# Patient Record
Sex: MALE | Race: ASIAN | NOT HISPANIC OR LATINO | URBAN - METROPOLITAN AREA
[De-identification: names, ages, dates, MRNs, and addresses within clinical notes are randomized per-mention and may not be internally consistent; named-entity substitution may affect disease eponyms.]

---

## 2018-06-01 ENCOUNTER — OUTPATIENT (OUTPATIENT)
Dept: OUTPATIENT SERVICES | Facility: HOSPITAL | Age: 63
LOS: 1 days | End: 2018-06-01
Payer: MEDICAID

## 2018-06-01 PROCEDURE — G9001: CPT

## 2018-06-11 ENCOUNTER — INPATIENT (INPATIENT)
Facility: HOSPITAL | Age: 63
LOS: 2 days | Discharge: HOME | End: 2018-06-14
Attending: HOSPITALIST | Admitting: HOSPITALIST
Payer: MEDICAID

## 2018-06-11 VITALS
DIASTOLIC BLOOD PRESSURE: 81 MMHG | OXYGEN SATURATION: 100 % | HEART RATE: 81 BPM | TEMPERATURE: 100 F | RESPIRATION RATE: 18 BRPM | SYSTOLIC BLOOD PRESSURE: 151 MMHG

## 2018-06-11 DIAGNOSIS — E11.9 TYPE 2 DIABETES MELLITUS WITHOUT COMPLICATIONS: ICD-10-CM

## 2018-06-11 DIAGNOSIS — I10 ESSENTIAL (PRIMARY) HYPERTENSION: ICD-10-CM

## 2018-06-11 DIAGNOSIS — D64.9 ANEMIA, UNSPECIFIED: ICD-10-CM

## 2018-06-11 DIAGNOSIS — Z29.9 ENCOUNTER FOR PROPHYLACTIC MEASURES, UNSPECIFIED: ICD-10-CM

## 2018-06-11 DIAGNOSIS — N17.9 ACUTE KIDNEY FAILURE, UNSPECIFIED: ICD-10-CM

## 2018-06-11 DIAGNOSIS — I63.9 CEREBRAL INFARCTION, UNSPECIFIED: ICD-10-CM

## 2018-06-11 DIAGNOSIS — R04.2 HEMOPTYSIS: ICD-10-CM

## 2018-06-11 DIAGNOSIS — I25.10 ATHEROSCLEROTIC HEART DISEASE OF NATIVE CORONARY ARTERY WITHOUT ANGINA PECTORIS: ICD-10-CM

## 2018-06-11 DIAGNOSIS — J18.9 PNEUMONIA, UNSPECIFIED ORGANISM: ICD-10-CM

## 2018-06-11 DIAGNOSIS — R94.31 ABNORMAL ELECTROCARDIOGRAM [ECG] [EKG]: ICD-10-CM

## 2018-06-11 DIAGNOSIS — E87.1 HYPO-OSMOLALITY AND HYPONATREMIA: ICD-10-CM

## 2018-06-11 LAB
ALBUMIN SERPL ELPH-MCNC: 2.9 G/DL — LOW (ref 3.3–5)
ALP SERPL-CCNC: 70 U/L — SIGNIFICANT CHANGE UP (ref 40–120)
ALT FLD-CCNC: 18 U/L — SIGNIFICANT CHANGE UP (ref 4–41)
APTT BLD: 31 SEC — SIGNIFICANT CHANGE UP (ref 27.5–37.4)
AST SERPL-CCNC: 28 U/L — SIGNIFICANT CHANGE UP (ref 4–40)
BASE EXCESS BLDV CALC-SCNC: -4 MMOL/L — SIGNIFICANT CHANGE UP
BASE EXCESS BLDV CALC-SCNC: -6.5 MMOL/L — SIGNIFICANT CHANGE UP
BASOPHILS # BLD AUTO: 0.02 K/UL — SIGNIFICANT CHANGE UP (ref 0–0.2)
BASOPHILS NFR BLD AUTO: 0.2 % — SIGNIFICANT CHANGE UP (ref 0–2)
BILIRUB SERPL-MCNC: 0.5 MG/DL — SIGNIFICANT CHANGE UP (ref 0.2–1.2)
BLD GP AB SCN SERPL QL: NEGATIVE — SIGNIFICANT CHANGE UP
BLOOD GAS VENOUS - CREATININE: 5.92 MG/DL — HIGH (ref 0.5–1.3)
BUN SERPL-MCNC: 42 MG/DL — HIGH (ref 7–23)
CALCIUM SERPL-MCNC: 6.8 MG/DL — LOW (ref 8.4–10.5)
CHLORIDE BLDV-SCNC: 99 MMOL/L — SIGNIFICANT CHANGE UP (ref 96–108)
CHLORIDE SERPL-SCNC: 87 MMOL/L — LOW (ref 98–107)
CK MB BLD-MCNC: 0.9 — SIGNIFICANT CHANGE UP (ref 0–2.5)
CK MB BLD-MCNC: 4.68 NG/ML — SIGNIFICANT CHANGE UP (ref 1–6.6)
CK MB BLD-MCNC: 7.07 NG/ML — HIGH (ref 1–6.6)
CK SERPL-CCNC: 504 U/L — HIGH (ref 30–200)
CK SERPL-CCNC: 537 U/L — HIGH (ref 30–200)
CO2 SERPL-SCNC: 18 MMOL/L — LOW (ref 22–31)
CREAT SERPL-MCNC: 5.69 MG/DL — HIGH (ref 0.5–1.3)
EOSINOPHIL # BLD AUTO: 0 K/UL — SIGNIFICANT CHANGE UP (ref 0–0.5)
EOSINOPHIL NFR BLD AUTO: 0 % — SIGNIFICANT CHANGE UP (ref 0–6)
GAS PNL BLDV: 118 MMOL/L — CRITICAL LOW (ref 136–146)
GAS PNL BLDV: 119 MMOL/L — CRITICAL LOW (ref 136–146)
GLUCOSE BLDV-MCNC: 224 — HIGH (ref 70–99)
GLUCOSE BLDV-MCNC: 272 — HIGH (ref 70–99)
GLUCOSE SERPL-MCNC: 255 MG/DL — HIGH (ref 70–99)
HCO3 BLDV-SCNC: 19 MMOL/L — LOW (ref 20–27)
HCO3 BLDV-SCNC: 21 MMOL/L — SIGNIFICANT CHANGE UP (ref 20–27)
HCT VFR BLD CALC: 28.7 % — LOW (ref 39–50)
HCT VFR BLDV CALC: 26.6 % — LOW (ref 39–51)
HCT VFR BLDV CALC: 36.4 % — LOW (ref 39–51)
HGB BLD-MCNC: 9.5 G/DL — LOW (ref 13–17)
HGB BLDV-MCNC: 11.8 G/DL — LOW (ref 13–17)
HGB BLDV-MCNC: 8.6 G/DL — LOW (ref 13–17)
IMM GRANULOCYTES # BLD AUTO: 0.03 # — SIGNIFICANT CHANGE UP
IMM GRANULOCYTES NFR BLD AUTO: 0.3 % — SIGNIFICANT CHANGE UP (ref 0–1.5)
INR BLD: 1.11 — SIGNIFICANT CHANGE UP (ref 0.88–1.17)
LACTATE BLDV-MCNC: 0.9 MMOL/L — SIGNIFICANT CHANGE UP (ref 0.5–2)
LYMPHOCYTES # BLD AUTO: 0.62 K/UL — LOW (ref 1–3.3)
LYMPHOCYTES # BLD AUTO: 6.1 % — LOW (ref 13–44)
MCHC RBC-ENTMCNC: 27.2 PG — SIGNIFICANT CHANGE UP (ref 27–34)
MCHC RBC-ENTMCNC: 33.1 % — SIGNIFICANT CHANGE UP (ref 32–36)
MCV RBC AUTO: 82.2 FL — SIGNIFICANT CHANGE UP (ref 80–100)
MONOCYTES # BLD AUTO: 1.04 K/UL — HIGH (ref 0–0.9)
MONOCYTES NFR BLD AUTO: 10.2 % — SIGNIFICANT CHANGE UP (ref 2–14)
NEUTROPHILS # BLD AUTO: 8.44 K/UL — HIGH (ref 1.8–7.4)
NEUTROPHILS NFR BLD AUTO: 83.2 % — HIGH (ref 43–77)
NRBC # FLD: 0 — SIGNIFICANT CHANGE UP
PCO2 BLDV: 32 MMHG — LOW (ref 41–51)
PCO2 BLDV: 33 MMHG — LOW (ref 41–51)
PH BLDV: 7.36 PH — SIGNIFICANT CHANGE UP (ref 7.32–7.43)
PH BLDV: 7.4 PH — SIGNIFICANT CHANGE UP (ref 7.32–7.43)
PHOSPHATE SERPL-MCNC: 3.3 MG/DL — SIGNIFICANT CHANGE UP (ref 2.5–4.5)
PLATELET # BLD AUTO: 128 K/UL — LOW (ref 150–400)
PMV BLD: 11.3 FL — SIGNIFICANT CHANGE UP (ref 7–13)
PO2 BLDV: 31 MMHG — LOW (ref 35–40)
PO2 BLDV: 40 MMHG — SIGNIFICANT CHANGE UP (ref 35–40)
POTASSIUM BLDV-SCNC: 3.4 MMOL/L — SIGNIFICANT CHANGE UP (ref 3.4–4.5)
POTASSIUM BLDV-SCNC: 3.7 MMOL/L — SIGNIFICANT CHANGE UP (ref 3.4–4.5)
POTASSIUM SERPL-MCNC: 3.9 MMOL/L — SIGNIFICANT CHANGE UP (ref 3.5–5.3)
POTASSIUM SERPL-SCNC: 3.9 MMOL/L — SIGNIFICANT CHANGE UP (ref 3.5–5.3)
PROT SERPL-MCNC: 6.8 G/DL — SIGNIFICANT CHANGE UP (ref 6–8.3)
PROTHROM AB SERPL-ACNC: 12.8 SEC — SIGNIFICANT CHANGE UP (ref 9.8–13.1)
RBC # BLD: 3.49 M/UL — LOW (ref 4.2–5.8)
RBC # FLD: 11.6 % — SIGNIFICANT CHANGE UP (ref 10.3–14.5)
REVIEW TO FOLLOW: YES — SIGNIFICANT CHANGE UP
RH IG SCN BLD-IMP: NEGATIVE — SIGNIFICANT CHANGE UP
SAO2 % BLDV: 54.1 % — LOW (ref 60–85)
SAO2 % BLDV: 71.6 % — SIGNIFICANT CHANGE UP (ref 60–85)
SODIUM SERPL-SCNC: 123 MMOL/L — LOW (ref 135–145)
SODIUM SERPL-SCNC: 128 MMOL/L — LOW (ref 135–145)
TROPONIN T SERPL-MCNC: 0.06 NG/ML — SIGNIFICANT CHANGE UP (ref 0–0.06)
TROPONIN T SERPL-MCNC: < 0.06 NG/ML — SIGNIFICANT CHANGE UP (ref 0–0.06)
WBC # BLD: 10.15 K/UL — SIGNIFICANT CHANGE UP (ref 3.8–10.5)
WBC # FLD AUTO: 10.15 K/UL — SIGNIFICANT CHANGE UP (ref 3.8–10.5)

## 2018-06-11 PROCEDURE — 74176 CT ABD & PELVIS W/O CONTRAST: CPT | Mod: 26

## 2018-06-11 PROCEDURE — 99223 1ST HOSP IP/OBS HIGH 75: CPT | Mod: GC

## 2018-06-11 PROCEDURE — 71045 X-RAY EXAM CHEST 1 VIEW: CPT | Mod: 26

## 2018-06-11 PROCEDURE — 71250 CT THORAX DX C-: CPT | Mod: 26

## 2018-06-11 RX ORDER — AZITHROMYCIN 500 MG/1
500 TABLET, FILM COATED ORAL EVERY 24 HOURS
Qty: 0 | Refills: 0 | Status: DISCONTINUED | OUTPATIENT
Start: 2018-06-12 | End: 2018-06-14

## 2018-06-11 RX ORDER — INSULIN LISPRO 100/ML
VIAL (ML) SUBCUTANEOUS
Qty: 0 | Refills: 0 | Status: DISCONTINUED | OUTPATIENT
Start: 2018-06-11 | End: 2018-06-14

## 2018-06-11 RX ORDER — CEFTRIAXONE 500 MG/1
1 INJECTION, POWDER, FOR SOLUTION INTRAMUSCULAR; INTRAVENOUS ONCE
Qty: 0 | Refills: 0 | Status: COMPLETED | OUTPATIENT
Start: 2018-06-11 | End: 2018-06-11

## 2018-06-11 RX ORDER — PIPERACILLIN AND TAZOBACTAM 4; .5 G/20ML; G/20ML
3.38 INJECTION, POWDER, LYOPHILIZED, FOR SOLUTION INTRAVENOUS EVERY 12 HOURS
Qty: 0 | Refills: 0 | Status: DISCONTINUED | OUTPATIENT
Start: 2018-06-11 | End: 2018-06-12

## 2018-06-11 RX ORDER — SIMVASTATIN 20 MG/1
40 TABLET, FILM COATED ORAL AT BEDTIME
Qty: 0 | Refills: 0 | Status: DISCONTINUED | OUTPATIENT
Start: 2018-06-11 | End: 2018-06-14

## 2018-06-11 RX ORDER — ASPIRIN/CALCIUM CARB/MAGNESIUM 324 MG
324 TABLET ORAL ONCE
Qty: 0 | Refills: 0 | Status: COMPLETED | OUTPATIENT
Start: 2018-06-11 | End: 2018-06-11

## 2018-06-11 RX ORDER — LABETALOL HCL 100 MG
300 TABLET ORAL
Qty: 0 | Refills: 0 | Status: DISCONTINUED | OUTPATIENT
Start: 2018-06-11 | End: 2018-06-14

## 2018-06-11 RX ORDER — LABETALOL HCL 100 MG
300 TABLET ORAL
Qty: 0 | Refills: 0 | Status: DISCONTINUED | OUTPATIENT
Start: 2018-06-11 | End: 2018-06-11

## 2018-06-11 RX ORDER — PIPERACILLIN AND TAZOBACTAM 4; .5 G/20ML; G/20ML
4.5 INJECTION, POWDER, LYOPHILIZED, FOR SOLUTION INTRAVENOUS EVERY 8 HOURS
Qty: 0 | Refills: 0 | Status: DISCONTINUED | OUTPATIENT
Start: 2018-06-11 | End: 2018-06-11

## 2018-06-11 RX ORDER — GLUCAGON INJECTION, SOLUTION 0.5 MG/.1ML
1 INJECTION, SOLUTION SUBCUTANEOUS ONCE
Qty: 0 | Refills: 0 | Status: DISCONTINUED | OUTPATIENT
Start: 2018-06-11 | End: 2018-06-14

## 2018-06-11 RX ORDER — DEXTROSE 50 % IN WATER 50 %
25 SYRINGE (ML) INTRAVENOUS ONCE
Qty: 0 | Refills: 0 | Status: DISCONTINUED | OUTPATIENT
Start: 2018-06-11 | End: 2018-06-14

## 2018-06-11 RX ORDER — IPRATROPIUM/ALBUTEROL SULFATE 18-103MCG
3 AEROSOL WITH ADAPTER (GRAM) INHALATION EVERY 6 HOURS
Qty: 0 | Refills: 0 | Status: DISCONTINUED | OUTPATIENT
Start: 2018-06-11 | End: 2018-06-12

## 2018-06-11 RX ORDER — ACETAMINOPHEN 500 MG
650 TABLET ORAL ONCE
Qty: 0 | Refills: 0 | Status: COMPLETED | OUTPATIENT
Start: 2018-06-11 | End: 2018-06-11

## 2018-06-11 RX ORDER — PIPERACILLIN AND TAZOBACTAM 4; .5 G/20ML; G/20ML
3.38 INJECTION, POWDER, LYOPHILIZED, FOR SOLUTION INTRAVENOUS EVERY 8 HOURS
Qty: 0 | Refills: 0 | Status: DISCONTINUED | OUTPATIENT
Start: 2018-06-11 | End: 2018-06-11

## 2018-06-11 RX ORDER — ASPIRIN/CALCIUM CARB/MAGNESIUM 324 MG
81 TABLET ORAL DAILY
Qty: 0 | Refills: 0 | Status: DISCONTINUED | OUTPATIENT
Start: 2018-06-11 | End: 2018-06-13

## 2018-06-11 RX ORDER — SODIUM CHLORIDE 9 MG/ML
1000 INJECTION INTRAMUSCULAR; INTRAVENOUS; SUBCUTANEOUS ONCE
Qty: 0 | Refills: 0 | Status: COMPLETED | OUTPATIENT
Start: 2018-06-11 | End: 2018-06-11

## 2018-06-11 RX ORDER — DEXTROSE 50 % IN WATER 50 %
15 SYRINGE (ML) INTRAVENOUS ONCE
Qty: 0 | Refills: 0 | Status: DISCONTINUED | OUTPATIENT
Start: 2018-06-11 | End: 2018-06-14

## 2018-06-11 RX ORDER — SODIUM CHLORIDE 9 MG/ML
1000 INJECTION, SOLUTION INTRAVENOUS
Qty: 0 | Refills: 0 | Status: DISCONTINUED | OUTPATIENT
Start: 2018-06-11 | End: 2018-06-14

## 2018-06-11 RX ORDER — METOPROLOL TARTRATE 50 MG
25 TABLET ORAL DAILY
Qty: 0 | Refills: 0 | Status: DISCONTINUED | OUTPATIENT
Start: 2018-06-11 | End: 2018-06-11

## 2018-06-11 RX ORDER — METOPROLOL TARTRATE 50 MG
12.5 TABLET ORAL
Qty: 0 | Refills: 0 | Status: DISCONTINUED | OUTPATIENT
Start: 2018-06-12 | End: 2018-06-14

## 2018-06-11 RX ORDER — SODIUM CHLORIDE 9 MG/ML
1000 INJECTION INTRAMUSCULAR; INTRAVENOUS; SUBCUTANEOUS
Qty: 0 | Refills: 0 | Status: DISCONTINUED | OUTPATIENT
Start: 2018-06-11 | End: 2018-06-11

## 2018-06-11 RX ORDER — METOPROLOL TARTRATE 50 MG
12.5 TABLET ORAL
Qty: 0 | Refills: 0 | Status: DISCONTINUED | OUTPATIENT
Start: 2018-06-11 | End: 2018-06-11

## 2018-06-11 RX ORDER — INSULIN GLARGINE 100 [IU]/ML
15 INJECTION, SOLUTION SUBCUTANEOUS AT BEDTIME
Qty: 0 | Refills: 0 | Status: DISCONTINUED | OUTPATIENT
Start: 2018-06-11 | End: 2018-06-14

## 2018-06-11 RX ORDER — PIPERACILLIN AND TAZOBACTAM 4; .5 G/20ML; G/20ML
3.38 INJECTION, POWDER, LYOPHILIZED, FOR SOLUTION INTRAVENOUS ONCE
Qty: 0 | Refills: 0 | Status: COMPLETED | OUTPATIENT
Start: 2018-06-11 | End: 2018-06-11

## 2018-06-11 RX ORDER — SODIUM BICARBONATE 1 MEQ/ML
650 SYRINGE (ML) INTRAVENOUS THREE TIMES A DAY
Qty: 0 | Refills: 0 | Status: DISCONTINUED | OUTPATIENT
Start: 2018-06-11 | End: 2018-06-13

## 2018-06-11 RX ORDER — DEXTROSE 50 % IN WATER 50 %
12.5 SYRINGE (ML) INTRAVENOUS ONCE
Qty: 0 | Refills: 0 | Status: DISCONTINUED | OUTPATIENT
Start: 2018-06-11 | End: 2018-06-14

## 2018-06-11 RX ORDER — SODIUM CHLORIDE 9 MG/ML
1000 INJECTION INTRAMUSCULAR; INTRAVENOUS; SUBCUTANEOUS
Qty: 0 | Refills: 0 | Status: DISCONTINUED | OUTPATIENT
Start: 2018-06-11 | End: 2018-06-12

## 2018-06-11 RX ORDER — ACETAMINOPHEN 500 MG
650 TABLET ORAL EVERY 6 HOURS
Qty: 0 | Refills: 0 | Status: DISCONTINUED | OUTPATIENT
Start: 2018-06-11 | End: 2018-06-14

## 2018-06-11 RX ORDER — AZITHROMYCIN 500 MG/1
500 TABLET, FILM COATED ORAL ONCE
Qty: 0 | Refills: 0 | Status: COMPLETED | OUTPATIENT
Start: 2018-06-11 | End: 2018-06-11

## 2018-06-11 RX ADMIN — Medication 0.1 MILLIGRAM(S): at 23:53

## 2018-06-11 RX ADMIN — CEFTRIAXONE 100 GRAM(S): 500 INJECTION, POWDER, FOR SOLUTION INTRAMUSCULAR; INTRAVENOUS at 13:54

## 2018-06-11 RX ADMIN — SODIUM CHLORIDE 100 MILLILITER(S): 9 INJECTION INTRAMUSCULAR; INTRAVENOUS; SUBCUTANEOUS at 23:53

## 2018-06-11 RX ADMIN — Medication 324 MILLIGRAM(S): at 13:54

## 2018-06-11 RX ADMIN — Medication 650 MILLIGRAM(S): at 17:29

## 2018-06-11 RX ADMIN — SODIUM CHLORIDE 100 MILLILITER(S): 9 INJECTION INTRAMUSCULAR; INTRAVENOUS; SUBCUTANEOUS at 20:12

## 2018-06-11 RX ADMIN — SIMVASTATIN 40 MILLIGRAM(S): 20 TABLET, FILM COATED ORAL at 23:53

## 2018-06-11 RX ADMIN — PIPERACILLIN AND TAZOBACTAM 200 GRAM(S): 4; .5 INJECTION, POWDER, LYOPHILIZED, FOR SOLUTION INTRAVENOUS at 19:07

## 2018-06-11 RX ADMIN — AZITHROMYCIN 250 MILLIGRAM(S): 500 TABLET, FILM COATED ORAL at 14:30

## 2018-06-11 RX ADMIN — Medication 650 MILLIGRAM(S): at 23:53

## 2018-06-11 RX ADMIN — SODIUM CHLORIDE 666.67 MILLILITER(S): 9 INJECTION INTRAMUSCULAR; INTRAVENOUS; SUBCUTANEOUS at 12:13

## 2018-06-11 RX ADMIN — Medication 650 MILLIGRAM(S): at 12:13

## 2018-06-11 RX ADMIN — SODIUM CHLORIDE 100 MILLILITER(S): 9 INJECTION INTRAMUSCULAR; INTRAVENOUS; SUBCUTANEOUS at 19:07

## 2018-06-11 RX ADMIN — SODIUM CHLORIDE 1000 MILLILITER(S): 9 INJECTION INTRAMUSCULAR; INTRAVENOUS; SUBCUTANEOUS at 14:55

## 2018-06-11 NOTE — ED PROVIDER NOTE - PROGRESS NOTE DETAILS
Attending Rodriguez: I initially saw patient in [facility], was subsequently transferred to other room of department. 63M pmh HTN, CAD s/p stent x 3 (2015), CVA x 3 (no residual deficits), IDDM, presetns with hematemesis x 3 days, episode of chest pain yesterday, resolved, and R lower back pain radiating down R leg. Pt states he did have multiple episodes of hemoptysis, has felt febrile over last few days, daughter believes he may have had weight loss. Pt did immig[place] to [place] from [facility] in [place], did have abnormal TB testing in past. unsure if vaccinated. pt to be moved to negative pressure room in [facility] section. Care to be assumed by Dr. Alvarez. - Zeus Frias MD Attending Rodriguez: I initially saw patient in [facility], was subsequently transferred to other room of department. 63M pmh HTN, CAD s/p stent x 3 (2015), CVA x 3 (no residual deficits), IDDM, presents with hematemesis x 3 days, episode of chest pain yesterday, resolved, and R lower back/flank pain radiating down R leg. Pt states he did have multiple episodes of hemoptysis, has felt febrile over last few days, daughter believes he may have had weight loss. Pt did immig[place] to [place] from [facility] in [place], did have abnormal TB testing in past. unsure if vaccinated. pt to be moved to negative pressure room in [facility] section due to concern for potential TB. Care to be assumed by Dr. Alvarez. Pt signed out to Dr. Alvarez. - Zeus Frias MD spoke with patients pcp office. no prior ekg available to compare. was sent lab work from january. cxr with left hilar infiltrate. patient and family updated on results. will admit. - resident Robb Curry

## 2018-06-11 NOTE — ED ADULT NURSE REASSESSMENT NOTE - NS ED NURSE REASSESS COMMENT FT1
Patient reporting active CP at this time, MAR advised, repeat Troponin sent and repeat ECG performed as per MAR, no further order, will continue to monitor.

## 2018-06-11 NOTE — CONSULT NOTE ADULT - SUBJECTIVE AND OBJECTIVE BOX
HISTORY OF PRESENT ILLNESS:  Patient is a 63y old  Male who presents with a chief complaint of Hemoptysis, r/o TB (11 Jun 2018 16:41)    HPI:  Pt is a 63M with PMHx of DMII, CKD (unknown baseline Cr), CAD s/p stent x3 in 2015, CVA x3 without residual deficits, who presents with shortness of breath and hemoptysis for 3 months, now with fever, chills, and worsening fatigue. In EKG showed TWI in V4- V6, I and aVL. CK and CKMB elevated. CXR showed asymmetric consolidation of left midlung concerning for pna vs pulmonary hemorrhage. Cardiology consulted. On exam, patient denies any recent chest pain or any active chest pain. He endorses new fever, chills, back pain and worsening fatigue in addition to his hemoptysis that he has been experiencing x3 months.  He deies   nausea, vomiting, diarrhea, constipation, abdominal pain, bladder and bowel problems, leg swelling, sick contact, recent travel. Vitals: BP Temp 99.9, /62, HR 71, 02 sat 100 on RA.        Allergies  No Known Allergies      	    MEDICATIONS  cloNIDine 0.1 milliGRAM(s) Oral daily  labetalol 300 milliGRAM(s) Oral two times a day  azithromycin  IVPB 500 milliGRAM(s) IV Intermittent every 24 hours  piperacillin/tazobactam IVPB. 3.375 Gram(s) IV Intermittent every 12 hours  ALBUTerol/ipratropium for Nebulization 3 milliLiter(s) Nebulizer every 6 hours  acetaminophen   Tablet 650 milliGRAM(s) Oral every 6 hours PRN  dextrose 40% Gel 15 Gram(s) Oral once PRN  dextrose 50% Injectable 12.5 Gram(s) IV Push once  dextrose 50% Injectable 25 Gram(s) IV Push once  dextrose 50% Injectable 25 Gram(s) IV Push once  glucagon  Injectable 1 milliGRAM(s) IntraMuscular once PRN  insulin glargine Injectable (LANTUS) 15 Unit(s) SubCutaneous at bedtime  insulin lispro (HumaLOG) corrective regimen sliding scale   SubCutaneous three times a day before meals  simvastatin 40 milliGRAM(s) Oral at bedtime  dextrose 5%. 1000 milliLiter(s) IV Continuous <Continuous>  sodium bicarbonate 650 milliGRAM(s) Oral three times a day  sodium chloride 0.9%. 1000 milliLiter(s) IV Continuous <Continuous>        PAST MEDICAL & SURGICAL HISTORY:  Coronary artery disease  CKD (chronic kidney disease)  HTN (hypertension)  Diabetes  No significant past surgical history      FAMILY HISTORY:  No pertinent family history in first degree relatives      SOCIAL HISTORY  From [facility], moved her ~10 yrs ago, works as a     SUBSTANCE USE  Tobacco Usage:  ( ) never smoked   (x ) former smoker  ( ) current smoker; Packs per day:   Alcohol Usage: ( ) none  (x ) occasional ( ) 2-3 times a week ( ) daily; Last drink:   Recreational drugs (x ) None    REVIEW OF SYSTEMS:    CONSTITUTIONAL: + fevers, No chills,+ fatigue, No weight gain  EYES: No vision changes   ENT: No congestion, No ear pain, No sore throat. +hemoptysis  NECK: No pain, No stiffness  RESPIRATORY: + shortness of breath, No cough, No wheezing, No hemoptysis  CARDIOVASCULAR: No chest pain. No palpitations, + AMEZCUA, No orthopnea, No paroxysmal nocturnal dyspnea, No pleuritic pain  GASTROINTESTINAL: No abdominal pain, No nausea, No vomiting, No hematemesis, No diarrhea No constipation. No melena  GENITOURINARY: No dysuria, No frequency, No incontinence, No hematuria  NEUROLOGICAL: No dizziness, No lightheadedness, No syncope, No LOC, No headache, No numbness or weakness  MUSCULOSKELETAL: No joint pain, No joint swelling., +lower back pain  PSYCHIATRIC: No anxiety, No depression  SKIN: No diaphoresis. No itching, No rashes, No pressure ulcers    All other review of systems is negative unless indicated above.    VITAL SIGNS  T(C): 37.4 (06-11-18 @ 19:08), Max: 39.8 (06-11-18 @ 11:43)  HR: 75 (06-11-18 @ 19:08) (73 - 92)  BP: 158/73 (06-11-18 @ 19:08) (138/64 - 166/76)  RR: 20 (06-11-18 @ 19:08) (18 - 20)  SpO2: 98% (06-11-18 @ 19:08) (98% - 100%)  Wt(kg): --    PHYSICAL EXAM:    Appearance: NAD, no distress  HEENT:   Normal oral mucosa, PERRL, EOMI  Cardiovascular: Regular rate and rhythm, Normal S1 S2, No JVD, No murmurs, No edema  Respiratory: + LLB rales, No rhonchi, No wheezing. No tenderness to palpation  Gastrointestinal:  Soft, Non-tender, + BS  Neurologic: Non-focal, A&Ox3  Skin: Warm and dry, No rashes, No ecchymoses, No cyanosis  Extremities: No clubbing, cyanosis or edema  Vascular: Peripheral pulses palpable 2+ bilaterally  Psychiatry: Mood & affect appropriate        LABORATORY VALUES	 	                          9.5    10.15 )-----------( 128      ( 11 Jun 2018 11:50 )             28.7       06-11    128<L>  |  x   |  x   ----------------------------<  x   x    |  x   |  x   06-11    123<L>  |  87<L>  |  42<H>  ----------------------------<  255<H>  3.9   |  18<L>  |  5.69<H>    Ca    6.8<L>      11 Jun 2018 11:50  Phos  3.3     06-11    TPro  6.8  /  Alb  2.9<L>  /  TBili  0.5  /  DBili  x   /  AST  28  /  ALT  18  /  AlkPhos  70  06-11    LIVER FUNCTIONS - ( 11 Jun 2018 11:50 )  Alb: 2.9 g/dL / Pro: 6.8 g/dL / ALK PHOS: 70 u/L / ALT: 18 u/L / AST: 28 u/L / GGT: x           Prothrombin Time, Plasma: 12.8 SEC (06-11 @ 11:50)      CARDIAC MARKERS:  Creatine Kinase, Serum: 537 u/L (06-11 @ 17:21)  Creatine Kinase, Serum: 504 u/L (06-11 @ 11:50)    CKMB: 7.07 ng/mL (06-11 @ 17:21)  CKMB: 4.68 ng/mL (06-11 @ 11:50)    CKMB Relative Index: 0.9 (06-11 @ 11:50)    Troponin T, Serum: < 0.06 ng/mL (06-11 @ 17:21)  Troponin T, Serum: 0.06 ng/mL (06-11 @ 11:50)          Blood Gas Venous - Lactate: 0.9 mmol/L (06-11 @ 15:36)              CAPILLARY BLOOD GLUCOSE      POCT Blood Glucose.: 146 mg/dL (11 Jun 2018 19:17)          TELEMETRY: 	    ECG:  	  RADIOLOGY:  OTHER: 	    PREVIOUS DIAGNOSTIC TESTING:    [ ] Echocardiogram: pending    	    ASSESSMENT AND PLAN  Pt is a 63M with PMHx of DMII, CKD (unknown baseline Cr), CAD s/p stent x3 in 2015, CVA x3 without residual deficits p/w shortness of breath, hemoptysis x3, with now presenting worsening cardiology consulted for EKG changes in the setting of elevated CK and CKMB.  Patient denies current and active chest pain.  Patient with TWI in with elevated CK and CKMB.  Patient has current hemoptysis.     PLAN  1. shortness of breath- patient with shortness of breath x3 months, denies any current chest pain or recent chest pain occurring at  rest or exertion.  Patient also with hemoptysis. given history of stent, EKG with evidence of ischemia, not meeting STEMI criteria. Patient has no previous EKG in medical record. Elevated CK, without concurrent elevation in MB fraction, most recent set of troponin is negative in the setting of creatine >5. Given no active chest pain and unclear pulmonary status in setting of hemoptysis, would continue home asa, and hold for further antiplatelet therapy and heparin for now.   monitor on telemetry  trend CE every 8rs  serial EKG  if patient experienced chest pain, call [phone] cardiology immediately.  consider urgent pulmonary consult  appreciate pulmonary reccs re: hemoptysis and further AC and platelet therapy.  given fever,  left shift, and CXR concerning for pulmonary hemorrhage would consider further workup by primary team vs pulmonary /MICU  given active pulmonary and renal issue, would consider MICU eval  will continue to follow w/ you    # [id number] HISTORY OF PRESENT ILLNESS:  Patient is a 63y old  Male who presents with a chief complaint of Hemoptysis, r/o TB (11 Jun 2018 16:41)    HPI:  Pt is a 63M with PMHx of DMII, CKD (unknown baseline Cr), CAD s/p stent x3 in 2015, CVA x3 without residual deficits, who presents with shortness of breath and hemoptysis for 3 months, now with fever, chills, and worsening fatigue. In EKG showed TWI in V4- V6, I and aVL. CK and CKMB elevated. CXR showed asymmetric consolidation of left midlung concerning for pna vs pulmonary hemorrhage. Cardiology consulted. On exam, patient denies any recent chest pain or any active chest pain. He endorses new fever, chills, back pain and worsening fatigue in addition to his hemoptysis and shortness of breath that he has been experiencing x3 months.  He denies  nausea, vomiting, diarrhea, constipation, abdominal pain, bladder and bowel problems, leg swelling, sick contact, recent travel. Vitals: BP Temp 99.9, /62, HR 71, 02 sat 100 on RA.        Allergies  No Known Allergies      	    MEDICATIONS  cloNIDine 0.1 milliGRAM(s) Oral daily  labetalol 300 milliGRAM(s) Oral two times a day  azithromycin  IVPB 500 milliGRAM(s) IV Intermittent every 24 hours  piperacillin/tazobactam IVPB. 3.375 Gram(s) IV Intermittent every 12 hours  ALBUTerol/ipratropium for Nebulization 3 milliLiter(s) Nebulizer every 6 hours  acetaminophen   Tablet 650 milliGRAM(s) Oral every 6 hours PRN  dextrose 40% Gel 15 Gram(s) Oral once PRN  dextrose 50% Injectable 12.5 Gram(s) IV Push once  dextrose 50% Injectable 25 Gram(s) IV Push once  dextrose 50% Injectable 25 Gram(s) IV Push once  glucagon  Injectable 1 milliGRAM(s) IntraMuscular once PRN  insulin glargine Injectable (LANTUS) 15 Unit(s) SubCutaneous at bedtime  insulin lispro (HumaLOG) corrective regimen sliding scale   SubCutaneous three times a day before meals  simvastatin 40 milliGRAM(s) Oral at bedtime  dextrose 5%. 1000 milliLiter(s) IV Continuous <Continuous>  sodium bicarbonate 650 milliGRAM(s) Oral three times a day  sodium chloride 0.9%. 1000 milliLiter(s) IV Continuous <Continuous>        PAST MEDICAL & SURGICAL HISTORY:  Coronary artery disease  CKD (chronic kidney disease)  HTN (hypertension)  Diabetes  No significant past surgical history      FAMILY HISTORY:  No pertinent family history in first degree relatives      SOCIAL HISTORY  From [facility], moved her ~10 yrs ago, works as a     SUBSTANCE USE  Tobacco Usage:  ( ) never smoked   (x ) former smoker  ( ) current smoker; Packs per day:   Alcohol Usage: ( ) none  (x ) occasional ( ) 2-3 times a week ( ) daily; Last drink:   Recreational drugs (x ) None    REVIEW OF SYSTEMS:    CONSTITUTIONAL: + fevers, No chills,+ fatigue, No weight gain  EYES: No vision changes   ENT: No congestion, No ear pain, No sore throat. +hemoptysis  NECK: No pain, No stiffness  RESPIRATORY: + shortness of breath, No cough, No wheezing, No hemoptysis  CARDIOVASCULAR: No chest pain. No palpitations, + AMEZCUA, No orthopnea, No paroxysmal nocturnal dyspnea, No pleuritic pain  GASTROINTESTINAL: No abdominal pain, No nausea, No vomiting, No hematemesis, No diarrhea No constipation. No melena  GENITOURINARY: No dysuria, No frequency, No incontinence, No hematuria  NEUROLOGICAL: No dizziness, No lightheadedness, No syncope, No LOC, No headache, No numbness or weakness  MUSCULOSKELETAL: No joint pain, No joint swelling., +lower back pain  PSYCHIATRIC: No anxiety, No depression  SKIN: No diaphoresis. No itching, No rashes, No pressure ulcers    All other review of systems is negative unless indicated above.    VITAL SIGNS  T(C): 37.4 (06-11-18 @ 19:08), Max: 39.8 (06-11-18 @ 11:43)  HR: 75 (06-11-18 @ 19:08) (73 - 92)  BP: 158/73 (06-11-18 @ 19:08) (138/64 - 166/76)  RR: 20 (06-11-18 @ 19:08) (18 - 20)  SpO2: 98% (06-11-18 @ 19:08) (98% - 100%)  Wt(kg): --    PHYSICAL EXAM:    Appearance: NAD, no distress  HEENT:   Normal oral mucosa, PERRL, EOMI  Cardiovascular: Regular rate and rhythm, Normal S1 S2, No JVD, No murmurs, No edema  Respiratory: + LLB rales, No rhonchi, No wheezing. No tenderness to palpation  Gastrointestinal:  Soft, Non-tender, + BS  Neurologic: Non-focal, A&Ox3  Skin: Warm and dry, No rashes, No ecchymoses, No cyanosis  Extremities: No clubbing, cyanosis or edema  Vascular: Peripheral pulses palpable 2+ bilaterally  Psychiatry: Mood & affect appropriate        LABORATORY VALUES	 	                          9.5    10.15 )-----------( 128      ( 11 Jun 2018 11:50 )             28.7       06-11    128<L>  |  x   |  x   ----------------------------<  x   x    |  x   |  x   06-11    123<L>  |  87<L>  |  42<H>  ----------------------------<  255<H>  3.9   |  18<L>  |  5.69<H>    Ca    6.8<L>      11 Jun 2018 11:50  Phos  3.3     06-11    TPro  6.8  /  Alb  2.9<L>  /  TBili  0.5  /  DBili  x   /  AST  28  /  ALT  18  /  AlkPhos  70  06-11    LIVER FUNCTIONS - ( 11 Jun 2018 11:50 )  Alb: 2.9 g/dL / Pro: 6.8 g/dL / ALK PHOS: 70 u/L / ALT: 18 u/L / AST: 28 u/L / GGT: x           Prothrombin Time, Plasma: 12.8 SEC (06-11 @ 11:50)      CARDIAC MARKERS:  Creatine Kinase, Serum: 537 u/L (06-11 @ 17:21)  Creatine Kinase, Serum: 504 u/L (06-11 @ 11:50)    CKMB: 7.07 ng/mL (06-11 @ 17:21)  CKMB: 4.68 ng/mL (06-11 @ 11:50)    CKMB Relative Index: 0.9 (06-11 @ 11:50)    Troponin T, Serum: < 0.06 ng/mL (06-11 @ 17:21)  Troponin T, Serum: 0.06 ng/mL (06-11 @ 11:50)          Blood Gas Venous - Lactate: 0.9 mmol/L (06-11 @ 15:36)              CAPILLARY BLOOD GLUCOSE      POCT Blood Glucose.: 146 mg/dL (11 Jun 2018 19:17)          TELEMETRY: 	    ECG:  	  RADIOLOGY:  OTHER: 	    PREVIOUS DIAGNOSTIC TESTING:    [ ] Echocardiogram: pending    	    ASSESSMENT AND PLAN  Pt is a 63M with PMHx of DMII, CKD (unknown baseline Cr), CAD s/p stent x3 in 2015, CVA x3 without residual deficits p/w shortness of breath, hemoptysis x3, with now presenting worsening cardiology consulted for EKG changes in the setting of elevated CK and CKMB.  Patient denies current and active chest pain.  Patient with TWI in with elevated CK and CKMB.  Patient has current hemoptysis.     PLAN  1. shortness of breath- patient with shortness of breath x3 months, denies any current chest pain or recent chest pain occurring at  rest or exertion.  Patient also with hemoptysis. given history of stent, EKG with evidence of ischemia, not meeting STEMI criteria. Patient has no previous EKG in medical record. Elevated CK, without concurrent elevation in MB fraction, most recent set of troponin is negative in the setting of creatine >5. Given no active chest pain and unclear pulmonary status in setting of hemoptysis, would continue home asa, and hold for further antiplatelet therapy and heparin for now.   monitor on telemetry  trend CE every 8rs  serial EKG  if patient experienced chest pain, call [phone] cardiology immediately.  consider urgent pulmonary consult  appreciate pulmonary reccs re: hemoptysis and further AC and platelet therapy.  given fever,  left shift, and CXR concerning for pulmonary hemorrhage would consider further workup by primary team vs pulmonary /MICU  given active pulmonary and renal issue, would consider MICU eval  will continue to follow w/ you    # [id number] HISTORY OF PRESENT ILLNESS:  Patient is a 63y old  Male who presents with a chief complaint of Hemoptysis, r/o TB (11 Jun 2018 16:41)    HPI:  Pt is a 63M with PMHx of DMII, CKD (unknown baseline Cr), CAD s/p stent x3 in 2015, CVA x3 without residual deficits, who presents with shortness of breath and hemoptysis for 3 months, now with fever, chills, and worsening fatigue. In EKG showed TWI in V4- V6, I and aVL. CK and CKMB elevated. CXR showed asymmetric consolidation of left midlung concerning for pna vs pulmonary hemorrhage. Cardiology consulted. On exam, patient denies any recent chest pain or any active chest pain. He endorses new fever, chills, back pain and worsening fatigue in addition to his hemoptysis and shortness of breath that he has been experiencing x3 months.  He denies  nausea, vomiting, diarrhea, constipation, abdominal pain, bladder and bowel problems, leg swelling, sick contact, recent travel. Vitals: BP Temp 99.9, /62, HR 71, 02 sat 100 on RA.        Allergies  No Known Allergies      	    MEDICATIONS  cloNIDine 0.1 milliGRAM(s) Oral daily  labetalol 300 milliGRAM(s) Oral two times a day  azithromycin  IVPB 500 milliGRAM(s) IV Intermittent every 24 hours  piperacillin/tazobactam IVPB. 3.375 Gram(s) IV Intermittent every 12 hours  ALBUTerol/ipratropium for Nebulization 3 milliLiter(s) Nebulizer every 6 hours  acetaminophen   Tablet 650 milliGRAM(s) Oral every 6 hours PRN  dextrose 40% Gel 15 Gram(s) Oral once PRN  dextrose 50% Injectable 12.5 Gram(s) IV Push once  dextrose 50% Injectable 25 Gram(s) IV Push once  dextrose 50% Injectable 25 Gram(s) IV Push once  glucagon  Injectable 1 milliGRAM(s) IntraMuscular once PRN  insulin glargine Injectable (LANTUS) 15 Unit(s) SubCutaneous at bedtime  insulin lispro (HumaLOG) corrective regimen sliding scale   SubCutaneous three times a day before meals  simvastatin 40 milliGRAM(s) Oral at bedtime  dextrose 5%. 1000 milliLiter(s) IV Continuous <Continuous>  sodium bicarbonate 650 milliGRAM(s) Oral three times a day  sodium chloride 0.9%. 1000 milliLiter(s) IV Continuous <Continuous>        PAST MEDICAL & SURGICAL HISTORY:  Coronary artery disease  CKD (chronic kidney disease)  HTN (hypertension)  Diabetes  No significant past surgical history      FAMILY HISTORY:  No pertinent family history in first degree relatives      SOCIAL HISTORY  From [facility], moved her ~10 yrs ago, works as a     SUBSTANCE USE  Tobacco Usage:  ( ) never smoked   (x ) former smoker  ( ) current smoker; Packs per day:   Alcohol Usage: ( ) none  (x ) occasional ( ) 2-3 times a week ( ) daily; Last drink:   Recreational drugs (x ) None    REVIEW OF SYSTEMS:    CONSTITUTIONAL: + fevers, No chills,+ fatigue, No weight gain  EYES: No vision changes   ENT: No congestion, No ear pain, No sore throat. +hemoptysis  NECK: No pain, No stiffness  RESPIRATORY: + shortness of breath, No cough, No wheezing, No hemoptysis  CARDIOVASCULAR: No chest pain. No palpitations, + AMEZCUA, No orthopnea, No paroxysmal nocturnal dyspnea, No pleuritic pain  GASTROINTESTINAL: No abdominal pain, No nausea, No vomiting, No hematemesis, No diarrhea No constipation. No melena  GENITOURINARY: No dysuria, No frequency, No incontinence, No hematuria  NEUROLOGICAL: No dizziness, No lightheadedness, No syncope, No LOC, No headache, No numbness or weakness  MUSCULOSKELETAL: No joint pain, No joint swelling., +lower back pain  PSYCHIATRIC: No anxiety, No depression  SKIN: No diaphoresis. No itching, No rashes, No pressure ulcers    All other review of systems is negative unless indicated above.    VITAL SIGNS  T(C): 37.4 (06-11-18 @ 19:08), Max: 39.8 (06-11-18 @ 11:43)  HR: 75 (06-11-18 @ 19:08) (73 - 92)  BP: 158/73 (06-11-18 @ 19:08) (138/64 - 166/76)  RR: 20 (06-11-18 @ 19:08) (18 - 20)  SpO2: 98% (06-11-18 @ 19:08) (98% - 100%)  Wt(kg): --    PHYSICAL EXAM:    Appearance: NAD, no distress  HEENT:   Normal oral mucosa, PERRL, EOMI  Cardiovascular: Regular rate and rhythm, Normal S1 S2, No JVD, No murmurs, No edema  Respiratory: + LLB rales, No rhonchi, No wheezing. No tenderness to palpation  Gastrointestinal:  Soft, Non-tender, + BS  Neurologic: Non-focal, A&Ox3  Skin: Warm and dry, No rashes, No ecchymoses, No cyanosis  Extremities: No clubbing, cyanosis or edema  Vascular: Peripheral pulses palpable 2+ bilaterally  Psychiatry: Mood & affect appropriate        LABORATORY VALUES	 	                          9.5    10.15 )-----------( 128      ( 11 Jun 2018 11:50 )             28.7       06-11    128<L>  |  x   |  x   ----------------------------<  x   x    |  x   |  x   06-11    123<L>  |  87<L>  |  42<H>  ----------------------------<  255<H>  3.9   |  18<L>  |  5.69<H>    Ca    6.8<L>      11 Jun 2018 11:50  Phos  3.3     06-11    TPro  6.8  /  Alb  2.9<L>  /  TBili  0.5  /  DBili  x   /  AST  28  /  ALT  18  /  AlkPhos  70  06-11    LIVER FUNCTIONS - ( 11 Jun 2018 11:50 )  Alb: 2.9 g/dL / Pro: 6.8 g/dL / ALK PHOS: 70 u/L / ALT: 18 u/L / AST: 28 u/L / GGT: x           Prothrombin Time, Plasma: 12.8 SEC (06-11 @ 11:50)      CARDIAC MARKERS:  Creatine Kinase, Serum: 537 u/L (06-11 @ 17:21)  Creatine Kinase, Serum: 504 u/L (06-11 @ 11:50)    CKMB: 7.07 ng/mL (06-11 @ 17:21)  CKMB: 4.68 ng/mL (06-11 @ 11:50)    CKMB Relative Index: 0.9 (06-11 @ 11:50)    Troponin T, Serum: < 0.06 ng/mL (06-11 @ 17:21)  Troponin T, Serum: 0.06 ng/mL (06-11 @ 11:50)          Blood Gas Venous - Lactate: 0.9 mmol/L (06-11 @ 15:36)              CAPILLARY BLOOD GLUCOSE      POCT Blood Glucose.: 146 mg/dL (11 Jun 2018 19:17)          TELEMETRY: 	NSR    ECG:  	NSR , TW IV4- V6, I and aVL  RADIOLOGY: < from: CT Chest No Cont (06.11.18 @ 18:40) >  INTERPRETATION:  Left lower lobe consolidation.    No acute abdominal pathology.    < from: Xray Chest 1 View- PORTABLE-Urgent (06.11.18 @ 13:10) >  IMPRESSION:     Asymmetric consolidation left midlung, possibly pneumonia or pulmonary   hemorrhage.      PREVIOUS DIAGNOSTIC TESTING:    [ ] Echocardiogram: pending  	  ASSESSMENT AND PLAN  Pt is a 63M with PMHx of DMII, CKD (unknown baseline Cr), CAD s/p stent x3 in 2015, CVA x3 without residual deficits p/w shortness of breath, hemoptysis x3, with now presenting worsening fatigue and new fever and chills. Cardiology consulted for EKG changes in the setting of elevated CK and CKMB.  Patient denies current and active chest pain.  Patient with TWI in V4- V6, I and aVL with elevated CK and CKMB.  Patient has current hemoptysis.     PLAN  1. shortness of breath- patient with shortness of breath x3 months, denies any current chest pain or recent chest pain occurring at  rest or exertion.  Patient also with hemoptysis. given history of stent, EKG with evidence of ischemia, not meeting STEMI criteria. Patient has no previous EKG in medical record. Elevated CK, without concurrent elevation in MB fraction, most recent set of troponin is negative in the setting of creatine >5. Given no active chest pain and unclear pulmonary status in setting of hemoptysis, would continue home asa, and hold for further antiplatelet therapy and heparin for now.   monitor on telemetry  trend CE every 8rs  serial EKG  if patient experienced chest pain, call [phone] cardiology immediately.  consider urgent pulmonary consult  appreciate pulmonary reccs re: hemoptysis and further AC and platelet therapy.  given fever,  left shift, and CXR concerning for pulmonary hemorrhage would consider further workup by primary team vs pulmonary /MICU  given active pulmonary and renal issue, would consider MICU eval  will continue to follow w/ you    # [id number] HISTORY OF PRESENT ILLNESS:  Patient is a 63y old  Male who presents with a chief complaint of Hemoptysis, r/o TB (11 Jun 2018 16:41)    HPI:  Pt is a 63M with PMHx of DMII, CKD (unknown baseline Cr), CAD s/p stent x3 in 2015, CVA x3 without residual deficits, who presents with shortness of breath and hemoptysis for 3 months, now with fever, chills, and worsening fatigue. In EKG showed TWI in V4- V6, I and aVL. CK and CKMB elevated. CXR showed asymmetric consolidation of left midlung concerning for pna vs pulmonary hemorrhage. Cardiology consulted. On exam, patient denies any recent chest pain or any active chest pain. He endorses new fever, chills, back pain and worsening fatigue in addition to his hemoptysis and shortness of breath that he has been experiencing x3 months.  He denies  nausea, vomiting, diarrhea, constipation, abdominal pain, bladder and bowel problems, leg swelling, sick contact, recent travel. Vitals: BP Temp 99.9, /62, HR 71, 02 sat 100 on RA.        Allergies  No Known Allergies      	    MEDICATIONS  cloNIDine 0.1 milliGRAM(s) Oral daily  labetalol 300 milliGRAM(s) Oral two times a day  azithromycin  IVPB 500 milliGRAM(s) IV Intermittent every 24 hours  piperacillin/tazobactam IVPB. 3.375 Gram(s) IV Intermittent every 12 hours  ALBUTerol/ipratropium for Nebulization 3 milliLiter(s) Nebulizer every 6 hours  acetaminophen   Tablet 650 milliGRAM(s) Oral every 6 hours PRN  dextrose 40% Gel 15 Gram(s) Oral once PRN  dextrose 50% Injectable 12.5 Gram(s) IV Push once  dextrose 50% Injectable 25 Gram(s) IV Push once  dextrose 50% Injectable 25 Gram(s) IV Push once  glucagon  Injectable 1 milliGRAM(s) IntraMuscular once PRN  insulin glargine Injectable (LANTUS) 15 Unit(s) SubCutaneous at bedtime  insulin lispro (HumaLOG) corrective regimen sliding scale   SubCutaneous three times a day before meals  simvastatin 40 milliGRAM(s) Oral at bedtime  dextrose 5%. 1000 milliLiter(s) IV Continuous <Continuous>  sodium bicarbonate 650 milliGRAM(s) Oral three times a day  sodium chloride 0.9%. 1000 milliLiter(s) IV Continuous <Continuous>        PAST MEDICAL & SURGICAL HISTORY:  Coronary artery disease  CKD (chronic kidney disease)  HTN (hypertension)  Diabetes  No significant past surgical history      FAMILY HISTORY:  No pertinent family history in first degree relatives      SOCIAL HISTORY  From [facility], moved her ~10 yrs ago, works as a     SUBSTANCE USE  Tobacco Usage:  ( ) never smoked   (x ) former smoker  ( ) current smoker; Packs per day:   Alcohol Usage: ( ) none  (x ) occasional ( ) 2-3 times a week ( ) daily; Last drink:   Recreational drugs (x ) None    REVIEW OF SYSTEMS:    CONSTITUTIONAL: + fevers, No chills,+ fatigue, No weight gain  EYES: No vision changes   ENT: No congestion, No ear pain, No sore throat. +hemoptysis  NECK: No pain, No stiffness  RESPIRATORY: + shortness of breath, No cough, No wheezing, No hemoptysis  CARDIOVASCULAR: No chest pain. No palpitations, + AMEZCUA, No orthopnea, No paroxysmal nocturnal dyspnea, No pleuritic pain  GASTROINTESTINAL: No abdominal pain, No nausea, No vomiting, No hematemesis, No diarrhea No constipation. No melena  GENITOURINARY: No dysuria, No frequency, No incontinence, No hematuria  NEUROLOGICAL: No dizziness, No lightheadedness, No syncope, No LOC, No headache, No numbness or weakness  MUSCULOSKELETAL: No joint pain, No joint swelling., +lower back pain  PSYCHIATRIC: No anxiety, No depression  SKIN: No diaphoresis. No itching, No rashes, No pressure ulcers    All other review of systems is negative unless indicated above.    VITAL SIGNS  T(C): 37.4 (06-11-18 @ 19:08), Max: 39.8 (06-11-18 @ 11:43)  HR: 75 (06-11-18 @ 19:08) (73 - 92)  BP: 158/73 (06-11-18 @ 19:08) (138/64 - 166/76)  RR: 20 (06-11-18 @ 19:08) (18 - 20)  SpO2: 98% (06-11-18 @ 19:08) (98% - 100%)  Wt(kg): --    PHYSICAL EXAM:    Appearance: NAD, no distress  HEENT:   Normal oral mucosa, PERRL, EOMI  Cardiovascular: Regular rate and rhythm, Normal S1 S2, No JVD, No murmurs, No edema  Respiratory: + LLB rales, No rhonchi, No wheezing. No tenderness to palpation  Gastrointestinal:  Soft, Non-tender, + BS  Neurologic: Non-focal, A&Ox3  Skin: Warm and dry, No rashes, No ecchymoses, No cyanosis  Extremities: No clubbing, cyanosis or edema  Vascular: Peripheral pulses palpable 2+ bilaterally  Psychiatry: Mood & affect appropriate        LABORATORY VALUES	 	                          9.5    10.15 )-----------( 128      ( 11 Jun 2018 11:50 )             28.7       06-11    128<L>  |  x   |  x   ----------------------------<  x   x    |  x   |  x   06-11    123<L>  |  87<L>  |  42<H>  ----------------------------<  255<H>  3.9   |  18<L>  |  5.69<H>    Ca    6.8<L>      11 Jun 2018 11:50  Phos  3.3     06-11    TPro  6.8  /  Alb  2.9<L>  /  TBili  0.5  /  DBili  x   /  AST  28  /  ALT  18  /  AlkPhos  70  06-11    LIVER FUNCTIONS - ( 11 Jun 2018 11:50 )  Alb: 2.9 g/dL / Pro: 6.8 g/dL / ALK PHOS: 70 u/L / ALT: 18 u/L / AST: 28 u/L / GGT: x           Prothrombin Time, Plasma: 12.8 SEC (06-11 @ 11:50)      CARDIAC MARKERS:  Creatine Kinase, Serum: 537 u/L (06-11 @ 17:21)  Creatine Kinase, Serum: 504 u/L (06-11 @ 11:50)    CKMB: 7.07 ng/mL (06-11 @ 17:21)  CKMB: 4.68 ng/mL (06-11 @ 11:50)    CKMB Relative Index: 0.9 (06-11 @ 11:50)    Troponin T, Serum: < 0.06 ng/mL (06-11 @ 17:21)  Troponin T, Serum: 0.06 ng/mL (06-11 @ 11:50)          Blood Gas Venous - Lactate: 0.9 mmol/L (06-11 @ 15:36)              CAPILLARY BLOOD GLUCOSE      POCT Blood Glucose.: 146 mg/dL (11 Jun 2018 19:17)          TELEMETRY: 	NSR    ECG:  	NSR , TW IV4- V6, I and aVL  RADIOLOGY: < from: CT Chest No Cont (06.11.18 @ 18:40) >  INTERPRETATION:  Left lower lobe consolidation.    No acute abdominal pathology.    < from: Xray Chest 1 View- PORTABLE-Urgent (06.11.18 @ 13:10) >  IMPRESSION:     Asymmetric consolidation left midlung, possibly pneumonia or pulmonary   hemorrhage.      PREVIOUS DIAGNOSTIC TESTING:    [ ] Echocardiogram: pending  	  ASSESSMENT AND PLAN  Pt is a 63M with PMHx of DMII, CKD (unknown baseline Cr), CAD s/p stent x3 in 2015, CVA x3 without residual deficits p/w shortness of breath and hemoptysis x3 months, with now presenting worsening fatigue and new fever and chills. Cardiology consulted for EKG changes in the setting of elevated CK and CKMB.  Patient denies current and active chest pain.  Patient with TWI in V4- V6, I and aVL with elevated CK and CKMB.  Patient has current hemoptysis.     PLAN  1. Shortness of breath- patient with shortness of breath x3 months, denies any current chest pain or recent chest pain occurring at  rest or exertion.  Patient also with hemoptysis. Given history of stent, EKG with evidence of ischemia, not meeting STEMI criteria. Patient has no previous EKG in medical record. Elevated CK, without concurrent elevation in MB fraction, most recent set of troponin is negative in the setting of creatine >5. Given no active chest pain and unclear pulmonary status in setting of hemoptysis, would continue home asa, and hold for further antiplatelet therapy and heparin for now.   monitor on telemetry  trend CE every 8rs  serial EKG  if patient experienced chest pain, call [phone] cardiology immediately.  consider urgent pulmonary consult  appreciate pulmonary reccs re: hemoptysis and further AC and platelet therapy.  given fever,  left shift, and CXR concerning for pulmonary hemorrhage would consider further workup by primary team vs pulmonary /MICU  given active pulmonary and renal issue, would consider MICU eval  will continue to follow w/ you    # [phone]

## 2018-06-11 NOTE — H&P ADULT - PROBLEM SELECTOR PLAN 6
Pt has hx of CAD with 3 stents placed in 2015 at [facility], on ASA81 at home  - holding ASA in setting of hemoptysis

## 2018-06-11 NOTE — H&P ADULT - PROBLEM SELECTOR PLAN 2
Serum Cr elevated to 5.69, unknown baseline. Has hx of chronic CKD and had appt with nephrologist this week.  - will obtain records from PMD for baseline Cr  - p-ANCA, c-ANCA, anti-GBM, C3, and C4 levels for ?goodpastures, vasculitis  - urine electrolytes, AU, urine albumin  - will consult nephro

## 2018-06-11 NOTE — H&P ADULT - PROBLEM SELECTOR PLAN 7
Unknown A1C, on novolog 70/30 30U BID at home.  - will check A1C  - ISS, check fingersticks Unknown A1C, on novolog 70/30 30U BID at home.  - will check A1C  - ISS, check fingersticks  - will c/w weight based lantus 15U at night

## 2018-06-11 NOTE — ED PROVIDER NOTE - ATTENDING CONTRIBUTION TO CARE
Attending note:   After face to face evaluation of this patient, I concur with above noted hx, pe, and care plan for this patient.  +62 y/o M with dm, cad, htn, +chronic cough and hemoptysis for months according to patient.    +LLL rales on exam; patient well appearing.    evaluation in progress

## 2018-06-11 NOTE — H&P ADULT - ASSESSMENT
33M with PMHx of HTN, CAD s/p stent placement x3 in 2015, CKD, DMII presents with fevers and hemoptysis concerning for CAP vs active TB infection.

## 2018-06-11 NOTE — H&P ADULT - PROBLEM SELECTOR PLAN 9
Pt s/p CVA x3 without residual deficits  - c/w simvastatin 40  - holding ASA in setting of active hemoptysis

## 2018-06-11 NOTE — H&P ADULT - HISTORY OF PRESENT ILLNESS
Pt is a 63M with PMHx of DMII, CKD (unknown baseline Cr), CAD s/p stent x3 in 2015, CVA x3 without residual deficits, who presents with hemoptysis for 3 months. Pt was in usual state of health three months ago when he abruptly started to experience hemoptysis. He was evaluated by his  shortly after it began who sent bloodwork and recommended a chest x-ray. Pt reports the blood work was normal and he never got the CXR. The hemoptysis continued about once a week and became acutely worse over the past 4 days. He came to the emergency department after experiencing low back pain, fever, and increasing weakness and shortness of breath. Currently, pt c/o tremulousness, dizziness, weakness, SOB, continued cough, and low back pain. He had one episode of vomiting the day prior to presentation, nausea and vomiting now resolved. He denies CP, abd pain, nausea, dysuria, constipation, diarrhea, numbness/tingling in his legs, or incontinence. He is originally from [facility], but immigrated here in 2010. He most recently visited [facility] in 2011, but since then has had no international travel. He denies any recent travel out of [place] or recent sick contacts. He states that when he first came to [place] he had an abnormal PPD test, but did not receive any tx for latent TB. He is not sure if he received the TB vaccine as a child.     In ED, VS were: Tmax 130.7/ Tcurrent 99.8 HR73 /64 RR18 SaO2 100% RA. Labs were significant for Hgb of 9.5, platelets of 128, and Creatinine of 5.69. EKG showed T wave inversions in V4-V6, I, and aVL. CXR showed asymmetric consolidation of left midlung concerning for pna vs pulmonary hemorrhage. He was admitted to the general medical floor for further management of CAP vs active TB Pt is a 63M with PMHx of DMII, CKD (unknown baseline Cr), CAD s/p stent x3 in 2015, CVA x3 without residual deficits, who presents with hemoptysis for 3 months. Pt was in usual state of health three months ago when he abruptly started to experience hemoptysis. He was evaluated by his  shortly after it began who sent bloodwork and recommended a chest x-ray. Pt reports the blood work was normal and he never got the CXR. The hemoptysis continued about once a week and became acutely worse over the past 4 days. He came to the emergency department after experiencing low back pain, fever, and increasing weakness and shortness of breath. Currently, pt c/o tremulousness, dizziness, weakness, SOB, continued cough, and low back pain. He describes the hemoptysis as dark blood, no clots. He had one episode of vomiting the day prior to presentation, nausea and vomiting now resolved. He denies CP, abd pain, nausea, dysuria, constipation, diarrhea, numbness/tingling in his legs, or incontinence. He is originally from [facility], but immigrated here in 2010. He most recently visited [facility] in 2011, but since then has had no international travel. He denies any recent travel out of [place] or recent sick contacts. He states that when he first came to [place] he had an abnormal PPD test, but did not receive any tx for latent TB. He is not sure if he received the TB vaccine as a child.     In ED, VS were: Tmax 130.7/ Tcurrent 99.8 HR73 /64 RR18 SaO2 100% RA. Labs were significant for Hgb of 9.5, platelets of 128, and Creatinine of 5.69. EKG showed T wave inversions in V4-V6, I, and aVL. CXR showed asymmetric consolidation of left midlung concerning for pna vs pulmonary hemorrhage. He was admitted to the general medical floor for further management of CAP vs active TB

## 2018-06-11 NOTE — ED PROVIDER NOTE - OBJECTIVE STATEMENT
Attending Rodriguez: 63M pmh HTN, CAD s/p stent x 3 (2015), CVA x 3 (no residual deficits), IDDM, presetns with hematemesis x 3 days, episode of chest pain yesterday, resolved, and R lower back pain radiating down R leg. 63M, PMH of HTN, DM, CKD presenting with coughing up blood. History obtained from patient and family. For the past three days the patietn          initially saw patient in [place], was subsequently transferred to other room of department. 63M pmh HTN, CAD s/p stent x 3 (2015), CVA x 3 (no residual deficits), IDDM, presents with hematemesis x 3 days, episode of chest pain yesterday, resolved, and R lower back/flank pain radiating down R leg. Pt states he did have multiple episodes of hemoptysis, has felt febrile over last few days, daughter believes he may have had weight loss. Pt did immig[place] from [facility] in [place], did have abnormal TB testing in past. unsure if vaccinated. pt to be moved to negative pressure room in [place] due to concern for potential TB. Care to be assumed by Dr. Alvarez. Pt signed out to Dr. Alvarez. - Zeus Frias MD. 63M, PMH of HTN, DM, CKD presenting with coughing up blood. History obtained from patient and family. Patient is from [facility] (immigrated in [place]) an reports three months of hemoptysis. Believes he has lost weight but cannot quantify.  For the past three months the patient has been coughing up blood. Was febrile yesterday and had chest pain. Patient denies difficulty breathing, nausea, vomiting, abdominal pain, burning with urination. Patient has history of abnormal TB testing in past, does not know if vaccinated. 63M, PMH of HTN, DM, CKD presenting with coughing up blood. History obtained from patient and family. Patient is from [facility] (immigrated in 2010) an reports three months of hemoptysis. Believes he has lost weight but cannot quantify.  For the past three months the patient has been coughing up blood. Was febrile yesterday and had chest pain. Patient denies difficulty breathing, nausea, vomiting, abdominal pain, burning with urination. Patient has history of abnormal TB testing in past, does not know if vaccinated.    PCP: Dr. Cevallos [phone]

## 2018-06-11 NOTE — H&P ADULT - PROBLEM SELECTOR PLAN 5
Normocytic anemia with Hgb of 9.5, unknown baseline. Most likely in the setting of chronic disease (CKD) and hemoptysis.  - iron, ferritin, TIBC in AM  - cont to monitor CBC, signs/sx of active bleed

## 2018-06-11 NOTE — H&P ADULT - ATTENDING COMMENTS
Patient seen and examined. Chart/lab reviewed. Agree with above with modifications.  33M with PMHx of HTN, DM-2, HLD, CAD s/p stents x3 in 2015, CKD, p/w fevers, chills, hemoptysis concerning for CAP vs active TB infection, also c/o chest discomfort and right lower back pain.  PE: pt in distress from fever/chill, lung expiratory wheeze, heart regular, abdomen distended, soft, extrem: no edema  labs/imaging reviewed: CXR L hilar infiltrate, labs notable for hyponatremia Na 123, HCo3 18, bun/creat 42/5.69, hypocalcemia 6.68, albumin 2.9    Assessment/plan:  # Hemoptysis: pneumonia vs. active TB, check CT chest w/o contrast, pulmonary consult, airborne isolation r/o TB, AFB x3, quantiferon gold  pt with h/o +PPD (2 daughters treated with 9 months of INH)  Pt with expiratory wheeze, give duoneb  cover with abx (zosyn, zithromax), check urine legionella Ag    # Acute on chronic renal failure: ?CKD5, get baseline creat from PCP,  CT abd/pelvis with distended abdomen and LBP  likely progressive CKD from HTN/DM, however, in the setting of hemoptysis, will r/o RPGN with UA, complement level, anca, anti-gbm  NahCo3 650 mg tid for metabolic acidosis likely due to ckd,   Nephrology consult  # Anemia: likely due to ckd and acute blood loss from hemoptysis, check iron panel in am, monitor cbc, transfuse prn for Hgb<7  # Electrolyte abnormality: pt with hypocalcemia, hyponatremia, check phos  likely hyperphosphatemia from advanced ckd, if has high phos can treat with phos binder phoslo; likely element of hypovolemic hyponatremia, will get urine lytes and urine osm, IVF hydration NS  #HTN: c/w labetalol and lopressor, clonidine, hold losartan in the setting of advanced ckd Patient seen and examined. Chart/lab reviewed. Agree with above with modifications.  33M with PMHx of HTN, DM-2, HLD, CAD s/p stents x3 in 2015, CKD, p/w fevers, chills, hemoptysis concerning for CAP vs active TB infection, also c/o chest discomfort and right lower back pain.  PE: pt in distress from fever/chill, lung expiratory wheeze, heart regular, abdomen distended, soft, extrem: no edema  labs/imaging reviewed: CXR L hilar infiltrate, labs notable for hyponatremia Na 123, HCo3 18, bun/creat 42/5.69, hypocalcemia 6.68, albumin 2.9    Assessment/plan:  # Hemoptysis: pneumonia vs. active TB, check CT chest w/o contrast, pulmonary consult, airborne isolation r/o TB, AFB x3, quantiferon gold  pt with h/o +PPD (2 daughters treated with 9 months of INH)  Pt with expiratory wheeze, give duoneb  cover with abx (zosyn, zithromax), check urine legionella Ag  # Acute on chronic renal failure: ?CKD5, get baseline creat from PCP,  CT abd/pelvis with distended abdomen and LBP  likely progressive CKD from HTN/DM, however, in the setting of hemoptysis, will r/o RPGN with UA, complement level, anca, anti-gbm  NahCo3 650 mg tid for metabolic acidosis likely due to ckd,   Nephrology consult  # chest discomfort: r/o ACS with 3 sets of cardiac enzymes and ekg's, pt with h/o CAD/stents, hold off on ASA for now in the setting of hemoptysis, check echo, consider cardiology consult , cont cardiac meds  # Anemia: likely due to ckd and acute blood loss from hemoptysis, check iron panel in am, monitor cbc, transfuse prn for Hgb<7  # Electrolyte abnormality: pt with hypocalcemia, hyponatremia, check phos  likely hyperphosphatemia from advanced ckd, if has high phos can treat with phos binder phoslo; likely element of hypovolemic hyponatremia, will get urine lytes and urine osm, IVF hydration NS  #HTN: c/w labetalol and lopressor, clonidine, hold losartan in the setting of advanced ckd

## 2018-06-11 NOTE — ED ADULT NURSE REASSESSMENT NOTE - NS ED NURSE REASSESS COMMENT FT1
Pt pulled IV line out in attempt to get out bed. New 20G iv placed in right ac. Fluids infusing as per order   Instructed pt to use call bell for assistance prior to this episode; reinforced use of call bell.  Pt in no acute distress at this time; airborne precautions in place. Will continue to monitor/assess

## 2018-06-11 NOTE — H&P ADULT - PROBLEM SELECTOR PLAN 4
Na 123 on presentation, unclear etiology   - NaHCO3 650 TID  - f/u urine electrolytes, albumin, UA Na 123 on presentation, unclear etiology. Now s/p 2L IVF bolus  - NaHCO3 650 TID  - f/u urine electrolytes, albumin, UA

## 2018-06-11 NOTE — H&P ADULT - NSHPREVIEWOFSYSTEMS_GEN_ALL_CORE
REVIEW OF SYSTEMS:    CONSTITUTIONAL: +weakness, +fevers, No chills  EYES/ENT: No visual changes;  No vertigo or throat pain   NECK: No pain or stiffness  RESPIRATORY: +cough, +hemoptysis, +shortness of breath  CARDIOVASCULAR: No chest pain or palpitations  GASTROINTESTINAL: No abdominal or epigastric pain. No nausea, vomiting, or hematemesis; No diarrhea or constipation. No melena or hematochezia.  GENITOURINARY: No dysuria, frequency or hematuria  NEUROLOGICAL: No numbness or weakness  SKIN: No itching, burning, rashes, or lesions   All other review of systems is negative unless indicated above.

## 2018-06-11 NOTE — H&P ADULT - PROBLEM SELECTOR PLAN 1
Pt with three month hx of hemoptysis, now with fever to 103.7 and back pain. CXR concerning for pna vs pulmonary hemorrhage.   - will c/w zosyn and azithromycin  - f/u blood cx and urine legionella  - duoneb Q6H  - tylenol PRN fever  - CT abd/chest  - ID consulted, f/u recs  - f/u quant gold, obtain sputum cultures to r/o TB and place in isolation

## 2018-06-11 NOTE — ED PROVIDER NOTE - MEDICAL DECISION MAKING DETAILS
63M presenting with hemoptysis and weight loss. concern for sepsis with pulmonary source. plan for cbc, cmp, ekg, cardiac enzymes, cxr. will reassess.

## 2018-06-11 NOTE — H&P ADULT - PROBLEM SELECTOR PLAN 8
Pt on metoprolol, labetalol, clonidine, and losartan at home  - will c/w clonidine, labetalol, and metoprolol  - holding losartan in setting of FELICITA

## 2018-06-11 NOTE — ED PROVIDER NOTE - PHYSICAL EXAMINATION
General: well appearing male in no acute distress   Respiratory: normal work of breathing, left sided rales   Cardiac: tachycardic, regular rhythm, no chest tenderness to palpation   Abdomen: soft, non-tender, no CVA tenderness   MSK: no swelling or tenderness of lower extremities   Neuro: A&Ox3

## 2018-06-11 NOTE — H&P ADULT - NSHPPHYSICALEXAM_GEN_ALL_CORE
.  VITAL SIGNS:  T(C): 37.7 (06-11-18 @ 15:34), Max: 39.8 (06-11-18 @ 11:43)  T(F): 99.8 (06-11-18 @ 15:34), Max: 103.7 (06-11-18 @ 11:43)  HR: 73 (06-11-18 @ 15:34) (73 - 92)  BP: 138/64 (06-11-18 @ 15:34) (138/64 - 166/76)  BP(mean): --  RR: 18 (06-11-18 @ 15:34) (18 - 19)  SpO2: 100% (06-11-18 @ 15:34) (100% - 100%)  Wt(kg): --    PHYSICAL EXAM:    Constitutional: tremulous, uncomfortable appearing  Head: NC/AT  Eyes: PERRLA  ENT: no nasal discharge; no oropharyngeal erythema or exudates; dry oral mucosa  Neck: supple; no JVD or thyromegaly  Respiratory: exam limited by coughing, +rales in RL and RM lung fields, no resp distress noted  Cardiac: +S1/S2; RRR; no M/R/G; PMI non-displaced  Gastrointestinal: distended, NT/ND; no rebound or guarding; +BS  Extremities: WWP, no clubbing or cyanosis; no peripheral edema  Vascular: 2+ radial, DP/PT pulses B/L  Lymphatic: no submandibular or cervical LAD  Neurologic: AAOx3; CNII-XII grossly intact

## 2018-06-11 NOTE — H&P ADULT - NSHPLABSRESULTS_GEN_ALL_CORE
9.5    10.15 )-----------( 128      ( 11 Jun 2018 11:50 )             28.7       06-11    123<L>  |  87<L>  |  42<H>  ----------------------------<  255<H>  3.9   |  18<L>  |  5.69<H>    Ca    6.8<L>      11 Jun 2018 11:50    TPro  6.8  /  Alb  2.9<L>  /  TBili  0.5  /  DBili  x   /  AST  28  /  ALT  18  /  AlkPhos  70  06-11                  PT/INR - ( 11 Jun 2018 11:50 )   PT: 12.8 SEC;   INR: 1.11          PTT - ( 11 Jun 2018 11:50 )  PTT:31.0 SEC    Lactate Trend      CARDIAC MARKERS ( 11 Jun 2018 11:50 )  x     / 0.06 ng/mL / 504 u/L / 4.68 ng/mL / x          < from: Xray Chest 1 View- PORTABLE-Urgent (06.11.18 @ 13:10) >    EXAM:  XR CHEST PORTABLE URGENT 1V        PROCEDURE DATE:  Jun 11 2018         INTERPRETATION:  CLINICAL INFORMATION: Chest pain.    TECHNIQUE: Frontal view of the chest   COMPARISON: None.    FINDINGS:    LUNGS/PLEURA: Asymmetric consolidation in the left midlung. No pleural   effusion or pneumothorax.  MEDIASTINUM: Heart size cannot adequately be assessed on this projection.  OTHER: None.    IMPRESSION:     Asymmetric consolidation left midlung, possibly pneumonia or pulmonary   hemorrhage.    < end of copied text >

## 2018-06-11 NOTE — ED ADULT NURSE NOTE - OBJECTIVE STATEMENT
p/t is a 63y old male received awake and responsive, c/o of vomiting blood for past 2 days on and off, p/t denies any chest pain bloods drawn and sent to lab no further c/o noted will continue to monitor p/t is a 63y old male received awake and responsive, c/o of vomiting blood for past 2 days on and off, p/t denies any chest pain bloods drawn and sent to lab no further c/o noted will continue to monitor  Patient was transferred from room 5 to room 6 to rule out TB. Patient has family at bedside and is comfortable. Antibiotic therapy in place. Will continue to monitor.     SOBIA Licona

## 2018-06-11 NOTE — ED ADULT NURSE REASSESSMENT NOTE - NS ED NURSE REASSESS COMMENT FT1
Received report from previous RN; airborne precautions in place and maintained. Pt on cardiac monitor resting comfortable in no acute distress.  Spoke with MD Pandey, Received report from previous RN; airborne precautions in place and maintained. Pt on cardiac monitor resting comfortable in no acute distress.  Spoke with MD Pandey,[phone]. Endorsed to repeat labs at 130 am. Will continue to monitor/assess

## 2018-06-11 NOTE — H&P ADULT - NSHPSOCIALHISTORY_GEN_ALL_CORE
Pt has 35 pack year smoking hx, quit smoking 6 years ago. He denies any recent tobacco, alcohol, or drug use. He works as a .

## 2018-06-11 NOTE — H&P ADULT - PROBLEM SELECTOR PLAN 3
T wave inversion on aVL, I, and V4-V6 concerning for possible lateral ischemia. No prior EKG available for comparison. Initial troponins negative. Possibly demand ischemia in the setting of anemia.  - will trend Jasmin Q6Hs, if increasing will call cardiology   - holding ASA81 in the setting of active hemoptysis

## 2018-06-12 DIAGNOSIS — N17.9 ACUTE KIDNEY FAILURE, UNSPECIFIED: ICD-10-CM

## 2018-06-12 DIAGNOSIS — R69 ILLNESS, UNSPECIFIED: ICD-10-CM

## 2018-06-12 LAB
ALBUMIN SERPL ELPH-MCNC: 2.5 G/DL — LOW (ref 3.3–5)
ALP SERPL-CCNC: 68 U/L — SIGNIFICANT CHANGE UP (ref 40–120)
ALT FLD-CCNC: 22 U/L — SIGNIFICANT CHANGE UP (ref 4–41)
APPEARANCE UR: CLEAR — SIGNIFICANT CHANGE UP
AST SERPL-CCNC: 32 U/L — SIGNIFICANT CHANGE UP (ref 4–40)
BASOPHILS # BLD AUTO: 0.01 K/UL — SIGNIFICANT CHANGE UP (ref 0–0.2)
BASOPHILS NFR BLD AUTO: 0.1 % — SIGNIFICANT CHANGE UP (ref 0–2)
BILIRUB SERPL-MCNC: 0.3 MG/DL — SIGNIFICANT CHANGE UP (ref 0.2–1.2)
BILIRUB UR-MCNC: NEGATIVE — SIGNIFICANT CHANGE UP
BLOOD UR QL VISUAL: HIGH
BUN SERPL-MCNC: 43 MG/DL — HIGH (ref 7–23)
BUN SERPL-MCNC: 44 MG/DL — HIGH (ref 7–23)
C3 SERPL-MCNC: 165 MG/DL — HIGH (ref 81–157)
C4 SERPL-MCNC: 47 MG/DL — HIGH (ref 13–39)
CALCIUM SERPL-MCNC: 6.6 MG/DL — LOW (ref 8.4–10.5)
CALCIUM SERPL-MCNC: 6.7 MG/DL — LOW (ref 8.4–10.5)
CHLORIDE SERPL-SCNC: 94 MMOL/L — LOW (ref 98–107)
CHLORIDE SERPL-SCNC: 95 MMOL/L — LOW (ref 98–107)
CK MB BLD-MCNC: 10.18 NG/ML — HIGH (ref 1–6.6)
CK SERPL-CCNC: 609 U/L — HIGH (ref 30–200)
CO2 SERPL-SCNC: 16 MMOL/L — LOW (ref 22–31)
CO2 SERPL-SCNC: 16 MMOL/L — LOW (ref 22–31)
COLOR SPEC: SIGNIFICANT CHANGE UP
CREAT SERPL-MCNC: 5.66 MG/DL — HIGH (ref 0.5–1.3)
CREAT SERPL-MCNC: 5.72 MG/DL — HIGH (ref 0.5–1.3)
EOSINOPHIL # BLD AUTO: 0.01 K/UL — SIGNIFICANT CHANGE UP (ref 0–0.5)
EOSINOPHIL NFR BLD AUTO: 0.1 % — SIGNIFICANT CHANGE UP (ref 0–6)
FERRITIN SERPL-MCNC: 351.6 NG/ML — SIGNIFICANT CHANGE UP (ref 30–400)
GLUCOSE SERPL-MCNC: 114 MG/DL — HIGH (ref 70–99)
GLUCOSE SERPL-MCNC: 118 MG/DL — HIGH (ref 70–99)
GLUCOSE UR-MCNC: 100 — SIGNIFICANT CHANGE UP
HBA1C BLD-MCNC: 6.2 % — HIGH (ref 4–5.6)
HCT VFR BLD CALC: 26.6 % — LOW (ref 39–50)
HCT VFR BLD CALC: 28.9 % — LOW (ref 39–50)
HGB BLD-MCNC: 8.8 G/DL — LOW (ref 13–17)
HGB BLD-MCNC: 9.5 G/DL — LOW (ref 13–17)
IMM GRANULOCYTES # BLD AUTO: 0.02 # — SIGNIFICANT CHANGE UP
IMM GRANULOCYTES NFR BLD AUTO: 0.3 % — SIGNIFICANT CHANGE UP (ref 0–1.5)
IRON SATN MFR SERPL: 11 UG/DL — LOW (ref 45–165)
IRON SATN MFR SERPL: 148 UG/DL — LOW (ref 155–535)
KETONES UR-MCNC: SIGNIFICANT CHANGE UP
LEUKOCYTE ESTERASE UR-ACNC: NEGATIVE — SIGNIFICANT CHANGE UP
LYMPHOCYTES # BLD AUTO: 0.46 K/UL — LOW (ref 1–3.3)
LYMPHOCYTES # BLD AUTO: 6.4 % — LOW (ref 13–44)
MAGNESIUM SERPL-MCNC: 1.4 MG/DL — LOW (ref 1.6–2.6)
MAGNESIUM SERPL-MCNC: 1.5 MG/DL — LOW (ref 1.6–2.6)
MCHC RBC-ENTMCNC: 27.1 PG — SIGNIFICANT CHANGE UP (ref 27–34)
MCHC RBC-ENTMCNC: 27.2 PG — SIGNIFICANT CHANGE UP (ref 27–34)
MCHC RBC-ENTMCNC: 32.9 % — SIGNIFICANT CHANGE UP (ref 32–36)
MCHC RBC-ENTMCNC: 33.1 % — SIGNIFICANT CHANGE UP (ref 32–36)
MCV RBC AUTO: 82.1 FL — SIGNIFICANT CHANGE UP (ref 80–100)
MCV RBC AUTO: 82.3 FL — SIGNIFICANT CHANGE UP (ref 80–100)
MONOCYTES # BLD AUTO: 0.76 K/UL — SIGNIFICANT CHANGE UP (ref 0–0.9)
MONOCYTES NFR BLD AUTO: 10.5 % — SIGNIFICANT CHANGE UP (ref 2–14)
MUCOUS THREADS # UR AUTO: SIGNIFICANT CHANGE UP
NEUTROPHILS # BLD AUTO: 5.98 K/UL — SIGNIFICANT CHANGE UP (ref 1.8–7.4)
NEUTROPHILS NFR BLD AUTO: 82.6 % — HIGH (ref 43–77)
NITRITE UR-MCNC: NEGATIVE — SIGNIFICANT CHANGE UP
NRBC # FLD: 0 — SIGNIFICANT CHANGE UP
NRBC # FLD: 0 — SIGNIFICANT CHANGE UP
PH UR: 7 — SIGNIFICANT CHANGE UP (ref 4.6–8)
PHOSPHATE SERPL-MCNC: 4 MG/DL — SIGNIFICANT CHANGE UP (ref 2.5–4.5)
PHOSPHATE SERPL-MCNC: 4.5 MG/DL — SIGNIFICANT CHANGE UP (ref 2.5–4.5)
PLATELET # BLD AUTO: 125 K/UL — LOW (ref 150–400)
PLATELET # BLD AUTO: 127 K/UL — LOW (ref 150–400)
PMV BLD: 11 FL — SIGNIFICANT CHANGE UP (ref 7–13)
PMV BLD: 11.6 FL — SIGNIFICANT CHANGE UP (ref 7–13)
POTASSIUM SERPL-MCNC: 3.6 MMOL/L — SIGNIFICANT CHANGE UP (ref 3.5–5.3)
POTASSIUM SERPL-MCNC: 3.8 MMOL/L — SIGNIFICANT CHANGE UP (ref 3.5–5.3)
POTASSIUM SERPL-SCNC: 3.6 MMOL/L — SIGNIFICANT CHANGE UP (ref 3.5–5.3)
POTASSIUM SERPL-SCNC: 3.8 MMOL/L — SIGNIFICANT CHANGE UP (ref 3.5–5.3)
PROT SERPL-MCNC: 6.3 G/DL — SIGNIFICANT CHANGE UP (ref 6–8.3)
PROT UR-MCNC: 500 MG/DL — HIGH
RBC # BLD: 3.24 M/UL — LOW (ref 4.2–5.8)
RBC # BLD: 3.51 M/UL — LOW (ref 4.2–5.8)
RBC # FLD: 11.7 % — SIGNIFICANT CHANGE UP (ref 10.3–14.5)
RBC # FLD: 11.7 % — SIGNIFICANT CHANGE UP (ref 10.3–14.5)
RBC CASTS # UR COMP ASSIST: SIGNIFICANT CHANGE UP (ref 0–?)
SODIUM SERPL-SCNC: 129 MMOL/L — LOW (ref 135–145)
SODIUM SERPL-SCNC: 130 MMOL/L — LOW (ref 135–145)
SP GR SPEC: 1.01 — SIGNIFICANT CHANGE UP (ref 1–1.04)
SPECIMEN SOURCE: SIGNIFICANT CHANGE UP
SPECIMEN SOURCE: SIGNIFICANT CHANGE UP
SQUAMOUS # UR AUTO: SIGNIFICANT CHANGE UP
TROPONIN T SERPL-MCNC: < 0.06 NG/ML — SIGNIFICANT CHANGE UP (ref 0–0.06)
TROPONIN T SERPL-MCNC: < 0.06 NG/ML — SIGNIFICANT CHANGE UP (ref 0–0.06)
UIBC SERPL-MCNC: 136.6 UG/DL — SIGNIFICANT CHANGE UP (ref 110–370)
UROBILINOGEN FLD QL: NORMAL MG/DL — SIGNIFICANT CHANGE UP
WBC # BLD: 7.24 K/UL — SIGNIFICANT CHANGE UP (ref 3.8–10.5)
WBC # BLD: 8.73 K/UL — SIGNIFICANT CHANGE UP (ref 3.8–10.5)
WBC # FLD AUTO: 7.24 K/UL — SIGNIFICANT CHANGE UP (ref 3.8–10.5)
WBC # FLD AUTO: 8.73 K/UL — SIGNIFICANT CHANGE UP (ref 3.8–10.5)
WBC UR QL: SIGNIFICANT CHANGE UP (ref 0–?)

## 2018-06-12 PROCEDURE — 99233 SBSQ HOSP IP/OBS HIGH 50: CPT | Mod: GC

## 2018-06-12 PROCEDURE — 99254 IP/OBS CNSLTJ NEW/EST MOD 60: CPT | Mod: GC

## 2018-06-12 PROCEDURE — 99223 1ST HOSP IP/OBS HIGH 75: CPT | Mod: GC

## 2018-06-12 PROCEDURE — 99222 1ST HOSP IP/OBS MODERATE 55: CPT

## 2018-06-12 PROCEDURE — 99222 1ST HOSP IP/OBS MODERATE 55: CPT | Mod: GC

## 2018-06-12 RX ORDER — FERROUS SULFATE 325(65) MG
325 TABLET ORAL DAILY
Qty: 0 | Refills: 0 | Status: DISCONTINUED | OUTPATIENT
Start: 2018-06-12 | End: 2018-06-14

## 2018-06-12 RX ORDER — ALBUTEROL 90 UG/1
1 AEROSOL, METERED ORAL EVERY 4 HOURS
Qty: 0 | Refills: 0 | Status: DISCONTINUED | OUTPATIENT
Start: 2018-06-12 | End: 2018-06-12

## 2018-06-12 RX ORDER — TIOTROPIUM BROMIDE 18 UG/1
1 CAPSULE ORAL; RESPIRATORY (INHALATION) DAILY
Qty: 0 | Refills: 0 | Status: DISCONTINUED | OUTPATIENT
Start: 2018-06-12 | End: 2018-06-12

## 2018-06-12 RX ORDER — CEFTRIAXONE 500 MG/1
1 INJECTION, POWDER, FOR SOLUTION INTRAMUSCULAR; INTRAVENOUS EVERY 24 HOURS
Qty: 0 | Refills: 0 | Status: DISCONTINUED | OUTPATIENT
Start: 2018-06-12 | End: 2018-06-14

## 2018-06-12 RX ORDER — IPRATROPIUM/ALBUTEROL SULFATE 18-103MCG
3 AEROSOL WITH ADAPTER (GRAM) INHALATION EVERY 6 HOURS
Qty: 0 | Refills: 0 | Status: DISCONTINUED | OUTPATIENT
Start: 2018-06-12 | End: 2018-06-12

## 2018-06-12 RX ORDER — SODIUM CHLORIDE 9 MG/ML
3 INJECTION INTRAMUSCULAR; INTRAVENOUS; SUBCUTANEOUS THREE TIMES A DAY
Qty: 0 | Refills: 0 | Status: DISCONTINUED | OUTPATIENT
Start: 2018-06-12 | End: 2018-06-14

## 2018-06-12 RX ADMIN — Medication 3 MILLILITER(S): at 16:09

## 2018-06-12 RX ADMIN — AZITHROMYCIN 250 MILLIGRAM(S): 500 TABLET, FILM COATED ORAL at 07:04

## 2018-06-12 RX ADMIN — Medication 12.5 MILLIGRAM(S): at 07:09

## 2018-06-12 RX ADMIN — Medication 3 MILLILITER(S): at 03:05

## 2018-06-12 RX ADMIN — Medication 325 MILLIGRAM(S): at 18:02

## 2018-06-12 RX ADMIN — Medication 1: at 08:33

## 2018-06-12 RX ADMIN — INSULIN GLARGINE 15 UNIT(S): 100 INJECTION, SOLUTION SUBCUTANEOUS at 01:03

## 2018-06-12 RX ADMIN — Medication 12.5 MILLIGRAM(S): at 18:02

## 2018-06-12 RX ADMIN — Medication 81 MILLIGRAM(S): at 12:10

## 2018-06-12 RX ADMIN — Medication 650 MILLIGRAM(S): at 21:31

## 2018-06-12 RX ADMIN — Medication 0.1 MILLIGRAM(S): at 07:09

## 2018-06-12 RX ADMIN — Medication 300 MILLIGRAM(S): at 18:02

## 2018-06-12 RX ADMIN — Medication 650 MILLIGRAM(S): at 12:10

## 2018-06-12 RX ADMIN — PIPERACILLIN AND TAZOBACTAM 25 GRAM(S): 4; .5 INJECTION, POWDER, LYOPHILIZED, FOR SOLUTION INTRAVENOUS at 08:32

## 2018-06-12 RX ADMIN — Medication 650 MILLIGRAM(S): at 07:09

## 2018-06-12 RX ADMIN — SIMVASTATIN 40 MILLIGRAM(S): 20 TABLET, FILM COATED ORAL at 21:31

## 2018-06-12 RX ADMIN — SODIUM CHLORIDE 3 MILLILITER(S): 9 INJECTION INTRAMUSCULAR; INTRAVENOUS; SUBCUTANEOUS at 16:10

## 2018-06-12 RX ADMIN — INSULIN GLARGINE 15 UNIT(S): 100 INJECTION, SOLUTION SUBCUTANEOUS at 21:32

## 2018-06-12 RX ADMIN — CEFTRIAXONE 100 GRAM(S): 500 INJECTION, POWDER, FOR SOLUTION INTRAMUSCULAR; INTRAVENOUS at 12:14

## 2018-06-12 RX ADMIN — Medication 300 MILLIGRAM(S): at 07:09

## 2018-06-12 NOTE — CONSULT NOTE ADULT - ASSESSMENT
33M with PMHx of HTN, CAD s/p stent placement x3 in 2015, CKD, DMII presents with fevers and hemoptysis.    1.  Hemoptysis-  -Etiology appears unclear at this time  -Patient clinically stable, on RA  -f/u sputum AFB  -No indication for bronch at this time    2. LLL consolidation-  -Likely a PNA  -Stable, on RA  -check sputum cxs/bcxs  -f/u AFB  -cont vanco/zosyn/azithro  -check a urine legionella    Elie Quiñonez DO   Pulmonary and Critical Care Fellow 33M with PMHx of HTN, CAD s/p stent placement x3 in 2015, CKD, DMII presents with fevers and hemoptysis.    1.  Hemoptysis-  -Etiology appears unclear at this time: DDx: infectious vs DPLD with immunologic basis (?capillaritis) given patients renal involvement vs maligancy  -Patient clinically stable, on RA  -f/u sputum AFB  -f/u serologies  -Would plan for bronchoscopy if condition worsens    2. LLL consolidation-  -Stable, on RA  -check sputum cxs/bcxs  -f/u AFB  -cont vanco/zosyn/azithro  -check a urine legionella    Elie Quiñonez, DO   Pulmonary and Critical Care Fellow

## 2018-06-12 NOTE — CONSULT NOTE ADULT - SUBJECTIVE AND OBJECTIVE BOX
CHIEF COMPLAINT:    HPI:    PAST MEDICAL & SURGICAL HISTORY:  Coronary artery disease  CKD (chronic kidney disease)  HTN (hypertension)  Diabetes  No significant past surgical history      FAMILY HISTORY:  No pertinent family history in first degree relatives      SOCIAL HISTORY:  Smoking: __ packs x ___ years  EtOH Use:  Marital Status:  Occupation:  Recent Travel:  Country of Birth:  Advance Directives:    Allergies    No Known Allergies    Intolerances        HOME MEDICATIONS:    REVIEW OF SYSTEMS:  Constitutional:   Eyes:  ENT:  CV:  Resp:  GI:  :  MSK:  Integumentary:  Neurological:  Psychiatric:  Endocrine:  Hematologic/Lymphatic:  Allergic/Immunologic:  [ ] All other systems negative  [ ] Unable to assess ROS because ________    OBJECTIVE:  ICU Vital Signs Last 24 Hrs  T(C): 37.4 (2018 06:00), Max: 39.8 (2018 11:43)  T(F): 99.4 (2018 06:00), Max: 103.7 (2018 11:43)  HR: 96 (2018 06:00) (63 - 96)  BP: 150/51 (2018 06:00) (138/64 - 175/77)  BP(mean): --  ABP: --  ABP(mean): --  RR: 18 (2018 06:00) (18 - 20)  SpO2: 96% (2018 06:00) (96% - 100%)        CAPILLARY BLOOD GLUCOSE      POCT Blood Glucose.: 167 mg/dL (2018 08:28)      PHYSICAL EXAM:  General:  AAOx3  HEENT: EOMI, PERRL  Lymph Nodes: No LAD  Neck: JVP 4-6cm  Respiratory:   Cardiovascular: RRR, no m/r/g  Abdomen: soft, NT/ND, no HSM  Extremities: no c/c/e  Skin: nl. skin turgor  Neurological: no deficitis      HOSPITAL MEDICATIONS:  MEDICATIONS  (STANDING):  ALBUTerol/ipratropium for Nebulization 3 milliLiter(s) Nebulizer every 6 hours  aspirin enteric coated 81 milliGRAM(s) Oral daily  azithromycin  IVPB 500 milliGRAM(s) IV Intermittent every 24 hours  cloNIDine 0.1 milliGRAM(s) Oral daily  dextrose 5%. 1000 milliLiter(s) (50 mL/Hr) IV Continuous <Continuous>  dextrose 50% Injectable 12.5 Gram(s) IV Push once  dextrose 50% Injectable 25 Gram(s) IV Push once  dextrose 50% Injectable 25 Gram(s) IV Push once  insulin glargine Injectable (LANTUS) 15 Unit(s) SubCutaneous at bedtime  insulin lispro (HumaLOG) corrective regimen sliding scale   SubCutaneous three times a day before meals  labetalol 300 milliGRAM(s) Oral two times a day  metoprolol tartrate 12.5 milliGRAM(s) Oral two times a day  piperacillin/tazobactam IVPB. 3.375 Gram(s) IV Intermittent every 12 hours  simvastatin 40 milliGRAM(s) Oral at bedtime  sodium bicarbonate 650 milliGRAM(s) Oral three times a day    MEDICATIONS  (PRN):  acetaminophen   Tablet 650 milliGRAM(s) Oral every 6 hours PRN For Temp greater than 38 C (100.4 F)  dextrose 40% Gel 15 Gram(s) Oral once PRN Blood Glucose LESS THAN 70 milliGRAM(s)/deciliter  glucagon  Injectable 1 milliGRAM(s) IntraMuscular once PRN Glucose LESS THAN 70 milligrams/deciliter      LABS:                        8.8    7.24  )-----------( 125      ( 2018 06:30 )             26.6     06-12    130<L>  |  94<L>  |  44<H>  ----------------------------<  114<H>  3.8   |  16<L>  |  5.72<H>    Ca    6.7<L>      2018 06:30  Phos  4.0     12  Mg     1.5     12    TPro  6.3  /  Alb  2.5<L>  /  TBili  0.3  /  DBili  x   /  AST  32  /  ALT  22  /  AlkPhos  68  06-12    PT/INR - ( 2018 11:50 )   PT: 12.8 SEC;   INR: 1.11          PTT - ( 2018 11:50 )  PTT:31.0 SEC  Urinalysis Basic - ( 2018 08:04 )    Color: PLYEL / Appearance: CLEAR / S.008 / pH: 7.0  Gluc: 100 / Ketone: TRACE  / Bili: NEGATIVE / Urobili: NORMAL mg/dL   Blood: MODERATE / Protein: 500 mg/dL / Nitrite: NEGATIVE   Leuk Esterase: NEGATIVE / RBC: 2-5 / WBC 0-2   Sq Epi: OCC / Non Sq Epi: x / Bacteria: x        Venous Blood Gas:   @ 15:36  7.36/32/40/19/71.6  VBG Lactate: 0.9  Venous Blood Gas:   @ 11:40  7.40/33/31/21/54.1  VBG Lactate: --      MICROBIOLOGY:     RADIOLOGY:  [ ] Reviewed and interpreted by me    PULMONARY FUNCTION TESTS:    EKG: CHIEF COMPLAINT: LBP/cough    HPI:  63M with PMHx of DMII, CKD (unknown baseline Cr), CAD s/p stent x3 in 2015, CVA x3 without residual deficits, who presents with hemoptysis for 3 months. Patient states he has had hemoptysis intermittently now for this time period.  Not large volume, but scant and mixed in the sputum. He was evaluated by his  shortly after it began who sent bloodwork and recommended a chest x-ray. Pt reports the blood work was normal and he never got the CXR. The hemoptysis continued about once a week and became acutely worse over the past 4 days. He came to the emergency department after experiencing low back pain, fever, and increasing weakness and shortness of breath.     He is originally from [facility] [place], but immigrated here in . He most recently visited [facility] in , but since then has had no international travel. He denies any recent travel out of [place] or recent sick contacts. He states that when he first came to [place] he had an abnormal PPD test, but did not receive any tx for latent TB. He is not sure if he received the TB vaccine as a child.     Works as a  and lives in [facility].  Former smoker, quit 6 years ago.    PAST MEDICAL & SURGICAL HISTORY:  Coronary artery disease  CKD (chronic kidney disease)  HTN (hypertension)  Diabetes  No significant past surgical history      FAMILY HISTORY:  No pertinent family history in first degree relatives      SOCIAL HISTORY:  Smoking: _1_ packs x _25__ years  EtOH Use: social  Marital Status: single  Occupation:   Recent Travel: none  Country of Birth: [facility]    Allergies    No Known Allergies    Intolerances    HOME MEDICATIONS:  · 	metoprolol succinate 25 mg oral tablet, extended release: 1 tab(s) orally once a day, Last Dose Taken:    · 	cloNIDine 0.1 mg oral tablet: 0.1 cap(s) orally once a day, Last Dose Taken:    · 	raNITIdine 150 mg oral capsule: 1 cap(s) orally 2 times a day, Last Dose Taken:    · 	losartan 100 mg oral tablet: 1 tab(s) orally once a day, Last Dose Taken:    · 	labetalol 300 mg oral tablet: 1 tab(s) orally 2 times a day, Last Dose Taken:    · 	Aspir 81 oral delayed release tablet: 1 tab(s) orally once a day, Last Dose Taken:    · 	NovoLOG 100 units/mL injectable solution: 30 unit(s) injectable 2 times a day, Last Dose Taken:      REVIEW OF SYSTEMS:  Constitutional:   Eyes:  ENT:  CV:  Resp:  GI:  :  MSK:  Integumentary:  Neurological:  Psychiatric:  Endocrine:  Hematologic/Lymphatic:  Allergic/Immunologic:  [x] All other systems negative  [ ] Unable to assess ROS because ________    OBJECTIVE:  ICU Vital Signs Last 24 Hrs  T(C): 37.4 (2018 06:00), Max: 39.8 (2018 11:43)  T(F): 99.4 (2018 06:00), Max: 103.7 (2018 11:43)  HR: 96 (2018 06:00) (63 - 96)  BP: 150/51 (2018 06:00) (138/64 - 175/77)  BP(mean): --  ABP: --  ABP(mean): --  RR: 18 (2018 06:00) (18 - 20)  SpO2: 96% (2018 06:00) (96% - 100%)        CAPILLARY BLOOD GLUCOSE      POCT Blood Glucose.: 167 mg/dL (2018 08:28)      PHYSICAL EXAM:  General:  AAOx3  HEENT: EOMI, PERRL  Lymph Nodes: No LAD  Neck: JVP 4-6cm  Respiratory:   Cardiovascular: RRR, no m/r/g  Abdomen: soft, NT/ND, no HSM  Extremities: no c/c/e  Skin: nl. skin turgor  Neurological: no deficitis      HOSPITAL MEDICATIONS:  MEDICATIONS  (STANDING):  ALBUTerol/ipratropium for Nebulization 3 milliLiter(s) Nebulizer every 6 hours  aspirin enteric coated 81 milliGRAM(s) Oral daily  azithromycin  IVPB 500 milliGRAM(s) IV Intermittent every 24 hours  cloNIDine 0.1 milliGRAM(s) Oral daily  dextrose 5%. 1000 milliLiter(s) (50 mL/Hr) IV Continuous <Continuous>  dextrose 50% Injectable 12.5 Gram(s) IV Push once  dextrose 50% Injectable 25 Gram(s) IV Push once  dextrose 50% Injectable 25 Gram(s) IV Push once  insulin glargine Injectable (LANTUS) 15 Unit(s) SubCutaneous at bedtime  insulin lispro (HumaLOG) corrective regimen sliding scale   SubCutaneous three times a day before meals  labetalol 300 milliGRAM(s) Oral two times a day  metoprolol tartrate 12.5 milliGRAM(s) Oral two times a day  piperacillin/tazobactam IVPB. 3.375 Gram(s) IV Intermittent every 12 hours  simvastatin 40 milliGRAM(s) Oral at bedtime  sodium bicarbonate 650 milliGRAM(s) Oral three times a day    MEDICATIONS  (PRN):  acetaminophen   Tablet 650 milliGRAM(s) Oral every 6 hours PRN For Temp greater than 38 C (100.4 F)  dextrose 40% Gel 15 Gram(s) Oral once PRN Blood Glucose LESS THAN 70 milliGRAM(s)/deciliter  glucagon  Injectable 1 milliGRAM(s) IntraMuscular once PRN Glucose LESS THAN 70 milligrams/deciliter      LABS:                        8.8    7.24  )-----------( 125      ( 2018 06:30 )             26.6     06-12    130<L>  |  94<L>  |  44<H>  ----------------------------<  114<H>  3.8   |  16<L>  |  5.72<H>    Ca    6.7<L>      2018 06:30  Phos  4.0       Mg     1.5         TPro  6.3  /  Alb  2.5<L>  /  TBili  0.3  /  DBili  x   /  AST  32  /  ALT  22  /  AlkPhos  68  06-12    PT/INR - ( 2018 11:50 )   PT: 12.8 SEC;   INR: 1.11          PTT - ( 2018 11:50 )  PTT:31.0 SEC  Urinalysis Basic - ( 2018 08:04 )    Color: PLYEL / Appearance: CLEAR / S.008 / pH: 7.0  Gluc: 100 / Ketone: TRACE  / Bili: NEGATIVE / Urobili: NORMAL mg/dL   Blood: MODERATE / Protein: 500 mg/dL / Nitrite: NEGATIVE   Leuk Esterase: NEGATIVE / RBC: 2-5 / WBC 0-2   Sq Epi: OCC / Non Sq Epi: x / Bacteria: x        Venous Blood Gas:   @ 15:36  7.36/32/40/19/71.6  VBG Lactate: 0.9  Venous Blood Gas:   @ 11:40  7.40/33/31//54.1  VBG Lactate: --    RADIOLOGY:  [x] Reviewed and interpreted by me

## 2018-06-12 NOTE — CONSULT NOTE ADULT - ATTENDING COMMENTS
I spoke with the patient's outpatient nephrologist.  He has a baseline creatinine of 2.5 but did not follow up so further workup was pending.  There is a clinical suspicion for small vessel vasculitis.  however the ddx is pneumonia / tb vs pulmonary hemorrhage.  At this point we have no definitive evidence of Vasculitis and the patient is clinically stable.  Discussed with pulmonary.  Will consider giving IV steroids tonight.  If ANCAs positive he will need plasma exchange and cytoxan vs rituxan. I spoke with the patient's outpatient nephrologist.  He has a baseline creatinine of 2.5 but did not follow up so further workup was pending.  There is a clinical suspicion for small vessel vasculitis.  however the ddx is pneumonia / tb vs pulmonary hemorrhage.  At this point we have no definitive evidence of Vasculitis and the patient is clinically stable.  Discussed with pulmonary.  Would consider 250 mg of solumedrol today.  If ANCAs positive he will need plasma exchange and cytoxan vs rituxan.  If aspirin can be held would hold as patient may need a kidney biopsy.

## 2018-06-12 NOTE — PROGRESS NOTE ADULT - PROBLEM SELECTOR PLAN 2
Serum Cr elevated to 5.69 on admission, unknown baseline. Has hx of chronic CKD and had appt with nephrologist this week.  - will obtain records from [facility]D for baseline Cr  - p-ANCA, c-ANCA, anti-GBM, C3, and C4 levels for ?goodpastures, vasculitis  - urine electrolytes, AU, urine albumin  - will consult nephro Serum Cr elevated to 5.69 on admission, unknown baseline. Has hx of chronic CKD and had appt with nephrologist this week.  - will obtain records from [facility]D for baseline Cr  - p-ANCA, c-ANCA, anti-GBM, C3, and C4 levels for ? vasculitis  - urine electrolytes, urine albumin  - will consult nephro

## 2018-06-12 NOTE — PROGRESS NOTE ADULT - SUBJECTIVE AND OBJECTIVE BOX
PROVIDER CONTACT INFO:  Jolie Rose MD  [facility] Pager: [phone]  [facility] [place] Pager: [phone]    MD MAMI  63y  Male      Patient is a 63y old  Male who presents with a chief complaint of Hemoptysis, r/o TB (11 Jun 2018 16:41)      INTERVAL HPI/OVERNIGHT EVENTS: Overnight, pt was started on ASA81 per cards recs, His CK and CK-MB continue to trend upwards, however cardiology has low suspicion for ACS and believe this is 2/2 renal disease and acute illness. Also, Na+ was correcting too quickly overnight so fluids were discontinued.     T(C): 37.4 (06-12-18 @ 06:00), Max: 39.8 (06-11-18 @ 11:43)  HR: 96 (06-12-18 @ 06:00) (63 - 96)  BP: 150/51 (06-12-18 @ 06:00) (138/64 - 175/77)  RR: 18 (06-12-18 @ 06:00) (18 - 20)  SpO2: 96% (06-12-18 @ 06:00) (96% - 100%)  Wt(kg): --Vital Signs Last 24 Hrs  T(C): 37.4 (12 Jun 2018 06:00), Max: 39.8 (11 Jun 2018 11:43)  T(F): 99.4 (12 Jun 2018 06:00), Max: 103.7 (11 Jun 2018 11:43)  HR: 96 (12 Jun 2018 06:00) (63 - 96)  BP: 150/51 (12 Jun 2018 06:00) (138/64 - 175/77)  BP(mean): --  RR: 18 (12 Jun 2018 06:00) (18 - 20)  SpO2: 96% (12 Jun 2018 06:00) (96% - 100%)    PHYSICAL EXAM:  GENERAL: NAD  HEAD:  Atraumatic, Normocephalic  EYES: EOMI, PERRLA, conjunctiva and sclera clear  ENMT: No tonsillar erythema, exudates,; Moist mucous membranes. No lesions  NECK: Supple, No JVD, Normal thyroid  CHEST/LUNG: Clear to percussion bilaterally; No rales, rhonchi, wheezing, or rubs  HEART: Regular rate and rhythm; No murmurs, rubs, or gallops  ABDOMEN: Soft, Nontender, Nondistended; Bowel sounds present.   EXTREMITIES:  2+ Peripheral Pulses, No clubbing, cyanosis, or edema  LYMPH: No lymphadenopathy noted  SKIN: No rashes or lesions  PSYCH: Alert & Oriented x3    Consultant(s) Notes Reviewed:  [x ] YES  [ ] NO  Care Discussed with Consultants/Other Providers [ x] YES  [ ] NO    LABS:                        9.5    8.73  )-----------( 127      ( 12 Jun 2018 01:30 )             28.9     06-12    129<L>  |  95<L>  |  43<H>  ----------------------------<  118<H>  3.6   |  16<L>  |  5.66<H>    Ca    6.6<L>      12 Jun 2018 01:40  Phos  4.5     06-12  Mg     1.4     06-12    TPro  6.8  /  Alb  2.9<L>  /  TBili  0.5  /  DBili  x   /  AST  28  /  ALT  18  /  AlkPhos  70  06-11    PT/INR - ( 11 Jun 2018 11:50 )   PT: 12.8 SEC;   INR: 1.11          PTT - ( 11 Jun 2018 11:50 )  PTT:31.0 SEC    CAPILLARY BLOOD GLUCOSE      POCT Blood Glucose.: 140 mg/dL (12 Jun 2018 01:03)  POCT Blood Glucose.: 124 mg/dL (11 Jun 2018 23:31)  POCT Blood Glucose.: 146 mg/dL (11 Jun 2018 19:17) PROVIDER CONTACT INFO:  Jolie Rose MD  [facility] Pager: [phone]  [facility] Pager: [phone]    MD MAMI  63y  Male      Patient is a 63y old  Male who presents with a chief complaint of Hemoptysis, r/o TB (11 Jun 2018 16:41)      INTERVAL HPI/OVERNIGHT EVENTS: Overnight, pt was started on ASA81 per cards recs, His CK and CK-MB continue to trend upwards, however cardiology has low suspicion for ACS and believe this is 2/2 renal disease and acute illness. Also, Na+ was correcting too quickly overnight so fluids were discontinued. This AM pt states he feels better, but is still coughing with streaks of blood. He denies N/V, CP, but endorses SOB and subjective fevers. Per nursing staff, he had one episode of diarrhea this AM.     T(C): 37.4 (06-12-18 @ 06:00), Max: 39.8 (06-11-18 @ 11:43)  HR: 96 (06-12-18 @ 06:00) (63 - 96)  BP: 150/51 (06-12-18 @ 06:00) (138/64 - 175/77)  RR: 18 (06-12-18 @ 06:00) (18 - 20)  SpO2: 96% (06-12-18 @ 06:00) (96% - 100%)  Wt(kg): --Vital Signs Last 24 Hrs  T(C): 37.4 (12 Jun 2018 06:00), Max: 39.8 (11 Jun 2018 11:43)  T(F): 99.4 (12 Jun 2018 06:00), Max: 103.7 (11 Jun 2018 11:43)  HR: 96 (12 Jun 2018 06:00) (63 - 96)  BP: 150/51 (12 Jun 2018 06:00) (138/64 - 175/77)  BP(mean): --  RR: 18 (12 Jun 2018 06:00) (18 - 20)  SpO2: 96% (12 Jun 2018 06:00) (96% - 100%)    PHYSICAL EXAM:  GENERAL: NAD  HEAD:  Atraumatic, Normocephalic  EYES: EOMI, PERRLA, conjunctiva and sclera clear  ENMT: No tonsillar erythema, exudates,; Moist mucous membranes. No lesions  NECK: Supple, No JVD, Normal thyroid  CHEST/LUNG: faint rales in LLL  HEART: Regular rate and rhythm; No murmurs, rubs, or gallops  ABDOMEN: Soft, Nontender, Nondistended; Bowel sounds present.   EXTREMITIES:  2+ Peripheral Pulses, No clubbing, cyanosis, or edema  LYMPH: No lymphadenopathy noted  SKIN: No rashes or lesions  PSYCH: Alert & Oriented x3    Consultant(s) Notes Reviewed:  [x ] YES  [ ] NO  Care Discussed with Consultants/Other Providers [ x] YES  [ ] NO    LABS:                        9.5    8.73  )-----------( 127      ( 12 Jun 2018 01:30 )             28.9     06-12    129<L>  |  95<L>  |  43<H>  ----------------------------<  118<H>  3.6   |  16<L>  |  5.66<H>    Ca    6.6<L>      12 Jun 2018 01:40  Phos  4.5     06-12  Mg     1.4     06-12    TPro  6.8  /  Alb  2.9<L>  /  TBili  0.5  /  DBili  x   /  AST  28  /  ALT  18  /  AlkPhos  70  06-11    PT/INR - ( 11 Jun 2018 11:50 )   PT: 12.8 SEC;   INR: 1.11          PTT - ( 11 Jun 2018 11:50 )  PTT:31.0 SEC    CAPILLARY BLOOD GLUCOSE      POCT Blood Glucose.: 140 mg/dL (12 Jun 2018 01:03)  POCT Blood Glucose.: 124 mg/dL (11 Jun 2018 23:31)  POCT Blood Glucose.: 146 mg/dL (11 Jun 2018 19:17)

## 2018-06-12 NOTE — PROGRESS NOTE ADULT - PROBLEM SELECTOR PLAN 5
Normocytic anemia with Hgb of 9.5, unknown baseline. Stable. Most likely in the setting of chronic disease (CKD) and hemoptysis.  - f/u iron, ferritin, TIBC  - cont to monitor CBC, signs/sx of active bleed

## 2018-06-12 NOTE — PROGRESS NOTE ADULT - PROBLEM SELECTOR PLAN 1
Pt with three month hx of hemoptysis, now with fever to 103.7 and back pain. CXR concerning for pna vs pulmonary hemorrhage.   - will c/w zosyn and azithromycin  - f/u blood cx and urine legionella  - duoneb Q6H  - tylenol PRN fever  - CT abd/chest showing LLL consolidation in prelim read, pending official read  - ID consulted, f/u recs  - f/u quant gold, obtain sputum cultures to r/o TB and place in isolation Pt with three month hx of hemoptysis, now with fever to 103.7 and back pain. CXR concerning for pna vs pulmonary hemorrhage.   - will c/w zosyn and azithromycin  - f/u blood cx and urine legionella  - duoneb Q6H  - tylenol PRN fever  - CT abd/chest showing LLL consolidation, pna vs hemorrhage  - ID consulted, will r/o TB and c/w CAP tx   - f/u quant gold, obtain sputum cultures to r/o TB and place in isolation

## 2018-06-12 NOTE — PROGRESS NOTE ADULT - PROBLEM SELECTOR PLAN 3
T wave inversion on aVL, I, and V4-V6 concerning for possible lateral ischemia. No prior EKG available for comparison. Troponins negative, however CK and CK-MB continue to increase. Per cards, low suspicion for ACS, likely due to chronic renal disease/acute illness.  - will trend Jasmin Q6Hs, f/u official cardiology consult  - resumed ASA81 per cards recs T wave inversion on aVL, I, and V4-V6 concerning for possible lateral ischemia. No prior EKG available for comparison. Troponins negative, however CK and CK-MB continue to increase. Per cards, low suspicion for ACS, likely due to chronic renal disease/acute illness.  - will stop trending cardiac enzymes and f/u official cardiology consult  - resumed ASA81 per cards recs

## 2018-06-12 NOTE — CONSULT NOTE ADULT - PROBLEM SELECTOR RECOMMENDATION 2
Likely hypovolemic hyponatremia responding to IV fluids. Agree with holding fluids overnight to avoid overcorrection.  -avoid overcorrecting >10meq/24 hours  -correcting with IV NS. Likely hypovolemic hyponatremia responding to IV fluids. Agree with holding fluids overnight to avoid overcorrection.  -avoid overcorrecting >10meq/24 hours  -correcting with IV NS.    Plan pending discussion with attending.  Francis García  Medicine PGY-2 nephrology elective  Pager [phone] / [phone] Likely hypovolemic hyponatremia responding to IV fluids. Agree with holding fluids overnight to avoid overcorrection.  -avoid overcorrecting >6-8meq/24 hours  -correcting with IV NS.

## 2018-06-12 NOTE — PROGRESS NOTE ADULT - ATTENDING COMMENTS
Patient seen and examined. Chart/lab reviewed. Agree with above with modifications.  33M with PMHx of HTN, DM-2, HLD, CAD s/p stents x3 in 2015, CKD, p/w fevers, chills, hemoptysis concerning for CAP vs active TB infection, also c/o chest discomfort and LBP.  Pt feels better today.    Assessment/plan:  # Hemoptysis: likely CAP (pneumonia), less likely TB, CT showed LLL consolidation  responding to iv abx (zosyn, zithromax), normal WBC. No plan for bronchoscopy per pulm.   c/w  airborne isolation r/o TB, AFB x3, quantiferon gold; ID consult appreciated  cont duoneb likely COPD  f/u urine legionella Ag  # Acute on chronic renal failure: ?CKD5, get baseline creat from PCP,  CT abd/pelvis no hydro, no RP bleed  likely progressive CKD from HTN/DM, unlikely RPGN with benign UA, but will f/u complement level, anca, anti-gbm  NahCo3 650 mg tid for metabolic acidosis likely due to ckd,   Nephrology consult appreciated  # chest discomfort: normal troponin but elevated cpk, doubt ACS, resumed on ASA, cont cardiac meds  check echo, f/u cardio recs  # Anemia: likely due to ckd and acute blood loss from hemoptysis, check iron panel in am, monitor cbc, transfuse prn for Hgb<7  # Electrolyte abnormality: pt with hypocalcemia, hyponatremia  hypovolemic hyponatremia improving with NS  #HTN:  c/w labetalol and lopressor, clonidine, hold losartan in the setting of advanced ckd Patient seen and examined. Chart/lab reviewed. Agree with above with modifications.  33M with PMHx of HTN, DM-2, HLD, CAD s/p stents x3 in 2015, CKD, p/w fevers, chills, hemoptysis concerning for CAP vs active TB infection, also c/o chest discomfort and LBP.  Pt feels better today.    Assessment/plan:  # Hemoptysis: likely CAP (pneumonia), less likely TB, CT showed LLL consolidation  responding to iv abx (zosyn, zithromax), normal WBC. No plan for bronchoscopy per pulm.   c/w  airborne isolation r/o TB, AFB x3, quantiferon gold; ID consult appreciated  cont duoneb likely COPD  f/u urine legionella Ag  # Acute on chronic renal failure: ?CKD5, get baseline creat from PCP,  CT abd/pelvis no hydro, no RP bleed  likely progressive CKD from HTN/DM, unlikely RPGN with benign UA, but will f/u complement level, anca, anti-gbm  NahCo3 650 mg tid for metabolic acidosis likely due to ckd,   Nephrology consult appreciated  # chest discomfort: normal troponin but elevated cpk, doubt ACS, resumed on ASA, cont cardiac meds  check echo, f/u cardio recs  # Anemia: likely due to ckd and acute blood loss from hemoptysis,  monitor cbc, transfuse prn for Hgb<8  iron store ok (ferritin 351)  # Electrolyte abnormality: pt with hypocalcemia, hyponatremia  hypovolemic hyponatremia improving with NS  #HTN:  c/w labetalol and lopressor, clonidine, hold losartan in the setting of advanced ckd Patient seen and examined. Chart/lab reviewed. Agree with above with modifications.  33M with PMHx of HTN, DM-2, HLD, CAD s/p stents x3 in 2015, CKD, p/w fevers, chills, hemoptysis concerning for CAP vs active TB infection, also c/o chest discomfort and LBP.  Pt feels better today.    Assessment/plan:  # Hemoptysis: likely CAP (pneumonia), less likely TB, CT showed LLL consolidation  responding to iv abx, abx changed to ceftriaxone/zithromax per ID, normal WBC.   No plan for bronchoscopy per pulm.   c/w  airborne isolation r/o TB, AFB x3, quantiferon gold; ID consult appreciated  cont duoneb likely COPD  f/u urine legionella Ag  # Acute on chronic renal failure: ?CKD5, get baseline creat from PCP,  CT abd/pelvis no hydro, no RP bleed  likely progressive CKD from HTN/DM, unlikely RPGN with benign UA, but will f/u complement level, anca, anti-gbm  NahCo3 650 mg tid for metabolic acidosis likely due to ckd,   Nephrology consult appreciated  # chest discomfort: normal troponin but elevated cpk, doubt ACS, resumed on ASA, cont cardiac meds  check echo, f/u cardio recs  # Anemia: likely due to ckd and acute blood loss from hemoptysis,  monitor cbc, transfuse prn for Hgb<8  iron store ok (ferritin 351)  # Electrolyte abnormality: pt with hypocalcemia, hyponatremia  hypovolemic hyponatremia improving with NS  #HTN:  c/w labetalol and lopressor, clonidine, hold losartan in the setting of advanced ckd

## 2018-06-12 NOTE — CONSULT NOTE ADULT - ASSESSMENT
64yo M with PMH of DMII, CKD (unknown baseline Cr), CAD s/p stent x3 in 2015, CVA x3 without residual deficits, who presents with hemoptysis for 3 months, low back pain, fever, increasing weakness and shortness of breath, a/w r/o ACS / TB, nephrology consulted for SCr stable 5 with unclear baseline. 64yo M with PMH of DMII, CKD (baseline Cr ~ 2.5), CAD s/p stent x3 in 2015, CVA x3 without residual deficits, who presents with hemoptysis for 3 months, low back pain, fever, increasing weakness and shortness of breath, a/w r/o ACS / TB, nephrology consulted for SCr stable 5 with unclear baseline.

## 2018-06-12 NOTE — CONSULT NOTE ADULT - SUBJECTIVE AND OBJECTIVE BOX
[facility] Division of Kidney Diseases & Hypertension  INITIAL CONSULT NOTE  [phone]--------------------------------------------------------------------------------    Interview completed with Ila  via [facility] service ID#[id number]    HPI: Pt is a 64yo M with PMH of DMII, CKD (unknown baseline Cr), CAD s/p stent x3 in 2015, CVA x3 without residual deficits, who presents with hemoptysis for 3 months, low back pain, fever, increasing weakness and shortness of breath. Around three months ago he started feeling unwell with abrupt onset hemoptysis. No outpatient CXR at that time per patient report. He had been having hemoptysis about once weekly, worse over the past 4 days. He reports a hx of abnormal PPD on arrival to [facility] but never treated for latent TB. Unclear if he had TB vaccine. [place] in [place] and last visited 2011. Denies swollen joints, hx of rheumatologic disease, family hx of rheumatologic disease, red eyes, nasal congestion, chronic cough.     Regarding his hx of kidney disease, he states that he first discovered he had kidney disease 1 year ago and has been following with a Dr. Garcia in [place]. He is unsure of his baseline numbers/creatitine. He notes that his medications were changed over 6 months ago by his nephrologist to help his kidneys.     ED course: VS were: Tmax 103.7 HR73 /64 RR18 SaO2 100% RA. Labs were significant for Hgb of 9.5, platelets of 128, and Creatinine of 5.69. EKG showed T wave inversions in V4-V6, I, and aVL. CXR showed asymmetric consolidation of left midlung concerning for pna vs pulmonary hemorrhage. He was admitted to the general medical floor for further management of CAP vs active TB (11 Jun 2018 16:41)    Hospital course: Given ceftriaxone 1g IV x1 switched to zosyn 3.375g IV q8, azithromycin 500mg IV qd. BCx pending, sputum cx pending, sputum AFB pending, quant gold pending, Ulnikolasionella pending. ANCA, ARTEM, C3, C4, rheum workup o/w pending      PAST HISTORY  --------------------------------------------------------------------------------  PAST MEDICAL & SURGICAL HISTORY:  Coronary artery disease  CKD (chronic kidney disease)  HTN (hypertension)  Diabetes  No significant past surgical history    FAMILY HISTORY:  No pertinent family history in first degree relatives    PAST SOCIAL HISTORY: 35 pack year smoking hx, quit smoking 6 years ago. He denies any recent tobacco, alcohol, or drug use. He works as a .    ALLERGIES & MEDICATIONS  --------------------------------------------------------------------------------  Allergies    No Known Allergies    Intolerances      Standing Inpatient Medications  ALBUTerol/ipratropium for Nebulization 3 milliLiter(s) Nebulizer every 6 hours  aspirin enteric coated 81 milliGRAM(s) Oral daily  azithromycin  IVPB 500 milliGRAM(s) IV Intermittent every 24 hours  cloNIDine 0.1 milliGRAM(s) Oral daily  dextrose 5%. 1000 milliLiter(s) IV Continuous <Continuous>  dextrose 50% Injectable 12.5 Gram(s) IV Push once  dextrose 50% Injectable 25 Gram(s) IV Push once  dextrose 50% Injectable 25 Gram(s) IV Push once  ferrous    sulfate 325 milliGRAM(s) Oral daily  insulin glargine Injectable (LANTUS) 15 Unit(s) SubCutaneous at bedtime  insulin lispro (HumaLOG) corrective regimen sliding scale   SubCutaneous three times a day before meals  labetalol 300 milliGRAM(s) Oral two times a day  metoprolol tartrate 12.5 milliGRAM(s) Oral two times a day  piperacillin/tazobactam IVPB. 3.375 Gram(s) IV Intermittent every 12 hours  simvastatin 40 milliGRAM(s) Oral at bedtime  sodium bicarbonate 650 milliGRAM(s) Oral three times a day    PRN Inpatient Medications  acetaminophen   Tablet 650 milliGRAM(s) Oral every 6 hours PRN  dextrose 40% Gel 15 Gram(s) Oral once PRN  glucagon  Injectable 1 milliGRAM(s) IntraMuscular once PRN      REVIEW OF SYSTEMS  --------------------------------------------------------------------------------  Gen: No  fevers/chills, weakness  Skin: No rashes  Head/Eyes/Ears/Mouth: No headache; Normal hearing; Normal vision w/o blurriness;   Respiratory: +bloody cough, o/w No dyspnea, cough, wheezing, hemoptysis  CV: No chest pain,   GI: No abdominal pain, diarrhea, constipation, nausea, vomiting  : No increased frequency, dysuria, hematuria, nocturia  MSK: No joint pain/swelling;   Neuro: No dizziness/lightheadedness, weakness, seizures    All other systems were reviewed and are negative, except as noted.    VITALS/PHYSICAL EXAM  --------------------------------------------------------------------------------  T(C): 37.4 (06-12-18 @ 06:00), Max: 39.8 (06-11-18 @ 11:43)  HR: 96 (06-12-18 @ 06:00) (63 - 96)  BP: 150/51 (06-12-18 @ 06:00) (138/64 - 175/77)  RR: 18 (06-12-18 @ 06:00) (18 - 20)  SpO2: 96% (06-12-18 @ 06:00) (96% - 100%)  Wt(kg): --  Height (cm): 167.64 (06-11-18 @ 22:58)  Weight (kg): 71.8 (06-11-18 @ 22:58)  BMI (kg/m2): 25.5 (06-11-18 @ 22:58)  BSA (m2): 1.81 (06-11-18 @ 22:58)      Physical Exam:  	Gen: NAD, well-appearing  	HEENT: no scleral injection, no nasal discharge/congestion, PERRL, supple neck  	Pulm: L inspirator crackles lower and middle lobes with fine expiratory wheezes.   	CV:  S1S2  	Abd: NO bladder tenderness/distension; +BS, soft               : no CVA tenderness  	Ext: No B/L Lower ext edema  	Neuro: No focal deficits  	Skin: Warm, without rashes  	Vascular access:     LABS/STUDIES  --------------------------------------------------------------------------------              8.8    7.24  >-----------<  125      [06-12-18 @ 06:30]              26.6     130  |  94  |  44  ----------------------------<  114      [06-12-18 @ 06:30]  3.8   |  16  |  5.72        Ca     6.7     [06-12-18 @ 06:30]      Mg     1.5     [06-12-18 @ 06:30]      Phos  4.0     [06-12-18 @ 06:30]    TPro  6.3  /  Alb  2.5  /  TBili  0.3  /  DBili  x   /  AST  32  /  ALT  22  /  AlkPhos  68  [06-12-18 @ 06:30]    PT/INR: PT 12.8 , INR 1.11       [06-11-18 @ 11:50]  PTT: 31.0       [06-11-18 @ 11:50]    Troponin < 0.06      [06-12-18 @ 06:30]        [06-12-18 @ 01:30]    Creatinine Trend:  SCr 5.72 [06-12 @ 06:30]  SCr 5.66 [06-12 @ 01:40]  SCr 5.69 [06-11 @ 11:50]    Urinalysis - [06-12-18 @ 08:04]      Color PLYEL / Appearance CLEAR / SG 1.008 / pH 7.0      Gluc 100 / Ketone TRACE  / Bili NEGATIVE / Urobili NORMAL       Blood MODERATE / Protein 500 / Leuk Est NEGATIVE / Nitrite NEGATIVE      RBC 2-5 / WBC 0-2 / Hyaline  / Gran  / Sq Epi OCC / Non Sq Epi  / Bacteria       Iron 11, TIBC 148, %sat --      [06-12-18 @ 06:30]  Ferritin 351.6      [06-12-18 @ 06:30]  HbA1c 6.2      [06-12-18 @ 06:30] [facility] Division of Kidney Diseases & Hypertension  INITIAL CONSULT NOTE  [phone]--------------------------------------------------------------------------------    Interview completed with Ila  via [employer] service ID#[id number]    HPI: Pt is a 62yo M with PMH of DMII, CKD (unknown baseline Cr), CAD s/p stent x3 in 2015, CVA x3 without residual deficits, who presents with hemoptysis for 3 months, low back pain, fever, increasing weakness and shortness of breath. Around three months ago he started feeling unwell with abrupt onset hemoptysis. No outpatient CXR at that time per patient report. He had been having hemoptysis about once weekly, worse over the past 4 days. He reports a hx of abnormal PPD on arrival to [facility] but never treated for latent TB. Unclear if he had TB vaccine. [place] in [place] and last visited 2011. Denies swollen joints, hx of rheumatologic disease, family hx of rheumatologic disease, red eyes, nasal congestion, chronic cough.     Regarding his hx of kidney disease, he states that he first discovered he had kidney disease 1 year ago and has been following with a Dr. Garcia in [place]. He is unsure of his baseline numbers/creatitine. He notes that his medications were changed over 6 months ago by his nephrologist to help his kidneys. ROS o/w positive for dysuria on admission now resolved as well as arm and leg tingling, now resolved.     ED course: VS were: Tmax 103.7 HR73 /64 RR18 SaO2 100% RA. Labs were significant for Hgb of 9.5, platelets of 128, and Creatinine of 5.69. EKG showed T wave inversions in V4-V6, I, and aVL. CXR showed asymmetric consolidation of left midlung concerning for pna vs pulmonary hemorrhage. He was admitted to the general medical floor for further management of CAP vs active TB (11 Jun 2018 16:41)    Hospital course: Given ceftriaxone 1g IV x1 switched to zosyn 3.375g IV q8, azithromycin 500mg IV qd. BCx pending, sputum cx pending, sputum AFB pending, quant gold pending, Ulegionella pending. ANCA, ARTEM, C3, C4, rheum workup o/w pending      PAST HISTORY  --------------------------------------------------------------------------------  PAST MEDICAL & SURGICAL HISTORY:  Coronary artery disease  CKD (chronic kidney disease)  HTN (hypertension)  Diabetes  No significant past surgical history    FAMILY HISTORY:  No pertinent family history in first degree relatives    PAST SOCIAL HISTORY: 35 pack year smoking hx, quit smoking 6 years ago. He denies any recent tobacco, alcohol, or drug use. He works as a .    ALLERGIES & MEDICATIONS  --------------------------------------------------------------------------------  Allergies    No Known Allergies    Intolerances      Standing Inpatient Medications  ALBUTerol/ipratropium for Nebulization 3 milliLiter(s) Nebulizer every 6 hours  aspirin enteric coated 81 milliGRAM(s) Oral daily  azithromycin  IVPB 500 milliGRAM(s) IV Intermittent every 24 hours  cloNIDine 0.1 milliGRAM(s) Oral daily  dextrose 5%. 1000 milliLiter(s) IV Continuous <Continuous>  dextrose 50% Injectable 12.5 Gram(s) IV Push once  dextrose 50% Injectable 25 Gram(s) IV Push once  dextrose 50% Injectable 25 Gram(s) IV Push once  ferrous    sulfate 325 milliGRAM(s) Oral daily  insulin glargine Injectable (LANTUS) 15 Unit(s) SubCutaneous at bedtime  insulin lispro (HumaLOG) corrective regimen sliding scale   SubCutaneous three times a day before meals  labetalol 300 milliGRAM(s) Oral two times a day  metoprolol tartrate 12.5 milliGRAM(s) Oral two times a day  piperacillin/tazobactam IVPB. 3.375 Gram(s) IV Intermittent every 12 hours  simvastatin 40 milliGRAM(s) Oral at bedtime  sodium bicarbonate 650 milliGRAM(s) Oral three times a day    PRN Inpatient Medications  acetaminophen   Tablet 650 milliGRAM(s) Oral every 6 hours PRN  dextrose 40% Gel 15 Gram(s) Oral once PRN  glucagon  Injectable 1 milliGRAM(s) IntraMuscular once PRN      REVIEW OF SYSTEMS  --------------------------------------------------------------------------------  Gen: No  fevers/chills, weakness  Skin: No rashes  Head/Eyes/Ears/Mouth: No headache; Normal hearing; Normal vision w/o blurriness;   Respiratory: +bloody cough, o/w No dyspnea, cough, wheezing, hemoptysis  CV: No chest pain,   GI: No abdominal pain, diarrhea, constipation, nausea, vomiting  : +dysuria on admission now resolved, o/w No increased frequency, dysuria, hematuria, nocturia  MSK: No joint pain/swelling;   Neuro: +arm/leg burning/tingling; o/w No dizziness/lightheadedness, weakness, seizures    All other systems were reviewed and are negative, except as noted.    VITALS/PHYSICAL EXAM  --------------------------------------------------------------------------------  T(C): 37.4 (06-12-18 @ 06:00), Max: 39.8 (06-11-18 @ 11:43)  HR: 96 (06-12-18 @ 06:00) (63 - 96)  BP: 150/51 (06-12-18 @ 06:00) (138/64 - 175/77)  RR: 18 (06-12-18 @ 06:00) (18 - 20)  SpO2: 96% (06-12-18 @ 06:00) (96% - 100%)  Wt(kg): --  Height (cm): 167.64 (06-11-18 @ 22:58)  Weight (kg): 71.8 (06-11-18 @ 22:58)  BMI (kg/m2): 25.5 (06-11-18 @ 22:58)  BSA (m2): 1.81 (06-11-18 @ 22:58)      Physical Exam:  	Gen: NAD, well-appearing  	HEENT: no scleral injection, no nasal discharge/congestion, PERRL, supple neck  	Pulm: L inspirator crackles lower and middle lobes with fine expiratory wheezes.   	CV:  S1S2  	Abd: NO bladder tenderness/distension; +BS, soft               : no CVA tenderness  	Ext: No B/L Lower ext edema  	Neuro: No focal deficits  	Skin: Warm, without rashes  	Vascular access:     LABS/STUDIES  --------------------------------------------------------------------------------              8.8    7.24  >-----------<  125      [06-12-18 @ 06:30]              26.6     130  |  94  |  44  ----------------------------<  114      [06-12-18 @ 06:30]  3.8   |  16  |  5.72        Ca     6.7     [06-12-18 @ 06:30]      Mg     1.5     [06-12-18 @ 06:30]      Phos  4.0     [06-12-18 @ 06:30]    TPro  6.3  /  Alb  2.5  /  TBili  0.3  /  DBili  x   /  AST  32  /  ALT  22  /  AlkPhos  68  [06-12-18 @ 06:30]    PT/INR: PT 12.8 , INR 1.11       [06-11-18 @ 11:50]  PTT: 31.0       [06-11-18 @ 11:50]    Troponin < 0.06      [06-12-18 @ 06:30]        [06-12-18 @ 01:30]    Creatinine Trend:  SCr 5.72 [06-12 @ 06:30]  SCr 5.66 [06-12 @ 01:40]  SCr 5.69 [06-11 @ 11:50]    Urinalysis - [06-12-18 @ 08:04]      Color PLYEL / Appearance CLEAR / SG 1.008 / pH 7.0      Gluc 100 / Ketone TRACE  / Bili NEGATIVE / Urobili NORMAL       Blood MODERATE / Protein 500 / Leuk Est NEGATIVE / Nitrite NEGATIVE      RBC 2-5 / WBC 0-2 / Hyaline  / Gran  / Sq Epi OCC / Non Sq Epi  / Bacteria       Iron 11, TIBC 148, %sat --      [06-12-18 @ 06:30]  Ferritin 351.6      [06-12-18 @ 06:30]  HbA1c 6.2      [06-12-18 @ 06:30] [facility] Division of Kidney Diseases & Hypertension  INITIAL CONSULT NOTE  [phone]--------------------------------------------------------------------------------    Interview completed with Ila  via [employer] service ID#[id number]    HPI: Pt is a 64yo M with PMH of DMII, CKD (unknown baseline Cr), CAD s/p stent x3 in 2015, CVA x3 without residual deficits, who presents with hemoptysis for 3 months, low back pain, fever, increasing weakness and shortness of breath. Around three months ago he started feeling unwell with abrupt onset hemoptysis. No outpatient CXR at that time per patient report. He had been having hemoptysis about once weekly, worse over the past 4 days. He reports a hx of abnormal PPD on arrival to [facility] but never treated for latent TB. Unclear if he had TB vaccine. [place] and last visited [place]. Denies swollen joints, hx of rheumatologic disease, family hx of rheumatologic disease, red eyes, nasal congestion, chronic cough.     Regarding his hx of kidney disease, he states that he first discovered he had kidney disease 1 year ago and has been following with a Dr. Garcia in [place]. He is unsure of his baseline numbers/creatitine. He notes that his medications were changed over 6 months ago by his nephrologist to help his kidneys. ROS o/w positive for dysuria on admission now resolved as well as arm and leg tingling, now resolved.     ED course: VS were: Tmax 103.7 HR73 /64 RR18 SaO2 100% RA. Labs were significant for Hgb of 9.5, platelets of 128, and Creatinine of 5.69. EKG showed T wave inversions in V4-V6, I, and aVL. CXR showed asymmetric consolidation of left midlung concerning for pna vs pulmonary hemorrhage. He was admitted to the general medical floor for further management of CAP vs active TB (11 Jun 2018 16:41)    Hospital course: Given ceftriaxone 1g IV x1 switched to zosyn 3.375g IV q8, azithromycin 500mg IV qd. BCx pending, sputum cx pending, sputum AFB pending, quant gold pending, Ulegionella pending. ANCA, ARTEM, C3, C4, rheum workup o/w pending      PAST HISTORY  --------------------------------------------------------------------------------  PAST MEDICAL & SURGICAL HISTORY:  Coronary artery disease  CKD (chronic kidney disease)  HTN (hypertension)  Diabetes  No significant past surgical history    FAMILY HISTORY:  No pertinent family history in first degree relatives    PAST SOCIAL HISTORY: 35 pack year smoking hx, quit smoking 6 years ago. He denies any recent tobacco, alcohol, or drug use. He works as a .    ALLERGIES & MEDICATIONS  --------------------------------------------------------------------------------  Allergies    No Known Allergies    Intolerances      Standing Inpatient Medications  ALBUTerol/ipratropium for Nebulization 3 milliLiter(s) Nebulizer every 6 hours  aspirin enteric coated 81 milliGRAM(s) Oral daily  azithromycin  IVPB 500 milliGRAM(s) IV Intermittent every 24 hours  cloNIDine 0.1 milliGRAM(s) Oral daily  dextrose 5%. 1000 milliLiter(s) IV Continuous <Continuous>  dextrose 50% Injectable 12.5 Gram(s) IV Push once  dextrose 50% Injectable 25 Gram(s) IV Push once  dextrose 50% Injectable 25 Gram(s) IV Push once  ferrous    sulfate 325 milliGRAM(s) Oral daily  insulin glargine Injectable (LANTUS) 15 Unit(s) SubCutaneous at bedtime  insulin lispro (HumaLOG) corrective regimen sliding scale   SubCutaneous three times a day before meals  labetalol 300 milliGRAM(s) Oral two times a day  metoprolol tartrate 12.5 milliGRAM(s) Oral two times a day  piperacillin/tazobactam IVPB. 3.375 Gram(s) IV Intermittent every 12 hours  simvastatin 40 milliGRAM(s) Oral at bedtime  sodium bicarbonate 650 milliGRAM(s) Oral three times a day    PRN Inpatient Medications  acetaminophen   Tablet 650 milliGRAM(s) Oral every 6 hours PRN  dextrose 40% Gel 15 Gram(s) Oral once PRN  glucagon  Injectable 1 milliGRAM(s) IntraMuscular once PRN      REVIEW OF SYSTEMS  --------------------------------------------------------------------------------  Gen: No  fevers/chills, +weakness  Skin: No rashes  Head/Eyes/Ears/Mouth: No headache; Normal hearing; Normal vision w/o blurriness;   Respiratory: +bloody cough, o/w No dyspnea, cough, wheezing, hemoptysis  CV: No chest pain,   GI: No abdominal pain, diarrhea, constipation, nausea, vomiting  : +dysuria on admission now resolved, o/w No increased frequency, dysuria, hematuria, nocturia  MSK: No joint pain/swelling;   Neuro: +arm/leg burning/tingling; o/w No dizziness/lightheadedness, weakness, seizures    All other systems were reviewed and are negative, except as noted.    VITALS/PHYSICAL EXAM  --------------------------------------------------------------------------------  T(C): 37.4 (06-12-18 @ 06:00), Max: 39.8 (06-11-18 @ 11:43)  HR: 96 (06-12-18 @ 06:00) (63 - 96)  BP: 150/51 (06-12-18 @ 06:00) (138/64 - 175/77)  RR: 18 (06-12-18 @ 06:00) (18 - 20)  SpO2: 96% (06-12-18 @ 06:00) (96% - 100%)  Wt(kg): --  Height (cm): 167.64 (06-11-18 @ 22:58)  Weight (kg): 71.8 (06-11-18 @ 22:58)  BMI (kg/m2): 25.5 (06-11-18 @ 22:58)  BSA (m2): 1.81 (06-11-18 @ 22:58)      Physical Exam:  	Gen: NAD, well-appearing  	HEENT: no scleral injection, no nasal discharge/congestion, PERRL, supple neck  	Pulm: L inspirator crackles lower and middle lobes with fine expiratory wheezes.   	CV:  S1S2  	Abd: NO bladder tenderness/distension; +BS, soft               : no CVA tenderness  	Ext: No B/L Lower ext edema  	Neuro: No asterixis, tremors noted  	Skin: Warm, without rashes  	Vascular: no cyanosis    LABS/STUDIES  --------------------------------------------------------------------------------              8.8    7.24  >-----------<  125      [06-12-18 @ 06:30]              26.6     130  |  94  |  44  ----------------------------<  114      [06-12-18 @ 06:30]  3.8   |  16  |  5.72        Ca     6.7     [06-12-18 @ 06:30]      Mg     1.5     [06-12-18 @ 06:30]      Phos  4.0     [06-12-18 @ 06:30]    TPro  6.3  /  Alb  2.5  /  TBili  0.3  /  DBili  x   /  AST  32  /  ALT  22  /  AlkPhos  68  [06-12-18 @ 06:30]    PT/INR: PT 12.8 , INR 1.11       [06-11-18 @ 11:50]  PTT: 31.0       [06-11-18 @ 11:50]    Troponin < 0.06      [06-12-18 @ 06:30]        [06-12-18 @ 01:30]    Creatinine Trend:  SCr 5.72 [06-12 @ 06:30]  SCr 5.66 [06-12 @ 01:40]  SCr 5.69 [06-11 @ 11:50]    Urinalysis - [06-12-18 @ 08:04]      Color PLYEL / Appearance CLEAR / SG 1.008 / pH 7.0      Gluc 100 / Ketone TRACE  / Bili NEGATIVE / Urobili NORMAL       Blood MODERATE / Protein 500 / Leuk Est NEGATIVE / Nitrite NEGATIVE      RBC 2-5 / WBC 0-2 / Hyaline  / Gran  / Sq Epi OCC / Non Sq Epi  / Bacteria       Iron 11, TIBC 148, %sat --      [06-12-18 @ 06:30]  Ferritin 351.6      [06-12-18 @ 06:30]  HbA1c 6.2      [06-12-18 @ 06:30]

## 2018-06-12 NOTE — CONSULT NOTE ADULT - PROBLEM SELECTOR RECOMMENDATION 9
Pt's Scr stable 5.7 on last 3 BMP's with unclear baseline. Outpatient records with baseline SCr appreciated if retrieved. U/A notable for moderate blood and 500 protein. Agree with w/u of possible ANCA vasculitis. Hgb A1C 6.2% suggesting well-controlled DM2 currently, though may have been poorly controlled previously as he was first aware of kidney disease and referred to a nephrologist 1 year ago. Since TB high on the differential, interstitial nephritis 2/2 TB is possible. No evidence of urogenital abscess/granulomas seen on CT abd/pelvis.   -urine Na, Cr, lytes.  -will f/u ANCA, C3, C4 with you.   -outpatient records for baseline SCr.  -f/u quant gold, sputum AFB Pt's Scr stable 5.7 on last 3 BMP's with unclear baseline. Outpatient records with baseline SCr appreciated if retrieved. U/A notable for moderate blood and 500 protein. Agree with w/u of possible ANCA vasculitis. Hgb A1C 6.2% suggesting well-controlled DM2 currently, though may have been poorly controlled previously as he was first aware of kidney disease and referred to a nephrologist 1 year ago. Since TB high on the differential, interstitial nephritis 2/2 TB is possible. No evidence of urogenital abscess/granulomas seen on CT abd/pelvis.   -urine Na, Cr, lytes.  -will f/u ANCA, C3, C4 with you.   -outpatient records for baseline SCr.  -f/u quant gold, sputum AFB  -agree w/ sodium bicarb TID

## 2018-06-12 NOTE — CONSULT NOTE ADULT - ATTENDING COMMENTS
Patient with LLL infiltrate, hemoptysis for 3 months, now increased. Agree with sputum for routine culture and afb, continue antibiotics

## 2018-06-12 NOTE — PROGRESS NOTE ADULT - PROBLEM SELECTOR PLAN 4
Na 123 on presentation, now improved to 129 after IVF  - cont to monitor off IV fluids  - NaHCO3 650 TID  - f/u urine electrolytes, albumin, UA

## 2018-06-12 NOTE — PROGRESS NOTE ADULT - PROBLEM SELECTOR PLAN 7
Unknown A1C, on novolog 70/30 30U BID at home. Blood sugars wnl in hospital  - f/u A1C  - ISS, check fingersticks  - will c/w weight based lantus 15U at night

## 2018-06-12 NOTE — CONSULT NOTE ADULT - ATTENDING COMMENTS
63 year old with DM presents with 3 months og hemoptysis.  Found to have kidney injury and fever.    CT with left middle lobe consolidation,.    Tb is on DDX. Check sputum afb times three.    Given FELICITA and hemoptysis agree with work up for vasculitis.  Check ARTEM/ ANCA    Can continue treatment for community acquired pneumonia- but would not explain symptoms of this duration. 63 year old with DM presents with 3 months og hemoptysis.  Found to have kidney injury and fever.    CT with left middle lobe consolidation,.    Tb is on DDX. Check sputum afb times three.    Given FELICITA and hemoptysis agree with work up for vasculitis.  Check ARTEM/ ANCA    Can continue treatment for community acquired pneumonia with ceftiraxone and azithromycin- but would not explain symptoms of this duration.

## 2018-06-12 NOTE — CONSULT NOTE ADULT - SUBJECTIVE AND OBJECTIVE BOX
Patient is a 63y old  Male who presents with a chief complaint of Hemoptysis, r/o TB (2018 16:41)    HPI:  Pt is a 63M with PMHx of DMII, CKD (unknown baseline Cr), CAD s/p stent x3 in 2015, CVA x3 without residual deficits, who presents with hemoptysis for 3 months. Pt was in usual state of health three months ago when he abruptly started to experience hemoptysis. He was evaluated by his  shortly after it began who sent bloodwork and recommended a chest x-ray. Pt reports the blood work was normal and he never got the CXR. The hemoptysis continued about once a week and became acutely worse over the past 4 days. He came to the emergency department after experiencing low back pain, fever, and increasing weakness and shortness of breath. Currently, pt c/o tremulousness, dizziness, weakness, SOB, continued cough, and low back pain. He describes the hemoptysis as dark blood, no clots. He had one episode of vomiting the day prior to presentation, nausea and vomiting now resolved. He denies CP, abd pain, nausea, dysuria, constipation, diarrhea, numbness/tingling in his legs, or incontinence. He is originally from [facility], but immigrated here in . He most recently visited [facility] in , but since then has had no international travel. He denies any recent travel out of [place] or recent sick contacts. He states that when he first came to [place] he had an abnormal PPD test, but did not receive any tx for latent TB. He is not sure if he received the TB vaccine as a child.     In ED, VS were: Tmax 103.7/ Tcurrent 99.8 HR73 /64 RR18 SaO2 100% RA. Labs were significant for Hgb of 9.5, platelets of 128, and Creatinine of 5.69. EKG showed T wave inversions in V4-V6, I, and aVL. CXR showed asymmetric consolidation of left midlung concerning for pna vs pulmonary hemorrhage. He was admitted to the general medical floor for further management of CAP vs active TB (2018 16:41)    Hospital course: Given ceftriaxone 1g IV x1 switched to zosyn 3.375g IV q8, azithromycin 500mg IV qd. BCx pending, sputum cx pending, sputum AFB pending, quant gold pending, Ulegionella pending.         PAST MEDICAL & SURGICAL HISTORY:  Coronary artery disease  CKD (chronic kidney disease)  HTN (hypertension)  Diabetes  No significant past surgical history    Social history:    FAMILY HISTORY:  No pertinent family history in first degree relatives    REVIEW OF SYSTEMS  General:Denies any malaise fatigue or chills. Fevers absent  Skin:No rash	  Ophthalmologic:Denies any visual complaints,discharge redness or photophobia	  ENMT:No nasal discharge,headache,sinus congestion or throat pain.No dental complaints  Respiratory and Thorax:No cough,sputum or chest pain.Denies shortness of breath	  Cardiovascular:	No chest pain,palpitaions or dizziness  Gastrointestinal:	NO nausea,abdominal pain or diarrhea.  Genitourinary:	No dysuria,frequency. No flank pain  Musculoskeletal:	No joint swelling or pain.No weakness  Neurological:No confusion,diziness.No extremity weakness.No bladder or bowel incontinence	  Psychiatric:No delusions or hallucinations	  Hematology/Lymphatics:	No LN swelling.No gum bleeding     Endocrine: No recent weight gain or loss. No abnormal heat/cold intolerance  Allergic/Immunologic: No hives or rash     Allergies    No Known Allergies  Intolerances        Antimicrobials:  azithromycin  IVPB 500 milliGRAM(s) IV Intermittent every 24 hours  piperacillin/tazobactam IVPB. 3.375 Gram(s) IV Intermittent every 12 hours        Vital Signs Last 24 Hrs  T(C): 37.4 (2018 06:00), Max: 39.8 (2018 11:43)  T(F): 99.4 (2018 06:00), Max: 103.7 (2018 11:43)  HR: 96 (2018 06:00) (63 - 96)  BP: 150/51 (2018 06:00) (138/64 - 175/77)  BP(mean): --  RR: 18 (2018 06:00) (18 - 20)  SpO2: 96% (2018 06:00) (96% - 100%)    PHYSICAL EXAM:        LABS:                8.8    7.24  )-----------( 125      ( 2018 06:30 )             26.6       06-12    130<L>  |  94<L>  |  44<H>  ----------------------------<  114<H>  3.8   |  16<L>  |  5.72<H>    Ca    6.7<L>      2018 06:30  Phos  4.0       Mg     1.5         TPro  6.3  /  Alb  2.5<L>  /  TBili  0.3  /  DBili  x   /  AST  32  /  ALT  22  /  AlkPhos  68  -    Urinalysis Basic - ( 2018 08:04 )  Color: PLYEL / Appearance: CLEAR / S.008 / pH: 7.0  Gluc: 100 / Ketone: TRACE  / Bili: NEGATIVE / Urobili: NORMAL mg/dL   Blood: MODERATE / Protein: 500 mg/dL / Nitrite: NEGATIVE   Leuk Esterase: NEGATIVE / RBC: 2-5 / WBC 0-2   Sq Epi: OCC / Non Sq Epi: x / Bacteria: x      RECENT CULTURES:      MICROBIOLOGY:          Radiology:  CT Abdomen and Pelvis No Cont (18 @ 18:43) >  IMPRESSION: Left lower lobe consolidation (which could be hemorrhage or pneumonia).    No retroperitoneal hemorrhage.    CT Chest No Cont (18 @ 18:40) >  IMPRESSION:  Left lower lobe consolidation (which could be hemorrhage or pneumonia).    No retroperitoneal hemorrhage.    Xray Chest 1 View- PORTABLE-Urgent (18 @ 13:10) >  IMPRESSION: Asymmetric consolidation left midlung, possibly pneumonia or pulmonary   hemorrhage.    Assessment:  63 [place] M with PMHx of DMII, CKD (unknown baseline Cr), CAD s/p stent x3 in 2015, CVA x3 without residual deficits, who presents chronic hemoptysis in the setting of fever, L midlung infiltrate w/o cavitation, hyponatremia, FELICITA, UA with blood/protein. Hx of abn PPD.     Concern for TB.     Recommendations and Plan:  ?infectious vs inflammatory   - f/u quant gold, sputum AFB x3 (at least 8hrs apart, at least one early morning specimen)  - send mycobacterial cx and GEETHA with sputum AFB.   - f/u Ulegionella.   - f/u UCx, BCx, sputum cx  - consider sending HIV  - consider sending MPO and PR3 for further evaluation of vasculitis       To be d/w attending. Patient is a 63y old  Male who presents with a chief complaint of Hemoptysis, r/o TB (2018 16:41)    HPI:  Pt is a 63M with PMHx of DMII, CKD (unknown baseline Cr), CAD s/p stent x3 in 2015, CVA x3 without residual deficits, who presents with hemoptysis for 3 months. Pt was in usual state of health three months ago when he abruptly started to experience hemoptysis. He was evaluated by his  shortly after it began who sent bloodwork and recommended a chest x-ray. Pt reports the blood work was normal and he never got the CXR. The hemoptysis continued about once a week and became acutely worse over the past 4 days. He came to the emergency department after experiencing low back pain, fever, and increasing weakness and shortness of breath. Currently, pt c/o tremulousness, dizziness, weakness, SOB, continued cough, and low back pain. He describes the hemoptysis as dark blood, no clots. He had one episode of vomiting the day prior to presentation, nausea and vomiting now resolved. He denies CP, abd pain, nausea, dysuria, constipation, diarrhea, numbness/tingling in his legs, or incontinence. He is originally from [facility], but immigrated here in . He most recently visited [facility] in , but since then has had no international travel. He denies any recent travel out of [place] or recent sick contacts. He states that when he first came to [place] he had an abnormal PPD test, but did not receive any tx for latent TB. He is not sure if he received the TB vaccine as a child.     In ED, VS were: Tmax 103.7/ Tcurrent 99.8 HR73 /64 RR18 SaO2 100% RA. Labs were significant for Hgb of 9.5, platelets of 128, and Creatinine of 5.69. EKG showed T wave inversions in V4-V6, I, and aVL. CXR showed asymmetric consolidation of left midlung concerning for pna vs pulmonary hemorrhage. He was admitted to the general medical floor for further management of CAP vs active TB (2018 16:41)    Hospital course: Given ceftriaxone 1g IV x1 switched to zosyn 3.375g IV q8, azithromycin 500mg IV qd. BCx pending, sputum cx pending, sputum AFB pending, quant gold pending, Ulegionella pending.     Patient seen and evaluated at bedside this morning ([facility]  # [id number]). Pt states that he continues to have cough with sputum mixed with blood. Not a large amount. Denies night sweats. Does endorse chills and AMEZCUA without CP (started approx 3 months ago). Continues to have low back pain radiating down lateral aspect of leg to knee. Complaining of tingling sensation in his b/l hands and feet. No one at home with similar symptoms.       PAST MEDICAL & SURGICAL HISTORY:  Coronary artery disease  CKD (chronic kidney disease)  HTN (hypertension)  Diabetes  No significant past surgical history    Social history:  - works as   - never incarcerated.   - lives with wife and daughters.   - former smoker: quit 6 yrs ago, smoked 1pk per day for 30-35 years.   - no alcohol use or drug use.     FAMILY HISTORY:  No pertinent family history in first degree relatives    REVIEW OF SYSTEMS  General:Denies any malaise fatigue.  Skin: No rash	  Ophthalmologic:Denies any visual complaints,discharge redness or photophobia	  ENMT:No nasal discharge,headache,sinus congestion or throat pain.No dental complaints  Respiratory and Thorax:chest pain.	  Cardiovascular:	No chest pain,palpitaions or dizziness  Gastrointestinal:	NO nausea,abdominal pain or diarrhea.  Genitourinary:	No dysuria,frequency. No flank pain  Musculoskeletal:	No joint swelling or pain.No weakness  Neurological:No confusion,diziness.No extremity weakness.No bladder or bowel incontinence	  Psychiatric:No delusions or hallucinations	  Hematology/Lymphatics:	No LN swelling.No gum bleeding     Endocrine: No recent weight gain or loss. No abnormal heat/cold intolerance  Allergic/Immunologic: No hives or rash     Allergies    No Known Allergies  Intolerances    Antimicrobials:  azithromycin  IVPB 500 milliGRAM(s) IV Intermittent every 24 hours  piperacillin/tazobactam IVPB. 3.375 Gram(s) IV Intermittent every 12 hours        Vital Signs Last 24 Hrs  T(C): 37.4 (2018 06:00), Max: 39.8 (2018 11:43)  T(F): 99.4 (2018 06:00), Max: 103.7 (2018 11:43)  HR: 96 (2018 06:00) (63 - 96)  BP: 150/51 (2018 06:00) (138/64 - 175/77)  BP(mean): --  RR: 18 (2018 06:00) (18 - 20)  SpO2: 96% (2018 06:00) (96% - 100%)    PHYSICAL EXAM:  General: No apparent distress, obese   Neck: Supple, NO JVD, No LAD.   Cardiac: RRR, Normal S1/S2, No M/R/G  Pulmonary: L mid lung field posteriorly with wheezing, Crackles L base.   Abdomen: Soft, NTND, +BS  Extremities: 2+ b/l LE peripheral pulses. No edema  Neuro: A&O x 3, No focal deficits  - no midline TTP.   Skin: No rashes      LABS:                8.8    7.24  )-----------( 125      ( 2018 06:30 )             26.6       06-12    130<L>  |  94<L>  |  44<H>  ----------------------------<  114<H>  3.8   |  16<L>  |  5.72<H>    Ca    6.7<L>      2018 06:30  Phos  4.0     06-12  Mg     1.5     06-12    TPro  6.3  /  Alb  2.5<L>  /  TBili  0.3  /  DBili  x   /  AST  32  /  ALT  22  /  AlkPhos  68  06-12    Urinalysis Basic - ( 2018 08:04 )  Color: PLYEL / Appearance: CLEAR / S.008 / pH: 7.0  Gluc: 100 / Ketone: TRACE  / Bili: NEGATIVE / Urobili: NORMAL mg/dL   Blood: MODERATE / Protein: 500 mg/dL / Nitrite: NEGATIVE   Leuk Esterase: NEGATIVE / RBC: 2-5 / WBC 0-2   Sq Epi: OCC / Non Sq Epi: x / Bacteria: x      RECENT CULTURES:      MICROBIOLOGY:      Radiology:  CT Abdomen and Pelvis No Cont (18 @ 18:43) >  IMPRESSION: Left lower lobe consolidation (which could be hemorrhage or pneumonia).    No retroperitoneal hemorrhage.    CT Chest No Cont (18 @ 18:40) >  IMPRESSION:  Left lower lobe consolidation (which could be hemorrhage or pneumonia).    No retroperitoneal hemorrhage.    Xray Chest 1 View- PORTABLE-Urgent (18 @ 13:10) >  IMPRESSION: Asymmetric consolidation left midlung, possibly pneumonia or pulmonary   hemorrhage.    Assessment:  63 [place] M former smoker (30-35 pk/yrs) with PMHx of DMII, CKD (unknown baseline Cr), CAD s/p stent x3 in 2015, CVA x3 without residual deficits, who presents chronic hemoptysis in the setting of fever, low back pain, L midlung infiltrate w/o cavitation, hyponatremia, FELICITA, UA with blood/protein. Hx of abn PPD.     Concern for TB.     Recommendations and Plan:  infectious vs inflammatory vs malignancy.  - f/u quant gold, sputum AFB x3 (at least 8hrs apart, at least one early morning specimen)  - send mycobacterial cx and GEETHA with sputum AFB.   - f/u Ulegionella.   - f/u UCx, BCx, sputum cx  - consider sending HIV  - consider sending MPO and PR3 for further evaluation of vasculitis       To be d/w attending.

## 2018-06-13 LAB
BUN SERPL-MCNC: 48 MG/DL — HIGH (ref 7–23)
C-ANCA SER-ACNC: NEGATIVE — SIGNIFICANT CHANGE UP
CALCIUM SERPL-MCNC: 6.9 MG/DL — LOW (ref 8.4–10.5)
CHLORIDE SERPL-SCNC: 97 MMOL/L — LOW (ref 98–107)
CK MB BLD-MCNC: 3.25 NG/ML — SIGNIFICANT CHANGE UP (ref 1–6.6)
CK MB BLD-MCNC: 3.32 NG/ML — SIGNIFICANT CHANGE UP (ref 1–6.6)
CK SERPL-CCNC: 259 U/L — HIGH (ref 30–200)
CK SERPL-CCNC: 271 U/L — HIGH (ref 30–200)
CO2 SERPL-SCNC: 17 MMOL/L — LOW (ref 22–31)
CREAT ?TM UR-MCNC: 20.4 MG/DL — SIGNIFICANT CHANGE UP
CREAT SERPL-MCNC: 5.99 MG/DL — HIGH (ref 0.5–1.3)
CULTURE - ACID FAST SMEAR CONCENTRATED: SIGNIFICANT CHANGE UP
CULTURE - ACID FAST SMEAR CONCENTRATED: SIGNIFICANT CHANGE UP
GBM IGG SER-ACNC: <0.2 U — SIGNIFICANT CHANGE UP
GLUCOSE SERPL-MCNC: 93 MG/DL — SIGNIFICANT CHANGE UP (ref 70–99)
HCT VFR BLD CALC: 26.3 % — LOW (ref 39–50)
HGB BLD-MCNC: 8.7 G/DL — LOW (ref 13–17)
M TB TUBERC IFN-G BLD QL: 0.1 IU/ML — SIGNIFICANT CHANGE UP
M TB TUBERC IFN-G BLD QL: 0.66 IU/ML — SIGNIFICANT CHANGE UP
M TB TUBERC IFN-G BLD QL: NEGATIVE — SIGNIFICANT CHANGE UP
MAGNESIUM SERPL-MCNC: 1.7 MG/DL — SIGNIFICANT CHANGE UP (ref 1.6–2.6)
MCHC RBC-ENTMCNC: 27.3 PG — SIGNIFICANT CHANGE UP (ref 27–34)
MCHC RBC-ENTMCNC: 33.1 % — SIGNIFICANT CHANGE UP (ref 32–36)
MCV RBC AUTO: 82.4 FL — SIGNIFICANT CHANGE UP (ref 80–100)
MITOGEN IGNF BCKGRD COR BLD-ACNC: 2.52 IU/ML — SIGNIFICANT CHANGE UP
NRBC # FLD: 0 — SIGNIFICANT CHANGE UP
OSMOLALITY UR: 227 MOSMO/KG — SIGNIFICANT CHANGE UP (ref 50–1200)
P-ANCA SER-ACNC: NEGATIVE — SIGNIFICANT CHANGE UP
PHOSPHATE SERPL-MCNC: 5.6 MG/DL — HIGH (ref 2.5–4.5)
PLATELET # BLD AUTO: 133 K/UL — LOW (ref 150–400)
PMV BLD: 11.7 FL — SIGNIFICANT CHANGE UP (ref 7–13)
POTASSIUM SERPL-MCNC: 3.2 MMOL/L — LOW (ref 3.5–5.3)
POTASSIUM SERPL-SCNC: 3.2 MMOL/L — LOW (ref 3.5–5.3)
POTASSIUM UR-SCNC: 14.8 MMOL/L — SIGNIFICANT CHANGE UP
PROT UR-MCNC: 317.4 MG/DL — SIGNIFICANT CHANGE UP
RBC # BLD: 3.19 M/UL — LOW (ref 4.2–5.8)
RBC # FLD: 11.9 % — SIGNIFICANT CHANGE UP (ref 10.3–14.5)
SODIUM SERPL-SCNC: 134 MMOL/L — LOW (ref 135–145)
SODIUM UR-SCNC: 55 MMOL/L — SIGNIFICANT CHANGE UP
SPECIMEN SOURCE: SIGNIFICANT CHANGE UP
SPECIMEN SOURCE: SIGNIFICANT CHANGE UP
TROPONIN T, HIGH SENSITIVITY RESULT: 103 NG/L — CRITICAL HIGH (ref ?–14)
TROPONIN T, HIGH SENSITIVITY RESULT: 95 NG/L — CRITICAL HIGH (ref ?–14)
UUN UR-MCNC: 202 MG/DL — SIGNIFICANT CHANGE UP
WBC # BLD: 5.72 K/UL — SIGNIFICANT CHANGE UP (ref 3.8–10.5)
WBC # FLD AUTO: 5.72 K/UL — SIGNIFICANT CHANGE UP (ref 3.8–10.5)

## 2018-06-13 PROCEDURE — 99233 SBSQ HOSP IP/OBS HIGH 50: CPT | Mod: GC

## 2018-06-13 PROCEDURE — 93010 ELECTROCARDIOGRAM REPORT: CPT

## 2018-06-13 PROCEDURE — 99232 SBSQ HOSP IP/OBS MODERATE 35: CPT

## 2018-06-13 RX ORDER — CALCIUM ACETATE 667 MG
667 TABLET ORAL
Qty: 0 | Refills: 0 | Status: DISCONTINUED | OUTPATIENT
Start: 2018-06-13 | End: 2018-06-14

## 2018-06-13 RX ORDER — SODIUM BICARBONATE 1 MEQ/ML
0.08 SYRINGE (ML) INTRAVENOUS
Qty: 75 | Refills: 0 | Status: DISCONTINUED | OUTPATIENT
Start: 2018-06-13 | End: 2018-06-14

## 2018-06-13 RX ORDER — ASPIRIN/CALCIUM CARB/MAGNESIUM 324 MG
81 TABLET ORAL DAILY
Qty: 0 | Refills: 0 | Status: DISCONTINUED | OUTPATIENT
Start: 2018-06-13 | End: 2018-06-14

## 2018-06-13 RX ORDER — MECLIZINE HCL 12.5 MG
12.5 TABLET ORAL EVERY 8 HOURS
Qty: 0 | Refills: 0 | Status: DISCONTINUED | OUTPATIENT
Start: 2018-06-13 | End: 2018-06-14

## 2018-06-13 RX ORDER — POTASSIUM CHLORIDE 20 MEQ
40 PACKET (EA) ORAL EVERY 4 HOURS
Qty: 0 | Refills: 0 | Status: COMPLETED | OUTPATIENT
Start: 2018-06-13 | End: 2018-06-13

## 2018-06-13 RX ORDER — POTASSIUM CHLORIDE 20 MEQ
40 PACKET (EA) ORAL EVERY 4 HOURS
Qty: 0 | Refills: 0 | Status: DISCONTINUED | OUTPATIENT
Start: 2018-06-13 | End: 2018-06-13

## 2018-06-13 RX ADMIN — Medication 300 MILLIGRAM(S): at 05:55

## 2018-06-13 RX ADMIN — CEFTRIAXONE 100 GRAM(S): 500 INJECTION, POWDER, FOR SOLUTION INTRAMUSCULAR; INTRAVENOUS at 12:29

## 2018-06-13 RX ADMIN — Medication 667 MILLIGRAM(S): at 17:26

## 2018-06-13 RX ADMIN — Medication 1: at 08:20

## 2018-06-13 RX ADMIN — Medication 2: at 17:26

## 2018-06-13 RX ADMIN — Medication 667 MILLIGRAM(S): at 12:30

## 2018-06-13 RX ADMIN — Medication 325 MILLIGRAM(S): at 12:30

## 2018-06-13 RX ADMIN — Medication 75 MEQ/KG/HR: at 12:31

## 2018-06-13 RX ADMIN — Medication 0.1 MILLIGRAM(S): at 05:55

## 2018-06-13 RX ADMIN — SIMVASTATIN 40 MILLIGRAM(S): 20 TABLET, FILM COATED ORAL at 23:36

## 2018-06-13 RX ADMIN — Medication 12.5 MILLIGRAM(S): at 05:55

## 2018-06-13 RX ADMIN — SODIUM CHLORIDE 3 MILLILITER(S): 9 INJECTION INTRAMUSCULAR; INTRAVENOUS; SUBCUTANEOUS at 13:43

## 2018-06-13 RX ADMIN — Medication 40 MILLIEQUIVALENT(S): at 08:21

## 2018-06-13 RX ADMIN — Medication 12.5 MILLIGRAM(S): at 17:26

## 2018-06-13 RX ADMIN — Medication 81 MILLIGRAM(S): at 12:30

## 2018-06-13 RX ADMIN — AZITHROMYCIN 250 MILLIGRAM(S): 500 TABLET, FILM COATED ORAL at 07:00

## 2018-06-13 RX ADMIN — Medication 650 MILLIGRAM(S): at 05:55

## 2018-06-13 RX ADMIN — INSULIN GLARGINE 15 UNIT(S): 100 INJECTION, SOLUTION SUBCUTANEOUS at 23:36

## 2018-06-13 RX ADMIN — Medication 300 MILLIGRAM(S): at 17:26

## 2018-06-13 NOTE — PROGRESS NOTE ADULT - PROBLEM SELECTOR PLAN 5
Normocytic anemia with Hgb of 9.5, unknown baseline. Stable. Most likely in the setting of chronic disease (CKD), hemoptysis, and low iron. Ferritin wnl  - c/w iron supplementation  - cont to monitor CBC, signs/sx of active bleed

## 2018-06-13 NOTE — PROGRESS NOTE ADULT - SUBJECTIVE AND OBJECTIVE BOX
[facility] Division of Kidney Diseases & Hypertension  FOLLOW UP NOTE  [phone]--------------------------------------------------------------------------------  Chief Complaint:Pneumonia      24 hour events/subjective:  Pt reports feeling mildly dizzy with room spinning this AM which started about 1 hour ago but otherwise feels unchanged. He believes he coughed up small volume blood last night. Denies subjective fevers. This AM he was started on NaHCO3 infusion.     PAST HISTORY  --------------------------------------------------------------------------------  No significant changes to PMH, PSH, FHx, SHx, unless otherwise noted    ALLERGIES & MEDICATIONS  --------------------------------------------------------------------------------  Allergies    No Known Allergies    Intolerances      Standing Inpatient Medications  azithromycin  IVPB 500 milliGRAM(s) IV Intermittent every 24 hours  cefTRIAXone   IVPB 1 Gram(s) IV Intermittent every 24 hours  cloNIDine 0.1 milliGRAM(s) Oral daily  dextrose 5%. 1000 milliLiter(s) IV Continuous <Continuous>  dextrose 50% Injectable 12.5 Gram(s) IV Push once  dextrose 50% Injectable 25 Gram(s) IV Push once  dextrose 50% Injectable 25 Gram(s) IV Push once  ferrous    sulfate 325 milliGRAM(s) Oral daily  insulin glargine Injectable (LANTUS) 15 Unit(s) SubCutaneous at bedtime  insulin lispro (HumaLOG) corrective regimen sliding scale   SubCutaneous three times a day before meals  labetalol 300 milliGRAM(s) Oral two times a day  metoprolol tartrate 12.5 milliGRAM(s) Oral two times a day  simvastatin 40 milliGRAM(s) Oral at bedtime  sodium bicarbonate  Infusion 0.078 mEq/kG/Hr IV Continuous <Continuous>    PRN Inpatient Medications  acetaminophen   Tablet 650 milliGRAM(s) Oral every 6 hours PRN  dextrose 40% Gel 15 Gram(s) Oral once PRN  glucagon  Injectable 1 milliGRAM(s) IntraMuscular once PRN  sodium chloride 3%  Inhalation 3 milliLiter(s) Inhalation three times a day PRN      REVIEW OF SYSTEMS  --------------------------------------------------------------------------------  Gen: No  fevers/chills  Skin: No rashes  Head/Eyes/Ears/Mouth: No headache; Normal hearing; Normal vision w/o blurriness  Respiratory: +cough ? hemoptysis again o/n; o/w No dyspnea, cough, wheezing, hemoptysis  CV: No chest pain, PND, orthopnea  GI: No abdominal pain, diarrhea, constipation, nausea, vomiting  : No increased frequency, dysuria, hematuria, nocturia  MSK: No joint pain/swelling; no back pain; no edema  Neuro: +room spinning x 1 hr; o/w No dizziness/lightheadedness, weakness, seizures, numbness, tingling    All other systems were reviewed and are negative, except as noted.    VITALS/PHYSICAL EXAM  --------------------------------------------------------------------------------  T(C): 37.1 (06-13-18 @ 08:25), Max: 37.9 (06-12-18 @ 16:00)  HR: 70 (06-13-18 @ 08:25) (68 - 87)  BP: 117/70 (06-13-18 @ 08:25) (117/70 - 172/85)  RR: 18 (06-13-18 @ 08:25) (17 - 18)  SpO2: 99% (06-13-18 @ 08:25) (95% - 100%)  Wt(kg): --  Height (cm): 167.64 (06-11-18 @ 22:58)  Weight (kg): 71.8 (06-11-18 @ 22:58)  BMI (kg/m2): 25.5 (06-11-18 @ 22:58)  BSA (m2): 1.81 (06-11-18 @ 22:58)      Physical Exam:  	Gen: NAD, well-appearing, lying flat in bed breathing comfortably, eating cereal  	HEENT: no scleral injection, no nasal discharge/congestion, PERRL, supple neck  	Pulm: L inspirator crackles lower and middle lobes with fine expiratory wheezes.   	CV:  S1S2  	Abd: NO bladder tenderness/distension; +BS, soft               : no CVA tenderness  	Ext: No B/L Lower ext edema  	Neuro: No asterixis, tremors noted  	Skin: Warm, without rashes  	Vascular: no cyanosis      LABS/STUDIES  --------------------------------------------------------------------------------              8.7    5.72  >-----------<  133      [06-13-18 @ 06:16]              26.3     134  |  97  |  48  ----------------------------<  93      [06-13-18 @ 06:30]  3.2   |  17  |  5.99        Ca     6.9     [06-13-18 @ 06:30]      Mg     1.7     [06-13-18 @ 06:30]      Phos  5.6     [06-13-18 @ 06:30]    TPro  6.3  /  Alb  2.5  /  TBili  0.3  /  DBili  x   /  AST  32  /  ALT  22  /  AlkPhos  68  [06-12-18 @ 06:30]    PT/INR: PT 12.8 , INR 1.11       [06-11-18 @ 11:50]  PTT: 31.0       [06-11-18 @ 11:50]    Troponin < 0.06      [06-12-18 @ 06:30]        [06-12-18 @ 01:30]    Creatinine Trend:  SCr 5.99 [06-13 @ 06:30]  SCr 5.72 [06-12 @ 06:30]  SCr 5.66 [06-12 @ 01:40]  SCr 5.69 [06-11 @ 11:50]    Urinalysis - [06-12-18 @ 08:04]      Color PLYEL / Appearance CLEAR / SG 1.008 / pH 7.0      Gluc 100 / Ketone TRACE  / Bili NEGATIVE / Urobili NORMAL       Blood MODERATE / Protein 500 / Leuk Est NEGATIVE / Nitrite NEGATIVE      RBC 2-5 / WBC 0-2 / Hyaline  / Gran  / Sq Epi OCC / Non Sq Epi  / Bacteria       Iron 11, TIBC 148, %sat --      [06-12-18 @ 06:30]  Ferritin 351.6      [06-12-18 @ 06:30]  HbA1c 6.2      [06-12-18 @ 06:30]      C3 Complement 165      [06-12-18 @ 06:30]  C4 Complement 47      [06-12-18 @ 06:30]

## 2018-06-13 NOTE — PROGRESS NOTE ADULT - PROBLEM SELECTOR PLAN 1
Pt with three month hx of hemoptysis, now with fever to 103.7 and back pain. CXR concerning for pna vs pulmonary hemorrhage. Blood cx no growth after 24 hours.   - will c/w ceftriaxone and azithromycin per ID recs  - f/u urine legionella  - MDI Q6H  - tylenol PRN fever  - CT abd/chest showing LLL consolidation, pna vs hemorrhage  - ID consulted, will r/o TB and c/w CAP tx   - f/u quant gold, obtain sputum cultures to r/o TB and place in isolation

## 2018-06-13 NOTE — PROGRESS NOTE ADULT - SUBJECTIVE AND OBJECTIVE BOX
PROVIDER CONTACT INFO:  Jolie Rose MD  [facility] Pager: [phone]  [facility] [place] Pager: [phone]    MD MAMI  63y  Male      Patient is a 63y old  Male who presents with a chief complaint of Hemoptysis, r/o TB (2018 16:41)      INTERVAL HPI/OVERNIGHT EVENTS: No overnight events. This AM pt feels well, denies F/C/N/V, CP/SOB, abd pain, dysuria, constipation/diarrhea.     T(C): 36.9 (18 @ 05:20), Max: 37.9 (18 @ 10:00)  HR: 68 (18 @ 05:20) (68 - 87)  BP: 172/85 (18 @ 05:20) (120/75 - 172/85)  RR: 17 (18 @ 05:20) (17 - 18)  SpO2: 100% (18 @ 05:20) (95% - 100%)  Wt(kg): --Vital Signs Last 24 Hrs  T(C): 36.9 (2018 05:20), Max: 37.9 (2018 10:00)  T(F): 98.4 (2018 05:20), Max: 100.3 (2018 16:00)  HR: 68 (2018 05:20) (68 - 87)  BP: 172/85 (2018 05:20) (120/75 - 172/85)  BP(mean): --  RR: 17 (2018 05:20) (17 - 18)  SpO2: 100% (2018 05:20) (95% - 100%)    PHYSICAL EXAM:  GENERAL: NAD, well-groomed, well-developed  HEAD:  Atraumatic, Normocephalic  EYES: EOMI, PERRLA, conjunctiva and sclera clear  ENMT: No tonsillar erythema, exudates,; Moist mucous membranes. No lesions  NECK: Supple, No JVD, Normal thyroid  CHEST/LUNG: Clear to percussion bilaterally; No rales, rhonchi, wheezing, or rubs  HEART: Regular rate and rhythm; No murmurs, rubs, or gallops  ABDOMEN: Soft, Nontender, Nondistended; Bowel sounds present  EXTREMITIES:  2+ Peripheral Pulses, No clubbing, cyanosis, or edema  LYMPH: No lymphadenopathy noted  SKIN: No rashes or lesions  PSYCH: Alert & Oriented x3    Consultant(s) Notes Reviewed:  [x ] YES  [ ] NO  Care Discussed with Consultants/Other Providers [ x] YES  [ ] NO    LABS:                        8.7    5.72  )-----------( 133      ( 2018 06:16 )             26.3     06-12    130<L>  |  94<L>  |  44<H>  ----------------------------<  114<H>  3.8   |  16<L>  |  5.72<H>    Ca    6.7<L>      2018 06:30  Phos  4.0       Mg     1.5         TPro  6.3  /  Alb  2.5<L>  /  TBili  0.3  /  DBili  x   /  AST  32  /  ALT  22  /  AlkPhos  68  -12    PT/INR - ( 2018 11:50 )   PT: 12.8 SEC;   INR: 1.11          PTT - ( 2018 11:50 )  PTT:31.0 SEC  Urinalysis Basic - ( 2018 08:04 )    Color: PLYEL / Appearance: CLEAR / S.008 / pH: 7.0  Gluc: 100 / Ketone: TRACE  / Bili: NEGATIVE / Urobili: NORMAL mg/dL   Blood: MODERATE / Protein: 500 mg/dL / Nitrite: NEGATIVE   Leuk Esterase: NEGATIVE / RBC: 2-5 / WBC 0-2   Sq Epi: OCC / Non Sq Epi: x / Bacteria: x      CAPILLARY BLOOD GLUCOSE      POCT Blood Glucose.: 125 mg/dL (2018 21:25)  POCT Blood Glucose.: 139 mg/dL (2018 17:46)  POCT Blood Glucose.: 149 mg/dL (2018 11:54)  POCT Blood Glucose.: 167 mg/dL (2018 08:28)        Urinalysis Basic - ( 2018 08:04 )    Color: PLYEL / Appearance: CLEAR / S.008 / pH: 7.0  Gluc: 100 / Ketone: TRACE  / Bili: NEGATIVE / Urobili: NORMAL mg/dL   Blood: MODERATE / Protein: 500 mg/dL / Nitrite: NEGATIVE   Leuk Esterase: NEGATIVE / RBC: 2-5 / WBC 0-2   Sq Epi: OCC / Non Sq Epi: x / Bacteria: x        RADIOLOGY & ADDITIONAL TESTS:    Imaging Personally Reviewed:  [ ] YES  [ ] NO PROVIDER CONTACT INFO:  Jolie Rose MD  [facility] Pager: [phone]  [facility] [place] Pager: [phone]    MD MAMI  63y  Male      Patient is a 63y old  Male who presents with a chief complaint of Hemoptysis, r/o TB (2018 16:41)      INTERVAL HPI/OVERNIGHT EVENTS: No overnight events. This AM pt c/o dizziness, denies F/C/N/V, CP/SOB, abd pain, dysuria, constipation. He has had some episodes of loose stool, denies hematochezia,     T(C): 36.9 (18 @ 05:20), Max: 37.9 (18 @ 10:00)  HR: 68 (18 @ 05:20) (68 - 87)  BP: 172/85 (18 @ 05:20) (120/75 - 172/85)  RR: 17 (18 @ 05:20) (17 - 18)  SpO2: 100% (18 @ 05:20) (95% - 100%)  Wt(kg): --Vital Signs Last 24 Hrs  T(C): 36.9 (2018 05:20), Max: 37.9 (2018 10:00)  T(F): 98.4 (2018 05:20), Max: 100.3 (2018 16:00)  HR: 68 (2018 05:20) (68 - 87)  BP: 172/85 (2018 05:20) (120/75 - 172/85)  BP(mean): --  RR: 17 (2018 05:20) (17 - 18)  SpO2: 100% (2018 05:20) (95% - 100%)    PHYSICAL EXAM:  GENERAL: NAD, well-groomed, well-developed  HEAD:  Atraumatic, Normocephalic  EYES: EOMI, PERRLA, conjunctiva and sclera clear  ENMT: No tonsillar erythema, exudates,; Moist mucous membranes. No lesions  NECK: Supple, No JVD, Normal thyroid  CHEST/LUNG: Clear to percussion bilaterally; No rales, rhonchi, wheezing, or rubs  HEART: Regular rate and rhythm; No murmurs, rubs, or gallops  ABDOMEN: Soft, Nontender, Nondistended; Bowel sounds present  EXTREMITIES:  2+ Peripheral Pulses, No clubbing, cyanosis, or edema  LYMPH: No lymphadenopathy noted  SKIN: No rashes or lesions  PSYCH: Alert & Oriented x3    Consultant(s) Notes Reviewed:  [x ] YES  [ ] NO  Care Discussed with Consultants/Other Providers [ x] YES  [ ] NO    LABS:                        8.7    5.72  )-----------( 133      ( 2018 06:16 )             26.3     06-12    130<L>  |  94<L>  |  44<H>  ----------------------------<  114<H>  3.8   |  16<L>  |  5.72<H>    Ca    6.7<L>      2018 06:30  Phos  4.0       Mg     1.5     -12    TPro  6.3  /  Alb  2.5<L>  /  TBili  0.3  /  DBili  x   /  AST  32  /  ALT  22  /  AlkPhos  68  06-12    PT/INR - ( 2018 11:50 )   PT: 12.8 SEC;   INR: 1.11          PTT - ( 2018 11:50 )  PTT:31.0 SEC  Urinalysis Basic - ( 2018 08:04 )    Color: PLYEL / Appearance: CLEAR / S.008 / pH: 7.0  Gluc: 100 / Ketone: TRACE  / Bili: NEGATIVE / Urobili: NORMAL mg/dL   Blood: MODERATE / Protein: 500 mg/dL / Nitrite: NEGATIVE   Leuk Esterase: NEGATIVE / RBC: 2-5 / WBC 0-2   Sq Epi: OCC / Non Sq Epi: x / Bacteria: x      CAPILLARY BLOOD GLUCOSE      POCT Blood Glucose.: 125 mg/dL (2018 21:25)  POCT Blood Glucose.: 139 mg/dL (2018 17:46)  POCT Blood Glucose.: 149 mg/dL (2018 11:54)  POCT Blood Glucose.: 167 mg/dL (2018 08:28)        Urinalysis Basic - ( 2018 08:04 )    Color: PLYEL / Appearance: CLEAR / S.008 / pH: 7.0  Gluc: 100 / Ketone: TRACE  / Bili: NEGATIVE / Urobili: NORMAL mg/dL   Blood: MODERATE / Protein: 500 mg/dL / Nitrite: NEGATIVE   Leuk Esterase: NEGATIVE / RBC: 2-5 / WBC 0-2   Sq Epi: OCC / Non Sq Epi: x / Bacteria: x PROVIDER CONTACT INFO:  Jolie Rose MD  [facility] Pager: [phone]  [facility] Pager: [phone]    MD MAMI  63y  Male      Patient is a 63y old  Male who presents with a chief complaint of Hemoptysis, r/o TB (2018 16:41)      INTERVAL HPI/OVERNIGHT EVENTS: No overnight events. This AM pt c/o dizziness, denies F/C/N/V, CP/SOB, abd pain, dysuria, constipation. He has had some episodes of loose stool, denies hematochezia,     T(C): 36.9 (18 @ 05:20), Max: 37.9 (18 @ 10:00)  HR: 68 (18 @ 05:20) (68 - 87)  BP: 172/85 (18 @ 05:20) (120/75 - 172/85)  RR: 17 (18 @ 05:20) (17 - 18)  SpO2: 100% (18 @ 05:20) (95% - 100%)  Wt(kg): --Vital Signs Last 24 Hrs  T(C): 36.9 (2018 05:20), Max: 37.9 (2018 10:00)  T(F): 98.4 (2018 05:20), Max: 100.3 (2018 16:00)  HR: 68 (2018 05:20) (68 - 87)  BP: 172/85 (2018 05:20) (120/75 - 172/85)  BP(mean): --  RR: 17 (2018 05:20) (17 - 18)  SpO2: 100% (2018 05:20) (95% - 100%)    PHYSICAL EXAM:  GENERAL: NAD  HEAD:  Atraumatic, Normocephalic  EYES: EOMI, PERRLA, conjunctiva and sclera clear  ENMT: No tonsillar erythema, exudates,; Moist mucous membranes. No lesions  NECK: Supple, No JVD, Normal thyroid  CHEST/LUNG: faint rales in LLL, no wheezing  HEART: Regular rate and rhythm; No murmurs, rubs, or gallops  ABDOMEN: Soft, Nontender, Nondistended; Bowel sounds present  EXTREMITIES:  2+ Peripheral Pulses, No clubbing, cyanosis, or edema  LYMPH: No lymphadenopathy noted  SKIN: No rashes or lesions  PSYCH: Alert & Oriented x3    Consultant(s) Notes Reviewed:  [x ] YES  [ ] NO  Care Discussed with Consultants/Other Providers [ x] YES  [ ] NO    LABS:                        8.7    5.72  )-----------( 133      ( 2018 06:16 )             26.3     06-12    130<L>  |  94<L>  |  44<H>  ----------------------------<  114<H>  3.8   |  16<L>  |  5.72<H>    Ca    6.7<L>      2018 06:30  Phos  4.0       Mg     1.5         TPro  6.3  /  Alb  2.5<L>  /  TBili  0.3  /  DBili  x   /  AST  32  /  ALT  22  /  AlkPhos  68  06-12    PT/INR - ( 2018 11:50 )   PT: 12.8 SEC;   INR: 1.11          PTT - ( 2018 11:50 )  PTT:31.0 SEC  Urinalysis Basic - ( 2018 08:04 )    Color: PLYEL / Appearance: CLEAR / S.008 / pH: 7.0  Gluc: 100 / Ketone: TRACE  / Bili: NEGATIVE / Urobili: NORMAL mg/dL   Blood: MODERATE / Protein: 500 mg/dL / Nitrite: NEGATIVE   Leuk Esterase: NEGATIVE / RBC: 2-5 / WBC 0-2   Sq Epi: OCC / Non Sq Epi: x / Bacteria: x      CAPILLARY BLOOD GLUCOSE      POCT Blood Glucose.: 125 mg/dL (2018 21:25)  POCT Blood Glucose.: 139 mg/dL (2018 17:46)  POCT Blood Glucose.: 149 mg/dL (2018 11:54)  POCT Blood Glucose.: 167 mg/dL (2018 08:28)        Urinalysis Basic - ( 2018 08:04 )    Color: PLYEL / Appearance: CLEAR / S.008 / pH: 7.0  Gluc: 100 / Ketone: TRACE  / Bili: NEGATIVE / Urobili: NORMAL mg/dL   Blood: MODERATE / Protein: 500 mg/dL / Nitrite: NEGATIVE   Leuk Esterase: NEGATIVE / RBC: 2-5 / WBC 0-2   Sq Epi: OCC / Non Sq Epi: x / Bacteria: x

## 2018-06-13 NOTE — PROGRESS NOTE ADULT - SUBJECTIVE AND OBJECTIVE BOX
Interval Events:    REVIEW OF SYSTEMS:  Constitutional:   Eyes:  ENT:  CV:  Resp:  GI:  :  MSK:  Integumentary:  Neurological:  Psychiatric:  Endocrine:  Hematologic/Lymphatic:  Allergic/Immunologic:  [x] All other systems negative  [ ] Unable to assess ROS because ________    OBJECTIVE:  ICU Vital Signs Last 24 Hrs  T(C): 36.9 (2018 05:20), Max: 37.9 (2018 10:00)  T(F): 98.4 (2018 05:20), Max: 100.3 (2018 16:00)  HR: 68 (2018 05:20) (68 - 87)  BP: 172/85 (2018 05:20) (120/75 - 172/85)  BP(mean): --  ABP: --  ABP(mean): --  RR: 17 (2018 05:20) (17 - 18)  SpO2: 100% (2018 05:20) (95% - 100%)        CAPILLARY BLOOD GLUCOSE      POCT Blood Glucose.: 125 mg/dL (2018 21:25)      PHYSICAL EXAM:  General: AAOx3  HEENT: EOMI, PERRL  Lymph Nodes: No LAD  Neck: Supple  Respiratory:   Cardiovascular: RRR   Abdomen: soft, NT/ND  Extremities: no c/c/e    HOSPITAL MEDICATIONS:  MEDICATIONS  (STANDING):  aspirin enteric coated 81 milliGRAM(s) Oral daily  azithromycin  IVPB 500 milliGRAM(s) IV Intermittent every 24 hours  cefTRIAXone   IVPB 1 Gram(s) IV Intermittent every 24 hours  cloNIDine 0.1 milliGRAM(s) Oral daily  dextrose 5%. 1000 milliLiter(s) (50 mL/Hr) IV Continuous <Continuous>  dextrose 50% Injectable 12.5 Gram(s) IV Push once  dextrose 50% Injectable 25 Gram(s) IV Push once  dextrose 50% Injectable 25 Gram(s) IV Push once  ferrous    sulfate 325 milliGRAM(s) Oral daily  insulin glargine Injectable (LANTUS) 15 Unit(s) SubCutaneous at bedtime  insulin lispro (HumaLOG) corrective regimen sliding scale   SubCutaneous three times a day before meals  labetalol 300 milliGRAM(s) Oral two times a day  metoprolol tartrate 12.5 milliGRAM(s) Oral two times a day  simvastatin 40 milliGRAM(s) Oral at bedtime  sodium bicarbonate 650 milliGRAM(s) Oral three times a day    MEDICATIONS  (PRN):  acetaminophen   Tablet 650 milliGRAM(s) Oral every 6 hours PRN For Temp greater than 38 C (100.4 F)  dextrose 40% Gel 15 Gram(s) Oral once PRN Blood Glucose LESS THAN 70 milliGRAM(s)/deciliter  glucagon  Injectable 1 milliGRAM(s) IntraMuscular once PRN Glucose LESS THAN 70 milligrams/deciliter  sodium chloride 3%  Inhalation 3 milliLiter(s) Inhalation three times a day PRN sputum induction      LABS:                        8.7    5.72  )-----------( 133      ( 2018 06:16 )             26.3         134<L>  |  97<L>  |  48<H>  ----------------------------<  93  3.2<L>   |  17<L>  |  5.99<H>    Ca    6.9<L>      2018 06:30  Phos  5.6       Mg     1.7         TPro  6.3  /  Alb  2.5<L>  /  TBili  0.3  /  DBili  x   /  AST  32  /  ALT  22  /  AlkPhos  68  06-12    PT/INR - ( 2018 11:50 )   PT: 12.8 SEC;   INR: 1.11          PTT - ( 2018 11:50 )  PTT:31.0 SEC  Urinalysis Basic - ( 2018 08:04 )    Color: PLYEL / Appearance: CLEAR / S.008 / pH: 7.0  Gluc: 100 / Ketone: TRACE  / Bili: NEGATIVE / Urobili: NORMAL mg/dL   Blood: MODERATE / Protein: 500 mg/dL / Nitrite: NEGATIVE   Leuk Esterase: NEGATIVE / RBC: 2-5 / WBC 0-2   Sq Epi: OCC / Non Sq Epi: x / Bacteria: x        Venous Blood Gas:   @ 15:36  7.36/32/40/19/71.6  VBG Lactate: 0.9  Venous Blood Gas:   @ 11:40  7.40/33/31/21/54.1  VBG Lactate: --      RADIOLOGY:  [x] Reviewed and interpreted by me Interval Events:  Patient with mild episode of hemoptysis--scant in nature  SOB improved    REVIEW OF SYSTEMS:  Constitutional:   Eyes:  ENT:  CV:  Resp: hemoptysis  GI:  :  MSK:  Integumentary:  Neurological:  Psychiatric:  Endocrine:  Hematologic/Lymphatic:  Allergic/Immunologic:  [x] All other systems negative  [ ] Unable to assess ROS because ________    OBJECTIVE:  ICU Vital Signs Last 24 Hrs  T(C): 36.9 (2018 05:20), Max: 37.9 (2018 10:00)  T(F): 98.4 (2018 05:20), Max: 100.3 (2018 16:00)  HR: 68 (2018 05:20) (68 - 87)  BP: 172/85 (2018 05:20) (120/75 - 172/85)  BP(mean): --  ABP: --  ABP(mean): --  RR: 17 (2018 05:20) (17 - 18)  SpO2: 100% (2018 05:20) (95% - 100%)        CAPILLARY BLOOD GLUCOSE      POCT Blood Glucose.: 125 mg/dL (2018 21:25)      PHYSICAL EXAM:  General: AAOx3  HEENT: EOMI, PERRL  Lymph Nodes: No LAD  Neck: Supple  Respiratory:  decreased bs at left base  Cardiovascular: RRR   Abdomen: soft, NT/ND  Extremities: no c/c/e    HOSPITAL MEDICATIONS:  MEDICATIONS  (STANDING):  aspirin enteric coated 81 milliGRAM(s) Oral daily  azithromycin  IVPB 500 milliGRAM(s) IV Intermittent every 24 hours  cefTRIAXone   IVPB 1 Gram(s) IV Intermittent every 24 hours  cloNIDine 0.1 milliGRAM(s) Oral daily  dextrose 5%. 1000 milliLiter(s) (50 mL/Hr) IV Continuous <Continuous>  dextrose 50% Injectable 12.5 Gram(s) IV Push once  dextrose 50% Injectable 25 Gram(s) IV Push once  dextrose 50% Injectable 25 Gram(s) IV Push once  ferrous    sulfate 325 milliGRAM(s) Oral daily  insulin glargine Injectable (LANTUS) 15 Unit(s) SubCutaneous at bedtime  insulin lispro (HumaLOG) corrective regimen sliding scale   SubCutaneous three times a day before meals  labetalol 300 milliGRAM(s) Oral two times a day  metoprolol tartrate 12.5 milliGRAM(s) Oral two times a day  simvastatin 40 milliGRAM(s) Oral at bedtime  sodium bicarbonate 650 milliGRAM(s) Oral three times a day    MEDICATIONS  (PRN):  acetaminophen   Tablet 650 milliGRAM(s) Oral every 6 hours PRN For Temp greater than 38 C (100.4 F)  dextrose 40% Gel 15 Gram(s) Oral once PRN Blood Glucose LESS THAN 70 milliGRAM(s)/deciliter  glucagon  Injectable 1 milliGRAM(s) IntraMuscular once PRN Glucose LESS THAN 70 milligrams/deciliter  sodium chloride 3%  Inhalation 3 milliLiter(s) Inhalation three times a day PRN sputum induction      LABS:                        8.7    5.72  )-----------( 133      ( 2018 06:16 )             26.3     06-    134<L>  |  97<L>  |  48<H>  ----------------------------<  93  3.2<L>   |  17<L>  |  5.99<H>    Ca    6.9<L>      2018 06:30  Phos  5.6     -  Mg     1.7         TPro  6.3  /  Alb  2.5<L>  /  TBili  0.3  /  DBili  x   /  AST  32  /  ALT  22  /  AlkPhos  68  06-12    PT/INR - ( 2018 11:50 )   PT: 12.8 SEC;   INR: 1.11          PTT - ( 2018 11:50 )  PTT:31.0 SEC  Urinalysis Basic - ( 2018 08:04 )    Color: PLYEL / Appearance: CLEAR / S.008 / pH: 7.0  Gluc: 100 / Ketone: TRACE  / Bili: NEGATIVE / Urobili: NORMAL mg/dL   Blood: MODERATE / Protein: 500 mg/dL / Nitrite: NEGATIVE   Leuk Esterase: NEGATIVE / RBC: 2-5 / WBC 0-2   Sq Epi: OCC / Non Sq Epi: x / Bacteria: x        Venous Blood Gas:   @ 15:36  7.36/32/40/19/71.6  VBG Lactate: 0.9  Venous Blood Gas:   @ 11:40  7.40/33/31/21/54.1  VBG Lactate: --      RADIOLOGY:  [x] Reviewed and interpreted by me

## 2018-06-13 NOTE — PROGRESS NOTE ADULT - ATTENDING COMMENTS
Pt. seen and examined. Pt. with FELICITA on ?CKD in the setting of ongoing cough for months, with presence of dysmorphic RBCs on urine microscopy and significant proteinuria, pt. with ?RPGN. Pt. with ?hemodynamically mediated FELICITA in the setting of relative hypotension and diarrhea.  Serum ANCAs and anti-GBM negative. Would hold off on pulse corticosteroid therapy.  Scr increased on labs today, however pt. remains non-oliguric. No acute indication for HD at this time.   Repeat UA and spot urine TP/CR.  Will consider kidney biopsy if Scr does not improve.  Monitor BMP. Strict I/Os. Avoid nephrotoxins. Renally dose all medications.

## 2018-06-13 NOTE — PROGRESS NOTE ADULT - SUBJECTIVE AND OBJECTIVE BOX
Follow Up:      Inverval History/ROS:Patient is a 63y old  Male who presents with a chief complaint of Hemoptysis, r/o TB (2018 16:41)  States cough is better today.   No fever        Allergies    No Known Allergies    Intolerances        ANTIMICROBIALS:  azithromycin  IVPB 500 every 24 hours  cefTRIAXone   IVPB 1 every 24 hours      OTHER MEDS:  acetaminophen   Tablet 650 milliGRAM(s) Oral every 6 hours PRN  aspirin  chewable 81 milliGRAM(s) Oral daily  calcium acetate 667 milliGRAM(s) Oral three times a day with meals  cloNIDine 0.1 milliGRAM(s) Oral daily  dextrose 40% Gel 15 Gram(s) Oral once PRN  dextrose 5%. 1000 milliLiter(s) IV Continuous <Continuous>  dextrose 50% Injectable 12.5 Gram(s) IV Push once  dextrose 50% Injectable 25 Gram(s) IV Push once  dextrose 50% Injectable 25 Gram(s) IV Push once  ferrous    sulfate 325 milliGRAM(s) Oral daily  glucagon  Injectable 1 milliGRAM(s) IntraMuscular once PRN  insulin glargine Injectable (LANTUS) 15 Unit(s) SubCutaneous at bedtime  insulin lispro (HumaLOG) corrective regimen sliding scale   SubCutaneous three times a day before meals  labetalol 300 milliGRAM(s) Oral two times a day  meclizine 12.5 milliGRAM(s) Oral every 8 hours PRN  metoprolol tartrate 12.5 milliGRAM(s) Oral two times a day  simvastatin 40 milliGRAM(s) Oral at bedtime  sodium bicarbonate  Infusion 0.078 mEq/kG/Hr IV Continuous <Continuous>  sodium chloride 3%  Inhalation 3 milliLiter(s) Inhalation three times a day PRN      Vital Signs Last 24 Hrs  T(C): 36.9 (2018 12:14), Max: 37.9 (2018 16:00)  T(F): 98.5 (2018 12:14), Max: 100.3 (2018 16:00)  HR: 83 (2018 13:43) (64 - 87)  BP: 164/82 (2018 12:14) (117/70 - 172/85)  BP(mean): --  RR: 19 (2018 12:14) (17 - 19)  SpO2: 99% (2018 13:43) (99% - 100%)    PHYSICAL EXAM:  General: [x ] non-toxic  HEAD/EYES: [ ] PERRL [x ] white sclera [ ] icterus  ENT:  [ ] normal [ x] supple [ ] thrush [ ] pharyngeal exudate  Cardiovascular:   [ ] murmur [x ] normal [ ] PPM/AICD  Respiratory:  [x ] clear to ausculation bilaterally  GI:  [x ] soft, non-tender, normal bowel sounds  :  [ ] hinds [ ] no CVA tenderness   Musculoskeletal:  [x ] no synovitis  Neurologic:  [x ] non-focal exam   Skin:  [x ] no rash  Lymph: [ ] no lymphadenopathy  Psychiatric:  [ x] appropriate affect [ ] alert & oriented  Lines:  [x ] no phlebitis [ ] central line                                8.7    5.72  )-----------( 133      ( 2018 06:16 )             26.3       06-13    134<L>  |  97<L>  |  48<H>  ----------------------------<  93  3.2<L>   |  17<L>  |  5.99<H>    Ca    6.9<L>      2018 06:30  Phos  5.6     06-13  Mg     1.7     06-13    TPro  6.3  /  Alb  2.5<L>  /  TBili  0.3  /  DBili  x   /  AST  32  /  ALT  22  /  AlkPhos  68  06-12      Urinalysis Basic - ( 2018 08:04 )    Color: PLYEL / Appearance: CLEAR / S.008 / pH: 7.0  Gluc: 100 / Ketone: TRACE  / Bili: NEGATIVE / Urobili: NORMAL mg/dL   Blood: MODERATE / Protein: 500 mg/dL / Nitrite: NEGATIVE   Leuk Esterase: NEGATIVE / RBC: 2-5 / WBC 0-2   Sq Epi: OCC / Non Sq Epi: x / Bacteria: x        MICROBIOLOGY:    RADIOLOGY:

## 2018-06-13 NOTE — PROGRESS NOTE ADULT - ASSESSMENT
63 year old with DM presents with 3 months og hemoptysis.  Found to have kidney injury and fever.    CT with left middle lobe consolidation,.    Tb is on DDX. Check sputum afb times three.    Given FELICITA and hemoptysis agree with work up for vasculitis.  Check ARTEM/ ANCA.  Pulm/ renal evals in progress      Can continue treatment for community acquired pneumonia with ceftiraxone and azithromycin- but would not explain symptoms of this duration. Plan 5 day course.

## 2018-06-13 NOTE — PROGRESS NOTE ADULT - PROBLEM SELECTOR PLAN 1
Given moderate blood and 500 protein on U/A and possible pulmonary hemorrhage, concern for glomerulonephritic process. Renal involvement from TB is possible, though no evidence of imaging. AFB sputum cultures are pending. Immunologic workup including ANCA is pending. Pt did not receive stress dose steroids last night. Urine was spun last night and showed dysmorphic RBC's but not RBC casts. SCr is up-trending with pt now mildly hypokalemic and hyperphosphatemic, mentating well with stable BUN.   -f/u ANCA  -f/u infectious work up.  -defer stress dose steroids for now, pending serologic workup.   -C3 and C4 mildly elevated  -pt started on NaHCO3 gtt this AM given serum HCO3 17 from 16. Given moderate blood and 500 protein on U/A and possible pulmonary hemorrhage, concern for glomerulonephritic process. Renal involvement from TB is possible, though no evidence on imaging. AFB sputum cultures are pending. Immunologic workup including ANCA is pending. Pt did not receive stress dose steroids last night. Urine was spun last night and showed dysmorphic RBC's but not RBC casts. SCr is up-trending with pt now mildly hypokalemic and hyperphosphatemic, mentating well with stable BUN.   -f/u ANCA  -f/u infectious work up.  -C3 and C4 mildly elevated  -pt started on NaHCO3 gtt this AM given serum HCO3 17 from 16.

## 2018-06-13 NOTE — PROGRESS NOTE ADULT - PROBLEM SELECTOR PLAN 3
T wave inversion on aVL, I, and V4-V6 concerning for possible lateral ischemia. No prior EKG available for comparison. Troponins negative, however CK and CK-MB continue to increase. Per cards, low suspicion for ACS, likely due to chronic renal disease/acute illness.  - no longer trending Jasmin, will re-evaluate if pt experiences chest pain or SOB  - resumed ASA81 per cards recs T wave inversion on aVL, I, and V4-V6 concerning for possible lateral ischemia. No prior EKG available for comparison. Troponins negative, however CK and CK-MB continue to increase. Per cards, low suspicion for ACS, likely due to chronic renal disease/acute illness.  - no longer trending Jasmin, will re-evaluate if pt experiences chest pain or SOB  - resumed ASA81 per cards recs, will ask if it can be held for kidney bx

## 2018-06-13 NOTE — PROVIDER CONTACT NOTE (CRITICAL VALUE NOTIFICATION) - SITUATION
patient had atrial bigeminy earlier in day on tele, ekg done which showed changes, cardiology saw patient

## 2018-06-13 NOTE — PROGRESS NOTE ADULT - PROBLEM SELECTOR PLAN 7
A1C 6.2, on novolog 70/30 30U BID at home. Blood sugars wnl in hospital  - ISS, check fingersticks  - will c/w weight based lantus 15U at night

## 2018-06-13 NOTE — PROGRESS NOTE ADULT - PROBLEM SELECTOR PLAN 2
Resolving with appropriate correction rate now off IV fluids. Resolving with appropriate correction rate now off IV fluids.    Plan pending discussion with attending  Francis García  Medicine PGY-2 nephrology elective  Pager [phone] / [phone]

## 2018-06-13 NOTE — PROGRESS NOTE ADULT - ATTENDING COMMENTS
Patient c/o dizziness fatigue. No change in dyspnea. Awaiting serologies, renal bx. Will plan bronchoscopy for Friday if no improvement in symptoms.

## 2018-06-13 NOTE — PROGRESS NOTE ADULT - ATTENDING COMMENTS
Patient seen and examined. Chart/lab reviewed. Agree with above with modifications.  33M with PMHx of HTN, DM-2, HLD, CAD s/p stents x3 in 2015, CKD, p/w fevers, chills, hemoptysis concerning for CAP vs active TB infection, also c/o chest discomfort and LBP.  Pt feels better today.    Assessment/plan:  # Hemoptysis: likely CAP (pneumonia), less likely TB, CT showed LLL consolidation  responding to iv abx (ceftriaxone/zithromax), normal wbc  still pending urine legionella and r/o TB.   Pt denies further hemoptysis this am, sating well on RA, consider bronchoscopy to r/o alveolar hemorrhage if respiratory status deteriorates, f/u pulm  c/w  airborne isolation r/o TB, AFB x3, quantiferon gold; hiv test, f/u ID  cont duoneb likely COPD    # Acute on chronic renal failure: baseline creat ~2.5,  CT abd/pelvis no hydro, no RP bleed  likely progressive CKD from HTN/DM, unlikely RPGN with benign UA, but there's dysmorphic rbc's per nephro review of urine sediment but it could be just from diabetic nephropathy, complement level slightly elevated, f/u serologies anca, anti-gbm  creat bump from 5.7 to 5.9, persistent met acidosis hco3 17, d/c bicarbonate tab and start nahco3 drip  f/u nephro recs, no plan for pulse steroid/cytoxan at this time as pt has PNA and is being ruled out for TB  if anca +, then will pursue renal biopsy to r/o RPGN  # chest discomfort: resume ASA, ventricular ectopy on tele, normal troponin but elevated cpk, doubt ACS, cont cardiac meds  check echo, f/u cardio recs  # Anemia: likely due to ckd and acute blood loss from hemoptysis,  monitor cbc, transfuse prn for Hgb<8  iron store ok (ferritin 351)  # Electrolyte abnormality: pt with hypocalcemia, hyponatremia  hypovolemic hyponatremia improved, start phoslo for hypocalcemia/hyperphosphatemia  #HTN:  c/w labetalol and lopressor, clonidine, hold losartan in the setting of advanced ckd

## 2018-06-13 NOTE — PROGRESS NOTE ADULT - PROBLEM SELECTOR PLAN 4
Na 123 on presentation, now improved to 129 after IVF  - cont to monitor off IV fluids  - NaHCO3 650 TID  - f/u urine electrolytes, albumin Na 123 on presentation, now improved to 129 after IVF  - f/u urine electrolytes, albumin

## 2018-06-13 NOTE — PROGRESS NOTE ADULT - ASSESSMENT
64yo M with PMH of DMII, CKD (baseline Cr ~ 2.5), CAD s/p stent x3 in 2015, CVA x3 without residual deficits, who presents with hemoptysis for 3 months, low back pain, fever, increasing weakness and shortness of breath, a/w r/o ACS / TB, nephrology consulted for SCr stable 5 with unclear baseline, concern for glomerulonephritis given proteinuria and hematuria.

## 2018-06-13 NOTE — PROGRESS NOTE ADULT - PROBLEM SELECTOR PLAN 2
Serum Cr elevated to 5.69 on admission, baseline 2.5. Has hx of chronic CKD and had appt with nephrologist this week. C3 and C4 elevated. No RBC casts per renal  - p-ANCA, c-ANCA, anti-GBM, levels for ? vasculitis  - urine electrolytes, urine albumin  - renal consulted, unclear if vasculitis, will follow-up labs. Recommend kidney bx and IV solumedrol 250mg. Steroids not recommended per ID. Serum Cr elevated to 5.69 on admission, baseline 2.5. Currently 5.99. Has hx of chronic CKD and had appt with nephrologist this week. C3 and C4 elevated.  - p-ANCA, c-ANCA, anti-GBM, levels for ? vasculitis  - urine electrolytes, urine albumin  - renal consulted, unclear if vasculitis, will follow-up labs. Recommend kidney bx and IV solumedrol 250mg. Steroids not recommended per ID.  - will switch sodium bicarb tabs to gtt with IVF

## 2018-06-13 NOTE — PROGRESS NOTE ADULT - ASSESSMENT
33M with PMHx of HTN, CAD s/p stent placement x3 in 2015, CKD, DMII presents with fevers and hemoptysis.    1.  Hemoptysis-  -Etiology appears unclear at this time: DDx: infectious vs DPLD with immunologic basis (?capillaritis) given patients renal involvement vs maligancy  -Patient clinically stable, on RA  -f/u sputum AFB  -f/u serologies  -Would plan for bronchoscopy if condition worsens    2. LLL consolidation-  -Stable, on RA  -check sputum cxs/bcxs  -f/u AFB  -cont vanco/zosyn/azithro  -check a urine legionella    Elie Quiñonez, DO   Pulmonary and Critical Care Fellow 33M with PMHx of HTN, CAD s/p stent placement x3 in 2015, CKD, DMII presents with fevers and hemoptysis.    1.  Hemoptysis-  -Etiology appears unclear at this time: DDx: infectious vs DPLD with immunologic basis (?capillaritis) given patients renal involvement vs maligancy  -Patient clinically stable, on RA--patient with one episode o/n  -f/u sputum AFB, serologies    2. LLL consolidation-  -check sputum cxs/bcxs  -f/u AFB  -cont CTX/azithro  -check a urine legionella    Elie Quiñonez,    Pulmonary and Critical Care Fellow

## 2018-06-14 ENCOUNTER — TRANSCRIPTION ENCOUNTER (OUTPATIENT)
Age: 63
End: 2018-06-14

## 2018-06-14 VITALS
TEMPERATURE: 98 F | SYSTOLIC BLOOD PRESSURE: 130 MMHG | RESPIRATION RATE: 18 BRPM | HEART RATE: 61 BPM | OXYGEN SATURATION: 100 % | DIASTOLIC BLOOD PRESSURE: 65 MMHG

## 2018-06-14 DIAGNOSIS — R74.8 ABNORMAL LEVELS OF OTHER SERUM ENZYMES: ICD-10-CM

## 2018-06-14 LAB
BUN SERPL-MCNC: 47 MG/DL — HIGH (ref 7–23)
CALCIUM SERPL-MCNC: 7.1 MG/DL — LOW (ref 8.4–10.5)
CHLORIDE SERPL-SCNC: 97 MMOL/L — LOW (ref 98–107)
CK MB BLD-MCNC: 2.91 NG/ML — SIGNIFICANT CHANGE UP (ref 1–6.6)
CK SERPL-CCNC: 177 U/L — SIGNIFICANT CHANGE UP (ref 30–200)
CO2 SERPL-SCNC: 20 MMOL/L — LOW (ref 22–31)
CREAT SERPL-MCNC: 5.73 MG/DL — HIGH (ref 0.5–1.3)
GLUCOSE SERPL-MCNC: 176 MG/DL — HIGH (ref 70–99)
HCT VFR BLD CALC: 25.1 % — LOW (ref 39–50)
HGB BLD-MCNC: 8.3 G/DL — LOW (ref 13–17)
L PNEUMO AG UR QL: NEGATIVE — SIGNIFICANT CHANGE UP
MAGNESIUM SERPL-MCNC: 1.6 MG/DL — SIGNIFICANT CHANGE UP (ref 1.6–2.6)
MCHC RBC-ENTMCNC: 27.2 PG — SIGNIFICANT CHANGE UP (ref 27–34)
MCHC RBC-ENTMCNC: 33.1 % — SIGNIFICANT CHANGE UP (ref 32–36)
MCV RBC AUTO: 82.3 FL — SIGNIFICANT CHANGE UP (ref 80–100)
NRBC # FLD: 0 — SIGNIFICANT CHANGE UP
PHOSPHATE SERPL-MCNC: 4.5 MG/DL — SIGNIFICANT CHANGE UP (ref 2.5–4.5)
PLATELET # BLD AUTO: 141 K/UL — LOW (ref 150–400)
PMV BLD: 11.5 FL — SIGNIFICANT CHANGE UP (ref 7–13)
POTASSIUM SERPL-MCNC: 3.4 MMOL/L — LOW (ref 3.5–5.3)
POTASSIUM SERPL-SCNC: 3.4 MMOL/L — LOW (ref 3.5–5.3)
PTH-INTACT SERPL-MCNC: 385.9 PG/ML — HIGH (ref 15–65)
RBC # BLD: 3.05 M/UL — LOW (ref 4.2–5.8)
RBC # FLD: 11.9 % — SIGNIFICANT CHANGE UP (ref 10.3–14.5)
SODIUM SERPL-SCNC: 135 MMOL/L — SIGNIFICANT CHANGE UP (ref 135–145)
TROPONIN T, HIGH SENSITIVITY RESULT: 87 NG/L — CRITICAL HIGH (ref ?–14)
WBC # BLD: 4.92 K/UL — SIGNIFICANT CHANGE UP (ref 3.8–10.5)
WBC # FLD AUTO: 4.92 K/UL — SIGNIFICANT CHANGE UP (ref 3.8–10.5)

## 2018-06-14 PROCEDURE — 99232 SBSQ HOSP IP/OBS MODERATE 35: CPT

## 2018-06-14 PROCEDURE — 99239 HOSP IP/OBS DSCHRG MGMT >30: CPT

## 2018-06-14 PROCEDURE — 93306 TTE W/DOPPLER COMPLETE: CPT | Mod: 26

## 2018-06-14 RX ORDER — AZITHROMYCIN 500 MG/1
1 TABLET, FILM COATED ORAL
Qty: 3 | Refills: 0 | OUTPATIENT
Start: 2018-06-14 | End: 2018-06-16

## 2018-06-14 RX ORDER — LACTOBACILLUS ACIDOPHILUS 100MM CELL
1 CAPSULE ORAL
Qty: 0 | Refills: 0 | Status: DISCONTINUED | OUTPATIENT
Start: 2018-06-14 | End: 2018-06-14

## 2018-06-14 RX ORDER — CEFUROXIME AXETIL 250 MG
1 TABLET ORAL
Qty: 6 | Refills: 0 | OUTPATIENT
Start: 2018-06-14 | End: 2018-06-16

## 2018-06-14 RX ORDER — POTASSIUM CHLORIDE 20 MEQ
20 PACKET (EA) ORAL ONCE
Qty: 0 | Refills: 0 | Status: COMPLETED | OUTPATIENT
Start: 2018-06-14 | End: 2018-06-14

## 2018-06-14 RX ORDER — LOSARTAN POTASSIUM 100 MG/1
1 TABLET, FILM COATED ORAL
Qty: 0 | Refills: 0 | COMMUNITY

## 2018-06-14 RX ORDER — SODIUM BICARBONATE 1 MEQ/ML
650 SYRINGE (ML) INTRAVENOUS THREE TIMES A DAY
Qty: 0 | Refills: 0 | Status: DISCONTINUED | OUTPATIENT
Start: 2018-06-14 | End: 2018-06-14

## 2018-06-14 RX ORDER — POTASSIUM CHLORIDE 20 MEQ
20 PACKET (EA) ORAL EVERY 4 HOURS
Qty: 0 | Refills: 0 | Status: DISCONTINUED | OUTPATIENT
Start: 2018-06-14 | End: 2018-06-14

## 2018-06-14 RX ORDER — HEPARIN SODIUM 5000 [USP'U]/ML
5000 INJECTION INTRAVENOUS; SUBCUTANEOUS EVERY 8 HOURS
Qty: 0 | Refills: 0 | Status: DISCONTINUED | OUTPATIENT
Start: 2018-06-14 | End: 2018-06-14

## 2018-06-14 RX ORDER — SIMVASTATIN 20 MG/1
1 TABLET, FILM COATED ORAL
Qty: 30 | Refills: 0 | OUTPATIENT
Start: 2018-06-14 | End: 2018-07-13

## 2018-06-14 RX ADMIN — INSULIN GLARGINE 15 UNIT(S): 100 INJECTION, SOLUTION SUBCUTANEOUS at 20:45

## 2018-06-14 RX ADMIN — Medication 81 MILLIGRAM(S): at 13:30

## 2018-06-14 RX ADMIN — HEPARIN SODIUM 5000 UNIT(S): 5000 INJECTION INTRAVENOUS; SUBCUTANEOUS at 13:51

## 2018-06-14 RX ADMIN — HEPARIN SODIUM 5000 UNIT(S): 5000 INJECTION INTRAVENOUS; SUBCUTANEOUS at 20:47

## 2018-06-14 RX ADMIN — Medication 300 MILLIGRAM(S): at 17:57

## 2018-06-14 RX ADMIN — Medication 650 MILLIGRAM(S): at 20:46

## 2018-06-14 RX ADMIN — Medication 667 MILLIGRAM(S): at 13:30

## 2018-06-14 RX ADMIN — Medication 667 MILLIGRAM(S): at 08:53

## 2018-06-14 RX ADMIN — Medication 1: at 17:56

## 2018-06-14 RX ADMIN — Medication 12.5 MILLIGRAM(S): at 17:58

## 2018-06-14 RX ADMIN — Medication 650 MILLIGRAM(S): at 13:30

## 2018-06-14 RX ADMIN — Medication 1 TABLET(S): at 17:57

## 2018-06-14 RX ADMIN — Medication 0.1 MILLIGRAM(S): at 07:02

## 2018-06-14 RX ADMIN — SIMVASTATIN 40 MILLIGRAM(S): 20 TABLET, FILM COATED ORAL at 20:46

## 2018-06-14 RX ADMIN — CEFTRIAXONE 100 GRAM(S): 500 INJECTION, POWDER, FOR SOLUTION INTRAMUSCULAR; INTRAVENOUS at 13:51

## 2018-06-14 RX ADMIN — Medication 300 MILLIGRAM(S): at 07:02

## 2018-06-14 RX ADMIN — Medication 325 MILLIGRAM(S): at 13:30

## 2018-06-14 RX ADMIN — AZITHROMYCIN 250 MILLIGRAM(S): 500 TABLET, FILM COATED ORAL at 07:12

## 2018-06-14 RX ADMIN — Medication 12.5 MILLIGRAM(S): at 07:02

## 2018-06-14 RX ADMIN — Medication 1: at 08:53

## 2018-06-14 RX ADMIN — Medication 20 MILLIEQUIVALENT(S): at 10:23

## 2018-06-14 RX ADMIN — Medication 667 MILLIGRAM(S): at 17:56

## 2018-06-14 NOTE — PROGRESS NOTE ADULT - PROBLEM SELECTOR PLAN 2
Serum Cr elevated to 5.69 on admission, baseline 2.5. Currently 5.99. Has hx of chronic CKD and had appt with nephrologist this week. C3 and C4 elevated. p-ANCA, c-ANCA negative.   - urine electrolytes, urine albumin  - renal consulted, unclear if vasculitis, will follow-up labs. Recommend kidney bx and IV solumedrol 250mg. Steroids not recommended per ID. Serum Cr elevated to 5.69 on admission, baseline 2.5. Currently 5.99. Has hx of chronic CKD and had appt with nephrologist this week. C3 and C4 elevated. p-ANCA, c-ANCA negative. UA showing nephrotic range proteinuria, most likely 2/2 hypertension and diabetic nephropathy.  - f/u urine electrolytes, urine albumin  - renal consulted, appreciate recs. Unlikely vasculitis, does not need inpt kidney bx. Will f/u as outpatient

## 2018-06-14 NOTE — DISCHARGE NOTE ADULT - HOSPITAL COURSE
Pt is a 63M with PMHx of DMII, CKD (baseline Cr 2.5), CAD s/p stent x3 in 2015, CVA x3 without residual deficits, who presents with hemoptysis for 3 months. Pt was in usual state of health three months ago when he abruptly started to experience hemoptysis. He was evaluated by his  shortly after it began who sent bloodwork and recommended a chest x-ray. Pt reports the blood work was normal and he never got the CXR. The hemoptysis continued about once a week and became acutely worse over the past 4 days. He came to the emergency department after experiencing low back pain, fever, and increasing weakness and shortness of breath. In ED, VS were: Tmax 130.7 HR73 /64 RR18 SaO2 100% RA. Labs were significant for Hgb of 9.5, platelets of 128, and Creatinine of 5.69. EKG showed T wave inversions in V4-V6, I, and aVL. CXR showed asymmetric consolidation of left midlung concerning for pna vs pulmonary hemorrhage. Cardiac enzymes showed increased CK and CK-MB, but normal troponin. He was admitted to the general medical floor for further management of CAP vs active TB.    Upon admission pt was started on zosyn and azithromycin for CAP. He went for CT scan which showed LLL consolidation concerning for pulm hemorrhage vs pneumonia. Pulmonology was consulted for possible bronchoscopy to evaluate the consolidation. They recommended further treatment for CAP and to rule out TB. UA showed nephrotic range proteinuria and blood, so nephrology was consulted for possible vasculitis affecting kidneys. Cardiology was also consulted and had low suspicion for ACS; believed increase in CK and CK-MB were 2/2 CKD and active infection. Two days after admission, pt had a few beats of atrial bigeminy on telemetry. Pt denied chest pain or shortness of breath. Repeat EKG was concerning for new ST elevations in V1 and V2. Repeat cardiac enzymes showed downtrending CK and CK-MB, but high sensitive troponin was 103. Cardiology was reconsulted and they still had low suspicion for ACS due to lack of symptoms, but recommended checking until enzymes were downtrending. On repeat the troponin was downtrending and the patient was asymptomatic Pt is a 63M with PMHx of DMII, CKD (baseline Cr 2.5), CAD s/p stent x3 in 2015, CVA x3 without residual deficits, who presents with hemoptysis for 3 months. Pt was in usual state of health three months ago when he abruptly started to experience hemoptysis. He was evaluated by his  shortly after it began who sent bloodwork and recommended a chest x-ray. Pt reports the blood work was normal and he never got the CXR. The hemoptysis continued about once a week and became acutely worse over the past 4 days. He came to the emergency department after experiencing low back pain, fever, and increasing weakness and shortness of breath. In ED, VS were: Tmax 130.7 HR73 /64 RR18 SaO2 100% RA. Labs were significant for Hgb of 9.5, platelets of 128, and Creatinine of 5.69. EKG showed T wave inversions in V4-V6, I, and aVL. CXR showed asymmetric consolidation of left midlung concerning for pna vs pulmonary hemorrhage. Cardiac enzymes showed increased CK and CK-MB, but normal troponin. He was admitted to the general medical floor for further management of CAP vs active TB.    Upon admission pt was started on zosyn and azithromycin for CAP. He went for CT scan which showed LLL consolidation concerning for pulm hemorrhage vs pneumonia. Pulmonology was consulted for possible bronchoscopy to evaluate the consolidation. They recommended further treatment for CAP and to rule out TB. UA showed nephrotic range proteinuria and blood, so nephrology was consulted for possible vasculitis affecting kidneys. Cardiology was also consulted and had low suspicion for ACS; believed increase in CK and CK-MB were 2/2 CKD and active infection. Two days after admission, pt had a few beats of atrial bigeminy on telemetry. Pt denied chest pain or shortness of breath. Repeat EKG was concerning for new ST elevations in V1 and V2. Repeat cardiac enzymes showed downtrending CK and CK-MB, but high sensitive troponin was 103. Cardiology was reconsulted and they still had low suspicion for ACS due to lack of symptoms, but recommended checking until enzymes were downtrending. On repeat the troponin was downtrending and the patient was asymptomatic. Transesophageal echo showed an EF of 65%, normal LV internal dimensions and wall thickness, and grossly normal LV systolic function. His quantiferon gold and 2 sputum AFB samples returned negative. Vasculitis blood tests including p-ANCA, c-ANCA, and anti-GBM antibodies returned negative. He was determined to be stable for discharge on oral antibiotics and with instruction to obtain repeat imaging after completing antibiotics as he may need a bronchoscopy as an outpatient. Pt is a 63M with PMHx of DMII, CKD (baseline Cr 2.5), CAD s/p stent x3 in 2015, CVA x3 without residual deficits, who presents with hemoptysis for 3 months. Pt was in usual state of health three months ago when he abruptly started to experience hemoptysis. He was evaluated by his  shortly after it began who sent bloodwork and recommended a chest x-ray. Pt reports the blood work was normal and he never got the CXR. The hemoptysis continued about once a week and became acutely worse over the past 4 days. He came to the emergency department after experiencing low back pain, fever, and increasing weakness and shortness of breath. In ED, VS were: Tmax 130.7 HR73 /64 RR18 SaO2 100% RA. Labs were significant for Hgb of 9.5, platelets of 128, and Creatinine of 5.69. EKG showed T wave inversions in V4-V6, I, and aVL. CXR showed asymmetric consolidation of left midlung concerning for pna vs pulmonary hemorrhage. Cardiac enzymes showed increased CK and CK-MB, but normal troponin. He was admitted to the general medical floor for further management of CAP vs active TB.    Upon admission pt was started on zosyn and azithromycin for CAP. He went for CT scan which showed LLL consolidation concerning for pulm hemorrhage vs pneumonia. Pulmonology was consulted for possible bronchoscopy to evaluate the consolidation. They recommended further treatment for CAP and to rule out TB. UA showed nephrotic range proteinuria and blood, so nephrology was consulted for possible vasculitis affecting kidneys. Cardiology was also consulted and had low suspicion for ACS; believed increase in CK and CK-MB were 2/2 CKD and active infection. Two days after admission, pt had a few beats of atrial bigeminy on telemetry. Pt denied chest pain or shortness of breath. Repeat EKG was concerning for new ST elevations in V1 and V2. Repeat cardiac enzymes showed downtrending CK and CK-MB, but high sensitive troponin was 103. Cardiology was reconsulted and they still had low suspicion for ACS due to lack of symptoms, but recommended checking until enzymes were downtrending. On repeat the troponin was downtrending and the patient was asymptomatic. Transesophageal echo showed an EF of 65%, normal LV internal dimensions and wall thickness, and grossly normal LV systolic function. His quantiferon gold and 2 sputum AFB samples returned negative. Vasculitis blood tests including p-ANCA, c-ANCA, and anti-GBM antibodies returned negative. Infectious disease recommended further treatment with 5 days total of ceftin and azithromycin. He was determined to be stable for discharge on oral antibiotics and with instruction to obtain repeat imaging after completing antibiotics as he may need a bronchoscopy as an outpatient.

## 2018-06-14 NOTE — DISCHARGE NOTE ADULT - PLAN OF CARE
Treatment of pneumonia You came to the hospital after coughing up blood and were found to have a pneumonia. You were given antibiotics to treat this infection. You will need to get repeat imaging after treatment for further evaluation of your lungs. Please follow-up with your primary care doctor or pulmonologist for a referral for a CT scan. Return to the hospital if you experience a worsening of fevers, chills, nausea, vomiting, chest pain, shortness of breath, or coughing up blood. Continue your blood pressure medications as prescribed. You have a history of chronic kidney disease. In the hospital your kidney function was found to be worse than your previous baseline. You will need to follow-up with your nephrologist as an outpatient for further monitoring of this condition. Continue home diabetes medication. Continue to monitor your blood sugar daily. You came to the hospital after coughing up blood and were found to have a pneumonia. You were given antibiotics to treat this infection. You will need to get repeat imaging after treatment for further evaluation of your lungs. Please follow-up with your primary care doctor or pulmonologist for a referral for a CT scan. Please also follow-up with the infectious disease specialists within two weeks of discharge. Return to the hospital if you experience a worsening of fevers, chills, nausea, vomiting, chest pain, shortness of breath, or coughing up blood. You have a history of hypertension. In the hospital your losartan was discontinued due to your worsening kidney function. Continue taking metoprolol, labetalol, and clonidine upon discharge. Follow-up with your primary care doctor within 1 week of discharge. You have a history of chronic kidney disease. In the hospital your kidney function was found to be worse than your previous baseline. You will need to follow-up with your nephrologist within 2 weeks of discharge for further monitoring of this condition.

## 2018-06-14 NOTE — PROGRESS NOTE ADULT - ATTENDING COMMENTS
Patient seen and examined. Chart/lab reviewed. Agree with above with modifications.  33M with PMHx of HTN, DM-2, HLD, CAD s/p stents x3 in 2015, CKD, p/w fevers, chills, hemoptysis with LLL consolidation, being treated for CAP. Ruled out for TB. Pt had diarrhea x1 today.    Assessment/plan:  # Hemoptysis: likely CAP (pneumonia), ruled out for TB w/ neg sputum AFB and neg quantiferon  CT showed LLL consolidation, d/c airborne isolation  urine legionella still not back  on ceftriaxone/zithromax, consider switch to po abx  cont duoneb    # Acute on chronic renal failure: baseline creat ~2.5, now has progressed likely diabetic nephropathy  CT no hydro, no RP bleed, ruled out for RPGN w/ neg serologies as above  urine protein/cr ~15 c/w nephrotic range proteinuria likely diabetic nephropathy  complement level slightly elevated  -pt now has ckd5, but no uremic, no indication for dialysis at this time, but will need close outpt renal f/u and preparation for dialysis in the future, will need vascular surgery eval as outpt for AVF creation    -no indication for pulse steroid/cytoxan at this time, f/u nephrology  -pt has secondary hyperparathyroidism () due to advanced ckd, cont phoslo  # chest discomfort: resolved, c/w ASA/cardiac meds, elevated hsTn but trending down, doubt ACS per Cards, cont cardiac meds  TTE, f/u cards  # Anemia: likely due to ckd and acute blood loss from hemoptysis,  monitor cbc, transfuse prn for Hgb<8  [employer] [place] (ferritin 351)  # Electrolyte abnormality: pt with hypocalcemia, hyponatremia  hypovolemic hyponatremia improved, c/w phoslo  #HTN:  c/w labetalol and lopressor, clonidine, hold losartan in the setting of advanced ckd  # Dispo: d/c plan pending TTE Patient seen and examined. Chart/lab reviewed. Agree with above with modifications.  33M with PMHx of HTN, DM-2, HLD, CAD s/p stents x3 in 2015, CKD, p/w fevers, chills, hemoptysis with LLL consolidation, being treated for CAP. Ruled out for TB. Pt had diarrhea x1 today.    Assessment/plan:  # Hemoptysis: likely CAP (pneumonia), ruled out for TB w/ neg sputum AFB and neg quantiferon  CT showed LLL consolidation, d/c airborne isolation  urine legionella still not back  on ceftriaxone/zithromax, consider switch to po abx  cont duoneb    # Acute on chronic renal failure: baseline creat ~2.5, now has progressed likely diabetic nephropathy  CT no hydro, no RP bleed, ruled out for RPGN w/ neg serologies as above  urine protein/cr ~15 c/w nephrotic range proteinuria likely diabetic nephropathy  complement level slightly elevated  -pt now has ckd5, but no uremic, no indication for dialysis at this time, but will need close outpt renal f/u and preparation for dialysis in the future, will need vascular surgery eval as outpt for AVF creation    -no indication for pulse steroid/cytoxan at this time, f/u nephrology  -pt has secondary hyperparathyroidism () due to advanced ckd, cont phoslo  # chest discomfort: resolved, c/w ASA/cardiac meds, elevated hsTn but trending down, doubt ACS per Cards, cont cardiac meds  TTE normal LVEF 63%, no further cardiac intervention, f/u cards as outpt  # Anemia: likely due to ckd and acute blood loss from hemoptysis,  monitor cbc, transfuse prn for Hgb<8  [employer] [place] (ferritin 351)  # Electrolyte abnormality: pt with hypocalcemia, hyponatremia  hypovolemic hyponatremia improved, c/w phoslo  #HTN:  c/w labetalol and lopressor, clonidine, hold losartan in the setting of advanced ckd  # Dispo: d/c plan home today. Time spent on discharge 35 minutes coordinating discharge plan and discussing with patient and family.

## 2018-06-14 NOTE — DISCHARGE NOTE ADULT - CARE PROVIDER_API CALL
Elisa Zuluaga), Critical Care Medicine; Internal Medicine; Pulmonary Disease; Sleep Medicine  [address] [address]  Phone: [phone]  Fax: [phone]    Joaquín Valentine), Infectious Disease  [address] [place] [id number]  Phone: [phone]  Fax: [phone]

## 2018-06-14 NOTE — DISCHARGE NOTE ADULT - CARE PLAN
Principal Discharge DX:	Hemoptysis  Goal:	Treatment of pneumonia  Assessment and plan of treatment:	You came to the hospital after coughing up blood and were found to have a pneumonia. You were given antibiotics to treat this infection. You will need to get repeat imaging after treatment for further evaluation of your lungs. Please follow-up with your primary care doctor or pulmonologist for a referral for a CT scan. Return to the hospital if you experience a worsening of fevers, chills, nausea, vomiting, chest pain, shortness of breath, or coughing up blood.  Secondary Diagnosis:	HTN (hypertension)  Assessment and plan of treatment:	Continue your blood pressure medications as prescribed.  Secondary Diagnosis:	CKD (chronic kidney disease)  Assessment and plan of treatment:	You have a history of chronic kidney disease. In the hospital your kidney function was found to be worse than your previous baseline. You will need to follow-up with your nephrologist as an outpatient for further monitoring of this condition.  Secondary Diagnosis:	Diabetes  Assessment and plan of treatment:	Continue home diabetes medication. Continue to monitor your blood sugar daily. Principal Discharge DX:	Hemoptysis  Goal:	Treatment of pneumonia  Assessment and plan of treatment:	You came to the hospital after coughing up blood and were found to have a pneumonia. You were given antibiotics to treat this infection. You will need to get repeat imaging after treatment for further evaluation of your lungs. Please follow-up with your primary care doctor or pulmonologist for a referral for a CT scan. Please also follow-up with the infectious disease specialists within two weeks of discharge. Return to the hospital if you experience a worsening of fevers, chills, nausea, vomiting, chest pain, shortness of breath, or coughing up blood.  Secondary Diagnosis:	HTN (hypertension)  Assessment and plan of treatment:	You have a history of hypertension. In the hospital your losartan was discontinued due to your worsening kidney function. Continue taking metoprolol, labetalol, and clonidine upon discharge. Follow-up with your primary care doctor within 1 week of discharge.  Secondary Diagnosis:	CKD (chronic kidney disease)  Assessment and plan of treatment:	You have a history of chronic kidney disease. In the hospital your kidney function was found to be worse than your previous baseline. You will need to follow-up with your nephrologist within 2 weeks of discharge for further monitoring of this condition.  Secondary Diagnosis:	Diabetes  Assessment and plan of treatment:	Continue home diabetes medication. Continue to monitor your blood sugar daily.

## 2018-06-14 NOTE — PROGRESS NOTE ADULT - SUBJECTIVE AND OBJECTIVE BOX
Follow Up:      Inverval History/ROS:Patient is a 63y old  Male who presents with a chief complaint of Community Acquired Pneumonia (14 Jun 2018 12:28)    Feels better.  States hemopytsis has stopped. Afebrile.        Allergies    No Known Allergies    Intolerances        ANTIMICROBIALS:  azithromycin  IVPB 500 every 24 hours  cefTRIAXone   IVPB 1 every 24 hours      OTHER MEDS:  acetaminophen   Tablet 650 milliGRAM(s) Oral every 6 hours PRN  aspirin  chewable 81 milliGRAM(s) Oral daily  calcium acetate 667 milliGRAM(s) Oral three times a day with meals  cloNIDine 0.1 milliGRAM(s) Oral daily  dextrose 40% Gel 15 Gram(s) Oral once PRN  dextrose 5%. 1000 milliLiter(s) IV Continuous <Continuous>  dextrose 50% Injectable 12.5 Gram(s) IV Push once  dextrose 50% Injectable 25 Gram(s) IV Push once  dextrose 50% Injectable 25 Gram(s) IV Push once  ferrous    sulfate 325 milliGRAM(s) Oral daily  glucagon  Injectable 1 milliGRAM(s) IntraMuscular once PRN  heparin  Injectable 5000 Unit(s) SubCutaneous every 8 hours  insulin glargine Injectable (LANTUS) 15 Unit(s) SubCutaneous at bedtime  insulin lispro (HumaLOG) corrective regimen sliding scale   SubCutaneous three times a day before meals  labetalol 300 milliGRAM(s) Oral two times a day  lactobacillus acidophilus 1 Tablet(s) Oral two times a day with meals  meclizine 12.5 milliGRAM(s) Oral every 8 hours PRN  metoprolol tartrate 12.5 milliGRAM(s) Oral two times a day  potassium chloride   Solution 20 milliEquivalent(s) Oral every 4 hours  simvastatin 40 milliGRAM(s) Oral at bedtime  sodium bicarbonate 650 milliGRAM(s) Oral three times a day  sodium bicarbonate  Infusion 0.078 mEq/kG/Hr IV Continuous <Continuous>  sodium chloride 3%  Inhalation 3 milliLiter(s) Inhalation three times a day PRN      Vital Signs Last 24 Hrs  T(C): 36.4 (14 Jun 2018 12:41), Max: 37.2 (13 Jun 2018 17:58)  T(F): 97.6 (14 Jun 2018 12:41), Max: 98.9 (13 Jun 2018 17:58)  HR: 73 (14 Jun 2018 12:41) (64 - 85)  BP: 114/70 (14 Jun 2018 12:41) (113/64 - 190/88)  BP(mean): --  RR: 18 (14 Jun 2018 12:41) (17 - 18)  SpO2: 98% (14 Jun 2018 12:41) (98% - 100%)    PHYSICAL EXAM:  General: [x ] non-toxic  HEAD/EYES: [ ] PERRL [x ] white sclera [ ] icterus  ENT:  [ ] normal [x ] supple [ ] thrush [ ] pharyngeal exudate  Cardiovascular:   [ ] murmur [x ] normal [ ] PPM/AICD  Respiratory:  [ ] clear to ausculation bilaterally  GI:  [ ] soft, non-tender, normal bowel sounds  :  [ ] hinds [ ] no CVA tenderness   Musculoskeletal:  [ ] no synovitis  Neurologic:  [ x] non-focal exam   Skin:  [ ] no rash  Lymph: [ ] no lymphadenopathy  Psychiatric:  [x ] appropriate affect [ ] alert & oriented  Lines:  [x ] no phlebitis [ ] central line                                8.3    4.92  )-----------( 141      ( 14 Jun 2018 08:28 )             25.1       06-14    135  |  97<L>  |  47<H>  ----------------------------<  176<H>  3.4<L>   |  20<L>  |  5.73<H>    Ca    7.1<L>      14 Jun 2018 08:28  Phos  4.5     06-14  Mg     1.6     06-14            MICROBIOLOGY:    RADIOLOGY:

## 2018-06-14 NOTE — PROGRESS NOTE ADULT - ASSESSMENT
33M with PMHx of HTN, CAD s/p stent placement x3 in 2015, CKD, DMII presents with fevers and hemoptysis concerning for CAP vs active TB infection vs vasculitis

## 2018-06-14 NOTE — DISCHARGE NOTE ADULT - CARE PROVIDERS DIRECT ADDRESSES
[email or web address]

## 2018-06-14 NOTE — PROGRESS NOTE ADULT - PROBLEM SELECTOR PROBLEM 2
Hyponatremia
FELICITA (acute kidney injury)
Hyponatremia

## 2018-06-14 NOTE — DISCHARGE NOTE ADULT - PATIENT PORTAL LINK FT
You can access the [facility] Portal, offered by [facility], by registering with the following website: [email or web address]

## 2018-06-14 NOTE — PROGRESS NOTE ADULT - PROBLEM SELECTOR PLAN 7
Pt has hx of CAD with 3 stents placed in 2015 at [facility], on ASA81 at home  - resumed ASA81 per cards recs Pt has hx of CAD with 3 stents placed in 2015 at [facility], on ASA81 at home  - c/w ASA81

## 2018-06-14 NOTE — PROGRESS NOTE ADULT - NSHPATTENDINGPLANDISCUSS_GEN_ALL_CORE
fellow
patient, team
Team
med team
patient and medical resident

## 2018-06-14 NOTE — PROGRESS NOTE ADULT - PROBLEM SELECTOR PLAN 5
Na 123 on presentation, now improved to 129 after IVF  - f/u urine electrolytes, albumin Resolved  - daily BMP

## 2018-06-14 NOTE — PROGRESS NOTE ADULT - ATTENDING COMMENTS
Patient examined and ROS reviewed. A case of CKD presented with hemoptysis with mild proteinuria. GN w/u negative. Hyponatremia is resolved.

## 2018-06-14 NOTE — PROGRESS NOTE ADULT - PROBLEM SELECTOR PLAN 1
Pt with three month hx of hemoptysis, now with fever to 103.7 and back pain. CXR concerning for pna vs pulmonary hemorrhage. Blood cx no growth after 24 hours.   - will c/w ceftriaxone and azithromycin per ID recs  - f/u urine legionella  - MDI Q6H  - tylenol PRN fever  - CT abd/chest showing LLL consolidation, pna vs hemorrhage  - ID consulted, will r/o TB and c/w CAP tx   - f/u quant gold, obtain sputum cultures to r/o TB and place in isolation Pt with three month hx of hemoptysis, now with fever to 103.7 and back pain. CXR concerning for pna vs pulmonary hemorrhage. CT abd/chest showing LLL consolidation, pna vs hemorrhage. Blood cx no growth to date. AFB negative x2, quant gold negative.   - will c/w ceftriaxone and azithromycin per ID recs  - f/u urine legionella  - MDI Q6H  - tylenol PRN fever  - ID consulted, appreciate recs

## 2018-06-14 NOTE — PROGRESS NOTE ADULT - PROBLEM SELECTOR PLAN 3
Pts highly sensitive troponin elevated to 103, most recently 87 associated with ST changes in V1 and V2. Cardiology not convinced this is ACS due to lack of symptoms. Recommend echo   - will f/u echo   - will not check troponins unless acutely symptomatic

## 2018-06-14 NOTE — PROGRESS NOTE ADULT - SUBJECTIVE AND OBJECTIVE BOX
Interval Events:    REVIEW OF SYSTEMS:  Constitutional:   Eyes:  ENT:  CV:  Resp:  GI:  :  MSK:  Integumentary:  Neurological:  Psychiatric:  Endocrine:  Hematologic/Lymphatic:  Allergic/Immunologic:  [x] All other systems negative  [ ] Unable to assess ROS because ________    OBJECTIVE:  ICU Vital Signs Last 24 Hrs  T(C): 36.8 (2018 06:59), Max: 37.2 (2018 17:58)  T(F): 98.3 (2018 06:59), Max: 98.9 (2018 17:58)  HR: 64 (2018 06:59) (64 - 85)  BP: 138/80 (2018 06:59) (117/70 - 190/88)  BP(mean): --  ABP: --  ABP(mean): --  RR: 18 (2018 06:59) (17 - 19)  SpO2: 100% (2018 06:59) (99% - 100%)        CAPILLARY BLOOD GLUCOSE      POCT Blood Glucose.: 175 mg/dL (2018 22:42)      PHYSICAL EXAM:  General: AAOx3  HEENT: EOMI, PERRL  Lymph Nodes: No LAD  Neck: Supple  Respiratory:   Cardiovascular: RRR   Abdomen: soft, NT/ND  Extremities: no c/c/e    HOSPITAL MEDICATIONS:  MEDICATIONS  (STANDING):  aspirin  chewable 81 milliGRAM(s) Oral daily  azithromycin  IVPB 500 milliGRAM(s) IV Intermittent every 24 hours  calcium acetate 667 milliGRAM(s) Oral three times a day with meals  cefTRIAXone   IVPB 1 Gram(s) IV Intermittent every 24 hours  cloNIDine 0.1 milliGRAM(s) Oral daily  dextrose 5%. 1000 milliLiter(s) (50 mL/Hr) IV Continuous <Continuous>  dextrose 50% Injectable 12.5 Gram(s) IV Push once  dextrose 50% Injectable 25 Gram(s) IV Push once  dextrose 50% Injectable 25 Gram(s) IV Push once  ferrous    sulfate 325 milliGRAM(s) Oral daily  insulin glargine Injectable (LANTUS) 15 Unit(s) SubCutaneous at bedtime  insulin lispro (HumaLOG) corrective regimen sliding scale   SubCutaneous three times a day before meals  labetalol 300 milliGRAM(s) Oral two times a day  metoprolol tartrate 12.5 milliGRAM(s) Oral two times a day  simvastatin 40 milliGRAM(s) Oral at bedtime  sodium bicarbonate  Infusion 0.078 mEq/kG/Hr (75 mL/Hr) IV Continuous <Continuous>    MEDICATIONS  (PRN):  acetaminophen   Tablet 650 milliGRAM(s) Oral every 6 hours PRN For Temp greater than 38 C (100.4 F)  dextrose 40% Gel 15 Gram(s) Oral once PRN Blood Glucose LESS THAN 70 milliGRAM(s)/deciliter  glucagon  Injectable 1 milliGRAM(s) IntraMuscular once PRN Glucose LESS THAN 70 milligrams/deciliter  meclizine 12.5 milliGRAM(s) Oral every 8 hours PRN Dizziness  sodium chloride 3%  Inhalation 3 milliLiter(s) Inhalation three times a day PRN sputum induction      LABS:                        8.7    5.72  )-----------( 133      ( 2018 06:16 )             26.3     06-13    134<L>  |  97<L>  |  48<H>  ----------------------------<  93  3.2<L>   |  17<L>  |  5.99<H>    Ca    6.9<L>      2018 06:30  Phos  5.6     06-13  Mg     1.7     -        Urinalysis Basic - ( 2018 08:04 )    Color: PLYEL / Appearance: CLEAR / S.008 / pH: 7.0  Gluc: 100 / Ketone: TRACE  / Bili: NEGATIVE / Urobili: NORMAL mg/dL   Blood: MODERATE / Protein: 500 mg/dL / Nitrite: NEGATIVE   Leuk Esterase: NEGATIVE / RBC: 2-5 / WBC 0-2   Sq Epi: OCC / Non Sq Epi: x / Bacteria: x            RADIOLOGY:  [x] Reviewed and interpreted by me Interval Events:  No acute events o/n  dyspnea improving  no hemoptysis    REVIEW OF SYSTEMS:  Constitutional:   Eyes:  ENT:  CV:  Resp: cough  GI:  :  MSK:  Integumentary:  Neurological:  Psychiatric:  Endocrine:  Hematologic/Lymphatic:  Allergic/Immunologic:  [x] All other systems negative  [ ] Unable to assess ROS because ________    OBJECTIVE:  ICU Vital Signs Last 24 Hrs  T(C): 36.8 (2018 06:59), Max: 37.2 (2018 17:58)  T(F): 98.3 (2018 06:59), Max: 98.9 (2018 17:58)  HR: 64 (2018 06:59) (64 - 85)  BP: 138/80 (2018 06:59) (117/70 - 190/88)  BP(mean): --  ABP: --  ABP(mean): --  RR: 18 (2018 06:59) (17 - 19)  SpO2: 100% (2018 06:59) (99% - 100%)        CAPILLARY BLOOD GLUCOSE      POCT Blood Glucose.: 175 mg/dL (2018 22:42)      PHYSICAL EXAM:  General: AAOx3  HEENT: EOMI, PERRL  Lymph Nodes: No LAD  Neck: Supple  Respiratory: dec bs at left base  Cardiovascular: RRR   Abdomen: soft, NT/ND  Extremities: no c/c/e    HOSPITAL MEDICATIONS:  MEDICATIONS  (STANDING):  aspirin  chewable 81 milliGRAM(s) Oral daily  azithromycin  IVPB 500 milliGRAM(s) IV Intermittent every 24 hours  calcium acetate 667 milliGRAM(s) Oral three times a day with meals  cefTRIAXone   IVPB 1 Gram(s) IV Intermittent every 24 hours  cloNIDine 0.1 milliGRAM(s) Oral daily  dextrose 5%. 1000 milliLiter(s) (50 mL/Hr) IV Continuous <Continuous>  dextrose 50% Injectable 12.5 Gram(s) IV Push once  dextrose 50% Injectable 25 Gram(s) IV Push once  dextrose 50% Injectable 25 Gram(s) IV Push once  ferrous    sulfate 325 milliGRAM(s) Oral daily  insulin glargine Injectable (LANTUS) 15 Unit(s) SubCutaneous at bedtime  insulin lispro (HumaLOG) corrective regimen sliding scale   SubCutaneous three times a day before meals  labetalol 300 milliGRAM(s) Oral two times a day  metoprolol tartrate 12.5 milliGRAM(s) Oral two times a day  simvastatin 40 milliGRAM(s) Oral at bedtime  sodium bicarbonate  Infusion 0.078 mEq/kG/Hr (75 mL/Hr) IV Continuous <Continuous>    MEDICATIONS  (PRN):  acetaminophen   Tablet 650 milliGRAM(s) Oral every 6 hours PRN For Temp greater than 38 C (100.4 F)  dextrose 40% Gel 15 Gram(s) Oral once PRN Blood Glucose LESS THAN 70 milliGRAM(s)/deciliter  glucagon  Injectable 1 milliGRAM(s) IntraMuscular once PRN Glucose LESS THAN 70 milligrams/deciliter  meclizine 12.5 milliGRAM(s) Oral every 8 hours PRN Dizziness  sodium chloride 3%  Inhalation 3 milliLiter(s) Inhalation three times a day PRN sputum induction      LABS:                        8.7    5.72  )-----------( 133      ( 2018 06:16 )             26.3     06-13    134<L>  |  97<L>  |  48<H>  ----------------------------<  93  3.2<L>   |  17<L>  |  5.99<H>    Ca    6.9<L>      2018 06:30  Phos  5.6     06-13  Mg     1.7     06-        Urinalysis Basic - ( 2018 08:04 )    Color: PLYEL / Appearance: CLEAR / S.008 / pH: 7.0  Gluc: 100 / Ketone: TRACE  / Bili: NEGATIVE / Urobili: NORMAL mg/dL   Blood: MODERATE / Protein: 500 mg/dL / Nitrite: NEGATIVE   Leuk Esterase: NEGATIVE / RBC: 2-5 / WBC 0-2   Sq Epi: OCC / Non Sq Epi: x / Bacteria: x            RADIOLOGY:  [x] Reviewed and interpreted by me

## 2018-06-14 NOTE — PROGRESS NOTE ADULT - SUBJECTIVE AND OBJECTIVE BOX
PROVIDER CONTACT INFO:  Jolie Rose MD  [facility] Pager: [phone]  [place] Pager: [phone]    MD MAMI  63y  Male      Patient is a 63y old  Male who presents with a chief complaint of Hemoptysis, r/o TB (2018 16:41)      INTERVAL HPI/OVERNIGHT EVENTS: Overnight, pt's troponin remained elevated, but trending down, now 95 from 103. He currently denies F/C/N/V, CP/SOB, abd pain, dysuria, constipation/diarrhea.     T(C): 36.8 (18 @ 06:59), Max: 37.2 (18 @ 17:58)  HR: 64 (18 @ 06:59) (64 - 85)  BP: 138/80 (18 @ 06:59) (117/70 - 190/88)  RR: 18 (18 @ 06:59) (17 - 19)  SpO2: 100% (18 @ 06:59) (99% - 100%)  Wt(kg): --Vital Signs Last 24 Hrs  T(C): 36.8 (2018 06:59), Max: 37.2 (2018 17:58)  T(F): 98.3 (2018 06:59), Max: 98.9 (2018 17:58)  HR: 64 (2018 06:59) (64 - 85)  BP: 138/80 (2018 06:59) (117/70 - 190/88)  BP(mean): --  RR: 18 (2018 06:59) (17 - 19)  SpO2: 100% (2018 06:59) (99% - 100%)    PHYSICAL EXAM:  GENERAL: NAD, well-groomed, well-developed  HEAD:  Atraumatic, Normocephalic  EYES: EOMI, PERRLA, conjunctiva and sclera clear  ENMT: No tonsillar erythema, exudates,; Moist mucous membranes. No lesions  NECK: Supple, No JVD, Normal thyroid  CHEST/LUNG: Clear to percussion bilaterally; No rales, rhonchi, wheezing, or rubs  HEART: Regular rate and rhythm; No murmurs, rubs, or gallops  ABDOMEN: Soft, Nontender, Nondistended; Bowel sounds present  EXTREMITIES:  2+ Peripheral Pulses, No clubbing, cyanosis, or edema  LYMPH: No lymphadenopathy noted  SKIN: No rashes or lesions  PSYCH: Alert & Oriented x3    Consultant(s) Notes Reviewed:  [x ] YES  [ ] NO  Care Discussed with Consultants/Other Providers [ x] YES  [ ] NO    LABS:                        8.7    5.72  )-----------( 133      ( 2018 06:16 )             26.3     -    134<L>  |  97<L>  |  48<H>  ----------------------------<  93  3.2<L>   |  17<L>  |  5.99<H>    Ca    6.9<L>      2018 06:30  Phos  5.6       Mg     1.7             Urinalysis Basic - ( 2018 08:04 )    Color: PLYEL / Appearance: CLEAR / S.008 / pH: 7.0  Gluc: 100 / Ketone: TRACE  / Bili: NEGATIVE / Urobili: NORMAL mg/dL   Blood: MODERATE / Protein: 500 mg/dL / Nitrite: NEGATIVE   Leuk Esterase: NEGATIVE / RBC: 2-5 / WBC 0-2   Sq Epi: OCC / Non Sq Epi: x / Bacteria: x      CAPILLARY BLOOD GLUCOSE      POCT Blood Glucose.: 175 mg/dL (2018 22:42)  POCT Blood Glucose.: 208 mg/dL (2018 16:59)  POCT Blood Glucose.: 120 mg/dL (2018 12:07)  POCT Blood Glucose.: 157 mg/dL (2018 07:48)        Urinalysis Basic - ( 2018 08:04 )    Color: PLYEL / Appearance: CLEAR / S.008 / pH: 7.0  Gluc: 100 / Ketone: TRACE  / Bili: NEGATIVE / Urobili: NORMAL mg/dL   Blood: MODERATE / Protein: 500 mg/dL / Nitrite: NEGATIVE   Leuk Esterase: NEGATIVE / RBC: 2-5 / WBC 0-2   Sq Epi: OCC / Non Sq Epi: x / Bacteria: x        RADIOLOGY & ADDITIONAL TESTS:    Imaging Personally Reviewed:  [ ] YES  [ ] NO

## 2018-06-14 NOTE — DISCHARGE NOTE ADULT - MEDICATION SUMMARY - MEDICATIONS TO TAKE
I will START or STAY ON the medications listed below when I get home from the hospital:    Aspir 81 oral delayed release tablet  -- 1 tab(s) by mouth once a day  -- Indication: For Coronary artery disease    cloNIDine 0.1 mg oral tablet  -- 0.1 cap(s) by mouth once a day  -- Indication: For Hypertension    NovoLOG 100 units/mL injectable solution  -- 30 unit(s) injectable 2 times a day  -- Indication: For Diabetes    simvastatin 40 mg oral tablet  -- 1 tab(s) by mouth once a day (at bedtime)  -- Indication: For Coronary artery disease    metoprolol succinate 25 mg oral tablet, extended release  -- 1 tab(s) by mouth once a day  -- Indication: For Hypertension    labetalol 300 mg oral tablet  -- 1 tab(s) by mouth 2 times a day  -- Indication: For Hypertension    cefuroxime 500 mg oral tablet  -- 1 tab(s) by mouth 2 times a day   -- Finish all this medication unless otherwise directed by prescriber.  Medication should be taken with plenty of water.  Take with food or milk.    -- Indication: For Pneumonia    raNITIdine 150 mg oral capsule  -- 1 cap(s) by mouth 2 times a day  -- Indication: For GERD    azithromycin 500 mg oral tablet  -- 1 tab(s) by mouth once a day   -- Do not take dairy products, antacids, or iron preparations within one hour of this medication.  Finish all this medication unless otherwise directed by prescriber.    -- Indication: For Pneumonia

## 2018-06-14 NOTE — PROGRESS NOTE ADULT - ASSESSMENT
62yo M with PMH of DMII, CKD (baseline Cr ~ 2.5), CAD s/p stent x3 in 2015, CVA x3 without residual deficits, who presents with hemoptysis for 3 months, low back pain, fever, increasing weakness and shortness of breath, a/w r/o ACS / TB, nephrology consulted for SCr stable 5 with unclear baseline, concern for glomerulonephritis given proteinuria and hematuria but negative immunologic workup to day.

## 2018-06-14 NOTE — PROGRESS NOTE ADULT - ATTENDING COMMENTS
Left lower lobe consolidation may represent bacterial pneumonia, however clinical picture is not very supportive of such process.  Patient seems to be doing better on abx, would complete 7 day course and repeat imaging, if there is no improvement in consolidation will need bronchoscopy. If vasculitis is suspected agree with renal biopsy.

## 2018-06-14 NOTE — PROGRESS NOTE ADULT - ASSESSMENT
33M with PMHx of HTN, CAD s/p stent placement x3 in 2015, CKD, DMII presents with fevers and hemoptysis.    1.  Hemoptysis-  -Etiology appears unclear at this time: DDx: infectious vs DPLD with immunologic basis (?capillaritis) given patients renal involvement vs maligancy  -Patient clinically stable, on RA--patient with one episode o/n  -sputum AFB (-) x2; quant gold (-)  -serologies negative to this point (p-ANCA; c-ANCA; anti-GBM)    2. LLL consolidation-  -sputum AFB (-) x2  -f/u 3rd AFB  -cont CTX/azithro  -check a urine legionella    Elie Quiñonez, DO   Pulmonary and Critical Care Fellow

## 2018-06-14 NOTE — PROGRESS NOTE ADULT - PROBLEM SELECTOR PLAN 4
T wave inversion on aVL, I, and V4-V6 concerning for possible lateral ischemia. No prior EKG available for comparison. CK and CK-MB downtrending since admission, but troponins now elevated. Per cards, low suspicion for ACS, likely due to chronic renal disease/acute illness.  - cont trending CE Q6H  - resumed ASA81 per cards recs, cannot be held if pt were to go for kidney bx T wave inversion on aVL, I, and V4-V6 concerning for possible lateral ischemia. No prior EKG available for comparison. CK and CK-MB downtrending since admission, but troponins now elevated. Per cards, low suspicion for ACS, likely due to chronic renal disease/acute illness. Pt asymptomatic. Management as above.

## 2018-06-14 NOTE — PROGRESS NOTE ADULT - PROBLEM SELECTOR PLAN 6
Normocytic anemia with Hgb of 9.5, unknown baseline. Stable. Most likely in the setting of chronic disease (CKD), hemoptysis, and low iron. Ferritin wnl  - c/w iron supplementation  - cont to monitor CBC, signs/sx of active bleed
Pt has hx of CAD with 3 stents placed in 2015 at [facility], on ASA81 at home  - resumed ASA81 per cards recs
Pt has hx of CAD with 3 stents placed in 2015 at [facility], on ASA81 at home  - resumed ASA81 per cards recs, will hold if going for renal bx

## 2018-06-14 NOTE — PROGRESS NOTE ADULT - PROBLEM SELECTOR PLAN 2
Resolved.     Plan pending discussion with attending  Francis García  Medicine PGY-2 nephrology elective  Pager [phone] / [phone]

## 2018-06-14 NOTE — PROGRESS NOTE ADULT - ASSESSMENT
63 year old with DM presents with 3 months og hemoptysis.  Found to have kidney injury and fever.    CT with left middle lobe consolidation,.    Tb is on DDX. AFB smear times two are engative- consider sending a third.       Can continue treatment for community acquired pneumonia with ceftiraxone and azithromycin- but would not explain symptoms of this duration. Plan 5 day course. Finish abx after dose on 6/116. can finish course with ceftin 500 mg po q 12 and azithromycin 500 mg po qd.

## 2018-06-14 NOTE — PROGRESS NOTE ADULT - PROBLEM SELECTOR PLAN 1
Given moderate blood and 500 protein on U/A and possible pulmonary hemorrhage, concern for glomerulonephritic process. Renal involvement from TB is possible, though no evidence on imaging. AFB sputum cultures negative x 2, now off isolation. SCr improved today 5.7 from 5.9. ? RPGN, hemodynamically mediated FELICITA given relative hypotension yesterday AM with diarrhea.  -p- and c-ANCA negative. No indication for pulse dose steroids at this time.  -spot urine protein 317 elevated.   -f/u infectious work up.  -C3 and C4 mildly elevated

## 2018-06-14 NOTE — PROGRESS NOTE ADULT - PROBLEM SELECTOR PROBLEM 1
Acute kidney injury superimposed on chronic kidney disease
Acute kidney injury superimposed on chronic kidney disease
Hemoptysis

## 2018-06-14 NOTE — PROGRESS NOTE ADULT - PROBLEM SELECTOR PLAN 9
Pt on metoprolol, labetalol, clonidine, and losartan at home  - will c/w clonidine, labetalol, and metoprolol  - holding losartan in setting of FELICITA
Pt s/p CVA x3 without residual deficits  - c/w simvastatin 40  - ASA81
Pt s/p CVA x3 without residual deficits  - c/w simvastatin 40  - ASA81

## 2018-06-14 NOTE — PROGRESS NOTE ADULT - PROBLEM SELECTOR PLAN 8
A1C 6.2, on novolog 70/30 30U BID at home. Blood sugars wnl in hospital  - ISS, check fingersticks  - will c/w weight based lantus 15U at night
Pt on metoprolol, labetalol, clonidine, and losartan at home  - will c/w clonidine, labetalol, and metoprolol  - holding losartan in setting of FELICITA
Pt on metoprolol, labetalol, clonidine, and losartan at home  - will c/w clonidine, labetalol, and metoprolol  - holding losartan in setting of FELICITA

## 2018-06-14 NOTE — PROGRESS NOTE ADULT - SUBJECTIVE AND OBJECTIVE BOX
[facility] Division of Kidney Diseases & Hypertension  FOLLOW UP NOTE  [phone]--------------------------------------------------------------------------------  Chief Complaint:Pneumonia      24 hour events/subjective:  Immunologic workup negative to date including p- and c-ANCA. Pt does not feel dizzy this morning but states that he feels a little weak. Troponin was still elevated o/n but down-trending. Denies fevers, chills, recurrent hemoptysis night sweat, LE swelling, flank pain, dysuria, change in urine, difficulty urinating.      PAST HISTORY  --------------------------------------------------------------------------------  No significant changes to PMH, PSH, FHx, SHx, unless otherwise noted    ALLERGIES & MEDICATIONS  --------------------------------------------------------------------------------  Allergies    No Known Allergies    Intolerances      Standing Inpatient Medications  aspirin  chewable 81 milliGRAM(s) Oral daily  azithromycin  IVPB 500 milliGRAM(s) IV Intermittent every 24 hours  calcium acetate 667 milliGRAM(s) Oral three times a day with meals  cefTRIAXone   IVPB 1 Gram(s) IV Intermittent every 24 hours  cloNIDine 0.1 milliGRAM(s) Oral daily  dextrose 5%. 1000 milliLiter(s) IV Continuous <Continuous>  dextrose 50% Injectable 12.5 Gram(s) IV Push once  dextrose 50% Injectable 25 Gram(s) IV Push once  dextrose 50% Injectable 25 Gram(s) IV Push once  ferrous    sulfate 325 milliGRAM(s) Oral daily  insulin glargine Injectable (LANTUS) 15 Unit(s) SubCutaneous at bedtime  insulin lispro (HumaLOG) corrective regimen sliding scale   SubCutaneous three times a day before meals  labetalol 300 milliGRAM(s) Oral two times a day  lactobacillus acidophilus 1 Tablet(s) Oral two times a day with meals  metoprolol tartrate 12.5 milliGRAM(s) Oral two times a day  potassium chloride   Solution 20 milliEquivalent(s) Oral every 4 hours  simvastatin 40 milliGRAM(s) Oral at bedtime  sodium bicarbonate  Infusion 0.078 mEq/kG/Hr IV Continuous <Continuous>    PRN Inpatient Medications  acetaminophen   Tablet 650 milliGRAM(s) Oral every 6 hours PRN  dextrose 40% Gel 15 Gram(s) Oral once PRN  glucagon  Injectable 1 milliGRAM(s) IntraMuscular once PRN  meclizine 12.5 milliGRAM(s) Oral every 8 hours PRN  sodium chloride 3%  Inhalation 3 milliLiter(s) Inhalation three times a day PRN      REVIEW OF SYSTEMS  --------------------------------------------------------------------------------  Gen: No  fevers/chills, +weakness  Skin: No rashes  Head/Eyes/Ears/Mouth: No headache; Normal hearing; Normal vision w/o blurriness  Respiratory: No dyspnea, cough, wheezing, hemoptysis  CV: No chest pain, PND, orthopnea  GI: No abdominal pain, diarrhea, constipation, nausea, vomiting  : No increased frequency, dysuria, hematuria, nocturia  MSK: No joint pain/swelling; no back pain; no edema  Neuro: No dizziness/lightheadedness, weakness, seizures, numbness, tingling      All other systems were reviewed and are negative, except as noted.    VITALS/PHYSICAL EXAM  --------------------------------------------------------------------------------  T(C): 36.9 (06-14-18 @ 10:24), Max: 37.2 (06-13-18 @ 17:58)  HR: 68 (06-14-18 @ 10:24) (64 - 85)  BP: 113/64 (06-14-18 @ 10:24) (113/64 - 190/88)  RR: 18 (06-14-18 @ 10:24) (17 - 19)  SpO2: 100% (06-14-18 @ 10:24) (99% - 100%)  Wt(kg): --        Physical Exam:  	Gen: NAD, well-appearing, lying nearly flat in bed breathing comfortably  	HEENT: no scleral injection, no nasal discharge/congestion, PERRL, supple neck  	Pulm: no inspiratory crackles appreciated over left lung today, ctab  	CV:  S1S2  	Abd: NO bladder tenderness/distension; +BS, soft               : no CVA tenderness  	Ext: No B/L Lower ext edema  	Neuro: No asterixis, tremors noted  	Skin: Warm, without rashes  	Vascular: no cyanosis    LABS/STUDIES  --------------------------------------------------------------------------------              8.3    4.92  >-----------<  141      [06-14-18 @ 08:28]              25.1     135  |  97  |  47  ----------------------------<  176      [06-14-18 @ 08:28]  3.4   |  20  |  5.73        Ca     7.1     [06-14-18 @ 08:28]      Mg     1.6     [06-14-18 @ 08:28]      Phos  4.5     [06-14-18 @ 08:28]                [06-14-18 @ 08:36]    Creatinine Trend:  SCr 5.73 [06-14 @ 08:28]  SCr 5.99 [06-13 @ 06:30]  SCr 5.72 [06-12 @ 06:30]  SCr 5.66 [06-12 @ 01:40]  SCr 5.69 [06-11 @ 11:50]    Urinalysis - [06-12-18 @ 08:04]      Color PLYEL / Appearance CLEAR / SG 1.008 / pH 7.0      Gluc 100 / Ketone TRACE  / Bili NEGATIVE / Urobili NORMAL       Blood MODERATE / Protein 500 / Leuk Est NEGATIVE / Nitrite NEGATIVE      RBC 2-5 / WBC 0-2 / Hyaline  / Gran  / Sq Epi OCC / Non Sq Epi  / Bacteria     Urine Creatinine 20.40      [06-13-18 @ 10:57]  Urine Protein 317.4      [06-13-18 @ 10:57]  Urine Sodium 55      [06-13-18 @ 10:57]  Urine Urea Nitrogen 202.0      [06-13-18 @ 10:57]  Urine Potassium 14.8      [06-13-18 @ 10:57]  Urine Osmolality 227      [06-13-18 @ 10:57]    Iron 11, TIBC 148, %sat --      [06-12-18 @ 06:30]  Ferritin 351.6      [06-12-18 @ 06:30]  .9 (Ca --)      [06-14-18 @ 08:28]   --  HbA1c 6.2      [06-12-18 @ 06:30]      C3 Complement 165      [06-12-18 @ 06:30]  C4 Complement 47      [06-12-18 @ 06:30]  ANCA: cANCA Negative, pANCA Negative, atypical ANCA --      [06-12-18 @ 06:30]  anti-GBM <0.2      [06-12-18 @ 06:30]

## 2018-06-14 NOTE — DISCHARGE NOTE ADULT - ADDITIONAL INSTRUCTIONS
- You are to follow-up in the next 1-2 weeks with [facility] Division of Pulmonary, Critical Care, nd Sleep Medicine at [facility]. Please call [phone] for an appointment.  - - You are to follow-up in the next 1-2 weeks with [facility] Division of Pulmonary, Critical Care, and Sleep Medicine at [facility]. Please call [phone] for an appointment.  - You are to follow-up in the next 1-2 weeks with [facility] Division of Infectious Disease at [facility]. Please call [phone] for an appointment.  - You are to follow-up in the next 1-2 weeks with your primary care physician and nephrologist  - Continue taking antibiotics as prescribed

## 2018-06-14 NOTE — PROGRESS NOTE ADULT - PROVIDER SPECIALTY LIST ADULT
Infectious Disease
Infectious Disease
Internal Medicine
Nephrology
Pulmonology
Pulmonology
Nephrology

## 2018-06-16 LAB
BACTERIA BLD CULT: SIGNIFICANT CHANGE UP
BACTERIA BLD CULT: SIGNIFICANT CHANGE UP

## 2018-07-01 ENCOUNTER — OUTPATIENT (OUTPATIENT)
Dept: OUTPATIENT SERVICES | Facility: HOSPITAL | Age: 63
LOS: 1 days | End: 2018-07-01

## 2018-07-01 DIAGNOSIS — Z76.89 PERSONS ENCOUNTERING HEALTH SERVICES IN OTHER SPECIFIED CIRCUMSTANCES: ICD-10-CM

## 2018-07-13 PROBLEM — Z00.00 ENCOUNTER FOR PREVENTIVE HEALTH EXAMINATION: Status: ACTIVE | Noted: 2018-07-13

## 2018-07-13 PROBLEM — I10 ESSENTIAL (PRIMARY) HYPERTENSION: Chronic | Status: ACTIVE | Noted: 2018-06-11

## 2018-07-17 DIAGNOSIS — Z71.89 OTHER SPECIFIED COUNSELING: ICD-10-CM

## 2018-07-17 PROBLEM — I25.10 ATHEROSCLEROTIC HEART DISEASE OF NATIVE CORONARY ARTERY WITHOUT ANGINA PECTORIS: Chronic | Status: ACTIVE | Noted: 2018-06-11

## 2018-07-24 LAB
ACID FAST STN SPT: SIGNIFICANT CHANGE UP
ACID FAST STN SPT: SIGNIFICANT CHANGE UP

## 2018-08-20 ENCOUNTER — APPOINTMENT (OUTPATIENT)
Dept: VASCULAR SURGERY | Facility: CLINIC | Age: 63
End: 2018-08-20
Payer: COMMERCIAL

## 2018-08-20 VITALS
SYSTOLIC BLOOD PRESSURE: 180 MMHG | TEMPERATURE: 97.8 F | BODY MASS INDEX: 25.39 KG/M2 | HEART RATE: 76 BPM | WEIGHT: 158 LBS | DIASTOLIC BLOOD PRESSURE: 81 MMHG | HEIGHT: 66 IN

## 2018-08-20 DIAGNOSIS — L98.8 OTHER SPECIFIED DISORDERS OF THE SKIN AND SUBCUTANEOUS TISSUE: ICD-10-CM

## 2018-08-20 DIAGNOSIS — Z86.39 PERSONAL HISTORY OF OTHER ENDOCRINE, NUTRITIONAL AND METABOLIC DISEASE: ICD-10-CM

## 2018-08-20 DIAGNOSIS — Z87.891 PERSONAL HISTORY OF NICOTINE DEPENDENCE: ICD-10-CM

## 2018-08-20 PROCEDURE — G0365: CPT

## 2018-08-20 PROCEDURE — 99204 OFFICE O/P NEW MOD 45 MIN: CPT

## 2018-08-22 ENCOUNTER — APPOINTMENT (OUTPATIENT)
Dept: NEPHROLOGY | Facility: CLINIC | Age: 63
End: 2018-08-22
Payer: COMMERCIAL

## 2018-08-22 ENCOUNTER — APPOINTMENT (OUTPATIENT)
Dept: TRANSPLANT | Facility: CLINIC | Age: 63
End: 2018-08-22
Payer: COMMERCIAL

## 2018-08-22 VITALS
BODY MASS INDEX: 25.23 KG/M2 | RESPIRATION RATE: 14 BRPM | SYSTOLIC BLOOD PRESSURE: 123 MMHG | HEART RATE: 75 BPM | TEMPERATURE: 97.7 F | HEIGHT: 66 IN | DIASTOLIC BLOOD PRESSURE: 69 MMHG | OXYGEN SATURATION: 98 % | WEIGHT: 157 LBS

## 2018-08-22 VITALS — SYSTOLIC BLOOD PRESSURE: 123 MMHG | WEIGHT: 157 LBS | BODY MASS INDEX: 25.34 KG/M2 | DIASTOLIC BLOOD PRESSURE: 69 MMHG

## 2018-08-22 DIAGNOSIS — N18.9 CHRONIC KIDNEY DISEASE, UNSPECIFIED: ICD-10-CM

## 2018-08-22 PROCEDURE — 99205 OFFICE O/P NEW HI 60 MIN: CPT

## 2018-08-22 PROCEDURE — 99214 OFFICE O/P EST MOD 30 MIN: CPT

## 2018-09-07 ENCOUNTER — OUTPATIENT (OUTPATIENT)
Dept: OUTPATIENT SERVICES | Facility: HOSPITAL | Age: 63
LOS: 1 days | End: 2018-09-07
Payer: MEDICAID

## 2018-09-07 VITALS
SYSTOLIC BLOOD PRESSURE: 110 MMHG | HEART RATE: 56 BPM | TEMPERATURE: 98 F | HEIGHT: 66 IN | RESPIRATION RATE: 16 BRPM | WEIGHT: 156.97 LBS | DIASTOLIC BLOOD PRESSURE: 70 MMHG

## 2018-09-07 DIAGNOSIS — E11.9 TYPE 2 DIABETES MELLITUS WITHOUT COMPLICATIONS: ICD-10-CM

## 2018-09-07 DIAGNOSIS — N18.6 END STAGE RENAL DISEASE: ICD-10-CM

## 2018-09-07 DIAGNOSIS — N18.9 CHRONIC KIDNEY DISEASE, UNSPECIFIED: ICD-10-CM

## 2018-09-07 DIAGNOSIS — Z98.890 OTHER SPECIFIED POSTPROCEDURAL STATES: Chronic | ICD-10-CM

## 2018-09-07 DIAGNOSIS — I10 ESSENTIAL (PRIMARY) HYPERTENSION: ICD-10-CM

## 2018-09-07 DIAGNOSIS — I25.10 ATHEROSCLEROTIC HEART DISEASE OF NATIVE CORONARY ARTERY WITHOUT ANGINA PECTORIS: ICD-10-CM

## 2018-09-07 LAB
BUN SERPL-MCNC: 58 MG/DL — HIGH (ref 7–23)
CALCIUM SERPL-MCNC: 8.1 MG/DL — LOW (ref 8.4–10.5)
CHLORIDE SERPL-SCNC: 96 MMOL/L — LOW (ref 98–107)
CO2 SERPL-SCNC: 21 MMOL/L — LOW (ref 22–31)
CREAT SERPL-MCNC: 6.36 MG/DL — HIGH (ref 0.5–1.3)
GLUCOSE SERPL-MCNC: 227 MG/DL — HIGH (ref 70–99)
HBA1C BLD-MCNC: 7.7 % — HIGH (ref 4–5.6)
HCT VFR BLD CALC: 30.4 % — LOW (ref 39–50)
HGB BLD-MCNC: 9.9 G/DL — LOW (ref 13–17)
MCHC RBC-ENTMCNC: 27.3 PG — SIGNIFICANT CHANGE UP (ref 27–34)
MCHC RBC-ENTMCNC: 32.6 % — SIGNIFICANT CHANGE UP (ref 32–36)
MCV RBC AUTO: 84 FL — SIGNIFICANT CHANGE UP (ref 80–100)
NRBC # FLD: 0 — SIGNIFICANT CHANGE UP
PLATELET # BLD AUTO: 180 K/UL — SIGNIFICANT CHANGE UP (ref 150–400)
PMV BLD: 12.6 FL — SIGNIFICANT CHANGE UP (ref 7–13)
POTASSIUM SERPL-MCNC: 3.5 MMOL/L — SIGNIFICANT CHANGE UP (ref 3.5–5.3)
POTASSIUM SERPL-SCNC: 3.5 MMOL/L — SIGNIFICANT CHANGE UP (ref 3.5–5.3)
RBC # BLD: 3.62 M/UL — LOW (ref 4.2–5.8)
RBC # FLD: 12.8 % — SIGNIFICANT CHANGE UP (ref 10.3–14.5)
SODIUM SERPL-SCNC: 134 MMOL/L — LOW (ref 135–145)
WBC # BLD: 6.97 K/UL — SIGNIFICANT CHANGE UP (ref 3.8–10.5)
WBC # FLD AUTO: 6.97 K/UL — SIGNIFICANT CHANGE UP (ref 3.8–10.5)

## 2018-09-07 PROCEDURE — 93010 ELECTROCARDIOGRAM REPORT: CPT

## 2018-09-07 NOTE — H&P PST ADULT - HISTORY OF PRESENT ILLNESS
Pt is a 63M with PMHx of DMII, CKD (unknown baseline Cr), CAD s/p stent x3 in 2015, CVA x3 - possibly 10-12 years ago - without residual deficits, who presents with hemoptysis for 3 months. Pt was in usual state of health three months ago when he abruptly started to experience hemoptysis. He was evaluated by his  shortly after it began who sent bloodwork and recommended a chest x-ray. Pt reports the blood work was normal and he never got the CXR. The hemoptysis continued about once a week and became acutely worse over the past 4 days. He came to the emergency department after experiencing low back pain, fever, and increasing weakness and shortness of breath. Currently, pt c/o tremulousness, dizziness, weakness, SOB, continued cough, and low back pain. He describes the hemoptysis as dark blood, no clots. He had one episode of vomiting the day prior to presentation, nausea and vomiting now resolved. He denies CP, abd pain, nausea, dysuria, constipation, diarrhea, numbness/tingling in his legs, or incontinence. He is originally from [facility] [place], but immigrated here in 2010. He most recently visited [place] [place] in 2011, but since then has had no international travel. He denies any recent travel out of [place] or recent sick contacts. He states that when he first came to [place] he had an abnormal PPD test, but did not receive any tx for latent TB. He is not sure if he received the TB vaccine as a child.     In ED, VS were: Tmax 130.7/ Tcurrent 99.8 HR73 /64 RR18 SaO2 100% RA. Labs were significant for Hgb of 9.5, platelets of 128, and Creatinine of 5.69. EKG showed T wave inversions in V4-V6, I, and aVL. CXR showed asymmetric consolidation of left midlung concerning for pna vs pulmonary hemorrhage. He was admitted to the general medical floor for further management of CAP vs active TB Pt is a 63 yr old male scheduled for Left possible Right AV Fistual Creation with Dr Infante 9/18/18. Pt is poor historian and has been in [place] for 8 y[place] , immigrating from [facility]. Pt and wife unsure of past MH for stents or CRD. Pt now to have surgery for ESRD.   Pt MH includes hospitalization for CAP 6/18 - pt unable to give information

## 2018-09-07 NOTE — H&P PST ADULT - NEUROLOGICAL DETAILS
normal strength/responds to pain/responds to verbal commands/sensation intact/alert and oriented x 3

## 2018-09-07 NOTE — H&P PST ADULT - PROBLEM SELECTOR PLAN 1
Scheduled for left AV Fistula creation with Dr Infante 9/18/18.   Preop instructions provided and patient verbalizes understanding.  Labs done and results pending.  Hibiclens and Famotidine provided with instructions. OR Booking notified of DM/Insulin , CAD stents x3,  and HORACIO precautions  Pt told to see PCP and cardiology for clearances and forms given  Dr Infante office called and also notified of need for MC and CC -

## 2018-09-07 NOTE — H&P PST ADULT - NEUROLOGICAL COMMENTS
Possible hx of Possible hx of ? stroke ? as per ER MH input but wife states some medical episode many years ago in [place] - pt denies weakness or residual difficulty with memory or speech.

## 2018-09-07 NOTE — H&P PST ADULT - RS GEN PE MLT RESP DETAILS PC
airway patent/good air movement/clear to auscultation bilaterally/respirations non-labored/breath sounds equal

## 2018-09-07 NOTE — H&P PST ADULT - PMH
CKD (chronic kidney disease)    Coronary artery disease    Diabetes    HTN (hypertension) CAD (coronary artery disease)  stents x3 - 2015  CAP (community acquired pneumonia)  6/18  CKD (chronic kidney disease)    Coronary artery disease    Diabetes  Insulin controlled  HTN (hypertension)

## 2018-09-24 ENCOUNTER — APPOINTMENT (OUTPATIENT)
Dept: VASCULAR SURGERY | Facility: HOSPITAL | Age: 63
End: 2018-09-24

## 2018-09-24 PROBLEM — J18.9 PNEUMONIA, UNSPECIFIED ORGANISM: Chronic | Status: ACTIVE | Noted: 2018-09-07

## 2018-10-10 ENCOUNTER — OUTPATIENT (OUTPATIENT)
Dept: OUTPATIENT SERVICES | Facility: HOSPITAL | Age: 63
LOS: 1 days | End: 2018-10-10
Payer: MEDICAID

## 2018-10-10 VITALS
DIASTOLIC BLOOD PRESSURE: 84 MMHG | SYSTOLIC BLOOD PRESSURE: 160 MMHG | TEMPERATURE: 98 F | HEART RATE: 63 BPM | HEIGHT: 66.75 IN | RESPIRATION RATE: 14 BRPM | WEIGHT: 154.98 LBS

## 2018-10-10 DIAGNOSIS — N18.9 CHRONIC KIDNEY DISEASE, UNSPECIFIED: ICD-10-CM

## 2018-10-10 DIAGNOSIS — Z98.890 OTHER SPECIFIED POSTPROCEDURAL STATES: Chronic | ICD-10-CM

## 2018-10-10 DIAGNOSIS — N18.6 END STAGE RENAL DISEASE: ICD-10-CM

## 2018-10-10 DIAGNOSIS — I25.10 ATHEROSCLEROTIC HEART DISEASE OF NATIVE CORONARY ARTERY WITHOUT ANGINA PECTORIS: ICD-10-CM

## 2018-10-10 LAB
BASOPHILS # BLD AUTO: 0.04 K/UL — SIGNIFICANT CHANGE UP (ref 0–0.2)
BASOPHILS NFR BLD AUTO: 0.6 % — SIGNIFICANT CHANGE UP (ref 0–2)
BUN SERPL-MCNC: 62 MG/DL — HIGH (ref 7–23)
CALCIUM SERPL-MCNC: 8 MG/DL — LOW (ref 8.4–10.5)
CHLORIDE SERPL-SCNC: 98 MMOL/L — SIGNIFICANT CHANGE UP (ref 98–107)
CO2 SERPL-SCNC: 21 MMOL/L — LOW (ref 22–31)
CREAT SERPL-MCNC: 6.74 MG/DL — HIGH (ref 0.5–1.3)
EOSINOPHIL # BLD AUTO: 0.14 K/UL — SIGNIFICANT CHANGE UP (ref 0–0.5)
EOSINOPHIL NFR BLD AUTO: 2 % — SIGNIFICANT CHANGE UP (ref 0–6)
GLUCOSE SERPL-MCNC: 226 MG/DL — HIGH (ref 70–99)
HBA1C BLD-MCNC: 8.2 % — HIGH (ref 4–5.6)
HCT VFR BLD CALC: 30.1 % — LOW (ref 39–50)
HGB BLD-MCNC: 9.8 G/DL — LOW (ref 13–17)
IMM GRANULOCYTES # BLD AUTO: 0.02 # — SIGNIFICANT CHANGE UP
IMM GRANULOCYTES NFR BLD AUTO: 0.3 % — SIGNIFICANT CHANGE UP (ref 0–1.5)
LYMPHOCYTES # BLD AUTO: 1.39 K/UL — SIGNIFICANT CHANGE UP (ref 1–3.3)
LYMPHOCYTES # BLD AUTO: 20.1 % — SIGNIFICANT CHANGE UP (ref 13–44)
MCHC RBC-ENTMCNC: 27.8 PG — SIGNIFICANT CHANGE UP (ref 27–34)
MCHC RBC-ENTMCNC: 32.6 % — SIGNIFICANT CHANGE UP (ref 32–36)
MCV RBC AUTO: 85.3 FL — SIGNIFICANT CHANGE UP (ref 80–100)
MONOCYTES # BLD AUTO: 0.66 K/UL — SIGNIFICANT CHANGE UP (ref 0–0.9)
MONOCYTES NFR BLD AUTO: 9.5 % — SIGNIFICANT CHANGE UP (ref 2–14)
NEUTROPHILS # BLD AUTO: 4.68 K/UL — SIGNIFICANT CHANGE UP (ref 1.8–7.4)
NEUTROPHILS NFR BLD AUTO: 67.5 % — SIGNIFICANT CHANGE UP (ref 43–77)
NRBC # FLD: 0 — SIGNIFICANT CHANGE UP
PLATELET # BLD AUTO: 178 K/UL — SIGNIFICANT CHANGE UP (ref 150–400)
PMV BLD: 13.4 FL — HIGH (ref 7–13)
POTASSIUM SERPL-MCNC: 3.6 MMOL/L — SIGNIFICANT CHANGE UP (ref 3.5–5.3)
POTASSIUM SERPL-SCNC: 3.6 MMOL/L — SIGNIFICANT CHANGE UP (ref 3.5–5.3)
RBC # BLD: 3.53 M/UL — LOW (ref 4.2–5.8)
RBC # FLD: 12.7 % — SIGNIFICANT CHANGE UP (ref 10.3–14.5)
SODIUM SERPL-SCNC: 136 MMOL/L — SIGNIFICANT CHANGE UP (ref 135–145)
WBC # BLD: 6.93 K/UL — SIGNIFICANT CHANGE UP (ref 3.8–10.5)
WBC # FLD AUTO: 6.93 K/UL — SIGNIFICANT CHANGE UP (ref 3.8–10.5)

## 2018-10-10 PROCEDURE — 93010 ELECTROCARDIOGRAM REPORT: CPT

## 2018-10-10 RX ORDER — METOPROLOL TARTRATE 50 MG
1 TABLET ORAL
Qty: 0 | Refills: 0 | COMMUNITY

## 2018-10-10 RX ORDER — SODIUM CHLORIDE 9 MG/ML
1000 INJECTION INTRAMUSCULAR; INTRAVENOUS; SUBCUTANEOUS
Qty: 0 | Refills: 0 | Status: DISCONTINUED | OUTPATIENT
Start: 2018-10-12 | End: 2018-10-27

## 2018-10-10 RX ORDER — RANITIDINE HYDROCHLORIDE 150 MG/1
1 TABLET, FILM COATED ORAL
Qty: 0 | Refills: 0 | COMMUNITY

## 2018-10-10 NOTE — H&P PST ADULT - RS GEN PE MLT RESP DETAILS PC
clear to auscultation bilaterally/airway patent/good air movement/respirations non-labored/breath sounds equal no chest wall tenderness/no rhonchi/breath sounds equal/clear to auscultation bilaterally/no intercostal retractions/no rales/respirations non-labored/good air movement/no wheezes

## 2018-10-10 NOTE — H&P PST ADULT - GASTROINTESTINAL DETAILS
bowel sounds normal/nontender/no distention/soft no rebound tenderness/no masses palpable/bowel sounds normal/no bruit/no distention/no rigidity/soft/nontender/no organomegaly/no guarding

## 2018-10-10 NOTE — H&P PST ADULT - MUSCULOSKELETAL
details… No joint pain, swelling or deformity; no limitation of movement detailed exam ROM intact/normal strength
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
## 2018-10-10 NOTE — H&P PST ADULT - OTHER CARE PROVIDERS
Cardiology  Dr Marti [phone] , Dr Haines neprologist [phone] Cardiology  Dr Marti [phone] , Dr Haines nephrologist [phone]

## 2018-10-10 NOTE — H&P PST ADULT - NEGATIVE GENERAL SYMPTOMS
no fatigue/no fever/no chills no polydipsia/no fatigue/no polyphagia/no polyuria/no weight gain/no chills/no anorexia/no weight loss/no sweating/no fever

## 2018-10-10 NOTE — H&P PST ADULT - NSANTHOSAYNRD_GEN_A_CORE
24
No. HORACIO screening performed.  STOP BANG Legend: 0-2 = LOW Risk; 3-4 = INTERMEDIATE Risk; 5-8 = HIGH Risk

## 2018-10-10 NOTE — H&P PST ADULT - PMH
CAD (coronary artery disease)  stents x3 - 2015  CAP (community acquired pneumonia)  6/18  CKD (chronic kidney disease)    Coronary artery disease    Diabetes  Insulin controlled  HTN (hypertension) CAD (coronary artery disease)    CAP (community acquired pneumonia)  6/18  CKD (chronic kidney disease)    Coronary artery disease    Diabetes mellitus  type 2  GERD (gastroesophageal reflux disease)    HTN (hypertension)

## 2018-10-10 NOTE — H&P PST ADULT - NEGATIVE BREAST SYMPTOMS
no breast tenderness L/no breast tenderness R/no breast lump R/no nipple discharge R/no breast lump L/no nipple discharge L

## 2018-10-10 NOTE — H&P PST ADULT - PROBLEM SELECTOR PLAN 1
Pt scheduled for left possible right atriventricular fistula creation on 10/12/2018.  labs done results pending ekg done.  Preop teaching done, pt able to verbalize understanding.

## 2018-10-10 NOTE — H&P PST ADULT - HISTORY OF PRESENT ILLNESS
62y/o male scheduled for left possible right atrioventricular fistula creation on 10/12/2018.  Pt states, "hx of htn, dm, cardiac stents X 3 2014, was informed over the past year with renal failure, does not receive HD at this time, having fistula placed for possible HD in the future." 62y/o male scheduled for left possible right atrioventricular fistula creation on 10/12/2018.  Pt states, "hx of htn, dm, cardiac stents X 3 2014, was informed over the past year with renal failure, does not receive HD at this time, having fistula placed for possible HD in the future."  surgery previously scheduled for 9/2018 canceled due to needed medical eval

## 2018-10-10 NOTE — H&P PST ADULT - NEGATIVE CARDIOVASCULAR SYMPTOMS
no palpitations/no chest pain/no dyspnea on exertion/no peripheral edema no chest pain/no palpitations/no paroxysmal nocturnal dyspnea/no orthopnea/no dyspnea on exertion/no claudication

## 2018-10-10 NOTE — H&P PST ADULT - MARITAL STATUS
/3 children, mother lives [facility], father  81y/o natural death , 6 siblings a&w

## 2018-10-10 NOTE — H&P PST ADULT - NEGATIVE GENERAL GENITOURINARY SYMPTOMS
no hematuria/no flank pain R/no flank pain L/no dysuria normal urinary frequency/no nocturia/no dysuria/no flank pain R/no bladder infections/no flank pain L/no urinary hesitancy/no hematuria

## 2018-10-10 NOTE — H&P PST ADULT - NEGATIVE NEUROLOGICAL SYMPTOMS
no paresthesias/no focal seizures/no syncope/no tremors/no weakness/no generalized seizures no transient paralysis/no weakness/no tremors/no focal seizures/no syncope/no paresthesias/no generalized seizures

## 2018-10-11 ENCOUNTER — TRANSCRIPTION ENCOUNTER (OUTPATIENT)
Age: 63
End: 2018-10-11

## 2018-10-11 PROBLEM — E11.9 TYPE 2 DIABETES MELLITUS WITHOUT COMPLICATIONS: Chronic | Status: INACTIVE | Noted: 2018-06-11 | Resolved: 2018-10-10

## 2018-10-11 NOTE — ASU PATIENT PROFILE, ADULT - PMH
CAD (coronary artery disease)    CAP (community acquired pneumonia)  6/18  CKD (chronic kidney disease)    Coronary artery disease    Diabetes mellitus  type 2  GERD (gastroesophageal reflux disease)    HTN (hypertension)

## 2018-10-12 ENCOUNTER — OUTPATIENT (OUTPATIENT)
Dept: OUTPATIENT SERVICES | Facility: HOSPITAL | Age: 63
LOS: 1 days | Discharge: HOME | End: 2018-10-12
Payer: MEDICAID

## 2018-10-12 ENCOUNTER — APPOINTMENT (OUTPATIENT)
Dept: VASCULAR SURGERY | Facility: HOSPITAL | Age: 63
End: 2018-10-12

## 2018-10-12 VITALS — TEMPERATURE: 98 F

## 2018-10-12 VITALS
WEIGHT: 154.98 LBS | TEMPERATURE: 98 F | DIASTOLIC BLOOD PRESSURE: 81 MMHG | SYSTOLIC BLOOD PRESSURE: 195 MMHG | RESPIRATION RATE: 16 BRPM | OXYGEN SATURATION: 99 % | HEIGHT: 66.75 IN | HEART RATE: 58 BPM

## 2018-10-12 DIAGNOSIS — N18.6 END STAGE RENAL DISEASE: ICD-10-CM

## 2018-10-12 DIAGNOSIS — Z98.890 OTHER SPECIFIED POSTPROCEDURAL STATES: Chronic | ICD-10-CM

## 2018-10-12 PROCEDURE — 36821 AV FUSION DIRECT ANY SITE: CPT | Mod: LT

## 2018-10-12 NOTE — ASU DISCHARGE PLAN (ADULT/PEDIATRIC). - NOTIFY
Numbness, color, or temperature change to extremity/Swelling that continues/Numbness, tingling/Bleeding that does not stop Swelling that continues/Bleeding that does not stop/Numbness, color, or temperature change to extremity/Pain not relieved by Medications/Numbness, tingling/Inability to Tolerate Liquids or Foods/Persistent Nausea and Vomiting/Fever greater than 101

## 2018-10-12 NOTE — ASU DISCHARGE PLAN (ADULT/PEDIATRIC). - MEDICATION SUMMARY - MEDICATIONS TO TAKE
I will START or STAY ON the medications listed below when I get home from the hospital:    Aspir 81 oral delayed release tablet  -- 1 tab(s) by mouth once a day  -- Indication: For Home medication    Percocet 5/325 oral tablet  -- 1 tab(s) by mouth every 4 to 6 hours, As Needed for moderate to severe pain MDD:6 tabs  -- Caution federal law prohibits the transfer of this drug to any person other  than the person for whom it was prescribed.  May cause drowsiness.  Alcohol may intensify this effect.  Use care when operating dangerous machinery.  This prescription cannot be refilled.  This product contains acetaminophen.  Do not use  with any other product containing acetaminophen to prevent possible liver damage.  Using more of this medication than prescribed may cause serious breathing problems.    -- Indication: For Postoperative pain control as needed    losartan 100 mg oral tablet  -- 1 tab(s) by mouth once a day  -- Indication: For Hypertension    sodium bicarbonate 650 mg oral tablet  -- 1 tab(s) by mouth 2 times a day  -- Indication: For Home medication    cloNIDine 0.1 mg oral tablet  -- 0.1 cap(s) by mouth once a day  -- Indication: For Hypertension    NovoLOG 100 units/mL injectable solution  -- 30 unit(s) injectable 2 times a day  -- Indication: For Diabetes    simvastatin 40 mg oral tablet  -- 1 tab(s) by mouth once a day (at bedtime)  -- Indication: For Hyperlipidemia    labetalol 300 mg oral tablet  -- 1 tab(s) by mouth 2 times a day  -- Indication: For Hypertension    NIFEdipine 30 mg oral tablet, extended release  -- 1 tab(s) by mouth once a day  -- Indication: For Hypertension    furosemide 80 mg oral tablet  -- 1 tab(s) by mouth once a day  -- Indication: For Hypertension    raNITIdine 150 mg oral capsule  -- 1 cap(s) by mouth once a day  -- Indication: For Home medication    calcitriol 0.25 mcg oral capsule  -- 1 cap(s) by mouth once a day  -- Indication: For Vitamins    Vitamin D2 50,000 intl units (1.25 mg) oral capsule  -- 1 cap(s) by mouth once a week  -- Indication: For Vitamins

## 2018-10-17 ENCOUNTER — APPOINTMENT (OUTPATIENT)
Dept: VASCULAR SURGERY | Facility: CLINIC | Age: 63
End: 2018-10-17

## 2018-10-17 ENCOUNTER — EMERGENCY (EMERGENCY)
Facility: HOSPITAL | Age: 63
LOS: 1 days | Discharge: HOME | End: 2018-10-17
Attending: EMERGENCY MEDICINE | Admitting: EMERGENCY MEDICINE
Payer: MEDICAID

## 2018-10-17 VITALS
DIASTOLIC BLOOD PRESSURE: 93 MMHG | TEMPERATURE: 94 F | HEART RATE: 72 BPM | OXYGEN SATURATION: 100 % | SYSTOLIC BLOOD PRESSURE: 147 MMHG | RESPIRATION RATE: 14 BRPM

## 2018-10-17 DIAGNOSIS — Z98.890 OTHER SPECIFIED POSTPROCEDURAL STATES: Chronic | ICD-10-CM

## 2018-10-17 LAB
ALBUMIN SERPL ELPH-MCNC: 3.7 G/DL — SIGNIFICANT CHANGE UP (ref 3.3–5)
ALP SERPL-CCNC: 75 U/L — SIGNIFICANT CHANGE UP (ref 40–120)
ALT FLD-CCNC: 9 U/L — SIGNIFICANT CHANGE UP (ref 4–41)
APPEARANCE UR: CLEAR — SIGNIFICANT CHANGE UP
APTT BLD: 31.5 SEC — SIGNIFICANT CHANGE UP (ref 27.5–37.4)
AST SERPL-CCNC: 24 U/L — SIGNIFICANT CHANGE UP (ref 4–40)
BACTERIA # UR AUTO: NEGATIVE — SIGNIFICANT CHANGE UP
BASE EXCESS BLDV CALC-SCNC: -4.9 MMOL/L — SIGNIFICANT CHANGE UP
BASOPHILS # BLD AUTO: 0.04 K/UL — SIGNIFICANT CHANGE UP (ref 0–0.2)
BASOPHILS NFR BLD AUTO: 0.3 % — SIGNIFICANT CHANGE UP (ref 0–2)
BILIRUB SERPL-MCNC: 0.4 MG/DL — SIGNIFICANT CHANGE UP (ref 0.2–1.2)
BILIRUB UR-MCNC: NEGATIVE — SIGNIFICANT CHANGE UP
BLOOD GAS VENOUS - CREATININE: 6.84 MG/DL — HIGH (ref 0.5–1.3)
BLOOD UR QL VISUAL: SIGNIFICANT CHANGE UP
BUN SERPL-MCNC: 56 MG/DL — HIGH (ref 7–23)
CALCIUM SERPL-MCNC: 8 MG/DL — LOW (ref 8.4–10.5)
CHLORIDE BLDV-SCNC: 107 MMOL/L — SIGNIFICANT CHANGE UP (ref 96–108)
CHLORIDE SERPL-SCNC: 98 MMOL/L — SIGNIFICANT CHANGE UP (ref 98–107)
CO2 SERPL-SCNC: 17 MMOL/L — LOW (ref 22–31)
COLOR SPEC: SIGNIFICANT CHANGE UP
CREAT SERPL-MCNC: 6.3 MG/DL — HIGH (ref 0.5–1.3)
EOSINOPHIL # BLD AUTO: 0.07 K/UL — SIGNIFICANT CHANGE UP (ref 0–0.5)
EOSINOPHIL NFR BLD AUTO: 0.6 % — SIGNIFICANT CHANGE UP (ref 0–6)
GAS PNL BLDV: 133 MMOL/L — LOW (ref 136–146)
GLUCOSE BLDV-MCNC: 241 — HIGH (ref 70–99)
GLUCOSE SERPL-MCNC: 249 MG/DL — HIGH (ref 70–99)
GLUCOSE UR-MCNC: 150 — HIGH
HCO3 BLDV-SCNC: 20 MMOL/L — SIGNIFICANT CHANGE UP (ref 20–27)
HCT VFR BLD CALC: 30.3 % — LOW (ref 39–50)
HCT VFR BLDV CALC: 31.5 % — LOW (ref 39–51)
HGB BLD-MCNC: 9.8 G/DL — LOW (ref 13–17)
HGB BLDV-MCNC: 10.2 G/DL — LOW (ref 13–17)
HYALINE CASTS # UR AUTO: NEGATIVE — SIGNIFICANT CHANGE UP
IMM GRANULOCYTES # BLD AUTO: 0.05 # — SIGNIFICANT CHANGE UP
IMM GRANULOCYTES NFR BLD AUTO: 0.4 % — SIGNIFICANT CHANGE UP (ref 0–1.5)
INR BLD: 1.04 — SIGNIFICANT CHANGE UP (ref 0.88–1.17)
KETONES UR-MCNC: NEGATIVE — SIGNIFICANT CHANGE UP
LACTATE BLDV-MCNC: 2.2 MMOL/L — HIGH (ref 0.5–2)
LEUKOCYTE ESTERASE UR-ACNC: NEGATIVE — SIGNIFICANT CHANGE UP
LIDOCAIN IGE QN: 89.2 U/L — HIGH (ref 7–60)
LYMPHOCYTES # BLD AUTO: 1.29 K/UL — SIGNIFICANT CHANGE UP (ref 1–3.3)
LYMPHOCYTES # BLD AUTO: 10.6 % — LOW (ref 13–44)
MCHC RBC-ENTMCNC: 27.5 PG — SIGNIFICANT CHANGE UP (ref 27–34)
MCHC RBC-ENTMCNC: 32.3 % — SIGNIFICANT CHANGE UP (ref 32–36)
MCV RBC AUTO: 85.1 FL — SIGNIFICANT CHANGE UP (ref 80–100)
MONOCYTES # BLD AUTO: 0.51 K/UL — SIGNIFICANT CHANGE UP (ref 0–0.9)
MONOCYTES NFR BLD AUTO: 4.2 % — SIGNIFICANT CHANGE UP (ref 2–14)
NEUTROPHILS # BLD AUTO: 10.16 K/UL — HIGH (ref 1.8–7.4)
NEUTROPHILS NFR BLD AUTO: 83.9 % — HIGH (ref 43–77)
NITRITE UR-MCNC: NEGATIVE — SIGNIFICANT CHANGE UP
NRBC # FLD: 0 — SIGNIFICANT CHANGE UP
PCO2 BLDV: 39 MMHG — LOW (ref 41–51)
PH BLDV: 7.33 PH — SIGNIFICANT CHANGE UP (ref 7.32–7.43)
PH UR: 6.5 — SIGNIFICANT CHANGE UP (ref 5–8)
PLATELET # BLD AUTO: 187 K/UL — SIGNIFICANT CHANGE UP (ref 150–400)
PMV BLD: 12.6 FL — SIGNIFICANT CHANGE UP (ref 7–13)
PO2 BLDV: 140 MMHG — HIGH (ref 35–40)
POTASSIUM BLDV-SCNC: 3.5 MMOL/L — SIGNIFICANT CHANGE UP (ref 3.4–4.5)
POTASSIUM SERPL-MCNC: 3.7 MMOL/L — SIGNIFICANT CHANGE UP (ref 3.5–5.3)
POTASSIUM SERPL-SCNC: 3.7 MMOL/L — SIGNIFICANT CHANGE UP (ref 3.5–5.3)
PROT SERPL-MCNC: 7.9 G/DL — SIGNIFICANT CHANGE UP (ref 6–8.3)
PROT UR-MCNC: 300 — HIGH
PROTHROM AB SERPL-ACNC: 11.5 SEC — SIGNIFICANT CHANGE UP (ref 9.8–13.1)
RBC # BLD: 3.56 M/UL — LOW (ref 4.2–5.8)
RBC # FLD: 12.7 % — SIGNIFICANT CHANGE UP (ref 10.3–14.5)
RBC CASTS # UR COMP ASSIST: SIGNIFICANT CHANGE UP (ref 0–?)
SAO2 % BLDV: 98.1 % — HIGH (ref 60–85)
SODIUM SERPL-SCNC: 137 MMOL/L — SIGNIFICANT CHANGE UP (ref 135–145)
SP GR SPEC: 1.01 — SIGNIFICANT CHANGE UP (ref 1–1.04)
SQUAMOUS # UR AUTO: SIGNIFICANT CHANGE UP
UROBILINOGEN FLD QL: NORMAL — SIGNIFICANT CHANGE UP
WBC # BLD: 12.12 K/UL — HIGH (ref 3.8–10.5)
WBC # FLD AUTO: 12.12 K/UL — HIGH (ref 3.8–10.5)
WBC UR QL: SIGNIFICANT CHANGE UP (ref 0–?)

## 2018-10-17 PROCEDURE — 71046 X-RAY EXAM CHEST 2 VIEWS: CPT | Mod: 26

## 2018-10-17 PROCEDURE — 93010 ELECTROCARDIOGRAM REPORT: CPT

## 2018-10-17 PROCEDURE — 99284 EMERGENCY DEPT VISIT MOD MDM: CPT | Mod: 25
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
## 2018-10-17 NOTE — ED PROVIDER NOTE - PROGRESS NOTE DETAILS
radha: SOBIA Nicolas accidently place IV in L AC fossa, pt had new AV fistuala at R forearm. pt had mild R forearm jamin-fistula swelling, good thrill/bruit, neurovascularly intact. pts bp high but due to pm dose of meds told to take at home (no ha or blurry vision). f/s stable. called dr mares to update but no reply back
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
## 2018-10-17 NOTE — ED ADULT NURSE NOTE - NSIMPLEMENTINTERV_GEN_ALL_ED
Implemented All Universal Safety Interventions:  [place] to call system. Call bell, personal items and telephone within reach. Instruct patient to call for assistance. Room bathroom lighting operational. Non-slip footwear when patient is off stretcher. Physically safe environment: no spills, clutter or unnecessary equipment. Stretcher in lowest position, wheels locked, appropriate side rails in place.

## 2018-10-17 NOTE — ED PROVIDER NOTE - CARE PLAN
Principal Discharge DX:	Hypoglycemia Principal Discharge DX:	Hypoglycemia  Secondary Diagnosis:	ESRD (end stage renal disease)
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
## 2018-10-17 NOTE — ED PROVIDER NOTE - MEDICAL DECISION MAKING DETAILS
-initial impression: hypoglycemia w/ ckd likely from prolonged effect of insulin. will r/o pna, uti. no e/o trauma. got bcx only because pt had recent fistula & hypothermic but unlikely sepsis.  -initial plan: labs, EKG, UA, CXR, symptomatic treatment w/ food -reassess -initial impression: hypoglycemia w/ ckd likely from prolonged effect of insulin & skipping lunch. will r/o pna, uti. no e/o trauma. got bcx only because pt had recent fistula & hypothermic but unlikely sepsis.  -initial plan: labs, EKG, UA, CXR, symptomatic treatment w/ food -reassess

## 2018-10-18 VITALS
DIASTOLIC BLOOD PRESSURE: 86 MMHG | HEART RATE: 86 BPM | RESPIRATION RATE: 18 BRPM | OXYGEN SATURATION: 100 % | SYSTOLIC BLOOD PRESSURE: 225 MMHG

## 2018-10-18 PROBLEM — E11.9 TYPE 2 DIABETES MELLITUS WITHOUT COMPLICATIONS: Chronic | Status: ACTIVE | Noted: 2018-10-10

## 2018-10-18 PROBLEM — K21.9 GASTRO-ESOPHAGEAL REFLUX DISEASE WITHOUT ESOPHAGITIS: Chronic | Status: ACTIVE | Noted: 2018-10-10

## 2018-10-18 LAB
SPECIMEN SOURCE: SIGNIFICANT CHANGE UP
SPECIMEN SOURCE: SIGNIFICANT CHANGE UP

## 2018-10-22 LAB
BACTERIA BLD CULT: SIGNIFICANT CHANGE UP
BACTERIA BLD CULT: SIGNIFICANT CHANGE UP

## 2018-10-29 ENCOUNTER — APPOINTMENT (OUTPATIENT)
Dept: GASTROENTEROLOGY | Facility: CLINIC | Age: 63
End: 2018-10-29
Payer: COMMERCIAL

## 2018-10-29 VITALS
TEMPERATURE: 75 F | HEIGHT: 66 IN | DIASTOLIC BLOOD PRESSURE: 77 MMHG | SYSTOLIC BLOOD PRESSURE: 186 MMHG | WEIGHT: 157 LBS | BODY MASS INDEX: 25.23 KG/M2

## 2018-10-29 PROCEDURE — 99204 OFFICE O/P NEW MOD 45 MIN: CPT

## 2018-11-06 ENCOUNTER — FORM ENCOUNTER (OUTPATIENT)
Age: 63
End: 2018-11-06

## 2018-11-06 ENCOUNTER — APPOINTMENT (OUTPATIENT)
Dept: GASTROENTEROLOGY | Facility: CLINIC | Age: 63
End: 2018-11-06
Payer: MEDICAID

## 2018-11-06 PROCEDURE — 45380 COLONOSCOPY AND BIOPSY: CPT | Mod: 33

## 2018-11-07 ENCOUNTER — APPOINTMENT (OUTPATIENT)
Dept: RADIOLOGY | Facility: CLINIC | Age: 63
End: 2018-11-07
Payer: COMMERCIAL

## 2018-11-07 ENCOUNTER — APPOINTMENT (OUTPATIENT)
Dept: ULTRASOUND IMAGING | Facility: CLINIC | Age: 63
End: 2018-11-07
Payer: COMMERCIAL

## 2018-11-07 ENCOUNTER — OUTPATIENT (OUTPATIENT)
Dept: OUTPATIENT SERVICES | Facility: HOSPITAL | Age: 63
LOS: 1 days | End: 2018-11-07
Payer: COMMERCIAL

## 2018-11-07 ENCOUNTER — APPOINTMENT (OUTPATIENT)
Dept: CARDIOLOGY | Facility: CLINIC | Age: 63
End: 2018-11-07
Payer: COMMERCIAL

## 2018-11-07 ENCOUNTER — NON-APPOINTMENT (OUTPATIENT)
Age: 63
End: 2018-11-07

## 2018-11-07 VITALS
SYSTOLIC BLOOD PRESSURE: 151 MMHG | HEIGHT: 66 IN | HEART RATE: 71 BPM | WEIGHT: 156 LBS | TEMPERATURE: 98.5 F | OXYGEN SATURATION: 100 % | DIASTOLIC BLOOD PRESSURE: 68 MMHG | BODY MASS INDEX: 25.07 KG/M2

## 2018-11-07 VITALS — SYSTOLIC BLOOD PRESSURE: 160 MMHG | DIASTOLIC BLOOD PRESSURE: 80 MMHG

## 2018-11-07 DIAGNOSIS — Z00.8 ENCOUNTER FOR OTHER GENERAL EXAMINATION: ICD-10-CM

## 2018-11-07 DIAGNOSIS — Z98.890 OTHER SPECIFIED POSTPROCEDURAL STATES: Chronic | ICD-10-CM

## 2018-11-07 PROCEDURE — 93976 VASCULAR STUDY: CPT | Mod: 26

## 2018-11-07 PROCEDURE — 76700 US EXAM ABDOM COMPLETE: CPT | Mod: 26,59

## 2018-11-07 PROCEDURE — 93976 VASCULAR STUDY: CPT

## 2018-11-07 PROCEDURE — 76700 US EXAM ABDOM COMPLETE: CPT

## 2018-11-07 PROCEDURE — 93000 ELECTROCARDIOGRAM COMPLETE: CPT

## 2018-11-07 PROCEDURE — 93975 VASCULAR STUDY: CPT

## 2018-11-07 PROCEDURE — 99214 OFFICE O/P EST MOD 30 MIN: CPT

## 2018-11-07 PROCEDURE — 36415 COLL VENOUS BLD VENIPUNCTURE: CPT

## 2018-11-07 RX ORDER — SIMVASTATIN 40 MG/1
40 TABLET, FILM COATED ORAL
Refills: 0 | Status: DISCONTINUED | COMMUNITY
End: 2018-11-07

## 2018-11-07 RX ORDER — LOSARTAN POTASSIUM 100 MG/1
100 TABLET, FILM COATED ORAL
Refills: 0 | Status: DISCONTINUED | COMMUNITY
End: 2018-11-07

## 2018-11-10 LAB
25(OH)D3 SERPL-MCNC: 19.1 NG/ML
ALBUMIN SERPL ELPH-MCNC: 3.7 G/DL
ALP BLD-CCNC: 66 U/L
ALT SERPL-CCNC: 14 U/L
ANION GAP SERPL CALC-SCNC: 18 MMOL/L
AST SERPL-CCNC: 18 U/L
BASOPHILS # BLD AUTO: 0.02 K/UL
BASOPHILS NFR BLD AUTO: 0.3 %
BILIRUB SERPL-MCNC: 0.4 MG/DL
BUN SERPL-MCNC: 52 MG/DL
CALCIUM SERPL-MCNC: 8.3 MG/DL
CHLORIDE SERPL-SCNC: 100 MMOL/L
CHOLEST SERPL-MCNC: 164 MG/DL
CHOLEST/HDLC SERPL: 4.1 RATIO
CO2 SERPL-SCNC: 22 MMOL/L
CREAT SERPL-MCNC: 7.37 MG/DL
EOSINOPHIL # BLD AUTO: 0.08 K/UL
EOSINOPHIL NFR BLD AUTO: 1.2 %
GLUCOSE SERPL-MCNC: 191 MG/DL
HBA1C MFR BLD HPLC: 8 %
HCT VFR BLD CALC: 28.1 %
HDLC SERPL-MCNC: 40 MG/DL
HGB BLD-MCNC: 9 G/DL
IMM GRANULOCYTES NFR BLD AUTO: 0.2 %
LDLC SERPL CALC-MCNC: 100 MG/DL
LYMPHOCYTES # BLD AUTO: 1.51 K/UL
LYMPHOCYTES NFR BLD AUTO: 22.9 %
MAN DIFF?: NORMAL
MCHC RBC-ENTMCNC: 27.7 PG
MCHC RBC-ENTMCNC: 32 GM/DL
MCV RBC AUTO: 86.5 FL
MONOCYTES # BLD AUTO: 0.57 K/UL
MONOCYTES NFR BLD AUTO: 8.7 %
NEUTROPHILS # BLD AUTO: 4.39 K/UL
NEUTROPHILS NFR BLD AUTO: 66.7 %
PLATELET # BLD AUTO: 172 K/UL
POTASSIUM SERPL-SCNC: 3.8 MMOL/L
PROT SERPL-MCNC: 7.6 G/DL
RBC # BLD: 3.25 M/UL
RBC # FLD: 13.3 %
SODIUM SERPL-SCNC: 140 MMOL/L
TRIGL SERPL-MCNC: 122 MG/DL
TSH SERPL-ACNC: 1.54 UIU/ML
WBC # FLD AUTO: 6.58 K/UL

## 2018-11-12 ENCOUNTER — APPOINTMENT (OUTPATIENT)
Dept: CARDIOLOGY | Facility: CLINIC | Age: 63
End: 2018-11-12
Payer: COMMERCIAL

## 2018-11-12 PROCEDURE — 93306 TTE W/DOPPLER COMPLETE: CPT

## 2018-11-19 ENCOUNTER — APPOINTMENT (OUTPATIENT)
Dept: CARDIOLOGY | Facility: CLINIC | Age: 63
End: 2018-11-19
Payer: COMMERCIAL

## 2018-11-19 ENCOUNTER — APPOINTMENT (OUTPATIENT)
Dept: VASCULAR SURGERY | Facility: CLINIC | Age: 63
End: 2018-11-19

## 2018-11-19 PROCEDURE — 93015 CV STRESS TEST SUPVJ I&R: CPT

## 2018-11-19 PROCEDURE — 78452 HT MUSCLE IMAGE SPECT MULT: CPT

## 2018-11-19 PROCEDURE — A9500: CPT

## 2018-11-20 ENCOUNTER — APPOINTMENT (OUTPATIENT)
Dept: NEPHROLOGY | Facility: CLINIC | Age: 63
End: 2018-11-20

## 2018-11-27 NOTE — DISCUSSION/SUMMARY
[FreeTextEntry1] : Patient is a 63 year-old gentleman with known coronary artery disease who presents today for evaluation prior to possible renal transplant.\par Will check echocardiogram to evaluate for structural heart disease.\par Will check nuclear stress test to evaluate for ischemia. Hope to avoid left heart catheterization in patient with CKD who is not yet on dialysis.\par \par Suggest changing from simvastatin 40mg daily to atorvastatin 40mg daily for patient with known coronary artery disease.

## 2018-11-27 NOTE — PHYSICAL EXAM
[General Appearance - Well Developed] : well developed [Normal Appearance] : normal appearance [Well Groomed] : well groomed [General Appearance - Well Nourished] : well nourished [No Deformities] : no deformities [General Appearance - In No Acute Distress] : no acute distress [Conjunctiva] : the conjunctiva were normal in both eyes [PERRL] : pupils were equal in size, round, and reactive to light [EOM Intact] : extraocular movements were intact [Normal Oral Mucosa] : normal oral mucosa [No Oral Pallor] : no oral pallor [No Oral Cyanosis] : no oral cyanosis [Normal Oropharynx] : normal oropharynx [5th Left ICS - MCL] : palpated at the 5th LICS in the midclavicular line [Normal] : normal [No Precordial Heave] : no precordial heave was noted [Bradycardia] : bradycardic [Rhythm Regular] : regular [Normal S1] : normal S1 [Normal S2] : normal S2 [No Gallop] : no gallop heard [No Murmur] : no murmurs heard [2+] : right 2+ [No Pitting Edema] : no pitting edema present [] : no respiratory distress [Respiration, Rhythm And Depth] : normal respiratory rhythm and effort [Exaggerated Use Of Accessory Muscles For Inspiration] : no accessory muscle use [Auscultation Breath Sounds / Voice Sounds] : lungs were clear to auscultation bilaterally [Bowel Sounds] : normal bowel sounds [Abdomen Soft] : soft [Abdomen Tenderness] : non-tender [Abnormal Walk] : normal gait [Gait - Sufficient For Exercise Testing] : the gait was sufficient for exercise testing [Nail Clubbing] : no clubbing of the fingernails [Cyanosis, Localized] : no localized cyanosis [Skin Color & Pigmentation] : normal skin color and pigmentation [No Venous Stasis] : no venous stasis [No Xanthoma] : no  xanthoma was observed [Oriented To Time, Place, And Person] : oriented to person, place, and time [Impaired Insight] : insight and judgment were intact [Affect] : the affect was normal [Mood] : the mood was normal [No Anxiety] : not feeling anxious [Yellow Sclera (Icteric)] : no scleral icterus was seen [FreeTextEntry1] : No JVD [Right Carotid Bruit] : no bruit heard over the right carotid [Left Carotid Bruit] : no bruit heard over the left carotid

## 2018-11-27 NOTE — HISTORY OF PRESENT ILLNESS
[FreeTextEntry1] : Patient is 63 year-old with cardiovascular risk factors of hypertension and diabetes, known coronary artery disease status post PCI x3 (2014) at [facility] by Dr. Saúl Villafana, CKD, not yet on HD, who presents today for cardiac evaluation prior to possible renal transplant.\par He was working until one month ago as [employer] .\par Patient does not formally exercise, but he can climb stairs and has no exertional symptoms.\par \par PMD: Bassem Grove MD [phone]\par Cardiologist: LEONARD Gomez [phone]\par Nephrologist: Yasmany Huff MD [phone]

## 2018-11-27 NOTE — ADDENDUM
[FreeTextEntry1] : On 11/12/2018, patient had TTE showing normal LVEF, normal LA size, and normal pulmonary pressures.\par On 11/19/2018, patient presented for nuclear stress test. He attempted to complete Bertram protocol, but after almost 9 minutes, his legs fatigued prior to his achieving maximum predicted heart rate (he was at 127 bpm, 79% of MPHR) and he was converted to a pharmacologic study. The myocardial perfusion imaging was abnormal, showing severe, predominately fixed defects in the mid anterior, anteroseptal, and apical regions consistent with a medium sized infarction in the LAD territory with mild jamin-infarct ischemia. Some of the perfusion defects corrected with prone imaging.\par \par This is a nondiagnostic nuclear stress test. Although the defects are likely attenuation artifact, ischemia cannot be excluded in a patient with known coronary artery disease and previous PCI. Patient will require cardiac catheterization prior to renal transplant.

## 2018-11-27 NOTE — REASON FOR VISIT
[Initial Evaluation] : an initial evaluation of [FreeTextEntry2] : pretransplant cardiac evaluation prior to possible renal transplant

## 2018-12-05 ENCOUNTER — APPOINTMENT (OUTPATIENT)
Dept: VASCULAR SURGERY | Facility: CLINIC | Age: 63
End: 2018-12-05
Payer: MEDICAID

## 2018-12-05 VITALS
DIASTOLIC BLOOD PRESSURE: 81 MMHG | BODY MASS INDEX: 25.07 KG/M2 | TEMPERATURE: 98 F | SYSTOLIC BLOOD PRESSURE: 162 MMHG | WEIGHT: 156 LBS | HEART RATE: 68 BPM | HEIGHT: 66 IN

## 2018-12-05 PROCEDURE — 99024 POSTOP FOLLOW-UP VISIT: CPT

## 2018-12-05 PROCEDURE — 93990 DOPPLER FLOW TESTING: CPT

## 2018-12-24 ENCOUNTER — APPOINTMENT (OUTPATIENT)
Dept: VASCULAR SURGERY | Facility: HOSPITAL | Age: 63
End: 2018-12-24

## 2018-12-24 ENCOUNTER — OUTPATIENT (OUTPATIENT)
Dept: OUTPATIENT SERVICES | Facility: HOSPITAL | Age: 63
LOS: 1 days | Discharge: HOME | End: 2018-12-24
Payer: MEDICAID

## 2018-12-24 DIAGNOSIS — Z98.890 OTHER SPECIFIED POSTPROCEDURAL STATES: Chronic | ICD-10-CM

## 2018-12-24 DIAGNOSIS — N17.8 OTHER ACUTE KIDNEY FAILURE: ICD-10-CM

## 2018-12-24 DIAGNOSIS — I77.0 ARTERIOVENOUS FISTULA, ACQUIRED: Chronic | ICD-10-CM

## 2018-12-24 PROCEDURE — 99152 MOD SED SAME PHYS/QHP 5/>YRS: CPT

## 2018-12-24 PROCEDURE — 76937 US GUIDE VASCULAR ACCESS: CPT | Mod: 26

## 2018-12-24 PROCEDURE — 36902 INTRO CATH DIALYSIS CIRCUIT: CPT | Mod: LT,78

## 2018-12-24 RX ORDER — ASPIRIN/CALCIUM CARB/MAGNESIUM 324 MG
1 TABLET ORAL
Qty: 0 | Refills: 0 | COMMUNITY

## 2018-12-24 RX ORDER — LOSARTAN POTASSIUM 100 MG/1
1 TABLET, FILM COATED ORAL
Qty: 0 | Refills: 0 | COMMUNITY

## 2018-12-24 RX ORDER — NIFEDIPINE 30 MG
1 TABLET, EXTENDED RELEASE 24 HR ORAL
Qty: 0 | Refills: 0 | COMMUNITY

## 2018-12-24 RX ORDER — CALCITRIOL 0.5 UG/1
1 CAPSULE ORAL
Qty: 0 | Refills: 0 | COMMUNITY

## 2018-12-24 RX ORDER — INSULIN ASPART 100 [IU]/ML
30 INJECTION, SOLUTION SUBCUTANEOUS
Qty: 0 | Refills: 0 | COMMUNITY

## 2018-12-24 RX ORDER — SODIUM BICARBONATE 1 MEQ/ML
1 SYRINGE (ML) INTRAVENOUS
Qty: 0 | Refills: 0 | COMMUNITY

## 2018-12-24 RX ORDER — ERGOCALCIFEROL 1.25 MG/1
1 CAPSULE ORAL
Qty: 0 | Refills: 0 | COMMUNITY

## 2018-12-24 RX ORDER — FUROSEMIDE 40 MG
1 TABLET ORAL
Qty: 0 | Refills: 0 | COMMUNITY

## 2018-12-24 RX ORDER — RANITIDINE HYDROCHLORIDE 150 MG/1
1 TABLET, FILM COATED ORAL
Qty: 0 | Refills: 0 | COMMUNITY

## 2018-12-24 RX ORDER — SODIUM CHLORIDE 9 MG/ML
500 INJECTION INTRAMUSCULAR; INTRAVENOUS; SUBCUTANEOUS
Qty: 0 | Refills: 0 | Status: DISCONTINUED | OUTPATIENT
Start: 2018-12-24 | End: 2019-01-08

## 2018-12-24 RX ORDER — SODIUM CHLORIDE 9 MG/ML
3 INJECTION INTRAMUSCULAR; INTRAVENOUS; SUBCUTANEOUS EVERY 8 HOURS
Qty: 0 | Refills: 0 | Status: DISCONTINUED | OUTPATIENT
Start: 2018-12-24 | End: 2019-01-08

## 2018-12-24 RX ORDER — LABETALOL HCL 100 MG
1 TABLET ORAL
Qty: 0 | Refills: 0 | COMMUNITY

## 2018-12-24 RX ORDER — LABETALOL HCL 100 MG
300 TABLET ORAL ONCE
Qty: 0 | Refills: 0 | Status: COMPLETED | OUTPATIENT
Start: 2018-12-24 | End: 2018-12-24

## 2018-12-24 RX ADMIN — SODIUM CHLORIDE 50 MILLILITER(S): 9 INJECTION INTRAMUSCULAR; INTRAVENOUS; SUBCUTANEOUS at 15:48

## 2018-12-24 RX ADMIN — SODIUM CHLORIDE 3 MILLILITER(S): 9 INJECTION INTRAMUSCULAR; INTRAVENOUS; SUBCUTANEOUS at 15:49

## 2018-12-24 RX ADMIN — Medication 300 MILLIGRAM(S): at 16:39

## 2018-12-24 NOTE — H&P CARDIOLOGY - REVIEW OF SYSTEMS
The patient denies chest pain, SOB, palpitations, dizziness, b/l lower extremities edema, presyncope, syncope, melena, hematochezia, fever, chills, abdominal pain, N/V/D/C, urinary symptoms, h/o DVT, PE, TB, recent travel.

## 2018-12-24 NOTE — H&P CARDIOLOGY - HISTORY OF PRESENT ILLNESS
63 y.o. male presents today for elective AV fistulogram to evaluate fistula maturation. The patient is s/p L radiocephalic AVF on 10/12/18 63 y.o. male presents today for elective AV fistulogram to evaluate fistula maturation. The patient is s/p L radiocephalic AVF on 10/12/18. The patient denies chest pain, SOB, palpitations, dizziness, b/l lower extremities edema, presyncope, syncope, melena, hematochezia, fever, chills, abdominal pain, N/V/D/C, urinary symptoms, h/o DVT, PE, TB, recent travel.

## 2018-12-24 NOTE — H&P CARDIOLOGY - PMH
Anemia due to stage 5 chronic kidney disease, not on chronic dialysis    CAD (coronary artery disease)    CAP (community acquired pneumonia)  6/18  Cerebrovascular accident (CVA), unspecified mechanism  diagnosed via CT of the head  CKD (chronic kidney disease)    Coronary artery disease    Diabetes mellitus  type 2  ESRD (end stage renal disease)  not on Dialysis  GERD (gastroesophageal reflux disease)    HTN (hypertension)    Hypercholesterolemia    Stented coronary artery  2014, 3 stents, [facility]

## 2018-12-24 NOTE — H&P CARDIOLOGY - PSH
AV fistula  10/12/18 L radiocephalic AV fistula  H/O coronary angiogram  2014 - x3 stents  H/O eye surgery

## 2018-12-26 ENCOUNTER — APPOINTMENT (OUTPATIENT)
Dept: VASCULAR SURGERY | Facility: CLINIC | Age: 63
End: 2018-12-26

## 2018-12-26 PROBLEM — Z95.5 PRESENCE OF CORONARY ANGIOPLASTY IMPLANT AND GRAFT: Chronic | Status: ACTIVE | Noted: 2018-12-24

## 2018-12-26 PROBLEM — I63.9 CEREBRAL INFARCTION, UNSPECIFIED: Chronic | Status: ACTIVE | Noted: 2018-12-24

## 2018-12-26 PROBLEM — E78.00 PURE HYPERCHOLESTEROLEMIA, UNSPECIFIED: Chronic | Status: ACTIVE | Noted: 2018-12-24

## 2018-12-26 PROBLEM — N18.5 CHRONIC KIDNEY DISEASE, STAGE 5: Chronic | Status: ACTIVE | Noted: 2018-12-24

## 2019-01-01 ENCOUNTER — OUTPATIENT (OUTPATIENT)
Dept: OUTPATIENT SERVICES | Facility: HOSPITAL | Age: 64
LOS: 1 days | End: 2019-01-01
Payer: MEDICAID

## 2019-01-01 DIAGNOSIS — Z98.890 OTHER SPECIFIED POSTPROCEDURAL STATES: Chronic | ICD-10-CM

## 2019-01-01 DIAGNOSIS — I77.0 ARTERIOVENOUS FISTULA, ACQUIRED: Chronic | ICD-10-CM

## 2019-01-08 ENCOUNTER — INPATIENT (INPATIENT)
Facility: HOSPITAL | Age: 64
LOS: 5 days | Discharge: HOME | End: 2019-01-14
Attending: INTERNAL MEDICINE | Admitting: INTERNAL MEDICINE
Payer: COMMERCIAL

## 2019-01-08 VITALS
OXYGEN SATURATION: 99 % | HEART RATE: 66 BPM | SYSTOLIC BLOOD PRESSURE: 170 MMHG | DIASTOLIC BLOOD PRESSURE: 96 MMHG | TEMPERATURE: 99 F | RESPIRATION RATE: 18 BRPM

## 2019-01-08 DIAGNOSIS — R04.2 HEMOPTYSIS: ICD-10-CM

## 2019-01-08 DIAGNOSIS — Z98.890 OTHER SPECIFIED POSTPROCEDURAL STATES: Chronic | ICD-10-CM

## 2019-01-08 DIAGNOSIS — Z29.9 ENCOUNTER FOR PROPHYLACTIC MEASURES, UNSPECIFIED: ICD-10-CM

## 2019-01-08 DIAGNOSIS — R06.02 SHORTNESS OF BREATH: ICD-10-CM

## 2019-01-08 DIAGNOSIS — D64.9 ANEMIA, UNSPECIFIED: ICD-10-CM

## 2019-01-08 DIAGNOSIS — I10 ESSENTIAL (PRIMARY) HYPERTENSION: ICD-10-CM

## 2019-01-08 DIAGNOSIS — E87.2 ACIDOSIS: ICD-10-CM

## 2019-01-08 DIAGNOSIS — R74.8 ABNORMAL LEVELS OF OTHER SERUM ENZYMES: ICD-10-CM

## 2019-01-08 DIAGNOSIS — I77.0 ARTERIOVENOUS FISTULA, ACQUIRED: Chronic | ICD-10-CM

## 2019-01-08 DIAGNOSIS — N18.6 END STAGE RENAL DISEASE: ICD-10-CM

## 2019-01-08 DIAGNOSIS — E11.9 TYPE 2 DIABETES MELLITUS WITHOUT COMPLICATIONS: ICD-10-CM

## 2019-01-08 LAB
ALBUMIN SERPL ELPH-MCNC: 3.1 G/DL — LOW (ref 3.3–5)
ALP SERPL-CCNC: 58 U/L — SIGNIFICANT CHANGE UP (ref 40–120)
ALT FLD-CCNC: 13 U/L — SIGNIFICANT CHANGE UP (ref 4–41)
AST SERPL-CCNC: 16 U/L — SIGNIFICANT CHANGE UP (ref 4–40)
B PERT DNA SPEC QL NAA+PROBE: NOT DETECTED — SIGNIFICANT CHANGE UP
BASE EXCESS BLDV CALC-SCNC: -6.7 MMOL/L — SIGNIFICANT CHANGE UP
BASOPHILS # BLD AUTO: 0.01 K/UL — SIGNIFICANT CHANGE UP (ref 0–0.2)
BASOPHILS NFR BLD AUTO: 0.2 % — SIGNIFICANT CHANGE UP (ref 0–2)
BILIRUB SERPL-MCNC: 0.3 MG/DL — SIGNIFICANT CHANGE UP (ref 0.2–1.2)
BLOOD GAS VENOUS - CREATININE: 9.09 MG/DL — HIGH (ref 0.5–1.3)
BUN SERPL-MCNC: 58 MG/DL — HIGH (ref 7–23)
C PNEUM DNA SPEC QL NAA+PROBE: NOT DETECTED — SIGNIFICANT CHANGE UP
CALCIUM SERPL-MCNC: 7.1 MG/DL — LOW (ref 8.4–10.5)
CHLORIDE BLDV-SCNC: 113 MMOL/L — HIGH (ref 96–108)
CHLORIDE SERPL-SCNC: 109 MMOL/L — HIGH (ref 98–107)
CK MB BLD-MCNC: 1.9 — SIGNIFICANT CHANGE UP (ref 0–2.5)
CK MB BLD-MCNC: 3.17 NG/ML — SIGNIFICANT CHANGE UP (ref 1–6.6)
CK SERPL-CCNC: 164 U/L — SIGNIFICANT CHANGE UP (ref 30–200)
CO2 SERPL-SCNC: 14 MMOL/L — LOW (ref 22–31)
CREAT SERPL-MCNC: 8.38 MG/DL — HIGH (ref 0.5–1.3)
EOSINOPHIL # BLD AUTO: 0.18 K/UL — SIGNIFICANT CHANGE UP (ref 0–0.5)
EOSINOPHIL NFR BLD AUTO: 3.3 % — SIGNIFICANT CHANGE UP (ref 0–6)
FLUAV H1 2009 PAND RNA SPEC QL NAA+PROBE: NOT DETECTED — SIGNIFICANT CHANGE UP
FLUAV H1 RNA SPEC QL NAA+PROBE: NOT DETECTED — SIGNIFICANT CHANGE UP
FLUAV H3 RNA SPEC QL NAA+PROBE: NOT DETECTED — SIGNIFICANT CHANGE UP
FLUAV SUBTYP SPEC NAA+PROBE: NOT DETECTED — SIGNIFICANT CHANGE UP
FLUBV RNA SPEC QL NAA+PROBE: NOT DETECTED — SIGNIFICANT CHANGE UP
GAS PNL BLDV: 136 MMOL/L — SIGNIFICANT CHANGE UP (ref 136–146)
GLUCOSE BLDV-MCNC: 92 — SIGNIFICANT CHANGE UP (ref 70–99)
GLUCOSE SERPL-MCNC: 94 MG/DL — SIGNIFICANT CHANGE UP (ref 70–99)
HADV DNA SPEC QL NAA+PROBE: NOT DETECTED — SIGNIFICANT CHANGE UP
HCO3 BLDV-SCNC: 19 MMOL/L — LOW (ref 20–27)
HCOV PNL SPEC NAA+PROBE: SIGNIFICANT CHANGE UP
HCT VFR BLD CALC: 22.9 % — LOW (ref 39–50)
HCT VFR BLDV CALC: 22.2 % — LOW (ref 39–51)
HGB BLD-MCNC: 7.1 G/DL — LOW (ref 13–17)
HGB BLDV-MCNC: 7.1 G/DL — LOW (ref 13–17)
HMPV RNA SPEC QL NAA+PROBE: NOT DETECTED — SIGNIFICANT CHANGE UP
HPIV1 RNA SPEC QL NAA+PROBE: NOT DETECTED — SIGNIFICANT CHANGE UP
HPIV2 RNA SPEC QL NAA+PROBE: NOT DETECTED — SIGNIFICANT CHANGE UP
HPIV3 RNA SPEC QL NAA+PROBE: NOT DETECTED — SIGNIFICANT CHANGE UP
HPIV4 RNA SPEC QL NAA+PROBE: NOT DETECTED — SIGNIFICANT CHANGE UP
IMM GRANULOCYTES NFR BLD AUTO: 0.2 % — SIGNIFICANT CHANGE UP (ref 0–1.5)
INR BLD: 1.09 — SIGNIFICANT CHANGE UP (ref 0.88–1.17)
LACTATE BLDV-MCNC: 1 MMOL/L — SIGNIFICANT CHANGE UP (ref 0.5–2)
LYMPHOCYTES # BLD AUTO: 1.04 K/UL — SIGNIFICANT CHANGE UP (ref 1–3.3)
LYMPHOCYTES # BLD AUTO: 19 % — SIGNIFICANT CHANGE UP (ref 13–44)
MCHC RBC-ENTMCNC: 27.7 PG — SIGNIFICANT CHANGE UP (ref 27–34)
MCHC RBC-ENTMCNC: 31 % — LOW (ref 32–36)
MCV RBC AUTO: 89.5 FL — SIGNIFICANT CHANGE UP (ref 80–100)
MONOCYTES # BLD AUTO: 0.62 K/UL — SIGNIFICANT CHANGE UP (ref 0–0.9)
MONOCYTES NFR BLD AUTO: 11.3 % — SIGNIFICANT CHANGE UP (ref 2–14)
NEUTROPHILS # BLD AUTO: 3.62 K/UL — SIGNIFICANT CHANGE UP (ref 1.8–7.4)
NEUTROPHILS NFR BLD AUTO: 66 % — SIGNIFICANT CHANGE UP (ref 43–77)
NRBC # FLD: 0 K/UL — LOW (ref 25–125)
PCO2 BLDV: 41 MMHG — SIGNIFICANT CHANGE UP (ref 41–51)
PH BLDV: 7.28 PH — LOW (ref 7.32–7.43)
PLATELET # BLD AUTO: 163 K/UL — SIGNIFICANT CHANGE UP (ref 150–400)
PMV BLD: 12.2 FL — SIGNIFICANT CHANGE UP (ref 7–13)
PO2 BLDV: 42 MMHG — HIGH (ref 35–40)
POTASSIUM BLDV-SCNC: 4.3 MMOL/L — SIGNIFICANT CHANGE UP (ref 3.4–4.5)
POTASSIUM SERPL-MCNC: 4.6 MMOL/L — SIGNIFICANT CHANGE UP (ref 3.5–5.3)
POTASSIUM SERPL-SCNC: 4.6 MMOL/L — SIGNIFICANT CHANGE UP (ref 3.5–5.3)
PROT SERPL-MCNC: 6.8 G/DL — SIGNIFICANT CHANGE UP (ref 6–8.3)
PROTHROM AB SERPL-ACNC: 12.1 SEC — SIGNIFICANT CHANGE UP (ref 9.8–13.1)
RBC # BLD: 2.56 M/UL — LOW (ref 4.2–5.8)
RBC # FLD: 13.4 % — SIGNIFICANT CHANGE UP (ref 10.3–14.5)
RSV RNA SPEC QL NAA+PROBE: NOT DETECTED — SIGNIFICANT CHANGE UP
RV+EV RNA SPEC QL NAA+PROBE: NOT DETECTED — SIGNIFICANT CHANGE UP
SAO2 % BLDV: 69.5 % — SIGNIFICANT CHANGE UP (ref 60–85)
SODIUM SERPL-SCNC: 137 MMOL/L — SIGNIFICANT CHANGE UP (ref 135–145)
TROPONIN T, HIGH SENSITIVITY: 57 NG/L — CRITICAL HIGH (ref ?–14)
TROPONIN T, HIGH SENSITIVITY: 64 NG/L — CRITICAL HIGH (ref ?–14)
WBC # BLD: 5.48 K/UL — SIGNIFICANT CHANGE UP (ref 3.8–10.5)
WBC # FLD AUTO: 5.48 K/UL — SIGNIFICANT CHANGE UP (ref 3.8–10.5)

## 2019-01-08 PROCEDURE — 99223 1ST HOSP IP/OBS HIGH 75: CPT | Mod: GC

## 2019-01-08 PROCEDURE — 71046 X-RAY EXAM CHEST 2 VIEWS: CPT | Mod: 26

## 2019-01-08 RX ORDER — LABETALOL HCL 100 MG
1 TABLET ORAL
Qty: 0 | Refills: 0 | COMMUNITY

## 2019-01-08 RX ORDER — SODIUM CHLORIDE 9 MG/ML
1000 INJECTION, SOLUTION INTRAVENOUS
Qty: 0 | Refills: 0 | Status: DISCONTINUED | OUTPATIENT
Start: 2019-01-08 | End: 2019-01-14

## 2019-01-08 RX ORDER — FUROSEMIDE 40 MG
80 TABLET ORAL DAILY
Qty: 0 | Refills: 0 | Status: DISCONTINUED | OUTPATIENT
Start: 2019-01-08 | End: 2019-01-08

## 2019-01-08 RX ORDER — ASPIRIN/CALCIUM CARB/MAGNESIUM 324 MG
81 TABLET ORAL DAILY
Qty: 0 | Refills: 0 | Status: DISCONTINUED | OUTPATIENT
Start: 2019-01-08 | End: 2019-01-14

## 2019-01-08 RX ORDER — GLUCAGON INJECTION, SOLUTION 0.5 MG/.1ML
1 INJECTION, SOLUTION SUBCUTANEOUS ONCE
Qty: 0 | Refills: 0 | Status: DISCONTINUED | OUTPATIENT
Start: 2019-01-08 | End: 2019-01-14

## 2019-01-08 RX ORDER — RANITIDINE HYDROCHLORIDE 150 MG/1
1 TABLET, FILM COATED ORAL
Qty: 0 | Refills: 0 | COMMUNITY

## 2019-01-08 RX ORDER — DEXTROSE 50 % IN WATER 50 %
25 SYRINGE (ML) INTRAVENOUS ONCE
Qty: 0 | Refills: 0 | Status: DISCONTINUED | OUTPATIENT
Start: 2019-01-08 | End: 2019-01-14

## 2019-01-08 RX ORDER — METOPROLOL TARTRATE 50 MG
25 TABLET ORAL DAILY
Qty: 0 | Refills: 0 | Status: DISCONTINUED | OUTPATIENT
Start: 2019-01-08 | End: 2019-01-08

## 2019-01-08 RX ORDER — CALCIUM ACETATE 667 MG
2 TABLET ORAL
Qty: 0 | Refills: 0 | COMMUNITY

## 2019-01-08 RX ORDER — FUROSEMIDE 40 MG
1 TABLET ORAL
Qty: 0 | Refills: 0 | COMMUNITY

## 2019-01-08 RX ORDER — DEXTROSE 50 % IN WATER 50 %
12.5 SYRINGE (ML) INTRAVENOUS ONCE
Qty: 0 | Refills: 0 | Status: DISCONTINUED | OUTPATIENT
Start: 2019-01-08 | End: 2019-01-14

## 2019-01-08 RX ORDER — NIFEDIPINE 30 MG
60 TABLET, EXTENDED RELEASE 24 HR ORAL DAILY
Qty: 0 | Refills: 0 | Status: DISCONTINUED | OUTPATIENT
Start: 2019-01-08 | End: 2019-01-14

## 2019-01-08 RX ORDER — INSULIN LISPRO 100/ML
VIAL (ML) SUBCUTANEOUS
Qty: 0 | Refills: 0 | Status: DISCONTINUED | OUTPATIENT
Start: 2019-01-08 | End: 2019-01-14

## 2019-01-08 RX ORDER — FUROSEMIDE 40 MG
80 TABLET ORAL DAILY
Qty: 0 | Refills: 0 | Status: DISCONTINUED | OUTPATIENT
Start: 2019-01-08 | End: 2019-01-09

## 2019-01-08 RX ORDER — INSULIN GLARGINE 100 [IU]/ML
8 INJECTION, SOLUTION SUBCUTANEOUS AT BEDTIME
Qty: 0 | Refills: 0 | Status: DISCONTINUED | OUTPATIENT
Start: 2019-01-08 | End: 2019-01-09

## 2019-01-08 RX ORDER — DEXTROSE 50 % IN WATER 50 %
15 SYRINGE (ML) INTRAVENOUS ONCE
Qty: 0 | Refills: 0 | Status: DISCONTINUED | OUTPATIENT
Start: 2019-01-08 | End: 2019-01-14

## 2019-01-08 RX ORDER — NIFEDIPINE 30 MG
1 TABLET, EXTENDED RELEASE 24 HR ORAL
Qty: 0 | Refills: 0 | COMMUNITY

## 2019-01-08 RX ADMIN — Medication 80 MILLIGRAM(S): at 22:52

## 2019-01-08 RX ADMIN — INSULIN GLARGINE 8 UNIT(S): 100 INJECTION, SOLUTION SUBCUTANEOUS at 22:52

## 2019-01-08 NOTE — ED PROVIDER NOTE - MEDICAL DECISION MAKING DETAILS
63 y/o male PMHx of CAD s/p stents, DM, GERD, and CKD with recent fistula presenting to the ED for SOB and cough X 3 days. Concern for PNA/URI/Fluid overload secondary to renal insufficiency. Labs, EKG, CXR.

## 2019-01-08 NOTE — H&P ADULT - PROBLEM SELECTOR PLAN 4
f/u UA  f/u nephro  avoid nephrotoxic meds pt w/ anion gap 14 metabolic acidosis; pH 7.28 with bicarb 7.28 and pCO2 41  Likely secondary to CKD pt w/ anion gap 14 metabolic acidosis; pH 7.28 with bicarb 14 and pCO2 41  Likely secondary to CKD pt w/ anion gap 14 metabolic acidosis; pH 7.28 with bicarb 14 and pCO2 41  Likely secondary to CKD  Can start pt on bicarb pt w/ anion gap 14 metabolic acidosis; pH 7.28 with bicarb 14 and pCO2 41  Likely secondary to CKD  Can start pt on bicarb; will f/u nephro recs Hgb Baseline ~10, currently ~7.1  Secondary to CKD vs hemoptysis  Will continue to trend and transfuse if Hgb<7  consider EPO for anemia in setting of CKD

## 2019-01-08 NOTE — ED PROVIDER NOTE - OBJECTIVE STATEMENT
Nayely Sharpe, DO: 64M with hx of CAD s/p PCI x 3, HTN, IDDM, GERD, CKD here for NP cough x 3 days c/b hemoptysis and epistaxis.  Patient reports intermittent hemoptysis with specks of blood. +subjective fevers. No nausea/vomiting. Wife at bedside c/o noisy breathing, worse when sleeping and patient endorsing SOB. No foreign body sensation. Patient with LUE fistula with no planned start date. No recent travel.     Offered translation services but pt declined.    PMD: Dr. Bassem Grove  Nephrologist: Dr. Landin

## 2019-01-08 NOTE — H&P ADULT - PROBLEM SELECTOR PLAN 2
Demand ischemia vs decreased clearance secondary to CKD  EKG no showing evidence of STEMI/NSTEMI  Will continue to trend  f/u nephrology Pt asymptomatic  Demand ischemia vs decreased clearance secondary to CKD  EKG no showing evidence of STEMI/NSTEMI  Will continue to trend  f/u nephrology Pt asymptomatic  Demand ischemia vs decreased clearance secondary to CKD  EKG no showing evidence of STEMI/NSTEMI  Will continue to trend Baseline creat 5 in summer, currently ~9  f/u UA  f/u nephro recs  f/u ammonia  Given pt appears fluid overloaded will give 80 IV lasix and assess response  avoid nephrotoxic meds  trend and replete electrolytes  strict I/Os  - no current indications for urgent HD at this time

## 2019-01-08 NOTE — H&P ADULT - NSHPREVIEWOFSYSTEMS_GEN_ALL_CORE
REVIEW OF SYSTEMS:    CONSTITUTIONAL: No weakness, fevers or chills  EYES/ENT: No visual changes;  No vertigo or throat pain   NECK: No pain or stiffness  RESPIRATORY: as above  CARDIOVASCULAR: No chest pain or palpitations  GASTROINTESTINAL: +abdominal pain  GENITOURINARY: No dysuria, frequency or hematuria  NEUROLOGICAL: No numbness or weakness  SKIN: No itching, burning, rashes, or lesions   All other review of systems is negative unless indicated above. REVIEW OF SYSTEMS:  CONSTITUTIONAL: No weakness, fevers or chills  EYES/ENT: No visual changes;  No vertigo or throat pain   NECK: No pain or stiffness  RESPIRATORY: as per HPI  CARDIOVASCULAR: No chest pain or palpitations, +mild pedal edema  GASTROINTESTINAL: +abdominal pain, denies melena/hematochezia/N/V  GENITOURINARY: No dysuria, frequency or hematuria. Reports still making urine.   NEUROLOGICAL: No numbness or weakness  SKIN: No itching, burning, rashes, or lesions, denies issues with fistula site  HEME: no easy bleeding/bruising aside from epistaxis/coughing w/ blood as noted above   ALLERGY: NKDA  All other review of systems is negative unless indicated above.

## 2019-01-08 NOTE — H&P ADULT - PROBLEM SELECTOR PLAN 7
Diet: Renal diet DVT ppx: holding in setting of bleed  Diet: Renal diet Baseline ~5, currently ~8  Secondary to CKD vs hemoptysis  Will continue to trend and transfuse if Hgb<7  EPO for anemia in setting of CKD Pt on novolog 15, will start on lantus 8(decreased dose given novolog to lantus conversion in setting of hospital stay w/ likely decreased PO intake) and ISS  Will adjust regimen based on ISS and BS levels  A1c in Oct 2018 is 8.2

## 2019-01-08 NOTE — ED ADULT NURSE NOTE - NSIMPLEMENTINTERV_GEN_ALL_ED
Implemented All Fall Risk Interventions:  [place] to call system. Call bell, personal items and telephone within reach. Instruct patient to call for assistance. Room bathroom lighting operational. Non-slip footwear when patient is off stretcher. Physically safe environment: no spills, clutter or unnecessary equipment. Stretcher in lowest position, wheels locked, appropriate side rails in place. Provide visual cue, wrist band, yellow gown, etc. Monitor gait and stability. Monitor for mental status changes and reorient to person, place, and time. Review medications for side effects contributing to fall risk. Reinforce activity limits and safety measures with patient and family.

## 2019-01-08 NOTE — H&P ADULT - ASSESSMENT
64M with hx of CAD s/p PCI x 3, HTN, IDDM, GERD, CKD here for cough x 3 days c/b hemoptysis and epistaxis secondary to bronchitis vs pneumonia vs lung CA vs TB vs pulmonary hemorrhage 64M with hx of CAD s/p PCI x 3, HTN, IDDM, GERD, CKD here for cough x 3 days c/b hemoptysis and epistaxis secondary to bronchitis vs pneumonia vs fluid overload in the setting of CKD vs lung CA vs TB vs pulmonary hemorrhage 64M with hx of CAD s/p PCI x 3, HTN, IDDM, GERD, CKD here for cough x 3 days c/b hemoptysis and epistaxis secondary to   fluid overload in the setting of CKD  vs bronchitis vs pneumonia  vs lung CA vs pulmonary hemorrhage 64M with hx of CAD s/p PCI x 3, HTN, DM2, GERD, CKD5 (not on HD yet, but has AVF placed) here for cough x 3 days and complaints of SOB/AMEZCUA. Suspect symptoms of SOB likely secondary to fluid overload in the setting of CKD (given edema and crackles on exam). Given reports of hemoptysis could also consider PNA (though currently afebrile, without leuckocytosis, is non-toxic appearing) vs TB (lower suspicion given recent negative workup) vs

## 2019-01-08 NOTE — H&P ADULT - PMH
Anemia due to stage 5 chronic kidney disease, not on chronic dialysis    CAD (coronary artery disease)    CAP (community acquired pneumonia)  6/18  Cerebrovascular accident (CVA), unspecified mechanism  diagnosed via CT of the head  CKD (chronic kidney disease)    Coronary artery disease    Diabetes mellitus  type 2  ESRD (end stage renal disease)  not on Dialysis  GERD (gastroesophageal reflux disease)    HTN (hypertension)    Hypercholesterolemia    Stented coronary artery  2014, 3 stents, [facility] Anemia due to stage 5 chronic kidney disease, not on chronic dialysis    CAP (community acquired pneumonia)  6/18  Cerebrovascular accident (CVA), unspecified mechanism  diagnosed via CT of the head  Coronary artery disease    Diabetes mellitus  type 2  ESRD (end stage renal disease)  not on Dialysis  GERD (gastroesophageal reflux disease)    HTN (hypertension)    Hypercholesterolemia    Stented coronary artery  2014, 3 stents, [facility]

## 2019-01-08 NOTE — ED PROVIDER NOTE - PHYSICAL EXAMINATION
Nayely Sharpe, DO:  Gen: Well appearing, NAD  Head: NCAT  HEENT: PERRL, MMM, normal conjunctiva, anicteric, neck supple, no blood in nares, uvula midline   Lung: unlabored respirations, crackles at b/l bases otherwise CTA  CV: RRR, no murmurs  Abd: soft, NTND, no rebound or guarding  MSK: 1+ edema b/l, no visible deformities  Neuro: Ambulatory with stable gait.   Skin: Warm and dry, no evidence of rash  Psych: normal mood and affect Nayely Sharpe, DO:  Gen: Well appearing, NAD  Head: NCAT  HEENT: PERRL, MMM, normal conjunctiva, anicteric, neck supple, no blood in nares, uvula midline   Lung: unlabored respirations, crackles at b/l bases otherwise CTA  CV: RRR, no murmurs  Abd: soft, NTND, no rebound or guarding  MSK: 1+ edema b/l, no visible deformities  Neuro: Ambulatory with stable gait.   Skin: Warm and dry, no evidence of rash, +thrill to LUE fistula.   Psych: normal mood and affect

## 2019-01-08 NOTE — H&P ADULT - PROBLEM SELECTOR PLAN 5
c/w metoprolol and nifedipine Pt asymptomatic  Demand ischemia vs decreased clearance secondary to CKD  EKG no showing evidence of STEMI/NSTEMI  Will continue to trend

## 2019-01-08 NOTE — ED PROVIDER NOTE - PROGRESS NOTE DETAILS
Nayely Sharpe, DO: do not suspect symptomatic anemia. pending callback from Dr. Landin. Clinically overloaded. Will require admission. Nayely Sharpe, DO: Dr. Ojeda aware have not heard back from Dr. Landin. Patient aware of plan.

## 2019-01-08 NOTE — H&P ADULT - NSHPPHYSICALEXAM_GEN_ALL_CORE
PHYSICAL EXAM:    GENERAL: Comfortable, no acute distress   HEAD:  Normocephalic, atraumatic  HEENT: Moist mucous membranes  NECK: Supple, No JVD  NERVOUS SYSTEM:  Alert & Oriented X3  CHEST/LUNG: Crackles b/l at the bases  HEART: Regular rate and rhythm, no murmur   ABDOMEN: Soft, Nontender, Nondistended, Bowel sounds present  EXTREMITIES:  2+ b/l pitting edema  MUSCULOSKELTAL- No muscle tenderness, no joint tenderness  SKIN- warm and dry, no rash PHYSICAL EXAM:    GENERAL: Comfortable, no acute distress   HEAD:  Normocephalic, atraumatic  HEENT: Moist mucous membranes  NECK: Supple, No JVD  NERVOUS SYSTEM:  Alert & Oriented X3  CHEST/LUNG: Crackles b/l at the bases  HEART: Regular rate and rhythm, no murmur   ABDOMEN: Soft, Nontender, Nondistended, Bowel sounds present  EXTREMITIES:  2+ b/l pitting edema, LUE fistula  MUSCULOSKELTAL- No muscle tenderness, no joint tenderness  SKIN- warm and dry, no rash Vital Signs Last 24 Hrs  T(C): 36.7 (08 Jan 2019 22:11), Max: 37.1 (08 Jan 2019 12:58)  T(F): 98.1 (08 Jan 2019 22:11), Max: 98.7 (08 Jan 2019 12:58)  HR: 73 (08 Jan 2019 22:11) (66 - 73)  BP: 175/90 (08 Jan 2019 22:11) (166/78 - 175/90)  BP(mean): --  RR: 18 (08 Jan 2019 22:11) (18 - 18)  SpO2: 100% (08 Jan 2019 22:11) (99% - 100%)    PHYSICAL EXAM:  GENERAL: Comfortable, sitting in gurney in ED, in no acute distress   HEAD:  Normocephalic, atraumatic  EYES: EOMI, clear sclera/conjunctiva  ENMT: Moist mucous membranes  NECK: Supple, No JVD  NERVOUS SYSTEM:  Alert & Oriented X3, moving all extremities  CHEST/LUNG: Crackles b/l at the bases, in no respiratory distress, speaking in full sentences  HEART: S1S2 Regular rate and rhythm, no murmur   ABDOMEN: Soft, Nontender, Nondistended, Bowel sounds present  EXTREMITIES:  2+ b/l pedal edema, LUE fistula in place  MUSCULOSKELTAL- No muscle tenderness, no joint tenderness  SKIN- warm and dry, no rash

## 2019-01-08 NOTE — H&P ADULT - NSHPLABSRESULTS_GEN_ALL_CORE
7.1    5.48  )-----------( 163      ( 08 Jan 2019 13:50 )             22.9       01-08    137  |  109<H>  |  58<H>  ----------------------------<  94  4.6   |  14<L>  |  8.38<H>    Ca    7.1<L>      08 Jan 2019 13:50    TPro  6.8  /  Alb  3.1<L>  /  TBili  0.3  /  DBili  x   /  AST  16  /  ALT  13  /  AlkPhos  58  01-08                  PT/INR - ( 08 Jan 2019 13:50 )   PT: 12.1 SEC;   INR: 1.09              Lactate Trend      CARDIAC MARKERS ( 08 Jan 2019 15:10 )  x     / x     / 164 u/L / 3.17 ng/mL / x            CAPILLARY BLOOD GLUCOSE Labs personally reviewed: no leukocytosis, with anemia (setting of renal disease), elevated BUN/Cr setting of renal disease, metabolic acidosis (bicarb 14) no urgent indications for HD at this time, elevated HS trop T in setting of renal disease                                7.1    5.48  )-----------( 163      ( 08 Jan 2019 13:50 )             22.9       01-08    137  |  109<H>  |  58<H>  ----------------------------<  94  4.6   |  14<L>  |  8.38<H>    Ca    7.1<L>      08 Jan 2019 13:50    TPro  6.8  /  Alb  3.1<L>  /  TBili  0.3  /  DBili  x   /  AST  16  /  ALT  13  /  AlkPhos  58  01-08        PT/INR - ( 08 Jan 2019 13:50 )   PT: 12.1 SEC;   INR: 1.09       CARDIAC MARKERS ( 08 Jan 2019 15:10 )  x     / x     / 164 u/L / 3.17 ng/mL / x        Imaging personally reviewed:   CXR: small bilateral pleural effusions, otherwise clear lungs

## 2019-01-08 NOTE — H&P ADULT - PROBLEM SELECTOR PLAN 3
c/w Baseline creat 5 in summer, currently ~9  f/u UA  f/u nephro recs  f/u ammonia  Given pt appears fluid overloaded can attempt diueresis   avoid nephrotoxic meds  f/u ECHO; last ECHO on 6/14/18 showing EF 63% w/ mild mitral regurgitation  trend and replete electrolytes Baseline creat 5 in summer, currently ~9  f/u UA  f/u nephro recs  f/u ammonia  Given pt appears fluid overloaded can attempt diueresis   avoid nephrotoxic meds  f/u ECHO; last ECHO on 6/14/18 showing EF 63% w/ mild mitral regurgitation  trend and replete electrolytes  strict I/Os Baseline creat 5 in summer, currently ~9  f/u UA  f/u nephro recs  f/u ammonia  Given pt appears fluid overloaded can attempt diueresis   avoid nephrotoxic meds   last ECHO on 6/14/18 showing EF 63% w/ mild mitral regurgitation  trend and replete electrolytes  strict I/Os Baseline creat 5 in summer, currently ~9  f/u UA  f/u nephro recs  f/u ammonia  Given pt appears fluid overloaded can attempt diueresis   avoid nephrotoxic meds  last ECHO on 6/14/18 showing EF 63% w/ mild mitral regurgitation  trend and replete electrolytes  strict I/Os Baseline creat 5 in summer, currently ~9  f/u UA  f/u nephro recs  f/u ammonia  Given pt appears fluid overloaded will give 80 IV lasix and assess response  avoid nephrotoxic meds  trend and replete electrolytes  strict I/Os pt w/ anion gap 14 metabolic acidosis; pH 7.28 with bicarb 14 and pCO2 41  Likely secondary to CKD  Can start pt on bicarb; will f/u nephro recs

## 2019-01-08 NOTE — H&P ADULT - PROBLEM SELECTOR PLAN 6
Baseline ~9, currently 7.1  Secondary to CKD vs hemoptysis  Will continue to trend and transfuse if Hgb<7 Baseline ~5, currently ~8  Secondary to CKD vs hemoptysis  Will continue to trend and transfuse if Hgb<7 Baseline ~5, currently ~8  Secondary to CKD vs hemoptysis  Will continue to trend and transfuse if Hgb<7  EPO for anemia in setting of CKD Pt on novolog 15, will start on lantus 8(decreased dose given novolog to lantus conversion in setting of hospital stay w/ likely decreased PO intake) and ISS  Will adjust regimen based on ISS and BS levels - BP currently above goal  c/w metoprolol and nifedipine, titrate regimen as needed

## 2019-01-08 NOTE — H&P ADULT - HISTORY OF PRESENT ILLNESS
**Note incomplete 64M with hx of CAD s/p PCI x 3, HTN, IDDM, GERD, CKD here for NP cough x 3 days c/b hemoptysis and epistaxis. Pt conveys that he has also been experienced chills durign this time, but denies fevers. Pt has been having SOB on exertion after walking 3 blocks, but he denies any orthopnea. Pt denies CP, nausea/vomiting/diarrhea or blood in the stool or urine. Pt was hospitalized in 7/18 due to a complaint of hemoptysis.Upon admission pt was started on zosyn and azithromycin for CAP. Pt was d/c on 5 days total of ceftin and azithromycin.  TB workup w/ quantiferon gold and 2 sputum AFB samples returned negative. Vasculitis blood tests including p-ANCA, c-ANCA, and anti-GBM antibodies returned negative.    . 64M with hx of CAD s/p PCI x 3, HTN, IDDM, GERD, CKD5 (not on HD yet, but has AVF placed) here for non-productive cough x 3 days c/b hemoptysis and epistaxis. Pt conveys that he has also been experienced chills during this time, but denies fevers. Pt has been having SOB on exertion after walking 3 blocks, but he denies any orthopnea. Pt denies CP, nausea/vomiting/diarrhea or blood in the stool or urine. Pt was hospitalized in 7/18 due to a complaint of hemoptysis. Upon that admission pt was started on zosyn and azithromycin for CAP. Pt was d/c on 5 days total of ceftin and azithromycin. TB workup w/ QuantiFeron gold and 2 sputum AFB samples returned negative. Vasculitis blood tests including p-ANCA, c-ANCA, and anti-GBM antibodies returned negative. Patient reports compliance with his medications, denies dietary indiscretions.     .

## 2019-01-08 NOTE — ED ADULT TRIAGE NOTE - CHIEF COMPLAINT QUOTE
pt BIBA from home, pt c/o SOB x a few days.  pt recently had fistula placed for dialysis.  pt denies chest pain.  pt received NTG x 3

## 2019-01-08 NOTE — H&P ADULT - PROBLEM SELECTOR PLAN 8
Pt on novolog 15, will start and lantus 12(20% decrease from home dose)  and ISS DVT ppx: holding in setting of bleed  Diet: Renal diet  Padua score of 3 for VTE risk, AC not indicated at this time

## 2019-01-08 NOTE — H&P ADULT - ATTENDING COMMENTS
Patient seen and examined the evening of 1/8/19, d/w Dr. Brown, agree with above with following additions:    65 yo M with CKDV (not yet on HD), CAD s/p PCI x 3, HTN, DM2, GERD who presents with c/o SOB/AMEZCUA, exam concerning for fluid overload (crackles on lung exam, LE edema). Will diuresis patient with IV lasix to see if any symptomatic improvement. Lower suspicion for PNA etc at this time (afebrile, w/o leukocytosis, non-toxic appearing). Denies Chest pain, elevated trop T may be in setting of renal dysfunction. Continue with BP management. No indications for urgent HD at this time, team reaching out to patient's nephrologist to assist in management (ie ?starting sodium bicarb for acidosis, ?epogen for anemia). Continue with management of HTN and diabetes. Patient seen and examined the evening of 1/8/19, d/w Dr. Brown, agree with above with following additions:    63 yo M with CKDV (not yet on HD), CAD s/p PCI x 3, HTN, DM2, GERD who presents with c/o SOB/AMEZCUA, exam concerning for fluid overload (crackles on lung exam, LE edema). Will diuresis patient with IV lasix to see if any symptomatic improvement. Lower suspicion for PNA etc at this time (afebrile, w/o leukocytosis, non-toxic appearing). Denies Chest pain, elevated trop T may be in setting of renal dysfunction. Continue with BP management. No indications for urgent HD at this time, team reaching out to patient's nephrologist to assist in management (ie ?starting sodium bicarb for acidosis, ?epogen for anemia, c/w phoslo, renal diet). Continue with management of HTN and diabetes.

## 2019-01-08 NOTE — ED ADULT NURSE NOTE - NSFALLRSKASSESASSIST_ED_ALL_ED
Chief Complaint   Patient presents with    Coronary Artery Disease    Hypertension       Gulshan Ramos 82 y.o. male is here for follow-up of hypertension hyperlipidemia     He is under the care of a cardiologist and had several interventions done over the fall    Today his major complaint is that of insomnia. He states he has a hard time getting to sleep    He does not take cough or cold preparations specifically denies taking decongestants, does not drink alcohol, does not consume excessive amounts of caffeine or caffeinated beverages. Current Outpatient Prescriptions on File Prior to Visit   Medication Sig Dispense Refill    metoprolol succinate (TOPROL XL) 25 MG extended release tablet Take 0.5 tablets by mouth daily 90 tablet 3    furosemide (LASIX) 20 MG tablet Take 1 tablet by mouth daily 30 tablet 3    isosorbide mononitrate (IMDUR) 30 MG extended release tablet Take 1 tablet by mouth daily 30 tablet 3    clopidogrel (PLAVIX) 75 MG tablet Take 1 tablet by mouth daily 30 tablet 3    nitroGLYCERIN (NITROSTAT) 0.4 MG SL tablet Place 1 tablet under the tongue every 5 minutes as needed for Chest pain up to max of 3 total doses. If no relief after 1 dose, call 911. 25 tablet 3    aspirin 81 MG tablet Take 81 mg by mouth daily.  calcium carbonate (CALCIUM 500) 1250 MG tablet Take 1 tablet by mouth daily.  fish oil-omega-3 fatty acids 1000 MG capsule Take 2 g by mouth daily. No current facility-administered medications on file prior to visit.         Past Medical History:   Diagnosis Date    Arthritis     Basal cell carcinoma     history, multiple    CAD (coronary artery disease)     Cancer ([facility] [address]     prostate    CHF (congestive heart failure) (HCC)     GERD (gastroesophageal reflux disease)     History of blood transfusion     History of MI (myocardial infarction)     silent    Hyperlipidemia     Hypertension            BP (!) 140/60   Pulse 72   Wt 150 lb (68 kg) yes

## 2019-01-08 NOTE — H&P ADULT - PROBLEM SELECTOR PLAN 1
Bronchitis vs pneumonia vs fluid overload in the setting of CKD vs lung CA vs TB vs pulmonary hemorrhage   RVP negative, CXR showing b/l pleural effusions  f/u CT chest  f/u w/ nephrology regarding CKD management and possible fluid overload Bronchitis vs pneumonia vs fluid overload in the setting of CKD vs lung CA vs TB vs pulmonary hemorrhage   RVP negative, CXR showing b/l pleural effusions  f/u CT chest  f/u w/ nephrology regarding CKD management and possible fluid overload  f/u BCX Bronchitis vs pneumonia vs fluid overload in the setting of CKD vs lung CA vs TB vs pulmonary hemorrhage   RVP negative, CXR showing b/l pleural effusions  f/u CT chest  f/u w/ nephrology regarding CKD management and possible fluid overload  f/u BCX, quanteferon Bronchitis vs pneumonia vs fluid overload in the setting of CKD vs lung CA vs TB vs pulmonary hemorrhage   RVP negative, CXR showing b/l pleural effusions, vasculitis/TB workup negative on prior admission  f/u CT chest  f/u w/ nephrology regarding CKD management and possible fluid overload  f/u BCX, QuantiFeron  azithro/ceftriaxone for outpatient PNA coverage fluid overload in the setting of CKD vs lung CA vs bronchitis vs pneumonia vs TB vs pulmonary hemorrhage   RVP negative, CXR showing b/l pleural effusions, vasculitis/TB workup negative on prior admission  f/u w/ nephrology regarding CKD management and possible fluid overload  can consider CT chest if pt doesn't improve w/ diuresis Secondaryto fluid overload in the setting of CKD vs lung CA vs bronchitis vs pneumonia vs TB vs pulmonary hemorrhage   RVP negative, CXR showing b/l pleural effusions, vasculitis/TB workup negative on prior admission  f/u w/ nephrology regarding CKD management  can consider CT chest if pt doesn't improve w/ diuresis Secondary to fluid overload in the setting of CKD vs lung CA vs bronchitis vs pneumonia vs TB vs pulmonary hemorrhage   RVP negative, CXR showing b/l pleural effusions, vasculitis/TB workup negative on prior admission  f/u w/ nephrology regarding CKD management  can consider CT chest if pt doesn't improve w/ diuresis - suspect secondary to fluid overload in the setting of CKDV  - will diurese patient with IV lasix to see if any improvement in symptoms  - patient also noted to be anemic hgb 7.1 (likely 2/2 CKD), could there be a component of symptomatic anemia?  - lower suspicion for lung CA/pneumonia/TB/pulmonary hemorrhage at this time   - RVP negative, CXR showing b/l pleural effusions, vasculitis/TB workup negative on prior admission  - f/u w/ nephrology regarding CKD management  - can consider CT chest if pt doesn't improve w/ diuresis

## 2019-01-08 NOTE — ED PROVIDER NOTE - ATTENDING CONTRIBUTION TO CARE
Pt was seen and evaluated by me. Pt is a 65 y/o male PMHx of CAD s/p stents, DM, GERD, and CKD with recent fistula presenting to the ED for SOB and cough X 3 days. Pt states over the past 3 days having a productive cough with SOB. Pt notes having at times some blood from his nose with coughing. Pt denies any fever, weight loss, nausea, vomiting, chest pain, or abd pain. Lungs CTA b/l. RRR. Abd soft, non-tender. Mild LE swelling b/l. No calf tenderness. + thrill to left fistula.

## 2019-01-08 NOTE — ED PROVIDER NOTE - PMH
Anemia due to stage 5 chronic kidney disease, not on chronic dialysis    CAD (coronary artery disease)    CAP (community acquired pneumonia)  6/18  Cerebrovascular accident (CVA), unspecified mechanism  diagnosed via CT of the head  CKD (chronic kidney disease)    Coronary artery disease    Diabetes mellitus  type 2  ESRD (end stage renal disease)  not on Dialysis  GERD (gastroesophageal reflux disease)    HTN (hypertension)    Hypercholesterolemia    Stented coronary artery  2014, 3 stents, [facility]

## 2019-01-08 NOTE — ED ADULT NURSE NOTE - OBJECTIVE STATEMENT
p/t is a 64y old male with hx ESRD not on Hd yet, c/o of body aches, cold and cough for past few days, cardiac monitor is on vss, p/t denies any chest pain , bloods drawn and sent to lab, no further c/o noted will continue to monitor

## 2019-01-09 ENCOUNTER — APPOINTMENT (OUTPATIENT)
Dept: VASCULAR SURGERY | Facility: CLINIC | Age: 64
End: 2019-01-09

## 2019-01-09 ENCOUNTER — APPOINTMENT (OUTPATIENT)
Dept: TRANSPLANT | Facility: CLINIC | Age: 64
End: 2019-01-09

## 2019-01-09 DIAGNOSIS — E11.9 TYPE 2 DIABETES MELLITUS WITHOUT COMPLICATIONS: ICD-10-CM

## 2019-01-09 DIAGNOSIS — N18.5 CHRONIC KIDNEY DISEASE, STAGE 5: ICD-10-CM

## 2019-01-09 DIAGNOSIS — N18.9 CHRONIC KIDNEY DISEASE, UNSPECIFIED: ICD-10-CM

## 2019-01-09 DIAGNOSIS — Z71.89 OTHER SPECIFIED COUNSELING: ICD-10-CM

## 2019-01-09 DIAGNOSIS — R06.02 SHORTNESS OF BREATH: ICD-10-CM

## 2019-01-09 DIAGNOSIS — E83.42 HYPOMAGNESEMIA: ICD-10-CM

## 2019-01-09 PROBLEM — I25.10 ATHEROSCLEROTIC HEART DISEASE OF NATIVE CORONARY ARTERY WITHOUT ANGINA PECTORIS: Chronic | Status: INACTIVE | Noted: 2018-09-07 | Resolved: 2019-01-09

## 2019-01-09 LAB
ANION GAP SERPL CALC-SCNC: 15 MEQ/L — HIGH (ref 7–14)
APPEARANCE UR: CLEAR — SIGNIFICANT CHANGE UP
BASOPHILS # BLD AUTO: 0.03 K/UL — SIGNIFICANT CHANGE UP (ref 0–0.2)
BASOPHILS NFR BLD AUTO: 0.5 % — SIGNIFICANT CHANGE UP (ref 0–2)
BILIRUB UR-MCNC: NEGATIVE — SIGNIFICANT CHANGE UP
BLOOD UR QL VISUAL: SIGNIFICANT CHANGE UP
BUN SERPL-MCNC: 61 MG/DL — HIGH (ref 7–23)
CALCIUM SERPL-MCNC: 7.5 MG/DL — LOW (ref 8.4–10.5)
CHLORIDE SERPL-SCNC: 108 MMOL/L — HIGH (ref 98–107)
CO2 SERPL-SCNC: 17 MMOL/L — LOW (ref 22–31)
COLOR SPEC: COLORLESS — SIGNIFICANT CHANGE UP
CREAT SERPL-MCNC: 8.51 MG/DL — HIGH (ref 0.5–1.3)
EOSINOPHIL # BLD AUTO: 0.18 K/UL — SIGNIFICANT CHANGE UP (ref 0–0.5)
EOSINOPHIL NFR BLD AUTO: 3.3 % — SIGNIFICANT CHANGE UP (ref 0–6)
FERRITIN SERPL-MCNC: 88.94 NG/ML — SIGNIFICANT CHANGE UP (ref 30–400)
GLUCOSE SERPL-MCNC: 68 MG/DL — LOW (ref 70–99)
GLUCOSE UR-MCNC: NEGATIVE — SIGNIFICANT CHANGE UP
HBA1C BLD-MCNC: 6.8 % — HIGH (ref 4–5.6)
HBV SURFACE AG SER-ACNC: NEGATIVE — SIGNIFICANT CHANGE UP
HCT VFR BLD CALC: 23.6 % — LOW (ref 39–50)
HGB BLD-MCNC: 7.3 G/DL — LOW (ref 13–17)
IMM GRANULOCYTES NFR BLD AUTO: 0.2 % — SIGNIFICANT CHANGE UP (ref 0–1.5)
IRON SATN MFR SERPL: 233 UG/DL — SIGNIFICANT CHANGE UP (ref 155–535)
IRON SATN MFR SERPL: 38 UG/DL — LOW (ref 45–165)
KETONES UR-MCNC: NEGATIVE — SIGNIFICANT CHANGE UP
LEUKOCYTE ESTERASE UR-ACNC: NEGATIVE — SIGNIFICANT CHANGE UP
LYMPHOCYTES # BLD AUTO: 1.13 K/UL — SIGNIFICANT CHANGE UP (ref 1–3.3)
LYMPHOCYTES # BLD AUTO: 20.5 % — SIGNIFICANT CHANGE UP (ref 13–44)
MAGNESIUM SERPL-MCNC: 1.3 MG/DL — LOW (ref 1.6–2.6)
MCHC RBC-ENTMCNC: 27 PG — SIGNIFICANT CHANGE UP (ref 27–34)
MCHC RBC-ENTMCNC: 30.9 % — LOW (ref 32–36)
MCV RBC AUTO: 87.4 FL — SIGNIFICANT CHANGE UP (ref 80–100)
MONOCYTES # BLD AUTO: 0.52 K/UL — SIGNIFICANT CHANGE UP (ref 0–0.9)
MONOCYTES NFR BLD AUTO: 9.5 % — SIGNIFICANT CHANGE UP (ref 2–14)
NEUTROPHILS # BLD AUTO: 3.63 K/UL — SIGNIFICANT CHANGE UP (ref 1.8–7.4)
NEUTROPHILS NFR BLD AUTO: 66 % — SIGNIFICANT CHANGE UP (ref 43–77)
NITRITE UR-MCNC: NEGATIVE — SIGNIFICANT CHANGE UP
NRBC # FLD: 0 K/UL — LOW (ref 25–125)
NT-PROBNP SERPL-SCNC: 4099 PG/ML — SIGNIFICANT CHANGE UP
PH UR: 6 — SIGNIFICANT CHANGE UP (ref 5–8)
PHOSPHATE SERPL-MCNC: 5.3 MG/DL — HIGH (ref 2.5–4.5)
PLATELET # BLD AUTO: 157 K/UL — SIGNIFICANT CHANGE UP (ref 150–400)
PMV BLD: 12.4 FL — SIGNIFICANT CHANGE UP (ref 7–13)
POTASSIUM SERPL-MCNC: 4.4 MMOL/L — SIGNIFICANT CHANGE UP (ref 3.5–5.3)
POTASSIUM SERPL-SCNC: 4.4 MMOL/L — SIGNIFICANT CHANGE UP (ref 3.5–5.3)
PROT UR-MCNC: 200 — HIGH
PTH-INTACT SERPL-MCNC: 335.2 PG/ML — HIGH (ref 15–65)
RBC # BLD: 2.7 M/UL — LOW (ref 4.2–5.8)
RBC # FLD: 13.2 % — SIGNIFICANT CHANGE UP (ref 10.3–14.5)
RBC CASTS # UR COMP ASSIST: SIGNIFICANT CHANGE UP (ref 0–?)
SODIUM SERPL-SCNC: 140 MMOL/L — SIGNIFICANT CHANGE UP (ref 135–145)
SP GR SPEC: 1.01 — SIGNIFICANT CHANGE UP (ref 1–1.04)
TROPONIN T, HIGH SENSITIVITY: 66 NG/L — CRITICAL HIGH (ref ?–14)
UIBC SERPL-MCNC: 194.5 UG/DL — SIGNIFICANT CHANGE UP (ref 110–370)
UROBILINOGEN FLD QL: NORMAL — SIGNIFICANT CHANGE UP
WBC # BLD: 5.5 K/UL — SIGNIFICANT CHANGE UP (ref 3.8–10.5)
WBC # FLD AUTO: 5.5 K/UL — SIGNIFICANT CHANGE UP (ref 3.8–10.5)

## 2019-01-09 PROCEDURE — 99233 SBSQ HOSP IP/OBS HIGH 50: CPT | Mod: GC

## 2019-01-09 RX ORDER — IRON SUCROSE 20 MG/ML
100 INJECTION, SOLUTION INTRAVENOUS
Qty: 0 | Refills: 0 | Status: DISCONTINUED | OUTPATIENT
Start: 2019-01-09 | End: 2019-01-09

## 2019-01-09 RX ORDER — MAGNESIUM SULFATE 500 MG/ML
2 VIAL (ML) INJECTION ONCE
Qty: 0 | Refills: 0 | Status: COMPLETED | OUTPATIENT
Start: 2019-01-09 | End: 2019-01-09

## 2019-01-09 RX ORDER — IRON SUCROSE 20 MG/ML
100 INJECTION, SOLUTION INTRAVENOUS
Qty: 0 | Refills: 0 | Status: DISCONTINUED | OUTPATIENT
Start: 2019-01-09 | End: 2019-01-14

## 2019-01-09 RX ORDER — DEXTROSE 50 % IN WATER 50 %
25 SYRINGE (ML) INTRAVENOUS ONCE
Qty: 0 | Refills: 0 | Status: COMPLETED | OUTPATIENT
Start: 2019-01-09 | End: 2019-01-09

## 2019-01-09 RX ORDER — FUROSEMIDE 40 MG
80 TABLET ORAL
Qty: 0 | Refills: 0 | Status: DISCONTINUED | OUTPATIENT
Start: 2019-01-09 | End: 2019-01-12

## 2019-01-09 RX ORDER — LABETALOL HCL 100 MG
300 TABLET ORAL
Qty: 0 | Refills: 0 | Status: DISCONTINUED | OUTPATIENT
Start: 2019-01-09 | End: 2019-01-09

## 2019-01-09 RX ORDER — INSULIN GLARGINE 100 [IU]/ML
5 INJECTION, SOLUTION SUBCUTANEOUS AT BEDTIME
Qty: 0 | Refills: 0 | Status: DISCONTINUED | OUTPATIENT
Start: 2019-01-09 | End: 2019-01-14

## 2019-01-09 RX ORDER — CHLORHEXIDINE GLUCONATE 213 G/1000ML
1 SOLUTION TOPICAL
Qty: 0 | Refills: 0 | Status: DISCONTINUED | OUTPATIENT
Start: 2019-01-09 | End: 2019-01-14

## 2019-01-09 RX ORDER — LABETALOL HCL 100 MG
300 TABLET ORAL
Qty: 0 | Refills: 0 | Status: DISCONTINUED | OUTPATIENT
Start: 2019-01-09 | End: 2019-01-10

## 2019-01-09 RX ORDER — INSULIN LISPRO 100/ML
VIAL (ML) SUBCUTANEOUS AT BEDTIME
Qty: 0 | Refills: 0 | Status: DISCONTINUED | OUTPATIENT
Start: 2019-01-09 | End: 2019-01-14

## 2019-01-09 RX ORDER — METOPROLOL TARTRATE 50 MG
50 TABLET ORAL DAILY
Qty: 0 | Refills: 0 | Status: DISCONTINUED | OUTPATIENT
Start: 2019-01-09 | End: 2019-01-09

## 2019-01-09 RX ORDER — FUROSEMIDE 40 MG
80 TABLET ORAL ONCE
Qty: 0 | Refills: 0 | Status: COMPLETED | OUTPATIENT
Start: 2019-01-09 | End: 2019-01-09

## 2019-01-09 RX ORDER — ERYTHROPOIETIN 10000 [IU]/ML
10000 INJECTION, SOLUTION INTRAVENOUS; SUBCUTANEOUS
Qty: 0 | Refills: 0 | Status: DISCONTINUED | OUTPATIENT
Start: 2019-01-09 | End: 2019-01-14

## 2019-01-09 RX ORDER — CALCITRIOL 0.5 UG/1
0.25 CAPSULE ORAL DAILY
Qty: 0 | Refills: 0 | Status: DISCONTINUED | OUTPATIENT
Start: 2019-01-09 | End: 2019-01-10

## 2019-01-09 RX ORDER — CALCIUM ACETATE 667 MG
1334 TABLET ORAL
Qty: 0 | Refills: 0 | Status: DISCONTINUED | OUTPATIENT
Start: 2019-01-09 | End: 2019-01-14

## 2019-01-09 RX ADMIN — Medication 80 MILLIGRAM(S): at 19:49

## 2019-01-09 RX ADMIN — IRON SUCROSE 100 MILLIGRAM(S): 20 INJECTION, SOLUTION INTRAVENOUS at 17:17

## 2019-01-09 RX ADMIN — Medication 1: at 16:40

## 2019-01-09 RX ADMIN — INSULIN GLARGINE 5 UNIT(S): 100 INJECTION, SOLUTION SUBCUTANEOUS at 21:56

## 2019-01-09 RX ADMIN — Medication 60 MILLIGRAM(S): at 05:49

## 2019-01-09 RX ADMIN — Medication 300 MILLIGRAM(S): at 19:50

## 2019-01-09 RX ADMIN — Medication 1334 MILLIGRAM(S): at 13:14

## 2019-01-09 RX ADMIN — CALCITRIOL 0.25 MICROGRAM(S): 0.5 CAPSULE ORAL at 13:14

## 2019-01-09 RX ADMIN — Medication 50 MILLIGRAM(S): at 09:48

## 2019-01-09 RX ADMIN — Medication 1334 MILLIGRAM(S): at 19:05

## 2019-01-09 RX ADMIN — Medication 1: at 13:53

## 2019-01-09 RX ADMIN — Medication 50 GRAM(S): at 09:47

## 2019-01-09 RX ADMIN — ERYTHROPOIETIN 10000 UNIT(S): 10000 INJECTION, SOLUTION INTRAVENOUS; SUBCUTANEOUS at 17:10

## 2019-01-09 RX ADMIN — Medication 2: at 21:56

## 2019-01-09 RX ADMIN — Medication 81 MILLIGRAM(S): at 13:15

## 2019-01-09 RX ADMIN — Medication 80 MILLIGRAM(S): at 05:52

## 2019-01-09 NOTE — CONSULT NOTE ADULT - PROBLEM SELECTOR RECOMMENDATION 6
check PTH, vit D 25  continue phoslo  on calcitriol. can be switched to hectorol once on HD.   monitor ca and phos daily

## 2019-01-09 NOTE — CONSULT NOTE ADULT - ATTENDING COMMENTS
Discussed with , outpt Dialysis unit to be set up in [place] or [facility] under Dr. Huff  Discussed with daughter at bedside, Rosalia ( [phone]) daughter agreeable for HD and outpt set up

## 2019-01-09 NOTE — CONSULT NOTE ADULT - PROBLEM SELECTOR RECOMMENDATION 3
epo with HD.   iron sat 16%, will start IV iron with HD x 5 doses  transfuse to keep Hb>8  r/o acute bleed epo with HD.   iron sat 16%, will start IV iron with HD x 5 doses  transfuse to keep Hb>8  r/o acute bleed  Monitor Hb

## 2019-01-09 NOTE — PROGRESS NOTE ADULT - PROBLEM SELECTOR PLAN 6
- BP currently above goal  c/w metoprolol and nifedipine, will add home dose of labetalol - BP currently above goal  c/w nifedipine, will go up on metoprolol to 50 mg daily, c/w lasix - BP currently above goal  c/w nifedipine and resume home dose labetalol 300 BID

## 2019-01-09 NOTE — CONSULT NOTE ADULT - PROBLEM SELECTOR RECOMMENDATION 9
CKD 5 not on dialysis, present with worsen renal function, fluid overload. now ESRD plan to start HD. clear by vascular to use AVF. (placed 10/18)  get hep b surface antigen  will start on HD today. consent in chart   renal diet CKD 5 not on dialysis, present with worsen renal function, fluid overload. now ESRD plan to initiate HD today via AVF  cleared by vascular to use AVF. (placed 10/18)  renal diet  Monitor BMP and BP  Discussed with , outpt Dialysis unit to be set up in [place] or [facility] under Dr. Huff

## 2019-01-09 NOTE — PROGRESS NOTE ADULT - PROBLEM SELECTOR PLAN 2
Baseline creat 5 in summer, currently ~9  f/u UA  f/u nephro recs  f/u ammonia  Given pt appears fluid overloaded will give 80 IV lasix and assess response  avoid nephrotoxic meds  trend and replete electrolytes  strict I/Os  - no current indications for urgent HD at this time Baseline creat 5 in summer, currently ~9  f/u UA  f/u nephro recs  f/u ammonia  trend and replete electrolytes  strict I/Os  - no current indications for urgent HD at this time Baseline creat 5 in summer, currently ~9  Pt to be started on HD today as per renal  trend and replete electrolytes  strict I/Os  f/u Vit D, PTH Baseline creat 5 in summer, currently ~9  Pt to be started on HD today as per renal  trend and replete electrolytes  strict I/Os  f/u Vit D, PTH  c/w phoslo, renal diet

## 2019-01-09 NOTE — PROGRESS NOTE ADULT - PROBLEM SELECTOR PLAN 8
DVT ppx: holding in setting of bleed  Diet: Renal diet  Padua score of 3 for VTE risk, AC not indicated at this time

## 2019-01-09 NOTE — CONSULT NOTE ADULT - PROBLEM SELECTOR RECOMMENDATION 5
continue current meds  UF with HD  low Na diet  monitor continue current meds  UF with HD  low Na diet  monitor BP closely

## 2019-01-09 NOTE — PROGRESS NOTE ADULT - SUBJECTIVE AND OBJECTIVE BOX
Patient is a 64y old  Male who presents with a chief complaint of SOB/AMEZCUA, cough (2019 11:36)      SUBJECTIVE / OVERNIGHT EVENTS:  Pt does not express any acute complaints or concerns. Pt reports to urinating adequately, though this was not recorded. Pt denies f/c/n/v/d/CP/SOB or other issues.    MEDICATIONS  (STANDING):  aspirin enteric coated 81 milliGRAM(s) Oral daily  calcitriol   Capsule 0.25 MICROGram(s) Oral daily  calcium acetate 1334 milliGRAM(s) Oral three times a day with meals  dextrose 5%. 1000 milliLiter(s) (50 mL/Hr) IV Continuous <Continuous>  dextrose 50% Injectable 12.5 Gram(s) IV Push once  dextrose 50% Injectable 25 Gram(s) IV Push once  dextrose 50% Injectable 25 Gram(s) IV Push once  epoetin ralph Injectable 48093 Unit(s) IV Push <User Schedule>  furosemide   Injectable 80 milliGRAM(s) IV Push two times a day  insulin glargine Injectable (LANTUS) 8 Unit(s) SubCutaneous at bedtime  insulin lispro (HumaLOG) corrective regimen sliding scale   SubCutaneous three times a day before meals  iron sucrose Injectable 100 milliGRAM(s) IV Push <User Schedule>  labetalol 300 milliGRAM(s) Oral two times a day  NIFEdipine XL 60 milliGRAM(s) Oral daily    MEDICATIONS  (PRN):  dextrose 40% Gel 15 Gram(s) Oral once PRN Blood Glucose LESS THAN 70 milliGRAM(s)/deciliter  glucagon  Injectable 1 milliGRAM(s) IntraMuscular once PRN Glucose LESS THAN 70 milligrams/deciliter      Vital Signs Last 24 Hrs  T(C): 36.7 (2019 09:40), Max: 36.7 (2019 22:11)  T(F): 98.1 (2019 09:40), Max: 98.1 (2019 22:11)  HR: 69 (2019 09:40) (65 - 73)  BP: 161/69 (2019 09:40) (159/89 - 175/90)  BP(mean): --  RR: 18 (2019 09:40) (18 - 18)  SpO2: 100% (2019 09:40) (99% - 100%)    CAPILLARY BLOOD GLUCOSE      POCT Blood Glucose.: 157 mg/dL (2019 10:15)  POCT Blood Glucose.: 79 mg/dL (2019 09:38)  POCT Blood Glucose.: 224 mg/dL (2019 22:21)    I&O's Summary    2019 07:01  -  2019 13:21  --------------------------------------------------------  IN: 0 mL / OUT: 550 mL / NET: -550 mL        PHYSICAL EXAM:  	GENERAL: Comfortable, NAD  	HEAD:  Normocephalic, atraumatic  	ENMT: Moist mucous membranes  	NECK: Supple, No JVD  	NERVOUS SYSTEM:  Alert & Oriented X3, moving all extremities  	CHEST/LUNG: Crackles b/l at the bases, in no respiratory distress, speaking in full sentences  	HEART: S1S2 Regular rate and rhythm, no murmur   	ABDOMEN: Soft, Nontender, Nondistended, Bowel sounds present  	EXTREMITIES:  1+ b/l pedal edema, decreased from yesterday, LUE fistula in place  	MUSCULOSKELTAL- No muscle tenderness, no joint tenderness  SKIN- warm and dry, no rash    LABS:                        7.3    5.50  )-----------( 157      ( 2019 06:37 )             23.6     Auto Eosinophil # 0.18  / Auto Eosinophil % 3.3   / Auto Neutrophil # 3.63  / Auto Neutrophil % 66.0  / BANDS % x                            7.1    5.48  )-----------( 163      ( 2019 13:50 )             22.9     Auto Eosinophil # 0.18  / Auto Eosinophil % 3.3   / Auto Neutrophil # 3.62  / Auto Neutrophil % 66.0  / BANDS % x        09    140  |  108<H>  |  61<H>  ----------------------------<  68<L>  4.4   |  17<L>  |  8.51<H>      137  |  109<H>  |  58<H>  ----------------------------<  94  4.6   |  14<L>  |  8.38<H>    Ca    7.5<L>      2019 06:37  Mg     1.3       Phos  5.3       TPro  6.8  /  Alb  3.1<L>  /  TBili  0.3  /  DBili  x   /  AST  16  /  ALT  13  /  AlkPhos  58      PT/INR - ( 2019 13:50 )   PT: 12.1 SEC;   INR: 1.09            CARDIAC MARKERS ( 2019 15:10 )  x     / x     / 164 u/L / 3.17 ng/mL / x          Urinalysis Basic - ( 2019 10:32 )    Color: COLORLESS / Appearance: CLEAR / S.007 / pH: 6.0  Gluc: NEGATIVE / Ketone: NEGATIVE  / Bili: NEGATIVE / Urobili: NORMAL   Blood: SMALL / Protein: 200 / Nitrite: NEGATIVE   Leuk Esterase: NEGATIVE / RBC: 0-2 / WBC x   Sq Epi: x / Non Sq Epi: x / Bacteria: x          RESPIRATORY  VENT:    ABG:     VBG: ( 13:50 ) - VBG - pH: 7.28  | pCO2: 41    | pO2: 42    | Lactate: 1.0        RADIOLOGY & ADDITIONAL TESTS:  (Imaging Personally Reviewed): ERIN    Consultant(s) Notes Reviewed:  Nephro, cards    Care Discussed with Consultants/Other Providers: ERIN

## 2019-01-09 NOTE — PROGRESS NOTE ADULT - PROBLEM SELECTOR PLAN 1
- suspect secondary to fluid overload in the setting of CKDV  - will diurese patient with IV lasix to see if any improvement in symptoms  - patient also noted to be anemic hgb 7.1 (likely 2/2 CKD), could there be a component of symptomatic anemia?  - lower suspicion for lung CA/pneumonia/TB/pulmonary hemorrhage at this time   - RVP negative, CXR showing b/l pleural effusions, vasculitis/TB workup negative on prior admission  - f/u w/ nephrology regarding CKD management  - can consider CT chest if pt doesn't improve w/ diuresis - suspect secondary to fluid overload in the setting of CKDV  - pt s/p 80 IV lasix x2, currently denies SOB, cough or other symptoms and reports adequate UO(I&Os not recorded)  - patient also noted to be anemic hgb 7.1 (likely 2/2 CKD) on admission, maybe a component of symptomatic anemia, currently 7.3  - lower suspicion for lung CA/pneumonia/TB/pulmonary hemorrhage at this time   - RVP negative, CXR showing b/l pleural effusions, vasculitis/TB workup negative on prior admission  - f/u w/ nephrology regarding CKD management  - can consider CT chest if pt doesn't improve w/ diuresis - suspect secondary to fluid overload in the setting of CKDV  - pt s/p 80 IV lasix x2, currently denies SOB, cough or other symptoms and reports adequate UO(I&Os not recorded)  - patient also noted to be anemic hgb 7.1 (likely 2/2 CKD) on admission, maybe a component of symptomatic anemia, currently 7.3  - lower suspicion for lung CA/pneumonia/TB/pulmonary hemorrhage at this time   - RVP negative, CXR showing b/l pleural effusions, vasculitis/TB workup negative on prior admission  -pt to be started on HD today - suspect secondary to fluid overload in the setting of CKDV  - pt s/p 80 IV lasix x2, currently denies SOB, cough or other symptoms and reports adequate UO(I&Os not recorded); will c/w 80 IV lasix BID as per cards recs  - patient also noted to be anemic hgb 7.1 (likely 2/2 CKD) on admission, maybe a component of symptomatic anemia, currently 7.3  - lower suspicion for lung CA/pneumonia/TB/pulmonary hemorrhage at this time   - RVP negative, CXR showing b/l pleural effusions, vasculitis/TB workup negative on prior admission  -pt to be started on HD today

## 2019-01-09 NOTE — PROGRESS NOTE ADULT - PROBLEM SELECTOR PLAN 7
Pt on novolog 15, will start on lantus 8(decreased dose given novolog to lantus conversion in setting of hospital stay w/ likely decreased PO intake) and ISS  Will adjust regimen based on ISS and BS levels  A1c in Oct 2018 is 8.2 Pt on novolog 15 at home; due to hypoglycemia to 68 in AM will transition to decreased dose of lantus 5   Pt started in decreased dose on admission given novolog to lantus conversion in setting of hospital stay w/ likely decreased PO intake)   Will adjust regimen based on ISS and BS levels  A1c in Oct 2018 is 8.2; most recently 6.8 Pt on novolog 15 at home; due to hypoglycemia to 68 in AM will transition to decreased dose of lantus 5   Pt started on decreased dose on admission due to novolog to lantus conversion in setting of hospital stay w/ likely decreased PO intake   Will adjust regimen based on ISS and BS levels  A1c in Oct 2018 is 8.2; most recently 6.8

## 2019-01-09 NOTE — CONSULT NOTE ADULT - SUBJECTIVE AND OBJECTIVE BOX
Jimmy Sandhu MD  Interventional Cardiology / Advance Heart Failure and Cardiac Transplant Specialist  [place] Office : [address] [place] [address] [place] [address]  Tel:   [place] Office : [address] [facility] [place] [address]  Tel: [phone]  Cell : [phone]    HISTORY OF PRESENT ILLNESS:  64M with hx of CAD s/p PCI x 3, HTN, IDDM, GERD, CKD5 (not on HD yet, but has AVF placed) here for non-productive cough x 3 days c/b hemoptysis and epistaxis. Pt conveys that he has also been experienced chills during this time, but denies fevers. Pt has been having SOB on exertion after walking 3 blocks, but he denies any orthopnea. Pt denies CP, nausea/vomiting/diarrhea or blood in the stool or urine. Pt was hospitalized in 7/18 due to a complaint of hemoptysis. Upon that admission pt was started on zosyn and azithromycin for CAP. Pt was d/c on 5 days total of ceftin and azithromycin. TB workup w/ QuantiFeron gold and 2 sputum AFB samples returned negative. Vasculitis blood tests including p-ANCA, c-ANCA, and anti-GBM antibodies returned negative. Patient reports compliance with his medications, denies dietary indiscretions. Pt denies any chest pains on exertion    PAST MEDICAL & SURGICAL HISTORY:  Anemia due to stage 5 chronic kidney disease, not on chronic dialysis  Cerebrovascular accident (CVA), unspecified mechanism: diagnosed via CT of the head  Hypercholesterolemia  ESRD (end stage renal disease): not on Dialysis  Stented coronary artery: 2014, 3 stents, [facility]  GERD (gastroesophageal reflux disease)  Diabetes mellitus: type 2  CAP (community acquired pneumonia): 6/18  Coronary artery disease  HTN (hypertension)  H/O eye surgery  AV fistula: 10/12/18 L radiocephalic AV fistula  H/O coronary angiogram: 2014 - x3 stents    	    MEDICATIONS:  aspirin enteric coated 81 milliGRAM(s) Oral daily  furosemide   Injectable 80 milliGRAM(s) IV Push two times a day  labetalol 300 milliGRAM(s) Oral two times a day  NIFEdipine XL 60 milliGRAM(s) Oral daily  dextrose 40% Gel 15 Gram(s) Oral once PRN  dextrose 50% Injectable 12.5 Gram(s) IV Push once  dextrose 50% Injectable 25 Gram(s) IV Push once  dextrose 50% Injectable 25 Gram(s) IV Push once  glucagon  Injectable 1 milliGRAM(s) IntraMuscular once PRN  insulin glargine Injectable (LANTUS) 8 Unit(s) SubCutaneous at bedtime  insulin lispro (HumaLOG) corrective regimen sliding scale   SubCutaneous three times a day before meals    calcitriol   Capsule 0.25 MICROGram(s) Oral daily  calcium acetate 1334 milliGRAM(s) Oral three times a day with meals  dextrose 5%. 1000 milliLiter(s) IV Continuous <Continuous>  epoetin ralph Injectable 41608 Unit(s) IV Push <User Schedule>  iron sucrose Injectable 100 milliGRAM(s) IV Push <User Schedule>      FAMILY HISTORY:  No pertinent family history in first degree relatives        Allergies    No Known Allergies    Intolerances    	      PHYSICAL EXAM:  T(C): 36.7 (01-09-19 @ 09:40), Max: 37.1 (01-08-19 @ 12:58)  HR: 69 (01-09-19 @ 09:40) (65 - 73)  BP: 161/69 (01-09-19 @ 09:40) (159/89 - 175/90)  RR: 18 (01-09-19 @ 09:40) (18 - 18)  SpO2: 100% (01-09-19 @ 09:40) (99% - 100%)  Wt(kg): --  I&O's Summary    09 Jan 2019 07:01  -  09 Jan 2019 12:37  --------------------------------------------------------  IN: 0 mL / OUT: 550 mL / NET: -550 mL        	  HEENT:   Normal oral mucosa, PERRL, EOMI	  Cardiovascular: Normal S1 S2, No JVD, No murmurs, No edema  Respiratory: b/l rhonchi  Gastrointestinal:  Soft, Non-tender, + BS	  Extremities: Normal range of motion, No clubbing, cyanosis or edema    LABS:	 	    CARDIAC MARKERS:      CKMB: 3.17 ng/mL (01-08 @ 15:10)    CKMB Relative Index: 1.9 (01-08 @ 15:10)                            7.3    5.50  )-----------( 157      ( 09 Jan 2019 06:37 )             23.6     01-09    140  |  108<H>  |  61<H>  ----------------------------<  68<L>  4.4   |  17<L>  |  8.51<H>    Ca    7.5<L>      09 Jan 2019 06:37  Phos  5.3     01-09  Mg     1.3     01-09    TPro  6.8  /  Alb  3.1<L>  /  TBili  0.3  /  DBili  x   /  AST  16  /  ALT  13  /  AlkPhos  58  01-08    proBNP: Serum Pro-Brain Natriuretic Peptide: 4099 pg/mL (01-09 @ 06:37)    Lipid Profile:   HgA1c: Hemoglobin A1C, Whole Blood: 6.8 % (01-09 @ 06:37)    TSH:     ASSESSMENT/PLAN:

## 2019-01-09 NOTE — PROGRESS NOTE ADULT - PROBLEM SELECTOR PLAN 5
Pt asymptomatic  Demand ischemia vs decreased clearance secondary to CKD  EKG no showing evidence of STEMI/NSTEMI  Will continue to trend Pt asymptomatic  Demand ischemia vs decreased clearance secondary to CKD  EKG not showing evidence of STEMI/NSTEMI  Will continue to trend Pt asymptomatic  Demand ischemia vs decreased clearance secondary to CKD  EKG not showing evidence of STEMI/NSTEMI  Will continue to trend  cards following

## 2019-01-09 NOTE — PROGRESS NOTE ADULT - PROBLEM SELECTOR PLAN 4
Hgb Baseline ~10, currently ~7.1  Secondary to CKD vs hemoptysis  Will continue to trend and transfuse if Hgb<7  consider EPO for anemia in setting of CKD Hgb Baseline ~10, currently ~7.3  Secondary to CKD vs hemoptysis  Will continue to trend and transfuse if Hgb<7  consider EPO for anemia in setting of CKD Hgb Baseline ~10, currently ~7.3  Likely secondary to CKD; epo with HD. Iron sat 16%, will start IV iron with HD x 5 doses and transfuse to keep Hb>8 as per renal recs

## 2019-01-09 NOTE — PROGRESS NOTE ADULT - ASSESSMENT
64M with hx of CAD s/p PCI x 3, HTN, DM2, GERD, CKD5 (not on HD yet, but has AVF placed) here for cough x 3 days and complaints of SOB/AMEZCUA. Suspect symptoms of SOB likely secondary to fluid overload in the setting of CKD (given edema and crackles on exam). Given reports of hemoptysis could also consider PNA (though currently afebrile, without leuckocytosis, is non-toxic appearing) vs TB (lower suspicion given recent negative workup) vs 64M with hx of CAD s/p PCI x 3, HTN, DM2, GERD, CKD5 (not on HD yet, but has AVF placed) here for cough x 3 days and complaints of SOB/AMEZCUA. Suspect symptoms of SOB likely secondary to fluid overload in the setting of CKD (given edema and crackles on exam). Given reports of hemoptysis could also consider PNA (though currently afebrile, without leukocytosis,  is non-toxic appearing) vs TB (lower suspicion given recent negative workup) vs other (had negative workup for vasculitis on prior admission)

## 2019-01-09 NOTE — CONSULT NOTE ADULT - SUBJECTIVE AND OBJECTIVE BOX
[facility] [place] [facility]   MD JORDAN AVALOS MD ANGELA WONG, PA    TEL:  OFFICE: [phone]  DR MARES CELL: [phone]  DR. HORTA CELL: [phone]  LIDA WINTERS CELL: [phone]      HPI:  64M with hx of CAD s/p PCI x 3, HTN, IDDM, GERD, CKD5 (not on HD yet, but has AVF placed) here for non-productive cough x 3 days c/b hemoptysis and epistaxis. Pt conveys that he has also been experienced chills during this time, but denies fevers. Pt has been having SOB on exertion after walking 3 blocks, but he denies any orthopnea. Pt denies CP, nausea/vomiting/diarrhea or blood in the stool or urine.   Patient follow up with dr. mares for CKD. renal function has been progressively worsening for the past year, >6g/day proteinuria, vasculitis work up neg, ckd likely dm/htn. AVF done with dr. Infante in oct s/p balloon 2 weeks ago. clear by vascular to use AVF.  patient c/o hemoptysis, epistaxis, sob, weakness x 3 days.     Allergies:  No Known Allergies      PAST MEDICAL & SURGICAL HISTORY:  Anemia due to stage 5 chronic kidney disease, not on chronic dialysis  Cerebrovascular accident (CVA), unspecified mechanism: diagnosed via CT of the head  Hypercholesterolemia  ESRD (end stage renal disease): not on Dialysis  Stented coronary artery: 2014, 3 stents, [facility]  GERD (gastroesophageal reflux disease)  Diabetes mellitus: type 2  CAP (community acquired pneumonia): 6/18  Coronary artery disease  HTN (hypertension)  H/O eye surgery  AV fistula: 10/12/18 L radiocephalic AV fistula  H/O coronary angiogram: 2014 - x3 stents      Home Medications Reviewed    Hospital Medications:   MEDICATIONS  (STANDING):  aspirin enteric coated 81 milliGRAM(s) Oral daily  calcitriol   Capsule 0.25 MICROGram(s) Oral daily  calcium acetate 1334 milliGRAM(s) Oral three times a day with meals  dextrose 5%. 1000 milliLiter(s) (50 mL/Hr) IV Continuous <Continuous>  dextrose 50% Injectable 25 milliLiter(s) IV Push once  dextrose 50% Injectable 12.5 Gram(s) IV Push once  dextrose 50% Injectable 25 Gram(s) IV Push once  dextrose 50% Injectable 25 Gram(s) IV Push once  furosemide   Injectable 80 milliGRAM(s) IV Push two times a day  insulin glargine Injectable (LANTUS) 8 Unit(s) SubCutaneous at bedtime  insulin lispro (HumaLOG) corrective regimen sliding scale   SubCutaneous three times a day before meals  labetalol 300 milliGRAM(s) Oral two times a day  NIFEdipine XL 60 milliGRAM(s) Oral daily      SOCIAL HISTORY:  Denies ETOh, Smoking,     FAMILY HISTORY:  No pertinent family history in first degree relatives      REVIEW OF SYSTEMS:  CONSTITUTIONAL: + weakness, no fevers or chills  EYES/ENT: No visual changes;  No vertigo or throat pain   NECK: No pain or stiffness  RESPIRATORY: see HPI  CARDIOVASCULAR: No chest pain or palpitations.  GASTROINTESTINAL: No abdominal or epigastric pain. No nausea, vomiting, or hematemesis; No diarrhea or constipation. No melena or hematochezia.  GENITOURINARY: No dysuria, frequency, foamy urine, urinary urgency, incontinence or hematuria  NEUROLOGICAL: No numbness or weakness  SKIN: No itching, burning, rashes, or lesions   VASCULAR: No bilateral lower extremity edema.   All other review of systems is negative unless indicated above.    VITALS:  T(F): 98.1 (01-09-19 @ 09:40), Max: 98.7 (01-08-19 @ 12:58)  HR: 69 (01-09-19 @ 09:40)  BP: 161/69 (01-09-19 @ 09:40)  RR: 18 (01-09-19 @ 09:40)  SpO2: 100% (01-09-19 @ 09:40)  Wt(kg): --        PHYSICAL EXAM:  Constitutional: NAD  HEENT: anicteric sclera, oropharynx clear, MMM  Neck: No JVD  Respiratory: basilar crackles   Cardiovascular: S1, S2, RRR  Gastrointestinal: BS+, soft, NT/ND  Extremities: No cyanosis or clubbing. No peripheral edema  Neurological: A/O x 3, no focal deficits  Psychiatric: Normal mood, normal affect  : No CVA tenderness. No hinds.   Skin: No rashes  Vascular Access: left AVF + bruit + thrill     LABS:  01-09    140  |  108<H>  |  61<H>  ----------------------------<  68<L>  4.4   |  17<L>  |  8.51<H>    Ca    7.5<L>      09 Jan 2019 06:37  Phos  5.3     01-09  Mg     1.3     01-09    TPro  6.8  /  Alb  3.1<L>  /  TBili  0.3  /  DBili      /  AST  16  /  ALT  13  /  AlkPhos  58  01-08    Creatinine Trend: 8.51 <--, 8.38 <--                        7.3    5.50  )-----------( 157      ( 09 Jan 2019 06:37 )             23.6     Urine Studies:        RADIOLOGY & ADDITIONAL STUDIES: [facility] [place] [facility]   MD JORDAN AVALOS MD ANGELA WONG, PA    TEL:  OFFICE: [phone]  DR MARES CELL: [phone]  DR. HORTA CELL: [phone]  LIDA WINTERS CELL: [phone]      HPI:  64M with hx of CAD s/p PCI x 3, HTN, IDDM, GERD, CKD5 (not on HD yet, but has AVF placed) here for non-productive cough x 3 days c/b hemoptysis and epistaxis. Pt conveys that he has also been experienced chills during this time, but denies fevers. Pt has been having SOB on exertion after walking 3 blocks, but he denies any orthopnea. Pt denies CP, nausea/vomiting/diarrhea or blood in the stool or urine.     Patient follow up with dr. mares for CKD. renal function has been progressively worsening for the past year, >6g/day proteinuria, vasculitis work up neg, ckd likely dm/htn. AVF done with dr. Infante in oct s/p balloon 2 weeks ago. clear by vascular to use AVF.  patient c/o hemoptysis, epistaxis, sob, weakness x 3 days.     Allergies:  No Known Allergies      PAST MEDICAL & SURGICAL HISTORY:  Anemia due to stage 5 chronic kidney disease, not on chronic dialysis  Cerebrovascular accident (CVA), unspecified mechanism: diagnosed via CT of the head  Hypercholesterolemia  ESRD (end stage renal disease): not on Dialysis  Stented coronary artery: 2014, 3 stents, [facility]  GERD (gastroesophageal reflux disease)  Diabetes mellitus: type 2  CAP (community acquired pneumonia): 6/18  Coronary artery disease  HTN (hypertension)  H/O eye surgery  AV fistula: 10/12/18 L radiocephalic AV fistula  H/O coronary angiogram: 2014 - x3 stents      Home Medications Reviewed    Hospital Medications:   MEDICATIONS  (STANDING):  aspirin enteric coated 81 milliGRAM(s) Oral daily  calcitriol   Capsule 0.25 MICROGram(s) Oral daily  calcium acetate 1334 milliGRAM(s) Oral three times a day with meals  dextrose 5%. 1000 milliLiter(s) (50 mL/Hr) IV Continuous <Continuous>  dextrose 50% Injectable 25 milliLiter(s) IV Push once  dextrose 50% Injectable 12.5 Gram(s) IV Push once  dextrose 50% Injectable 25 Gram(s) IV Push once  dextrose 50% Injectable 25 Gram(s) IV Push once  furosemide   Injectable 80 milliGRAM(s) IV Push two times a day  insulin glargine Injectable (LANTUS) 8 Unit(s) SubCutaneous at bedtime  insulin lispro (HumaLOG) corrective regimen sliding scale   SubCutaneous three times a day before meals  labetalol 300 milliGRAM(s) Oral two times a day  NIFEdipine XL 60 milliGRAM(s) Oral daily      SOCIAL HISTORY:  Denies ETOh, Smoking,     FAMILY HISTORY:  No pertinent family history in first degree relatives      REVIEW OF SYSTEMS:  CONSTITUTIONAL: + weakness, no fevers or chills  EYES/ENT: No visual changes;  No vertigo or throat pain   NECK: No pain or stiffness  RESPIRATORY: see HPI  CARDIOVASCULAR: No chest pain or palpitations.  GASTROINTESTINAL: No abdominal or epigastric pain. No nausea, vomiting, or hematemesis; No diarrhea or constipation. No melena or hematochezia.  GENITOURINARY: No dysuria, frequency, foamy urine, urinary urgency, incontinence or hematuria  NEUROLOGICAL: No numbness or weakness  SKIN: No itching, burning, rashes, or lesions   VASCULAR: No bilateral lower extremity edema.   All other review of systems is negative unless indicated above.    VITALS:  T(F): 98.1 (01-09-19 @ 09:40), Max: 98.7 (01-08-19 @ 12:58)  HR: 69 (01-09-19 @ 09:40)  BP: 161/69 (01-09-19 @ 09:40)  RR: 18 (01-09-19 @ 09:40)  SpO2: 100% (01-09-19 @ 09:40)  Wt(kg): --        PHYSICAL EXAM:  Constitutional: NAD  HEENT: anicteric sclera, oropharynx clear, MMM  Neck: No JVD  Respiratory: basilar crackles   Cardiovascular: S1, S2, RRR  Gastrointestinal: BS+, soft, NT/ND  Extremities: No cyanosis or clubbing. No peripheral edema  Neurological: A/O x 3, no focal deficits  Psychiatric: Normal mood, normal affect  : No CVA tenderness. No hinds.   Skin: No rashes  Vascular Access: left AVF + bruit + thrill     LABS:  01-09    140  |  108<H>  |  61<H>  ----------------------------<  68<L>  4.4   |  17<L>  |  8.51<H>    Ca    7.5<L>      09 Jan 2019 06:37  Phos  5.3     01-09  Mg     1.3     01-09    TPro  6.8  /  Alb  3.1<L>  /  TBili  0.3  /  DBili      /  AST  16  /  ALT  13  /  AlkPhos  58  01-08    Creatinine Trend: 8.51 <--, 8.38 <--                        7.3    5.50  )-----------( 157      ( 09 Jan 2019 06:37 )             23.6     Urine Studies:        RADIOLOGY & ADDITIONAL STUDIES:

## 2019-01-09 NOTE — CONSULT NOTE ADULT - PROBLEM SELECTOR RECOMMENDATION 4
on lasix 80mg iv bid  cardio eval (dr. Sandhu called) on lasix 80mg iv bid  cardio eval (dr. Sandhu called)  Monitor daily weights

## 2019-01-10 ENCOUNTER — TRANSCRIPTION ENCOUNTER (OUTPATIENT)
Age: 64
End: 2019-01-10

## 2019-01-10 LAB
24R-OH-CALCIDIOL SERPL-MCNC: 13.8 NG/ML — LOW (ref 30–80)
ANION GAP SERPL CALC-SCNC: 14 MEQ/L — SIGNIFICANT CHANGE UP (ref 7–14)
BLD GP AB SCN SERPL QL: NEGATIVE — SIGNIFICANT CHANGE UP
BUN SERPL-MCNC: 43 MG/DL — HIGH (ref 7–23)
CALCIUM SERPL-MCNC: 8.1 MG/DL — LOW (ref 8.4–10.5)
CHLORIDE SERPL-SCNC: 103 MMOL/L — SIGNIFICANT CHANGE UP (ref 98–107)
CO2 SERPL-SCNC: 24 MMOL/L — SIGNIFICANT CHANGE UP (ref 22–31)
CREAT SERPL-MCNC: 6.58 MG/DL — HIGH (ref 0.5–1.3)
GLUCOSE SERPL-MCNC: 93 MG/DL — SIGNIFICANT CHANGE UP (ref 70–99)
HBV CORE IGM SER-ACNC: NONREACTIVE — SIGNIFICANT CHANGE UP
HBV SURFACE AB SER-ACNC: <3 MLU/ML — LOW
HCT VFR BLD CALC: 25.3 % — LOW (ref 39–50)
HCV AB S/CO SERPL IA: 0.13 S/CO — SIGNIFICANT CHANGE UP
HCV AB SERPL-IMP: SIGNIFICANT CHANGE UP
HGB BLD-MCNC: 8 G/DL — LOW (ref 13–17)
HIV COMBO RESULT: SIGNIFICANT CHANGE UP
HIV1+2 AB SPEC QL: SIGNIFICANT CHANGE UP
L PNEUMO AG UR QL: NEGATIVE — SIGNIFICANT CHANGE UP
MAGNESIUM SERPL-MCNC: 1.8 MG/DL — SIGNIFICANT CHANGE UP (ref 1.6–2.6)
MCHC RBC-ENTMCNC: 27.3 PG — SIGNIFICANT CHANGE UP (ref 27–34)
MCHC RBC-ENTMCNC: 31.6 % — LOW (ref 32–36)
MCV RBC AUTO: 86.3 FL — SIGNIFICANT CHANGE UP (ref 80–100)
NRBC # FLD: 0 K/UL — LOW (ref 25–125)
PHOSPHATE SERPL-MCNC: 4.4 MG/DL — SIGNIFICANT CHANGE UP (ref 2.5–4.5)
PLATELET # BLD AUTO: 156 K/UL — SIGNIFICANT CHANGE UP (ref 150–400)
PMV BLD: 12.3 FL — SIGNIFICANT CHANGE UP (ref 7–13)
POTASSIUM SERPL-MCNC: 4.1 MMOL/L — SIGNIFICANT CHANGE UP (ref 3.5–5.3)
POTASSIUM SERPL-SCNC: 4.1 MMOL/L — SIGNIFICANT CHANGE UP (ref 3.5–5.3)
PTH-INTACT SERPL-MCNC: 380.1 PG/ML — HIGH (ref 15–65)
RBC # BLD: 2.93 M/UL — LOW (ref 4.2–5.8)
RBC # FLD: 13.1 % — SIGNIFICANT CHANGE UP (ref 10.3–14.5)
RH IG SCN BLD-IMP: NEGATIVE — SIGNIFICANT CHANGE UP
RH IG SCN BLD-IMP: NEGATIVE — SIGNIFICANT CHANGE UP
SODIUM SERPL-SCNC: 141 MMOL/L — SIGNIFICANT CHANGE UP (ref 135–145)
VIT D25+D1,25 OH+D1,25 PNL SERPL-MCNC: 22.2 PG/ML — SIGNIFICANT CHANGE UP (ref 19.9–79.3)
WBC # BLD: 6.08 K/UL — SIGNIFICANT CHANGE UP (ref 3.8–10.5)
WBC # FLD AUTO: 6.08 K/UL — SIGNIFICANT CHANGE UP (ref 3.8–10.5)

## 2019-01-10 PROCEDURE — 93306 TTE W/DOPPLER COMPLETE: CPT | Mod: 26

## 2019-01-10 PROCEDURE — 99233 SBSQ HOSP IP/OBS HIGH 50: CPT | Mod: GC

## 2019-01-10 RX ORDER — HYDRALAZINE HCL 50 MG
10 TABLET ORAL ONCE
Qty: 0 | Refills: 0 | Status: DISCONTINUED | OUTPATIENT
Start: 2019-01-10 | End: 2019-01-10

## 2019-01-10 RX ORDER — CHOLECALCIFEROL (VITAMIN D3) 125 MCG
1000 CAPSULE ORAL DAILY
Qty: 0 | Refills: 0 | Status: DISCONTINUED | OUTPATIENT
Start: 2019-01-10 | End: 2019-01-10

## 2019-01-10 RX ORDER — HYDRALAZINE HCL 50 MG
10 TABLET ORAL ONCE
Qty: 0 | Refills: 0 | Status: COMPLETED | OUTPATIENT
Start: 2019-01-10 | End: 2019-01-10

## 2019-01-10 RX ORDER — LOSARTAN POTASSIUM 100 MG/1
50 TABLET, FILM COATED ORAL DAILY
Qty: 0 | Refills: 0 | Status: DISCONTINUED | OUTPATIENT
Start: 2019-01-10 | End: 2019-01-14

## 2019-01-10 RX ORDER — DOXERCALCIFEROL 2.5 UG/1
2 CAPSULE ORAL
Qty: 0 | Refills: 0 | Status: DISCONTINUED | OUTPATIENT
Start: 2019-01-10 | End: 2019-01-14

## 2019-01-10 RX ORDER — LABETALOL HCL 100 MG
10 TABLET ORAL ONCE
Qty: 0 | Refills: 0 | Status: COMPLETED | OUTPATIENT
Start: 2019-01-10 | End: 2019-01-10

## 2019-01-10 RX ORDER — HEPARIN SODIUM 5000 [USP'U]/ML
5000 INJECTION INTRAVENOUS; SUBCUTANEOUS EVERY 12 HOURS
Qty: 0 | Refills: 0 | Status: DISCONTINUED | OUTPATIENT
Start: 2019-01-10 | End: 2019-01-14

## 2019-01-10 RX ORDER — LABETALOL HCL 100 MG
400 TABLET ORAL
Qty: 0 | Refills: 0 | Status: DISCONTINUED | OUTPATIENT
Start: 2019-01-10 | End: 2019-01-14

## 2019-01-10 RX ADMIN — Medication 10 MILLIGRAM(S): at 13:50

## 2019-01-10 RX ADMIN — Medication 10 MILLIGRAM(S): at 16:58

## 2019-01-10 RX ADMIN — Medication 0.1 MILLIGRAM(S): at 21:35

## 2019-01-10 RX ADMIN — INSULIN GLARGINE 5 UNIT(S): 100 INJECTION, SOLUTION SUBCUTANEOUS at 21:35

## 2019-01-10 RX ADMIN — CHLORHEXIDINE GLUCONATE 1 APPLICATION(S): 213 SOLUTION TOPICAL at 05:54

## 2019-01-10 RX ADMIN — Medication 1334 MILLIGRAM(S): at 08:48

## 2019-01-10 RX ADMIN — Medication 80 MILLIGRAM(S): at 05:54

## 2019-01-10 RX ADMIN — CALCITRIOL 0.25 MICROGRAM(S): 0.5 CAPSULE ORAL at 12:21

## 2019-01-10 RX ADMIN — Medication 1334 MILLIGRAM(S): at 18:00

## 2019-01-10 RX ADMIN — Medication 1: at 12:20

## 2019-01-10 RX ADMIN — Medication 400 MILLIGRAM(S): at 18:00

## 2019-01-10 RX ADMIN — LOSARTAN POTASSIUM 50 MILLIGRAM(S): 100 TABLET, FILM COATED ORAL at 18:00

## 2019-01-10 RX ADMIN — Medication 60 MILLIGRAM(S): at 05:54

## 2019-01-10 RX ADMIN — Medication 80 MILLIGRAM(S): at 18:00

## 2019-01-10 RX ADMIN — Medication 81 MILLIGRAM(S): at 12:21

## 2019-01-10 RX ADMIN — Medication 1: at 21:35

## 2019-01-10 RX ADMIN — Medication 300 MILLIGRAM(S): at 05:54

## 2019-01-10 RX ADMIN — HEPARIN SODIUM 5000 UNIT(S): 5000 INJECTION INTRAVENOUS; SUBCUTANEOUS at 18:33

## 2019-01-10 RX ADMIN — Medication 1334 MILLIGRAM(S): at 12:21

## 2019-01-10 NOTE — PROGRESS NOTE ADULT - PROBLEM SELECTOR PLAN 8
DVT ppx: holding in setting of bleed  Diet: Renal diet  Padua score of 3 for VTE risk, AC not indicated at this time DVT ppx: hep subq  Diet: Renal diet

## 2019-01-10 NOTE — PROGRESS NOTE ADULT - PROBLEM SELECTOR PLAN 1
Resolved.  -suspect secondary to fluid overload in the setting of CKDV  - will c/w 80 IV lasix BID as per cards recs  - patient also noted to be anemic hgb 7.1 (likely 2/2 CKD) on admission, maybe a component of symptomatic anemia, currently 8, pt on EPO  - lower suspicion for lung CA/pneumonia/TB/pulmonary hemorrhage at this time   - RVP negative, CXR showing b/l pleural effusions, vasculitis/TB workup negative on prior admission  -pt to be started on HD today Pt s/p HD on 1/9/18, as per Dr. Huff, pt set to undergo two more HD as an inpatient before being discharged  Baseline creat 5 in summer, downtrending from ~9 on admission after HD currently 6.58  strict I/Os  Vit D WNL, PTH elevated secondary to CKD  c/w phoslo, renal diet

## 2019-01-10 NOTE — DISCHARGE NOTE ADULT - CARE PROVIDER_API CALL
Yasmany Huff), Internal Medicine; Nephrology  [address]  [address] [place] [address]  Phone: [phone]  Fax: [phone]

## 2019-01-10 NOTE — DISCHARGE NOTE ADULT - CARE PLAN
Principal Discharge DX:	SOB (shortness of breath)  Goal:	To evaluate and manage SOB  Assessment and plan of treatment:	You presented with a complaint of cough and shortness of breath. Imaging of your lungs and physical exam findings were suggestive of fluid overload, which may have been contributing to your cough and shortness of breath. You were given medications to decrease the fluids in your body and underwent hemodialysis on decrease and regulate the fluids and electrolytes in your body. Your symptoms improved and clinical status improved; you were deemed stable for discharge.  Secondary Diagnosis:	ESRD (end stage renal disease)  Assessment and plan of treatment:	You have a history of kidney disease that required hemodialysis treatment. You underwent 3 sessions of hemodialysis  Secondary Diagnosis:	Troponin level elevated  Secondary Diagnosis:	Anemia  Secondary Diagnosis:	Acidosis Principal Discharge DX:	SOB (shortness of breath)  Goal:	To evaluate and manage SOB  Assessment and plan of treatment:	You presented with a complaint of cough and shortness of breath. Imaging of your lungs and physical exam findings were suggestive of fluid overload, which may have been contributing to your cough and shortness of breath. You were given medications to decrease the fluids in your body and underwent hemodialysis on decrease and regulate the fluids and electrolytes in your body. Your symptoms improved and clinical status improved; you were deemed stable for discharge.Please follow up with your nephrologist and primary care doctor within a few days of discharge  Secondary Diagnosis:	ESRD (end stage renal disease)  Assessment and plan of treatment:	You have a history of kidney disease that required hemodialysis treatment. You underwent 3 sessions of hemodialysis. Please follow up with your nephrologist within a few days of discharge and continue on your current HD regimen.  Secondary Diagnosis:	Troponin level elevated  Assessment and plan of treatment:	You were found to have elevated levels of heart enzymes suggestive of stress to the heart. You were seen by cardiology who said that your elevated heart enzymes are associated with your kidney disease. You had en echo done, which showed_____. Please follow up with a Cardiologist within a week of discharge for further management of your heart function and blood pressure.  Secondary Diagnosis:	Anemia  Assessment and plan of treatment:	You were found to have anemia during admission, which may have been contributing to your shortness of breath. You anemia improved with treatment and your symptoms resolved. Please follow up with your primary care doctor and nephrologist within a few days of discharge.  Secondary Diagnosis:	Acidosis  Assessment and plan of treatment:	You were found to have high acids in your blood. This is most likely due to your kidney disease. You accid status returned to normal with hemodialysis. Please follow up with your nephrologist within a few days of discharge and continue on your current hemodialysis regimen.  Secondary Diagnosis:	Hypertension  Assessment and plan of treatment:	You were found to have high blood pressure on presentation to the hospital. You blood pressure was managed with medications that helped to improve your blood pressure. Please continue to take your blood pressure medications as indicated and please follow up with a Cardiologist within a week of discharge for further management of your heart function and blood pressure. Principal Discharge DX:	SOB (shortness of breath)  Goal:	To evaluate and manage SOB  Assessment and plan of treatment:	You presented with a complaint of cough and shortness of breath. Imaging of your lungs and physical exam findings were suggestive of fluid overload, which may have been contributing to your cough and shortness of breath. You were given medications to decrease the fluids in your body and underwent hemodialysis on decrease and regulate the fluids and electrolytes in your body. Your symptoms improved and clinical status improved; you were deemed stable for discharge.Please follow up with your nephrologist and primary care doctor within a few days of discharge  Secondary Diagnosis:	ESRD (end stage renal disease)  Assessment and plan of treatment:	You have a history of kidney disease that required hemodialysis treatment. You underwent 3 sessions of hemodialysis. Please follow up with your nephrologist within a few days of discharge and continue on your current HD regimen.  Secondary Diagnosis:	Troponin level elevated  Assessment and plan of treatment:	You were found to have elevated levels of heart enzymes suggestive of stress to the heart. You were seen by cardiology who said that your elevated heart enzymes are associated with your kidney disease. You had en echo done, which showed ejection fraction of 63%. Please follow up with a Cardiologist within a week of discharge for further management of your heart function and blood pressure.  Secondary Diagnosis:	Anemia  Assessment and plan of treatment:	You were found to have anemia during admission, which may have been contributing to your shortness of breath. You anemia improved with treatment and your symptoms resolved. Please follow up with your primary care doctor and nephrologist within a few days of discharge.  Secondary Diagnosis:	Acidosis  Assessment and plan of treatment:	You were found to have high acids in your blood. This is most likely due to your kidney disease. You acid status returned to normal with hemodialysis. Please follow up with your nephrologist within a few days of discharge and continue on your current hemodialysis regimen.  Secondary Diagnosis:	Hypertension  Assessment and plan of treatment:	You were found to have high blood pressure on presentation to the hospital. You blood pressure was managed with medications that helped to improve your blood pressure. Please continue to take your blood pressure medications as indicated and please follow up with a Cardiologist within a week of discharge for further management of your heart function and blood pressure. Principal Discharge DX:	SOB (shortness of breath)  Goal:	To evaluate and manage SOB  Assessment and plan of treatment:	You presented with a complaint of cough and shortness of breath. Imaging of your lungs and physical exam findings were suggestive of fluid overload, which may have been contributing to your cough and shortness of breath. You were given medications to decrease the fluids in your body and underwent hemodialysis on decrease and regulate the fluids and electrolytes in your body. Your symptoms improved and clinical status improved; you were deemed stable for discharge. Please follow up with your nephrologist and primary care doctor within a few days of discharge  Secondary Diagnosis:	ESRD (end stage renal disease)  Assessment and plan of treatment:	You have a history of kidney disease that required hemodialysis treatment. You underwent 3 sessions of hemodialysis. Please follow up with your nephrologist within a few days of discharge and continue on your current HD regimen.  Secondary Diagnosis:	Troponin level elevated  Assessment and plan of treatment:	You were found to have elevated levels of heart enzymes suggestive of stress to the heart. You were seen by cardiology who said that your elevated heart enzymes are associated with your kidney disease. You had en echo done, which showed ejection fraction of 63%. Please follow up with a Cardiologist within a week of discharge for further management of your heart function and blood pressure.  Secondary Diagnosis:	Anemia  Assessment and plan of treatment:	You were found to have anemia during admission, which may have been contributing to your shortness of breath. You anemia improved with treatment and your symptoms resolved. Please follow up with your primary care doctor and nephrologist within a few days of discharge.  Secondary Diagnosis:	Acidosis  Assessment and plan of treatment:	You were found to have high acids in your blood. This is most likely due to your kidney disease. You acid status returned to normal with hemodialysis. Please follow up with your nephrologist within a few days of discharge and continue on your current hemodialysis regimen.  Secondary Diagnosis:	Hypertension  Assessment and plan of treatment:	You were found to have high blood pressure on presentation to the hospital. You blood pressure was managed with medications that helped to improve your blood pressure. Please continue to take your blood pressure medications as indicated and please follow up with a Cardiologist within a week of discharge for further management of your heart function and blood pressure. Principal Discharge DX:	SOB (shortness of breath)  Goal:	Please follow up with nephrology and go to hemodialysis on 1/16  Assessment and plan of treatment:	You presented with a complaint of cough and shortness of breath. Imaging of your lungs and physical exam findings were suggestive of fluid overload, which may have been contributing to your cough and shortness of breath. You were given medications to decrease the fluids in your body and underwent hemodialysis to decrease and regulate the fluids and electrolytes in your body. Your symptoms improved and clinical status improved; you were deemed stable for discharge. Please follow up with your nephrologist and primary care doctor within a few days of discharge  Secondary Diagnosis:	ESRD (end stage renal disease)  Goal:	Please follow up with your nephrologist.  Assessment and plan of treatment:	You have a history of kidney disease that required hemodialysis treatment. You underwent 4 sessions of hemodialysis. Please follow up with your nephrologist within a few days of discharge and continue on your current dialysis regimen. Of note, patient received 2/5 doses of VENOFER and 1/8 doses of DRISDOL. Please continue remaining doses at dialysis.  Secondary Diagnosis:	Troponin level elevated  Assessment and plan of treatment:	You were found to have elevated levels of heart enzymes suggestive of stress to the heart. You were seen by cardiology who said that your elevated heart enzymes are associated with your kidney disease. You had en echo done, which showed ejection fraction of 63%. Please follow up with a Cardiologist within a week of discharge for further management of your heart function and blood pressure.  Secondary Diagnosis:	Anemia  Assessment and plan of treatment:	You were found to have anemia during admission, which may have been contributing to your shortness of breath. You anemia improved with treatment and your symptoms resolved. Please follow up with your primary care doctor and nephrologist within a few days of discharge.  Secondary Diagnosis:	Acidosis  Assessment and plan of treatment:	You were found to have high acids in your blood. This is most likely due to your kidney disease. You acid status returned to normal with hemodialysis. Please follow up with your nephrologist within a few days of discharge and continue on your current hemodialysis regimen.  Secondary Diagnosis:	Hypertension  Assessment and plan of treatment:	You were found to have high blood pressure on presentation to the hospital. You blood pressure was managed with medications that helped to improve your blood pressure. Please continue to take your blood pressure medications as indicated and please follow up with a Cardiologist within a week of discharge for further management of your heart function and blood pressure. Principal Discharge DX:	SOB (shortness of breath)  Goal:	Please follow up with nephrology and PMD and go to hemodialysis on 1/16  Assessment and plan of treatment:	You presented with a complaint of cough and shortness of breath. Imaging of your lungs and physical exam findings were suggestive of fluid overload, which may have been contributing to your cough and shortness of breath. You were given medications to decrease the fluids in your body and underwent hemodialysis to decrease and regulate the fluids and electrolytes in your body. Your symptoms improved and clinical status improved; you were deemed stable for discharge. Please follow up with your nephrologist and primary care doctor within a few days of discharge  Secondary Diagnosis:	ESRD (end stage renal disease)  Goal:	Please follow up with your nephrologist.  Assessment and plan of treatment:	You have a history of kidney disease that required hemodialysis treatment. You underwent 4 sessions of hemodialysis. Please follow up with your nephrologist within a few days of discharge and continue on your current dialysis regimen. Of note, patient received 2/5 doses of VENOFER. Please continue remaining doses at dialysis.  Secondary Diagnosis:	Troponin level elevated  Assessment and plan of treatment:	You were found to have elevated levels of heart enzymes suggestive of stress to the heart. You were seen by cardiology who said that your elevated heart enzymes are associated with your kidney disease. You had en echo done, which showed ejection fraction of 63%. Please follow up with a Cardiologist within a week of discharge for further management of your heart function and blood pressure.  Secondary Diagnosis:	Anemia  Assessment and plan of treatment:	You were found to have anemia during admission, which may have been contributing to your shortness of breath. You anemia improved with treatment and your symptoms resolved. Please follow up with your primary care doctor and nephrologist within a few days of discharge.  Secondary Diagnosis:	Acidosis  Assessment and plan of treatment:	You were found to have high acids in your blood. This is most likely due to your kidney disease. You acid status returned to normal with hemodialysis. Please follow up with your nephrologist within a few days of discharge and continue on your current hemodialysis regimen.  Secondary Diagnosis:	Hypertension  Assessment and plan of treatment:	You were found to have high blood pressure on presentation to the hospital. You blood pressure was managed with medications that helped to improve your blood pressure. Please continue to take your blood pressure medications as indicated and please follow up with a Cardiologist within a week of discharge for further management of your heart function and blood pressure.

## 2019-01-10 NOTE — PROGRESS NOTE ADULT - PROBLEM SELECTOR PLAN 6
- BP currently above goal  c/w nifedipine and resume home dose labetalol 300 BID - BP currently above goal  c/w nifedipine, resume home clonidine and increase to labetalol 400 BID - BP currently above goal  c/w nifedipine, resume home clonidine and increase home dose to labetalol 400 BID in the setting of persistent HTN

## 2019-01-10 NOTE — PROGRESS NOTE ADULT - ASSESSMENT
64M with hx of CAD s/p PCI x 3, HTN, DM2, GERD, CKD5 (not on HD yet, but has AVF placed) here for cough x 3 days and complaints of SOB/AMEZCUA. Suspect symptoms of SOB likely secondary to fluid overload in the setting of CKD (given edema and crackles on exam). Given reports of hemoptysis could also consider PNA (though currently afebrile, without leukocytosis,  is non-toxic appearing) vs TB (lower suspicion given recent negative workup) vs other (had negative workup for vasculitis on prior admission) 64M with hx of CAD s/p PCI x 3, HTN, DM2, GERD, CKD5 (not on HD yet, but has AVF placed) here for cough x 3 days and complaints of SOB/AMEZCUA. Suspect symptoms of SOB likely secondary to fluid overload in the setting of CKD (given edema and crackles on exam). Now ESRD, s/p diuresis and initiation of HD, with symptomatic improvement.

## 2019-01-10 NOTE — PROGRESS NOTE ADULT - SUBJECTIVE AND OBJECTIVE BOX
[facility]   MD JORDAN AVALOS MD ANGELA WONG, PA    TEL:  OFFICE: [phone]  DR CUETO CELL: [phone]  DR. HORTA CELL: [phone]  LIDA WINTERS CELL: [phone]        Patient is a 64y old  Male who presents with a chief complaint of SOB/AMEZCUA, cough (10 Devonte 2019 14:47)      Patient seen and examined at bedside. No chest pain/sob    VITALS:  T(F): 98.4 (01-10-19 @ 12:28), Max: 98.7 (01-10-19 @ 05:50)  HR: 68 (01-10-19 @ 14:13)  BP: 181/91 (01-10-19 @ 14:13)  RR: 17 (01-10-19 @ 12:28)  SpO2: 100% (01-10-19 @ 12:28)  Wt(kg): --    01-09 @ 07:01  -  01-10 @ 07:00  --------------------------------------------------------  IN: 1000 mL / OUT: 3725 mL / NET: -2725 mL    01-10 @ 07:01  -  01-10 @ 14:54  --------------------------------------------------------  IN: 300 mL / OUT: 280 mL / NET: 20 mL      Height (cm): 167.64 (01-09 @ 19:50)  Weight (kg): 62.3 (01-09 @ 19:50)  BMI (kg/m2): 22.2 (01-09 @ 19:50)  BSA (m2): 1.7 (01-09 @ 19:50)    PHYSICAL EXAM:  Constitutional: NAD  Neck: No JVD  Respiratory: CTAB, no wheezes, rales or rhonchi  Cardiovascular: S1, S2, RRR  Gastrointestinal: BS+, soft, NT/ND  Extremities: No peripheral edema    Hospital Medications:   MEDICATIONS  (STANDING):  aspirin enteric coated 81 milliGRAM(s) Oral daily  calcitriol   Capsule 0.25 MICROGram(s) Oral daily  calcium acetate 1334 milliGRAM(s) Oral three times a day with meals  chlorhexidine 4% Liquid 1 Application(s) Topical <User Schedule>  cloNIDine 0.1 milliGRAM(s) Oral at bedtime  dextrose 5%. 1000 milliLiter(s) (50 mL/Hr) IV Continuous <Continuous>  dextrose 50% Injectable 12.5 Gram(s) IV Push once  dextrose 50% Injectable 25 Gram(s) IV Push once  dextrose 50% Injectable 25 Gram(s) IV Push once  epoetin ralph Injectable 31818 Unit(s) IV Push <User Schedule>  furosemide   Injectable 80 milliGRAM(s) IV Push two times a day  heparin  Injectable 5000 Unit(s) SubCutaneous every 12 hours  insulin glargine Injectable (LANTUS) 5 Unit(s) SubCutaneous at bedtime  insulin lispro (HumaLOG) corrective regimen sliding scale   SubCutaneous three times a day before meals  insulin lispro (HumaLOG) corrective regimen sliding scale   SubCutaneous at bedtime  iron sucrose Injectable 100 milliGRAM(s) IV Push <User Schedule>  labetalol 400 milliGRAM(s) Oral two times a day  NIFEdipine XL 60 milliGRAM(s) Oral daily      LABS:  01-10    141  |  103  |  43<H>  ----------------------------<  93  4.1   |  24  |  6.58<H>    Ca    8.1<L>      10 Devonte 2019 05:20  Phos  4.4     01-10  Mg     1.8     01-10      Creatinine Trend: 6.58 <--, 8.51 <--, 8.38 <--    Phosphorus Level, Serum: 4.4 mg/dL (01-10 @ 05:20)    calcium--  intact pth--  parathyroid hormone intact, [id number]  calcium--  intact pth--  parathyroid hormone intact, [id number]                            8.0    6.08  )-----------( 156      ( 10 Devonte 2019 05:20 )             25.3     Urine Studies:  Urinalysis - [01-09-19 @ 10:32]      Color COLORLESS / Appearance CLEAR / SG 1.007 / pH 6.0      Gluc NEGATIVE / Ketone NEGATIVE  / Bili NEGATIVE / Urobili NORMAL       Blood SMALL / Protein 200 / Leuk Est NEGATIVE / Nitrite NEGATIVE      RBC 0-2 / WBC  / Hyaline  / Gran  / Sq Epi  / Non Sq Epi  / Bacteria       Iron 38, TIBC 233, %sat --      [01-09-19 @ 06:37]  Ferritin 88.94      [01-09-19 @ 06:37]  .1 (Ca --)      [01-10-19 @ 05:20]   --  .2 (Ca --)      [01-09-19 @ 16:30]   --  .9 (Ca --)      [06-14-18 @ 08:28]   --  HbA1c 6.8      [01-09-19 @ 06:37]    HBsAb <3.0      [01-09-19 @ 16:30]  HBsAg NEGATIVE      [01-09-19 @ 11:30]  HCV 0.13, Nonreactive Hepatitis C AB  S/CO Ratio                        Interpretation  < 1.0                                     Non-Reactive  1.0 - 4.9                           Weakly-Reactive  > 5.0                                 Reactive  Non-Reactive: Aperson with a non-reactive HCV antibody  result is considered uninfected.  No further action is  needed unless recent infection is suspected.  In these  cases, consider repeat testing later to detect  seroconversion..  Weakly-Reactive: HCV antibody test is abnormal, HCV RNA  Qualitative test will follow.  Reactive: HCV antibody test is abnormal, HCV RNA  Qualitative test will follow.  Note: HCV antibody testing is performed on the [employer]   system.      [01-09-19 @ 16:30]      RADIOLOGY & ADDITIONAL STUDIES:

## 2019-01-10 NOTE — DISCHARGE NOTE ADULT - PLAN OF CARE
To evaluate and manage SOB You presented with a complaint of cough and shortness of breath. Imaging of your lungs and physical exam findings were suggestive of fluid overload, which may have been contributing to your cough and shortness of breath. You were given medications to decrease the fluids in your body and underwent hemodialysis on decrease and regulate the fluids and electrolytes in your body. Your symptoms improved and clinical status improved; you were deemed stable for discharge. You have a history of kidney disease that required hemodialysis treatment. You underwent 3 sessions of hemodialysis You presented with a complaint of cough and shortness of breath. Imaging of your lungs and physical exam findings were suggestive of fluid overload, which may have been contributing to your cough and shortness of breath. You were given medications to decrease the fluids in your body and underwent hemodialysis on decrease and regulate the fluids and electrolytes in your body. Your symptoms improved and clinical status improved; you were deemed stable for discharge.Please follow up with your nephrologist and primary care doctor within a few days of discharge You have a history of kidney disease that required hemodialysis treatment. You underwent 3 sessions of hemodialysis. Please follow up with your nephrologist within a few days of discharge and continue on your current HD regimen. You were found to have elevated levels of heart enzymes suggestive of stress to the heart. You were seen by cardiology who said that your elevated heart enzymes are associated with your kidney disease. You had en echo done, which showed_____. Please follow up with a Cardiologist within a week of discharge for further management of your heart function and blood pressure. You were found to have anemia during admission, which may have been contributing to your shortness of breath. You anemia improved with treatment and your symptoms resolved. Please follow up with your primary care doctor and nephrologist within a few days of discharge. You were found to have high acids in your blood. This is most likely due to your kidney disease. You accid status returned to normal with hemodialysis. Please follow up with your nephrologist within a few days of discharge and continue on your current hemodialysis regimen. You were found to have high blood pressure on presentation to the hospital. You blood pressure was managed with medications that helped to improve your blood pressure. Please continue to take your blood pressure medications as indicated and please follow up with a Cardiologist within a week of discharge for further management of your heart function and blood pressure. You were found to have elevated levels of heart enzymes suggestive of stress to the heart. You were seen by cardiology who said that your elevated heart enzymes are associated with your kidney disease. You had en echo done, which showed ejection fraction of 63%. Please follow up with a Cardiologist within a week of discharge for further management of your heart function and blood pressure. You were found to have high acids in your blood. This is most likely due to your kidney disease. You acid status returned to normal with hemodialysis. Please follow up with your nephrologist within a few days of discharge and continue on your current hemodialysis regimen. You presented with a complaint of cough and shortness of breath. Imaging of your lungs and physical exam findings were suggestive of fluid overload, which may have been contributing to your cough and shortness of breath. You were given medications to decrease the fluids in your body and underwent hemodialysis on decrease and regulate the fluids and electrolytes in your body. Your symptoms improved and clinical status improved; you were deemed stable for discharge. Please follow up with your nephrologist and primary care doctor within a few days of discharge Please follow up with nephrology and go to hemodialysis on 1/16 You presented with a complaint of cough and shortness of breath. Imaging of your lungs and physical exam findings were suggestive of fluid overload, which may have been contributing to your cough and shortness of breath. You were given medications to decrease the fluids in your body and underwent hemodialysis to decrease and regulate the fluids and electrolytes in your body. Your symptoms improved and clinical status improved; you were deemed stable for discharge. Please follow up with your nephrologist and primary care doctor within a few days of discharge Please follow up with your nephrologist. You have a history of kidney disease that required hemodialysis treatment. You underwent 4 sessions of hemodialysis. Please follow up with your nephrologist within a few days of discharge and continue on your current dialysis regimen. Of note, patient received 2/5 doses of VENOFER and 1/8 doses of DRISDOL. Please continue remaining doses at dialysis. Please follow up with nephrology and PMD and go to hemodialysis on 1/16 You have a history of kidney disease that required hemodialysis treatment. You underwent 4 sessions of hemodialysis. Please follow up with your nephrologist within a few days of discharge and continue on your current dialysis regimen. Of note, patient received 2/5 doses of VENOFER. Please continue remaining doses at dialysis.

## 2019-01-10 NOTE — PROGRESS NOTE ADULT - PROBLEM SELECTOR PLAN 6
elevated PTH on calcitriol, now on HD will switch to hectorol 2mcg with HD   continue phoslo  monitor ca and phos daily.

## 2019-01-10 NOTE — DISCHARGE NOTE ADULT - MEDICATION SUMMARY - MEDICATIONS TO CHANGE
I will SWITCH the dose or number of times a day I take the medications listed below when I get home from the hospital:  None I will SWITCH the dose or number of times a day I take the medications listed below when I get home from the hospital:    NovoLIN 70/30 FlexPen subcutaneous suspension  -- 15 unit(s) subcutaneous 2 times a day    labetalol 300 mg oral tablet  -- 1 tab(s) by mouth 2 times a day

## 2019-01-10 NOTE — DISCHARGE NOTE ADULT - ADDITIONAL INSTRUCTIONS
Please follow up with your nephrologist and primary care doctor within a few days of discharge.  Please follow up with your cardiologist within a week of discharge.  Please go to your scheduled hemodialysis sessions. Please follow up with your nephrologist and primary care doctor within a few days of discharge. You have dialysis on 1/16.  Please follow up with your cardiologist within a week of discharge.  Please go to your scheduled hemodialysis sessions.    Of note, patient received 2/5 doses of VENOFER and 1/8 doses of DRISDOL. Please continue remaining doses at dialysis. Please follow up with your nephrologist and primary care doctor within a few days of discharge. You have dialysis on 1/16.  Please follow up with your cardiologist within a week of discharge.  Please go to your scheduled hemodialysis sessions.    Of note, patient received 2/5 doses of VENOFER. Please continue remaining doses at dialysis.

## 2019-01-10 NOTE — PROGRESS NOTE ADULT - PROBLEM SELECTOR PLAN 2
Baseline creat 5 in summer, currently ~9  Pt to be started on HD today as per renal  trend and replete electrolytes  strict I/Os  f/u Vit D, PTH  c/w phoslo, renal diet Resolved.  -suspect secondary to fluid overload in the setting of CKDV  - will c/w 80 IV lasix BID as per cards recs  - patient also noted to be anemic hgb 7.1 (likely 2/2 CKD) on admission, maybe a component of symptomatic anemia, currently 8, pt on EPO  - lower suspicion for lung CA/pneumonia/TB/pulmonary hemorrhage at this time   - RVP negative, CXR showing b/l pleural effusions, vasculitis/TB workup negative on prior admission

## 2019-01-10 NOTE — PROGRESS NOTE ADULT - PROBLEM SELECTOR PLAN 1
CKD 5 not on dialysis, present with worsen renal function, fluid overload. now ESRD started on HD 1/9. tolerated well. HD today and tmr  AVF working well   renal diet  Monitor BMP and BP  SW planning with outpatient HD, jose alfredo lucas CKD 5 not on dialysis, present with worsen renal function, fluid overload. now ESRD started on HD 1/9. tolerated well. HD today and tmr  AVF working well   renal diet  Monitor BMP and BP  SW planning with outpatient HD, [place] vs [place]

## 2019-01-10 NOTE — PROGRESS NOTE ADULT - PROBLEM SELECTOR PLAN 5
Pt asymptomatic  Demand ischemia vs decreased clearance secondary to CKD  EKG not showing evidence of STEMI/NSTEMI  Will continue to trend  cards following

## 2019-01-10 NOTE — PROGRESS NOTE ADULT - ASSESSMENT
EKG - NSR LVH with repolarization changes   Echo 6/18 - Normal LV function    a/p     1) Shortness of breath - secondary to volume overload, improved after HD, on lasix 80mg IV q12 still makes urine     2) Troponin elevation - no CP, likely sec to CKD, pending get 2d echo to access LV function     3) Weakness - likely sec to anemia, sec to renal disease    4) ESRD - s/p HD     5) CAD s/p PCI - s/p 3 stents in 2014, cont asa

## 2019-01-10 NOTE — DISCHARGE NOTE ADULT - COMMUNITY RESOURCES
Patient accepted at [facility] [phone] [facility] [address] phone  on MWF 9:00am.Start date wednesday 01/16/2019 at 8:00am

## 2019-01-10 NOTE — DISCHARGE NOTE ADULT - MEDICATION SUMMARY - MEDICATIONS TO TAKE
I will START or STAY ON the medications listed below when I get home from the hospital:    Please follow up with Dr. Infante in 2 weeks  -- Indication: For ESRD (end stage renal disease)    Aspirin Enteric Coated 81 mg oral delayed release tablet  -- 1 tab(s) by mouth once a day  -- Indication: For Chronic kidney disease-mineral and bone disorder    losartan 50 mg oral tablet  -- 1 tab(s) by mouth once a day  -- Indication: For Hypertension    cloNIDine 0.1 mg oral tablet  -- 1 tab(s) by mouth once a day (at bedtime)  -- Indication: For Hypertension    NovoLIN 70/30 FlexPen subcutaneous suspension  -- 15 unit(s) subcutaneous 2 times a day  -- Indication: For Diabetes mellitus    atorvastatin 40 mg oral tablet  -- 1 tab(s) by mouth once a day  -- Indication: For Diabetes mellitus    labetalol 300 mg oral tablet  -- 1 tab(s) by mouth 2 times a day  -- Indication: For Hypertension    NIFEdipine 60 mg oral tablet, extended release  -- 1 tab(s) by mouth once a day  -- Indication: For Hypertension    calcium acetate 667 mg oral tablet  -- 1334 milligram(s) by mouth 3 times a day with meals  -- Indication: For Chronic kidney disease-mineral and bone disorder    calcitriol 0.25 mcg oral capsule  -- 1 cap(s) by mouth once a day  -- Indication: For Chronic kidney disease-mineral and bone disorder I will START or STAY ON the medications listed below when I get home from the hospital:    Please follow up with Dr. Infante in 2 weeks  -- Indication: For ESRD (end stage renal disease)    Aspirin Enteric Coated 81 mg oral delayed release tablet  -- 1 tab(s) by mouth once a day  -- Indication: For Chronic kidney disease-mineral and bone disorder    losartan 50 mg oral tablet  -- 1 tab(s) by mouth once a day  -- Indication: For Hypertension    cloNIDine 0.1 mg oral tablet  -- 1 tab(s) by mouth once a day (at bedtime)  -- Indication: For Hypertension    NovoLIN 70/30 FlexPen subcutaneous suspension  -- 7 unit(s) subcutaneous 2 times a day   -- Indication: For Diabetes mellitus    atorvastatin 40 mg oral tablet  -- 1 tab(s) by mouth once a day  -- Indication: For Diabetes mellitus    labetalol 200 mg oral tablet  -- 2 tab(s) by mouth 2 times a day   -- It is very important that you take or use this exactly as directed.  Do not skip doses or discontinue unless directed by your doctor.  May cause drowsiness.  Alcohol may intensify this effect.  Use care when operating dangerous machinery.  Some non-prescription drugs may aggravate your condition.  Read all labels carefully.  If a warning appears, check with your doctor before taking.    -- Indication: For Hypertension    NIFEdipine 60 mg oral tablet, extended release  -- 1 tab(s) by mouth once a day  -- Indication: For Hypertension    calcium acetate 667 mg oral tablet  -- 1334 milligram(s) by mouth 3 times a day with meals  -- Indication: For Chronic kidney disease-mineral and bone disorder    ergocalciferol 50,000 intl units (1.25 mg) oral capsule  -- 1 cap(s) by mouth once a week  -- Indication: For Chronic kidney disease-mineral and bone disorder    calcitriol 0.25 mcg oral capsule  -- 1 cap(s) by mouth once a day  -- Indication: For Chronic kidney disease-mineral and bone disorder

## 2019-01-10 NOTE — PROGRESS NOTE ADULT - PROBLEM SELECTOR PLAN 3
pt w/ anion gap 14 metabolic acidosis; pH 7.28 with bicarb 14 and pCO2 41 on admission  AG currently 15  Likely secondary to CKD  pt to be started on HD, which should help to resolve the acidosis Resolved s/p HD  Bicarb 24  Likely secondary to CKD

## 2019-01-10 NOTE — PROGRESS NOTE ADULT - PROBLEM SELECTOR PLAN 7
Pt on novolog 15 at home; due to hypoglycemia to 68 in AM will transition to decreased dose of lantus 5   Pt started on decreased dose on admission due to novolog to lantus conversion in setting of hospital stay w/ likely decreased PO intake   Will adjust regimen based on ISS and BS levels  A1c in Oct 2018 is 8.2; most recently 6.8 Pt on novolog 15 at home; curently on lantus 5, morning sugar 99, will c/w current dose  Pt started on decreased dose on admission due to novolog to lantus conversion in setting of hospital stay w/ likely decreased PO intake   Will adjust regimen based on ISS and BS levels  A1c in Oct 2018 is 8.2; most recently 6.8 Pt on novolog 15 at home; curently on lantus 5, morning FS 99, will c/w current dose  Pt started on decreased dose on admission due to novolog to lantus conversion in setting of hospital stay w/ likely decreased PO intake   Will adjust regimen based on ISS and BS levels  A1c in Oct 2018 is 8.2; most recently 6.8

## 2019-01-10 NOTE — PROGRESS NOTE ADULT - SUBJECTIVE AND OBJECTIVE BOX
Jimmy Sandhu MD  Interventional Cardiology  [place] Office : [phone] [place] [id number]  Tel:   [place] Office : [address] [facility] [place] [address]  Tel: [phone]  Cell : [phone]    Subjective : Pt lying in bed comfortable, not in distress, denies any chest pain or SOB  	  MEDICATIONS:  aspirin enteric coated 81 milliGRAM(s) Oral daily  cloNIDine 0.1 milliGRAM(s) Oral at bedtime  furosemide   Injectable 80 milliGRAM(s) IV Push two times a day  heparin  Injectable 5000 Unit(s) SubCutaneous every 12 hours  labetalol 400 milliGRAM(s) Oral two times a day  NIFEdipine XL 60 milliGRAM(s) Oral daily            dextrose 40% Gel 15 Gram(s) Oral once PRN  dextrose 50% Injectable 12.5 Gram(s) IV Push once  dextrose 50% Injectable 25 Gram(s) IV Push once  dextrose 50% Injectable 25 Gram(s) IV Push once  glucagon  Injectable 1 milliGRAM(s) IntraMuscular once PRN  insulin glargine Injectable (LANTUS) 5 Unit(s) SubCutaneous at bedtime  insulin lispro (HumaLOG) corrective regimen sliding scale   SubCutaneous three times a day before meals  insulin lispro (HumaLOG) corrective regimen sliding scale   SubCutaneous at bedtime    calcitriol   Capsule 0.25 MICROGram(s) Oral daily  calcium acetate 1334 milliGRAM(s) Oral three times a day with meals  chlorhexidine 4% Liquid 1 Application(s) Topical <User Schedule>  dextrose 5%. 1000 milliLiter(s) IV Continuous <Continuous>  epoetin ralph Injectable 70477 Unit(s) IV Push <User Schedule>  iron sucrose Injectable 100 milliGRAM(s) IV Push <User Schedule>      PHYSICAL EXAM:  T(C): 36.9 (01-10-19 @ 12:28), Max: 37.1 (01-10-19 @ 05:50)  HR: 68 (01-10-19 @ 14:13) (68 - 86)  BP: 181/91 (01-10-19 @ 14:13) (151/82 - 183/86)  RR: 17 (01-10-19 @ 12:28) (16 - 17)  SpO2: 100% (01-10-19 @ 12:28) (98% - 100%)  Wt(kg): --  I&O's Summary    09 Jan 2019 07:01  -  10 Devonte 2019 07:00  --------------------------------------------------------  IN: 1000 mL / OUT: 3725 mL / NET: -2725 mL    10 Devonte 2019 07:01  -  10 Devonte 2019 14:47  --------------------------------------------------------  IN: 300 mL / OUT: 280 mL / NET: 20 mL      Height (cm): 167.64 (01-09 @ 19:50)  Weight (kg): 62.3 (01-09 @ 19:50)  BMI (kg/m2): 22.2 (01-09 @ 19:50)  BSA (m2): 1.7 (01-09 @ 19:50)    Appearance: Normal	  HEENT:   Normal oral mucosa, PERRL, EOMI	  Cardiovascular: Normal S1 S2, No JVD, systolic murmur +, No edema  Respiratory: Lungs clear to auscultation	  Gastrointestinal:  Soft, Non-tender, + BS	  Extremities: Normal range of motion, No clubbing, cyanosis or edema                                    8.0    6.08  )-----------( 156      ( 10 Devonte 2019 05:20 )             25.3     01-10    141  |  103  |  43<H>  ----------------------------<  93  4.1   |  24  |  6.58<H>    Ca    8.1<L>      10 Devonte 2019 05:20  Phos  4.4     01-10  Mg     1.8     01-10      proBNP:   Lipid Profile:   HgA1c:   TSH:

## 2019-01-10 NOTE — PROGRESS NOTE ADULT - ASSESSMENT
64M with hx of CAD s/p PCI x 3, HTN, IDDM, GERD, CKD5 (not on HD yet, but has AVF placed) here for non-productive cough x 3 days c/b hemoptysis and epistaxis.

## 2019-01-10 NOTE — DISCHARGE NOTE ADULT - HOSPITAL COURSE
HPI:  64M with hx of CAD s/p PCI x 3, HTN, IDDM, GERD, CKD5 (not on HD yet, but has AVF placed) here for non-productive cough x 3 days c/b hemoptysis and epistaxis. Pt conveys that he has also been experienced chills during this time, but denies fevers. Pt has been having SOB on exertion after walking 3 blocks, but he denies any orthopnea. Pt denies CP, nausea/vomiting/diarrhea or blood in the stool or urine. Pt was hospitalized in 7/18 due to a complaint of hemoptysis. Upon that admission pt was started on zosyn and azithromycin for CAP. Pt was d/c on 5 days total of ceftin and azithromycin. TB workup w/ QuantiFeron gold and 2 sputum AFB samples returned negative. Vasculitis blood tests including p-ANCA, c-ANCA, and anti-GBM antibodies returned negative. Patient reports compliance with his medications, denies dietary indiscretions.     Hospital course:    Pt is s/p HDx3 during the hospital stay. Baseline creat 5 in summer, downtrending from ~9 on admission after HD downtrending to currently__  strict I/Os HPI:  64M with hx of CAD s/p PCI x 3, HTN, IDDM, GERD, CKD5 (not on HD yet, but has AVF placed) here for non-productive cough x 3 days c/b hemoptysis and epistaxis. Pt conveys that he has also been experienced chills during this time, but denies fevers. Pt has been having SOB on exertion after walking 3 blocks, but he denies any orthopnea. Pt denies CP, nausea/vomiting/diarrhea or blood in the stool or urine. Pt was hospitalized in 7/18 due to a complaint of hemoptysis. Upon that admission pt was started on zosyn and azithromycin for CAP. Pt was d/c on 5 days total of ceftin and azithromycin. TB workup w/ QuantiFeron gold and 2 sputum AFB samples returned negative. Vasculitis blood tests including p-ANCA, c-ANCA, and anti-GBM antibodies returned negative. Patient reports compliance with his medications, denies dietary indiscretions.     Hospital course:    Pt's symptoms of SOB likely secondary to fluid overload in the setting of CKD (given edema and crackles on exam).  RVP negative and CXR showing b/l pleural effusions, vasculitis/TB workup negative on prior admission. Pt was initiated on HD, with symptomatic improvement. Pt underwent HDx3 during the hospital stay. Baseline creat 5 in summer,  ~9 on admission with improvement to __. Pt on started 80 IV lasix BID as per cards recs for fluid overload. His troponin level was elevated on admission but pt was asymptomatic. This likely secondary to demand ischemia vs decreased clearance secondary to CKD. EKG not showing evidence of STEMI/NSTEMI. No ischemic w/u recommended by cards. Patient also noted to be anemic w/ hgb 7.1 (likely 2/2 CKD) on admission treated w/ EPO that resulted in subsequent improvement to Hgb__.  admission. Iron sat 16%, pt placed on IV iron with HD x 5 doses. Pt titrated to lantus 5 and continued on ISS w/ appropriate BS control. Pt placed on decreased dose of insulin on admission due to novolog to lantus conversion in setting of hospital stay w/ likely decreased PO intake. HPI:  64M with hx of CAD s/p PCI x 3, HTN, IDDM, GERD, CKD5 (not on HD yet, but has AVF placed) here for non-productive cough x 3 days c/b hemoptysis and epistaxis. Pt conveys that he has also been experienced chills during this time, but denies fevers. Pt has been having SOB on exertion after walking 3 blocks, but he denies any orthopnea. Pt denies CP, nausea/vomiting/diarrhea or blood in the stool or urine. Pt was hospitalized in 7/18 due to a complaint of hemoptysis. Upon that admission pt was started on zosyn and azithromycin for CAP. Pt was d/c on 5 days total of ceftin and azithromycin. TB workup w/ QuantiFeron gold and 2 sputum AFB samples returned negative. Vasculitis blood tests including p-ANCA, c-ANCA, and anti-GBM antibodies returned negative. Patient reports compliance with his medications, denies dietary indiscretions.     Hospital course:    Pt's symptoms of SOB likely secondary to fluid overload in the setting of CKD (given edema and crackles on exam).  RVP negative and CXR showing b/l pleural effusions, vasculitis/TB workup negative on prior admission. Pt was initiated on HD, with symptomatic improvement. Is now ESRD. Pt underwent HDx3 during the hospital stay. Pt on started 80 IV lasix BID as per cards recs for fluid overload. His troponin level was elevated on admission but pt was asymptomatic. This likely secondary to demand ischemia vs decreased clearance secondary to CKD. EKG not showing evidence of STEMI/NSTEMI. No ischemic w/u recommended by cards. Patient also noted to have anemia 2/2 renal disease w/ hgb 7.1 on admission, treated w/ EPO, Iron sat 16%, pt also placed on IV iron with HD x 5 doses. Metabolic acidosis improved s/p HD. Continued on phoslo and renal diet. Pt titrated to lantus 5 and continued on ISS w/ appropriate glycemic control. Pt placed on decreased dose of insulin on admission due to novolog to lantus conversion in setting of hospital stay w/ likely decreased PO intake.    Appreciate CM/SW assistance with setup of outpatient HD services. Medically stable for discharge with otpt PMD/nephrology and cardiology followup. HPI:  64M with hx of CAD s/p PCI x 3, HTN, IDDM, GERD, CKD5 (not on HD yet, but has AVF placed) here for non-productive cough x 3 days c/b hemoptysis and epistaxis. Pt conveys that he has also been experienced chills during this time, but denies fevers. Pt has been having SOB on exertion after walking 3 blocks, but he denies any orthopnea. Pt denies CP, nausea/vomiting/diarrhea or blood in the stool or urine. Pt was hospitalized in 7/18 due to a complaint of hemoptysis. Upon that admission pt was started on zosyn and azithromycin for CAP. Pt was d/c on 5 days total of ceftin and azithromycin. TB workup w/ QuantiFeron gold and 2 sputum AFB samples returned negative. Vasculitis blood tests including p-ANCA, c-ANCA, and anti-GBM antibodies returned negative. Patient reports compliance with his medications, denies dietary indiscretions.     Hospital course:    Pt's symptoms of SOB likely secondary to fluid overload in the setting of CKD (given edema and crackles on exam).  RVP negative and CXR showing b/l pleural effusions, vasculitis/TB workup negative on prior admission. Pt was initiated on HD, with symptomatic improvement. Is now ESRD. Pt underwent HDx3 during the hospital stay. Pt on started 80 IV lasix BID as per cards recs for fluid overload. His troponin level was elevated on admission but pt was asymptomatic. This likely secondary to demand ischemia vs decreased clearance secondary to CKD. EKG not showing evidence of STEMI/NSTEMI. No ischemic w/u recommended by cards. Patient also noted to have anemia 2/2 renal disease w/ hgb 7.1 on admission, treated w/ EPO, Iron sat 16%, pt also placed on IV iron with HD x 5 doses. Metabolic acidosis improved s/p HD. Continued on phoslo and renal diet. Pt titrated to lantus 5 and continued on ISS w/ appropriate glycemic control. Pt placed on decreased dose of insulin on admission due to novolog to lantus conversion in setting of hospital stay w/ likely decreased PO intake.    Appreciate CM/SW assistance with setup of outpatient HD services. Medically stable for discharge with otpt PMD/nephrology and cardiology followup

## 2019-01-10 NOTE — DISCHARGE NOTE ADULT - PATIENT PORTAL LINK FT
You can access the [facility] Patient Portal, offered by [facility], by registering with the following website: [email or web address]

## 2019-01-10 NOTE — PROGRESS NOTE ADULT - SUBJECTIVE AND OBJECTIVE BOX
Patient is a 64y old  Male who presents with a chief complaint of SOB/AMEZCUA, cough (10 Devonte 2019 13:23)      SUBJECTIVE / OVERNIGHT EVENTS:  No acute events overnight. Pt has some cough but no hemoptysis. Pt denies f/c/n/v/d/CP/SOB.    MEDICATIONS  (STANDING):  aspirin enteric coated 81 milliGRAM(s) Oral daily  calcitriol   Capsule 0.25 MICROGram(s) Oral daily  calcium acetate 1334 milliGRAM(s) Oral three times a day with meals  chlorhexidine 4% Liquid 1 Application(s) Topical <User Schedule>  cloNIDine 0.1 milliGRAM(s) Oral at bedtime  dextrose 5%. 1000 milliLiter(s) (50 mL/Hr) IV Continuous <Continuous>  dextrose 50% Injectable 12.5 Gram(s) IV Push once  dextrose 50% Injectable 25 Gram(s) IV Push once  dextrose 50% Injectable 25 Gram(s) IV Push once  epoetin ralph Injectable 95074 Unit(s) IV Push <User Schedule>  furosemide   Injectable 80 milliGRAM(s) IV Push two times a day  heparin  Injectable 5000 Unit(s) SubCutaneous every 12 hours  insulin glargine Injectable (LANTUS) 5 Unit(s) SubCutaneous at bedtime  insulin lispro (HumaLOG) corrective regimen sliding scale   SubCutaneous three times a day before meals  insulin lispro (HumaLOG) corrective regimen sliding scale   SubCutaneous at bedtime  iron sucrose Injectable 100 milliGRAM(s) IV Push <User Schedule>  labetalol 400 milliGRAM(s) Oral two times a day  labetalol Injectable 10 milliGRAM(s) IV Push once  NIFEdipine XL 60 milliGRAM(s) Oral daily    MEDICATIONS  (PRN):  dextrose 40% Gel 15 Gram(s) Oral once PRN Blood Glucose LESS THAN 70 milliGRAM(s)/deciliter  glucagon  Injectable 1 milliGRAM(s) IntraMuscular once PRN Glucose LESS THAN 70 milligrams/deciliter      Vital Signs Last 24 Hrs  T(C): 36.9 (10 Devonte 2019 12:28), Max: 37.1 (10 Devonte 2019 05:50)  T(F): 98.4 (10 Devonte 2019 12:28), Max: 98.7 (10 Devonte 2019 05:50)  HR: 72 (10 Devonte 2019 13:13) (65 - 86)  BP: 183/86 (10 Devonte 2019 13:13) (149/73 - 183/86)  BP(mean): --  RR: 17 (10 Devonte 2019 12:28) (16 - 18)  SpO2: 100% (10 Devonte 2019 12:28) (98% - 100%)    CAPILLARY BLOOD GLUCOSE      POCT Blood Glucose.: 176 mg/dL (10 Devonte 2019 12:08)  POCT Blood Glucose.: 99 mg/dL (10 Devonte 2019 07:45)  POCT Blood Glucose.: 318 mg/dL (2019 21:31)  POCT Blood Glucose.: 164 mg/dL (2019 16:48)    I&O's Summary    2019 07:01  -  10 Devonte 2019 07:00  --------------------------------------------------------  IN: 1000 mL / OUT: 3725 mL / NET: -2725 mL    10 Devonte 2019 07:01  -  10 Devonte 2019 13:41  --------------------------------------------------------  IN: 300 mL / OUT: 280 mL / NET: 20 mL        PHYSICAL EXAM:  	GENERAL: Comfortable, NAD  	HEAD:  Normocephalic, atraumatic  	ENMT: Moist mucous membranes  	NECK: Supple, No JVD  	NERVOUS SYSTEM:  Alert & Oriented X3, moving all extremities  	CHEST/LUNG: Crackles b/l at the bases, in no respiratory distress, speaking in full sentences  	HEART: S1S2 Regular rate and rhythm, no murmur   	ABDOMEN: Soft, Nontender, Nondistended, Bowel sounds present  	EXTREMITIES:  mild b/l pedal edema, decreased from yesterday, LUE fistula in place  	MUSCULOSKELTAL- No muscle tenderness, no joint tenderness  SKIN- warm and dry, no rash    LABS:                        8.0    6.08  )-----------( 156      ( 10 Devonte 2019 05:20 )             25.3     Auto Eosinophil # x     / Auto Eosinophil % x     / Auto Neutrophil # x     / Auto Neutrophil % x     / BANDS % x                            7.3    5.50  )-----------( 157      ( 2019 06:37 )             23.6     Auto Eosinophil # 0.18  / Auto Eosinophil % 3.3   / Auto Neutrophil # 3.63  / Auto Neutrophil % 66.0  / BANDS % x                            7.1    5.48  )-----------( 163      ( 2019 13:50 )             22.9     Auto Eosinophil # 0.18  / Auto Eosinophil % 3.3   / Auto Neutrophil # 3.62  / Auto Neutrophil % 66.0  / BANDS % x        01-10    141  |  103  |  43<H>  ----------------------------<  93  4.1   |  24  |  6.58<H>      140  |  108<H>  |  61<H>  ----------------------------<  68<L>  4.4   |  17<L>  |  8.51<H>      137  |  109<H>  |  58<H>  ----------------------------<  94  4.6   |  14<L>  |  8.38<H>    Ca    8.1<L>      10 Devonte 2019 05:20  Mg     1.8     01-10  Phos  4.4     01-10  TPro  6.8  /  Alb  3.1<L>  /  TBili  0.3  /  DBili  x   /  AST  16  /  ALT  13  /  AlkPhos  58      PT/INR - ( 2019 13:50 )   PT: 12.1 SEC;   INR: 1.09            CARDIAC MARKERS ( 2019 15:10 )  x     / x     / 164 u/L / 3.17 ng/mL / x          Urinalysis Basic - ( 2019 10:32 )    Color: COLORLESS / Appearance: CLEAR / S.007 / pH: 6.0  Gluc: NEGATIVE / Ketone: NEGATIVE  / Bili: NEGATIVE / Urobili: NORMAL   Blood: SMALL / Protein: 200 / Nitrite: NEGATIVE   Leuk Esterase: NEGATIVE / RBC: 0-2 / WBC x   Sq Epi: x / Non Sq Epi: x / Bacteria: x          RESPIRATORY  VENT:    ABG:     VBG:     RADIOLOGY & ADDITIONAL TESTS:  (Imaging Personally Reviewed) NA    Consultant(s) Notes Reviewed:  nephro, cards    Care Discussed with Consultants/Other Providers: nephro

## 2019-01-10 NOTE — PROGRESS NOTE ADULT - PROBLEM SELECTOR PLAN 5
BP uncontrolled  patient is now on HD, will start losartan 50mg daily, up titrate if needed.   UF with HD  low Na diet  monitor BP closely.

## 2019-01-10 NOTE — PROGRESS NOTE ADULT - PROBLEM SELECTOR PLAN 4
Hgb Baseline ~10, currently ~7.3  Likely secondary to CKD; epo with HD. Iron sat 16%, will start IV iron with HD x 5 doses and transfuse to keep Hb>8 as per renal recs Hgb Baseline ~10, currently improved to ~8, pt receiving EPO w/ HD   Iron sat 16%, pt on IV iron with HD x 5 doses and transfuse as needed

## 2019-01-10 NOTE — DISCHARGE NOTE ADULT - MEDICATION SUMMARY - MEDICATIONS TO STOP TAKING
I will STOP taking the medications listed below when I get home from the hospital:    Metoprolol Succinate ER 25 mg oral tablet, extended release  -- 1 tab(s) by mouth once a day    furosemide 80 mg oral tablet  -- 1 tab(s) by mouth 2 times a day

## 2019-01-11 LAB
ANION GAP SERPL CALC-SCNC: 16 MEQ/L — HIGH (ref 7–14)
BLD GP AB SCN SERPL QL: NEGATIVE — SIGNIFICANT CHANGE UP
BUN SERPL-MCNC: 53 MG/DL — HIGH (ref 7–23)
CALCIUM SERPL-MCNC: 7.6 MG/DL — LOW (ref 8.4–10.5)
CHLORIDE SERPL-SCNC: 99 MMOL/L — SIGNIFICANT CHANGE UP (ref 98–107)
CO2 SERPL-SCNC: 23 MMOL/L — SIGNIFICANT CHANGE UP (ref 22–31)
CREAT SERPL-MCNC: 7.45 MG/DL — HIGH (ref 0.5–1.3)
GLUCOSE SERPL-MCNC: 119 MG/DL — HIGH (ref 70–99)
HBV CORE AB SER-ACNC: NONREACTIVE — SIGNIFICANT CHANGE UP
HBV SURFACE AB SER-ACNC: NONREACTIVE — SIGNIFICANT CHANGE UP
HBV SURFACE AG SER-ACNC: NONREACTIVE — SIGNIFICANT CHANGE UP
HCT VFR BLD CALC: 23.6 % — LOW (ref 39–50)
HCT VFR BLD CALC: 28.7 % — LOW (ref 39–50)
HCV AB S/CO SERPL IA: 0.08 S/CO — SIGNIFICANT CHANGE UP
HCV AB SERPL-IMP: SIGNIFICANT CHANGE UP
HCV RNA SERPL NAA DL=5-ACNC: NOT DETECTED IU/ML — SIGNIFICANT CHANGE UP
HCV RNA SPEC NAA+PROBE-LOG IU: SIGNIFICANT CHANGE UP LOGIU/ML
HGB BLD-MCNC: 7.4 G/DL — LOW (ref 13–17)
HGB BLD-MCNC: 9.1 G/DL — LOW (ref 13–17)
MAGNESIUM SERPL-MCNC: 1.6 MG/DL — SIGNIFICANT CHANGE UP (ref 1.6–2.6)
MCHC RBC-ENTMCNC: 26.9 PG — LOW (ref 27–34)
MCHC RBC-ENTMCNC: 27 PG — SIGNIFICANT CHANGE UP (ref 27–34)
MCHC RBC-ENTMCNC: 31.4 % — LOW (ref 32–36)
MCHC RBC-ENTMCNC: 31.7 % — LOW (ref 32–36)
MCV RBC AUTO: 84.9 FL — SIGNIFICANT CHANGE UP (ref 80–100)
MCV RBC AUTO: 86.1 FL — SIGNIFICANT CHANGE UP (ref 80–100)
NRBC # FLD: 0 K/UL — LOW (ref 25–125)
NRBC # FLD: 0 K/UL — LOW (ref 25–125)
PHOSPHATE SERPL-MCNC: 6.2 MG/DL — HIGH (ref 2.5–4.5)
PLATELET # BLD AUTO: 171 K/UL — SIGNIFICANT CHANGE UP (ref 150–400)
PLATELET # BLD AUTO: 195 K/UL — SIGNIFICANT CHANGE UP (ref 150–400)
PMV BLD: 12.7 FL — SIGNIFICANT CHANGE UP (ref 7–13)
PMV BLD: 12.8 FL — SIGNIFICANT CHANGE UP (ref 7–13)
POTASSIUM SERPL-MCNC: 3.8 MMOL/L — SIGNIFICANT CHANGE UP (ref 3.5–5.3)
POTASSIUM SERPL-SCNC: 3.8 MMOL/L — SIGNIFICANT CHANGE UP (ref 3.5–5.3)
RBC # BLD: 2.74 M/UL — LOW (ref 4.2–5.8)
RBC # BLD: 3.38 M/UL — LOW (ref 4.2–5.8)
RBC # FLD: 13.4 % — SIGNIFICANT CHANGE UP (ref 10.3–14.5)
RBC # FLD: 14 % — SIGNIFICANT CHANGE UP (ref 10.3–14.5)
RH IG SCN BLD-IMP: NEGATIVE — SIGNIFICANT CHANGE UP
SODIUM SERPL-SCNC: 138 MMOL/L — SIGNIFICANT CHANGE UP (ref 135–145)
SPECIMEN SOURCE: SIGNIFICANT CHANGE UP
WBC # BLD: 6.11 K/UL — SIGNIFICANT CHANGE UP (ref 3.8–10.5)
WBC # BLD: 7.71 K/UL — SIGNIFICANT CHANGE UP (ref 3.8–10.5)
WBC # FLD AUTO: 6.11 K/UL — SIGNIFICANT CHANGE UP (ref 3.8–10.5)
WBC # FLD AUTO: 7.71 K/UL — SIGNIFICANT CHANGE UP (ref 3.8–10.5)

## 2019-01-11 PROCEDURE — 99233 SBSQ HOSP IP/OBS HIGH 50: CPT | Mod: GC

## 2019-01-11 RX ORDER — ERGOCALCIFEROL 1.25 MG/1
50000 CAPSULE ORAL
Qty: 0 | Refills: 0 | Status: DISCONTINUED | OUTPATIENT
Start: 2019-01-11 | End: 2019-01-14

## 2019-01-11 RX ADMIN — Medication 0.1 MILLIGRAM(S): at 21:08

## 2019-01-11 RX ADMIN — Medication 1334 MILLIGRAM(S): at 10:16

## 2019-01-11 RX ADMIN — IRON SUCROSE 100 MILLIGRAM(S): 20 INJECTION, SOLUTION INTRAVENOUS at 18:58

## 2019-01-11 RX ADMIN — Medication 400 MILLIGRAM(S): at 05:27

## 2019-01-11 RX ADMIN — HEPARIN SODIUM 5000 UNIT(S): 5000 INJECTION INTRAVENOUS; SUBCUTANEOUS at 05:26

## 2019-01-11 RX ADMIN — Medication 60 MILLIGRAM(S): at 05:26

## 2019-01-11 RX ADMIN — ERYTHROPOIETIN 10000 UNIT(S): 10000 INJECTION, SOLUTION INTRAVENOUS; SUBCUTANEOUS at 18:58

## 2019-01-11 RX ADMIN — Medication 80 MILLIGRAM(S): at 05:27

## 2019-01-11 RX ADMIN — CHLORHEXIDINE GLUCONATE 1 APPLICATION(S): 213 SOLUTION TOPICAL at 05:26

## 2019-01-11 RX ADMIN — Medication 80 MILLIGRAM(S): at 21:08

## 2019-01-11 RX ADMIN — ERGOCALCIFEROL 50000 UNIT(S): 1.25 CAPSULE ORAL at 21:08

## 2019-01-11 RX ADMIN — LOSARTAN POTASSIUM 50 MILLIGRAM(S): 100 TABLET, FILM COATED ORAL at 05:26

## 2019-01-11 RX ADMIN — Medication 1334 MILLIGRAM(S): at 12:24

## 2019-01-11 RX ADMIN — INSULIN GLARGINE 5 UNIT(S): 100 INJECTION, SOLUTION SUBCUTANEOUS at 21:07

## 2019-01-11 RX ADMIN — DOXERCALCIFEROL 2 MICROGRAM(S): 2.5 CAPSULE ORAL at 18:58

## 2019-01-11 RX ADMIN — Medication 81 MILLIGRAM(S): at 12:24

## 2019-01-11 NOTE — PROGRESS NOTE ADULT - PROBLEM SELECTOR PLAN 6
-SBP improved in 140s   -c/w nifedipine, clonidine and increased home dose to labetalol 400 BID; started on losartan 50 mg in the setting of persistent HTN -SBP improved in 140s   -c/w nifedipine, clonidine, labetalol and losartan Pt asymptomatic  Demand ischemia vs decreased clearance secondary to CKD  EKG not showing evidence of STEMI/NSTEMI  Will continue to trend  cards following

## 2019-01-11 NOTE — PROGRESS NOTE ADULT - PROBLEM SELECTOR PLAN 8
DVT ppx: hep subq  Diet: Renal diet  Dispo: Pending completion of inpatient HD course and set up of HD at [facility] or [facility] DVT ppx: hep subq  Diet: Renal diet  Dispo: Pending completion of inpatient HD course today and set up of HD at [facility] or [facility]

## 2019-01-11 NOTE — PROGRESS NOTE ADULT - PROBLEM SELECTOR PLAN 3
epo with HD.   iron sat 16%, will start IV iron with HD x 5 doses  Monitor Hb.  transfuse to keep Hb>8

## 2019-01-11 NOTE — PROGRESS NOTE ADULT - PROBLEM SELECTOR PLAN 7
Pt on novolog 15 at home; curently on lantus 5, morning FS __, will c/w current dose  Pt started on decreased dose on admission due to novolog to lantus conversion in setting of hospital stay w/ likely decreased PO intake   Will adjust regimen based on ISS and BS levels  A1c in Oct 2018 is 8.2; most recently 6.8 Pt on novolog 15 at home; curently on lantus 5; will c/w current dose  Pt started on decreased dose on admission due to novolog to lantus conversion in setting of hospital stay w/ likely decreased PO intake   Will adjust regimen based on ISS and BS levels  A1c in Oct 2018 is 8.2; most recently 6.8

## 2019-01-11 NOTE — PROGRESS NOTE ADULT - PROBLEM SELECTOR PLAN 6
elevated PTH on calcitriol, now on HD will switch to hectorol 2mcg with HD   low vit d, start vit [id number] weekly x8 doses  continue phoslo  monitor ca and phos daily.

## 2019-01-11 NOTE — PROGRESS NOTE ADULT - PROBLEM SELECTOR PLAN 1
CKD 5 not on dialysis, present with worsen renal function, fluid overload. now ESRD started on HD 1/9. tolerated well. HD today  (3rd treatment)  AVF working well   renal diet  Monitor BMP and BP  SW planning with outpatient HD, [place] vs [place]

## 2019-01-11 NOTE — PROGRESS NOTE ADULT - ASSESSMENT
64M with hx of CAD s/p PCI x 3, HTN, DM2, GERD, CKD5 (not on HD yet, but has AVF placed) here for cough x 3 days and complaints of SOB/AMEZCUA. Suspect symptoms of SOB likely secondary to fluid overload in the setting of CKD (given edema and crackles on exam). Now ESRD, s/p diuresis and initiation of HD, with symptomatic improvement.

## 2019-01-11 NOTE — PROGRESS NOTE ADULT - PROBLEM SELECTOR PLAN 3
Resolved s/p HD  Bicarb 23  Likely secondary to CKD -SBP improved in 140s   -c/w nifedipine, clonidine, labetalol and losartan

## 2019-01-11 NOTE — PROGRESS NOTE ADULT - PROBLEM SELECTOR PLAN 5
Pt asymptomatic  Demand ischemia vs decreased clearance secondary to CKD  EKG not showing evidence of STEMI/NSTEMI  Will continue to trend  cards following Resolved s/p HD  Bicarb 23  Likely secondary to CKD

## 2019-01-11 NOTE — PROGRESS NOTE ADULT - PROBLEM SELECTOR PLAN 2
Resolved.  -suspect secondary to fluid overload in the setting of CKDV  - will c/w 80 IV lasix BID as per cards recs  - patient also noted to be anemic hgb 7.1 (likely 2/2 CKD) on admission, maybe a component of symptomatic anemia, currently 7.4, pt on EPO  - lower suspicion for lung CA/pneumonia/TB/pulmonary hemorrhage at this time   - RVP negative, CXR showing b/l pleural effusions, vasculitis/TB workup negative on prior admission Resolved.  -suspect secondary to fluid overload in the setting of CKDV  - will c/w 80 IV lasix BID as per cards recs  - patient also noted to be anemic hgb 7.1 (likely 2/2 CKD) on admission, maybe a component of symptomatic anemia, currently 7.4, pt on EPO  - lower suspicion for lung CA/pneumonia/TB/pulmonary hemorrhage at this time   - RVP negative, CXR showing b/l pleural effusions, vasculitis/TB workup negative on prior admission  -ECHO w/ normal LV function and EF 63% Hgb Baseline ~10, currently 7.4, will transfuse 1 unit today to keep Hgb>8 as per renal recs, f/u post transfusion CBC   Iron sat 16%, pt on IV iron with HD x 5 doses and transfuse as needed

## 2019-01-11 NOTE — PROGRESS NOTE ADULT - PROBLEM SELECTOR PLAN 4
on lasix 80mg iv bid, fluid status much improved on HD. lasix can be changed to 80mg po daily  f/u cardio  Monitor daily weights.

## 2019-01-11 NOTE — PROGRESS NOTE ADULT - PROBLEM SELECTOR PLAN 4
Hgb Baseline ~10, currently 7.4, will transfuse 1 unit today due keep Hgb<8 as per renal recs   Iron sat 16%, pt on IV iron with HD x 5 doses and transfuse as needed Hgb Baseline ~10, currently 7.4, will transfuse 1 unit today to keep Hgb>8 as per renal recs, f/u post transfusion CBC   Iron sat 16%, pt on IV iron with HD x 5 doses and transfuse as needed Resolved.  -suspect secondary to fluid overload in the setting of CKDV  - will c/w 80 IV lasix BID as per cards recs  - patient also noted to be anemic hgb 7.1 (likely 2/2 CKD) on admission, maybe a component of symptomatic anemia, currently 7.4, pt on EPO  - lower suspicion for lung CA/pneumonia/TB/pulmonary hemorrhage at this time   - RVP negative, CXR showing b/l pleural effusions, vasculitis/TB workup negative on prior admission  -ECHO w/ normal LV function and EF 63%

## 2019-01-11 NOTE — PROGRESS NOTE ADULT - SUBJECTIVE AND OBJECTIVE BOX
Jimmy Sandhu MD  Interventional Cardiology  [place] Office : [phone] [place] [id number]  Tel:   [place] Office : [address] [facility] [place] [address]  Tel: [phone]  Cell : [phone]    Subjective : Pt lying in bed comfortable, not in distress, denies any chest pain or SOB  	  MEDICATIONS:  aspirin enteric coated 81 milliGRAM(s) Oral daily  cloNIDine 0.1 milliGRAM(s) Oral at bedtime  furosemide   Injectable 80 milliGRAM(s) IV Push two times a day  heparin  Injectable 5000 Unit(s) SubCutaneous every 12 hours  labetalol 400 milliGRAM(s) Oral two times a day  losartan 50 milliGRAM(s) Oral daily  NIFEdipine XL 60 milliGRAM(s) Oral daily            dextrose 40% Gel 15 Gram(s) Oral once PRN  dextrose 50% Injectable 12.5 Gram(s) IV Push once  dextrose 50% Injectable 25 Gram(s) IV Push once  dextrose 50% Injectable 25 Gram(s) IV Push once  glucagon  Injectable 1 milliGRAM(s) IntraMuscular once PRN  insulin glargine Injectable (LANTUS) 5 Unit(s) SubCutaneous at bedtime  insulin lispro (HumaLOG) corrective regimen sliding scale   SubCutaneous three times a day before meals  insulin lispro (HumaLOG) corrective regimen sliding scale   SubCutaneous at bedtime    calcium acetate 1334 milliGRAM(s) Oral three times a day with meals  chlorhexidine 4% Liquid 1 Application(s) Topical <User Schedule>  dextrose 5%. 1000 milliLiter(s) IV Continuous <Continuous>  doxercalciferol Injectable 2 MICROGram(s) IV Push <User Schedule>  epoetin ralph Injectable 69238 Unit(s) IV Push <User Schedule>  ergocalciferol 84066 Unit(s) Oral <User Schedule>  iron sucrose Injectable 100 milliGRAM(s) IV Push <User Schedule>      PHYSICAL EXAM:  T(C): 36.7 (01-11-19 @ 05:25), Max: 36.7 (01-10-19 @ 21:14)  HR: 66 (01-11-19 @ 05:40) (65 - 77)  BP: 145/77 (01-11-19 @ 05:40) (145/77 - 192/91)  RR: 16 (01-11-19 @ 05:40) (16 - 18)  SpO2: 100% (01-11-19 @ 05:40) (100% - 100%)  Wt(kg): --  I&O's Summary    10 Devonte 2019 07:01  -  11 Jan 2019 07:00  --------------------------------------------------------  IN: 1750 mL / OUT: 2890 mL / NET: -1140 mL    11 Jan 2019 07:01  -  11 Jan 2019 13:28  --------------------------------------------------------  IN: 300 mL / OUT: 600 mL / NET: -300 mL          Appearance: Normal	  HEENT:   Normal oral mucosa, PERRL, EOMI	  Cardiovascular: Normal S1 S2, No JVD, No murmurs, No edema  Respiratory: Lungs clear to auscultation	  Gastrointestinal:  Soft, Non-tender, + BS	  Extremities: Normal range of motion, No clubbing, cyanosis or edema                                    7.4    6.11  )-----------( 171      ( 11 Jan 2019 04:13 )             23.6     01-11    138  |  99  |  53<H>  ----------------------------<  119<H>  3.8   |  23  |  7.45<H>    Ca    7.6<L>      11 Jan 2019 04:13  Phos  6.2     01-11  Mg     1.6     01-11      proBNP:   Lipid Profile:   HgA1c:   TSH:

## 2019-01-11 NOTE — PROGRESS NOTE ADULT - SUBJECTIVE AND OBJECTIVE BOX
[facility] [place] [facility]   MD JORDAN AVALOS MD ANGELA WONG, PA    TEL:  OFFICE: [phone]  DR CUETO CELL: [phone]  DR. HORTA CELL: [phone]  LIDA WINTERS CELL: [phone]        Patient is a 64y old  Male who presents with a chief complaint of SOB/AMEZCUA, cough (11 Jan 2019 07:33)      Patient seen and examined at bedside. No chest pain/sob    VITALS:  T(F): 98 (01-11-19 @ 05:25), Max: 98 (01-10-19 @ 21:14)  HR: 66 (01-11-19 @ 05:40)  BP: 145/77 (01-11-19 @ 05:40)  RR: 16 (01-11-19 @ 05:40)  SpO2: 100% (01-11-19 @ 05:40)  Wt(kg): --    01-10 @ 07:01 - 01-11 @ 07:00  --------------------------------------------------------  IN: 1750 mL / OUT: 2890 mL / NET: -1140 mL    01-11 @ 07:01 - 01-11 @ 12:31  --------------------------------------------------------  IN: 300 mL / OUT: 600 mL / NET: -300 mL          PHYSICAL EXAM:  Constitutional: NAD  Neck: No JVD  Respiratory: CTAB, no wheezes, rales or rhonchi  Cardiovascular: S1, S2, RRR  Gastrointestinal: BS+, soft, NT/ND  Extremities: No peripheral edema    Hospital Medications:   MEDICATIONS  (STANDING):  aspirin enteric coated 81 milliGRAM(s) Oral daily  calcium acetate 1334 milliGRAM(s) Oral three times a day with meals  chlorhexidine 4% Liquid 1 Application(s) Topical <User Schedule>  cloNIDine 0.1 milliGRAM(s) Oral at bedtime  dextrose 5%. 1000 milliLiter(s) (50 mL/Hr) IV Continuous <Continuous>  dextrose 50% Injectable 12.5 Gram(s) IV Push once  dextrose 50% Injectable 25 Gram(s) IV Push once  dextrose 50% Injectable 25 Gram(s) IV Push once  doxercalciferol Injectable 2 MICROGram(s) IV Push <User Schedule>  epoetin ralph Injectable 49950 Unit(s) IV Push <User Schedule>  ergocalciferol 79622 Unit(s) Oral <User Schedule>  furosemide   Injectable 80 milliGRAM(s) IV Push two times a day  heparin  Injectable 5000 Unit(s) SubCutaneous every 12 hours  insulin glargine Injectable (LANTUS) 5 Unit(s) SubCutaneous at bedtime  insulin lispro (HumaLOG) corrective regimen sliding scale   SubCutaneous three times a day before meals  insulin lispro (HumaLOG) corrective regimen sliding scale   SubCutaneous at bedtime  iron sucrose Injectable 100 milliGRAM(s) IV Push <User Schedule>  labetalol 400 milliGRAM(s) Oral two times a day  losartan 50 milliGRAM(s) Oral daily  NIFEdipine XL 60 milliGRAM(s) Oral daily      LABS:  01-11    138  |  99  |  53<H>  ----------------------------<  119<H>  3.8   |  23  |  7.45<H>    Ca    7.6<L>      11 Jan 2019 04:13  Phos  6.2     01-11  Mg     1.6     01-11      Creatinine Trend: 7.45 <--, 6.58 <--, 8.51 <--, 8.38 <--    Phosphorus Level, Serum: 6.2 mg/dL (01-11 @ 04:13)                              7.4    6.11  )-----------( 171      ( 11 Jan 2019 04:13 )             23.6     Urine Studies:  Urinalysis - [01-09-19 @ 10:32]      Color COLORLESS / Appearance CLEAR / SG 1.007 / pH 6.0      Gluc NEGATIVE / Ketone NEGATIVE  / Bili NEGATIVE / Urobili NORMAL       Blood SMALL / Protein 200 / Leuk Est NEGATIVE / Nitrite NEGATIVE      RBC 0-2 / WBC  / Hyaline  / Gran  / Sq Epi  / Non Sq Epi  / Bacteria       Iron 38, TIBC 233, %sat --      [01-09-19 @ 06:37]  Ferritin 88.94      [01-09-19 @ 06:37]  .1 (Ca --)      [01-10-19 @ 05:20]   --  .2 (Ca --)      [01-09-19 @ 16:30]   --  .9 (Ca --)      [06-14-18 @ 08:28]   --  Vitamin D (25OH) 13.8      [01-10-19 @ 05:20]  HbA1c 6.8      [01-09-19 @ 06:37]    HBsAb <3.0      [01-09-19 @ 16:30]  HBsAg NEGATIVE      [01-09-19 @ 11:30]  HCV 0.13, Nonreactive Hepatitis C AB  S/CO Ratio                        Interpretation  < 1.0                                     Non-Reactive  1.0 - 4.9                           Weakly-Reactive  > 5.0                                 Reactive  Non-Reactive: Aperson with a non-reactive HCV antibody  result is considered uninfected.  No further action is  needed unless recent infection is suspected.  In these  cases, consider repeat testing later to detect  seroconversion..  Weakly-Reactive: HCV antibody test is abnormal, HCV RNA  Qualitative test will follow.  Reactive: HCV antibody test is abnormal, HCV RNA  Qualitative test will follow.  Note: HCV antibody testing is performed on the [employer]   system.      [01-09-19 @ 16:30]      RADIOLOGY & ADDITIONAL STUDIES:

## 2019-01-11 NOTE — PROGRESS NOTE ADULT - SUBJECTIVE AND OBJECTIVE BOX
Patient is a 64y old  Male who presents with a chief complaint of SOB/AMEZCUA, cough (11 Jan 2019 12:31)      SUBJECTIVE / OVERNIGHT EVENTS:  No acute events or complaints overnight. Pt denies cough/SOB/CP/n/v/d/f/c or other issues.      MEDICATIONS  (STANDING):  aspirin enteric coated 81 milliGRAM(s) Oral daily  calcium acetate 1334 milliGRAM(s) Oral three times a day with meals  chlorhexidine 4% Liquid 1 Application(s) Topical <User Schedule>  cloNIDine 0.1 milliGRAM(s) Oral at bedtime  dextrose 5%. 1000 milliLiter(s) (50 mL/Hr) IV Continuous <Continuous>  dextrose 50% Injectable 12.5 Gram(s) IV Push once  dextrose 50% Injectable 25 Gram(s) IV Push once  dextrose 50% Injectable 25 Gram(s) IV Push once  doxercalciferol Injectable 2 MICROGram(s) IV Push <User Schedule>  epoetin ralph Injectable 73368 Unit(s) IV Push <User Schedule>  ergocalciferol 43068 Unit(s) Oral <User Schedule>  furosemide   Injectable 80 milliGRAM(s) IV Push two times a day  heparin  Injectable 5000 Unit(s) SubCutaneous every 12 hours  insulin glargine Injectable (LANTUS) 5 Unit(s) SubCutaneous at bedtime  insulin lispro (HumaLOG) corrective regimen sliding scale   SubCutaneous three times a day before meals  insulin lispro (HumaLOG) corrective regimen sliding scale   SubCutaneous at bedtime  iron sucrose Injectable 100 milliGRAM(s) IV Push <User Schedule>  labetalol 400 milliGRAM(s) Oral two times a day  losartan 50 milliGRAM(s) Oral daily  NIFEdipine XL 60 milliGRAM(s) Oral daily    MEDICATIONS  (PRN):  dextrose 40% Gel 15 Gram(s) Oral once PRN Blood Glucose LESS THAN 70 milliGRAM(s)/deciliter  glucagon  Injectable 1 milliGRAM(s) IntraMuscular once PRN Glucose LESS THAN 70 milligrams/deciliter      Vital Signs Last 24 Hrs  T(C): 36.7 (11 Jan 2019 05:25), Max: 36.7 (10 Devonte 2019 21:14)  T(F): 98 (11 Jan 2019 05:25), Max: 98 (10 Devonte 2019 21:14)  HR: 66 (11 Jan 2019 05:40) (65 - 77)  BP: 145/77 (11 Jan 2019 05:40) (145/77 - 192/91)  BP(mean): --  RR: 16 (11 Jan 2019 05:40) (16 - 18)  SpO2: 100% (11 Jan 2019 05:40) (100% - 100%)    CAPILLARY BLOOD GLUCOSE      POCT Blood Glucose.: 139 mg/dL (11 Jan 2019 12:15)  POCT Blood Glucose.: 264 mg/dL (10 Devonte 2019 21:25)  POCT Blood Glucose.: 110 mg/dL (10 Devonte 2019 17:49)    I&O's Summary    10 Devonte 2019 07:01  -  11 Jan 2019 07:00  --------------------------------------------------------  IN: 1750 mL / OUT: 2890 mL / NET: -1140 mL    11 Jan 2019 07:01  -  11 Jan 2019 13:49  --------------------------------------------------------  IN: 300 mL / OUT: 600 mL / NET: -300 mL        PHYSICAL EXAM:  	GENERAL: Comfortable, NAD  	HEAD:  Normocephalic, atraumatic  	ENMT: Moist mucous membranes  	NECK: Supple, No JVD  	NERVOUS SYSTEM:  Alert & Oriented X3, moving all extremities  	CHEST/LUNG: decreased BS in LLL  	HEART: S1S2 Regular rate and rhythm, no murmur   	ABDOMEN: Soft, Nontender, Nondistended, Bowel sounds present  	EXTREMITIES:  mild b/l pedal edema, decreased from yesterday, LUE fistula in place  	MUSCULOSKELTAL- No muscle tenderness, no joint tenderness  SKIN- warm and dry, no rash    LABS:                        7.4    6.11  )-----------( 171      ( 11 Jan 2019 04:13 )             23.6     Auto Eosinophil # x     / Auto Eosinophil % x     / Auto Neutrophil # x     / Auto Neutrophil % x     / BANDS % x                            8.0    6.08  )-----------( 156      ( 10 Devonte 2019 05:20 )             25.3     Auto Eosinophil # x     / Auto Eosinophil % x     / Auto Neutrophil # x     / Auto Neutrophil % x     / BANDS % x        01-11    138  |  99  |  53<H>  ----------------------------<  119<H>  3.8   |  23  |  7.45<H>  01-10    141  |  103  |  43<H>  ----------------------------<  93  4.1   |  24  |  6.58<H>    Ca    7.6<L>      11 Jan 2019 04:13  Mg     1.6     01-11  Phos  6.2     01-11                RESPIRATORY  VENT:    ABG:     VBG:     RADIOLOGY & ADDITIONAL TESTS:  (Imaging Personally Reviewed) NA    Consultant(s) Notes Reviewed:  nephro, cards    Care Discussed with Consultants/Other Providers: ERIN

## 2019-01-11 NOTE — PROGRESS NOTE ADULT - ASSESSMENT
EKG - NSR LVH with repolarization changes   Echo:  < from: Transthoracic Echocardiogram (01.10.19 @ 17:02) >  1. Mitral annular calcification, otherwise normal mitral  valve. Minimal mitral regurgitation.  2. Normal left ventricular internal dimensions and wall  thicknesses.  3. Normal left ventricular systolic function. No segmental  wall motion abnormalities.  4. Normal right ventricular size and function.  *** Compared with echocardiogram of 6/14/2018, no  significant changes noted.    < end of copied text >      a/p     1) Shortness of breath - secondary to volume overload, improved after HD, on lasix 80mg IV q12 still makes urine     2) Troponin elevation - no CP, likely sec to CKD, echo with normal LV function, denies CP, id continues to be SOB will get ischemic eval as OP    3) Weakness - improved with dialysis and transfusion     4) ESRD - s/p HD     5) CAD s/p PCI - s/p 3 stents in 2014, cont asa

## 2019-01-11 NOTE — PROGRESS NOTE ADULT - PROBLEM SELECTOR PLAN 1
Pt s/p HD x2, as per Dr. Huff, pt set to undergo one more HD as an inpatient before being discharged  Baseline creat 5 in summer, downtrending from ~9 on admission after HD currently 6.58  strict I/Os  Vit D WNL, PTH elevated secondary to CKD  c/w phoslo, renal diet Pt s/p HD x2, as per Dr. Huff, pt set to undergo one more HD today as an inpatient before being discharged   Baseline creat 5 in summer, downtrending from ~9 on admission after HD currently 6.58  strict I/Os  Vit D WNL, PTH elevated secondary to CKD  c/w phoslo, renal diet

## 2019-01-12 LAB
ALBUMIN SERPL ELPH-MCNC: 3.2 G/DL — LOW (ref 3.3–5)
ALP SERPL-CCNC: 69 U/L — SIGNIFICANT CHANGE UP (ref 40–120)
ALT FLD-CCNC: 16 U/L — SIGNIFICANT CHANGE UP (ref 4–41)
ANION GAP SERPL CALC-SCNC: 14 MEQ/L — SIGNIFICANT CHANGE UP (ref 7–14)
AST SERPL-CCNC: 16 U/L — SIGNIFICANT CHANGE UP (ref 4–40)
BACTERIA NPH CULT: SIGNIFICANT CHANGE UP
BILIRUB SERPL-MCNC: 0.3 MG/DL — SIGNIFICANT CHANGE UP (ref 0.2–1.2)
BUN SERPL-MCNC: 38 MG/DL — HIGH (ref 7–23)
CALCIUM SERPL-MCNC: 8.5 MG/DL — SIGNIFICANT CHANGE UP (ref 8.4–10.5)
CHLORIDE SERPL-SCNC: 96 MMOL/L — LOW (ref 98–107)
CO2 SERPL-SCNC: 26 MMOL/L — SIGNIFICANT CHANGE UP (ref 22–31)
CREAT SERPL-MCNC: 5.99 MG/DL — HIGH (ref 0.5–1.3)
GLUCOSE SERPL-MCNC: 128 MG/DL — HIGH (ref 70–99)
HCT VFR BLD CALC: 29.4 % — LOW (ref 39–50)
HGB BLD-MCNC: 9.2 G/DL — LOW (ref 13–17)
MAGNESIUM SERPL-MCNC: 1.7 MG/DL — SIGNIFICANT CHANGE UP (ref 1.6–2.6)
MCHC RBC-ENTMCNC: 26.6 PG — LOW (ref 27–34)
MCHC RBC-ENTMCNC: 31.3 % — LOW (ref 32–36)
MCV RBC AUTO: 85 FL — SIGNIFICANT CHANGE UP (ref 80–100)
NRBC # FLD: 0 K/UL — LOW (ref 25–125)
PHOSPHATE SERPL-MCNC: 4.4 MG/DL — SIGNIFICANT CHANGE UP (ref 2.5–4.5)
PLATELET # BLD AUTO: 191 K/UL — SIGNIFICANT CHANGE UP (ref 150–400)
PMV BLD: 12.3 FL — SIGNIFICANT CHANGE UP (ref 7–13)
POTASSIUM SERPL-MCNC: 4 MMOL/L — SIGNIFICANT CHANGE UP (ref 3.5–5.3)
POTASSIUM SERPL-SCNC: 4 MMOL/L — SIGNIFICANT CHANGE UP (ref 3.5–5.3)
PROT SERPL-MCNC: 7.1 G/DL — SIGNIFICANT CHANGE UP (ref 6–8.3)
RBC # BLD: 3.46 M/UL — LOW (ref 4.2–5.8)
RBC # FLD: 14.5 % — SIGNIFICANT CHANGE UP (ref 10.3–14.5)
SODIUM SERPL-SCNC: 136 MMOL/L — SIGNIFICANT CHANGE UP (ref 135–145)
WBC # BLD: 6.89 K/UL — SIGNIFICANT CHANGE UP (ref 3.8–10.5)
WBC # FLD AUTO: 6.89 K/UL — SIGNIFICANT CHANGE UP (ref 3.8–10.5)

## 2019-01-12 PROCEDURE — 99232 SBSQ HOSP IP/OBS MODERATE 35: CPT

## 2019-01-12 RX ORDER — FUROSEMIDE 40 MG
80 TABLET ORAL DAILY
Qty: 0 | Refills: 0 | Status: DISCONTINUED | OUTPATIENT
Start: 2019-01-12 | End: 2019-01-13

## 2019-01-12 RX ADMIN — LOSARTAN POTASSIUM 50 MILLIGRAM(S): 100 TABLET, FILM COATED ORAL at 05:09

## 2019-01-12 RX ADMIN — CHLORHEXIDINE GLUCONATE 1 APPLICATION(S): 213 SOLUTION TOPICAL at 05:09

## 2019-01-12 RX ADMIN — Medication 0.1 MILLIGRAM(S): at 21:29

## 2019-01-12 RX ADMIN — Medication 1334 MILLIGRAM(S): at 12:21

## 2019-01-12 RX ADMIN — Medication 1334 MILLIGRAM(S): at 18:11

## 2019-01-12 RX ADMIN — INSULIN GLARGINE 5 UNIT(S): 100 INJECTION, SOLUTION SUBCUTANEOUS at 21:30

## 2019-01-12 RX ADMIN — HEPARIN SODIUM 5000 UNIT(S): 5000 INJECTION INTRAVENOUS; SUBCUTANEOUS at 18:12

## 2019-01-12 RX ADMIN — Medication 60 MILLIGRAM(S): at 05:09

## 2019-01-12 RX ADMIN — HEPARIN SODIUM 5000 UNIT(S): 5000 INJECTION INTRAVENOUS; SUBCUTANEOUS at 05:09

## 2019-01-12 RX ADMIN — Medication 400 MILLIGRAM(S): at 05:09

## 2019-01-12 RX ADMIN — Medication 400 MILLIGRAM(S): at 18:12

## 2019-01-12 RX ADMIN — Medication 1334 MILLIGRAM(S): at 09:41

## 2019-01-12 RX ADMIN — Medication 80 MILLIGRAM(S): at 18:14

## 2019-01-12 RX ADMIN — Medication 80 MILLIGRAM(S): at 09:41

## 2019-01-12 RX ADMIN — Medication 81 MILLIGRAM(S): at 12:21

## 2019-01-12 NOTE — PROGRESS NOTE ADULT - SUBJECTIVE AND OBJECTIVE BOX
Jimmy Sandhu MD  Interventional Cardiology / Advance Heart Failure and Cardiac Transplant Specialist  [place] Office : [phone] [place] [address] [address] [address]  Tel:   [place] Office : [address] [facility] [place] [address]  Tel: [phone]  Cell : [phone]    Subjective : Pt lying in bed comfortable, not in distress, denies any chest pain or SOB  	  MEDICATIONS:  aspirin enteric coated 81 milliGRAM(s) Oral daily  cloNIDine 0.1 milliGRAM(s) Oral at bedtime  furosemide   Injectable 80 milliGRAM(s) IV Push two times a day  heparin  Injectable 5000 Unit(s) SubCutaneous every 12 hours  labetalol 400 milliGRAM(s) Oral two times a day  losartan 50 milliGRAM(s) Oral daily  NIFEdipine XL 60 milliGRAM(s) Oral daily            dextrose 40% Gel 15 Gram(s) Oral once PRN  dextrose 50% Injectable 12.5 Gram(s) IV Push once  dextrose 50% Injectable 25 Gram(s) IV Push once  dextrose 50% Injectable 25 Gram(s) IV Push once  glucagon  Injectable 1 milliGRAM(s) IntraMuscular once PRN  insulin glargine Injectable (LANTUS) 5 Unit(s) SubCutaneous at bedtime  insulin lispro (HumaLOG) corrective regimen sliding scale   SubCutaneous three times a day before meals  insulin lispro (HumaLOG) corrective regimen sliding scale   SubCutaneous at bedtime    calcium acetate 1334 milliGRAM(s) Oral three times a day with meals  chlorhexidine 4% Liquid 1 Application(s) Topical <User Schedule>  dextrose 5%. 1000 milliLiter(s) IV Continuous <Continuous>  doxercalciferol Injectable 2 MICROGram(s) IV Push <User Schedule>  epoetin ralph Injectable 57780 Unit(s) IV Push <User Schedule>  ergocalciferol 02215 Unit(s) Oral <User Schedule>  iron sucrose Injectable 100 milliGRAM(s) IV Push <User Schedule>      PHYSICAL EXAM:  T(C): 36.8 (01-12-19 @ 13:51), Max: 36.9 (01-12-19 @ 05:05)  HR: 68 (01-12-19 @ 13:51) (66 - 75)  BP: 141/80 (01-12-19 @ 13:51) (120/69 - 183/87)  RR: 17 (01-12-19 @ 13:51) (17 - 18)  SpO2: 100% (01-12-19 @ 13:51) (100% - 100%)  Wt(kg): --  I&O's Summary    11 Jan 2019 07:01  -  12 Jan 2019 07:00  --------------------------------------------------------  IN: 700 mL / OUT: 2000 mL / NET: -1300 mL    12 Jan 2019 07:01  -  12 Jan 2019 15:13  --------------------------------------------------------  IN: 500 mL / OUT: 200 mL / NET: 300 mL          Appearance: Normal	  HEENT:   Normal oral mucosa, PERRL, EOMI	  Cardiovascular: Normal S1 S2, No JVD, No murmurs, No edema  Respiratory: Lungs clear to auscultation	  Gastrointestinal:  Soft, Non-tender, + BS	  Extremities: Normal range of motion, No clubbing, cyanosis or edema                                    9.2    6.89  )-----------( 191      ( 12 Jan 2019 05:56 )             29.4     01-12    136  |  96<L>  |  38<H>  ----------------------------<  128<H>  4.0   |  26  |  5.99<H>    Ca    8.5      12 Jan 2019 05:56  Phos  4.4     01-12  Mg     1.7     01-12    TPro  7.1  /  Alb  3.2<L>  /  TBili  0.3  /  DBili  x   /  AST  16  /  ALT  16  /  AlkPhos  69  01-12    proBNP:   Lipid Profile:   HgA1c:   TSH:

## 2019-01-12 NOTE — PROGRESS NOTE ADULT - PROBLEM SELECTOR PLAN 1
now ESRD started on HD 1/9. on MWF luis carlosule with AVF   Plan for HD on 1/14   renal diet  Monitor BMP and BP  SW planning with outpatient HD, [place] vs [place]

## 2019-01-12 NOTE — PROGRESS NOTE ADULT - SUBJECTIVE AND OBJECTIVE BOX
Patient is a 64y old  Male who presents with a chief complaint of SOB/AMEZCUA, cough (11 Jan 2019 12:31)      SUBJECTIVE / OVERNIGHT EVENTS:  No acute issues overnight.  Pt denies f/c/n/v/d/CP/SOB or other issues.  per day    MEDICATIONS  (STANDING):  aspirin enteric coated 81 milliGRAM(s) Oral daily  calcium acetate 1334 milliGRAM(s) Oral three times a day with meals  chlorhexidine 4% Liquid 1 Application(s) Topical <User Schedule>  cloNIDine 0.1 milliGRAM(s) Oral at bedtime  dextrose 5%. 1000 milliLiter(s) (50 mL/Hr) IV Continuous <Continuous>  dextrose 50% Injectable 12.5 Gram(s) IV Push once  dextrose 50% Injectable 25 Gram(s) IV Push once  dextrose 50% Injectable 25 Gram(s) IV Push once  doxercalciferol Injectable 2 MICROGram(s) IV Push <User Schedule>  epoetin ralph Injectable 00811 Unit(s) IV Push <User Schedule>  ergocalciferol 31564 Unit(s) Oral <User Schedule>  furosemide   Injectable 80 milliGRAM(s) IV Push two times a day  heparin  Injectable 5000 Unit(s) SubCutaneous every 12 hours  insulin glargine Injectable (LANTUS) 5 Unit(s) SubCutaneous at bedtime  insulin lispro (HumaLOG) corrective regimen sliding scale   SubCutaneous three times a day before meals  insulin lispro (HumaLOG) corrective regimen sliding scale   SubCutaneous at bedtime  iron sucrose Injectable 100 milliGRAM(s) IV Push <User Schedule>  labetalol 400 milliGRAM(s) Oral two times a day  losartan 50 milliGRAM(s) Oral daily  NIFEdipine XL 60 milliGRAM(s) Oral daily    MEDICATIONS  (PRN):  dextrose 40% Gel 15 Gram(s) Oral once PRN Blood Glucose LESS THAN 70 milliGRAM(s)/deciliter  glucagon  Injectable 1 milliGRAM(s) IntraMuscular once PRN Glucose LESS THAN 70 milligrams/deciliter      Vital Signs Last 24 Hrs  T(C): 36.9 (12 Jan 2019 05:05), Max: 37 (11 Jan 2019 13:45)  T(F): 98.4 (12 Jan 2019 05:05), Max: 98.6 (11 Jan 2019 13:45)  HR: 66 (12 Jan 2019 05:05) (66 - 81)  BP: 142/91 (12 Jan 2019 05:05) (129/73 - 183/87)  BP(mean): --  RR: 18 (12 Jan 2019 05:05) (16 - 18)  SpO2: 100% (12 Jan 2019 05:05) (100% - 100%)    CAPILLARY BLOOD GLUCOSE      POCT Blood Glucose.: 138 mg/dL (11 Jan 2019 21:06)  POCT Blood Glucose.: 135 mg/dL (11 Jan 2019 17:10)  POCT Blood Glucose.: 139 mg/dL (11 Jan 2019 12:15)    I&O's Summary    11 Jan 2019 07:01  -  12 Jan 2019 07:00  --------------------------------------------------------  IN: 700 mL / OUT: 2000 mL / NET: -1300 mL        PHYSICAL EXAM:  	GENERAL: Comfortable, NAD  	HEAD:  Normocephalic, atraumatic  	ENMT: Moist mucous membranes  	NECK: Supple, No JVD  	NERVOUS SYSTEM:  Alert & Oriented X3, moving all extremities  	CHEST/LUNG: b/l crackles  	HEART: S1S2 Regular rate and rhythm, no murmur   	ABDOMEN: Soft, Nontender, Nondistended, Bowel sounds present  	EXTREMITIES:  mild b/l pedal edema, LUE fistula in place  	MUSCULOSKELTAL- No muscle tenderness, no joint tenderness  SKIN- warm and dry, no rash    LABS:                        9.2    6.89  )-----------( 191      ( 12 Jan 2019 05:56 )             29.4     Auto Eosinophil # x     / Auto Eosinophil % x     / Auto Neutrophil # x     / Auto Neutrophil % x     / BANDS % x                            9.1    7.71  )-----------( 195      ( 11 Jan 2019 18:15 )             28.7     Auto Eosinophil # x     / Auto Eosinophil % x     / Auto Neutrophil # x     / Auto Neutrophil % x     / BANDS % x                            7.4    6.11  )-----------( 171      ( 11 Jan 2019 04:13 )             23.6     Auto Eosinophil # x     / Auto Eosinophil % x     / Auto Neutrophil # x     / Auto Neutrophil % x     / BANDS % x        01-12    136  |  96<L>  |  38<H>  ----------------------------<  128<H>  4.0   |  26  |  5.99<H>  01-11    138  |  99  |  53<H>  ----------------------------<  119<H>  3.8   |  23  |  7.45<H>    Ca    8.5      12 Jan 2019 05:56  Mg     1.7     01-12  Phos  4.4     01-12  TPro  7.1  /  Alb  3.2<L>  /  TBili  0.3  /  DBili  x   /  AST  16  /  ALT  16  /  AlkPhos  69  01-12                RESPIRATORY  VENT:    ABG:     VBG:     RADIOLOGY & ADDITIONAL TESTS:  (Imaging Personally Reviewed): ERIN    Consultant(s) Notes Reviewed:  nephro, cardio    Care Discussed with Consultants/Other Providers: ERIN

## 2019-01-12 NOTE — PROGRESS NOTE ADULT - PROBLEM SELECTOR PLAN 7
Pt on novolog 15 at home; currently on lantus 5; will c/w current dose as BS well controlled  Pt started on decreased dose on admission due to novolog to lantus conversion in setting of hospital stay w/ likely decreased PO intake   Will adjust regimen based on ISS and BS levels  A1c in Oct 2018 is 8.2; most recently 6.8

## 2019-01-12 NOTE — PROGRESS NOTE ADULT - PROBLEM SELECTOR PLAN 2
Hgb Baseline ~10,s/p 1 unit Hgb  currently 9.2   Iron sat 16%, pt on IV iron with HD x 5 doses and transfuse as needed  Transfuse if Hgb<8

## 2019-01-12 NOTE — PROGRESS NOTE ADULT - PROBLEM SELECTOR PLAN 4
Resolved.  -suspect secondary to fluid overload in the setting of CKDV  - will c/w 80 IV lasix BID as per cards recs  - patient also noted to be anemic hgb 7.1 (likely 2/2 CKD) on admission, maybe a component of symptomatic anemia, currently 7.4, pt on EPO  - lower suspicion for lung CA/pneumonia/TB/pulmonary hemorrhage at this time   - RVP negative, CXR showing b/l pleural effusions, vasculitis/TB workup negative on prior admission  -ECHO w/ normal LV function and EF 63%

## 2019-01-12 NOTE — PROGRESS NOTE ADULT - PROBLEM SELECTOR PLAN 6
elevated PTH   hectorol 2mcg with HD   low vit d, start vit [id number] weekly x8 doses  continue phoslo  monitor ca and phos daily.

## 2019-01-12 NOTE — PROGRESS NOTE ADULT - SUBJECTIVE AND OBJECTIVE BOX
[facility] NEPHROLOGY PRACTICE   MD JORDAN AVALOS MD RUORU WONG, PA    TEL:  OFFICE: [phone]  DR CUETO CELL: [phone]  SUSNA WINTERS CELL: [phone]  DR. HORTA CELL: [phone]    RENAL FOLLOW UP NOTE  --------------------------------------------------------------------------------  HPI:      Pt seen and examined at bedside.   Sherrill SOB, chest pain     PAST HISTORY  --------------------------------------------------------------------------------  No significant changes to PMH, PSH, FHx, SHx, unless otherwise noted    ALLERGIES & MEDICATIONS  --------------------------------------------------------------------------------  Allergies    No Known Allergies    Intolerances      Standing Inpatient Medications  aspirin enteric coated 81 milliGRAM(s) Oral daily  calcium acetate 1334 milliGRAM(s) Oral three times a day with meals  chlorhexidine 4% Liquid 1 Application(s) Topical <User Schedule>  cloNIDine 0.1 milliGRAM(s) Oral at bedtime  dextrose 5%. 1000 milliLiter(s) IV Continuous <Continuous>  dextrose 50% Injectable 12.5 Gram(s) IV Push once  dextrose 50% Injectable 25 Gram(s) IV Push once  dextrose 50% Injectable 25 Gram(s) IV Push once  doxercalciferol Injectable 2 MICROGram(s) IV Push <User Schedule>  epoetin ralph Injectable 85153 Unit(s) IV Push <User Schedule>  ergocalciferol 99521 Unit(s) Oral <User Schedule>  furosemide   Injectable 80 milliGRAM(s) IV Push two times a day  heparin  Injectable 5000 Unit(s) SubCutaneous every 12 hours  insulin glargine Injectable (LANTUS) 5 Unit(s) SubCutaneous at bedtime  insulin lispro (HumaLOG) corrective regimen sliding scale   SubCutaneous three times a day before meals  insulin lispro (HumaLOG) corrective regimen sliding scale   SubCutaneous at bedtime  iron sucrose Injectable 100 milliGRAM(s) IV Push <User Schedule>  labetalol 400 milliGRAM(s) Oral two times a day  losartan 50 milliGRAM(s) Oral daily  NIFEdipine XL 60 milliGRAM(s) Oral daily    PRN Inpatient Medications  dextrose 40% Gel 15 Gram(s) Oral once PRN  glucagon  Injectable 1 milliGRAM(s) IntraMuscular once PRN      REVIEW OF SYSTEMS  --------------------------------------------------------------------------------  General: no fever  CVS: no chest pain  RESP: no sob, no cough  ABD: no abdominal pain    VITALS/PHYSICAL EXAM  --------------------------------------------------------------------------------  T(C): 36.9 (01-12-19 @ 09:39), Max: 37 (01-11-19 @ 13:45)  HR: 68 (01-12-19 @ 09:44) (66 - 75)  BP: 120/69 (01-12-19 @ 09:44) (120/69 - 183/87)  RR: 17 (01-12-19 @ 09:44) (16 - 18)  SpO2: 100% (01-12-19 @ 09:44) (100% - 100%)  Wt(kg): --        01-11-19 @ 07:01  -  01-12-19 @ 07:00  --------------------------------------------------------  IN: 700 mL / OUT: 2000 mL / NET: -1300 mL    01-12-19 @ 07:01  -  01-12-19 @ 11:18  --------------------------------------------------------  IN: 500 mL / OUT: 200 mL / NET: 300 mL      Physical Exam:  	Gen: NAD  	HEENT: MMM  	Pulm: CTA B/L  	CV: S1S2  	Abd: Soft, +BS  	Ext: No LE edema B/L                      Neuro: Awake   	Skin: Warm and Dry   	Vascular access: AVF    LABS/STUDIES  --------------------------------------------------------------------------------              9.2    6.89  >-----------<  191      [01-12-19 @ 05:56]              29.4     136  |  96  |  38  ----------------------------<  128      [01-12-19 @ 05:56]  4.0   |  26  |  5.99        Ca     8.5     [01-12-19 @ 05:56]      Mg     1.7     [01-12-19 @ 05:56]      Phos  4.4     [01-12-19 @ 05:56]    TPro  7.1  /  Alb  3.2  /  TBili  0.3  /  DBili  x   /  AST  16  /  ALT  16  /  AlkPhos  69  [01-12-19 @ 05:56]          Creatinine Trend:  SCr 5.99 [01-12 @ 05:56]  SCr 7.45 [01-11 @ 04:13]  SCr 6.58 [01-10 @ 05:20]  SCr 8.51 [01-09 @ 06:37]  SCr 8.38 [01-08 @ 13:50]    Urinalysis - [01-09-19 @ 10:32]      Color COLORLESS / Appearance CLEAR / SG 1.007 / pH 6.0      Gluc NEGATIVE / Ketone NEGATIVE  / Bili NEGATIVE / Urobili NORMAL       Blood SMALL / Protein 200 / Leuk Est NEGATIVE / Nitrite NEGATIVE      RBC 0-2 / WBC  / Hyaline  / Gran  / Sq Epi  / Non Sq Epi  / Bacteria       Iron 38, TIBC 233, %sat --      [01-09-19 @ 06:37]  Ferritin 88.94      [01-09-19 @ 06:37]  .1 (Ca --)      [01-10-19 @ 05:20]   --  .2 (Ca --)      [01-09-19 @ 16:30]   --  .9 (Ca --)      [06-14-18 @ 08:28]   --  Vitamin D (25OH) 13.8      [01-10-19 @ 05:20]  HbA1c 6.8      [01-09-19 @ 06:37]    HBsAb <3.0      [01-09-19 @ 16:30]  HBsAb Nonreactive      [01-10-19 @ 17:00]  HBsAg Nonreactive      [01-10-19 @ 17:00]  HBcAb Nonreactive      [01-10-19 @ 17:00]  HCV 0.08, Nonreactive Hepatitis C AB  S/CO Ratio                        Interpretation  < 1.0                                     Non-Reactive  1.0 - 4.9                           Weakly-Reactive  > 5.0                                 Reactive  Non-Reactive: Aperson with a non-reactive HCV antibody  result is considered uninfected.  No further action is  needed unless recent infection is suspected.  In these  cases, consider repeat testing later to detect  seroconversion..  Weakly-Reactive: HCV antibody test is abnormal, HCV RNA  Qualitative test will follow.  Reactive: HCV antibody test is abnormal, HCV RNA  Qualitative test will follow.  Note: HCV antibody testing is performed on the [employer] system.      [01-10-19 @ 17:00]

## 2019-01-12 NOTE — PROGRESS NOTE ADULT - PROBLEM SELECTOR PLAN 8
DVT ppx: hep subq  Diet: Renal diet  Dispo: Pending set up of HD at [place] or [facility]

## 2019-01-12 NOTE — PROGRESS NOTE ADULT - PROBLEM SELECTOR PLAN 6
Pt asymptomatic  Demand ischemia vs decreased clearance secondary to CKD  EKG not showing evidence of STEMI/NSTEMI  cards following

## 2019-01-12 NOTE — PROGRESS NOTE ADULT - PROBLEM SELECTOR PLAN 1
Pt s/p HD x3  Baseline creat 5 in summer, downtrending from ~9 on admission after HDx3  strict I/Os  Vit D WNL, PTH elevated secondary to CKD  c/w phoslo, renal diet Pt s/p HD x3  Baseline creat 5 in summer, downtrending from ~9 on admission to 5.99 after HDx3  strict I/Os  Vit D WNL, PTH elevated secondary to CKD  c/w phoslo, renal diet

## 2019-01-13 LAB
ALBUMIN SERPL ELPH-MCNC: 3.1 G/DL — LOW (ref 3.3–5)
ALP SERPL-CCNC: 67 U/L — SIGNIFICANT CHANGE UP (ref 40–120)
ALT FLD-CCNC: 20 U/L — SIGNIFICANT CHANGE UP (ref 4–41)
ANION GAP SERPL CALC-SCNC: 16 MEQ/L — HIGH (ref 7–14)
AST SERPL-CCNC: 17 U/L — SIGNIFICANT CHANGE UP (ref 4–40)
BILIRUB SERPL-MCNC: 0.3 MG/DL — SIGNIFICANT CHANGE UP (ref 0.2–1.2)
BUN SERPL-MCNC: 54 MG/DL — HIGH (ref 7–23)
CALCIUM SERPL-MCNC: 8.4 MG/DL — SIGNIFICANT CHANGE UP (ref 8.4–10.5)
CHLORIDE SERPL-SCNC: 94 MMOL/L — LOW (ref 98–107)
CO2 SERPL-SCNC: 24 MMOL/L — SIGNIFICANT CHANGE UP (ref 22–31)
CREAT SERPL-MCNC: 7.26 MG/DL — HIGH (ref 0.5–1.3)
GLUCOSE SERPL-MCNC: 114 MG/DL — HIGH (ref 70–99)
HCT VFR BLD CALC: 29.3 % — LOW (ref 39–50)
HGB BLD-MCNC: 9.3 G/DL — LOW (ref 13–17)
MAGNESIUM SERPL-MCNC: 1.7 MG/DL — SIGNIFICANT CHANGE UP (ref 1.6–2.6)
MCHC RBC-ENTMCNC: 27.4 PG — SIGNIFICANT CHANGE UP (ref 27–34)
MCHC RBC-ENTMCNC: 31.7 % — LOW (ref 32–36)
MCV RBC AUTO: 86.2 FL — SIGNIFICANT CHANGE UP (ref 80–100)
NRBC # FLD: 0 K/UL — LOW (ref 25–125)
PHOSPHATE SERPL-MCNC: 5.5 MG/DL — HIGH (ref 2.5–4.5)
PLATELET # BLD AUTO: 202 K/UL — SIGNIFICANT CHANGE UP (ref 150–400)
PMV BLD: 11.9 FL — SIGNIFICANT CHANGE UP (ref 7–13)
POTASSIUM SERPL-MCNC: 4.1 MMOL/L — SIGNIFICANT CHANGE UP (ref 3.5–5.3)
POTASSIUM SERPL-SCNC: 4.1 MMOL/L — SIGNIFICANT CHANGE UP (ref 3.5–5.3)
PROT SERPL-MCNC: 7 G/DL — SIGNIFICANT CHANGE UP (ref 6–8.3)
RBC # BLD: 3.4 M/UL — LOW (ref 4.2–5.8)
RBC # FLD: 13.7 % — SIGNIFICANT CHANGE UP (ref 10.3–14.5)
SODIUM SERPL-SCNC: 134 MMOL/L — LOW (ref 135–145)
WBC # BLD: 6.42 K/UL — SIGNIFICANT CHANGE UP (ref 3.8–10.5)
WBC # FLD AUTO: 6.42 K/UL — SIGNIFICANT CHANGE UP (ref 3.8–10.5)

## 2019-01-13 PROCEDURE — 99232 SBSQ HOSP IP/OBS MODERATE 35: CPT

## 2019-01-13 RX ADMIN — Medication 3: at 21:44

## 2019-01-13 RX ADMIN — Medication 60 MILLIGRAM(S): at 05:34

## 2019-01-13 RX ADMIN — INSULIN GLARGINE 5 UNIT(S): 100 INJECTION, SOLUTION SUBCUTANEOUS at 21:45

## 2019-01-13 RX ADMIN — Medication 0.1 MILLIGRAM(S): at 21:45

## 2019-01-13 RX ADMIN — HEPARIN SODIUM 5000 UNIT(S): 5000 INJECTION INTRAVENOUS; SUBCUTANEOUS at 17:56

## 2019-01-13 RX ADMIN — LOSARTAN POTASSIUM 50 MILLIGRAM(S): 100 TABLET, FILM COATED ORAL at 05:34

## 2019-01-13 RX ADMIN — Medication 1334 MILLIGRAM(S): at 17:56

## 2019-01-13 RX ADMIN — Medication 400 MILLIGRAM(S): at 17:56

## 2019-01-13 RX ADMIN — Medication 400 MILLIGRAM(S): at 05:34

## 2019-01-13 RX ADMIN — Medication 1334 MILLIGRAM(S): at 12:16

## 2019-01-13 RX ADMIN — Medication 1: at 12:16

## 2019-01-13 RX ADMIN — HEPARIN SODIUM 5000 UNIT(S): 5000 INJECTION INTRAVENOUS; SUBCUTANEOUS at 05:34

## 2019-01-13 RX ADMIN — Medication 1334 MILLIGRAM(S): at 08:10

## 2019-01-13 RX ADMIN — CHLORHEXIDINE GLUCONATE 1 APPLICATION(S): 213 SOLUTION TOPICAL at 05:34

## 2019-01-13 RX ADMIN — Medication 81 MILLIGRAM(S): at 12:16

## 2019-01-13 RX ADMIN — Medication 80 MILLIGRAM(S): at 05:39

## 2019-01-13 NOTE — PROGRESS NOTE ADULT - PROBLEM SELECTOR PLAN 6
elevated PTH   hectorol 2mcg with HD   low vit d, start vit [id number] weekly x8 doses  continue phoslo  monitor ca and phos daily.

## 2019-01-13 NOTE — PROGRESS NOTE ADULT - SUBJECTIVE AND OBJECTIVE BOX
[facility] NEPHROLOGY PRACTICE   MD JORDAN AVALOS MD RUORU WONG, PA    TEL:  OFFICE: [phone]  DR CUETO CELL: [phone]  SUSAN WINTERS CELL: [phone]  DR. HORTA CELL: [phone]    RENAL FOLLOW UP NOTE  --------------------------------------------------------------------------------  HPI:      Pt seen and examined at bedside.   Sherrill SOB, chest pain     PAST HISTORY  --------------------------------------------------------------------------------  No significant changes to PMH, PSH, FHx, SHx, unless otherwise noted    ALLERGIES & MEDICATIONS  --------------------------------------------------------------------------------  Allergies    No Known Allergies    Intolerances      Standing Inpatient Medications  aspirin enteric coated 81 milliGRAM(s) Oral daily  calcium acetate 1334 milliGRAM(s) Oral three times a day with meals  chlorhexidine 4% Liquid 1 Application(s) Topical <User Schedule>  cloNIDine 0.1 milliGRAM(s) Oral at bedtime  dextrose 5%. 1000 milliLiter(s) IV Continuous <Continuous>  dextrose 50% Injectable 12.5 Gram(s) IV Push once  dextrose 50% Injectable 25 Gram(s) IV Push once  dextrose 50% Injectable 25 Gram(s) IV Push once  doxercalciferol Injectable 2 MICROGram(s) IV Push <User Schedule>  epoetin ralph Injectable 16379 Unit(s) IV Push <User Schedule>  ergocalciferol 98995 Unit(s) Oral <User Schedule>  furosemide    Tablet 80 milliGRAM(s) Oral daily  heparin  Injectable 5000 Unit(s) SubCutaneous every 12 hours  insulin glargine Injectable (LANTUS) 5 Unit(s) SubCutaneous at bedtime  insulin lispro (HumaLOG) corrective regimen sliding scale   SubCutaneous three times a day before meals  insulin lispro (HumaLOG) corrective regimen sliding scale   SubCutaneous at bedtime  iron sucrose Injectable 100 milliGRAM(s) IV Push <User Schedule>  labetalol 400 milliGRAM(s) Oral two times a day  losartan 50 milliGRAM(s) Oral daily  NIFEdipine XL 60 milliGRAM(s) Oral daily    PRN Inpatient Medications  dextrose 40% Gel 15 Gram(s) Oral once PRN  glucagon  Injectable 1 milliGRAM(s) IntraMuscular once PRN      REVIEW OF SYSTEMS  --------------------------------------------------------------------------------  General: no fever  CVS: no chest pain  RESP: no sob, no cough    VITALS/PHYSICAL EXAM  --------------------------------------------------------------------------------  T(C): 36.5 (01-13-19 @ 05:32), Max: 36.9 (01-12-19 @ 21:20)  HR: 68 (01-13-19 @ 05:32) (67 - 74)  BP: 149/88 (01-13-19 @ 05:32) (129/69 - 152/90)  RR: 17 (01-13-19 @ 05:32) (17 - 17)  SpO2: 100% (01-13-19 @ 05:32) (99% - 100%)  Wt(kg): --        01-12-19 @ 07:01  -  01-13-19 @ 07:00  --------------------------------------------------------  IN: 1050 mL / OUT: 1100 mL / NET: -50 mL    01-13-19 @ 07:01  -  01-13-19 @ 11:41  --------------------------------------------------------  IN: 300 mL / OUT: 900 mL / NET: -600 mL      Physical Exam:  	Gen: NAD  	HEENT: MARILU  	Pulm: CTA B/L  	CV: S1S2  	Abd: Soft, +BS  	Ext: No LE edema B/L                      Neuro: Awake   	Skin: Warm and Dry   	Vascular access: AVF    LABS/STUDIES  --------------------------------------------------------------------------------              9.3    6.42  >-----------<  202      [01-13-19 @ 05:50]              29.3     134  |  94  |  54  ----------------------------<  114      [01-13-19 @ 05:30]  4.1   |  24  |  7.26        Ca     8.4     [01-13-19 @ 05:30]      Mg     1.7     [01-13-19 @ 05:30]      Phos  5.5     [01-13-19 @ 05:30]    TPro  7.0  /  Alb  3.1  /  TBili  0.3  /  DBili  x   /  AST  17  /  ALT  20  /  AlkPhos  67  [01-13-19 @ 05:30]          Creatinine Trend:  SCr 7.26 [01-13 @ 05:30]  SCr 5.99 [01-12 @ 05:56]  SCr 7.45 [01-11 @ 04:13]  SCr 6.58 [01-10 @ 05:20]  SCr 8.51 [01-09 @ 06:37]    Urinalysis - [01-09-19 @ 10:32]      Color COLORLESS / Appearance CLEAR / SG 1.007 / pH 6.0      Gluc NEGATIVE / Ketone NEGATIVE  / Bili NEGATIVE / Urobili NORMAL       Blood SMALL / Protein 200 / Leuk Est NEGATIVE / Nitrite NEGATIVE      RBC 0-2 / WBC  / Hyaline  / Gran  / Sq Epi  / Non Sq Epi  / Bacteria       Iron 38, TIBC 233, %sat --      [01-09-19 @ 06:37]  Ferritin 88.94      [01-09-19 @ 06:37]  .1 (Ca --)      [01-10-19 @ 05:20]   --  .2 (Ca --)      [01-09-19 @ 16:30]   --  .9 (Ca --)      [06-14-18 @ 08:28]   --  Vitamin D (25OH) 13.8      [01-10-19 @ 05:20]  HbA1c 6.8      [01-09-19 @ 06:37]    HBsAb <3.0      [01-09-19 @ 16:30]  HBsAb Nonreactive      [01-10-19 @ 17:00]  HBsAg Nonreactive      [01-10-19 @ 17:00]  HBcAb Nonreactive      [01-10-19 @ 17:00]  HCV 0.08, Nonreactive Hepatitis C AB  S/CO Ratio                        Interpretation  < 1.0                                     Non-Reactive  1.0 - 4.9                           Weakly-Reactive  > 5.0                                 Reactive  Non-Reactive: Aperson with a non-reactive HCV antibody  result is considered uninfected.  No further action is  needed unless recent infection is suspected.  In these  cases, consider repeat testing later to detect  seroconversion..  Weakly-Reactive: HCV antibody test is abnormal, HCV RNA  Qualitative test will follow.  Reactive: HCV antibody test is abnormal, HCV RNA  Qualitative test will follow.  Note: HCV antibody testing is performed on the [employer] system.      [01-10-19 @ 17:00]

## 2019-01-13 NOTE — PROGRESS NOTE ADULT - PROBLEM SELECTOR PLAN 1
Pt s/p HD x3  Baseline creat 5 in summer, downtrending from ~9 on admission to 5.99 after HDx3  strict I/Os  Vit D WNL, PTH elevated secondary to CKD  c/w phoslo, renal diet

## 2019-01-13 NOTE — PROGRESS NOTE ADULT - PROBLEM SELECTOR PLAN 1
now ESRD started on HD 1/9. on MWF schedule with AVF   Plan for HD on 1/14   renal diet  Monitor BMP and BP  SW planning with outpatient HD, [place] vs [place]

## 2019-01-13 NOTE — PROGRESS NOTE ADULT - SUBJECTIVE AND OBJECTIVE BOX
Jimmy Sandhu MD  Interventional Cardiology / Advance Heart Failure and Cardiac Transplant Specialist  [place] Office : [address] [place] [id number]  Tel:   [place] Office : [address] [facility] [place] [address]  Tel: [phone]  Cell : [phone]    Subjective : Pt lying in bed comfortable, not in distress, denies any chest pain or SOB  	  MEDICATIONS:  aspirin enteric coated 81 milliGRAM(s) Oral daily  cloNIDine 0.1 milliGRAM(s) Oral at bedtime  heparin  Injectable 5000 Unit(s) SubCutaneous every 12 hours  labetalol 400 milliGRAM(s) Oral two times a day  losartan 50 milliGRAM(s) Oral daily  NIFEdipine XL 60 milliGRAM(s) Oral daily            dextrose 40% Gel 15 Gram(s) Oral once PRN  dextrose 50% Injectable 12.5 Gram(s) IV Push once  dextrose 50% Injectable 25 Gram(s) IV Push once  dextrose 50% Injectable 25 Gram(s) IV Push once  glucagon  Injectable 1 milliGRAM(s) IntraMuscular once PRN  insulin glargine Injectable (LANTUS) 5 Unit(s) SubCutaneous at bedtime  insulin lispro (HumaLOG) corrective regimen sliding scale   SubCutaneous three times a day before meals  insulin lispro (HumaLOG) corrective regimen sliding scale   SubCutaneous at bedtime    calcium acetate 1334 milliGRAM(s) Oral three times a day with meals  chlorhexidine 4% Liquid 1 Application(s) Topical <User Schedule>  dextrose 5%. 1000 milliLiter(s) IV Continuous <Continuous>  doxercalciferol Injectable 2 MICROGram(s) IV Push <User Schedule>  epoetin ralph Injectable 84092 Unit(s) IV Push <User Schedule>  ergocalciferol 07426 Unit(s) Oral <User Schedule>  iron sucrose Injectable 100 milliGRAM(s) IV Push <User Schedule>      PHYSICAL EXAM:  T(C): 36.7 (01-13-19 @ 14:11), Max: 36.9 (01-12-19 @ 21:20)  HR: 63 (01-13-19 @ 14:11) (63 - 74)  BP: 149/79 (01-13-19 @ 14:11) (129/69 - 152/90)  RR: 16 (01-13-19 @ 14:11) (16 - 17)  SpO2: 100% (01-13-19 @ 14:11) (99% - 100%)  Wt(kg): --  I&O's Summary    12 Jan 2019 07:01  -  13 Jan 2019 07:00  --------------------------------------------------------  IN: 1050 mL / OUT: 1100 mL / NET: -50 mL    13 Jan 2019 07:01  -  13 Jan 2019 14:35  --------------------------------------------------------  IN: 300 mL / OUT: 1200 mL / NET: -900 mL          Appearance: Normal	  HEENT:   Normal oral mucosa, PERRL, EOMI	  Cardiovascular: Normal S1 S2, No JVD, No murmurs, No edema  Respiratory: Lungs clear to auscultation	  Gastrointestinal:  Soft, Non-tender, + BS	  Extremities: Normal range of motion, No clubbing, cyanosis or edema                                    9.3    6.42  )-----------( 202      ( 13 Jan 2019 05:50 )             29.3     01-13    134<L>  |  94<L>  |  54<H>  ----------------------------<  114<H>  4.1   |  24  |  7.26<H>    Ca    8.4      13 Jan 2019 05:30  Phos  5.5     01-13  Mg     1.7     01-13    TPro  7.0  /  Alb  3.1<L>  /  TBili  0.3  /  DBili  x   /  AST  17  /  ALT  20  /  AlkPhos  67  01-13    proBNP:   Lipid Profile:   HgA1c:   TSH:

## 2019-01-13 NOTE — PROGRESS NOTE ADULT - PROBLEM SELECTOR PLAN 4
Resolved.  -suspect secondary to fluid overload in the setting of CKDV  - switched to 80 PO lasix  - patient also noted to be anemic hgb 7.1 (likely 2/2 CKD) on admission, maybe a component of symptomatic anemia, currently 7.4, pt on EPO  - lower suspicion for lung CA/pneumonia/TB/pulmonary hemorrhage at this time   - RVP negative, CXR showing b/l pleural effusions, vasculitis/TB workup negative on prior admission  -ECHO w/ normal LV function and EF 63%

## 2019-01-13 NOTE — PROGRESS NOTE ADULT - SUBJECTIVE AND OBJECTIVE BOX
Patient is a 64y old  Male who presents with a chief complaint of SOB/AMEZCUA, cough (12 Jan 2019 11:17)      SUBJECTIVE / OVERNIGHT EVENTS:  Pt expresses no acute issues or concerns. He denies f/c/n/v/d/CP/SOb     MEDICATIONS  (STANDING):  aspirin enteric coated 81 milliGRAM(s) Oral daily  calcium acetate 1334 milliGRAM(s) Oral three times a day with meals  chlorhexidine 4% Liquid 1 Application(s) Topical <User Schedule>  cloNIDine 0.1 milliGRAM(s) Oral at bedtime  dextrose 5%. 1000 milliLiter(s) (50 mL/Hr) IV Continuous <Continuous>  dextrose 50% Injectable 12.5 Gram(s) IV Push once  dextrose 50% Injectable 25 Gram(s) IV Push once  dextrose 50% Injectable 25 Gram(s) IV Push once  doxercalciferol Injectable 2 MICROGram(s) IV Push <User Schedule>  epoetin ralph Injectable 98249 Unit(s) IV Push <User Schedule>  ergocalciferol 19710 Unit(s) Oral <User Schedule>  furosemide    Tablet 80 milliGRAM(s) Oral daily  heparin  Injectable 5000 Unit(s) SubCutaneous every 12 hours  insulin glargine Injectable (LANTUS) 5 Unit(s) SubCutaneous at bedtime  insulin lispro (HumaLOG) corrective regimen sliding scale   SubCutaneous three times a day before meals  insulin lispro (HumaLOG) corrective regimen sliding scale   SubCutaneous at bedtime  iron sucrose Injectable 100 milliGRAM(s) IV Push <User Schedule>  labetalol 400 milliGRAM(s) Oral two times a day  losartan 50 milliGRAM(s) Oral daily  NIFEdipine XL 60 milliGRAM(s) Oral daily    MEDICATIONS  (PRN):  dextrose 40% Gel 15 Gram(s) Oral once PRN Blood Glucose LESS THAN 70 milliGRAM(s)/deciliter  glucagon  Injectable 1 milliGRAM(s) IntraMuscular once PRN Glucose LESS THAN 70 milligrams/deciliter      Vital Signs Last 24 Hrs  T(C): 36.5 (13 Jan 2019 05:32), Max: 36.9 (12 Jan 2019 21:20)  T(F): 97.7 (13 Jan 2019 05:32), Max: 98.5 (12 Jan 2019 21:20)  HR: 68 (13 Jan 2019 05:32) (67 - 74)  BP: 149/88 (13 Jan 2019 05:32) (129/69 - 152/90)  BP(mean): --  RR: 17 (13 Jan 2019 05:32) (17 - 17)  SpO2: 100% (13 Jan 2019 05:32) (99% - 100%)    CAPILLARY BLOOD GLUCOSE      POCT Blood Glucose.: 127 mg/dL (13 Jan 2019 07:53)  POCT Blood Glucose.: 220 mg/dL (12 Jan 2019 21:07)  POCT Blood Glucose.: 129 mg/dL (12 Jan 2019 17:45)  POCT Blood Glucose.: 147 mg/dL (12 Jan 2019 12:16)    I&O's Summary    12 Jan 2019 07:01  -  13 Jan 2019 07:00  --------------------------------------------------------  IN: 1050 mL / OUT: 1100 mL / NET: -50 mL    13 Jan 2019 07:01  -  13 Jan 2019 10:10  --------------------------------------------------------  IN: 300 mL / OUT: 900 mL / NET: -600 mL        PHYSICAL EXAM:  	GENERAL: Comfortable, NAD  	HEAD:  Normocephalic, atraumatic  	ENMT: Moist mucous membranes  	NECK: Supple, No JVD  	NERVOUS SYSTEM:  Alert & Oriented X3, moving all extremities  	CHEST/LUNG: b/l crackles  	HEART: S1S2 Regular rate and rhythm, no murmur   	ABDOMEN: Soft, Nontender, Nondistended, Bowel sounds present  	EXTREMITIES:  , LUE fistula in place  	MUSCULOSKELTAL- No muscle tenderness, no joint tenderness  SKIN- warm and dry, no rash    LABS:                        9.3    6.42  )-----------( 202      ( 13 Jan 2019 05:50 )             29.3     Auto Eosinophil # x     / Auto Eosinophil % x     / Auto Neutrophil # x     / Auto Neutrophil % x     / BANDS % x                            9.2    6.89  )-----------( 191      ( 12 Jan 2019 05:56 )             29.4     Auto Eosinophil # x     / Auto Eosinophil % x     / Auto Neutrophil # x     / Auto Neutrophil % x     / BANDS % x                            9.1    7.71  )-----------( 195      ( 11 Jan 2019 18:15 )             28.7     Auto Eosinophil # x     / Auto Eosinophil % x     / Auto Neutrophil # x     / Auto Neutrophil % x     / BANDS % x        01-13    134<L>  |  94<L>  |  54<H>  ----------------------------<  114<H>  4.1   |  24  |  7.26<H>  01-12    136  |  96<L>  |  38<H>  ----------------------------<  128<H>  4.0   |  26  |  5.99<H>    Ca    8.4      13 Jan 2019 05:30  Mg     1.7     01-13  Phos  5.5     01-13  TPro  7.0  /  Alb  3.1<L>  /  TBili  0.3  /  DBili  x   /  AST  17  /  ALT  20  /  AlkPhos  67  01-13  TPro  7.1  /  Alb  3.2<L>  /  TBili  0.3  /  DBili  x   /  AST  16  /  ALT  16  /  AlkPhos  69  01-12                RESPIRATORY  VENT:    ABG:     VBG:     RADIOLOGY & ADDITIONAL TESTS:  (Imaging Personally Reviewed)    Consultant(s) Notes Reviewed:      Care Discussed with Consultants/Other Providers: Patient is a 64y old  Male who presents with a chief complaint of SOB/AMEZCUA, cough (12 Jan 2019 11:17)      SUBJECTIVE / OVERNIGHT EVENTS:  Pt expresses no acute issues or concerns. He denies f/c/n/v/d/CP/SOB or other issues.    MEDICATIONS  (STANDING):  aspirin enteric coated 81 milliGRAM(s) Oral daily  calcium acetate 1334 milliGRAM(s) Oral three times a day with meals  chlorhexidine 4% Liquid 1 Application(s) Topical <User Schedule>  cloNIDine 0.1 milliGRAM(s) Oral at bedtime  dextrose 5%. 1000 milliLiter(s) (50 mL/Hr) IV Continuous <Continuous>  dextrose 50% Injectable 12.5 Gram(s) IV Push once  dextrose 50% Injectable 25 Gram(s) IV Push once  dextrose 50% Injectable 25 Gram(s) IV Push once  doxercalciferol Injectable 2 MICROGram(s) IV Push <User Schedule>  epoetin ralph Injectable 49457 Unit(s) IV Push <User Schedule>  ergocalciferol 76420 Unit(s) Oral <User Schedule>  furosemide    Tablet 80 milliGRAM(s) Oral daily  heparin  Injectable 5000 Unit(s) SubCutaneous every 12 hours  insulin glargine Injectable (LANTUS) 5 Unit(s) SubCutaneous at bedtime  insulin lispro (HumaLOG) corrective regimen sliding scale   SubCutaneous three times a day before meals  insulin lispro (HumaLOG) corrective regimen sliding scale   SubCutaneous at bedtime  iron sucrose Injectable 100 milliGRAM(s) IV Push <User Schedule>  labetalol 400 milliGRAM(s) Oral two times a day  losartan 50 milliGRAM(s) Oral daily  NIFEdipine XL 60 milliGRAM(s) Oral daily    MEDICATIONS  (PRN):  dextrose 40% Gel 15 Gram(s) Oral once PRN Blood Glucose LESS THAN 70 milliGRAM(s)/deciliter  glucagon  Injectable 1 milliGRAM(s) IntraMuscular once PRN Glucose LESS THAN 70 milligrams/deciliter      Vital Signs Last 24 Hrs  T(C): 36.5 (13 Jan 2019 05:32), Max: 36.9 (12 Jan 2019 21:20)  T(F): 97.7 (13 Jan 2019 05:32), Max: 98.5 (12 Jan 2019 21:20)  HR: 68 (13 Jan 2019 05:32) (67 - 74)  BP: 149/88 (13 Jan 2019 05:32) (129/69 - 152/90)  BP(mean): --  RR: 17 (13 Jan 2019 05:32) (17 - 17)  SpO2: 100% (13 Jan 2019 05:32) (99% - 100%)    CAPILLARY BLOOD GLUCOSE      POCT Blood Glucose.: 127 mg/dL (13 Jan 2019 07:53)  POCT Blood Glucose.: 220 mg/dL (12 Jan 2019 21:07)  POCT Blood Glucose.: 129 mg/dL (12 Jan 2019 17:45)  POCT Blood Glucose.: 147 mg/dL (12 Jan 2019 12:16)    I&O's Summary    12 Jan 2019 07:01  -  13 Jan 2019 07:00  --------------------------------------------------------  IN: 1050 mL / OUT: 1100 mL / NET: -50 mL    13 Jan 2019 07:01  -  13 Jan 2019 10:10  --------------------------------------------------------  IN: 300 mL / OUT: 900 mL / NET: -600 mL        PHYSICAL EXAM:  	GENERAL: Comfortable, NAD  	HEAD:  Normocephalic, atraumatic  	ENMT: Moist mucous membranes  	NECK: Supple, No JVD  	NERVOUS SYSTEM:  Alert & Oriented X3, moving all extremities  	CHEST/LUNG: b/l crackles  	HEART: S1S2 Regular rate and rhythm, no murmur   	ABDOMEN: Soft, Nontender, Nondistended, Bowel sounds present  	EXTREMITIES:  , LUE fistula in place  	MUSCULOSKELTAL- No muscle tenderness, no joint tenderness  SKIN- warm and dry, no rash    LABS:                        9.3    6.42  )-----------( 202      ( 13 Jan 2019 05:50 )             29.3     Auto Eosinophil # x     / Auto Eosinophil % x     / Auto Neutrophil # x     / Auto Neutrophil % x     / BANDS % x                            9.2    6.89  )-----------( 191      ( 12 Jan 2019 05:56 )             29.4     Auto Eosinophil # x     / Auto Eosinophil % x     / Auto Neutrophil # x     / Auto Neutrophil % x     / BANDS % x                            9.1    7.71  )-----------( 195      ( 11 Jan 2019 18:15 )             28.7     Auto Eosinophil # x     / Auto Eosinophil % x     / Auto Neutrophil # x     / Auto Neutrophil % x     / BANDS % x        01-13    134<L>  |  94<L>  |  54<H>  ----------------------------<  114<H>  4.1   |  24  |  7.26<H>  01-12    136  |  96<L>  |  38<H>  ----------------------------<  128<H>  4.0   |  26  |  5.99<H>    Ca    8.4      13 Jan 2019 05:30  Mg     1.7     01-13  Phos  5.5     01-13  TPro  7.0  /  Alb  3.1<L>  /  TBili  0.3  /  DBili  x   /  AST  17  /  ALT  20  /  AlkPhos  67  01-13  TPro  7.1  /  Alb  3.2<L>  /  TBili  0.3  /  DBili  x   /  AST  16  /  ALT  16  /  AlkPhos  69  01-12                RESPIRATORY  VENT:    ABG:     VBG:     RADIOLOGY & ADDITIONAL TESTS:  (Imaging Personally Reviewed)    Consultant(s) Notes Reviewed:      Care Discussed with Consultants/Other Providers: Patient is a 64y old  Male who presents with a chief complaint of SOB/AMEZCUA, cough (12 Jan 2019 11:17)      SUBJECTIVE / OVERNIGHT EVENTS:  Pt expresses no acute issues or concerns. He denies f/c/n/v/d/CP/SOB or other issues.    MEDICATIONS  (STANDING):  aspirin enteric coated 81 milliGRAM(s) Oral daily  calcium acetate 1334 milliGRAM(s) Oral three times a day with meals  chlorhexidine 4% Liquid 1 Application(s) Topical <User Schedule>  cloNIDine 0.1 milliGRAM(s) Oral at bedtime  dextrose 5%. 1000 milliLiter(s) (50 mL/Hr) IV Continuous <Continuous>  dextrose 50% Injectable 12.5 Gram(s) IV Push once  dextrose 50% Injectable 25 Gram(s) IV Push once  dextrose 50% Injectable 25 Gram(s) IV Push once  doxercalciferol Injectable 2 MICROGram(s) IV Push <User Schedule>  epoetin ralph Injectable 53747 Unit(s) IV Push <User Schedule>  ergocalciferol 96018 Unit(s) Oral <User Schedule>  furosemide    Tablet 80 milliGRAM(s) Oral daily  heparin  Injectable 5000 Unit(s) SubCutaneous every 12 hours  insulin glargine Injectable (LANTUS) 5 Unit(s) SubCutaneous at bedtime  insulin lispro (HumaLOG) corrective regimen sliding scale   SubCutaneous three times a day before meals  insulin lispro (HumaLOG) corrective regimen sliding scale   SubCutaneous at bedtime  iron sucrose Injectable 100 milliGRAM(s) IV Push <User Schedule>  labetalol 400 milliGRAM(s) Oral two times a day  losartan 50 milliGRAM(s) Oral daily  NIFEdipine XL 60 milliGRAM(s) Oral daily    MEDICATIONS  (PRN):  dextrose 40% Gel 15 Gram(s) Oral once PRN Blood Glucose LESS THAN 70 milliGRAM(s)/deciliter  glucagon  Injectable 1 milliGRAM(s) IntraMuscular once PRN Glucose LESS THAN 70 milligrams/deciliter      Vital Signs Last 24 Hrs  T(C): 36.5 (13 Jan 2019 05:32), Max: 36.9 (12 Jan 2019 21:20)  T(F): 97.7 (13 Jan 2019 05:32), Max: 98.5 (12 Jan 2019 21:20)  HR: 68 (13 Jan 2019 05:32) (67 - 74)  BP: 149/88 (13 Jan 2019 05:32) (129/69 - 152/90)  BP(mean): --  RR: 17 (13 Jan 2019 05:32) (17 - 17)  SpO2: 100% (13 Jan 2019 05:32) (99% - 100%)    CAPILLARY BLOOD GLUCOSE      POCT Blood Glucose.: 127 mg/dL (13 Jan 2019 07:53)  POCT Blood Glucose.: 220 mg/dL (12 Jan 2019 21:07)  POCT Blood Glucose.: 129 mg/dL (12 Jan 2019 17:45)  POCT Blood Glucose.: 147 mg/dL (12 Jan 2019 12:16)    I&O's Summary    12 Jan 2019 07:01  -  13 Jan 2019 07:00  --------------------------------------------------------  IN: 1050 mL / OUT: 1100 mL / NET: -50 mL    13 Jan 2019 07:01  -  13 Jan 2019 10:10  --------------------------------------------------------  IN: 300 mL / OUT: 900 mL / NET: -600 mL        PHYSICAL EXAM:  	GENERAL: Comfortable, NAD  	HEAD:  Normocephalic, atraumatic  	ENMT: Moist mucous membranes  	NECK: Supple, No JVD  	NERVOUS SYSTEM:  Alert & Oriented X3, moving all extremities  	CHEST/LUNG: b/l crackles  	HEART: S1S2 Regular rate and rhythm, no murmur   	ABDOMEN: Soft, Nontender, Nondistended, Bowel sounds present  	EXTREMITIES:  , LUE fistula in place  	MUSCULOSKELTAL- No muscle tenderness, no joint tenderness  SKIN- warm and dry, no rash    LABS:                        9.3    6.42  )-----------( 202      ( 13 Jan 2019 05:50 )             29.3     Auto Eosinophil # x     / Auto Eosinophil % x     / Auto Neutrophil # x     / Auto Neutrophil % x     / BANDS % x                            9.2    6.89  )-----------( 191      ( 12 Jan 2019 05:56 )             29.4     Auto Eosinophil # x     / Auto Eosinophil % x     / Auto Neutrophil # x     / Auto Neutrophil % x     / BANDS % x                            9.1    7.71  )-----------( 195      ( 11 Jan 2019 18:15 )             28.7     Auto Eosinophil # x     / Auto Eosinophil % x     / Auto Neutrophil # x     / Auto Neutrophil % x     / BANDS % x        01-13    134<L>  |  94<L>  |  54<H>  ----------------------------<  114<H>  4.1   |  24  |  7.26<H>  01-12    136  |  96<L>  |  38<H>  ----------------------------<  128<H>  4.0   |  26  |  5.99<H>    Ca    8.4      13 Jan 2019 05:30  Mg     1.7     01-13  Phos  5.5     01-13  TPro  7.0  /  Alb  3.1<L>  /  TBili  0.3  /  DBili  x   /  AST  17  /  ALT  20  /  AlkPhos  67  01-13  TPro  7.1  /  Alb  3.2<L>  /  TBili  0.3  /  DBili  x   /  AST  16  /  ALT  16  /  AlkPhos  69  01-12                RESPIRATORY  VENT:    ABG:     VBG:     RADIOLOGY & ADDITIONAL TESTS:  (Imaging Personally Reviewed): ERIN    Consultant(s) Notes Reviewed:  NA    Care Discussed with Consultants/Other Providers: ERIN

## 2019-01-13 NOTE — PROGRESS NOTE ADULT - PROBLEM SELECTOR PLAN 8
DVT ppx: hep subq  Diet: Renal diet  Dispo: Pending set up of HD at [place] [facility] or [facility]

## 2019-01-13 NOTE — PROGRESS NOTE ADULT - ASSESSMENT
EKG - NSR LVH with repolarization changes   Echo:  < from: Transthoracic Echocardiogram (01.10.19 @ 17:02) >  1. Mitral annular calcification, otherwise normal mitral  valve. Minimal mitral regurgitation.  2. Normal left ventricular internal dimensions and wall  thicknesses.  3. Normal left ventricular systolic function. No segmental  wall motion abnormalities.  4. Normal right ventricular size and function.  *** Compared with echocardiogram of 6/14/2018, no  significant changes noted.    < end of copied text >      a/p     1) Shortness of breath - secondary to volume overload, improved after HD,     2) Troponin elevation - no CP, likely sec to CKD, echo with normal LV function, denies CP, id continues to be SOB will get ischemic eval as OP    3) Weakness - improved with dialysis and transfusion     4) ESRD - s/p HD     5) CAD s/p PCI - s/p 3 stents in 2014, cont asa

## 2019-01-14 VITALS — DIASTOLIC BLOOD PRESSURE: 88 MMHG | HEART RATE: 72 BPM | SYSTOLIC BLOOD PRESSURE: 173 MMHG

## 2019-01-14 PROCEDURE — 99239 HOSP IP/OBS DSCHRG MGMT >30: CPT

## 2019-01-14 RX ORDER — LABETALOL HCL 100 MG
2 TABLET ORAL
Qty: 120 | Refills: 0
Start: 2019-01-14 | End: 2019-02-12

## 2019-01-14 RX ORDER — LOSARTAN POTASSIUM 100 MG/1
1 TABLET, FILM COATED ORAL
Qty: 30 | Refills: 0
Start: 2019-01-14 | End: 2019-02-12

## 2019-01-14 RX ORDER — CALCIUM ACETATE 667 MG
1334 TABLET ORAL
Qty: 0 | Refills: 0 | DISCHARGE
Start: 2019-01-14 | End: 2019-02-12

## 2019-01-14 RX ORDER — NIFEDIPINE 30 MG
60 TABLET, EXTENDED RELEASE 24 HR ORAL DAILY
Qty: 0 | Refills: 0 | Status: DISCONTINUED | OUTPATIENT
Start: 2019-01-14 | End: 2019-01-14

## 2019-01-14 RX ORDER — INSULIN NPH HUM/REG INSULIN HM 70-30/ML
15 VIAL (ML) SUBCUTANEOUS
Qty: 0 | Refills: 0 | COMMUNITY

## 2019-01-14 RX ORDER — CALCIUM ACETATE 667 MG
1334 TABLET ORAL
Qty: 120060 | Refills: 0
Start: 2019-01-14 | End: 2019-02-12

## 2019-01-14 RX ORDER — INSULIN NPH HUM/REG INSULIN HM 70-30/ML
7 VIAL (ML) SUBCUTANEOUS
Qty: 10 | Refills: 0
Start: 2019-01-14 | End: 2019-02-12

## 2019-01-14 RX ORDER — LOSARTAN POTASSIUM 100 MG/1
50 TABLET, FILM COATED ORAL DAILY
Qty: 0 | Refills: 0 | Status: DISCONTINUED | OUTPATIENT
Start: 2019-01-14 | End: 2019-01-14

## 2019-01-14 RX ORDER — INSULIN NPH HUM/REG INSULIN HM 70-30/ML
15 VIAL (ML) SUBCUTANEOUS
Qty: 0 | Refills: 0 | DISCHARGE
Start: 2019-01-14 | End: 2019-02-12

## 2019-01-14 RX ORDER — ERGOCALCIFEROL 1.25 MG/1
1 CAPSULE ORAL
Qty: 7 | Refills: 0
Start: 2019-01-14 | End: 2019-03-14

## 2019-01-14 RX ORDER — LABETALOL HCL 100 MG
1 TABLET ORAL
Qty: 0 | Refills: 0 | COMMUNITY

## 2019-01-14 RX ORDER — METOPROLOL TARTRATE 50 MG
1 TABLET ORAL
Qty: 0 | Refills: 0 | COMMUNITY

## 2019-01-14 RX ADMIN — HEPARIN SODIUM 5000 UNIT(S): 5000 INJECTION INTRAVENOUS; SUBCUTANEOUS at 18:25

## 2019-01-14 RX ADMIN — LOSARTAN POTASSIUM 50 MILLIGRAM(S): 100 TABLET, FILM COATED ORAL at 12:55

## 2019-01-14 RX ADMIN — Medication 81 MILLIGRAM(S): at 12:23

## 2019-01-14 RX ADMIN — ERYTHROPOIETIN 10000 UNIT(S): 10000 INJECTION, SOLUTION INTRAVENOUS; SUBCUTANEOUS at 08:54

## 2019-01-14 RX ADMIN — Medication 400 MILLIGRAM(S): at 18:24

## 2019-01-14 RX ADMIN — Medication 2: at 12:22

## 2019-01-14 RX ADMIN — IRON SUCROSE 100 MILLIGRAM(S): 20 INJECTION, SOLUTION INTRAVENOUS at 08:54

## 2019-01-14 RX ADMIN — Medication 1334 MILLIGRAM(S): at 18:24

## 2019-01-14 RX ADMIN — DOXERCALCIFEROL 2 MICROGRAM(S): 2.5 CAPSULE ORAL at 08:53

## 2019-01-14 RX ADMIN — Medication 60 MILLIGRAM(S): at 12:55

## 2019-01-14 RX ADMIN — Medication 1334 MILLIGRAM(S): at 10:28

## 2019-01-14 RX ADMIN — Medication 1334 MILLIGRAM(S): at 12:23

## 2019-01-14 NOTE — PROGRESS NOTE ADULT - PROBLEM SELECTOR PLAN 1
Pt s/p HD x3  Baseline creat 5 in summer, downtrending from ~9 on admission to 5.99 after HDx3  strict I/Os  Vit D WNL, PTH elevated secondary to CKD  c/w phoslo, renal diet Pt s/p HD x4  Baseline creat 5 in summer, downtrending from ~9 on admission to 5.99 after HDx4  strict I/Os  Vit D WNL, PTH elevated secondary to CKD  c/w phoslo, renal diet  F/u with renal about lasix dose after d/c.  Needs HD set up after discharge by [facility].

## 2019-01-14 NOTE — PROGRESS NOTE ADULT - ASSESSMENT
64M with hx of CAD s/p PCI x 3, HTN, DM2, GERD, CKD5 (not on HD yet, but has AVF placed) here for cough x 3 days and complaints of SOB/AMEZCUA. Suspect symptoms of SOB likely secondary to fluid overload in the setting of CKD (given edema and crackles on exam). Now ESRD, s/p diuresis and initiation of HD, with symptomatic improvement. 64M with hx of CAD s/p PCI x 3, HTN, DM2, GERD, CKD5 (not on HD yet, but has AVF placed) here for cough x 3 days and complaints of SOB/AMEZCUA. Suspect symptoms of SOB likely secondary to fluid overload in the setting of CKD (given edema and crackles on exam). Now ESRD, s/p diuresis and initiation of HD, with symptomatic improvement. Pending  setup for HD after discharge.

## 2019-01-14 NOTE — PROGRESS NOTE ADULT - REASON FOR ADMISSION
SOB/AMEZCUA, cough

## 2019-01-14 NOTE — PROGRESS NOTE ADULT - PROBLEM SELECTOR PLAN 8
DVT ppx: hep subq  Diet: Renal diet  Dispo: Pending set up of HD at [place] or [facility]

## 2019-01-14 NOTE — PROGRESS NOTE ADULT - PROBLEM SELECTOR PLAN 1
now ESRD started on HD 1/9. on MWF schedule with AVF   continue HD as ordered   renal diet  Monitor BMP and BP  SW planning with outpatient HD, [place] vs [place]

## 2019-01-14 NOTE — PROGRESS NOTE ADULT - ATTENDING COMMENTS
Patient seen and examined by myself , case discussed  with resident ,agree with the above finding and plan  Pt with no acute complain   Renal f/u appreciated , recommend changing lasix to 80 mg po daily    Awaiting setup of otpt HD services at either [place] or [facility] prior to discharge.
Patient seen and examined by myself , case discussed  with resident ,agree with the above finding and plan  Pt with no acute complain   Renal  and cardiology f/u appreciated ,   c/w current plan of care    Awaiting setup of outpt HD services at either [place] or [facility] prior to discharge.
Patient seen and evaluated. Agree with above.  Dialsysis set up as outpatient. Patient stable for discharge with follow up with nephrology. D/C planning 35 minutes.
Patient seen and examined, d/w Dr. Brown, agree with above with following additions:    63 yo M CKD5 (now ESRD) presenting with fluid overload, now symptomatically improved s/p diuresis and initiation of HD. Tolerated dialysis session yesterday (though did c/o of some flushing), appreciate renal assistance, plan to complete 2 more sessions of HD as inpatient prior to discharge. Anemia 2/2 renal disease, c/w epogen and IV iron as per renal, d/w HD SW, applying to otpt HD centers at this time ([place] or [facility]), those centers do not have hgb requirement. Dispo pending completion of inpatient HD course and setup of otpt HD services, appreciate CM/SW assistance.
Patient seen and examined, d/w Dr. Brown, agree with above.     Feels well, tolerated 2nd session of HD yesterday, to go for 3rd session of HD today. Appreciate renal input. Transfusing pRBC with dialysis as per renal (would like to keep hgb >8). Awaiting setup of otpt HD services at either [place] or [facility] prior to discharge.
Patient seen and examined, d/w Dr. Brown, agree with above with following additions:    64M with hx of CAD s/p PCI x 3, HTN, DM2, GERD, CKD5 (has AVF placed) here with volume overload, s/p IV diuresis, currently feeling better. Appreciate renal input, initiating HD via AVF as per renal, (will need outpatient HD setup, appreciate CM/SW assistance, [place] or [facility] dialysis centers per renal). Acidosis should improve with HD, c/w phoslo, renal diet, calcitriol, as per renal. On epogen and IV iron for anemia 2/2 renal disease as per renal. Hypomagnesemia (repleted Mg). Appreciate cards input, elevated trop setting of CKD, patient without chest pain, they recommend echo to eval LV function.

## 2019-01-14 NOTE — PROGRESS NOTE ADULT - SUBJECTIVE AND OBJECTIVE BOX
Patient is a 64y old  Male who presents with a chief complaint of SOB/AMEZCUA, cough (12 Jan 2019 11:17)      SUBJECTIVE / OVERNIGHT EVENTS:      MEDICATIONS  (STANDING):        Vital Signs Last 24 Hrs      CAPILLARY BLOOD GLUCOSE      I&O's Summary        PHYSICAL EXAM:      LABS:        RADIOLOGY & ADDITIONAL TESTS:  (Imaging Personally Reviewed): ERIN    Consultant(s) Notes Reviewed:  ERIN    Care Discussed with Consultants/Other Providers: ERIN Patient is a 64y old  Male who presents with a chief complaint of SOB/AMEZCUA, cough (12 Jan 2019 11:17)      SUBJECTIVE / OVERNIGHT EVENTS: No acute events overnight. Patient was in HD this AM. Denies any new complaints. No longer SOB or coughing, remains afebrile.    MEDICATIONS  (STANDING):  aspirin enteric coated 81 milliGRAM(s) Oral daily  calcium acetate 1334 milliGRAM(s) Oral three times a day with meals  chlorhexidine 4% Liquid 1 Application(s) Topical <User Schedule>  cloNIDine 0.1 milliGRAM(s) Oral at bedtime  dextrose 5%. 1000 milliLiter(s) (50 mL/Hr) IV Continuous <Continuous>  dextrose 50% Injectable 12.5 Gram(s) IV Push once  dextrose 50% Injectable 25 Gram(s) IV Push once  dextrose 50% Injectable 25 Gram(s) IV Push once  doxercalciferol Injectable 2 MICROGram(s) IV Push <User Schedule>  epoetin ralph Injectable 52915 Unit(s) IV Push <User Schedule>  ergocalciferol 03332 Unit(s) Oral <User Schedule>  heparin  Injectable 5000 Unit(s) SubCutaneous every 12 hours  insulin glargine Injectable (LANTUS) 5 Unit(s) SubCutaneous at bedtime  insulin lispro (HumaLOG) corrective regimen sliding scale   SubCutaneous three times a day before meals  insulin lispro (HumaLOG) corrective regimen sliding scale   SubCutaneous at bedtime  iron sucrose Injectable 100 milliGRAM(s) IV Push <User Schedule>  labetalol 400 milliGRAM(s) Oral two times a day  losartan 50 milliGRAM(s) Oral daily  NIFEdipine XL 60 milliGRAM(s) Oral daily    MEDICATIONS  (PRN):  dextrose 40% Gel 15 Gram(s) Oral once PRN Blood Glucose LESS THAN 70 milliGRAM(s)/deciliter  glucagon  Injectable 1 milliGRAM(s) IntraMuscular once PRN Glucose LESS THAN 70 milligrams/deciliter    Vital Signs Last 24 Hrs  T(C): 37 (14 Jan 2019 12:18), Max: 37 (14 Jan 2019 12:18)  T(F): 98.6 (14 Jan 2019 12:18), Max: 98.6 (14 Jan 2019 12:18)  HR: 71 (14 Jan 2019 12:18) (59 - 71)  BP: 162/79 (14 Jan 2019 12:18) (123/68 - 180/91)  BP(mean): --  RR: 16 (14 Jan 2019 12:18) (16 - 17)  SpO2: 96% (14 Jan 2019 12:18) (96% - 100%)    CAPILLARY BLOOD GLUCOSE      POCT Blood Glucose.: 207 mg/dL (14 Jan 2019 11:49)  POCT Blood Glucose.: 119 mg/dL (14 Jan 2019 08:36)  POCT Blood Glucose.: 111 mg/dL (14 Jan 2019 06:12)  POCT Blood Glucose.: 358 mg/dL (13 Jan 2019 21:03)  POCT Blood Glucose.: 100 mg/dL (13 Jan 2019 16:50)    I&O's Summary    13 Jan 2019 07:01  -  14 Jan 2019 07:00  --------------------------------------------------------  IN: 950 mL / OUT: 1900 mL / NET: -950 mL    14 Jan 2019 07:01  -  14 Jan 2019 12:46  --------------------------------------------------------  IN: 400 mL / OUT: 1800 mL / NET: -1400 mL      PHYSICAL EXAM:  GENERAL: NAD, ill appearing, cachectic  HEAD:  Atraumatic, Normocephalic  EYES: EOMI, conjunctiva and sclera clear  NECK: Supple, No JVD  CHEST/LUNG: Clear to auscultation bilaterally; No wheeze, ronchi or rales  HEART: Regular rate and rhythm; No murmurs, rubs, or gallops  ABDOMEN: Soft, Nontender, Nondistended; Bowel sounds present  EXTREMITIES:  2+ Peripheral Pulses, No clubbing, cyanosis, or edema  PSYCH: AAOx3  NEUROLOGY: non-focal  SKIN: No rashes or lesions    LABS:  CBC Full  -  ( 13 Jan 2019 05:50 )  WBC Count : 6.42 K/uL  Hemoglobin : 9.3 g/dL  Hematocrit : 29.3 %  Platelet Count - Automated : 202 K/uL  Mean Cell Volume : 86.2 fL  Mean Cell Hemoglobin : 27.4 pg  Mean Cell Hemoglobin Concentration : 31.7 %  Auto Neutrophil # : x  Auto Lymphocyte # : x  Auto Monocyte # : x  Auto Eosinophil # : x  Auto Basophil # : x  Auto Neutrophil % : x  Auto Lymphocyte % : x  Auto Monocyte % : x  Auto Eosinophil % : x  Auto Basophil % : x    01-13    134<L>  |  94<L>  |  54<H>  ----------------------------<  114<H>  4.1   |  24  |  7.26<H>    Ca    8.4      13 Jan 2019 05:30  Phos  5.5     01-13  Mg     1.7     01-13    TPro  7.0  /  Alb  3.1<L>  /  TBili  0.3  /  DBili  x   /  AST  17  /  ALT  20  /  AlkPhos  67  01-13      RADIOLOGY & ADDITIONAL TESTS:  (Imaging Personally Reviewed): ERIN    Consultant(s) Notes Reviewed:  ERIN    Care Discussed with Consultants/Other Providers: ERIN

## 2019-01-14 NOTE — PROGRESS NOTE ADULT - PROBLEM SELECTOR PLAN 6
elevated PTH   hectorol 2mcg with HD   low vit d, start vit [id number] weekly x8 doses  continue phoslo  monitor ca and phos daily.

## 2019-01-14 NOTE — PROGRESS NOTE ADULT - PROBLEM SELECTOR PROBLEM 7
Diabetes mellitus
Hypomagnesemia
Diabetes mellitus

## 2019-01-14 NOTE — PROGRESS NOTE ADULT - PROBLEM SELECTOR PLAN 4
Resolved.  -suspect secondary to fluid overload in the setting of CKDV  - switched to 80 PO lasix  - patient also noted to be anemic hgb 7.1 (likely 2/2 CKD) on admission, maybe a component of symptomatic anemia, currently 7.4, pt on EPO  - lower suspicion for lung CA/pneumonia/TB/pulmonary hemorrhage at this time   - RVP negative, CXR showing b/l pleural effusions, vasculitis/TB workup negative on prior admission  -ECHO w/ normal LV function and EF 63% Resolved.  -suspect secondary to fluid overload in the setting of CKDV  - switched to 80 PO lasix  - patient also noted to be anemic hgb 7.1 (likely 2/2 CKD) on admission, maybe a component of symptomatic anemia, currently 7.4, pt on EPO  - low suspicion for lung CA/pneumonia/TB/pulmonary hemorrhage at this time   - RVP negative, CXR showing b/l pleural effusions, vasculitis/TB workup negative on prior admission  -ECHO w/ normal LV function and EF 63%

## 2019-01-14 NOTE — PROGRESS NOTE ADULT - SUBJECTIVE AND OBJECTIVE BOX
[facility]   MD JORDAN AVALSO MD ANGELA WONG, PA    TEL:  OFFICE: [phone]  DR CUETO CELL: [phone]  DR. HORTA CELL: [phone]  LIDA WINTERS CELL: [phone]        Patient is a 64y old  Male who presents with a chief complaint of SOB/AMEZCUA, cough (14 Jan 2019 11:46)      Patient seen and examined in HD. No chest pain/sob    VITALS:  T(F): 98.6 (01-14-19 @ 12:18), Max: 98.6 (01-14-19 @ 12:18)  HR: 71 (01-14-19 @ 12:18)  BP: 162/79 (01-14-19 @ 12:18)  RR: 16 (01-14-19 @ 12:18)  SpO2: 96% (01-14-19 @ 12:18)  Wt(kg): --  Flow 300    01-13 @ 07:01 - 01-14 @ 07:00  --------------------------------------------------------  IN: 950 mL / OUT: 1900 mL / NET: -950 mL    01-14 @ 07:01  - 01-14 @ 14:14  --------------------------------------------------------  IN: 400 mL / OUT: 1800 mL / NET: -1400 mL          PHYSICAL EXAM:  Constitutional: NAD  Neck: No JVD  Respiratory: CTAB, no wheezes, rales or rhonchi  Cardiovascular: S1, S2, RRR  Gastrointestinal: BS+, soft, NT/ND  Extremities: No peripheral edema    Hospital Medications:   MEDICATIONS  (STANDING):  aspirin enteric coated 81 milliGRAM(s) Oral daily  calcium acetate 1334 milliGRAM(s) Oral three times a day with meals  chlorhexidine 4% Liquid 1 Application(s) Topical <User Schedule>  cloNIDine 0.1 milliGRAM(s) Oral at bedtime  dextrose 5%. 1000 milliLiter(s) (50 mL/Hr) IV Continuous <Continuous>  dextrose 50% Injectable 12.5 Gram(s) IV Push once  dextrose 50% Injectable 25 Gram(s) IV Push once  dextrose 50% Injectable 25 Gram(s) IV Push once  doxercalciferol Injectable 2 MICROGram(s) IV Push <User Schedule>  epoetin ralph Injectable 94636 Unit(s) IV Push <User Schedule>  ergocalciferol 49089 Unit(s) Oral <User Schedule>  heparin  Injectable 5000 Unit(s) SubCutaneous every 12 hours  insulin glargine Injectable (LANTUS) 5 Unit(s) SubCutaneous at bedtime  insulin lispro (HumaLOG) corrective regimen sliding scale   SubCutaneous three times a day before meals  insulin lispro (HumaLOG) corrective regimen sliding scale   SubCutaneous at bedtime  iron sucrose Injectable 100 milliGRAM(s) IV Push <User Schedule>  labetalol 400 milliGRAM(s) Oral two times a day  losartan 50 milliGRAM(s) Oral daily  NIFEdipine XL 60 milliGRAM(s) Oral daily      LABS:  01-13    134<L>  |  94<L>  |  54<H>  ----------------------------<  114<H>  4.1   |  24  |  7.26<H>    Ca    8.4      13 Jan 2019 05:30  Phos  5.5     01-13  Mg     1.7     01-13    TPro  7.0  /  Alb  3.1<L>  /  TBili  0.3  /  DBili      /  AST  17  /  ALT  20  /  AlkPhos  67  01-13    Creatinine Trend: 7.26 <--, 5.99 <--, 7.45 <--, 6.58 <--, 8.51 <--, 8.38 <--                                9.3    6.42  )-----------( 202      ( 13 Jan 2019 05:50 )             29.3     Urine Studies:  Urinalysis - [01-09-19 @ 10:32]      Color COLORLESS / Appearance CLEAR / SG 1.007 / pH 6.0      Gluc NEGATIVE / Ketone NEGATIVE  / Bili NEGATIVE / Urobili NORMAL       Blood SMALL / Protein 200 / Leuk Est NEGATIVE / Nitrite NEGATIVE      RBC 0-2 / WBC  / Hyaline  / Gran  / Sq Epi  / Non Sq Epi  / Bacteria       Iron 38, TIBC 233, %sat --      [01-09-19 @ 06:37]  Ferritin 88.94      [01-09-19 @ 06:37]  .1 (Ca --)      [01-10-19 @ 05:20]   --  .2 (Ca --)      [01-09-19 @ 16:30]   --  .9 (Ca --)      [06-14-18 @ 08:28]   --  Vitamin D (25OH) 13.8      [01-10-19 @ 05:20]  HbA1c 6.8      [01-09-19 @ 06:37]    HBsAb <3.0      [01-09-19 @ 16:30]  HBsAb Nonreactive      [01-10-19 @ 17:00]  HBsAg Nonreactive      [01-10-19 @ 17:00]  HBcAb Nonreactive      [01-10-19 @ 17:00]  HCV 0.08, Nonreactive Hepatitis C AB  S/CO Ratio                        Interpretation  < 1.0                                     Non-Reactive  1.0 - 4.9                           Weakly-Reactive  > 5.0                                 Reactive  Non-Reactive: Aperson with a non-reactive HCV antibody  result is considered uninfected.  No further action is  needed unless recent infection is suspected.  In these  cases, consider repeat testing later to detect  seroconversion..  Weakly-Reactive: HCV antibody test is abnormal, HCV RNA  Qualitative test will follow.  Reactive: HCV antibody test is abnormal, HCV RNA  Qualitative test will follow.  Note: HCV antibody testing is performed on the [employer]   system.      [01-10-19 @ 17:00]      RADIOLOGY & ADDITIONAL STUDIES:

## 2019-01-14 NOTE — PROGRESS NOTE ADULT - PROVIDER SPECIALTY LIST ADULT
Cardiology
Internal Medicine
Nephrology

## 2019-01-14 NOTE — PROGRESS NOTE ADULT - PROBLEM SELECTOR PLAN 5
uncontrolled  meds rescheduled for HD, please give all bp meds.  UF with HD  low Na diet  monitor BP closely. increase losartan 100mg daily if BP still elevated

## 2019-01-14 NOTE — PROGRESS NOTE ADULT - SUBJECTIVE AND OBJECTIVE BOX
Jimmy Sandhu MD  Interventional Cardiology / Advance Heart Failure and Cardiac Transplant Specialist  [place] Office : [address] [place] [id number]  Tel:   [place] Office : [address] [facility] [place] [address]  Tel: [phone]  Cell : [phone]    Subjective : Pt lying in bed comfortable, not in distress, denies any chest pain or SOB  	  MEDICATIONS:  aspirin enteric coated 81 milliGRAM(s) Oral daily  cloNIDine 0.1 milliGRAM(s) Oral at bedtime  heparin  Injectable 5000 Unit(s) SubCutaneous every 12 hours  labetalol 400 milliGRAM(s) Oral two times a day  losartan 50 milliGRAM(s) Oral daily  NIFEdipine XL 60 milliGRAM(s) Oral daily            dextrose 40% Gel 15 Gram(s) Oral once PRN  dextrose 50% Injectable 12.5 Gram(s) IV Push once  dextrose 50% Injectable 25 Gram(s) IV Push once  dextrose 50% Injectable 25 Gram(s) IV Push once  glucagon  Injectable 1 milliGRAM(s) IntraMuscular once PRN  insulin glargine Injectable (LANTUS) 5 Unit(s) SubCutaneous at bedtime  insulin lispro (HumaLOG) corrective regimen sliding scale   SubCutaneous three times a day before meals  insulin lispro (HumaLOG) corrective regimen sliding scale   SubCutaneous at bedtime    calcium acetate 1334 milliGRAM(s) Oral three times a day with meals  chlorhexidine 4% Liquid 1 Application(s) Topical <User Schedule>  dextrose 5%. 1000 milliLiter(s) IV Continuous <Continuous>  doxercalciferol Injectable 2 MICROGram(s) IV Push <User Schedule>  epoetin ralph Injectable 40186 Unit(s) IV Push <User Schedule>  ergocalciferol 24123 Unit(s) Oral <User Schedule>  iron sucrose Injectable 100 milliGRAM(s) IV Push <User Schedule>      PHYSICAL EXAM:  T(C): 37 (01-14-19 @ 12:18), Max: 37 (01-14-19 @ 12:18)  HR: 71 (01-14-19 @ 12:18) (59 - 71)  BP: 162/79 (01-14-19 @ 12:18) (123/68 - 180/91)  RR: 16 (01-14-19 @ 12:18) (16 - 17)  SpO2: 96% (01-14-19 @ 12:18) (96% - 100%)  Wt(kg): --  I&O's Summary    13 Jan 2019 07:01  -  14 Jan 2019 07:00  --------------------------------------------------------  IN: 950 mL / OUT: 1900 mL / NET: -950 mL    14 Jan 2019 07:01  -  14 Jan 2019 12:46  --------------------------------------------------------  IN: 400 mL / OUT: 1800 mL / NET: -1400 mL          Appearance: Normal	  HEENT:   Normal oral mucosa, PERRL, EOMI	  Cardiovascular: Normal S1 S2, No JVD, No murmurs, No edema  Respiratory: Lungs clear to auscultation	  Gastrointestinal:  Soft, Non-tender, + BS	  Extremities: Normal range of motion, No clubbing, cyanosis or edema                                    9.3    6.42  )-----------( 202      ( 13 Jan 2019 05:50 )             29.3     01-13    134<L>  |  94<L>  |  54<H>  ----------------------------<  114<H>  4.1   |  24  |  7.26<H>    Ca    8.4      13 Jan 2019 05:30  Phos  5.5     01-13  Mg     1.7     01-13    TPro  7.0  /  Alb  3.1<L>  /  TBili  0.3  /  DBili  x   /  AST  17  /  ALT  20  /  AlkPhos  67  01-13    proBNP:   Lipid Profile:   HgA1c:   TSH:

## 2019-01-28 ENCOUNTER — APPOINTMENT (OUTPATIENT)
Dept: TRANSPLANT | Facility: CLINIC | Age: 64
End: 2019-01-28

## 2019-01-28 ENCOUNTER — NON-APPOINTMENT (OUTPATIENT)
Age: 64
End: 2019-01-28

## 2019-01-28 ENCOUNTER — APPOINTMENT (OUTPATIENT)
Dept: CARDIOLOGY | Facility: CLINIC | Age: 64
End: 2019-01-28
Payer: MEDICAID

## 2019-01-28 VITALS
WEIGHT: 150 LBS | HEART RATE: 73 BPM | BODY MASS INDEX: 24.11 KG/M2 | OXYGEN SATURATION: 97 % | SYSTOLIC BLOOD PRESSURE: 195 MMHG | HEIGHT: 66 IN | DIASTOLIC BLOOD PRESSURE: 84 MMHG

## 2019-01-28 VITALS — SYSTOLIC BLOOD PRESSURE: 170 MMHG | DIASTOLIC BLOOD PRESSURE: 78 MMHG

## 2019-01-28 PROCEDURE — 93000 ELECTROCARDIOGRAM COMPLETE: CPT

## 2019-01-28 PROCEDURE — 99214 OFFICE O/P EST MOD 30 MIN: CPT

## 2019-01-28 NOTE — PHYSICAL EXAM
[General Appearance - Well Developed] : well developed [Normal Appearance] : normal appearance [Well Groomed] : well groomed [General Appearance - Well Nourished] : well nourished [No Deformities] : no deformities [General Appearance - In No Acute Distress] : no acute distress [Normal Oral Mucosa] : normal oral mucosa [No Oral Pallor] : no oral pallor [No Oral Cyanosis] : no oral cyanosis [Normal Oropharynx] : normal oropharynx [] : no respiratory distress [Respiration, Rhythm And Depth] : normal respiratory rhythm and effort [Exaggerated Use Of Accessory Muscles For Inspiration] : no accessory muscle use [Auscultation Breath Sounds / Voice Sounds] : lungs were clear to auscultation bilaterally [Bowel Sounds] : normal bowel sounds [Abdomen Soft] : soft [Abdomen Tenderness] : non-tender [Abnormal Walk] : normal gait [Gait - Sufficient For Exercise Testing] : the gait was sufficient for exercise testing [Nail Clubbing] : no clubbing of the fingernails [Cyanosis, Localized] : no localized cyanosis [Skin Color & Pigmentation] : normal skin color and pigmentation [No Venous Stasis] : no venous stasis [No Xanthoma] : no  xanthoma was observed [Oriented To Time, Place, And Person] : oriented to person, place, and time [Impaired Insight] : insight and judgment were intact [Affect] : the affect was normal [Mood] : the mood was normal [No Anxiety] : not feeling anxious [Conjunctiva] : the conjunctiva were normal in both eyes [PERRL] : pupils were equal in size, round, and reactive to light [EOM Intact] : extraocular movements were intact [5th Left ICS - MCL] : palpated at the 5th LICS in the midclavicular line [Normal] : normal [No Precordial Heave] : no precordial heave was noted [Bradycardia] : bradycardic [Rhythm Regular] : regular [Normal S1] : normal S1 [Normal S2] : normal S2 [No Gallop] : no gallop heard [No Murmur] : no murmurs heard [2+] : right 2+ [No Pitting Edema] : no pitting edema present [FreeTextEntry1] : No JVD [Yellow Sclera (Icteric)] : no scleral icterus was seen [Right Carotid Bruit] : no bruit heard over the right carotid [Left Carotid Bruit] : no bruit heard over the left carotid

## 2019-01-28 NOTE — DISCUSSION/SUMMARY
[FreeTextEntry1] : Patient is a 64 year-old gentleman with known coronary artery disease who presents today for evaluation prior to possible renal transplant.\par On 11/12/2018, patient had TTE showing normal LVEF, normal LA size, and normal pulmonary pressures.\par On 11/19/2018, patient presented for nuclear stress test. He attempted to complete Bertram protocol, but after almost 9 minutes, his legs fatigued prior to his achieving maximum predicted heart rate (he was at 127 bpm, 79% of MPHR) and he was converted to a pharmacologic study. The myocardial perfusion imaging was abnormal, showing severe, predominately fixed defects in the mid anterior, anteroseptal, and apical regions consistent with a medium sized infarction in the LAD territory with mild jamin-infarct ischemia. Some of the perfusion defects corrected with prone imaging.\par \par This is a nondiagnostic nuclear stress test. Although the defects are likely attenuation artifact, ischemia cannot be excluded in a patient with known coronary artery disease and previous PCI. Patient will require cardiac catheterization prior to renal transplant.\par \par If BP remains elevated on dialysis days (or nondialysis days), patient encouraged to take additional 200 mg of labetalol.

## 2019-02-07 LAB
ABO + RH PNL BLD: NORMAL
ABO + RH PNL BLD: NORMAL
ALBUMIN SERPL ELPH-MCNC: 4.1 G/DL
ALP BLD-CCNC: 95 U/L
ALT SERPL-CCNC: 21 U/L
ANION GAP SERPL CALC-SCNC: 16 MMOL/L
APPEARANCE: CLEAR
AST SERPL-CCNC: 24 U/L
BACTERIA: NEGATIVE
BASOPHILS # BLD AUTO: 0.02 K/UL
BASOPHILS NFR BLD AUTO: 0.4 %
BILIRUB SERPL-MCNC: 0.4 MG/DL
BILIRUBIN URINE: NEGATIVE
BLOOD URINE: ABNORMAL
BUN SERPL-MCNC: 50 MG/DL
CALCIUM SERPL-MCNC: 8.4 MG/DL
CHLORIDE SERPL-SCNC: 97 MMOL/L
CMV IGG SERPL QL: 2 U/ML
CMV IGG SERPL-IMP: POSITIVE
CO2 SERPL-SCNC: 23 MMOL/L
COLOR: YELLOW
CREAT SERPL-MCNC: 6.72 MG/DL
CREAT SPEC-SCNC: 76 MG/DL
CREAT/PROT UR: 6.9 RATIO
EBV DNA SERPL NAA+PROBE-ACNC: NOT DETECTED IU/ML
EBVPCR LOG: NOT DETECTED LOGIU/ML
EOSINOPHIL # BLD AUTO: 0.05 K/UL
EOSINOPHIL NFR BLD AUTO: 0.9 %
GLUCOSE QUALITATIVE U: 500 MG/DL
GLUCOSE SERPL-MCNC: 134 MG/DL
HAV IGM SER QL: NONREACTIVE
HBA1C MFR BLD HPLC: 7.5 %
HBV CORE IGG+IGM SER QL: NONREACTIVE
HBV SURFACE AB SER QL: NONREACTIVE
HBV SURFACE AB SERPL IA-ACNC: <3 MIU/ML
HBV SURFACE AG SER QL: NONREACTIVE
HCG SERPL-MCNC: <1 MIU/ML
HCT VFR BLD CALC: 30.3 %
HCV AB SER QL: NONREACTIVE
HCV S/CO RATIO: 0.13 S/CO
HEPATITIS A IGG ANTIBODY: REACTIVE
HGB BLD-MCNC: 9.8 G/DL
HIV1+2 AB SPEC QL IA.RAPID: NONREACTIVE
HYALINE CASTS: 1 /LPF
IMM GRANULOCYTES NFR BLD AUTO: 0.2 %
KETONES URINE: NEGATIVE
LEUKOCYTE ESTERASE URINE: NEGATIVE
LYMPHOCYTES # BLD AUTO: 1.67 K/UL
LYMPHOCYTES NFR BLD AUTO: 30.1 %
M TB IFN-G BLD-IMP: NEGATIVE
MAGNESIUM SERPL-MCNC: 1.6 MG/DL
MAN DIFF?: NORMAL
MCHC RBC-ENTMCNC: 27 PG
MCHC RBC-ENTMCNC: 32.3 GM/DL
MCV RBC AUTO: 83.5 FL
MICROSCOPIC-UA: NORMAL
MONOCYTES # BLD AUTO: 0.32 K/UL
MONOCYTES NFR BLD AUTO: 5.8 %
NEUTROPHILS # BLD AUTO: 3.47 K/UL
NEUTROPHILS NFR BLD AUTO: 62.6 %
NITRITE URINE: NEGATIVE
PH URINE: 6
PHOSPHATE SERPL-MCNC: 3.6 MG/DL
PLATELET # BLD AUTO: 201 K/UL
POTASSIUM SERPL-SCNC: 4.1 MMOL/L
PROT SERPL-MCNC: 8.3 G/DL
PROT UR-MCNC: 527 MG/DL
PROTEIN URINE: 300 MG/DL
PSA SERPL-MCNC: 2.4 NG/ML
QUANTIFERON TB PLUS MITOGEN MINUS NIL: >10 IU/ML
QUANTIFERON TB PLUS NIL: 0.03 IU/ML
QUANTIFERON TB PLUS TB1 MINUS NIL: -0.01 IU/ML
QUANTIFERON TB PLUS TB2 MINUS NIL: 0.01 IU/ML
RBC # BLD: 3.63 M/UL
RBC # FLD: 13.3 %
RED BLOOD CELLS URINE: 1 /HPF
RUBV IGG FLD-ACNC: 3.8 INDEX
RUBV IGG SER-IMP: POSITIVE
SODIUM SERPL-SCNC: 136 MMOL/L
SPECIFIC GRAVITY URINE: 1.01
SQUAMOUS EPITHELIAL CELLS: 2 /HPF
UROBILINOGEN URINE: NEGATIVE MG/DL
VZV AB TITR SER: POSITIVE
VZV IGG SER IF-ACNC: >4000 INDEX
WBC # FLD AUTO: 5.54 K/UL
WHITE BLOOD CELLS URINE: 3 /HPF

## 2019-02-11 NOTE — ED ADULT TRIAGE NOTE - PRO INTERPRETER NEED 2
Spoke with patient and she said taking Hydrocodone 2 tabs every 6 hrs, was supposed to get #240 and instead got #150. She said she would like to keep rx  Coordinated pickup on same day with Fentanyl so her children don't have to make so many trips to the pharmacy. She said she has to pay a copayment each time rx is picked up and now she will have to pay again, sooner. Told patient would d/w Dr. Nevaeh Fernando on Friday, 2/15/19 when he is back in office, not sure if [facility] will wave the second copayment for Hydrocodone. English

## 2019-02-12 ENCOUNTER — TRANSCRIPTION ENCOUNTER (OUTPATIENT)
Age: 64
End: 2019-02-12

## 2019-02-12 ENCOUNTER — INPATIENT (INPATIENT)
Facility: HOSPITAL | Age: 64
LOS: 0 days | Discharge: HOME | DRG: 246 | End: 2019-02-13
Attending: INTERNAL MEDICINE | Admitting: INTERNAL MEDICINE
Payer: MEDICAID

## 2019-02-12 VITALS
HEART RATE: 55 BPM | HEIGHT: 66 IN | DIASTOLIC BLOOD PRESSURE: 109 MMHG | RESPIRATION RATE: 16 BRPM | TEMPERATURE: 99 F | SYSTOLIC BLOOD PRESSURE: 234 MMHG | OXYGEN SATURATION: 98 % | WEIGHT: 138.89 LBS

## 2019-02-12 DIAGNOSIS — N18.9 CHRONIC KIDNEY DISEASE, UNSPECIFIED: ICD-10-CM

## 2019-02-12 DIAGNOSIS — I77.0 ARTERIOVENOUS FISTULA, ACQUIRED: Chronic | ICD-10-CM

## 2019-02-12 DIAGNOSIS — Z98.890 OTHER SPECIFIED POSTPROCEDURAL STATES: Chronic | ICD-10-CM

## 2019-02-12 DIAGNOSIS — N18.6 END STAGE RENAL DISEASE: ICD-10-CM

## 2019-02-12 DIAGNOSIS — I10 ESSENTIAL (PRIMARY) HYPERTENSION: ICD-10-CM

## 2019-02-12 DIAGNOSIS — N18.5 CHRONIC KIDNEY DISEASE, STAGE 5: ICD-10-CM

## 2019-02-12 DIAGNOSIS — R94.31 ABNORMAL ELECTROCARDIOGRAM [ECG] [EKG]: ICD-10-CM

## 2019-02-12 LAB
ALBUMIN SERPL ELPH-MCNC: 3.9 G/DL — SIGNIFICANT CHANGE UP (ref 3.3–5)
ALP SERPL-CCNC: 82 U/L — SIGNIFICANT CHANGE UP (ref 40–120)
ALT FLD-CCNC: 25 U/L — SIGNIFICANT CHANGE UP (ref 10–45)
ANION GAP SERPL CALC-SCNC: 15 MMOL/L — SIGNIFICANT CHANGE UP (ref 5–17)
AST SERPL-CCNC: 22 U/L — SIGNIFICANT CHANGE UP (ref 10–40)
BILIRUB SERPL-MCNC: 0.6 MG/DL — SIGNIFICANT CHANGE UP (ref 0.2–1.2)
BUN SERPL-MCNC: 27 MG/DL — HIGH (ref 7–23)
CALCIUM SERPL-MCNC: 8.9 MG/DL — SIGNIFICANT CHANGE UP (ref 8.4–10.5)
CHLORIDE SERPL-SCNC: 94 MMOL/L — LOW (ref 96–108)
CO2 SERPL-SCNC: 27 MMOL/L — SIGNIFICANT CHANGE UP (ref 22–31)
CREAT SERPL-MCNC: 6.43 MG/DL — HIGH (ref 0.5–1.3)
GLUCOSE BLDC GLUCOMTR-MCNC: 140 MG/DL — HIGH (ref 70–99)
GLUCOSE BLDC GLUCOMTR-MCNC: 155 MG/DL — HIGH (ref 70–99)
GLUCOSE BLDC GLUCOMTR-MCNC: 207 MG/DL — HIGH (ref 70–99)
GLUCOSE SERPL-MCNC: 253 MG/DL — HIGH (ref 70–99)
HCT VFR BLD CALC: 33.8 % — LOW (ref 39–50)
HGB BLD-MCNC: 11.3 G/DL — LOW (ref 13–17)
MCHC RBC-ENTMCNC: 30.1 PG — SIGNIFICANT CHANGE UP (ref 27–34)
MCHC RBC-ENTMCNC: 33.4 GM/DL — SIGNIFICANT CHANGE UP (ref 32–36)
MCV RBC AUTO: 90 FL — SIGNIFICANT CHANGE UP (ref 80–100)
PLATELET # BLD AUTO: 134 K/UL — LOW (ref 150–400)
POTASSIUM SERPL-MCNC: 4.5 MMOL/L — SIGNIFICANT CHANGE UP (ref 3.5–5.3)
POTASSIUM SERPL-SCNC: 4.5 MMOL/L — SIGNIFICANT CHANGE UP (ref 3.5–5.3)
PROT SERPL-MCNC: 7.3 G/DL — SIGNIFICANT CHANGE UP (ref 6–8.3)
RBC # BLD: 3.76 M/UL — LOW (ref 4.2–5.8)
RBC # FLD: 16.1 % — HIGH (ref 10.3–14.5)
SODIUM SERPL-SCNC: 136 MMOL/L — SIGNIFICANT CHANGE UP (ref 135–145)
WBC # BLD: 6.2 K/UL — SIGNIFICANT CHANGE UP (ref 3.8–10.5)
WBC # FLD AUTO: 6.2 K/UL — SIGNIFICANT CHANGE UP (ref 3.8–10.5)

## 2019-02-12 PROCEDURE — 92928 PRQ TCAT PLMT NTRAC ST 1 LES: CPT | Mod: LC,78

## 2019-02-12 PROCEDURE — 93010 ELECTROCARDIOGRAM REPORT: CPT | Mod: 77

## 2019-02-12 PROCEDURE — 93572 IV DOP VEL&/PRESS C FLO EA: CPT | Mod: 26,LC,GC

## 2019-02-12 PROCEDURE — 99152 MOD SED SAME PHYS/QHP 5/>YRS: CPT | Mod: GC,77

## 2019-02-12 PROCEDURE — 93571 IV DOP VEL&/PRESS C FLO 1ST: CPT | Mod: 26,LD,GC

## 2019-02-12 PROCEDURE — 93456 R HRT CORONARY ARTERY ANGIO: CPT | Mod: 26,GC

## 2019-02-12 RX ORDER — DEXTROSE 50 % IN WATER 50 %
25 SYRINGE (ML) INTRAVENOUS ONCE
Qty: 0 | Refills: 0 | Status: DISCONTINUED | OUTPATIENT
Start: 2019-02-12 | End: 2019-02-13

## 2019-02-12 RX ORDER — LOSARTAN POTASSIUM 100 MG/1
50 TABLET, FILM COATED ORAL DAILY
Qty: 0 | Refills: 0 | Status: DISCONTINUED | OUTPATIENT
Start: 2019-02-12 | End: 2019-02-13

## 2019-02-12 RX ORDER — HYDRALAZINE HCL 50 MG
10 TABLET ORAL ONCE
Qty: 0 | Refills: 0 | Status: COMPLETED | OUTPATIENT
Start: 2019-02-12 | End: 2019-02-12

## 2019-02-12 RX ORDER — LABETALOL HCL 100 MG
400 TABLET ORAL EVERY 12 HOURS
Qty: 0 | Refills: 0 | Status: DISCONTINUED | OUTPATIENT
Start: 2019-02-12 | End: 2019-02-13

## 2019-02-12 RX ORDER — CALCITRIOL 0.5 UG/1
0.25 CAPSULE ORAL DAILY
Qty: 0 | Refills: 0 | Status: DISCONTINUED | OUTPATIENT
Start: 2019-02-12 | End: 2019-02-13

## 2019-02-12 RX ORDER — SODIUM CHLORIDE 9 MG/ML
1000 INJECTION, SOLUTION INTRAVENOUS
Qty: 0 | Refills: 0 | Status: DISCONTINUED | OUTPATIENT
Start: 2019-02-12 | End: 2019-02-13

## 2019-02-12 RX ORDER — INSULIN LISPRO 100/ML
VIAL (ML) SUBCUTANEOUS AT BEDTIME
Qty: 0 | Refills: 0 | Status: DISCONTINUED | OUTPATIENT
Start: 2019-02-12 | End: 2019-02-13

## 2019-02-12 RX ORDER — GLUCAGON INJECTION, SOLUTION 0.5 MG/.1ML
1 INJECTION, SOLUTION SUBCUTANEOUS ONCE
Qty: 0 | Refills: 0 | Status: DISCONTINUED | OUTPATIENT
Start: 2019-02-12 | End: 2019-02-13

## 2019-02-12 RX ORDER — LABETALOL HCL 100 MG
20 TABLET ORAL ONCE
Qty: 0 | Refills: 0 | Status: COMPLETED | OUTPATIENT
Start: 2019-02-12 | End: 2019-02-12

## 2019-02-12 RX ORDER — INSULIN LISPRO 100/ML
VIAL (ML) SUBCUTANEOUS
Qty: 0 | Refills: 0 | Status: DISCONTINUED | OUTPATIENT
Start: 2019-02-12 | End: 2019-02-13

## 2019-02-12 RX ORDER — ASPIRIN/CALCIUM CARB/MAGNESIUM 324 MG
81 TABLET ORAL DAILY
Qty: 0 | Refills: 0 | Status: DISCONTINUED | OUTPATIENT
Start: 2019-02-12 | End: 2019-02-13

## 2019-02-12 RX ORDER — DEXTROSE 50 % IN WATER 50 %
12.5 SYRINGE (ML) INTRAVENOUS ONCE
Qty: 0 | Refills: 0 | Status: DISCONTINUED | OUTPATIENT
Start: 2019-02-12 | End: 2019-02-13

## 2019-02-12 RX ORDER — ATORVASTATIN CALCIUM 80 MG/1
40 TABLET, FILM COATED ORAL AT BEDTIME
Qty: 0 | Refills: 0 | Status: DISCONTINUED | OUTPATIENT
Start: 2019-02-12 | End: 2019-02-13

## 2019-02-12 RX ORDER — ACETAMINOPHEN 500 MG
650 TABLET ORAL ONCE
Qty: 0 | Refills: 0 | Status: COMPLETED | OUTPATIENT
Start: 2019-02-12 | End: 2019-02-13

## 2019-02-12 RX ORDER — CLOPIDOGREL BISULFATE 75 MG/1
1 TABLET, FILM COATED ORAL
Qty: 30 | Refills: 11
Start: 2019-02-12 | End: 2020-02-06

## 2019-02-12 RX ORDER — NIFEDIPINE 30 MG
60 TABLET, EXTENDED RELEASE 24 HR ORAL DAILY
Qty: 0 | Refills: 0 | Status: DISCONTINUED | OUTPATIENT
Start: 2019-02-12 | End: 2019-02-13

## 2019-02-12 RX ORDER — CLOPIDOGREL BISULFATE 75 MG/1
75 TABLET, FILM COATED ORAL DAILY
Qty: 0 | Refills: 0 | Status: DISCONTINUED | OUTPATIENT
Start: 2019-02-12 | End: 2019-02-13

## 2019-02-12 RX ORDER — DEXTROSE 50 % IN WATER 50 %
15 SYRINGE (ML) INTRAVENOUS ONCE
Qty: 0 | Refills: 0 | Status: DISCONTINUED | OUTPATIENT
Start: 2019-02-12 | End: 2019-02-13

## 2019-02-12 RX ORDER — CALCIUM ACETATE 667 MG
1334 TABLET ORAL
Qty: 0 | Refills: 0 | Status: DISCONTINUED | OUTPATIENT
Start: 2019-02-12 | End: 2019-02-13

## 2019-02-12 RX ADMIN — Medication 10 MILLIGRAM(S): at 21:45

## 2019-02-12 RX ADMIN — FENTANYL CITRATE 25 MICROGRAM(S): 50 INJECTION INTRAVENOUS at 23:15

## 2019-02-12 RX ADMIN — Medication 1: at 18:52

## 2019-02-12 RX ADMIN — Medication 1334 MILLIGRAM(S): at 18:37

## 2019-02-12 RX ADMIN — Medication 400 MILLIGRAM(S): at 18:37

## 2019-02-12 RX ADMIN — Medication 10 MILLIGRAM(S): at 09:20

## 2019-02-12 RX ADMIN — FENTANYL CITRATE 25 MICROGRAM(S): 50 INJECTION INTRAVENOUS at 23:50

## 2019-02-12 RX ADMIN — Medication 10 MILLIGRAM(S): at 20:40

## 2019-02-12 RX ADMIN — Medication 2: at 13:21

## 2019-02-12 RX ADMIN — Medication 0.1 MILLIGRAM(S): at 21:44

## 2019-02-12 RX ADMIN — Medication 0.5 MILLIGRAM(S): at 23:15

## 2019-02-12 RX ADMIN — Medication 10 MILLIGRAM(S): at 22:50

## 2019-02-12 RX ADMIN — ATORVASTATIN CALCIUM 40 MILLIGRAM(S): 80 TABLET, FILM COATED ORAL at 21:44

## 2019-02-12 RX ADMIN — Medication 20 MILLIGRAM(S): at 14:00

## 2019-02-12 NOTE — DISCHARGE NOTE ADULT - FINDINGS/TREATMENT
drug eluting stents placed to distal LAD and prox Cx.  Do not stop you Aspirin or Plavix unless instructed to do so by your cardiologist.

## 2019-02-12 NOTE — CONSULT NOTE ADULT - PROBLEM SELECTOR RECOMMENDATION 9
ESRd on HD ESRd on HD MWF- via L AVF   Last H Don 2/11  Pt planned for angiogram today   Plan for HD tomorrow 1st shift  Monitor BMP and Bp  Consent obtained

## 2019-02-12 NOTE — DISCHARGE NOTE ADULT - PLAN OF CARE
Pt remains chest pain free and understands post cath discharge instructions No heavy lifting, strenuous activity, bending, straining or unnecessary stair climbing  for 2 weeks. No sex for 1 week.  No driving for 2 days. You may shower 24 hours following procedure but avoid baths and swimming for 1 week. Check groin site for bleeding and/or swelling daily following procedure. Call your doctor/cardiologist immediately should it occur or if you have increased/persistent pain at the site. Follow up with your cardiologist in 1- 2 weeks. You may call [facility] at [phone] or [phone] after office hours and weekends  with any questions or concerns following your procedure. Take medications as prescribed. Your blood pressure will be controlled. Continue with your blood pressure medications; eat a heart healthy diet with low salt diet; exercise regularly (consult with your physician or cardiologist first); maintain a heart healthy weight; if you smoke - quit (A resource to help you stop smoking is the [facility] Tobacco [facility] – phone number [phone].); include healthy ways to manage stress. Continue to follow with your primary care physician or cardiologist. Your hemoglobin A1C will be between 7-8 Continue to follow with your primary care MD or your endocrinologist.  Follow a heart healthy diabetic diet. If you check your fingerstick glucose at home, call your MD if it is greater than 250mg/dL on 2 occasions or less than 100mg/dL on 2 occasions. Know signs of low blood sugar, such as: dizziness, shakiness, sweating, confusion, hunger, nervousness-drink 4 ounces apple juice if occurs and call your doctor. Know early signs of high blood sugar, such as: frequent urination, increased thirst, blurry vision, fatigue, headache - call your doctor if this occurs. Follow with other practitioners to care for your diabetes, such as ophthamologist and podiatrist. Your LDL cholesterol will be less than 70mg/dL Continue with your cholesterol medications. Eat a heart healthy diet that is low in saturated fats and salt, and includes whole grains, fruits, vegetables and lean protein; exercise regularly (consult with your physician or cardiologist first); maintain a heart healthy weight. Continue to follow with your primary physician or cardiologist for treatment goals, continue medication, have liver function testing every 3 months as anti lipid medications can cause liver irritation. If you smoke - quit (A resource to help you stop smoking is the [facility] Tobacco [facility] – phone number [phone].).

## 2019-02-12 NOTE — CONSULT NOTE ADULT - SUBJECTIVE AND OBJECTIVE BOX
[facility]   MD JORDAN AVALOS MD RUORU WONG, PA    TEL:  OFFICE: [phone]  DR HUFF CELL: [phone]  SUSAN WINTERS CELL: [phone]  DR. HORTA CELL: [phone]    -- INITIAL RENAL CONSULT NOTE  --------------------------------------------------------------------------------  HPI:  63 y/o Male with PMHx of CAD s/p PCI x 3, HTN, IDDM, GERD,ESRD on HD here for  Coronary angiogram.        PAST HISTORY  --------------------------------------------------------------------------------  PAST MEDICAL & SURGICAL HISTORY:  Anemia due to stage 5 chronic kidney disease, not on chronic dialysis  Cerebrovascular accident (CVA), unspecified mechanism: diagnosed via CT of the head  Hypercholesterolemia  ESRD (end stage renal disease): on Dialysis( M/W/F), By Dr. Huff  Stented coronary artery: 2014, 3 stents, [facility]  GERD (gastroesophageal reflux disease)  Diabetes mellitus: type 2  CAP (community acquired pneumonia): 6/18  Coronary artery disease  HTN (hypertension)  H/O eye surgery  AV fistula: 10/12/18 L radiocephalic AV fistula  H/O coronary angiogram: 2014 - x3 stents    FAMILY HISTORY:  No pertinent family history in first degree relatives    PAST SOCIAL HISTORY:    ALLERGIES & MEDICATIONS  --------------------------------------------------------------------------------  Allergies    No Known Allergies    Intolerances      Standing Inpatient Medications  aspirin enteric coated 81 milliGRAM(s) Oral daily  atorvastatin 40 milliGRAM(s) Oral at bedtime  calcitriol   Capsule 0.25 MICROGram(s) Oral daily  calcium acetate 1334 milliGRAM(s) Oral three times a day with meals  cloNIDine 0.1 milliGRAM(s) Oral at bedtime  dextrose 5%. 1000 milliLiter(s) IV Continuous <Continuous>  dextrose 50% Injectable 12.5 Gram(s) IV Push once  dextrose 50% Injectable 25 Gram(s) IV Push once  dextrose 50% Injectable 25 Gram(s) IV Push once  insulin lispro (HumaLOG) corrective regimen sliding scale   SubCutaneous three times a day before meals  insulin lispro (HumaLOG) corrective regimen sliding scale   SubCutaneous at bedtime  labetalol 400 milliGRAM(s) Oral every 12 hours  labetalol Injectable 20 milliGRAM(s) IV Push once  losartan 50 milliGRAM(s) Oral daily  NIFEdipine XL 60 milliGRAM(s) Oral daily    PRN Inpatient Medications  dextrose 40% Gel 15 Gram(s) Oral once PRN  glucagon  Injectable 1 milliGRAM(s) IntraMuscular once PRN      REVIEW OF SYSTEMS  --------------------------------------------------------------------------------  Gen: No fevers/chills  Skin: No rashes  Head/Eyes/Ears: Normal hearing,  Normal vision   Respiratory: No dyspnea, cough  CV: No chest pain  GI: No abdominal pain, diarrhea, constipation, nausea, vomiting  : No dysuria, hematuria  MSK: No  edema  Heme: No easy bruising or bleeding  Psych: No significant depression    All other systems were reviewed and are negative, except as noted.    VITALS/PHYSICAL EXAM  --------------------------------------------------------------------------------  T(C): 37.1 (02-12-19 @ 08:51), Max: 37.1 (02-12-19 @ 08:51)  HR: 55 (02-12-19 @ 08:51) (55 - 55)  BP: 234/109 (02-12-19 @ 08:51) (234/109 - 234/109)  RR: 16 (02-12-19 @ 08:51) (16 - 16)  SpO2: 98% (02-12-19 @ 08:51) (98% - 98%)  Wt(kg): --  Height (cm): 167.64 (02-12-19 @ 08:51)  Weight (kg): 62.024769115 (02-12-19 @ 08:51)  BMI (kg/m2): 22.4 (02-12-19 @ 08:51)  BSA (m2): 1.71 (02-12-19 @ 08:51)      Physical Exam:  	Gen: NAD  	HEENT: MMM  	Pulm: CTA B/L  	CV: S1S2  	Abd: Soft, +BS   	Ext: No LE edema B/L  	Neuro: Awake  	Skin: Warm and dry  	Vascular access:    LABS/STUDIES  --------------------------------------------------------------------------------              11.3   6.2   >-----------<  134      [02-12-19 @ 09:07]              33.8     136  |  94  |  27  ----------------------------<  253      [02-12-19 @ 09:07]  4.5   |  27  |  6.43        Ca     8.9     [02-12-19 @ 09:07]    TPro  7.3  /  Alb  3.9  /  TBili  0.6  /  DBili  x   /  AST  22  /  ALT  25  /  AlkPhos  82  [02-12-19 @ 09:07]          Creatinine Trend:  SCr 6.43 [02-12 @ 09:07]    Urinalysis - [01-09-19 @ 10:32]      Color COLORLESS / Appearance CLEAR / SG 1.007 / pH 6.0      Gluc NEGATIVE / Ketone NEGATIVE  / Bili NEGATIVE / Urobili NORMAL       Blood SMALL / Protein 200 / Leuk Est NEGATIVE / Nitrite NEGATIVE      RBC 0-2 / WBC  / Hyaline  / Gran  / Sq Epi  / Non Sq Epi  / Bacteria       Iron 38, TIBC 233, %sat --      [01-09-19 @ 06:37]  Ferritin 88.94      [01-09-19 @ 06:37]  .1 (Ca --)      [01-10-19 @ 05:20]   --  .2 (Ca --)      [01-09-19 @ 16:30]   --  .9 (Ca --)      [06-14-18 @ 08:28]   --  Vitamin D (25OH) 13.8      [01-10-19 @ 05:20]  HbA1c 6.8      [01-09-19 @ 06:37]    HBsAb <3.0      [01-09-19 @ 16:30]  HBsAb Nonreactive      [01-10-19 @ 17:00]  HBsAg Nonreactive      [01-10-19 @ 17:00]  HBcAb Nonreactive      [01-10-19 @ 17:00]  HCV 0.08, Nonreactive Hepatitis C AB  S/CO Ratio                        Interpretation  < 1.0                                     Non-Reactive  1.0 - 4.9                           Weakly-Reactive  > 5.0                                 Reactive  Non-Reactive: Aperson with a non-reactive HCV antibody  result is considered uninfected.  No further action is  needed unless recent infection is suspected.  In these  cases, consider repeat testing later to detect  seroconversion..  Weakly-Reactive: HCV antibody test is abnormal, HCV RNA  Qualitative test will follow.  Reactive: HCV antibody test is abnormal, HCV RNA  Qualitative test will follow.  Note: HCV antibody testing is performed on the [employer]   system.      [01-10-19 @ 17:00]    C3 Complement 165      [06-12-18 @ 06:30]  C4 Complement 47      [06-12-18 @ 06:30]  ANCA: cANCA Negative, pANCA Negative, atypical ANCA --      [06-12-18 @ 06:30]  anti-GBM <0.2      [06-12-18 @ 06:30] [facility]   MD JORDAN AVALOS MD RUORU WONG, PA    TEL:  OFFICE: [phone]  DR HUFF CELL: [phone]  SUSAN WINTERS CELL: [phone]  DR. HORTA CELL: [phone]    -- INITIAL RENAL CONSULT NOTE  --------------------------------------------------------------------------------  HPI:  65 y/o Male with PMHx of CAD s/p PCI x 3, HTN, IDDM, GERD,ESRD on HD here for  Coronary angiogram.    PT goes to [facility], on [facility] schedule.   Nephrologist is Dr. Huff        PAST HISTORY  --------------------------------------------------------------------------------  PAST MEDICAL & SURGICAL HISTORY:  Anemia due to stage 5 chronic kidney disease, not on chronic dialysis  Cerebrovascular accident (CVA), unspecified mechanism: diagnosed via CT of the head  Hypercholesterolemia  ESRD (end stage renal disease): on Dialysis( M/W/F), By Dr. Huff  Stented coronary artery: 2014, 3 stents, [facility]  GERD (gastroesophageal reflux disease)  Diabetes mellitus: type 2  CAP (community acquired pneumonia): 6/18  Coronary artery disease  HTN (hypertension)  H/O eye surgery  AV fistula: 10/12/18 L radiocephalic AV fistula  H/O coronary angiogram: 2014 - x3 stents    FAMILY HISTORY:  No pertinent family history in first degree relatives    PAST SOCIAL HISTORY:    ALLERGIES & MEDICATIONS  --------------------------------------------------------------------------------  Allergies    No Known Allergies    Intolerances      Standing Inpatient Medications  aspirin enteric coated 81 milliGRAM(s) Oral daily  atorvastatin 40 milliGRAM(s) Oral at bedtime  calcitriol   Capsule 0.25 MICROGram(s) Oral daily  calcium acetate 1334 milliGRAM(s) Oral three times a day with meals  cloNIDine 0.1 milliGRAM(s) Oral at bedtime  dextrose 5%. 1000 milliLiter(s) IV Continuous <Continuous>  dextrose 50% Injectable 12.5 Gram(s) IV Push once  dextrose 50% Injectable 25 Gram(s) IV Push once  dextrose 50% Injectable 25 Gram(s) IV Push once  insulin lispro (HumaLOG) corrective regimen sliding scale   SubCutaneous three times a day before meals  insulin lispro (HumaLOG) corrective regimen sliding scale   SubCutaneous at bedtime  labetalol 400 milliGRAM(s) Oral every 12 hours  labetalol Injectable 20 milliGRAM(s) IV Push once  losartan 50 milliGRAM(s) Oral daily  NIFEdipine XL 60 milliGRAM(s) Oral daily    PRN Inpatient Medications  dextrose 40% Gel 15 Gram(s) Oral once PRN  glucagon  Injectable 1 milliGRAM(s) IntraMuscular once PRN      REVIEW OF SYSTEMS  --------------------------------------------------------------------------------  Gen: No fevers/chills  Skin: No rashes  Head/Eyes/Ears: Normal hearing,  Normal vision   Respiratory: No dyspnea, cough  CV: No chest pain  GI: No abdominal pain, diarrhea, constipation, nausea, vomiting  : No dysuria, hematuria  MSK: No  edema  Heme: No easy bruising or bleeding  Psych: No significant depression    All other systems were reviewed and are negative, except as noted.    VITALS/PHYSICAL EXAM  --------------------------------------------------------------------------------  T(C): 37.1 (02-12-19 @ 08:51), Max: 37.1 (02-12-19 @ 08:51)  HR: 55 (02-12-19 @ 08:51) (55 - 55)  BP: 234/109 (02-12-19 @ 08:51) (234/109 - 234/109)  RR: 16 (02-12-19 @ 08:51) (16 - 16)  SpO2: 98% (02-12-19 @ 08:51) (98% - 98%)  Wt(kg): --  Height (cm): 167.64 (02-12-19 @ 08:51)  Weight (kg): 62.898520221 (02-12-19 @ 08:51)  BMI (kg/m2): 22.4 (02-12-19 @ 08:51)  BSA (m2): 1.71 (02-12-19 @ 08:51)      Physical Exam:  	Gen: NAD  	HEENT: MMM  	Pulm: CTA B/L  	CV: S1S2  	Abd: Soft, +BS   	Ext: No LE edema B/L  	Neuro: Awake  	Skin: Warm and dry  	Vascular access: AVF    LABS/STUDIES  --------------------------------------------------------------------------------              11.3   6.2   >-----------<  134      [02-12-19 @ 09:07]              33.8     136  |  94  |  27  ----------------------------<  253      [02-12-19 @ 09:07]  4.5   |  27  |  6.43        Ca     8.9     [02-12-19 @ 09:07]    TPro  7.3  /  Alb  3.9  /  TBili  0.6  /  DBili  x   /  AST  22  /  ALT  25  /  AlkPhos  82  [02-12-19 @ 09:07]          Creatinine Trend:  SCr 6.43 [02-12 @ 09:07]    Urinalysis - [01-09-19 @ 10:32]      Color COLORLESS / Appearance CLEAR / SG 1.007 / pH 6.0      Gluc NEGATIVE / Ketone NEGATIVE  / Bili NEGATIVE / Urobili NORMAL       Blood SMALL / Protein 200 / Leuk Est NEGATIVE / Nitrite NEGATIVE      RBC 0-2 / WBC  / Hyaline  / Gran  / Sq Epi  / Non Sq Epi  / Bacteria       Iron 38, TIBC 233, %sat --      [01-09-19 @ 06:37]  Ferritin 88.94      [01-09-19 @ 06:37]  .1 (Ca --)      [01-10-19 @ 05:20]   --  .2 (Ca --)      [01-09-19 @ 16:30]   --  .9 (Ca --)      [06-14-18 @ 08:28]   --  Vitamin D (25OH) 13.8      [01-10-19 @ 05:20]  HbA1c 6.8      [01-09-19 @ 06:37]    HBsAb <3.0      [01-09-19 @ 16:30]  HBsAb Nonreactive      [01-10-19 @ 17:00]  HBsAg Nonreactive      [01-10-19 @ 17:00]  HBcAb Nonreactive      [01-10-19 @ 17:00]  HCV 0.08, Nonreactive Hepatitis C AB  S/CO Ratio                        Interpretation  < 1.0                                     Non-Reactive  1.0 - 4.9                           Weakly-Reactive  > 5.0                                 Reactive  Non-Reactive: Aperson with a non-reactive HCV antibody  result is considered uninfected.  No further action is  needed unless recent infection is suspected.  In these  cases, consider repeat testing later to detect  seroconversion..  Weakly-Reactive: HCV antibody test is abnormal, HCV RNA  Qualitative test will follow.  Reactive: HCV antibody test is abnormal, HCV RNA  Qualitative test will follow.  Note: HCV antibody testing is performed on the [employer] system.      [01-10-19 @ 17:00]    C3 Complement 165      [06-12-18 @ 06:30]  C4 Complement 47      [06-12-18 @ 06:30]  ANCA: cANCA Negative, pANCA Negative, atypical ANCA --      [06-12-18 @ 06:30]  anti-GBM <0.2      [06-12-18 @ 06:30]

## 2019-02-12 NOTE — DISCHARGE NOTE ADULT - CARE PLAN
Principal Discharge DX:	Stented coronary artery  Goal:	Pt remains chest pain free and understands post cath discharge instructions  Assessment and plan of treatment:	No heavy lifting, strenuous activity, bending, straining or unnecessary stair climbing  for 2 weeks. No sex for 1 week.  No driving for 2 days. You may shower 24 hours following procedure but avoid baths and swimming for 1 week. Check groin site for bleeding and/or swelling daily following procedure. Call your doctor/cardiologist immediately should it occur or if you have increased/persistent pain at the site. Follow up with your cardiologist in 1- 2 weeks. You may call [facility] at [phone] or [phone] after office hours and weekends  with any questions or concerns following your procedure. Take medications as prescribed.  Secondary Diagnosis:	HTN (hypertension)  Goal:	Your blood pressure will be controlled.  Assessment and plan of treatment:	Continue with your blood pressure medications; eat a heart healthy diet with low salt diet; exercise regularly (consult with your physician or cardiologist first); maintain a heart healthy weight; if you smoke - quit (A resource to help you stop smoking is the [facility] – phone number [phone].); include healthy ways to manage stress. Continue to follow with your primary care physician or cardiologist.  Secondary Diagnosis:	Diabetes mellitus  Goal:	Your hemoglobin A1C will be between 7-8  Assessment and plan of treatment:	Continue to follow with your primary care MD or your endocrinologist.  Follow a heart healthy diabetic diet. If you check your fingerstick glucose at home, call your MD if it is greater than 250mg/dL on 2 occasions or less than 100mg/dL on 2 occasions. Know signs of low blood sugar, such as: dizziness, shakiness, sweating, confusion, hunger, nervousness-drink 4 ounces apple juice if occurs and call your doctor. Know early signs of high blood sugar, such as: frequent urination, increased thirst, blurry vision, fatigue, headache - call your doctor if this occurs. Follow with other practitioners to care for your diabetes, such as ophthamologist and podiatrist.  Secondary Diagnosis:	Hypercholesterolemia  Goal:	Your LDL cholesterol will be less than 70mg/dL  Assessment and plan of treatment:	Continue with your cholesterol medications. Eat a heart healthy diet that is low in saturated fats and salt, and includes whole grains, fruits, vegetables and lean protein; exercise regularly (consult with your physician or cardiologist first); maintain a heart healthy weight. Continue to follow with your primary physician or cardiologist for treatment goals, continue medication, have liver function testing every 3 months as anti lipid medications can cause liver irritation. If you smoke - quit (A resource to help you stop smoking is the [facility] – phone number [phone].).

## 2019-02-12 NOTE — H&P CARDIOLOGY - PMH
Anemia due to stage 5 chronic kidney disease, not on chronic dialysis    CAP (community acquired pneumonia)  6/18  Cerebrovascular accident (CVA), unspecified mechanism  diagnosed via CT of the head  Coronary artery disease    Diabetes mellitus  type 2  ESRD (end stage renal disease)  on Dialysis( M/W/F), By Dr. Huff  GERD (gastroesophageal reflux disease)    HTN (hypertension)    Hypercholesterolemia    Stented coronary artery  2014, 3 stents, [facility]

## 2019-02-12 NOTE — DISCHARGE NOTE ADULT - HOSPITAL COURSE
63 y/o Male with PMHx of CAD s/p PCI x 3, HTN, IDDM, GERD, ESRD ( on HD has AVF on Left arm, Renal: Dr. Huff ) here for  Coronary angiogram. Patient states he is in for Kidney transplant and is here for cardiac clearance. Was seen and evaluated by Dr. dEin Johnson, nuclear stress test done which shows severe predominantly fixed defects in the mid anterior april septal and apical regions consistent with medium sized infarction in the LAD territory with mild jamin infarct ischemia. Patient is referred for coronary angiogram. Pt is now s/p cardiac cath NAHUM x 1 distal LAD and NAHUM x 1 prox Circ. Pt tolerated the procedure well, cardiac cath site benign. HD pending prior to discharge 65 y/o Male with PMHx of CAD s/p PCI x 3, HTN, IDDM, GERD, ESRD ( on HD has AVF on Left arm, Renal: Dr. Huff ) here for  Coronary angiogram. Patient states he is in for Kidney transplant and is here for cardiac clearance. Was seen and evaluated by Dr. Edin Johnson, nuclear stress test done which shows severe predominantly fixed defects in the mid anterior april septal and apical regions consistent with medium sized infarction in the LAD territory with mild jamin infarct ischemia. Patient is referred for coronary angiogram. Pt is now s/p cardiac cath NAHUM x 1 distal LAD and NAHUM x 1 prox Circ. Pt tolerated the procedure well, cardiac cath site benign. HD prior to discharge, tolerated HD well in am.

## 2019-02-12 NOTE — DISCHARGE NOTE ADULT - MEDICATION SUMMARY - MEDICATIONS TO TAKE
I will START or STAY ON the medications listed below when I get home from the hospital:    Aspirin Enteric Coated 81 mg oral delayed release tablet  -- 1 tab(s) by mouth once a day  -- Indication: For TO KEEP STENT OPEN     losartan 50 mg oral tablet  -- 1 tab(s) by mouth once a day  -- Indication: For High bllod pressure     cloNIDine 0.1 mg oral tablet  -- 1 tab(s) by mouth once a day (at bedtime)  -- Indication: For High blood pressure     NovoLIN 70/30 FlexPen subcutaneous suspension  -- 7 unit(s) subcutaneous 2 times a day   -- Indication: For diabetes mellitus     atorvastatin 40 mg oral tablet  -- 1 tab(s) by mouth once a day  -- Indication: For High cholesterol    clopidogrel 75 mg oral tablet  -- 1 tab(s) by mouth once a day  -- Indication: For TO KEEP STENT OPEN     labetalol 200 mg oral tablet  -- 2 tab(s) by mouth 2 times a day   -- It is very important that you take or use this exactly as directed.  Do not skip doses or discontinue unless directed by your doctor.  May cause drowsiness.  Alcohol may intensify this effect.  Use care when operating dangerous machinery.  Some non-prescription drugs may aggravate your condition.  Read all labels carefully.  If a warning appears, check with your doctor before taking.    -- Indication: For High blood pressure     NIFEdipine 60 mg oral tablet, extended release  -- 1 tab(s) by mouth once a day  -- Indication: For High blood pressure     calcium acetate 667 mg oral tablet  -- 1334 milligram(s) by mouth 3 times a day with meals  -- Indication: For kidney function     ergocalciferol 50,000 intl units (1.25 mg) oral capsule  -- 1 cap(s) by mouth once a week  -- Indication: For supplement     calcitriol 0.25 mcg oral capsule  -- 1 cap(s) by mouth once a day  -- Indication: For kidney function I will START or STAY ON the medications listed below when I get home from the hospital:    Aspirin Enteric Coated 81 mg oral delayed release tablet  -- 1 tab(s) by mouth once a day  -- Indication: For TO KEEP STENT OPEN     losartan 50 mg oral tablet  -- 1 tab(s) by mouth once a day  -- Indication: For High blood pressure     cloNIDine 0.1 mg oral tablet  -- 1 tab(s) by mouth once a day (at bedtime)  -- Indication: For High blood pressure     NovoLIN 70/30 FlexPen subcutaneous suspension  -- 7 unit(s) subcutaneous 2 times a day   -- Indication: For Diabetes control    atorvastatin 40 mg oral tablet  -- 1 tab(s) by mouth once a day  -- Indication: For High cholesterol    clopidogrel 75 mg oral tablet  -- 1 tab(s) by mouth once a day  -- Indication: For TO KEEP STENT OPEN     labetalol 200 mg oral tablet  -- 2 tab(s) by mouth 2 times a day   -- It is very important that you take or use this exactly as directed.  Do not skip doses or discontinue unless directed by your doctor.  May cause drowsiness.  Alcohol may intensify this effect.  Use care when operating dangerous machinery.  Some non-prescription drugs may aggravate your condition.  Read all labels carefully.  If a warning appears, check with your doctor before taking.    -- Indication: For High blood pressure     NIFEdipine 60 mg oral tablet, extended release  -- 1 tab(s) by mouth once a day  -- Indication: For High blood pressure     calcium acetate 667 mg oral tablet  -- 1334 milligram(s) by mouth 3 times a day with meals  -- Indication: For kidney function     ergocalciferol 50,000 intl units (1.25 mg) oral capsule  -- 1 cap(s) by mouth once a week  -- Indication: For supplement     calcitriol 0.25 mcg oral capsule  -- 1 cap(s) by mouth once a day  -- Indication: For kidney function

## 2019-02-12 NOTE — CHART NOTE - NSCHARTNOTEFT_GEN_A_CORE
Removal of Femoral Sheath    Pulses in the (right/left) lower extremity are palpable. The patient was placed in the supine position. The insertion site was identified and the sutures were removed per protocol.  The 6 & 7 Portuguese femoral sheath was then removed. Direct pressure was applied for  25 minutes. Monitoring of the right  groin and both lower extremities including neuro-vascular checks and vital signs every 15 minutes x 4, then every 30 minutes x 2, then every 1 hour was ordered.    Complications: None

## 2019-02-12 NOTE — H&P CARDIOLOGY - HISTORY OF PRESENT ILLNESS
63 y/o Male with PMHx of CAD s/p PCI x 3, HTN, IDDM, GERD, ESRD ( on HD has AVF on Left arm, Renal: Dr. Huff )here for  Coronary angiogram. Patient states he is in for Kidney transplant and is here for cardiac clearance. Was seen and evaluated by Dr. Edin Johnson, nuclear stress test done which shows severe predominantly fixed defects in the mid anterior april septal and apical regions consistent with medium sized infarction in the LAD territory with mild jamin infarct ischemia. Patient is referred for coronary angiogram, Denies chest pain, SOB, palpitation dizziness/ syncope.   .

## 2019-02-12 NOTE — CONSULT NOTE ADULT - PROBLEM SELECTOR PROBLEM 3
HTN (hypertension) Small bowel obstruction Type 2 diabetes mellitus without complication, without long-term current use of insulin Essential hypertension Acquired hypothyroidism Ileostomy dysfunction Hydrocele, unspecified hydrocele type

## 2019-02-12 NOTE — DISCHARGE NOTE ADULT - CARE PROVIDER_API CALL
Edin Johnson)  Cardiology; Internal Medicine  [address], [address]  [place] [address]  Phone: [phone]  Fax: [phone]  Follow Up Time:

## 2019-02-12 NOTE — DISCHARGE NOTE ADULT - PATIENT PORTAL LINK FT
You can access the [facility] Patient Portal, offered by [facility], by registering with the following website: [email or web address]

## 2019-02-13 VITALS
RESPIRATION RATE: 16 BRPM | TEMPERATURE: 98 F | OXYGEN SATURATION: 98 % | HEART RATE: 73 BPM | DIASTOLIC BLOOD PRESSURE: 72 MMHG | SYSTOLIC BLOOD PRESSURE: 155 MMHG

## 2019-02-13 PROBLEM — N18.6 END STAGE RENAL DISEASE: Chronic | Status: ACTIVE | Noted: 2018-12-24

## 2019-02-13 LAB
ANION GAP SERPL CALC-SCNC: 14 MMOL/L — SIGNIFICANT CHANGE UP (ref 5–17)
BUN SERPL-MCNC: 35 MG/DL — HIGH (ref 7–23)
CALCIUM SERPL-MCNC: 8.5 MG/DL — SIGNIFICANT CHANGE UP (ref 8.4–10.5)
CALCIUM SERPL-MCNC: 8.9 MG/DL — SIGNIFICANT CHANGE UP (ref 8.4–10.5)
CHLORIDE SERPL-SCNC: 93 MMOL/L — LOW (ref 96–108)
CHOLEST SERPL-MCNC: 128 MG/DL — SIGNIFICANT CHANGE UP (ref 10–199)
CO2 SERPL-SCNC: 24 MMOL/L — SIGNIFICANT CHANGE UP (ref 22–31)
CREAT SERPL-MCNC: 8.15 MG/DL — HIGH (ref 0.5–1.3)
GLUCOSE BLDC GLUCOMTR-MCNC: 154 MG/DL — HIGH (ref 70–99)
GLUCOSE BLDC GLUCOMTR-MCNC: 229 MG/DL — HIGH (ref 70–99)
GLUCOSE BLDC GLUCOMTR-MCNC: 239 MG/DL — HIGH (ref 70–99)
GLUCOSE SERPL-MCNC: 273 MG/DL — HIGH (ref 70–99)
HCT VFR BLD CALC: 31.9 % — LOW (ref 39–50)
HDLC SERPL-MCNC: 45 MG/DL — SIGNIFICANT CHANGE UP
HGB BLD-MCNC: 11 G/DL — LOW (ref 13–17)
LIPID PNL WITH DIRECT LDL SERPL: 67 MG/DL — SIGNIFICANT CHANGE UP
MCHC RBC-ENTMCNC: 31 PG — SIGNIFICANT CHANGE UP (ref 27–34)
MCHC RBC-ENTMCNC: 34.6 GM/DL — SIGNIFICANT CHANGE UP (ref 32–36)
MCV RBC AUTO: 89.8 FL — SIGNIFICANT CHANGE UP (ref 80–100)
PLATELET # BLD AUTO: 141 K/UL — LOW (ref 150–400)
POTASSIUM SERPL-MCNC: 6.1 MMOL/L — HIGH (ref 3.5–5.3)
POTASSIUM SERPL-SCNC: 6.1 MMOL/L — HIGH (ref 3.5–5.3)
PTH-INTACT FLD-MCNC: 337 PG/ML — HIGH (ref 15–65)
RBC # BLD: 3.55 M/UL — LOW (ref 4.2–5.8)
RBC # FLD: 16.7 % — HIGH (ref 10.3–14.5)
SODIUM SERPL-SCNC: 131 MMOL/L — LOW (ref 135–145)
TOTAL CHOLESTEROL/HDL RATIO MEASUREMENT: 2.8 RATIO — LOW (ref 3.4–9.6)
TRIGL SERPL-MCNC: 82 MG/DL — SIGNIFICANT CHANGE UP (ref 10–149)
WBC # BLD: 8.8 K/UL — SIGNIFICANT CHANGE UP (ref 3.8–10.5)
WBC # FLD AUTO: 8.8 K/UL — SIGNIFICANT CHANGE UP (ref 3.8–10.5)

## 2019-02-13 RX ORDER — ATROPINE SULFATE 0.1 MG/ML
0.5 SYRINGE (ML) INJECTION ONCE
Qty: 0 | Refills: 0 | Status: COMPLETED | OUTPATIENT
Start: 2019-02-13 | End: 2019-02-12

## 2019-02-13 RX ORDER — FENTANYL CITRATE 50 UG/ML
25 INJECTION INTRAVENOUS ONCE
Qty: 0 | Refills: 0 | Status: DISCONTINUED | OUTPATIENT
Start: 2019-02-13 | End: 2019-02-12

## 2019-02-13 RX ADMIN — Medication 1334 MILLIGRAM(S): at 07:03

## 2019-02-13 RX ADMIN — LOSARTAN POTASSIUM 50 MILLIGRAM(S): 100 TABLET, FILM COATED ORAL at 13:00

## 2019-02-13 RX ADMIN — Medication 60 MILLIGRAM(S): at 12:58

## 2019-02-13 RX ADMIN — CLOPIDOGREL BISULFATE 75 MILLIGRAM(S): 75 TABLET, FILM COATED ORAL at 05:21

## 2019-02-13 RX ADMIN — Medication 2: at 07:52

## 2019-02-13 RX ADMIN — Medication 400 MILLIGRAM(S): at 12:57

## 2019-02-13 RX ADMIN — CALCITRIOL 0.25 MICROGRAM(S): 0.5 CAPSULE ORAL at 12:57

## 2019-02-13 RX ADMIN — Medication 81 MILLIGRAM(S): at 05:21

## 2019-02-13 RX ADMIN — Medication 1334 MILLIGRAM(S): at 12:57

## 2019-02-13 RX ADMIN — Medication 650 MILLIGRAM(S): at 06:26

## 2019-02-13 RX ADMIN — Medication 650 MILLIGRAM(S): at 07:00

## 2019-02-13 RX ADMIN — Medication 1: at 12:59

## 2019-02-13 NOTE — PROGRESS NOTE ADULT - ASSESSMENT
65 y/o Male with PMHx of CAD s/p PCI x 3, HTN, IDDM, GERD,ESRD on HD here for  Coronary angiogram.  s/p cardiac cath NAHUM x 1 pLAD and NAHUM x 1 dLAD via right femoral artery access.

## 2019-02-13 NOTE — PROGRESS NOTE ADULT - SUBJECTIVE AND OBJECTIVE BOX
Patient is a 64y old  Male who presents with CAD (2019 19:46) now s/p cardiac cath NAHUM x 1 pLAD and NAHUM x 1 dLAD via right femoral artery access.           Allergies    No Known Allergies    Intolerances        Medications:  acetaminophen   Tablet .. 650 milliGRAM(s) Oral once PRN  aspirin enteric coated 81 milliGRAM(s) Oral daily  atorvastatin 40 milliGRAM(s) Oral at bedtime  calcitriol   Capsule 0.25 MICROGram(s) Oral daily  calcium acetate 1334 milliGRAM(s) Oral three times a day with meals  cloNIDine 0.1 milliGRAM(s) Oral at bedtime  clopidogrel Tablet 75 milliGRAM(s) Oral daily  dextrose 40% Gel 15 Gram(s) Oral once PRN  dextrose 5%. 1000 milliLiter(s) IV Continuous <Continuous>  dextrose 50% Injectable 12.5 Gram(s) IV Push once  dextrose 50% Injectable 25 Gram(s) IV Push once  dextrose 50% Injectable 25 Gram(s) IV Push once  glucagon  Injectable 1 milliGRAM(s) IntraMuscular once PRN  insulin lispro (HumaLOG) corrective regimen sliding scale   SubCutaneous three times a day before meals  insulin lispro (HumaLOG) corrective regimen sliding scale   SubCutaneous at bedtime  labetalol 400 milliGRAM(s) Oral every 12 hours  losartan 50 milliGRAM(s) Oral daily  NIFEdipine XL 60 milliGRAM(s) Oral daily      Vitals:  T(C): 36.9 (19 @ 21:30), Max: 37.1 (19 @ 08:51)  HR: 59 (19 @ 02:30) (55 - 95)  BP: 136/72 (19 @ 02:30) (136/72 - 234/109)  BP(mean): 150 (19 @ 08:51) (150 - 150)  RR: 17 (19 @ 02:30) (15 - 18)  SpO2: 97% (19 @ 02:30) (97% - 100%)  Wt(kg): --  Daily Height in cm: 167.64 (2019 18:00)    Daily Weight in k (2019 08:51)  I&O's Summary        Physical Exam:  Procedural Access Site: Right femoral artery access. No hematoma, Non-tender to palpation, 2+ pulse, No bruit, No Ecchymosis  Respiratory: Clear to auscultation bilaterally        136  |  94<L>  |  27<H>  ----------------------------<  253<H>  4.5   |  27  |  6.43<H>    Ca    8.9      2019 09:07    TPro  7.3  /  Alb  3.9  /  TBili  0.6  /  DBili  x   /  AST  22  /  ALT  25  /  AlkPhos  82          Interpretation of Telemetry: NSR

## 2019-02-13 NOTE — PROGRESS NOTE ADULT - SUBJECTIVE AND OBJECTIVE BOX
[facility]   MD JORDAN AVALOS MD RUORU WONG, PA    TEL:  OFFICE: [phone]  DR CUETO CELL: [phone]  SUSAN WINTERS CELL: [phone]  DR. HORTA CELL: [phone]    RENAL FOLLOW UP NOTE  --------------------------------------------------------------------------------  HPI:      Pt seen and examined at bedside.   Sherrill SOB, chest pain     PAST HISTORY  --------------------------------------------------------------------------------  No significant changes to PMH, PSH, FHx, SHx, unless otherwise noted    ALLERGIES & MEDICATIONS  --------------------------------------------------------------------------------  Allergies    No Known Allergies    Intolerances      Standing Inpatient Medications  aspirin enteric coated 81 milliGRAM(s) Oral daily  atorvastatin 40 milliGRAM(s) Oral at bedtime  calcitriol   Capsule 0.25 MICROGram(s) Oral daily  calcium acetate 1334 milliGRAM(s) Oral three times a day with meals  cloNIDine 0.1 milliGRAM(s) Oral at bedtime  clopidogrel Tablet 75 milliGRAM(s) Oral daily  dextrose 5%. 1000 milliLiter(s) IV Continuous <Continuous>  dextrose 50% Injectable 12.5 Gram(s) IV Push once  dextrose 50% Injectable 25 Gram(s) IV Push once  dextrose 50% Injectable 25 Gram(s) IV Push once  insulin lispro (HumaLOG) corrective regimen sliding scale   SubCutaneous three times a day before meals  insulin lispro (HumaLOG) corrective regimen sliding scale   SubCutaneous at bedtime  labetalol 400 milliGRAM(s) Oral every 12 hours  losartan 50 milliGRAM(s) Oral daily  NIFEdipine XL 60 milliGRAM(s) Oral daily    PRN Inpatient Medications  dextrose 40% Gel 15 Gram(s) Oral once PRN  glucagon  Injectable 1 milliGRAM(s) IntraMuscular once PRN      REVIEW OF SYSTEMS  --------------------------------------------------------------------------------  General: no fever  CVS: no chest pain  RESP: no sob, no cough    VITALS/PHYSICAL EXAM  --------------------------------------------------------------------------------  T(C): 36.9 (02-13-19 @ 13:04), Max: 36.9 (02-12-19 @ 21:30)  HR: 75 (02-13-19 @ 13:04) (56 - 95)  BP: 155/75 (02-13-19 @ 13:04) (136/72 - 214/85)  RR: 18 (02-13-19 @ 13:04) (15 - 18)  SpO2: 98% (02-13-19 @ 13:04) (97% - 100%)  Wt(kg): --  Height (cm): 167.64 (02-12-19 @ 18:00)  Weight (kg): 62.6 (02-12-19 @ 18:00)  BMI (kg/m2): 22.3 (02-12-19 @ 18:00)  BSA (m2): 1.71 (02-12-19 @ 18:00)      02-12-19 @ 07:01  -  02-13-19 @ 07:00  --------------------------------------------------------  IN: 240 mL / OUT: 0 mL / NET: 240 mL    02-13-19 @ 07:01  -  02-13-19 @ 13:52  --------------------------------------------------------  IN: 240 mL / OUT: 0 mL / NET: 240 mL      Physical Exam:  	Gen: NAD  	HEENT: MARILU  	Pulm: CTA B/L  	CV: S1S2  	Abd: Soft, +BS  	Ext: No LE edema B/L                      Neuro: Awake   	Skin: Warm and Dry   	Vascular access: AVF    LABS/STUDIES  --------------------------------------------------------------------------------              11.0   8.8   >-----------<  141      [02-13-19 @ 10:54]              31.9     131  |  93  |  35  ----------------------------<  273      [02-13-19 @ 10:54]  6.1   |  24  |  8.15        Ca     8.9     [02-13-19 @ 10:54]    TPro  7.3  /  Alb  3.9  /  TBili  0.6  /  DBili  x   /  AST  22  /  ALT  25  /  AlkPhos  82  [02-12-19 @ 09:07]          Creatinine Trend:  SCr 8.15 [02-13 @ 10:54]  SCr 6.43 [02-12 @ 09:07]        Iron 38, TIBC 233, %sat --      [01-09-19 @ 06:37]  Ferritin 88.94      [01-09-19 @ 06:37]  .1 (Ca --)      [01-10-19 @ 05:20]   --  .2 (Ca --)      [01-09-19 @ 16:30]   --  .9 (Ca --)      [06-14-18 @ 08:28]   --  Vitamin D (25OH) 13.8      [01-10-19 @ 05:20]  HbA1c 6.8      [01-09-19 @ 06:37]

## 2019-02-13 NOTE — PROGRESS NOTE ADULT - ASSESSMENT
Patient is a 64y old  Male who presents with CAD (12 Feb 2019 19:46) now s/p cardiac cath NAHUM x 1 pLAD and NAHUM x 1 dLAD via right femoral artery access. Pt tolerated the procedure well, cardiac cath site benign. post-procedure discharge instructions discussed and questions addressed

## 2019-02-13 NOTE — PROGRESS NOTE ADULT - PROBLEM SELECTOR PLAN 1
ESRd on HD MWF- via L AVF   s/p angiogram   Pt dialyzed this AM  Next HD on 2/15  Monitor BMP and Bp  Consent obtained.

## 2019-02-26 ENCOUNTER — APPOINTMENT (OUTPATIENT)
Dept: CARDIOLOGY | Facility: CLINIC | Age: 64
End: 2019-02-26
Payer: MEDICAID

## 2019-02-26 ENCOUNTER — NON-APPOINTMENT (OUTPATIENT)
Age: 64
End: 2019-02-26

## 2019-02-26 ENCOUNTER — APPOINTMENT (OUTPATIENT)
Dept: TRANSPLANT | Facility: CLINIC | Age: 64
End: 2019-02-26

## 2019-02-26 VITALS — DIASTOLIC BLOOD PRESSURE: 86 MMHG | HEART RATE: 58 BPM | OXYGEN SATURATION: 100 % | SYSTOLIC BLOOD PRESSURE: 183 MMHG

## 2019-02-26 VITALS — DIASTOLIC BLOOD PRESSURE: 85 MMHG | SYSTOLIC BLOOD PRESSURE: 170 MMHG

## 2019-02-26 PROCEDURE — 99214 OFFICE O/P EST MOD 30 MIN: CPT

## 2019-02-26 PROCEDURE — 93000 ELECTROCARDIOGRAM COMPLETE: CPT

## 2019-02-26 NOTE — CARDIOLOGY SUMMARY
[___] : [unfilled] [LVEF ___%] : LVEF [unfilled]% [None] : no pulmonary hypertension [Normal] : normal LA size [___] : [unfilled]

## 2019-02-26 NOTE — REASON FOR VISIT
[Other: _____] : [unfilled] [Follow-Up - Clinic] : a clinic follow-up of [FreeTextEntry2] : pretransplant cardiac evaluation prior to possible renal transplant

## 2019-02-26 NOTE — HISTORY OF PRESENT ILLNESS
[FreeTextEntry1] : Patient is 64 year-old with cardiovascular risk factors of hypertension and diabetes, known coronary artery disease status post PCI x3 (2014) at [employer] [facility] by Dr. Saúl Villafana, CKD, now ESRD on HD [id number]) that began January 2019, who presents today for cardiac evaluation prior to possible renal transplant.\par He was working until October 2018 as [employer] .\par Patient does not formally exercise, but he can climb stairs and has no exertional symptoms.\par \par In January 2019, patient admitted to [facility] for shortness of breath and nosebleed. He was found to be volume overloaded in the setting of FELICITA on CKD and he was initiated on hemodialysis. \par \par In February 2019, patient seen to have abnormal nuclear stress test and was referred for left and right heart catheterization. The right heart cath showed elevated systemic blood pressures but normal PCWP (14 mmHg) and normal PA pressures (37/17 mmHg) with normal cardiac output and index of 6 L/min and 3.6 respectively. The left heart cath revealed significant distal LAD disease and significant in stent stenosis of LCx. He is now status post POBA to LCx and NAHUM to LAD.\par \par PMD: Bassem Grove MD [phone]\par Cardiologist: ELONARD Gomez [phone]\par Nephrologist: Yasmany Huff MD [phone]

## 2019-03-28 ENCOUNTER — APPOINTMENT (OUTPATIENT)
Dept: TRANSPLANT | Facility: CLINIC | Age: 64
End: 2019-03-28

## 2019-04-30 ENCOUNTER — APPOINTMENT (OUTPATIENT)
Dept: TRANSPLANT | Facility: CLINIC | Age: 64
End: 2019-04-30

## 2019-05-14 ENCOUNTER — APPOINTMENT (OUTPATIENT)
Dept: CARDIOLOGY | Facility: CLINIC | Age: 64
End: 2019-05-14
Payer: MEDICAID

## 2019-05-14 ENCOUNTER — NON-APPOINTMENT (OUTPATIENT)
Age: 64
End: 2019-05-14

## 2019-05-14 VITALS
BODY MASS INDEX: 24.43 KG/M2 | DIASTOLIC BLOOD PRESSURE: 64 MMHG | HEIGHT: 66 IN | HEART RATE: 62 BPM | SYSTOLIC BLOOD PRESSURE: 115 MMHG | WEIGHT: 152 LBS | OXYGEN SATURATION: 99 %

## 2019-05-14 PROCEDURE — 93000 ELECTROCARDIOGRAM COMPLETE: CPT

## 2019-05-14 PROCEDURE — 99214 OFFICE O/P EST MOD 30 MIN: CPT

## 2019-05-14 RX ORDER — LOSARTAN POTASSIUM 100 MG/1
100 TABLET, FILM COATED ORAL DAILY
Qty: 90 | Refills: 3 | Status: DISCONTINUED | COMMUNITY
Start: 2019-01-28 | End: 2019-05-14

## 2019-05-14 RX ORDER — FUROSEMIDE 80 MG/1
80 TABLET ORAL
Refills: 0 | Status: DISCONTINUED | COMMUNITY
End: 2019-05-14

## 2019-05-14 NOTE — PHYSICAL EXAM
[General Appearance - Well Developed] : well developed [Normal Appearance] : normal appearance [Well Groomed] : well groomed [General Appearance - Well Nourished] : well nourished [No Deformities] : no deformities [General Appearance - In No Acute Distress] : no acute distress [Normal Oral Mucosa] : normal oral mucosa [No Oral Pallor] : no oral pallor [No Oral Cyanosis] : no oral cyanosis [Normal Oropharynx] : normal oropharynx [FreeTextEntry1] : No JVD [] : no respiratory distress [Respiration, Rhythm And Depth] : normal respiratory rhythm and effort [Exaggerated Use Of Accessory Muscles For Inspiration] : no accessory muscle use [Auscultation Breath Sounds / Voice Sounds] : lungs were clear to auscultation bilaterally [Bowel Sounds] : normal bowel sounds [Abdomen Soft] : soft [Abnormal Walk] : normal gait [Abdomen Tenderness] : non-tender [Gait - Sufficient For Exercise Testing] : the gait was sufficient for exercise testing [Nail Clubbing] : no clubbing of the fingernails [Cyanosis, Localized] : no localized cyanosis [Skin Color & Pigmentation] : normal skin color and pigmentation [No Venous Stasis] : no venous stasis [No Xanthoma] : no  xanthoma was observed [Oriented To Time, Place, And Person] : oriented to person, place, and time [Impaired Insight] : insight and judgment were intact [Affect] : the affect was normal [Mood] : the mood was normal [No Anxiety] : not feeling anxious [Conjunctiva] : the conjunctiva were normal in both eyes [PERRL] : pupils were equal in size, round, and reactive to light [EOM Intact] : extraocular movements were intact [Yellow Sclera (Icteric)] : no scleral icterus was seen [5th Left ICS - MCL] : palpated at the 5th LICS in the midclavicular line [Normal] : normal [No Precordial Heave] : no precordial heave was noted [Bradycardia] : bradycardic [Rhythm Regular] : regular [Normal S1] : normal S1 [Normal S2] : normal S2 [No Murmur] : no murmurs heard [No Gallop] : no gallop heard [Right Carotid Bruit] : no bruit heard over the right carotid [Left Carotid Bruit] : no bruit heard over the left carotid [2+] : right 2+ [No Pitting Edema] : no pitting edema present

## 2019-05-14 NOTE — REASON FOR VISIT
[Follow-Up - Clinic] : a clinic follow-up of [FreeTextEntry2] : pretransplant cardiac evaluation prior to possible renal transplant [Spouse] : spouse

## 2019-05-14 NOTE — DISCUSSION/SUMMARY
[FreeTextEntry1] : Patient is a 64 year-old gentleman with known coronary artery disease who presents today for evaluation prior to possible renal transplant.\par On 11/12/2018, patient had TTE showing normal LVEF, normal LA size, and normal pulmonary pressures.\par On 11/19/2018, patient presented for nuclear stress test. He attempted to complete Bertram protocol, but after almost 9 minutes, his legs fatigued prior to his achieving maximum predicted heart rate (he was at 127 bpm, 79% of MPHR) and he was converted to a pharmacologic study. The myocardial perfusion imaging was abnormal, showing severe, predominately fixed defects in the mid anterior, anteroseptal, and apical regions consistent with a medium sized infarction in the LAD territory with mild jamin-infarct ischemia. Some of the perfusion defects corrected with prone imaging.\par \par On 2/12/2019 patient had left heart catheterization which revealed significant in stent stenosis of LCx and new prox LAD disease. He underwent POBA of previously stented LCx lesion and new NAHUM to LAD. Right heart catheterization revealed normal wedge 14 mmHg and PA pressures 37/17 mmHg with cardiac output > 6 L/min and cardiac index 3.6.\par \par If BP remains elevated on dialysis days (or nondialysis days), patient encouraged to take additional 200 mg of labetalol. \par \par Continue dual antiplatelet therapy, high intensity statin therapy, antihypertension regimen, and diabetes regimen.

## 2019-05-14 NOTE — HISTORY OF PRESENT ILLNESS
[FreeTextEntry1] : Patient is 64 year-old with cardiovascular risk factors of hypertension and diabetes, known coronary artery disease status post PCI x3 (2014) at [place] by Dr. Saúl Villafana, CKD, now ESRD on HD [id number]) that began January 2019, who presents today for cardiac evaluation prior to possible renal transplant.\par He was working until October 2018 as [employer] .\par Patient does not formally exercise, but he can climb stairs and has no exertional symptoms.\par \par In January 2019, patient admitted to [facility] for shortness of breath and nosebleed. He was found to be volume overloaded in the setting of FELICITA on CKD and he was initiated on hemodialysis. \par \par In February 2019, patient seen to have abnormal nuclear stress test and was referred for left and right heart catheterization. The right heart cath showed elevated systemic blood pressures but normal PCWP (14 mmHg) and normal PA pressures (37/17 mmHg) with normal cardiac output and index of 6 L/min and 3.6 respectively. The left heart cath revealed significant distal LAD disease and significant in stent stenosis of LCx. He is now status post POBA to LCx and NAHUM to LAD.\par \par May 2019 - Patient presents today in his usual state of health. He reports occasional right leg pain that limits his exercise while walking on the treadmill. He is without chest pain, shortness of breath, lower extremity swelling.\par \par PMD: Bassem Grove MD [phone]\par Cardiologist: LEONARD Gomez [phone]\par Nephrologist: Yasmany Huff MD [phone]

## 2019-06-11 ENCOUNTER — APPOINTMENT (OUTPATIENT)
Dept: TRANSPLANT | Facility: CLINIC | Age: 64
End: 2019-06-11

## 2019-07-01 ENCOUNTER — OUTPATIENT (OUTPATIENT)
Dept: OUTPATIENT SERVICES | Facility: HOSPITAL | Age: 64
LOS: 1 days | End: 2019-07-01
Payer: MEDICARE

## 2019-07-01 DIAGNOSIS — Z98.890 OTHER SPECIFIED POSTPROCEDURAL STATES: Chronic | ICD-10-CM

## 2019-07-01 DIAGNOSIS — I77.0 ARTERIOVENOUS FISTULA, ACQUIRED: Chronic | ICD-10-CM

## 2019-07-01 PROCEDURE — G9005: CPT

## 2019-07-02 ENCOUNTER — APPOINTMENT (OUTPATIENT)
Dept: CARDIOLOGY | Facility: CLINIC | Age: 64
End: 2019-07-02
Payer: MEDICARE

## 2019-07-02 ENCOUNTER — APPOINTMENT (OUTPATIENT)
Dept: TRANSPLANT | Facility: CLINIC | Age: 64
End: 2019-07-02

## 2019-07-02 PROCEDURE — 93923 UPR/LXTR ART STDY 3+ LVLS: CPT

## 2019-07-10 PROCEDURE — C1874: CPT

## 2019-07-10 PROCEDURE — 82310 ASSAY OF CALCIUM: CPT

## 2019-07-10 PROCEDURE — 80048 BASIC METABOLIC PNL TOTAL CA: CPT

## 2019-07-10 PROCEDURE — 93005 ELECTROCARDIOGRAM TRACING: CPT

## 2019-07-10 PROCEDURE — 82962 GLUCOSE BLOOD TEST: CPT

## 2019-07-10 PROCEDURE — 93571 IV DOP VEL&/PRESS C FLO 1ST: CPT | Mod: LD

## 2019-07-10 PROCEDURE — 85027 COMPLETE CBC AUTOMATED: CPT

## 2019-07-10 PROCEDURE — 80061 LIPID PANEL: CPT

## 2019-07-10 PROCEDURE — 99152 MOD SED SAME PHYS/QHP 5/>YRS: CPT

## 2019-07-10 PROCEDURE — C9600: CPT | Mod: LD

## 2019-07-10 PROCEDURE — C1887: CPT

## 2019-07-10 PROCEDURE — 83970 ASSAY OF PARATHORMONE: CPT

## 2019-07-10 PROCEDURE — C1725: CPT

## 2019-07-10 PROCEDURE — C1894: CPT

## 2019-07-10 PROCEDURE — 93572 IV DOP VEL&/PRESS C FLO EA: CPT | Mod: LC

## 2019-07-10 PROCEDURE — 99153 MOD SED SAME PHYS/QHP EA: CPT

## 2019-07-10 PROCEDURE — C1769: CPT

## 2019-07-10 PROCEDURE — 80053 COMPREHEN METABOLIC PANEL: CPT

## 2019-07-10 PROCEDURE — 93456 R HRT CORONARY ARTERY ANGIO: CPT

## 2019-07-17 ENCOUNTER — RX RENEWAL (OUTPATIENT)
Age: 64
End: 2019-07-17

## 2019-08-01 ENCOUNTER — OUTPATIENT (OUTPATIENT)
Dept: OUTPATIENT SERVICES | Facility: HOSPITAL | Age: 64
LOS: 1 days | End: 2019-08-01

## 2019-08-01 DIAGNOSIS — I77.0 ARTERIOVENOUS FISTULA, ACQUIRED: Chronic | ICD-10-CM

## 2019-08-01 DIAGNOSIS — Z98.890 OTHER SPECIFIED POSTPROCEDURAL STATES: Chronic | ICD-10-CM

## 2019-10-03 ENCOUNTER — APPOINTMENT (OUTPATIENT)
Dept: CARDIOLOGY | Facility: CLINIC | Age: 64
End: 2019-10-03
Payer: MEDICARE

## 2019-10-03 ENCOUNTER — NON-APPOINTMENT (OUTPATIENT)
Age: 64
End: 2019-10-03

## 2019-10-03 VITALS
BODY MASS INDEX: 23.73 KG/M2 | DIASTOLIC BLOOD PRESSURE: 62 MMHG | WEIGHT: 147 LBS | SYSTOLIC BLOOD PRESSURE: 118 MMHG | HEART RATE: 50 BPM | OXYGEN SATURATION: 99 %

## 2019-10-03 PROCEDURE — 93000 ELECTROCARDIOGRAM COMPLETE: CPT

## 2019-10-03 PROCEDURE — 99215 OFFICE O/P EST HI 40 MIN: CPT

## 2019-10-03 NOTE — PHYSICAL EXAM
[General Appearance - Well Developed] : well developed [Well Groomed] : well groomed [Normal Appearance] : normal appearance [General Appearance - Well Nourished] : well nourished [No Deformities] : no deformities [General Appearance - In No Acute Distress] : no acute distress [Normal Oral Mucosa] : normal oral mucosa [No Oral Pallor] : no oral pallor [No Oral Cyanosis] : no oral cyanosis [Normal Oropharynx] : normal oropharynx [FreeTextEntry1] : No JVD [] : no respiratory distress [Exaggerated Use Of Accessory Muscles For Inspiration] : no accessory muscle use [Respiration, Rhythm And Depth] : normal respiratory rhythm and effort [Auscultation Breath Sounds / Voice Sounds] : lungs were clear to auscultation bilaterally [Bowel Sounds] : normal bowel sounds [Abdomen Soft] : soft [Gait - Sufficient For Exercise Testing] : the gait was sufficient for exercise testing [Abnormal Walk] : normal gait [Abdomen Tenderness] : non-tender [Nail Clubbing] : no clubbing of the fingernails [Cyanosis, Localized] : no localized cyanosis [Skin Color & Pigmentation] : normal skin color and pigmentation [No Venous Stasis] : no venous stasis [No Xanthoma] : no  xanthoma was observed [Oriented To Time, Place, And Person] : oriented to person, place, and time [Affect] : the affect was normal [Mood] : the mood was normal [Impaired Insight] : insight and judgment were intact [No Anxiety] : not feeling anxious [Conjunctiva] : the conjunctiva were normal in both eyes [PERRL] : pupils were equal in size, round, and reactive to light [Yellow Sclera (Icteric)] : no scleral icterus was seen [EOM Intact] : extraocular movements were intact [Normal] : normal [5th Left ICS - MCL] : palpated at the 5th LICS in the midclavicular line [No Precordial Heave] : no precordial heave was noted [Bradycardia] : bradycardic [Rhythm Regular] : regular [Normal S1] : normal S1 [Normal S2] : normal S2 [No Gallop] : no gallop heard [No Murmur] : no murmurs heard [Right Carotid Bruit] : no bruit heard over the right carotid [Left Carotid Bruit] : no bruit heard over the left carotid [2+] : right 2+ [No Pitting Edema] : no pitting edema present

## 2019-10-03 NOTE — DISCUSSION/SUMMARY
[FreeTextEntry1] : Patient is a 64 year-old gentleman with known coronary artery disease who presents today for evaluation prior to possible renal transplant.\par On 11/12/2018, patient had TTE showing normal LVEF, normal LA size, and normal pulmonary pressures.\par On 11/19/2018, patient presented for nuclear stress test. He attempted to complete Bertram protocol, but after almost 9 minutes, his legs fatigued prior to his achieving maximum predicted heart rate (he was at 127 bpm, 79% of MPHR) and he was converted to a pharmacologic study. The myocardial perfusion imaging was abnormal, showing severe, predominately fixed defects in the mid anterior, anteroseptal, and apical regions consistent with a medium sized infarction in the LAD territory with mild jamin-infarct ischemia. Some of the perfusion defects corrected with prone imaging.\par \par On 2/12/2019 patient had left heart catheterization which revealed significant in stent stenosis of LCx and new prox LAD disease. He underwent POBA of previously stented LCx lesion and new NAHUM to LAD. Right heart catheterization revealed normal wedge 14 mmHg and PA pressures 37/17 mmHg with cardiac output > 6 L/min and cardiac index 3.6.\par \par If BP remains elevated on dialysis days (or nondialysis days), patient encouraged to take additional 200 mg of labetalol. \par \par Continue dual antiplatelet therapy, high intensity statin therapy, antihypertension regimen, and diabetes regimen.\par \par As peripheral vascular studies were normal, would recommend orthopedic or podiatric evaluation of the right ankle discomfort with walking.\par Will repeat echocardiogram and stress test in 2020. No further cardiovascular work-up is necessary at this time.

## 2019-10-03 NOTE — HISTORY OF PRESENT ILLNESS
[FreeTextEntry1] : Patient is 64 year-old with cardiovascular risk factors of hypertension and diabetes, known coronary artery disease status post PCI x3 (2014) at [place] by Dr. Saúl Villafana, CKD, now ESRD on HD [id number]) that began January 2019, who presents today for cardiac evaluation prior to possible renal transplant.\par He was working until October 2018 as [employer] .\par Patient does not formally exercise, but he can climb stairs and has no exertional symptoms.\par \par In January 2019, patient admitted to [facility] for shortness of breath and nosebleed. He was found to be volume overloaded in the setting of FELICITA on CKD and he was initiated on hemodialysis. \par \par In February 2019, patient seen to have abnormal nuclear stress test and was referred for left and right heart catheterization. The right heart cath showed elevated systemic blood pressures but normal PCWP (14 mmHg) and normal PA pressures (37/17 mmHg) with normal cardiac output and index of 6 L/min and 3.6 respectively. The left heart cath revealed significant distal LAD disease and significant in stent stenosis of LCx. He is now status post POBA to LCx and NAHUM to LAD.\par \par May 2019 - Patient presents today in his usual state of health. He reports occasional right leg pain that limits his exercise while walking on the treadmill. He is without chest pain, shortness of breath, lower extremity swelling.\par \par October 2019 - Patient presents today in his usual state of health. He reports occasional right leg pain that limits his exercise while walking on the treadmill. He is without chest pain, shortness of breath, lower extremity swelling. He had normal HOLLY/PVR and normal ultrasound of the abdominal vessels (and into iliacs). \par \par PMD: Bassem Grove MD [phone]\par Cardiologist: LEONARD Gomez [phone]\par Nephrologist: Yasmany Huff MD [phone]

## 2019-10-08 ENCOUNTER — RX RENEWAL (OUTPATIENT)
Age: 64
End: 2019-10-08

## 2019-10-10 ENCOUNTER — OTHER (OUTPATIENT)
Age: 64
End: 2019-10-10

## 2019-10-10 ENCOUNTER — APPOINTMENT (OUTPATIENT)
Dept: TRANSPLANT | Facility: CLINIC | Age: 64
End: 2019-10-10

## 2019-11-12 ENCOUNTER — APPOINTMENT (OUTPATIENT)
Dept: TRANSPLANT | Facility: CLINIC | Age: 64
End: 2019-11-12

## 2019-11-13 NOTE — DISCUSSION/SUMMARY
[FreeTextEntry1] : Patient is a 64 year-old gentleman with known coronary artery disease who presents today for evaluation prior to possible renal transplant.\par On 11/12/2018, patient had TTE showing normal LVEF, normal LA size, and normal pulmonary pressures.\par On 11/19/2018, patient presented for nuclear stress test. He attempted to complete Bertram protocol, but after almost 9 minutes, his legs fatigued prior to his achieving maximum predicted heart rate (he was at 127 bpm, 79% of MPHR) and he was converted to a pharmacologic study. The myocardial perfusion imaging was abnormal, showing severe, predominately fixed defects in the mid anterior, anteroseptal, and apical regions consistent with a medium sized infarction in the LAD territory with mild jamin-infarct ischemia. Some of the perfusion defects corrected with prone imaging.\par \par On 2/12/2019 patient had left heart catheterization which revealed significant in stent stenosis of LCx and new prox LAD disease. He underwent POBA of previously stented LCx lesion and new NAHUM to LAD. Right heart catheterization revealed normal wedge 14 mmHg and PA pressures 37/17 mmHg with cardiac output > 6 L/min and cardiac index 3.6.\par \par If BP remains elevated on dialysis days (or nondialysis days), patient encouraged to take additional 200 mg of labetalol.  Depth Of Biopsy: dermis

## 2019-12-06 ENCOUNTER — APPOINTMENT (OUTPATIENT)
Dept: TRANSPLANT | Facility: CLINIC | Age: 64
End: 2019-12-06

## 2019-12-16 ENCOUNTER — INPATIENT (INPATIENT)
Facility: HOSPITAL | Age: 64
LOS: 0 days | Discharge: HOME | End: 2019-12-17
Attending: HOSPITALIST | Admitting: HOSPITALIST
Payer: MEDICARE

## 2019-12-16 VITALS
HEART RATE: 60 BPM | SYSTOLIC BLOOD PRESSURE: 197 MMHG | DIASTOLIC BLOOD PRESSURE: 75 MMHG | OXYGEN SATURATION: 99 % | TEMPERATURE: 97 F | RESPIRATION RATE: 16 BRPM

## 2019-12-16 DIAGNOSIS — Z95.5 PRESENCE OF CORONARY ANGIOPLASTY IMPLANT AND GRAFT: ICD-10-CM

## 2019-12-16 DIAGNOSIS — M54.9 DORSALGIA, UNSPECIFIED: ICD-10-CM

## 2019-12-16 DIAGNOSIS — Z02.9 ENCOUNTER FOR ADMINISTRATIVE EXAMINATIONS, UNSPECIFIED: ICD-10-CM

## 2019-12-16 DIAGNOSIS — E11.9 TYPE 2 DIABETES MELLITUS WITHOUT COMPLICATIONS: ICD-10-CM

## 2019-12-16 DIAGNOSIS — I10 ESSENTIAL (PRIMARY) HYPERTENSION: ICD-10-CM

## 2019-12-16 DIAGNOSIS — Z98.890 OTHER SPECIFIED POSTPROCEDURAL STATES: Chronic | ICD-10-CM

## 2019-12-16 DIAGNOSIS — Z29.9 ENCOUNTER FOR PROPHYLACTIC MEASURES, UNSPECIFIED: ICD-10-CM

## 2019-12-16 DIAGNOSIS — N18.6 END STAGE RENAL DISEASE: ICD-10-CM

## 2019-12-16 DIAGNOSIS — I77.0 ARTERIOVENOUS FISTULA, ACQUIRED: Chronic | ICD-10-CM

## 2019-12-16 DIAGNOSIS — M25.552 PAIN IN LEFT HIP: ICD-10-CM

## 2019-12-16 LAB
ALBUMIN SERPL ELPH-MCNC: 4.3 G/DL — SIGNIFICANT CHANGE UP (ref 3.3–5)
ALP SERPL-CCNC: 62 U/L — SIGNIFICANT CHANGE UP (ref 40–120)
ALT FLD-CCNC: 11 U/L — SIGNIFICANT CHANGE UP (ref 4–41)
ANION GAP SERPL CALC-SCNC: 22 MMO/L — HIGH (ref 7–14)
AST SERPL-CCNC: 10 U/L — SIGNIFICANT CHANGE UP (ref 4–40)
BASOPHILS # BLD AUTO: 0.05 K/UL — SIGNIFICANT CHANGE UP (ref 0–0.2)
BASOPHILS NFR BLD AUTO: 0.4 % — SIGNIFICANT CHANGE UP (ref 0–2)
BILIRUB SERPL-MCNC: 0.5 MG/DL — SIGNIFICANT CHANGE UP (ref 0.2–1.2)
BUN SERPL-MCNC: 62 MG/DL — HIGH (ref 7–23)
CALCIUM SERPL-MCNC: 10.4 MG/DL — SIGNIFICANT CHANGE UP (ref 8.4–10.5)
CHLORIDE SERPL-SCNC: 94 MMOL/L — LOW (ref 98–107)
CO2 SERPL-SCNC: 22 MMOL/L — SIGNIFICANT CHANGE UP (ref 22–31)
CREAT SERPL-MCNC: 12.27 MG/DL — HIGH (ref 0.5–1.3)
EOSINOPHIL # BLD AUTO: 0.17 K/UL — SIGNIFICANT CHANGE UP (ref 0–0.5)
EOSINOPHIL NFR BLD AUTO: 1.3 % — SIGNIFICANT CHANGE UP (ref 0–6)
GLUCOSE SERPL-MCNC: 219 MG/DL — HIGH (ref 70–99)
HBV SURFACE AG SER-ACNC: NEGATIVE — SIGNIFICANT CHANGE UP
HCT VFR BLD CALC: 33.5 % — LOW (ref 39–50)
HGB BLD-MCNC: 10.8 G/DL — LOW (ref 13–17)
IMM GRANULOCYTES NFR BLD AUTO: 0.3 % — SIGNIFICANT CHANGE UP (ref 0–1.5)
LIDOCAIN IGE QN: 91 U/L — HIGH (ref 7–60)
LYMPHOCYTES # BLD AUTO: 1.53 K/UL — SIGNIFICANT CHANGE UP (ref 1–3.3)
LYMPHOCYTES # BLD AUTO: 11.9 % — LOW (ref 13–44)
MCHC RBC-ENTMCNC: 30.9 PG — SIGNIFICANT CHANGE UP (ref 27–34)
MCHC RBC-ENTMCNC: 32.2 % — SIGNIFICANT CHANGE UP (ref 32–36)
MCV RBC AUTO: 96 FL — SIGNIFICANT CHANGE UP (ref 80–100)
MONOCYTES # BLD AUTO: 0.89 K/UL — SIGNIFICANT CHANGE UP (ref 0–0.9)
MONOCYTES NFR BLD AUTO: 6.9 % — SIGNIFICANT CHANGE UP (ref 2–14)
NEUTROPHILS # BLD AUTO: 10.23 K/UL — HIGH (ref 1.8–7.4)
NEUTROPHILS NFR BLD AUTO: 79.2 % — HIGH (ref 43–77)
NRBC # FLD: 0 K/UL — SIGNIFICANT CHANGE UP (ref 0–0)
PHOSPHATE SERPL-MCNC: 4.7 MG/DL — HIGH (ref 2.5–4.5)
PLATELET # BLD AUTO: 182 K/UL — SIGNIFICANT CHANGE UP (ref 150–400)
PMV BLD: 10.9 FL — SIGNIFICANT CHANGE UP (ref 7–13)
POTASSIUM SERPL-MCNC: 4.8 MMOL/L — SIGNIFICANT CHANGE UP (ref 3.5–5.3)
POTASSIUM SERPL-SCNC: 4.8 MMOL/L — SIGNIFICANT CHANGE UP (ref 3.5–5.3)
PROT SERPL-MCNC: 7.9 G/DL — SIGNIFICANT CHANGE UP (ref 6–8.3)
PTH-INTACT SERPL-MCNC: 150.9 PG/ML — HIGH (ref 15–65)
PTH-INTACT SERPL-MCNC: 158.5 PG/ML — HIGH (ref 15–65)
RBC # BLD: 3.49 M/UL — LOW (ref 4.2–5.8)
RBC # FLD: 13 % — SIGNIFICANT CHANGE UP (ref 10.3–14.5)
SODIUM SERPL-SCNC: 138 MMOL/L — SIGNIFICANT CHANGE UP (ref 135–145)
WBC # BLD: 12.91 K/UL — HIGH (ref 3.8–10.5)
WBC # FLD AUTO: 12.91 K/UL — HIGH (ref 3.8–10.5)

## 2019-12-16 PROCEDURE — 99223 1ST HOSP IP/OBS HIGH 75: CPT

## 2019-12-16 PROCEDURE — 74176 CT ABD & PELVIS W/O CONTRAST: CPT | Mod: 26,GC

## 2019-12-16 RX ORDER — CALCIUM ACETATE 667 MG
1334 TABLET ORAL
Refills: 0 | Status: DISCONTINUED | OUTPATIENT
Start: 2019-12-16 | End: 2019-12-16

## 2019-12-16 RX ORDER — LABETALOL HCL 100 MG
400 TABLET ORAL
Refills: 0 | Status: DISCONTINUED | OUTPATIENT
Start: 2019-12-16 | End: 2019-12-17

## 2019-12-16 RX ORDER — NIFEDIPINE 30 MG
60 TABLET, EXTENDED RELEASE 24 HR ORAL DAILY
Refills: 0 | Status: DISCONTINUED | OUTPATIENT
Start: 2019-12-17 | End: 2019-12-17

## 2019-12-16 RX ORDER — ACETAMINOPHEN 500 MG
650 TABLET ORAL EVERY 6 HOURS
Refills: 0 | Status: DISCONTINUED | OUTPATIENT
Start: 2019-12-16 | End: 2019-12-17

## 2019-12-16 RX ORDER — DEXTROSE 50 % IN WATER 50 %
15 SYRINGE (ML) INTRAVENOUS ONCE
Refills: 0 | Status: DISCONTINUED | OUTPATIENT
Start: 2019-12-16 | End: 2019-12-17

## 2019-12-16 RX ORDER — INSULIN GLARGINE 100 [IU]/ML
5 INJECTION, SOLUTION SUBCUTANEOUS AT BEDTIME
Refills: 0 | Status: DISCONTINUED | OUTPATIENT
Start: 2019-12-16 | End: 2019-12-17

## 2019-12-16 RX ORDER — INSULIN LISPRO 100/ML
VIAL (ML) SUBCUTANEOUS
Refills: 0 | Status: DISCONTINUED | OUTPATIENT
Start: 2019-12-16 | End: 2019-12-17

## 2019-12-16 RX ORDER — DEXTROSE 50 % IN WATER 50 %
12.5 SYRINGE (ML) INTRAVENOUS ONCE
Refills: 0 | Status: DISCONTINUED | OUTPATIENT
Start: 2019-12-16 | End: 2019-12-17

## 2019-12-16 RX ORDER — SEVELAMER CARBONATE 2400 MG/1
1600 POWDER, FOR SUSPENSION ORAL
Refills: 0 | Status: DISCONTINUED | OUTPATIENT
Start: 2019-12-16 | End: 2019-12-17

## 2019-12-16 RX ORDER — GLUCAGON INJECTION, SOLUTION 0.5 MG/.1ML
1 INJECTION, SOLUTION SUBCUTANEOUS ONCE
Refills: 0 | Status: DISCONTINUED | OUTPATIENT
Start: 2019-12-16 | End: 2019-12-17

## 2019-12-16 RX ORDER — CHLORHEXIDINE GLUCONATE 213 G/1000ML
1 SOLUTION TOPICAL DAILY
Refills: 0 | Status: DISCONTINUED | OUTPATIENT
Start: 2019-12-16 | End: 2019-12-17

## 2019-12-16 RX ORDER — ASPIRIN/CALCIUM CARB/MAGNESIUM 324 MG
81 TABLET ORAL DAILY
Refills: 0 | Status: DISCONTINUED | OUTPATIENT
Start: 2019-12-16 | End: 2019-12-17

## 2019-12-16 RX ORDER — DEXTROSE 50 % IN WATER 50 %
25 SYRINGE (ML) INTRAVENOUS ONCE
Refills: 0 | Status: DISCONTINUED | OUTPATIENT
Start: 2019-12-16 | End: 2019-12-17

## 2019-12-16 RX ORDER — SODIUM CHLORIDE 9 MG/ML
1000 INJECTION, SOLUTION INTRAVENOUS
Refills: 0 | Status: DISCONTINUED | OUTPATIENT
Start: 2019-12-16 | End: 2019-12-17

## 2019-12-16 RX ORDER — ACETAMINOPHEN 500 MG
975 TABLET ORAL ONCE
Refills: 0 | Status: COMPLETED | OUTPATIENT
Start: 2019-12-16 | End: 2019-12-16

## 2019-12-16 RX ORDER — ATORVASTATIN CALCIUM 80 MG/1
40 TABLET, FILM COATED ORAL AT BEDTIME
Refills: 0 | Status: DISCONTINUED | OUTPATIENT
Start: 2019-12-16 | End: 2019-12-17

## 2019-12-16 RX ORDER — CLOPIDOGREL BISULFATE 75 MG/1
75 TABLET, FILM COATED ORAL DAILY
Refills: 0 | Status: DISCONTINUED | OUTPATIENT
Start: 2019-12-17 | End: 2019-12-17

## 2019-12-16 RX ADMIN — Medication 6: at 22:09

## 2019-12-16 RX ADMIN — Medication 975 MILLIGRAM(S): at 11:30

## 2019-12-16 RX ADMIN — Medication 400 MILLIGRAM(S): at 16:55

## 2019-12-16 RX ADMIN — SEVELAMER CARBONATE 1600 MILLIGRAM(S): 2400 POWDER, FOR SUSPENSION ORAL at 17:51

## 2019-12-16 RX ADMIN — INSULIN GLARGINE 5 UNIT(S): 100 INJECTION, SOLUTION SUBCUTANEOUS at 22:08

## 2019-12-16 RX ADMIN — Medication 0.1 MILLIGRAM(S): at 16:55

## 2019-12-16 NOTE — H&P ADULT - PROBLEM SELECTOR PLAN 5
BP elevated, likely 2/2 pain and missed HD  -HD due today BP elevated, likely 2/2 pain and missed HD  -HD due today  -C/w Labetalol, clonidine, and nifedipine BP elevated to 190s, likely 2/2 pain and missed HD  -HD due today  -C/w Labetalol, clonidine, and nifedipine

## 2019-12-16 NOTE — H&P ADULT - NSHPLABSRESULTS_GEN_ALL_CORE
.  LABS:                         10.8   12.91 )-----------( 182      ( 16 Dec 2019 10:45 )             33.5     12-16    138  |  94<L>  |  62<H>  ----------------------------<  219<H>  4.8   |  22  |  12.27<H>    Ca    10.4      16 Dec 2019 10:45    TPro  7.9  /  Alb  4.3  /  TBili  0.5  /  DBili  x   /  AST  10  /  ALT  11  /  AlkPhos  62  12-16                  RADIOLOGY, EKG & ADDITIONAL TESTS: Reviewed.  < from: CT Renal Stone Hunt (12.16.19 @ 12:03) >  IMPRESSION: No urinary calculusor obstructive uropathy.     TAB LOPEZ M.D., ATTENDING RADIOLOGIST  This document has been electronically signed. Dec 16 2019 12:19PM

## 2019-12-16 NOTE — ED PROVIDER NOTE - PMH
Anemia due to stage 5 chronic kidney disease, not on chronic dialysis    CAP (community acquired pneumonia)  6/18  Cerebrovascular accident (CVA), unspecified mechanism  diagnosed via CT of the head  Coronary artery disease    Diabetes mellitus  type 2  ESRD (end stage renal disease)  on Dialysis( M/W/F), By Dr. Huff  GERD (gastroesophageal reflux disease)    HTN (hypertension)    Hypercholesterolemia    Stented coronary artery  2014, 3 stents, [facility]

## 2019-12-16 NOTE — H&P ADULT - NSICDXPASTSURGICALHX_GEN_ALL_CORE_FT
PAST SURGICAL HISTORY:  AV fistula 10/12/18 L radiocephalic AV fistula    H/O coronary angiogram 2014 - x3 stents    H/O eye surgery

## 2019-12-16 NOTE — H&P ADULT - HISTORY OF PRESENT ILLNESS
64M w/PMH of CAD s/p PCI (most recent in Feb w/2 NAHUM, dLAD and pLCx), HTN, IDDM, GERD, ESRD via AVF here for L hip pain. Pt reports for past 2 weeks, he's been having constant L hip pain, no radiation, worse with walking which has worsened since yesterday. He tried taking Tylenol twice yesterday, however it did not help. The dose of Tylenol 975mg in the ED however did alleviate the pain somewhat. He denies any injuries, falls, or dx of arthritis. No LE weakness, numbness/tingling, incontinence or gait instability.   Pt also reports chronic abdominal discomfort which is improved with Ranitidine.   No fevers, chills, cp, sob, dizziness, lightheadedness, urinary symptoms, nausea, vomiting or diarrhea.

## 2019-12-16 NOTE — CONSULT NOTE ADULT - SUBJECTIVE AND OBJECTIVE BOX
[facility]   MD ANDRES AVALOS DO MARYANNE SOURIAL, ISSAC SHARPE    TEL:  OFFICE: [phone]  DR MARES CELL: [phone]  DR. HUNTER CELL: [phone]  DR. BOSE CELL: [phone]  LIDA WINTERS CELL: [phone]    HPI:  64M w/PMH of CAD s/p PCI (most recent in Feb w/2 NAHUM, dLAD and pLCx), HTN, IDDM, GERD, ESRD via AVF here for L hip pain. Pt reports for past 2 weeks, he's been having constant L hip pain, no radiation, patient report waken up with severe pain yesterday, took Tylenol without relief. He denies any injuries, falls, or dx of arthritis. No LE weakness, numbness/tingling, incontinence or gait instability.   No fevers, chills, cp, sob, dizziness, lightheadedness, urinary symptoms, nausea, vomiting or diarrhea.  pt follow up with dr. mares goes to [place] [facility], last HD friday    Allergies:  No Known Allergies      PAST MEDICAL & SURGICAL HISTORY:  Anemia due to stage 5 chronic kidney disease, not on chronic dialysis  Cerebrovascular accident (CVA), unspecified mechanism: diagnosed via CT of the head  Hypercholesterolemia  ESRD (end stage renal disease): on Dialysis( M/W/F), By Dr. Mares  Stented coronary artery: 2014, 3 stents, [facility]  GERD (gastroesophageal reflux disease)  Diabetes mellitus: type 2  CAP (community acquired pneumonia): 6/18  Coronary artery disease  HTN (hypertension)  H/O eye surgery  AV fistula: 10/12/18 L radiocephalic AV fistula  H/O coronary angiogram: 2014 - x3 stents      Home Medications Reviewed    Hospital Medications:   MEDICATIONS  (STANDING):  aspirin enteric coated 81 milliGRAM(s) Oral daily  atorvastatin 40 milliGRAM(s) Oral at bedtime  calcium acetate 1334 milliGRAM(s) Oral two times a day with meals  cloNIDine 0.1 milliGRAM(s) Oral two times a day  dextrose 5%. 1000 milliLiter(s) (50 mL/Hr) IV Continuous <Continuous>  dextrose 50% Injectable 12.5 Gram(s) IV Push once  dextrose 50% Injectable 25 Gram(s) IV Push once  dextrose 50% Injectable 25 Gram(s) IV Push once  insulin glargine Injectable (LANTUS) 5 Unit(s) SubCutaneous at bedtime  insulin lispro (HumaLOG) corrective regimen sliding scale   SubCutaneous Before meals and at bedtime  labetalol 400 milliGRAM(s) Oral two times a day      SOCIAL HISTORY:  Denies ETOh, Smoking,     FAMILY HISTORY:  No pertinent family history in first degree relatives      REVIEW OF SYSTEMS:  CONSTITUTIONAL: No weakness, fevers or chills  EYES/ENT: No visual changes;  No vertigo or throat pain   NECK: No pain or stiffness  RESPIRATORY: No cough, wheezing, hemoptysis; No shortness of breath  CARDIOVASCULAR: No chest pain or palpitations.  GASTROINTESTINAL: No abdominal or epigastric pain. No nausea, vomiting, or hematemesis; No diarrhea or constipation. No melena or hematochezia.  GENITOURINARY: No dysuria, frequency, foamy urine, urinary urgency, incontinence or hematuria  NEUROLOGICAL: see hpi  SKIN: No itching, burning, rashes, or lesions   VASCULAR: No bilateral lower extremity edema.   All other review of systems is negative unless indicated above.    VITALS:  T(F): 98.6 (12-16-19 @ 16:33), Max: 98.6 (12-16-19 @ 16:33)  HR: 76 (12-16-19 @ 16:33)  BP: 190/84 (12-16-19 @ 16:33)  RR: 18 (12-16-19 @ 16:33)  SpO2: 100% (12-16-19 @ 16:33)  Wt(kg): --        PHYSICAL EXAM:  Constitutional: NAD  HEENT: anicteric sclera, oropharynx clear, MMM  Neck: No JVD  Respiratory: CTAB, no wheezes, rales or rhonchi  Cardiovascular: S1, S2, RRR  Gastrointestinal: BS+, soft, NT/ND  Extremities: No cyanosis or clubbing. No peripheral edema  Neurological: A/O x 3, no focal deficits  Psychiatric: Normal mood, normal affect  : No CVA tenderness. No hidns.   Skin: No rashes  Vascular Access: left AVF + thrill + bruit    LABS:  12-16    138  |  94<L>  |  62<H>  ----------------------------<  219<H>  4.8   |  22  |  12.27<H>    Ca    10.4      16 Dec 2019 10:45  Phos  4.7     12-16    TPro  7.9  /  Alb  4.3  /  TBili  0.5  /  DBili      /  AST  10  /  ALT  11  /  AlkPhos  62  12-16    Creatinine Trend: 12.27 <--                        10.8   12.91 )-----------( 182      ( 16 Dec 2019 10:45 )             33.5     Urine Studies:        RADIOLOGY & ADDITIONAL STUDIES:

## 2019-12-16 NOTE — ED PROVIDER NOTE - CLINICAL SUMMARY MEDICAL DECISION MAKING FREE TEXT BOX
63yo M pmhx esrd htn hld dm p/w CC flank/back pain - will provide pain control - CT abd/pelv, labs ekg reassess. Clinically appears to be musculoskeletal in origin.

## 2019-12-16 NOTE — ED ADULT NURSE NOTE - OBJECTIVE STATEMENT
Pt A/Ox4 states he has had LT sided buttock and flank pain that radiates around his abdomen. Denies fever/chills, Denies n/v/d, States he get dialysis M,W,F and was due for it today at 0900, Denies CP or SOB. Denies trauma or injury to the area, no bruising noted. States he makes minimal amt of urine and denies changes in urine. Noted fistula to LT arm, no dressing skin appears clean and intact around site.

## 2019-12-16 NOTE — CHART NOTE - NSCHARTNOTEFT_GEN_A_CORE
Called by dialysis RN re:  /88, HR 52. Pt asymptomatic, denies headache, N/V. Pt being dialyzed only 30 minutes at this time.     -will continue to monitor vital signs throughout dialysis  -no intervention at this moment - most likely to improve with more dialysis  -Dr Barnhart aware of this plan Called by dialysis RN re:  /88, HR 52. Pt asymptomatic, denies headache, N/V. Pt being dialyzed only 30 minutes at this time.     -will continue to monitor vital signs throughout dialysis  -no intervention at this moment - most likely to improve with more dialysis  -Dr Barnhart aware of this plan      6am - bp 179/81 - pt sleeping at present time  -am bp meds to be given

## 2019-12-16 NOTE — PROVIDER CONTACT NOTE (OTHER) - BACKGROUND
64M w/PMH of CAD s/p PCI (most recent in Feb w/2 NAHUM, dLAD and pLCx), HTN, IDDM, GERD, ESRD via AVF here for L hip pain and missed HD.

## 2019-12-16 NOTE — ED ADULT NURSE NOTE - NSIMPLEMENTINTERV_GEN_ALL_ED
Implemented All Universal Safety Interventions:  [place] [employer] to call system. Call bell, personal items and telephone within reach. Instruct patient to call for assistance. Room bathroom lighting operational. Non-slip footwear when patient is off stretcher. Physically safe environment: no spills, clutter or unnecessary equipment. Stretcher in lowest position, wheels locked, appropriate side rails in place.

## 2019-12-16 NOTE — ED PROVIDER NOTE - OBJECTIVE STATEMENT
65yo M pmhx HTN HLD DM ESRD on dialysis, due today, p/w CC R sided flank/back pain, worsening over past few days exacerbated by movement. Denies similar episodes in past. Denies fever chills cp sob cough n/v/d urinary discomfort. 65yo M pmhx HTN HLD DM ESRD on dialysis, due today, p/w CC R sided flank/back pain, worsening over past few days exacerbated by movement. Denies similar episodes in past. Denies fever chills cp sob cough n/v/d urinary discomfort.    Attending; 65yo male presents with left flank and hip pain for 2 weeks.  worse last 2 days.  no fever.  pain worse with movement.

## 2019-12-16 NOTE — H&P ADULT - ASSESSMENT
64M w/PMH of CAD s/p PCI (most recent in Feb w/2 NAHUM, dLAD and pLCx), HTN, IDDM, GERD, ESRD via AVF here for back pain 64M w/PMH of CAD s/p PCI (most recent in Feb w/2 NAHUM, dLAD and pLCx), HTN, IDDM, GERD, ESRD via AVF here for L hip pain and missed HD.

## 2019-12-16 NOTE — H&P ADULT - PROBLEM SELECTOR PLAN 2
Due for dialysis today, has L AVF  -HD per renal Due for dialysis today, has L AVF  -HD per renal  -C/w calcitriol and calcium acetate Due for dialysis today, has L AVF  -HD per renal  -Ca 10.4 on todays labs, will discuss with renal regarding calcium acetate dosing   -Phos added on to today's labs   -C/w calcitriol

## 2019-12-16 NOTE — H&P ADULT - PROBLEM SELECTOR PLAN 1
Acute on chronic pain, worsened over past 1-2 days, worse with weight-bearing.   CTAP negative for acute pathology or renal stones  -L hip xrays ordered to assess for arthritis  -Tylenol for pain control  -PT eval

## 2019-12-16 NOTE — H&P ADULT - NSHPPHYSICALEXAM_GEN_ALL_CORE
.  VITAL SIGNS:  T(C): 36.6 (12-16-19 @ 13:49), Max: 36.6 (12-16-19 @ 13:49)  T(F): 97.8 (12-16-19 @ 13:49), Max: 97.8 (12-16-19 @ 13:49)  HR: 52 (12-16-19 @ 13:49) (52 - 60)  BP: 194/73 (12-16-19 @ 13:49) (194/73 - 197/75)  BP(mean): --  RR: 18 (12-16-19 @ 13:49) (16 - 18)  SpO2: 100% (12-16-19 @ 13:49) (99% - 100%)  Wt(kg): --    PHYSICAL EXAM:    Constitutional: WDWN resting comfortably in bed; NAD  Head: NC/AT  Eyes: PERRL, EOMI, clear conjunctiva  ENT: no nasal discharge; uvula midline, no oropharyngeal erythema or exudates; MMM  Neck: supple; no JVD or thyromegaly  Respiratory: CTA B/L; no W/R/R, no retractions  Cardiac: +S1/S2; RRR; no M/R/G; PMI non-displaced  Gastrointestinal: soft, NT/ND; no rebound or guarding; +BSx4  Genitourinary: normal external genitalia  Back: spine midline, no bony tenderness or step-offs; no CVAT B/L  Extremities: WWP, no clubbing or cyanosis; no peripheral edema  Musculoskeletal: NROM x4; no joint swelling, tenderness or erythema  Vascular: 2+ radial, femoral, DP/PT pulses B/L  Dermatologic: skin warm, dry and intact; no rashes, wounds, or scars  Lymphatic: no submandibular or cervical LAD  Neurologic: AAOx3; CNII-XII grossly intact; no focal deficits  Psychiatric: affect and characteristics of appearance, verbalizations, behaviors are appropriate .  VITAL SIGNS:  T(C): 36.6 (12-16-19 @ 13:49), Max: 36.6 (12-16-19 @ 13:49)  T(F): 97.8 (12-16-19 @ 13:49), Max: 97.8 (12-16-19 @ 13:49)  HR: 52 (12-16-19 @ 13:49) (52 - 60)  BP: 194/73 (12-16-19 @ 13:49) (194/73 - 197/75)  BP(mean): --  RR: 18 (12-16-19 @ 13:49) (16 - 18)  SpO2: 100% (12-16-19 @ 13:49) (99% - 100%)  Wt(kg): --    PHYSICAL EXAM:    Constitutional: WDWN resting comfortably in bed; NAD  Head: NC/AT  Eyes: PERRL, EOMI, clear conjunctiva  ENT: no nasal discharge; uvula midline, no oropharyngeal erythema or exudates; MMM  Neck: supple  Respiratory: CTA B/L; no W/R/R  Cardiac: +S1/S2; RRR; no M/R/G; PMI non-displaced  Gastrointestinal: soft, NT/ND; no rebound or guarding; +BSx4  Back: spine midline, no bony tenderness or step-offs; no CVAT B/L,  Extremities: WWP, no clubbing or cyanosis; no peripheral edema, L hip flexion intact, +mild pinpoint tenderness of L buttock, pain in L hip joint when hip is flexed then abducted   Musculoskeletal: NROM x4; no joint swelling, tenderness or erythema  Dermatologic: skin warm, dry and intact; no rashes, wounds, or scars  Neurologic: AAOx3; CNII-XII grossly intact; no focal deficits  Psychiatric: affect and characteristics of appearance, verbalizations, behaviors are appropriate

## 2019-12-16 NOTE — CONSULT NOTE ADULT - ASSESSMENT
64M w/PMH of CAD s/p PCI (most recent in Feb w/2 NAHUM, dLAD and pLCx), HTN, IDDM, GERD, ESRD via AVF here for L hip pain    ESRD on HD MWF via AVF  last HD friday  plan for HD today  consent in chart  renal diet  monitor bmp    HTN  uncontrolled  did not take his medication today  resume home meds  UF with HD  low Na diet  monitor    anemia  at goal  will start epo 4000 for maintainence once bp is better  monitor hb    ckd-mbd  check pth, phos level  on phoslo 1334 tid, will change to renagel 1600 tid sec to elevated ca  monitor phos and calcium daily    L hip pain  pending xray 64M w/PMH of CAD s/p PCI (most recent in Feb w/2 NAHUM, dLAD and pLCx), HTN, IDDM, GERD, ESRD via AVF here for L hip pain    ESRD on HD MWF via AVF  last HD friday  plan for HD today  consent in chart  renal diet  monitor bmp    HTN  uncontrolled  did not take his medication today  resume home meds  UF with HD  low Na diet  monitor    anemia  at goal  will start epo 4000 for maintainence once bp is better  monitor hb    ckd-mbd  check pth, phos level  on phoslo 1334 tid, will change to renagel 1600 tid sec to elevated ca  hold calcitriol for now  monitor phos and calcium daily    L hip pain  pending xray 64M w/PMH of CAD s/p PCI (most recent in Feb w/2 NAHUM, dLAD and pLCx), HTN, IDDM, GERD, ESRD via AVF here for L hip pain    ESRD on HD MWF via AVF  last HD friday  plan for HD today  consent in chart  renal diet  monitor bmp    HTN  uncontrolled  did not take his medication today  resume home meds  UF with HD  low Na diet  monitor    Anemia  at goal  will start epo 4000 for maintainence once bp is better  monitor hb    ckd-mbd  check pth, phos level  on phoslo 1334 tid, will change to renagel 1600 tid sec to elevated ca  hold calcitriol for now  monitor phos and calcium daily    L hip pain  pending xray

## 2019-12-16 NOTE — H&P ADULT - PROBLEM SELECTOR PLAN 4
Pt on Novolog 70/30 at home, 15U BID  -f/u A1c  -Start Lantus 5U as on previous admission  -MDSS and FS QID  -Consistent carb, renal diet

## 2019-12-16 NOTE — H&P ADULT - NSICDXPASTMEDICALHX_GEN_ALL_CORE_FT
PAST MEDICAL HISTORY:  Anemia due to stage 5 chronic kidney disease, not on chronic dialysis     CAP (community acquired pneumonia) 6/18    Cerebrovascular accident (CVA), unspecified mechanism diagnosed via CT of the head    Coronary artery disease     Diabetes mellitus type 2    ESRD (end stage renal disease) on Dialysis( M/W/F), By Dr. Huff    GERD (gastroesophageal reflux disease)     HTN (hypertension)     Hypercholesterolemia     Stented coronary artery 2014, 3 stents, [facility]

## 2019-12-17 ENCOUNTER — TRANSCRIPTION ENCOUNTER (OUTPATIENT)
Age: 64
End: 2019-12-17

## 2019-12-17 VITALS
OXYGEN SATURATION: 100 % | SYSTOLIC BLOOD PRESSURE: 152 MMHG | DIASTOLIC BLOOD PRESSURE: 73 MMHG | RESPIRATION RATE: 18 BRPM | HEART RATE: 56 BPM | TEMPERATURE: 98 F

## 2019-12-17 LAB
ANION GAP SERPL CALC-SCNC: 19 MMO/L — HIGH (ref 7–14)
BASOPHILS # BLD AUTO: 0.06 K/UL — SIGNIFICANT CHANGE UP (ref 0–0.2)
BASOPHILS NFR BLD AUTO: 0.6 % — SIGNIFICANT CHANGE UP (ref 0–2)
BUN SERPL-MCNC: 28 MG/DL — HIGH (ref 7–23)
CALCIUM SERPL-MCNC: 9.4 MG/DL — SIGNIFICANT CHANGE UP (ref 8.4–10.5)
CHLORIDE SERPL-SCNC: 93 MMOL/L — LOW (ref 98–107)
CO2 SERPL-SCNC: 24 MMOL/L — SIGNIFICANT CHANGE UP (ref 22–31)
CREAT SERPL-MCNC: 6.64 MG/DL — HIGH (ref 0.5–1.3)
EOSINOPHIL # BLD AUTO: 0.2 K/UL — SIGNIFICANT CHANGE UP (ref 0–0.5)
EOSINOPHIL NFR BLD AUTO: 2 % — SIGNIFICANT CHANGE UP (ref 0–6)
GLUCOSE SERPL-MCNC: 210 MG/DL — HIGH (ref 70–99)
HBA1C BLD-MCNC: 7.8 % — HIGH (ref 4–5.6)
HBV CORE AB SER-ACNC: NONREACTIVE — SIGNIFICANT CHANGE UP
HBV SURFACE AB SER-ACNC: 3.3 MLU/ML — LOW
HCT VFR BLD CALC: 34.4 % — LOW (ref 39–50)
HCV AB S/CO SERPL IA: 0.11 S/CO — SIGNIFICANT CHANGE UP (ref 0–0.99)
HCV AB SERPL-IMP: SIGNIFICANT CHANGE UP
HGB BLD-MCNC: 11.3 G/DL — LOW (ref 13–17)
IMM GRANULOCYTES NFR BLD AUTO: 0.2 % — SIGNIFICANT CHANGE UP (ref 0–1.5)
LYMPHOCYTES # BLD AUTO: 1.71 K/UL — SIGNIFICANT CHANGE UP (ref 1–3.3)
LYMPHOCYTES # BLD AUTO: 16.9 % — SIGNIFICANT CHANGE UP (ref 13–44)
MAGNESIUM SERPL-MCNC: 1.9 MG/DL — SIGNIFICANT CHANGE UP (ref 1.6–2.6)
MCHC RBC-ENTMCNC: 31.6 PG — SIGNIFICANT CHANGE UP (ref 27–34)
MCHC RBC-ENTMCNC: 32.8 % — SIGNIFICANT CHANGE UP (ref 32–36)
MCV RBC AUTO: 96.1 FL — SIGNIFICANT CHANGE UP (ref 80–100)
MONOCYTES # BLD AUTO: 0.81 K/UL — SIGNIFICANT CHANGE UP (ref 0–0.9)
MONOCYTES NFR BLD AUTO: 8 % — SIGNIFICANT CHANGE UP (ref 2–14)
NEUTROPHILS # BLD AUTO: 7.34 K/UL — SIGNIFICANT CHANGE UP (ref 1.8–7.4)
NEUTROPHILS NFR BLD AUTO: 72.3 % — SIGNIFICANT CHANGE UP (ref 43–77)
NRBC # FLD: 0 K/UL — SIGNIFICANT CHANGE UP (ref 0–0)
PHOSPHATE SERPL-MCNC: 3.9 MG/DL — SIGNIFICANT CHANGE UP (ref 2.5–4.5)
PLATELET # BLD AUTO: 183 K/UL — SIGNIFICANT CHANGE UP (ref 150–400)
PMV BLD: 11 FL — SIGNIFICANT CHANGE UP (ref 7–13)
POTASSIUM SERPL-MCNC: 4 MMOL/L — SIGNIFICANT CHANGE UP (ref 3.5–5.3)
POTASSIUM SERPL-SCNC: 4 MMOL/L — SIGNIFICANT CHANGE UP (ref 3.5–5.3)
RBC # BLD: 3.58 M/UL — LOW (ref 4.2–5.8)
RBC # FLD: 13.1 % — SIGNIFICANT CHANGE UP (ref 10.3–14.5)
SODIUM SERPL-SCNC: 136 MMOL/L — SIGNIFICANT CHANGE UP (ref 135–145)
SPECIMEN SOURCE: SIGNIFICANT CHANGE UP
SPECIMEN SOURCE: SIGNIFICANT CHANGE UP
WBC # BLD: 10.14 K/UL — SIGNIFICANT CHANGE UP (ref 3.8–10.5)
WBC # FLD AUTO: 10.14 K/UL — SIGNIFICANT CHANGE UP (ref 3.8–10.5)

## 2019-12-17 PROCEDURE — 73502 X-RAY EXAM HIP UNI 2-3 VIEWS: CPT | Mod: 26,LT

## 2019-12-17 RX ORDER — ACETAMINOPHEN 500 MG
2 TABLET ORAL
Qty: 0 | Refills: 0 | DISCHARGE
Start: 2019-12-17

## 2019-12-17 RX ORDER — CALCITRIOL 0.5 UG/1
1 CAPSULE ORAL
Qty: 0 | Refills: 0 | DISCHARGE

## 2019-12-17 RX ORDER — RANITIDINE HYDROCHLORIDE 150 MG/1
1 TABLET, FILM COATED ORAL
Qty: 0 | Refills: 0 | DISCHARGE

## 2019-12-17 RX ORDER — LOSARTAN POTASSIUM 100 MG/1
25 TABLET, FILM COATED ORAL DAILY
Refills: 0 | Status: DISCONTINUED | OUTPATIENT
Start: 2019-12-17 | End: 2019-12-17

## 2019-12-17 RX ORDER — INSULIN GLARGINE 100 [IU]/ML
5 INJECTION, SOLUTION SUBCUTANEOUS
Qty: 1 | Refills: 0
Start: 2019-12-17 | End: 2020-01-15

## 2019-12-17 RX ORDER — SEVELAMER CARBONATE 2400 MG/1
2 POWDER, FOR SUSPENSION ORAL
Qty: 180 | Refills: 0
Start: 2019-12-17 | End: 2020-01-15

## 2019-12-17 RX ORDER — LINAGLIPTIN 5 MG/1
1 TABLET, FILM COATED ORAL
Qty: 30 | Refills: 0
Start: 2019-12-17 | End: 2020-01-15

## 2019-12-17 RX ORDER — LOSARTAN POTASSIUM 100 MG/1
1 TABLET, FILM COATED ORAL
Qty: 30 | Refills: 0
Start: 2019-12-17 | End: 2020-01-15

## 2019-12-17 RX ADMIN — Medication 650 MILLIGRAM(S): at 06:17

## 2019-12-17 RX ADMIN — Medication 60 MILLIGRAM(S): at 06:17

## 2019-12-17 RX ADMIN — Medication 2: at 13:40

## 2019-12-17 RX ADMIN — Medication 81 MILLIGRAM(S): at 11:31

## 2019-12-17 RX ADMIN — CLOPIDOGREL BISULFATE 75 MILLIGRAM(S): 75 TABLET, FILM COATED ORAL at 11:31

## 2019-12-17 RX ADMIN — Medication 0.1 MILLIGRAM(S): at 06:17

## 2019-12-17 RX ADMIN — SEVELAMER CARBONATE 1600 MILLIGRAM(S): 2400 POWDER, FOR SUSPENSION ORAL at 11:33

## 2019-12-17 RX ADMIN — Medication 400 MILLIGRAM(S): at 06:17

## 2019-12-17 RX ADMIN — Medication 650 MILLIGRAM(S): at 07:00

## 2019-12-17 NOTE — DISCHARGE NOTE PROVIDER - CARE PROVIDER_API CALL
Yasmany Huff)  Internal Medicine; Nephrology  [address], [address]nd [place] [address]  Phone: [phone]  Fax: [phone]  Follow Up Time: 1-3 days Yasmany Huff)  Internal Medicine; Nephrology  [address], [address]nd [place] [address]  Phone: [phone]  Fax: [phone]  Follow Up Time: 1-3 days    Dr Bassem Grove,   call to make a follow up appt  Phone: [phone]  Fax: (   )    -  Follow Up Time:

## 2019-12-17 NOTE — DISCHARGE NOTE PROVIDER - NSDCFUSCHEDAPPT_GEN_ALL_CORE_FT
MD RYANN BOLAÑOS ; 01/09/2020 ; NPP Cardio [facility] MD RYANN BOLAÑOS ; 01/09/2020 ; NPP Cardio 101[facility] MD RYANN BOLAÑOS ; 01/09/2020 ; NPP Cardio 101[facility] MD RYANN BOLAÑOS ; 01/09/2020 ; NPP Cardio 1010 [facility] MD RYANN BOLAÑOS ; 01/09/2020 ; NPP Cardio 1010 [facility] MD RYANN BOLAÑOS ; 01/09/2020 ; NPP Cardio 1010 [facility]

## 2019-12-17 NOTE — PHYSICAL THERAPY INITIAL EVALUATION ADULT - PERTINENT HX OF CURRENT PROBLEM, REHAB EVAL
Pt. is a 64 year old male admitted to [facility] due to dorsalgia. PMH: CVA, ESRD, Anemia. GERD.

## 2019-12-17 NOTE — DISCHARGE NOTE NURSING/CASE MANAGEMENT/SOCIAL WORK - PATIENT PORTAL LINK FT
You can access the FollowMyHealth Patient Portal offered by [facility] by registering at the following website: [email or web address]. By joining [employer]’s FollowMyHealth portal, you will also be able to view your health information using other applications (apps) compatible with our system.

## 2019-12-17 NOTE — DISCHARGE NOTE PROVIDER - HOSPITAL COURSE
64M w/PMH of CAD s/p PCI (most recent in Feb w/2 NAHUM, dLAD and pLCx), HTN, IDDM, GERD, ESRD via AVF here for L hip pain. Pt reports for past 2 weeks, he's been having constant L hip pain, no radiation, worse with walking. CTAP negative for acute pathology or renal stones    -L hip xrays __    PT eval 64M w/PMH of CAD s/p PCI (most recent in Feb w/2 NAHUM, dLAD and pLCx), HTN, IDDM, GERD, ESRD via AVF here for L hip pain. Pt reports for past 2 weeks, he's been having constant L hip pain, no radiation, worse with walking. CTAP negative for acute pathology or renal stones    L hip xrays No Fx    PT eval home no needs, pt is optiized for dc, SW to reinstate HD, Both Dr Barnhart and Dr Huff informed 64M w/PMH of CAD s/p PCI (most recent in Feb w/2 NAHUM, dLAD and pLCx), HTN, IDDM, GERD, ESRD via AVF here for L hip pain. Pt reports for past 2 weeks, he's been having constant L hip pain, no radiation, worse with walking. CTAP negative for acute pathology or renal stones. L hip xrays No Fx. Pt found to be hypertensive, missed BP meds on admission, BP normalized, pt underwent HD and now cliniccaly stable for discharge     PT eval home no needs, pt is optiized for dc, SW to reinstate HD, Both Dr Barnhart and Dr Huff informed 64M w/PMH of CAD s/p PCI (most recent in Feb w/2 NAHUM, dLAD and pLCx), HTN, IDDM, GERD, ESRD via AVF here for L hip pain. Pt reports for past 2 weeks, he's been having constant L hip pain, no radiation, worse with walking. CTAP negative for acute pathology or renal stones. L hip xrays No Fx. Pt found to be hypertensive, missed BP meds on admission, BP normalized, pt underwent HD and now cliniccaly stable for discharge     PT eval home no needs, pt is optiized for dc, SW to reinstate HD, Both Dr Barnhart and Dr Huff informed.        Attending Addendum:    Patient seen and examined by me on the discharge day. Pt feeling better. Hip pain resolved. Xray results reviewed with pt.    s/p Dialysis with improved BP. home meds restarted.     All questions answered in details. Follow up plan explained.    Pt to follow up with his nephrologist Dr. Huff.

## 2019-12-17 NOTE — DISCHARGE NOTE PROVIDER - NSDCMRMEDTOKEN_GEN_ALL_CORE_FT
Aspirin Enteric Coated 81 mg oral delayed release tablet: 1 tab(s) orally once a day  atorvastatin 40 mg oral tablet: 1 tab(s) orally once a day  calcitriol 0.25 mcg oral capsule: 1 cap(s) orally once a day  calcium acetate 667 mg oral tablet: 1334 milligram(s) orally 2 times a day  cloNIDine 0.1 mg oral tablet: 1 tab(s) orally 2 times a day  clopidogrel 75 mg oral tablet: 1 tab(s) orally once a day  labetalol 200 mg oral tablet: 2 tab(s) orally 2 times a day   NIFEdipine 60 mg oral tablet, extended release: 1 tab(s) orally once a day  NovoLIN 70/30 FlexPen subcutaneous suspension: 15 unit(s) subcutaneous 2 times a day  raNITIdine 150 mg oral tablet: 1 tab(s) orally once a day (at bedtime) acetaminophen 325 mg oral tablet: 2 tab(s) orally every 6 hours, As needed, Mild Pain (1 - 3)  Aspirin Enteric Coated 81 mg oral delayed release tablet: 1 tab(s) orally once a day  atorvastatin 40 mg oral tablet: 1 tab(s) orally once a day  calcitriol 0.25 mcg oral capsule: 1 cap(s) orally once a day  calcium acetate 667 mg oral tablet: 1334 milligram(s) orally 2 times a day  cloNIDine 0.1 mg oral tablet: 1 tab(s) orally 2 times a day  clopidogrel 75 mg oral tablet: 1 tab(s) orally once a day  labetalol 200 mg oral tablet: 2 tab(s) orally 2 times a day   Lantus 100 units/mL subcutaneous solution: 5 unit(s) subcutaneous once a day (at bedtime)   linagliptin 5 mg oral tablet: 1 tab(s) orally once a day   NIFEdipine 60 mg oral tablet, extended release: 1 tab(s) orally once a day acetaminophen 325 mg oral tablet: 2 tab(s) orally every 6 hours, As needed, Mild Pain (1 - 3)  Aspirin Enteric Coated 81 mg oral delayed release tablet: 1 tab(s) orally once a day  atorvastatin 40 mg oral tablet: 1 tab(s) orally once a day  cloNIDine 0.1 mg oral tablet: 1 tab(s) orally 2 times a day  clopidogrel 75 mg oral tablet: 1 tab(s) orally once a day  labetalol 200 mg oral tablet: 2 tab(s) orally 2 times a day   Lantus 100 units/mL subcutaneous solution: 5 unit(s) subcutaneous once a day (at bedtime)   linagliptin 5 mg oral tablet: 1 tab(s) orally once a day   NIFEdipine 60 mg oral tablet, extended release: 1 tab(s) orally once a day  sevelamer carbonate 800 mg oral tablet: 2 tab(s) orally 3 times a day (with meals) acetaminophen 325 mg oral tablet: 2 tab(s) orally every 6 hours, As needed, Mild Pain (1 - 3)  Aspirin Enteric Coated 81 mg oral delayed release tablet: 1 tab(s) orally once a day  atorvastatin 40 mg oral tablet: 1 tab(s) orally once a day  cloNIDine 0.1 mg oral tablet: 1 tab(s) orally 2 times a day  clopidogrel 75 mg oral tablet: 1 tab(s) orally once a day  labetalol 200 mg oral tablet: 2 tab(s) orally 2 times a day   Lantus 100 units/mL subcutaneous solution: 5 unit(s) subcutaneous once a day (at bedtime)   linagliptin 5 mg oral tablet: 1 tab(s) orally once a day   losartan 25 mg oral tablet: 1 tab(s) orally once a day  NIFEdipine 60 mg oral tablet, extended release: 1 tab(s) orally once a day  sevelamer carbonate 800 mg oral tablet: 2 tab(s) orally 3 times a day (with meals)

## 2019-12-17 NOTE — DISCHARGE NOTE PROVIDER - PROVIDER TOKENS
PROVIDER:[TOKEN:[[id number]],FOLLOWUP:[1-3 days]] PROVIDER:[TOKEN:[[id number]],FOLLOWUP:[1-3 days]],FREE:[LAST:[Dr Bassem Grove],PHONE:[phone]],FAX:[(   )    -],ADDRESS:[call to make a follow up appt]]

## 2019-12-17 NOTE — DISCHARGE NOTE PROVIDER - NSDCCPCAREPLAN_GEN_ALL_CORE_FT
PRINCIPAL DISCHARGE DIAGNOSIS  Diagnosis: Hypertensive urgency  Assessment and Plan of Treatment: Low salt diet. It is important to be compliant with blood pressure medications. Continue home regimen and make a follow up appt with your PCP/renal in 1 week      SECONDARY DISCHARGE DIAGNOSES  Diagnosis: Hip pain, left  Assessment and Plan of Treatment: X-ray no fracture, may take Tylenol as needed.    Diagnosis: Stented coronary artery  Assessment and Plan of Treatment: Continue daily Aspirin and Plavix and Atorvastatin. Eat low salt, low fat diet    Diagnosis: HTN (hypertension)  Assessment and Plan of Treatment: Continue w/ home regimen: Clonidine, Labetolol and Nifedipine    Diagnosis: Dialysis patient  Assessment and Plan of Treatment: COntiinue HD as per routine scheduled, next due 12/18 PRINCIPAL DISCHARGE DIAGNOSIS  Diagnosis: Hypertensive urgency  Assessment and Plan of Treatment: Low salt diet. It is important to be compliant with blood pressure medications. Continue home regimen and make a follow up appt with your PCP/renal in 1 week      SECONDARY DISCHARGE DIAGNOSES  Diagnosis: Hip pain, left  Assessment and Plan of Treatment: X-ray no fracture, may take Tylenol as needed.    Diagnosis: Stented coronary artery  Assessment and Plan of Treatment: Continue daily Aspirin and Plavix and Atorvastatin. Eat low salt, low fat diet    Diagnosis: Diabetes mellitus  Assessment and Plan of Treatment: *********Hg A1c 7.6%. Stop 70/30 Novolin to prevent hypoglycemia**********  Based on your sugars, started on Lantus 5 Units daily and take Tradjenta 5 mg in AM w/ breakfast ***    Diagnosis: HTN (hypertension)  Assessment and Plan of Treatment: Continue w/ home regimen: Clonidine, Labetolol and Nifedipine    Diagnosis: Dialysis patient  Assessment and Plan of Treatment: Continue HD as per routine scheduled, next due 12/18 PRINCIPAL DISCHARGE DIAGNOSIS  Diagnosis: Hypertensive urgency  Assessment and Plan of Treatment: Low salt diet. It is important to be compliant with blood pressure medications. Continue home regimen as well as new medication for better control of your blood pressure. Losartan 25 mg daily   Call make a follow up appt with your PCP/renal in 1 week      SECONDARY DISCHARGE DIAGNOSES  Diagnosis: Hip pain, left  Assessment and Plan of Treatment: X-ray no fracture, may take Tylenol as needed.    Diagnosis: Stented coronary artery  Assessment and Plan of Treatment: Continue daily Aspirin and Plavix and Atorvastatin. Eat low salt, low fat diet    Diagnosis: Diabetes mellitus  Assessment and Plan of Treatment: *********Hg A1c 7.6%. Stop 70/30 Novolin to prevent hypoglycemia**********  Based on your sugars, started on Lantus 5 Units daily and take Tradjenta 5 mg in AM w/ breakfast ***    Diagnosis: HTN (hypertension)  Assessment and Plan of Treatment: Continue w/ home regimen: Clonidine, Labetolol and Nifedipine    Diagnosis: Dialysis patient  Assessment and Plan of Treatment: Continue HD as per routine scheduled, next due 12/18

## 2019-12-17 NOTE — PROGRESS NOTE ADULT - ASSESSMENT
64M w/PMH of CAD s/p PCI (most recent in Feb w/2 NAHUM, dLAD and pLCx), HTN, IDDM, GERD, ESRD via AVF here for L hip pain    ESRD on HD MWF via AVF  s/p HD 12/16 tolerated well  consent in chart  renal diet  monitor bmp    HTN  uncontrolled  start losartan 25   low Na diet  monitor    Anemia  at goal  will start epo 4000 for maintainence once bp is better  monitor hb    ckd-mbd  pth, phos level ok  on phoslo 1334 tid, will change to renagel 1600 tid sec to elevated ca  hold calcitriol for now  monitor phos and calcium daily    L hip pain  xray without fx or dislocation

## 2019-12-17 NOTE — PROGRESS NOTE ADULT - SUBJECTIVE AND OBJECTIVE BOX
[facility]   MD ANDRES AVALOS, DO PHUC BOSE, ISSAC SHARPE    TEL:  OFFICE: [phone]  DR CUETO CELL: [phone]  DR. HUNTER CELL: [phone]  DR. BOSE CELL: [phone]  LIDA WINTERS CELL: [phone]        Patient is a 64y old  Male who presents with a chief complaint of L hip pain and missed HD (17 Dec 2019 08:48)      Patient seen and examined at bedside. No chest pain/sob    VITALS:  T(F): 98 (12-17-19 @ 12:29), Max: 98.6 (12-16-19 @ 16:33)  HR: 56 (12-17-19 @ 12:29)  BP: 152/73 (12-17-19 @ 12:29)  RR: 18 (12-17-19 @ 12:29)  SpO2: 100% (12-17-19 @ 12:29)  Wt(kg): --    12-16 @ 07:01  -  12-17 @ 07:00  --------------------------------------------------------  IN: 400 mL / OUT: 2400 mL / NET: -2000 mL      Height (cm): 167.6 (12-16 @ 16:33)  Weight (kg): 66.8 (12-16 @ 16:33)  BMI (kg/m2): 23.8 (12-16 @ 16:33)  BSA (m2): 1.76 (12-16 @ 16:33)    PHYSICAL EXAM:  Constitutional: NAD  Neck: No JVD  Respiratory: CTAB, no wheezes, rales or rhonchi  Cardiovascular: S1, S2, RRR  Gastrointestinal: BS+, soft, NT/ND  Extremities: No peripheral edema    Hospital Medications:   MEDICATIONS  (STANDING):  aspirin enteric coated 81 milliGRAM(s) Oral daily  atorvastatin 40 milliGRAM(s) Oral at bedtime  chlorhexidine 4% Liquid 1 Application(s) Topical daily  cloNIDine 0.1 milliGRAM(s) Oral two times a day  clopidogrel Tablet 75 milliGRAM(s) Oral daily  dextrose 5%. 1000 milliLiter(s) (50 mL/Hr) IV Continuous <Continuous>  dextrose 50% Injectable 12.5 Gram(s) IV Push once  dextrose 50% Injectable 25 Gram(s) IV Push once  dextrose 50% Injectable 25 Gram(s) IV Push once  insulin glargine Injectable (LANTUS) 5 Unit(s) SubCutaneous at bedtime  insulin lispro (HumaLOG) corrective regimen sliding scale   SubCutaneous Before meals and at bedtime  labetalol 400 milliGRAM(s) Oral two times a day  NIFEdipine XL 60 milliGRAM(s) Oral daily  sevelamer carbonate 1600 milliGRAM(s) Oral three times a day with meals      LABS:  12-17    136  |  93<L>  |  28<H>  ----------------------------<  210<H>  4.0   |  24  |  6.64<H>    Ca    9.4      17 Dec 2019 05:54  Phos  3.9     12-17  Mg     1.9     12-17    TPro  7.9  /  Alb  4.3  /  TBili  0.5  /  DBili      /  AST  10  /  ALT  11  /  AlkPhos  62  12-16    Creatinine Trend: 6.64 <--, 12.27 <--    Phosphorus Level, Serum: 3.9 mg/dL (12-17 @ 05:54)    calcium--  intact pth--  parathyroid hormone intact, [id number]  calcium--  intact pth--  parathyroid hormone intact, [id number]                            11.3   10.14 )-----------( 183      ( 17 Dec 2019 05:54 )             34.4     Urine Studies:      Iron 38, TIBC 233, %sat --      [01-09-19 @ 06:37]  Ferritin 88.94      [01-09-19 @ 06:37]  .9 (Ca --)      [12-16-19 @ 22:40]   --  .5 (Ca --)      [12-16-19 @ 17:00]   --  PTH -- (Ca 8.5)      [02-13-19 @ 15:35]   337  .1 (Ca --)      [01-10-19 @ 05:20]   --  .2 (Ca --)      [01-09-19 @ 16:30]   --  Vitamin D (25OH) 13.8      [01-10-19 @ 05:20]  HbA1c 7.8      [12-17-19 @ 05:54]  Lipid: chol 128, TG 82, HDL 45, LDL 67      [02-13-19 @ 15:37]    HBsAg NEGATIVE      [12-16-19 @ 22:40]      RADIOLOGY & ADDITIONAL STUDIES:

## 2019-12-19 LAB
BACTERIA NPH CULT: SIGNIFICANT CHANGE UP
BACTERIA NPH CULT: SIGNIFICANT CHANGE UP

## 2020-01-01 ENCOUNTER — OUTPATIENT (OUTPATIENT)
Dept: OUTPATIENT SERVICES | Facility: HOSPITAL | Age: 65
LOS: 1 days | End: 2020-01-01
Payer: MEDICARE

## 2020-01-01 DIAGNOSIS — Z98.890 OTHER SPECIFIED POSTPROCEDURAL STATES: Chronic | ICD-10-CM

## 2020-01-01 DIAGNOSIS — I77.0 ARTERIOVENOUS FISTULA, ACQUIRED: Chronic | ICD-10-CM

## 2020-01-02 ENCOUNTER — APPOINTMENT (OUTPATIENT)
Dept: TRANSPLANT | Facility: CLINIC | Age: 65
End: 2020-01-02

## 2020-01-07 DIAGNOSIS — Z71.89 OTHER SPECIFIED COUNSELING: ICD-10-CM

## 2020-01-07 DIAGNOSIS — Z76.89 PERSONS ENCOUNTERING HEALTH SERVICES IN OTHER SPECIFIED CIRCUMSTANCES: ICD-10-CM

## 2020-01-08 DIAGNOSIS — Z71.89 OTHER SPECIFIED COUNSELING: ICD-10-CM

## 2020-01-08 NOTE — CONSULT NOTE ADULT - ASSESSMENT
64M with hx of CAD s/p PCI x 3, HTN, IDDM, GERD, CKD5 (not on HD yet, but has AVF placed) here for non-productive cough x 3 days c/b hemoptysis and epistaxis.
EKG - NSR LVH with repolarization changes   Echo 6/18 - Normal LV function    a/p     1) Shortness of breath - secondary to volume overload likely, consider CT chest, to be started on HD today on lasix 80mg IV q12     2) Troponin elevation - no CP, likely sec to CKD, will get 2d echo to access LV function     3) Weakness - likely sec to anemia, sec to renal disease    4) ESRD - to be started on HD today    5) CAD s/p PCI - s/p 3 stents in 2014, cont asa
Predicted suboptimal energy intake

## 2020-01-09 ENCOUNTER — APPOINTMENT (OUTPATIENT)
Dept: CARDIOLOGY | Facility: CLINIC | Age: 65
End: 2020-01-09

## 2020-03-05 ENCOUNTER — APPOINTMENT (OUTPATIENT)
Dept: TRANSPLANT | Facility: CLINIC | Age: 65
End: 2020-03-05

## 2020-03-10 ENCOUNTER — NON-APPOINTMENT (OUTPATIENT)
Age: 65
End: 2020-03-10

## 2020-03-10 ENCOUNTER — APPOINTMENT (OUTPATIENT)
Dept: CARDIOLOGY | Facility: CLINIC | Age: 65
End: 2020-03-10
Payer: COMMERCIAL

## 2020-03-10 VITALS
OXYGEN SATURATION: 97 % | BODY MASS INDEX: 23.73 KG/M2 | WEIGHT: 147 LBS | DIASTOLIC BLOOD PRESSURE: 82 MMHG | HEART RATE: 66 BPM | SYSTOLIC BLOOD PRESSURE: 164 MMHG

## 2020-03-10 PROCEDURE — 93000 ELECTROCARDIOGRAM COMPLETE: CPT | Mod: NC

## 2020-03-10 PROCEDURE — 99215 OFFICE O/P EST HI 40 MIN: CPT

## 2020-03-15 NOTE — HISTORY OF PRESENT ILLNESS
[FreeTextEntry1] : Patient is 64 year-old with cardiovascular risk factors of hypertension and diabetes, known coronary artery disease status post PCI x3 (2014) at [place] by Dr. Saúl Villafana, CKD, now ESRD on HD [id number]) that began January 2019, who presents today for cardiac evaluation prior to possible renal transplant.\par He was working until October 2018 as [employer] .\par Patient does not formally exercise, but he can climb stairs and has no exertional symptoms.\par \par In January 2019, patient admitted to [facility] for shortness of breath and nosebleed. He was found to be volume overloaded in the setting of FELICITA on CKD and he was initiated on hemodialysis. \par \par In February 2019, patient seen to have abnormal nuclear stress test and was referred for left and right heart catheterization. The right heart cath showed elevated systemic blood pressures but normal PCWP (14 mmHg) and normal PA pressures (37/17 mmHg) with normal cardiac output and index of 6 L/min and 3.6 respectively. The left heart cath revealed significant distal LAD disease and significant in stent stenosis of LCx. He is now status post POBA to LCx and NAHUM to LAD.\par \par May 2019 - Patient presents today in his usual state of health. He reports occasional right leg pain that limits his exercise while walking on the treadmill. He is without chest pain, shortness of breath, lower extremity swelling.\par \par October 2019 - Patient presents today in his usual state of health. He reports occasional right leg pain that limits his exercise while walking on the treadmill. He is without chest pain, shortness of breath, lower extremity swelling. He had normal HOLLY/PVR and normal ultrasound of the abdominal vessels (and into iliacs). \par \par March 2020 - Patient returns for follow-up of his cardiovascular disease and to maintain standing with the Transplant Center. Patient recently traveled to [facility] (January 8 - February 6, 2020), and when patient returned having lost 8 lbs without a clear cause. It was unintentional weight loss. He is now being evaluated for cancer including lung cancer (former smoker). He will also need EGD/colonoscopy.\par \par PMD: Bassem Grove MD [phone]\par Cardiologist: LEONARD Gomez [phone]\par Nephrologist: Yasmany Huff MD [phone]

## 2020-03-15 NOTE — REASON FOR VISIT
[Follow-Up - Clinic] : a clinic follow-up of [Spouse] : spouse [FreeTextEntry2] : pretransplant cardiac evaluation prior to possible renal transplant

## 2020-03-31 ENCOUNTER — APPOINTMENT (OUTPATIENT)
Dept: NEPHROLOGY | Facility: CLINIC | Age: 65
End: 2020-03-31

## 2020-05-21 ENCOUNTER — APPOINTMENT (OUTPATIENT)
Dept: CARDIOLOGY | Facility: CLINIC | Age: 65
End: 2020-05-21
Payer: MEDICARE

## 2020-05-21 PROCEDURE — 93306 TTE W/DOPPLER COMPLETE: CPT

## 2020-05-28 ENCOUNTER — APPOINTMENT (OUTPATIENT)
Dept: CARDIOLOGY | Facility: CLINIC | Age: 65
End: 2020-05-28
Payer: MEDICARE

## 2020-05-28 ENCOUNTER — MED ADMIN CHARGE (OUTPATIENT)
Age: 65
End: 2020-05-28

## 2020-05-28 PROCEDURE — 93015 CV STRESS TEST SUPVJ I&R: CPT

## 2020-05-28 PROCEDURE — 78452 HT MUSCLE IMAGE SPECT MULT: CPT

## 2020-05-28 PROCEDURE — A9500: CPT

## 2020-05-28 RX ORDER — REGADENOSON 0.08 MG/ML
0.4 INJECTION, SOLUTION INTRAVENOUS
Qty: 1 | Refills: 0 | Status: COMPLETED | OUTPATIENT
Start: 2020-05-28

## 2020-05-28 RX ADMIN — REGADENOSON 0.4 MG/5ML: 0.08 INJECTION, SOLUTION INTRAVENOUS at 00:00

## 2020-06-25 ENCOUNTER — APPOINTMENT (OUTPATIENT)
Dept: TRANSPLANT | Facility: CLINIC | Age: 65
End: 2020-06-25

## 2020-06-30 ENCOUNTER — APPOINTMENT (OUTPATIENT)
Dept: NEPHROLOGY | Facility: CLINIC | Age: 65
End: 2020-06-30
Payer: COMMERCIAL

## 2020-06-30 LAB
ALBUMIN SERPL ELPH-MCNC: 4.5 G/DL
ALP BLD-CCNC: 60 U/L
ALT SERPL-CCNC: 15 U/L
ANION GAP SERPL CALC-SCNC: 20 MMOL/L
AST SERPL-CCNC: 14 U/L
BASOPHILS # BLD AUTO: 0.04 K/UL
BASOPHILS NFR BLD AUTO: 0.5 %
BILIRUB SERPL-MCNC: 0.8 MG/DL
BUN SERPL-MCNC: 59 MG/DL
C PEPTIDE SERPL-MCNC: 25.8 NG/ML
CALCIUM SERPL-MCNC: 8.3 MG/DL
CHLORIDE SERPL-SCNC: 93 MMOL/L
CHOLEST SERPL-MCNC: 151 MG/DL
CHOLEST/HDLC SERPL: 3.4 RATIO
CK SERPL-CCNC: 113 U/L
CO2 SERPL-SCNC: 24 MMOL/L
CREAT SERPL-MCNC: 10.99 MG/DL
CRP SERPL-MCNC: 0.52 MG/DL
EOSINOPHIL # BLD AUTO: 0.13 K/UL
EOSINOPHIL NFR BLD AUTO: 1.8 %
ERYTHROCYTE [SEDIMENTATION RATE] IN BLOOD BY WESTERGREN METHOD: 70 MM/HR
GLUCOSE SERPL-MCNC: 221 MG/DL
HCT VFR BLD CALC: 39.4 %
HDLC SERPL-MCNC: 45 MG/DL
HGB BLD-MCNC: 12.1 G/DL
HIV1+2 AB SPEC QL IA.RAPID: NONREACTIVE
IMM GRANULOCYTES NFR BLD AUTO: 0.1 %
LDLC SERPL CALC-MCNC: 90 MG/DL
LYMPHOCYTES # BLD AUTO: 1.31 K/UL
LYMPHOCYTES NFR BLD AUTO: 17.8 %
MAGNESIUM SERPL-MCNC: 2.2 MG/DL
MAN DIFF?: NORMAL
MCHC RBC-ENTMCNC: 30.6 PG
MCHC RBC-ENTMCNC: 30.7 GM/DL
MCV RBC AUTO: 99.7 FL
MONOCYTES # BLD AUTO: 0.71 K/UL
MONOCYTES NFR BLD AUTO: 9.7 %
NEUTROPHILS # BLD AUTO: 5.15 K/UL
NEUTROPHILS NFR BLD AUTO: 70.1 %
PHOSPHATE SERPL-MCNC: 7.6 MG/DL
PLATELET # BLD AUTO: 161 K/UL
POTASSIUM SERPL-SCNC: 6.2 MMOL/L
PROT SERPL-MCNC: 7.2 G/DL
PSA SERPL-MCNC: 1.25 NG/ML
RBC # BLD: 3.95 M/UL
RBC # FLD: 15.7 %
SODIUM SERPL-SCNC: 138 MMOL/L
T3 SERPL-MCNC: 78 NG/DL
T4 FREE SERPL-MCNC: 0.9 NG/DL
TRIGL SERPL-MCNC: 78 MG/DL
TSH SERPL-ACNC: 0.95 UIU/ML
URATE SERPL-MCNC: 6.6 MG/DL
WBC # FLD AUTO: 7.35 K/UL

## 2020-06-30 PROCEDURE — 99213 OFFICE O/P EST LOW 20 MIN: CPT

## 2020-06-30 NOTE — HISTORY OF PRESENT ILLNESS
[Diabetes Mellitus] : Diabetes Mellitus [FreeTextEntry1] : Born in [facility], lives in [place] since .\par Patient has known CKD (), on follow up with Dr. Akbar is here for pre kidney transplant evaluation. started on dialysis since 2020. HD [facility]\par He has known DM (age 42) On insulin (age 60); HTN (age 52).\par No known h/o kidney stone/ Prostatism.\par No gross hematuria\par Urine out put:normal\par Has  h/o Pneumonia in May 2018 . No episodes of UTI.\par Has CAD, has had 3 stents placed in  at [facility] (Dr. Saúl Mccormack)\par ans 2 stents placed in 2019. \par No known h/o active CVA/PVD/DVT/neoplasia/active infections/bleeding.\par had blood transfusion in 2019 \par Most recent hospitalization was in 2019 for cardiac cath and PCI \par \par Past surgeries:\par Eye surgery for cataract on both sides\par No history of kidney/ bladder/ prostate surgery.\par Non smoker. Quit in ; 1ppd for 30 years.\par Fam: Parents are . Father - 80 Mother- 80 Siblings- 3 brothers and 2 sisters- healthy.\par Children: 3 daughters, Healthy. \par No family history of kidney disease.\par Independent for ADL . 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 used to Drive taxi cab.  Lives with wife and 3 daughters\par Kidney Biopsy:none\par Potential Live donors:none at present\par \par Last Seen 18\par Listed 18\par Next Apt 2020\par ABO A\par PRA O% (18)\par Cause of Kidney Failure DM \par Other med Hx CKD , DMX20 years on insulin , HTN age 52, CAD with 3 stents \par \par Most recent testing\par Cardiac \par Seen by Dr Johnson 3/10/2020\par EK2018, sinus 63 bpm with LVH with strain pattern, diffuse TWI in precordial leads (biphasic in V1-V3 and deep symmetrical negative T in V4-V6) \par Stress Test: 2018, exercised and completed almost 9 minutes of Bertram protocol, but could not achieve target heart rate, converted to pharmacologic nuclear stress test and seen to have severe predominantly fixed defects in the mid-anterior, anteroseptal, and apical regions that partially correct with prone imaging, LVEF 49% and LVEDV 126 mL \par Echo: 2018, normal LV function, no pulmonary hypertension, normal LA size LVEF 65-70%. \par 1/10/2019, normal LV function, no pulmonary hypertension, normal LA size, LVEF 63% \par Cardiac Cath: 2019 by Iván Nathan MD at [facility] - left and right heart catheterization, elevated systemic blood pressures but normal wedge, 14 mmHg, normal PA pressures (37/17 mmHg, with normal cardiac output of 6.2 L/min and normal index of 3.6. Angiography revealed 90% prox LCx disease at site of prior stent with 80% distal LAD disease that was significant by iFR \par Stent:  at [facility]  2019 - POBA to LCx and NAHUM to distal LAD \par Stress - Severe fixed defects in anteroseptal walls and apex consistent with medium sized area of infarction. EF 41% Similar to prior. \par Echo -   eccentric LVH no pulmonary hypertension, mildly dilated  LA . LVEF 50%. \par \par Radiology \par Abd Doppler and US 18 Liver, BD, panc, spleen, WNL. Cholelithiasis. Kidneys no hydro bilaterally. patent vasculature. \par Chest Xray 10/18/18 CLear lungs. \par \par Cancer Screening\par PSA 18 2.4\par Colonoscopy 18 one 1 mm polyp transverse colon. none bleeding internal hemorrhoids. repeat 5-10 years. Path not available in the chart. .  \par \par \par \par \par

## 2020-06-30 NOTE — REASON FOR VISIT
[Kidney Transplant Evaluation] : kidney transplant evaluation [Follow-Up] : a follow-up visit for [FreeTextEntry1] : \par \par \par

## 2020-06-30 NOTE — PHYSICAL EXAM
[General Appearance - Alert] : alert [General Appearance - In No Acute Distress] : in no acute distress [Sclera] : the sclera and conjunctiva were normal [PERRL With Normal Accommodation] : pupils were equal in size, round, and reactive to light [Outer Ear] : the ears and nose were normal in appearance [Extraocular Movements] : extraocular movements were intact [Oropharynx] : the oropharynx was normal [Neck Appearance] : the appearance of the neck was normal [Neck Cervical Mass (___cm)] : no neck mass was observed [Jugular Venous Distention Increased] : there was no jugular-venous distention [Thyroid Diffuse Enlargement] : the thyroid was not enlarged [Thyroid Nodule] : there were no palpable thyroid nodules [Auscultation Breath Sounds / Voice Sounds] : lungs were clear to auscultation bilaterally [Heart Rate And Rhythm] : heart rate was normal and rhythm regular [Heart Sounds] : normal S1 and S2 [Heart Sounds Gallop] : no gallops [Murmurs] : no murmurs [Heart Sounds Pericardial Friction Rub] : no pericardial rub [Bowel Sounds] : normal bowel sounds [Abdomen Soft] : soft [Edema] : there was no peripheral edema [Abdomen Tenderness] : non-tender [Abnormal Walk] : normal gait [Abdomen Mass (___ Cm)] : no abdominal mass palpated [Nail Clubbing] : no clubbing  or cyanosis of the fingernails [Motor Tone] : muscle strength and tone were normal [Musculoskeletal - Swelling] : no joint swelling seen [Skin Color & Pigmentation] : normal skin color and pigmentation [Skin Turgor] : normal skin turgor [] : right dorsalis pedis non-palpable

## 2020-06-30 NOTE — ASSESSMENT
[FreeTextEntry1] : 1. ESRD/CKD. Patient is a moderate risk candidate for transplantation. \par 2. CV - Recent echo and stress reviewed. \par 3. Cancer screening - need to obtain colonoscopy path report. \par 4. ID-  Update serologies\par 5. Imaging - CTA needed \par 6. HTN - controlled \par 7. DM- managed by PMD. check HbA1c \par \par \par 	\par Patient must complete: cardiac clearance, Abd imaging, chest imaging, possible mammo and PAP, update serologies including COVID Ab here at the office, Complete COVID PCR testing, Send consents to the patient to sign and return.\par Donor coordinator contact information given to patient	\par KDPI >85%: Yes\par CDC high risk: No\par Hep C Kidney: Yes\par \par \par I have personally discussed the risks and benefits of transplantation where the following was disclosed:\par  \par Reviewed factors affecting survival and morbidity while on dialysis, the transplant wait list and reviewed jamin-operative and long-term risk factors affecting outcome in kidney transplantation. \par  \par One year SRTR outcomes for national and [facility] were discussed in regards to patient survival and graft survival after transplantation. \par  \par Details of transplant surgery, including complications were discussed.\par Immunosuppression and complications including infection including life threatening sepsis and opportunistic infections, malignancy and new onset diabetes were discussed. \par  \par Benefits of live donor transplantation as well as variability in wait times across regions and multiple listing were discussed. \par KDPI >85% and PHS high risk criteria donors were discussed. \par HCV kidney transplantation was discussed.\par  \par Will proceed with completing/ updating work up and listing for transplant/ live donor transplant once work up is reviewed and found to be acceptable by multidisciplinary listing committee.\par \par

## 2020-07-01 ENCOUNTER — APPOINTMENT (OUTPATIENT)
Dept: CT IMAGING | Facility: IMAGING CENTER | Age: 65
End: 2020-07-01

## 2020-07-01 ENCOUNTER — APPOINTMENT (OUTPATIENT)
Dept: RADIOLOGY | Facility: IMAGING CENTER | Age: 65
End: 2020-07-01

## 2020-07-01 LAB
ABO + RH PNL BLD: NORMAL
CMV IGG SERPL QL: 1.9 U/ML
CMV IGG SERPL-IMP: POSITIVE
CMV IGM SERPL QL: <8 AU/ML
CMV IGM SERPL QL: NEGATIVE
EBV EA AB SER IA-ACNC: <5 U/ML
EBV EA AB TITR SER IF: POSITIVE
EBV EA IGG SER QL IA: 535 U/ML
EBV EA IGG SER-ACNC: NEGATIVE
EBV EA IGM SER IA-ACNC: NEGATIVE
EBV PATRN SPEC IB-IMP: NORMAL
EBV VCA IGG SER IA-ACNC: >750 U/ML
EBV VCA IGM SER QL IA: <10 U/ML
EPSTEIN-BARR VIRUS CAPSID ANTIGEN IGG: POSITIVE
HAV IGM SER QL: NONREACTIVE
HBV CORE IGG+IGM SER QL: NONREACTIVE
HBV SURFACE AB SER QL: REACTIVE
HBV SURFACE AB SERPL IA-ACNC: 166.3 MIU/ML
HBV SURFACE AG SER QL: NONREACTIVE
HCV AB SER QL: NONREACTIVE
HCV S/CO RATIO: 0.14 S/CO
HEPATITIS A IGG ANTIBODY: REACTIVE
HSV 1+2 IGG SER IA-IMP: NEGATIVE
HSV 1+2 IGG SER IA-IMP: POSITIVE
HSV 1+2 IGG SER IA-IMP: POSITIVE
HSV1 IGG SER QL: 30 INDEX
HSV1 IGG SER QL: 30 INDEX
HSV2 IGG SER QL: 0.11 INDEX
RUBV IGG FLD-ACNC: 4.4 INDEX
RUBV IGG SER-IMP: POSITIVE
SARS-COV-2 IGG SERPL IA-ACNC: <0.1 INDEX
SARS-COV-2 IGG SERPL QL IA: NEGATIVE
T GONDII AB SER-IMP: NEGATIVE
T GONDII IGG SER QL: <3 IU/ML
T PALLIDUM AB SER QL IA: NEGATIVE
VZV AB TITR SER: POSITIVE
VZV IGG SER IF-ACNC: 3851 INDEX

## 2020-07-02 ENCOUNTER — APPOINTMENT (OUTPATIENT)
Dept: CARDIOLOGY | Facility: CLINIC | Age: 65
End: 2020-07-02
Payer: COMMERCIAL

## 2020-07-02 ENCOUNTER — NON-APPOINTMENT (OUTPATIENT)
Age: 65
End: 2020-07-02

## 2020-07-02 VITALS
BODY MASS INDEX: 22.34 KG/M2 | HEART RATE: 67 BPM | HEIGHT: 66 IN | DIASTOLIC BLOOD PRESSURE: 74 MMHG | SYSTOLIC BLOOD PRESSURE: 156 MMHG | WEIGHT: 139 LBS | OXYGEN SATURATION: 100 %

## 2020-07-02 DIAGNOSIS — R94.39 ABNORMAL RESULT OF OTHER CARDIOVASCULAR FUNCTION STUDY: ICD-10-CM

## 2020-07-02 PROCEDURE — 93000 ELECTROCARDIOGRAM COMPLETE: CPT

## 2020-07-02 PROCEDURE — 99215 OFFICE O/P EST HI 40 MIN: CPT

## 2020-07-02 RX ORDER — NIFEDIPINE 60 MG/1
60 TABLET, EXTENDED RELEASE ORAL DAILY
Qty: 90 | Refills: 0 | Status: DISCONTINUED | COMMUNITY
End: 2020-07-02

## 2020-07-02 NOTE — DISCUSSION/SUMMARY
[FreeTextEntry1] : Patient is a 65 year-old gentleman with known coronary artery disease who presents today for evaluation prior to possible renal transplant.\par On 11/12/2018, patient had TTE showing normal LVEF, normal LA size, and normal pulmonary pressures.\par On 11/19/2018, patient presented for nuclear stress test. He attempted to complete Bertram protocol, but after almost 9 minutes, his legs fatigued prior to his achieving maximum predicted heart rate (he was at 127 bpm, 79% of MPHR) and he was converted to a pharmacologic study. The myocardial perfusion imaging was abnormal, showing severe, predominately fixed defects in the mid anterior, anteroseptal, and apical regions consistent with a medium sized infarction in the LAD territory with mild jamin-infarct ischemia. Some of the perfusion defects corrected with prone imaging.\par \par On 2/12/2019 patient had left heart catheterization which revealed significant in stent stenosis of LCx and new prox LAD disease. He underwent POBA of previously stented LCx lesion and new NAHUM to LAD. Right heart catheterization revealed normal wedge 14 mmHg and PA pressures 37/17 mmHg with cardiac output > 6 L/min and cardiac index 3.6.\par \par Repeat echocardiogram in May 2020 showed mild segmental LV dysfunction with normal pulmonary pressures. The nuclear stress test in May 2020 was negative for ischemia.\par \par If BP remains elevated on dialysis days (or nondialysis days), patient encouraged to take additional 200 mg of labetalol. \par \par Continue dual antiplatelet therapy, high intensity statin therapy, antihypertension regimen, and diabetes regimen.\par \par As peripheral vascular studies were normal, would recommend orthopedic or podiatric evaluation of the right ankle discomfort with walking.\par \par No further cardiovascular work-up is necessary at this time.

## 2020-07-02 NOTE — HISTORY OF PRESENT ILLNESS
[FreeTextEntry1] : Patient is 65 year-old with cardiovascular risk factors of hypertension and diabetes, known coronary artery disease status post PCI x3 (2014) at [place] by Dr. Saúl Villafana, CKD, now ESRD on HD [id number]) that began January 2019, who presents today for cardiac evaluation prior to possible renal transplant.\par He was working until October 2018 as [employer] .\par Patient does not formally exercise, but he can climb stairs and has no exertional symptoms.\par \par In January 2019, patient admitted to [facility] for shortness of breath and nosebleed. He was found to be volume overloaded in the setting of FELICITA on CKD and he was initiated on hemodialysis. \par \par In February 2019, patient seen to have abnormal nuclear stress test and was referred for left and right heart catheterization. The right heart cath showed elevated systemic blood pressures but normal PCWP (14 mmHg) and normal PA pressures (37/17 mmHg) with normal cardiac output and index of 6 L/min and 3.6 respectively. The left heart cath revealed significant distal LAD disease and significant in stent stenosis of LCx. He is now status post POBA to LCx and NAHUM to LAD.\par \par May 2019 - Patient presents today in his usual state of health. He reports occasional right leg pain that limits his exercise while walking on the treadmill. He is without chest pain, shortness of breath, lower extremity swelling.\par \par October 2019 - Patient presents today in his usual state of health. He reports occasional right leg pain that limits his exercise while walking on the treadmill. He is without chest pain, shortness of breath, lower extremity swelling. He had normal HOLLY/PVR and normal ultrasound of the abdominal vessels (and into iliacs). \par \par March 2020 - Patient returns for follow-up of his cardiovascular disease and to maintain standing with the Transplant Center. Patient recently traveled to [facility] (January 8 - February 6, 2020), and when patient returned having lost 8 lbs without a clear cause. It was unintentional weight loss. He is now being evaluated for cancer including lung cancer (former smoker). He will also need EGD/colonoscopy.\par \par PMD: Bassem Grove MD [phone]\par Cardiologist: LEONARD Gomez [phone]\par Nephrologist: Yasmany Huff MD [phone]

## 2020-07-02 NOTE — PHYSICAL EXAM
[General Appearance - Well Developed] : well developed [Normal Appearance] : normal appearance [General Appearance - Well Nourished] : well nourished [No Deformities] : no deformities [Well Groomed] : well groomed [No Oral Pallor] : no oral pallor [General Appearance - In No Acute Distress] : no acute distress [Normal Oral Mucosa] : normal oral mucosa [Normal Oropharynx] : normal oropharynx [No Oral Cyanosis] : no oral cyanosis [Respiration, Rhythm And Depth] : normal respiratory rhythm and effort [] : no respiratory distress [Auscultation Breath Sounds / Voice Sounds] : lungs were clear to auscultation bilaterally [Abdomen Soft] : soft [Bowel Sounds] : normal bowel sounds [Exaggerated Use Of Accessory Muscles For Inspiration] : no accessory muscle use [Abdomen Tenderness] : non-tender [Gait - Sufficient For Exercise Testing] : the gait was sufficient for exercise testing [Abnormal Walk] : normal gait [Nail Clubbing] : no clubbing of the fingernails [Cyanosis, Localized] : no localized cyanosis [No Venous Stasis] : no venous stasis [Skin Color & Pigmentation] : normal skin color and pigmentation [No Xanthoma] : no  xanthoma was observed [Oriented To Time, Place, And Person] : oriented to person, place, and time [Impaired Insight] : insight and judgment were intact [Mood] : the mood was normal [Affect] : the affect was normal [No Anxiety] : not feeling anxious [Conjunctiva] : the conjunctiva were normal in both eyes [EOM Intact] : extraocular movements were intact [PERRL] : pupils were equal in size, round, and reactive to light [5th Left ICS - MCL] : palpated at the 5th LICS in the midclavicular line [Normal] : normal [Rhythm Regular] : regular [No Precordial Heave] : no precordial heave was noted [Bradycardia] : bradycardic [Normal S1] : normal S1 [Normal S2] : normal S2 [No Gallop] : no gallop heard [No Murmur] : no murmurs heard [2+] : right 2+ [No Pitting Edema] : no pitting edema present [Yellow Sclera (Icteric)] : no scleral icterus was seen [FreeTextEntry1] : No JVD [Right Carotid Bruit] : no bruit heard over the right carotid [Left Carotid Bruit] : no bruit heard over the left carotid

## 2020-07-08 ENCOUNTER — EMERGENCY (EMERGENCY)
Facility: HOSPITAL | Age: 65
LOS: 1 days | Discharge: HOME | End: 2020-07-08
Attending: STUDENT IN AN ORGANIZED HEALTH CARE EDUCATION/TRAINING PROGRAM | Admitting: STUDENT IN AN ORGANIZED HEALTH CARE EDUCATION/TRAINING PROGRAM
Payer: MEDICARE

## 2020-07-08 VITALS
RESPIRATION RATE: 18 BRPM | HEART RATE: 75 BPM | OXYGEN SATURATION: 100 % | SYSTOLIC BLOOD PRESSURE: 153 MMHG | TEMPERATURE: 98 F | DIASTOLIC BLOOD PRESSURE: 71 MMHG

## 2020-07-08 VITALS
OXYGEN SATURATION: 100 % | RESPIRATION RATE: 17 BRPM | TEMPERATURE: 98 F | DIASTOLIC BLOOD PRESSURE: 71 MMHG | HEART RATE: 62 BPM | SYSTOLIC BLOOD PRESSURE: 152 MMHG

## 2020-07-08 DIAGNOSIS — Z98.890 OTHER SPECIFIED POSTPROCEDURAL STATES: Chronic | ICD-10-CM

## 2020-07-08 DIAGNOSIS — I77.0 ARTERIOVENOUS FISTULA, ACQUIRED: Chronic | ICD-10-CM

## 2020-07-08 PROCEDURE — 99282 EMERGENCY DEPT VISIT SF MDM: CPT

## 2020-07-08 NOTE — ED PROVIDER NOTE - PROGRESS NOTE DETAILS
PA JOSSE: Patient reassessed, sitting comfortably in chair in NAD, denies any complaints. States feeling better, symptoms improved. The patient was given verbal and written discharge instructions. Specifically, instructions when to return to the ED and when to seek follow-up from their pcp was discussed. Any specialty follow-up was discussed, including how to make an appointment.  Instructions were discussed in simple, plain language and was understood by the patient. The patient understands that should their symptoms worsen or any new symptoms arise, they should return to the ED immediately for further evaluation. All pt's questions were answered. Patient verbalizes understanding.
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
## 2020-07-08 NOTE — ED PROVIDER NOTE - HEAD SHAPE
normal cephalic/ATRAUMATIC/mild swelling to right parietal area, no tenderness, no ecchymosis, no erythema, no deformity

## 2020-07-08 NOTE — ED PROVIDER NOTE - PATIENT PORTAL LINK FT
You can access the FollowMyHealth Patient Portal offered by [facility] by registering at the following website: [email or web address]. By joining [facility]’s FollowMyHealth portal, you will also be able to view your health information using other applications (apps) compatible with our system.
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
## 2020-07-08 NOTE — ED PROVIDER NOTE - NSFOLLOWUPINSTRUCTIONS_ED_ALL_ED_FT
Rest, drink plenty of fluids.  Advance activity as tolerated.  Continue all previously prescribed medications as directed.  Follow up with your primary care physician in 48-72 hours- bring copies of your results.  Return to the ER for worsening or persistent symptoms, nausea, vomiting, severe headache, weakness, numbness and/or ANY NEW OR CONCERNING SYMPTOMS. If you have issues obtaining follow up, please call: [phone] to obtain a doctor or specialist who takes your insurance in your area.

## 2020-07-08 NOTE — ED PROVIDER NOTE - PMH
Anemia due to stage 5 chronic kidney disease, not on chronic dialysis    CAP (community acquired pneumonia)  6/18  Cerebrovascular accident (CVA), unspecified mechanism  diagnosed via CT of the head  Coronary artery disease    Diabetes mellitus  type 2  ESRD (end stage renal disease)  on Dialysis( M/W/F), By Dr. Huff  GERD (gastroesophageal reflux disease)    HTN (hypertension)    Hypercholesterolemia    Stented coronary artery  2014, 3 stents, [facility]
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
## 2020-07-08 NOTE — ED POST DISCHARGE NOTE - RESULT SUMMARY
Jamel Davis DO: daughter called back requesting information regarding patient's stay and stated she had called nypd because patient's behavior was abnormal at home. she states she was not able to speak to the EMS to let them know her concerns and request behavioral eval. behavior concerns were not conveyed to ED team and patient was assess for head trauma only. recommended if daughter still has concern to call 911 to have [employer]/EMS bring patient to ED for concern. Jamel Davis DO: daughter called back requesting information regarding patient's stay. she stated she had originally called nypd because patient's behavior was abnormal at home. she states she was not able to speak to the EMS to let them know her concerns and request behavioral eval. behavior concerns were not conveyed to ED team and patient was assess for head trauma only. recommended if daughter still has concern to call 911 to have [employer]/EMS bring patient to ED for concern.

## 2020-07-14 LAB
CMV DNA SPEC QL NAA+PROBE: NOT DETECTED
CMVPCR LOG: NOT DETECTED LOG10IU/ML
HSV1 IGM SER QL: NORMAL TITER
HSV2 AB FLD-ACNC: NORMAL TITER
M TB IFN-G BLD-IMP: NEGATIVE
QUANTIFERON TB PLUS MITOGEN MINUS NIL: 6.38 IU/ML
QUANTIFERON TB PLUS NIL: 0.01 IU/ML
QUANTIFERON TB PLUS TB1 MINUS NIL: 0 IU/ML
QUANTIFERON TB PLUS TB2 MINUS NIL: 0 IU/ML

## 2020-08-05 ENCOUNTER — INPATIENT (INPATIENT)
Facility: HOSPITAL | Age: 65
LOS: 3 days | Discharge: HOME | End: 2020-08-09
Attending: SPECIALIST | Admitting: SPECIALIST
Payer: MEDICARE

## 2020-08-05 ENCOUNTER — TRANSCRIPTION ENCOUNTER (OUTPATIENT)
Age: 65
End: 2020-08-05

## 2020-08-05 VITALS
SYSTOLIC BLOOD PRESSURE: 186 MMHG | OXYGEN SATURATION: 100 % | RESPIRATION RATE: 18 BRPM | HEART RATE: 58 BPM | TEMPERATURE: 98 F | DIASTOLIC BLOOD PRESSURE: 68 MMHG

## 2020-08-05 DIAGNOSIS — Z98.890 OTHER SPECIFIED POSTPROCEDURAL STATES: Chronic | ICD-10-CM

## 2020-08-05 DIAGNOSIS — I77.0 ARTERIOVENOUS FISTULA, ACQUIRED: Chronic | ICD-10-CM

## 2020-08-05 DIAGNOSIS — K80.00 CALCULUS OF GALLBLADDER WITH ACUTE CHOLECYSTITIS WITHOUT OBSTRUCTION: ICD-10-CM

## 2020-08-05 LAB
ALBUMIN SERPL ELPH-MCNC: 4.7 G/DL — SIGNIFICANT CHANGE UP (ref 3.3–5)
ALP SERPL-CCNC: 69 U/L — SIGNIFICANT CHANGE UP (ref 40–120)
ALT FLD-CCNC: 10 U/L — SIGNIFICANT CHANGE UP (ref 4–41)
ANION GAP SERPL CALC-SCNC: 16 MMO/L — HIGH (ref 7–14)
ANION GAP SERPL CALC-SCNC: 17 MMO/L — HIGH (ref 7–14)
ANION GAP SERPL CALC-SCNC: 20 MMO/L — HIGH (ref 7–14)
ANISOCYTOSIS BLD QL: SLIGHT — SIGNIFICANT CHANGE UP
AST SERPL-CCNC: 14 U/L — SIGNIFICANT CHANGE UP (ref 4–40)
BASE EXCESS BLDV CALC-SCNC: 7.3 MMOL/L — SIGNIFICANT CHANGE UP
BASOPHILS # BLD AUTO: 0.04 K/UL — SIGNIFICANT CHANGE UP (ref 0–0.2)
BASOPHILS NFR BLD AUTO: 0.3 % — SIGNIFICANT CHANGE UP (ref 0–2)
BASOPHILS NFR SPEC: 0 % — SIGNIFICANT CHANGE UP (ref 0–2)
BILIRUB SERPL-MCNC: 1.3 MG/DL — HIGH (ref 0.2–1.2)
BLASTS # FLD: 0 % — SIGNIFICANT CHANGE UP (ref 0–0)
BLD GP AB SCN SERPL QL: NEGATIVE — SIGNIFICANT CHANGE UP
BLOOD GAS VENOUS - CREATININE: 10.1 MG/DL — HIGH (ref 0.5–1.3)
BUN SERPL-MCNC: 21 MG/DL — SIGNIFICANT CHANGE UP (ref 7–23)
BUN SERPL-MCNC: 33 MG/DL — HIGH (ref 7–23)
BUN SERPL-MCNC: 37 MG/DL — HIGH (ref 7–23)
CALCIUM SERPL-MCNC: 8.5 MG/DL — SIGNIFICANT CHANGE UP (ref 8.4–10.5)
CALCIUM SERPL-MCNC: 9.1 MG/DL — SIGNIFICANT CHANGE UP (ref 8.4–10.5)
CALCIUM SERPL-MCNC: 9.2 MG/DL — SIGNIFICANT CHANGE UP (ref 8.4–10.5)
CHLORIDE BLDV-SCNC: 96 MMOL/L — SIGNIFICANT CHANGE UP (ref 96–108)
CHLORIDE SERPL-SCNC: 89 MMOL/L — LOW (ref 98–107)
CHLORIDE SERPL-SCNC: 90 MMOL/L — LOW (ref 98–107)
CHLORIDE SERPL-SCNC: 90 MMOL/L — LOW (ref 98–107)
CO2 SERPL-SCNC: 27 MMOL/L — SIGNIFICANT CHANGE UP (ref 22–31)
CO2 SERPL-SCNC: 27 MMOL/L — SIGNIFICANT CHANGE UP (ref 22–31)
CO2 SERPL-SCNC: 28 MMOL/L — SIGNIFICANT CHANGE UP (ref 22–31)
CREAT SERPL-MCNC: 6.07 MG/DL — HIGH (ref 0.5–1.3)
CREAT SERPL-MCNC: 9.05 MG/DL — HIGH (ref 0.5–1.3)
CREAT SERPL-MCNC: 9.34 MG/DL — HIGH (ref 0.5–1.3)
EOSINOPHIL # BLD AUTO: 0.02 K/UL — SIGNIFICANT CHANGE UP (ref 0–0.5)
EOSINOPHIL NFR BLD AUTO: 0.1 % — SIGNIFICANT CHANGE UP (ref 0–6)
EOSINOPHIL NFR FLD: 0 % — SIGNIFICANT CHANGE UP (ref 0–6)
GAS PNL BLDV: 133 MMOL/L — LOW (ref 136–146)
GLUCOSE BLDC GLUCOMTR-MCNC: 105 MG/DL — HIGH (ref 70–99)
GLUCOSE BLDC GLUCOMTR-MCNC: 120 MG/DL — HIGH (ref 70–99)
GLUCOSE BLDC GLUCOMTR-MCNC: 137 MG/DL — HIGH (ref 70–99)
GLUCOSE BLDC GLUCOMTR-MCNC: 185 MG/DL — HIGH (ref 70–99)
GLUCOSE BLDV-MCNC: 186 MG/DL — HIGH (ref 70–99)
GLUCOSE SERPL-MCNC: 127 MG/DL — HIGH (ref 70–99)
GLUCOSE SERPL-MCNC: 190 MG/DL — HIGH (ref 70–99)
GLUCOSE SERPL-MCNC: 205 MG/DL — HIGH (ref 70–99)
HBV SURFACE AG SER-ACNC: NEGATIVE — SIGNIFICANT CHANGE UP
HCO3 BLDV-SCNC: 29 MMOL/L — HIGH (ref 20–27)
HCT VFR BLD CALC: 37.7 % — LOW (ref 39–50)
HCT VFR BLDV CALC: 38.8 % — LOW (ref 39–51)
HGB BLD-MCNC: 11.8 G/DL — LOW (ref 13–17)
HGB BLDV-MCNC: 12.6 G/DL — LOW (ref 13–17)
IMM GRANULOCYTES NFR BLD AUTO: 0.5 % — SIGNIFICANT CHANGE UP (ref 0–1.5)
LACTATE BLDV-MCNC: 2.4 MMOL/L — HIGH (ref 0.5–2)
LIDOCAIN IGE QN: 87.1 U/L — HIGH (ref 7–60)
LYMPHOCYTES # BLD AUTO: 0.84 K/UL — LOW (ref 1–3.3)
LYMPHOCYTES # BLD AUTO: 5.3 % — LOW (ref 13–44)
LYMPHOCYTES NFR SPEC AUTO: 4.5 % — LOW (ref 13–44)
MACROCYTES BLD QL: SLIGHT — SIGNIFICANT CHANGE UP
MAGNESIUM SERPL-MCNC: 1.9 MG/DL — SIGNIFICANT CHANGE UP (ref 1.6–2.6)
MCHC RBC-ENTMCNC: 30.4 PG — SIGNIFICANT CHANGE UP (ref 27–34)
MCHC RBC-ENTMCNC: 31.3 % — LOW (ref 32–36)
MCV RBC AUTO: 97.2 FL — SIGNIFICANT CHANGE UP (ref 80–100)
METAMYELOCYTES # FLD: 0 % — SIGNIFICANT CHANGE UP (ref 0–1)
MONOCYTES # BLD AUTO: 1.68 K/UL — HIGH (ref 0–0.9)
MONOCYTES NFR BLD AUTO: 10.6 % — SIGNIFICANT CHANGE UP (ref 2–14)
MONOCYTES NFR BLD: 5.3 % — SIGNIFICANT CHANGE UP (ref 2–9)
MYELOCYTES NFR BLD: 0 % — SIGNIFICANT CHANGE UP (ref 0–0)
NEUTROPHIL AB SER-ACNC: 89.3 % — HIGH (ref 43–77)
NEUTROPHILS # BLD AUTO: 13.24 K/UL — HIGH (ref 1.8–7.4)
NEUTROPHILS NFR BLD AUTO: 83.2 % — HIGH (ref 43–77)
NEUTS BAND # BLD: 0.9 % — SIGNIFICANT CHANGE UP (ref 0–6)
NRBC # FLD: 0 K/UL — SIGNIFICANT CHANGE UP (ref 0–0)
OTHER - HEMATOLOGY %: 0 — SIGNIFICANT CHANGE UP
PCO2 BLDV: 53 MMHG — HIGH (ref 41–51)
PH BLDV: 7.4 PH — SIGNIFICANT CHANGE UP (ref 7.32–7.43)
PHOSPHATE SERPL-MCNC: 3.4 MG/DL — SIGNIFICANT CHANGE UP (ref 2.5–4.5)
PLATELET # BLD AUTO: 139 K/UL — LOW (ref 150–400)
PLATELET COUNT - ESTIMATE: SIGNIFICANT CHANGE UP
PMV BLD: 10.5 FL — SIGNIFICANT CHANGE UP (ref 7–13)
PO2 BLDV: 27 MMHG — LOW (ref 35–40)
POLYCHROMASIA BLD QL SMEAR: SLIGHT — SIGNIFICANT CHANGE UP
POTASSIUM BLDV-SCNC: 5.6 MMOL/L — HIGH (ref 3.4–4.5)
POTASSIUM SERPL-MCNC: 4.4 MMOL/L — SIGNIFICANT CHANGE UP (ref 3.5–5.3)
POTASSIUM SERPL-MCNC: 5.6 MMOL/L — HIGH (ref 3.5–5.3)
POTASSIUM SERPL-MCNC: 6.1 MMOL/L — HIGH (ref 3.5–5.3)
POTASSIUM SERPL-SCNC: 4.4 MMOL/L — SIGNIFICANT CHANGE UP (ref 3.5–5.3)
POTASSIUM SERPL-SCNC: 5.6 MMOL/L — HIGH (ref 3.5–5.3)
POTASSIUM SERPL-SCNC: 6.1 MMOL/L — HIGH (ref 3.5–5.3)
PROMYELOCYTES # FLD: 0 % — SIGNIFICANT CHANGE UP (ref 0–0)
PROT SERPL-MCNC: 9 G/DL — HIGH (ref 6–8.3)
RBC # BLD: 3.88 M/UL — LOW (ref 4.2–5.8)
RBC # FLD: 14.2 % — SIGNIFICANT CHANGE UP (ref 10.3–14.5)
RH IG SCN BLD-IMP: NEGATIVE — SIGNIFICANT CHANGE UP
SAO2 % BLDV: 39.4 % — LOW (ref 60–85)
SARS-COV-2 IGG SERPL QL IA: NEGATIVE — SIGNIFICANT CHANGE UP
SARS-COV-2 IGM SERPL IA-ACNC: <0.1 INDEX — SIGNIFICANT CHANGE UP
SARS-COV-2 RNA SPEC QL NAA+PROBE: SIGNIFICANT CHANGE UP
SODIUM SERPL-SCNC: 134 MMOL/L — LOW (ref 135–145)
SODIUM SERPL-SCNC: 134 MMOL/L — LOW (ref 135–145)
SODIUM SERPL-SCNC: 136 MMOL/L — SIGNIFICANT CHANGE UP (ref 135–145)
VARIANT LYMPHS # BLD: 0 % — SIGNIFICANT CHANGE UP
WBC # BLD: 15.9 K/UL — HIGH (ref 3.8–10.5)
WBC # FLD AUTO: 15.9 K/UL — HIGH (ref 3.8–10.5)

## 2020-08-05 PROCEDURE — 99233 SBSQ HOSP IP/OBS HIGH 50: CPT

## 2020-08-05 PROCEDURE — 99285 EMERGENCY DEPT VISIT HI MDM: CPT

## 2020-08-05 PROCEDURE — 76705 ECHO EXAM OF ABDOMEN: CPT | Mod: 26

## 2020-08-05 PROCEDURE — 71045 X-RAY EXAM CHEST 1 VIEW: CPT | Mod: 26

## 2020-08-05 RX ORDER — CHLORHEXIDINE GLUCONATE 213 G/1000ML
1 SOLUTION TOPICAL DAILY
Refills: 0 | Status: DISCONTINUED | OUTPATIENT
Start: 2020-08-05 | End: 2020-08-09

## 2020-08-05 RX ORDER — HYDRALAZINE HCL 50 MG
10 TABLET ORAL ONCE
Refills: 0 | Status: COMPLETED | OUTPATIENT
Start: 2020-08-05 | End: 2020-08-05

## 2020-08-05 RX ORDER — LABETALOL HCL 100 MG
400 TABLET ORAL EVERY 12 HOURS
Refills: 0 | Status: DISCONTINUED | OUTPATIENT
Start: 2020-08-05 | End: 2020-08-09

## 2020-08-05 RX ORDER — DEXTROSE 50 % IN WATER 50 %
15 SYRINGE (ML) INTRAVENOUS ONCE
Refills: 0 | Status: DISCONTINUED | OUTPATIENT
Start: 2020-08-05 | End: 2020-08-07

## 2020-08-05 RX ORDER — LABETALOL HCL 100 MG
10 TABLET ORAL ONCE
Refills: 0 | Status: COMPLETED | OUTPATIENT
Start: 2020-08-05 | End: 2020-08-05

## 2020-08-05 RX ORDER — ONDANSETRON 8 MG/1
4 TABLET, FILM COATED ORAL ONCE
Refills: 0 | Status: COMPLETED | OUTPATIENT
Start: 2020-08-05 | End: 2020-08-05

## 2020-08-05 RX ORDER — PIPERACILLIN AND TAZOBACTAM 4; .5 G/20ML; G/20ML
3.38 INJECTION, POWDER, LYOPHILIZED, FOR SOLUTION INTRAVENOUS ONCE
Refills: 0 | Status: COMPLETED | OUTPATIENT
Start: 2020-08-05 | End: 2020-08-05

## 2020-08-05 RX ORDER — HYDROMORPHONE HYDROCHLORIDE 2 MG/ML
0.5 INJECTION INTRAMUSCULAR; INTRAVENOUS; SUBCUTANEOUS ONCE
Refills: 0 | Status: DISCONTINUED | OUTPATIENT
Start: 2020-08-05 | End: 2020-08-05

## 2020-08-05 RX ORDER — LABETALOL HCL 100 MG
200 TABLET ORAL EVERY 12 HOURS
Refills: 0 | Status: DISCONTINUED | OUTPATIENT
Start: 2020-08-05 | End: 2020-08-05

## 2020-08-05 RX ORDER — CALCIUM GLUCONATE 100 MG/ML
2 VIAL (ML) INTRAVENOUS ONCE
Refills: 0 | Status: COMPLETED | OUTPATIENT
Start: 2020-08-05 | End: 2020-08-05

## 2020-08-05 RX ORDER — SODIUM CHLORIDE 9 MG/ML
1000 INJECTION INTRAMUSCULAR; INTRAVENOUS; SUBCUTANEOUS
Refills: 0 | Status: DISCONTINUED | OUTPATIENT
Start: 2020-08-05 | End: 2020-08-06

## 2020-08-05 RX ORDER — ACETAMINOPHEN 500 MG
650 TABLET ORAL EVERY 6 HOURS
Refills: 0 | Status: DISCONTINUED | OUTPATIENT
Start: 2020-08-05 | End: 2020-08-09

## 2020-08-05 RX ORDER — DEXTROSE 50 % IN WATER 50 %
25 SYRINGE (ML) INTRAVENOUS ONCE
Refills: 0 | Status: DISCONTINUED | OUTPATIENT
Start: 2020-08-05 | End: 2020-08-07

## 2020-08-05 RX ORDER — DEXTROSE 50 % IN WATER 50 %
12.5 SYRINGE (ML) INTRAVENOUS ONCE
Refills: 0 | Status: DISCONTINUED | OUTPATIENT
Start: 2020-08-05 | End: 2020-08-07

## 2020-08-05 RX ORDER — PIPERACILLIN AND TAZOBACTAM 4; .5 G/20ML; G/20ML
3.38 INJECTION, POWDER, LYOPHILIZED, FOR SOLUTION INTRAVENOUS EVERY 12 HOURS
Refills: 0 | Status: DISCONTINUED | OUTPATIENT
Start: 2020-08-05 | End: 2020-08-06

## 2020-08-05 RX ORDER — SODIUM CHLORIDE 9 MG/ML
1000 INJECTION, SOLUTION INTRAVENOUS
Refills: 0 | Status: DISCONTINUED | OUTPATIENT
Start: 2020-08-05 | End: 2020-08-05

## 2020-08-05 RX ORDER — INSULIN LISPRO 100/ML
VIAL (ML) SUBCUTANEOUS EVERY 6 HOURS
Refills: 0 | Status: DISCONTINUED | OUTPATIENT
Start: 2020-08-05 | End: 2020-08-07

## 2020-08-05 RX ORDER — GLUCAGON INJECTION, SOLUTION 0.5 MG/.1ML
1 INJECTION, SOLUTION SUBCUTANEOUS ONCE
Refills: 0 | Status: DISCONTINUED | OUTPATIENT
Start: 2020-08-05 | End: 2020-08-07

## 2020-08-05 RX ORDER — DEXTROSE 50 % IN WATER 50 %
50 SYRINGE (ML) INTRAVENOUS ONCE
Refills: 0 | Status: COMPLETED | OUTPATIENT
Start: 2020-08-05 | End: 2020-08-05

## 2020-08-05 RX ORDER — PIPERACILLIN AND TAZOBACTAM 4; .5 G/20ML; G/20ML
3.38 INJECTION, POWDER, LYOPHILIZED, FOR SOLUTION INTRAVENOUS ONCE
Refills: 0 | Status: DISCONTINUED | OUTPATIENT
Start: 2020-08-05 | End: 2020-08-05

## 2020-08-05 RX ORDER — LOSARTAN POTASSIUM 100 MG/1
25 TABLET, FILM COATED ORAL DAILY
Refills: 0 | Status: DISCONTINUED | OUTPATIENT
Start: 2020-08-05 | End: 2020-08-09

## 2020-08-05 RX ORDER — HEPARIN SODIUM 5000 [USP'U]/ML
5000 INJECTION INTRAVENOUS; SUBCUTANEOUS EVERY 8 HOURS
Refills: 0 | Status: DISCONTINUED | OUTPATIENT
Start: 2020-08-05 | End: 2020-08-09

## 2020-08-05 RX ORDER — INSULIN HUMAN 100 [IU]/ML
10 INJECTION, SOLUTION SUBCUTANEOUS ONCE
Refills: 0 | Status: COMPLETED | OUTPATIENT
Start: 2020-08-05 | End: 2020-08-05

## 2020-08-05 RX ORDER — NIFEDIPINE 30 MG
60 TABLET, EXTENDED RELEASE 24 HR ORAL DAILY
Refills: 0 | Status: DISCONTINUED | OUTPATIENT
Start: 2020-08-05 | End: 2020-08-09

## 2020-08-05 RX ADMIN — Medication 650 MILLIGRAM(S): at 21:46

## 2020-08-05 RX ADMIN — Medication 650 MILLIGRAM(S): at 21:16

## 2020-08-05 RX ADMIN — HEPARIN SODIUM 5000 UNIT(S): 5000 INJECTION INTRAVENOUS; SUBCUTANEOUS at 13:18

## 2020-08-05 RX ADMIN — Medication 400 MILLIGRAM(S): at 20:20

## 2020-08-05 RX ADMIN — HYDROMORPHONE HYDROCHLORIDE 0.5 MILLIGRAM(S): 2 INJECTION INTRAMUSCULAR; INTRAVENOUS; SUBCUTANEOUS at 06:09

## 2020-08-05 RX ADMIN — INSULIN HUMAN 10 UNIT(S): 100 INJECTION, SOLUTION SUBCUTANEOUS at 10:21

## 2020-08-05 RX ADMIN — PIPERACILLIN AND TAZOBACTAM 200 GRAM(S): 4; .5 INJECTION, POWDER, LYOPHILIZED, FOR SOLUTION INTRAVENOUS at 08:01

## 2020-08-05 RX ADMIN — Medication 50 MILLILITER(S): at 10:22

## 2020-08-05 RX ADMIN — Medication 100 GRAM(S): at 10:16

## 2020-08-05 RX ADMIN — ONDANSETRON 4 MILLIGRAM(S): 8 TABLET, FILM COATED ORAL at 05:38

## 2020-08-05 RX ADMIN — PIPERACILLIN AND TAZOBACTAM 25 GRAM(S): 4; .5 INJECTION, POWDER, LYOPHILIZED, FOR SOLUTION INTRAVENOUS at 21:16

## 2020-08-05 RX ADMIN — HEPARIN SODIUM 5000 UNIT(S): 5000 INJECTION INTRAVENOUS; SUBCUTANEOUS at 21:16

## 2020-08-05 RX ADMIN — Medication 10 MILLIGRAM(S): at 13:17

## 2020-08-05 RX ADMIN — Medication 10 MILLIGRAM(S): at 07:50

## 2020-08-05 RX ADMIN — Medication 0.1 MILLIGRAM(S): at 21:16

## 2020-08-05 RX ADMIN — HYDROMORPHONE HYDROCHLORIDE 0.5 MILLIGRAM(S): 2 INJECTION INTRAMUSCULAR; INTRAVENOUS; SUBCUTANEOUS at 05:39

## 2020-08-05 RX ADMIN — SODIUM CHLORIDE 75 MILLILITER(S): 9 INJECTION INTRAMUSCULAR; INTRAVENOUS; SUBCUTANEOUS at 13:18

## 2020-08-05 NOTE — ED ADULT NURSE REASSESSMENT NOTE - NS ED NURSE REASSESS COMMENT FT1
Pt in sinus bradycardia on cardiac monitor, heart rate ranging from 43 to 66. Pt asymptomatic at this time. MD made aware. Will continue to monitor.

## 2020-08-05 NOTE — CONSULT NOTE ADULT - ASSESSMENT
65M hx CAD s/p PCI (most recent in Feb 2019 w/2 NAHUM, dLAD and pLCx, Echo may 2020 showing mild segmental LV dysfunction, NST May 2020 negative for ischemia) HTN, DM, GERD, ESRD on HD via L AFV (last dialyzed yesterday, 8/4) presenting with acute cholecystitis denying chest pain, SOB, palpitations, or lower extremity edema. Cardiology consult for pre-op clearance for his acute cholecystitis.    #Preop: patient w/ MET approximately 4 (walks up a flight of stairs without SOB, performs daily activities). Prior echo 2/2019- EF was 63%, another Echo in 5/2020 w/ mild segmental LV dysfunction. NST on 5/2020 was negative for ischemia.   ECG   Patient currently denies chest pain, palpitations, SOB, or lower extremity edema.   RCRI score: Class 4 risk; 10.1% 30 day risk of death, MI, or cardiac arrest.   SOLORZANO score 0.2% risk of myocardial infarction or cardiac arrest, intraoperatively or up to 30 days post-op  Patient is an intermediate risk patient for an intermediate risk procedure.    #HTN:  hypertensive urgency w/ sBP elevated to 190's, possibly 2/2 to acute cholecystitis. Normally on Labetalol 200 BID, Nifedipine 60 QD, and Clopidogrel 75 QD. 65M hx CAD s/p PCI (most recent in Feb 2019 w/2 NAHUM, dLAD and pLCx, Echo may 2020 showing mild segmental LV dysfunction, NST May 2020 negative for ischemia) HTN, DM, GERD, ESRD on HD via L AFV (last dialyzed yesterday, 8/4) presenting with acute cholecystitis denying chest pain, SOB, palpitations, or lower extremity edema. Cardiology consult for pre-op clearance for his acute cholecystitis.    #Preop: patient w/ MET approximately 4 (walks up a flight of stairs without SOB, performs daily activities). Prior echo 2/2019- EF was 63%, another Echo in 5/2020 w/ mild segmental LV dysfunction. NST on 5/2020 was negative for ischemia.   ECG   Patient currently denies chest pain, palpitations, SOB, or lower extremity edema.   RCRI score: Class 4 risk; 10.1% 30 day risk of death, MI, or cardiac arrest.   SOLORZANO score 0.2% risk of myocardial infarction or cardiac arrest, intraoperatively or up to 30 days post-op  Will hold his Clopidogrel for now  Patient is an intermediate risk patient for an intermediate risk procedure, no contraindication.    #HTN:  hypertensive urgency w/ sBP elevated to 190's, possibly 2/2 to acute cholecystitis. Normally on labetalol, nifedipine, and clonidine.  - C/w home meds.

## 2020-08-05 NOTE — H&P ADULT - NSHPPHYSICALEXAM_GEN_ALL_CORE
General: well developed, well nourished, NAD  Neuro: alert and oriented, no focal deficits, moves all extremities spontaneously  HEENT: NCAT, EOMI, anicteric, mucosa moist  Respiratory: airway patent, respirations unlabored  CVS: regular rate and rhythm  Abdomen: soft, nondistended, TTP in RUQ, +Pelayo's sign, no rebound/guarding  Extremities: no edema, sensation and movement grossly intact  Skin: warm, dry, appropriate color

## 2020-08-05 NOTE — PROGRESS NOTE ADULT - SUBJECTIVE AND OBJECTIVE BOX
HEMODIALYSIS NOTE  --------------------------------------------------------------------------------  Chief Complaint: ESRD/Ongoing hemodialysis requirement    24 hour events/subjective:  RUQ pain      PAST HISTORY  --------------------------------------------------------------------------------  No significant changes to PMH, PSH, FHx, SHx, unless otherwise noted    ALLERGIES & MEDICATIONS  --------------------------------------------------------------------------------  Allergies    No Known Allergies    Intolerances      Standing Inpatient Medications  cloNIDine 0.1 milliGRAM(s) Oral two times a day  dextrose 50% Injectable 12.5 Gram(s) IV Push once  dextrose 50% Injectable 25 Gram(s) IV Push once  dextrose 50% Injectable 25 Gram(s) IV Push once  heparin   Injectable 5000 Unit(s) SubCutaneous every 8 hours  insulin lispro (HumaLOG) corrective regimen sliding scale   SubCutaneous every 6 hours  labetalol 400 milliGRAM(s) Oral every 12 hours  losartan 25 milliGRAM(s) Oral daily  NIFEdipine XL 60 milliGRAM(s) Oral daily  piperacillin/tazobactam IVPB.. 3.375 Gram(s) IV Intermittent every 12 hours  sodium chloride 0.9%. 1000 milliLiter(s) IV Continuous <Continuous>    PRN Inpatient Medications  acetaminophen   Tablet .. 650 milliGRAM(s) Oral every 6 hours PRN  dextrose 40% Gel 15 Gram(s) Oral once PRN  glucagon  Injectable 1 milliGRAM(s) IntraMuscular once PRN      VITALS/PHYSICAL EXAM  --------------------------------------------------------------------------------  T(C): 37.9 (08-05-20 @ 17:20), Max: 37.9 (08-05-20 @ 17:20)  HR: 78 (08-05-20 @ 17:20) (57 - 78)  BP: 174/81 (08-05-20 @ 17:20) (167/73 - 232/79)  RR: 18 (08-05-20 @ 17:20) (16 - 20)  SpO2: 100% (08-05-20 @ 16:44) (97% - 100%)  Wt(kg): --  flow 400      08-05-20 @ 07:01  -  08-05-20 @ 18:27  --------------------------------------------------------  IN: 0 mL / OUT: 0 mL / NET: 0 mL      Physical Exam:  	Gen: NAD, well-appearing  	HEENT: PERRL, supple neck, clear oropharynx  	Pulm: CTA B/L  	CV: RRR, S1S2; no rub  	Abd: RUQ tenderness  	: No suprapubic tenderness  	UE: Warm, FROM, no clubbing, intact strength; no edema; no asterixis  	LE: Warm, FROM, no clubbing, intact strength; no edema  	Vascular access: AVF    LABS/STUDIES  --------------------------------------------------------------------------------              11.8   15.90 >-----------<  139      [08-05-20 @ 06:34]              37.7     134  |  90  |  37  ----------------------------<  205      [08-05-20 @ 07:58]  6.1   |  28  |  9.34        Ca     8.5     [08-05-20 @ 07:58]    TPro  9.0  /  Alb  4.7  /  TBili  1.3  /  DBili  x   /  AST  14  /  ALT  10  /  AlkPhos  69  [08-05-20 @ 05:40]          .9 (Ca --)      [12-16-19 @ 22:40]   --  .5 (Ca --)      [12-16-19 @ 17:00]   --  HbA1c 7.8      [12-17-19 @ 05:54]    HBsAg NEGATIVE      [08-05-20 @ 09:00]

## 2020-08-05 NOTE — CONSULT NOTE ADULT - SUBJECTIVE AND OBJECTIVE BOX
[facility]   MD ANDRES AVALOS DO ANAM SIDDIQUI ANGELA WONG, PA    TEL:  OFFICE: [phone]  DR MARES CELL: [phone]  DR. HUNTER CELL: [phone]  DR. HORTA CELL: [phone]  LIDA WINTERS CELL: [phone]    From 5pm-7am answering service [phone]    HPI:  65M hx CAD s/p PCI (most recent in Feb 2019 w/2 NAHUM, dLAD and pLCx, Echo may 2020 showing mild segmental LV dysfunction, NST May 2020 negative for ischemia) HTN, DM, GERD, ESRD on HD via L AFV (last dialyzed yesterday, 8/4) presenting with 2day of RUQ abdominal pain associated with subjective fevers x1 day and anorexia. Patient denies nausea, vomiting, jaundice, chills, states bowel function is at baseline.  nephrologist: dr. mares  outpatient dialysis unit: [facility]       Allergies:  No Known Allergies      PAST MEDICAL & SURGICAL HISTORY:  Anemia due to stage 5 chronic kidney disease, not on chronic dialysis  Cerebrovascular accident (CVA), unspecified mechanism: diagnosed via CT of the head  Hypercholesterolemia  ESRD (end stage renal disease): on Dialysis( M/W/F), By Dr. Mares  Stented coronary artery: 2014, 3 stents, [facility]  GERD (gastroesophageal reflux disease)  Diabetes mellitus: type 2  CAP (community acquired pneumonia): 6/18  Coronary artery disease  HTN (hypertension)  H/O eye surgery  AV fistula: 10/12/18 L radiocephalic AV fistula  H/O coronary angiogram: 2014 - x3 stents      Home Medications Reviewed    Hospital Medications:   MEDICATIONS  (STANDING):  cloNIDine 0.1 milliGRAM(s) Oral two times a day  dextrose 50% Injectable 12.5 Gram(s) IV Push once  dextrose 50% Injectable 25 Gram(s) IV Push once  dextrose 50% Injectable 25 Gram(s) IV Push once  heparin   Injectable 5000 Unit(s) SubCutaneous every 8 hours  insulin lispro (HumaLOG) corrective regimen sliding scale   SubCutaneous every 6 hours  labetalol 400 milliGRAM(s) Oral every 12 hours  losartan 25 milliGRAM(s) Oral daily  NIFEdipine XL 60 milliGRAM(s) Oral daily  piperacillin/tazobactam IVPB.. 3.375 Gram(s) IV Intermittent every 12 hours  sodium chloride 0.9%. 1000 milliLiter(s) (75 mL/Hr) IV Continuous <Continuous>      SOCIAL HISTORY:  Denies ETOh, Smoking,     FAMILY HISTORY:  No pertinent family history in first degree relatives      REVIEW OF SYSTEMS:  CONSTITUTIONAL: No weakness, fevers or chills  EYES/ENT: No visual changes;  No vertigo or throat pain   NECK: No pain or stiffness  RESPIRATORY: No cough, wheezing, hemoptysis; No shortness of breath  CARDIOVASCULAR: No chest pain or palpitations.  GASTROINTESTINAL: see hpi  GENITOURINARY: No dysuria, frequency, foamy urine, urinary urgency, incontinence or hematuria  NEUROLOGICAL: No numbness or weakness  SKIN: No itching, burning, rashes, or lesions   VASCULAR: No bilateral lower extremity edema.   All other review of systems is negative unless indicated above.    VITALS:  T(F): 99.3 (08-05-20 @ 12:40), Max: 99.3 (08-05-20 @ 12:40)  HR: 72 (08-05-20 @ 12:40)  BP: 192/87 (08-05-20 @ 12:40)  RR: 18 (08-05-20 @ 12:40)  SpO2: 98% (08-05-20 @ 12:40)  Wt(kg): --    08-05 @ 07:01  -  08-05 @ 12:42  --------------------------------------------------------  IN: 0 mL / OUT: 0 mL / NET: 0 mL          PHYSICAL EXAM:  Constitutional: NAD  HEENT: anicteric sclera, oropharynx clear, MMM  Neck: No JVD  Respiratory: CTAB, no wheezes, rales or rhonchi  Cardiovascular: S1, S2, RRR  Gastrointestinal: BS+, soft, RUQ tenderness  Extremities: No cyanosis or clubbing. No peripheral edema  Neurological: A/O x 3, no focal deficits  Psychiatric: Normal mood, normal affect  : No CVA tenderness. No hinds.   Skin: No rashes      LABS:  08-05    134<L>  |  90<L>  |  37<H>  ----------------------------<  205<H>  6.1<H>   |  28  |  9.34<H>    Ca    8.5      05 Aug 2020 07:58    TPro  9.0<H>  /  Alb  4.7  /  TBili  1.3<H>  /  DBili      /  AST  14  /  ALT  10  /  AlkPhos  69  08-05    Creatinine Trend: 9.34 <--, 9.05 <--                        11.8   15.90 )-----------( 139      ( 05 Aug 2020 06:34 )             37.7     Urine Studies:        RADIOLOGY & ADDITIONAL STUDIES:

## 2020-08-05 NOTE — H&P ADULT - HISTORY OF PRESENT ILLNESS
65M hx CAD s/p PCI (most recent in Feb 2019 w/2 NAHUM, dLAD and pLCx, Echo may 2020 showing mild segmental LV dysfunction, NST May 2020 negative for ischemia) HTN, DM, GERD, ESRD on HD via L AFV (last dialyzed yesterday, 8/4) presenting with 2day of RUQ abdominal pain associated with subjective fevers x1 day and anorexia. Patient denies nausea, vomiting, jaundice, chills, states bowel function is at baseline.

## 2020-08-05 NOTE — PATIENT PROFILE ADULT - BRADEN NUTRITION
It is NOT necessary to avoid eating \"greens\" while taking warfarin.  You just need to be consistent in approximately how much you eat each week.      There are only 5 FOODS TO AVOID with warfarin:      Do NOT eat: Cooked spinach  (raw is okay though).      Cranberries in any form (juice, muffins, craisens).      Grapefruit.      Mangoes      Pomegranate      Please call the [facility] if any of your medications change.  This means: if a med is discontinued, a new med is started, or a dose is changed.  These meds may interact with your warfarin and we may need to adjust your dose.  This is especially important if you start any type of ANTIBIOTIC.     Call your physician immediately if you notice any of the following symptoms of a blood clot:   Sudden weakness in any limb  Numbness or tingling anywhere  Visual changes or loss of sight in either eye  Sudden onset of slurred speech or inability to speak  Dizziness or faintness  New pain, swelling, redness or heat in any extremity  New SOB or chest pain  Symptoms associated with blood clotting/low INR reviewed and verbalizes understanding.     (3) adequate anxiety

## 2020-08-05 NOTE — CONSULT NOTE ADULT - ASSESSMENT
65M hx CAD s/p PCI (most recent in Feb 2019 w/2 NAHUM, dLAD and pLCx, Echo may 2020 showing mild segmental LV dysfunction, NST May 2020 negative for ischemia) HTN, DM, GERD, ESRD on HD via L AFV (last dialyzed yesterday, 8/4) presenting with 2day of RUQ abdominal pain concern for acute cholecystitis     ESRD on HD MWF via AVF  s/p HD 8/4  HD again today sec to hyperkalemia  consent in chart  renal diet  monitor bmp    Hyperkalemia  sec to renal failure  HD today  low k diet  monitor     HTN  uncontrolled  resume home meds  hydralazine 10 iv if sbp>180  low Na diet  monitor    Anemia  at goal  no need for epo  monitor hb    ckd-mbd  check pth, phos   monitor phos and calcium daily    abd pain  f/u surgery 65M hx CAD s/p PCI (most recent in Feb 2019 w/2 NAHUM, dLAD and pLCx, Echo may 2020 showing mild segmental LV dysfunction, NST May 2020 negative for ischemia) HTN, DM, GERD, ESRD on HD via L AFV (last dialyzed yesterday, 8/4) presenting with 2day of RUQ abdominal pain concern for acute cholecystitis     ESRD on HD MWF via AVF  s/p HD 8/4 at his HD Unit  HD again today sec to hyperkalemia  HD consent in chart  renal diet  monitor bmp    Hyperkalemia  sec to renal failure  HD today  low k diet  monitor     HTN  uncontrolled  resume home meds  hydralazine 10 iv if sbp>180  low Na diet  monitor    Anemia  at goal  no need for epo  monitor hb    Ckd-mbd  check pth, phos   monitor phos and calcium daily    Abd pain  f/u surgery

## 2020-08-05 NOTE — H&P ADULT - NSHPLABSRESULTS_GEN_ALL_CORE
11.8   15.90 )-----------( 139      ( 05 Aug 2020 06:34 )             37.7       08-05    136  |  89<L>  |  33<H>  ----------------------------<  190<H>  5.6<H>   |  27  |  9.05<H>    Ca    9.2      05 Aug 2020 05:40    TPro  9.0<H>  /  Alb  4.7  /  TBili  1.3<H>  /  DBili  x   /  AST  14  /  ALT  10  /  AlkPhos  69  08-05 11.8   15.90 )-----------( 139      ( 05 Aug 2020 06:34 )             37.7       08-05    136  |  89<L>  |  33<H>  ----------------------------<  190<H>  5.6<H>   |  27  |  9.05<H>    Ca    9.2      05 Aug 2020 05:40    TPro  9.0<H>  /  Alb  4.7  /  TBili  1.3<H>  /  DBili  x   /  AST  14  /  ALT  10  /  AlkPhos  69  08-05    < from: Xray Chest 1 View AP/PA (08.05.20 @ 06:43) >    < from: US Abdomen Limited (08.05.20 @ 06:16) >    FINDINGS:    Liver: Measures 15.7 cm in length and demonstrates a normal echotexture..  Bile ducts: Normal caliber. Common bile duct measures 6 mm.  Gallbladder: Cholelithiasis and sludge. Gallbladder wall measures 4 mm. No pericholecystic fluid. Positive sonographic Pelayo sign. Pain medications were given.  Pancreas: Poorly visualized.  Right kidney: 7.8 cm. No hydronephrosis.  Ascites: None.  IVC: Visualized portions are within normal limits.    IMPRESSION:    Cholelithiasis, gallbladder wall thickening and positive Pelayo's sign. These findings correlate with a very high positive predictive value for acute cholecystitis.    < end of copied text >        ******PRELIMINARY REPORT******    ******PRELIMINARY REPORT******            INTERPRETATION:  clear lungs. no free air below the diaphragm.          ******PRELIMINARY REPORT******    ******PRELIMINARY REPORT******          < end of copied text >

## 2020-08-05 NOTE — PROVIDER CONTACT NOTE (OTHER) - ASSESSMENT
Patient's BP is elevated 192/87. Patient is A&Ox4, denies pain, chest pain, SOB, nausea, dizziness or change in vision.

## 2020-08-05 NOTE — PROVIDER CONTACT NOTE (OTHER) - ASSESSMENT
Patient's BP is 181/72 and his temperature is 99.9. Patient is A&Ox4 denies any pain, chest pain, shortness of breath, nausea, vomiting, chills, or dizziness.

## 2020-08-05 NOTE — H&P ADULT - ASSESSMENT
65M hx CAD s/p PCI, HTN, DM, GERD, ESRD on HD via L AFV now with acute cholecystitis.    - Admit to General Surgery, Dr. Abreu  - NPO, gentle IVF  - IV antibiotics: Zosyn  - Repeat BMP given K 5.6, mildly hemolyzed  - Nephrology consult for preoperative optimization  - Cardiology consult for preoperative optimization    Discussed with attending.    Monica Ordaz PGY2  B Team Surgery [id number]

## 2020-08-05 NOTE — ED PROVIDER NOTE - PHYSICAL EXAMINATION
Gen: Well appearing in moderate distress from pain  Head: NC/AT  Neck: trachea midline  Resp:  No distress  Abd: soft TTP in the RUQ with positive murphys  Ext: no deformities  Neuro:  A&O appears non focal  Skin:  Warm and dry as visualized  Psych:  Normal affect and mood

## 2020-08-05 NOTE — H&P ADULT - NSHPREVIEWOFSYSTEMS_GEN_ALL_CORE
The patient denies chills; chest pain, SOB, palpitation; dizziness, weakness; nausea, vomiting; diarrhea, constipation; bladder and bowel problems; leg swelling; sick contacts; and recent travel.

## 2020-08-05 NOTE — H&P ADULT - NSICDXPASTMEDICALHX_GEN_ALL_CORE_FT
PAST MEDICAL HISTORY:  Anemia due to stage 5 chronic kidney disease, not on chronic dialysis     CAP (community acquired pneumonia) 6/18    Cerebrovascular accident (CVA), unspecified mechanism diagnosed via CT of the head    Coronary artery disease     Diabetes mellitus type 2    ESRD (end stage renal disease) on Dialysis( M/W/F), By Dr. Huff    GERD (gastroesophageal reflux disease)     HTN (hypertension)     Hypercholesterolemia     Stented coronary artery 2014, 3 stents, [facility]

## 2020-08-05 NOTE — ED ADULT TRIAGE NOTE - CHIEF COMPLAINT QUOTE
Pt arrives to ED c/o RUQ abd pain for 2 days.  Pt is TRS dialysis pt and completed full session on Tuesday.  Pt c/.o N/V x 1 emesis yesterday, denies diarrhea.  EKG in triage.  Pt shunt on left arm.  Pt hypertensive in triage.  Pt appears uncomfortable, moaning, in triage. Pt arrives to ED c/o RUQ abd pain for 2 days.  Pt is TRS dialysis pt and completed full session on Tuesday.  Pt c/.o N/V x 1 emesis yesterday, denies diarrhea.  EKG in triage.  Pt shunt on left arm.  Pt hypertensive in triage.  Pt appears uncomfortable, moaning, in triage.  LBM 2 days ago.

## 2020-08-05 NOTE — CONSULT NOTE ADULT - ATTENDING COMMENTS
No cardiac contraindication to proceeding with Cholecystectomy with GA  Can hold Plavix given chronicity of PCI  Continue other meds, patient appears to have history of significantly elevated BPs, continue home regimen for now

## 2020-08-05 NOTE — ED ADULT NURSE NOTE - OBJECTIVE STATEMENT
Pt received to room 20 complaining of RUQ pain x 2 days. AxOx4 and ambulatory at baseline. Pt states he began having RUQ pain 2 days ago, abdomen soft and tender to touch, pt endorses vomiting x 1 yesterday, denies blood in vomit, pt not actively vomiting at this time. Pt receives dialysis TTS, last full session tuesday. Left AVF noted, thrill present. Respirations even and unlabored, pt appears uncomfortable, moaning in room. Pt denies headache, dizziness, chest pain, SOB, nausea, vomiting, diarrhea, chills and cough at this time. Pt states he had a fever yesterday. PMH ESRD, cardiac stents, HTN, DM. Pt states he did not take BP meds last night. Pt sinus bradycardic on cardiac monitor and hypertensive at this time, MD made aware. Awaiting MD mohan and MD orders at this time. Will continue to monitor.

## 2020-08-05 NOTE — ED PROVIDER NOTE - CLINICAL SUMMARY MEDICAL DECISION MAKING FREE TEXT BOX
pt with severe RUQ pain.  GB pathology considered most likely whether yimi vs obstructing stone.  No fever or change in MS so doubt cholangitis.  Will get labs and imaging.  Aggressive treatment of pain with narcotics and antiemetics.

## 2020-08-05 NOTE — H&P ADULT - ATTENDING COMMENTS
Above noted. Agree with the assessment and plan.  Laparoscopic, possible open cholecystectomy with IOC, possible ERCP discussed in detail with the patient including risks: hemorrhage, infection, bile duct/ intestinal injuries, pancreatitis.  The patient understands the planned treatment and agrees to proceed with it.

## 2020-08-05 NOTE — CONSULT NOTE ADULT - SUBJECTIVE AND OBJECTIVE BOX
Chief Complaint  Patient is a 65y old  Male who presents with a chief complaint of acute cholecystitis (05 Aug 2020 08:00)      History of Presenting Illness  HPI:  65M hx CAD s/p PCI (most recent in Feb 2019 w/2 NAHUM, dLAD and pLCx, Echo may 2020 showing mild segmental LV dysfunction, NST May 2020 negative for ischemia) HTN, DM, GERD, ESRD on HD via L AFV (last dialyzed yesterday, 8/4) presenting with 2day of RUQ abdominal pain associated with subjective fevers x1 day and anorexia. Patient denies nausea, vomiting, jaundice, chills, states bowel function is at baseline. (05 Aug 2020 08:00)      Past Medical and Surgical History  PAST MEDICAL & SURGICAL HISTORY:  Anemia due to stage 5 chronic kidney disease, not on chronic dialysis  Cerebrovascular accident (CVA), unspecified mechanism: diagnosed via CT of the head  Hypercholesterolemia  ESRD (end stage renal disease): on Dialysis( M/W/F), By Dr. Huff  Stented coronary artery: 2014, 3 stents, [facility]  GERD (gastroesophageal reflux disease)  Diabetes mellitus: type 2  CAP (community acquired pneumonia): 6/18  Coronary artery disease  HTN (hypertension)  H/O eye surgery  AV fistula: 10/12/18 L radiocephalic AV fistula  H/O coronary angiogram: 2014 - x3 stents      Family History  FAMILY HISTORY:  No pertinent family history in first degree relatives      Social HistoryCalculus of gallbladder with acute cholecystitis without obstruction  No pertinent family history in first degree relatives  Handoff  MEWS Score  Anemia due to stage 5 chronic kidney disease, not on chronic dialysis  Cerebrovascular accident (CVA), unspecified mechanism  Hypercholesterolemia  ESRD (end stage renal disease)  Stented coronary artery  GERD (gastroesophageal reflux disease)  Diabetes mellitus  CAD (coronary artery disease)  CAP (community acquired pneumonia)  Coronary artery disease  CKD (chronic kidney disease)  HTN (hypertension)  Diabetes  Calculus of gallbladder with acute cholecystitis without obstruction  H/O eye surgery  AV fistula  H/O coronary angiogram  No significant past surgical history  CHEST PAIN  27  SysAdmin_VisitLink      MEDICATIONS  (STANDING):  cloNIDine 0.1 milliGRAM(s) Oral two times a day  dextrose 50% Injectable 12.5 Gram(s) IV Push once  dextrose 50% Injectable 25 Gram(s) IV Push once  dextrose 50% Injectable 25 Gram(s) IV Push once  heparin   Injectable 5000 Unit(s) SubCutaneous every 8 hours  insulin lispro (HumaLOG) corrective regimen sliding scale   SubCutaneous every 6 hours  labetalol 400 milliGRAM(s) Oral every 12 hours  losartan 25 milliGRAM(s) Oral daily  NIFEdipine XL 60 milliGRAM(s) Oral daily  piperacillin/tazobactam IVPB.. 3.375 Gram(s) IV Intermittent every 12 hours  sodium chloride 0.9%. 1000 milliLiter(s) (75 mL/Hr) IV Continuous <Continuous>    MEDICATIONS  (PRN):  acetaminophen   Tablet .. 650 milliGRAM(s) Oral every 6 hours PRN Mild Pain (1 - 3)  dextrose 40% Gel 15 Gram(s) Oral once PRN Blood Glucose LESS THAN 70 milliGRAM(s)/deciliter  glucagon  Injectable 1 milliGRAM(s) IntraMuscular once PRN Glucose LESS THAN 70 milligrams/deciliter      ECG:    ECHO:   LABS:        -----------------------                        11.8   15.90 )-----------( 139      ( 05 Aug 2020 06:34 )             37.7     -----------------------  08-05    134<L>  |  90<L>  |  37<H>  ----------------------------<  205<H>  6.1<H>   |  28  |  9.34<H>    Ca    8.5      05 Aug 2020 07:58    TPro  9.0<H>  /  Alb  4.7  /  TBili  1.3<H>  /  DBili  x   /  AST  14  /  ALT  10  /  AlkPhos  69  08-05    -----------------------    -----------------------    ----------------------  I&O's Summary    ---------------------  proBNP:     RADIOLOGY & ADDITIONAL STUDIES:      Assessment and Plan Chief Complaint  Patient is a 65y old  Male who presents with a chief complaint of acute cholecystitis (05 Aug 2020 08:00)    History of Presenting Illness  66 y/o male with  pmhx CAD s/p PCI x3 (2014) by Dr. Saúl Villafana, (most recent in Feb 2019 w/ 2 NAHUM, dLAD and pLCx, Echo may 2020 showing mild segmental LV dysfunction, NST May 2020 negative for ischemia) HTN, DM, GERD, ESRD on HD via L AFV (last dialyzed yesterday, 8/4) presenting with 2day of RUQ abdominal pain associated with subjective fevers x1 day and anorexia.   In 1/2019, was admitted to [facility] for SOB and epistaxis, found to be hypervolemic in setting   In 2/2019, had an abnormal nuclear stress test and was referred for left and right heart catheterization. The right heart cath showed elevated systemic blood pressures but normal PCWP (14 mmHg) and normal PA pressures (37/17 mmHg) with normal cardiac output and index of 6 L/min and 3.6 respectively. The left heart cath revealed significant distal LAD disease and significant in stent stenosis of LCx. He is now status post POBA to LCx and NAHUM to LAD.  Patient denies nausea, vomiting, jaundice, chills, states bowel function is at baseline. (05 Aug 2020 08:00).   Denies CP, palpitations, or pleurisy. Sleeps with 2 pillows at night, is capable of walking up 1 flight of stairs and walkign 6-8 blocks without SOB. Denies orthopnea, PND, or leg edema. He follows with Dr. Edin Johnson, his cardiologist.        Past Medical and Surgical History  PAST MEDICAL & SURGICAL HISTORY:  Anemia due to stage 5 chronic kidney disease, not on chronic dialysis  Cerebrovascular accident (CVA), unspecified mechanism: diagnosed via CT of the head  Hypercholesterolemia  ESRD (end stage renal disease): on Dialysis( M/W/F), By Dr. Huff  Stented coronary artery: 2014, 3 stents, [facility]  GERD (gastroesophageal reflux disease)  Diabetes mellitus: type 2  CAP (community acquired pneumonia): 6/18  Coronary artery disease  HTN (hypertension)  H/O eye surgery  AV fistula: 10/12/18 L radiocephalic AV fistula  H/O coronary angiogram: 2014 - x3 stents      Family History  FAMILY HISTORY:  No pertinent family history in first degree relatives      Social HistoryCalculus of gallbladder with acute cholecystitis without obstruction  No pertinent family history in first degree relatives  Handoff  MEWS Score  Anemia due to stage 5 chronic kidney disease, not on chronic dialysis  Cerebrovascular accident (CVA), unspecified mechanism  Hypercholesterolemia  ESRD (end stage renal disease)  Stented coronary artery  GERD (gastroesophageal reflux disease)  Diabetes mellitus  CAD (coronary artery disease)  CAP (community acquired pneumonia)  Coronary artery disease  CKD (chronic kidney disease)  HTN (hypertension)  Diabetes  Calculus of gallbladder with acute cholecystitis without obstruction  H/O eye surgery  AV fistula  H/O coronary angiogram  No significant past surgical history  CHEST PAIN  29  SysAdmin_VisitLink      MEDICATIONS  (STANDING):  cloNIDine 0.1 milliGRAM(s) Oral two times a day  dextrose 50% Injectable 12.5 Gram(s) IV Push once  dextrose 50% Injectable 25 Gram(s) IV Push once  dextrose 50% Injectable 25 Gram(s) IV Push once  heparin   Injectable 5000 Unit(s) SubCutaneous every 8 hours  insulin lispro (HumaLOG) corrective regimen sliding scale   SubCutaneous every 6 hours  labetalol 400 milliGRAM(s) Oral every 12 hours  losartan 25 milliGRAM(s) Oral daily  NIFEdipine XL 60 milliGRAM(s) Oral daily  piperacillin/tazobactam IVPB.. 3.375 Gram(s) IV Intermittent every 12 hours  sodium chloride 0.9%. 1000 milliLiter(s) (75 mL/Hr) IV Continuous <Continuous>    MEDICATIONS  (PRN):  acetaminophen   Tablet .. 650 milliGRAM(s) Oral every 6 hours PRN Mild Pain (1 - 3)  dextrose 40% Gel 15 Gram(s) Oral once PRN Blood Glucose LESS THAN 70 milliGRAM(s)/deciliter  glucagon  Injectable 1 milliGRAM(s) IntraMuscular once PRN Glucose LESS THAN 70 milligrams/deciliter      ECG:    ECHO:   LABS:        -----------------------                        11.8   15.90 )-----------( 139      ( 05 Aug 2020 06:34 )             37.7     -----------------------  08-05    134<L>  |  90<L>  |  37<H>  ----------------------------<  205<H>  6.1<H>   |  28  |  9.34<H>    Ca    8.5      05 Aug 2020 07:58    TPro  9.0<H>  /  Alb  4.7  /  TBili  1.3<H>  /  DBili  x   /  AST  14  /  ALT  10  /  AlkPhos  69  08-05    -----------------------    -----------------------    ----------------------  I&O's Summary    ---------------------  proBNP:     RADIOLOGY & ADDITIONAL STUDIES:      Assessment and Plan

## 2020-08-05 NOTE — PROVIDER CONTACT NOTE (OTHER) - ASSESSMENT
Patient's BP is 169/72. Patient is A&Ox4. No complaints of pain, chest pain, SOB, nausea, vomiting, dizziness or changes in vision.

## 2020-08-05 NOTE — ED ADULT NURSE NOTE - CHIEF COMPLAINT QUOTE
Pt arrives to ED c/o RUQ abd pain for 2 days.  Pt is TRS dialysis pt and completed full session on Tuesday.  Pt c/.o N/V x 1 emesis yesterday, denies diarrhea.  EKG in triage.  Pt shunt on left arm.  Pt hypertensive in triage.  Pt appears uncomfortable, moaning, in triage.  LBM 2 days ago.

## 2020-08-06 ENCOUNTER — RESULT REVIEW (OUTPATIENT)
Age: 65
End: 2020-08-06

## 2020-08-06 LAB
ALBUMIN SERPL ELPH-MCNC: 4.1 G/DL — SIGNIFICANT CHANGE UP (ref 3.3–5)
ALP SERPL-CCNC: 63 U/L — SIGNIFICANT CHANGE UP (ref 40–120)
ALT FLD-CCNC: 6 U/L — SIGNIFICANT CHANGE UP (ref 4–41)
ANION GAP SERPL CALC-SCNC: 19 MMO/L — HIGH (ref 7–14)
APTT BLD: 31.1 SEC — SIGNIFICANT CHANGE UP (ref 27–36.3)
AST SERPL-CCNC: 12 U/L — SIGNIFICANT CHANGE UP (ref 4–40)
BILIRUB SERPL-MCNC: 1.5 MG/DL — HIGH (ref 0.2–1.2)
BLD GP AB SCN SERPL QL: NEGATIVE — SIGNIFICANT CHANGE UP
BUN SERPL-MCNC: 27 MG/DL — HIGH (ref 7–23)
CALCIUM SERPL-MCNC: 8.8 MG/DL — SIGNIFICANT CHANGE UP (ref 8.4–10.5)
CHLORIDE SERPL-SCNC: 89 MMOL/L — LOW (ref 98–107)
CO2 SERPL-SCNC: 24 MMOL/L — SIGNIFICANT CHANGE UP (ref 22–31)
CREAT SERPL-MCNC: 7.45 MG/DL — HIGH (ref 0.5–1.3)
GLUCOSE BLDC GLUCOMTR-MCNC: 126 MG/DL — HIGH (ref 70–99)
GLUCOSE BLDC GLUCOMTR-MCNC: 145 MG/DL — HIGH (ref 70–99)
GLUCOSE BLDC GLUCOMTR-MCNC: 231 MG/DL — HIGH (ref 70–99)
GLUCOSE SERPL-MCNC: 131 MG/DL — HIGH (ref 70–99)
HBA1C BLD-MCNC: 5.7 % — HIGH (ref 4–5.6)
HBV CORE AB SER-ACNC: NONREACTIVE — SIGNIFICANT CHANGE UP
HBV SURFACE AB SER-ACNC: 137.3 MLU/ML — SIGNIFICANT CHANGE UP
HCT VFR BLD CALC: 38.1 % — LOW (ref 39–50)
HCV AB S/CO SERPL IA: 0.09 S/CO — SIGNIFICANT CHANGE UP (ref 0–0.99)
HCV AB SERPL-IMP: SIGNIFICANT CHANGE UP
HGB BLD-MCNC: 12.2 G/DL — LOW (ref 13–17)
INR BLD: 1.12 — SIGNIFICANT CHANGE UP (ref 0.88–1.16)
MAGNESIUM SERPL-MCNC: 1.9 MG/DL — SIGNIFICANT CHANGE UP (ref 1.6–2.6)
MCHC RBC-ENTMCNC: 31.8 PG — SIGNIFICANT CHANGE UP (ref 27–34)
MCHC RBC-ENTMCNC: 32 % — SIGNIFICANT CHANGE UP (ref 32–36)
MCV RBC AUTO: 99.2 FL — SIGNIFICANT CHANGE UP (ref 80–100)
NRBC # FLD: 0 K/UL — SIGNIFICANT CHANGE UP (ref 0–0)
PHOSPHATE SERPL-MCNC: 4.7 MG/DL — HIGH (ref 2.5–4.5)
PLATELET # BLD AUTO: 122 K/UL — LOW (ref 150–400)
PMV BLD: 12 FL — SIGNIFICANT CHANGE UP (ref 7–13)
POTASSIUM SERPL-MCNC: 5.1 MMOL/L — SIGNIFICANT CHANGE UP (ref 3.5–5.3)
POTASSIUM SERPL-SCNC: 5.1 MMOL/L — SIGNIFICANT CHANGE UP (ref 3.5–5.3)
PROT SERPL-MCNC: 7.8 G/DL — SIGNIFICANT CHANGE UP (ref 6–8.3)
PROTHROM AB SERPL-ACNC: 12.8 SEC — SIGNIFICANT CHANGE UP (ref 10.6–13.6)
RBC # BLD: 3.84 M/UL — LOW (ref 4.2–5.8)
RBC # FLD: 14.1 % — SIGNIFICANT CHANGE UP (ref 10.3–14.5)
RH IG SCN BLD-IMP: NEGATIVE — SIGNIFICANT CHANGE UP
SODIUM SERPL-SCNC: 132 MMOL/L — LOW (ref 135–145)
WBC # BLD: 13.57 K/UL — HIGH (ref 3.8–10.5)
WBC # FLD AUTO: 13.57 K/UL — HIGH (ref 3.8–10.5)

## 2020-08-06 PROCEDURE — 88304 TISSUE EXAM BY PATHOLOGIST: CPT | Mod: 26

## 2020-08-06 PROCEDURE — 99233 SBSQ HOSP IP/OBS HIGH 50: CPT

## 2020-08-06 PROCEDURE — 74018 RADEX ABDOMEN 1 VIEW: CPT | Mod: 26

## 2020-08-06 RX ORDER — NALOXONE HYDROCHLORIDE 4 MG/.1ML
0.1 SPRAY NASAL
Refills: 0 | Status: DISCONTINUED | OUTPATIENT
Start: 2020-08-06 | End: 2020-08-09

## 2020-08-06 RX ORDER — ONDANSETRON 8 MG/1
4 TABLET, FILM COATED ORAL EVERY 6 HOURS
Refills: 0 | Status: DISCONTINUED | OUTPATIENT
Start: 2020-08-06 | End: 2020-08-09

## 2020-08-06 RX ORDER — HYDROMORPHONE HYDROCHLORIDE 2 MG/ML
0.5 INJECTION INTRAMUSCULAR; INTRAVENOUS; SUBCUTANEOUS
Refills: 0 | Status: DISCONTINUED | OUTPATIENT
Start: 2020-08-06 | End: 2020-08-08

## 2020-08-06 RX ORDER — HYDROMORPHONE HYDROCHLORIDE 2 MG/ML
30 INJECTION INTRAMUSCULAR; INTRAVENOUS; SUBCUTANEOUS
Refills: 0 | Status: DISCONTINUED | OUTPATIENT
Start: 2020-08-06 | End: 2020-08-08

## 2020-08-06 RX ORDER — SODIUM CHLORIDE 9 MG/ML
1000 INJECTION INTRAMUSCULAR; INTRAVENOUS; SUBCUTANEOUS
Refills: 0 | Status: DISCONTINUED | OUTPATIENT
Start: 2020-08-06 | End: 2020-08-06

## 2020-08-06 RX ORDER — SODIUM CHLORIDE 9 MG/ML
1000 INJECTION, SOLUTION INTRAVENOUS
Refills: 0 | Status: DISCONTINUED | OUTPATIENT
Start: 2020-08-06 | End: 2020-08-07

## 2020-08-06 RX ORDER — ONDANSETRON 8 MG/1
4 TABLET, FILM COATED ORAL ONCE
Refills: 0 | Status: DISCONTINUED | OUTPATIENT
Start: 2020-08-06 | End: 2020-08-07

## 2020-08-06 RX ORDER — DEXTROSE MONOHYDRATE, SODIUM CHLORIDE, AND POTASSIUM CHLORIDE 50; .745; 4.5 G/1000ML; G/1000ML; G/1000ML
1000 INJECTION, SOLUTION INTRAVENOUS
Refills: 0 | Status: DISCONTINUED | OUTPATIENT
Start: 2020-08-06 | End: 2020-08-06

## 2020-08-06 RX ADMIN — Medication 0.1 MILLIGRAM(S): at 22:47

## 2020-08-06 RX ADMIN — Medication 2: at 23:07

## 2020-08-06 RX ADMIN — LOSARTAN POTASSIUM 25 MILLIGRAM(S): 100 TABLET, FILM COATED ORAL at 07:12

## 2020-08-06 RX ADMIN — Medication 400 MILLIGRAM(S): at 07:11

## 2020-08-06 RX ADMIN — PIPERACILLIN AND TAZOBACTAM 25 GRAM(S): 4; .5 INJECTION, POWDER, LYOPHILIZED, FOR SOLUTION INTRAVENOUS at 07:11

## 2020-08-06 RX ADMIN — Medication 0.1 MILLIGRAM(S): at 07:12

## 2020-08-06 RX ADMIN — Medication 650 MILLIGRAM(S): at 06:56

## 2020-08-06 RX ADMIN — Medication 650 MILLIGRAM(S): at 06:26

## 2020-08-06 RX ADMIN — HEPARIN SODIUM 5000 UNIT(S): 5000 INJECTION INTRAVENOUS; SUBCUTANEOUS at 06:26

## 2020-08-06 RX ADMIN — HEPARIN SODIUM 5000 UNIT(S): 5000 INJECTION INTRAVENOUS; SUBCUTANEOUS at 22:47

## 2020-08-06 RX ADMIN — Medication 60 MILLIGRAM(S): at 07:12

## 2020-08-06 RX ADMIN — HYDROMORPHONE HYDROCHLORIDE 30 MILLILITER(S): 2 INJECTION INTRAMUSCULAR; INTRAVENOUS; SUBCUTANEOUS at 21:49

## 2020-08-06 NOTE — PROGRESS NOTE ADULT - ASSESSMENT
65M hx CAD s/p PCI, HTN, DM, GERD, ESRD on HD via L AFV now with acute cholecystitis.    - OR today for cholecystectomy  - Pain control as needed  - NPO with IVF @ 30cc  - c/w home meds  - Dialysis per Nephrology  - No cardiac contraindication to proceeding; holding Plavix for now  - IV antibiotics: Zosyn  - c/w VTE prophylaxis    B TEAM  [id number]

## 2020-08-06 NOTE — PROGRESS NOTE ADULT - SUBJECTIVE AND OBJECTIVE BOX
INTERVAL EVENTS:    PAST MEDICAL & SURGICAL HISTORY:  Anemia due to stage 5 chronic kidney disease, not on chronic dialysis  Cerebrovascular accident (CVA), unspecified mechanism: diagnosed via CT of the head  Hypercholesterolemia  ESRD (end stage renal disease): on Dialysis( M/W/F), By Dr. Huff  Stented coronary artery: 2014, 3 stents, [facility]  GERD (gastroesophageal reflux disease)  Diabetes mellitus: type 2  CAD (coronary artery disease)  CAP (community acquired pneumonia): 6/18  Coronary artery disease  CKD (chronic kidney disease)  HTN (hypertension)  Diabetes: Insulin controlled  H/O eye surgery  AV fistula: 10/12/18 L radiocephalic AV fistula  H/O coronary angiogram: 2014 - x3 stents  No significant past surgical history      MEDICATIONS  (STANDING):  chlorhexidine 4% Liquid 1 Application(s) Topical daily  cloNIDine 0.1 milliGRAM(s) Oral two times a day  dextrose 50% Injectable 12.5 Gram(s) IV Push once  dextrose 50% Injectable 25 Gram(s) IV Push once  dextrose 50% Injectable 25 Gram(s) IV Push once  heparin   Injectable 5000 Unit(s) SubCutaneous every 8 hours  insulin lispro (HumaLOG) corrective regimen sliding scale   SubCutaneous every 6 hours  labetalol 400 milliGRAM(s) Oral every 12 hours  losartan 25 milliGRAM(s) Oral daily  NIFEdipine XL 60 milliGRAM(s) Oral daily  piperacillin/tazobactam IVPB.. 3.375 Gram(s) IV Intermittent every 12 hours  sodium chloride 0.9%. 1000 milliLiter(s) (30 mL/Hr) IV Continuous <Continuous>    MEDICATIONS  (PRN):  acetaminophen   Tablet .. 650 milliGRAM(s) Oral every 6 hours PRN Mild Pain (1 - 3)  dextrose 40% Gel 15 Gram(s) Oral once PRN Blood Glucose LESS THAN 70 milliGRAM(s)/deciliter  glucagon  Injectable 1 milliGRAM(s) IntraMuscular once PRN Glucose LESS THAN 70 milligrams/deciliter      Vital Signs Last 24 Hrs  T(C): 36.9 (06 Aug 2020 09:16), Max: 39 (06 Aug 2020 07:09)  T(F): 98.5 (06 Aug 2020 09:16), Max: 102.2 (06 Aug 2020 07:09)  HR: 60 (06 Aug 2020 09:16) (60 - 80)  BP: 148/71 (06 Aug 2020 09:16) (148/71 - 192/92)  BP(mean): --  RR: 16 (06 Aug 2020 09:16) (16 - 20)  SpO2: 98% (06 Aug 2020 09:16) (98% - 100%)     PHYSICAL EXAM:  GEN: Awake, alert. NAD.   HEENT: NCAT, PERRL, EOMI. Mucosa moist. No JVD.  RESP: CTA b/l  CV: RRR. Normal S1/S2. No m/r/g.  ABD: Soft. NT/ND. BS+  EXT: Warm. No edema, clubbing, or cyanosis.   NEURO: AAOx3. No focal deficits.     LABS:                        12.2   13.57 )-----------( 122      ( 06 Aug 2020 06:20 )             38.1     08-06    132<L>  |  89<L>  |  27<H>  ----------------------------<  131<H>  5.1   |  24  |  7.45<H>    Ca    8.8      06 Aug 2020 06:20  Phos  4.7     08-06  Mg     1.9     08-06    TPro  7.8  /  Alb  4.1  /  TBili  1.5<H>  /  DBili  x   /  AST  12  /  ALT  6   /  AlkPhos  63  08-06        PT/INR - ( 06 Aug 2020 09:45 )   PT: 12.8 SEC;   INR: 1.12          PTT - ( 06 Aug 2020 09:45 )  PTT:31.1 SEC    I&O's Summary    05 Aug 2020 07:01  -  06 Aug 2020 07:00  --------------------------------------------------------  IN: 400 mL / OUT: 1400 mL / NET: -1000 mL      BNP  RADIOLOGY & ADDITIONAL STUDIES:    TELEMETRY:    EKG: INTERVAL EVENTS:    Patient was seen and evaluated at bedside. Abdominal pain present. No chest pain, palpitations, or SOB. Denies nausea, vomiting, or diarrhea.     PAST MEDICAL & SURGICAL HISTORY:  Anemia due to stage 5 chronic kidney disease, not on chronic dialysis  Cerebrovascular accident (CVA), unspecified mechanism: diagnosed via CT of the head  Hypercholesterolemia  ESRD (end stage renal disease): on Dialysis( M/W/F), By Dr. Huff  Stented coronary artery: 2014, 3 stents, [facility]  GERD (gastroesophageal reflux disease)  Diabetes mellitus: type 2  CAD (coronary artery disease)  CAP (community acquired pneumonia): 6/18  Coronary artery disease  CKD (chronic kidney disease)  HTN (hypertension)  Diabetes: Insulin controlled  H/O eye surgery  AV fistula: 10/12/18 L radiocephalic AV fistula  H/O coronary angiogram: 2014 - x3 stents  No significant past surgical history      MEDICATIONS  (STANDING):  chlorhexidine 4% Liquid 1 Application(s) Topical daily  cloNIDine 0.1 milliGRAM(s) Oral two times a day  dextrose 50% Injectable 12.5 Gram(s) IV Push once  dextrose 50% Injectable 25 Gram(s) IV Push once  dextrose 50% Injectable 25 Gram(s) IV Push once  heparin   Injectable 5000 Unit(s) SubCutaneous every 8 hours  insulin lispro (HumaLOG) corrective regimen sliding scale   SubCutaneous every 6 hours  labetalol 400 milliGRAM(s) Oral every 12 hours  losartan 25 milliGRAM(s) Oral daily  NIFEdipine XL 60 milliGRAM(s) Oral daily  piperacillin/tazobactam IVPB.. 3.375 Gram(s) IV Intermittent every 12 hours  sodium chloride 0.9%. 1000 milliLiter(s) (30 mL/Hr) IV Continuous <Continuous>    MEDICATIONS  (PRN):  acetaminophen   Tablet .. 650 milliGRAM(s) Oral every 6 hours PRN Mild Pain (1 - 3)  dextrose 40% Gel 15 Gram(s) Oral once PRN Blood Glucose LESS THAN 70 milliGRAM(s)/deciliter  glucagon  Injectable 1 milliGRAM(s) IntraMuscular once PRN Glucose LESS THAN 70 milligrams/deciliter      Vital Signs Last 24 Hrs  T(C): 36.9 (06 Aug 2020 09:16), Max: 39 (06 Aug 2020 07:09)  T(F): 98.5 (06 Aug 2020 09:16), Max: 102.2 (06 Aug 2020 07:09)  HR: 60 (06 Aug 2020 09:16) (60 - 80)  BP: 148/71 (06 Aug 2020 09:16) (148/71 - 192/92)  BP(mean): --  RR: 16 (06 Aug 2020 09:16) (16 - 20)  SpO2: 98% (06 Aug 2020 09:16) (98% - 100%)     PHYSICAL EXAM:  GEN: Awake, alert. NAD.   HEENT: NCAT, PERRL, EOMI. Mucosa moist. No JVD.  RESP: CTA b/l. No crackles appreciated.  CV: RRR. Normal S1/S2.   ABD: Soft. NT/ND. BS+  EXT: Warm. No lower extremity edema, clubbing, or cyanosis.   NEURO: AAOx3. No focal deficits.     LABS:                        12.2   13.57 )-----------( 122      ( 06 Aug 2020 06:20 )             38.1     08-06    132<L>  |  89<L>  |  27<H>  ----------------------------<  131<H>  5.1   |  24  |  7.45<H>    Ca    8.8      06 Aug 2020 06:20  Phos  4.7     08-06  Mg     1.9     08-06    TPro  7.8  /  Alb  4.1  /  TBili  1.5<H>  /  DBili  x   /  AST  12  /  ALT  6   /  AlkPhos  63  08-06        PT/INR - ( 06 Aug 2020 09:45 )   PT: 12.8 SEC;   INR: 1.12          PTT - ( 06 Aug 2020 09:45 )  PTT:31.1 SEC    I&O's Summary    05 Aug 2020 07:01  -  06 Aug 2020 07:00  --------------------------------------------------------  IN: 400 mL / OUT: 1400 mL / NET: -1000 mL

## 2020-08-06 NOTE — PROGRESS NOTE ADULT - ASSESSMENT
65M hx CAD s/p PCI (most recent in Feb 2019 w/2 NAHUM, dLAD and pLCx, Echo may 2020 showing mild segmental LV dysfunction, NST May 2020 negative for ischemia) HTN, DM, GERD, ESRD on HD via L AFV (last dialyzed yesterday, 8/4) presenting with 2day of RUQ abdominal pain concern for acute cholecystitis     ESRD on HD TTS via AVF  s/p HD 8/5  sec to hyperkalemia  HD consent in chart  lab reviewed no indication for HD today. likely for tmr  renal diet  monitor bmp    Hyperkalemia  sec to renal failure  better  low k diet  monitor     HTN  controlled  low Na diet  monitor    Anemia  at goal  no need for epo  monitor hb    Ckd-mbd  check pth, phos   monitor phos and calcium daily    Abd pain  f/u surgery 65M hx CAD s/p PCI (most recent in Feb 2019 w/2 NAHUM, dLAD and pLCx, Echo may 2020 showing mild segmental LV dysfunction, NST May 2020 negative for ischemia) HTN, DM, GERD, ESRD on HD via L AFV (last dialyzed yesterday, 8/4) presenting with 2day of RUQ abdominal pain concern for acute cholecystitis     ESRD on HD TTS via AVF  s/p HD 8/5  sec to hyperkalemia  HD consent in chart  lab reviewed no indication for HD today. likely for tmrw  renal diet  monitor bmp    Hyperkalemia  sec to renal failure  better  low k diet  monitor     HTN  controlled  low Na diet  monitor    Anemia  at goal  no need for epo  monitor hb    Ckd-mbd  check pth, phos   monitor phos and calcium daily    Abd pain  f/u surgery

## 2020-08-06 NOTE — CHART NOTE - NSCHARTNOTEFT_GEN_A_CORE
CAPRINI SCORE     AGE RELATED RISK FACTORS                                                       MOBILITY RELATED FACTORS  [ ] Age 41-60 years                                            (1 Point)                  [ ] Bed rest                                                        (1 Point)  [x] Age: 61-74 years                                           (2 Points)                 [ ] Plaster cast                                                   (2 Points)  [ ] Age= 75 years                                              (3 Points)                 [ ] Bed bound for more than 72 hours                 (2 Points)    DISEASE RELATED RISK FACTORS                                               GENDER SPECIFIC FACTORS  [ ] Edema in the lower extremities                       (1 Point)                  [ ] Pregnancy                                                     (1 Point)  [ ] Varicose veins                                               (1 Point)                  [ ] Post-partum < 6 weeks                                   (1 Point)             [ ] BMI > 25 Kg/m2                                            (1 Point)                  [ ] Hormonal therapy  or oral contraception          (1 Point)                 [ ] Sepsis (in the previous month)                        (1 Point)                  [ ] History of pregnancy complications                 (1 point)  [ ] Pneumonia or serious lung disease                                               [ ] Unexplained or recurrent                     (1 Point)           (in the previous month)                               (1 Point)  [ ] Abnormal pulmonary function test                     (1 Point)                 SURGERY RELATED RISK FACTORS  [ ] Acute myocardial infarction                              (1 Point)                 [ ]  Section                                             (1 Point)  [ ] Congestive heart failure (in the previous month)  (1 Point)               [ ] Minor surgery                                                  (1 Point)   [ ] Inflammatory bowel disease                             (1 Point)                 [ ] Arthroscopic surgery                                        (2 Points)  [ ] Central venous access                                      (2 Points)               [ ] General surgery lasting more than 45 minutes   (2 Points)       [ ] Stroke (in the previous month)                          (5 Points)               [ ] Elective arthroplasty                                         (5 Points)                                                                                                                                               HEMATOLOGY RELATED FACTORS                                                 TRAUMA RELATED RISK FACTORS  [ ] Prior episodes of VTE                                     (3 Points)                [ ] Fracture of the hip, pelvis, or leg                       (5 Points)  [ ] Positive family history for VTE                         (3 Points)                 [ ] Acute spinal cord injury (in the previous month)  (5 Points)  [ ] Prothrombin 70406 A                                     (3 Points)                 [ ] Paralysis  (less than 1 month)                             (5 Points)  [ ] Factor V Leiden                                             (3 Points)                  [ ] Multiple Trauma within 1 month                        (5 Points)  [ ] Lupus anticoagulants                                     (3 Points)                                                           [ ] Anticardiolipin antibodies                               (3 Points)                                                       [ ] High homocysteine in the blood                      (3 Points)                                             [ ] Other congenital or acquired thrombophilia      (3 Points)                                                [ ] Heparin induced thrombocytopenia                  (3 Points)                                          Total Score [ 2 ]    Caprini Score 0 - 2:  Low Risk, No VTE Prophylaxis required for most patients, encourage ambulation  Caprini Score 3 - 6:  At Risk, pharmacologic VTE prophylaxis is indicated for most patients (in the absence of a contraindication)  Caprini Score Greater than or = 7:  High Risk, pharmacologic VTE prophylaxis is indicated for most patients (in the absence of a contraindication)

## 2020-08-06 NOTE — PROGRESS NOTE ADULT - SUBJECTIVE AND OBJECTIVE BOX
B TEAM SURGERY DAILY PROGRESS NOTE    SUBJECTIVE/INTERVAL: Patient seen and examined at bedside. No acute events. Minimal tenderness. Denies nausea or vomiting.    O: Vital Signs Last 24 Hrs  T(C): 36.9 (06 Aug 2020 09:16), Max: 39 (06 Aug 2020 07:09)  T(F): 98.5 (06 Aug 2020 09:16), Max: 102.2 (06 Aug 2020 07:09)  HR: 60 (06 Aug 2020 09:16) (60 - 80)  BP: 148/71 (06 Aug 2020 09:16) (148/71 - 192/92)  RR: 16 (06 Aug 2020 09:16) (16 - 20)  SpO2: 98% (06 Aug 2020 09:16) (98% - 100%)    I&O's Detail    05 Aug 2020 07:01  -  06 Aug 2020 07:00  --------------------------------------------------------  IN:    Other: 400 mL  Total IN: 400 mL    OUT:    Other: 1400 mL  Total OUT: 1400 mL    Total NET: -1000 mL      PHYSICAL  General: alert and oriented, NAD  Resp: airway patent, respirations unlabored  CVS: regular rate and rhythm  Abdomen: soft, nondistended, minimally tender  Extremities: no edema  Skin: warm, dry, appropriate color      LABS                      12.2   13.57 )-----------( 122      ( 06 Aug 2020 06:20 )             38.1   08-06    132<L>  |  89<L>  |  27<H>  ----------------------------<  131<H>  5.1   |  24  |  7.45<H>    Ca    8.8      06 Aug 2020 06:20  Phos  4.7     08-06  Mg     1.9     08-06    TPro  7.8  /  Alb  4.1  /  TBili  1.5<H>  /  DBili  x   /  AST  12  /  ALT  6   /  AlkPhos  63  08-06

## 2020-08-06 NOTE — PRE-OP CHECKLIST - COMMENTS
sips of water with meds this am at 0600 see mar, zosyn, clonodine, heparin, labetolol, losartan, nifedipine

## 2020-08-06 NOTE — BRIEF OPERATIVE NOTE - OPERATION/FINDINGS
Laparoscopic converted to open cholecystectomy.  While laparoscopic performed intraoperative cholangiogram which opacified the gallbladder and CBD only.  Therefore decision made to convert to open in order to identify anatomy and confirm no injury.  Only one lumen into the gallbladder identified, which was then transected and ligated with vicryl suture.  Spillage of bile during the case which was irrigated.

## 2020-08-06 NOTE — PRE-OP CHECKLIST - 2.
07:00 Assumed care by Dr Stephanie young reassessment for COPD exacerbation Desaturated when tried to mobilize to 88% Patient will be admitted Dr. To [facility] resident discussed with hospitalist 
 see pacu flow sheet for vs in preop holding

## 2020-08-06 NOTE — PROGRESS NOTE ADULT - SUBJECTIVE AND OBJECTIVE BOX
[facility] [place] [facility]   MD ANDRES AVALOS DO ANAM SIDDIQUI ANGELA WONG, PA    TEL:  OFFICE: [phone]  DR CUETO CELL: [phone]  DR. HUNTER CELL: [phone]  DR. HORTA CELL: [phone]  LIDA WINTERS CELL: [phone]    From 5pm-7am Answering Service [phone]    Patient is a 65y old  Male who presents with a chief complaint of acute cholecystitis (06 Aug 2020 11:00)      Patient seen and examined at bedside. No chest pain/sob    VITALS:  T(F): 98.3 (08-06-20 @ 12:10), Max: 102.2 (08-06-20 @ 07:09)  HR: 63 (08-06-20 @ 12:10)  BP: 138/56 (08-06-20 @ 12:10)  RR: 14 (08-06-20 @ 12:10)  SpO2: 97% (08-06-20 @ 12:10)  Wt(kg): --    08-05 @ 07:01  -  08-06 @ 07:00  --------------------------------------------------------  IN: 400 mL / OUT: 1400 mL / NET: -1000 mL      Height (cm): 167.6 (08-05 @ 20:59)  Weight (kg): 64 (08-06 @ 11:59)  BMI (kg/m2): 22.8 (08-06 @ 11:59)  BSA (m2): 1.72 (08-06 @ 11:59)    PHYSICAL EXAM:  Constitutional: NAD  Neck: No JVD  Respiratory: CTAB, no wheezes, rales or rhonchi  Cardiovascular: S1, S2, RRR  Gastrointestinal: BS+, soft, NT/ND  Extremities: No peripheral edema    Hospital Medications:   MEDICATIONS  (STANDING):  chlorhexidine 4% Liquid 1 Application(s) Topical daily  cloNIDine 0.1 milliGRAM(s) Oral two times a day  dextrose 50% Injectable 12.5 Gram(s) IV Push once  dextrose 50% Injectable 25 Gram(s) IV Push once  dextrose 50% Injectable 25 Gram(s) IV Push once  heparin   Injectable 5000 Unit(s) SubCutaneous every 8 hours  insulin lispro (HumaLOG) corrective regimen sliding scale   SubCutaneous every 6 hours  labetalol 400 milliGRAM(s) Oral every 12 hours  losartan 25 milliGRAM(s) Oral daily  NIFEdipine XL 60 milliGRAM(s) Oral daily  piperacillin/tazobactam IVPB.. 3.375 Gram(s) IV Intermittent every 12 hours  sodium chloride 0.9%. 1000 milliLiter(s) (30 mL/Hr) IV Continuous <Continuous>      LABS:  08-06    132<L>  |  89<L>  |  27<H>  ----------------------------<  131<H>  5.1   |  24  |  7.45<H>    Ca    8.8      06 Aug 2020 06:20  Phos  4.7     08-06  Mg     1.9     08-06    TPro  7.8  /  Alb  4.1  /  TBili  1.5<H>  /  DBili      /  AST  12  /  ALT  6   /  AlkPhos  63  08-06    Creatinine Trend: 7.45 <--, 6.07 <--, 9.34 <--, 9.05 <--    Phosphorus Level, Serum: 4.7 mg/dL (08-06 @ 06:20)  Albumin, Serum: 4.1 g/dL (08-06 @ 06:20)  Phosphorus Level, Serum: 3.4 mg/dL (08-05 @ 23:08)                              12.2   13.57 )-----------( 122      ( 06 Aug 2020 06:20 )             38.1     Urine Studies:      .9 (Ca --)      [12-16-19 @ 22:40]   --  .5 (Ca --)      [12-16-19 @ 17:00]   --  HbA1c 7.8      [12-17-19 @ 05:54]    HBsAb 137.3      [08-05-20 @ 17:40]  HBsAg NEGATIVE      [08-05-20 @ 09:00]  HBcAb Nonreactive      [08-05-20 @ 17:40]  HCV 0.09, Nonreactive Hepatitis C AB  S/CO Ratio                        Interpretation  < 1.00                                   Non-Reactive  1.00 - 4.99                         Weakly-Reactive  >= 5.00                                Reactive  Non-Reactive: Aperson with a non-reactive HCV antibody  result is considered uninfected.  No further action is  needed unless recent infection is suspected.  In these  cases, consider repeat testing later to detect  seroconversion..  Weakly-Reactive: HCV antibody test is abnormal, HCV RNA  Qualitative test will follow.  Reactive: HCV antibody test is abnormal, HCV RNA  Qualitative test will follow.  Note: HCV antibody testing is performed on the Abbott   system.      [08-05-20 @ 17:40]      RADIOLOGY & ADDITIONAL STUDIES:

## 2020-08-06 NOTE — BRIEF OPERATIVE NOTE - NSICDXBRIEFPROCEDURE_GEN_ALL_CORE_FT
PROCEDURES:  Cholecystectomy, open, with intraoperative cholangiogram 06-Aug-2020 20:12:41  Branden Angel

## 2020-08-06 NOTE — PROGRESS NOTE ADULT - ASSESSMENT
65M hx CAD s/p PCI (most recent in Feb 2019 w/2 NAHUM, dLAD and pLCx, Echo may 2020 showing mild segmental LV dysfunction, NST May 2020 negative for ischemia) HTN, DM, GERD, ESRD on HD via L AFV (last dialyzed yesterday, 8/4) presenting with acute cholecystitis denying chest pain, SOB, palpitations, or lower extremity edema. Cardiology consult for pre-op clearance for his acute cholecystitis.    #Preop: patient w/ MET approximately 4 (walks up a flight of stairs without SOB, performs daily activities). Prior echo 2/2019- EF was 63%, another Echo in 5/2020 w/ mild segmental LV dysfunction. NST on 5/2020 was negative for ischemia.   ECG   Patient currently denies chest pain, palpitations, SOB, or lower extremity edema.   RCRI score: Class 4 risk; 10.1% 30 day risk of death, MI, or cardiac arrest.   SOLORZANO score 0.2% risk of myocardial infarction or cardiac arrest, intraoperatively or up to 30 days post-op  Will hold his Clopidogrel for now  Patient is an intermediate risk patient for an intermediate risk procedure, no contraindication.    #HTN:  hypertensive urgency w/ sBP elevated to 190's, possibly 2/2 to acute cholecystitis. Normally on labetalol, nifedipine, and clonidine.  - C/w home meds.

## 2020-08-07 LAB
ALBUMIN SERPL ELPH-MCNC: 3.9 G/DL — SIGNIFICANT CHANGE UP (ref 3.3–5)
ALBUMIN SERPL ELPH-MCNC: 3.9 G/DL — SIGNIFICANT CHANGE UP (ref 3.3–5)
ALP SERPL-CCNC: 57 U/L — SIGNIFICANT CHANGE UP (ref 40–120)
ALP SERPL-CCNC: 57 U/L — SIGNIFICANT CHANGE UP (ref 40–120)
ALT FLD-CCNC: 155 U/L — HIGH (ref 4–41)
ALT FLD-CCNC: 155 U/L — HIGH (ref 4–41)
ANION GAP SERPL CALC-SCNC: 19 MMO/L — HIGH (ref 7–14)
ANION GAP SERPL CALC-SCNC: 19 MMO/L — HIGH (ref 7–14)
AST SERPL-CCNC: 155 U/L — HIGH (ref 4–40)
AST SERPL-CCNC: 155 U/L — HIGH (ref 4–40)
BILIRUB DIRECT SERPL-MCNC: 0.3 MG/DL — HIGH (ref 0.1–0.2)
BILIRUB SERPL-MCNC: 0.8 MG/DL — SIGNIFICANT CHANGE UP (ref 0.2–1.2)
BILIRUB SERPL-MCNC: 0.8 MG/DL — SIGNIFICANT CHANGE UP (ref 0.2–1.2)
BUN SERPL-MCNC: 22 MG/DL — SIGNIFICANT CHANGE UP (ref 7–23)
BUN SERPL-MCNC: 50 MG/DL — HIGH (ref 7–23)
CALCIUM SERPL-MCNC: 8.2 MG/DL — LOW (ref 8.4–10.5)
CALCIUM SERPL-MCNC: 9.1 MG/DL — SIGNIFICANT CHANGE UP (ref 8.4–10.5)
CHLORIDE SERPL-SCNC: 91 MMOL/L — LOW (ref 98–107)
CHLORIDE SERPL-SCNC: 93 MMOL/L — LOW (ref 98–107)
CO2 SERPL-SCNC: 22 MMOL/L — SIGNIFICANT CHANGE UP (ref 22–31)
CO2 SERPL-SCNC: 25 MMOL/L — SIGNIFICANT CHANGE UP (ref 22–31)
CREAT SERPL-MCNC: 4.87 MG/DL — HIGH (ref 0.5–1.3)
CREAT SERPL-MCNC: 9.25 MG/DL — HIGH (ref 0.5–1.3)
CULTURE RESULTS: SIGNIFICANT CHANGE UP
GLUCOSE BLDC GLUCOMTR-MCNC: 106 MG/DL — HIGH (ref 70–99)
GLUCOSE BLDC GLUCOMTR-MCNC: 113 MG/DL — HIGH (ref 70–99)
GLUCOSE BLDC GLUCOMTR-MCNC: 113 MG/DL — HIGH (ref 70–99)
GLUCOSE BLDC GLUCOMTR-MCNC: 138 MG/DL — HIGH (ref 70–99)
GLUCOSE BLDC GLUCOMTR-MCNC: 172 MG/DL — HIGH (ref 70–99)
GLUCOSE BLDC GLUCOMTR-MCNC: 199 MG/DL — HIGH (ref 70–99)
GLUCOSE BLDC GLUCOMTR-MCNC: 99 MG/DL — SIGNIFICANT CHANGE UP (ref 70–99)
GLUCOSE SERPL-MCNC: 111 MG/DL — HIGH (ref 70–99)
GLUCOSE SERPL-MCNC: 214 MG/DL — HIGH (ref 70–99)
HCT VFR BLD CALC: 35.9 % — LOW (ref 39–50)
HGB BLD-MCNC: 10.8 G/DL — LOW (ref 13–17)
MAGNESIUM SERPL-MCNC: 2.4 MG/DL — SIGNIFICANT CHANGE UP (ref 1.6–2.6)
MCHC RBC-ENTMCNC: 30.1 % — LOW (ref 32–36)
MCHC RBC-ENTMCNC: 30.9 PG — SIGNIFICANT CHANGE UP (ref 27–34)
MCV RBC AUTO: 102.6 FL — HIGH (ref 80–100)
NRBC # FLD: 0 K/UL — SIGNIFICANT CHANGE UP (ref 0–0)
PHOSPHATE SERPL-MCNC: 7.8 MG/DL — HIGH (ref 2.5–4.5)
PLATELET # BLD AUTO: 128 K/UL — LOW (ref 150–400)
PMV BLD: 12.4 FL — SIGNIFICANT CHANGE UP (ref 7–13)
POTASSIUM SERPL-MCNC: 4 MMOL/L — SIGNIFICANT CHANGE UP (ref 3.5–5.3)
POTASSIUM SERPL-MCNC: 6.5 MMOL/L — CRITICAL HIGH (ref 3.5–5.3)
POTASSIUM SERPL-SCNC: 4 MMOL/L — SIGNIFICANT CHANGE UP (ref 3.5–5.3)
POTASSIUM SERPL-SCNC: 6.5 MMOL/L — CRITICAL HIGH (ref 3.5–5.3)
PROT SERPL-MCNC: 7.5 G/DL — SIGNIFICANT CHANGE UP (ref 6–8.3)
PROT SERPL-MCNC: 7.5 G/DL — SIGNIFICANT CHANGE UP (ref 6–8.3)
RBC # BLD: 3.5 M/UL — LOW (ref 4.2–5.8)
RBC # FLD: 13.8 % — SIGNIFICANT CHANGE UP (ref 10.3–14.5)
SODIUM SERPL-SCNC: 134 MMOL/L — LOW (ref 135–145)
SODIUM SERPL-SCNC: 135 MMOL/L — SIGNIFICANT CHANGE UP (ref 135–145)
SPECIMEN SOURCE: SIGNIFICANT CHANGE UP
WBC # BLD: 12.35 K/UL — HIGH (ref 3.8–10.5)
WBC # FLD AUTO: 12.35 K/UL — HIGH (ref 3.8–10.5)

## 2020-08-07 PROCEDURE — 93010 ELECTROCARDIOGRAM REPORT: CPT

## 2020-08-07 PROCEDURE — 99233 SBSQ HOSP IP/OBS HIGH 50: CPT

## 2020-08-07 RX ORDER — SODIUM CHLORIDE 9 MG/ML
1000 INJECTION INTRAMUSCULAR; INTRAVENOUS; SUBCUTANEOUS
Refills: 0 | Status: DISCONTINUED | OUTPATIENT
Start: 2020-08-07 | End: 2020-08-08

## 2020-08-07 RX ORDER — SODIUM CHLORIDE 9 MG/ML
1000 INJECTION INTRAMUSCULAR; INTRAVENOUS; SUBCUTANEOUS
Refills: 0 | Status: DISCONTINUED | OUTPATIENT
Start: 2020-08-07 | End: 2020-08-07

## 2020-08-07 RX ORDER — INSULIN LISPRO 100/ML
VIAL (ML) SUBCUTANEOUS
Refills: 0 | Status: DISCONTINUED | OUTPATIENT
Start: 2020-08-07 | End: 2020-08-09

## 2020-08-07 RX ORDER — CALCIUM GLUCONATE 100 MG/ML
2 VIAL (ML) INTRAVENOUS ONCE
Refills: 0 | Status: DISCONTINUED | OUTPATIENT
Start: 2020-08-07 | End: 2020-08-07

## 2020-08-07 RX ORDER — INSULIN LISPRO 100/ML
VIAL (ML) SUBCUTANEOUS AT BEDTIME
Refills: 0 | Status: DISCONTINUED | OUTPATIENT
Start: 2020-08-07 | End: 2020-08-09

## 2020-08-07 RX ORDER — DEXTROSE 50 % IN WATER 50 %
12.5 SYRINGE (ML) INTRAVENOUS ONCE
Refills: 0 | Status: DISCONTINUED | OUTPATIENT
Start: 2020-08-07 | End: 2020-08-09

## 2020-08-07 RX ORDER — SODIUM ZIRCONIUM CYCLOSILICATE 10 G/10G
10 POWDER, FOR SUSPENSION ORAL ONCE
Refills: 0 | Status: COMPLETED | OUTPATIENT
Start: 2020-08-07 | End: 2020-08-07

## 2020-08-07 RX ORDER — SODIUM CHLORIDE 9 MG/ML
1000 INJECTION, SOLUTION INTRAVENOUS
Refills: 0 | Status: DISCONTINUED | OUTPATIENT
Start: 2020-08-07 | End: 2020-08-09

## 2020-08-07 RX ORDER — INSULIN HUMAN 100 [IU]/ML
10 INJECTION, SOLUTION SUBCUTANEOUS ONCE
Refills: 0 | Status: COMPLETED | OUTPATIENT
Start: 2020-08-07 | End: 2020-08-07

## 2020-08-07 RX ORDER — DEXTROSE 50 % IN WATER 50 %
25 SYRINGE (ML) INTRAVENOUS ONCE
Refills: 0 | Status: DISCONTINUED | OUTPATIENT
Start: 2020-08-07 | End: 2020-08-09

## 2020-08-07 RX ORDER — CALCIUM GLUCONATE 100 MG/ML
1 VIAL (ML) INTRAVENOUS
Refills: 0 | Status: COMPLETED | OUTPATIENT
Start: 2020-08-07 | End: 2020-08-07

## 2020-08-07 RX ORDER — GLUCAGON INJECTION, SOLUTION 0.5 MG/.1ML
1 INJECTION, SOLUTION SUBCUTANEOUS ONCE
Refills: 0 | Status: DISCONTINUED | OUTPATIENT
Start: 2020-08-07 | End: 2020-08-09

## 2020-08-07 RX ORDER — CALCIUM ACETATE 667 MG
667 TABLET ORAL
Refills: 0 | Status: DISCONTINUED | OUTPATIENT
Start: 2020-08-07 | End: 2020-08-09

## 2020-08-07 RX ORDER — SODIUM CHLORIDE 9 MG/ML
1000 INJECTION, SOLUTION INTRAVENOUS
Refills: 0 | Status: DISCONTINUED | OUTPATIENT
Start: 2020-08-07 | End: 2020-08-07

## 2020-08-07 RX ORDER — DEXTROSE 50 % IN WATER 50 %
15 SYRINGE (ML) INTRAVENOUS ONCE
Refills: 0 | Status: DISCONTINUED | OUTPATIENT
Start: 2020-08-07 | End: 2020-08-09

## 2020-08-07 RX ORDER — DEXTROSE 50 % IN WATER 50 %
50 SYRINGE (ML) INTRAVENOUS ONCE
Refills: 0 | Status: COMPLETED | OUTPATIENT
Start: 2020-08-07 | End: 2020-08-07

## 2020-08-07 RX ADMIN — HYDROMORPHONE HYDROCHLORIDE 30 MILLILITER(S): 2 INJECTION INTRAMUSCULAR; INTRAVENOUS; SUBCUTANEOUS at 01:57

## 2020-08-07 RX ADMIN — HEPARIN SODIUM 5000 UNIT(S): 5000 INJECTION INTRAVENOUS; SUBCUTANEOUS at 05:25

## 2020-08-07 RX ADMIN — Medication 0.1 MILLIGRAM(S): at 18:33

## 2020-08-07 RX ADMIN — LOSARTAN POTASSIUM 25 MILLIGRAM(S): 100 TABLET, FILM COATED ORAL at 05:25

## 2020-08-07 RX ADMIN — SODIUM ZIRCONIUM CYCLOSILICATE 10 GRAM(S): 10 POWDER, FOR SUSPENSION ORAL at 09:54

## 2020-08-07 RX ADMIN — Medication 60 MILLIGRAM(S): at 05:25

## 2020-08-07 RX ADMIN — HEPARIN SODIUM 5000 UNIT(S): 5000 INJECTION INTRAVENOUS; SUBCUTANEOUS at 15:33

## 2020-08-07 RX ADMIN — HYDROMORPHONE HYDROCHLORIDE 30 MILLILITER(S): 2 INJECTION INTRAMUSCULAR; INTRAVENOUS; SUBCUTANEOUS at 08:29

## 2020-08-07 RX ADMIN — HEPARIN SODIUM 5000 UNIT(S): 5000 INJECTION INTRAVENOUS; SUBCUTANEOUS at 22:01

## 2020-08-07 RX ADMIN — Medication 50 MILLILITER(S): at 09:54

## 2020-08-07 RX ADMIN — Medication 50 GRAM(S): at 10:43

## 2020-08-07 RX ADMIN — Medication 0.1 MILLIGRAM(S): at 05:24

## 2020-08-07 RX ADMIN — Medication 1: at 06:53

## 2020-08-07 RX ADMIN — HYDROMORPHONE HYDROCHLORIDE 30 MILLILITER(S): 2 INJECTION INTRAMUSCULAR; INTRAVENOUS; SUBCUTANEOUS at 20:36

## 2020-08-07 RX ADMIN — Medication 400 MILLIGRAM(S): at 18:36

## 2020-08-07 RX ADMIN — INSULIN HUMAN 10 UNIT(S): 100 INJECTION, SOLUTION SUBCUTANEOUS at 09:53

## 2020-08-07 NOTE — PROGRESS NOTE ADULT - SUBJECTIVE AND OBJECTIVE BOX
INTERVAL EVENTS:    PAST MEDICAL & SURGICAL HISTORY:  Anemia due to stage 5 chronic kidney disease, not on chronic dialysis  Cerebrovascular accident (CVA), unspecified mechanism: diagnosed via CT of the head  Hypercholesterolemia  ESRD (end stage renal disease): on Dialysis( M/W/F), By Dr. Huff  Stented coronary artery: 2014, 3 stents, [facility]  GERD (gastroesophageal reflux disease)  Diabetes mellitus: type 2  CAD (coronary artery disease)  CAP (community acquired pneumonia): 6/18  Coronary artery disease  CKD (chronic kidney disease)  HTN (hypertension)  Diabetes: Insulin controlled  H/O eye surgery  AV fistula: 10/12/18 L radiocephalic AV fistula  H/O coronary angiogram: 2014 - x3 stents  No significant past surgical history      MEDICATIONS  (STANDING):  calcium gluconate IVPB 2 Gram(s) IV Intermittent once  chlorhexidine 4% Liquid 1 Application(s) Topical daily  cloNIDine 0.1 milliGRAM(s) Oral two times a day  dextrose 50% Injectable 12.5 Gram(s) IV Push once  dextrose 50% Injectable 25 Gram(s) IV Push once  dextrose 50% Injectable 25 Gram(s) IV Push once  dextrose 50% Injectable 50 milliLiter(s) IV Push once  heparin   Injectable 5000 Unit(s) SubCutaneous every 8 hours  HYDROmorphone  Injectable 0.5 milliGRAM(s) IV Push every 10 minutes  HYDROmorphone PCA (1 mG/mL) 30 milliLiter(s) PCA Continuous PCA Continuous  insulin lispro (HumaLOG) corrective regimen sliding scale   SubCutaneous every 6 hours  insulin regular  human recombinant 10 Unit(s) IV Push once  labetalol 400 milliGRAM(s) Oral every 12 hours  losartan 25 milliGRAM(s) Oral daily  NIFEdipine XL 60 milliGRAM(s) Oral daily  sodium chloride 0.9%. 1000 milliLiter(s) (30 mL/Hr) IV Continuous <Continuous>  sodium zirconium cyclosilicate 10 Gram(s) Oral once    MEDICATIONS  (PRN):  acetaminophen   Tablet .. 650 milliGRAM(s) Oral every 6 hours PRN Mild Pain (1 - 3)  dextrose 40% Gel 15 Gram(s) Oral once PRN Blood Glucose LESS THAN 70 milliGRAM(s)/deciliter  glucagon  Injectable 1 milliGRAM(s) IntraMuscular once PRN Glucose LESS THAN 70 milligrams/deciliter  HYDROmorphone PCA (1 mG/mL) Rescue Clinician Bolus 0.5 milliGRAM(s) IV Push every 15 minutes PRN for Pain Scale GREATER THAN 6  naloxone Injectable 0.1 milliGRAM(s) IV Push every 3 minutes PRN For ANY of the following changes in patient status:  A. RR LESS THAN 10 breaths per minute, B. Oxygen saturation LESS THAN 90%, C. Sedation score of 6  ondansetron Injectable 4 milliGRAM(s) IV Push every 6 hours PRN Nausea      Vital Signs Last 24 Hrs  T(C): 36.7 (07 Aug 2020 05:30), Max: 37.1 (06 Aug 2020 11:39)  T(F): 98 (07 Aug 2020 05:30), Max: 98.7 (06 Aug 2020 11:39)  HR: 57 (07 Aug 2020 05:30) (56 - 63)  BP: 141/70 (07 Aug 2020 05:30) (103/52 - 148/71)  BP(mean): 80 (06 Aug 2020 23:45) (65 - 80)  RR: 17 (07 Aug 2020 05:30) (11 - 18)  SpO2: 97% (07 Aug 2020 05:30) (96% - 100%)     PHYSICAL EXAM:  GEN: Awake, alert. NAD.   HEENT: NCAT, PERRL, EOMI. Mucosa moist. No JVD.  RESP: CTA b/l  CV: RRR. Normal S1/S2. No m/r/g.  ABD: Soft. NT/ND. BS+  EXT: Warm. No edema, clubbing, or cyanosis.   NEURO: AAOx3. No focal deficits.     LABS:                        10.8   12.35 )-----------( 128      ( 07 Aug 2020 06:15 )             35.9     08-07    134<L>  |  93<L>  |  50<H>  ----------------------------<  214<H>  6.5<HH>   |  22  |  9.25<H>    Ca    8.2<L>      07 Aug 2020 06:15  Phos  7.8     08-07  Mg     2.4     08-07    TPro  7.5  /  Alb  3.9  /  TBili  0.8  /  DBili  0.3<H>  /  AST  155<H>  /  ALT  155<H>  /  AlkPhos  57  08-07        PT/INR - ( 06 Aug 2020 09:45 )   PT: 12.8 SEC;   INR: 1.12          PTT - ( 06 Aug 2020 09:45 )  PTT:31.1 SEC    I&O's Summary    06 Aug 2020 07:01  -  07 Aug 2020 07:00  --------------------------------------------------------  IN: 100 mL / OUT: 0 mL / NET: 100 mL      BNP  RADIOLOGY & ADDITIONAL STUDIES:    TELEMETRY:    EKG: INTERVAL EVENTS:    Patient was seen and evaluated at the bedside. No chest pain or SOB before dialysis. Mild abdominal pain from open cholecystectomy.    PAST MEDICAL & SURGICAL HISTORY:  Anemia due to stage 5 chronic kidney disease, not on chronic dialysis  Cerebrovascular accident (CVA), unspecified mechanism: diagnosed via CT of the head  Hypercholesterolemia  ESRD (end stage renal disease): on Dialysis( M/W/F), By Dr. Huff  Stented coronary artery: 2014, 3 stents, [facility]  GERD (gastroesophageal reflux disease)  Diabetes mellitus: type 2  CAD (coronary artery disease)  CAP (community acquired pneumonia): 6/18  Coronary artery disease  CKD (chronic kidney disease)  HTN (hypertension)  Diabetes: Insulin controlled  H/O eye surgery  AV fistula: 10/12/18 L radiocephalic AV fistula  H/O coronary angiogram: 2014 - x3 stents  No significant past surgical history      MEDICATIONS  (STANDING):  calcium gluconate IVPB 2 Gram(s) IV Intermittent once  chlorhexidine 4% Liquid 1 Application(s) Topical daily  cloNIDine 0.1 milliGRAM(s) Oral two times a day  dextrose 50% Injectable 12.5 Gram(s) IV Push once  dextrose 50% Injectable 25 Gram(s) IV Push once  dextrose 50% Injectable 25 Gram(s) IV Push once  dextrose 50% Injectable 50 milliLiter(s) IV Push once  heparin   Injectable 5000 Unit(s) SubCutaneous every 8 hours  HYDROmorphone  Injectable 0.5 milliGRAM(s) IV Push every 10 minutes  HYDROmorphone PCA (1 mG/mL) 30 milliLiter(s) PCA Continuous PCA Continuous  insulin lispro (HumaLOG) corrective regimen sliding scale   SubCutaneous every 6 hours  insulin regular  human recombinant 10 Unit(s) IV Push once  labetalol 400 milliGRAM(s) Oral every 12 hours  losartan 25 milliGRAM(s) Oral daily  NIFEdipine XL 60 milliGRAM(s) Oral daily  sodium chloride 0.9%. 1000 milliLiter(s) (30 mL/Hr) IV Continuous <Continuous>  sodium zirconium cyclosilicate 10 Gram(s) Oral once    MEDICATIONS  (PRN):  acetaminophen   Tablet .. 650 milliGRAM(s) Oral every 6 hours PRN Mild Pain (1 - 3)  dextrose 40% Gel 15 Gram(s) Oral once PRN Blood Glucose LESS THAN 70 milliGRAM(s)/deciliter  glucagon  Injectable 1 milliGRAM(s) IntraMuscular once PRN Glucose LESS THAN 70 milligrams/deciliter  HYDROmorphone PCA (1 mG/mL) Rescue Clinician Bolus 0.5 milliGRAM(s) IV Push every 15 minutes PRN for Pain Scale GREATER THAN 6  naloxone Injectable 0.1 milliGRAM(s) IV Push every 3 minutes PRN For ANY of the following changes in patient status:  A. RR LESS THAN 10 breaths per minute, B. Oxygen saturation LESS THAN 90%, C. Sedation score of 6  ondansetron Injectable 4 milliGRAM(s) IV Push every 6 hours PRN Nausea      Vital Signs Last 24 Hrs  T(C): 36.7 (07 Aug 2020 05:30), Max: 37.1 (06 Aug 2020 11:39)  T(F): 98 (07 Aug 2020 05:30), Max: 98.7 (06 Aug 2020 11:39)  HR: 57 (07 Aug 2020 05:30) (56 - 63)  BP: 141/70 (07 Aug 2020 05:30) (103/52 - 148/71)  BP(mean): 80 (06 Aug 2020 23:45) (65 - 80)  RR: 17 (07 Aug 2020 05:30) (11 - 18)  SpO2: 97% (07 Aug 2020 05:30) (96% - 100%)     PHYSICAL EXAM:  GEN: Awake, alert. NAD.   HEENT: NCAT, PERRL, EOMI. Mucosa moist. No JVD.  RESP: CTA b/l  CV: RRR. Normal S1/S2. No m/r/g.  ABD: Soft. NT/ND. BS+  EXT: Warm. No edema, clubbing, or cyanosis.   NEURO: AAOx3. No focal deficits.     LABS:                        10.8   12.35 )-----------( 128      ( 07 Aug 2020 06:15 )             35.9     08-07    134<L>  |  93<L>  |  50<H>  ----------------------------<  214<H>  6.5<HH>   |  22  |  9.25<H>    Ca    8.2<L>      07 Aug 2020 06:15  Phos  7.8     08-07  Mg     2.4     08-07    TPro  7.5  /  Alb  3.9  /  TBili  0.8  /  DBili  0.3<H>  /  AST  155<H>  /  ALT  155<H>  /  AlkPhos  57  08-07        PT/INR - ( 06 Aug 2020 09:45 )   PT: 12.8 SEC;   INR: 1.12          PTT - ( 06 Aug 2020 09:45 )  PTT:31.1 SEC    I&O's Summary    06 Aug 2020 07:01  -  07 Aug 2020 07:00  --------------------------------------------------------  IN: 100 mL / OUT: 0 mL / NET: 100 mL

## 2020-08-07 NOTE — PROGRESS NOTE ADULT - SUBJECTIVE AND OBJECTIVE BOX
[facility] [facility]   MD ANDRES AVALOS DO ANAM SIDDIQUI ANGELA WONG, PA    TEL:  OFFICE: [phone]  DR CUETO CELL: [phone]  DR. HUNTER CELL: [phone]  DR. HORTA CELL: [phone]  LIDA WINTERS CELL: [phone]    From 5pm-7am Answering Service [phone]    Patient is a 65y old  Male who presents with a chief complaint of acute cholecystitis (07 Aug 2020 10:12)      Patient seen and examined in HD. No chest pain/sob    VITALS:  T(F): 97.5 (08-07-20 @ 11:05), Max: 98.1 (08-06-20 @ 20:00)  HR: 64 (08-07-20 @ 11:05)  BP: 128/69 (08-07-20 @ 11:05)  RR: 16 (08-07-20 @ 11:05)  SpO2: 98% (08-07-20 @ 11:05)  Wt(kg): --  flow 400    08-06 @ 07:01  -  08-07 @ 07:00  --------------------------------------------------------  IN: 100 mL / OUT: 0 mL / NET: 100 mL          PHYSICAL EXAM:  Constitutional: NAD  Neck: No JVD  Respiratory: CTAB, no wheezes, rales or rhonchi  Cardiovascular: S1, S2, RRR  Gastrointestinal: + dressing d/c/i. + tender  Extremities: No peripheral edema    Hospital Medications:   MEDICATIONS  (STANDING):  calcium gluconate IVPB 1 Gram(s) IV Intermittent <User Schedule>  chlorhexidine 4% Liquid 1 Application(s) Topical daily  cloNIDine 0.1 milliGRAM(s) Oral two times a day  dextrose 50% Injectable 12.5 Gram(s) IV Push once  dextrose 50% Injectable 25 Gram(s) IV Push once  dextrose 50% Injectable 25 Gram(s) IV Push once  heparin   Injectable 5000 Unit(s) SubCutaneous every 8 hours  HYDROmorphone  Injectable 0.5 milliGRAM(s) IV Push every 10 minutes  HYDROmorphone PCA (1 mG/mL) 30 milliLiter(s) PCA Continuous PCA Continuous  insulin lispro (HumaLOG) corrective regimen sliding scale   SubCutaneous every 6 hours  labetalol 400 milliGRAM(s) Oral every 12 hours  losartan 25 milliGRAM(s) Oral daily  NIFEdipine XL 60 milliGRAM(s) Oral daily  sodium chloride 0.9%. 1000 milliLiter(s) (30 mL/Hr) IV Continuous <Continuous>      LABS:  08-07    134<L>  |  93<L>  |  50<H>  ----------------------------<  214<H>  6.5<HH>   |  22  |  9.25<H>    Ca    8.2<L>      07 Aug 2020 06:15  Phos  7.8     08-07  Mg     2.4     08-07    TPro  7.5  /  Alb  3.9  /  TBili  0.8  /  DBili  0.3<H>  /  AST  155<H>  /  ALT  155<H>  /  AlkPhos  57  08-07    Creatinine Trend: 9.25 <--, 7.45 <--, 6.07 <--, 9.34 <--, 9.05 <--    Phosphorus Level, Serum: 7.8 mg/dL (08-07 @ 06:15)  Albumin, Serum: 3.9 g/dL (08-07 @ 06:15)  Albumin, Serum: 3.9 g/dL (08-07 @ 06:15)                              10.8   12.35 )-----------( 128      ( 07 Aug 2020 06:15 )             35.9     Urine Studies:      .9 (Ca --)      [12-16-19 @ 22:40]   --  .5 (Ca --)      [12-16-19 @ 17:00]   --  HbA1c 7.8      [12-17-19 @ 05:54]    HBsAb 137.3      [08-05-20 @ 17:40]  HBsAg NEGATIVE      [08-05-20 @ 09:00]  HBcAb Nonreactive      [08-05-20 @ 17:40]  HCV 0.09, Nonreactive Hepatitis C AB  S/CO Ratio                        Interpretation  < 1.00                                   Non-Reactive  1.00 - 4.99                         Weakly-Reactive  >= 5.00                                Reactive  Non-Reactive: Aperson with a non-reactive HCV antibody  result is considered uninfected.  No further action is  needed unless recent infection is suspected.  In these  cases, consider repeat testing later to detect  seroconversion..  Weakly-Reactive: HCV antibody test is abnormal, HCV RNA  Qualitative test will follow.  Reactive: HCV antibody test is abnormal, HCV RNA  Qualitative test will follow.  Note: HCV antibody testing is performed on the Abbott   system.      [08-05-20 @ 17:40]      RADIOLOGY & ADDITIONAL STUDIES:

## 2020-08-07 NOTE — CHART NOTE - NSCHARTNOTEFT_GEN_A_CORE
POST-OPERATIVE NOTE    Patient is s/p laparoscopic cholecystectomy converted to open cholecystectomy 2/2 aberrant anatomy on IOC    Subjective:  Patient reports pain at the incisional site, controlled with medication  Denies chest pain, shortness of breath, nausea, vomiting  Is not yet passing gas or having bowel movements  Not yet urinating independently or ambulating independently    Vital Signs Last 24 Hrs  T(C): 36.6 (07 Aug 2020 02:00), Max: 39 (06 Aug 2020 07:09)  T(F): 97.8 (07 Aug 2020 02:00), Max: 102.2 (06 Aug 2020 07:09)  HR: 61 (07 Aug 2020 02:00) (56 - 75)  BP: 140/76 (07 Aug 2020 02:00) (103/52 - 177/65)  BP(mean): 80 (06 Aug 2020 23:45) (65 - 80)  RR: 16 (07 Aug 2020 02:00) (11 - 18)  SpO2: 97% (07 Aug 2020 02:00) (96% - 100%)  I&O's Detail    05 Aug 2020 07:01  -  06 Aug 2020 07:00  --------------------------------------------------------  IN:    Other: 400 mL  Total IN: 400 mL    OUT:    Other: 1400 mL  Total OUT: 1400 mL    Total NET: -1000 mL      06 Aug 2020 07:01  -  07 Aug 2020 03:28  --------------------------------------------------------  IN:    Oral Fluid: 60 mL  Total IN: 60 mL    OUT:  Total OUT: 0 mL    Total NET: 60 mL        cloNIDine 0.1  heparin   Injectable 5000  labetalol 400  losartan 25  NIFEdipine XL 60    PAST MEDICAL & SURGICAL HISTORY:  Anemia due to stage 5 chronic kidney disease, not on chronic dialysis  Cerebrovascular accident (CVA), unspecified mechanism: diagnosed via CT of the head  Hypercholesterolemia  ESRD (end stage renal disease): on Dialysis( M/W/F), By Dr. Huff  Stented coronary artery: 2014, 3 stents, [facility]  GERD (gastroesophageal reflux disease)  Diabetes mellitus: type 2  CAP (community acquired pneumonia): 6/18  Coronary artery disease  HTN (hypertension)  H/O eye surgery  AV fistula: 10/12/18 L radiocephalic AV fistula  H/O coronary angiogram: 2014 - x3 stents        Physical Exam:  General: NAD, resting comfortably in bed  Pulmonary: Nonlabored breathing, no respiratory distress, CTAB  Cardiovascular: NSR, no murmurs or rubs  Abdominal: soft, appropriately tender, nondistended. Incisions CDI, dressed with gauze and tape.   Extremities: WWP      LABS:                        12.2   13.57 )-----------( 122      ( 06 Aug 2020 06:20 )             38.1     08-06    132<L>  |  89<L>  |  27<H>  ----------------------------<  131<H>  5.1   |  24  |  7.45<H>    Ca    8.8      06 Aug 2020 06:20  Phos  4.7     08-06  Mg     1.9     08-06    TPro  7.8  /  Alb  4.1  /  TBili  1.5<H>  /  DBili  x   /  AST  12  /  ALT  6   /  AlkPhos  63  08-06    PT/INR - ( 06 Aug 2020 09:45 )   PT: 12.8 SEC;   INR: 1.12          PTT - ( 06 Aug 2020 09:45 )  PTT:31.1 SEC  CAPILLARY BLOOD GLUCOSE      POCT Blood Glucose.: 231 mg/dL (06 Aug 2020 22:43)  POCT Blood Glucose.: 145 mg/dL (06 Aug 2020 11:49)  POCT Blood Glucose.: 126 mg/dL (06 Aug 2020 06:23)      Radiology and Additional Studies:    Assessment:  The patient is a 65y Male who is now 6 hours post-op from a lap yimi, converted to open    Plan:  - Pain control as needed, on PCA  - NPO for MRCP  - DVT ppx  - Dialysis today  - F/u AM labs, LFTs    Sampson Bowers, PGY-1  B Surgery Pager #[phone]

## 2020-08-07 NOTE — PROGRESS NOTE ADULT - SUBJECTIVE AND OBJECTIVE BOX
Anesthesia Pain Management Service    SUBJECTIVE: Patient is doing well with IV PCA and no significant problems reported.    Pain Scale Score	At rest: ___ 	With Activity: ___ 	[X ] Refer to charted pain scores    THERAPY:    [ ] IV PCA Morphine		[ ] 5 mg/mL	[ ] 1 mg/mL  [X ] IV PCA Hydromorphone	[ ] 5 mg/mL	[X ] 1 mg/mL  [ ] IV PCA Fentanyl		[ ] 50 micrograms/mL    Demand dose __0.2_ lockout __6_ (minutes) Continuous Rate _0__ Total: ___  Daily      MEDICATIONS  (STANDING):  calcium acetate 667 milliGRAM(s) Oral three times a day with meals  chlorhexidine 4% Liquid 1 Application(s) Topical daily  cloNIDine 0.1 milliGRAM(s) Oral two times a day  dextrose 50% Injectable 12.5 Gram(s) IV Push once  dextrose 50% Injectable 25 Gram(s) IV Push once  dextrose 50% Injectable 25 Gram(s) IV Push once  heparin   Injectable 5000 Unit(s) SubCutaneous every 8 hours  HYDROmorphone  Injectable 0.5 milliGRAM(s) IV Push every 10 minutes  HYDROmorphone PCA (1 mG/mL) 30 milliLiter(s) PCA Continuous PCA Continuous  insulin lispro (HumaLOG) corrective regimen sliding scale   SubCutaneous every 6 hours  labetalol 400 milliGRAM(s) Oral every 12 hours  losartan 25 milliGRAM(s) Oral daily  NIFEdipine XL 60 milliGRAM(s) Oral daily    MEDICATIONS  (PRN):  acetaminophen   Tablet .. 650 milliGRAM(s) Oral every 6 hours PRN Mild Pain (1 - 3)  dextrose 40% Gel 15 Gram(s) Oral once PRN Blood Glucose LESS THAN 70 milliGRAM(s)/deciliter  glucagon  Injectable 1 milliGRAM(s) IntraMuscular once PRN Glucose LESS THAN 70 milligrams/deciliter  HYDROmorphone PCA (1 mG/mL) Rescue Clinician Bolus 0.5 milliGRAM(s) IV Push every 15 minutes PRN for Pain Scale GREATER THAN 6  naloxone Injectable 0.1 milliGRAM(s) IV Push every 3 minutes PRN For ANY of the following changes in patient status:  A. RR LESS THAN 10 breaths per minute, B. Oxygen saturation LESS THAN 90%, C. Sedation score of 6  ondansetron Injectable 4 milliGRAM(s) IV Push every 6 hours PRN Nausea      OBJECTIVE:    Sedation Score:	[ X] Alert	[ ] Drowsy 	[ ] Arousable	[ ] Asleep	[ ] Unresponsive    Side Effects:	[X ] None	[ ] Nausea	[ ] Vomiting	[ ] Pruritus  		[ ] Other:    Vital Signs Last 24 Hrs  T(C): 36.6 (07 Aug 2020 14:10), Max: 36.7 (06 Aug 2020 20:00)  T(F): 97.9 (07 Aug 2020 14:10), Max: 98.1 (06 Aug 2020 20:00)  HR: 67 (07 Aug 2020 14:10) (56 - 67)  BP: 141/65 (07 Aug 2020 14:10) (103/52 - 147/77)  BP(mean): 80 (06 Aug 2020 23:45) (65 - 80)  RR: 16 (07 Aug 2020 14:10) (11 - 18)  SpO2: 98% (07 Aug 2020 14:10) (95% - 100%)    ASSESSMENT/ PLAN    Therapy to  be:	[ X] Continue   [ ] Discontinued   [ ] Change to prn Analgesics    Documentation and Verification of current medications:   [X] Done	[ ] Not done, not elligible    Comments: Patient continues to be NPO, will continue IVPCA as pain is adequately controlled with PCA and continue to follow.

## 2020-08-07 NOTE — PROGRESS NOTE ADULT - ATTENDING COMMENTS
Uncontrolled HTN in ESRD pt  DAPT when ok from surgical standpoint, ok to hold Plavix perioperatively
BP optimal, continue current medications  No further cardiac recommendations, Plavix on hold  Cardiology will sign off please reconsult prn, pt to follow up with outpt Cardiologist, Edin Johnson

## 2020-08-07 NOTE — PROGRESS NOTE ADULT - ASSESSMENT
65M hx CAD s/p PCI, HTN, DM, GERD, ESRD on HD via L AFV, POD #1 lap --> open cholecystectomy, now with hyperkalemia post op.    Plan:  - NPO except meds/ IVF @ 30cc  - pain control PRN   - Hyperkalemia: appreciate nephrology recs: 10g of Lokelma and hyperkalemia cocktail given.   - HD today at 11:30  - MRCP ordered   - continue to hold Plavix   - c/w VTE prophylaxis with [facility]    B TEAM  [id number]

## 2020-08-07 NOTE — PROGRESS NOTE ADULT - SUBJECTIVE AND OBJECTIVE BOX
Subjective:  Patient s/p laparoscopic converted to open cholecystectomy.   Patient seen and examined at bedside with surgical team during morning rounds. Patient states that he is having pain, but isn't pressing his PCA button much. Patient instructed to press PCA button when he has pain.  Denies bowel function and N/V.      General: Lying in bed in NAD, appears uncomfortable.  Neuro: A&Ox3  Skin: No pallor or jaundice noted.  Head: NCAT, no visible lesions   Chest: no visible deformity, nonlabored respirations   Abdomen: mildly distended,  + appropriate perinicisional tenderness. Incisions clean and intact, mildly saturated.      T(C): 36.7 (08-07-20 @ 05:30), Max: 37.1 (08-06-20 @ 11:39)  HR: 57 (08-07-20 @ 05:30) (56 - 63)  BP: 141/70 (08-07-20 @ 05:30) (103/52 - 148/71)  RR: 17 (08-07-20 @ 05:30) (11 - 18)  SpO2: 97% (08-07-20 @ 05:30) (96% - 100%)      08-06-20 @ 07:01  -  08-07-20 @ 07:00  --------------------------------------------------------  IN: 100 mL / OUT: 0 mL / NET: 100 mL        LABS:  cret                        10.8   12.35 )-----------( 128      ( 07 Aug 2020 06:15 )             35.9     08-07    134<L>  |  93<L>  |  50<H>  ----------------------------<  214<H>  6.5<HH>   |  22  |  9.25<H>    Ca    8.2<L>      07 Aug 2020 06:15  Phos  7.8     08-07  Mg     2.4     08-07    TPro  7.5  /  Alb  3.9  /  TBili  0.8  /  DBili  0.3<H>  /  AST  155<H>  /  ALT  155<H>  /  AlkPhos  57  08-07    PT/INR - ( 06 Aug 2020 09:45 )   PT: 12.8 SEC;   INR: 1.12          PTT - ( 06 Aug 2020 09:45 )  PTT:31.1 SEC      acetaminophen   Tablet .. 650 milliGRAM(s) Oral every 6 hours PRN  calcium gluconate IVPB 2 Gram(s) IV Intermittent once  chlorhexidine 4% Liquid 1 Application(s) Topical daily  cloNIDine 0.1 milliGRAM(s) Oral two times a day  dextrose 40% Gel 15 Gram(s) Oral once PRN  dextrose 50% Injectable 12.5 Gram(s) IV Push once  dextrose 50% Injectable 25 Gram(s) IV Push once  dextrose 50% Injectable 25 Gram(s) IV Push once  dextrose 50% Injectable 50 milliLiter(s) IV Push once  glucagon  Injectable 1 milliGRAM(s) IntraMuscular once PRN  heparin   Injectable 5000 Unit(s) SubCutaneous every 8 hours  HYDROmorphone  Injectable 0.5 milliGRAM(s) IV Push every 10 minutes  HYDROmorphone PCA (1 mG/mL) 30 milliLiter(s) PCA Continuous PCA Continuous  HYDROmorphone PCA (1 mG/mL) Rescue Clinician Bolus 0.5 milliGRAM(s) IV Push every 15 minutes PRN  insulin lispro (HumaLOG) corrective regimen sliding scale   SubCutaneous every 6 hours  insulin regular  human recombinant 10 Unit(s) IV Push once  labetalol 400 milliGRAM(s) Oral every 12 hours  losartan 25 milliGRAM(s) Oral daily  naloxone Injectable 0.1 milliGRAM(s) IV Push every 3 minutes PRN  NIFEdipine XL 60 milliGRAM(s) Oral daily  ondansetron Injectable 4 milliGRAM(s) IV Push every 6 hours PRN  sodium chloride 0.9%. 1000 milliLiter(s) IV Continuous <Continuous>  sodium zirconium cyclosilicate 10 Gram(s) Oral once      Imaging:

## 2020-08-07 NOTE — PROGRESS NOTE ADULT - ASSESSMENT
65M hx CAD s/p PCI (most recent in Feb 2019 w/2 NAHUM, dLAD and pLCx, Echo may 2020 showing mild segmental LV dysfunction, NST May 2020 negative for ischemia) HTN, DM, GERD, ESRD on HD via L AFV (last dialyzed yesterday, 8/4) presenting with acute cholecystitis denying chest pain, SOB, palpitations, or lower extremity edema. Cardiology consult for pre-op clearance for his acute cholecystitis. Abdominal pain s/p open cholecystectomy. From a cardiac perspective, no current complaints, physical exam unremarkable, BP stable on home meds.    #Post-op  - mild abdominal pain from open cholecystectomy  - denies CP or SOB  - no current cardiac issues    #HTN:    - Stable BP   - on home BP meds (amlodipine, labetalol, nifedipine) 65M hx CAD s/p PCI (most recent in Feb 2019 w/2 NAHUM, dLAD and pLCx, Echo may 2020 showing mild segmental LV dysfunction, NST May 2020 negative for ischemia) HTN, DM, GERD, ESRD on HD via L AFV (last dialyzed yesterday, 8/4) presenting with acute cholecystitis denying chest pain, SOB, palpitations, or lower extremity edema. Cardiology consult for pre-op clearance for his acute cholecystitis. Abdominal pain s/p open cholecystectomy. From a cardiac perspective, no current complaints, physical exam unremarkable, BP stable on home meds.    #Post-op  - mild abdominal pain from open cholecystectomy  - denies CP or SOB  - no current cardiac issues    #HTN:    - Stable BP   - on home BP meds (amlodipine, labetalol, nifedipine)    Will discuss with the attending.      Tessie Mckeon MD  Internal Medicine, PGY 1 65M hx CAD s/p PCI (most recent in Feb 2019 w/2 NAHUM, dLAD and pLCx, Echo may 2020 showing mild segmental LV dysfunction, NST May 2020 negative for ischemia) HTN, DM, GERD, ESRD on HD via L AFV (last dialyzed yesterday, 8/4) presenting with acute cholecystitis denying chest pain, SOB, palpitations, or lower extremity edema. Cardiology consult for pre-op clearance for his acute cholecystitis. Abdominal pain s/p open cholecystectomy. From a cardiac perspective, no current complaints, physical exam unremarkable, BP stable on home meds.    #Post-op  - mild abdominal pain from open cholecystectomy  - denies CP or SOB  - no current cardiac issues  - continue to hold on plavix, will benefit w/ outpt f/u with cardiologist, Dr. Johnson      #HTN:    - Stable BP   - on home BP meds (amlodipine, labetalol, nifedipine)    No further interventions from cardiology, signing off on the patient, reconsult PRN.    Discussed with the attending.      Tessie Mckeon MD  Internal Medicine, PGY 1

## 2020-08-07 NOTE — PROGRESS NOTE ADULT - ASSESSMENT
65M hx CAD s/p PCI (most recent in Feb 2019 w/2 NAHUM, dLAD and pLCx, Echo may 2020 showing mild segmental LV dysfunction, NST May 2020 negative for ischemia) HTN, DM, GERD, ESRD on HD via L AFV (last dialyzed yesterday, 8/4) presenting with 2day of RUQ abdominal pain concern for acute cholecystitis     ESRD on HD TTS via AVF  s/p HD 8/5  sec to hyperkalemia  HD consent in chart  Continue HD as ordered. tolerating well. vitals stable. access working well   likely HD sat as well to continue outpatient dialysis schedule  renal diet  monitor bmp    Hyperkalemia  sec to renal failure  HD again today  low k diet  monitor     HTN  controlled  low Na diet  monitor    Anemia  at goal  no need for epo  monitor hb    Ckd-mbd  check pth  hyperphosphatemia start phoslo 667 tid with meal  monitor phos and calcium daily    Abd pain  f/u surgery

## 2020-08-07 NOTE — PROGRESS NOTE ADULT - SUBJECTIVE AND OBJECTIVE BOX
Anesthesia Pain Management Service    SUBJECTIVE: Patient is doing well with IV PCA and no significant problems reported.    Pain Scale Score	At rest: __4_ 	With Activity: ___ 	[X ] Refer to charted pain scores    THERAPY:    [ ] IV PCA Morphine		[ ] 5 mg/mL	[ ] 1 mg/mL  [X ] IV PCA Hydromorphone	[ ] 5 mg/mL	[X ] 1 mg/mL  [ ] IV PCA Fentanyl		[ ] 50 micrograms/mL    Demand dose __0.2_ lockout __6_ (minutes) Continuous Rate _0__ Total: __2_  mg used (in past 24 hours)      MEDICATIONS  (STANDING):  calcium gluconate IVPB 1 Gram(s) IV Intermittent <User Schedule>  chlorhexidine 4% Liquid 1 Application(s) Topical daily  cloNIDine 0.1 milliGRAM(s) Oral two times a day  dextrose 50% Injectable 12.5 Gram(s) IV Push once  dextrose 50% Injectable 25 Gram(s) IV Push once  dextrose 50% Injectable 25 Gram(s) IV Push once  heparin   Injectable 5000 Unit(s) SubCutaneous every 8 hours  HYDROmorphone  Injectable 0.5 milliGRAM(s) IV Push every 10 minutes  HYDROmorphone PCA (1 mG/mL) 30 milliLiter(s) PCA Continuous PCA Continuous  insulin lispro (HumaLOG) corrective regimen sliding scale   SubCutaneous every 6 hours  labetalol 400 milliGRAM(s) Oral every 12 hours  losartan 25 milliGRAM(s) Oral daily  NIFEdipine XL 60 milliGRAM(s) Oral daily  sodium chloride 0.9%. 1000 milliLiter(s) (30 mL/Hr) IV Continuous <Continuous>    MEDICATIONS  (PRN):  acetaminophen   Tablet .. 650 milliGRAM(s) Oral every 6 hours PRN Mild Pain (1 - 3)  dextrose 40% Gel 15 Gram(s) Oral once PRN Blood Glucose LESS THAN 70 milliGRAM(s)/deciliter  glucagon  Injectable 1 milliGRAM(s) IntraMuscular once PRN Glucose LESS THAN 70 milligrams/deciliter  HYDROmorphone PCA (1 mG/mL) Rescue Clinician Bolus 0.5 milliGRAM(s) IV Push every 15 minutes PRN for Pain Scale GREATER THAN 6  naloxone Injectable 0.1 milliGRAM(s) IV Push every 3 minutes PRN For ANY of the following changes in patient status:  A. RR LESS THAN 10 breaths per minute, B. Oxygen saturation LESS THAN 90%, C. Sedation score of 6  ondansetron Injectable 4 milliGRAM(s) IV Push every 6 hours PRN Nausea      OBJECTIVE: laying in bed     Sedation Score:	[ X] Alert	[ ] Drowsy 	[ ] Arousable	[ ] Asleep	[ ] Unresponsive    Side Effects:	[X ] None	[ ] Nausea	[ ] Vomiting	[ ] Pruritus  		[ ] Other:    Vital Signs Last 24 Hrs  T(C): 36.3 (07 Aug 2020 10:10), Max: 37.1 (06 Aug 2020 11:39)  T(F): 97.4 (07 Aug 2020 10:10), Max: 98.7 (06 Aug 2020 11:39)  HR: 58 (07 Aug 2020 10:10) (56 - 63)  BP: 147/77 (07 Aug 2020 10:10) (103/52 - 147/77)  BP(mean): 80 (06 Aug 2020 23:45) (65 - 80)  RR: 17 (07 Aug 2020 10:10) (11 - 18)  SpO2: 95% (07 Aug 2020 10:10) (95% - 100%)    ASSESSMENT/ PLAN    Therapy to  be:	[ X] Continue   [ ] Discontinued   [ ] Change to prn Analgesics    Documentation and Verification of current medications:   [X] Done	[ ] Not done, not elligible    Comments: Recommend non-opioid adjuvant analgesics to be used when possible and when allowed by primary surgical team.    Progress Note written now but Patient was seen earlier.

## 2020-08-07 NOTE — CHART NOTE - NSCHARTNOTEFT_GEN_A_CORE
Potassium on morning labs came back as 6.5, nonhemolyzed.  Patient for dialysis today,  dialysis was contacted promptly - patient for HD (second shift) at 11:30 AM. Unable to be pushed earlier because first shift of HD was already running.   Called nephrology ISSAC Haines for recommendations for hyperkalemia.  Per recs, we ordered 10 mg Lokelma and hyperkalemia cocktail (10 u of insulin, amp of dextrose, and 2g of calcium gluconate).  EKG was also ordered.      B team surgery   [id number]

## 2020-08-08 LAB
ALBUMIN SERPL ELPH-MCNC: 3.7 G/DL — SIGNIFICANT CHANGE UP (ref 3.3–5)
ALBUMIN SERPL ELPH-MCNC: 3.7 G/DL — SIGNIFICANT CHANGE UP (ref 3.3–5)
ALP SERPL-CCNC: 58 U/L — SIGNIFICANT CHANGE UP (ref 40–120)
ALP SERPL-CCNC: 58 U/L — SIGNIFICANT CHANGE UP (ref 40–120)
ALT FLD-CCNC: 144 U/L — HIGH (ref 4–41)
ALT FLD-CCNC: 144 U/L — HIGH (ref 4–41)
ANION GAP SERPL CALC-SCNC: 19 MMO/L — HIGH (ref 7–14)
AST SERPL-CCNC: 111 U/L — HIGH (ref 4–40)
AST SERPL-CCNC: 111 U/L — HIGH (ref 4–40)
BILIRUB DIRECT SERPL-MCNC: 0.2 MG/DL — SIGNIFICANT CHANGE UP (ref 0.1–0.2)
BILIRUB SERPL-MCNC: 0.6 MG/DL — SIGNIFICANT CHANGE UP (ref 0.2–1.2)
BILIRUB SERPL-MCNC: 0.6 MG/DL — SIGNIFICANT CHANGE UP (ref 0.2–1.2)
BUN SERPL-MCNC: 37 MG/DL — HIGH (ref 7–23)
CALCIUM SERPL-MCNC: 9.1 MG/DL — SIGNIFICANT CHANGE UP (ref 8.4–10.5)
CHLORIDE SERPL-SCNC: 92 MMOL/L — LOW (ref 98–107)
CO2 SERPL-SCNC: 24 MMOL/L — SIGNIFICANT CHANGE UP (ref 22–31)
CREAT SERPL-MCNC: 6.68 MG/DL — HIGH (ref 0.5–1.3)
GLUCOSE BLDC GLUCOMTR-MCNC: 132 MG/DL — HIGH (ref 70–99)
GLUCOSE BLDC GLUCOMTR-MCNC: 141 MG/DL — HIGH (ref 70–99)
GLUCOSE BLDC GLUCOMTR-MCNC: 148 MG/DL — HIGH (ref 70–99)
GLUCOSE BLDC GLUCOMTR-MCNC: 152 MG/DL — HIGH (ref 70–99)
GLUCOSE BLDC GLUCOMTR-MCNC: 176 MG/DL — HIGH (ref 70–99)
GLUCOSE SERPL-MCNC: 144 MG/DL — HIGH (ref 70–99)
HCT VFR BLD CALC: 35.8 % — LOW (ref 39–50)
HGB BLD-MCNC: 10.4 G/DL — LOW (ref 13–17)
MAGNESIUM SERPL-MCNC: 2.1 MG/DL — SIGNIFICANT CHANGE UP (ref 1.6–2.6)
MCHC RBC-ENTMCNC: 29.1 % — LOW (ref 32–36)
MCHC RBC-ENTMCNC: 29.9 PG — SIGNIFICANT CHANGE UP (ref 27–34)
MCV RBC AUTO: 102.9 FL — HIGH (ref 80–100)
NRBC # FLD: 0 K/UL — SIGNIFICANT CHANGE UP (ref 0–0)
PHOSPHATE SERPL-MCNC: 6.4 MG/DL — HIGH (ref 2.5–4.5)
PLATELET # BLD AUTO: 156 K/UL — SIGNIFICANT CHANGE UP (ref 150–400)
PMV BLD: 11.6 FL — SIGNIFICANT CHANGE UP (ref 7–13)
POTASSIUM SERPL-MCNC: 4.3 MMOL/L — SIGNIFICANT CHANGE UP (ref 3.5–5.3)
POTASSIUM SERPL-SCNC: 4.3 MMOL/L — SIGNIFICANT CHANGE UP (ref 3.5–5.3)
PROT SERPL-MCNC: 7.5 G/DL — SIGNIFICANT CHANGE UP (ref 6–8.3)
PROT SERPL-MCNC: 7.5 G/DL — SIGNIFICANT CHANGE UP (ref 6–8.3)
RBC # BLD: 3.48 M/UL — LOW (ref 4.2–5.8)
RBC # FLD: 13.8 % — SIGNIFICANT CHANGE UP (ref 10.3–14.5)
SODIUM SERPL-SCNC: 135 MMOL/L — SIGNIFICANT CHANGE UP (ref 135–145)
WBC # BLD: 11.09 K/UL — HIGH (ref 3.8–10.5)
WBC # FLD AUTO: 11.09 K/UL — HIGH (ref 3.8–10.5)

## 2020-08-08 PROCEDURE — 74183 MRI ABD W/O CNTR FLWD CNTR: CPT | Mod: 26

## 2020-08-08 RX ORDER — ASPIRIN/CALCIUM CARB/MAGNESIUM 324 MG
81 TABLET ORAL DAILY
Refills: 0 | Status: DISCONTINUED | OUTPATIENT
Start: 2020-08-08 | End: 2020-08-09

## 2020-08-08 RX ORDER — OXYCODONE HYDROCHLORIDE 5 MG/1
2.5 TABLET ORAL
Refills: 0 | Status: DISCONTINUED | OUTPATIENT
Start: 2020-08-08 | End: 2020-08-09

## 2020-08-08 RX ORDER — OXYCODONE HYDROCHLORIDE 5 MG/1
5 TABLET ORAL
Refills: 0 | Status: DISCONTINUED | OUTPATIENT
Start: 2020-08-08 | End: 2020-08-09

## 2020-08-08 RX ORDER — SODIUM CHLORIDE 9 MG/ML
1000 INJECTION INTRAMUSCULAR; INTRAVENOUS; SUBCUTANEOUS
Refills: 0 | Status: DISCONTINUED | OUTPATIENT
Start: 2020-08-08 | End: 2020-08-08

## 2020-08-08 RX ORDER — SENNA PLUS 8.6 MG/1
1 TABLET ORAL ONCE
Refills: 0 | Status: COMPLETED | OUTPATIENT
Start: 2020-08-08 | End: 2020-08-08

## 2020-08-08 RX ADMIN — SENNA PLUS 1 TABLET(S): 8.6 TABLET ORAL at 14:05

## 2020-08-08 RX ADMIN — Medication 667 MILLIGRAM(S): at 11:56

## 2020-08-08 RX ADMIN — Medication 400 MILLIGRAM(S): at 17:37

## 2020-08-08 RX ADMIN — OXYCODONE HYDROCHLORIDE 5 MILLIGRAM(S): 5 TABLET ORAL at 11:47

## 2020-08-08 RX ADMIN — OXYCODONE HYDROCHLORIDE 5 MILLIGRAM(S): 5 TABLET ORAL at 14:05

## 2020-08-08 RX ADMIN — OXYCODONE HYDROCHLORIDE 5 MILLIGRAM(S): 5 TABLET ORAL at 21:19

## 2020-08-08 RX ADMIN — OXYCODONE HYDROCHLORIDE 5 MILLIGRAM(S): 5 TABLET ORAL at 17:34

## 2020-08-08 RX ADMIN — OXYCODONE HYDROCHLORIDE 5 MILLIGRAM(S): 5 TABLET ORAL at 20:49

## 2020-08-08 RX ADMIN — OXYCODONE HYDROCHLORIDE 5 MILLIGRAM(S): 5 TABLET ORAL at 10:47

## 2020-08-08 RX ADMIN — Medication 1: at 17:35

## 2020-08-08 RX ADMIN — Medication 667 MILLIGRAM(S): at 17:36

## 2020-08-08 RX ADMIN — HEPARIN SODIUM 5000 UNIT(S): 5000 INJECTION INTRAVENOUS; SUBCUTANEOUS at 20:49

## 2020-08-08 RX ADMIN — Medication 0.1 MILLIGRAM(S): at 17:36

## 2020-08-08 RX ADMIN — OXYCODONE HYDROCHLORIDE 5 MILLIGRAM(S): 5 TABLET ORAL at 14:35

## 2020-08-08 RX ADMIN — OXYCODONE HYDROCHLORIDE 5 MILLIGRAM(S): 5 TABLET ORAL at 18:26

## 2020-08-08 RX ADMIN — CHLORHEXIDINE GLUCONATE 1 APPLICATION(S): 213 SOLUTION TOPICAL at 11:55

## 2020-08-08 RX ADMIN — Medication 81 MILLIGRAM(S): at 11:55

## 2020-08-08 RX ADMIN — HEPARIN SODIUM 5000 UNIT(S): 5000 INJECTION INTRAVENOUS; SUBCUTANEOUS at 13:06

## 2020-08-08 RX ADMIN — HEPARIN SODIUM 5000 UNIT(S): 5000 INJECTION INTRAVENOUS; SUBCUTANEOUS at 05:38

## 2020-08-08 NOTE — PROGRESS NOTE ADULT - SUBJECTIVE AND OBJECTIVE BOX
GENERAL SURGERY PROGRESS NOTE    Patient: MD MAMI , 65y ([birth year])Male   MRN: [id number]  Location: [address]  Visit: 08-05-20 Inpatient  Date: 08-08-20 @ 10:47      Procedure:    Events of past 24 hours:      PAST MEDICAL & SURGICAL HISTORY:  Anemia due to stage 5 chronic kidney disease, not on chronic dialysis  Cerebrovascular accident (CVA), unspecified mechanism: diagnosed via CT of the head  Hypercholesterolemia  ESRD (end stage renal disease): on Dialysis( M/W/F), By Dr. Huff  Stented coronary artery: 2014, 3 stents, [facility]  GERD (gastroesophageal reflux disease)  Diabetes mellitus: type 2  CAP (community acquired pneumonia): 6/18  Coronary artery disease  HTN (hypertension)  H/O eye surgery  AV fistula: 10/12/18 L radiocephalic AV fistula  H/O coronary angiogram: 2014 - x3 stents      Vitals: T(F): 97.4 (08-08-20 @ 09:52), Max: 98.6 (08-08-20 @ 09:50)  HR: 86 (08-08-20 @ 09:52)  BP: 132/73 (08-08-20 @ 09:52)  RR: 17 (08-08-20 @ 09:52)  SpO2: 100% (08-08-20 @ 09:52)      Diet, Regular:   Consistent Carbohydrate Evening Snack (CSTCHOSN)  Low Fat (LOWFAT)  For patients receiving Renal Replacement - No Protein Restr, No Conc K, No Conc Phos, Low Sodium (RENAL)      08-07-20 @ 07:01  -  08-08-20 @ 07:00  --------------------------------------------------------  IN:    IV PiggyBack: 100 mL    Other: 400 mL    sodium chloride 0.9%: 360 mL    sodium chloride 0.9%: 300 mL  Total IN: 1160 mL    OUT:    Other: 1400 mL  Total OUT: 1400 mL    Total NET: -240 mL          PHYSICAL EXAM  General: NAD, resting comfortably  Neurology: A&Ox3, moves all extremities  Respiratory: nonlabored breathing, normal chest wall expansion  Abdominal: Soft, NT, ND, incisions c/d/i  Vascular: Warm and well perfused  Extremities: No edema  Lines/Drains: Grace catheter in place    MEDICATIONS  (STANDING):  calcium acetate 667 milliGRAM(s) Oral three times a day with meals  chlorhexidine 4% Liquid 1 Application(s) Topical daily  cloNIDine 0.1 milliGRAM(s) Oral two times a day  dextrose 5%. 1000 milliLiter(s) (50 mL/Hr) IV Continuous <Continuous>  dextrose 50% Injectable 12.5 Gram(s) IV Push once  dextrose 50% Injectable 25 Gram(s) IV Push once  dextrose 50% Injectable 25 Gram(s) IV Push once  heparin   Injectable 5000 Unit(s) SubCutaneous every 8 hours  insulin lispro (HumaLOG) corrective regimen sliding scale   SubCutaneous three times a day before meals  insulin lispro (HumaLOG) corrective regimen sliding scale   SubCutaneous at bedtime  labetalol 400 milliGRAM(s) Oral every 12 hours  losartan 25 milliGRAM(s) Oral daily  NIFEdipine XL 60 milliGRAM(s) Oral daily    MEDICATIONS  (PRN):  acetaminophen   Tablet .. 650 milliGRAM(s) Oral every 6 hours PRN Mild Pain (1 - 3)  dextrose 40% Gel 15 Gram(s) Oral once PRN Blood Glucose LESS THAN 70 milliGRAM(s)/deciliter  glucagon  Injectable 1 milliGRAM(s) IntraMuscular once PRN Glucose LESS THAN 70 milligrams/deciliter  naloxone Injectable 0.1 milliGRAM(s) IV Push every 3 minutes PRN For ANY of the following changes in patient status:  A. RR LESS THAN 10 breaths per minute, B. Oxygen saturation LESS THAN 90%, C. Sedation score of 6  ondansetron Injectable 4 milliGRAM(s) IV Push every 6 hours PRN Nausea  oxyCODONE    IR 5 milliGRAM(s) Oral every 3 hours PRN Severe Pain (7 - 10)  oxyCODONE    IR 2.5 milliGRAM(s) Oral every 3 hours PRN Moderate Pain (4 - 6)      LAB/STUDIES:  CAPILLARY BLOOD GLUCOSE      POCT Blood Glucose.: 132 mg/dL (08 Aug 2020 07:00)  POCT Blood Glucose.: 141 mg/dL (08 Aug 2020 05:51)  POCT Blood Glucose.: 113 mg/dL (07 Aug 2020 22:04)  POCT Blood Glucose.: 106 mg/dL (07 Aug 2020 17:27)  POCT Blood Glucose.: 99 mg/dL (07 Aug 2020 13:56)  POCT Blood Glucose.: 113 mg/dL (07 Aug 2020 13:02)  POCT Blood Glucose.: 138 mg/dL (07 Aug 2020 11:57)                          10.4   11.09 )-----------( 156      ( 08 Aug 2020 06:45 )             35.8     08-08    135  |  92<L>  |  37<H>  ----------------------------<  144<H>  4.3   |  24  |  6.68<H>    Ca    9.1      08 Aug 2020 06:45  Phos  6.4     08-08  Mg     2.1     08-08    TPro  7.5  /  Alb  3.7  /  TBili  0.6  /  DBili  0.2  /  AST  111<H>  /  ALT  144<H>  /  AlkPhos  58  08-08               7.5  | 0.6  | 111      ------------------[58      ( 08 Aug 2020 06:45 )  3.7  | 0.2  | 144           Culture - Nose (collected 06 Aug 2020 01:26)  Source: .Nose Nose  Final Report (07 Aug 2020 17:53):    No MRSA isolated    No Staph Aureus (MSSA) isolated "This can represent normal nasal    carriage.  PCR is more sensitive for identifying MRSA/MSSA carriage"        IMAGING:  < from: MR MRCP w/wo IV Cont (08.08.20 @ 09:30) >  IMPRESSION:  Normal liver morphology with apparent iron deposition involving both the liver and spleen.    Status post cholecystectomy with trace to small free fluid in the operative bed without drainable collection, likely postprocedural. Within the free fluid, there is an ill-defined 1 cm structure, possible hemorrhagic debris versus dropped stone.    Trace central intrahepatic biliary distention and prominence of the extrahepatic common bile duct consistent with post cholecystectomy state. No choledocholithiasis.    < end of copied text > GENERAL SURGERY PROGRESS NOTE    Patient: MD MAMI , 65y ([birth year])Male   MRN: [id number]  Location: [place] [id number]  Visit: 08-05-20 Inpatient  Date: 08-08-20 @ 10:47      Procedure: laparoscopic converted to open cholecystectomy.     Events of past 24 hours:  - MRCP performed ysd  - complaining of dizziness this AM  - received dialysis this AM      PAST MEDICAL & SURGICAL HISTORY:  Anemia due to stage 5 chronic kidney disease, not on chronic dialysis  Cerebrovascular accident (CVA), unspecified mechanism: diagnosed via CT of the head  Hypercholesterolemia  ESRD (end stage renal disease): on Dialysis( M/W/F), By Dr. Huff  Stented coronary artery: 2014, 3 stents, [facility]  GERD (gastroesophageal reflux disease)  Diabetes mellitus: type 2  CAP (community acquired pneumonia): 6/18  Coronary artery disease  HTN (hypertension)  H/O eye surgery  AV fistula: 10/12/18 L radiocephalic AV fistula  H/O coronary angiogram: 2014 - x3 stents      Vitals: T(F): 97.4 (08-08-20 @ 09:52), Max: 98.6 (08-08-20 @ 09:50)  HR: 86 (08-08-20 @ 09:52)  BP: 132/73 (08-08-20 @ 09:52)  RR: 17 (08-08-20 @ 09:52)  SpO2: 100% (08-08-20 @ 09:52)      Diet, Regular:   Consistent Carbohydrate Evening Snack (CSTCHOSN)  Low Fat (LOWFAT)  For patients receiving Renal Replacement - No Protein Restr, No Conc K, No Conc Phos, Low Sodium (RENAL)      08-07-20 @ 07:01  -  08-08-20 @ 07:00  --------------------------------------------------------  IN:    IV PiggyBack: 100 mL    Other: 400 mL    sodium chloride 0.9%: 360 mL    sodium chloride 0.9%: 300 mL  Total IN: 1160 mL    OUT:    Other: 1400 mL  Total OUT: 1400 mL    Total NET: -240 mL          PHYSICAL EXAM  General: NAD, resting comfortably  Neurology: A&Ox3, moves all extremities  Respiratory: nonlabored breathing, normal chest wall expansion  Abdominal: Soft, NT, ND, incisions c/d/i  Vascular: Warm and well perfused  Extremities: No edema    MEDICATIONS  (STANDING):  calcium acetate 667 milliGRAM(s) Oral three times a day with meals  chlorhexidine 4% Liquid 1 Application(s) Topical daily  cloNIDine 0.1 milliGRAM(s) Oral two times a day  dextrose 5%. 1000 milliLiter(s) (50 mL/Hr) IV Continuous <Continuous>  dextrose 50% Injectable 12.5 Gram(s) IV Push once  dextrose 50% Injectable 25 Gram(s) IV Push once  dextrose 50% Injectable 25 Gram(s) IV Push once  heparin   Injectable 5000 Unit(s) SubCutaneous every 8 hours  insulin lispro (HumaLOG) corrective regimen sliding scale   SubCutaneous three times a day before meals  insulin lispro (HumaLOG) corrective regimen sliding scale   SubCutaneous at bedtime  labetalol 400 milliGRAM(s) Oral every 12 hours  losartan 25 milliGRAM(s) Oral daily  NIFEdipine XL 60 milliGRAM(s) Oral daily    MEDICATIONS  (PRN):  acetaminophen   Tablet .. 650 milliGRAM(s) Oral every 6 hours PRN Mild Pain (1 - 3)  dextrose 40% Gel 15 Gram(s) Oral once PRN Blood Glucose LESS THAN 70 milliGRAM(s)/deciliter  glucagon  Injectable 1 milliGRAM(s) IntraMuscular once PRN Glucose LESS THAN 70 milligrams/deciliter  naloxone Injectable 0.1 milliGRAM(s) IV Push every 3 minutes PRN For ANY of the following changes in patient status:  A. RR LESS THAN 10 breaths per minute, B. Oxygen saturation LESS THAN 90%, C. Sedation score of 6  ondansetron Injectable 4 milliGRAM(s) IV Push every 6 hours PRN Nausea  oxyCODONE    IR 5 milliGRAM(s) Oral every 3 hours PRN Severe Pain (7 - 10)  oxyCODONE    IR 2.5 milliGRAM(s) Oral every 3 hours PRN Moderate Pain (4 - 6)      LAB/STUDIES:  CAPILLARY BLOOD GLUCOSE      POCT Blood Glucose.: 132 mg/dL (08 Aug 2020 07:00)  POCT Blood Glucose.: 141 mg/dL (08 Aug 2020 05:51)  POCT Blood Glucose.: 113 mg/dL (07 Aug 2020 22:04)  POCT Blood Glucose.: 106 mg/dL (07 Aug 2020 17:27)  POCT Blood Glucose.: 99 mg/dL (07 Aug 2020 13:56)  POCT Blood Glucose.: 113 mg/dL (07 Aug 2020 13:02)  POCT Blood Glucose.: 138 mg/dL (07 Aug 2020 11:57)                          10.4   11.09 )-----------( 156      ( 08 Aug 2020 06:45 )             35.8     08-08    135  |  92<L>  |  37<H>  ----------------------------<  144<H>  4.3   |  24  |  6.68<H>    Ca    9.1      08 Aug 2020 06:45  Phos  6.4     08-08  Mg     2.1     08-08    TPro  7.5  /  Alb  3.7  /  TBili  0.6  /  DBili  0.2  /  AST  111<H>  /  ALT  144<H>  /  AlkPhos  58  08-08               7.5  | 0.6  | 111      ------------------[58      ( 08 Aug 2020 06:45 )  3.7  | 0.2  | 144           Culture - Nose (collected 06 Aug 2020 01:26)  Source: .Nose Nose  Final Report (07 Aug 2020 17:53):    No MRSA isolated    No Staph Aureus (MSSA) isolated "This can represent normal nasal    carriage.  PCR is more sensitive for identifying MRSA/MSSA carriage"        IMAGING:  < from: MR MRCP w/wo IV Cont (08.08.20 @ 09:30) >  IMPRESSION:  Normal liver morphology with apparent iron deposition involving both the liver and spleen.    Status post cholecystectomy with trace to small free fluid in the operative bed without drainable collection, likely postprocedural. Within the free fluid, there is an ill-defined 1 cm structure, possible hemorrhagic debris versus dropped stone.    Trace central intrahepatic biliary distention and prominence of the extrahepatic common bile duct consistent with post cholecystectomy state. No choledocholithiasis.    < end of copied text >

## 2020-08-08 NOTE — PROGRESS NOTE ADULT - ASSESSMENT
65M hx CAD s/p PCI, HTN, DM, GERD, ESRD on HD via L AFV, POD #1 lap --> open cholecystectomy, now with hyperkalemia post op.    Plan:  - NPO except meds/ IVF @ 30cc  - pain control PRN   - Hyperkalemia: appreciate nephrology recs: 10g of Lokelma and hyperkalemia cocktail given.   - HD today at 11:30  - MRCP ordered   - continue to hold Plavix   - c/w VTE prophylaxis with [facility]    B TEAM  [id number] 65M hx CAD s/p PCI, HTN, DM, GERD, ESRD on HD via L AFV, POD #1 lap --> open cholecystectomy, now with hyperkalemia post op. Patient was dizzy this AM, is opioid naive, and received 12 mg PCA, this is likely a strong factor in patient's complaint. Will follow up.    Plan:  - diet: CC full, low fat, renal  - pain control: oxy 2.5/5 PRN and tylenol PRN  - Hyperkalemia resolved  - MRCP is free of choledocolithiasis   - start asa 81 today  - continue to hold Plavix  - c/w VTE prophylaxis with [facility]    B TEAM  [id number]

## 2020-08-08 NOTE — PROGRESS NOTE ADULT - SUBJECTIVE AND OBJECTIVE BOX
Anesthesia Pain Management Service    SUBJECTIVE: Patient states he thinks he is dizzy from the IV PCA.  When asked patient admits that when he used the IV PCA last night, he did not feel dizzy and it helped with his pain.  However, this morning when he tried to get from the chair to the bed and went to dialysis, he started feeling dizzy.    Pain Scale Score	At rest: _5-6/10__ 	With Activity: ___ 	[X ] Refer to charted pain scores    THERAPY:    [ ] IV PCA Morphine		[ ] 5 mg/mL	[ ] 1 mg/mL  [X ] IV PCA Hydromorphone	[ ] 5 mg/mL	[X ] 1 mg/mL  [ ] IV PCA Fentanyl		[ ] 50 micrograms/mL    Demand dose __0.2_ lockout __6_ (minutes) Continuous Rate _0__ Total: __12.4_   mg used (in past 24 hrs)      MEDICATIONS  (STANDING):  calcium acetate 667 milliGRAM(s) Oral three times a day with meals  chlorhexidine 4% Liquid 1 Application(s) Topical daily  cloNIDine 0.1 milliGRAM(s) Oral two times a day  dextrose 5%. 1000 milliLiter(s) (50 mL/Hr) IV Continuous <Continuous>  dextrose 50% Injectable 12.5 Gram(s) IV Push once  dextrose 50% Injectable 25 Gram(s) IV Push once  dextrose 50% Injectable 25 Gram(s) IV Push once  heparin   Injectable 5000 Unit(s) SubCutaneous every 8 hours  insulin lispro (HumaLOG) corrective regimen sliding scale   SubCutaneous three times a day before meals  insulin lispro (HumaLOG) corrective regimen sliding scale   SubCutaneous at bedtime  labetalol 400 milliGRAM(s) Oral every 12 hours  losartan 25 milliGRAM(s) Oral daily  NIFEdipine XL 60 milliGRAM(s) Oral daily  sodium chloride 0.9%. 1000 milliLiter(s) (10 mL/Hr) IV Continuous <Continuous>    MEDICATIONS  (PRN):  acetaminophen   Tablet .. 650 milliGRAM(s) Oral every 6 hours PRN Mild Pain (1 - 3)  dextrose 40% Gel 15 Gram(s) Oral once PRN Blood Glucose LESS THAN 70 milliGRAM(s)/deciliter  glucagon  Injectable 1 milliGRAM(s) IntraMuscular once PRN Glucose LESS THAN 70 milligrams/deciliter  naloxone Injectable 0.1 milliGRAM(s) IV Push every 3 minutes PRN For ANY of the following changes in patient status:  A. RR LESS THAN 10 breaths per minute, B. Oxygen saturation LESS THAN 90%, C. Sedation score of 6  ondansetron Injectable 4 milliGRAM(s) IV Push every 6 hours PRN Nausea  oxyCODONE    IR 5 milliGRAM(s) Oral every 3 hours PRN Severe Pain (7 - 10)  oxyCODONE    IR 2.5 milliGRAM(s) Oral every 3 hours PRN Moderate Pain (4 - 6)      OBJECTIVE:  Patient is sitting up in bed.    Sedation Score:	[ X] Alert	[ ] Drowsy 	[ ] Arousable	[ ] Asleep	[ ] Unresponsive    Side Effects:	[X ] None	[ ] Nausea	[ ] Vomiting	[ ] Pruritus  		[ ] Other:    Vital Signs Last 24 Hrs  T(C): 36.7 (08 Aug 2020 06:40), Max: 36.9 (07 Aug 2020 17:54)  T(F): 98 (08 Aug 2020 06:40), Max: 98.4 (07 Aug 2020 17:54)  HR: 73 (08 Aug 2020 06:40) (58 - 78)  BP: 119/72 (08 Aug 2020 06:40) (103/64 - 147/81)  BP(mean): --  RR: 17 (08 Aug 2020 06:40) (16 - 18)  SpO2: 94% (08 Aug 2020 06:06) (93% - 98%)    ASSESSMENT/ PLAN    Therapy to  be:	[ ] Continue   [ X] Discontinued   [X ] Change to prn Analgesics    Documentation and Verification of current medications:   [X] Done	[ ] Not done, not elligible    Comments: Patient is apparently opioid naive and used a considerable amount of the IV PCA, in combination of getting up may have caused his dizziness this morning. Team called and paged, awaiting call back. IV PCA discontinued and started patient on low dose PRN Oral/IV opioids and/or Adjuvant non-opioid medication to be ordered at this point.

## 2020-08-08 NOTE — PROGRESS NOTE ADULT - SUBJECTIVE AND OBJECTIVE BOX
Anesthesia Pain Management Service- Attending Addendum    SUBJECTIVE: Patient's pain control adequate    Therapy:	  [ X] IV PCA	   [ ] Epidural           [ ] s/p Spinal Opoid              [ ] Postpartum infusion	  [ ] Patient controlled regional anesthesia (PCRA)    [ ] prn Analgesics    Allergies    No Known Allergies    Intolerances      MEDICATIONS  (STANDING):  aspirin enteric coated 81 milliGRAM(s) Oral daily  calcium acetate 667 milliGRAM(s) Oral three times a day with meals  chlorhexidine 4% Liquid 1 Application(s) Topical daily  cloNIDine 0.1 milliGRAM(s) Oral two times a day  dextrose 5%. 1000 milliLiter(s) (50 mL/Hr) IV Continuous <Continuous>  dextrose 50% Injectable 12.5 Gram(s) IV Push once  dextrose 50% Injectable 25 Gram(s) IV Push once  dextrose 50% Injectable 25 Gram(s) IV Push once  heparin   Injectable 5000 Unit(s) SubCutaneous every 8 hours  insulin lispro (HumaLOG) corrective regimen sliding scale   SubCutaneous three times a day before meals  insulin lispro (HumaLOG) corrective regimen sliding scale   SubCutaneous at bedtime  labetalol 400 milliGRAM(s) Oral every 12 hours  losartan 25 milliGRAM(s) Oral daily  NIFEdipine XL 60 milliGRAM(s) Oral daily  senna 1 Tablet(s) Oral once    MEDICATIONS  (PRN):  acetaminophen   Tablet .. 650 milliGRAM(s) Oral every 6 hours PRN Mild Pain (1 - 3)  dextrose 40% Gel 15 Gram(s) Oral once PRN Blood Glucose LESS THAN 70 milliGRAM(s)/deciliter  glucagon  Injectable 1 milliGRAM(s) IntraMuscular once PRN Glucose LESS THAN 70 milligrams/deciliter  naloxone Injectable 0.1 milliGRAM(s) IV Push every 3 minutes PRN For ANY of the following changes in patient status:  A. RR LESS THAN 10 breaths per minute, B. Oxygen saturation LESS THAN 90%, C. Sedation score of 6  ondansetron Injectable 4 milliGRAM(s) IV Push every 6 hours PRN Nausea  oxyCODONE    IR 5 milliGRAM(s) Oral every 3 hours PRN Severe Pain (7 - 10)  oxyCODONE    IR 2.5 milliGRAM(s) Oral every 3 hours PRN Moderate Pain (4 - 6)      OBJECTIVE:   [X] No new signs     [ ] Other:    Side Effects:  [X ] None			[ ] Other:      ASSESSMENT/PLAN  -Discontinue current therapy    [ ] Therapy changed to:    [ ] IV PCA       [ ] Epidural     [ X] prn Analgesics     Comments: Pain management per primary team, APS to sign off

## 2020-08-08 NOTE — PROGRESS NOTE ADULT - SUBJECTIVE AND OBJECTIVE BOX
[facility]   MD JORDAN AVALOS MD RUORU WONG, PA    TEL:  OFFICE: [phone]  DR CUETO CELL: [phone]  SUSAN WINTERS CELL: [phone]  DR. HORTA CELL: [phone]  DR. HUNTER CELL: [phone]    FROM 5 PM - 7 AM PLEASE CALL ANSWERING SERVICE: [phone]    RENAL FOLLOW UP NOTE  --------------------------------------------------------------------------------  HPI:      Pt seen and examined at bedside.   Denies SOB, chest pain     PAST HISTORY  --------------------------------------------------------------------------------  No significant changes to PMH, PSH, FHx, SHx, unless otherwise noted    ALLERGIES & MEDICATIONS  --------------------------------------------------------------------------------  Allergies    No Known Allergies    Intolerances      Standing Inpatient Medications  aspirin enteric coated 81 milliGRAM(s) Oral daily  calcium acetate 667 milliGRAM(s) Oral three times a day with meals  chlorhexidine 4% Liquid 1 Application(s) Topical daily  cloNIDine 0.1 milliGRAM(s) Oral two times a day  dextrose 5%. 1000 milliLiter(s) IV Continuous <Continuous>  dextrose 50% Injectable 12.5 Gram(s) IV Push once  dextrose 50% Injectable 25 Gram(s) IV Push once  dextrose 50% Injectable 25 Gram(s) IV Push once  heparin   Injectable 5000 Unit(s) SubCutaneous every 8 hours  insulin lispro (HumaLOG) corrective regimen sliding scale   SubCutaneous three times a day before meals  insulin lispro (HumaLOG) corrective regimen sliding scale   SubCutaneous at bedtime  labetalol 400 milliGRAM(s) Oral every 12 hours  losartan 25 milliGRAM(s) Oral daily  NIFEdipine XL 60 milliGRAM(s) Oral daily    PRN Inpatient Medications  acetaminophen   Tablet .. 650 milliGRAM(s) Oral every 6 hours PRN  dextrose 40% Gel 15 Gram(s) Oral once PRN  glucagon  Injectable 1 milliGRAM(s) IntraMuscular once PRN  naloxone Injectable 0.1 milliGRAM(s) IV Push every 3 minutes PRN  ondansetron Injectable 4 milliGRAM(s) IV Push every 6 hours PRN  oxyCODONE    IR 5 milliGRAM(s) Oral every 3 hours PRN  oxyCODONE    IR 2.5 milliGRAM(s) Oral every 3 hours PRN      REVIEW OF SYSTEMS  --------------------------------------------------------------------------------  General: no fever  CVS: no chest pain  RESP: no sob, no cough  ABD: + abdominal pain  : no dysuria,  MSK: no edema     VITALS/PHYSICAL EXAM  --------------------------------------------------------------------------------  T(C): 36.3 (08-08-20 @ 09:52), Max: 37 (08-08-20 @ 09:50)  HR: 86 (08-08-20 @ 09:52) (61 - 86)  BP: 132/73 (08-08-20 @ 09:52) (103/64 - 166/77)  RR: 17 (08-08-20 @ 09:52) (16 - 18)  SpO2: 100% (08-08-20 @ 09:52) (93% - 100%)  Wt(kg): --    Weight (kg): 64 (08-06-20 @ 11:59)      08-07-20 @ 07:01  -  08-08-20 @ 07:00  --------------------------------------------------------  IN: 1160 mL / OUT: 1400 mL / NET: -240 mL    08-08-20 @ 07:01  -  08-08-20 @ 11:11  --------------------------------------------------------  IN: 400 mL / OUT: 1400 mL / NET: -1000 mL      Physical Exam:  	Gen: NAD  	HEENT: MMM  	Pulm: CTA B/L  	CV: S1S2  	Abd: Soft, +BS  	Ext: No LE edema B/L                      Neuro: Awake non focal  	Skin: Warm and Dry   	Vascular access:avf          JHONATHAN hinds  LABS/STUDIES  --------------------------------------------------------------------------------              10.4   11.09 >-----------<  156      [08-08-20 @ 06:45]              35.8     135  |  92  |  37  ----------------------------<  144      [08-08-20 @ 06:45]  4.3   |  24  |  6.68        Ca     9.1     [08-08-20 @ 06:45]      Mg     2.1     [08-08-20 @ 06:45]      Phos  6.4     [08-08-20 @ 06:45]    TPro  7.5  /  Alb  3.7  /  TBili  0.6  /  DBili  0.2  /  AST  111  /  ALT  144  /  AlkPhos  58  [08-08-20 @ 06:45]          Creatinine Trend:  SCr 6.68 [08-08 @ 06:45]  SCr 4.87 [08-07 @ 15:52]  SCr 9.25 [08-07 @ 06:15]  SCr 7.45 [08-06 @ 06:20]  SCr 6.07 [08-05 @ 23:08]        .9 (Ca --)      [12-16-19 @ 22:40]   --  .5 (Ca --)      [12-16-19 @ 17:00]   --  HbA1c 7.8      [12-17-19 @ 05:54]    HBsAb 137.3      [08-05-20 @ 17:40]  HBsAg NEGATIVE      [08-05-20 @ 09:00]  HBcAb Nonreactive      [08-05-20 @ 17:40]  HCV 0.09, Nonreactive Hepatitis C AB  S/CO Ratio                        Interpretation  < 1.00                                   Non-Reactive  1.00 - 4.99                         Weakly-Reactive  >= 5.00                                Reactive  Non-Reactive: Aperson with a non-reactive HCV antibody  result is considered uninfected.  No further action is  needed unless recent infection is suspected.  In these  cases, consider repeat testing later to detect  seroconversion..  Weakly-Reactive: HCV antibody test is abnormal, HCV RNA  Qualitative test will follow.  Reactive: HCV antibody test is abnormal, HCV RNA  Qualitative test will follow.  Note: HCV antibody testing is performed on the [employer] system.      [08-05-20 @ 17:40]

## 2020-08-08 NOTE — PROGRESS NOTE ADULT - ASSESSMENT
65M hx CAD s/p PCI (most recent in Feb 2019 w/2 NAHUM, dLAD and pLCx, Echo may 2020 showing mild segmental LV dysfunction, NST May 2020 negative for ischemia) HTN, DM, GERD, ESRD on HD via L AFV (last dialyzed yesterday, 8/4) presenting with 2day of RUQ abdominal pain concern for acute cholecystitis     ESRD on HD TTS via AVF  s/p HD 8/8 with 1L UF   HD consent in chart  plan for HD tuesday  renal diet  monitor bmp    Hyperkalemia  sec to renal failure  HD with 2 K bath  low k diet  monitor     HTN  controlled  low Na diet  monitor    Anemia  at goal  no need for epo  monitor hb    Ckd-mbd  check pth  hyperphosphatemia  phoslo 667 tid with meal  monitor phos and calcium daily    Abd pain  f/u surgery

## 2020-08-09 ENCOUNTER — TRANSCRIPTION ENCOUNTER (OUTPATIENT)
Age: 65
End: 2020-08-09

## 2020-08-09 VITALS
SYSTOLIC BLOOD PRESSURE: 120 MMHG | OXYGEN SATURATION: 99 % | DIASTOLIC BLOOD PRESSURE: 59 MMHG | RESPIRATION RATE: 16 BRPM | HEART RATE: 69 BPM | TEMPERATURE: 98 F

## 2020-08-09 LAB
ALBUMIN SERPL ELPH-MCNC: 4 G/DL — SIGNIFICANT CHANGE UP (ref 3.3–5)
ALP SERPL-CCNC: 68 U/L — SIGNIFICANT CHANGE UP (ref 40–120)
ALT FLD-CCNC: 158 U/L — HIGH (ref 4–41)
ANION GAP SERPL CALC-SCNC: 19 MMO/L — HIGH (ref 7–14)
AST SERPL-CCNC: 107 U/L — HIGH (ref 4–40)
BILIRUB SERPL-MCNC: 0.6 MG/DL — SIGNIFICANT CHANGE UP (ref 0.2–1.2)
BUN SERPL-MCNC: 31 MG/DL — HIGH (ref 7–23)
CALCIUM SERPL-MCNC: 8.6 MG/DL — SIGNIFICANT CHANGE UP (ref 8.4–10.5)
CHLORIDE SERPL-SCNC: 89 MMOL/L — LOW (ref 98–107)
CO2 SERPL-SCNC: 24 MMOL/L — SIGNIFICANT CHANGE UP (ref 22–31)
CREAT SERPL-MCNC: 6.1 MG/DL — HIGH (ref 0.5–1.3)
GLUCOSE BLDC GLUCOMTR-MCNC: 152 MG/DL — HIGH (ref 70–99)
GLUCOSE BLDC GLUCOMTR-MCNC: 206 MG/DL — HIGH (ref 70–99)
GLUCOSE SERPL-MCNC: 142 MG/DL — HIGH (ref 70–99)
HCT VFR BLD CALC: 34.9 % — LOW (ref 39–50)
HGB BLD-MCNC: 10.8 G/DL — LOW (ref 13–17)
MAGNESIUM SERPL-MCNC: 2.1 MG/DL — SIGNIFICANT CHANGE UP (ref 1.6–2.6)
MCHC RBC-ENTMCNC: 30.9 % — LOW (ref 32–36)
MCHC RBC-ENTMCNC: 30.9 PG — SIGNIFICANT CHANGE UP (ref 27–34)
MCV RBC AUTO: 99.7 FL — SIGNIFICANT CHANGE UP (ref 80–100)
NRBC # FLD: 0 K/UL — SIGNIFICANT CHANGE UP (ref 0–0)
PHOSPHATE SERPL-MCNC: 4.7 MG/DL — HIGH (ref 2.5–4.5)
PLATELET # BLD AUTO: 162 K/UL — SIGNIFICANT CHANGE UP (ref 150–400)
PMV BLD: 11.5 FL — SIGNIFICANT CHANGE UP (ref 7–13)
POTASSIUM SERPL-MCNC: 4.4 MMOL/L — SIGNIFICANT CHANGE UP (ref 3.5–5.3)
POTASSIUM SERPL-SCNC: 4.4 MMOL/L — SIGNIFICANT CHANGE UP (ref 3.5–5.3)
PROT SERPL-MCNC: 7.6 G/DL — SIGNIFICANT CHANGE UP (ref 6–8.3)
RBC # BLD: 3.5 M/UL — LOW (ref 4.2–5.8)
RBC # FLD: 13.5 % — SIGNIFICANT CHANGE UP (ref 10.3–14.5)
SODIUM SERPL-SCNC: 132 MMOL/L — LOW (ref 135–145)
WBC # BLD: 7.34 K/UL — SIGNIFICANT CHANGE UP (ref 3.8–10.5)
WBC # FLD AUTO: 7.34 K/UL — SIGNIFICANT CHANGE UP (ref 3.8–10.5)

## 2020-08-09 RX ORDER — CLOPIDOGREL BISULFATE 75 MG/1
75 TABLET, FILM COATED ORAL DAILY
Refills: 0 | Status: DISCONTINUED | OUTPATIENT
Start: 2020-08-09 | End: 2020-08-09

## 2020-08-09 RX ORDER — OXYCODONE HYDROCHLORIDE 5 MG/1
1 TABLET ORAL
Qty: 20 | Refills: 0
Start: 2020-08-09 | End: 2020-08-13

## 2020-08-09 RX ADMIN — OXYCODONE HYDROCHLORIDE 5 MILLIGRAM(S): 5 TABLET ORAL at 04:52

## 2020-08-09 RX ADMIN — HEPARIN SODIUM 5000 UNIT(S): 5000 INJECTION INTRAVENOUS; SUBCUTANEOUS at 06:19

## 2020-08-09 RX ADMIN — Medication 400 MILLIGRAM(S): at 06:19

## 2020-08-09 RX ADMIN — OXYCODONE HYDROCHLORIDE 5 MILLIGRAM(S): 5 TABLET ORAL at 11:24

## 2020-08-09 RX ADMIN — OXYCODONE HYDROCHLORIDE 5 MILLIGRAM(S): 5 TABLET ORAL at 07:53

## 2020-08-09 RX ADMIN — OXYCODONE HYDROCHLORIDE 5 MILLIGRAM(S): 5 TABLET ORAL at 15:42

## 2020-08-09 RX ADMIN — Medication 667 MILLIGRAM(S): at 11:30

## 2020-08-09 RX ADMIN — CHLORHEXIDINE GLUCONATE 1 APPLICATION(S): 213 SOLUTION TOPICAL at 11:26

## 2020-08-09 RX ADMIN — LOSARTAN POTASSIUM 25 MILLIGRAM(S): 100 TABLET, FILM COATED ORAL at 06:19

## 2020-08-09 RX ADMIN — Medication 81 MILLIGRAM(S): at 11:30

## 2020-08-09 RX ADMIN — Medication 2: at 12:01

## 2020-08-09 RX ADMIN — OXYCODONE HYDROCHLORIDE 5 MILLIGRAM(S): 5 TABLET ORAL at 04:22

## 2020-08-09 RX ADMIN — Medication 0.1 MILLIGRAM(S): at 06:19

## 2020-08-09 RX ADMIN — OXYCODONE HYDROCHLORIDE 5 MILLIGRAM(S): 5 TABLET ORAL at 07:23

## 2020-08-09 RX ADMIN — Medication 1: at 08:14

## 2020-08-09 RX ADMIN — CLOPIDOGREL BISULFATE 75 MILLIGRAM(S): 75 TABLET, FILM COATED ORAL at 11:30

## 2020-08-09 RX ADMIN — OXYCODONE HYDROCHLORIDE 5 MILLIGRAM(S): 5 TABLET ORAL at 10:52

## 2020-08-09 RX ADMIN — Medication 667 MILLIGRAM(S): at 08:14

## 2020-08-09 RX ADMIN — Medication 60 MILLIGRAM(S): at 02:55

## 2020-08-09 NOTE — DISCHARGE NOTE PROVIDER - NSDCCPCAREPLAN_GEN_ALL_CORE_FT
PRINCIPAL DISCHARGE DIAGNOSIS  Diagnosis: Calculus of gallbladder with acute cholecystitis without obstruction  Assessment and Plan of Treatment: You were admitted for cholecystitis and underwent an open cholecystectomy. Your incisions are covered with a surgical glue. You may take a shower as normal but do not scrub the glue. Do not soak in a tub or go swimming. They will fall off on their own in a few days. If you feel pain from the surgery while you are at home you can alternate taking Tylenol and ibuprofen, with oxycodone if you need it for severe pain. If the pain becomes severe and cannot be helped with medication you should return to the hospital. Please follow up with your surgeon at the number provided within two weeks for a check-up. Please also contact your primary care provider and let them know that you recently had surgery.

## 2020-08-09 NOTE — PROGRESS NOTE ADULT - REASON FOR ADMISSION
acute cholecystitis

## 2020-08-09 NOTE — PROGRESS NOTE ADULT - SUBJECTIVE AND OBJECTIVE BOX
GENERAL SURGERY PROGRESS NOTE    POD3: Lap to open cholecystectomy    SUBJECTIVE:  No acute events overnight  Pt examined at bedside  Reports pain  Denies nausea, vomiting  Is passing gas and having bowel movements. Denies diarrhea  Tolerating regular diabetic diet  Ambulating independently    OBJECTIVE:  Vital Signs Last 24 Hrs  T(C): 37.4 (09 Aug 2020 06:17), Max: 37.4 (08 Aug 2020 21:52)  T(F): 99.3 (09 Aug 2020 06:17), Max: 99.3 (08 Aug 2020 21:52)  HR: 66 (09 Aug 2020 07:22) (64 - 96)  BP: 136/76 (09 Aug 2020 07:22) (133/69 - 194/106)  BP(mean): --  RR: 16 (09 Aug 2020 06:17) (16 - 18)  SpO2: 99% (09 Aug 2020 06:17) (95% - 100%)    Physical Examination:  General: NAD, resting comfortably  Neurology: A&Ox3, moves all extremities  Respiratory: nonlabored breathing, normal chest wall expansion  Abdominal: Soft, NT, ND, incisions c/d/i  Vascular: Warm and well perfused  Extremities: No edema    LABS:                        10.8   7.34  )-----------( 162      ( 09 Aug 2020 05:45 )             34.9       08-09    132<L>  |  89<L>  |  31<H>  ----------------------------<  142<H>  4.4   |  24  |  6.10<H>    Ca    8.6      09 Aug 2020 05:45  Phos  4.7     08-09  Mg     2.1     08-09    TPro  7.6  /  Alb  4.0  /  TBili  0.6  /  DBili  x   /  AST  107<H>  /  ALT  158<H>  /  AlkPhos  68  08-09

## 2020-08-09 NOTE — DISCHARGE NOTE PROVIDER - CARE PROVIDER_API CALL
Aj Abreu  SURGERY  [id number] [address] [address]  Phone: [phone]  Fax: [phone]  Follow Up Time: 2 weeks

## 2020-08-09 NOTE — DISCHARGE NOTE NURSING/CASE MANAGEMENT/SOCIAL WORK - PATIENT PORTAL LINK FT
You can access the FollowMyHealth Patient Portal offered by [facility] by registering at the following website: [email or web address]. By joining [employer]’s FollowMyHealth portal, you will also be able to view your health information using other applications (apps) compatible with our system.

## 2020-08-09 NOTE — DISCHARGE NOTE NURSING/CASE MANAGEMENT/SOCIAL WORK - NSDCPNINST_GEN_ALL_CORE
On oxycodone, being that it is a narcotic, no driving or operating machinery on this medication. It can cause drowsiness and dizziness. If temperature is 99.5 or higher, and/or surgical sites on the abdomen are hot to the touch, notify your provider immediately, this can indicate infection. When bathing, do not scrub incision sites. If excessive bleeding occurs, notify the provider.

## 2020-08-09 NOTE — DISCHARGE NOTE PROVIDER - HOSPITAL COURSE
65M hx CAD s/p PCI (most recent in Feb 2019 w/2 NAHUM, dLAD and pLCx, Echo may 2020 showing mild segmental LV dysfunction, NST May 2020 negative for ischemia) HTN, DM, GERD, ESRD on HD via L AFV (last dialyzed yesterday, 8/4) presented to the ED with 2day of RUQ abdominal pain associated with subjective fevers x1 day and anorexia. Patient denied nausea, vomiting, jaundice, chills, states bowel function is at baseline. RUQ US showed Cholelithiasis, gallbladder wall thickening and positive Pelayo's sign. Pt admitted to surgery under Dr. Abreu. Cardiology and nephrology consulted for OR clearance due to ESRD and extensive cardiac history. Pre-op course complicated by HTN to sBP in the 190s and home BP meds were continued.         Pt was brought to the OR on HD1 for a laparoscopic converted to open cholecystectomy.  While laparoscopic performed intraoperative cholangiogram which opacified the gallbladder and CBD only.  Therefore decision made to convert to open in order to identify anatomy and confirm no injury.  Only one lumen into the gallbladder identified, which was then transected and ligated with vicryl suture.  Spillage of bile during the case which was irrigated. Following the procedure, pt was transferred from PACU to floor in stable condition. He underwent an uncomplicated recovery course which included pain control and inpatient dialysis.        On POD3 pt is stable for discharge with instructions for follow up within 2 weeks. He is being sent with oral pain medications and instructions to return to the hospital if there are any complications.

## 2020-08-09 NOTE — PROGRESS NOTE ADULT - ASSESSMENT
65M hx CAD s/p PCI, HTN, DM, GERD, ESRD on HD via L AFV, POD #1 lap --> open cholecystectomy.     Plan:  - diet: CC full, low fat, renal  - pain control: oxy 2.5/5 PRN and tylenol PRN  - Hyperkalemia resolved  - No longer complaining of dizziness after PCA discontinued  - MRCP is free of choledocolithiasis   - restart Plavix today  - c/w VTE prophylaxis with SQH    Sampson Bowers, PGY-1  B Surgery Pager #[phone]

## 2020-08-09 NOTE — PROGRESS NOTE ADULT - SUBJECTIVE AND OBJECTIVE BOX
[facility]ACT[facility]   MD JORDAN AVALOS MD RUORU WONG, PA    TEL:  OFFICE: [phone]  DR CUETO CELL: [phone]  SUSAN WINTERS CELL: [phone]  DR. HORTA CELL: [phone]  DR. HUNTER CELL: [phone]    FROM 5 PM - 7 AM PLEASE CALL ANSWERING SERVICE: [phone]    RENAL FOLLOW UP NOTE  --------------------------------------------------------------------------------  HPI:      Pt seen and examined at bedside.   Denies SOB, chest pain     PAST HISTORY  --------------------------------------------------------------------------------  No significant changes to PMH, PSH, FHx, SHx, unless otherwise noted    ALLERGIES & MEDICATIONS  --------------------------------------------------------------------------------  Allergies    No Known Allergies    Intolerances      Standing Inpatient Medications  aspirin enteric coated 81 milliGRAM(s) Oral daily  calcium acetate 667 milliGRAM(s) Oral three times a day with meals  chlorhexidine 4% Liquid 1 Application(s) Topical daily  cloNIDine 0.1 milliGRAM(s) Oral two times a day  clopidogrel Tablet 75 milliGRAM(s) Oral daily  dextrose 5%. 1000 milliLiter(s) IV Continuous <Continuous>  dextrose 50% Injectable 12.5 Gram(s) IV Push once  dextrose 50% Injectable 25 Gram(s) IV Push once  dextrose 50% Injectable 25 Gram(s) IV Push once  heparin   Injectable 5000 Unit(s) SubCutaneous every 8 hours  insulin lispro (HumaLOG) corrective regimen sliding scale   SubCutaneous three times a day before meals  insulin lispro (HumaLOG) corrective regimen sliding scale   SubCutaneous at bedtime  labetalol 400 milliGRAM(s) Oral every 12 hours  losartan 25 milliGRAM(s) Oral daily  NIFEdipine XL 60 milliGRAM(s) Oral daily    PRN Inpatient Medications  acetaminophen   Tablet .. 650 milliGRAM(s) Oral every 6 hours PRN  dextrose 40% Gel 15 Gram(s) Oral once PRN  glucagon  Injectable 1 milliGRAM(s) IntraMuscular once PRN  naloxone Injectable 0.1 milliGRAM(s) IV Push every 3 minutes PRN  ondansetron Injectable 4 milliGRAM(s) IV Push every 6 hours PRN  oxyCODONE    IR 5 milliGRAM(s) Oral every 3 hours PRN  oxyCODONE    IR 2.5 milliGRAM(s) Oral every 3 hours PRN      REVIEW OF SYSTEMS  --------------------------------------------------------------------------------  General: no fever  CVS: no chest pain  RESP: no sob, no cough  : no dysuria,  MSK: no edema     VITALS/PHYSICAL EXAM  --------------------------------------------------------------------------------  T(C): 37.4 (08-09-20 @ 06:17), Max: 37.4 (08-08-20 @ 21:52)  HR: 66 (08-09-20 @ 07:22) (64 - 96)  BP: 136/76 (08-09-20 @ 07:22) (133/69 - 194/106)  RR: 16 (08-09-20 @ 06:17) (16 - 18)  SpO2: 99% (08-09-20 @ 06:17) (95% - 100%)  Wt(kg): --        08-08-20 @ 07:01  -  08-09-20 @ 07:00  --------------------------------------------------------  IN: 880 mL / OUT: 1400 mL / NET: -520 mL      Physical Exam:  	Gen: NAD  	HEENT: MMM  	Pulm: CTA B/L  	CV: S1S2  	Abd: Soft, +BS  	Ext: No LE edema B/L                      Neuro: Awake alert  	Skin: Warm and Dry   	Vascular access: avf          JHONATHAN no  lizet  LABS/STUDIES  --------------------------------------------------------------------------------              10.8   7.34  >-----------<  162      [08-09-20 @ 05:45]              34.9     132  |  89  |  31  ----------------------------<  142      [08-09-20 @ 05:45]  4.4   |  24  |  6.10        Ca     8.6     [08-09-20 @ 05:45]      Mg     2.1     [08-09-20 @ 05:45]      Phos  4.7     [08-09-20 @ 05:45]    TPro  7.6  /  Alb  4.0  /  TBili  0.6  /  DBili  x   /  AST  107  /  ALT  158  /  AlkPhos  68  [08-09-20 @ 05:45]          Creatinine Trend:  SCr 6.10 [08-09 @ 05:45]  SCr 6.68 [08-08 @ 06:45]  SCr 4.87 [08-07 @ 15:52]  SCr 9.25 [08-07 @ 06:15]  SCr 7.45 [08-06 @ 06:20]        .9 (Ca --)      [12-16-19 @ 22:40]   --  .5 (Ca --)      [12-16-19 @ 17:00]   --  HbA1c 7.8      [12-17-19 @ 05:54]    HBsAb 137.3      [08-05-20 @ 17:40]  HBsAg NEGATIVE      [08-05-20 @ 09:00]  HBcAb Nonreactive      [08-05-20 @ 17:40]  HCV 0.09, Nonreactive Hepatitis C AB  S/CO Ratio                        Interpretation  < 1.00                                   Non-Reactive  1.00 - 4.99                         Weakly-Reactive  >= 5.00                                Reactive  Non-Reactive: Aperson with a non-reactive HCV antibody  result is considered uninfected.  No further action is  needed unless recent infection is suspected.  In these  cases, consider repeat testing later to detect  seroconversion..  Weakly-Reactive: HCV antibody test is abnormal, HCV RNA  Qualitative test will follow.  Reactive: HCV antibody test is abnormal, HCV RNA  Qualitative test will follow.  Note: HCV antibody testing is performed on the [employer] system.      [08-05-20 @ 17:40]

## 2020-08-09 NOTE — DISCHARGE NOTE PROVIDER - NSDCMRMEDTOKEN_GEN_ALL_CORE_FT
acetaminophen 325 mg oral tablet: 2 tab(s) orally every 6 hours, As needed, Mild Pain (1 - 3)  Aspirin Enteric Coated 81 mg oral delayed release tablet: 1 tab(s) orally once a day  atorvastatin 40 mg oral tablet: 1 tab(s) orally once a day  calcitriol 0.25 mcg oral capsule: 1 cap(s) orally once a day  cloNIDine 0.1 mg oral tablet: 1 tab(s) orally 2 times a day  clopidogrel 75 mg oral tablet: 1 tab(s) orally once a day  labetalol 200 mg oral tablet: 2 tab(s) orally 2 times a day   Lantus 100 units/mL subcutaneous solution: 5 unit(s) subcutaneous once a day (at bedtime)   linagliptin 5 mg oral tablet: 1 tab(s) orally once a day   losartan 25 mg oral tablet: 1 tab(s) orally once a day  NIFEdipine 60 mg oral tablet, extended release: 1 tab(s) orally once a day  sevelamer carbonate 800 mg oral tablet: 2 tab(s) orally 3 times a day (with meals) acetaminophen 325 mg oral tablet: 2 tab(s) orally every 6 hours, As needed, Mild Pain (1 - 3)  Aspirin Enteric Coated 81 mg oral delayed release tablet: 1 tab(s) orally once a day  atorvastatin 40 mg oral tablet: 1 tab(s) orally once a day  calcitriol 0.25 mcg oral capsule: 1 cap(s) orally once a day  cloNIDine 0.1 mg oral tablet: 1 tab(s) orally 2 times a day  clopidogrel 75 mg oral tablet: 1 tab(s) orally once a day  labetalol 200 mg oral tablet: 2 tab(s) orally 2 times a day   Lantus 100 units/mL subcutaneous solution: 5 unit(s) subcutaneous once a day (at bedtime)   linagliptin 5 mg oral tablet: 1 tab(s) orally once a day   losartan 25 mg oral tablet: 1 tab(s) orally once a day  NIFEdipine 60 mg oral tablet, extended release: 1 tab(s) orally once a day  oxyCODONE 5 mg oral tablet: 1 tab(s) orally every 6 hours, As Needed -Severe Pain (7 - 10) MDD:No more than 4 pills per day  sevelamer carbonate 800 mg oral tablet: 2 tab(s) orally 3 times a day (with meals)

## 2020-08-09 NOTE — PROGRESS NOTE ADULT - ASSESSMENT
65M hx CAD s/p PCI (most recent in Feb 2019 w/2 NAHUM, dLAD and pLCx, Echo may 2020 showing mild segmental LV dysfunction, NST May 2020 negative for ischemia) HTN, DM, GERD, ESRD on HD via L AFV (last dialyzed yesterday, 8/4) presenting with 2day of RUQ abdominal pain concern for acute cholecystitis     ESRD on HD TTS via AVF  s/p HD 8/8 with 1L UF   HD consent in chart  plan for HD tuesday  renal diet  monitor bmp    Hyperkalemia  sec to renal failure  HD with 2 K bath  low k diet  monitor     HTN  controlled  low Na diet  monitor    Anemia  at goal  no need for epo  monitor hb    Ckd-mbd  check pth  hyperphosphatemia  phoslo 667 tid with meal  monitor phos and calcium daily    Abd pain  s/p  open cholecystectomy,   f/u surgery

## 2020-08-09 NOTE — PROGRESS NOTE ADULT - PROVIDER SPECIALTY LIST ADULT
Cardiology
Nephrology
Pain Medicine
Surgery
Cardiology
Pain Medicine

## 2020-08-10 ENCOUNTER — EMERGENCY (EMERGENCY)
Facility: HOSPITAL | Age: 65
LOS: 1 days | Discharge: HOME | End: 2020-08-10
Attending: EMERGENCY MEDICINE | Admitting: EMERGENCY MEDICINE
Payer: MEDICARE

## 2020-08-10 VITALS
HEART RATE: 103 BPM | DIASTOLIC BLOOD PRESSURE: 82 MMHG | HEIGHT: 66 IN | RESPIRATION RATE: 16 BRPM | TEMPERATURE: 98 F | SYSTOLIC BLOOD PRESSURE: 156 MMHG | OXYGEN SATURATION: 95 %

## 2020-08-10 DIAGNOSIS — Z98.890 OTHER SPECIFIED POSTPROCEDURAL STATES: Chronic | ICD-10-CM

## 2020-08-10 DIAGNOSIS — I77.0 ARTERIOVENOUS FISTULA, ACQUIRED: Chronic | ICD-10-CM

## 2020-08-10 LAB
CULTURE RESULTS: SIGNIFICANT CHANGE UP
CULTURE RESULTS: SIGNIFICANT CHANGE UP
SPECIMEN SOURCE: SIGNIFICANT CHANGE UP
SPECIMEN SOURCE: SIGNIFICANT CHANGE UP

## 2020-08-10 PROCEDURE — 99284 EMERGENCY DEPT VISIT MOD MDM: CPT | Mod: GC

## 2020-08-10 NOTE — ED PROVIDER NOTE - ATTENDING CONTRIBUTION TO CARE
Attending note:   After face to face evaluation of this patient, I concur with above noted hx, pe, and care plan for this patient.  Kaur: 65 yom few days post CCK complaining of worsening abd pain, diffuse not releived by meds given. Pt also noted no BM since surgery and is unsure if he passed gas. No fever, on HD. On exam, pt is uncomfortable, +thrill in left wrist, abd distended with diffuse tenderness, no cvat, basilar crackles, +pitting edema, RRR. Concern for obstruction, infection, abscess - labs Ct, surgery eval. Pt scheduled for HD in AM.

## 2020-08-10 NOTE — ED ADULT NURSE NOTE - PMH
Anemia due to stage 5 chronic kidney disease, not on chronic dialysis    CAP (community acquired pneumonia)  6/18  Cerebrovascular accident (CVA), unspecified mechanism  diagnosed via CT of the head  Coronary artery disease    Diabetes mellitus  type 2  ESRD (end stage renal disease)  on Dialysis( M/W/F), By Dr. Huff  GERD (gastroesophageal reflux disease)    HTN (hypertension)    Hypercholesterolemia    Stented coronary artery  2014, 3 stents, [facility]

## 2020-08-10 NOTE — ED PROVIDER NOTE - OBJECTIVE STATEMENT
65M hx CAD s/p PCI, HTN, DM, GERD, ESRD on HD (last dialyzed yesterday) post-op day 4 from open cholecystectomy under Dr. Abreu p/f 8/10 diffuse abdominal pain and distension a/w inability to pass gas. Denies fevers, n/v, cp/sob.

## 2020-08-10 NOTE — ED PROVIDER NOTE - PATIENT PORTAL LINK FT
You can access the FollowMyHealth Patient Portal offered by [facility] by registering at the following website: [email or web address]. By joining [facility]’s FollowMyHealth portal, you will also be able to view your health information using other applications (apps) compatible with our system.

## 2020-08-10 NOTE — ED ADULT TRIAGE NOTE - CHIEF COMPLAINT QUOTE
pt brought in by EMS s/p Cholecystectomy ~5 days ago,  c/o abdominal pain and no BM x7 days. pt given Milk of Magnesia, Prune Juice, and stool softener with no relief. pt states not passing gas x2-3 days. denies nausea, vomiting. PMH- HTN, DM, ESRD on dialysis T/TH/S, last dialysis Sat via L AV fistula.

## 2020-08-10 NOTE — ED PROVIDER NOTE - NSFOLLOWUPINSTRUCTIONS_ED_ALL_ED_FT
Follow up with your surgeon at your regularly schedule post-operative visit.    Take Docusate (stool softener) once a day for the next week. The prescription has been sent to your pharmacy.     Seek immediate medical attention for new or worsening symptoms.

## 2020-08-10 NOTE — ED PROVIDER NOTE - PMH
Anemia due to stage 5 chronic kidney disease, not on chronic dialysis    CAP (community acquired pneumonia)  6/18  Cerebrovascular accident (CVA), unspecified mechanism  diagnosed via CT of the head  Coronary artery disease    Diabetes mellitus  type 2  ESRD (end stage renal disease)  on Dialysis( M/W/F), By Dr. Huff  GERD (gastroesophageal reflux disease)    HTN (hypertension)    Hypercholesterolemia    Stented coronary artery  2014, 3 stents, [facility]

## 2020-08-10 NOTE — ED PROVIDER NOTE - CLINICAL SUMMARY MEDICAL DECISION MAKING FREE TEXT BOX
65M p/f abdominal pain and distension POD 4, incision sites CDI. Diffusely tender. Plan for labs, IV contrast, surg consult.

## 2020-08-10 NOTE — ED ADULT NURSE NOTE - OBJECTIVE STATEMENT
Pt. received in room 22, A&Ox4, ambulatory. Arrives with L AV fistula, dialysis Tuesday, Thursday, Saturday; last dialysis Saturday. Pt. reports he had a cholecystectomy on Friday c/o generalized abd pain, abd distention, and no bowel movement since Friday. Pt. also states he has not been passing gas for 2-3 days. Denies fever, chills, n/v/d, SOB, dizziness, weakness, cough. 20G IV inserted in right ac, positive blood return, flushes without difficulty. Respirations even & unlabored on room air. MD Kaur at bedside for evaluation. Will continue to monitor. Pt. received in room 22, A&Ox4, ambulatory. Arrives with L AV fistula, dialysis Tuesday, Thursday, Saturday; last dialysis Saturday. Pt. reports he had a cholecystectomy on Friday c/o generalized abd pain, abd distention, and no bowel movement since Friday. Pt. also states he has not been passing gas for 2-3 days. Denies fever, chills, n/v/d, SOB, dizziness, weakness, cough, chest pain. 20G IV inserted in right ac, positive blood return, flushes without difficulty. Respirations even & unlabored on room air. MD Kaur at bedside for evaluation. Will continue to monitor.

## 2020-08-10 NOTE — ED PROVIDER NOTE - PROGRESS NOTE DETAILS
Anthony Villegas, PGY-3: patient signed out to me, BM then ok to go. Patient had BM x2 states that the first had a small amount of blood, stool was very hard. Second bowel movement was observed in the toilet, no blood observed, no blood observed on rectal exam (Chaperone: Paola Lombardo). Patient states that he feels better after BM will dc with instructions to f/u up with surgery at his regularly schedule f/u visit. Will also send home with colace for a week. Anthony Villegas, PGY-3: patients BP elevated, will give morning meds. Patient is asymptomatic at this time, has dialysis today. Asymptomatic HTN, needs dialysis, will still dc so he can go to dialysis.

## 2020-08-11 VITALS
TEMPERATURE: 98 F | RESPIRATION RATE: 18 BRPM | DIASTOLIC BLOOD PRESSURE: 84 MMHG | SYSTOLIC BLOOD PRESSURE: 213 MMHG | OXYGEN SATURATION: 99 % | HEART RATE: 73 BPM

## 2020-08-11 LAB
ALBUMIN SERPL ELPH-MCNC: 3.9 G/DL — SIGNIFICANT CHANGE UP (ref 3.3–5)
ALP SERPL-CCNC: 65 U/L — SIGNIFICANT CHANGE UP (ref 40–120)
ALT FLD-CCNC: 97 U/L — HIGH (ref 4–41)
ANION GAP SERPL CALC-SCNC: 19 MMO/L — HIGH (ref 7–14)
APTT BLD: 29.4 SEC — SIGNIFICANT CHANGE UP (ref 27–36.3)
AST SERPL-CCNC: 50 U/L — HIGH (ref 4–40)
BASOPHILS # BLD AUTO: 0.03 K/UL — SIGNIFICANT CHANGE UP (ref 0–0.2)
BASOPHILS NFR BLD AUTO: 0.3 % — SIGNIFICANT CHANGE UP (ref 0–2)
BILIRUB SERPL-MCNC: 0.4 MG/DL — SIGNIFICANT CHANGE UP (ref 0.2–1.2)
BUN SERPL-MCNC: 58 MG/DL — HIGH (ref 7–23)
CALCIUM SERPL-MCNC: 9.2 MG/DL — SIGNIFICANT CHANGE UP (ref 8.4–10.5)
CHLORIDE SERPL-SCNC: 90 MMOL/L — LOW (ref 98–107)
CO2 SERPL-SCNC: 26 MMOL/L — SIGNIFICANT CHANGE UP (ref 22–31)
CREAT SERPL-MCNC: 9.29 MG/DL — HIGH (ref 0.5–1.3)
EOSINOPHIL # BLD AUTO: 0.19 K/UL — SIGNIFICANT CHANGE UP (ref 0–0.5)
EOSINOPHIL NFR BLD AUTO: 2 % — SIGNIFICANT CHANGE UP (ref 0–6)
GLUCOSE SERPL-MCNC: 195 MG/DL — HIGH (ref 70–99)
HCT VFR BLD CALC: 32.2 % — LOW (ref 39–50)
HGB BLD-MCNC: 10.1 G/DL — LOW (ref 13–17)
IMM GRANULOCYTES NFR BLD AUTO: 0.4 % — SIGNIFICANT CHANGE UP (ref 0–1.5)
INR BLD: 0.95 — SIGNIFICANT CHANGE UP (ref 0.88–1.16)
LYMPHOCYTES # BLD AUTO: 1.03 K/UL — SIGNIFICANT CHANGE UP (ref 1–3.3)
LYMPHOCYTES # BLD AUTO: 11 % — LOW (ref 13–44)
MCHC RBC-ENTMCNC: 31 PG — SIGNIFICANT CHANGE UP (ref 27–34)
MCHC RBC-ENTMCNC: 31.4 % — LOW (ref 32–36)
MCV RBC AUTO: 98.8 FL — SIGNIFICANT CHANGE UP (ref 80–100)
MONOCYTES # BLD AUTO: 1.02 K/UL — HIGH (ref 0–0.9)
MONOCYTES NFR BLD AUTO: 10.9 % — SIGNIFICANT CHANGE UP (ref 2–14)
NEUTROPHILS # BLD AUTO: 7.07 K/UL — SIGNIFICANT CHANGE UP (ref 1.8–7.4)
NEUTROPHILS NFR BLD AUTO: 75.4 % — SIGNIFICANT CHANGE UP (ref 43–77)
NRBC # FLD: 0 K/UL — SIGNIFICANT CHANGE UP (ref 0–0)
PLATELET # BLD AUTO: 167 K/UL — SIGNIFICANT CHANGE UP (ref 150–400)
PMV BLD: 11 FL — SIGNIFICANT CHANGE UP (ref 7–13)
POTASSIUM SERPL-MCNC: 5.2 MMOL/L — SIGNIFICANT CHANGE UP (ref 3.5–5.3)
POTASSIUM SERPL-SCNC: 5.2 MMOL/L — SIGNIFICANT CHANGE UP (ref 3.5–5.3)
PROT SERPL-MCNC: 7.5 G/DL — SIGNIFICANT CHANGE UP (ref 6–8.3)
PROTHROM AB SERPL-ACNC: 11 SEC — SIGNIFICANT CHANGE UP (ref 10.6–13.6)
RBC # BLD: 3.26 M/UL — LOW (ref 4.2–5.8)
RBC # FLD: 13.5 % — SIGNIFICANT CHANGE UP (ref 10.3–14.5)
SODIUM SERPL-SCNC: 135 MMOL/L — SIGNIFICANT CHANGE UP (ref 135–145)
WBC # BLD: 9.38 K/UL — SIGNIFICANT CHANGE UP (ref 3.8–10.5)
WBC # FLD AUTO: 9.38 K/UL — SIGNIFICANT CHANGE UP (ref 3.8–10.5)

## 2020-08-11 PROCEDURE — 74177 CT ABD & PELVIS W/CONTRAST: CPT | Mod: 26

## 2020-08-11 RX ORDER — POLYETHYLENE GLYCOL 3350 17 G/17G
17 POWDER, FOR SOLUTION ORAL ONCE
Refills: 0 | Status: COMPLETED | OUTPATIENT
Start: 2020-08-11 | End: 2020-08-11

## 2020-08-11 RX ORDER — LOSARTAN POTASSIUM 100 MG/1
25 TABLET, FILM COATED ORAL DAILY
Refills: 0 | Status: DISCONTINUED | OUTPATIENT
Start: 2020-08-11 | End: 2020-08-14

## 2020-08-11 RX ORDER — GLYCERIN ADULT
1 SUPPOSITORY, RECTAL RECTAL ONCE
Refills: 0 | Status: COMPLETED | OUTPATIENT
Start: 2020-08-11 | End: 2020-08-11

## 2020-08-11 RX ORDER — LABETALOL HCL 100 MG
400 TABLET ORAL ONCE
Refills: 0 | Status: COMPLETED | OUTPATIENT
Start: 2020-08-11 | End: 2020-08-11

## 2020-08-11 RX ORDER — DOCUSATE SODIUM 100 MG
1 CAPSULE ORAL
Qty: 7 | Refills: 0
Start: 2020-08-11 | End: 2020-08-17

## 2020-08-11 RX ADMIN — Medication 1 ENEMA: at 04:32

## 2020-08-11 RX ADMIN — LOSARTAN POTASSIUM 25 MILLIGRAM(S): 100 TABLET, FILM COATED ORAL at 08:25

## 2020-08-11 RX ADMIN — Medication 1 SUPPOSITORY(S): at 06:40

## 2020-08-11 RX ADMIN — Medication 400 MILLIGRAM(S): at 08:25

## 2020-08-11 RX ADMIN — POLYETHYLENE GLYCOL 3350 17 GRAM(S): 17 POWDER, FOR SOLUTION ORAL at 04:32

## 2020-08-11 RX ADMIN — Medication 0.1 MILLIGRAM(S): at 08:25

## 2020-08-11 NOTE — CONSULT NOTE ADULT - ASSESSMENT
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 Abreu as scheduled for routine post-op surveillance  - Case discussed with surgical attending    Rylan Maldonado, PGY 3  Surgery [id number]

## 2020-08-11 NOTE — ED ADULT NURSE REASSESSMENT NOTE - NS ED NURSE REASSESS COMMENT FT1
Pt. stated he had a bowel movement but "was only a small amount" and still feels like he "has to go more." MD Patino made aware. Medication given as per order. Will continue to monitor.

## 2020-08-11 NOTE — ED ADULT NURSE REASSESSMENT NOTE - NS ED NURSE REASSESS COMMENT FT1
Pt. states he had a bowel movement. Denies blood in stool. Endorses relief of discomfort. VS as noted. MD Villegas aware of pt. blood pressure. Pt. denies headache and vision changes.

## 2020-08-11 NOTE — CONSULT NOTE ADULT - SUBJECTIVE AND OBJECTIVE BOX
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 Patient is POD#5 s/p laparoscopic converted to open cholecystectomy 8/6, discharged home 8/9. Pt reports that since arriving home he has had gradually increasing abdominal distention and pain, not resolving with use of narcotic pain medications. Pt reports normal PO intake without any increase in pain, and denies nausea or vomiting. However, pt states he has not had a bowel movement since before the surgery and also denies passing any flatus. Reports that he has not been taking any Tylenol for pain, but has been taking oxycodone regularly since discharge and denies using any bowel regimen. Denies any other complaints including fevers/chills, chest pain/shortness of breath, diarrhea.     PMHx: Anemia due to stage 5 chronic kidney disease, not on chronic dialysis  Cerebrovascular accident (CVA), unspecified mechanism  Hypercholesterolemia  ESRD (end stage renal disease)  Stented coronary artery  GERD (gastroesophageal reflux disease)  Diabetes mellitus  CAD (coronary artery disease)  CAP (community acquired pneumonia)  Coronary artery disease  CKD (chronic kidney disease)  HTN (hypertension)  Diabetes    PSHx: H/O eye surgery  AV fistula  H/O coronary angiogram  No significant past surgical history    Medications (inpatient): polyethylene glycol 3350 17 Gram(s) Oral Once  saline laxative (FLEET) Rectal Enema 1 Enema Rectal once    Medications (PRN):  Allergies: No Known Allergies  (Intolerances: )  Social Hx:   Family Hx: No pertinent family history in first degree relatives      T(C): 36.3  HR: 64 (64 - 103)  BP: 176/80 (156/82 - 176/80)  RR: 16 (16 - 16)  SpO2: 100% (95% - 100%)  Tmax: T(C): , Max: 36.7 (08-10-20 @ 21:55)      Physical Exam:  General: Well-developed elderly male, in no acute distress   Neurologic: Awake, alert, GCS 15, No focal Deficits   Respiratory: Normal respiratory effort  CVS: RRR, perfusing adequately  Abdomen: Abdomen softly distended, mild appropriate jamin-incisional TTP, no rebound or guarding. Surgical sites well appearing, staples in place.   Ext: Moving all extremities. LUE AVF with palpable thrill.  Skin: Warm, dry, intact, no erythema     Labs:                        10.1   9.38  )-----------( 167      ( 10 Aug 2020 23:40 )             32.2     PT/INR - ( 10 Aug 2020 23:40 )   PT: 11.0 SEC;   INR: 0.95          PTT - ( 10 Aug 2020 23:40 )  PTT:29.4 SEC  08-10    135  |  90<L>  |  58<H>  ----------------------------<  195<H>  5.2   |  26  |  9.29<H>    Ca    9.2      10 Aug 2020 23:40  Phos  4.7     08-09  Mg     2.1     08-09    TPro  7.5  /  Alb  3.9  /  TBili  0.4  /  DBili  x   /  AST  50<H>  /  ALT  97<H>  /  AlkPhos  65  08-10            Imaging and other studies:  < from: CT Abdomen and Pelvis w/ IV Cont (08.11.20 @ 01:47) >    EXAM:  CT ABDOMEN AND PELVIS IC        PROCEDURE DATE:  Aug 11 2020         INTERPRETATION:  CLINICAL INFORMATION: Abdominal pain and distention with inability to pass gas, postop day 4 from open cholecystectomy    COMPARISON: MRCP from 8/8/2020, CT abdomen and pelvis from 12/16/2019    PROCEDURE:  CT of the Abdomen and Pelvis was performed with intravenous contrast.  Intravenous contrast: 90 ml Omnipaque 350. 10 ml discarded.  Oral contrast: None.  Sagittal and coronal reformats were performed.    FINDINGS:  LOWER CHEST: Within normal limits.    LIVER: Within normal limits.  BILE DUCTS: Normal caliber.  GALLBLADDER: Postsurgical changes in the cholecystectomy bed. No fluid collections..  SPLEEN: Within normal limits.  PANCREAS: Within normal limits.  ADRENALS: Within normal limits.  KIDNEYS/URETERS: Atrophic kidneys. Left renal hypodensity too small to characterize.    BLADDER: Circumferential wall which may be secondary to under distention versus cystitis.  REPRODUCTIVE ORGANS: Prostate within normal limits.    BOWEL: No bowel obstruction. Appendix is normal. Moderate stool burden within the rectum.  PERITONEUM: No ascites.  VESSELS: Atherosclerotic changes.  RETROPERITONEUM/LYMPH NODES: No lymphadenopathy.  ABDOMINAL WALL: Surgical staples and subcutaneous air along the right upper quadrant, likely postsurgical.  BONES: Degenerative changes.    IMPRESSION:  Postsurgical changes in the cholecystectomy bed. No fluid collection.    No bowel obstruction. Moderate stool burden within the rectum.    Circumferential bladder wall thickening which may be secondary to underdistention versus cystitis.                      TRUDI VINES M.D., RADIOLOGY RESIDENT  This document has been electronically signed.  VAN WILLIAM M.D., ATTENDING RADIOLOGIST  This document has been electronically signed. Aug 11 2020  4:04AM                  < end of copied text >

## 2020-08-14 LAB — SURGICAL PATHOLOGY STUDY: SIGNIFICANT CHANGE UP

## 2020-08-17 ENCOUNTER — APPOINTMENT (OUTPATIENT)
Dept: TRANSPLANT | Facility: CLINIC | Age: 65
End: 2020-08-17

## 2020-09-02 ENCOUNTER — OUTPATIENT (OUTPATIENT)
Dept: OUTPATIENT SERVICES | Facility: HOSPITAL | Age: 65
LOS: 1 days | End: 2020-09-02
Payer: MEDICARE

## 2020-09-02 ENCOUNTER — APPOINTMENT (OUTPATIENT)
Dept: RADIOLOGY | Facility: IMAGING CENTER | Age: 65
End: 2020-09-02
Payer: MEDICARE

## 2020-09-02 ENCOUNTER — RESULT REVIEW (OUTPATIENT)
Age: 65
End: 2020-09-02

## 2020-09-02 ENCOUNTER — APPOINTMENT (OUTPATIENT)
Dept: CT IMAGING | Facility: IMAGING CENTER | Age: 65
End: 2020-09-02
Payer: MEDICARE

## 2020-09-02 DIAGNOSIS — Z99.2 DEPENDENCE ON RENAL DIALYSIS: ICD-10-CM

## 2020-09-02 DIAGNOSIS — Z98.890 OTHER SPECIFIED POSTPROCEDURAL STATES: Chronic | ICD-10-CM

## 2020-09-02 DIAGNOSIS — I77.0 ARTERIOVENOUS FISTULA, ACQUIRED: Chronic | ICD-10-CM

## 2020-09-02 DIAGNOSIS — N18.6 END STAGE RENAL DISEASE: ICD-10-CM

## 2020-09-02 PROCEDURE — 71046 X-RAY EXAM CHEST 2 VIEWS: CPT | Mod: 26

## 2020-09-02 PROCEDURE — 74174 CTA ABD&PLVS W/CONTRAST: CPT

## 2020-09-02 PROCEDURE — 71046 X-RAY EXAM CHEST 2 VIEWS: CPT

## 2020-09-02 PROCEDURE — 74174 CTA ABD&PLVS W/CONTRAST: CPT | Mod: 26

## 2020-09-18 ENCOUNTER — APPOINTMENT (OUTPATIENT)
Dept: TRANSPLANT | Facility: CLINIC | Age: 65
End: 2020-09-18

## 2020-10-21 ENCOUNTER — APPOINTMENT (OUTPATIENT)
Dept: TRANSPLANT | Facility: CLINIC | Age: 65
End: 2020-10-21

## 2020-11-18 ENCOUNTER — APPOINTMENT (OUTPATIENT)
Dept: TRANSPLANT | Facility: CLINIC | Age: 65
End: 2020-11-18

## 2020-12-04 ENCOUNTER — INPATIENT (INPATIENT)
Facility: HOSPITAL | Age: 65
LOS: 3 days | Discharge: HOME HEALTH | End: 2020-12-08
Attending: INTERNAL MEDICINE | Admitting: INTERNAL MEDICINE
Payer: MEDICARE

## 2020-12-04 VITALS
RESPIRATION RATE: 18 BRPM | TEMPERATURE: 98 F | HEART RATE: 61 BPM | HEIGHT: 66 IN | SYSTOLIC BLOOD PRESSURE: 156 MMHG | DIASTOLIC BLOOD PRESSURE: 77 MMHG | OXYGEN SATURATION: 100 %

## 2020-12-04 DIAGNOSIS — Z98.890 OTHER SPECIFIED POSTPROCEDURAL STATES: Chronic | ICD-10-CM

## 2020-12-04 DIAGNOSIS — I77.0 ARTERIOVENOUS FISTULA, ACQUIRED: Chronic | ICD-10-CM

## 2020-12-04 DIAGNOSIS — R19.5 OTHER FECAL ABNORMALITIES: ICD-10-CM

## 2020-12-04 LAB
ALBUMIN SERPL ELPH-MCNC: 3.7 G/DL — SIGNIFICANT CHANGE UP (ref 3.3–5)
ALP SERPL-CCNC: 60 U/L — SIGNIFICANT CHANGE UP (ref 40–120)
ALT FLD-CCNC: 12 U/L — SIGNIFICANT CHANGE UP (ref 4–41)
ANION GAP SERPL CALC-SCNC: 11 MMO/L — SIGNIFICANT CHANGE UP (ref 7–14)
APTT BLD: 26.9 SEC — LOW (ref 27–36.3)
AST SERPL-CCNC: 12 U/L — SIGNIFICANT CHANGE UP (ref 4–40)
B PERT DNA SPEC QL NAA+PROBE: SIGNIFICANT CHANGE UP
BASE EXCESS BLDV CALC-SCNC: 7.2 MMOL/L — SIGNIFICANT CHANGE UP
BASOPHILS # BLD AUTO: 0.01 K/UL — SIGNIFICANT CHANGE UP (ref 0–0.2)
BASOPHILS NFR BLD AUTO: 0.2 % — SIGNIFICANT CHANGE UP (ref 0–2)
BILIRUB SERPL-MCNC: 0.4 MG/DL — SIGNIFICANT CHANGE UP (ref 0.2–1.2)
BLOOD GAS VENOUS - CREATININE: 7.02 MG/DL — HIGH (ref 0.5–1.3)
BLOOD GAS VENOUS - FIO2: 21 — SIGNIFICANT CHANGE UP
BUN SERPL-MCNC: 54 MG/DL — HIGH (ref 7–23)
C PNEUM DNA SPEC QL NAA+PROBE: SIGNIFICANT CHANGE UP
CALCIUM SERPL-MCNC: 8.9 MG/DL — SIGNIFICANT CHANGE UP (ref 8.4–10.5)
CHLORIDE BLDV-SCNC: 96 MMOL/L — SIGNIFICANT CHANGE UP (ref 96–108)
CHLORIDE SERPL-SCNC: 94 MMOL/L — LOW (ref 98–107)
CO2 SERPL-SCNC: 31 MMOL/L — SIGNIFICANT CHANGE UP (ref 22–31)
CREAT SERPL-MCNC: 6.57 MG/DL — HIGH (ref 0.5–1.3)
EOSINOPHIL # BLD AUTO: 0.16 K/UL — SIGNIFICANT CHANGE UP (ref 0–0.5)
EOSINOPHIL NFR BLD AUTO: 2.7 % — SIGNIFICANT CHANGE UP (ref 0–6)
FLUAV H1 2009 PAND RNA SPEC QL NAA+PROBE: SIGNIFICANT CHANGE UP
FLUAV H1 RNA SPEC QL NAA+PROBE: SIGNIFICANT CHANGE UP
FLUAV H3 RNA SPEC QL NAA+PROBE: SIGNIFICANT CHANGE UP
FLUAV SUBTYP SPEC NAA+PROBE: SIGNIFICANT CHANGE UP
FLUBV RNA SPEC QL NAA+PROBE: SIGNIFICANT CHANGE UP
GAS PNL BLDV: 136 MMOL/L — SIGNIFICANT CHANGE UP (ref 136–146)
GLUCOSE BLDC GLUCOMTR-MCNC: 109 MG/DL — HIGH (ref 70–99)
GLUCOSE BLDC GLUCOMTR-MCNC: 115 MG/DL — HIGH (ref 70–99)
GLUCOSE BLDC GLUCOMTR-MCNC: 97 MG/DL — SIGNIFICANT CHANGE UP (ref 70–99)
GLUCOSE BLDV-MCNC: 226 MG/DL — HIGH (ref 70–99)
GLUCOSE SERPL-MCNC: 227 MG/DL — HIGH (ref 70–99)
HADV DNA SPEC QL NAA+PROBE: SIGNIFICANT CHANGE UP
HCO3 BLDV-SCNC: 30 MMOL/L — HIGH (ref 20–27)
HCOV PNL SPEC NAA+PROBE: SIGNIFICANT CHANGE UP
HCT VFR BLD CALC: 18.4 % — CRITICAL LOW (ref 39–50)
HCT VFR BLD CALC: 28.7 % — LOW (ref 39–50)
HCT VFR BLDV CALC: 20.1 % — CRITICAL LOW (ref 39–51)
HGB BLD-MCNC: 5.7 G/DL — CRITICAL LOW (ref 13–17)
HGB BLD-MCNC: 8.8 G/DL — LOW (ref 13–17)
HGB BLDV-MCNC: 6.4 G/DL — CRITICAL LOW (ref 13–17)
HMPV RNA SPEC QL NAA+PROBE: SIGNIFICANT CHANGE UP
HPIV1 RNA SPEC QL NAA+PROBE: SIGNIFICANT CHANGE UP
HPIV2 RNA SPEC QL NAA+PROBE: SIGNIFICANT CHANGE UP
HPIV3 RNA SPEC QL NAA+PROBE: SIGNIFICANT CHANGE UP
HPIV4 RNA SPEC QL NAA+PROBE: SIGNIFICANT CHANGE UP
IMM GRANULOCYTES NFR BLD AUTO: 0.3 % — SIGNIFICANT CHANGE UP (ref 0–1.5)
INR BLD: 1.06 — SIGNIFICANT CHANGE UP (ref 0.88–1.16)
LACTATE BLDV-MCNC: 1.7 MMOL/L — SIGNIFICANT CHANGE UP (ref 0.5–2)
LIDOCAIN IGE QN: 104.1 U/L — HIGH (ref 7–60)
LYMPHOCYTES # BLD AUTO: 1.2 K/UL — SIGNIFICANT CHANGE UP (ref 1–3.3)
LYMPHOCYTES # BLD AUTO: 19.9 % — SIGNIFICANT CHANGE UP (ref 13–44)
MAGNESIUM SERPL-MCNC: 2.1 MG/DL — SIGNIFICANT CHANGE UP (ref 1.6–2.6)
MCHC RBC-ENTMCNC: 29.4 PG — SIGNIFICANT CHANGE UP (ref 27–34)
MCHC RBC-ENTMCNC: 29.7 PG — SIGNIFICANT CHANGE UP (ref 27–34)
MCHC RBC-ENTMCNC: 30.7 % — LOW (ref 32–36)
MCHC RBC-ENTMCNC: 31 % — LOW (ref 32–36)
MCV RBC AUTO: 95.8 FL — SIGNIFICANT CHANGE UP (ref 80–100)
MCV RBC AUTO: 96 FL — SIGNIFICANT CHANGE UP (ref 80–100)
MONOCYTES # BLD AUTO: 0.78 K/UL — SIGNIFICANT CHANGE UP (ref 0–0.9)
MONOCYTES NFR BLD AUTO: 12.9 % — SIGNIFICANT CHANGE UP (ref 2–14)
NEUTROPHILS # BLD AUTO: 3.86 K/UL — SIGNIFICANT CHANGE UP (ref 1.8–7.4)
NEUTROPHILS NFR BLD AUTO: 64 % — SIGNIFICANT CHANGE UP (ref 43–77)
NRBC # FLD: 0 K/UL — SIGNIFICANT CHANGE UP (ref 0–0)
NRBC # FLD: 0 K/UL — SIGNIFICANT CHANGE UP (ref 0–0)
OB PNL STL: POSITIVE — SIGNIFICANT CHANGE UP
PCO2 BLDV: 60 MMHG — HIGH (ref 41–51)
PH BLDV: 7.35 PH — SIGNIFICANT CHANGE UP (ref 7.32–7.43)
PHOSPHATE SERPL-MCNC: 3.3 MG/DL — SIGNIFICANT CHANGE UP (ref 2.5–4.5)
PLATELET # BLD AUTO: 116 K/UL — LOW (ref 150–400)
PLATELET # BLD AUTO: 123 K/UL — LOW (ref 150–400)
PMV BLD: 12.1 FL — SIGNIFICANT CHANGE UP (ref 7–13)
PMV BLD: 12.4 FL — SIGNIFICANT CHANGE UP (ref 7–13)
PO2 BLDV: 38 MMHG — SIGNIFICANT CHANGE UP (ref 35–40)
POTASSIUM BLDV-SCNC: 4 MMOL/L — SIGNIFICANT CHANGE UP (ref 3.4–4.5)
POTASSIUM SERPL-MCNC: 4.1 MMOL/L — SIGNIFICANT CHANGE UP (ref 3.5–5.3)
POTASSIUM SERPL-SCNC: 4.1 MMOL/L — SIGNIFICANT CHANGE UP (ref 3.5–5.3)
PROT SERPL-MCNC: 6.4 G/DL — SIGNIFICANT CHANGE UP (ref 6–8.3)
PROTHROM AB SERPL-ACNC: 12 SEC — SIGNIFICANT CHANGE UP (ref 10.6–13.6)
RAPID RVP RESULT: SIGNIFICANT CHANGE UP
RBC # BLD: 1.92 M/UL — LOW (ref 4.2–5.8)
RBC # BLD: 2.99 M/UL — LOW (ref 4.2–5.8)
RBC # FLD: 15.3 % — HIGH (ref 10.3–14.5)
RBC # FLD: 15.5 % — HIGH (ref 10.3–14.5)
RSV RNA SPEC QL NAA+PROBE: SIGNIFICANT CHANGE UP
RV+EV RNA SPEC QL NAA+PROBE: SIGNIFICANT CHANGE UP
SALICYLATES SERPL-MCNC: < 5 MG/DL — LOW (ref 15–30)
SAO2 % BLDV: 58.2 % — LOW (ref 60–85)
SARS-COV-2 RNA SPEC QL NAA+PROBE: SIGNIFICANT CHANGE UP
SODIUM SERPL-SCNC: 136 MMOL/L — SIGNIFICANT CHANGE UP (ref 135–145)
WBC # BLD: 6.03 K/UL — SIGNIFICANT CHANGE UP (ref 3.8–10.5)
WBC # BLD: 6.49 K/UL — SIGNIFICANT CHANGE UP (ref 3.8–10.5)
WBC # FLD AUTO: 6.03 K/UL — SIGNIFICANT CHANGE UP (ref 3.8–10.5)
WBC # FLD AUTO: 6.49 K/UL — SIGNIFICANT CHANGE UP (ref 3.8–10.5)

## 2020-12-04 PROCEDURE — 99284 EMERGENCY DEPT VISIT MOD MDM: CPT

## 2020-12-04 RX ORDER — DEXTROSE 50 % IN WATER 50 %
12.5 SYRINGE (ML) INTRAVENOUS ONCE
Refills: 0 | Status: DISCONTINUED | OUTPATIENT
Start: 2020-12-04 | End: 2020-12-08

## 2020-12-04 RX ORDER — GLUCAGON INJECTION, SOLUTION 0.5 MG/.1ML
1 INJECTION, SOLUTION SUBCUTANEOUS ONCE
Refills: 0 | Status: DISCONTINUED | OUTPATIENT
Start: 2020-12-04 | End: 2020-12-08

## 2020-12-04 RX ORDER — PANTOPRAZOLE SODIUM 20 MG/1
8 TABLET, DELAYED RELEASE ORAL
Qty: 80 | Refills: 0 | Status: DISCONTINUED | OUTPATIENT
Start: 2020-12-04 | End: 2020-12-04

## 2020-12-04 RX ORDER — DEXTROSE 50 % IN WATER 50 %
25 SYRINGE (ML) INTRAVENOUS ONCE
Refills: 0 | Status: DISCONTINUED | OUTPATIENT
Start: 2020-12-04 | End: 2020-12-08

## 2020-12-04 RX ORDER — CALCITRIOL 0.5 UG/1
0.25 CAPSULE ORAL DAILY
Refills: 0 | Status: DISCONTINUED | OUTPATIENT
Start: 2020-12-04 | End: 2020-12-08

## 2020-12-04 RX ORDER — DEXTROSE 50 % IN WATER 50 %
15 SYRINGE (ML) INTRAVENOUS ONCE
Refills: 0 | Status: DISCONTINUED | OUTPATIENT
Start: 2020-12-04 | End: 2020-12-08

## 2020-12-04 RX ORDER — SODIUM CHLORIDE 9 MG/ML
1000 INJECTION, SOLUTION INTRAVENOUS
Refills: 0 | Status: DISCONTINUED | OUTPATIENT
Start: 2020-12-04 | End: 2020-12-08

## 2020-12-04 RX ORDER — INSULIN LISPRO 100/ML
VIAL (ML) SUBCUTANEOUS
Refills: 0 | Status: DISCONTINUED | OUTPATIENT
Start: 2020-12-04 | End: 2020-12-08

## 2020-12-04 RX ORDER — PANTOPRAZOLE SODIUM 20 MG/1
80 TABLET, DELAYED RELEASE ORAL ONCE
Refills: 0 | Status: COMPLETED | OUTPATIENT
Start: 2020-12-04 | End: 2020-12-04

## 2020-12-04 RX ORDER — ATORVASTATIN CALCIUM 80 MG/1
40 TABLET, FILM COATED ORAL AT BEDTIME
Refills: 0 | Status: DISCONTINUED | OUTPATIENT
Start: 2020-12-04 | End: 2020-12-08

## 2020-12-04 RX ORDER — LABETALOL HCL 100 MG
100 TABLET ORAL
Refills: 0 | Status: DISCONTINUED | OUTPATIENT
Start: 2020-12-04 | End: 2020-12-08

## 2020-12-04 RX ORDER — ERYTHROPOIETIN 10000 [IU]/ML
10000 INJECTION, SOLUTION INTRAVENOUS; SUBCUTANEOUS
Refills: 0 | Status: DISCONTINUED | OUTPATIENT
Start: 2020-12-04 | End: 2020-12-08

## 2020-12-04 RX ORDER — NIFEDIPINE 30 MG
60 TABLET, EXTENDED RELEASE 24 HR ORAL DAILY
Refills: 0 | Status: DISCONTINUED | OUTPATIENT
Start: 2020-12-04 | End: 2020-12-08

## 2020-12-04 RX ORDER — SEVELAMER CARBONATE 2400 MG/1
1600 POWDER, FOR SUSPENSION ORAL
Refills: 0 | Status: DISCONTINUED | OUTPATIENT
Start: 2020-12-04 | End: 2020-12-08

## 2020-12-04 RX ADMIN — Medication 100 MILLIGRAM(S): at 17:39

## 2020-12-04 RX ADMIN — SEVELAMER CARBONATE 1600 MILLIGRAM(S): 2400 POWDER, FOR SUSPENSION ORAL at 11:47

## 2020-12-04 RX ADMIN — PANTOPRAZOLE SODIUM 10 MG/HR: 20 TABLET, DELAYED RELEASE ORAL at 11:47

## 2020-12-04 RX ADMIN — ATORVASTATIN CALCIUM 40 MILLIGRAM(S): 80 TABLET, FILM COATED ORAL at 21:36

## 2020-12-04 RX ADMIN — Medication 0.1 MILLIGRAM(S): at 17:39

## 2020-12-04 RX ADMIN — Medication 60 MILLIGRAM(S): at 10:52

## 2020-12-04 RX ADMIN — PANTOPRAZOLE SODIUM 80 MILLIGRAM(S): 20 TABLET, DELAYED RELEASE ORAL at 09:00

## 2020-12-04 RX ADMIN — CALCITRIOL 0.25 MICROGRAM(S): 0.5 CAPSULE ORAL at 11:47

## 2020-12-04 NOTE — ED ADULT NURSE NOTE - OBJECTIVE STATEMENT
Pt received in spot 2, A&OX4, NAD.  c/o rectal bleeding of dark red blood for the past week, on plavix and aspirin.  Denies any hx of blood transfusions.  Endorses minimal abdominal pain.  Denies any nausea/vomiting. Denies any chest pain/SOB/palpitations.  Pt endorsing intermittent dizziness.  Denies any HA/vision changes.  Pt with L arm fistula, on dialysis, Tu-Th-Sat, last completed yesterday.  Labs sent, 18g IVL placed to R AC.  Will continue to monitor.

## 2020-12-04 NOTE — ED PROVIDER NOTE - PMH
Anemia due to stage 5 chronic kidney disease, not on chronic dialysis    CAP (community acquired pneumonia)  6/18  Cerebrovascular accident (CVA), unspecified mechanism  diagnosed via CT of the head  Coronary artery disease    Diabetes mellitus  type 2  ESRD (end stage renal disease)  on Dialysis( M/W/F), By Dr. Huff  GERD (gastroesophageal reflux disease)    HTN (hypertension)    Hypercholesterolemia    Stented coronary artery  2014, 3 stents, [facility]

## 2020-12-04 NOTE — H&P ADULT - NSICDXPASTMEDICALHX_GEN_ALL_CORE_FT
PAST MEDICAL HISTORY:  Anemia due to stage 5 chronic kidney disease, not on chronic dialysis     CAP (community acquired pneumonia) 6/18    Cerebrovascular accident (CVA), unspecified mechanism diagnosed via CT of the head    Coronary artery disease     Diabetes mellitus type 2    ESRD (end stage renal disease) on Dialysis( M/W/F), By Dr. Huff    GERD (gastroesophageal reflux disease)     HTN (hypertension)     Hypercholesterolemia     Stented coronary artery 2014, 3 stents, [facility]

## 2020-12-04 NOTE — CONSULT NOTE ADULT - ASSESSMENT
65M w/ pmh PCI (on aspirin and plavix), HTN, DM, GERD, ESRD on HD (TThSa, last dialysis yesterday, Thursday), s/p cholecystectomy here with black stools    ESRD on HD TTS via AVF  s/p HD 12/3 at [facility]  lab reviewed no urgent HD today.   severe anemia getting one unit now. repeat cbc and bmp. if stable will plan for more transfusion with HD tmr   HD consent in chart  renal diet  monitor bmp    HTN  suboptimal  resume home meds   low Na diet  monitor    Anemia  GIB  EGD likely monday  transfuse one unit today  likely more transfusion with HD tmr  monitor hb    Ckd-mbd  check pth  phos optimal continue binder   monitor phos and calcium daily           65M w/ pmh PCI (on aspirin and plavix), HTN, DM, GERD, ESRD on HD (TThSa, last dialysis yesterday, Thursday), s/p cholecystectomy here with black stools    ESRD on HD TTS via AVF  s/p HD 12/3 at [facility]  lab reviewed no urgent HD today.   Severe anemia getting one unit now. repeat cbc and bmp. if stable will plan for more transfusion with HD tmr   HD consent in chart  renal diet  monitor bmp    HTN  suboptimal  resume home meds   low Na diet  monitor    Anemia  sec to GIB  EGD likely monday  transfuse one unit today  likely more transfusion with HD tmr  monitor hb    Ckd-mbd  check pth  phos optimal continue binder   monitor phos and calcium daily

## 2020-12-04 NOTE — ED PROVIDER NOTE - NS ED ROS FT
Constitutional: no fevers, no chills.  Eyes: no visual changes.  Ears: no ear drainage, no ear pain.  Nose: no nasal congestion.  Mouth/Throat: no sore throat.  Cardiovascular: no chest pain.  Respiratory: no shortness of breath, no wheezing, no cough  Gastrointestinal: no nausea, no vomiting, no diarrhea, no abdominal pain +dark stools   MSK: no flank pain, no back pain.  Genitourinary: no dysuria, no hematuria.  Skin: no rashes.  Neuro: no headache

## 2020-12-04 NOTE — ED ADULT NURSE REASSESSMENT NOTE - NS ED NURSE REASSESS COMMENT FT1
pt noted to have rhythm change on tele monitor, pt denies any chest pain, blurred vision, headache, no new increase in dizziness or SOB, notified resident about change, stat ekg ordered and completed, cleared by resident to continue with blood transfusion.

## 2020-12-04 NOTE — CONSULT NOTE ADULT - ASSESSMENT
Anemia , Melena   transfusion recommended   GI eval   likely needs endoscopy     CAD history of stents multivessel PCI   last in feb 2019   low dose asa once deemed safe from GI standpoint  statin   obtain echo to re eval LV function     HTN  cont outpt meds    ESRD  On HD. follow up with renal. Monitor electrolytes, K, ca. Avoid significant nephrotoxic medications.    Abnormal lung exam  rhonchi on right side   obtain chest xray   rule out COVID19

## 2020-12-04 NOTE — H&P ADULT - HISTORY OF PRESENT ILLNESS
65M w/ pmh PCI (on aspirin and plavix), HTN, DM, GERD, ESRD on HD (TThSa, last dialysis yesterday, Thursday), s/p cholecystectomy here with black stools x 5 days. Also reports L tinnitus for couple days, Last colonoscopy 2 years ago. Patient denies chest pain, SOB, f/c, cough, abd pain, N/V/D/C, weakness, HA, dizziness, urinary symptoms, extremity pain or swelling or other complaints.

## 2020-12-04 NOTE — ED PROVIDER NOTE - ATTENDING CONTRIBUTION TO CARE
Marcos GARCIA: I agree with the above provided history and exam and addend/modify it as follows.    65M w/ pmh PCI (on plavix), HTN, DM, GERD, ESRD on HD (TThSa, last dialysis yesterday, Thursday), s/p cholecystectomy -- p/w black stools x 5 days. Also reports L tinnitus and generalized malaise/weakness, but no other hearing changes. Last colonoscpy 2 years ago. On exam abd soft nontender, rectal exam w/ dark material but no obvious active bleeding. Plan to check labs, hemoglobin, consider transfusion if anemic, likely admit for further observation and workup    I Tu Rodríguez MD performed a history and physical exam of the patient and discussed their management with the resident and /or advanced care provider. I reviewed the resident and /or ACP's note and agree with the documented findings and plan of care. My medical decision making and observations are found above.

## 2020-12-04 NOTE — CONSULT NOTE ADULT - SUBJECTIVE AND OBJECTIVE BOX
CHIEF COMPLAINT:Patient is a 65y old  Male who presents with a chief complaint of     HISTORY OF PRESENT ILLNESS:    This is a pleasant 64 y/o male with  pmhx CAD s/p PCI x3 (2014) by Dr. Saúl Villafana, (most recent in Feb 2019 w/ 2 NAHUM, dLAD and pLCx, Echo may 2020 showing mild segmental LV dysfunction, NST May 2020 negative for ischemia) HTN, DM, GERD, ESRD on HD via L AFV (last dialyzed yesterday, 8/4) , s/p cholecystectomy   presents with ringing in ear , black stools and constipation found to be significantly anemic  pt denies any chest pain, sob, palpitation, dizziness or syncope.     PAST MEDICAL & SURGICAL HISTORY:  Anemia due to stage 5 chronic kidney disease, not on chronic dialysis    Cerebrovascular accident (CVA), unspecified mechanism  diagnosed via CT of the head    Hypercholesterolemia    ESRD (end stage renal disease)  on Dialysis( M/W/F), By Dr. Huff    Stented coronary artery  2014, 3 stents, [facility]    GERD (gastroesophageal reflux disease)    Diabetes mellitus  type 2    CAP (community acquired pneumonia)  6/18    Coronary artery disease    HTN (hypertension)    H/O eye surgery    AV fistula  10/12/18 L radiocephalic AV fistula    H/O coronary angiogram  2014 - x3 stents            MEDICATIONS:    reviewed list       pantoprazole  Injectable 80 milliGRAM(s) IV Push Once          FAMILY HISTORY:  No pertinent family history in first degree relatives        Non-contributory    SOCIAL HISTORY:    No tobacco, drugs or etoh    Allergies    No Known Allergies    Intolerances    	    REVIEW OF SYSTEMS:  as above  The rest of the 14 points ROS reviewed and except above they are unremarkable.        PHYSICAL EXAM:  T(C): 36.7 (12-04-20 @ 05:19), Max: 36.7 (12-04-20 @ 05:19)  HR: 61 (12-04-20 @ 05:19) (61 - 61)  BP: 156/77 (12-04-20 @ 05:19) (156/77 - 156/77)  RR: 18 (12-04-20 @ 05:19) (18 - 18)  SpO2: 100% (12-04-20 @ 05:19) (100% - 100%)  Wt(kg): --  I&O's Summary      JVP: Normal  Neck: supple  Lung: pos right sided wheeze , rhonchi   CV: S1 S2 , Murmur:  Abd: soft  Ext: No edema  neuro: Awake / alert  Psych: flat affect  Skin: normal      LABS/DATA:    TELEMETRY: 	    ECG:  	   	  CARDIAC MARKERS:                                      5.7    6.03  )-----------( 123      ( 04 Dec 2020 06:00 )             18.4     12-04    136  |  94<L>  |  54<H>  ----------------------------<  227<H>  4.1   |  31  |  6.57<H>    Ca    8.9      04 Dec 2020 06:00  Phos  3.3     12-04  Mg     2.1     12-04    TPro  6.4  /  Alb  3.7  /  TBili  0.4  /  DBili  x   /  AST  12  /  ALT  12  /  AlkPhos  60  12-04    proBNP:   Lipid Profile:   HgA1c:   TSH:

## 2020-12-04 NOTE — ED PROVIDER NOTE - OBJECTIVE STATEMENT
65M w/ pmh PCI (on aspirin and plavix), HTN, DM, GERD, ESRD on HD (TThSa, last dialysis yesterday, Thursday), s/p cholecystectomy here with black stools x 5 days. Also reports L tinnitus for couple days, Last colonoscopy 2 years ago. Patient denies chest pain, SOB, f/c, cough, abd pain, N/V/D/C, weakness, HA, dizziness, urinary symptoms, extremity pain or swelling or other complaints. 65M w/ pmh PCI (on aspirin and plavix), HTN, DM, GERD, ESRD on HD (TThSa, last dialysis yesterday, Thursday), s/p cholecystectomy here with black stools x 5 days. Also reports L tinnitus for couple days, Last colonoscopy 2 years ago. Patient denies chest pain, SOB, f/c, cough, abd pain, N/V/D/C, weakness, HA, dizziness, urinary symptoms, extremity pain or swelling or other complaints.    PCP: Dr. Mago Parker

## 2020-12-04 NOTE — ED PROVIDER NOTE - PROGRESS NOTE DETAILS
Jenn Ventura, resident MD: pt was signed out to me. presented with black stools and found to be anemic. CBC reveals h/h 5.7/18, will start transfusion, give protonix, and admit for further GI evaluation. Jenn Ventura, resident MD: Dr. Huff called and is aware that pt is in the hospital and requires admission for transfusion and GI evaluation.

## 2020-12-04 NOTE — CONSULT NOTE ADULT - SUBJECTIVE AND OBJECTIVE BOX
Patient is a 65y old  Male who presents with a chief complaint of black stools (04 Dec 2020 09:20)     .     HPI:    HPI:  65M w/ pmh PCI (on aspirin and plavix), HTN, DM, GERD, ESRD on HD (TThSa, last dialysis yesterday, Thursday), s/p cholecystectomy here with black stools x 5 days. Also reports L tinnitus for couple days, Last colonoscopy 2 years ago. Patient denies chest pain, SOB, f/c, cough, abd pain, N/V/D/C, weakness, HA, dizziness, urinary symptoms, extremity pain or swelling or other complaints. (04 Dec 2020 09:20)          REVIEW OF SYSTEMS  Constitutional:   No fever, no fatigue, no pallor, no night sweats, no weight loss.  HEENT:   No eye pain, no vision changes, no icterus, no mouth ulcers.  Respiratory:   No shortness of breath, no cough, no respiratory distress.   Cardiovascular:   No chest pain, no palpitations.   Gastrointestinal: No abdominal pain, no nausea, no vomiting , no diahrrea, no constipation, no hematochezia,+ melena.  Skin:   No rashes, no jaundice, no eczema.   Musculoskeletal:   No joint pain, no swelling, no myalgia.   Neurologic:   No headache, no seizure, no weakness.   Genitourinary:   No dysuria, no decreased urine output.  Psychiatric:  No depression, no anxiety,   Endocrine:   No thyroid disease, no diabetes.  Heme/Lymphatic:   No anemia, no blood transfusions, no lymph node enlargement, no bleeding, no bruising.  ___________________________________________________________________________________________  Allergies    No Known Allergies    Intolerances      MEDICATIONS  (STANDING):  atorvastatin 40 milliGRAM(s) Oral at bedtime  calcitriol   Capsule 0.25 MICROGram(s) Oral daily  cloNIDine 0.1 milliGRAM(s) Oral two times a day  dextrose 40% Gel 15 Gram(s) Oral once  dextrose 5%. 1000 milliLiter(s) (50 mL/Hr) IV Continuous <Continuous>  dextrose 5%. 1000 milliLiter(s) (100 mL/Hr) IV Continuous <Continuous>  dextrose 50% Injectable 25 Gram(s) IV Push once  dextrose 50% Injectable 12.5 Gram(s) IV Push once  dextrose 50% Injectable 25 Gram(s) IV Push once  glucagon  Injectable 1 milliGRAM(s) IntraMuscular once  insulin lispro (ADMELOG) corrective regimen sliding scale   SubCutaneous three times a day before meals  labetalol 100 milliGRAM(s) Oral two times a day  NIFEdipine XL 60 milliGRAM(s) Oral daily  pantoprazole Infusion 8 mG/Hr (10 mL/Hr) IV Continuous <Continuous>  sevelamer carbonate 1600 milliGRAM(s) Oral three times a day with meals    MEDICATIONS  (PRN):      PAST MEDICAL & SURGICAL HISTORY:  Anemia due to stage 5 chronic kidney disease, not on chronic dialysis    Cerebrovascular accident (CVA), unspecified mechanism  diagnosed via CT of the head    Hypercholesterolemia    ESRD (end stage renal disease)  on Dialysis( M/W/F), By Dr. Huff    Stented coronary artery  2014, 3 stents, [facility]    GERD (gastroesophageal reflux disease)    Diabetes mellitus  type 2    CAP (community acquired pneumonia)  6/18    Coronary artery disease    HTN (hypertension)    H/O eye surgery    AV fistula  10/12/18 L radiocephalic AV fistula    H/O coronary angiogram  2014 - x3 stents      FAMILY HISTORY:  No pertinent family history in first degree relatives      Social History: No hsitory of : Tobacco use, IVDA, EToH  ______________________________________________________________________________________    PHYSICAL EXAM    Daily Height in cm: 167.64 (04 Dec 2020 05:19)    Daily   BMI: 22.8 (12-04 @ 05:19)  Change in Weight:  Vital Signs Last 24 Hrs  T(C): 36.7 (04 Dec 2020 12:06), Max: 36.7 (04 Dec 2020 05:19)  T(F): 98 (04 Dec 2020 12:06), Max: 98.1 (04 Dec 2020 05:19)  HR: 65 (04 Dec 2020 12:06) (57 - 65)  BP: 189/91 (04 Dec 2020 12:06) (155/67 - 189/91)  BP(mean): --  RR: 16 (04 Dec 2020 12:06) (14 - 18)  SpO2: 100% (04 Dec 2020 12:06) (100% - 100%)    General:  Well developed, well nourished, alert and active, no pallor, NAD.  HEENT:    Normal appearance of conjunctiva, ears, nose, lips, oropharynx, and oral mucosa, anicteric.  Neck:  No masses, no asymmetry.  Lymph Nodes:  No lymphadenopathy.   Cardiovascular:  RRR normal S1/S2, no murmur.  Respiratory:  CTA B/L, normal respiratory effort.   Abdominal:   soft, no masses or tenderness, normoactive BS, NT/ND, no HSM.  Extremities:   No clubbing or cyanosis, normal capillary refill, no edema.   Skin:   No rash, jaundice, lesions, eczema.   Musculoskeletal:  No joint swelling, erythema or tenderness.   Neuro: No focal deficits.   Other:   _______________________________________________________________________________________________  Lab Results:                          5.7    6.03  )-----------( 123      ( 04 Dec 2020 06:00 )             18.4     12-04    136  |  94<L>  |  54<H>  ----------------------------<  227<H>  4.1   |  31  |  6.57<H>    Ca    8.9      04 Dec 2020 06:00  Phos  3.3     12-04  Mg     2.1     12-04    TPro  6.4  /  Alb  3.7  /  TBili  0.4  /  DBili  x   /  AST  12  /  ALT  12  /  AlkPhos  60  12-04    LIVER FUNCTIONS - ( 04 Dec 2020 06:00 )  Alb: 3.7 g/dL / Pro: 6.4 g/dL / ALK PHOS: 60 u/L / ALT: 12 u/L / AST: 12 u/L / GGT: x           PT/INR - ( 04 Dec 2020 06:00 )   PT: 12.0 SEC;   INR: 1.06          PTT - ( 04 Dec 2020 06:00 )  PTT:26.9 SEC        Stool Results:          RADIOLOGY RESULTS:    SURGICAL PATHOLOGY:

## 2020-12-04 NOTE — ED PROVIDER NOTE - PHYSICAL EXAMINATION
Physical Examination: Gen: NAD, non-toxic, conversational  Eyes: PERRLA, EOMI   HENT: Normocephalic, atraumatic. External ears normal, no rhinorrhea, moist mucous membranes.   CV: RRR, no M/R/G  Resp: CTAB, non-labored, speaking without difficulty on room air  Abd: soft, non-tender, non rigid, no guarding or rebound tenderness  Back: No CVAT bilaterally, no midline ttp  Skin: dry, wwp   Neuro: AOx3, speech is fluent and appropriate, SUÁREZ without laterality, stable gait   Psych: Mood okay, affect euthymic

## 2020-12-04 NOTE — ED ADULT NURSE NOTE - PMH
Anemia due to stage 5 chronic kidney disease, not on chronic dialysis    CAP (community acquired pneumonia)  6/18  Cerebrovascular accident (CVA), unspecified mechanism  diagnosed via CT of the head  Coronary artery disease    Diabetes mellitus  type 2  ESRD (end stage renal disease)  on Dialysis( M/W/F), By Dr. Huff  GERD (gastroesophageal reflux disease)    HTN (hypertension)    Hypercholesterolemia    Stented coronary artery  2014, 3 stents, [facility]

## 2020-12-04 NOTE — ED ADULT NURSE REASSESSMENT NOTE - NS ED NURSE REASSESS COMMENT FT1
report received from night RN. pt resting in bed, in no apparent distress, has no complaints of headache, blurred vision, no increase in SOB or dizziness, VS as noted in flowsheet. pt pending blood transfusion. will continue to monitor.

## 2020-12-04 NOTE — CONSULT NOTE ADULT - SUBJECTIVE AND OBJECTIVE BOX
[facility]   MD ANDRES AVALOS DO ANAM SIDDIQUI ANGELA WONG, PA        TEL:  OFFICE: [phone]  DR MARES CELL: [phone]  DR. HUNTER CELL: [phone]  DR. HORTA CELL: [phone]  LIDA WINTERS CELL: [phone]    From 5pm-7am answering service [phone]    --- INITIAL RENAL CONSULT NOTE ---date of service 12-04-20 @ 13:20    HPI:  65M w/ pmh PCI (on aspirin and plavix), HTN, DM, GERD, ESRD on HD (TThSa, last dialysis yesterday, Thursday), s/p cholecystectomy here with black stools x 5 days. Also reports L tinnitus for couple days, Last colonoscopy 2 years ago. Patient denies chest pain, SOB, f/c, cough, abd pain, N/V/D/C, weakness, HA, dizziness, urinary symptoms, extremity pain or swelling or other complaints.  outpatient dialysis unit: [facility]   nephrologist: dr. mares     Allergies:  No Known Allergies      PAST MEDICAL & SURGICAL HISTORY:  Anemia due to stage 5 chronic kidney disease, not on chronic dialysis    Cerebrovascular accident (CVA), unspecified mechanism  diagnosed via CT of the head    Hypercholesterolemia    ESRD (end stage renal disease)  on Dialysis( M/W/F), By Dr. Mares    Stented coronary artery  2014, 3 stents, [facility]    GERD (gastroesophageal reflux disease)    Diabetes mellitus  type 2    CAP (community acquired pneumonia)  6/18    Coronary artery disease    HTN (hypertension)    H/O eye surgery    AV fistula  10/12/18 L radiocephalic AV fistula    H/O coronary angiogram  2014 - x3 stents        Home Medications Reviewed    Hospital Medications:   MEDICATIONS  (STANDING):  atorvastatin 40 milliGRAM(s) Oral at bedtime  calcitriol   Capsule 0.25 MICROGram(s) Oral daily  cloNIDine 0.1 milliGRAM(s) Oral two times a day  dextrose 40% Gel 15 Gram(s) Oral once  dextrose 5%. 1000 milliLiter(s) (50 mL/Hr) IV Continuous <Continuous>  dextrose 5%. 1000 milliLiter(s) (100 mL/Hr) IV Continuous <Continuous>  dextrose 50% Injectable 25 Gram(s) IV Push once  dextrose 50% Injectable 12.5 Gram(s) IV Push once  dextrose 50% Injectable 25 Gram(s) IV Push once  glucagon  Injectable 1 milliGRAM(s) IntraMuscular once  insulin lispro (ADMELOG) corrective regimen sliding scale   SubCutaneous three times a day before meals  labetalol 100 milliGRAM(s) Oral two times a day  NIFEdipine XL 60 milliGRAM(s) Oral daily  pantoprazole Infusion 8 mG/Hr (10 mL/Hr) IV Continuous <Continuous>  sevelamer carbonate 1600 milliGRAM(s) Oral three times a day with meals      SOCIAL HISTORY:  Denies ETOh, Smoking,     FAMILY HISTORY:  No pertinent family history in first degree relatives        REVIEW OF SYSTEMS:  CONSTITUTIONAL: No weakness, fevers or chills  EYES/ENT: No visual changes;  No vertigo or throat pain   NECK: No pain or stiffness  RESPIRATORY: No cough, wheezing, hemoptysis; No shortness of breath  CARDIOVASCULAR: No chest pain or palpitations.  GASTROINTESTINAL: see hpi  GENITOURINARY: No dysuria, frequency, foamy urine, urinary urgency, incontinence or hematuria  NEUROLOGICAL: No numbness or weakness  SKIN: No itching, burning, rashes, or lesions   VASCULAR: No bilateral lower extremity edema.   All other review of systems is negative unless indicated above.    VITALS:  T(F): 98.1 (12-04-20 @ 12:53), Max: 98.1 (12-04-20 @ 05:19)  HR: 60 (12-04-20 @ 12:53)  BP: 167/75 (12-04-20 @ 12:53)  RR: 16 (12-04-20 @ 12:53)  SpO2: 100% (12-04-20 @ 12:53)  Wt(kg): --    Height (cm): 167.6 (12-04 @ 05:19)    PHYSICAL EXAM:  Constitutional: NAD  HEENT: anicteric sclera, oropharynx clear, MMM  Neck: No JVD  Respiratory: CTAB, no wheezes, rales or rhonchi  Cardiovascular: S1, S2, RRR  Gastrointestinal: BS+, soft, NT/ND  Extremities: No cyanosis or clubbing. No peripheral edema  Neurological: A/O x 3, no focal deficits  Psychiatric: Normal mood, normal affect  : No CVA tenderness. No hinds.   Skin: No rashes  Vascular Access: avf    LABS:  12-04    136  |  94<L>  |  54<H>  ----------------------------<  227<H>  4.1   |  31  |  6.57<H>    Ca    8.9      04 Dec 2020 06:00  Phos  3.3     12-04  Mg     2.1     12-04    TPro  6.4  /  Alb  3.7  /  TBili  0.4  /  DBili      /  AST  12  /  ALT  12  /  AlkPhos  60  12-04    Creatinine Trend: 6.57 <--                        5.7    6.03  )-----------( 123      ( 04 Dec 2020 06:00 )             18.4     Urine Studies:        RADIOLOGY & ADDITIONAL STUDIES:

## 2020-12-04 NOTE — CONSULT NOTE ADULT - PROBLEM SELECTOR RECOMMENDATION 9
EGD on Monday at 2 pm   IV PPI pushes BID  Clear liquid diet--> advance to low fiber over weekend IF hemoglobin remains stable tomorrow.   NPO post MN Sunday night   CBC Q 24 hours   Keep hemoglobin close to  8 given comorbidities

## 2020-12-04 NOTE — ED PROVIDER NOTE - CLINICAL SUMMARY MEDICAL DECISION MAKING FREE TEXT BOX
65M w/ pmh PCI (on aspirin and plavix), HTN, DM, GERD, ESRD on HD (TThSa, last dialysis yesterday, Thursday), s/p cholecystectomy here with black stools x 5 days. Also reports L tinnitus for couple days, Last colonoscopy 2 years ago. guaiac, t and s, hgb

## 2020-12-04 NOTE — CONSULT NOTE ADULT - ASSESSMENT
***Patient seen in ED *****    65 year old male ESRD on HD presents with  dark stools for one week and severe anemia. Last colonoscopy as per patient was one  and half years ago.

## 2020-12-04 NOTE — PATIENT PROFILE ADULT - NSPROIMPLANTSMEDDEV_GEN_A_NUR
x3 from 2014/Vascular stents/Clips Vascular stents/Clips/5 stents  4 teeth implants  "fixed contact lens"

## 2020-12-04 NOTE — ED ADULT NURSE REASSESSMENT NOTE - NS ED NURSE REASSESS COMMENT FT1
Call placed to blood bank to obtain pt's type and screen result. Spoke with Rosa Sidhu in Blood bank who advised result is A Negative, pt has hx of transfusion on record as A Negative as well. Result unable to update on pt's electronic chart.

## 2020-12-04 NOTE — ED ADULT TRIAGE NOTE - CHIEF COMPLAINT QUOTE
Pt. c/o black stool x 5 days and left ear ringing. Endorses use of Plavix. Denies abd pain. PMHx: ESRD on dialysis Tues, Thurs, Saturday & last dialysis Saturday; Left AV fistula, HTN, CAD, CVA.

## 2020-12-04 NOTE — H&P ADULT - NSHPLABSRESULTS_GEN_ALL_CORE
Lab Results:  CBC  CBC Full  -  ( 04 Dec 2020 06:00 )  WBC Count : 6.03 K/uL  RBC Count : 1.92 M/uL  Hemoglobin : 5.7 g/dL  Hematocrit : 18.4 %  Platelet Count - Automated : 123 K/uL  Mean Cell Volume : 95.8 fL  Mean Cell Hemoglobin : 29.7 pg  Mean Cell Hemoglobin Concentration : 31.0 %  Auto Neutrophil # : 3.86 K/uL  Auto Lymphocyte # : 1.20 K/uL  Auto Monocyte # : 0.78 K/uL  Auto Eosinophil # : 0.16 K/uL  Auto Basophil # : 0.01 K/uL  Auto Neutrophil % : 64.0 %  Auto Lymphocyte % : 19.9 %  Auto Monocyte % : 12.9 %  Auto Eosinophil % : 2.7 %  Auto Basophil % : 0.2 %    .		Differential:	[] Automated		[] Manual  Chemistry                        5.7    6.03  )-----------( 123      ( 04 Dec 2020 06:00 )             18.4     12-04    136  |  94<L>  |  54<H>  ----------------------------<  227<H>  4.1   |  31  |  6.57<H>    Ca    8.9      04 Dec 2020 06:00  Phos  3.3     12-04  Mg     2.1     12-04    TPro  6.4  /  Alb  3.7  /  TBili  0.4  /  DBili  x   /  AST  12  /  ALT  12  /  AlkPhos  60  12-04    LIVER FUNCTIONS - ( 04 Dec 2020 06:00 )  Alb: 3.7 g/dL / Pro: 6.4 g/dL / ALK PHOS: 60 u/L / ALT: 12 u/L / AST: 12 u/L / GGT: x           PT/INR - ( 04 Dec 2020 06:00 )   PT: 12.0 SEC;   INR: 1.06          PTT - ( 04 Dec 2020 06:00 )  PTT:26.9 SEC          MICROBIOLOGY/CULTURES:      RADIOLOGY RESULTS: reviewed

## 2020-12-04 NOTE — CONSULT NOTE ADULT - ATTENDING COMMENTS
Advanced care planning was discussed with patient and family.  Risks, benefits and alternatives of the cardiac treatments and medical therapy including procedures were discussed in detail and all questions were answered. Importance of compliance with medical therapy and lifestyle modification to improve cardiovascular health were addressed. Appropriate forms and patient educational materials were reviewed. 30 minutes face to face spent.
NO plan for HD today

## 2020-12-04 NOTE — H&P ADULT - NSHPPHYSICALEXAM_GEN_ALL_CORE
General: WN/WD NAD  PERRLA  Neurology: A&Ox3, nonfocal, SUÁREZ x 4  Respiratory: CTA B/L  CV: RRR, S1S2, no murmurs, rubs or gallops  Abdominal: Soft, NT, ND +BS, Last BM  Extremities: No edema, + peripheral pulses  Skin Normal

## 2020-12-04 NOTE — PATIENT PROFILE ADULT - VISION (WITH CORRECTIVE LENSES IF THE PATIENT USUALLY WEARS THEM):
Normal vision: sees adequately in most situations; can see medication labels, newsprint Normal vision: sees adequately in most situations; can see medication labels, newsprint/pt states has contact lens

## 2020-12-05 DIAGNOSIS — E11.9 TYPE 2 DIABETES MELLITUS WITHOUT COMPLICATIONS: ICD-10-CM

## 2020-12-05 DIAGNOSIS — K92.1 MELENA: ICD-10-CM

## 2020-12-05 DIAGNOSIS — N18.6 END STAGE RENAL DISEASE: ICD-10-CM

## 2020-12-05 LAB
ANION GAP SERPL CALC-SCNC: 13 MMO/L — SIGNIFICANT CHANGE UP (ref 7–14)
BUN SERPL-MCNC: 71 MG/DL — HIGH (ref 7–23)
CALCIUM SERPL-MCNC: 9.3 MG/DL — SIGNIFICANT CHANGE UP (ref 8.4–10.5)
CHLORIDE SERPL-SCNC: 97 MMOL/L — LOW (ref 98–107)
CO2 SERPL-SCNC: 27 MMOL/L — SIGNIFICANT CHANGE UP (ref 22–31)
CREAT SERPL-MCNC: 8.39 MG/DL — HIGH (ref 0.5–1.3)
GLUCOSE BLDC GLUCOMTR-MCNC: 101 MG/DL — HIGH (ref 70–99)
GLUCOSE BLDC GLUCOMTR-MCNC: 117 MG/DL — HIGH (ref 70–99)
GLUCOSE BLDC GLUCOMTR-MCNC: 139 MG/DL — HIGH (ref 70–99)
GLUCOSE BLDC GLUCOMTR-MCNC: 169 MG/DL — HIGH (ref 70–99)
GLUCOSE BLDC GLUCOMTR-MCNC: 99 MG/DL — SIGNIFICANT CHANGE UP (ref 70–99)
GLUCOSE SERPL-MCNC: 116 MG/DL — HIGH (ref 70–99)
HBA1C BLD-MCNC: 5.6 % — SIGNIFICANT CHANGE UP (ref 4–5.6)
HCT VFR BLD CALC: 25.1 % — LOW (ref 39–50)
HCT VFR BLD CALC: 27.3 % — LOW (ref 39–50)
HGB BLD-MCNC: 8 G/DL — LOW (ref 13–17)
HGB BLD-MCNC: 8.3 G/DL — LOW (ref 13–17)
MAGNESIUM SERPL-MCNC: 2.3 MG/DL — SIGNIFICANT CHANGE UP (ref 1.6–2.6)
MCHC RBC-ENTMCNC: 28.9 PG — SIGNIFICANT CHANGE UP (ref 27–34)
MCHC RBC-ENTMCNC: 30.1 PG — SIGNIFICANT CHANGE UP (ref 27–34)
MCHC RBC-ENTMCNC: 30.4 % — LOW (ref 32–36)
MCHC RBC-ENTMCNC: 31.9 % — LOW (ref 32–36)
MCV RBC AUTO: 94.4 FL — SIGNIFICANT CHANGE UP (ref 80–100)
MCV RBC AUTO: 95.1 FL — SIGNIFICANT CHANGE UP (ref 80–100)
NRBC # FLD: 0 K/UL — SIGNIFICANT CHANGE UP (ref 0–0)
NRBC # FLD: 0 K/UL — SIGNIFICANT CHANGE UP (ref 0–0)
PHOSPHATE SERPL-MCNC: 4.3 MG/DL — SIGNIFICANT CHANGE UP (ref 2.5–4.5)
PLATELET # BLD AUTO: 108 K/UL — LOW (ref 150–400)
PLATELET # BLD AUTO: 117 K/UL — LOW (ref 150–400)
PMV BLD: 12.2 FL — SIGNIFICANT CHANGE UP (ref 7–13)
PMV BLD: 12.6 FL — SIGNIFICANT CHANGE UP (ref 7–13)
POTASSIUM SERPL-MCNC: 4.9 MMOL/L — SIGNIFICANT CHANGE UP (ref 3.5–5.3)
POTASSIUM SERPL-SCNC: 4.9 MMOL/L — SIGNIFICANT CHANGE UP (ref 3.5–5.3)
RBC # BLD: 2.66 M/UL — LOW (ref 4.2–5.8)
RBC # BLD: 2.87 M/UL — LOW (ref 4.2–5.8)
RBC # FLD: 15.6 % — HIGH (ref 10.3–14.5)
RBC # FLD: 15.6 % — HIGH (ref 10.3–14.5)
SODIUM SERPL-SCNC: 137 MMOL/L — SIGNIFICANT CHANGE UP (ref 135–145)
WBC # BLD: 6.45 K/UL — SIGNIFICANT CHANGE UP (ref 3.8–10.5)
WBC # BLD: 6.55 K/UL — SIGNIFICANT CHANGE UP (ref 3.8–10.5)
WBC # FLD AUTO: 6.45 K/UL — SIGNIFICANT CHANGE UP (ref 3.8–10.5)
WBC # FLD AUTO: 6.55 K/UL — SIGNIFICANT CHANGE UP (ref 3.8–10.5)

## 2020-12-05 PROCEDURE — 71045 X-RAY EXAM CHEST 1 VIEW: CPT | Mod: 26

## 2020-12-05 RX ORDER — HYDRALAZINE HCL 50 MG
50 TABLET ORAL THREE TIMES A DAY
Refills: 0 | Status: DISCONTINUED | OUTPATIENT
Start: 2020-12-05 | End: 2020-12-08

## 2020-12-05 RX ADMIN — CALCITRIOL 0.25 MICROGRAM(S): 0.5 CAPSULE ORAL at 13:07

## 2020-12-05 RX ADMIN — Medication 50 MILLIGRAM(S): at 21:21

## 2020-12-05 RX ADMIN — Medication 60 MILLIGRAM(S): at 11:49

## 2020-12-05 RX ADMIN — Medication 50 MILLIGRAM(S): at 13:07

## 2020-12-05 RX ADMIN — SEVELAMER CARBONATE 1600 MILLIGRAM(S): 2400 POWDER, FOR SUSPENSION ORAL at 17:58

## 2020-12-05 RX ADMIN — Medication 0.1 MILLIGRAM(S): at 17:58

## 2020-12-05 RX ADMIN — SEVELAMER CARBONATE 1600 MILLIGRAM(S): 2400 POWDER, FOR SUSPENSION ORAL at 11:51

## 2020-12-05 RX ADMIN — ATORVASTATIN CALCIUM 40 MILLIGRAM(S): 80 TABLET, FILM COATED ORAL at 21:21

## 2020-12-05 RX ADMIN — ERYTHROPOIETIN 10000 UNIT(S): 10000 INJECTION, SOLUTION INTRAVENOUS; SUBCUTANEOUS at 08:05

## 2020-12-05 RX ADMIN — Medication 100 MILLIGRAM(S): at 17:59

## 2020-12-05 NOTE — PROGRESS NOTE ADULT - PROBLEM SELECTOR PLAN 1
-trend h/h   -transfuse prn for Hgb < 8  -avoid nsaids/ac   -protonix ivp bid  -NPO p MN Sunday for EGD Monday @ 2pm; optimize hgb and electrolyes prn  -If going for dialysis on Monday please schedule for first shift with lab after dialysis

## 2020-12-05 NOTE — PROGRESS NOTE ADULT - ASSESSMENT
Anemia , Melena   transfusion recommended   GI eval noted   plan for for endoscopy     CAD history of stents multivessel PCI   last in feb 2019   low dose asa once deemed safe from GI standpoint  statin   obtain echo to re eval LV function     HTN  not well controlled  add hydralazine 50 tid     ESRD  On HD. follow up with renal. Monitor electrolytes, K, ca. Avoid significant nephrotoxic medications.    Abnormal lung exam  pos crackles  pos pulm edema on xray   fu with renal for HD and optimization   echo to eval LV / RV function     Pre-Operative Cardiac Risk Stratification and Optimization prior to EGD  await Echo and better BP control and improvement of pulm edema

## 2020-12-05 NOTE — PROGRESS NOTE ADULT - SUBJECTIVE AND OBJECTIVE BOX
Subjective: Patient seen and examined. No new events except as noted.     SUBJECTIVE/ROS:  pos sob  pos dark stools       MEDICATIONS:  MEDICATIONS  (STANDING):  atorvastatin 40 milliGRAM(s) Oral at bedtime  calcitriol   Capsule 0.25 MICROGram(s) Oral daily  cloNIDine 0.1 milliGRAM(s) Oral two times a day  dextrose 40% Gel 15 Gram(s) Oral once  dextrose 5%. 1000 milliLiter(s) (50 mL/Hr) IV Continuous <Continuous>  dextrose 5%. 1000 milliLiter(s) (100 mL/Hr) IV Continuous <Continuous>  dextrose 50% Injectable 25 Gram(s) IV Push once  dextrose 50% Injectable 12.5 Gram(s) IV Push once  dextrose 50% Injectable 25 Gram(s) IV Push once  epoetin ralph-epbx (RETACRIT) Injectable 79871 Unit(s) IV Push <User Schedule>  glucagon  Injectable 1 milliGRAM(s) IntraMuscular once  hydrALAZINE 50 milliGRAM(s) Oral three times a day  insulin lispro (ADMELOG) corrective regimen sliding scale   SubCutaneous three times a day before meals  labetalol 100 milliGRAM(s) Oral two times a day  NIFEdipine XL 60 milliGRAM(s) Oral daily  sevelamer carbonate 1600 milliGRAM(s) Oral three times a day with meals      PHYSICAL EXAM:  T(C): 36.7 (12-05-20 @ 11:48), Max: 37.1 (12-05-20 @ 05:45)  HR: 73 (12-05-20 @ 11:48) (59 - 73)  BP: 190/78 (12-05-20 @ 11:48) (163/91 - 190/78)  RR: 18 (12-05-20 @ 11:48) (16 - 18)  SpO2: 100% (12-05-20 @ 11:48) (100% - 100%)  Wt(kg): --  I&O's Summary    05 Dec 2020 07:01  -  05 Dec 2020 12:11  --------------------------------------------------------  IN: 400 mL / OUT: 1400 mL / NET: -1000 mL      Height (cm): 167.6 (12-04 @ 19:43)  Weight (kg): 63.5 (12-04 @ 19:43)  BMI (kg/m2): 22.6 (12-04 @ 19:43)  BSA (m2): 1.72 (12-04 @ 19:43)      JVP: Normal  Neck: supple  Lung: clear   CV: S1 S2 , Murmur:  Abd: soft  Ext: No edema  neuro: Awake / alert  Psych: flat affect  Skin: normal``    LABS/DATA:    CARDIAC MARKERS:                                8.0    6.45  )-----------( 117      ( 05 Dec 2020 08:00 )             25.1     12-05    137  |  97<L>  |  71<H>  ----------------------------<  116<H>  4.9   |  27  |  8.39<H>    Ca    9.3      05 Dec 2020 02:15  Phos  4.3     12-05  Mg     2.3     12-05    TPro  6.4  /  Alb  3.7  /  TBili  0.4  /  DBili  x   /  AST  12  /  ALT  12  /  AlkPhos  60  12-04    proBNP:   Lipid Profile:   HgA1c:   TSH:     TELE:  EKG:

## 2020-12-05 NOTE — PROGRESS NOTE ADULT - ASSESSMENT
65 year old male ESRD on HD presents with  dark stools for one week and severe anemia. Last colonoscopy as per patient was one  and half years ago.

## 2020-12-05 NOTE — PROGRESS NOTE ADULT - ASSESSMENT
65M w/ pmh PCI (on aspirin and plavix), HTN, DM, GERD, ESRD on HD (TThSa, last dialysis yesterday, Thursday), s/p cholecystectomy here with black stools x 5 days. Also reports L tinnitus for couple days, Last colonoscopy 2 years ago. Patient denies chest pain, SOB, f/c, cough, abd pain, N/V/D/C, weakness, HA, dizziness, urinary symptoms, extremity pain or swelling or other complaints.     Problem/Plan - 1:  ·  Problem: Dark stools.  Plan: acute blood loss anemia , Likely GI source  monitor cbc  transfuse PRN  GI fu , scope as per GI   will monitor.     Problem/Plan - 2:  ·  Problem: ESRD (end stage renal disease).  Plan: HD as scheduled  renal fu.     Problem/Plan - 3:  ·  Problem: HTN (hypertension).  Plan: DASH diet  cw home meds.

## 2020-12-05 NOTE — PROGRESS NOTE ADULT - SUBJECTIVE AND OBJECTIVE BOX
Patient is a 65y old  Male who presents with a chief complaint of black stools (05 Dec 2020 16:59)      INTERVAL HPI/OVERNIGHT EVENTS:  T(C): 36.7 (12-05-20 @ 11:48), Max: 37.1 (12-05-20 @ 05:45)  HR: 73 (12-05-20 @ 18:00) (59 - 78)  BP: 179/80 (12-05-20 @ 18:00) (163/91 - 190/78)  RR: 18 (12-05-20 @ 18:00) (16 - 18)  SpO2: 100% (12-05-20 @ 18:00) (100% - 100%)  Wt(kg): --  I&O's Summary    05 Dec 2020 07:01  -  05 Dec 2020 18:24  --------------------------------------------------------  IN: 400 mL / OUT: 1400 mL / NET: -1000 mL        LABS:                        8.0    6.45  )-----------( 117      ( 05 Dec 2020 08:00 )             25.1     12-05    137  |  97<L>  |  71<H>  ----------------------------<  116<H>  4.9   |  27  |  8.39<H>    Ca    9.3      05 Dec 2020 02:15  Phos  4.3     12-05  Mg     2.3     12-05    TPro  6.4  /  Alb  3.7  /  TBili  0.4  /  DBili  x   /  AST  12  /  ALT  12  /  AlkPhos  60  12-04    PT/INR - ( 04 Dec 2020 06:00 )   PT: 12.0 SEC;   INR: 1.06          PTT - ( 04 Dec 2020 06:00 )  PTT:26.9 SEC    CAPILLARY BLOOD GLUCOSE      POCT Blood Glucose.: 139 mg/dL (05 Dec 2020 17:28)  POCT Blood Glucose.: 99 mg/dL (05 Dec 2020 12:24)  POCT Blood Glucose.: 101 mg/dL (05 Dec 2020 08:15)  POCT Blood Glucose.: 117 mg/dL (05 Dec 2020 05:39)  POCT Blood Glucose.: 109 mg/dL (04 Dec 2020 20:42)            MEDICATIONS  (STANDING):  atorvastatin 40 milliGRAM(s) Oral at bedtime  calcitriol   Capsule 0.25 MICROGram(s) Oral daily  cloNIDine 0.1 milliGRAM(s) Oral two times a day  dextrose 40% Gel 15 Gram(s) Oral once  dextrose 5%. 1000 milliLiter(s) (50 mL/Hr) IV Continuous <Continuous>  dextrose 5%. 1000 milliLiter(s) (100 mL/Hr) IV Continuous <Continuous>  dextrose 50% Injectable 25 Gram(s) IV Push once  dextrose 50% Injectable 12.5 Gram(s) IV Push once  dextrose 50% Injectable 25 Gram(s) IV Push once  epoetin ralph-epbx (RETACRIT) Injectable 23410 Unit(s) IV Push <User Schedule>  glucagon  Injectable 1 milliGRAM(s) IntraMuscular once  hydrALAZINE 50 milliGRAM(s) Oral three times a day  insulin lispro (ADMELOG) corrective regimen sliding scale   SubCutaneous three times a day before meals  labetalol 100 milliGRAM(s) Oral two times a day  NIFEdipine XL 60 milliGRAM(s) Oral daily  sevelamer carbonate 1600 milliGRAM(s) Oral three times a day with meals    MEDICATIONS  (PRN):          PHYSICAL EXAM:  GENERAL: NAD, well-groomed, well-developed  HEAD:  Atraumatic, Normocephalic  CHEST/LUNG: Clear to percussion bilaterally; No rales, rhonchi, wheezing, or rubs  HEART: Regular rate and rhythm; No murmurs, rubs, or gallops  ABDOMEN: Soft, Nontender, Nondistended; Bowel sounds present  EXTREMITIES:  2+ Peripheral Pulses, No clubbing, cyanosis, or edema  LYMPH: No lymphadenopathy noted  SKIN: No rashes or lesions    Care Discussed with Consultants/Other Providers [ ] YES  [ ] NO

## 2020-12-05 NOTE — PROGRESS NOTE ADULT - SUBJECTIVE AND OBJECTIVE BOX
INTERVAL HPI/OVERNIGHT EVENTS:    no bm overnight   good resp to prbc    MEDICATIONS  (STANDING):  atorvastatin 40 milliGRAM(s) Oral at bedtime  calcitriol   Capsule 0.25 MICROGram(s) Oral daily  cloNIDine 0.1 milliGRAM(s) Oral two times a day  dextrose 40% Gel 15 Gram(s) Oral once  dextrose 5%. 1000 milliLiter(s) (50 mL/Hr) IV Continuous <Continuous>  dextrose 5%. 1000 milliLiter(s) (100 mL/Hr) IV Continuous <Continuous>  dextrose 50% Injectable 25 Gram(s) IV Push once  dextrose 50% Injectable 12.5 Gram(s) IV Push once  dextrose 50% Injectable 25 Gram(s) IV Push once  epoetin ralph-epbx (RETACRIT) Injectable 56336 Unit(s) IV Push <User Schedule>  glucagon  Injectable 1 milliGRAM(s) IntraMuscular once  insulin lispro (ADMELOG) corrective regimen sliding scale   SubCutaneous three times a day before meals  labetalol 100 milliGRAM(s) Oral two times a day  NIFEdipine XL 60 milliGRAM(s) Oral daily  sevelamer carbonate 1600 milliGRAM(s) Oral three times a day with meals    MEDICATIONS  (PRN):      Allergies    No Known Allergies    Intolerances        Review of Systems:    General:  No wt loss, fevers, chills, night sweats, fatigue   Eyes:  Good vision, no reported pain  ENT:  No sore throat, pain, runny nose, dysphagia  CV:  No pain, palpitations, hypo/hypertension  Resp:  No dyspnea, cough, tachypnea, wheezing  GI:  No pain, No nausea, No vomiting, No diarrhea, No constipation, No weight loss, No fever, No pruritis, No rectal bleeding, No melena, No dysphagia  :  No pain, bleeding, incontinence, nocturia  Muscle:  No pain, weakness  Neuro:  No weakness, tingling, memory problems  Psych:  No fatigue, insomnia, mood problems, depression  Endocrine:  No polyuria, polydypsia, cold/heat intolerance  Heme:  No petechiae, ecchymosis, easy bruisability  Skin:  No rash, tattoos, scars, edema      Vital Signs Last 24 Hrs  T(C): 36.7 (05 Dec 2020 09:33), Max: 37.1 (05 Dec 2020 05:45)  T(F): 98.1 (05 Dec 2020 09:33), Max: 98.7 (05 Dec 2020 05:45)  HR: 65 (05 Dec 2020 09:33) (59 - 65)  BP: 176/79 (05 Dec 2020 09:33) (163/91 - 189/91)  BP(mean): --  RR: 17 (05 Dec 2020 09:33) (16 - 17)  SpO2: 100% (05 Dec 2020 05:45) (100% - 100%)    PHYSICAL EXAM:    Constitutional: NAD  HEENT: EOMI, throat clear  Neck: No LAD, supple  Respiratory: CTA and P  Cardiovascular: S1 and S2, RRR, no M  Gastrointestinal: BS+, soft, NT/ND, neg HSM,  Extremities: No peripheral edema, neg clubbing, cyanosis  Vascular: 2+ peripheral pulses  Neurological: A/O x 3, no focal deficits  Psychiatric: Normal mood, normal affect  Skin: No rashes      LABS:                        8.0    6.45  )-----------( 117      ( 05 Dec 2020 08:00 )             25.1     12-05    137  |  97<L>  |  71<H>  ----------------------------<  116<H>  4.9   |  27  |  8.39<H>    Ca    9.3      05 Dec 2020 02:15  Phos  4.3     12-05  Mg     2.3     12-05    TPro  6.4  /  Alb  3.7  /  TBili  0.4  /  DBili  x   /  AST  12  /  ALT  12  /  AlkPhos  60  12-04    PT/INR - ( 04 Dec 2020 06:00 )   PT: 12.0 SEC;   INR: 1.06          PTT - ( 04 Dec 2020 06:00 )  PTT:26.9 SEC      RADIOLOGY & ADDITIONAL TESTS:   INTERVAL HPI/OVERNIGHT EVENTS:    no bm overnight   (+) melena x 1 this morning  good resp to prbc    MEDICATIONS  (STANDING):  atorvastatin 40 milliGRAM(s) Oral at bedtime  calcitriol   Capsule 0.25 MICROGram(s) Oral daily  cloNIDine 0.1 milliGRAM(s) Oral two times a day  dextrose 40% Gel 15 Gram(s) Oral once  dextrose 5%. 1000 milliLiter(s) (50 mL/Hr) IV Continuous <Continuous>  dextrose 5%. 1000 milliLiter(s) (100 mL/Hr) IV Continuous <Continuous>  dextrose 50% Injectable 25 Gram(s) IV Push once  dextrose 50% Injectable 12.5 Gram(s) IV Push once  dextrose 50% Injectable 25 Gram(s) IV Push once  epoetin ralph-epbx (RETACRIT) Injectable 73886 Unit(s) IV Push <User Schedule>  glucagon  Injectable 1 milliGRAM(s) IntraMuscular once  insulin lispro (ADMELOG) corrective regimen sliding scale   SubCutaneous three times a day before meals  labetalol 100 milliGRAM(s) Oral two times a day  NIFEdipine XL 60 milliGRAM(s) Oral daily  sevelamer carbonate 1600 milliGRAM(s) Oral three times a day with meals    MEDICATIONS  (PRN):      Allergies    No Known Allergies    Intolerances        Review of Systems:    General:  No wt loss, fevers, chills, night sweats, fatigue   Eyes:  Good vision, no reported pain  ENT:  No sore throat, pain, runny nose, dysphagia  CV:  No pain, palpitations, hypo/hypertension  Resp:  No dyspnea, cough, tachypnea, wheezing  GI:  No pain, No nausea, No vomiting, No diarrhea, No constipation, No weight loss, No fever, No pruritis, No rectal bleeding, No melena, No dysphagia  :  No pain, bleeding, incontinence, nocturia  Muscle:  No pain, weakness  Neuro:  No weakness, tingling, memory problems  Psych:  No fatigue, insomnia, mood problems, depression  Endocrine:  No polyuria, polydypsia, cold/heat intolerance  Heme:  No petechiae, ecchymosis, easy bruisability  Skin:  No rash, tattoos, scars, edema      Vital Signs Last 24 Hrs  T(C): 36.7 (05 Dec 2020 09:33), Max: 37.1 (05 Dec 2020 05:45)  T(F): 98.1 (05 Dec 2020 09:33), Max: 98.7 (05 Dec 2020 05:45)  HR: 65 (05 Dec 2020 09:33) (59 - 65)  BP: 176/79 (05 Dec 2020 09:33) (163/91 - 189/91)  BP(mean): --  RR: 17 (05 Dec 2020 09:33) (16 - 17)  SpO2: 100% (05 Dec 2020 05:45) (100% - 100%)    PHYSICAL EXAM:    Constitutional: NAD  HEENT: EOMI, throat clear  Neck: No LAD, supple  Respiratory: CTA and P  Cardiovascular: S1 and S2, RRR, no M  Gastrointestinal: BS+, soft, NT/ND, neg HSM,  Extremities: No peripheral edema, neg clubbing, cyanosis  Vascular: 2+ peripheral pulses  Neurological: A/O x 3, no focal deficits  Psychiatric: Normal mood, normal affect  Skin: No rashes      LABS:                        8.0    6.45  )-----------( 117      ( 05 Dec 2020 08:00 )             25.1     12-05    137  |  97<L>  |  71<H>  ----------------------------<  116<H>  4.9   |  27  |  8.39<H>    Ca    9.3      05 Dec 2020 02:15  Phos  4.3     12-05  Mg     2.3     12-05    TPro  6.4  /  Alb  3.7  /  TBili  0.4  /  DBili  x   /  AST  12  /  ALT  12  /  AlkPhos  60  12-04    PT/INR - ( 04 Dec 2020 06:00 )   PT: 12.0 SEC;   INR: 1.06          PTT - ( 04 Dec 2020 06:00 )  PTT:26.9 SEC      RADIOLOGY & ADDITIONAL TESTS:

## 2020-12-05 NOTE — CONSULT NOTE ADULT - SUBJECTIVE AND OBJECTIVE BOX
HPI:  65M w/ pmh PCI (on aspirin and plavix), HTN, DM, GERD, ESRD on HD (TThSa, last dialysis yesterday, Thursday), s/p cholecystectomy here with black stools x 5 days. Also reports L tinnitus for couple days, Last colonoscopy 2 years ago. Patient denies chest pain, SOB, f/c, cough, abd pain, N/V/D/C, weakness, HA, dizziness, urinary symptoms, extremity pain or swelling or other complaints. (04 Dec 2020 09:20)  Patient has history of diabetes, on oral meds at home,  no recent hypoglycemic episodes. Patient follows up with PCP for diabetes management.    PAST MEDICAL & SURGICAL HISTORY:  Anemia due to stage 5 chronic kidney disease, not on chronic dialysis    Cerebrovascular accident (CVA), unspecified mechanism  diagnosed via CT of the head    Hypercholesterolemia    ESRD (end stage renal disease)  on Dialysis( M/W/F), By Dr. Huff    Stented coronary artery  2014, 3 stents, [facility]    GERD (gastroesophageal reflux disease)    Diabetes mellitus  type 2    CAP (community acquired pneumonia)  6/18    Coronary artery disease    HTN (hypertension)    H/O eye surgery    AV fistula  10/12/18 L radiocephalic AV fistula    H/O coronary angiogram  2014 - x3 stents        FAMILY HISTORY:  No pertinent family history in first degree relatives        Social History:    Outpatient Medications:    MEDICATIONS  (STANDING):  atorvastatin 40 milliGRAM(s) Oral at bedtime  calcitriol   Capsule 0.25 MICROGram(s) Oral daily  cloNIDine 0.1 milliGRAM(s) Oral two times a day  dextrose 40% Gel 15 Gram(s) Oral once  dextrose 5%. 1000 milliLiter(s) (50 mL/Hr) IV Continuous <Continuous>  dextrose 5%. 1000 milliLiter(s) (100 mL/Hr) IV Continuous <Continuous>  dextrose 50% Injectable 25 Gram(s) IV Push once  dextrose 50% Injectable 12.5 Gram(s) IV Push once  dextrose 50% Injectable 25 Gram(s) IV Push once  epoetin ralph-epbx (RETACRIT) Injectable 76981 Unit(s) IV Push <User Schedule>  glucagon  Injectable 1 milliGRAM(s) IntraMuscular once  hydrALAZINE 50 milliGRAM(s) Oral three times a day  insulin lispro (ADMELOG) corrective regimen sliding scale   SubCutaneous three times a day before meals  labetalol 100 milliGRAM(s) Oral two times a day  NIFEdipine XL 60 milliGRAM(s) Oral daily  sevelamer carbonate 1600 milliGRAM(s) Oral three times a day with meals    MEDICATIONS  (PRN):      Allergies    No Known Allergies    Intolerances      Review of Systems:  Constitutional: No fever, no chills  Eyes: No blurry vision  Neuro: No tremors  HEENT: No pain, no neck swelling  Cardiovascular: No chest pain, no palpitations  Respiratory: No SOB, no cough  GI: No nausea, vomiting, abdominal pain  : No dysuria  Skin: no rash  MSK: Has leg swelling.  Psych: no depression  Endocrine: no polyuria, polydipsia    UNABLE TO OBTAIN    ALL OTHER SYSTEMS REVIEWED AND NEGATIVE        PHYSICAL EXAM:  VITALS: T(C): 36.7 (12-05-20 @ 11:48)  T(F): 98.1 (12-05-20 @ 11:48), Max: 98.7 (12-05-20 @ 05:45)  HR: 73 (12-05-20 @ 11:48) (59 - 73)  BP: 190/78 (12-05-20 @ 11:48) (163/91 - 190/78)  RR:  (16 - 18)  SpO2:  (100% - 100%)  Wt(kg): --  GENERAL: NAD, well-groomed, well-developed  EYES: No proptosis, no lid lag  HEENT:  Atraumatic, Normocephalic  THYROID: Normal size, no palpable nodules  RESPIRATORY: Clear to auscultation bilaterally; No rales, rhonchi, wheezing  CARDIOVASCULAR: Si S2, No murmurs;  GI: Soft, non distended, normal bowel sounds  SKIN: Dry, intact, No rashes or lesions  MUSCULOSKELETAL: Has BL lower extremity edema.  NEURO:  no tremor, sensation decreased in feet BL,    POCT Blood Glucose.: 99 mg/dL (12-05-20 @ 12:24)  POCT Blood Glucose.: 101 mg/dL (12-05-20 @ 08:15)  POCT Blood Glucose.: 117 mg/dL (12-05-20 @ 05:39)  POCT Blood Glucose.: 109 mg/dL (12-04-20 @ 20:42)  POCT Blood Glucose.: 97 mg/dL (12-04-20 @ 13:28)  POCT Blood Glucose.: 115 mg/dL (12-04-20 @ 10:24)                            8.0    6.45  )-----------( 117      ( 05 Dec 2020 08:00 )             25.1       12-05    137  |  97<L>  |  71<H>  ----------------------------<  116<H>  4.9   |  27  |  8.39<H>    EGFR if : 7   EGFR if non : 6     Ca    9.3      12-05  Mg     2.3     12-05  Phos  4.3     12-05    TPro  6.4  /  Alb  3.7  /  TBili  0.4  /  DBili  x   /  AST  12  /  ALT  12  /  AlkPhos  60  12-04      Thyroid Function Tests:              Radiology:

## 2020-12-05 NOTE — CONSULT NOTE ADULT - ASSESSMENT
Assessment  DMT2: 65y Male with DM T2 with hyperglycemia admitted with GIB, was on oral hypoglycemic agents at home, now on insulin, blood sugars improving, no hypoglycemic episode,  eating meals, compliant with low carb diet.  GIB: Being worked up, monitored, stable.  ESRD: On hemodialysis, labs, chart reviewed.              Rustam Li MD  Cell: [phone]  Office: [phone]

## 2020-12-06 ENCOUNTER — TRANSCRIPTION ENCOUNTER (OUTPATIENT)
Age: 65
End: 2020-12-06

## 2020-12-06 LAB
ANION GAP SERPL CALC-SCNC: 10 MMOL/L — SIGNIFICANT CHANGE UP (ref 7–14)
BASOPHILS # BLD AUTO: 0.04 K/UL — SIGNIFICANT CHANGE UP (ref 0–0.2)
BASOPHILS NFR BLD AUTO: 0.6 % — SIGNIFICANT CHANGE UP (ref 0–2)
BUN SERPL-MCNC: 44 MG/DL — HIGH (ref 7–23)
CALCIUM SERPL-MCNC: 9.3 MG/DL — SIGNIFICANT CHANGE UP (ref 8.4–10.5)
CHLORIDE SERPL-SCNC: 90 MMOL/L — LOW (ref 98–107)
CO2 SERPL-SCNC: 28 MMOL/L — SIGNIFICANT CHANGE UP (ref 22–31)
CREAT SERPL-MCNC: 6.9 MG/DL — HIGH (ref 0.5–1.3)
EOSINOPHIL # BLD AUTO: 0.16 K/UL — SIGNIFICANT CHANGE UP (ref 0–0.5)
EOSINOPHIL NFR BLD AUTO: 2.3 % — SIGNIFICANT CHANGE UP (ref 0–6)
GLUCOSE BLDC GLUCOMTR-MCNC: 109 MG/DL — HIGH (ref 70–99)
GLUCOSE BLDC GLUCOMTR-MCNC: 133 MG/DL — HIGH (ref 70–99)
GLUCOSE BLDC GLUCOMTR-MCNC: 182 MG/DL — HIGH (ref 70–99)
GLUCOSE BLDC GLUCOMTR-MCNC: 199 MG/DL — HIGH (ref 70–99)
GLUCOSE SERPL-MCNC: 127 MG/DL — HIGH (ref 70–99)
HCT VFR BLD CALC: 29.8 % — LOW (ref 39–50)
HGB BLD-MCNC: 9.1 G/DL — LOW (ref 13–17)
IANC: 4.72 K/UL — SIGNIFICANT CHANGE UP (ref 1.5–8.5)
IMM GRANULOCYTES NFR BLD AUTO: 0.3 % — SIGNIFICANT CHANGE UP (ref 0–1.5)
LYMPHOCYTES # BLD AUTO: 1.16 K/UL — SIGNIFICANT CHANGE UP (ref 1–3.3)
LYMPHOCYTES # BLD AUTO: 16.8 % — SIGNIFICANT CHANGE UP (ref 13–44)
MAGNESIUM SERPL-MCNC: 2.2 MG/DL — SIGNIFICANT CHANGE UP (ref 1.6–2.6)
MCHC RBC-ENTMCNC: 29.8 PG — SIGNIFICANT CHANGE UP (ref 27–34)
MCHC RBC-ENTMCNC: 30.5 GM/DL — LOW (ref 32–36)
MCV RBC AUTO: 97.7 FL — SIGNIFICANT CHANGE UP (ref 80–100)
MONOCYTES # BLD AUTO: 0.8 K/UL — SIGNIFICANT CHANGE UP (ref 0–0.9)
MONOCYTES NFR BLD AUTO: 11.6 % — SIGNIFICANT CHANGE UP (ref 2–14)
NEUTROPHILS # BLD AUTO: 4.72 K/UL — SIGNIFICANT CHANGE UP (ref 1.8–7.4)
NEUTROPHILS NFR BLD AUTO: 68.4 % — SIGNIFICANT CHANGE UP (ref 43–77)
NRBC # BLD: 0 /100 WBCS — SIGNIFICANT CHANGE UP
NRBC # FLD: 0 K/UL — SIGNIFICANT CHANGE UP
PHOSPHATE SERPL-MCNC: 3.8 MG/DL — SIGNIFICANT CHANGE UP (ref 2.5–4.5)
PLATELET # BLD AUTO: 151 K/UL — SIGNIFICANT CHANGE UP (ref 150–400)
POTASSIUM SERPL-MCNC: 3.9 MMOL/L — SIGNIFICANT CHANGE UP (ref 3.5–5.3)
POTASSIUM SERPL-SCNC: 3.9 MMOL/L — SIGNIFICANT CHANGE UP (ref 3.5–5.3)
RBC # BLD: 3.05 M/UL — LOW (ref 4.2–5.8)
RBC # FLD: 15.4 % — HIGH (ref 10.3–14.5)
SODIUM SERPL-SCNC: 128 MMOL/L — LOW (ref 135–145)
WBC # BLD: 6.9 K/UL — SIGNIFICANT CHANGE UP (ref 3.8–10.5)
WBC # FLD AUTO: 6.9 K/UL — SIGNIFICANT CHANGE UP (ref 3.8–10.5)

## 2020-12-06 RX ADMIN — Medication 100 MILLIGRAM(S): at 05:13

## 2020-12-06 RX ADMIN — Medication 50 MILLIGRAM(S): at 21:05

## 2020-12-06 RX ADMIN — SEVELAMER CARBONATE 1600 MILLIGRAM(S): 2400 POWDER, FOR SUSPENSION ORAL at 12:54

## 2020-12-06 RX ADMIN — Medication 60 MILLIGRAM(S): at 05:13

## 2020-12-06 RX ADMIN — CALCITRIOL 0.25 MICROGRAM(S): 0.5 CAPSULE ORAL at 12:54

## 2020-12-06 RX ADMIN — Medication 1: at 12:53

## 2020-12-06 RX ADMIN — Medication 0.1 MILLIGRAM(S): at 17:45

## 2020-12-06 RX ADMIN — Medication 100 MILLIGRAM(S): at 17:45

## 2020-12-06 RX ADMIN — Medication 50 MILLIGRAM(S): at 12:55

## 2020-12-06 RX ADMIN — Medication 50 MILLIGRAM(S): at 05:13

## 2020-12-06 RX ADMIN — ATORVASTATIN CALCIUM 40 MILLIGRAM(S): 80 TABLET, FILM COATED ORAL at 21:05

## 2020-12-06 RX ADMIN — SEVELAMER CARBONATE 1600 MILLIGRAM(S): 2400 POWDER, FOR SUSPENSION ORAL at 08:37

## 2020-12-06 RX ADMIN — Medication 0.1 MILLIGRAM(S): at 05:13

## 2020-12-06 RX ADMIN — SEVELAMER CARBONATE 1600 MILLIGRAM(S): 2400 POWDER, FOR SUSPENSION ORAL at 17:45

## 2020-12-06 NOTE — PROGRESS NOTE ADULT - SUBJECTIVE AND OBJECTIVE BOX
Subjective: Patient seen and examined. No new events except as noted.     SUBJECTIVE/ROS:  sob better       MEDICATIONS:  MEDICATIONS  (STANDING):  atorvastatin 40 milliGRAM(s) Oral at bedtime  calcitriol   Capsule 0.25 MICROGram(s) Oral daily  cloNIDine 0.1 milliGRAM(s) Oral two times a day  dextrose 40% Gel 15 Gram(s) Oral once  dextrose 5%. 1000 milliLiter(s) (50 mL/Hr) IV Continuous <Continuous>  dextrose 5%. 1000 milliLiter(s) (100 mL/Hr) IV Continuous <Continuous>  dextrose 50% Injectable 25 Gram(s) IV Push once  dextrose 50% Injectable 12.5 Gram(s) IV Push once  dextrose 50% Injectable 25 Gram(s) IV Push once  epoetin ralph-epbx (RETACRIT) Injectable 13078 Unit(s) IV Push <User Schedule>  glucagon  Injectable 1 milliGRAM(s) IntraMuscular once  hydrALAZINE 50 milliGRAM(s) Oral three times a day  insulin lispro (ADMELOG) corrective regimen sliding scale   SubCutaneous three times a day before meals  labetalol 100 milliGRAM(s) Oral two times a day  NIFEdipine XL 60 milliGRAM(s) Oral daily  sevelamer carbonate 1600 milliGRAM(s) Oral three times a day with meals      PHYSICAL EXAM:  T(C): 37.1 (12-06-20 @ 05:13), Max: 37.1 (12-06-20 @ 05:13)  HR: 63 (12-06-20 @ 05:13) (63 - 78)  BP: 157/68 (12-06-20 @ 05:13) (157/68 - 190/78)  RR: 17 (12-06-20 @ 05:13) (16 - 18)  SpO2: 100% (12-06-20 @ 05:13) (100% - 100%)  Wt(kg): --  I&O's Summary    05 Dec 2020 07:01  -  06 Dec 2020 07:00  --------------------------------------------------------  IN: 400 mL / OUT: 1400 mL / NET: -1000 mL            JVP: Normal  Neck: supple  Lung: clear   CV: S1 S2 , Murmur:  Abd: soft  Ext: No edema  neuro: Awake / alert  Psych: flat affect  Skin: normal``    LABS/DATA:    CARDIAC MARKERS:                                8.0    6.45  )-----------( 117      ( 05 Dec 2020 08:00 )             25.1     12-05    137  |  97<L>  |  71<H>  ----------------------------<  116<H>  4.9   |  27  |  8.39<H>    Ca    9.3      05 Dec 2020 02:15  Phos  4.3     12-05  Mg     2.3     12-05      proBNP:   Lipid Profile:   HgA1c:   TSH:     TELE:  EKG:

## 2020-12-06 NOTE — DISCHARGE NOTE PROVIDER - HOSPITAL COURSE
65 year old male ESRD on HD presents with  dark stools for one week and severe anemia. seen by GI, s/p EGD on  12/7 which showed__ 65 year old male ESRD on HD presents with  dark stools for one week and severe anemia. seen by GI, s/p EGD on  12/7 which showed - Normal esophagus.  Z-line regular, 37 cm from the incisors. Gastritis. Biopsied. One duodenal ulcer (possibly source of patient's GI  bleed). Pt was able to tolerated diet    Await pathology results (treat H. Pylori IF positive). Use Protonix (pantoprazole) 40 mg PO BID for 1 month.  GI recommend to Can restart AC if medically needed. Avoid NSAIDs   65 year old male ESRD on HD presents with  dark stools for one week and severe anemia. seen by GI, s/p EGD on  12/7 which showed - Normal esophagus. Z-line regular, 37 cm from the incisors. Gastritis. Biopsied. One duodenal ulcer (possibly source of patient's GI  bleed). Pt was able to tolerated diet    Await pathology results (treat H. Pylori IF positive). Use Protonix (pantoprazole) 40 mg PO BID for 1 month.  GI recommend to restart AC when medically needed. Avoid NSAIDs   65 year old male ESRD on HD presents with  dark stools for one week and severe anemia. Seen by GI, s/p EGD on 12/7 which showed - Normal esophagus. Z-line regular, 37 cm from the incisors. Gastritis. Biopsied. One duodenal ulcer (possibly source of patient's GI  bleed). Pt was able to tolerated diet   Await pathology results (treat H. Pylori IF positive). Use Protonix (pantoprazole) 40 mg PO BID for 1 month, the 40mg po daily  GI recommend to restart AC when medically needed. Avoid NSAIDs. Per Card Dr Cindy dickey to resume ASA 81mg po qd and Plavix with outpatient follow up    ESRD on HD   - Nephro consulted -on TTS via AVF ([facility], [place], operated by [facility] )  - check pth. on calcitriol, phos optimal continue binder   - CXR- Mild pulmonary edema     HTN   - Clonidine, Nifedipine, Labetalol, Losartan   - 12/5 added Hydralazine 50 Tid increased to 100mg po TID on 12/8    CAD/ PCI  -  ASA restarted safe from GI standpoint  - statin   - ECHO- EF 53%, Mod MR, Normal LV systolic function    Dispo -stable for discharge with outpt follow up. Discussed with daughter, in agreement

## 2020-12-06 NOTE — PROGRESS NOTE ADULT - ASSESSMENT
Assessment  DMT2: 65y Male with DM T2 with hyperglycemia admitted with GIB, was on oral hypoglycemic agents at home, now on insulin, blood sugars improving, no hypoglycemic episode,  eating meals, compliant with low carb diet.  GIB: Being worked up, monitored, stable.  ESRD: On hemodialysis, labs, chart reviewed.              Rustam Li MD  Cell: [phone]  Office: [phone]

## 2020-12-06 NOTE — PROGRESS NOTE ADULT - ASSESSMENT
Problem/Plan - 1:  ·  Problem: Dark stools.  Plan: acute blood loss anemia , Likely GI source  monitor cbc  transfuse PRN  GI fu , scope as per GI   will monitor.     Problem/Plan - 2:  ·  Problem: ESRD (end stage renal disease).  Plan: HD as scheduled  renal fu.     Problem/Plan - 3:·  Problem: HTN (hypertension).  Plan: DASH diet  cw home meds.

## 2020-12-06 NOTE — DISCHARGE NOTE PROVIDER - CARE PROVIDER_API CALL
Julio White  CARDIOVASCULAR DISEASE  [address]  [place] [address]  Phone: [phone]  Fax: [phone]  Follow Up Time:     Rustam Li  Endocrinology, Diabetes and Metabolism  [address] [place] [address]  Phone: [phone]  Fax: [phone]  Follow Up Time:    Rustam Li  Endocrinology, Diabetes and Metabolism  [address] [place] [address]  Phone: [phone]  Fax: [phone]  Follow Up Time:     Yasmany Huff  INTERNAL MEDICINE  [address], [address] [place] [address]  Phone: [phone]  Fax: [phone]  Follow Up Time:     Card,   Follow up with your Card as outpatient  Phone: (   )    -  Fax: (   )    -  Established Patient  Follow Up Time:     Luis Angel Monreal (DO)  Gastroenterology; Internal Medicine  [address] [place] [address]  Phone: [phone]  Fax: [phone]  Follow Up Time:

## 2020-12-06 NOTE — DISCHARGE NOTE PROVIDER - NSDCCPCAREPLAN_GEN_ALL_CORE_FT
PRINCIPAL DISCHARGE DIAGNOSIS  Diagnosis: Dark stools  Assessment and Plan of Treatment: s/p EGD on  12/7 which showed__      SECONDARY DISCHARGE DIAGNOSES  Diagnosis: Diabetes mellitus  Assessment and Plan of Treatment: Diabetes mellitus    Diagnosis: ESRD (end stage renal disease)  Assessment and Plan of Treatment: Continue HD as per routine schedule     PRINCIPAL DISCHARGE DIAGNOSIS  Diagnosis: Dark stools  Assessment and Plan of Treatment: s/p EGD on  12/7 which showed: Normal esophagus.  Z-line regular, 37 cm from the incisors. Gastritis. Biopsied. One duodenal ulcer (possibly source of patient's GI  bleed).    Await pathology results (treat H. Pylori IF positive). Use Protonix (pantoprazole) 40 mg  2 x day  at least for 1 month.  GI recommend to Avoid NSAIDs      SECONDARY DISCHARGE DIAGNOSES  Diagnosis: Diabetes mellitus  Assessment and Plan of Treatment: Consistent carb diet. Check FS before meals, keep FS log and follow up with your PCP/Endo in 1 week    Diagnosis: ESRD (end stage renal disease)  Assessment and Plan of Treatment: Continue HD as per routine schedule     PRINCIPAL DISCHARGE DIAGNOSIS  Diagnosis: Dark stools  Assessment and Plan of Treatment: s/p EGD on  12/7 which showed: Normal esophagus.  Z-line regular, 37 cm from the incisors. Gastritis. Biopsied. One duodenal ulcer (possibly source of patient's GI  bleed). Await pathology results (treat H. Pylori IF positive). Use Protonix (pantoprazole) 40 mg  2 x day  at least for 1 month.  GI recommend to Avoid NSAIDs  Please follow up with PMD within one week. Please call your doctor immediately if you noticed any bleeding or shortness of breath.      SECONDARY DISCHARGE DIAGNOSES  Diagnosis: Diabetes mellitus  Assessment and Plan of Treatment: Consistent carb diet. Check your glucose before meals, keep finger sticks  log and follow up with your PCP/Endo in 1 week    Diagnosis: ESRD (end stage renal disease)  Assessment and Plan of Treatment: Continue HD as per routine schedule     PRINCIPAL DISCHARGE DIAGNOSIS  Diagnosis: Dark stools  Assessment and Plan of Treatment: - You were seen by GI doctor had EGD done on 12/7 which showed: Normal esophagus. Gastritis. Biopsied. One duodenal ulcer (possibly source of patient's GI  bleed). Await pathology results (treat H. Pylori IF positive). Use Protonix (pantoprazole) 40 mg  2 x day  at least for 1 month then continue Protonix daily  - GI recommended to Avoid NSAIDs and you can resume taking ASA and Plavix. Monitor for signs of bleeding  - Please follow up with PMD within one week. Please call your doctor immediately if you noticed any bleeding or shortness of breath      SECONDARY DISCHARGE DIAGNOSES  Diagnosis: Essential hypertension  Assessment and Plan of Treatment: - Continue current blood pressure medication regimen as directed. Your Labetolol was decreased to 100mg oral 2xdaily 2/2 low heart rate and you were added Hydralazine 100mg oral 3xdaily. Monitor for any visual changes, headaches or dizziness.  Monitor blood pressure regularly.  Follow up with your PCP for further management for high blood pressure, please call to make appointment within 1 week of discharge    Diagnosis: CAD (coronary artery disease)  Assessment and Plan of Treatment: - per GI Card doctors you can resume taking ASA 81mg oral daily and Plavix as prescribed. Monitor for any signs of bleeding  - Follow up with your Card as outpatient for further management and treatment    Diagnosis: Diabetes mellitus  Assessment and Plan of Treatment: - Consistent carb diet. Check your glucose before meals, keep finger sticks  log and follow up with your PCP/Endo Dr Li in 1-2 weeks for further management  - Continue with Trujenta 5mg as prescribed. Per daughter you not taking Lantus insulin any longer    Diagnosis: ESRD (end stage renal disease)  Assessment and Plan of Treatment: - Continue HD as per routine schedule  - Follow up with Nephro Dr Huff as outpatient for further management and labs

## 2020-12-06 NOTE — DISCHARGE NOTE PROVIDER - NSRESEARCHGRANT_OVERRIDEREC_GEN_A_CORE
IMPROVE-DD Application Not Available Active gastroduodenal ulcer in the three months prior to treatment/IMPROVE-DD Application Not Available

## 2020-12-06 NOTE — DISCHARGE NOTE PROVIDER - NSDCMRMEDTOKEN_GEN_ALL_CORE_FT
acetaminophen 325 mg oral tablet: 2 tab(s) orally every 6 hours, As needed, Mild Pain (1 - 3)  Aspirin Enteric Coated 81 mg oral delayed release tablet: 1 tab(s) orally once a day  atorvastatin 40 mg oral tablet: 1 tab(s) orally once a day  calcitriol 0.25 mcg oral capsule: 1 cap(s) orally once a day  cloNIDine 0.1 mg oral tablet: 1 tab(s) orally 2 times a day  clopidogrel 75 mg oral tablet: 1 tab(s) orally once a day  docusate calcium 240 mg oral capsule: 1 cap(s) orally once a day   labetalol 200 mg oral tablet: 2 tab(s) orally 2 times a day   Lantus 100 units/mL subcutaneous solution: 5 unit(s) subcutaneous once a day (at bedtime)   linagliptin 5 mg oral tablet: 1 tab(s) orally once a day   losartan 25 mg oral tablet: 1 tab(s) orally once a day  NIFEdipine 60 mg oral tablet, extended release: 1 tab(s) orally once a day  oxyCODONE 5 mg oral tablet: 1 tab(s) orally every 6 hours, As Needed -Severe Pain (7 - 10) MDD:No more than 4 pills per day  sevelamer carbonate 800 mg oral tablet: 2 tab(s) orally 3 times a day (with meals)   acetaminophen 325 mg oral tablet: 2 tab(s) orally every 6 hours, As needed, Mild Pain (1 - 3)  Aspirin Enteric Coated 81 mg oral delayed release tablet: 1 tab(s) orally once a day  atorvastatin 40 mg oral tablet: 1 tab(s) orally once a day  calcitriol 0.25 mcg oral capsule: 1 cap(s) orally once a day  cloNIDine 0.1 mg oral tablet: 1 tab(s) orally 2 times a day  docusate calcium 240 mg oral capsule: 1 cap(s) orally once a day   hydrALAZINE 100 mg oral tablet: 1 tab(s) orally 3 times a day     Disregard previous order of Hydralazine 50mg  labetalol 100 mg oral tablet: 1 tab(s) orally 2 times a day  linagliptin 5 mg oral tablet: 1 tab(s) orally once a day   losartan 25 mg oral tablet: 1 tab(s) orally once a day  NIFEdipine 60 mg oral tablet, extended release: 1 tab(s) orally once a day  oxyCODONE 5 mg oral tablet: 1 tab(s) orally every 6 hours, As Needed -Severe Pain (7 - 10) MDD:No more than 4 pills per day  pantoprazole 40 mg oral delayed release tablet: 1 tab(s) orally 2 times a day  sevelamer carbonate 800 mg oral tablet: 2 tab(s) orally 3 times a day (with meals)   acetaminophen 325 mg oral tablet: 2 tab(s) orally every 6 hours, As needed, Mild Pain (1 - 3)  Aspirin Enteric Coated 81 mg oral delayed release tablet: 1 tab(s) orally once a day  atorvastatin 40 mg oral tablet: 1 tab(s) orally once a day  calcitriol 0.25 mcg oral capsule: 1 cap(s) orally once a day  cloNIDine 0.1 mg oral tablet: 1 tab(s) orally 2 times a day  clopidogrel 75 mg oral tablet: 1 tab(s) orally once a day  docusate calcium 240 mg oral capsule: 1 cap(s) orally once a day   hydrALAZINE 100 mg oral tablet: 1 tab(s) orally 3 times a day     Disregard previous order of Hydralazine 50mg  labetalol 100 mg oral tablet: 1 tab(s) orally 2 times a day  linagliptin 5 mg oral tablet: 1 tab(s) orally once a day   losartan 25 mg oral tablet: 1 tab(s) orally once a day  NIFEdipine 60 mg oral tablet, extended release: 1 tab(s) orally once a day  oxyCODONE 5 mg oral tablet: 1 tab(s) orally every 6 hours, As Needed -Severe Pain (7 - 10) MDD:No more than 4 pills per day  pantoprazole 40 mg oral delayed release tablet: 1 tab(s) orally 2 times a day  sevelamer carbonate 800 mg oral tablet: 2 tab(s) orally 3 times a day (with meals)

## 2020-12-06 NOTE — DISCHARGE NOTE PROVIDER - PROVIDER TOKENS
PROVIDER:[TOKEN:[[id number]]],PROVIDER:[TOKEN:[[id number]]] PROVIDER:[TOKEN:[[id number]]],PROVIDER:[TOKEN:[[id number]]],FREE:[LAST:[Card],PHONE:[(   )    -],FAX:[(   )    -],ADDRESS:[Follow up with your Card as outpatient],ESTABLISHEDPATIENT:[T]],PROVIDER:[TOKEN:[[id number]]]

## 2020-12-06 NOTE — PROGRESS NOTE ADULT - ASSESSMENT
Anemia , Melena   transfusion recommended   GI eval noted   plan for for endoscopy     CAD history of stents multivessel PCI   last in feb 2019   low dose asa once deemed safe from GI standpoint  statin   obtain echo to re eval LV function     HTN  better today   cont current meds     ESRD  On HD. follow up with renal. Monitor electrolytes, K, ca. Avoid significant nephrotoxic medications.    acute CHF unspecified  awaiting echo   HD   BP control     Pre-Operative Cardiac Risk Stratification and Optimization prior to EGD  await Echo and better BP control and improvement of pulm edema

## 2020-12-06 NOTE — PROGRESS NOTE ADULT - PROBLEM SELECTOR PLAN 1
-trend h/h   -transfuse prn for Hgb < 8  -avoid nsaids/ac   -protonix ivp bid  -NPO p MN Sunday for EGD Monday @ 2pm; optimize hgb and electrolytes prn  -If going for dialysis on Monday please schedule for first shift with lab after dialysis

## 2020-12-06 NOTE — PROGRESS NOTE ADULT - SUBJECTIVE AND OBJECTIVE BOX
Chief complaint  Patient is a 65y old  Male who presents with a chief complaint of black stools (06 Dec 2020 18:36)   Review of systems  Patient in bed, appears comfortable.    Labs and Fingersticks  CAPILLARY BLOOD GLUCOSE      POCT Blood Glucose.: 109 mg/dL (06 Dec 2020 17:00)  POCT Blood Glucose.: 182 mg/dL (06 Dec 2020 12:14)  POCT Blood Glucose.: 133 mg/dL (06 Dec 2020 08:20)  POCT Blood Glucose.: 169 mg/dL (05 Dec 2020 21:10)      Anion Gap, Serum: 10 (12-06 @ 11:20)  Anion Gap, Serum: 13 (12-05 @ 02:15)      Calcium, Total Serum: 9.3 (12-06 @ 11:20)  Calcium, Total Serum: 9.3 (12-05 @ 02:15)          12-06    128<L>  |  90<L>  |  44<H>  ----------------------------<  127<H>  3.9   |  28  |  6.90<H>    Ca    9.3      06 Dec 2020 11:20  Phos  3.8     12-06  Mg     2.2     12-06                          9.1    6.90  )-----------( 151      ( 06 Dec 2020 11:21 )             29.8     Medications  MEDICATIONS  (STANDING):  atorvastatin 40 milliGRAM(s) Oral at bedtime  calcitriol   Capsule 0.25 MICROGram(s) Oral daily  cloNIDine 0.1 milliGRAM(s) Oral two times a day  dextrose 40% Gel 15 Gram(s) Oral once  dextrose 5%. 1000 milliLiter(s) (50 mL/Hr) IV Continuous <Continuous>  dextrose 5%. 1000 milliLiter(s) (100 mL/Hr) IV Continuous <Continuous>  dextrose 50% Injectable 25 Gram(s) IV Push once  dextrose 50% Injectable 12.5 Gram(s) IV Push once  dextrose 50% Injectable 25 Gram(s) IV Push once  epoetin ralph-epbx (RETACRIT) Injectable 63561 Unit(s) IV Push <User Schedule>  glucagon  Injectable 1 milliGRAM(s) IntraMuscular once  hydrALAZINE 50 milliGRAM(s) Oral three times a day  insulin lispro (ADMELOG) corrective regimen sliding scale   SubCutaneous three times a day before meals  labetalol 100 milliGRAM(s) Oral two times a day  NIFEdipine XL 60 milliGRAM(s) Oral daily  sevelamer carbonate 1600 milliGRAM(s) Oral three times a day with meals      Physical Exam  General: Patient comfortable in bed  Vital Signs Last 12 Hrs  T(F): 98.7 (12-06-20 @ 12:57), Max: 98.7 (12-06-20 @ 12:57)  HR: 74 (12-06-20 @ 17:40) (64 - 74)  BP: 164/68 (12-06-20 @ 17:40) (156/66 - 164/68)  BP(mean): --  RR: 18 (12-06-20 @ 12:57) (18 - 18)  SpO2: 100% (12-06-20 @ 12:57) (100% - 100%)  Neck: No palpable thyroid nodules.  CVS: S1S2, No murmurs  Respiratory: No wheezing, no crepitations  GI: Abdomen soft, bowel sounds positive  Musculoskeletal:  edema lower extremities.     Diagnostics

## 2020-12-06 NOTE — PROGRESS NOTE ADULT - SUBJECTIVE AND OBJECTIVE BOX
Patient is a 65y old  Male who presents with a chief complaint of black stools (06 Dec 2020 10:39)      INTERVAL HPI/OVERNIGHT EVENTS:  T(C): 37.1 (12-06-20 @ 12:57), Max: 37.1 (12-06-20 @ 05:13)  HR: 74 (12-06-20 @ 17:40) (63 - 74)  BP: 164/68 (12-06-20 @ 17:40) (156/66 - 164/68)  RR: 18 (12-06-20 @ 12:57) (16 - 18)  SpO2: 100% (12-06-20 @ 12:57) (100% - 100%)  Wt(kg): --  I&O's Summary    05 Dec 2020 07:01  -  06 Dec 2020 07:00  --------------------------------------------------------  IN: 400 mL / OUT: 1400 mL / NET: -1000 mL        LABS:                        9.1    6.90  )-----------( 151      ( 06 Dec 2020 11:21 )             29.8     12-06    128<L>  |  90<L>  |  44<H>  ----------------------------<  127<H>  3.9   |  28  |  6.90<H>    Ca    9.3      06 Dec 2020 11:20  Phos  3.8     12-06  Mg     2.2     12-06          CAPILLARY BLOOD GLUCOSE      POCT Blood Glucose.: 109 mg/dL (06 Dec 2020 17:00)  POCT Blood Glucose.: 182 mg/dL (06 Dec 2020 12:14)  POCT Blood Glucose.: 133 mg/dL (06 Dec 2020 08:20)  POCT Blood Glucose.: 169 mg/dL (05 Dec 2020 21:10)            MEDICATIONS  (STANDING):  atorvastatin 40 milliGRAM(s) Oral at bedtime  calcitriol   Capsule 0.25 MICROGram(s) Oral daily  cloNIDine 0.1 milliGRAM(s) Oral two times a day  dextrose 40% Gel 15 Gram(s) Oral once  dextrose 5%. 1000 milliLiter(s) (50 mL/Hr) IV Continuous <Continuous>  dextrose 5%. 1000 milliLiter(s) (100 mL/Hr) IV Continuous <Continuous>  dextrose 50% Injectable 25 Gram(s) IV Push once  dextrose 50% Injectable 12.5 Gram(s) IV Push once  dextrose 50% Injectable 25 Gram(s) IV Push once  epoetin ralph-epbx (RETACRIT) Injectable 36811 Unit(s) IV Push <User Schedule>  glucagon  Injectable 1 milliGRAM(s) IntraMuscular once  hydrALAZINE 50 milliGRAM(s) Oral three times a day  insulin lispro (ADMELOG) corrective regimen sliding scale   SubCutaneous three times a day before meals  labetalol 100 milliGRAM(s) Oral two times a day  NIFEdipine XL 60 milliGRAM(s) Oral daily  sevelamer carbonate 1600 milliGRAM(s) Oral three times a day with meals    MEDICATIONS  (PRN):          PHYSICAL EXAM:  GENERAL: NAD, well-groomed, well-developed  HEAD:  Atraumatic, Normocephalic  CHEST/LUNG: Clear to percussion bilaterally; No rales, rhonchi, wheezing, or rubs  HEART: Regular rate and rhythm; No murmurs, rubs, or gallops  ABDOMEN: Soft, Nontender, Nondistended; Bowel sounds present  EXTREMITIES:  2+ Peripheral Pulses, No clubbing, cyanosis, or edema  LYMPH: No lymphadenopathy noted  SKIN: No rashes or lesions    Care Discussed with Consultants/Other Providers [ ] YES  [ ] NO

## 2020-12-06 NOTE — PROGRESS NOTE ADULT - SUBJECTIVE AND OBJECTIVE BOX
INTERVAL HPI/OVERNIGHT EVENTS:    no bm overnight; without abd pain   cbc pending   awaiting tte    MEDICATIONS  (STANDING):  atorvastatin 40 milliGRAM(s) Oral at bedtime  calcitriol   Capsule 0.25 MICROGram(s) Oral daily  cloNIDine 0.1 milliGRAM(s) Oral two times a day  dextrose 40% Gel 15 Gram(s) Oral once  dextrose 5%. 1000 milliLiter(s) (50 mL/Hr) IV Continuous <Continuous>  dextrose 5%. 1000 milliLiter(s) (100 mL/Hr) IV Continuous <Continuous>  dextrose 50% Injectable 25 Gram(s) IV Push once  dextrose 50% Injectable 12.5 Gram(s) IV Push once  dextrose 50% Injectable 25 Gram(s) IV Push once  epoetin ralph-epbx (RETACRIT) Injectable 74894 Unit(s) IV Push <User Schedule>  glucagon  Injectable 1 milliGRAM(s) IntraMuscular once  hydrALAZINE 50 milliGRAM(s) Oral three times a day  insulin lispro (ADMELOG) corrective regimen sliding scale   SubCutaneous three times a day before meals  labetalol 100 milliGRAM(s) Oral two times a day  NIFEdipine XL 60 milliGRAM(s) Oral daily  sevelamer carbonate 1600 milliGRAM(s) Oral three times a day with meals    MEDICATIONS  (PRN):      Allergies    No Known Allergies    Intolerances        Review of Systems:    General:  No wt loss, fevers, chills, night sweats, fatigue   Eyes:  Good vision, no reported pain  ENT:  No sore throat, pain, runny nose, dysphagia  CV:  No pain, palpitations, hypo/hypertension  Resp:  No dyspnea, cough, tachypnea, wheezing  GI:  No pain, No nausea, No vomiting, No diarrhea, No constipation, No weight loss, No fever, No pruritis, No rectal bleeding, No melena, No dysphagia  :  No pain, bleeding, incontinence, nocturia  Muscle:  No pain, weakness  Neuro:  No weakness, tingling, memory problems  Psych:  No fatigue, insomnia, mood problems, depression  Endocrine:  No polyuria, polydypsia, cold/heat intolerance  Heme:  No petechiae, ecchymosis, easy bruisability  Skin:  No rash, tattoos, scars, edema      Vital Signs Last 24 Hrs  T(C): 37.1 (06 Dec 2020 05:13), Max: 37.1 (06 Dec 2020 05:13)  T(F): 98.7 (06 Dec 2020 05:13), Max: 98.7 (06 Dec 2020 05:13)  HR: 63 (06 Dec 2020 05:13) (63 - 78)  BP: 157/68 (06 Dec 2020 05:13) (157/68 - 190/78)  BP(mean): --  RR: 17 (06 Dec 2020 05:13) (16 - 18)  SpO2: 100% (06 Dec 2020 05:13) (100% - 100%)    PHYSICAL EXAM:    Constitutional: NAD  HEENT: EOMI, throat clear  Neck: No LAD, supple  Respiratory: CTA and P  Cardiovascular: S1 and S2, RRR, no M  Gastrointestinal: BS+, soft, NT/ND, neg HSM,  Extremities: No peripheral edema, neg clubbing, cyanosis  Vascular: 2+ peripheral pulses  Neurological: A/O x 3, no focal deficits  Psychiatric: Normal mood, normal affect  Skin: No rashes      LABS:                        8.0    6.45  )-----------( 117      ( 05 Dec 2020 08:00 )             25.1     12-05    137  |  97<L>  |  71<H>  ----------------------------<  116<H>  4.9   |  27  |  8.39<H>    Ca    9.3      05 Dec 2020 02:15  Phos  4.3     12-05  Mg     2.3     12-05            RADIOLOGY & ADDITIONAL TESTS:

## 2020-12-07 ENCOUNTER — RESULT REVIEW (OUTPATIENT)
Age: 65
End: 2020-12-07

## 2020-12-07 LAB
ALBUMIN SERPL ELPH-MCNC: 3.9 G/DL — SIGNIFICANT CHANGE UP (ref 3.3–5)
ALP SERPL-CCNC: 73 U/L — SIGNIFICANT CHANGE UP (ref 40–120)
ALT FLD-CCNC: 10 U/L — SIGNIFICANT CHANGE UP (ref 4–41)
ANION GAP SERPL CALC-SCNC: 16 MMOL/L — HIGH (ref 7–14)
AST SERPL-CCNC: 15 U/L — SIGNIFICANT CHANGE UP (ref 4–40)
BILIRUB SERPL-MCNC: 0.8 MG/DL — SIGNIFICANT CHANGE UP (ref 0.2–1.2)
BUN SERPL-MCNC: 63 MG/DL — HIGH (ref 7–23)
CALCIUM SERPL-MCNC: 9.2 MG/DL — SIGNIFICANT CHANGE UP (ref 8.4–10.5)
CHLORIDE SERPL-SCNC: 95 MMOL/L — LOW (ref 98–107)
CO2 SERPL-SCNC: 24 MMOL/L — SIGNIFICANT CHANGE UP (ref 22–31)
CREAT SERPL-MCNC: 9.28 MG/DL — HIGH (ref 0.5–1.3)
GLUCOSE BLDC GLUCOMTR-MCNC: 109 MG/DL — HIGH (ref 70–99)
GLUCOSE BLDC GLUCOMTR-MCNC: 114 MG/DL — HIGH (ref 70–99)
GLUCOSE BLDC GLUCOMTR-MCNC: 127 MG/DL — HIGH (ref 70–99)
GLUCOSE BLDC GLUCOMTR-MCNC: 250 MG/DL — HIGH (ref 70–99)
GLUCOSE SERPL-MCNC: 110 MG/DL — HIGH (ref 70–99)
HCT VFR BLD CALC: 28.7 % — LOW (ref 39–50)
HGB BLD-MCNC: 8.9 G/DL — LOW (ref 13–17)
MCHC RBC-ENTMCNC: 30.1 PG — SIGNIFICANT CHANGE UP (ref 27–34)
MCHC RBC-ENTMCNC: 31 GM/DL — LOW (ref 32–36)
MCV RBC AUTO: 97 FL — SIGNIFICANT CHANGE UP (ref 80–100)
NRBC # BLD: 0 /100 WBCS — SIGNIFICANT CHANGE UP
NRBC # FLD: 0 K/UL — SIGNIFICANT CHANGE UP
PLATELET # BLD AUTO: 127 K/UL — LOW (ref 150–400)
POTASSIUM SERPL-MCNC: 4.6 MMOL/L — SIGNIFICANT CHANGE UP (ref 3.5–5.3)
POTASSIUM SERPL-SCNC: 4.6 MMOL/L — SIGNIFICANT CHANGE UP (ref 3.5–5.3)
PROT SERPL-MCNC: 7.1 G/DL — SIGNIFICANT CHANGE UP (ref 6–8.3)
RBC # BLD: 2.96 M/UL — LOW (ref 4.2–5.8)
RBC # FLD: 15.2 % — HIGH (ref 10.3–14.5)
SODIUM SERPL-SCNC: 135 MMOL/L — SIGNIFICANT CHANGE UP (ref 135–145)
WBC # BLD: 6.43 K/UL — SIGNIFICANT CHANGE UP (ref 3.8–10.5)
WBC # FLD AUTO: 6.43 K/UL — SIGNIFICANT CHANGE UP (ref 3.8–10.5)

## 2020-12-07 PROCEDURE — 88305 TISSUE EXAM BY PATHOLOGIST: CPT | Mod: 26

## 2020-12-07 PROCEDURE — 93306 TTE W/DOPPLER COMPLETE: CPT | Mod: 26

## 2020-12-07 PROCEDURE — 88312 SPECIAL STAINS GROUP 1: CPT | Mod: 26

## 2020-12-07 RX ORDER — CHLORHEXIDINE GLUCONATE 213 G/1000ML
1 SOLUTION TOPICAL DAILY
Refills: 0 | Status: DISCONTINUED | OUTPATIENT
Start: 2020-12-07 | End: 2020-12-08

## 2020-12-07 RX ORDER — SODIUM CHLORIDE 9 MG/ML
1000 INJECTION INTRAMUSCULAR; INTRAVENOUS; SUBCUTANEOUS
Refills: 0 | Status: DISCONTINUED | OUTPATIENT
Start: 2020-12-07 | End: 2020-12-07

## 2020-12-07 RX ORDER — PANTOPRAZOLE SODIUM 20 MG/1
40 TABLET, DELAYED RELEASE ORAL
Refills: 0 | Status: DISCONTINUED | OUTPATIENT
Start: 2020-12-07 | End: 2020-12-08

## 2020-12-07 RX ADMIN — CALCITRIOL 0.25 MICROGRAM(S): 0.5 CAPSULE ORAL at 11:16

## 2020-12-07 RX ADMIN — ATORVASTATIN CALCIUM 40 MILLIGRAM(S): 80 TABLET, FILM COATED ORAL at 21:06

## 2020-12-07 RX ADMIN — Medication 0.1 MILLIGRAM(S): at 17:38

## 2020-12-07 RX ADMIN — Medication 100 MILLIGRAM(S): at 17:38

## 2020-12-07 RX ADMIN — SODIUM CHLORIDE 30 MILLILITER(S): 9 INJECTION INTRAMUSCULAR; INTRAVENOUS; SUBCUTANEOUS at 15:41

## 2020-12-07 RX ADMIN — PANTOPRAZOLE SODIUM 40 MILLIGRAM(S): 20 TABLET, DELAYED RELEASE ORAL at 21:09

## 2020-12-07 RX ADMIN — Medication 50 MILLIGRAM(S): at 11:20

## 2020-12-07 RX ADMIN — Medication 50 MILLIGRAM(S): at 21:06

## 2020-12-07 RX ADMIN — Medication 60 MILLIGRAM(S): at 11:16

## 2020-12-07 NOTE — PROGRESS NOTE ADULT - SUBJECTIVE AND OBJECTIVE BOX
[facility]   MD ANDRES AVALOS DO ANAM SIDDIQUI ANGELA WONG, PA    TEL:  OFFICE: [phone]  DR CUETO CELL: [phone]  DR. HUNTER CELL: [phone]  DR. HORTA CELL: [phone]  LIDA WINTERS CELL: [phone]    From 5pm-7am Answering Service [phone]    -- RENAL FOLLOW UP NOTE ---Date of Service 12-07-20 @ 14:26    Patient is a 65y old  Male who presents with a chief complaint of black stools (07 Dec 2020 14:15)      Patient seen and examined at bedside. No chest pain/sob    VITALS:  T(F): 97.9 (12-07-20 @ 11:15), Max: 98.5 (12-07-20 @ 05:19)  HR: 65 (12-07-20 @ 13:41)  BP: 168/87 (12-07-20 @ 13:41)  RR: 16 (12-07-20 @ 11:15)  SpO2: 100% (12-07-20 @ 11:15)  Wt(kg): --        PHYSICAL EXAM:  Constitutional: NAD  Neck: No JVD  Respiratory: CTAB, no wheezes, rales or rhonchi  Cardiovascular: S1, S2, RRR  Gastrointestinal: BS+, soft, NT/ND  Extremities: No peripheral edema    Hospital Medications:   MEDICATIONS  (STANDING):  atorvastatin 40 milliGRAM(s) Oral at bedtime  calcitriol   Capsule 0.25 MICROGram(s) Oral daily  chlorhexidine 4% Liquid 1 Application(s) Topical daily  cloNIDine 0.1 milliGRAM(s) Oral two times a day  dextrose 40% Gel 15 Gram(s) Oral once  dextrose 5%. 1000 milliLiter(s) (50 mL/Hr) IV Continuous <Continuous>  dextrose 5%. 1000 milliLiter(s) (100 mL/Hr) IV Continuous <Continuous>  dextrose 50% Injectable 25 Gram(s) IV Push once  dextrose 50% Injectable 12.5 Gram(s) IV Push once  dextrose 50% Injectable 25 Gram(s) IV Push once  epoetin ralph-epbx (RETACRIT) Injectable 23697 Unit(s) IV Push <User Schedule>  glucagon  Injectable 1 milliGRAM(s) IntraMuscular once  hydrALAZINE 50 milliGRAM(s) Oral three times a day  insulin lispro (ADMELOG) corrective regimen sliding scale   SubCutaneous three times a day before meals  labetalol 100 milliGRAM(s) Oral two times a day  NIFEdipine XL 60 milliGRAM(s) Oral daily  sevelamer carbonate 1600 milliGRAM(s) Oral three times a day with meals      LABS:  12-07    135  |  95<L>  |  63<H>  ----------------------------<  110<H>  4.6   |  24  |  9.28<H>    Ca    9.2      07 Dec 2020 08:38  Phos  3.8     12-06  Mg     2.2     12-06    TPro  7.1  /  Alb  3.9  /  TBili  0.8  /  DBili      /  AST  15  /  ALT  10  /  AlkPhos  73  12-07    Creatinine Trend: 9.28 <--, 6.90 <--, 8.39 <--, 6.57 <--    Albumin, Serum: 3.9 g/dL (12-07 @ 08:38)                              8.9    6.43  )-----------( 127      ( 07 Dec 2020 08:37 )             28.7     Urine Studies:      .9 (Ca --)      [12-16-19 @ 22:40]   --  .5 (Ca --)      [12-16-19 @ 17:00]   --  HbA1c 7.8      [12-17-19 @ 05:54]        RADIOLOGY & ADDITIONAL STUDIES:

## 2020-12-07 NOTE — PROGRESS NOTE ADULT - ASSESSMENT
Anemia , Melena   transfusion recommended   GI eval noted   plan for for endoscopy     CAD history of stents multivessel PCI   last in feb 2019   low dose asa once deemed safe from GI standpoint  statin   awaiting echo to re eval LV function     HTN  better today   cont current meds     ESRD  On HD. follow up with renal. Monitor electrolytes, K, ca. Avoid significant nephrotoxic medications.    acute CHF unspecified  awaiting echo   HD   BP control     Pre-Operative Cardiac Risk Stratification and Optimization prior to EGD  await Echo and better BP control and improvement of pulm edema

## 2020-12-07 NOTE — PROGRESS NOTE ADULT - ASSESSMENT
Problem/Plan - 1:  ·  Problem: Dark stools.  Plan: acute blood loss anemia , Likely GI source  monitor cbc  transfuse PRN  EGD today   will monitor.     Problem/Plan - 2:  ·  Problem: ESRD (end stage renal disease).  Plan: HD as scheduled  renal fu.     Problem/Plan - 3:·  Problem: HTN (hypertension).  Plan: DASH diet  cw home meds.

## 2020-12-07 NOTE — PROGRESS NOTE ADULT - ASSESSMENT
65M w/ pmh PCI (on aspirin and plavix), HTN, DM, GERD, ESRD on HD (TThSa, last dialysis yesterday, Thursday), s/p cholecystectomy here with black stools    ESRD on HD TTS via AVF  s/p HD 12/3 at [facility]  lab reviewed no urgent HD today.   will resume outpatient schedule tmr  HD consent in chart  renal diet  monitor bmp    HTN  suboptimal  uptitrate hydralazine 100 tid  low Na diet  monitor    Anemia  sec to GIB  EGD today  monitor hb    Ckd-mbd  check pth. on calcitriol   phos optimal continue binder   monitor phos and calcium daily

## 2020-12-07 NOTE — PROGRESS NOTE ADULT - SUBJECTIVE AND OBJECTIVE BOX
Subjective: Patient seen and examined. No new events except as noted.     SUBJECTIVE/ROS:  feels ok       MEDICATIONS:  MEDICATIONS  (STANDING):  atorvastatin 40 milliGRAM(s) Oral at bedtime  calcitriol   Capsule 0.25 MICROGram(s) Oral daily  cloNIDine 0.1 milliGRAM(s) Oral two times a day  dextrose 40% Gel 15 Gram(s) Oral once  dextrose 5%. 1000 milliLiter(s) (50 mL/Hr) IV Continuous <Continuous>  dextrose 5%. 1000 milliLiter(s) (100 mL/Hr) IV Continuous <Continuous>  dextrose 50% Injectable 25 Gram(s) IV Push once  dextrose 50% Injectable 12.5 Gram(s) IV Push once  dextrose 50% Injectable 25 Gram(s) IV Push once  epoetin ralph-epbx (RETACRIT) Injectable 07991 Unit(s) IV Push <User Schedule>  glucagon  Injectable 1 milliGRAM(s) IntraMuscular once  hydrALAZINE 50 milliGRAM(s) Oral three times a day  insulin lispro (ADMELOG) corrective regimen sliding scale   SubCutaneous three times a day before meals  labetalol 100 milliGRAM(s) Oral two times a day  NIFEdipine XL 60 milliGRAM(s) Oral daily  sevelamer carbonate 1600 milliGRAM(s) Oral three times a day with meals      PHYSICAL EXAM:  T(C): 36.9 (12-07-20 @ 05:19), Max: 37.1 (12-06-20 @ 12:57)  HR: 62 (12-07-20 @ 05:19) (57 - 74)  BP: 143/62 (12-07-20 @ 05:19) (143/62 - 164/68)  RR: 17 (12-07-20 @ 05:19) (17 - 18)  SpO2: 100% (12-07-20 @ 05:19) (100% - 100%)  Wt(kg): --  I&O's Summary          JVP: Normal  Neck: supple  Lung: clear   CV: S1 S2 , Murmur:  Abd: soft  Ext: No edema  neuro: Awake / alert  Psych: flat affect  Skin: normal``    LABS/DATA:    CARDIAC MARKERS:                                9.1    6.90  )-----------( 151      ( 06 Dec 2020 11:21 )             29.8     12-06    128<L>  |  90<L>  |  44<H>  ----------------------------<  127<H>  3.9   |  28  |  6.90<H>    Ca    9.3      06 Dec 2020 11:20  Phos  3.8     12-06  Mg     2.2     12-06      proBNP:   Lipid Profile:   HgA1c:   TSH:     TELE:  EKG:

## 2020-12-07 NOTE — PROGRESS NOTE ADULT - SUBJECTIVE AND OBJECTIVE BOX
Patient is a 65y old  Male who presents with a chief complaint of black stools (07 Dec 2020 14:24)      INTERVAL HPI/OVERNIGHT EVENTS:  T(C): 37.2 (12-07-20 @ 14:54), Max: 37.2 (12-07-20 @ 14:54)  HR: 73 (12-07-20 @ 14:54) (57 - 74)  BP: 180/75 (12-07-20 @ 14:54) (143/62 - 180/75)  RR: 12 (12-07-20 @ 14:54) (12 - 18)  SpO2: 96% (12-07-20 @ 14:54) (96% - 100%)  Wt(kg): --  I&O's Summary      LABS:                        8.9    6.43  )-----------( 127      ( 07 Dec 2020 08:37 )             28.7     12-07    135  |  95<L>  |  63<H>  ----------------------------<  110<H>  4.6   |  24  |  9.28<H>    Ca    9.2      07 Dec 2020 08:38  Phos  3.8     12-06  Mg     2.2     12-06    TPro  7.1  /  Alb  3.9  /  TBili  0.8  /  DBili  x   /  AST  15  /  ALT  10  /  AlkPhos  73  12-07        CAPILLARY BLOOD GLUCOSE      POCT Blood Glucose.: 114 mg/dL (07 Dec 2020 13:37)  POCT Blood Glucose.: 127 mg/dL (07 Dec 2020 05:14)  POCT Blood Glucose.: 199 mg/dL (06 Dec 2020 21:36)  POCT Blood Glucose.: 109 mg/dL (06 Dec 2020 17:00)            MEDICATIONS  (STANDING):  atorvastatin 40 milliGRAM(s) Oral at bedtime  calcitriol   Capsule 0.25 MICROGram(s) Oral daily  chlorhexidine 4% Liquid 1 Application(s) Topical daily  cloNIDine 0.1 milliGRAM(s) Oral two times a day  dextrose 40% Gel 15 Gram(s) Oral once  dextrose 5%. 1000 milliLiter(s) (50 mL/Hr) IV Continuous <Continuous>  dextrose 5%. 1000 milliLiter(s) (100 mL/Hr) IV Continuous <Continuous>  dextrose 50% Injectable 25 Gram(s) IV Push once  dextrose 50% Injectable 12.5 Gram(s) IV Push once  dextrose 50% Injectable 25 Gram(s) IV Push once  epoetin ralph-epbx (RETACRIT) Injectable 05354 Unit(s) IV Push <User Schedule>  glucagon  Injectable 1 milliGRAM(s) IntraMuscular once  hydrALAZINE 50 milliGRAM(s) Oral three times a day  insulin lispro (ADMELOG) corrective regimen sliding scale   SubCutaneous three times a day before meals  labetalol 100 milliGRAM(s) Oral two times a day  NIFEdipine XL 60 milliGRAM(s) Oral daily  sevelamer carbonate 1600 milliGRAM(s) Oral three times a day with meals  sodium chloride 0.9%. 1000 milliLiter(s) (30 mL/Hr) IV Continuous <Continuous>    MEDICATIONS  (PRN):          PHYSICAL EXAM:  GENERAL: NAD, well-groomed, well-developed  HEAD:  Atraumatic, Normocephalic  CHEST/LUNG: Clear to percussion bilaterally; No rales, rhonchi, wheezing, or rubs  HEART: Regular rate and rhythm; No murmurs, rubs, or gallops  ABDOMEN: Soft, Nontender, Nondistended; Bowel sounds present  EXTREMITIES:  2+ Peripheral Pulses, No clubbing, cyanosis, or edema  LYMPH: No lymphadenopathy noted  SKIN: No rashes or lesions    Care Discussed with Consultants/Other Providers [ ] YES  [ ] NO

## 2020-12-07 NOTE — PROGRESS NOTE ADULT - ASSESSMENT
Assessment  DMT2: 65y Male with DM T2 with hyperglycemia admitted with GIB, was on oral hypoglycemic agents at home, now on insulin, FS in acceptable range, no hypoglycemic episode,  eating meals, compliant with low carb diet.  GIB: Being worked up, monitored, stable.  ESRD: On hemodialysis, labs, chart reviewed.              Rustam Li MD  Cell: [phone]  Office: [phone]

## 2020-12-07 NOTE — PROGRESS NOTE ADULT - SUBJECTIVE AND OBJECTIVE BOX
Chief complaint  Patient is a 65y old  Male who presents with a chief complaint of black stools (07 Dec 2020 09:13)   Review of systems  Patient in bed, appears comfortable.    Labs and Fingersticks  CAPILLARY BLOOD GLUCOSE      POCT Blood Glucose.: 114 mg/dL (07 Dec 2020 13:37)  POCT Blood Glucose.: 127 mg/dL (07 Dec 2020 05:14)  POCT Blood Glucose.: 199 mg/dL (06 Dec 2020 21:36)  POCT Blood Glucose.: 109 mg/dL (06 Dec 2020 17:00)      Anion Gap, Serum: 16 <H> (12-07 @ 08:38)  Anion Gap, Serum: 10 (12-06 @ 11:20)      Calcium, Total Serum: 9.2 (12-07 @ 08:38)  Calcium, Total Serum: 9.3 (12-06 @ 11:20)  Albumin, Serum: 3.9 (12-07 @ 08:38)    Alanine Aminotransferase (ALT/SGPT): 10 (12-07 @ 08:38)  Alkaline Phosphatase, Serum: 73 (12-07 @ 08:38)  Aspartate Aminotransferase (AST/SGOT): 15 (12-07 @ 08:38)        12-07    135  |  95<L>  |  63<H>  ----------------------------<  110<H>  4.6   |  24  |  9.28<H>    Ca    9.2      07 Dec 2020 08:38  Phos  3.8     12-06  Mg     2.2     12-06    TPro  7.1  /  Alb  3.9  /  TBili  0.8  /  DBili  x   /  AST  15  /  ALT  10  /  AlkPhos  73  12-07                        8.9    6.43  )-----------( 127      ( 07 Dec 2020 08:37 )             28.7     Medications  MEDICATIONS  (STANDING):  atorvastatin 40 milliGRAM(s) Oral at bedtime  calcitriol   Capsule 0.25 MICROGram(s) Oral daily  chlorhexidine 4% Liquid 1 Application(s) Topical daily  cloNIDine 0.1 milliGRAM(s) Oral two times a day  dextrose 40% Gel 15 Gram(s) Oral once  dextrose 5%. 1000 milliLiter(s) (50 mL/Hr) IV Continuous <Continuous>  dextrose 5%. 1000 milliLiter(s) (100 mL/Hr) IV Continuous <Continuous>  dextrose 50% Injectable 25 Gram(s) IV Push once  dextrose 50% Injectable 12.5 Gram(s) IV Push once  dextrose 50% Injectable 25 Gram(s) IV Push once  epoetin ralph-epbx (RETACRIT) Injectable 33008 Unit(s) IV Push <User Schedule>  glucagon  Injectable 1 milliGRAM(s) IntraMuscular once  hydrALAZINE 50 milliGRAM(s) Oral three times a day  insulin lispro (ADMELOG) corrective regimen sliding scale   SubCutaneous three times a day before meals  labetalol 100 milliGRAM(s) Oral two times a day  NIFEdipine XL 60 milliGRAM(s) Oral daily  sevelamer carbonate 1600 milliGRAM(s) Oral three times a day with meals      Physical Exam  General: Patient comfortable in bed  Vital Signs Last 12 Hrs  T(F): 97.9 (12-07-20 @ 11:15), Max: 98.5 (12-07-20 @ 05:19)  HR: 65 (12-07-20 @ 13:41) (62 - 65)  BP: 168/87 (12-07-20 @ 13:41) (143/62 - 173/85)  BP(mean): --  RR: 16 (12-07-20 @ 11:15) (16 - 17)  SpO2: 100% (12-07-20 @ 11:15) (100% - 100%)  Neck: No palpable thyroid nodules.  CVS: S1S2, No murmurs  Respiratory: No wheezing, no crepitations  GI: Abdomen soft, bowel sounds positive  Musculoskeletal:  edema lower extremities.     Diagnostics

## 2020-12-08 ENCOUNTER — TRANSCRIPTION ENCOUNTER (OUTPATIENT)
Age: 65
End: 2020-12-08

## 2020-12-08 VITALS — SYSTOLIC BLOOD PRESSURE: 169 MMHG | DIASTOLIC BLOOD PRESSURE: 66 MMHG | HEART RATE: 77 BPM | RESPIRATION RATE: 16 BRPM

## 2020-12-08 LAB
ANION GAP SERPL CALC-SCNC: 16 MMOL/L — HIGH (ref 7–14)
BUN SERPL-MCNC: 76 MG/DL — HIGH (ref 7–23)
CALCIUM SERPL-MCNC: 9.3 MG/DL — SIGNIFICANT CHANGE UP (ref 8.4–10.5)
CHLORIDE SERPL-SCNC: 92 MMOL/L — LOW (ref 98–107)
CO2 SERPL-SCNC: 25 MMOL/L — SIGNIFICANT CHANGE UP (ref 22–31)
CREAT SERPL-MCNC: 11.29 MG/DL — HIGH (ref 0.5–1.3)
GLUCOSE BLDC GLUCOMTR-MCNC: 106 MG/DL — HIGH (ref 70–99)
GLUCOSE BLDC GLUCOMTR-MCNC: 149 MG/DL — HIGH (ref 70–99)
GLUCOSE BLDC GLUCOMTR-MCNC: 204 MG/DL — HIGH (ref 70–99)
GLUCOSE SERPL-MCNC: 97 MG/DL — SIGNIFICANT CHANGE UP (ref 70–99)
HCT VFR BLD CALC: 29.2 % — LOW (ref 39–50)
HGB BLD-MCNC: 9.1 G/DL — LOW (ref 13–17)
MAGNESIUM SERPL-MCNC: 2.5 MG/DL — SIGNIFICANT CHANGE UP (ref 1.6–2.6)
MCHC RBC-ENTMCNC: 30 PG — SIGNIFICANT CHANGE UP (ref 27–34)
MCHC RBC-ENTMCNC: 31.2 GM/DL — LOW (ref 32–36)
MCV RBC AUTO: 96.4 FL — SIGNIFICANT CHANGE UP (ref 80–100)
NRBC # BLD: 0 /100 WBCS — SIGNIFICANT CHANGE UP
NRBC # FLD: 0 K/UL — SIGNIFICANT CHANGE UP
PHOSPHATE SERPL-MCNC: 5.3 MG/DL — HIGH (ref 2.5–4.5)
PLATELET # BLD AUTO: 166 K/UL — SIGNIFICANT CHANGE UP (ref 150–400)
POTASSIUM SERPL-MCNC: 5.1 MMOL/L — SIGNIFICANT CHANGE UP (ref 3.5–5.3)
POTASSIUM SERPL-SCNC: 5.1 MMOL/L — SIGNIFICANT CHANGE UP (ref 3.5–5.3)
RBC # BLD: 3.03 M/UL — LOW (ref 4.2–5.8)
RBC # FLD: 15.1 % — HIGH (ref 10.3–14.5)
SODIUM SERPL-SCNC: 133 MMOL/L — LOW (ref 135–145)
SURGICAL PATHOLOGY STUDY: SIGNIFICANT CHANGE UP
WBC # BLD: 5.79 K/UL — SIGNIFICANT CHANGE UP (ref 3.8–10.5)
WBC # FLD AUTO: 5.79 K/UL — SIGNIFICANT CHANGE UP (ref 3.8–10.5)

## 2020-12-08 RX ORDER — ASPIRIN/CALCIUM CARB/MAGNESIUM 324 MG
81 TABLET ORAL DAILY
Refills: 0 | Status: DISCONTINUED | OUTPATIENT
Start: 2020-12-08 | End: 2020-12-08

## 2020-12-08 RX ORDER — HYDRALAZINE HCL 50 MG
1 TABLET ORAL
Qty: 90 | Refills: 0
Start: 2020-12-08 | End: 2021-01-06

## 2020-12-08 RX ORDER — NIFEDIPINE 30 MG
1 TABLET, EXTENDED RELEASE 24 HR ORAL
Qty: 30 | Refills: 0
Start: 2020-12-08 | End: 2021-01-06

## 2020-12-08 RX ORDER — HYDRALAZINE HCL 50 MG
100 TABLET ORAL THREE TIMES A DAY
Refills: 0 | Status: DISCONTINUED | OUTPATIENT
Start: 2020-12-08 | End: 2020-12-08

## 2020-12-08 RX ORDER — CLOPIDOGREL BISULFATE 75 MG/1
1 TABLET, FILM COATED ORAL
Qty: 0 | Refills: 0 | DISCHARGE
Start: 2020-12-08

## 2020-12-08 RX ORDER — LOSARTAN POTASSIUM 100 MG/1
25 TABLET, FILM COATED ORAL DAILY
Refills: 0 | Status: DISCONTINUED | OUTPATIENT
Start: 2020-12-08 | End: 2020-12-08

## 2020-12-08 RX ORDER — LABETALOL HCL 100 MG
1 TABLET ORAL
Qty: 60 | Refills: 0
Start: 2020-12-08 | End: 2021-01-06

## 2020-12-08 RX ORDER — PANTOPRAZOLE SODIUM 20 MG/1
1 TABLET, DELAYED RELEASE ORAL
Qty: 60 | Refills: 0
Start: 2020-12-08 | End: 2021-01-06

## 2020-12-08 RX ORDER — HYDRALAZINE HCL 50 MG
50 TABLET ORAL ONCE
Refills: 0 | Status: COMPLETED | OUTPATIENT
Start: 2020-12-08 | End: 2020-12-08

## 2020-12-08 RX ORDER — NIFEDIPINE 30 MG
1 TABLET, EXTENDED RELEASE 24 HR ORAL
Qty: 0 | Refills: 0 | DISCHARGE

## 2020-12-08 RX ADMIN — PANTOPRAZOLE SODIUM 40 MILLIGRAM(S): 20 TABLET, DELAYED RELEASE ORAL at 05:31

## 2020-12-08 RX ADMIN — SEVELAMER CARBONATE 1600 MILLIGRAM(S): 2400 POWDER, FOR SUSPENSION ORAL at 09:13

## 2020-12-08 RX ADMIN — Medication 50 MILLIGRAM(S): at 14:48

## 2020-12-08 RX ADMIN — SEVELAMER CARBONATE 1600 MILLIGRAM(S): 2400 POWDER, FOR SUSPENSION ORAL at 17:07

## 2020-12-08 RX ADMIN — PANTOPRAZOLE SODIUM 40 MILLIGRAM(S): 20 TABLET, DELAYED RELEASE ORAL at 17:07

## 2020-12-08 RX ADMIN — Medication 60 MILLIGRAM(S): at 13:04

## 2020-12-08 RX ADMIN — CALCITRIOL 0.25 MICROGRAM(S): 0.5 CAPSULE ORAL at 11:17

## 2020-12-08 RX ADMIN — Medication 2: at 17:43

## 2020-12-08 RX ADMIN — Medication 0.1 MILLIGRAM(S): at 17:07

## 2020-12-08 RX ADMIN — Medication 50 MILLIGRAM(S): at 13:55

## 2020-12-08 RX ADMIN — Medication 100 MILLIGRAM(S): at 17:07

## 2020-12-08 RX ADMIN — Medication 100 MILLIGRAM(S): at 05:31

## 2020-12-08 RX ADMIN — Medication 81 MILLIGRAM(S): at 11:19

## 2020-12-08 RX ADMIN — Medication 0.1 MILLIGRAM(S): at 05:31

## 2020-12-08 NOTE — PROGRESS NOTE ADULT - ASSESSMENT
Assessment  DMT2: 65y Male with DM T2 with hyperglycemia admitted with GIB, now on insulin, blood sugars improved, no hypoglycemic episode,  eating meals, compliant with low carb diet.  GIB: Being worked up, monitored, stable.  ESRD: On hemodialysis, labs, chart reviewed.              Rustam Li MD  Cell: [phone]  Office: [phone]

## 2020-12-08 NOTE — PROGRESS NOTE ADULT - SUBJECTIVE AND OBJECTIVE BOX
Patient is a 65y old  Male who presents with a chief complaint of black stools (08 Dec 2020 14:36)      INTERVAL HPI/OVERNIGHT EVENTS:  T(C): 36.5 (12-08-20 @ 14:40), Max: 36.7 (12-07-20 @ 20:38)  HR: 56 (12-08-20 @ 14:40) (56 - 75)  BP: 191/73 (12-08-20 @ 14:40) (154/68 - 193/77)  RR: 17 (12-08-20 @ 14:40) (17 - 18)  SpO2: 100% (12-08-20 @ 05:24) (100% - 100%)  Wt(kg): --  I&O's Summary    08 Dec 2020 07:01  -  08 Dec 2020 16:51  --------------------------------------------------------  IN: 400 mL / OUT: 1900 mL / NET: -1500 mL        LABS:                        9.1    5.79  )-----------( 166      ( 08 Dec 2020 07:48 )             29.2     12-08    133<L>  |  92<L>  |  76<H>  ----------------------------<  97  5.1   |  25  |  11.29<H>    Ca    9.3      08 Dec 2020 07:02  Phos  5.3     12-08  Mg     2.5     12-08    TPro  7.1  /  Alb  3.9  /  TBili  0.8  /  DBili  x   /  AST  15  /  ALT  10  /  AlkPhos  73  12-07        CAPILLARY BLOOD GLUCOSE      POCT Blood Glucose.: 106 mg/dL (08 Dec 2020 13:43)  POCT Blood Glucose.: 149 mg/dL (08 Dec 2020 08:26)  POCT Blood Glucose.: 250 mg/dL (07 Dec 2020 21:23)            MEDICATIONS  (STANDING):  aspirin  chewable 81 milliGRAM(s) Oral daily  atorvastatin 40 milliGRAM(s) Oral at bedtime  calcitriol   Capsule 0.25 MICROGram(s) Oral daily  chlorhexidine 4% Liquid 1 Application(s) Topical daily  cloNIDine 0.1 milliGRAM(s) Oral two times a day  dextrose 40% Gel 15 Gram(s) Oral once  dextrose 5%. 1000 milliLiter(s) (50 mL/Hr) IV Continuous <Continuous>  dextrose 5%. 1000 milliLiter(s) (100 mL/Hr) IV Continuous <Continuous>  dextrose 50% Injectable 25 Gram(s) IV Push once  dextrose 50% Injectable 12.5 Gram(s) IV Push once  dextrose 50% Injectable 25 Gram(s) IV Push once  epoetin ralph-epbx (RETACRIT) Injectable 69823 Unit(s) IV Push <User Schedule>  glucagon  Injectable 1 milliGRAM(s) IntraMuscular once  hydrALAZINE 100 milliGRAM(s) Oral three times a day  insulin lispro (ADMELOG) corrective regimen sliding scale   SubCutaneous three times a day before meals  labetalol 100 milliGRAM(s) Oral two times a day  losartan 25 milliGRAM(s) Oral daily  NIFEdipine XL 60 milliGRAM(s) Oral daily  pantoprazole    Tablet 40 milliGRAM(s) Oral two times a day  sevelamer carbonate 1600 milliGRAM(s) Oral three times a day with meals    MEDICATIONS  (PRN):          PHYSICAL EXAM:  GENERAL: NAD, well-groomed, well-developed  HEAD:  Atraumatic, Normocephalic  CHEST/LUNG: Clear to percussion bilaterally; No rales, rhonchi, wheezing, or rubs  HEART: Regular rate and rhythm; No murmurs, rubs, or gallops  ABDOMEN: Soft, Nontender, Nondistended; Bowel sounds present  EXTREMITIES:  2+ Peripheral Pulses, No clubbing, cyanosis, or edema  LYMPH: No lymphadenopathy noted  SKIN: No rashes or lesions    Care Discussed with Consultants/Other Providers [ ] YES  [ ] NO

## 2020-12-08 NOTE — PROGRESS NOTE ADULT - SUBJECTIVE AND OBJECTIVE BOX
Chief complaint  Patient is a 65y old  Male who presents with a chief complaint of black stools (08 Dec 2020 08:21)   Review of systems  Patient in bed, appears comfortable.    Labs and Fingersticks  CAPILLARY BLOOD GLUCOSE      POCT Blood Glucose.: 149 mg/dL (08 Dec 2020 08:26)  POCT Blood Glucose.: 250 mg/dL (07 Dec 2020 21:23)  POCT Blood Glucose.: 109 mg/dL (07 Dec 2020 16:48)  POCT Blood Glucose.: 114 mg/dL (07 Dec 2020 13:37)      Anion Gap, Serum: 16 <H> (12-08 @ 07:02)  Anion Gap, Serum: 16 <H> (12-07 @ 08:38)  Anion Gap, Serum: 10 (12-06 @ 11:20)      Calcium, Total Serum: 9.3 (12-08 @ 07:02)  Calcium, Total Serum: 9.2 (12-07 @ 08:38)  Calcium, Total Serum: 9.3 (12-06 @ 11:20)  Albumin, Serum: 3.9 (12-07 @ 08:38)    Alanine Aminotransferase (ALT/SGPT): 10 (12-07 @ 08:38)  Alkaline Phosphatase, Serum: 73 (12-07 @ 08:38)  Aspartate Aminotransferase (AST/SGOT): 15 (12-07 @ 08:38)        12-08    133<L>  |  92<L>  |  76<H>  ----------------------------<  97  5.1   |  25  |  11.29<H>    Ca    9.3      08 Dec 2020 07:02  Phos  5.3     12-08  Mg     2.5     12-08    TPro  7.1  /  Alb  3.9  /  TBili  0.8  /  DBili  x   /  AST  15  /  ALT  10  /  AlkPhos  73  12-07                        9.1    5.79  )-----------( 166      ( 08 Dec 2020 07:48 )             29.2     Medications  MEDICATIONS  (STANDING):  aspirin  chewable 81 milliGRAM(s) Oral daily  atorvastatin 40 milliGRAM(s) Oral at bedtime  calcitriol   Capsule 0.25 MICROGram(s) Oral daily  chlorhexidine 4% Liquid 1 Application(s) Topical daily  cloNIDine 0.1 milliGRAM(s) Oral two times a day  dextrose 40% Gel 15 Gram(s) Oral once  dextrose 5%. 1000 milliLiter(s) (50 mL/Hr) IV Continuous <Continuous>  dextrose 5%. 1000 milliLiter(s) (100 mL/Hr) IV Continuous <Continuous>  dextrose 50% Injectable 25 Gram(s) IV Push once  dextrose 50% Injectable 12.5 Gram(s) IV Push once  dextrose 50% Injectable 25 Gram(s) IV Push once  epoetin ralph-epbx (RETACRIT) Injectable 65591 Unit(s) IV Push <User Schedule>  glucagon  Injectable 1 milliGRAM(s) IntraMuscular once  hydrALAZINE 50 milliGRAM(s) Oral three times a day  insulin lispro (ADMELOG) corrective regimen sliding scale   SubCutaneous three times a day before meals  labetalol 100 milliGRAM(s) Oral two times a day  NIFEdipine XL 60 milliGRAM(s) Oral daily  pantoprazole    Tablet 40 milliGRAM(s) Oral two times a day  sevelamer carbonate 1600 milliGRAM(s) Oral three times a day with meals      Physical Exam  General: Patient comfortable in bed  Vital Signs Last 12 Hrs  T(F): 98 (12-08-20 @ 05:24), Max: 98 (12-08-20 @ 05:24)  HR: 60 (12-08-20 @ 08:20) (60 - 69)  BP: 164/65 (12-08-20 @ 08:20) (154/68 - 180/65)  BP(mean): --  RR: 18 (12-08-20 @ 05:24) (18 - 18)  SpO2: 100% (12-08-20 @ 05:24) (100% - 100%)  Neck: No palpable thyroid nodules.  CVS: S1S2, No murmurs  Respiratory: No wheezing, no crepitations  GI: Abdomen soft, bowel sounds positive  Musculoskeletal:  edema lower extremities.     Diagnostics

## 2020-12-08 NOTE — PROGRESS NOTE ADULT - SUBJECTIVE AND OBJECTIVE BOX
INTERVAL HPI/OVERNIGHT EVENTS:    Black stools are improving     MEDICATIONS  (STANDING):  atorvastatin 40 milliGRAM(s) Oral at bedtime  calcitriol   Capsule 0.25 MICROGram(s) Oral daily  cloNIDine 0.1 milliGRAM(s) Oral two times a day  dextrose 40% Gel 15 Gram(s) Oral once  dextrose 5%. 1000 milliLiter(s) (50 mL/Hr) IV Continuous <Continuous>  dextrose 5%. 1000 milliLiter(s) (100 mL/Hr) IV Continuous <Continuous>  dextrose 50% Injectable 25 Gram(s) IV Push once  dextrose 50% Injectable 12.5 Gram(s) IV Push once  dextrose 50% Injectable 25 Gram(s) IV Push once  epoetin ralph-epbx (RETACRIT) Injectable 85838 Unit(s) IV Push <User Schedule>  glucagon  Injectable 1 milliGRAM(s) IntraMuscular once  hydrALAZINE 50 milliGRAM(s) Oral three times a day  insulin lispro (ADMELOG) corrective regimen sliding scale   SubCutaneous three times a day before meals  labetalol 100 milliGRAM(s) Oral two times a day  NIFEdipine XL 60 milliGRAM(s) Oral daily  sevelamer carbonate 1600 milliGRAM(s) Oral three times a day with meals    MEDICATIONS  (PRN):      Allergies    No Known Allergies    Intolerances        Review of Systems:    General:  No wt loss, fevers, chills, night sweats, fatigue   Eyes:  Good vision, no reported pain  ENT:  No sore throat, pain, runny nose, dysphagia  CV:  No pain, palpitations, hypo/hypertension  Resp:  No dyspnea, cough, tachypnea, wheezing  GI:  No pain, No nausea, No vomiting, No diarrhea, No constipation, No weight loss, No fever, No pruritis, No rectal bleeding, No melena, No dysphagia  :  No pain, bleeding, incontinence, nocturia  Muscle:  No pain, weakness  Neuro:  No weakness, tingling, memory problems  Psych:  No fatigue, insomnia, mood problems, depression  Endocrine:  No polyuria, polydypsia, cold/heat intolerance  Heme:  No petechiae, ecchymosis, easy bruisability  Skin:  No rash, tattoos, scars, edema      Vital Signs Last 24 Hrs  T(C): 37.1 (06 Dec 2020 05:13), Max: 37.1 (06 Dec 2020 05:13)  T(F): 98.7 (06 Dec 2020 05:13), Max: 98.7 (06 Dec 2020 05:13)  HR: 63 (06 Dec 2020 05:13) (63 - 78)  BP: 157/68 (06 Dec 2020 05:13) (157/68 - 190/78)  BP(mean): --  RR: 17 (06 Dec 2020 05:13) (16 - 18)  SpO2: 100% (06 Dec 2020 05:13) (100% - 100%)    PHYSICAL EXAM:    Constitutional: NAD  HEENT: EOMI, throat clear  Neck: No LAD, supple  Respiratory: CTA and P  Cardiovascular: S1 and S2, RRR, no M  Gastrointestinal: BS+, soft, NT/ND, neg HSM,  Extremities: No peripheral edema, neg clubbing, cyanosis  Vascular: 2+ peripheral pulses  Neurological: A/O x 3, no focal deficits  Psychiatric: Normal mood, normal affect  Skin: No rashes      LABS:                        8.0    6.45  )-----------( 117      ( 05 Dec 2020 08:00 )             25.1     12-05    137  |  97<L>  |  71<H>  ----------------------------<  116<H>  4.9   |  27  |  8.39<H>    Ca    9.3      05 Dec 2020 02:15  Phos  4.3     12-05  Mg     2.3     12-05            RADIOLOGY & ADDITIONAL TESTS:  < from: Upper Endoscopy (12.07.20 @ 15:43) >    [facility], [place]  _______________________________________________________________________________  Patient Name: Md Rivero                 Procedure Date: 12/7/2020 3:43 PM  MRN: [id number]                     Account Number: [id number]  YOB: 1955               Admit Type: Inpatient  Room: [facility]                         Gender: Male  Attending MD: Branden Broussard MD      _______________________________________________________________________________     Procedure:           Upper GI endoscopy  Indications:         Melena  Providers:           Branden Broussard MD  Medicines:           Monitored Anesthesia Care  Complications:       No immediate complications.  Procedure:           After obtaining informed consent, the endoscope was                        passed under direct vision. Throughout the procedure,                        the patient's blood pressure, pulse, and oxygen                        saturations were monitored continuously. The Endoscope                       was introduced through the mouth, and advanced to the                        third part of duodenum. The upper GI endoscopy was                        accomplished without difficulty. The patient tolerated                        the procedure well.                                                                                   Findings:       The examined esophagus was normal.       The Z-line was regular and was found 37 cm from the incisors.       Mild inflammation characterized by congestion (edema), erosions and        erythema was found in the gastric body and in the gastric antrum.        Biopsies were taken with a cold forceps for Helicobacter pylori testing.       One non-bleeding superficial duodenal ulcer with no stigmata of bleeding        was found in the duodenal bulb. The lesion was 7 mm in largest dimension.                                                                                   Moderate Sedation:       Moderate (conscious) sedation was personally administered by an        anesthesia professional. The following parameters were monitored: oxygen        saturation, heart rate, blood pressure, respiratory rate, EKG, adequacy        of pulmonary ventilation, and response to care.  Impression:         - Normal esophagus.                       - Z-line regular, 37 cm from the incisors.                       - Gastritis. Biopsied.                       - One duodenal ulcer (possibly source of patient's GI                        bleed).  Recommendation:      - Return patient to hospital he for ongoing care.                       - Advance diet as tolerated.                       - Await pathology results (treat H. Pylori IF positive).                       - Use Protonix (pantoprazole)40 mg PO BID for 1 month.                       - Can restart AC if medically needed                       -Avoid NSAIDs                                                                                   Attending Participation:       I personally performed the entire procedure.                                                                                     Branden Broussard  ___________________  Branden Broussard MD  12/7/2020 4:16:33 PM  This report has been signed electronically.  Number of Addenda: 0    Note Initiated On: 12/7/2020 3:43 PM    < end of copied text >

## 2020-12-08 NOTE — PROGRESS NOTE ADULT - SUBJECTIVE AND OBJECTIVE BOX
Subjective: Patient seen and examined. No new events except as noted.     SUBJECTIVE/ROS:  feels ok   No chest pain, dyspnea, palpitation, or dizziness.       MEDICATIONS:  MEDICATIONS  (STANDING):  atorvastatin 40 milliGRAM(s) Oral at bedtime  calcitriol   Capsule 0.25 MICROGram(s) Oral daily  chlorhexidine 4% Liquid 1 Application(s) Topical daily  cloNIDine 0.1 milliGRAM(s) Oral two times a day  dextrose 40% Gel 15 Gram(s) Oral once  dextrose 5%. 1000 milliLiter(s) (50 mL/Hr) IV Continuous <Continuous>  dextrose 5%. 1000 milliLiter(s) (100 mL/Hr) IV Continuous <Continuous>  dextrose 50% Injectable 25 Gram(s) IV Push once  dextrose 50% Injectable 12.5 Gram(s) IV Push once  dextrose 50% Injectable 25 Gram(s) IV Push once  epoetin ralph-epbx (RETACRIT) Injectable 30821 Unit(s) IV Push <User Schedule>  glucagon  Injectable 1 milliGRAM(s) IntraMuscular once  hydrALAZINE 50 milliGRAM(s) Oral three times a day  insulin lispro (ADMELOG) corrective regimen sliding scale   SubCutaneous three times a day before meals  labetalol 100 milliGRAM(s) Oral two times a day  NIFEdipine XL 60 milliGRAM(s) Oral daily  pantoprazole    Tablet 40 milliGRAM(s) Oral two times a day  sevelamer carbonate 1600 milliGRAM(s) Oral three times a day with meals      PHYSICAL EXAM:  T(C): 36.7 (12-08-20 @ 05:24), Max: 37.2 (12-07-20 @ 14:54)  HR: 69 (12-08-20 @ 05:24) (63 - 75)  BP: 154/68 (12-08-20 @ 06:58) (142/59 - 180/75)  RR: 18 (12-08-20 @ 05:24) (12 - 20)  SpO2: 100% (12-08-20 @ 05:24) (95% - 100%)  Wt(kg): --  I&O's Summary    Height (cm): 167.6 (12-07 @ 14:59)  Weight (kg): 63.5 (12-07 @ 14:59)  BMI (kg/m2): 22.6 (12-07 @ 14:59)  BSA (m2): 1.72 (12-07 @ 14:59)      JVP: Normal  Neck: supple  Lung: clear   CV: S1 S2 , Murmur:  Abd: soft  Ext: No edema  neuro: Awake / alert  Psych: flat affect  Skin: normal``    LABS/DATA:    CARDIAC MARKERS:                                9.1    5.79  )-----------( 166      ( 08 Dec 2020 07:48 )             29.2     12-08    133<L>  |  92<L>  |  76<H>  ----------------------------<  97  5.1   |  25  |  11.29<H>    Ca    9.3      08 Dec 2020 07:02  Phos  5.3     12-08  Mg     2.5     12-08    TPro  7.1  /  Alb  3.9  /  TBili  0.8  /  DBili  x   /  AST  15  /  ALT  10  /  AlkPhos  73  12-07    proBNP:   Lipid Profile:   HgA1c:   TSH:     TELE:  EKG:    e< from: Upper Endoscopy (12.07.20 @ 15:43) >  Moderate Sedation:       Moderate (conscious) sedation was personally administered by an        anesthesia professional. The following parameters were monitored: oxygen        saturation, heart rate, blood pressure, respiratory rate, EKG, adequacy        of pulmonary ventilation, and response to care.  Impression:         - Normal esophagus.                       - Z-line regular, 37 cm from the incisors.                       - Gastritis. Biopsied.                       - One duodenal ulcer (possibly source of patient's GI                        bleed).  Recommendation:      - Return patient to hospital he for ongoing care.                       - Advance diet as tolerated.                       - Await pathology results (treat H. Pylori IF positive).                       - Use Protonix (pantoprazole)40 mg PO BID for 1 month.                       - Can restart AC if medically needed                       -Avoid NSAIDs    < end of copied text >

## 2020-12-08 NOTE — PROGRESS NOTE ADULT - PROBLEM/PLAN-1
Keep well hydrated. Drink lots of water.  
DISPLAY PLAN FREE TEXT

## 2020-12-08 NOTE — PROGRESS NOTE ADULT - ASSESSMENT
Anemia , Melena   EGD reviewed  had duodenal ulcer  fu with GI     CAD history of stents multivessel PCI   last in feb 2019   resume low dose asa once deemed safe from GI standpoint  statin     HTN  cont current meds     ESRD  On HD. follow up with renal. Monitor electrolytes, K, ca. Avoid significant nephrotoxic medications.    acute CHF diastolic   has mod MR   HD   BP control

## 2020-12-08 NOTE — PROGRESS NOTE ADULT - SUBJECTIVE AND OBJECTIVE BOX
[facility]   MD ANDRES AVALOS DO ANAM SIDDIQUI ANGELA WONG, PA    TEL:  OFFICE: [phone]  DR CUETO CELL: [phone]  DR. HUNTER CELL: [phone]  DR. HORTA CELL: [phone]  LIDA WINTERS CELL: [phone]    From 5pm-7am Answering Service [phone]    -- RENAL FOLLOW UP NOTE ---Date of Service 12-08-20 @ 14:37    Patient is a 65y old  Male who presents with a chief complaint of black stools (08 Dec 2020 11:53)      Patient seen and examined in HD. No chest pain/sob    VITALS:  T(F): 97.7 (12-08-20 @ 11:33), Max: 99 (12-07-20 @ 14:54)  HR: 59 (12-08-20 @ 11:33)  BP: 193/77 (12-08-20 @ 13:53)  RR: 17 (12-08-20 @ 11:33)  SpO2: 100% (12-08-20 @ 05:24)  Wt(kg): --  flow 400    Height (cm): 167.6 (12-07 @ 14:59)  Weight (kg): 63.5 (12-07 @ 14:59)  BMI (kg/m2): 22.6 (12-07 @ 14:59)  BSA (m2): 1.72 (12-07 @ 14:59)    PHYSICAL EXAM:  Constitutional: NAD  Neck: No JVD  Respiratory: CTAB, no wheezes, rales or rhonchi  Cardiovascular: S1, S2, RRR  Gastrointestinal: BS+, soft, NT/ND  Extremities: No peripheral edema    Hospital Medications:   MEDICATIONS  (STANDING):  aspirin  chewable 81 milliGRAM(s) Oral daily  atorvastatin 40 milliGRAM(s) Oral at bedtime  calcitriol   Capsule 0.25 MICROGram(s) Oral daily  chlorhexidine 4% Liquid 1 Application(s) Topical daily  cloNIDine 0.1 milliGRAM(s) Oral two times a day  dextrose 40% Gel 15 Gram(s) Oral once  dextrose 5%. 1000 milliLiter(s) (50 mL/Hr) IV Continuous <Continuous>  dextrose 5%. 1000 milliLiter(s) (100 mL/Hr) IV Continuous <Continuous>  dextrose 50% Injectable 25 Gram(s) IV Push once  dextrose 50% Injectable 12.5 Gram(s) IV Push once  dextrose 50% Injectable 25 Gram(s) IV Push once  epoetin ralph-epbx (RETACRIT) Injectable 54840 Unit(s) IV Push <User Schedule>  glucagon  Injectable 1 milliGRAM(s) IntraMuscular once  hydrALAZINE 50 milliGRAM(s) Oral once  hydrALAZINE 100 milliGRAM(s) Oral three times a day  insulin lispro (ADMELOG) corrective regimen sliding scale   SubCutaneous three times a day before meals  labetalol 100 milliGRAM(s) Oral two times a day  losartan 25 milliGRAM(s) Oral daily  NIFEdipine XL 60 milliGRAM(s) Oral daily  pantoprazole    Tablet 40 milliGRAM(s) Oral two times a day  sevelamer carbonate 1600 milliGRAM(s) Oral three times a day with meals      LABS:  12-08    133<L>  |  92<L>  |  76<H>  ----------------------------<  97  5.1   |  25  |  11.29<H>    Ca    9.3      08 Dec 2020 07:02  Phos  5.3     12-08  Mg     2.5     12-08    TPro  7.1  /  Alb  3.9  /  TBili  0.8  /  DBili      /  AST  15  /  ALT  10  /  AlkPhos  73  12-07    Creatinine Trend: 11.29 <--, 9.28 <--, 6.90 <--, 8.39 <--, 6.57 <--    Phosphorus Level, Serum: 5.3 mg/dL (12-08 @ 07:02)                              9.1    5.79  )-----------( 166      ( 08 Dec 2020 07:48 )             29.2     Urine Studies:      .9 (Ca --)      [12-16-19 @ 22:40]   --  .5 (Ca --)      [12-16-19 @ 17:00]   --  HbA1c 7.8      [12-17-19 @ 05:54]        RADIOLOGY & ADDITIONAL STUDIES:

## 2020-12-08 NOTE — PROGRESS NOTE ADULT - SUBJECTIVE AND OBJECTIVE BOX
ANESTHESIA POSTOP CHECK    65y Male POSTOP DAY 1     No COMPLAINTS    NO APPARENT ANESTHESIA COMPLICATIONS

## 2020-12-08 NOTE — PROGRESS NOTE ADULT - REASON FOR ADMISSION
black stools

## 2020-12-08 NOTE — DISCHARGE NOTE NURSING/CASE MANAGEMENT/SOCIAL WORK - PATIENT PORTAL LINK FT
You can access the FollowMyHealth Patient Portal offered by [facility] by registering at the following website: [email or web address]. By joining [facility] FollowMyHealth portal, you will also be able to view your health information using other applications (apps) compatible with our system.

## 2020-12-08 NOTE — PROGRESS NOTE ADULT - ASSESSMENT
65M w/ pmh PCI (on aspirin and plavix), HTN, DM, GERD, ESRD on HD (TThSa, last dialysis yesterday, Thursday), s/p cholecystectomy here with black stools    ESRD on HD TTS via AVF  s/p HD 12/3 at [facility]  Seen in HD today. Continue HD as ordered. tolerating well. vitals stable. access working well   bp elevated. medication adjusted. d/w team  HD consent in chart  renal diet  monitor bmp    HTN  suboptimal  uptitrate hydralazine 100 tid  resume home losartan  low Na diet  monitor    Anemia  sec to GIB  s/p EGD  epo with hd. will hold today sec to elevated bp  monitor hb    Ckd-mbd  check pth. on calcitriol   phos optimal continue binder   monitor phos and calcium daily

## 2020-12-08 NOTE — PROGRESS NOTE ADULT - ASSESSMENT
Problem/Plan - 1:  ·  Problem: Dark stools.  Plan: acute blood loss anemia , Likely GI source  monitor cbc  transfuse PRN  EGD with gastritis  restart asa and monitor cbc   will monitor.     Problem/Plan - 2:  ·  Problem: ESRD (end stage renal disease).  Plan: HD as scheduled  renal fu.     Problem/Plan - 3:·  Problem: HTN (hypertension).  Plan: DASH diet  cw home meds. \    dc planning if cbc stable

## 2020-12-09 LAB
MRSA PCR RESULT.: SIGNIFICANT CHANGE UP
S AUREUS DNA NOSE QL NAA+PROBE: SIGNIFICANT CHANGE UP

## 2020-12-10 DIAGNOSIS — I10 ESSENTIAL (PRIMARY) HYPERTENSION: ICD-10-CM

## 2020-12-10 DIAGNOSIS — Z71.89 OTHER SPECIFIED COUNSELING: ICD-10-CM

## 2020-12-13 NOTE — ED ADULT TRIAGE NOTE - CHIEF COMPLAINT QUOTE
pt arrives to ED post driving in the wrong direction. Per EMS initial FS 51, given 1 tube of oral glucose. Current . Pt's oral temp 94.2, tremulous. Pt oriented to self, location, and year, unable to answer situational questions. Slow to respond to questions. Charge nurse aware. Warm blankets applied. Hs of DM, HTM, cardiac stents. Pt's only complaint is being cold. 8

## 2020-12-16 ENCOUNTER — APPOINTMENT (OUTPATIENT)
Dept: TRANSPLANT | Facility: CLINIC | Age: 65
End: 2020-12-16

## 2020-12-19 NOTE — PROGRESS NOTE ADULT - ASSESSMENT
Problem: Skin Integrity:  Goal: Will show no infection signs and symptoms  Description: Will show no infection signs and symptoms  12/19/2020 1021 by Maribell Parkinson RN  Outcome: Ongoing  12/19/2020 0239 by Reji Torres RN  Outcome: Met This Shift  Goal: Absence of new skin breakdown  Description: Absence of new skin breakdown  12/19/2020 1021 by Maribell Parkinson RN  Outcome: Ongoing  12/19/2020 0239 by Reji Torres RN  Outcome: Met This Shift     Problem: Airway Clearance - Ineffective  Goal: Achieve or maintain patent airway  12/19/2020 1021 by Maribell Parkinson RN  Outcome: Ongoing  12/19/2020 0239 by Reji Torres RN  Outcome: Met This Shift     Problem: Gas Exchange - Impaired  Goal: Absence of hypoxia  12/19/2020 1021 by Maribell Parkinson RN  Outcome: Ongoing  12/19/2020 0239 by Reji Torres RN  Outcome: Met This Shift  Goal: Promote optimal lung function  12/19/2020 1021 by Maribell Parkinson RN  Outcome: Ongoing  12/19/2020 0239 by Reji Torres RN  Outcome: Met This Shift     Problem: Breathing Pattern - Ineffective  Goal: Ability to achieve and maintain a regular respiratory rate  12/19/2020 1021 by Maribell Parkinson RN  Outcome: Ongoing  12/19/2020 0239 by Reji Torres RN  Outcome: Met This Shift     Problem:  Body Temperature -  Risk of, Imbalanced  Goal: Ability to maintain a body temperature within defined limits  12/19/2020 1021 by Maribell Parkinson RN  Outcome: Ongoing  12/19/2020 0239 by Reji Torres RN  Outcome: Met This Shift  Goal: Will regain or maintain usual level of consciousness  12/19/2020 1021 by Maribell Parkinson RN  Outcome: Ongoing  12/19/2020 0239 by Reji Torres RN  Outcome: Met This Shift  Goal: Complications related to the disease process, condition or treatment will be avoided or minimized  12/19/2020 1021 by Maribell Parkinson RN  Outcome: Ongoing  12/19/2020 0239 by Reji Torres RN  Outcome: Met This Shift     Problem: Isolation Precautions - Risk of Spread of Infection  Goal: Prevent transmission of infection  12/19/2020 1021 by Malu Ureña RN  Outcome: Ongoing  12/19/2020 0239 by Kathrine Madrid RN  Outcome: Met This Shift     Problem: Nutrition Deficits  Goal: Optimize nutrtional status  12/19/2020 1021 by Malu Ureña RN  Outcome: Ongoing  12/19/2020 0239 by Kathrine Madrid RN  Outcome: Met This Shift     Problem: Risk for Fluid Volume Deficit  Goal: Maintain normal heart rhythm  12/19/2020 1021 by Malu Ureña RN  Outcome: Ongoing  12/19/2020 0239 by Kathrine Madrid RN  Outcome: Met This Shift  Goal: Maintain absence of muscle cramping  12/19/2020 1021 by Malu Ureña RN  Outcome: Ongoing  12/19/2020 0239 by Kathrine Madrid RN  Outcome: Met This Shift  Goal: Maintain normal serum potassium, sodium, calcium, phosphorus, and pH  12/19/2020 1021 by Malu Ureña RN  Outcome: Ongoing  12/19/2020 0239 by Kathrine Madrid RN  Outcome: Met This Shift     Problem: Loneliness or Risk for Loneliness  Goal: Demonstrate positive use of time alone when socialization is not possible  12/19/2020 1021 by Malu Ureña RN  Outcome: Ongoing  12/19/2020 0239 by Kathrine Madrid RN  Outcome: Met This Shift     Problem: Fatigue  Goal: Verbalize increase energy and improved vitality  12/19/2020 1021 by Malu Ureña RN  Outcome: Ongoing  12/19/2020 0239 by Kathrine Madrid RN  Outcome: Met This Shift     Problem: Patient Education: Go to Patient Education Activity  Goal: Patient/Family Education  12/19/2020 1021 by Malu Ureña RN  Outcome: Ongoing  12/19/2020 0239 by Kathrine Madrid RN  Outcome: Met This Shift     Problem: Falls - Risk of:  Goal: Will remain free from falls  Description: Will remain free from falls  12/19/2020 1021 by Malu Ureña RN  Outcome: Ongoing  12/19/2020 0239 by Kathrine Madrid RN  Outcome: Met This Shift  Goal: Absence of physical injury  Description: Absence of physical Continue HD as ordered. tolerating well. vitals stable. access working well injury  12/19/2020 1021 by Laureano Colon RN  Outcome: Ongoing  12/19/2020 0239 by Avni Lee RN  Outcome: Met This Shift

## 2021-01-16 NOTE — ASU DISCHARGE PLAN (ADULT/PEDIATRIC). - FOLLOWUP APPOINTMENT CLINIC/PHYSICIAN
IR Drain Discharge Instructions:   -Gently flush drain with 5 mL normal saline twice daily.   -Empty drain daily and as needed. Record daily outputs and bring them to your follow-up appointments in IR.   -Drain dressing change daily with gentle cleansing around drain insertion site.   -Do not submerge your drain site in water, cover the site to keep it dry when showering.   -Call IR ([phone]) with fevers greater than 100.4° F, increasing abdominal pain, when outputs reach less than 20 mL/day, and/or with concerns with drain function.   -Secure drain with all transfers and position changes as to not dislodge the drain.     -IR will call patient for drain outputs on weekly basis for outpatient drain management visits. Further drain plans pending outputs.     IR Contact Phone Numbers:   [phone] M-F 8:00-4:30 p.m. (Excluding holidays.)   [phone] Saturdays and Sundays   [phone] Ask for the on-call IR Physician to be paged if your phone call is not returned within 24 hours at the previous two numbers listed.   Please follow up in 10-14 days.

## 2021-01-27 ENCOUNTER — APPOINTMENT (OUTPATIENT)
Dept: TRANSPLANT | Facility: CLINIC | Age: 66
End: 2021-01-27

## 2021-03-12 ENCOUNTER — APPOINTMENT (OUTPATIENT)
Dept: TRANSPLANT | Facility: CLINIC | Age: 66
End: 2021-03-12

## 2021-03-17 ENCOUNTER — INPATIENT (INPATIENT)
Facility: HOSPITAL | Age: 66
LOS: 5 days | Discharge: HOME | End: 2021-03-23
Attending: INTERNAL MEDICINE | Admitting: INTERNAL MEDICINE
Payer: MEDICARE

## 2021-03-17 VITALS
HEIGHT: 66 IN | DIASTOLIC BLOOD PRESSURE: 62 MMHG | OXYGEN SATURATION: 99 % | RESPIRATION RATE: 16 BRPM | HEART RATE: 65 BPM | TEMPERATURE: 97 F | SYSTOLIC BLOOD PRESSURE: 162 MMHG

## 2021-03-17 DIAGNOSIS — E11.9 TYPE 2 DIABETES MELLITUS WITHOUT COMPLICATIONS: ICD-10-CM

## 2021-03-17 DIAGNOSIS — I10 ESSENTIAL (PRIMARY) HYPERTENSION: ICD-10-CM

## 2021-03-17 DIAGNOSIS — I77.0 ARTERIOVENOUS FISTULA, ACQUIRED: Chronic | ICD-10-CM

## 2021-03-17 DIAGNOSIS — R06.00 DYSPNEA, UNSPECIFIED: ICD-10-CM

## 2021-03-17 DIAGNOSIS — N18.6 END STAGE RENAL DISEASE: ICD-10-CM

## 2021-03-17 DIAGNOSIS — Z98.890 OTHER SPECIFIED POSTPROCEDURAL STATES: Chronic | ICD-10-CM

## 2021-03-17 DIAGNOSIS — R77.8 OTHER SPECIFIED ABNORMALITIES OF PLASMA PROTEINS: ICD-10-CM

## 2021-03-17 LAB
ALBUMIN SERPL ELPH-MCNC: 4 G/DL — SIGNIFICANT CHANGE UP (ref 3.3–5)
ALP SERPL-CCNC: 119 U/L — SIGNIFICANT CHANGE UP (ref 40–120)
ALT FLD-CCNC: 19 U/L — SIGNIFICANT CHANGE UP (ref 4–41)
ANION GAP SERPL CALC-SCNC: 15 MMOL/L — HIGH (ref 7–14)
ANION GAP SERPL CALC-SCNC: 18 MMOL/L — HIGH (ref 7–14)
AST SERPL-CCNC: 27 U/L — SIGNIFICANT CHANGE UP (ref 4–40)
BASE EXCESS BLDV CALC-SCNC: 3.5 MMOL/L — HIGH (ref -3–2)
BASOPHILS # BLD AUTO: 0.03 K/UL — SIGNIFICANT CHANGE UP (ref 0–0.2)
BASOPHILS NFR BLD AUTO: 0.4 % — SIGNIFICANT CHANGE UP (ref 0–2)
BILIRUB SERPL-MCNC: 0.9 MG/DL — SIGNIFICANT CHANGE UP (ref 0.2–1.2)
BLD GP AB SCN SERPL QL: NEGATIVE — SIGNIFICANT CHANGE UP
BLOOD GAS VENOUS - CREATININE: 10.5 MG/DL — HIGH (ref 0.5–1.3)
BLOOD GAS VENOUS COMPREHENSIVE RESULT: SIGNIFICANT CHANGE UP
BUN SERPL-MCNC: 53 MG/DL — HIGH (ref 7–23)
BUN SERPL-MCNC: 53 MG/DL — HIGH (ref 7–23)
CALCIUM SERPL-MCNC: 9.1 MG/DL — SIGNIFICANT CHANGE UP (ref 8.4–10.5)
CALCIUM SERPL-MCNC: 9.4 MG/DL — SIGNIFICANT CHANGE UP (ref 8.4–10.5)
CHLORIDE BLDV-SCNC: 102 MMOL/L — SIGNIFICANT CHANGE UP (ref 96–108)
CHLORIDE SERPL-SCNC: 95 MMOL/L — LOW (ref 98–107)
CHLORIDE SERPL-SCNC: 95 MMOL/L — LOW (ref 98–107)
CK MB BLD-MCNC: 3.8 % — HIGH (ref 0–2.5)
CK MB CFR SERPL CALC: 2.1 NG/ML — SIGNIFICANT CHANGE UP
CK SERPL-CCNC: 55 U/L — SIGNIFICANT CHANGE UP (ref 30–200)
CO2 SERPL-SCNC: 23 MMOL/L — SIGNIFICANT CHANGE UP (ref 22–31)
CO2 SERPL-SCNC: 25 MMOL/L — SIGNIFICANT CHANGE UP (ref 22–31)
CREAT SERPL-MCNC: 9.37 MG/DL — HIGH (ref 0.5–1.3)
CREAT SERPL-MCNC: 9.64 MG/DL — HIGH (ref 0.5–1.3)
DIALYSIS INSTRUMENT RESULT - HEPATITIS B SURFACE ANTIGEN: NEGATIVE — SIGNIFICANT CHANGE UP
EOSINOPHIL # BLD AUTO: 0.12 K/UL — SIGNIFICANT CHANGE UP (ref 0–0.5)
EOSINOPHIL NFR BLD AUTO: 1.7 % — SIGNIFICANT CHANGE UP (ref 0–6)
GAS PNL BLDV: 133 MMOL/L — LOW (ref 136–146)
GLUCOSE BLDC GLUCOMTR-MCNC: 111 MG/DL — HIGH (ref 70–99)
GLUCOSE BLDC GLUCOMTR-MCNC: 207 MG/DL — HIGH (ref 70–99)
GLUCOSE BLDV-MCNC: 235 MG/DL — HIGH (ref 70–99)
GLUCOSE SERPL-MCNC: 161 MG/DL — HIGH (ref 70–99)
GLUCOSE SERPL-MCNC: 223 MG/DL — HIGH (ref 70–99)
HCO3 BLDV-SCNC: 27 MMOL/L — SIGNIFICANT CHANGE UP (ref 20–27)
HCT VFR BLD CALC: 35.7 % — LOW (ref 39–50)
HCT VFR BLDA CALC: 32.6 % — LOW (ref 39–51)
HGB BLD CALC-MCNC: 10.5 G/DL — LOW (ref 13–17)
HGB BLD-MCNC: 10.3 G/DL — LOW (ref 13–17)
IANC: 5.34 K/UL — SIGNIFICANT CHANGE UP (ref 1.5–8.5)
IMM GRANULOCYTES NFR BLD AUTO: 0.4 % — SIGNIFICANT CHANGE UP (ref 0–1.5)
LACTATE BLDV-MCNC: 2.1 MMOL/L — HIGH (ref 0.5–2)
LYMPHOCYTES # BLD AUTO: 0.72 K/UL — LOW (ref 1–3.3)
LYMPHOCYTES # BLD AUTO: 10.4 % — LOW (ref 13–44)
MCHC RBC-ENTMCNC: 26.5 PG — LOW (ref 27–34)
MCHC RBC-ENTMCNC: 28.9 GM/DL — LOW (ref 32–36)
MCV RBC AUTO: 92 FL — SIGNIFICANT CHANGE UP (ref 80–100)
MONOCYTES # BLD AUTO: 0.66 K/UL — SIGNIFICANT CHANGE UP (ref 0–0.9)
MONOCYTES NFR BLD AUTO: 9.6 % — SIGNIFICANT CHANGE UP (ref 2–14)
NEUTROPHILS # BLD AUTO: 5.34 K/UL — SIGNIFICANT CHANGE UP (ref 1.8–7.4)
NEUTROPHILS NFR BLD AUTO: 77.5 % — HIGH (ref 43–77)
NRBC # BLD: 0 /100 WBCS — SIGNIFICANT CHANGE UP
NRBC # FLD: 0 K/UL — SIGNIFICANT CHANGE UP
NT-PROBNP SERPL-SCNC: SIGNIFICANT CHANGE UP PG/ML
PCO2 BLDV: 47 MMHG — SIGNIFICANT CHANGE UP (ref 41–51)
PH BLDV: 7.39 — SIGNIFICANT CHANGE UP (ref 7.32–7.43)
PLATELET # BLD AUTO: 159 K/UL — SIGNIFICANT CHANGE UP (ref 150–400)
PO2 BLDV: 38 MMHG — SIGNIFICANT CHANGE UP (ref 35–40)
POTASSIUM BLDV-SCNC: 6.4 MMOL/L — CRITICAL HIGH (ref 3.4–4.5)
POTASSIUM SERPL-MCNC: 4.2 MMOL/L — SIGNIFICANT CHANGE UP (ref 3.5–5.3)
POTASSIUM SERPL-MCNC: SIGNIFICANT CHANGE UP MMOL/L (ref 3.5–5.3)
POTASSIUM SERPL-SCNC: 4.2 MMOL/L — SIGNIFICANT CHANGE UP (ref 3.5–5.3)
POTASSIUM SERPL-SCNC: SIGNIFICANT CHANGE UP MMOL/L (ref 3.5–5.3)
PROT SERPL-MCNC: 8.6 G/DL — HIGH (ref 6–8.3)
RBC # BLD: 3.88 M/UL — LOW (ref 4.2–5.8)
RBC # FLD: 17.7 % — HIGH (ref 10.3–14.5)
RH IG SCN BLD-IMP: NEGATIVE — SIGNIFICANT CHANGE UP
SAO2 % BLDV: 64.5 % — SIGNIFICANT CHANGE UP (ref 60–85)
SARS-COV-2 RNA SPEC QL NAA+PROBE: SIGNIFICANT CHANGE UP
SODIUM SERPL-SCNC: 133 MMOL/L — LOW (ref 135–145)
SODIUM SERPL-SCNC: 138 MMOL/L — SIGNIFICANT CHANGE UP (ref 135–145)
TROPONIN T, HIGH SENSITIVITY RESULT: 107 NG/L — CRITICAL HIGH
TROPONIN T, HIGH SENSITIVITY RESULT: 86 NG/L — CRITICAL HIGH
WBC # BLD: 6.9 K/UL — SIGNIFICANT CHANGE UP (ref 3.8–10.5)
WBC # FLD AUTO: 6.9 K/UL — SIGNIFICANT CHANGE UP (ref 3.8–10.5)

## 2021-03-17 PROCEDURE — 99284 EMERGENCY DEPT VISIT MOD MDM: CPT | Mod: CS,25

## 2021-03-17 PROCEDURE — 93010 ELECTROCARDIOGRAM REPORT: CPT

## 2021-03-17 PROCEDURE — 71045 X-RAY EXAM CHEST 1 VIEW: CPT | Mod: 26

## 2021-03-17 RX ORDER — SODIUM CHLORIDE 9 MG/ML
1000 INJECTION, SOLUTION INTRAVENOUS
Refills: 0 | Status: DISCONTINUED | OUTPATIENT
Start: 2021-03-17 | End: 2021-03-23

## 2021-03-17 RX ORDER — CLOPIDOGREL BISULFATE 75 MG/1
75 TABLET, FILM COATED ORAL DAILY
Refills: 0 | Status: DISCONTINUED | OUTPATIENT
Start: 2021-03-17 | End: 2021-03-23

## 2021-03-17 RX ORDER — ASPIRIN/CALCIUM CARB/MAGNESIUM 324 MG
81 TABLET ORAL DAILY
Refills: 0 | Status: DISCONTINUED | OUTPATIENT
Start: 2021-03-17 | End: 2021-03-20

## 2021-03-17 RX ORDER — DEXTROSE 50 % IN WATER 50 %
15 SYRINGE (ML) INTRAVENOUS ONCE
Refills: 0 | Status: DISCONTINUED | OUTPATIENT
Start: 2021-03-17 | End: 2021-03-23

## 2021-03-17 RX ORDER — LABETALOL HCL 100 MG
100 TABLET ORAL
Refills: 0 | Status: DISCONTINUED | OUTPATIENT
Start: 2021-03-17 | End: 2021-03-23

## 2021-03-17 RX ORDER — INSULIN LISPRO 100/ML
VIAL (ML) SUBCUTANEOUS AT BEDTIME
Refills: 0 | Status: DISCONTINUED | OUTPATIENT
Start: 2021-03-17 | End: 2021-03-23

## 2021-03-17 RX ORDER — ASPIRIN/CALCIUM CARB/MAGNESIUM 324 MG
81 TABLET ORAL DAILY
Refills: 0 | Status: DISCONTINUED | OUTPATIENT
Start: 2021-03-17 | End: 2021-03-22

## 2021-03-17 RX ORDER — DEXTROSE 50 % IN WATER 50 %
12.5 SYRINGE (ML) INTRAVENOUS ONCE
Refills: 0 | Status: DISCONTINUED | OUTPATIENT
Start: 2021-03-17 | End: 2021-03-23

## 2021-03-17 RX ORDER — LOSARTAN POTASSIUM 100 MG/1
25 TABLET, FILM COATED ORAL DAILY
Refills: 0 | Status: DISCONTINUED | OUTPATIENT
Start: 2021-03-17 | End: 2021-03-19

## 2021-03-17 RX ORDER — SEVELAMER CARBONATE 2400 MG/1
800 POWDER, FOR SUSPENSION ORAL
Refills: 0 | Status: DISCONTINUED | OUTPATIENT
Start: 2021-03-17 | End: 2021-03-23

## 2021-03-17 RX ORDER — CALCITRIOL 0.5 UG/1
0.25 CAPSULE ORAL DAILY
Refills: 0 | Status: DISCONTINUED | OUTPATIENT
Start: 2021-03-17 | End: 2021-03-23

## 2021-03-17 RX ORDER — DEXTROSE 50 % IN WATER 50 %
25 SYRINGE (ML) INTRAVENOUS ONCE
Refills: 0 | Status: DISCONTINUED | OUTPATIENT
Start: 2021-03-17 | End: 2021-03-23

## 2021-03-17 RX ORDER — GLUCAGON INJECTION, SOLUTION 0.5 MG/.1ML
1 INJECTION, SOLUTION SUBCUTANEOUS ONCE
Refills: 0 | Status: DISCONTINUED | OUTPATIENT
Start: 2021-03-17 | End: 2021-03-23

## 2021-03-17 RX ORDER — FUROSEMIDE 40 MG
40 TABLET ORAL ONCE
Refills: 0 | Status: COMPLETED | OUTPATIENT
Start: 2021-03-17 | End: 2021-03-17

## 2021-03-17 RX ORDER — HEPARIN SODIUM 5000 [USP'U]/ML
5000 INJECTION INTRAVENOUS; SUBCUTANEOUS EVERY 8 HOURS
Refills: 0 | Status: DISCONTINUED | OUTPATIENT
Start: 2021-03-17 | End: 2021-03-22

## 2021-03-17 RX ORDER — PANTOPRAZOLE SODIUM 20 MG/1
40 TABLET, DELAYED RELEASE ORAL
Refills: 0 | Status: DISCONTINUED | OUTPATIENT
Start: 2021-03-17 | End: 2021-03-23

## 2021-03-17 RX ORDER — NIFEDIPINE 30 MG
60 TABLET, EXTENDED RELEASE 24 HR ORAL DAILY
Refills: 0 | Status: DISCONTINUED | OUTPATIENT
Start: 2021-03-17 | End: 2021-03-23

## 2021-03-17 RX ORDER — HYDRALAZINE HCL 50 MG
100 TABLET ORAL THREE TIMES A DAY
Refills: 0 | Status: DISCONTINUED | OUTPATIENT
Start: 2021-03-17 | End: 2021-03-23

## 2021-03-17 RX ORDER — ATORVASTATIN CALCIUM 80 MG/1
40 TABLET, FILM COATED ORAL AT BEDTIME
Refills: 0 | Status: DISCONTINUED | OUTPATIENT
Start: 2021-03-17 | End: 2021-03-23

## 2021-03-17 RX ORDER — INSULIN LISPRO 100/ML
VIAL (ML) SUBCUTANEOUS
Refills: 0 | Status: DISCONTINUED | OUTPATIENT
Start: 2021-03-17 | End: 2021-03-23

## 2021-03-17 RX ADMIN — Medication 0.1 MILLIGRAM(S): at 19:49

## 2021-03-17 RX ADMIN — HEPARIN SODIUM 5000 UNIT(S): 5000 INJECTION INTRAVENOUS; SUBCUTANEOUS at 21:39

## 2021-03-17 RX ADMIN — Medication 100 MILLIGRAM(S): at 21:38

## 2021-03-17 RX ADMIN — Medication 100 MILLIGRAM(S): at 19:49

## 2021-03-17 RX ADMIN — SEVELAMER CARBONATE 800 MILLIGRAM(S): 2400 POWDER, FOR SUSPENSION ORAL at 19:50

## 2021-03-17 RX ADMIN — Medication 81 MILLIGRAM(S): at 15:18

## 2021-03-17 RX ADMIN — ATORVASTATIN CALCIUM 40 MILLIGRAM(S): 80 TABLET, FILM COATED ORAL at 21:38

## 2021-03-17 NOTE — ED PROVIDER NOTE - PMH
Anemia due to stage 5 chronic kidney disease, not on chronic dialysis    CAP (community acquired pneumonia)  6/18  Cerebrovascular accident (CVA), unspecified mechanism  diagnosed via CT of the head  Coronary artery disease    Diabetes mellitus  type 2  ESRD (end stage renal disease)  on Dialysis( M/W/F), By Dr. Huff  GERD (gastroesophageal reflux disease)    HTN (hypertension)    Hypercholesterolemia    Stented coronary artery  2014, 3 stents, [facility]

## 2021-03-17 NOTE — ED PROVIDER NOTE - CLINICAL SUMMARY MEDICAL DECISION MAKING FREE TEXT BOX
66 year old male with PMH of HTN, DM, ESRD (T/Th/S; last dialysis session yesterday), GI bleed presents to the ED complaining of shortness of breath for 2 months. HD stable. EKG- NSR, TWI in anterolateral leads. Imp: Dyspnea, concern for ACS vs CHF vs Severe anemia, Cannot perc out by age but clinically no tachycardia/tachypnea/hypoxia. Plan for labs, CXR, monitor and reassess.

## 2021-03-17 NOTE — CONSULT NOTE ADULT - ASSESSMENT
66 year old male with PMH of HTN, DM, ESRD (T/Th/S; last dialysis session yesterday), GI bleed presents to the ED complaining of shortness of breath for 2 months worsen yesterday.    ESRD on HD TTS  from [facility] dialysis unit  completed HD 3/16  sob today, hyperkalemia on VBG. planning for HD today  consent obtained in chart  renal diet  monitor    hyperkalemia  hd today  monitor    sob  ? fluid overload vs infection vs ACS  HD today  pending xray  monitor vitals    htn  uncontrolled  resume home meds  UF with hd today  low na diet  monitor    anemia  acceptable  epo 6000 with hd  monitor    hyponatremia  in setting of ESRD  hd today  monitor    ckd-mbd  check pth, phos level  monitor phos and calcium daily   66 year old male with PMH of HTN, DM, ESRD (T/Th/S; last dialysis session yesterday), GI bleed presents to the ED complaining of shortness of breath for 2 months worsen yesterday.    ESRD on HD TTS  from [facility] dialysis unit  completed HD 3/16  sob today, hyperkalemia on VBG. planning for HD today  consent obtained in chart  renal diet  monitor    Hyperkalemia  hd today  monitor serum K  Low K Diet    sob  ? fluid overload vs infection vs ACS  HD today  pending xray  monitor vitals    htn  uncontrolled  resume home meds  UF with hd today  low na diet  monitor    Anemia  acceptable  epo 6000 with hd  monitor Hb    hyponatremia  in setting of ESRD  hd today  monitor    ckd-mbd  check pth, phos level  monitor phos and calcium daily

## 2021-03-17 NOTE — ED PROVIDER NOTE - OBJECTIVE STATEMENT
66 year old male with PMH of HTN, DM, ESRD (T/Th/S; last dialysis session yesterday), GI bleed presents to the ED complaining of shortness of breath for 2 months. Pt states he has been having worsening dyspnea at rest and on exertion. Denies any chest pain, cough, lower extremity swelling, fevers and chills. Denies any other complaints.

## 2021-03-17 NOTE — H&P ADULT - NSHPLABSRESULTS_GEN_ALL_CORE
Lab Results:  CBC  CBC Full  -  ( 17 Mar 2021 12:32 )  WBC Count : 6.90 K/uL  RBC Count : 3.88 M/uL  Hemoglobin : 10.3 g/dL  Hematocrit : 35.7 %  Platelet Count - Automated : 159 K/uL  Mean Cell Volume : 92.0 fL  Mean Cell Hemoglobin : 26.5 pg  Mean Cell Hemoglobin Concentration : 28.9 gm/dL  Auto Neutrophil # : 5.34 K/uL  Auto Lymphocyte # : 0.72 K/uL  Auto Monocyte # : 0.66 K/uL  Auto Eosinophil # : 0.12 K/uL  Auto Basophil # : 0.03 K/uL  Auto Neutrophil % : 77.5 %  Auto Lymphocyte % : 10.4 %  Auto Monocyte % : 9.6 %  Auto Eosinophil % : 1.7 %  Auto Basophil % : 0.4 %    .		Differential:	[] Automated		[] Manual  Chemistry                        10.3   6.90  )-----------( 159      ( 17 Mar 2021 12:32 )             35.7     03-17    138  |  95<L>  |  53<H>  ----------------------------<  161<H>  4.2   |  25  |  9.64<H>    Ca    9.4      17 Mar 2021 13:43    TPro  8.6<H>  /  Alb  4.0  /  TBili  0.9  /  DBili  x   /  AST  27  /  ALT  19  /  AlkPhos  119  03-17    LIVER FUNCTIONS - ( 17 Mar 2021 12:32 )  Alb: 4.0 g/dL / Pro: 8.6 g/dL / ALK PHOS: 119 U/L / ALT: 19 U/L / AST: 27 U/L / GGT: x                     MICROBIOLOGY/CULTURES:      RADIOLOGY RESULTS:

## 2021-03-17 NOTE — H&P ADULT - ASSESSMENT
66 year old male with PMH of HTN, DM, ESRD (T/Th/S; last dialysis session yesterday), GI bleed presents to the ED complaining of shortness of breath for 2 months worsen yesterday. did not improve after hd. pt c/o poor appetite, weight lost for two month. +cough with brown sputum. Pt states he has been having worsening dyspnea at rest and on exertion. Denies any chest pain,lower extremity swelling, fevers and chills.

## 2021-03-17 NOTE — CONSULT NOTE ADULT - SUBJECTIVE AND OBJECTIVE BOX
Requesting Physician : Dr. Clements     Reason for Consultation: CAD    HISTORY OF PRESENT ILLNESS:  66 year old male with history of CAD s/p multiple PCI (last PCI in Feb of 2019 with PAULY to LAD and LCx), HTN, DM, ESRD on HD, history of GIB, history of CVA who is being seen for management of CAD.  The patient presented to the ED with dyspnea for approximately 2 months that progressively got worse over the last several days.  His symptoms occur at rest and with exertion and are associated with cough.  No chest pain or anginal symptoms.  He reports having NST 6 months ago with his outpatient cardiologist.            PAST MEDICAL & SURGICAL HISTORY:  Anemia due to stage 5 chronic kidney disease, not on chronic dialysis    Cerebrovascular accident (CVA), unspecified mechanism  diagnosed via CT of the head    Hypercholesterolemia    ESRD (end stage renal disease)  on Dialysis( M/W/F), By Dr. Huff    Stented coronary artery  2014, 3 stents, [facility]    GERD (gastroesophageal reflux disease)    Diabetes mellitus  type 2    CAP (community acquired pneumonia)  6/18    Coronary artery disease    HTN (hypertension)    H/O eye surgery    AV fistula  10/12/18 L radiocephalic AV fistula    H/O coronary angiogram  2014 - x3 stents            MEDICATIONS:  MEDICATIONS  (STANDING):  aspirin  chewable 81 milliGRAM(s) Oral daily  aspirin enteric coated 81 milliGRAM(s) Oral daily  atorvastatin 40 milliGRAM(s) Oral at bedtime  calcitriol   Capsule 0.25 MICROGram(s) Oral daily  cloNIDine 0.1 milliGRAM(s) Oral two times a day  clopidogrel Tablet 75 milliGRAM(s) Oral daily  dextrose 40% Gel 15 Gram(s) Oral once  dextrose 5%. 1000 milliLiter(s) (50 mL/Hr) IV Continuous <Continuous>  dextrose 5%. 1000 milliLiter(s) (100 mL/Hr) IV Continuous <Continuous>  dextrose 50% Injectable 25 Gram(s) IV Push once  dextrose 50% Injectable 12.5 Gram(s) IV Push once  dextrose 50% Injectable 25 Gram(s) IV Push once  glucagon  Injectable 1 milliGRAM(s) IntraMuscular once  heparin   Injectable 5000 Unit(s) SubCutaneous every 8 hours  hydrALAZINE 100 milliGRAM(s) Oral three times a day  insulin lispro (ADMELOG) corrective regimen sliding scale   SubCutaneous three times a day before meals  labetalol 100 milliGRAM(s) Oral two times a day  losartan 25 milliGRAM(s) Oral daily  NIFEdipine XL 60 milliGRAM(s) Oral daily  pantoprazole    Tablet 40 milliGRAM(s) Oral before breakfast  sevelamer carbonate 800 milliGRAM(s) Oral three times a day with meals      Allergies    No Known Allergies    Intolerances        FAMILY HISTORY:  No pertinent family history in first degree relatives      Non-contributary for premature coronary disease or sudden cardiac death    SOCIAL HISTORY:    [x ] Non-smoker  [ ] Smoker  [ ] Alcohol      REVIEW OF SYSTEMS:  [ ]chest pain  [ x ]shortness of breath  [  ]palpitations  [  ]syncope  [ ]near syncope [ ]upper extremity weakness   [ ] lower extremity weakness  [  ]diplopia  [  ]altered mental status   [  ]fevers  [ ]chills [ ]nausea  [ ]vomitting  [  ]dysphagia    [ ]abdominal pain  [ ]melena  [ ]BRBPR    [  ]epistaxis  [  ]rash    [ ]lower extremity edema        [x ] All others negative	  [ ] Unable to obtain    PHYSICAL EXAM:  T(C): 36.6 (03-17-21 @ 18:35), Max: 36.6 (03-17-21 @ 18:35)  HR: 60 (03-17-21 @ 16:20) (60 - 70)  BP: 168/85 (03-17-21 @ 16:20) (162/62 - 183/80)  RR: 18 (03-17-21 @ 18:35) (16 - 20)  SpO2: 100% (03-17-21 @ 15:18) (99% - 100%)  Wt(kg): --  I&O's Summary        HEENT:   Normal oral mucosa, PERRL, EOMI	  Lymphatic: No lymphadenopathy , no edema  Cardiovascular: Normal S1 S2, No JVD, No murmurs , Peripheral pulses palpable 2+ bilaterally  Respiratory: Lungs clear to auscultation, normal effort 	  Gastrointestinal:  Soft, Non-tender, + BS	  Skin: No rashes, No ecchymoses, No cyanosis, warm to touch  Musculoskeletal: Normal range of motion, normal strength  Psychiatry:  Mood & affect appropriate      TELEMETRY: 	    ECG: SR, LVH, inferolateral TWI  	  RADIOLOGY:  OTHER:     DIAGNOSTIC TESTING:  [ ] Echocardiogram: < from: Transthoracic Echocardiogram (12.07.20 @ 11:45) >  1. Moderate mitral regurgitation.  2. Moderately dilated left atrium.  LA volume index = 42  cc/m2.  3. Normal left ventricular systolic function. No segmental  wall motion abnormalities. Some segments are evaluated in  limited planes - cannot exclude small wall motion  abnormalities.  4. Normal left ventricular diastolic function.  5. Normal right ventricular size and function.  *** Compared with ec    < end of copied text >    [ ]  Catheterization: < from: Cardiac Cath Lab - Adult (02.12.19 @ 17:02) >  LAD:   --  Distal LAD: There was a discrete 80 % stenosis.  CX:   --  Proximal circumflex: There was a discrete 90 % stenosis at the  site of a prior stent, at the proximal margin of the stented segment.  --  Distal circumflex: There was a 90 % stenosis.  COMPLICATIONS: There were no complications.  INTERVENTIONAL IMPRESSIONS: Successful PCI to the Lcx and LAD with PAULY.  INTERVENTIONAL RECOMMENDATIONS: Aspirin and Plavix. Aggressive lipid and DM  medical care (as his re-restenosis rate is very high given his underlying  medical problems).  Prepared and signed by    < end of copied text >    [ ] Stress Test:    	  	  LABS:	 	    CARDIAC MARKERS:  CARDIAC MARKERS ( 17 Mar 2021 14:38 )  x     / x     / 55 U/L / x     / 2.1 ng/mL                              10.3   6.90  )-----------( 159      ( 17 Mar 2021 12:32 )             35.7     03-17    138  |  95<L>  |  53<H>  ----------------------------<  161<H>  4.2   |  25  |  9.64<H>    Ca    9.4      17 Mar 2021 13:43    TPro  8.6<H>  /  Alb  4.0  /  TBili  0.9  /  DBili  x   /  AST  27  /  ALT  19  /  AlkPhos  119  03-17    proBNP: Serum Pro-Brain Natriuretic Peptide: >22441 pg/mL (03-17 @ 12:32)    Lipid Profile:   HgA1c:   TSH:     A/P:  66 year old male with history of CAD s/p multiple PCI (last PCI in Feb of 2019 with PAULY to LAD and LCx), HTN, DM, ESRD on HD, history of GIB, history of CVA who is being seen for management of CAD    -pt. with elevated troponin with negative CPK inconsistent with acs  -suspect troponin elevation at some level secondary to underlying ESRD  -no chest pain or anginal symptoms currently  -no emergent cath indicated at this time  -would check TTE to evaluate LV function as well as degree of MR (moderate on last TTE)  -trend CE  -fluid removal with HD  -obtain outpatient records   -continue with apt for history of pauly if no contraindications  -further workup pending above    Cadence Sawant MD     
[facility]   MD ANDRES AVALOS DO ANAM SIDDIQUI ANGELA WONG, PA        TEL:  OFFICE: [phone]  DR HUFF CELL: [phone]  DR. HUNTER CELL: [phone]  DR. HORTA CELL: [phone]  LIDA WINTERS CELL: [phone]    From 5pm-7am answering service [phone]    --- INITIAL RENAL CONSULT NOTE ---date of service 03-17-21 @ 13:41    HPI:  66 year old male with PMH of HTN, DM, ESRD (T/Th/S; last dialysis session yesterday), GI bleed presents to the ED complaining of shortness of breath for 2 months worsen yesterday. did not improve after hd. pt c/o poor appetite, weight lost for two month. +cough with brown sputum. Pt states he has been having worsening dyspnea at rest and on exertion. Denies any chest pain,lower extremity swelling, fevers and chills.  outpatient unit: [facility] dialysis       Allergies:  No Known Allergies      PAST MEDICAL & SURGICAL HISTORY:  Anemia due to stage 5 chronic kidney disease, not on chronic dialysis    Cerebrovascular accident (CVA), unspecified mechanism  diagnosed via CT of the head    Hypercholesterolemia    ESRD (end stage renal disease)  on Dialysis( M/W/F), By Dr. Huff    Stented coronary artery  2014, 3 stents, [facility]    GERD (gastroesophageal reflux disease)    Diabetes mellitus  type 2    CAP (community acquired pneumonia)  6/18    Coronary artery disease    HTN (hypertension)    H/O eye surgery    AV fistula  10/12/18 L radiocephalic AV fistula    H/O coronary angiogram  2014 - x3 stents        Home Medications Reviewed    Hospital Medications:   MEDICATIONS  (STANDING):  aspirin  chewable 81 milliGRAM(s) Oral daily  furosemide   Injectable 40 milliGRAM(s) IV Push Once      SOCIAL HISTORY:  Denies ETOh, Smoking,     FAMILY HISTORY:  No pertinent family history in first degree relatives        REVIEW OF SYSTEMS:  CONSTITUTIONAL: No weakness, fevers or chills. poor appetite   EYES/ENT: No visual changes;  No vertigo or throat pain   NECK: No pain or stiffness  RESPIRATORY: see hpi  CARDIOVASCULAR: No chest pain or palpitations.  GASTROINTESTINAL: No abdominal or epigastric pain. No nausea, vomiting, or hematemesis; No diarrhea or constipation. No melena or hematochezia.  GENITOURINARY: No dysuria, frequency, foamy urine, urinary urgency, incontinence or hematuria  NEUROLOGICAL: No numbness or weakness  SKIN: No itching, burning, rashes, or lesions   VASCULAR: No bilateral lower extremity edema.   All other review of systems is negative unless indicated above.    VITALS:  T(F): 97.5 (03-17-21 @ 11:27), Max: 97.5 (03-17-21 @ 11:27)  HR: 70 (03-17-21 @ 11:27)  BP: 174/71 (03-17-21 @ 11:27)  RR: 20 (03-17-21 @ 11:27)  SpO2: 100% (03-17-21 @ 11:27)  Wt(kg): --    Height (cm): 167.6 (03-17 @ 10:34)    PHYSICAL EXAM:  Constitutional: NAD  HEENT: anicteric sclera, oropharynx clear, MMM  Neck: No JVD  Respiratory: left base crackles, right base decrease bs  Cardiovascular: S1, S2, RRR  Gastrointestinal: BS+, soft, NT/ND  Extremities: No cyanosis or clubbing. No peripheral edema  Neurological: A/O x 3, no focal deficits  Psychiatric: Normal mood, normal affect  : No CVA tenderness. No hinds.   Skin: No rashes  Vascular Access: avf + bruit    LABS:  03-17    133<L>  |  95<L>  |  53<H>  ----------------------------<  223<H>  TNP   |  23  |  9.37<H>    Ca    9.1      17 Mar 2021 12:32    TPro  8.6<H>  /  Alb  4.0  /  TBili  0.9  /  DBili      /  AST  27  /  ALT  19  /  AlkPhos  119  03-17    Creatinine Trend: 9.37 <--                        10.3   6.90  )-----------( 159      ( 17 Mar 2021 12:32 )             35.7     Urine Studies:        RADIOLOGY & ADDITIONAL STUDIES:

## 2021-03-17 NOTE — ED ADULT NURSE NOTE - PMH
Anemia due to stage 5 chronic kidney disease, not on chronic dialysis    CAP (community acquired pneumonia)  6/18  Cerebrovascular accident (CVA), unspecified mechanism  diagnosed via CT of the head  Coronary artery disease    Diabetes mellitus  type 2  ESRD (end stage renal disease)  on Dialysis( M/W/F), By Dr. Huff  GERD (gastroesophageal reflux disease)    HTN (hypertension)    Hypercholesterolemia    Stented coronary artery  2014, 3 stents, [facility]

## 2021-03-17 NOTE — H&P ADULT - NSICDXPASTMEDICALHX_GEN_ALL_CORE_FT
PAST MEDICAL HISTORY:  Anemia due to stage 5 chronic kidney disease, not on chronic dialysis     CAP (community acquired pneumonia) 6/18    Cerebrovascular accident (CVA), unspecified mechanism diagnosed via CT of the head    Coronary artery disease     Diabetes mellitus type 2    ESRD (end stage renal disease) on Dialysis( M/W/F), By Dr. Huff    GERD (gastroesophageal reflux disease)     HTN (hypertension)     Hypercholesterolemia     Stented coronary artery 2014, 3 stents, [facility]

## 2021-03-17 NOTE — ED ADULT NURSE NOTE - ED STAT RN HANDOFF TIME
How Severe Is Your Dermatophytosis?: moderate
Is This A New Presentation, Or A Follow-Up?: Rash
18:12

## 2021-03-17 NOTE — ED ADULT TRIAGE NOTE - CHIEF COMPLAINT QUOTE
pt c/o SOB x2 months worsening since last night. PMH HTN, DM, stents, ESRD on dialysis T/T/F last session yesterday. O2 sat 99% RA. RR even and unlabored. Denies fever, CP, abd pain. Also endorsing dry cough.

## 2021-03-18 LAB
A1C WITH ESTIMATED AVERAGE GLUCOSE RESULT: 6 % — HIGH (ref 4–5.6)
ALBUMIN SERPL ELPH-MCNC: 4.1 G/DL — SIGNIFICANT CHANGE UP (ref 3.3–5)
ALP SERPL-CCNC: 131 U/L — HIGH (ref 40–120)
ALT FLD-CCNC: 10 U/L — SIGNIFICANT CHANGE UP (ref 4–41)
ANION GAP SERPL CALC-SCNC: 22 MMOL/L — HIGH (ref 7–14)
AST SERPL-CCNC: 12 U/L — SIGNIFICANT CHANGE UP (ref 4–40)
BASOPHILS # BLD AUTO: 0.03 K/UL — SIGNIFICANT CHANGE UP (ref 0–0.2)
BASOPHILS NFR BLD AUTO: 0.4 % — SIGNIFICANT CHANGE UP (ref 0–2)
BILIRUB SERPL-MCNC: 0.8 MG/DL — SIGNIFICANT CHANGE UP (ref 0.2–1.2)
BUN SERPL-MCNC: 57 MG/DL — HIGH (ref 7–23)
CALCIUM SERPL-MCNC: 9.5 MG/DL — SIGNIFICANT CHANGE UP (ref 8.4–10.5)
CHLORIDE SERPL-SCNC: 96 MMOL/L — LOW (ref 98–107)
CO2 SERPL-SCNC: 22 MMOL/L — SIGNIFICANT CHANGE UP (ref 22–31)
CREAT SERPL-MCNC: 11.01 MG/DL — HIGH (ref 0.5–1.3)
EOSINOPHIL # BLD AUTO: 0.16 K/UL — SIGNIFICANT CHANGE UP (ref 0–0.5)
EOSINOPHIL NFR BLD AUTO: 2.3 % — SIGNIFICANT CHANGE UP (ref 0–6)
ESTIMATED AVERAGE GLUCOSE: 126 MG/DL — HIGH (ref 68–114)
GLUCOSE BLDC GLUCOMTR-MCNC: 107 MG/DL — HIGH (ref 70–99)
GLUCOSE BLDC GLUCOMTR-MCNC: 135 MG/DL — HIGH (ref 70–99)
GLUCOSE BLDC GLUCOMTR-MCNC: 158 MG/DL — HIGH (ref 70–99)
GLUCOSE BLDC GLUCOMTR-MCNC: 227 MG/DL — HIGH (ref 70–99)
GLUCOSE SERPL-MCNC: 91 MG/DL — SIGNIFICANT CHANGE UP (ref 70–99)
HCT VFR BLD CALC: 34.5 % — LOW (ref 39–50)
HGB BLD-MCNC: 10.5 G/DL — LOW (ref 13–17)
IANC: 4.92 K/UL — SIGNIFICANT CHANGE UP (ref 1.5–8.5)
IMM GRANULOCYTES NFR BLD AUTO: 0.4 % — SIGNIFICANT CHANGE UP (ref 0–1.5)
LYMPHOCYTES # BLD AUTO: 0.91 K/UL — LOW (ref 1–3.3)
LYMPHOCYTES # BLD AUTO: 13.3 % — SIGNIFICANT CHANGE UP (ref 13–44)
MCHC RBC-ENTMCNC: 28.3 PG — SIGNIFICANT CHANGE UP (ref 27–34)
MCHC RBC-ENTMCNC: 30.4 GM/DL — LOW (ref 32–36)
MCV RBC AUTO: 93 FL — SIGNIFICANT CHANGE UP (ref 80–100)
MONOCYTES # BLD AUTO: 0.81 K/UL — SIGNIFICANT CHANGE UP (ref 0–0.9)
MONOCYTES NFR BLD AUTO: 11.8 % — SIGNIFICANT CHANGE UP (ref 2–14)
MRSA PCR RESULT.: SIGNIFICANT CHANGE UP
NEUTROPHILS # BLD AUTO: 4.92 K/UL — SIGNIFICANT CHANGE UP (ref 1.8–7.4)
NEUTROPHILS NFR BLD AUTO: 71.8 % — SIGNIFICANT CHANGE UP (ref 43–77)
NRBC # BLD: 0 /100 WBCS — SIGNIFICANT CHANGE UP
NRBC # FLD: 0 K/UL — SIGNIFICANT CHANGE UP
PLATELET # BLD AUTO: 173 K/UL — SIGNIFICANT CHANGE UP (ref 150–400)
POTASSIUM SERPL-MCNC: 4.5 MMOL/L — SIGNIFICANT CHANGE UP (ref 3.5–5.3)
POTASSIUM SERPL-SCNC: 4.5 MMOL/L — SIGNIFICANT CHANGE UP (ref 3.5–5.3)
PROT SERPL-MCNC: 8.6 G/DL — HIGH (ref 6–8.3)
RBC # BLD: 3.71 M/UL — LOW (ref 4.2–5.8)
RBC # FLD: 17.5 % — HIGH (ref 10.3–14.5)
S AUREUS DNA NOSE QL NAA+PROBE: SIGNIFICANT CHANGE UP
SODIUM SERPL-SCNC: 140 MMOL/L — SIGNIFICANT CHANGE UP (ref 135–145)
WBC # BLD: 6.86 K/UL — SIGNIFICANT CHANGE UP (ref 3.8–10.5)
WBC # FLD AUTO: 6.86 K/UL — SIGNIFICANT CHANGE UP (ref 3.8–10.5)

## 2021-03-18 PROCEDURE — 93306 TTE W/DOPPLER COMPLETE: CPT | Mod: 26

## 2021-03-18 PROCEDURE — 76770 US EXAM ABDO BACK WALL COMP: CPT | Mod: 26

## 2021-03-18 RX ADMIN — LOSARTAN POTASSIUM 25 MILLIGRAM(S): 100 TABLET, FILM COATED ORAL at 05:32

## 2021-03-18 RX ADMIN — CLOPIDOGREL BISULFATE 75 MILLIGRAM(S): 75 TABLET, FILM COATED ORAL at 12:36

## 2021-03-18 RX ADMIN — SEVELAMER CARBONATE 800 MILLIGRAM(S): 2400 POWDER, FOR SUSPENSION ORAL at 12:36

## 2021-03-18 RX ADMIN — Medication 100 MILLIGRAM(S): at 19:03

## 2021-03-18 RX ADMIN — Medication 0.1 MILLIGRAM(S): at 19:46

## 2021-03-18 RX ADMIN — SEVELAMER CARBONATE 800 MILLIGRAM(S): 2400 POWDER, FOR SUSPENSION ORAL at 09:58

## 2021-03-18 RX ADMIN — PANTOPRAZOLE SODIUM 40 MILLIGRAM(S): 20 TABLET, DELAYED RELEASE ORAL at 05:32

## 2021-03-18 RX ADMIN — Medication 100 MILLIGRAM(S): at 05:32

## 2021-03-18 RX ADMIN — Medication 60 MILLIGRAM(S): at 05:32

## 2021-03-18 RX ADMIN — Medication 81 MILLIGRAM(S): at 12:36

## 2021-03-18 RX ADMIN — HEPARIN SODIUM 5000 UNIT(S): 5000 INJECTION INTRAVENOUS; SUBCUTANEOUS at 12:36

## 2021-03-18 RX ADMIN — ATORVASTATIN CALCIUM 40 MILLIGRAM(S): 80 TABLET, FILM COATED ORAL at 21:21

## 2021-03-18 RX ADMIN — Medication 0.1 MILLIGRAM(S): at 05:32

## 2021-03-18 RX ADMIN — CALCITRIOL 0.25 MICROGRAM(S): 0.5 CAPSULE ORAL at 12:36

## 2021-03-18 RX ADMIN — Medication 100 MILLIGRAM(S): at 21:21

## 2021-03-18 RX ADMIN — Medication 2: at 12:36

## 2021-03-18 RX ADMIN — HEPARIN SODIUM 5000 UNIT(S): 5000 INJECTION INTRAVENOUS; SUBCUTANEOUS at 05:32

## 2021-03-18 RX ADMIN — HEPARIN SODIUM 5000 UNIT(S): 5000 INJECTION INTRAVENOUS; SUBCUTANEOUS at 21:21

## 2021-03-18 RX ADMIN — SEVELAMER CARBONATE 800 MILLIGRAM(S): 2400 POWDER, FOR SUSPENSION ORAL at 21:21

## 2021-03-18 RX ADMIN — Medication 1: at 17:23

## 2021-03-18 NOTE — PROGRESS NOTE ADULT - ASSESSMENT
66 year old male with PMH of HTN, DM, ESRD (T/Th/S; last dialysis session yesterday), GI bleed presents to the ED complaining of shortness of breath for 2 months worsen yesterday.    ESRD on HD TTS  from [facility] dialysis unit  s/p UF 3/17 2L.   HD today  consent obtained in chart  renal diet  monitor    Hyperkalemia  better  monitor serum K  Low K Diet    sob  xray with b/l pl effusio  HD today  f/u cardio  monitor vitals    htn  better  resume home meds  UF with hd today  low na diet  monitor    Anemia  acceptable  epo 4000 with hd  monitor Hb    hyponatremia  in setting of ESRD  better  monitor    ckd-mbd  check pth, phos level  monitor phos and calcium daily    dysuria  pt c/o dysuria x6 month, concern for BPH  check UA and renal US

## 2021-03-18 NOTE — PROGRESS NOTE ADULT - ATTENDING COMMENTS
Agree with above  Continue with fluid removal with HD  Check TTE to evaluate LV function and degree of MR  Further cardiac workup including ischemic evaluation pending above    Cadence Sawant MD

## 2021-03-18 NOTE — PROGRESS NOTE ADULT - ASSESSMENT
66 year old male with PMH of HTN, DM, ESRD (T/Th/S; last dialysis session yesterday), GI bleed presents to the ED complaining of shortness of breath for 2 months worsen yesterday. did not improve after hd. pt c/o poor appetite, weight lost for two month. +cough with brown sputum. Pt states he has been having worsening dyspnea at rest and on exertion. Denies any chest pain,lower extremity swelling, fevers and chills.    Problem/Plan - 1:  ·  Problem: Dyspnea, unspecified type.  Plan: telemonitor  likely sec to fluid overload   hd as scheduled     Problem/Plan - 2:  ·  Problem: ESRD (end stage renal disease).  Plan: HD as scheduled  renal fu.     Problem/Plan - 3:  ·  Problem: Diabetes mellitus.  Plan: monitor FSISS.     Problem/Plan - 4:  ·  Problem: HTN (hypertension).  Plan: DASH diet  cw home meds.     Problem/Plan - 5:  ·  Problem: Troponin level elevated.  Plan: likley sec to renal failure  telemonitor   cards fu appreciated

## 2021-03-19 ENCOUNTER — TRANSCRIPTION ENCOUNTER (OUTPATIENT)
Age: 66
End: 2021-03-19

## 2021-03-19 LAB
ALBUMIN SERPL ELPH-MCNC: 4.3 G/DL — SIGNIFICANT CHANGE UP (ref 3.3–5)
ALP SERPL-CCNC: 130 U/L — HIGH (ref 40–120)
ALT FLD-CCNC: 10 U/L — SIGNIFICANT CHANGE UP (ref 4–41)
ANION GAP SERPL CALC-SCNC: 17 MMOL/L — HIGH (ref 7–14)
AST SERPL-CCNC: 12 U/L — SIGNIFICANT CHANGE UP (ref 4–40)
BASOPHILS # BLD AUTO: 0.04 K/UL — SIGNIFICANT CHANGE UP (ref 0–0.2)
BASOPHILS NFR BLD AUTO: 0.6 % — SIGNIFICANT CHANGE UP (ref 0–2)
BILIRUB SERPL-MCNC: 0.7 MG/DL — SIGNIFICANT CHANGE UP (ref 0.2–1.2)
BUN SERPL-MCNC: 37 MG/DL — HIGH (ref 7–23)
CALCIUM SERPL-MCNC: 9.8 MG/DL — SIGNIFICANT CHANGE UP (ref 8.4–10.5)
CHLORIDE SERPL-SCNC: 94 MMOL/L — LOW (ref 98–107)
CO2 SERPL-SCNC: 26 MMOL/L — SIGNIFICANT CHANGE UP (ref 22–31)
CREAT SERPL-MCNC: 7.44 MG/DL — HIGH (ref 0.5–1.3)
EOSINOPHIL # BLD AUTO: 0.11 K/UL — SIGNIFICANT CHANGE UP (ref 0–0.5)
EOSINOPHIL NFR BLD AUTO: 1.7 % — SIGNIFICANT CHANGE UP (ref 0–6)
GLUCOSE BLDC GLUCOMTR-MCNC: 139 MG/DL — HIGH (ref 70–99)
GLUCOSE BLDC GLUCOMTR-MCNC: 195 MG/DL — HIGH (ref 70–99)
GLUCOSE BLDC GLUCOMTR-MCNC: 204 MG/DL — HIGH (ref 70–99)
GLUCOSE BLDC GLUCOMTR-MCNC: 270 MG/DL — HIGH (ref 70–99)
GLUCOSE SERPL-MCNC: 101 MG/DL — HIGH (ref 70–99)
HCT VFR BLD CALC: 35.2 % — LOW (ref 39–50)
HGB BLD-MCNC: 10.7 G/DL — LOW (ref 13–17)
IANC: 4.56 K/UL — SIGNIFICANT CHANGE UP (ref 1.5–8.5)
IMM GRANULOCYTES NFR BLD AUTO: 0.3 % — SIGNIFICANT CHANGE UP (ref 0–1.5)
LYMPHOCYTES # BLD AUTO: 0.98 K/UL — LOW (ref 1–3.3)
LYMPHOCYTES # BLD AUTO: 15 % — SIGNIFICANT CHANGE UP (ref 13–44)
MCHC RBC-ENTMCNC: 27.9 PG — SIGNIFICANT CHANGE UP (ref 27–34)
MCHC RBC-ENTMCNC: 30.4 GM/DL — LOW (ref 32–36)
MCV RBC AUTO: 91.7 FL — SIGNIFICANT CHANGE UP (ref 80–100)
MONOCYTES # BLD AUTO: 0.84 K/UL — SIGNIFICANT CHANGE UP (ref 0–0.9)
MONOCYTES NFR BLD AUTO: 12.8 % — SIGNIFICANT CHANGE UP (ref 2–14)
NEUTROPHILS # BLD AUTO: 4.56 K/UL — SIGNIFICANT CHANGE UP (ref 1.8–7.4)
NEUTROPHILS NFR BLD AUTO: 69.6 % — SIGNIFICANT CHANGE UP (ref 43–77)
NRBC # BLD: 0 /100 WBCS — SIGNIFICANT CHANGE UP
NRBC # FLD: 0 K/UL — SIGNIFICANT CHANGE UP
PLATELET # BLD AUTO: 187 K/UL — SIGNIFICANT CHANGE UP (ref 150–400)
POTASSIUM SERPL-MCNC: 4.2 MMOL/L — SIGNIFICANT CHANGE UP (ref 3.5–5.3)
POTASSIUM SERPL-SCNC: 4.2 MMOL/L — SIGNIFICANT CHANGE UP (ref 3.5–5.3)
PROT SERPL-MCNC: 8.7 G/DL — HIGH (ref 6–8.3)
PTH-INTACT FLD-MCNC: 400 PG/ML — HIGH (ref 15–65)
RBC # BLD: 3.84 M/UL — LOW (ref 4.2–5.8)
RBC # FLD: 17.3 % — HIGH (ref 10.3–14.5)
SODIUM SERPL-SCNC: 137 MMOL/L — SIGNIFICANT CHANGE UP (ref 135–145)
WBC # BLD: 6.55 K/UL — SIGNIFICANT CHANGE UP (ref 3.8–10.5)
WBC # FLD AUTO: 6.55 K/UL — SIGNIFICANT CHANGE UP (ref 3.8–10.5)

## 2021-03-19 RX ORDER — SEVELAMER CARBONATE 2400 MG/1
1 POWDER, FOR SUSPENSION ORAL
Qty: 0 | Refills: 0 | DISCHARGE
Start: 2021-03-19

## 2021-03-19 RX ORDER — PANTOPRAZOLE SODIUM 20 MG/1
1 TABLET, DELAYED RELEASE ORAL
Qty: 30 | Refills: 0
Start: 2021-03-19 | End: 2021-04-17

## 2021-03-19 RX ORDER — LOSARTAN POTASSIUM 100 MG/1
1 TABLET, FILM COATED ORAL
Qty: 30 | Refills: 0
Start: 2021-03-19 | End: 2021-04-17

## 2021-03-19 RX ORDER — LOSARTAN POTASSIUM 100 MG/1
25 TABLET, FILM COATED ORAL ONCE
Refills: 0 | Status: COMPLETED | OUTPATIENT
Start: 2021-03-19 | End: 2021-03-19

## 2021-03-19 RX ORDER — ISOSORBIDE MONONITRATE 60 MG/1
60 TABLET, EXTENDED RELEASE ORAL DAILY
Refills: 0 | Status: DISCONTINUED | OUTPATIENT
Start: 2021-03-19 | End: 2021-03-23

## 2021-03-19 RX ORDER — LOSARTAN POTASSIUM 100 MG/1
50 TABLET, FILM COATED ORAL DAILY
Refills: 0 | Status: DISCONTINUED | OUTPATIENT
Start: 2021-03-20 | End: 2021-03-21

## 2021-03-19 RX ADMIN — PANTOPRAZOLE SODIUM 40 MILLIGRAM(S): 20 TABLET, DELAYED RELEASE ORAL at 05:06

## 2021-03-19 RX ADMIN — LOSARTAN POTASSIUM 25 MILLIGRAM(S): 100 TABLET, FILM COATED ORAL at 05:06

## 2021-03-19 RX ADMIN — Medication 100 MILLIGRAM(S): at 18:07

## 2021-03-19 RX ADMIN — CLOPIDOGREL BISULFATE 75 MILLIGRAM(S): 75 TABLET, FILM COATED ORAL at 12:16

## 2021-03-19 RX ADMIN — SEVELAMER CARBONATE 800 MILLIGRAM(S): 2400 POWDER, FOR SUSPENSION ORAL at 12:01

## 2021-03-19 RX ADMIN — Medication 100 MILLIGRAM(S): at 21:25

## 2021-03-19 RX ADMIN — Medication 100 MILLIGRAM(S): at 12:16

## 2021-03-19 RX ADMIN — SEVELAMER CARBONATE 800 MILLIGRAM(S): 2400 POWDER, FOR SUSPENSION ORAL at 12:15

## 2021-03-19 RX ADMIN — LOSARTAN POTASSIUM 25 MILLIGRAM(S): 100 TABLET, FILM COATED ORAL at 12:16

## 2021-03-19 RX ADMIN — SEVELAMER CARBONATE 800 MILLIGRAM(S): 2400 POWDER, FOR SUSPENSION ORAL at 18:07

## 2021-03-19 RX ADMIN — Medication 2: at 18:06

## 2021-03-19 RX ADMIN — HEPARIN SODIUM 5000 UNIT(S): 5000 INJECTION INTRAVENOUS; SUBCUTANEOUS at 12:16

## 2021-03-19 RX ADMIN — Medication 81 MILLIGRAM(S): at 12:16

## 2021-03-19 RX ADMIN — ATORVASTATIN CALCIUM 40 MILLIGRAM(S): 80 TABLET, FILM COATED ORAL at 21:25

## 2021-03-19 RX ADMIN — CALCITRIOL 0.25 MICROGRAM(S): 0.5 CAPSULE ORAL at 12:16

## 2021-03-19 RX ADMIN — HEPARIN SODIUM 5000 UNIT(S): 5000 INJECTION INTRAVENOUS; SUBCUTANEOUS at 05:06

## 2021-03-19 RX ADMIN — Medication 60 MILLIGRAM(S): at 05:05

## 2021-03-19 RX ADMIN — HEPARIN SODIUM 5000 UNIT(S): 5000 INJECTION INTRAVENOUS; SUBCUTANEOUS at 21:25

## 2021-03-19 RX ADMIN — Medication 0.1 MILLIGRAM(S): at 05:06

## 2021-03-19 RX ADMIN — Medication 100 MILLIGRAM(S): at 05:06

## 2021-03-19 RX ADMIN — Medication 3: at 12:17

## 2021-03-19 RX ADMIN — Medication 0.1 MILLIGRAM(S): at 18:05

## 2021-03-19 RX ADMIN — Medication 100 MILLIGRAM(S): at 05:05

## 2021-03-19 NOTE — DISCHARGE NOTE PROVIDER - HOSPITAL COURSE
Dyspnea, unspecified type.    -telemonitor  -likely sec to fluid overload   -hd T, TH, Sat  -cards fu.     ESRD  -HD as scheduled  -renal cs  - Renal US- Mild fullness of the collecting system in the lower pole of the right kidney. No calculus or hydronephrosis.  MRSA/MSSA neg    Diabetes mellitus.    -monitor FS  -ISS.     HTN   -DASH diet  cw home meds.     Troponin level elevated.    -irvinley sec to renal failure  telemonitor   cards called.    67 y/o male with a PMHx of CAD s/p stent placement, ESRD on HD TTS, CVA, HTN, HLD, DM, GERD and anemia of chronic disease presented to ED with shortness of breath likely in the setting of fluid overload from ESRD, also found to have elevated troponin levels.    Troponin level elevated  - Likely sec to renal failure vs acute ischemic disease  - Trop 86-->107  - Cardiology consulted  - TTE EF 35 - 40% mild distal septum and anterior wall and apex are severely hypokinetic, severe diastolic dysfunction  - 3/22 Cardiac cath - pLAD 80%=> scheduled PCI in 3 weeks. pRCA60%, dRCA 70%=> stent x1. RFA/LFA access.  + Plavix 75.   - c/w ASA/Plavix/Isosorbide/Statin/Clonidine/Hydralazine/Labetolol/Nifedipine    Hypervolemia  - Renal following (Dr. Oglesby)  - Likely in the setting of ESRD  - ProBNP: > 47729  - 3/17 CXR: Clear lungs with bilateral trace pleural effusions.  - 3/18 Renal US: Mild fullness of the collecting system in the lower pole of the right kidney.  No calculus or hydronephrosis.  - s/p HD with UF on 3/17 and 3/18 now on maintenance HD TTS    Dyspnea  - Likely sec to fluid overload  - COVID neg; CXR w/ trace B/L pleural effusions  - Renal consulted for fluid overload, as above  - Cardiology consulted as above for troponinemia    ESRD  - HD as scheduled  - Renal US- Mild fullness of the collecting system in the lower pole of the right kidney. No calculus or hydronephrosis.  - MRSA/MSSA neg    Diabetes mellitus.    - HgbA1c 6.0  - c/w ISS & FS QID    HTN   - c/w home meds    On 3/23 this case was reviewed with Dr. Clements, the patient is medically stable and optimized for discharge. All medications were reviewed and prescriptions were sent to mutually agreed upon pharmacy.     Patient scheduled for second dose COVID vaccine via dialysis center.

## 2021-03-19 NOTE — DISCHARGE NOTE PROVIDER - NSDCCPCAREPLAN_GEN_ALL_CORE_FT
PRINCIPAL DISCHARGE DIAGNOSIS  Diagnosis: Dyspnea, unspecified type  Assessment and Plan of Treatment: Related to fluid overload. Dialysis was completed and your shortness of breath improved. Continue with dialysis 3x/week as scheduled.      SECONDARY DISCHARGE DIAGNOSES  Diagnosis: ESRD (end stage renal disease)  Assessment and Plan of Treatment: Continue with dialysis 3x/week as scheduled. FOllow up with your nephrologist at your next HD session    Diagnosis: Troponin level elevated  Assessment and Plan of Treatment: Your cardiac enzymes were elevated upon arrival likely due to your end stage renal disease. You were seen by cardiology and an echocardiogram was performed. Moderate dysfunction of your left ventricle was noted. FOllow up with Dr Sawant from cardiology in 1-2 weeks for re-eval and further management.    Diagnosis: HTN (hypertension)  Assessment and Plan of Treatment: Losartan dose was increased for better blood pressure control. Please continue all your blood pressure medications as prescribed. Follow up with PCP for repeat BP check in 1 week.     PRINCIPAL DISCHARGE DIAGNOSIS  Diagnosis: Dyspnea, unspecified type  Assessment and Plan of Treatment: Related to fluid overload. Dialysis was completed and your shortness of breath improved. Continue with dialysis 3x/week as scheduled.      SECONDARY DISCHARGE DIAGNOSES  Diagnosis: Troponin level elevated  Assessment and Plan of Treatment: Your cardiac enzymes were elevated upon arrival likely due to your end stage renal disease. You were seen by cardiology and an echocardiogram was performed. Moderate dysfunction of your left ventricle was noted. Follow up with Dr Taveras from cardiology in 1-2 weeks for re-eval and further management.  ** You had one stent place and are scheduled for repeat cath in 3 weeks.    Diagnosis: ESRD (end stage renal disease)  Assessment and Plan of Treatment: Please continue to follow your dialysis schedule and refer to your primary provider/nephrologist for further care/recommendations. Continue your medications and supplementation as directed.    Diagnosis: HTN (hypertension)  Assessment and Plan of Treatment: Continue blood pressure medication regimen as directed. Monitor for any visual changes, headaches or dizziness.  Monitor blood pressure regularly.  Follow up with your PCP for further management for high blood pressure.    Diagnosis: Diabetes mellitus  Assessment and Plan of Treatment: Continue your medication regimen and a consistent carbohydrate diet (Meaning eating the same amount of carbohydrates at the same time each day). Monitor blood glucose levels throughout the day before meals and at bedtime. Record blood sugars and bring to outpatient providers appointment in order to be reviewed by your doctor for management modifications. If your sugars are more than 400 or less than 70 you should contact your PCP immediately. Monitor for signs/symptoms of low blood glucose, such as, dizziness, altered mental status, or cool/clammy skin. In addition, monitor for signs/symptoms of high blood glucose, such as, feeling hot, dry, fatigued, or with increased thirst/urination. Make regular podiatry appointments in order to have feet checked for wounds and uncontrolled toe nail growth to prevent infections, as well as, appointments with an ophthalmologist to monitor your vision.

## 2021-03-19 NOTE — DISCHARGE NOTE PROVIDER - CARE PROVIDER_API CALL
Nephrology,   Phone: (   )    -  Fax: (   )    -  Follow Up Time:     Cadence Sawant (MD)  Cardiovascular Disease; Internal Medicine; Interventional Cardiology  [address] [place] [id number]  Phone: [phone]  Fax: [phone]  Follow Up Time:     PCP,   Phone: (   )    -  Fax: (   )    -  Follow Up Time:    Ciera Taveras)  Interventional Cardiology  [address] [facility], [address]  [place] [address]  Phone: [phone]  Fax: [phone]  Follow Up Time: 2 weeks    Your nephrologist,   Phone: (   )    -  Fax: (   )    -  Follow Up Time: Routine

## 2021-03-19 NOTE — DISCHARGE NOTE PROVIDER - INSTRUCTIONS
Low sodium, low fat, low cholesterol  No concentrated potassium or phosphorus  Carbohydrate consistent

## 2021-03-19 NOTE — DISCHARGE NOTE PROVIDER - NSDCFUADDINST_GEN_ALL_CORE_FT
No heavy lifting x one week. No strenuous activity x 3 weeks. Monitor site of procedure and notify your doctor for any redness, swelling, discharge. No driving x 24 hours. You may shower but no baths or swimming x one week.

## 2021-03-19 NOTE — PROGRESS NOTE ADULT - ASSESSMENT
66 year old male with PMH of HTN, DM, ESRD (T/Th/S; last dialysis session yesterday), GI bleed presents to the ED complaining of shortness of breath for 2 months worsen yesterday. did not improve after hd. pt c/o poor appetite, weight lost for two month. +cough with brown sputum. Pt states he has been having worsening dyspnea at rest and on exertion. Denies any chest pain,lower extremity swelling, fevers and chills.    Problem/Plan - 1:  ·  Problem: Dyspnea, unspecified type.  Plan: telemonitor  likely sec to fluid overload   hd today  cards fu.     Problem/Plan - 2:  ·  Problem: ESRD (end stage renal disease).  Plan: HD as scheduled  renal fu.     Problem/Plan - 3:  ·  Problem: Diabetes mellitus.  Plan: monitor FSISS.     Problem/Plan - 4:  ·  Problem: HTN (hypertension).  Plan: DASH diet  cw home meds.     Problem/Plan - 5:  ·  Problem: Troponin level elevated.  Plan: sharmila sec to renal failure  telemonitor   echo reviewed  cath monday

## 2021-03-19 NOTE — PROGRESS NOTE ADULT - ASSESSMENT
66 year old male with PMH of HTN, DM, ESRD (T/Th/S; last dialysis session yesterday), GI bleed presents to the ED complaining of shortness of breath for 2 months worsen yesterday.    ESRD on HD TTS  from [facility] dialysis unit  LAst HD on 3/18 with 1.8 L UF  Plan for HD tomorrow   consent obtained in chart  renal diet  monitor    Hyperkalemia  Improved   monitor serum K  Low K Diet    SOB  xray with b/l pl effusio  UF with HD      HTN  Bp elevated   Increase losartan to 50mg QD  UF with HD   low Sodium  diet  monitor BP closely     Anemia  Acceptable  epo 4000 with hd  monitor Hb      Ckd-mbd  Elevated PTH _ Hectoral with HD  monitor phos and calcium daily    dysuria  pt c/o dysuria x6 month, concern for BPH  Follow up Urology as oupt    66 year old male with PMH of HTN, DM, ESRD (T/Th/S; last dialysis session yesterday), GI bleed presents to the ED complaining of shortness of breath for 2 months worsen yesterday.    ESRD on HD TTS  from [facility] dialysis unit  LAst HD on 3/18 with 1.8 L UF  Plan for HD tomorrow   consent obtained in chart  renal diet  monitor    Hyperkalemia  Improved   monitor serum K  Low K Diet    SOB  xray with b/l pl effusio  UF with HD      HTN  Bp elevated   Increase losartan to 50mg QD  UF with HD   low Sodium  diet  monitor BP closely     Anemia  Acceptable  epo 4000 with hd  monitor Hb      Ckd-mbd  Elevated PTH _ on calcitriol  monitor phos and calcium daily    dysuria  pt c/o dysuria x6 month, concern for BPH  Follow up Urology as oupt

## 2021-03-19 NOTE — DISCHARGE NOTE NURSING/CASE MANAGEMENT/SOCIAL WORK - PATIENT PORTAL LINK FT
You can access the FollowMyHealth Patient Portal offered by [facility] by registering at the following website: [email or web address]. By joining [employer]’s FollowMyHealth portal, you will also be able to view your health information using other applications (apps) compatible with our system.

## 2021-03-19 NOTE — DISCHARGE NOTE PROVIDER - NSDCMRMEDTOKEN_GEN_ALL_CORE_FT
Aspirin Enteric Coated 81 mg oral delayed release tablet: 1 tab(s) orally once a day  atorvastatin 40 mg oral tablet: 1 tab(s) orally once a day  calcitriol 0.25 mcg oral capsule: 1 cap(s) orally once a day  cloNIDine 0.1 mg oral tablet: 1 tab(s) orally 2 times a day  clopidogrel 75 mg oral tablet: 1 tab(s) orally once a day  hydrALAZINE 100 mg oral tablet: 1 tab(s) orally 3 times a day     Disregard previous order of Hydralazine 50mg  labetalol 100 mg oral tablet: 1 tab(s) orally 2 times a day  linagliptin 5 mg oral tablet: 1 tab(s) orally once a day   losartan 50 mg oral tablet: 1 tab(s) orally once a day  NIFEdipine 60 mg oral tablet, extended release: 1 tab(s) orally once a day  pantoprazole 40 mg oral delayed release tablet: 1 tab(s) orally once a day (before a meal)  sevelamer carbonate 800 mg oral tablet: 1 tab(s) orally 3 times a day (with meals)   Aspirin Enteric Coated 81 mg oral delayed release tablet: 1 tab(s) orally once a day  atorvastatin 40 mg oral tablet: 1 tab(s) orally once a day  calcitriol 0.25 mcg oral capsule: 1 cap(s) orally once a day  cloNIDine 0.1 mg oral tablet: 1 tab(s) orally 2 times a day  clopidogrel 75 mg oral tablet: 1 tab(s) orally once a day  hydrALAZINE 100 mg oral tablet: 1 tab(s) orally 3 times a day     Disregard previous order of Hydralazine 50mg  isosorbide mononitrate 60 mg oral tablet, extended release: 1 tab(s) orally once a day  labetalol 100 mg oral tablet: 1 tab(s) orally 2 times a day  linagliptin 5 mg oral tablet: 1 tab(s) orally once a day   losartan 100 mg oral tablet: 1 tab(s) orally once a day  NIFEdipine 60 mg oral tablet, extended release: 1 tab(s) orally once a day  pantoprazole 40 mg oral delayed release tablet: 1 tab(s) orally once a day (before a meal)  sevelamer carbonate 800 mg oral tablet: 1 tab(s) orally 3 times a day (with meals)

## 2021-03-19 NOTE — DISCHARGE NOTE PROVIDER - PROVIDER TOKENS
FREE:[LAST:[Nephrology],PHONE:[(   )    -],FAX:[(   )    -]],PROVIDER:[TOKEN:[[id number]]],FREE:[LAST:[PCP],PHONE:[(   )    -],FAX:[(   )    -]] PROVIDER:[TOKEN:[[id number]],FOLLOWUP:[2 weeks]],FREE:[LAST:[Your nephrologist],PHONE:[(   )    -],FAX:[(   )    -],FOLLOWUP:[Routine]]

## 2021-03-20 LAB
ANION GAP SERPL CALC-SCNC: 17 MMOL/L — HIGH (ref 7–14)
BUN SERPL-MCNC: 55 MG/DL — HIGH (ref 7–23)
CALCIUM SERPL-MCNC: 9.1 MG/DL — SIGNIFICANT CHANGE UP (ref 8.4–10.5)
CHLORIDE SERPL-SCNC: 96 MMOL/L — LOW (ref 98–107)
CO2 SERPL-SCNC: 25 MMOL/L — SIGNIFICANT CHANGE UP (ref 22–31)
CREAT SERPL-MCNC: 9.76 MG/DL — HIGH (ref 0.5–1.3)
GLUCOSE BLDC GLUCOMTR-MCNC: 102 MG/DL — HIGH (ref 70–99)
GLUCOSE BLDC GLUCOMTR-MCNC: 110 MG/DL — HIGH (ref 70–99)
GLUCOSE BLDC GLUCOMTR-MCNC: 110 MG/DL — HIGH (ref 70–99)
GLUCOSE BLDC GLUCOMTR-MCNC: 170 MG/DL — HIGH (ref 70–99)
GLUCOSE BLDC GLUCOMTR-MCNC: 241 MG/DL — HIGH (ref 70–99)
GLUCOSE SERPL-MCNC: 92 MG/DL — SIGNIFICANT CHANGE UP (ref 70–99)
HCT VFR BLD CALC: 34 % — LOW (ref 39–50)
HGB BLD-MCNC: 10.3 G/DL — LOW (ref 13–17)
MAGNESIUM SERPL-MCNC: 2.3 MG/DL — SIGNIFICANT CHANGE UP (ref 1.6–2.6)
MCHC RBC-ENTMCNC: 27.8 PG — SIGNIFICANT CHANGE UP (ref 27–34)
MCHC RBC-ENTMCNC: 30.3 GM/DL — LOW (ref 32–36)
MCV RBC AUTO: 91.6 FL — SIGNIFICANT CHANGE UP (ref 80–100)
NRBC # BLD: 0 /100 WBCS — SIGNIFICANT CHANGE UP
NRBC # FLD: 0 K/UL — SIGNIFICANT CHANGE UP
PHOSPHATE SERPL-MCNC: 5.9 MG/DL — HIGH (ref 2.5–4.5)
PLATELET # BLD AUTO: 187 K/UL — SIGNIFICANT CHANGE UP (ref 150–400)
POTASSIUM SERPL-MCNC: 4.3 MMOL/L — SIGNIFICANT CHANGE UP (ref 3.5–5.3)
POTASSIUM SERPL-SCNC: 4.3 MMOL/L — SIGNIFICANT CHANGE UP (ref 3.5–5.3)
PSA FLD-MCNC: 0.49 NG/ML — SIGNIFICANT CHANGE UP (ref 0–4)
RBC # BLD: 3.71 M/UL — LOW (ref 4.2–5.8)
RBC # FLD: 17.4 % — HIGH (ref 10.3–14.5)
SODIUM SERPL-SCNC: 138 MMOL/L — SIGNIFICANT CHANGE UP (ref 135–145)
WBC # BLD: 6.37 K/UL — SIGNIFICANT CHANGE UP (ref 3.8–10.5)
WBC # FLD AUTO: 6.37 K/UL — SIGNIFICANT CHANGE UP (ref 3.8–10.5)

## 2021-03-20 RX ADMIN — PANTOPRAZOLE SODIUM 40 MILLIGRAM(S): 20 TABLET, DELAYED RELEASE ORAL at 05:56

## 2021-03-20 RX ADMIN — SEVELAMER CARBONATE 800 MILLIGRAM(S): 2400 POWDER, FOR SUSPENSION ORAL at 17:00

## 2021-03-20 RX ADMIN — ATORVASTATIN CALCIUM 40 MILLIGRAM(S): 80 TABLET, FILM COATED ORAL at 21:28

## 2021-03-20 RX ADMIN — HEPARIN SODIUM 5000 UNIT(S): 5000 INJECTION INTRAVENOUS; SUBCUTANEOUS at 05:56

## 2021-03-20 RX ADMIN — Medication 0.1 MILLIGRAM(S): at 17:00

## 2021-03-20 RX ADMIN — SEVELAMER CARBONATE 800 MILLIGRAM(S): 2400 POWDER, FOR SUSPENSION ORAL at 08:18

## 2021-03-20 RX ADMIN — HEPARIN SODIUM 5000 UNIT(S): 5000 INJECTION INTRAVENOUS; SUBCUTANEOUS at 21:28

## 2021-03-20 RX ADMIN — Medication 100 MILLIGRAM(S): at 22:27

## 2021-03-20 RX ADMIN — Medication 81 MILLIGRAM(S): at 15:29

## 2021-03-20 RX ADMIN — HEPARIN SODIUM 5000 UNIT(S): 5000 INJECTION INTRAVENOUS; SUBCUTANEOUS at 15:29

## 2021-03-20 RX ADMIN — Medication 100 MILLIGRAM(S): at 17:00

## 2021-03-20 RX ADMIN — CLOPIDOGREL BISULFATE 75 MILLIGRAM(S): 75 TABLET, FILM COATED ORAL at 15:28

## 2021-03-20 RX ADMIN — CALCITRIOL 0.25 MICROGRAM(S): 0.5 CAPSULE ORAL at 15:29

## 2021-03-20 RX ADMIN — ISOSORBIDE MONONITRATE 60 MILLIGRAM(S): 60 TABLET, EXTENDED RELEASE ORAL at 15:29

## 2021-03-20 RX ADMIN — Medication 2: at 16:52

## 2021-03-20 RX ADMIN — Medication 0.1 MILLIGRAM(S): at 05:56

## 2021-03-20 NOTE — PROGRESS NOTE ADULT - ASSESSMENT
66 year old male with PMH of HTN, DM, ESRD (T/Th/S; last dialysis session yesterday), GI bleed presents to the ED complaining of shortness of breath for 2 months worsen yesterday.    ESRD on HD TTS  from [facility] dialysis unit  LAst HD on 3/18 with 1.8 L UF  Plan for HD today  consent obtained in chart  renal diet  monitor    Hyperkalemia  Improved   monitor serum K  Low K Diet    SOB  xray with b/l pl effusio  UF with HD      HTN  Bp fluctuating   Increase losartan to 100mg QD if remains elevated post HD  UF with HD   low Sodium  diet  monitor BP closely     Anemia  Acceptable  epo 4000 with hd  monitor Hb      Ckd-mbd  Elevated PTH _ on calcitriol  monitor phos and calcium daily    Dysuria   concern for BPH  Follow up Urology as oupt

## 2021-03-21 LAB
ALBUMIN SERPL ELPH-MCNC: 4.3 G/DL — SIGNIFICANT CHANGE UP (ref 3.3–5)
ALP SERPL-CCNC: 120 U/L — SIGNIFICANT CHANGE UP (ref 40–120)
ALT FLD-CCNC: 12 U/L — SIGNIFICANT CHANGE UP (ref 4–41)
ANION GAP SERPL CALC-SCNC: 15 MMOL/L — HIGH (ref 7–14)
AST SERPL-CCNC: 9 U/L — SIGNIFICANT CHANGE UP (ref 4–40)
BASOPHILS # BLD AUTO: 0.04 K/UL — SIGNIFICANT CHANGE UP (ref 0–0.2)
BASOPHILS NFR BLD AUTO: 0.5 % — SIGNIFICANT CHANGE UP (ref 0–2)
BILIRUB SERPL-MCNC: 0.7 MG/DL — SIGNIFICANT CHANGE UP (ref 0.2–1.2)
BUN SERPL-MCNC: 36 MG/DL — HIGH (ref 7–23)
CALCIUM SERPL-MCNC: 9.8 MG/DL — SIGNIFICANT CHANGE UP (ref 8.4–10.5)
CHLORIDE SERPL-SCNC: 94 MMOL/L — LOW (ref 98–107)
CO2 SERPL-SCNC: 27 MMOL/L — SIGNIFICANT CHANGE UP (ref 22–31)
CREAT SERPL-MCNC: 6.94 MG/DL — HIGH (ref 0.5–1.3)
EOSINOPHIL # BLD AUTO: 0.21 K/UL — SIGNIFICANT CHANGE UP (ref 0–0.5)
EOSINOPHIL NFR BLD AUTO: 2.7 % — SIGNIFICANT CHANGE UP (ref 0–6)
GLUCOSE BLDC GLUCOMTR-MCNC: 104 MG/DL — HIGH (ref 70–99)
GLUCOSE BLDC GLUCOMTR-MCNC: 113 MG/DL — HIGH (ref 70–99)
GLUCOSE BLDC GLUCOMTR-MCNC: 136 MG/DL — HIGH (ref 70–99)
GLUCOSE BLDC GLUCOMTR-MCNC: 148 MG/DL — HIGH (ref 70–99)
GLUCOSE SERPL-MCNC: 111 MG/DL — HIGH (ref 70–99)
HCT VFR BLD CALC: 38.7 % — LOW (ref 39–50)
HGB BLD-MCNC: 11.2 G/DL — LOW (ref 13–17)
IANC: 4.85 K/UL — SIGNIFICANT CHANGE UP (ref 1.5–8.5)
IMM GRANULOCYTES NFR BLD AUTO: 0.3 % — SIGNIFICANT CHANGE UP (ref 0–1.5)
LYMPHOCYTES # BLD AUTO: 1.37 K/UL — SIGNIFICANT CHANGE UP (ref 1–3.3)
LYMPHOCYTES # BLD AUTO: 17.9 % — SIGNIFICANT CHANGE UP (ref 13–44)
MAGNESIUM SERPL-MCNC: 2 MG/DL — SIGNIFICANT CHANGE UP (ref 1.6–2.6)
MCHC RBC-ENTMCNC: 26.7 PG — LOW (ref 27–34)
MCHC RBC-ENTMCNC: 28.9 GM/DL — LOW (ref 32–36)
MCV RBC AUTO: 92.4 FL — SIGNIFICANT CHANGE UP (ref 80–100)
MONOCYTES # BLD AUTO: 1.15 K/UL — HIGH (ref 0–0.9)
MONOCYTES NFR BLD AUTO: 15.1 % — HIGH (ref 2–14)
NEUTROPHILS # BLD AUTO: 4.85 K/UL — SIGNIFICANT CHANGE UP (ref 1.8–7.4)
NEUTROPHILS NFR BLD AUTO: 63.5 % — SIGNIFICANT CHANGE UP (ref 43–77)
NRBC # BLD: 0 /100 WBCS — SIGNIFICANT CHANGE UP
NRBC # FLD: 0 K/UL — SIGNIFICANT CHANGE UP
PHOSPHATE SERPL-MCNC: 5.5 MG/DL — HIGH (ref 2.5–4.5)
PLATELET # BLD AUTO: 211 K/UL — SIGNIFICANT CHANGE UP (ref 150–400)
POTASSIUM SERPL-MCNC: 4.8 MMOL/L — SIGNIFICANT CHANGE UP (ref 3.5–5.3)
POTASSIUM SERPL-SCNC: 4.8 MMOL/L — SIGNIFICANT CHANGE UP (ref 3.5–5.3)
PROT SERPL-MCNC: 8.7 G/DL — HIGH (ref 6–8.3)
RBC # BLD: 4.19 M/UL — LOW (ref 4.2–5.8)
RBC # FLD: 17.2 % — HIGH (ref 10.3–14.5)
SODIUM SERPL-SCNC: 136 MMOL/L — SIGNIFICANT CHANGE UP (ref 135–145)
WBC # BLD: 7.64 K/UL — SIGNIFICANT CHANGE UP (ref 3.8–10.5)
WBC # FLD AUTO: 7.64 K/UL — SIGNIFICANT CHANGE UP (ref 3.8–10.5)

## 2021-03-21 RX ORDER — LOSARTAN POTASSIUM 100 MG/1
100 TABLET, FILM COATED ORAL DAILY
Refills: 0 | Status: DISCONTINUED | OUTPATIENT
Start: 2021-03-22 | End: 2021-03-23

## 2021-03-21 RX ORDER — LOSARTAN POTASSIUM 100 MG/1
50 TABLET, FILM COATED ORAL ONCE
Refills: 0 | Status: COMPLETED | OUTPATIENT
Start: 2021-03-21 | End: 2021-03-21

## 2021-03-21 RX ADMIN — HEPARIN SODIUM 5000 UNIT(S): 5000 INJECTION INTRAVENOUS; SUBCUTANEOUS at 06:20

## 2021-03-21 RX ADMIN — HEPARIN SODIUM 5000 UNIT(S): 5000 INJECTION INTRAVENOUS; SUBCUTANEOUS at 21:20

## 2021-03-21 RX ADMIN — ISOSORBIDE MONONITRATE 60 MILLIGRAM(S): 60 TABLET, EXTENDED RELEASE ORAL at 12:09

## 2021-03-21 RX ADMIN — Medication 100 MILLIGRAM(S): at 21:20

## 2021-03-21 RX ADMIN — Medication 0.1 MILLIGRAM(S): at 18:25

## 2021-03-21 RX ADMIN — SEVELAMER CARBONATE 800 MILLIGRAM(S): 2400 POWDER, FOR SUSPENSION ORAL at 12:08

## 2021-03-21 RX ADMIN — HEPARIN SODIUM 5000 UNIT(S): 5000 INJECTION INTRAVENOUS; SUBCUTANEOUS at 13:50

## 2021-03-21 RX ADMIN — Medication 81 MILLIGRAM(S): at 12:08

## 2021-03-21 RX ADMIN — Medication 100 MILLIGRAM(S): at 06:21

## 2021-03-21 RX ADMIN — SEVELAMER CARBONATE 800 MILLIGRAM(S): 2400 POWDER, FOR SUSPENSION ORAL at 10:13

## 2021-03-21 RX ADMIN — Medication 100 MILLIGRAM(S): at 18:25

## 2021-03-21 RX ADMIN — SEVELAMER CARBONATE 800 MILLIGRAM(S): 2400 POWDER, FOR SUSPENSION ORAL at 18:25

## 2021-03-21 RX ADMIN — Medication 60 MILLIGRAM(S): at 06:21

## 2021-03-21 RX ADMIN — LOSARTAN POTASSIUM 50 MILLIGRAM(S): 100 TABLET, FILM COATED ORAL at 06:21

## 2021-03-21 RX ADMIN — CLOPIDOGREL BISULFATE 75 MILLIGRAM(S): 75 TABLET, FILM COATED ORAL at 12:08

## 2021-03-21 RX ADMIN — Medication 100 MILLIGRAM(S): at 13:50

## 2021-03-21 RX ADMIN — Medication 0.1 MILLIGRAM(S): at 06:21

## 2021-03-21 RX ADMIN — CALCITRIOL 0.25 MICROGRAM(S): 0.5 CAPSULE ORAL at 12:08

## 2021-03-21 RX ADMIN — LOSARTAN POTASSIUM 50 MILLIGRAM(S): 100 TABLET, FILM COATED ORAL at 10:13

## 2021-03-21 RX ADMIN — ATORVASTATIN CALCIUM 40 MILLIGRAM(S): 80 TABLET, FILM COATED ORAL at 21:20

## 2021-03-21 NOTE — PROGRESS NOTE ADULT - ASSESSMENT
66 year old male with PMH of HTN, DM, ESRD (T/Th/S; last dialysis session yesterday), GI bleed presents to the ED complaining of shortness of breath for 2 months worsen yesterday. did not improve after hd. pt c/o poor appetite, weight lost for two month. +cough with brown sputum. Pt states he has been having worsening dyspnea at rest and on exertion. Denies any chest pain,lower extremity swelling, fevers and chills.    Problem/Plan - 1:  ·  Problem: Dyspnea, unspecified type.  Plan: telemonitor  likely sec to fluid overload   hd today  cards fu.     Problem/Plan - 2:·  Problem: ESRD (end stage renal disease).  Plan: HD as scheduled  renal fu.     Problem/Plan - 3:  ·  Problem: Diabetes mellitus.  Plan: monitor FSISS.   Problem/Plan - 4:  ·  Problem: HTN (hypertension).  Plan: DASH diet  cw home meds.     Problem/Plan - 5:  ·  Problem: Troponin level elevated.  Plan: sharmila sec to renal failure  telemonitor   echo reviewed  cath monday

## 2021-03-21 NOTE — PROGRESS NOTE ADULT - ASSESSMENT
66 year old male with PMH of HTN, DM, ESRD (T/Th/S; last dialysis session yesterday), GI bleed presents to the ED complaining of shortness of breath for 2 months worsen yesterday.    ESRD on HD TTS  from [facility] dialysis unit  LAst HD on 3/20 with 2.2 L UF  Plan for HD Tuesday   Planned for cath Monday  consent obtained in chart  renal diet  monitor    Hyperkalemia  Improved   monitor serum K  Low K Diet    SOB  xray with b/l pl effusio  UF with HD  Planned for cath on MOnday      HTN  Bp elevated   Increase losartan to 100mg QD   UF with HD   low Sodium  diet  monitor BP closely     Anemia  Acceptable  epo 4000 with hd  monitor Hb      Ckd-mbd  Elevated PTH _ on calcitriol  monitor phos and calcium daily    Dysuria   concern for BPH  Follow up Urology as oupt

## 2021-03-22 LAB
ALBUMIN SERPL ELPH-MCNC: 3.9 G/DL — SIGNIFICANT CHANGE UP (ref 3.3–5)
ALP SERPL-CCNC: 106 U/L — SIGNIFICANT CHANGE UP (ref 40–120)
ALT FLD-CCNC: 10 U/L — SIGNIFICANT CHANGE UP (ref 4–41)
ANION GAP SERPL CALC-SCNC: 17 MMOL/L — HIGH (ref 7–14)
AST SERPL-CCNC: 9 U/L — SIGNIFICANT CHANGE UP (ref 4–40)
BASOPHILS # BLD AUTO: 0.05 K/UL — SIGNIFICANT CHANGE UP (ref 0–0.2)
BASOPHILS NFR BLD AUTO: 0.7 % — SIGNIFICANT CHANGE UP (ref 0–2)
BILIRUB SERPL-MCNC: 0.6 MG/DL — SIGNIFICANT CHANGE UP (ref 0.2–1.2)
BUN SERPL-MCNC: 54 MG/DL — HIGH (ref 7–23)
CALCIUM SERPL-MCNC: 9.4 MG/DL — SIGNIFICANT CHANGE UP (ref 8.4–10.5)
CHLORIDE SERPL-SCNC: 93 MMOL/L — LOW (ref 98–107)
CO2 SERPL-SCNC: 25 MMOL/L — SIGNIFICANT CHANGE UP (ref 22–31)
CREAT SERPL-MCNC: 9.16 MG/DL — HIGH (ref 0.5–1.3)
EOSINOPHIL # BLD AUTO: 0.27 K/UL — SIGNIFICANT CHANGE UP (ref 0–0.5)
EOSINOPHIL NFR BLD AUTO: 3.8 % — SIGNIFICANT CHANGE UP (ref 0–6)
GLUCOSE BLDC GLUCOMTR-MCNC: 105 MG/DL — HIGH (ref 70–99)
GLUCOSE BLDC GLUCOMTR-MCNC: 134 MG/DL — HIGH (ref 70–99)
GLUCOSE BLDC GLUCOMTR-MCNC: 164 MG/DL — HIGH (ref 70–99)
GLUCOSE SERPL-MCNC: 115 MG/DL — HIGH (ref 70–99)
HCT VFR BLD CALC: 34.1 % — LOW (ref 39–50)
HGB BLD-MCNC: 10.4 G/DL — LOW (ref 13–17)
IANC: 4.83 K/UL — SIGNIFICANT CHANGE UP (ref 1.5–8.5)
IMM GRANULOCYTES NFR BLD AUTO: 0.3 % — SIGNIFICANT CHANGE UP (ref 0–1.5)
LYMPHOCYTES # BLD AUTO: 1.07 K/UL — SIGNIFICANT CHANGE UP (ref 1–3.3)
LYMPHOCYTES # BLD AUTO: 15 % — SIGNIFICANT CHANGE UP (ref 13–44)
MAGNESIUM SERPL-MCNC: 2.1 MG/DL — SIGNIFICANT CHANGE UP (ref 1.6–2.6)
MCHC RBC-ENTMCNC: 27.9 PG — SIGNIFICANT CHANGE UP (ref 27–34)
MCHC RBC-ENTMCNC: 30.5 GM/DL — LOW (ref 32–36)
MCV RBC AUTO: 91.4 FL — SIGNIFICANT CHANGE UP (ref 80–100)
MONOCYTES # BLD AUTO: 0.9 K/UL — SIGNIFICANT CHANGE UP (ref 0–0.9)
MONOCYTES NFR BLD AUTO: 12.6 % — SIGNIFICANT CHANGE UP (ref 2–14)
NEUTROPHILS # BLD AUTO: 4.83 K/UL — SIGNIFICANT CHANGE UP (ref 1.8–7.4)
NEUTROPHILS NFR BLD AUTO: 67.6 % — SIGNIFICANT CHANGE UP (ref 43–77)
NRBC # BLD: 0 /100 WBCS — SIGNIFICANT CHANGE UP
NRBC # FLD: 0 K/UL — SIGNIFICANT CHANGE UP
PHOSPHATE SERPL-MCNC: 5.9 MG/DL — HIGH (ref 2.5–4.5)
PLATELET # BLD AUTO: 176 K/UL — SIGNIFICANT CHANGE UP (ref 150–400)
POTASSIUM SERPL-MCNC: 4.7 MMOL/L — SIGNIFICANT CHANGE UP (ref 3.5–5.3)
POTASSIUM SERPL-SCNC: 4.7 MMOL/L — SIGNIFICANT CHANGE UP (ref 3.5–5.3)
PROT SERPL-MCNC: 8 G/DL — SIGNIFICANT CHANGE UP (ref 6–8.3)
RBC # BLD: 3.73 M/UL — LOW (ref 4.2–5.8)
RBC # FLD: 17.2 % — HIGH (ref 10.3–14.5)
SODIUM SERPL-SCNC: 135 MMOL/L — SIGNIFICANT CHANGE UP (ref 135–145)
WBC # BLD: 7.14 K/UL — SIGNIFICANT CHANGE UP (ref 3.8–10.5)
WBC # FLD AUTO: 7.14 K/UL — SIGNIFICANT CHANGE UP (ref 3.8–10.5)

## 2021-03-22 PROCEDURE — 93010 ELECTROCARDIOGRAM REPORT: CPT

## 2021-03-22 RX ORDER — ASPIRIN/CALCIUM CARB/MAGNESIUM 324 MG
81 TABLET ORAL DAILY
Refills: 0 | Status: DISCONTINUED | OUTPATIENT
Start: 2021-03-23 | End: 2021-03-23

## 2021-03-22 RX ORDER — HEPARIN SODIUM 5000 [USP'U]/ML
5000 INJECTION INTRAVENOUS; SUBCUTANEOUS EVERY 12 HOURS
Refills: 0 | Status: DISCONTINUED | OUTPATIENT
Start: 2021-03-23 | End: 2021-03-23

## 2021-03-22 RX ADMIN — Medication 100 MILLIGRAM(S): at 05:25

## 2021-03-22 RX ADMIN — Medication 0.1 MILLIGRAM(S): at 18:23

## 2021-03-22 RX ADMIN — HEPARIN SODIUM 5000 UNIT(S): 5000 INJECTION INTRAVENOUS; SUBCUTANEOUS at 05:24

## 2021-03-22 RX ADMIN — Medication 100 MILLIGRAM(S): at 22:58

## 2021-03-22 RX ADMIN — Medication 100 MILLIGRAM(S): at 14:09

## 2021-03-22 RX ADMIN — SEVELAMER CARBONATE 800 MILLIGRAM(S): 2400 POWDER, FOR SUSPENSION ORAL at 18:23

## 2021-03-22 RX ADMIN — Medication 60 MILLIGRAM(S): at 05:25

## 2021-03-22 RX ADMIN — LOSARTAN POTASSIUM 100 MILLIGRAM(S): 100 TABLET, FILM COATED ORAL at 05:25

## 2021-03-22 RX ADMIN — Medication 100 MILLIGRAM(S): at 18:23

## 2021-03-22 RX ADMIN — PANTOPRAZOLE SODIUM 40 MILLIGRAM(S): 20 TABLET, DELAYED RELEASE ORAL at 05:25

## 2021-03-22 RX ADMIN — SEVELAMER CARBONATE 800 MILLIGRAM(S): 2400 POWDER, FOR SUSPENSION ORAL at 05:25

## 2021-03-22 RX ADMIN — Medication 0.1 MILLIGRAM(S): at 05:24

## 2021-03-22 RX ADMIN — ISOSORBIDE MONONITRATE 60 MILLIGRAM(S): 60 TABLET, EXTENDED RELEASE ORAL at 14:08

## 2021-03-22 RX ADMIN — ATORVASTATIN CALCIUM 40 MILLIGRAM(S): 80 TABLET, FILM COATED ORAL at 22:58

## 2021-03-22 NOTE — PROGRESS NOTE ADULT - ASSESSMENT
66 year old male with PMH of HTN, DM, ESRD (T/Th/S; last dialysis session yesterday), GI bleed presents to the ED complaining of shortness of breath for 2 months worsen yesterday. did not improve after hd. pt c/o poor appetite, weight lost for two month. +cough with brown sputum. Pt states he has been having worsening dyspnea at rest and on exertion. Denies any chest pain,lower extremity swelling, fevers and chills.    Problem/Plan - 1:  ·  Problem: Dyspnea, unspecified type.  Plan: telemonitor  likely sec to fluid overload   hd today  cards fu.     Problem/Plan - 2:·  Problem: ESRD (end stage renal disease).  Plan: HD as scheduled  renal fu.   Problem/Plan - 3:  ·  Problem: Diabetes mellitus.  Plan: monitor FSISS.   Problem/Plan - 4:  ·  Problem: HTN (hypertension).  Plan: DASH diet  cw home meds.     Problem/Plan - 5:  ·  Problem: Troponin level elevated.  Plan: sharmila sec to renal failure  telemonitor   echo reviewed  cath today

## 2021-03-22 NOTE — PROGRESS NOTE ADULT - ASSESSMENT
66 year old male with PMH of HTN, DM, ESRD (T/Th/S; last dialysis session yesterday), GI bleed presents to the ED complaining of shortness of breath for 2 months worsen yesterday.    ESRD on HD TTS  from [facility] dialysis unit  LAst HD on 3/20 with 2.2 L UF  Plan for HD Tuesday   consent obtained in chart  renal diet  monitor    Hyperkalemia  Improved   monitor serum K  Low K Diet    SOB  xray with b/l pl effusio  UF with HD  s/p cath with pci 3/22    HTN  Bp elevated   monitor for now. continue current meds  UF with HD   low Sodium  diet  monitor BP closely     Anemia  Acceptable  epo 4000 with hd  monitor Hb      Ckd-mbd  Elevated PTH _ on calcitriol  monitor phos and calcium daily    Dysuria   concern for BPH  Follow up Urology as oupt

## 2021-03-22 NOTE — CHART NOTE - NSCHARTNOTEFT_GEN_A_CORE
Pt is s/p cardiac cath via femoral access.  Patient denies pain, numbness, tingling, CP, SOB.     T(C): 36.9 (03-22-21 @ 22:44), Max: 37 (03-22-21 @ 18:21)  HR: 63 (03-22-21 @ 22:44) (63 - 72)  BP: 147/55 (03-22-21 @ 22:44) (147/55 - 161/68)  RR: 18 (03-22-21 @ 22:44) (18 - 18)  SpO2: 98% (03-22-21 @ 22:44) (98% - 100%)  VSS.     Site is stable with no hematoma, active bleed or swelling.   Dressing is clean/dry/intact.   DP pulse is palpable.   no femoral bruit    Will continue to monitor.

## 2021-03-23 VITALS
OXYGEN SATURATION: 100 % | SYSTOLIC BLOOD PRESSURE: 167 MMHG | DIASTOLIC BLOOD PRESSURE: 64 MMHG | TEMPERATURE: 98 F | HEART RATE: 69 BPM | RESPIRATION RATE: 16 BRPM

## 2021-03-23 LAB
ANION GAP SERPL CALC-SCNC: 20 MMOL/L — HIGH (ref 7–14)
BUN SERPL-MCNC: 66 MG/DL — HIGH (ref 7–23)
CALCIUM SERPL-MCNC: 9.2 MG/DL — SIGNIFICANT CHANGE UP (ref 8.4–10.5)
CHLORIDE SERPL-SCNC: 94 MMOL/L — LOW (ref 98–107)
CO2 SERPL-SCNC: 21 MMOL/L — LOW (ref 22–31)
CREAT SERPL-MCNC: 11.47 MG/DL — HIGH (ref 0.5–1.3)
GLUCOSE BLDC GLUCOMTR-MCNC: 118 MG/DL — HIGH (ref 70–99)
GLUCOSE BLDC GLUCOMTR-MCNC: 126 MG/DL — HIGH (ref 70–99)
GLUCOSE SERPL-MCNC: 114 MG/DL — HIGH (ref 70–99)
HCT VFR BLD CALC: 34.5 % — LOW (ref 39–50)
HGB BLD-MCNC: 10.5 G/DL — LOW (ref 13–17)
MAGNESIUM SERPL-MCNC: 2.2 MG/DL — SIGNIFICANT CHANGE UP (ref 1.6–2.6)
MCHC RBC-ENTMCNC: 27.7 PG — SIGNIFICANT CHANGE UP (ref 27–34)
MCHC RBC-ENTMCNC: 30.4 GM/DL — LOW (ref 32–36)
MCV RBC AUTO: 91 FL — SIGNIFICANT CHANGE UP (ref 80–100)
NRBC # BLD: 0 /100 WBCS — SIGNIFICANT CHANGE UP
NRBC # FLD: 0 K/UL — SIGNIFICANT CHANGE UP
PHOSPHATE SERPL-MCNC: 7.2 MG/DL — HIGH (ref 2.5–4.5)
PLATELET # BLD AUTO: 181 K/UL — SIGNIFICANT CHANGE UP (ref 150–400)
POTASSIUM SERPL-MCNC: 5.8 MMOL/L — HIGH (ref 3.5–5.3)
POTASSIUM SERPL-SCNC: 5.8 MMOL/L — HIGH (ref 3.5–5.3)
RBC # BLD: 3.79 M/UL — LOW (ref 4.2–5.8)
RBC # FLD: 17.1 % — HIGH (ref 10.3–14.5)
SARS-COV-2 RNA SPEC QL NAA+PROBE: SIGNIFICANT CHANGE UP
SODIUM SERPL-SCNC: 135 MMOL/L — SIGNIFICANT CHANGE UP (ref 135–145)
WBC # BLD: 7.25 K/UL — SIGNIFICANT CHANGE UP (ref 3.8–10.5)
WBC # FLD AUTO: 7.25 K/UL — SIGNIFICANT CHANGE UP (ref 3.8–10.5)

## 2021-03-23 RX ORDER — SODIUM ZIRCONIUM CYCLOSILICATE 10 G/10G
5 POWDER, FOR SUSPENSION ORAL ONCE
Refills: 0 | Status: DISCONTINUED | OUTPATIENT
Start: 2021-03-23 | End: 2021-03-23

## 2021-03-23 RX ORDER — ISOSORBIDE MONONITRATE 60 MG/1
1 TABLET, EXTENDED RELEASE ORAL
Qty: 30 | Refills: 0
Start: 2021-03-23 | End: 2021-04-21

## 2021-03-23 RX ORDER — DEXTROSE 50 % IN WATER 50 %
25 SYRINGE (ML) INTRAVENOUS ONCE
Refills: 0 | Status: DISCONTINUED | OUTPATIENT
Start: 2021-03-23 | End: 2021-03-23

## 2021-03-23 RX ORDER — INSULIN HUMAN 100 [IU]/ML
5 INJECTION, SOLUTION SUBCUTANEOUS ONCE
Refills: 0 | Status: DISCONTINUED | OUTPATIENT
Start: 2021-03-23 | End: 2021-03-23

## 2021-03-23 RX ORDER — LOSARTAN POTASSIUM 100 MG/1
1 TABLET, FILM COATED ORAL
Qty: 30 | Refills: 0
Start: 2021-03-23 | End: 2021-04-21

## 2021-03-23 RX ADMIN — CLOPIDOGREL BISULFATE 75 MILLIGRAM(S): 75 TABLET, FILM COATED ORAL at 15:20

## 2021-03-23 RX ADMIN — HEPARIN SODIUM 5000 UNIT(S): 5000 INJECTION INTRAVENOUS; SUBCUTANEOUS at 05:43

## 2021-03-23 RX ADMIN — Medication 100 MILLIGRAM(S): at 05:43

## 2021-03-23 RX ADMIN — SEVELAMER CARBONATE 800 MILLIGRAM(S): 2400 POWDER, FOR SUSPENSION ORAL at 15:20

## 2021-03-23 RX ADMIN — SEVELAMER CARBONATE 800 MILLIGRAM(S): 2400 POWDER, FOR SUSPENSION ORAL at 09:53

## 2021-03-23 RX ADMIN — Medication 60 MILLIGRAM(S): at 05:43

## 2021-03-23 RX ADMIN — PANTOPRAZOLE SODIUM 40 MILLIGRAM(S): 20 TABLET, DELAYED RELEASE ORAL at 05:44

## 2021-03-23 RX ADMIN — CALCITRIOL 0.25 MICROGRAM(S): 0.5 CAPSULE ORAL at 15:20

## 2021-03-23 RX ADMIN — Medication 100 MILLIGRAM(S): at 15:20

## 2021-03-23 RX ADMIN — Medication 0.1 MILLIGRAM(S): at 05:43

## 2021-03-23 RX ADMIN — LOSARTAN POTASSIUM 100 MILLIGRAM(S): 100 TABLET, FILM COATED ORAL at 05:43

## 2021-03-23 RX ADMIN — Medication 81 MILLIGRAM(S): at 15:20

## 2021-03-23 NOTE — CHART NOTE - NSCHARTNOTEFT_GEN_A_CORE
patient still to be seen today by cardiology.    Followup with:  Ciera Taveras MD (Interventional Cardiology)  [facility], [address], [facility].  This Thursday 3/25, 2:20PM.    Mr Rivero needs to be scheduled for return to Cath Lab for laser atherectomy on LAD artery in-stent restenosis, which we will schedule from the office, for ~2 weeks.    Harrison Wright M.D.  Cardiac Electrophysiology  [phone]

## 2021-03-23 NOTE — PROGRESS NOTE ADULT - PROVIDER SPECIALTY LIST ADULT
Cardiology
Cardiology
Hospitalist
Cardiology
Cardiology
Hospitalist
Hospitalist
Cardiology
Hospitalist
Hospitalist
Nephrology
Cardiology
Nephrology

## 2021-03-23 NOTE — PROGRESS NOTE ADULT - ASSESSMENT
66 year old male with PMH of HTN, DM, ESRD (T/Th/S; last dialysis session yesterday), GI bleed presents to the ED complaining of shortness of breath for 2 months worsen yesterday.    ESRD on HD TTS  from [facility] dialysis unit  LAst HD on 3/20 with 2.2 L UF  Seen in HD today. Continue HD as ordered. tolerating well. vitals stable. access working well   consent obtained in chart  renal diet  monitor    Hyperkalemia  HD today   monitor serum K  Low K Diet    SOB  xray with b/l pl effusio  UF with HD  s/p cath with pci 3/22    HTN  Bp acceptable   monitor for now. continue current meds  UF with HD   low Sodium  diet  monitor BP closely     Anemia  Acceptable  epo 4000 with hd  monitor Hb    Ckd-mbd  Elevated PTH _ on calcitriol  hyperphosphatemia increase renagel 1600 tid  monitor phos and calcium daily    Dysuria   concern for BPH  Follow up Urology as oupt

## 2021-03-23 NOTE — CHART NOTE - NSCHARTNOTEFT_GEN_A_CORE
On 3/23 this case was reviewed with Dr. Clements, the patient is medically stable and optimized for discharge. All medications were reviewed and prescriptions were sent to mutually agreed upon pharmacy.     Jose Alcala PA-C  Medicine ACP, pgr [phone]

## 2021-03-23 NOTE — CHART NOTE - NSCHARTNOTEFT_GEN_A_CORE
Patient with hyperkalemia 5.8 on AM labs. Initially ordered for HyperK cocktail, however, spoke with nephrology PA who stated no need as patient for HD today.    ISSAC Colvin-C  Medicine ACP, pgr [phone]

## 2021-03-23 NOTE — PROGRESS NOTE ADULT - SUBJECTIVE AND OBJECTIVE BOX
chief complaint: AMEZCUA    extended hpi: 66 year old male with history of CAD s/p multiple PCI (last PCI in Feb of 2019 with NAHUM to LAD and LCx), HTN, DM, ESRD on HD, history of GIB, history of CVA who is being seen for management of CAD.  The patient presented to the ED with dyspnea for approximately 2 months that progressively got worse over the last several days.    Today, denies CP, SOB or Palps, ROS otherwise -      chief complaint:    extended hpi: with > 4 characteristics:    Review of Systems:   Constitutional: [ ] fevers, [ ] chills.   Skin: [ ] dry skin. [ ] rashes.  Psychiatric: [ ] depression, [ ] anxiety.   Gastrointestinal: [ ] BRBPR, [ ] melena.   Neurological: [ ] confusion. [ ] seizures. [ ] shuffling gait.   Ears,Nose,Mouth and Throat: [ ] ear pain [ ] sore throat.   Eyes: [ ] diplopia.   Respiratory: [ ] hemoptysis. [ ] shortness of breath  Cardiovascular: See HPI above  Hematologic/Lymphatic: [ ] anemia. [ ] painful nodes. [ ] prolonged bleeding.   Genitourinary: [ ] hematuria. [ ] flank pain.   Endocrine: [ ] significant change in weight. [ ] intolerance to heat and cold.     Review of systems [ ] otherwise negative, [ ] otherwise unable to obtain    FH: no family history of sudden cardiac death in first degree relatives    SH: [ ] tobacco, [ ] alcohol, [ ] drugs           aspirin enteric coated 81 milliGRAM(s) Oral daily  atorvastatin 40 milliGRAM(s) Oral at bedtime  calcitriol   Capsule 0.25 MICROGram(s) Oral daily  cloNIDine 0.1 milliGRAM(s) Oral two times a day  clopidogrel Tablet 75 milliGRAM(s) Oral daily  dextrose 40% Gel 15 Gram(s) Oral once  dextrose 5%. 1000 milliLiter(s) IV Continuous <Continuous>  dextrose 5%. 1000 milliLiter(s) IV Continuous <Continuous>  dextrose 50% Injectable 25 Gram(s) IV Push once  dextrose 50% Injectable 12.5 Gram(s) IV Push once  dextrose 50% Injectable 25 Gram(s) IV Push once  glucagon  Injectable 1 milliGRAM(s) IntraMuscular once  heparin   Injectable 5000 Unit(s) SubCutaneous every 8 hours  hydrALAZINE 100 milliGRAM(s) Oral three times a day  insulin lispro (ADMELOG) corrective regimen sliding scale   SubCutaneous at bedtime  insulin lispro (ADMELOG) corrective regimen sliding scale   SubCutaneous three times a day before meals  isosorbide   mononitrate ER Tablet (IMDUR) 60 milliGRAM(s) Oral daily  labetalol 100 milliGRAM(s) Oral two times a day  losartan 50 milliGRAM(s) Oral daily  NIFEdipine XL 60 milliGRAM(s) Oral daily  pantoprazole    Tablet 40 milliGRAM(s) Oral before breakfast  sevelamer carbonate 800 milliGRAM(s) Oral three times a day with meals                            11.2   7.64  )-----------( 211      ( 21 Mar 2021 07:17 )             38.7       Hemoglobin: 11.2 g/dL (03-21 @ 07:17)  Hemoglobin: 10.3 g/dL (03-20 @ 07:26)  Hemoglobin: 10.7 g/dL (03-19 @ 06:53)  Hemoglobin: 10.5 g/dL (03-18 @ 06:58)  Hemoglobin: 10.3 g/dL (03-17 @ 12:32)      03-20    138  |  96<L>  |  55<H>  ----------------------------<  92  4.3   |  25  |  9.76<H>    Ca    9.1      20 Mar 2021 07:32  Phos  5.9     03-20  Mg     2.3     03-20      Creatinine Trend: 9.76<--, 7.44<--, 11.01<--, 9.64<--, 9.37<--    COAGS:           T(C): 36.7 (03-21-21 @ 06:18), Max: 36.9 (03-20-21 @ 22:26)  HR: 61 (03-21-21 @ 06:18) (57 - 64)  BP: 181/78 (03-21-21 @ 06:18) (135/65 - 181/78)  RR: 18 (03-21-21 @ 06:18) (17 - 18)  SpO2: 98% (03-21-21 @ 06:18) (98% - 100%)  Wt(kg): --    I&O's Summary    20 Mar 2021 07:01  -  21 Mar 2021 07:00  --------------------------------------------------------  IN: 400 mL / OUT: 2600 mL / NET: -2200 mL        General: Well nourished in no acute distress. Alert and Oriented * 3.   Head: Normocephalic and atraumatic.   Neck: No JVD. No bruits. Supple. Does not appear to be enlarged.   Cardiovascular: + S1,S2 ; RRR Soft systolic murmur at the left lower sternal border. No rubs noted.    Lungs: CTA b/l. No rhonchi, rales or wheezes.   Abdomen: + BS, soft. Non tender. Non distended. No rebound. No guarding.   Extremities: No clubbing/cyanosis/edema.   Neurologic: Moves all four extremities. Full range of motion.   Skin: Warm and moist. The patient's skin has normal elasticity and good skin turgor.   Psychiatric: Appropriate mood and affect.  Musculoskeletal: Normal range of motion, normal strength    Echo 3/18/2021 - EF decreased to 35-40%.  Mid-to distal septum and anterior wall, and apex are severely hypokinetic.  Severe diastolic dysfunction.    A/P:  66 year old male with history of CAD s/p multiple PCI (last PCI in Feb of 2019 with NAHUM to LAD and LCx), HTN, DM, ESRD on HD, history of GIB, history of CVA who is being seen for management of CAD    -suspect troponin elevation at some level secondary to underlying ESRD.  Cardiac enzyme pattern not consistent with acute coronary syndrome.  He also did not present with any angina.  Presenting s/sx consistent with heart failure.  -reduced LVEF, however, raises concern for occluded stents.  his last EF a few months ago was NORMAL.    -Continue hemodialysis.  -Optimize preload/afterload reduction in the setting of hypertension.  Will add Isosorbide Mononitrate to his hydralazine + nifedipine.  -[facility] monday-            
chief complaint: AMEZCUA    extended hpi: 66 year old male with history of CAD s/p multiple PCI (last PCI in Feb of 2019 with NAHUM to LAD and LCx), HTN, DM, ESRD on HD, history of GIB, history of CVA who is being seen for management of CAD.  The patient presented to the ED with dyspnea for approximately 2 months that progressively got worse over the last several days.    Today, denies CP, SOB or Palps, ROS otherwise -.  Resting comfortably awaiting coronary angiogram re: new LVEF%.    Review of Systems:   Constitutional: [ ] fevers, [ ] chills.   Skin: [ ] dry skin. [ ] rashes.  Psychiatric: [ ] depression, [ ] anxiety.   Gastrointestinal: [ ] BRBPR, [ ] melena.   Neurological: [ ] confusion. [ ] seizures. [ ] shuffling gait.   Ears,Nose,Mouth and Throat: [ ] ear pain [ ] sore throat.   Eyes: [ ] diplopia.   Respiratory: [ ] hemoptysis. [ ] shortness of breath  Cardiovascular: See HPI above  Hematologic/Lymphatic: [ ] anemia. [ ] painful nodes. [ ] prolonged bleeding.   Genitourinary: [ ] hematuria. [ ] flank pain.   Endocrine: [ ] significant change in weight. [ ] intolerance to heat and cold.     Review of systems [ ] otherwise negative, [ ] otherwise unable to obtain    FH: no family history of sudden cardiac death in first degree relatives    SH: [ ] tobacco, [ ] alcohol, [ ] drugs      aspirin enteric coated 81 milliGRAM(s) Oral daily  atorvastatin 40 milliGRAM(s) Oral at bedtime  calcitriol   Capsule 0.25 MICROGram(s) Oral daily  cloNIDine 0.1 milliGRAM(s) Oral two times a day  clopidogrel Tablet 75 milliGRAM(s) Oral daily  dextrose 40% Gel 15 Gram(s) Oral once  dextrose 5%. 1000 milliLiter(s) IV Continuous <Continuous>  dextrose 5%. 1000 milliLiter(s) IV Continuous <Continuous>  dextrose 50% Injectable 25 Gram(s) IV Push once  dextrose 50% Injectable 12.5 Gram(s) IV Push once  dextrose 50% Injectable 25 Gram(s) IV Push once  glucagon  Injectable 1 milliGRAM(s) IntraMuscular once  heparin   Injectable 5000 Unit(s) SubCutaneous every 8 hours  hydrALAZINE 100 milliGRAM(s) Oral three times a day  insulin lispro (ADMELOG) corrective regimen sliding scale   SubCutaneous three times a day before meals  insulin lispro (ADMELOG) corrective regimen sliding scale   SubCutaneous at bedtime  isosorbide   mononitrate ER Tablet (IMDUR) 60 milliGRAM(s) Oral daily  labetalol 100 milliGRAM(s) Oral two times a day  losartan 100 milliGRAM(s) Oral daily  NIFEdipine XL 60 milliGRAM(s) Oral daily  pantoprazole    Tablet 40 milliGRAM(s) Oral before breakfast  sevelamer carbonate 800 milliGRAM(s) Oral three times a day with meals                          10.4   7.14  )-----------( 176      ( 22 Mar 2021 07:46 )             34.1       03-22    135  |  93<L>  |  54<H>  ----------------------------<  115<H>  4.7   |  25  |  9.16<H>    Ca    9.4      22 Mar 2021 07:46  Phos  5.9     03-22  Mg     2.1     03-22    TPro  8.0  /  Alb  3.9  /  TBili  0.6  /  DBili  x   /  AST  9   /  ALT  10  /  AlkPhos  106  03-22    T(C): 36.6 (03-22-21 @ 05:23), Max: 36.8 (03-21-21 @ 21:16)  HR: 72 (03-22-21 @ 05:23) (64 - 72)  BP: 161/68 (03-22-21 @ 05:23) (157/67 - 161/68)  RR: 18 (03-22-21 @ 05:23) (18 - 18)  SpO2: 100% (03-22-21 @ 05:23) (100% - 100%)  Wt(kg): --    I&O's Summary    General: Well nourished in no acute distress. Alert and Oriented * 3.   Head: Normocephalic and atraumatic.   Neck: No JVD. No bruits. Supple. Does not appear to be enlarged.   Cardiovascular: + S1,S2 ; RRR Soft systolic murmur at the left lower sternal border. No rubs noted.    Lungs: CTA b/l. No rhonchi, rales or wheezes.   Abdomen: + BS, soft. Non tender. Non distended. No rebound. No guarding.   Extremities: No clubbing/cyanosis/edema.   Neurologic: Moves all four extremities. Full range of motion.   Skin: Warm and moist. The patient's skin has normal elasticity and good skin turgor.   Psychiatric: Appropriate mood and affect.  Musculoskeletal: Normal range of motion, normal strength    Echo 3/18/2021 - EF decreased to 35-40%.  Mid-to distal septum and anterior wall, and apex are severely hypokinetic.  Severe diastolic dysfunction.    A/P:  66 year old male with history of CAD s/p multiple PCI (last PCI in Feb of 2019 with NAHUM to LAD and LCx), HTN, DM, ESRD on HD, history of GIB, history of CVA who is being seen for management of CAD    -suspect troponin elevation at some level secondary to underlying ESRD.  Cardiac enzyme pattern not consistent with acute coronary syndrome.  He also did not present with any angina.  Presenting s/sx consistent with heart failure.  -reduced LVEF, however, raises concern for occluded stents.  his last EF a few months ago was NORMAL.  -Continue hemodialysis.  -Optimize preload/afterload reduction in the setting of hypertension.  Added Isosorbide Mononitrate to his hydralazine + nifedipine before the weekend.  -[facility] Monday.    Harrison Wright M.D.  Cardiac Electrophysiology  [phone]      
[facility] [place] [facility]   MD JORDAN AVALOS MD RUORU WONG, PA    TEL:  OFFICE: [phone]  DR CUETO CELL: [phone]  SUSAN WINTERS CELL: [phone]  DR. HORTA CELL: [phone]  DR. HUNTER CELL: [phone]    FROM 5 PM - 7 AM PLEASE CALL ANSWERING SERVICE: [phone]    RENAL FOLLOW UP NOTE--Date of Service 03-20-21 @ 10:15  --------------------------------------------------------------------------------  HPI:      Pt seen and examined at bedside.   Denies SOB, chest pain     PAST HISTORY  --------------------------------------------------------------------------------  No significant changes to PMH, PSH, FHx, SHx, unless otherwise noted    ALLERGIES & MEDICATIONS  --------------------------------------------------------------------------------  Allergies    No Known Allergies    Intolerances      Standing Inpatient Medications  aspirin  chewable 81 milliGRAM(s) Oral daily  aspirin enteric coated 81 milliGRAM(s) Oral daily  atorvastatin 40 milliGRAM(s) Oral at bedtime  calcitriol   Capsule 0.25 MICROGram(s) Oral daily  cloNIDine 0.1 milliGRAM(s) Oral two times a day  clopidogrel Tablet 75 milliGRAM(s) Oral daily  dextrose 40% Gel 15 Gram(s) Oral once  dextrose 5%. 1000 milliLiter(s) IV Continuous <Continuous>  dextrose 5%. 1000 milliLiter(s) IV Continuous <Continuous>  dextrose 50% Injectable 25 Gram(s) IV Push once  dextrose 50% Injectable 12.5 Gram(s) IV Push once  dextrose 50% Injectable 25 Gram(s) IV Push once  glucagon  Injectable 1 milliGRAM(s) IntraMuscular once  heparin   Injectable 5000 Unit(s) SubCutaneous every 8 hours  hydrALAZINE 100 milliGRAM(s) Oral three times a day  insulin lispro (ADMELOG) corrective regimen sliding scale   SubCutaneous at bedtime  insulin lispro (ADMELOG) corrective regimen sliding scale   SubCutaneous three times a day before meals  isosorbide   mononitrate ER Tablet (IMDUR) 60 milliGRAM(s) Oral daily  labetalol 100 milliGRAM(s) Oral two times a day  losartan 50 milliGRAM(s) Oral daily  NIFEdipine XL 60 milliGRAM(s) Oral daily  pantoprazole    Tablet 40 milliGRAM(s) Oral before breakfast  sevelamer carbonate 800 milliGRAM(s) Oral three times a day with meals    PRN Inpatient Medications      REVIEW OF SYSTEMS  --------------------------------------------------------------------------------  General: no fever  CVS: no chest pain  RESP: no sob, no cough  ABD: no abdominal pain  : no dysuria,  MSK: no edema     VITALS/PHYSICAL EXAM  --------------------------------------------------------------------------------  T(C): 36.4 (03-20-21 @ 05:55), Max: 36.9 (03-19-21 @ 18:04)  HR: 68 (03-20-21 @ 05:55) (60 - 68)  BP: 158/70 (03-20-21 @ 05:55) (141/70 - 158/70)  RR: 18 (03-20-21 @ 05:55) (16 - 18)  SpO2: 100% (03-20-21 @ 05:55) (98% - 100%)  Wt(kg): --        Physical Exam:  	Gen: NAD  	HEENT: MMM  	Pulm: CTA B/L  	CV: S1S2  	Abd: Soft, +BS  	Ext: No LE edema B/L                      Neuro: Awake   	Skin: Warm and Dry   	Vascular access: avf          JHONATHAN no  lizet  LABS/STUDIES  --------------------------------------------------------------------------------              10.3   6.37  >-----------<  187      [03-20-21 @ 07:26]              34.0     138  |  96  |  55  ----------------------------<  92      [03-20-21 @ 07:32]  4.3   |  25  |  9.76        Ca     9.1     [03-20-21 @ 07:32]      Mg     2.3     [03-20-21 @ 07:32]      Phos  5.9     [03-20-21 @ 07:32]    TPro  8.7  /  Alb  4.3  /  TBili  0.7  /  DBili  x   /  AST  12  /  ALT  10  /  AlkPhos  130  [03-19-21 @ 06:53]          Creatinine Trend:  SCr 9.76 [03-20 @ 07:32]  SCr 7.44 [03-19 @ 06:53]  SCr 11.01 [03-18 @ 06:58]  SCr 9.64 [03-17 @ 13:43]  SCr 9.37 [03-17 @ 12:32]        PTH -- (Ca --)      [03-19-21 @ 06:53]   400  HbA1c 7.8      [12-17-19 @ 05:54]      
[facility] [place] [facility]   MD ANDRES AVALOS DO ANAM SIDDIQUI ANGELA WONG, PA    TEL:  OFFICE: [phone]  DR CUETO CELL: [phone]  DR. HUNTER CELL: [phone]  DR. HORTA CELL: [phone]  LIDA WINTERS CELL: [phone]    From 5pm-7am Answering Service [phone]    -- RENAL FOLLOW UP NOTE ---Date of Service 03-22-21 @ 16:40    Patient is a 66y old  Male who presents with a chief complaint of SOB (22 Mar 2021 15:02)      Patient seen and examined at bedside. No chest pain/sob    VITALS:  T(F): 97.8 (03-22-21 @ 05:23), Max: 98.2 (03-21-21 @ 21:16)  HR: 72 (03-22-21 @ 05:23)  BP: 161/68 (03-22-21 @ 05:23)  RR: 18 (03-22-21 @ 05:23)  SpO2: 100% (03-22-21 @ 05:23)  Wt(kg): --        PHYSICAL EXAM:  Constitutional: NAD  Neck: No JVD  Respiratory: CTAB, no wheezes, rales or rhonchi  Cardiovascular: S1, S2, RRR  Gastrointestinal: BS+, soft, NT/ND  Extremities: No peripheral edema    Hospital Medications:   MEDICATIONS  (STANDING):  atorvastatin 40 milliGRAM(s) Oral at bedtime  calcitriol   Capsule 0.25 MICROGram(s) Oral daily  cloNIDine 0.1 milliGRAM(s) Oral two times a day  clopidogrel Tablet 75 milliGRAM(s) Oral daily  dextrose 40% Gel 15 Gram(s) Oral once  dextrose 5%. 1000 milliLiter(s) (50 mL/Hr) IV Continuous <Continuous>  dextrose 5%. 1000 milliLiter(s) (100 mL/Hr) IV Continuous <Continuous>  dextrose 50% Injectable 25 Gram(s) IV Push once  dextrose 50% Injectable 12.5 Gram(s) IV Push once  dextrose 50% Injectable 25 Gram(s) IV Push once  glucagon  Injectable 1 milliGRAM(s) IntraMuscular once  hydrALAZINE 100 milliGRAM(s) Oral three times a day  insulin lispro (ADMELOG) corrective regimen sliding scale   SubCutaneous three times a day before meals  insulin lispro (ADMELOG) corrective regimen sliding scale   SubCutaneous at bedtime  isosorbide   mononitrate ER Tablet (IMDUR) 60 milliGRAM(s) Oral daily  labetalol 100 milliGRAM(s) Oral two times a day  losartan 100 milliGRAM(s) Oral daily  NIFEdipine XL 60 milliGRAM(s) Oral daily  pantoprazole    Tablet 40 milliGRAM(s) Oral before breakfast  sevelamer carbonate 800 milliGRAM(s) Oral three times a day with meals      LABS:  03-22    135  |  93<L>  |  54<H>  ----------------------------<  115<H>  4.7   |  25  |  9.16<H>    Ca    9.4      22 Mar 2021 07:46  Phos  5.9     03-22  Mg     2.1     03-22    TPro  8.0  /  Alb  3.9  /  TBili  0.6  /  DBili      /  AST  9   /  ALT  10  /  AlkPhos  106  03-22    Creatinine Trend: 9.16 <--, 6.94 <--, 9.76 <--, 7.44 <--, 11.01 <--, 9.64 <--, 9.37 <--    Albumin, Serum: 3.9 g/dL (03-22 @ 07:46)  Phosphorus Level, Serum: 5.9 mg/dL (03-22 @ 07:46)                              10.4   7.14  )-----------( 176      ( 22 Mar 2021 07:46 )             34.1     Urine Studies:      PTH -- (Ca --)      [03-19-21 @ 06:53]   400  HbA1c 7.8      [12-17-19 @ 05:54]        RADIOLOGY & ADDITIONAL STUDIES:  
Patient is a 66y old  Male who presents with a chief complaint of SOB (21 Mar 2021 09:46)    Date of servie : 03-21-21 @ 18:30  INTERVAL HPI/OVERNIGHT EVENTS:  T(C): 36.7 (03-21-21 @ 06:18), Max: 36.9 (03-20-21 @ 22:26)  HR: 65 (03-21-21 @ 18:24) (61 - 65)  BP: 161/64 (03-21-21 @ 18:24) (135/55 - 181/78)  RR: 18 (03-21-21 @ 06:18) (18 - 18)  SpO2: 98% (03-21-21 @ 06:18) (98% - 100%)  Wt(kg): --  I&O's Summary    20 Mar 2021 07:01  -  21 Mar 2021 07:00  --------------------------------------------------------  IN: 400 mL / OUT: 2600 mL / NET: -2200 mL        LABS:                        11.2   7.64  )-----------( 211      ( 21 Mar 2021 07:17 )             38.7     03-21    136  |  94<L>  |  36<H>  ----------------------------<  111<H>  4.8   |  27  |  6.94<H>    Ca    9.8      21 Mar 2021 07:17  Phos  5.5     03-21  Mg     2.0     03-21    TPro  8.7<H>  /  Alb  4.3  /  TBili  0.7  /  DBili  x   /  AST  9   /  ALT  12  /  AlkPhos  120  03-21        CAPILLARY BLOOD GLUCOSE      POCT Blood Glucose.: 104 mg/dL (21 Mar 2021 17:14)  POCT Blood Glucose.: 136 mg/dL (21 Mar 2021 12:27)  POCT Blood Glucose.: 113 mg/dL (21 Mar 2021 08:19)  POCT Blood Glucose.: 170 mg/dL (20 Mar 2021 21:54)            MEDICATIONS  (STANDING):  aspirin enteric coated 81 milliGRAM(s) Oral daily  atorvastatin 40 milliGRAM(s) Oral at bedtime  calcitriol   Capsule 0.25 MICROGram(s) Oral daily  cloNIDine 0.1 milliGRAM(s) Oral two times a day  clopidogrel Tablet 75 milliGRAM(s) Oral daily  dextrose 40% Gel 15 Gram(s) Oral once  dextrose 5%. 1000 milliLiter(s) (50 mL/Hr) IV Continuous <Continuous>  dextrose 5%. 1000 milliLiter(s) (100 mL/Hr) IV Continuous <Continuous>  dextrose 50% Injectable 25 Gram(s) IV Push once  dextrose 50% Injectable 12.5 Gram(s) IV Push once  dextrose 50% Injectable 25 Gram(s) IV Push once  glucagon  Injectable 1 milliGRAM(s) IntraMuscular once  heparin   Injectable 5000 Unit(s) SubCutaneous every 8 hours  hydrALAZINE 100 milliGRAM(s) Oral three times a day  insulin lispro (ADMELOG) corrective regimen sliding scale   SubCutaneous three times a day before meals  insulin lispro (ADMELOG) corrective regimen sliding scale   SubCutaneous at bedtime  isosorbide   mononitrate ER Tablet (IMDUR) 60 milliGRAM(s) Oral daily  labetalol 100 milliGRAM(s) Oral two times a day  losartan 100 milliGRAM(s) Oral daily  NIFEdipine XL 60 milliGRAM(s) Oral daily  pantoprazole    Tablet 40 milliGRAM(s) Oral before breakfast  sevelamer carbonate 800 milliGRAM(s) Oral three times a day with meals    MEDICATIONS  (PRN):          PHYSICAL EXAM:  GENERAL: NAD, well-groomed, well-developed  HEAD:  Atraumatic, Normocephalic  CHEST/LUNG: Clear to percussion bilaterally; No rales, rhonchi, wheezing, or rubs  HEART: Regular rate and rhythm; No murmurs, rubs, or gallops  ABDOMEN: Soft, Nontender, Nondistended; Bowel sounds present  EXTREMITIES:  2+ Peripheral Pulses, No clubbing, cyanosis, or edema  LYMPH: No lymphadenopathy noted  SKIN: No rashes or lesions    Care Discussed with Consultants/Other Providers [ ] YES  [ ] NO
Patient is a 66y old  Male who presents with a chief complaint of SOB (22 Mar 2021 10:45)    Date of servie : 03-22-21 @ 15:02  INTERVAL HPI/OVERNIGHT EVENTS:  T(C): 36.6 (03-22-21 @ 05:23), Max: 36.8 (03-21-21 @ 21:16)  HR: 72 (03-22-21 @ 05:23) (64 - 72)  BP: 161/68 (03-22-21 @ 05:23) (157/67 - 161/68)  RR: 18 (03-22-21 @ 05:23) (18 - 18)  SpO2: 100% (03-22-21 @ 05:23) (100% - 100%)  Wt(kg): --  I&O's Summary      LABS:                        10.4   7.14  )-----------( 176      ( 22 Mar 2021 07:46 )             34.1     03-22    135  |  93<L>  |  54<H>  ----------------------------<  115<H>  4.7   |  25  |  9.16<H>    Ca    9.4      22 Mar 2021 07:46  Phos  5.9     03-22  Mg     2.1     03-22    TPro  8.0  /  Alb  3.9  /  TBili  0.6  /  DBili  x   /  AST  9   /  ALT  10  /  AlkPhos  106  03-22        CAPILLARY BLOOD GLUCOSE      POCT Blood Glucose.: 105 mg/dL (22 Mar 2021 12:33)  POCT Blood Glucose.: 134 mg/dL (22 Mar 2021 08:59)  POCT Blood Glucose.: 148 mg/dL (21 Mar 2021 21:39)  POCT Blood Glucose.: 104 mg/dL (21 Mar 2021 17:14)            MEDICATIONS  (STANDING):  atorvastatin 40 milliGRAM(s) Oral at bedtime  calcitriol   Capsule 0.25 MICROGram(s) Oral daily  cloNIDine 0.1 milliGRAM(s) Oral two times a day  clopidogrel Tablet 75 milliGRAM(s) Oral daily  dextrose 40% Gel 15 Gram(s) Oral once  dextrose 5%. 1000 milliLiter(s) (50 mL/Hr) IV Continuous <Continuous>  dextrose 5%. 1000 milliLiter(s) (100 mL/Hr) IV Continuous <Continuous>  dextrose 50% Injectable 25 Gram(s) IV Push once  dextrose 50% Injectable 12.5 Gram(s) IV Push once  dextrose 50% Injectable 25 Gram(s) IV Push once  glucagon  Injectable 1 milliGRAM(s) IntraMuscular once  heparin   Injectable 5000 Unit(s) SubCutaneous every 8 hours  hydrALAZINE 100 milliGRAM(s) Oral three times a day  insulin lispro (ADMELOG) corrective regimen sliding scale   SubCutaneous three times a day before meals  insulin lispro (ADMELOG) corrective regimen sliding scale   SubCutaneous at bedtime  isosorbide   mononitrate ER Tablet (IMDUR) 60 milliGRAM(s) Oral daily  labetalol 100 milliGRAM(s) Oral two times a day  losartan 100 milliGRAM(s) Oral daily  NIFEdipine XL 60 milliGRAM(s) Oral daily  pantoprazole    Tablet 40 milliGRAM(s) Oral before breakfast  sevelamer carbonate 800 milliGRAM(s) Oral three times a day with meals    MEDICATIONS  (PRN):          PHYSICAL EXAM:  GENERAL: NAD, well-groomed, well-developed  HEAD:  Atraumatic, Normocephalic  CHEST/LUNG: Clear to percussion bilaterally; No rales, rhonchi, wheezing, or rubs  HEART: Regular rate and rhythm; No murmurs, rubs, or gallops  ABDOMEN: Soft, Nontender, Nondistended; Bowel sounds present  EXTREMITIES:  2+ Peripheral Pulses, No clubbing, cyanosis, or edema  LYMPH: No lymphadenopathy noted  SKIN: No rashes or lesions    Care Discussed with Consultants/Other Providers [ ] YES  [ ] NO
chief complaint: AMEZCUA    extended hpi: 66 year old male with history of CAD s/p multiple PCI (last PCI in Feb of 2019 with NAHUM to LAD and LCx), HTN, DM, ESRD on HD, history of GIB, history of CVA who is being seen for management of CAD.  The patient presented to the ED with dyspnea for approximately 2 months that progressively got worse over the last several days.    Status-post PCI to right coronary artery.  Pending return to lab for laser atherectomy of LAD in-stent restenosis.  denies CP, SOB or Palps, ROS otherwise -.  Awaiting dialysis today.    Review of Systems:   Constitutional: [ ] fevers, [ ] chills.   Skin: [ ] dry skin. [ ] rashes.  Psychiatric: [ ] depression, [ ] anxiety.   Gastrointestinal: [ ] BRBPR, [ ] melena.   Neurological: [ ] confusion. [ ] seizures. [ ] shuffling gait.   Ears,Nose,Mouth and Throat: [ ] ear pain [ ] sore throat.   Eyes: [ ] diplopia.   Respiratory: [ ] hemoptysis. [ ] shortness of breath  Cardiovascular: See HPI above  Hematologic/Lymphatic: [ ] anemia. [ ] painful nodes. [ ] prolonged bleeding.   Genitourinary: [ ] hematuria. [ ] flank pain.   Endocrine: [ ] significant change in weight. [ ] intolerance to heat and cold.     Review of systems [ ] otherwise negative, [ ] otherwise unable to obtain    FH: no family history of sudden cardiac death in first degree relatives    SH: [ ] tobacco, [ ] alcohol, [ ] drugs      aspirin enteric coated 81 milliGRAM(s) Oral daily  atorvastatin 40 milliGRAM(s) Oral at bedtime  calcitriol   Capsule 0.25 MICROGram(s) Oral daily  cloNIDine 0.1 milliGRAM(s) Oral two times a day  clopidogrel Tablet 75 milliGRAM(s) Oral daily  dextrose 40% Gel 15 Gram(s) Oral once  dextrose 5%. 1000 milliLiter(s) IV Continuous <Continuous>  dextrose 5%. 1000 milliLiter(s) IV Continuous <Continuous>  dextrose 50% Injectable 25 Gram(s) IV Push once  dextrose 50% Injectable 12.5 Gram(s) IV Push once  dextrose 50% Injectable 25 Gram(s) IV Push once  glucagon  Injectable 1 milliGRAM(s) IntraMuscular once  heparin   Injectable 5000 Unit(s) SubCutaneous every 12 hours  hydrALAZINE 100 milliGRAM(s) Oral three times a day  insulin lispro (ADMELOG) corrective regimen sliding scale   SubCutaneous at bedtime  insulin lispro (ADMELOG) corrective regimen sliding scale   SubCutaneous three times a day before meals  isosorbide   mononitrate ER Tablet (IMDUR) 60 milliGRAM(s) Oral daily  labetalol 100 milliGRAM(s) Oral two times a day  losartan 100 milliGRAM(s) Oral daily  NIFEdipine XL 60 milliGRAM(s) Oral daily  pantoprazole    Tablet 40 milliGRAM(s) Oral before breakfast  sevelamer carbonate 800 milliGRAM(s) Oral three times a day with meals                            10.5   7.25  )-----------( 181      ( 23 Mar 2021 07:25 )             34.5       03-23    135  |  94<L>  |  66<H>  ----------------------------<  114<H>  5.8<H>   |  21<L>  |  11.47<H>    Ca    9.2      23 Mar 2021 07:25  Phos  7.2     03-23  Mg     2.2     03-23    TPro  8.0  /  Alb  3.9  /  TBili  0.6  /  DBili  x   /  AST  9   /  ALT  10  /  AlkPhos  106  03-22    T(C): 36.3 (03-23-21 @ 11:15), Max: 37 (03-22-21 @ 18:21)  HR: 57 (03-23-21 @ 11:15) (57 - 72)  BP: 153/81 (03-23-21 @ 11:15) (140/55 - 156/61)  RR: 16 (03-23-21 @ 11:15) (16 - 18)  SpO2: 100% (03-23-21 @ 11:15) (98% - 100%)  Wt(kg): --    I&O's Summary    General: Well nourished in no acute distress. Alert and Oriented * 3.   Head: Normocephalic and atraumatic.   Neck: No JVD. No bruits. Supple. Does not appear to be enlarged.   Cardiovascular: + S1,S2 ; RRR Soft systolic murmur at the left lower sternal border. No rubs noted.    Lungs: CTA b/l. No rhonchi, rales or wheezes.   Abdomen: + BS, soft. Non tender. Non distended. No rebound. No guarding.   Extremities: No clubbing/cyanosis/edema.   Neurologic: Moves all four extremities. Full range of motion.   Skin: Warm and moist. The patient's skin has normal elasticity and good skin turgor.   Psychiatric: Appropriate mood and affect.  Musculoskeletal: Normal range of motion, normal strength    Echo 3/18/2021 - EF decreased to 35-40%.  Mid-to distal septum and anterior wall, and apex are severely hypokinetic.  Severe diastolic dysfunction.    A/P:  66 year old male with history of CAD s/p multiple PCI (last PCI in Feb of 2019 with NAHUM to LAD and LCx), HTN, DM, ESRD on HD, history of GIB, history of CVA who is being seen for management of CAD    -suspect troponin elevation at some level secondary to underlying ESRD.  Cardiac enzyme pattern not consistent with acute coronary syndrome.  He also did not present with any angina.  Presenting s/sx consistent with heart failure.  -reduced LVEF raised concern for occluded stents and de-branden CAD.  found multisegment stenosis in RCA, with the significant distal segment stented yesterday by Dr Taveras. There is also in-stent restenosis in the LAD, which he will bring the patient back for laser atherectomy at a later date.    -Continue hemodialysis.    -Optimize preload/afterload reduction in the setting of hypertension.  Added Isosorbide Mononitrate to his hydralazine + nifedipine before the weekend.    OK for discharge from Cardiology perspective.  See chart note from this morning, followup @ Dr Patel's office with Dr Taveras set for next week.    Harrison Wright M.D.  Cardiac Electrophysiology  [phone]      
chief complaint: AMEZCUA    extended hpi: 66 year old male with history of CAD s/p multiple PCI (last PCI in Feb of 2019 with NAHUM to LAD and LCx), HTN, DM, ESRD on HD, history of GIB, history of CVA who is being seen for management of CAD.  The patient presented to the ED with dyspnea for approximately 2 months that progressively got worse over the last several days.    Today, denies CP, SOB or Palps, ROS otherwise -  He was prepared to go home today, but Echo results have changed this plan.  Newly identified reduced EF of 35-40%.    Review of Systems:   Constitutional: [ ] fevers, [ ] chills.   Skin: [ ] dry skin. [ ] rashes.  Psychiatric: [ ] depression, [ ] anxiety.   Gastrointestinal: [ ] BRBPR, [ ] melena.   Neurological: [ ] confusion. [ ] seizures. [ ] shuffling gait.   Ears,Nose,Mouth and Throat: [ ] ear pain [ ] sore throat.   Eyes: [ ] diplopia.   Respiratory: [ ] hemoptysis. [ ] shortness of breath  Cardiovascular: See HPI above  Hematologic/Lymphatic: [ ] anemia. [ ] painful nodes. [ ] prolonged bleeding.   Genitourinary: [ ] hematuria. [ ] flank pain.   Endocrine: [ ] significant change in weight. [ ] intolerance to heat and cold.     Review of systems [x ] otherwise negative, [ ] otherwise unable to obtain    FH: no family history of sudden cardiac death in first degree relatives    SH: [ ] tobacco, [ ] alcohol, [ ] drugs    aspirin  chewable 81 milliGRAM(s) Oral daily  aspirin enteric coated 81 milliGRAM(s) Oral daily  atorvastatin 40 milliGRAM(s) Oral at bedtime  calcitriol   Capsule 0.25 MICROGram(s) Oral daily  cloNIDine 0.1 milliGRAM(s) Oral two times a day  clopidogrel Tablet 75 milliGRAM(s) Oral daily  dextrose 40% Gel 15 Gram(s) Oral once  dextrose 5%. 1000 milliLiter(s) IV Continuous <Continuous>  dextrose 5%. 1000 milliLiter(s) IV Continuous <Continuous>  dextrose 50% Injectable 25 Gram(s) IV Push once  dextrose 50% Injectable 12.5 Gram(s) IV Push once  dextrose 50% Injectable 25 Gram(s) IV Push once  glucagon  Injectable 1 milliGRAM(s) IntraMuscular once  heparin   Injectable 5000 Unit(s) SubCutaneous every 8 hours  hydrALAZINE 100 milliGRAM(s) Oral three times a day  insulin lispro (ADMELOG) corrective regimen sliding scale   SubCutaneous at bedtime  insulin lispro (ADMELOG) corrective regimen sliding scale   SubCutaneous three times a day before meals  labetalol 100 milliGRAM(s) Oral two times a day  NIFEdipine XL 60 milliGRAM(s) Oral daily  pantoprazole    Tablet 40 milliGRAM(s) Oral before breakfast  sevelamer carbonate 800 milliGRAM(s) Oral three times a day with meals                            10.7   6.55  )-----------( 187      ( 19 Mar 2021 06:53 )             35.2       03-19    137  |  94<L>  |  37<H>  ----------------------------<  101<H>  4.2   |  26  |  7.44<H>    Ca    9.8      19 Mar 2021 06:53    TPro  8.7<H>  /  Alb  4.3  /  TBili  0.7  /  DBili  x   /  AST  12  /  ALT  10  /  AlkPhos  130<H>  03-19      CARDIAC MARKERS ( 17 Mar 2021 14:38 )  x     / x     / 55 U/L / x     / 2.1 ng/mL    T(C): 36.9 (03-19-21 @ 18:04), Max: 36.9 (03-19-21 @ 05:03)  HR: 68 (03-19-21 @ 18:04) (60 - 68)  BP: 151/68 (03-19-21 @ 18:04) (141/70 - 182/90)  RR: 16 (03-19-21 @ 18:04) (16 - 18)  SpO2: 98% (03-19-21 @ 18:04) (98% - 100%)  Wt(kg): --    I&O's Summary    18 Mar 2021 07:01  -  19 Mar 2021 07:00  --------------------------------------------------------  IN: 400 mL / OUT: 2200 mL / NET: -1800 mL      General: Well nourished in no acute distress. Alert and Oriented * 3.   Head: Normocephalic and atraumatic.   Neck: No JVD. No bruits. Supple. Does not appear to be enlarged.   Cardiovascular: + S1,S2 ; RRR Soft systolic murmur at the left lower sternal border. No rubs noted.    Lungs: CTA b/l. No rhonchi, rales or wheezes.   Abdomen: + BS, soft. Non tender. Non distended. No rebound. No guarding.   Extremities: No clubbing/cyanosis/edema.   Neurologic: Moves all four extremities. Full range of motion.   Skin: Warm and moist. The patient's skin has normal elasticity and good skin turgor.   Psychiatric: Appropriate mood and affect.  Musculoskeletal: Normal range of motion, normal strength    Echo 3/18/2021 - EF decreased to 35-40%.  Mid-to distal septum and anterior wall, and apex are severely hypokinetic.  Severe diastolic dysfunction.    A/P:  66 year old male with history of CAD s/p multiple PCI (last PCI in Feb of 2019 with NAHUM to LAD and LCx), HTN, DM, ESRD on HD, history of GIB, history of CVA who is being seen for management of CAD    -suspect troponin elevation at some level secondary to underlying ESRD.  Cardiac enzyme pattern not consistent with acute coronary syndrome.  He also did not present with any angina.  Presenting s/sx consistent with heart failure.  -reduced LVEF, however, raises concern for occluded stents.  his last EF a few months ago was NORMAL.    -Continue hemodialysis.  -Optimize preload/afterload reduction in the setting of hypertension.  Will add Isosorbide Mononitrate to his hydralazine + nifedipine.  -consider repeat angiogram during next week.    Harrison Wright M.D.  Cardiac Electrophysiology  [phone]      
[facility]   MD ANDRES AVALOS DO ANAM SIDDIQUI ANGELA WONG, PA    TEL:  OFFICE: [phone]  DR CUETO CELL: [phone]  DR. HUNTER CELL: [phone]  DR. HORTA CELL: [phone]  LIDA WINTERS CELL: [phone]    From 5pm-7am Answering Service [phone]    -- RENAL FOLLOW UP NOTE ---Date of Service 03-18-21 @ 13:19    Patient is a 66y old  Male who presents with a chief complaint of SOB (17 Mar 2021 19:37)      Patient seen and examined at bedside. No chest pain/sob. c/o dysuria. still c/o sob    VITALS:  T(F): 97.9 (03-18-21 @ 05:30), Max: 98 (03-17-21 @ 21:37)  HR: 73 (03-18-21 @ 05:30)  BP: 159/64 (03-18-21 @ 05:30)  RR: 18 (03-18-21 @ 05:30)  SpO2: 100% (03-18-21 @ 05:30)  Wt(kg): --    03-17 @ 07:01  -  03-18 @ 07:00  --------------------------------------------------------  IN: 400 mL / OUT: 2400 mL / NET: -2000 mL        Weight (kg): 61.5 (03-17 @ 19:43)    PHYSICAL EXAM:  Constitutional: NAD  Neck: No JVD  Respiratory: basilar crackles  Cardiovascular: S1, S2, RRR  Gastrointestinal: BS+, soft, NT/ND  Extremities: No peripheral edema    Hospital Medications:   MEDICATIONS  (STANDING):  aspirin  chewable 81 milliGRAM(s) Oral daily  aspirin enteric coated 81 milliGRAM(s) Oral daily  atorvastatin 40 milliGRAM(s) Oral at bedtime  calcitriol   Capsule 0.25 MICROGram(s) Oral daily  cloNIDine 0.1 milliGRAM(s) Oral two times a day  clopidogrel Tablet 75 milliGRAM(s) Oral daily  dextrose 40% Gel 15 Gram(s) Oral once  dextrose 5%. 1000 milliLiter(s) (50 mL/Hr) IV Continuous <Continuous>  dextrose 5%. 1000 milliLiter(s) (100 mL/Hr) IV Continuous <Continuous>  dextrose 50% Injectable 25 Gram(s) IV Push once  dextrose 50% Injectable 12.5 Gram(s) IV Push once  dextrose 50% Injectable 25 Gram(s) IV Push once  glucagon  Injectable 1 milliGRAM(s) IntraMuscular once  heparin   Injectable 5000 Unit(s) SubCutaneous every 8 hours  hydrALAZINE 100 milliGRAM(s) Oral three times a day  insulin lispro (ADMELOG) corrective regimen sliding scale   SubCutaneous at bedtime  insulin lispro (ADMELOG) corrective regimen sliding scale   SubCutaneous three times a day before meals  labetalol 100 milliGRAM(s) Oral two times a day  losartan 25 milliGRAM(s) Oral daily  NIFEdipine XL 60 milliGRAM(s) Oral daily  pantoprazole    Tablet 40 milliGRAM(s) Oral before breakfast  sevelamer carbonate 800 milliGRAM(s) Oral three times a day with meals      LABS:  03-18    140  |  96<L>  |  57<H>  ----------------------------<  91  4.5   |  22  |  11.01<H>    Ca    9.5      18 Mar 2021 06:58    TPro  8.6<H>  /  Alb  4.1  /  TBili  0.8  /  DBili      /  AST  12  /  ALT  10  /  AlkPhos  131<H>  03-18    Creatinine Trend: 11.01 <--, 9.64 <--, 9.37 <--    Albumin, Serum: 4.1 g/dL (03-18 @ 06:58)                              10.5   6.86  )-----------( 173      ( 18 Mar 2021 06:58 )             34.5     Urine Studies:      HbA1c 7.8      [12-17-19 @ 05:54]        RADIOLOGY & ADDITIONAL STUDIES:  
[facility] [place] [facility]   MD ANDRES AVALOS DO ANAM SIDDIQUI ANGELA WONG, PA    TEL:  OFFICE: [phone]  DR CUETO CELL: [phone]  DR. HUNTER CELL: [phone]  DR. HORTA CELL: [phone]  LIDA WINTERS CELL: [phone]    From 5pm-7am Answering Service [phone]    -- RENAL FOLLOW UP NOTE ---Date of Service 03-23-21 @ 12:52    Patient is a 66y old  Male who presents with a chief complaint of SOB (22 Mar 2021 16:40)      Patient seen and examined in HD. No chest pain/sob    VITALS:  T(F): 97.3 (03-23-21 @ 11:15), Max: 98.6 (03-22-21 @ 18:21)  HR: 57 (03-23-21 @ 11:15)  BP: 153/81 (03-23-21 @ 11:15)  RR: 16 (03-23-21 @ 11:15)  SpO2: 100% (03-23-21 @ 11:15)  Wt(kg): --  flow 400        PHYSICAL EXAM:  Constitutional: NAD  Neck: No JVD  Respiratory: CTAB, no wheezes, rales or rhonchi  Cardiovascular: S1, S2, RRR  Gastrointestinal: BS+, soft, NT/ND  Extremities: No peripheral edema    Hospital Medications:   MEDICATIONS  (STANDING):  aspirin enteric coated 81 milliGRAM(s) Oral daily  atorvastatin 40 milliGRAM(s) Oral at bedtime  calcitriol   Capsule 0.25 MICROGram(s) Oral daily  cloNIDine 0.1 milliGRAM(s) Oral two times a day  clopidogrel Tablet 75 milliGRAM(s) Oral daily  dextrose 40% Gel 15 Gram(s) Oral once  dextrose 5%. 1000 milliLiter(s) (50 mL/Hr) IV Continuous <Continuous>  dextrose 5%. 1000 milliLiter(s) (100 mL/Hr) IV Continuous <Continuous>  dextrose 50% Injectable 25 Gram(s) IV Push once  dextrose 50% Injectable 12.5 Gram(s) IV Push once  dextrose 50% Injectable 25 Gram(s) IV Push once  glucagon  Injectable 1 milliGRAM(s) IntraMuscular once  heparin   Injectable 5000 Unit(s) SubCutaneous every 12 hours  hydrALAZINE 100 milliGRAM(s) Oral three times a day  insulin lispro (ADMELOG) corrective regimen sliding scale   SubCutaneous at bedtime  insulin lispro (ADMELOG) corrective regimen sliding scale   SubCutaneous three times a day before meals  isosorbide   mononitrate ER Tablet (IMDUR) 60 milliGRAM(s) Oral daily  labetalol 100 milliGRAM(s) Oral two times a day  losartan 100 milliGRAM(s) Oral daily  NIFEdipine XL 60 milliGRAM(s) Oral daily  pantoprazole    Tablet 40 milliGRAM(s) Oral before breakfast  sevelamer carbonate 800 milliGRAM(s) Oral three times a day with meals      LABS:  03-23    135  |  94<L>  |  66<H>  ----------------------------<  114<H>  5.8<H>   |  21<L>  |  11.47<H>    Ca    9.2      23 Mar 2021 07:25  Phos  7.2     03-23  Mg     2.2     03-23    TPro  8.0  /  Alb  3.9  /  TBili  0.6  /  DBili      /  AST  9   /  ALT  10  /  AlkPhos  106  03-22    Creatinine Trend: 11.47 <--, 9.16 <--, 6.94 <--, 9.76 <--, 7.44 <--, 11.01 <--, 9.64 <--, 9.37 <--    Phosphorus Level, Serum: 7.2 mg/dL (03-23 @ 07:25)                              10.5   7.25  )-----------( 181      ( 23 Mar 2021 07:25 )             34.5     Urine Studies:      PTH -- (Ca --)      [03-19-21 @ 06:53]   400  HbA1c 7.8      [12-17-19 @ 05:54]        RADIOLOGY & ADDITIONAL STUDIES:  
Patient is a 66y old  Male who presents with a chief complaint of SOB (18 Mar 2021 14:36)    Date of servie : 03-18-21 @ 15:15  INTERVAL HPI/OVERNIGHT EVENTS:  T(C): 36.5 (03-18-21 @ 13:44), Max: 36.7 (03-17-21 @ 21:37)  HR: 61 (03-18-21 @ 13:44) (55 - 73)  BP: 157/64 (03-18-21 @ 13:44) (157/64 - 183/80)  RR: 17 (03-18-21 @ 13:44) (17 - 18)  SpO2: 100% (03-18-21 @ 13:44) (99% - 100%)  Wt(kg): --  I&O's Summary    17 Mar 2021 07:01  -  18 Mar 2021 07:00  --------------------------------------------------------  IN: 400 mL / OUT: 2400 mL / NET: -2000 mL        LABS:                        10.5   6.86  )-----------( 173      ( 18 Mar 2021 06:58 )             34.5     03-18    140  |  96<L>  |  57<H>  ----------------------------<  91  4.5   |  22  |  11.01<H>    Ca    9.5      18 Mar 2021 06:58    TPro  8.6<H>  /  Alb  4.1  /  TBili  0.8  /  DBili  x   /  AST  12  /  ALT  10  /  AlkPhos  131<H>  03-18        CAPILLARY BLOOD GLUCOSE      POCT Blood Glucose.: 227 mg/dL (18 Mar 2021 11:50)  POCT Blood Glucose.: 107 mg/dL (18 Mar 2021 07:59)  POCT Blood Glucose.: 207 mg/dL (17 Mar 2021 21:24)            MEDICATIONS  (STANDING):  aspirin  chewable 81 milliGRAM(s) Oral daily  aspirin enteric coated 81 milliGRAM(s) Oral daily  atorvastatin 40 milliGRAM(s) Oral at bedtime  calcitriol   Capsule 0.25 MICROGram(s) Oral daily  cloNIDine 0.1 milliGRAM(s) Oral two times a day  clopidogrel Tablet 75 milliGRAM(s) Oral daily  dextrose 40% Gel 15 Gram(s) Oral once  dextrose 5%. 1000 milliLiter(s) (50 mL/Hr) IV Continuous <Continuous>  dextrose 5%. 1000 milliLiter(s) (100 mL/Hr) IV Continuous <Continuous>  dextrose 50% Injectable 25 Gram(s) IV Push once  dextrose 50% Injectable 12.5 Gram(s) IV Push once  dextrose 50% Injectable 25 Gram(s) IV Push once  glucagon  Injectable 1 milliGRAM(s) IntraMuscular once  heparin   Injectable 5000 Unit(s) SubCutaneous every 8 hours  hydrALAZINE 100 milliGRAM(s) Oral three times a day  insulin lispro (ADMELOG) corrective regimen sliding scale   SubCutaneous at bedtime  insulin lispro (ADMELOG) corrective regimen sliding scale   SubCutaneous three times a day before meals  labetalol 100 milliGRAM(s) Oral two times a day  losartan 25 milliGRAM(s) Oral daily  NIFEdipine XL 60 milliGRAM(s) Oral daily  pantoprazole    Tablet 40 milliGRAM(s) Oral before breakfast  sevelamer carbonate 800 milliGRAM(s) Oral three times a day with meals    MEDICATIONS  (PRN):          PHYSICAL EXAM:  GENERAL: NAD, well-groomed, well-developed  HEAD:  Atraumatic, Normocephalic  CHEST/LUNG: Clear to percussion bilaterally; No rales, rhonchi, wheezing, or rubs  HEART: Regular rate and rhythm; No murmurs, rubs, or gallops  ABDOMEN: Soft, Nontender, Nondistended; Bowel sounds present  EXTREMITIES:  2+ Peripheral Pulses, No clubbing, cyanosis, or edema  LYMPH: No lymphadenopathy noted  SKIN: No rashes or lesions    Care Discussed with Consultants/Other Providers [ ] YES  [ ] NO
Patient is a 66y old  Male who presents with a chief complaint of SOB (19 Mar 2021 12:53)    Date of servie : 03-19-21 @ 15:17  INTERVAL HPI/OVERNIGHT EVENTS:  T(C): 36.7 (03-19-21 @ 12:14), Max: 36.9 (03-19-21 @ 05:03)  HR: 60 (03-19-21 @ 12:14) (60 - 68)  BP: 141/70 (03-19-21 @ 12:14) (141/70 - 182/90)  RR: 17 (03-19-21 @ 12:14) (17 - 18)  SpO2: 100% (03-19-21 @ 12:14) (100% - 100%)  Wt(kg): --  I&O's Summary    18 Mar 2021 07:01  -  19 Mar 2021 07:00  --------------------------------------------------------  IN: 400 mL / OUT: 2200 mL / NET: -1800 mL        LABS:                        10.7   6.55  )-----------( 187      ( 19 Mar 2021 06:53 )             35.2     03-19    137  |  94<L>  |  37<H>  ----------------------------<  101<H>  4.2   |  26  |  7.44<H>    Ca    9.8      19 Mar 2021 06:53    TPro  8.7<H>  /  Alb  4.3  /  TBili  0.7  /  DBili  x   /  AST  12  /  ALT  10  /  AlkPhos  130<H>  03-19        CAPILLARY BLOOD GLUCOSE      POCT Blood Glucose.: 270 mg/dL (19 Mar 2021 11:49)  POCT Blood Glucose.: 139 mg/dL (19 Mar 2021 09:06)  POCT Blood Glucose.: 135 mg/dL (18 Mar 2021 21:13)  POCT Blood Glucose.: 158 mg/dL (18 Mar 2021 17:20)            MEDICATIONS  (STANDING):  aspirin  chewable 81 milliGRAM(s) Oral daily  aspirin enteric coated 81 milliGRAM(s) Oral daily  atorvastatin 40 milliGRAM(s) Oral at bedtime  calcitriol   Capsule 0.25 MICROGram(s) Oral daily  cloNIDine 0.1 milliGRAM(s) Oral two times a day  clopidogrel Tablet 75 milliGRAM(s) Oral daily  dextrose 40% Gel 15 Gram(s) Oral once  dextrose 5%. 1000 milliLiter(s) (50 mL/Hr) IV Continuous <Continuous>  dextrose 5%. 1000 milliLiter(s) (100 mL/Hr) IV Continuous <Continuous>  dextrose 50% Injectable 25 Gram(s) IV Push once  dextrose 50% Injectable 12.5 Gram(s) IV Push once  dextrose 50% Injectable 25 Gram(s) IV Push once  glucagon  Injectable 1 milliGRAM(s) IntraMuscular once  heparin   Injectable 5000 Unit(s) SubCutaneous every 8 hours  hydrALAZINE 100 milliGRAM(s) Oral three times a day  insulin lispro (ADMELOG) corrective regimen sliding scale   SubCutaneous at bedtime  insulin lispro (ADMELOG) corrective regimen sliding scale   SubCutaneous three times a day before meals  labetalol 100 milliGRAM(s) Oral two times a day  NIFEdipine XL 60 milliGRAM(s) Oral daily  pantoprazole    Tablet 40 milliGRAM(s) Oral before breakfast  sevelamer carbonate 800 milliGRAM(s) Oral three times a day with meals    MEDICATIONS  (PRN):          PHYSICAL EXAM:  GENERAL:frail  CHEST/LUNG: Clear to percussion bilaterally; No rales, rhonchi, wheezing, or rubs  HEART: Regular rate and rhythm; No murmurs, rubs, or gallops  ABDOMEN: Soft, Nontender, Nondistended; Bowel sounds present  EXTREMITIES:  2+ Peripheral Pulses, No clubbing, cyanosis, or edema  LYMPH: No lymphadenopathy noted  SKIN: No rashes or lesions    Care Discussed with Consultants/Other Providers [x ] YES  [ ] NO
Patient is a 66y old  Male who presents with a chief complaint of SOB (20 Mar 2021 14:53)    Date of servie : 03-20-21 @ 16:42  INTERVAL HPI/OVERNIGHT EVENTS:  T(C): 36.6 (03-20-21 @ 14:45), Max: 36.9 (03-19-21 @ 18:04)  HR: 57 (03-20-21 @ 14:45) (57 - 68)  BP: 173/84 (03-20-21 @ 14:45) (149/62 - 177/85)  RR: 17 (03-20-21 @ 14:45) (16 - 18)  SpO2: 99% (03-20-21 @ 10:49) (98% - 100%)  Wt(kg): --  I&O's Summary    20 Mar 2021 07:01  -  20 Mar 2021 16:42  --------------------------------------------------------  IN: 400 mL / OUT: 2600 mL / NET: -2200 mL        LABS:                        10.3   6.37  )-----------( 187      ( 20 Mar 2021 07:26 )             34.0     03-20    138  |  96<L>  |  55<H>  ----------------------------<  92  4.3   |  25  |  9.76<H>    Ca    9.1      20 Mar 2021 07:32  Phos  5.9     03-20  Mg     2.3     03-20    TPro  8.7<H>  /  Alb  4.3  /  TBili  0.7  /  DBili  x   /  AST  12  /  ALT  10  /  AlkPhos  130<H>  03-19        CAPILLARY BLOOD GLUCOSE      POCT Blood Glucose.: 241 mg/dL (20 Mar 2021 16:39)  POCT Blood Glucose.: 102 mg/dL (20 Mar 2021 13:33)  POCT Blood Glucose.: 110 mg/dL (20 Mar 2021 13:05)  POCT Blood Glucose.: 110 mg/dL (20 Mar 2021 07:57)  POCT Blood Glucose.: 195 mg/dL (19 Mar 2021 22:34)  POCT Blood Glucose.: 204 mg/dL (19 Mar 2021 18:02)            MEDICATIONS  (STANDING):  aspirin enteric coated 81 milliGRAM(s) Oral daily  atorvastatin 40 milliGRAM(s) Oral at bedtime  calcitriol   Capsule 0.25 MICROGram(s) Oral daily  cloNIDine 0.1 milliGRAM(s) Oral two times a day  clopidogrel Tablet 75 milliGRAM(s) Oral daily  dextrose 40% Gel 15 Gram(s) Oral once  dextrose 5%. 1000 milliLiter(s) (50 mL/Hr) IV Continuous <Continuous>  dextrose 5%. 1000 milliLiter(s) (100 mL/Hr) IV Continuous <Continuous>  dextrose 50% Injectable 25 Gram(s) IV Push once  dextrose 50% Injectable 12.5 Gram(s) IV Push once  dextrose 50% Injectable 25 Gram(s) IV Push once  glucagon  Injectable 1 milliGRAM(s) IntraMuscular once  heparin   Injectable 5000 Unit(s) SubCutaneous every 8 hours  hydrALAZINE 100 milliGRAM(s) Oral three times a day  insulin lispro (ADMELOG) corrective regimen sliding scale   SubCutaneous three times a day before meals  insulin lispro (ADMELOG) corrective regimen sliding scale   SubCutaneous at bedtime  isosorbide   mononitrate ER Tablet (IMDUR) 60 milliGRAM(s) Oral daily  labetalol 100 milliGRAM(s) Oral two times a day  losartan 50 milliGRAM(s) Oral daily  NIFEdipine XL 60 milliGRAM(s) Oral daily  pantoprazole    Tablet 40 milliGRAM(s) Oral before breakfast  sevelamer carbonate 800 milliGRAM(s) Oral three times a day with meals    MEDICATIONS  (PRN):          PHYSICAL EXAM:  GENERAL: NAD, well-groomed, well-developed  HEAD:  Atraumatic, Normocephalic  CHEST/LUNG: Clear to percussion bilaterally; No rales, rhonchi, wheezing, or rubs  HEART: Regular rate and rhythm; No murmurs, rubs, or gallops  ABDOMEN: Soft, Nontender, Nondistended; Bowel sounds present  EXTREMITIES:  2+ Peripheral Pulses, No clubbing, cyanosis, or edema  LYMPH: No lymphadenopathy noted  SKIN: No rashes or lesions    Care Discussed with Consultants/Other Providers [x ] YES  [ ] NO
chief complaint: AMEZCUA    extended hpi: 66 year old male with history of CAD s/p multiple PCI (last PCI in Feb of 2019 with NAHUM to LAD and LCx), HTN, DM, ESRD on HD, history of GIB, history of CVA who is being seen for management of CAD.  The patient presented to the ED with dyspnea for approximately 2 months that progressively got worse over the last several days.    Today, denies CP, SOB or Palps, ROS otherwise -      chief complaint:    extended hpi: with > 4 characteristics:    Review of Systems:   Constitutional: [ ] fevers, [ ] chills.   Skin: [ ] dry skin. [ ] rashes.  Psychiatric: [ ] depression, [ ] anxiety.   Gastrointestinal: [ ] BRBPR, [ ] melena.   Neurological: [ ] confusion. [ ] seizures. [ ] shuffling gait.   Ears,Nose,Mouth and Throat: [ ] ear pain [ ] sore throat.   Eyes: [ ] diplopia.   Respiratory: [ ] hemoptysis. [ ] shortness of breath  Cardiovascular: See HPI above  Hematologic/Lymphatic: [ ] anemia. [ ] painful nodes. [ ] prolonged bleeding.   Genitourinary: [ ] hematuria. [ ] flank pain.   Endocrine: [ ] significant change in weight. [ ] intolerance to heat and cold.     Review of systems [ ] otherwise negative, [ ] otherwise unable to obtain    FH: no family history of sudden cardiac death in first degree relatives    SH: [ ] tobacco, [ ] alcohol, [ ] drugs    aspirin enteric coated 81 milliGRAM(s) Oral daily  atorvastatin 40 milliGRAM(s) Oral at bedtime  calcitriol   Capsule 0.25 MICROGram(s) Oral daily  cloNIDine 0.1 milliGRAM(s) Oral two times a day  clopidogrel Tablet 75 milliGRAM(s) Oral daily  dextrose 40% Gel 15 Gram(s) Oral once  dextrose 5%. 1000 milliLiter(s) IV Continuous <Continuous>  dextrose 5%. 1000 milliLiter(s) IV Continuous <Continuous>  dextrose 50% Injectable 25 Gram(s) IV Push once  dextrose 50% Injectable 12.5 Gram(s) IV Push once  dextrose 50% Injectable 25 Gram(s) IV Push once  glucagon  Injectable 1 milliGRAM(s) IntraMuscular once  heparin   Injectable 5000 Unit(s) SubCutaneous every 8 hours  hydrALAZINE 100 milliGRAM(s) Oral three times a day  insulin lispro (ADMELOG) corrective regimen sliding scale   SubCutaneous at bedtime  insulin lispro (ADMELOG) corrective regimen sliding scale   SubCutaneous three times a day before meals  isosorbide   mononitrate ER Tablet (IMDUR) 60 milliGRAM(s) Oral daily  labetalol 100 milliGRAM(s) Oral two times a day  losartan 50 milliGRAM(s) Oral daily  NIFEdipine XL 60 milliGRAM(s) Oral daily  pantoprazole    Tablet 40 milliGRAM(s) Oral before breakfast  sevelamer carbonate 800 milliGRAM(s) Oral three times a day with meals                        10.3   6.37  )-----------( 187      ( 20 Mar 2021 07:26 )             34.0     138  |  96<L>  |  55<H>  ----------------------------<  92  4.3   |  25  |  9.76<H>    Ca    9.1      20 Mar 2021 07:32  Phos  5.9     03-20  Mg     2.3     03-20    TPro  8.7<H>  /  Alb  4.3  /  TBili  0.7  /  DBili  x   /  AST  12  /  ALT  10  /  AlkPhos  130<H>  03-19      T(C): 36.6 (03-20-21 @ 14:45), Max: 36.9 (03-19-21 @ 18:04)  HR: 57 (03-20-21 @ 14:45) (57 - 68)  BP: 173/84 (03-20-21 @ 14:45) (149/62 - 177/85)  RR: 17 (03-20-21 @ 14:45) (16 - 18)  SpO2: 99% (03-20-21 @ 10:49) (98% - 100%)  Wt(kg): --    General: Well nourished in no acute distress. Alert and Oriented * 3.   Head: Normocephalic and atraumatic.   Neck: No JVD. No bruits. Supple. Does not appear to be enlarged.   Cardiovascular: + S1,S2 ; RRR Soft systolic murmur at the left lower sternal border. No rubs noted.    Lungs: CTA b/l. No rhonchi, rales or wheezes.   Abdomen: + BS, soft. Non tender. Non distended. No rebound. No guarding.   Extremities: No clubbing/cyanosis/edema.   Neurologic: Moves all four extremities. Full range of motion.   Skin: Warm and moist. The patient's skin has normal elasticity and good skin turgor.   Psychiatric: Appropriate mood and affect.  Musculoskeletal: Normal range of motion, normal strength    Echo 3/18/2021 - EF decreased to 35-40%.  Mid-to distal septum and anterior wall, and apex are severely hypokinetic.  Severe diastolic dysfunction.    A/P:  66 year old male with history of CAD s/p multiple PCI (last PCI in Feb of 2019 with NAHUM to LAD and LCx), HTN, DM, ESRD on HD, history of GIB, history of CVA who is being seen for management of CAD    -suspect troponin elevation at some level secondary to underlying ESRD.  Cardiac enzyme pattern not consistent with acute coronary syndrome.  He also did not present with any angina.  Presenting s/sx consistent with heart failure.  -reduced LVEF, however, raises concern for occluded stents.  his last EF a few months ago was NORMAL.    -Continue hemodialysis.  -Optimize preload/afterload reduction in the setting of hypertension.  Will add Isosorbide Mononitrate to his hydralazine + nifedipine.  -[facility] monday- pt understands and agrees          
chief complaint: AMEZCUA    extended hpi: 66 year old male with history of CAD s/p multiple PCI (last PCI in Feb of 2019 with PAULY to LAD and LCx), HTN, DM, ESRD on HD, history of GIB, history of CVA who is being seen for management of CAD.  The patient presented to the ED with dyspnea for approximately 2 months that progressively got worse over the last several days.    Today, denies CP, SOB or Palps, ROS otherwise -    Review of Systems:   Constitutional: [ ] fevers, [ ] chills.   Skin: [ ] dry skin. [ ] rashes.  Psychiatric: [ ] depression, [ ] anxiety.   Gastrointestinal: [ ] BRBPR, [ ] melena.   Neurological: [ ] confusion. [ ] seizures. [ ] shuffling gait.   Ears,Nose,Mouth and Throat: [ ] ear pain [ ] sore throat.   Eyes: [ ] diplopia.   Respiratory: [ ] hemoptysis. [ ] shortness of breath  Cardiovascular: See HPI above  Hematologic/Lymphatic: [ ] anemia. [ ] painful nodes. [ ] prolonged bleeding.   Genitourinary: [ ] hematuria. [ ] flank pain.   Endocrine: [ ] significant change in weight. [ ] intolerance to heat and cold.     Review of systems [x ] otherwise negative, [ ] otherwise unable to obtain    FH: no family history of sudden cardiac death in first degree relatives    SH: [ ] tobacco, [ ] alcohol, [ ] drugs    aspirin  chewable 81 milliGRAM(s) Oral daily  aspirin enteric coated 81 milliGRAM(s) Oral daily  atorvastatin 40 milliGRAM(s) Oral at bedtime  calcitriol   Capsule 0.25 MICROGram(s) Oral daily  cloNIDine 0.1 milliGRAM(s) Oral two times a day  clopidogrel Tablet 75 milliGRAM(s) Oral daily  dextrose 40% Gel 15 Gram(s) Oral once  dextrose 5%. 1000 milliLiter(s) IV Continuous <Continuous>  dextrose 5%. 1000 milliLiter(s) IV Continuous <Continuous>  dextrose 50% Injectable 25 Gram(s) IV Push once  dextrose 50% Injectable 12.5 Gram(s) IV Push once  dextrose 50% Injectable 25 Gram(s) IV Push once  glucagon  Injectable 1 milliGRAM(s) IntraMuscular once  heparin   Injectable 5000 Unit(s) SubCutaneous every 8 hours  hydrALAZINE 100 milliGRAM(s) Oral three times a day  insulin lispro (ADMELOG) corrective regimen sliding scale   SubCutaneous at bedtime  insulin lispro (ADMELOG) corrective regimen sliding scale   SubCutaneous three times a day before meals  labetalol 100 milliGRAM(s) Oral two times a day  losartan 25 milliGRAM(s) Oral daily  NIFEdipine XL 60 milliGRAM(s) Oral daily  pantoprazole    Tablet 40 milliGRAM(s) Oral before breakfast  sevelamer carbonate 800 milliGRAM(s) Oral three times a day with meals                          10.5   6.86  )-----------( 173      ( 18 Mar 2021 06:58 )             34.5       03-18    140  |  96<L>  |  57<H>  ----------------------------<  91  4.5   |  22  |  11.01<H>    Ca    9.5      18 Mar 2021 06:58    TPro  8.6<H>  /  Alb  4.1  /  TBili  0.8  /  DBili  x   /  AST  12  /  ALT  10  /  AlkPhos  131<H>  03-18      CARDIAC MARKERS ( 17 Mar 2021 14:38 )  x     / x     / 55 U/L / x     / 2.1 ng/mL        T(C): 36.5 (03-18-21 @ 13:44), Max: 36.7 (03-17-21 @ 21:37)  HR: 61 (03-18-21 @ 13:44) (55 - 73)  BP: 157/64 (03-18-21 @ 13:44) (157/64 - 183/80)  RR: 17 (03-18-21 @ 13:44) (17 - 18)  SpO2: 100% (03-18-21 @ 13:44) (99% - 100%)  Wt(kg): --    I&O's Summary    17 Mar 2021 07:01  -  18 Mar 2021 07:00  --------------------------------------------------------  IN: 400 mL / OUT: 2400 mL / NET: -2000 mL        General: Well nourished in no acute distress. Alert and Oriented * 3.   Head: Normocephalic and atraumatic.   Neck: No JVD. No bruits. Supple. Does not appear to be enlarged.   Cardiovascular: + S1,S2 ; RRR Soft systolic murmur at the left lower sternal border. No rubs noted.    Lungs: CTA b/l. No rhonchi, rales or wheezes.   Abdomen: + BS, soft. Non tender. Non distended. No rebound. No guarding.   Extremities: No clubbing/cyanosis/edema.   Neurologic: Moves all four extremities. Full range of motion.   Skin: Warm and moist. The patient's skin has normal elasticity and good skin turgor.   Psychiatric: Appropriate mood and affect.  Musculoskeletal: Normal range of motion, normal strength    DATA;    < from: Transthoracic Echocardiogram (12.07.20 @ 11:45) >  CONCLUSIONS:  1. Moderate mitral regurgitation.  2. Moderately dilated left atrium.  LA volume index = 42  cc/m2.  3. Normal left ventricular systolic function. No segmental  wall motion abnormalities. Some segments are evaluated in  limited planes - cannot exclude small wall motion  abnormalities.  4. Normal left ventricular diastolic function.  5. Normal right ventricular size and function.  *** Compared with echocardiogram of 1/10/2019, the is more  mitral regurgitation.  ------------------------------------------------------------------------  Confirmed on  12/7/2020 - 12:56:01 by Sheldon Jimenez M.D.    < end of copied text >      A/P:  66 year old male with history of CAD s/p multiple PCI (last PCI in Feb of 2019 with PAULY to LAD and LCx), HTN, DM, ESRD on HD, history of GIB, history of CVA who is being seen for management of CAD    -pt. with elevated troponin with negative CPK inconsistent with acs  -suspect troponin elevation at some level secondary to underlying ESRD  -no chest pain or anginal symptoms currently  -no emergent cath indicated at this time  -would check TTE to evaluate LV function as well as degree of MR (moderate on last TTE)  -trend CE  -fluid removal with HD  -obtain outpatient records   -continue with apt for history of pauly if no contraindications  -further workup pending above                
[facility]   MD JORDAN AVALOS MD RUORU WONG, PA    TEL:  OFFICE: [phone]  DR CEUTO CELL: [phone]  SUSAN WINTERS CELL: [phone]  DR. HORTA CELL: [phone]  DR. HUNTER CELL: [phone]    FROM 5 PM - 7 AM PLEASE CALL ANSWERING SERVICE: [phone]    RENAL FOLLOW UP NOTE--Date of Service 03-19-21 @ 08:54  --------------------------------------------------------------------------------  HPI:      Pt seen and examined at bedside.   Denies SOB, chest pain     PAST HISTORY  --------------------------------------------------------------------------------  No significant changes to PMH, PSH, FHx, SHx, unless otherwise noted    ALLERGIES & MEDICATIONS  --------------------------------------------------------------------------------  Allergies    No Known Allergies    Intolerances      Standing Inpatient Medications  aspirin  chewable 81 milliGRAM(s) Oral daily  aspirin enteric coated 81 milliGRAM(s) Oral daily  atorvastatin 40 milliGRAM(s) Oral at bedtime  calcitriol   Capsule 0.25 MICROGram(s) Oral daily  cloNIDine 0.1 milliGRAM(s) Oral two times a day  clopidogrel Tablet 75 milliGRAM(s) Oral daily  dextrose 40% Gel 15 Gram(s) Oral once  dextrose 5%. 1000 milliLiter(s) IV Continuous <Continuous>  dextrose 5%. 1000 milliLiter(s) IV Continuous <Continuous>  dextrose 50% Injectable 25 Gram(s) IV Push once  dextrose 50% Injectable 12.5 Gram(s) IV Push once  dextrose 50% Injectable 25 Gram(s) IV Push once  glucagon  Injectable 1 milliGRAM(s) IntraMuscular once  heparin   Injectable 5000 Unit(s) SubCutaneous every 8 hours  hydrALAZINE 100 milliGRAM(s) Oral three times a day  insulin lispro (ADMELOG) corrective regimen sliding scale   SubCutaneous three times a day before meals  insulin lispro (ADMELOG) corrective regimen sliding scale   SubCutaneous at bedtime  labetalol 100 milliGRAM(s) Oral two times a day  losartan 25 milliGRAM(s) Oral daily  NIFEdipine XL 60 milliGRAM(s) Oral daily  pantoprazole    Tablet 40 milliGRAM(s) Oral before breakfast  sevelamer carbonate 800 milliGRAM(s) Oral three times a day with meals    PRN Inpatient Medications      REVIEW OF SYSTEMS  --------------------------------------------------------------------------------  General: no fever  CVS: no chest pain  RESP: no sob, no cough  ABD: no abdominal pain  : no dysuria,  MSK: no edema     VITALS/PHYSICAL EXAM  --------------------------------------------------------------------------------  T(C): 36.9 (03-19-21 @ 05:03), Max: 36.9 (03-19-21 @ 05:03)  HR: 68 (03-19-21 @ 05:03) (61 - 68)  BP: 162/60 (03-19-21 @ 05:03) (157/64 - 182/90)  RR: 18 (03-19-21 @ 05:03) (17 - 18)  SpO2: 100% (03-19-21 @ 05:03) (100% - 100%)  Wt(kg): --  Height (cm): 167.6 (03-17-21 @ 10:34)  Weight (kg): 61.5 (03-17-21 @ 19:43)  BMI (kg/m2): 21.9 (03-17-21 @ 19:43)  BSA (m2): 1.7 (03-17-21 @ 19:43)      03-18-21 @ 07:01  -  03-19-21 @ 07:00  --------------------------------------------------------  IN: 400 mL / OUT: 2200 mL / NET: -1800 mL      Physical Exam:  	Gen: NAD  	HEENT: MMM  	Pulm: CTA B/L  	CV: S1S2  	Abd: Soft, +BS  	Ext: No LE edema B/L                      Neuro: Awake alert  	Skin: Warm and Dry   	Vascular access: AVF          JHONATHAN hinds  LABS/STUDIES  --------------------------------------------------------------------------------              10.7   6.55  >-----------<  187      [03-19-21 @ 06:53]              35.2     137  |  94  |  37  ----------------------------<  101      [03-19-21 @ 06:53]  4.2   |  26  |  7.44        Ca     9.8     [03-19-21 @ 06:53]    TPro  8.7  /  Alb  4.3  /  TBili  0.7  /  DBili  x   /  AST  12  /  ALT  10  /  AlkPhos  130  [03-19-21 @ 06:53]        CK 55      [03-17-21 @ 14:38]    Creatinine Trend:  SCr 7.44 [03-19 @ 06:53]  SCr 11.01 [03-18 @ 06:58]  SCr 9.64 [03-17 @ 13:43]  SCr 9.37 [03-17 @ 12:32]        PTH -- (Ca --)      [03-19-21 @ 06:53]   400  HbA1c 7.8      [12-17-19 @ 05:54]      
[facility]   MD JORDAN AVALOS MD RUORU WONG, PA    TEL:  OFFICE: [phone]  DR CUETO CELL: [phone]  SUSAN WINTERS CELL: [phone]  DR. HORTA CELL: [phone]  DR. HUNTER CELL: [phone]    FROM 5 PM - 7 AM PLEASE CALL ANSWERING SERVICE: [phone]    RENAL FOLLOW UP NOTE--Date of Service 03-21-21 @ 09:47  --------------------------------------------------------------------------------  HPI:      Pt seen and examined at bedside.   Denies SOB, chest pain     PAST HISTORY  --------------------------------------------------------------------------------  No significant changes to PMH, PSH, FHx, SHx, unless otherwise noted    ALLERGIES & MEDICATIONS  --------------------------------------------------------------------------------  Allergies    No Known Allergies    Intolerances      Standing Inpatient Medications  aspirin enteric coated 81 milliGRAM(s) Oral daily  atorvastatin 40 milliGRAM(s) Oral at bedtime  calcitriol   Capsule 0.25 MICROGram(s) Oral daily  cloNIDine 0.1 milliGRAM(s) Oral two times a day  clopidogrel Tablet 75 milliGRAM(s) Oral daily  dextrose 40% Gel 15 Gram(s) Oral once  dextrose 5%. 1000 milliLiter(s) IV Continuous <Continuous>  dextrose 5%. 1000 milliLiter(s) IV Continuous <Continuous>  dextrose 50% Injectable 25 Gram(s) IV Push once  dextrose 50% Injectable 12.5 Gram(s) IV Push once  dextrose 50% Injectable 25 Gram(s) IV Push once  glucagon  Injectable 1 milliGRAM(s) IntraMuscular once  heparin   Injectable 5000 Unit(s) SubCutaneous every 8 hours  hydrALAZINE 100 milliGRAM(s) Oral three times a day  insulin lispro (ADMELOG) corrective regimen sliding scale   SubCutaneous at bedtime  insulin lispro (ADMELOG) corrective regimen sliding scale   SubCutaneous three times a day before meals  isosorbide   mononitrate ER Tablet (IMDUR) 60 milliGRAM(s) Oral daily  labetalol 100 milliGRAM(s) Oral two times a day  losartan 50 milliGRAM(s) Oral daily  NIFEdipine XL 60 milliGRAM(s) Oral daily  pantoprazole    Tablet 40 milliGRAM(s) Oral before breakfast  sevelamer carbonate 800 milliGRAM(s) Oral three times a day with meals    PRN Inpatient Medications      REVIEW OF SYSTEMS  --------------------------------------------------------------------------------  General: no fever  CVS: no chest pain  RESP: no sob, no cough  ABD: no abdominal pain  : no dysuria,  MSK: no edema     VITALS/PHYSICAL EXAM  --------------------------------------------------------------------------------  T(C): 36.7 (03-21-21 @ 06:18), Max: 36.9 (03-20-21 @ 22:26)  HR: 61 (03-21-21 @ 06:18) (57 - 64)  BP: 181/78 (03-21-21 @ 06:18) (135/65 - 181/78)  RR: 18 (03-21-21 @ 06:18) (17 - 18)  SpO2: 98% (03-21-21 @ 06:18) (98% - 100%)  Wt(kg): --        03-20-21 @ 07:01  -  03-21-21 @ 07:00  --------------------------------------------------------  IN: 400 mL / OUT: 2600 mL / NET: -2200 mL      Physical Exam:  	Gen: NAD  	HEENT: MMM  	Pulm: CTA B/L  	CV: S1S2  	Abd: Soft, +BS  	Ext: No LE edema B/L                      Neuro: Awake   	Skin: Warm and Dry   	Vascular access:avf          JHONATHAN hinds  LABS/STUDIES  --------------------------------------------------------------------------------              11.2   7.64  >-----------<  211      [03-21-21 @ 07:17]              38.7     136  |  94  |  36  ----------------------------<  111      [03-21-21 @ 07:17]  4.8   |  27  |  6.94        Ca     9.8     [03-21-21 @ 07:17]      Mg     2.0     [03-21-21 @ 07:17]      Phos  5.5     [03-21-21 @ 07:17]    TPro  8.7  /  Alb  4.3  /  TBili  0.7  /  DBili  x   /  AST  9   /  ALT  12  /  AlkPhos  120  [03-21-21 @ 07:17]          Creatinine Trend:  SCr 6.94 [03-21 @ 07:17]  SCr 9.76 [03-20 @ 07:32]  SCr 7.44 [03-19 @ 06:53]  SCr 11.01 [03-18 @ 06:58]  SCr 9.64 [03-17 @ 13:43]        PTH -- (Ca --)      [03-19-21 @ 06:53]   400  HbA1c 7.8      [12-17-19 @ 05:54]

## 2021-04-01 DIAGNOSIS — Z01.818 ENCOUNTER FOR OTHER PREPROCEDURAL EXAMINATION: ICD-10-CM

## 2021-04-02 ENCOUNTER — APPOINTMENT (OUTPATIENT)
Dept: DISASTER EMERGENCY | Facility: CLINIC | Age: 66
End: 2021-04-02

## 2021-04-03 LAB — SARS-COV-2 N GENE NPH QL NAA+PROBE: NOT DETECTED

## 2021-04-05 ENCOUNTER — INPATIENT (INPATIENT)
Facility: HOSPITAL | Age: 66
LOS: 1 days | Discharge: HOME | End: 2021-04-07
Attending: INTERNAL MEDICINE | Admitting: INTERNAL MEDICINE
Payer: MEDICARE

## 2021-04-05 VITALS
OXYGEN SATURATION: 100 % | SYSTOLIC BLOOD PRESSURE: 183 MMHG | DIASTOLIC BLOOD PRESSURE: 80 MMHG | HEART RATE: 74 BPM | RESPIRATION RATE: 18 BRPM | HEIGHT: 66 IN | TEMPERATURE: 98 F

## 2021-04-05 DIAGNOSIS — I77.0 ARTERIOVENOUS FISTULA, ACQUIRED: Chronic | ICD-10-CM

## 2021-04-05 DIAGNOSIS — I50.9 HEART FAILURE, UNSPECIFIED: ICD-10-CM

## 2021-04-05 DIAGNOSIS — Z98.890 OTHER SPECIFIED POSTPROCEDURAL STATES: Chronic | ICD-10-CM

## 2021-04-05 LAB
A1C WITH ESTIMATED AVERAGE GLUCOSE RESULT: 5.5 % — SIGNIFICANT CHANGE UP (ref 4–5.6)
ALBUMIN SERPL ELPH-MCNC: 4.3 G/DL — SIGNIFICANT CHANGE UP (ref 3.3–5)
ALP SERPL-CCNC: 115 U/L — SIGNIFICANT CHANGE UP (ref 40–120)
ALT FLD-CCNC: 22 U/L — SIGNIFICANT CHANGE UP (ref 4–41)
ANION GAP SERPL CALC-SCNC: 16 MMOL/L — HIGH (ref 7–14)
ANION GAP SERPL CALC-SCNC: 18 MMOL/L — HIGH (ref 7–14)
APTT BLD: 33.4 SEC — SIGNIFICANT CHANGE UP (ref 27–36.3)
AST SERPL-CCNC: 15 U/L — SIGNIFICANT CHANGE UP (ref 4–40)
BASOPHILS # BLD AUTO: 0.05 K/UL — SIGNIFICANT CHANGE UP (ref 0–0.2)
BASOPHILS NFR BLD AUTO: 0.6 % — SIGNIFICANT CHANGE UP (ref 0–2)
BILIRUB SERPL-MCNC: 0.9 MG/DL — SIGNIFICANT CHANGE UP (ref 0.2–1.2)
BLOOD GAS ARTERIAL COMPREHENSIVE RESULT: SIGNIFICANT CHANGE UP
BUN SERPL-MCNC: 55 MG/DL — HIGH (ref 7–23)
BUN SERPL-MCNC: 58 MG/DL — HIGH (ref 7–23)
CALCIUM SERPL-MCNC: 9.1 MG/DL — SIGNIFICANT CHANGE UP (ref 8.4–10.5)
CALCIUM SERPL-MCNC: 9.3 MG/DL — SIGNIFICANT CHANGE UP (ref 8.4–10.5)
CHLORIDE SERPL-SCNC: 100 MMOL/L — SIGNIFICANT CHANGE UP (ref 98–107)
CHLORIDE SERPL-SCNC: 97 MMOL/L — LOW (ref 98–107)
CO2 SERPL-SCNC: 24 MMOL/L — SIGNIFICANT CHANGE UP (ref 22–31)
CO2 SERPL-SCNC: 26 MMOL/L — SIGNIFICANT CHANGE UP (ref 22–31)
CREAT SERPL-MCNC: 8.9 MG/DL — HIGH (ref 0.5–1.3)
CREAT SERPL-MCNC: 9.3 MG/DL — HIGH (ref 0.5–1.3)
EOSINOPHIL # BLD AUTO: 0.13 K/UL — SIGNIFICANT CHANGE UP (ref 0–0.5)
EOSINOPHIL NFR BLD AUTO: 1.7 % — SIGNIFICANT CHANGE UP (ref 0–6)
ESTIMATED AVERAGE GLUCOSE: 111 MG/DL — SIGNIFICANT CHANGE UP (ref 68–114)
GLUCOSE BLDC GLUCOMTR-MCNC: 151 MG/DL — HIGH (ref 70–99)
GLUCOSE BLDC GLUCOMTR-MCNC: 90 MG/DL — SIGNIFICANT CHANGE UP (ref 70–99)
GLUCOSE SERPL-MCNC: 157 MG/DL — HIGH (ref 70–99)
GLUCOSE SERPL-MCNC: 90 MG/DL — SIGNIFICANT CHANGE UP (ref 70–99)
HCT VFR BLD CALC: 30.3 % — LOW (ref 39–50)
HCT VFR BLD CALC: 35.4 % — LOW (ref 39–50)
HGB BLD-MCNC: 10.7 G/DL — LOW (ref 13–17)
HGB BLD-MCNC: 8.9 G/DL — LOW (ref 13–17)
IANC: 6.04 K/UL — SIGNIFICANT CHANGE UP (ref 1.5–8.5)
IMM GRANULOCYTES NFR BLD AUTO: 0.3 % — SIGNIFICANT CHANGE UP (ref 0–1.5)
INR BLD: 1.02 RATIO — SIGNIFICANT CHANGE UP (ref 0.88–1.16)
LYMPHOCYTES # BLD AUTO: 1.05 K/UL — SIGNIFICANT CHANGE UP (ref 1–3.3)
LYMPHOCYTES # BLD AUTO: 13.6 % — SIGNIFICANT CHANGE UP (ref 13–44)
MAGNESIUM SERPL-MCNC: 2.1 MG/DL — SIGNIFICANT CHANGE UP (ref 1.6–2.6)
MAGNESIUM SERPL-MCNC: 2.4 MG/DL — SIGNIFICANT CHANGE UP (ref 1.6–2.6)
MCHC RBC-ENTMCNC: 27.6 PG — SIGNIFICANT CHANGE UP (ref 27–34)
MCHC RBC-ENTMCNC: 28.1 PG — SIGNIFICANT CHANGE UP (ref 27–34)
MCHC RBC-ENTMCNC: 29.4 GM/DL — LOW (ref 32–36)
MCHC RBC-ENTMCNC: 30.2 GM/DL — LOW (ref 32–36)
MCV RBC AUTO: 92.9 FL — SIGNIFICANT CHANGE UP (ref 80–100)
MCV RBC AUTO: 94.1 FL — SIGNIFICANT CHANGE UP (ref 80–100)
MONOCYTES # BLD AUTO: 0.45 K/UL — SIGNIFICANT CHANGE UP (ref 0–0.9)
MONOCYTES NFR BLD AUTO: 5.8 % — SIGNIFICANT CHANGE UP (ref 2–14)
NEUTROPHILS # BLD AUTO: 6.04 K/UL — SIGNIFICANT CHANGE UP (ref 1.8–7.4)
NEUTROPHILS NFR BLD AUTO: 78 % — HIGH (ref 43–77)
NRBC # BLD: 0 /100 WBCS — SIGNIFICANT CHANGE UP
NRBC # BLD: 0 /100 WBCS — SIGNIFICANT CHANGE UP
NRBC # FLD: 0 K/UL — SIGNIFICANT CHANGE UP
NRBC # FLD: 0 K/UL — SIGNIFICANT CHANGE UP
PHOSPHATE SERPL-MCNC: 4.4 MG/DL — SIGNIFICANT CHANGE UP (ref 2.5–4.5)
PHOSPHATE SERPL-MCNC: 5.1 MG/DL — HIGH (ref 2.5–4.5)
PLATELET # BLD AUTO: 135 K/UL — LOW (ref 150–400)
PLATELET # BLD AUTO: 147 K/UL — LOW (ref 150–400)
POTASSIUM SERPL-MCNC: 5.6 MMOL/L — HIGH (ref 3.5–5.3)
POTASSIUM SERPL-MCNC: 5.9 MMOL/L — HIGH (ref 3.5–5.3)
POTASSIUM SERPL-SCNC: 5.6 MMOL/L — HIGH (ref 3.5–5.3)
POTASSIUM SERPL-SCNC: 5.9 MMOL/L — HIGH (ref 3.5–5.3)
PROT SERPL-MCNC: 8.5 G/DL — HIGH (ref 6–8.3)
PROTHROM AB SERPL-ACNC: 11.6 SEC — SIGNIFICANT CHANGE UP (ref 10.6–13.6)
RBC # BLD: 3.22 M/UL — LOW (ref 4.2–5.8)
RBC # BLD: 3.81 M/UL — LOW (ref 4.2–5.8)
RBC # FLD: 17.9 % — HIGH (ref 10.3–14.5)
RBC # FLD: 18.1 % — HIGH (ref 10.3–14.5)
SODIUM SERPL-SCNC: 139 MMOL/L — SIGNIFICANT CHANGE UP (ref 135–145)
SODIUM SERPL-SCNC: 142 MMOL/L — SIGNIFICANT CHANGE UP (ref 135–145)
WBC # BLD: 6.28 K/UL — SIGNIFICANT CHANGE UP (ref 3.8–10.5)
WBC # BLD: 7.74 K/UL — SIGNIFICANT CHANGE UP (ref 3.8–10.5)
WBC # FLD AUTO: 6.28 K/UL — SIGNIFICANT CHANGE UP (ref 3.8–10.5)
WBC # FLD AUTO: 7.74 K/UL — SIGNIFICANT CHANGE UP (ref 3.8–10.5)

## 2021-04-05 PROCEDURE — 99222 1ST HOSP IP/OBS MODERATE 55: CPT

## 2021-04-05 PROCEDURE — 93010 ELECTROCARDIOGRAM REPORT: CPT | Mod: 77

## 2021-04-05 PROCEDURE — 93010 ELECTROCARDIOGRAM REPORT: CPT

## 2021-04-05 PROCEDURE — 71045 X-RAY EXAM CHEST 1 VIEW: CPT | Mod: 26

## 2021-04-05 RX ORDER — DEXTROSE 50 % IN WATER 50 %
25 SYRINGE (ML) INTRAVENOUS ONCE
Refills: 0 | Status: DISCONTINUED | OUTPATIENT
Start: 2021-04-05 | End: 2021-04-07

## 2021-04-05 RX ORDER — INSULIN LISPRO 100/ML
VIAL (ML) SUBCUTANEOUS
Refills: 0 | Status: DISCONTINUED | OUTPATIENT
Start: 2021-04-05 | End: 2021-04-07

## 2021-04-05 RX ORDER — GLUCAGON INJECTION, SOLUTION 0.5 MG/.1ML
1 INJECTION, SOLUTION SUBCUTANEOUS ONCE
Refills: 0 | Status: DISCONTINUED | OUTPATIENT
Start: 2021-04-05 | End: 2021-04-07

## 2021-04-05 RX ORDER — LABETALOL HCL 100 MG
200 TABLET ORAL
Refills: 0 | Status: DISCONTINUED | OUTPATIENT
Start: 2021-04-05 | End: 2021-04-07

## 2021-04-05 RX ORDER — LABETALOL HCL 100 MG
200 TABLET ORAL ONCE
Refills: 0 | Status: COMPLETED | OUTPATIENT
Start: 2021-04-05 | End: 2021-04-05

## 2021-04-05 RX ORDER — NITROGLYCERIN 6.5 MG
1 CAPSULE, EXTENDED RELEASE ORAL ONCE
Refills: 0 | Status: COMPLETED | OUTPATIENT
Start: 2021-04-05 | End: 2021-04-05

## 2021-04-05 RX ORDER — HYDRALAZINE HCL 50 MG
5 TABLET ORAL ONCE
Refills: 0 | Status: DISCONTINUED | OUTPATIENT
Start: 2021-04-05 | End: 2021-04-05

## 2021-04-05 RX ORDER — SODIUM CHLORIDE 9 MG/ML
1000 INJECTION, SOLUTION INTRAVENOUS
Refills: 0 | Status: DISCONTINUED | OUTPATIENT
Start: 2021-04-05 | End: 2021-04-07

## 2021-04-05 RX ORDER — CALCITRIOL 0.5 UG/1
1 CAPSULE ORAL
Qty: 0 | Refills: 0 | DISCHARGE

## 2021-04-05 RX ORDER — INSULIN LISPRO 100/ML
VIAL (ML) SUBCUTANEOUS AT BEDTIME
Refills: 0 | Status: DISCONTINUED | OUTPATIENT
Start: 2021-04-05 | End: 2021-04-07

## 2021-04-05 RX ORDER — DEXTROSE 50 % IN WATER 50 %
15 SYRINGE (ML) INTRAVENOUS ONCE
Refills: 0 | Status: DISCONTINUED | OUTPATIENT
Start: 2021-04-05 | End: 2021-04-07

## 2021-04-05 RX ORDER — ASPIRIN/CALCIUM CARB/MAGNESIUM 324 MG
81 TABLET ORAL DAILY
Refills: 0 | Status: DISCONTINUED | OUTPATIENT
Start: 2021-04-06 | End: 2021-04-07

## 2021-04-05 RX ORDER — HYDRALAZINE HCL 50 MG
50 TABLET ORAL
Refills: 0 | Status: DISCONTINUED | OUTPATIENT
Start: 2021-04-05 | End: 2021-04-07

## 2021-04-05 RX ORDER — SEVELAMER CARBONATE 2400 MG/1
800 POWDER, FOR SUSPENSION ORAL
Refills: 0 | Status: DISCONTINUED | OUTPATIENT
Start: 2021-04-05 | End: 2021-04-07

## 2021-04-05 RX ORDER — NIFEDIPINE 30 MG
60 TABLET, EXTENDED RELEASE 24 HR ORAL DAILY
Refills: 0 | Status: DISCONTINUED | OUTPATIENT
Start: 2021-04-05 | End: 2021-04-07

## 2021-04-05 RX ORDER — ASPIRIN/CALCIUM CARB/MAGNESIUM 324 MG
81 TABLET ORAL ONCE
Refills: 0 | Status: COMPLETED | OUTPATIENT
Start: 2021-04-05 | End: 2021-04-05

## 2021-04-05 RX ORDER — PANTOPRAZOLE SODIUM 20 MG/1
40 TABLET, DELAYED RELEASE ORAL
Refills: 0 | Status: DISCONTINUED | OUTPATIENT
Start: 2021-04-05 | End: 2021-04-07

## 2021-04-05 RX ORDER — ATORVASTATIN CALCIUM 80 MG/1
40 TABLET, FILM COATED ORAL AT BEDTIME
Refills: 0 | Status: DISCONTINUED | OUTPATIENT
Start: 2021-04-05 | End: 2021-04-07

## 2021-04-05 RX ORDER — SODIUM ZIRCONIUM CYCLOSILICATE 10 G/10G
10 POWDER, FOR SUSPENSION ORAL ONCE
Refills: 0 | Status: COMPLETED | OUTPATIENT
Start: 2021-04-05 | End: 2021-04-05

## 2021-04-05 RX ORDER — DEXTROSE 50 % IN WATER 50 %
12.5 SYRINGE (ML) INTRAVENOUS ONCE
Refills: 0 | Status: DISCONTINUED | OUTPATIENT
Start: 2021-04-05 | End: 2021-04-07

## 2021-04-05 RX ORDER — LOSARTAN POTASSIUM 100 MG/1
100 TABLET, FILM COATED ORAL DAILY
Refills: 0 | Status: DISCONTINUED | OUTPATIENT
Start: 2021-04-05 | End: 2021-04-07

## 2021-04-05 RX ORDER — CLOPIDOGREL BISULFATE 75 MG/1
75 TABLET, FILM COATED ORAL DAILY
Refills: 0 | Status: DISCONTINUED | OUTPATIENT
Start: 2021-04-06 | End: 2021-04-07

## 2021-04-05 RX ORDER — SODIUM CHLORIDE 9 MG/ML
3 INJECTION INTRAMUSCULAR; INTRAVENOUS; SUBCUTANEOUS ONCE
Refills: 0 | Status: COMPLETED | OUTPATIENT
Start: 2021-04-05 | End: 2021-04-05

## 2021-04-05 RX ADMIN — ATORVASTATIN CALCIUM 40 MILLIGRAM(S): 80 TABLET, FILM COATED ORAL at 21:39

## 2021-04-05 RX ADMIN — Medication 81 MILLIGRAM(S): at 15:51

## 2021-04-05 RX ADMIN — Medication 50 MILLIGRAM(S): at 21:40

## 2021-04-05 RX ADMIN — Medication 200 MILLIGRAM(S): at 15:51

## 2021-04-05 RX ADMIN — SODIUM CHLORIDE 3 MILLILITER(S): 9 INJECTION INTRAMUSCULAR; INTRAVENOUS; SUBCUTANEOUS at 15:51

## 2021-04-05 RX ADMIN — SODIUM ZIRCONIUM CYCLOSILICATE 10 GRAM(S): 10 POWDER, FOR SUSPENSION ORAL at 19:02

## 2021-04-05 NOTE — CHART NOTE - NSCHARTNOTEFT_GEN_A_CORE
Pt was found to be in respiratory distress, tachypneic, and hypertensive with SBP > 210 with audible rales. RRT called patient placed on BIPAP with improvement in respiratory status. flash pulmonary edema likely 2/2 to fluid overload from missed HD vs hypertensive crisis. Pateint was supposed to get HD in AM, however HD now urgently expedited pt was placed on BIPAP. Pt to get bedside HD. Pt was found to be in respiratory distress, tachypneic, and hypertensive with SBP > 210 with audible rales. RRT called patient placed on BIPAP with improvement in respiratory status. Flash pulmonary edema likely 2/2 to fluid overload from missed HD vs hypertensive crisis. Patient given sublingual nitro 400mg x2. Patient's respiratory status improved with BIPAP and sublingual nitro. O2 saturation >95% on BIPAP and BP decreased to SBP of 190. Patient was supposed to get HD in AM, however HD now urgently expedited. Pt to get bedside HD. ABG, CBC, CMP drawn. Kirill JIMENEZ from [facility] notified. Will have CCU consult on patient. F/u RRT note for further details. Will monitor patient closely overnight. Pt was found to be in respiratory distress, tachypneic, and hypertensive with SBP > 210 with audible rales. RRT called patient placed on BIPAP with improvement in respiratory status. Flash pulmonary edema likely 2/2 to fluid overload from missed HD vs hypertensive crisis. Patient given sublingual nitro 400mg x2. Patient's respiratory status improved with BIPAP and sublingual nitro. O2 saturation >95% on BIPAP and BP decreased to SBP of 190. Patient was supposed to get HD in AM, however HD now urgently expedited. Pt to get bedside HD. ABG, CBC, CMP drawn. Kirill JIMENEZ from [facility] notified. Will have CCU consult on patient. F/u RRT note for further details. Will monitor patient closely overnight. Patient to be transferred to CCU.

## 2021-04-05 NOTE — H&P CARDIOLOGY - PMH
Anemia due to stage 5 chronic kidney disease, not on chronic dialysis    CAP (community acquired pneumonia)  6/18  Cerebrovascular accident (CVA), unspecified mechanism  diagnosed via CT of the head  Coronary artery disease    Diabetes mellitus  type 2  ESRD (end stage renal disease)  on Dialysis( M/W/F), By Dr. Huff  GERD (gastroesophageal reflux disease)    HTN (hypertension)    Hypercholesterolemia    Stented coronary artery  2014, 3 stents, [facility]

## 2021-04-05 NOTE — H&P CARDIOLOGY - HISTORY OF PRESENT ILLNESS
66 year old male with history of CAD s/p multiple PCI ( PCI in Feb of 2019 with NAHUM to LAD,  LCx), PCI in March 2021 with  NAHUM to RCA ), HTN, DM, ESRD on HD, history of GIB, history of CVA  presented today for  laser atherectomy on LAD artery in-stent restenosis. Patient    66 year old male with history of CAD s/p multiple PCI ( PCI in Feb of 2019 with NAHUM to LAD,  LCx), PCI in March 2021 with  NAHUM to RCA ), HTN, DM, ESRD on HD, history of GIB, history of CVA  presented today for  laser atherectomy on LAD artery in-stent restenosis.  Patient denies SOB, palpitations,  dizziness, lightheadedness, diaphoresis, nausea, vomiting, diarrhea, cough ,fever, chills, recent hospitalization, prior history of PE , DVT, CVA.    Covid PCR negative on 4/3/2021     66 year old male with history of CAD s/p multiple PCI ( PCI in Feb of 2019 with NAHUM to LAD,  LCx), PCI in March 2021 with  NAHUM to RCA ), HTN, DM, ESRD on HD, history of GIB  presented today for  laser atherectomy on LAD artery in-stent restenosis.  Patient denies SOB, palpitations,  dizziness, lightheadedness, diaphoresis, nausea, vomiting, diarrhea, cough ,fever, chills, recent hospitalization, prior history of PE , DVT, CVA.    Covid PCR negative on 4/3/2021     66 year old male with history of CAD s/p multiple PCI ( PCI in Feb of 2019 with NAHUM to LAD,  LCx), PCI in March 2021 with  NAHUM to RCA ), HTN, DM, ESRD on HD (Tue, Th, Sat)  history of GIB  presented today for  laser atherectomy on LAD artery in-stent restenosis.  Patient denies SOB, palpitations,  dizziness, lightheadedness, diaphoresis, nausea, vomiting, diarrhea, cough ,fever, chills, recent hospitalization, prior history of PE , DVT, CVA.    Covid PCR negative on 4/3/2021

## 2021-04-05 NOTE — CHART NOTE - NSCHARTNOTEFT_GEN_A_CORE
I arrived at patient's bedside for right femoral sheath removal. Pt was found to be in respiratory distress, tachypneic, and hypertensive with SBP > 210 with audible rales. Informed RN to obtain EKG, give nitro paste and call RRT. During RRT, pt was placed on BIPAP. Pt to get bedside HD. Will hold off on femoral sheath removal at this time until patient more stable.

## 2021-04-05 NOTE — H&P CARDIOLOGY - REVIEW OF SYSTEMS
Patient denies SOB, palpitations,  dizziness, lightheadedness, diaphoresis, nausea, vomiting, diarrhea, cough ,fever, chills, recent hospitalization, prior history of PE , DVT, CVA.

## 2021-04-05 NOTE — CHART NOTE - NSCHARTNOTEFT_GEN_A_CORE
s/p cardiac catheterization with PTCA of LAD for ISR; labs results in K 5.9 not hemolyzed, renal called, d/w Dr Leos and plan to give Lokelma 10mg po x1 now and will arrange for HD accordingly, likely 4/6

## 2021-04-05 NOTE — H&P CARDIOLOGY - ATTENDING COMMENTS
Patient seen and examined.  Agree with above.   Admitted post PCI to LAD  Pt. underwent laser atherectomy and POBA to LAD in the setting of ISR  Continue with dapt  HD per renal    Cadence Sawant MD

## 2021-04-06 LAB
ALBUMIN SERPL ELPH-MCNC: 3.9 G/DL — SIGNIFICANT CHANGE UP (ref 3.3–5)
ALP SERPL-CCNC: 105 U/L — SIGNIFICANT CHANGE UP (ref 40–120)
ALT FLD-CCNC: 22 U/L — SIGNIFICANT CHANGE UP (ref 4–41)
ANION GAP SERPL CALC-SCNC: 17 MMOL/L — HIGH (ref 7–14)
AST SERPL-CCNC: 20 U/L — SIGNIFICANT CHANGE UP (ref 4–40)
BASOPHILS # BLD AUTO: 0.02 K/UL — SIGNIFICANT CHANGE UP (ref 0–0.2)
BASOPHILS NFR BLD AUTO: 0.3 % — SIGNIFICANT CHANGE UP (ref 0–2)
BILIRUB SERPL-MCNC: 1 MG/DL — SIGNIFICANT CHANGE UP (ref 0.2–1.2)
BUN SERPL-MCNC: 34 MG/DL — HIGH (ref 7–23)
CALCIUM SERPL-MCNC: 9.1 MG/DL — SIGNIFICANT CHANGE UP (ref 8.4–10.5)
CHLORIDE SERPL-SCNC: 94 MMOL/L — LOW (ref 98–107)
CHOLEST SERPL-MCNC: 122 MG/DL — SIGNIFICANT CHANGE UP
CO2 SERPL-SCNC: 26 MMOL/L — SIGNIFICANT CHANGE UP (ref 22–31)
COVID-19 SPIKE DOMAIN AB INTERP: POSITIVE
COVID-19 SPIKE DOMAIN ANTIBODY RESULT: 31.8 U/ML — HIGH
CREAT SERPL-MCNC: 6.51 MG/DL — HIGH (ref 0.5–1.3)
EOSINOPHIL # BLD AUTO: 0.1 K/UL — SIGNIFICANT CHANGE UP (ref 0–0.5)
EOSINOPHIL NFR BLD AUTO: 1.6 % — SIGNIFICANT CHANGE UP (ref 0–6)
GLUCOSE BLDC GLUCOMTR-MCNC: 150 MG/DL — HIGH (ref 70–99)
GLUCOSE BLDC GLUCOMTR-MCNC: 179 MG/DL — HIGH (ref 70–99)
GLUCOSE BLDC GLUCOMTR-MCNC: 185 MG/DL — HIGH (ref 70–99)
GLUCOSE BLDC GLUCOMTR-MCNC: 96 MG/DL — SIGNIFICANT CHANGE UP (ref 70–99)
GLUCOSE SERPL-MCNC: 92 MG/DL — SIGNIFICANT CHANGE UP (ref 70–99)
HBV CORE AB SER-ACNC: SIGNIFICANT CHANGE UP
HBV SURFACE AB SER-ACNC: 32.6 MIU/ML — SIGNIFICANT CHANGE UP
HBV SURFACE AG SER-ACNC: SIGNIFICANT CHANGE UP
HCT VFR BLD CALC: 31 % — LOW (ref 39–50)
HCV AB S/CO SERPL IA: 0.06 S/CO — SIGNIFICANT CHANGE UP (ref 0–0.99)
HCV AB SERPL-IMP: SIGNIFICANT CHANGE UP
HDLC SERPL-MCNC: 53 MG/DL — SIGNIFICANT CHANGE UP
HGB BLD-MCNC: 9.4 G/DL — LOW (ref 13–17)
IANC: 4.78 K/UL — SIGNIFICANT CHANGE UP (ref 1.5–8.5)
IMM GRANULOCYTES NFR BLD AUTO: 0.3 % — SIGNIFICANT CHANGE UP (ref 0–1.5)
LIPID PNL WITH DIRECT LDL SERPL: 53 MG/DL — SIGNIFICANT CHANGE UP
LYMPHOCYTES # BLD AUTO: 0.93 K/UL — LOW (ref 1–3.3)
LYMPHOCYTES # BLD AUTO: 14.4 % — SIGNIFICANT CHANGE UP (ref 13–44)
MAGNESIUM SERPL-MCNC: 2.3 MG/DL — SIGNIFICANT CHANGE UP (ref 1.6–2.6)
MCHC RBC-ENTMCNC: 28.3 PG — SIGNIFICANT CHANGE UP (ref 27–34)
MCHC RBC-ENTMCNC: 30.3 GM/DL — LOW (ref 32–36)
MCV RBC AUTO: 93.4 FL — SIGNIFICANT CHANGE UP (ref 80–100)
MONOCYTES # BLD AUTO: 0.59 K/UL — SIGNIFICANT CHANGE UP (ref 0–0.9)
MONOCYTES NFR BLD AUTO: 9.2 % — SIGNIFICANT CHANGE UP (ref 2–14)
NEUTROPHILS # BLD AUTO: 4.78 K/UL — SIGNIFICANT CHANGE UP (ref 1.8–7.4)
NEUTROPHILS NFR BLD AUTO: 74.2 % — SIGNIFICANT CHANGE UP (ref 43–77)
NON HDL CHOLESTEROL: 69 MG/DL — SIGNIFICANT CHANGE UP
NRBC # BLD: 0 /100 WBCS — SIGNIFICANT CHANGE UP
NRBC # FLD: 0 K/UL — SIGNIFICANT CHANGE UP
PHOSPHATE SERPL-MCNC: 4.2 MG/DL — SIGNIFICANT CHANGE UP (ref 2.5–4.5)
PLATELET # BLD AUTO: 141 K/UL — LOW (ref 150–400)
POTASSIUM SERPL-MCNC: 4.7 MMOL/L — SIGNIFICANT CHANGE UP (ref 3.5–5.3)
POTASSIUM SERPL-SCNC: 4.7 MMOL/L — SIGNIFICANT CHANGE UP (ref 3.5–5.3)
PROT SERPL-MCNC: 7.9 G/DL — SIGNIFICANT CHANGE UP (ref 6–8.3)
RBC # BLD: 3.32 M/UL — LOW (ref 4.2–5.8)
RBC # FLD: 18.1 % — HIGH (ref 10.3–14.5)
SARS-COV-2 IGG+IGM SERPL QL IA: 31.8 U/ML — HIGH
SARS-COV-2 IGG+IGM SERPL QL IA: POSITIVE
SODIUM SERPL-SCNC: 137 MMOL/L — SIGNIFICANT CHANGE UP (ref 135–145)
TRIGL SERPL-MCNC: 81 MG/DL — SIGNIFICANT CHANGE UP
WBC # BLD: 6.44 K/UL — SIGNIFICANT CHANGE UP (ref 3.8–10.5)
WBC # FLD AUTO: 6.44 K/UL — SIGNIFICANT CHANGE UP (ref 3.8–10.5)

## 2021-04-06 RX ORDER — ERYTHROPOIETIN 10000 [IU]/ML
10000 INJECTION, SOLUTION INTRAVENOUS; SUBCUTANEOUS
Refills: 0 | Status: DISCONTINUED | OUTPATIENT
Start: 2021-04-06 | End: 2021-04-07

## 2021-04-06 RX ORDER — CHLORHEXIDINE GLUCONATE 213 G/1000ML
1 SOLUTION TOPICAL DAILY
Refills: 0 | Status: DISCONTINUED | OUTPATIENT
Start: 2021-04-06 | End: 2021-04-07

## 2021-04-06 RX ORDER — NITROGLYCERIN 6.5 MG
5 CAPSULE, EXTENDED RELEASE ORAL
Qty: 50 | Refills: 0 | Status: DISCONTINUED | OUTPATIENT
Start: 2021-04-06 | End: 2021-04-06

## 2021-04-06 RX ORDER — HYDRALAZINE HCL 50 MG
5 TABLET ORAL ONCE
Refills: 0 | Status: COMPLETED | OUTPATIENT
Start: 2021-04-06 | End: 2021-04-06

## 2021-04-06 RX ORDER — ISOSORBIDE DINITRATE 5 MG/1
10 TABLET ORAL THREE TIMES A DAY
Refills: 0 | Status: DISCONTINUED | OUTPATIENT
Start: 2021-04-06 | End: 2021-04-07

## 2021-04-06 RX ORDER — HEPARIN SODIUM 5000 [USP'U]/ML
5000 INJECTION INTRAVENOUS; SUBCUTANEOUS EVERY 12 HOURS
Refills: 0 | Status: DISCONTINUED | OUTPATIENT
Start: 2021-04-06 | End: 2021-04-07

## 2021-04-06 RX ADMIN — Medication 60 MILLIGRAM(S): at 03:50

## 2021-04-06 RX ADMIN — HEPARIN SODIUM 5000 UNIT(S): 5000 INJECTION INTRAVENOUS; SUBCUTANEOUS at 18:04

## 2021-04-06 RX ADMIN — SEVELAMER CARBONATE 800 MILLIGRAM(S): 2400 POWDER, FOR SUSPENSION ORAL at 07:22

## 2021-04-06 RX ADMIN — LOSARTAN POTASSIUM 100 MILLIGRAM(S): 100 TABLET, FILM COATED ORAL at 03:50

## 2021-04-06 RX ADMIN — SEVELAMER CARBONATE 800 MILLIGRAM(S): 2400 POWDER, FOR SUSPENSION ORAL at 11:14

## 2021-04-06 RX ADMIN — Medication 200 MILLIGRAM(S): at 18:05

## 2021-04-06 RX ADMIN — Medication 50 MILLIGRAM(S): at 03:50

## 2021-04-06 RX ADMIN — ATORVASTATIN CALCIUM 40 MILLIGRAM(S): 80 TABLET, FILM COATED ORAL at 22:51

## 2021-04-06 RX ADMIN — ISOSORBIDE DINITRATE 10 MILLIGRAM(S): 5 TABLET ORAL at 18:05

## 2021-04-06 RX ADMIN — Medication 200 MILLIGRAM(S): at 03:50

## 2021-04-06 RX ADMIN — ERYTHROPOIETIN 10000 UNIT(S): 10000 INJECTION, SOLUTION INTRAVENOUS; SUBCUTANEOUS at 15:43

## 2021-04-06 RX ADMIN — Medication 5 MILLIGRAM(S): at 05:00

## 2021-04-06 RX ADMIN — Medication 1: at 07:21

## 2021-04-06 RX ADMIN — Medication 50 MILLIGRAM(S): at 18:05

## 2021-04-06 RX ADMIN — Medication 0.1 MILLIGRAM(S): at 18:04

## 2021-04-06 RX ADMIN — PANTOPRAZOLE SODIUM 40 MILLIGRAM(S): 20 TABLET, DELAYED RELEASE ORAL at 07:21

## 2021-04-06 RX ADMIN — Medication 1: at 11:14

## 2021-04-06 RX ADMIN — Medication 1.5 MICROGRAM(S)/MIN: at 05:17

## 2021-04-06 RX ADMIN — Medication 81 MILLIGRAM(S): at 11:13

## 2021-04-06 RX ADMIN — Medication 0.1 MILLIGRAM(S): at 03:50

## 2021-04-06 RX ADMIN — CLOPIDOGREL BISULFATE 75 MILLIGRAM(S): 75 TABLET, FILM COATED ORAL at 11:14

## 2021-04-06 RX ADMIN — SEVELAMER CARBONATE 800 MILLIGRAM(S): 2400 POWDER, FOR SUSPENSION ORAL at 18:06

## 2021-04-06 RX ADMIN — Medication 5 MILLIGRAM(S): at 03:02

## 2021-04-06 NOTE — PROGRESS NOTE ADULT - SUBJECTIVE AND OBJECTIVE BOX
chief complaint: chest pain     extended hpi: 66 year old male with history of CAD s/p multiple PCI ( PCI in Feb of 2019 with NAHUM to LAD,  LCx), PCI in March 2021 with  NAHUM to RCA ), HTN, DM, ESRD on HD (Tue, Th, Sat)  history of GIB  presented today for  laser atherectomy on LAD artery in-stent restenosis.  Patient denies SOB, palpitations,  dizziness, lightheadedness, diaphoresis, nausea, vomiting, diarrhea, cough ,fever, chills, recent hospitalization, prior history of PE , DVT, CVA.    S: no chest pain or sob; pt. transferred to CCU for flash pulmonary edema in the setting of hypertensive urgency and renal failure.  ros otherwise negative.     Review of Systems:   Constitutional: [ ] fevers, [ ] chills.   Skin: [ ] dry skin. [ ] rashes.  Psychiatric: [ ] depression, [ ] anxiety.   Gastrointestinal: [ ] BRBPR, [ ] melena.   Neurological: [ ] confusion. [ ] seizures. [ ] shuffling gait.   Ears,Nose,Mouth and Throat: [ ] ear pain [ ] sore throat.   Eyes: [ ] diplopia.   Respiratory: [ ] hemoptysis. [ ] shortness of breath  Cardiovascular: See HPI above  Hematologic/Lymphatic: [ ] anemia. [ ] painful nodes. [ ] prolonged bleeding.   Genitourinary: [ ] hematuria. [ ] flank pain.   Endocrine: [ ] significant change in weight. [ ] intolerance to heat and cold.     Review of systems [x ] otherwise negative, [ ] otherwise unable to obtain    FH: no family history of sudden cardiac death in first degree relatives    SH: [ ] tobacco, [ ] alcohol, [ ] drugs    aspirin enteric coated 81 milliGRAM(s) Oral daily  atorvastatin 40 milliGRAM(s) Oral at bedtime  chlorhexidine 4% Liquid 1 Application(s) Topical daily  cloNIDine 0.1 milliGRAM(s) Oral two times a day  clopidogrel Tablet 75 milliGRAM(s) Oral daily  dextrose 40% Gel 15 Gram(s) Oral once  dextrose 5%. 1000 milliLiter(s) IV Continuous <Continuous>  dextrose 5%. 1000 milliLiter(s) IV Continuous <Continuous>  dextrose 50% Injectable 25 Gram(s) IV Push once  dextrose 50% Injectable 12.5 Gram(s) IV Push once  dextrose 50% Injectable 25 Gram(s) IV Push once  epoetin ralph-epbx (RETACRIT) Injectable 23203 Unit(s) IV Push <User Schedule>  glucagon  Injectable 1 milliGRAM(s) IntraMuscular once  heparin   Injectable 5000 Unit(s) SubCutaneous every 12 hours  hydrALAZINE 50 milliGRAM(s) Oral two times a day  insulin lispro (ADMELOG) corrective regimen sliding scale   SubCutaneous three times a day before meals  insulin lispro (ADMELOG) corrective regimen sliding scale   SubCutaneous at bedtime  isosorbide   dinitrate Tablet (ISORDIL) 10 milliGRAM(s) Oral three times a day  labetalol 200 milliGRAM(s) Oral two times a day  losartan 100 milliGRAM(s) Oral daily  NIFEdipine XL 60 milliGRAM(s) Oral daily  nitroglycerin  Infusion 5 MICROgram(s)/Min IV Continuous <Continuous>  pantoprazole    Tablet 40 milliGRAM(s) Oral before breakfast  sevelamer carbonate 800 milliGRAM(s) Oral three times a day with meals                            9.4    6.44  )-----------( 141      ( 06 Apr 2021 05:09 )             31.0       04-06    137  |  94<L>  |  34<H>  ----------------------------<  92  4.7   |  26  |  6.51<H>    Ca    9.1      06 Apr 2021 05:09  Phos  4.2     04-06  Mg     2.3     04-06    TPro  7.9  /  Alb  3.9  /  TBili  1.0  /  DBili  x   /  AST  20  /  ALT  22  /  AlkPhos  105  04-06            T(C): 37.1 (04-06-21 @ 15:55), Max: 37.1 (04-06-21 @ 15:55)  HR: 72 (04-06-21 @ 17:00) (59 - 105)  BP: 150/79 (04-06-21 @ 17:00) (136/61 - 214/107)  RR: 22 (04-06-21 @ 17:00) (14 - 22)  SpO2: 99% (04-06-21 @ 17:00) (94% - 100%)  Wt(kg): --    I&O's Summary    05 Apr 2021 07:01  -  06 Apr 2021 07:00  --------------------------------------------------------  IN: 682 mL / OUT: 2400 mL / NET: -1718 mL    06 Apr 2021 07:01  -  06 Apr 2021 17:44  --------------------------------------------------------  IN: 518 mL / OUT: 2500 mL / NET: -1982 mL        General: Well nourished in no acute distress. Alert and Oriented * 3.   Head: Normocephalic and atraumatic.   Neck: No JVD. No bruits. Supple. Does not appear to be enlarged.   Cardiovascular: + S1,S2 ; RRR Soft systolic murmur at the left lower sternal border. No rubs noted.    Lungs: CTA b/l. No rhonchi, rales or wheezes.   Abdomen: + BS, soft. Non tender. Non distended. No rebound. No guarding.   Extremities: No clubbing/cyanosis/edema.   Neurologic: Moves all four extremities. Full range of motion.   Skin: Warm and moist. The patient's skin has normal elasticity and good skin turgor.   Psychiatric: Appropriate mood and affect.  Musculoskeletal: Normal range of motion, normal strength      Tele: SR    A/P 66 year old male with history of CAD s/p multiple PCI ( PCI in Feb of 2019 with NAHUM to LAD,  LCx), PCI in March 2021 with  NAHUM to RCA ), HTN, DM, ESRD on HD (Tue, Th, Sat)  history of GIB  presented today for  laser atherectomy on LAD artery in-stent restenosis.  Patient denies SOB, palpitations,  dizziness, lightheadedness, diaphoresis, nausea, vomiting, diarrhea, cough ,fever, chills, recent hospitalization, prior history of PE , DVT, CVA.    -pt. s/p laser atherectomy and balloon angioplasty to the proximal LAD  -continue with dapt  -pt. with flash pulmonary edema last night in the setting of hypertensive urgency and ESRD  -symptoms have improved after BP control and after HD  -continue with dapt  -HD per renal  -supportive care per CCU    Cadence Sawant MD

## 2021-04-06 NOTE — RAPID RESPONSE TEAM SUMMARY - NSSITUATIONBACKGROUNDRRT_GEN_ALL_CORE
66 year old male with history of CAD s/p multiple PCI ( PCI in Feb of 2019 with NAHUM to LAD,  LCx), PCI in March 2021 with  NAHUM to RCA ), HTN, DM, ESRD on HD (Tue, Th, Sat)  history of GIB s/p  LHC.    RRT called for respiratory distress. Patient was anxious, taking rapid respirations, placed on 6L NC during RRT, saturating 96%. Patient was complaining of SOB, no CP. BP notable for SBP in 230s. Patient had audible crackles. He's due for HD today.    Nitroglycerin 0.4 mg x1 administered, with improvement in SBP to 190s. Placed on BiPAP for respiratory distress. Chest Xray was notable for pulmonary edema. Full set of labs drawn during RRT (CBC, CMP, coags), notable for persistent hyperkalemia to 5.6.  Plan to start bedside HD to help with SOB. 66 year old male with history of CAD s/p multiple PCI ( PCI in Feb of 2019 with NAHUM to LAD,  LCx), PCI in March 2021 with  NAHUM to RCA ), HTN, DM, ESRD on HD (Tue, Th, Sat)  history of GIB s/p  LHC.    RRT called for respiratory distress. Patient was anxious, taking rapid respirations, placed on 6L NC during RRT, saturating 96%. Patient was complaining of SOB, no CP. BP notable for SBP in 230s. Patient had audible crackles. He's due for HD today.    Nitroglycerin 0.4 mg x2 administered, with improvement in SBP to 190s. Placed on BiPAP for respiratory distress. Chest Xray was notable for pulmonary edema. Full set of labs drawn during RRT (CBC, CMP, coags), notable for persistent hyperkalemia to 5.6.  Plan to start bedside HD to help with SOB.

## 2021-04-06 NOTE — PROGRESS NOTE ADULT - SUBJECTIVE AND OBJECTIVE BOX
Tessie Mckeon, PGY1  CCU Service  Internal Medicine  Pager: [phone] ([facility]) / [phone] ([facility])    PATIENT: MD BOLAÑOS, MRN: [id number]    CHIEF COMPLAINT: Patient is a 66y old  Male who presents with a chief complaint of Elective cardiac catheterization (06 Apr 2021 00:13)      INTERVAL HISTORY/OVERNIGHT EVENTS: No overnight events. At bedside, patient has been sleeping and eating well. Denies N/V/D/C. Last BM x1 regular yesterday. Denies abdominal pain. Denies chest pain or SOB, cough. Has been ambulating with assistance. Oriented to person, place, and time. Breathing comfortably on room air.    REVIEW OF SYSTEMS:    Constitutional:     [ ] negative [ ] fevers [ ] chills [ ] weight loss [ ] weight gain  HEENT:                  [ ] negative [ ] dry eyes [ ] eye irritation [ ] postnasal drip [ ] nasal congestion  CV:                         [ ] negative  [ ] chest pain [ ] orthopnea [ ] palpitations [ ] murmur  Resp:                     [ ] negative [ ] cough [ ] shortness of breath [ ] dyspnea [ ] wheezing [ ] sputum [ ] hemoptysis  GI:                          [ ] negative [ ] nausea [ ] vomiting [ ] diarrhea [ ] constipation [ ] abd pain [ ] dysphagia   :                        [ ] negative [ ] dysuria [ ] nocturia [ ] hematuria [ ] increased urinary frequency  Musculoskeletal: [ ] negative [ ] back pain [ ] myalgias [ ] arthralgias [ ] fracture  Skin:                       [ ] negative [ ] rash [ ] itch  Neurological:        [ ] negative [ ] headache [ ] dizziness [ ] syncope [ ] weakness [ ] numbness  Psychiatric:           [ ] negative [ ] anxiety [ ] depression  Endocrine:            [ ] negative [ ] diabetes [ ] thyroid problem  Heme/Lymph:      [ ] negative [ ] anemia [ ] bleeding problem  Allergic/Immune: [ ] negative [ ] itchy eyes [ ] nasal discharge [ ] hives [ ] angioedema    [ ] All other systems negative  [ ] Unable to assess ROS because ________.    MEDICATIONS:  MEDICATIONS  (STANDING):  aspirin enteric coated 81 milliGRAM(s) Oral daily  atorvastatin 40 milliGRAM(s) Oral at bedtime  cloNIDine 0.1 milliGRAM(s) Oral two times a day  clopidogrel Tablet 75 milliGRAM(s) Oral daily  dextrose 40% Gel 15 Gram(s) Oral once  dextrose 5%. 1000 milliLiter(s) (50 mL/Hr) IV Continuous <Continuous>  dextrose 5%. 1000 milliLiter(s) (100 mL/Hr) IV Continuous <Continuous>  dextrose 50% Injectable 25 Gram(s) IV Push once  dextrose 50% Injectable 12.5 Gram(s) IV Push once  dextrose 50% Injectable 25 Gram(s) IV Push once  glucagon  Injectable 1 milliGRAM(s) IntraMuscular once  hydrALAZINE 50 milliGRAM(s) Oral two times a day  insulin lispro (ADMELOG) corrective regimen sliding scale   SubCutaneous three times a day before meals  insulin lispro (ADMELOG) corrective regimen sliding scale   SubCutaneous at bedtime  labetalol 200 milliGRAM(s) Oral two times a day  losartan 100 milliGRAM(s) Oral daily  NIFEdipine XL 60 milliGRAM(s) Oral daily  nitroglycerin  Infusion 5 MICROgram(s)/Min (1.5 mL/Hr) IV Continuous <Continuous>  pantoprazole    Tablet 40 milliGRAM(s) Oral before breakfast  sevelamer carbonate 800 milliGRAM(s) Oral three times a day with meals    MEDICATIONS  (PRN):      ALLERGIES: Allergies    No Known Allergies    Intolerances        OBJECTIVE:  ICU Vital Signs Last 24 Hrs  T(C): 36.6 (06 Apr 2021 03:20), Max: 36.6 (05 Apr 2021 21:07)  T(F): 97.9 (06 Apr 2021 03:20), Max: 97.9 (06 Apr 2021 03:20)  HR: 74 (06 Apr 2021 07:00) (67 - 105)  BP: 164/73 (06 Apr 2021 07:00) (148/94 - 214/107)  BP(mean): 91 (06 Apr 2021 07:00) (90 - 122)  ABP: --  ABP(mean): --  RR: 21 (06 Apr 2021 07:00) (14 - 21)  SpO2: 100% (06 Apr 2021 07:00) (94% - 100%)      Adult Advanced Hemodynamics Last 24 Hrs  CVP(mm Hg): --  CVP(cm H2O): --  CO: --  CI: --  PA: --  PA(mean): --  PCWP: --  SVR: --  SVRI: --  PVR: --  PVRI: --  CAPILLARY BLOOD GLUCOSE      POCT Blood Glucose.: 179 mg/dL (06 Apr 2021 06:47)  POCT Blood Glucose.: 151 mg/dL (05 Apr 2021 22:18)  POCT Blood Glucose.: 90 mg/dL (05 Apr 2021 15:23)    CAPILLARY BLOOD GLUCOSE      POCT Blood Glucose.: 179 mg/dL (06 Apr 2021 06:47)    I&O's Summary    05 Apr 2021 07:01  -  06 Apr 2021 07:00  --------------------------------------------------------  IN: 682 mL / OUT: 2400 mL / NET: -1718 mL      Daily Height in cm: 167.64 (05 Apr 2021 21:07)    Daily     PHYSICAL EXAMINATION:  General: Comfortable, no acute distress, cooperative with exam.  HEENT: PERRLA, EOMI, moist mucous membranes.  Respiratory: CTAB, normal respiratory effort, no coughing, wheezes, crackles, or rales.  CV: RRR, S1S2, no murmurs, rubs or gallops. No JVD. Distal pulses intact.  Abdominal: Soft, nontender, nondistended, no rebound or guarding, normal bowel sounds.  Neurology: AOx3, no focal neuro defects, SUÁREZ x 4.  Extremities: No pitting edema, + Peripheral pulses.  Incisions:   Tubes:    LABS:  ABG - ( 05 Apr 2021 22:39 )  pH, Arterial: 7.35  pH, Blood: x     /  pCO2: 46    /  pO2: 69    / HCO3: 24    / Base Excess: 0.2   /  SaO2: 90.6                                    9.4    6.44  )-----------( 141      ( 06 Apr 2021 05:09 )             31.0     04-06    137  |  94<L>  |  34<H>  ----------------------------<  92  4.7   |  26  |  6.51<H>    Ca    9.1      06 Apr 2021 05:09  Phos  4.2     04-06  Mg     2.3     04-06    TPro  7.9  /  Alb  3.9  /  TBili  1.0  /  DBili  x   /  AST  20  /  ALT  22  /  AlkPhos  105  04-06    LIVER FUNCTIONS - ( 06 Apr 2021 05:09 )  Alb: 3.9 g/dL / Pro: 7.9 g/dL / ALK PHOS: 105 U/L / ALT: 22 U/L / AST: 20 U/L / GGT: x           PT/INR - ( 05 Apr 2021 22:42 )   PT: 11.6 sec;   INR: 1.02 ratio         PTT - ( 05 Apr 2021 22:42 )  PTT:33.4 sec            TELEMETRY:     EKG:     IMAGING:     Tessie Mckeon, PGY1  CCU Service  Internal Medicine  Pager: [phone] ([facility]) / [phone] ([facility])    PATIENT: MD BOLAÑOS, MRN: [id number]    CHIEF COMPLAINT: Patient is a 66y old  Male who presents with a chief complaint of Elective cardiac catheterization (06 Apr 2021 00:13)      INTERVAL HISTORY/OVERNIGHT EVENTS: Overnight a RRT was called on 6N after elective cardiac catheterization with balloon angioplasty and laser atherectomy for respiratory distress. This morning the pt is feeling well. He is not having any SOB, chest pain, or palpitations.     REVIEW OF SYSTEMS:    Constitutional:     [ ] negative [ ] fevers [ ] chills [ ] weight loss [ ] weight gain  HEENT:                  [ ] negative [ ] dry eyes [ ] eye irritation [ ] postnasal drip [ ] nasal congestion  CV:                         [ ] negative  [ ] chest pain [ ] orthopnea [ ] palpitations [ ] murmur  Resp:                     [ ] negative [ ] cough [ ] shortness of breath [ ] dyspnea [ ] wheezing [ ] sputum [ ] hemoptysis  GI:                          [ ] negative [ ] nausea [ ] vomiting [ ] diarrhea [ ] constipation [ ] abd pain [ ] dysphagia   :                        [ ] negative [ ] dysuria [ ] nocturia [ ] hematuria [ ] increased urinary frequency  Musculoskeletal: [ ] negative [ ] back pain [ ] myalgias [ ] arthralgias [ ] fracture  Skin:                       [ ] negative [ ] rash [ ] itch  Neurological:        [ ] negative [ ] headache [ ] dizziness [ ] syncope [ ] weakness [ ] numbness  Psychiatric:           [ ] negative [ ] anxiety [ ] depression  Endocrine:            [ ] negative [ ] diabetes [ ] thyroid problem  Heme/Lymph:      [ ] negative [ ] anemia [ ] bleeding problem  Allergic/Immune: [ ] negative [ ] itchy eyes [ ] nasal discharge [ ] hives [ ] angioedema    [x ] All other systems negative      MEDICATIONS:  MEDICATIONS  (STANDING):  aspirin enteric coated 81 milliGRAM(s) Oral daily  atorvastatin 40 milliGRAM(s) Oral at bedtime  cloNIDine 0.1 milliGRAM(s) Oral two times a day  clopidogrel Tablet 75 milliGRAM(s) Oral daily  dextrose 40% Gel 15 Gram(s) Oral once  dextrose 5%. 1000 milliLiter(s) (50 mL/Hr) IV Continuous <Continuous>  dextrose 5%. 1000 milliLiter(s) (100 mL/Hr) IV Continuous <Continuous>  dextrose 50% Injectable 25 Gram(s) IV Push once  dextrose 50% Injectable 12.5 Gram(s) IV Push once  dextrose 50% Injectable 25 Gram(s) IV Push once  glucagon  Injectable 1 milliGRAM(s) IntraMuscular once  hydrALAZINE 50 milliGRAM(s) Oral two times a day  insulin lispro (ADMELOG) corrective regimen sliding scale   SubCutaneous three times a day before meals  insulin lispro (ADMELOG) corrective regimen sliding scale   SubCutaneous at bedtime  labetalol 200 milliGRAM(s) Oral two times a day  losartan 100 milliGRAM(s) Oral daily  NIFEdipine XL 60 milliGRAM(s) Oral daily  nitroglycerin  Infusion 5 MICROgram(s)/Min (1.5 mL/Hr) IV Continuous <Continuous>  pantoprazole    Tablet 40 milliGRAM(s) Oral before breakfast  sevelamer carbonate 800 milliGRAM(s) Oral three times a day with meals    MEDICATIONS  (PRN):      ALLERGIES: Allergies    No Known Allergies    Intolerances        OBJECTIVE:  ICU Vital Signs Last 24 Hrs  T(C): 36.6 (06 Apr 2021 03:20), Max: 36.6 (05 Apr 2021 21:07)  T(F): 97.9 (06 Apr 2021 03:20), Max: 97.9 (06 Apr 2021 03:20)  HR: 74 (06 Apr 2021 07:00) (67 - 105)  BP: 164/73 (06 Apr 2021 07:00) (148/94 - 214/107)  BP(mean): 91 (06 Apr 2021 07:00) (90 - 122)  ABP: --  ABP(mean): --  RR: 21 (06 Apr 2021 07:00) (14 - 21)  SpO2: 100% (06 Apr 2021 07:00) (94% - 100%)      POCT Blood Glucose.: 179 mg/dL (06 Apr 2021 06:47)  POCT Blood Glucose.: 151 mg/dL (05 Apr 2021 22:18)  POCT Blood Glucose.: 90 mg/dL (05 Apr 2021 15:23)    CAPILLARY BLOOD GLUCOSE      POCT Blood Glucose.: 179 mg/dL (06 Apr 2021 06:47)    I&O's Summary    05 Apr 2021 07:01  -  06 Apr 2021 07:00  --------------------------------------------------------  IN: 682 mL / OUT: 2400 mL / NET: -1718 mL      Daily Height in cm: 167.64 (05 Apr 2021 21:07)    Daily     PHYSICAL EXAMINATION:  General: Comfortable, no acute distress, cooperative with exam.  HEENT: PERRLA, EOMI, moist mucous membranes.  Respiratory: CTAB, normal respiratory effort, no coughing, wheezes, crackles, or rales.  CV: RRR, S1S2, no murmurs, rubs or gallops. No JVD. Distal pulses intact.  Abdominal: Soft, nontender, nondistended, no rebound or guarding, normal bowel sounds.  Neurology: AOx3, no focal neuro defects, SUÁREZ x 4.  Extremities: No pitting edema, + Peripheral pulses.    LABS:  ABG - ( 05 Apr 2021 22:39 )  pH, Arterial: 7.35  pH, Blood: x     /  pCO2: 46    /  pO2: 69    / HCO3: 24    / Base Excess: 0.2   /  SaO2: 90.6                                    9.4    6.44  )-----------( 141      ( 06 Apr 2021 05:09 )             31.0     04-06    137  |  94<L>  |  34<H>  ----------------------------<  92  4.7   |  26  |  6.51<H>    Ca    9.1      06 Apr 2021 05:09  Phos  4.2     04-06  Mg     2.3     04-06    TPro  7.9  /  Alb  3.9  /  TBili  1.0  /  DBili  x   /  AST  20  /  ALT  22  /  AlkPhos  105  04-06    LIVER FUNCTIONS - ( 06 Apr 2021 05:09 )  Alb: 3.9 g/dL / Pro: 7.9 g/dL / ALK PHOS: 105 U/L / ALT: 22 U/L / AST: 20 U/L / GGT: x           PT/INR - ( 05 Apr 2021 22:42 )   PT: 11.6 sec;   INR: 1.02 ratio         PTT - ( 05 Apr 2021 22:42 )  PTT:33.4 sec            TELEMETRY:     EKG:     IMAGING:

## 2021-04-06 NOTE — PROGRESS NOTE ADULT - ASSESSMENT
65 y/o male with a PMHx of CAD s/p stent placement (NAHUM to RCA in March 2021), ischemic cardiomyopathy with moderate LV dysfunction (EF 35-40%) and severe diastolic dysfunction, ESRD on HD TTS, CVA, HTN, HLD, DM and GERD presented for elective cardiac catheterization with balloon angioplasty and laser atherectomy of pLAD 80% stenosis with course complicated by flash pulmonary edema secondary to hypertensive urgency and volume overload. Pt transferred to CCU for further management.    NEURO  - No active issues  - Pt alert and oriented  - Continue to monitor    RESPIRATORY  # Respiratory failure without hypoxia  - Likely in the setting of pulmonary edema secondary to hypertension  - Pt placed on BIPAP 14/7 with FiO2 50% with improvement in symptoms  - Pt currently oxygenating 100% on BIPAP  - Will continue BIPAP for now and wean as tolerated  - Continue to monitor continuous pulse oximetry    CARDIOVASCULAR  # Flash pulmonary edema/decompensated combined systolic/diastolic heart failure secondary to hypertensive urgency/emergency  - Pt noted to be in respiratory distress with JVD and audible rales on examination in the setting of hypervolemia  - Pt given SL nitro and placed on BIPAP for respiratory distress  - Given patient is oliguric, renal recommends urgent bedside hemodialysis  - HD with ultrafiltration started with goal to remove 2L fluid  - Continue with HD as per renal  - BP control with current antihypertensive regimen; will expect improvement in blood pressure post dialysis  - Strict I&Os, daily weights, fluid restriction, sodium restriction    # Coronary artery disease  - s/p LHC today with balloon angioplasty and laser atherectomy of pLAD 80% stenosis  - No EKG changes during RRT today  - No chest pain  - Continue ASA, Plavix, Lipitor, Labetalol and Losartan  - Monitor on telemetry  - Right femoral sheath remains in given critical state  - Will remove sheath later tonight when less critical    # Hyperlipidemia  - Continue Lipitor  - DASH diet    GASTROINTESTINAL  - No acute issues  - Diet as tolerated    RENAL  # ESRD on HD  - Pt found to be hypervolemic likely secondary to cardiorenal etiology (decompensated heart failure and ESRD)  - Pt with JVD and bilateral rales on examination  - CXR showing bilateral opacities in the setting of pulmonary edema  - Renal recommendations appreciated  - Plan for HD with ultrafiltration with goal to remove 2L fluid  - Continue HD as per renal  - Low sodium/renal diet    # Hyperkalemia  - Pt also with mild hyperkalemia of 5.9 s/p Lokelma with improvement to 5.6  - Expect improvement in potassium when HD completed  - Continue renal diet  - Re-check BMP post HD in AM    ENDOCRINE  # Diabetes mellitus  - Continue insulin sliding scale while inpatient  - A1C was 5.5  - Hold oral hypoglycemics  - Monitor finger sticks  - Diabetic diet    HEME  # Anemia of chronic disease  - Hemoglobin appears stable  - No signs of bleeding  - Monitor CBC    ID  - No acute issues    DVT PPX  - Will hold off on DVT ppx until right femoral sheath is removed   67 y/o male with a PMHx of CAD s/p stent placement (NAHUM to RCA in March 2021), ischemic cardiomyopathy with moderate LV dysfunction (EF 35-40%) and severe diastolic dysfunction, ESRD on HD TTS, CVA, HTN, HLD, DM and GERD presented for elective cardiac catheterization with balloon angioplasty and laser atherectomy of pLAD 80% stenosis with course complicated by flash pulmonary edema secondary to hypertensive urgency and volume overload. Pt transferred to CCU for further management.    NEURO  - No active issues  - Pt alert and oriented  - Continue to monitor    RESPIRATORY  # Respiratory failure without hypoxia  - Likely in the setting of pulmonary edema secondary to hypertension  - S/p BIPAP   - On nasal cannula, will wean him off given improved respiratory status  - Continue to monitor continuous pulse oximetry    CARDIOVASCULAR  # Flash pulmonary edema/decompensated combined systolic/diastolic heart failure secondary to hypertensive urgency/emergency  - Pt noted to be in respiratory distress with JVD and audible rales on examination in the setting of hypervolemia  - S/p SL nitro and placed on BIPAP for respiratory distress  - S/p bedside HD with removal of 2L on 4/5 overnight  - HD with ultrafiltration started with goal to remove 2L fluid  - Receiving HD session 4/6  - Currently on: Clonidine 0.1 mg BID, Hydralazine 50 mg BID, Labetalol 200 mg BID, Losartan 100 mg QD, and Nifedipine XL 60 mg QD, on 4/6, Isordil. Also will expect improvement in blood pressure post dialysis  - Will wean off nitro gtt  - Strict I&Os, daily weights, fluid restriction, sodium restriction    # Coronary artery disease  - s/p C today with balloon angioplasty and laser atherectomy of pLAD 80% stenosis  - No EKG changes during RRT today  - No chest pain  - Continue ASA, Plavix, Lipitor, Labetalol and Losartan  - Monitor on telemetry    # Hyperlipidemia  - Continue Lipitor  - DASH diet    GASTROINTESTINAL  - No acute issues  - Diet as tolerated    RENAL  # ESRD on HD  - Pt found to be hypervolemic likely secondary to cardiorenal etiology (decompensated heart failure and ESRD)  - Pt with JVD and bilateral rales on examination  - CXR showing bilateral opacities in the setting of pulmonary edema  - Renal recommendations appreciated  - S/p HD 2L removal  - Low sodium/renal diet    # Hyperkalemia  - Pt also with mild hyperkalemia of 5.9 s/p Lokelma with improvement to 5.6  - Expect improvement in potassium when HD completed  - Continue renal diet  - Resolved    ENDOCRINE  # Diabetes mellitus  - Continue insulin sliding scale while inpatient  - A1C was 5.5  - Hold oral hypoglycemics  - Monitor finger sticks  - Diabetic diet    HEME  # Anemia of chronic disease  - Hemoglobin appears stable  - No signs of bleeding  - Monitor CBC    ID  - No acute issues    DVT PPX  - Heparin subq

## 2021-04-06 NOTE — CHART NOTE - NSCHARTNOTEFT_GEN_A_CORE
Pt successfully weaned from BIPAP to nasal cannula and continues to oxygenate at 100% with no complaints of dyspnea. However, pt continues to be hypertensive to 190s despite oral and IV antihypertensives. Nitro gtt started and will titrate accordingly.    Right femoral sheath was subsequently removed. Manual pressure applied for 30 minutes with scant bleeding. Hemostasis achieved. Sterile dressing applied. No complications. No evidence of bleeding, edema or hematoma. Pulses and sensation intact. Vitals remained stable. BP improving to 160s on nitro gtt. Will continue to monitor.

## 2021-04-06 NOTE — CHART NOTE - NSCHARTNOTEFT_GEN_A_CORE
CCU Transfer Note    Transfer from: CCU  Transfer to:  ( x ) Medicine    (  ) Telemetry    (  ) RCU    (  ) Palliative    (  ) Stroke Unit    (  ) _______________  Accepting physician:    MEDICATIONS:  STANDING MEDICATIONS  aspirin enteric coated 81 milliGRAM(s) Oral daily  atorvastatin 40 milliGRAM(s) Oral at bedtime  chlorhexidine 4% Liquid 1 Application(s) Topical daily  cloNIDine 0.1 milliGRAM(s) Oral two times a day  clopidogrel Tablet 75 milliGRAM(s) Oral daily  dextrose 40% Gel 15 Gram(s) Oral once  dextrose 5%. 1000 milliLiter(s) IV Continuous <Continuous>  dextrose 5%. 1000 milliLiter(s) IV Continuous <Continuous>  dextrose 50% Injectable 25 Gram(s) IV Push once  dextrose 50% Injectable 12.5 Gram(s) IV Push once  dextrose 50% Injectable 25 Gram(s) IV Push once  epoetin ralph-epbx (RETACRIT) Injectable 69163 Unit(s) IV Push <User Schedule>  glucagon  Injectable 1 milliGRAM(s) IntraMuscular once  heparin   Injectable 5000 Unit(s) SubCutaneous every 12 hours  hydrALAZINE 50 milliGRAM(s) Oral two times a day  insulin lispro (ADMELOG) corrective regimen sliding scale   SubCutaneous three times a day before meals  insulin lispro (ADMELOG) corrective regimen sliding scale   SubCutaneous at bedtime  isosorbide   dinitrate Tablet (ISORDIL) 10 milliGRAM(s) Oral three times a day  labetalol 200 milliGRAM(s) Oral two times a day  losartan 100 milliGRAM(s) Oral daily  NIFEdipine XL 60 milliGRAM(s) Oral daily  nitroglycerin  Infusion 5 MICROgram(s)/Min IV Continuous <Continuous>  pantoprazole    Tablet 40 milliGRAM(s) Oral before breakfast  sevelamer carbonate 800 milliGRAM(s) Oral three times a day with meals    PRN MEDICATIONS      VITAL SIGNS: Last 24 Hours  T(C): 37.1 (06 Apr 2021 15:55), Max: 37.1 (06 Apr 2021 15:55)  T(F): 98.8 (06 Apr 2021 15:55), Max: 98.8 (06 Apr 2021 15:55)  HR: 64 (06 Apr 2021 16:19) (59 - 105)  BP: 163/64 (06 Apr 2021 16:19) (136/61 - 214/107)  BP(mean): 88 (06 Apr 2021 16:19) (78 - 122)  RR: 17 (06 Apr 2021 16:19) (14 - 21)  SpO2: 100% (06 Apr 2021 16:19) (94% - 100%)    LABS:                        9.4    6.44  )-----------( 141      ( 06 Apr 2021 05:09 )             31.0     04-06    137  |  94<L>  |  34<H>  ----------------------------<  92  4.7   |  26  |  6.51<H>    Ca    9.1      06 Apr 2021 05:09  Phos  4.2     04-06  Mg     2.3     04-06    TPro  7.9  /  Alb  3.9  /  TBili  1.0  /  DBili  x   /  AST  20  /  ALT  22  /  AlkPhos  105  04-06    PT/INR - ( 05 Apr 2021 22:42 )   PT: 11.6 sec;   INR: 1.02 ratio         PTT - ( 05 Apr 2021 22:42 )  PTT:33.4 sec    ABG - ( 05 Apr 2021 22:39 )  pH, Arterial: 7.35  pH, Blood: x     /  pCO2: 46    /  pO2: 69    / HCO3: 24    / Base Excess: 0.2   /  SaO2: 90.6              RADIOLOGY:    CCU COURSE:67 y/o male with a PMHx of CAD s/p stent placement (NAHUM to RCA in March 2021), ischemic cardiomyopathy with moderate LV dysfunction (EF 35-40%) and severe diastolic dysfunction, ESRD on HD TTS, CVA, HTN, HLD, DM and GERD presented for elective cardiac catheterization with balloon angioplasty and laser atherectomy of pLAD 80% stenosis with course complicated by flash pulmonary edema secondary to hypertensive urgency and volume overload requiring BiPAP and urgent HD Pt transferred to CCU .2L fluid removed via HD and patients was able to tolerate NC--> RA. He was started on nitro drip for hypertension which was titrated off. Patient again received HD today. he is stable for tele floor          ASSESSMENT & PLAN:         For Follow-Up:

## 2021-04-06 NOTE — PROGRESS NOTE ADULT - ASSESSMENT
67 y/o male with a PMHx of CAD s/p stent placement (NAHUM to RCA in March 2021), ischemic cardiomyopathy with moderate LV dysfunction (EF 35-40%) and severe diastolic dysfunction, ESRD on HD TTS, CVA, HTN, HLD, DM and GERD presented for elective cardiac catheterization with balloon angioplasty and laser atherectomy of pLAD 80% stenosis with course complicated by flash pulmonary edema secondary to hypertensive urgency and volume overload. Pt transferred to CCU for further management.    NEURO  - No active issues  - Pt alert and oriented  - Continue to monitor    RESPIRATORY  # Respiratory failure without hypoxia  - Likely in the setting of pulmonary edema secondary to hypertension  - Pt placed on BIPAP 14/7 with FiO2 50% with improvement in symptoms  - Pt currently oxygenating 100% on BIPAP  - Will continue BIPAP for now and wean as tolerated  - Continue to monitor continuous pulse oximetry    CARDIOVASCULAR  # Flash pulmonary edema/decompensated combined systolic/diastolic heart failure secondary to hypertensive urgency/emergency  - Pt noted to be in respiratory distress with JVD and audible rales on examination in the setting of hypervolemia  - Pt given SL nitro and placed on BIPAP for respiratory distress  - Given patient is oliguric, renal recommends urgent bedside hemodialysis  - HD with ultrafiltration started with goal to remove 2L fluid  - Continue with HD as per renal  - BP control with current antihypertensive regimen; will expect improvement in blood pressure post dialysis  - Strict I&Os, daily weights, fluid restriction, sodium restriction    # Coronary artery disease  - s/p LHC today with balloon angioplasty and laser atherectomy of pLAD 80% stenosis  - No EKG changes during RRT today  - No chest pain  - Continue ASA, Plavix, Lipitor, Labetalol and Losartan  - Monitor on telemetry  - Right femoral sheath remains in given critical state  - Will remove sheath later tonight when less critical    # Hyperlipidemia  - Continue Lipitor  - DASH diet    GASTROINTESTINAL  - No acute issues  - Diet as tolerated    RENAL  # ESRD on HD  - Pt found to be hypervolemic likely secondary to cardiorenal etiology (decompensated heart failure and ESRD)  - Pt with JVD and bilateral rales on examination  - CXR showing bilateral opacities in the setting of pulmonary edema  - Renal recommendations appreciated  - Plan for HD with ultrafiltration with goal to remove 2L fluid  - Continue HD as per renal  - Low sodium/renal diet    # Hyperkalemia  - Pt also with mild hyperkalemia of 5.9 s/p Lokelma with improvement to 5.6  - Expect improvement in potassium when HD completed  - Continue renal diet  - Re-check BMP post HD in AM    ENDOCRINE  # Diabetes mellitus  - Continue insulin sliding scale while inpatient  - A1C was 5.5  - Hold oral hypoglycemics  - Monitor finger sticks  - Diabetic diet    HEME  # Anemia of chronic disease  - Hemoglobin appears stable  - No signs of bleeding  - Monitor CBC    ID  - No acute issues    DVT PPX  - Will hold off on DVT ppx until right femoral sheath is removed    Case discussed with nocturnist, Dr. Damico  # [id number]

## 2021-04-06 NOTE — PROGRESS NOTE ADULT - ASSESSMENT
67 y/o male with a PMHx of CAD s/p stent placement (NAHUM to RCA in March 2021), ischemic cardiomyopathy with moderate LV dysfunction (EF 35-40%) and severe diastolic dysfunction, ESRD on HD TTS, CVA, HTN, HLD, DM and GERD presented for elective cardiac catheterization with balloon angioplasty and laser atherectomy of pLAD 80% stenosis with course complicated by flash pulmonary edema secondary to hypertensive urgency and volume overload. Pt transferred to CCU for further management.    ESRD on HD TTS  from [facility] dialysis unit  s/p urgent HD 4/5 for fluid overload, UF 2L  HD again today  consent obtained  monitor    Hyperkalemia  sec to renal failure  s/p hd  monitor serum K  Low K Diet    SOB  UF with HD  s/p cath with pci    HTN  Bp acceptable   monitor for now. continue current meds  UF with HD   low Sodium  diet  monitor BP closely     Anemia  Acceptable  epo [id number] with hd  monitor Hb    Ckd-mbd  Elevated PTH _ on calcitriol  continue binder  monitor phos and calcium daily

## 2021-04-06 NOTE — PROGRESS NOTE ADULT - SUBJECTIVE AND OBJECTIVE BOX
CCU ACCEPT NOTE    HISTORY OF PRESENT ILLNESS:  67 y/o male with a PMHx of CAD s/p stent placement (NAHUM to RCA in March 2021), ischemic cardiomyopathy with moderate LV dysfunction (EF 35-40%) and severe diastolic dysfunction, ESRD on HD TTS, CVA, HTN, HLD, DM and GERD presented for elective cardiac catheterization with balloon angioplasty and laser atherectomy of pLAD 80% stenosis. Pt was subsequently admitted to telemetry. On the floors around 10pm, pt became increasingly short of breath and tachypneic with systolic blood pressure readings > 200. RRT was subsequently called and patient was started on BIPAP. Pt was found to be in flash pulmonary edema secondary to hypertension and volume overload. Pt was started on hemodialysis at bedside as per nephrology, Dr. Jovany Leos. Since initiating HD and BIPAP, pt now reports feeling improved. Pt denies chest pain, palpitations, nausea.    Allergies: No Known Allergies    Intolerances: No Known Intolerances      PAST MEDICAL HISTORY SURGICAL HISTORY:  HTN (hypertension)  CAD (coronary artery disease)  DM (diabetes mellitus type 2)  GERD (gastroesophageal reflux disease)  ESRD (end stage renal disease) on HD  HLD (hyperlipidemia)  CVA (cerebrovascular accident)  Anemia of chronic disease    PAST SURGICAL HISTORY  Left radiocephalic AV fistula  Eye surgery  Coronary artery stent placement      MEDICATIONS  (STANDING):  aspirin enteric coated 81 milliGRAM(s) Oral daily  atorvastatin 40 milliGRAM(s) Oral at bedtime  cloNIDine 0.1 milliGRAM(s) Oral two times a day  clopidogrel Tablet 75 milliGRAM(s) Oral daily  dextrose 40% Gel 15 Gram(s) Oral once  dextrose 5%. 1000 milliLiter(s) (50 mL/Hr) IV Continuous <Continuous>  dextrose 5%. 1000 milliLiter(s) (100 mL/Hr) IV Continuous <Continuous>  dextrose 50% Injectable 25 Gram(s) IV Push once  dextrose 50% Injectable 12.5 Gram(s) IV Push once  dextrose 50% Injectable 25 Gram(s) IV Push once  glucagon  Injectable 1 milliGRAM(s) IntraMuscular once  hydrALAZINE 50 milliGRAM(s) Oral two times a day  insulin lispro (ADMELOG) corrective regimen sliding scale   SubCutaneous three times a day before meals  insulin lispro (ADMELOG) corrective regimen sliding scale   SubCutaneous at bedtime  labetalol 200 milliGRAM(s) Oral two times a day  losartan 100 milliGRAM(s) Oral daily  NIFEdipine XL 60 milliGRAM(s) Oral daily  pantoprazole    Tablet 40 milliGRAM(s) Oral before breakfast  sevelamer carbonate 800 milliGRAM(s) Oral three times a day with meals    MEDICATIONS  (PRN):      Drug Dosing Weight  Height (cm): 167.6 (05 Apr 2021 21:07)  Weight (kg): 61.235 (05 Apr 2021 16:32)  BMI (kg/m2): 21.8 (05 Apr 2021 21:07)  BSA (m2): 1.69 (05 Apr 2021 21:07)    FAMILY HISTORY:  No pertinent family history in first degree relatives    SOCIAL HISTORY:  Pt is  and lives with his wife. Pt denies alcohol abuse and is a former smoker.    ROS:    CONSTITUTIONAL: No fevers. No chills. No fatigue.  EYES: No vision changes.  ENT: No congestion. No ear pain. No sore throat.  NECK: No pain. No stiffness.  RESPIRATORY: + shortness of breath. No cough. No wheezing. No hemoptysis.  CARDIOVASCULAR: + orthopnea. No chest pain. No palpitations. No AMEZCUA. No pleuritic pain.  GASTROINTESTINAL: No abdominal pain. No nausea. No vomiting. No hematemesis. No diarrhea. No constipation. No melena.  GENITOURINARY: No dysuria. No frequency. No incontinence. No hematuria.  NEUROLOGICAL: No dizziness. No lightheadedness. No syncope. No LOC. No headache. No numbness or weakness.  MUSCULOSKELETAL: No edema. No joint pain. No joint swelling.  PSYCHIATRIC: No anxiety. No depression.  DERMATOLOGY: No diaphoresis. No itching. No rashes. No pressure ulcers.  HEME/LYMPH: No easy bruising or bleeding gums    All other review of systems is negative unless indicated above.    PHYSICAL EXAMINATION:    Appearance: Well developed. Moderate respiratory distress. Unable to speak in full sentences.  HEENT: PERRL. EOMI. Moist mucous membranes. + JVD.  Cardiovascular: Regular rate and rhythm. Normal S1 and S2. No murmurs, gallops, or rubs.  Respiratory: Tachypneic. Audible rales heard. Crackles up to mid lungs bilaterally. No wheezing or rhonchi noted. No tenderness to palpation.  Gastrointestinal: Soft. Non-tender. Non-distended. Positive bowel sounds.  Neurologic: Non-focal. A&Ox3. Normal motor and sensory examination.  Skin: Warm and dry. No rashes. No ecchymosis. No cyanosis.  Peripheral: No clubbing. No edema.  Vascular: Peripheral pulses palpable 2+ bilaterally. Right femoral sheath still in place (unable to remove given critical state).  Psychiatry: Mood and affect appropriate.      Vital Signs Last 24 Hrs  T(C): 36.6 (05 Apr 2021 21:07), Max: 36.6 (05 Apr 2021 21:07)  T(F): 97.8 (05 Apr 2021 21:07), Max: 97.8 (05 Apr 2021 21:07)  HR: 74 (05 Apr 2021 21:07) (74 - 74)  BP: 183/80 (05 Apr 2021 21:07) (183/80 - 183/80)  BP(mean): --  ABP: --  ABP(mean): --  RR: 18 (05 Apr 2021 21:07) (18 - 18)  SpO2: 100% (05 Apr 2021 21:07) (100% - 100%)      ABG - ( 05 Apr 2021 22:39 )  pH, Arterial: 7.35  pH, Blood: x     /  pCO2: 46    /  pO2: 69    / HCO3: 24    / Base Excess: 0.2   /  SaO2: 90.6          LABS:  CBC Full  -  ( 05 Apr 2021 22:39 )  WBC Count : 7.74 K/uL  RBC Count : 3.81 M/uL  Hemoglobin : 10.7 g/dL  Hematocrit : 35.4 %  Platelet Count - Automated : 147 K/uL  Mean Cell Volume : 92.9 fL  Mean Cell Hemoglobin : 28.1 pg  Mean Cell Hemoglobin Concentration : 30.2 gm/dL  Auto Neutrophil # : 6.04 K/uL  Auto Lymphocyte # : 1.05 K/uL  Auto Monocyte # : 0.45 K/uL  Auto Eosinophil # : 0.13 K/uL  Auto Basophil # : 0.05 K/uL  Auto Neutrophil % : 78.0 %  Auto Lymphocyte % : 13.6 %  Auto Monocyte % : 5.8 %  Auto Eosinophil % : 1.7 %  Auto Basophil % : 0.6 %    04-05    139  |  97<L>  |  58<H>  ----------------------------<  157<H>  5.6<H>   |  24  |  9.30<H>    Ca    9.3      05 Apr 2021 22:39  Phos  5.1     04-05  Mg     2.4     04-05    TPro  8.5<H>  /  Alb  4.3  /  TBili  0.9  /  DBili  x   /  AST  15  /  ALT  22  /  AlkPhos  115  04-05        CAPILLARY BLOOD GLUCOSE      POCT Blood Glucose.: 151 mg/dL (05 Apr 2021 22:18)    PT/INR - ( 05 Apr 2021 22:42 )   PT: 11.6 sec;   INR: 1.02 ratio         PTT - ( 05 Apr 2021 22:42 )  PTT:33.4 sec      I&O's Detail      RADIOLOGY STUDIES    March 2021 - Echo: EF 35-40%. Mitral annular calcification, otherwise normal mitral valve. Mild mitral regurgitation. Normal aortic root. Calcified trileaflet aortic valve with normal opening. Mildly dilated left atrium. Moderate global left ventricular systolic dysfunction. The mid to distal septum, apex and mid to distal anterior walls are severely hypokinetic. Normal left ventricular internal dimensions and wall thicknesses. Severe diastolic dysfunction. Normal right ventricular  size and function. Normal tricuspid valve. Mild tricuspid regurgitation. Normal pulmonic valve. Normal pericardium with no pericardial effusion. Bilateral pleural effusions.    April 5, 2021 - Cardiac catheterization:  LM: Normal.  Proximal LAD: There was a tubular 80% stenosis at the site of a prior stent. A balloon angioplasty was performed on the 80% lesion in the proximal LAD. Following intervention there was an improved angiographic appearance with a 20% residual stenosis. Laser atherectomy was performed using a 0.9mm ELCA RX Laser Cath catheter and 2 pass(es) across the lesion. Balloon angioplasty was performed, using a 3.0 X 20 Euphora RX balloon, with 1 inflations and a maximum inflation pressure of 10 [place]. Balloon angioplasty was performed, using a 3.0 X 15 Euphora [place] balloon, with 1 inflations and a maximum inflation pressure of 20 [place]. Cutting balloon angioplasty was performed, using a 3.5 X 15 [place] balloon, with 1 inflations and a maximum inflation pressure of 10 [place].  Circumflex: This vessel was not injected, but was visualized during a prior cardiac catheterization.  RCA: This vessel was not injected.  COMPLICATIONS: There were no complications. CCU ACCEPT NOTE    HISTORY OF PRESENT ILLNESS:  67 y/o male with a PMHx of CAD s/p stent placement (NAHUM to RCA in March 2021), ischemic cardiomyopathy with moderate LV dysfunction (EF 35-40%) and severe diastolic dysfunction, ESRD on HD TTS, CVA, HTN, HLD, DM and GERD presented for elective cardiac catheterization with balloon angioplasty and laser atherectomy of pLAD 80% stenosis. Pt was subsequently admitted to telemetry. On the floors around 10pm, pt became increasingly short of breath and tachypneic with systolic blood pressure readings > 200. RRT was subsequently called and patient was started on BIPAP. Pt was found to be in flash pulmonary edema secondary to hypertension and volume overload. Pt was started on hemodialysis at bedside as per nephrology, Dr. Jovany Leos. Since initiating HD and BIPAP, pt now reports feeling improved. Pt denies chest pain, palpitations, nausea.    Allergies: No Known Allergies    Intolerances: No Known Intolerances      PAST MEDICAL HISTORY SURGICAL HISTORY:  HTN (hypertension)  CAD (coronary artery disease)  DM (diabetes mellitus type 2)  GERD (gastroesophageal reflux disease)  ESRD (end stage renal disease) on HD  HLD (hyperlipidemia)  CVA (cerebrovascular accident)  Anemia of chronic disease    PAST SURGICAL HISTORY  Left radiocephalic AV fistula  Eye surgery  Coronary artery stent placement      MEDICATIONS  (STANDING):  aspirin enteric coated 81 milliGRAM(s) Oral daily  atorvastatin 40 milliGRAM(s) Oral at bedtime  cloNIDine 0.1 milliGRAM(s) Oral two times a day  clopidogrel Tablet 75 milliGRAM(s) Oral daily  dextrose 40% Gel 15 Gram(s) Oral once  dextrose 5%. 1000 milliLiter(s) (50 mL/Hr) IV Continuous <Continuous>  dextrose 5%. 1000 milliLiter(s) (100 mL/Hr) IV Continuous <Continuous>  dextrose 50% Injectable 25 Gram(s) IV Push once  dextrose 50% Injectable 12.5 Gram(s) IV Push once  dextrose 50% Injectable 25 Gram(s) IV Push once  glucagon  Injectable 1 milliGRAM(s) IntraMuscular once  hydrALAZINE 50 milliGRAM(s) Oral two times a day  insulin lispro (ADMELOG) corrective regimen sliding scale   SubCutaneous three times a day before meals  insulin lispro (ADMELOG) corrective regimen sliding scale   SubCutaneous at bedtime  labetalol 200 milliGRAM(s) Oral two times a day  losartan 100 milliGRAM(s) Oral daily  NIFEdipine XL 60 milliGRAM(s) Oral daily  pantoprazole    Tablet 40 milliGRAM(s) Oral before breakfast  sevelamer carbonate 800 milliGRAM(s) Oral three times a day with meals    MEDICATIONS  (PRN):      Drug Dosing Weight  Height (cm): 167.6 (05 Apr 2021 21:07)  Weight (kg): 61.235 (05 Apr 2021 16:32)  BMI (kg/m2): 21.8 (05 Apr 2021 21:07)  BSA (m2): 1.69 (05 Apr 2021 21:07)    FAMILY HISTORY:  No pertinent family history in first degree relatives    SOCIAL HISTORY:  Pt is  and lives with his wife. Pt denies alcohol abuse and is a former smoker.    ROS:    CONSTITUTIONAL: No fevers. No chills. No fatigue.  EYES: No vision changes.  ENT: No congestion. No ear pain. No sore throat.  NECK: No pain. No stiffness.  RESPIRATORY: + shortness of breath. No cough. No wheezing. No hemoptysis.  CARDIOVASCULAR: + orthopnea. No chest pain. No palpitations. No AMEZCUA. No pleuritic pain.  GASTROINTESTINAL: No abdominal pain. No nausea. No vomiting. No hematemesis. No diarrhea. No constipation. No melena.  GENITOURINARY: No dysuria. No frequency. No incontinence. No hematuria.  NEUROLOGICAL: No dizziness. No lightheadedness. No syncope. No LOC. No headache. No numbness or weakness.  MUSCULOSKELETAL: No edema. No joint pain. No joint swelling.  PSYCHIATRIC: No anxiety. No depression.  DERMATOLOGY: No diaphoresis. No itching. No rashes. No pressure ulcers.  HEME/LYMPH: No easy bruising or bleeding gums    All other review of systems is negative unless indicated above.    PHYSICAL EXAMINATION:    Appearance: Well developed. Moderate respiratory distress. Unable to speak in full sentences.  HEENT: PERRL. EOMI. Moist mucous membranes. + JVD.  Cardiovascular: Regular rate and rhythm. Normal S1 and S2. No murmurs, gallops, or rubs.  Respiratory: Tachypneic. Audible rales heard. Crackles up to mid lungs bilaterally. No wheezing or rhonchi noted. No tenderness to palpation.  Gastrointestinal: Soft. Non-tender. Non-distended. Positive bowel sounds.  Neurologic: Non-focal. A&Ox3. Normal motor and sensory examination.  Skin: Warm and dry. No rashes. No ecchymosis. No cyanosis.  Peripheral: No clubbing. No edema.  Vascular: Peripheral pulses palpable 2+ bilaterally. Right femoral sheath still in place (unable to remove given critical state).  Psychiatry: Mood and affect appropriate.      Vital Signs Last 24 Hrs  T(C): 36.6 (05 Apr 2021 21:07), Max: 36.6 (05 Apr 2021 21:07)  T(F): 97.8 (05 Apr 2021 21:07), Max: 97.8 (05 Apr 2021 21:07)  HR: 74 (05 Apr 2021 21:07) (74 - 74)  BP: 183/80 (05 Apr 2021 21:07) (183/80 - 183/80)  BP(mean): --  ABP: --  ABP(mean): --  RR: 18 (05 Apr 2021 21:07) (18 - 18)  SpO2: 100% (05 Apr 2021 21:07) (100% - 100%)      ABG - ( 05 Apr 2021 22:39 )  pH, Arterial: 7.35  pH, Blood: x     /  pCO2: 46    /  pO2: 69    / HCO3: 24    / Base Excess: 0.2   /  SaO2: 90.6          LABS:  CBC Full  -  ( 05 Apr 2021 22:39 )  WBC Count : 7.74 K/uL  RBC Count : 3.81 M/uL  Hemoglobin : 10.7 g/dL  Hematocrit : 35.4 %  Platelet Count - Automated : 147 K/uL  Mean Cell Volume : 92.9 fL  Mean Cell Hemoglobin : 28.1 pg  Mean Cell Hemoglobin Concentration : 30.2 gm/dL  Auto Neutrophil # : 6.04 K/uL  Auto Lymphocyte # : 1.05 K/uL  Auto Monocyte # : 0.45 K/uL  Auto Eosinophil # : 0.13 K/uL  Auto Basophil # : 0.05 K/uL  Auto Neutrophil % : 78.0 %  Auto Lymphocyte % : 13.6 %  Auto Monocyte % : 5.8 %  Auto Eosinophil % : 1.7 %  Auto Basophil % : 0.6 %    04-05    139  |  97<L>  |  58<H>  ----------------------------<  157<H>  5.6<H>   |  24  |  9.30<H>    Ca    9.3      05 Apr 2021 22:39  Phos  5.1     04-05  Mg     2.4     04-05    TPro  8.5<H>  /  Alb  4.3  /  TBili  0.9  /  DBili  x   /  AST  15  /  ALT  22  /  AlkPhos  115  04-05        CAPILLARY BLOOD GLUCOSE      POCT Blood Glucose.: 151 mg/dL (05 Apr 2021 22:18)    PT/INR - ( 05 Apr 2021 22:42 )   PT: 11.6 sec;   INR: 1.02 ratio         PTT - ( 05 Apr 2021 22:42 )  PTT:33.4 sec      I&O's Detail      RADIOLOGY STUDIES    March 2021 - Echo: EF 35-40%. Mitral annular calcification, otherwise normal mitral valve. Mild mitral regurgitation. Normal aortic root. Calcified trileaflet aortic valve with normal opening. Mildly dilated left atrium. Moderate global left ventricular systolic dysfunction. The mid to distal septum, apex and mid to distal anterior walls are severely hypokinetic. Normal left ventricular internal dimensions and wall thicknesses. Severe diastolic dysfunction. Normal right ventricular  size and function. Normal tricuspid valve. Mild tricuspid regurgitation. Normal pulmonic valve. Normal pericardium with no pericardial effusion. Bilateral pleural effusions.    April 5, 2021 - Cardiac catheterization:  Proximal LAD: There was a tubular 80% stenosis at the site of a prior stent. A balloon angioplasty was performed on the 80% lesion in the proximal LAD. Following intervention there was an improved angiographic appearance with a 20% residual stenosis. Laser atherectomy was performed using a 0.9mm ELCA RX Laser Cath catheter and 2 pass(es) across the lesion. Balloon angioplasty was performed, using a 3.0 X 20 Euphora RX balloon, with 1 inflations and a maximum inflation pressure of 10 [place]. Balloon angioplasty was performed, using a 3.0 X 15 Euphora NC balloon, with 1 inflations and a maximum inflation pressure of 20 [place]. Cutting balloon angioplasty was performed, using a 3.5 X 15 [place] balloon, with 1 inflations and a maximum inflation pressure of 10 [place].

## 2021-04-06 NOTE — PROGRESS NOTE ADULT - SUBJECTIVE AND OBJECTIVE BOX
[facility]   MD ANDRES AVALOS DO ANAM SIDDIQUI ANGELA WONG, PA    TEL:  OFFICE: [phone]  DR CUETO CELL: [phone]  DR. HUNTER CELL: [phone]  DR. HORTA CELL: [phone]  LIDA WINTERS CELL: [phone]    From 5pm-7am Answering Service [phone]    -- RENAL FOLLOW UP NOTE ---Date of Service 04-06-21 @ 15:02    Patient is a 66y old  Male who presents with a chief complaint of Elective cardiac catheterization (06 Apr 2021 00:13)      Patient seen and examined at bedside. No chest pain/sob    VITALS:  T(F): 98.2 (04-06-21 @ 12:25), Max: 98.2 (04-06-21 @ 11:17)  HR: 61 (04-06-21 @ 14:00)  BP: 159/62 (04-06-21 @ 14:00)  RR: 21 (04-06-21 @ 14:00)  SpO2: 100% (04-06-21 @ 14:00)  Wt(kg): --    04-05 @ 07:01  -  04-06 @ 07:00  --------------------------------------------------------  IN: 682 mL / OUT: 2400 mL / NET: -1718 mL    04-06 @ 07:01  -  04-06 @ 15:02  --------------------------------------------------------  IN: 18 mL / OUT: 0 mL / NET: 18 mL      Height (cm): 167.6 (04-05 @ 21:07)  Weight (kg): 59.6 (04-06 @ 03:20)  BMI (kg/m2): 21.2 (04-06 @ 03:20)  BSA (m2): 1.67 (04-06 @ 03:20)    PHYSICAL EXAM:  Constitutional: NAD  Neck: No JVD  Respiratory: CTAB, no wheezes, rales or rhonchi  Cardiovascular: S1, S2, RRR  Gastrointestinal: BS+, soft, NT/ND  Extremities: No peripheral edema    Hospital Medications:   MEDICATIONS  (STANDING):  aspirin enteric coated 81 milliGRAM(s) Oral daily  atorvastatin 40 milliGRAM(s) Oral at bedtime  chlorhexidine 4% Liquid 1 Application(s) Topical daily  cloNIDine 0.1 milliGRAM(s) Oral two times a day  clopidogrel Tablet 75 milliGRAM(s) Oral daily  dextrose 40% Gel 15 Gram(s) Oral once  dextrose 5%. 1000 milliLiter(s) (50 mL/Hr) IV Continuous <Continuous>  dextrose 5%. 1000 milliLiter(s) (100 mL/Hr) IV Continuous <Continuous>  dextrose 50% Injectable 25 Gram(s) IV Push once  dextrose 50% Injectable 12.5 Gram(s) IV Push once  dextrose 50% Injectable 25 Gram(s) IV Push once  glucagon  Injectable 1 milliGRAM(s) IntraMuscular once  heparin   Injectable 5000 Unit(s) SubCutaneous every 12 hours  hydrALAZINE 50 milliGRAM(s) Oral two times a day  insulin lispro (ADMELOG) corrective regimen sliding scale   SubCutaneous three times a day before meals  insulin lispro (ADMELOG) corrective regimen sliding scale   SubCutaneous at bedtime  isosorbide   dinitrate Tablet (ISORDIL) 10 milliGRAM(s) Oral three times a day  labetalol 200 milliGRAM(s) Oral two times a day  losartan 100 milliGRAM(s) Oral daily  NIFEdipine XL 60 milliGRAM(s) Oral daily  nitroglycerin  Infusion 5 MICROgram(s)/Min (1.5 mL/Hr) IV Continuous <Continuous>  pantoprazole    Tablet 40 milliGRAM(s) Oral before breakfast  sevelamer carbonate 800 milliGRAM(s) Oral three times a day with meals      LABS:  04-06    137  |  94<L>  |  34<H>  ----------------------------<  92  4.7   |  26  |  6.51<H>    Ca    9.1      06 Apr 2021 05:09  Phos  4.2     04-06  Mg     2.3     04-06    TPro  7.9  /  Alb  3.9  /  TBili  1.0  /  DBili      /  AST  20  /  ALT  22  /  AlkPhos  105  04-06    Creatinine Trend: 6.51 <--, 9.30 <--, 8.90 <--    Albumin, Serum: 3.9 g/dL (04-06 @ 05:09)  Phosphorus Level, Serum: 4.2 mg/dL (04-06 @ 05:09)  Albumin, Serum: 4.3 g/dL (04-05 @ 22:39)  Phosphorus Level, Serum: 5.1 mg/dL (04-05 @ 22:39)  Phosphorus Level, Serum: 4.4 mg/dL (04-05 @ 16:03)                              9.4    6.44  )-----------( 141      ( 06 Apr 2021 05:09 )             31.0     Urine Studies:      PTH -- (Ca --)      [03-19-21 @ 06:53]   400  HbA1c 7.8      [12-17-19 @ 05:54]  Lipid: chol 122, TG 81, HDL 53, LDL --      [04-06-21 @ 05:09]    HBsAb 32.6      [04-06-21 @ 10:06]  HBsAg Nonreact      [04-06-21 @ 10:06]  HBcAb Nonreact      [04-06-21 @ 10:06]  HCV 0.06, Nonreact      [04-06-21 @ 10:06]      RADIOLOGY & ADDITIONAL STUDIES:

## 2021-04-07 ENCOUNTER — TRANSCRIPTION ENCOUNTER (OUTPATIENT)
Age: 66
End: 2021-04-07

## 2021-04-07 VITALS — OXYGEN SATURATION: 97 % | HEART RATE: 64 BPM

## 2021-04-07 LAB
ALBUMIN SERPL ELPH-MCNC: 3.6 G/DL — SIGNIFICANT CHANGE UP (ref 3.3–5)
ALP SERPL-CCNC: 92 U/L — SIGNIFICANT CHANGE UP (ref 40–120)
ALT FLD-CCNC: 16 U/L — SIGNIFICANT CHANGE UP (ref 4–41)
ANION GAP SERPL CALC-SCNC: 13 MMOL/L — SIGNIFICANT CHANGE UP (ref 7–14)
AST SERPL-CCNC: 12 U/L — SIGNIFICANT CHANGE UP (ref 4–40)
BASOPHILS # BLD AUTO: 0.03 K/UL — SIGNIFICANT CHANGE UP (ref 0–0.2)
BASOPHILS NFR BLD AUTO: 0.5 % — SIGNIFICANT CHANGE UP (ref 0–2)
BILIRUB SERPL-MCNC: 0.8 MG/DL — SIGNIFICANT CHANGE UP (ref 0.2–1.2)
BUN SERPL-MCNC: 27 MG/DL — HIGH (ref 7–23)
CALCIUM SERPL-MCNC: 8.8 MG/DL — SIGNIFICANT CHANGE UP (ref 8.4–10.5)
CHLORIDE SERPL-SCNC: 98 MMOL/L — SIGNIFICANT CHANGE UP (ref 98–107)
CHOLEST SERPL-MCNC: 122 MG/DL — SIGNIFICANT CHANGE UP
CO2 SERPL-SCNC: 27 MMOL/L — SIGNIFICANT CHANGE UP (ref 22–31)
CREAT SERPL-MCNC: 5.28 MG/DL — HIGH (ref 0.5–1.3)
EOSINOPHIL # BLD AUTO: 0.19 K/UL — SIGNIFICANT CHANGE UP (ref 0–0.5)
EOSINOPHIL NFR BLD AUTO: 3 % — SIGNIFICANT CHANGE UP (ref 0–6)
GLUCOSE BLDC GLUCOMTR-MCNC: 112 MG/DL — HIGH (ref 70–99)
GLUCOSE BLDC GLUCOMTR-MCNC: 162 MG/DL — HIGH (ref 70–99)
GLUCOSE BLDC GLUCOMTR-MCNC: 168 MG/DL — HIGH (ref 70–99)
GLUCOSE SERPL-MCNC: 118 MG/DL — HIGH (ref 70–99)
HCT VFR BLD CALC: 33.2 % — LOW (ref 39–50)
HDLC SERPL-MCNC: 50 MG/DL — SIGNIFICANT CHANGE UP
HGB BLD-MCNC: 9.7 G/DL — LOW (ref 13–17)
IANC: 3.9 K/UL — SIGNIFICANT CHANGE UP (ref 1.5–8.5)
IMM GRANULOCYTES NFR BLD AUTO: 0.3 % — SIGNIFICANT CHANGE UP (ref 0–1.5)
LIPID PNL WITH DIRECT LDL SERPL: 56 MG/DL — SIGNIFICANT CHANGE UP
LYMPHOCYTES # BLD AUTO: 1.24 K/UL — SIGNIFICANT CHANGE UP (ref 1–3.3)
LYMPHOCYTES # BLD AUTO: 19.3 % — SIGNIFICANT CHANGE UP (ref 13–44)
MAGNESIUM SERPL-MCNC: 2.1 MG/DL — SIGNIFICANT CHANGE UP (ref 1.6–2.6)
MCHC RBC-ENTMCNC: 27.5 PG — SIGNIFICANT CHANGE UP (ref 27–34)
MCHC RBC-ENTMCNC: 29.2 GM/DL — LOW (ref 32–36)
MCV RBC AUTO: 94.1 FL — SIGNIFICANT CHANGE UP (ref 80–100)
MONOCYTES # BLD AUTO: 1.03 K/UL — HIGH (ref 0–0.9)
MONOCYTES NFR BLD AUTO: 16.1 % — HIGH (ref 2–14)
NEUTROPHILS # BLD AUTO: 3.9 K/UL — SIGNIFICANT CHANGE UP (ref 1.8–7.4)
NEUTROPHILS NFR BLD AUTO: 60.8 % — SIGNIFICANT CHANGE UP (ref 43–77)
NON HDL CHOLESTEROL: 72 MG/DL — SIGNIFICANT CHANGE UP
NRBC # BLD: 0 /100 WBCS — SIGNIFICANT CHANGE UP
NRBC # FLD: 0 K/UL — SIGNIFICANT CHANGE UP
PHOSPHATE SERPL-MCNC: 4.6 MG/DL — HIGH (ref 2.5–4.5)
PLATELET # BLD AUTO: 136 K/UL — LOW (ref 150–400)
POTASSIUM SERPL-MCNC: 4.4 MMOL/L — SIGNIFICANT CHANGE UP (ref 3.5–5.3)
POTASSIUM SERPL-SCNC: 4.4 MMOL/L — SIGNIFICANT CHANGE UP (ref 3.5–5.3)
PROT SERPL-MCNC: 7.6 G/DL — SIGNIFICANT CHANGE UP (ref 6–8.3)
RBC # BLD: 3.53 M/UL — LOW (ref 4.2–5.8)
RBC # FLD: 18.1 % — HIGH (ref 10.3–14.5)
SARS-COV-2 RNA SPEC QL NAA+PROBE: SIGNIFICANT CHANGE UP
SODIUM SERPL-SCNC: 138 MMOL/L — SIGNIFICANT CHANGE UP (ref 135–145)
TRIGL SERPL-MCNC: 78 MG/DL — SIGNIFICANT CHANGE UP
WBC # BLD: 6.41 K/UL — SIGNIFICANT CHANGE UP (ref 3.8–10.5)
WBC # FLD AUTO: 6.41 K/UL — SIGNIFICANT CHANGE UP (ref 3.8–10.5)

## 2021-04-07 RX ORDER — ASPIRIN/CALCIUM CARB/MAGNESIUM 324 MG
1 TABLET ORAL
Qty: 30 | Refills: 0
Start: 2021-04-07 | End: 2021-05-06

## 2021-04-07 RX ORDER — ISOSORBIDE DINITRATE 5 MG/1
1 TABLET ORAL
Qty: 90 | Refills: 0
Start: 2021-04-07 | End: 2021-05-06

## 2021-04-07 RX ORDER — ATORVASTATIN CALCIUM 80 MG/1
1 TABLET, FILM COATED ORAL
Qty: 0 | Refills: 0 | DISCHARGE

## 2021-04-07 RX ORDER — LABETALOL HCL 100 MG
1 TABLET ORAL
Qty: 60 | Refills: 0
Start: 2021-04-07 | End: 2021-05-06

## 2021-04-07 RX ORDER — ASPIRIN/CALCIUM CARB/MAGNESIUM 324 MG
1 TABLET ORAL
Qty: 0 | Refills: 0 | DISCHARGE

## 2021-04-07 RX ORDER — LINAGLIPTIN 5 MG/1
1 TABLET, FILM COATED ORAL
Qty: 0 | Refills: 0 | DISCHARGE

## 2021-04-07 RX ORDER — LABETALOL HCL 100 MG
1 TABLET ORAL
Qty: 0 | Refills: 0 | DISCHARGE

## 2021-04-07 RX ORDER — SEVELAMER CARBONATE 2400 MG/1
1 POWDER, FOR SUSPENSION ORAL
Qty: 90 | Refills: 0
Start: 2021-04-07 | End: 2021-05-06

## 2021-04-07 RX ORDER — CLOPIDOGREL BISULFATE 75 MG/1
1 TABLET, FILM COATED ORAL
Qty: 30 | Refills: 0
Start: 2021-04-07 | End: 2021-05-06

## 2021-04-07 RX ORDER — NIFEDIPINE 30 MG
1 TABLET, EXTENDED RELEASE 24 HR ORAL
Qty: 30 | Refills: 0
Start: 2021-04-07 | End: 2021-05-06

## 2021-04-07 RX ORDER — HYDRALAZINE HCL 50 MG
1 TABLET ORAL
Qty: 0 | Refills: 0 | DISCHARGE

## 2021-04-07 RX ORDER — NIFEDIPINE 30 MG
1.5 TABLET, EXTENDED RELEASE 24 HR ORAL
Qty: 30 | Refills: 0 | DISCHARGE
Start: 2021-04-07 | End: 2021-05-06

## 2021-04-07 RX ORDER — LINAGLIPTIN 5 MG/1
1 TABLET, FILM COATED ORAL
Qty: 30 | Refills: 0
Start: 2021-04-07 | End: 2021-05-06

## 2021-04-07 RX ORDER — HYDRALAZINE HCL 50 MG
1 TABLET ORAL
Qty: 60 | Refills: 0
Start: 2021-04-07 | End: 2021-05-06

## 2021-04-07 RX ORDER — ATORVASTATIN CALCIUM 80 MG/1
1 TABLET, FILM COATED ORAL
Qty: 30 | Refills: 0
Start: 2021-04-07 | End: 2021-05-06

## 2021-04-07 RX ORDER — SEVELAMER CARBONATE 2400 MG/1
2 POWDER, FOR SUSPENSION ORAL
Qty: 0 | Refills: 0 | DISCHARGE
Start: 2021-04-07 | End: 2021-05-06

## 2021-04-07 RX ORDER — LOSARTAN POTASSIUM 100 MG/1
1 TABLET, FILM COATED ORAL
Qty: 30 | Refills: 0
Start: 2021-04-07 | End: 2021-05-06

## 2021-04-07 RX ORDER — PANTOPRAZOLE SODIUM 20 MG/1
1 TABLET, DELAYED RELEASE ORAL
Qty: 30 | Refills: 0
Start: 2021-04-07 | End: 2021-05-06

## 2021-04-07 RX ADMIN — ISOSORBIDE DINITRATE 10 MILLIGRAM(S): 5 TABLET ORAL at 10:04

## 2021-04-07 RX ADMIN — ISOSORBIDE DINITRATE 10 MILLIGRAM(S): 5 TABLET ORAL at 03:02

## 2021-04-07 RX ADMIN — LOSARTAN POTASSIUM 100 MILLIGRAM(S): 100 TABLET, FILM COATED ORAL at 03:01

## 2021-04-07 RX ADMIN — Medication 81 MILLIGRAM(S): at 12:41

## 2021-04-07 RX ADMIN — Medication 200 MILLIGRAM(S): at 06:51

## 2021-04-07 RX ADMIN — CHLORHEXIDINE GLUCONATE 1 APPLICATION(S): 213 SOLUTION TOPICAL at 12:41

## 2021-04-07 RX ADMIN — Medication 0.1 MILLIGRAM(S): at 18:05

## 2021-04-07 RX ADMIN — HEPARIN SODIUM 5000 UNIT(S): 5000 INJECTION INTRAVENOUS; SUBCUTANEOUS at 06:51

## 2021-04-07 RX ADMIN — PANTOPRAZOLE SODIUM 40 MILLIGRAM(S): 20 TABLET, DELAYED RELEASE ORAL at 06:52

## 2021-04-07 RX ADMIN — Medication 60 MILLIGRAM(S): at 03:01

## 2021-04-07 RX ADMIN — Medication 200 MILLIGRAM(S): at 18:05

## 2021-04-07 RX ADMIN — Medication 1: at 08:47

## 2021-04-07 RX ADMIN — Medication 50 MILLIGRAM(S): at 18:05

## 2021-04-07 RX ADMIN — Medication 1: at 18:05

## 2021-04-07 RX ADMIN — SEVELAMER CARBONATE 800 MILLIGRAM(S): 2400 POWDER, FOR SUSPENSION ORAL at 12:41

## 2021-04-07 RX ADMIN — CLOPIDOGREL BISULFATE 75 MILLIGRAM(S): 75 TABLET, FILM COATED ORAL at 12:41

## 2021-04-07 RX ADMIN — ISOSORBIDE DINITRATE 10 MILLIGRAM(S): 5 TABLET ORAL at 18:05

## 2021-04-07 RX ADMIN — Medication 50 MILLIGRAM(S): at 06:51

## 2021-04-07 RX ADMIN — SEVELAMER CARBONATE 800 MILLIGRAM(S): 2400 POWDER, FOR SUSPENSION ORAL at 18:05

## 2021-04-07 RX ADMIN — SEVELAMER CARBONATE 800 MILLIGRAM(S): 2400 POWDER, FOR SUSPENSION ORAL at 08:46

## 2021-04-07 RX ADMIN — Medication 0.1 MILLIGRAM(S): at 06:51

## 2021-04-07 NOTE — DISCHARGE NOTE PROVIDER - HOSPITAL COURSE
67 y/o male with a PMHx of CAD s/p stent placement (NAHUM to RCA in March 2021), ischemic cardiomyopathy with moderate LV dysfunction (EF 35-40%) and severe diastolic dysfunction, ESRD on HD TTS, CVA, HTN, HLD, DM and GERD presented for elective cardiac catheterization with balloon angioplasty and laser atherectomy of pLAD 80% stenosis with course complicated by flash pulmonary edema secondary to hypertensive urgency and volume overload. Pt transferred to CCU for further management, started on nitro gtt and BiPAP, s/p urgent dialysis with improvement in volume and respiratory status, then transferred to the floors. Patient currently breathing comfortably on room air, SpO2 98% on room air. 67 y/o male with a PMHx of CAD s/p stent placement (NAHUM to RCA in March 2021), ischemic cardiomyopathy with moderate LV dysfunction (EF 35-40%) and severe diastolic dysfunction, ESRD on HD TTS, CVA, HTN, HLD, DM and GERD presented for elective cardiac catheterization with balloon angioplasty and laser atherectomy of pLAD 80% stenosis with course complicated by flash pulmonary edema secondary to hypertensive urgency and volume overload. Pt transferred to CCU for further management, started on nitro gtt and BiPAP, s/p urgent dialysis with UF with improvement in volume and respiratory status, then transferred to the floors. Patient currently breathing comfortably with SpO2 98% on room air.  Case discussed with Dr. Sawant and patient is medically stable for discharge home.

## 2021-04-07 NOTE — DISCHARGE NOTE PROVIDER - PROVIDER TOKENS
PROVIDER:[TOKEN:[[id number]],SCHEDULEDAPPT:[04/16/2021],SCHEDULEDAPPTTIME:[12:00 PM]],FREE:[LAST:[Your Nephrologist],PHONE:[(   )    -],FAX:[(   )    -]],PROVIDER:[TOKEN:[[id number]]]

## 2021-04-07 NOTE — PROGRESS NOTE ADULT - ASSESSMENT
65 y/o male with a PMHx of CAD s/p stent placement (NAHUM to RCA in March 2021), ischemic cardiomyopathy with moderate LV dysfunction (EF 35-40%) and severe diastolic dysfunction, ESRD on HD TTS, CVA, HTN, HLD, DM and GERD presented for elective cardiac catheterization with balloon angioplasty and laser atherectomy of pLAD 80% stenosis with course complicated by flash pulmonary edema secondary to hypertensive urgency and volume overload. Pt transferred to CCU for further management.    ESRD on HD TTS  from [facility] dialysis unit  s/p urgent HD 4/5 for fluid overload, UF 2L  HD 4/6 UF 2L  no need for hd today, plan hd per schedule  consent obtained  monitor    Hyperkalemia  sec to renal failure  s/p hd  monitor serum K  Low K Diet    SOB  UF with HD  s/p cath with pci    HTN  Bp acceptable   monitor for now. continue current meds  UF with HD   low Sodium  diet  monitor BP closely     Anemia  Acceptable  epo [id number] with hd  monitor Hb    Ckd-mbd  Elevated PTH _ on calcitriol  continue binder  monitor phos and calcium daily

## 2021-04-07 NOTE — DISCHARGE NOTE PROVIDER - NSDCCPCAREPLAN_GEN_ALL_CORE_FT
PRINCIPAL DISCHARGE DIAGNOSIS  Diagnosis: CAD (coronary artery disease)  Assessment and Plan of Treatment: You initially came to the hospital for elective cardiac catheterization and underwent balloon angioplasty and laser atherectomy of pLAD 80% stenosis. Continue Aspirin, Plavix, Atorvastatin, Isordil, Clonidine, Hydralazine, Labetalol, Losartan, and Nifedipine as prescribed. Low salt, low cholesterol, low fat diet. Follow up with your Cardiologist, Dr. Johnson; an appointment was made for you on 4/16/21 at 12:00PM; call [phone] if you need to reschedule appointment.      SECONDARY DISCHARGE DIAGNOSES  Diagnosis: Hypertensive urgency  Assessment and Plan of Treatment: Your blood pressure was uncontrolled and you required a nitroglycerin drip in the CCU. Blood pressure improved. Continue Isordil, Clonidine, Hydralazine, Labetalol, Losartan, and Nifedipine as prescribed.  with course complicated by flash pulmonary edema secondary to hypertensive urgency and volume overload. Pt transferred to CCU for further management, started on nitro gtt and BiPAP    Diagnosis: Flash pulmonary edema  Assessment and Plan of Treatment: Fluid backed up into the lungs due to your elevated blood pressure and ESRD. You are currently breathing comfortably on room air. Continue medications as prescribed.    Diagnosis: ESRD on dialysis  Assessment and Plan of Treatment: Please follow up with your nephrologist for management and continue your scheduled hemodialysis. Continue Renvela as prescribed.

## 2021-04-07 NOTE — DISCHARGE NOTE PROVIDER - CARE PROVIDERS DIRECT ADDRESSES
,[email or web address],DirectAddress_Unknown,DirectAddress_Unknown

## 2021-04-07 NOTE — PROGRESS NOTE ADULT - ATTENDING COMMENTS
NO plan for HD today
PLan for HD today
Patient seen and examined.  Agree with above.   Symptoms resolved after HD  Pt. euvolemic with no symptoms  Continue with dapt  DC planning if ok with consultants  Follow up with Dr. Johnson after discharge    Cadence Sawant MD
Acute pulmonary edema after PCI with laser atherectomy, CB angioplasty for 80% ISR of pLAD-likely from hypertensive emergency. Appreciate recs from Nephrology in initiating HD-pt appears clinically improved with better respiratory status since initiation of HD. Will recommend further intense monitoring and assessment in the CCU noting post-procedural acute pulm edema. TTE AM tomorrow. Cont further management per primary cardiologist.    Thanks for the opportunity of participating in the care of this pt. Please call [phone] for further questions/issues.     Abelino Damico MD   Interventional cardiologist   Coverage information @ [email or web address] ,  pw:  [email or web address]

## 2021-04-07 NOTE — CONSULT NOTE ADULT - SUBJECTIVE AND OBJECTIVE BOX
MD MAMI  Patient is a 66y old  Male who presents with a chief complaint of   HPI:  66 year old male with history of CAD s/p multiple PCI ( PCI in Feb of 2019 with NAHUM to LAD,  LCx), PCI in March 2021 with  NAHUM to RCA ), HTN, DM, ESRD on HD (Tue, Th, Sat)  history of GIB  presented today for  laser atherectomy on LAD artery in-stent restenosis.  Post procedure, he was seen at bedside. He complains of dyspnea and cough. On O2 2 l/min. Last HD was on Saturday. Covid PCR negative on 4/3/2021.            PAST MEDICAL & SURGICAL HISTORY:  HTN (hypertension)    Coronary artery disease    CAP (community acquired pneumonia)  6/18    Diabetes mellitus  type 2    GERD (gastroesophageal reflux disease)    Stented coronary artery  2014, 3 stents, [facility]    ESRD (end stage renal disease)  on Dialysis( M/W/F), By Dr. Huff    Hypercholesterolemia    Cerebrovascular accident (CVA), unspecified mechanism  diagnosed via CT of the head    Anemia due to stage 5 chronic kidney disease, not on chronic dialysis    H/O coronary angiogram  2014 - x3 stents    AV fistula  10/12/18 L radiocephalic AV fistula    H/O eye surgery      MEDICATIONS  (STANDING):  atorvastatin 40 milliGRAM(s) Oral at bedtime  cloNIDine 0.1 milliGRAM(s) Oral two times a day  dextrose 40% Gel 15 Gram(s) Oral once  dextrose 5%. 1000 milliLiter(s) (50 mL/Hr) IV Continuous <Continuous>  dextrose 5%. 1000 milliLiter(s) (100 mL/Hr) IV Continuous <Continuous>  dextrose 50% Injectable 25 Gram(s) IV Push once  dextrose 50% Injectable 12.5 Gram(s) IV Push once  dextrose 50% Injectable 25 Gram(s) IV Push once  glucagon  Injectable 1 milliGRAM(s) IntraMuscular once  hydrALAZINE 50 milliGRAM(s) Oral two times a day  insulin lispro (ADMELOG) corrective regimen sliding scale   SubCutaneous three times a day before meals  insulin lispro (ADMELOG) corrective regimen sliding scale   SubCutaneous at bedtime  labetalol 200 milliGRAM(s) Oral two times a day  losartan 100 milliGRAM(s) Oral daily  NIFEdipine XL 60 milliGRAM(s) Oral daily  pantoprazole    Tablet 40 milliGRAM(s) Oral before breakfast  sevelamer carbonate 800 milliGRAM(s) Oral three times a day with meals    Allergies    No Known Allergies    Intolerances      FAMILY HISTORY:  No pertinent family history in first degree relatives        REVIEW OF SYSTEMS    General:  No fever, chills or night sweats.    Ophthalmologic: No changes in vision.  	  ENMT: No difficulty swallowing. 	    Respiratory and Thorax: Dyspnea and cough.  	  Cardiovascular: No chest pains, tiredness or palpitations.	    Gastrointestinal: No dyspepsia, constipation or diarrhea.	    Genitourinary:	 No dysuria, hematuria or frequency.    Musculoskeletal: No joint pains or swelling. No muscle pains.    Neurological:	No weakness or numbness. No seizures.    Hematology/Lymphatics: No heat or cold intolerance.    Endocrine: No polyuria or polydipsia.	      Vital Signs Last 24 Hrs  T(C): 36.6 (05 Apr 2021 21:07), Max: 36.6 (05 Apr 2021 21:07)  T(F): 97.8 (05 Apr 2021 21:07), Max: 97.8 (05 Apr 2021 21:07)  HR: 74 (05 Apr 2021 21:07) (74 - 74)  BP: 183/80 (05 Apr 2021 21:07) (183/80 - 183/80)  BP(mean): --  RR: 18 (05 Apr 2021 21:07) (18 - 18)  SpO2: 100% (05 Apr 2021 21:07) (100% - 100%)    PHYSICAL EXAMINATION:  Constitutional:  Mild dyspnea. Speaks full sentences.     Neck:  The thyroid is normal. Trachea is midline. No JVD    Breasts: Normal examination.    Respiratory: The lungs are clear to auscultation. No dullness and expansion is normal.    Cardiovascular: S1 and S2 are normal. No mummurs, rubs or gallops are present.    Gastrointestinal: The abdomen is soft. No tenderness is present. No masses are present. Bowel sounds are normal.    Genitourinary: The bladder is not distended. No CVA tenderness is present.    Extremities: No edema is noted. No deformities are present.    Neurological: Cognition is normal. Tone, power and sensation are normal.     Skin: No lesions are seen  or palpated.    Lymph Nodes: No lymphadenopathy is present.    Psychiatric: Mood is appropriate. No hallucinations or flight of ideas are noted.                            8.9    6.28  )-----------( 135      ( 05 Apr 2021 16:03 )             30.3     04-05    142  |  100  |  55<H>  ----------------------------<  90  5.9<H>   |  26  |  8.90<H>    Ca    9.1      05 Apr 2021 16:03  Phos  4.4     04-05  Mg     2.1     04-05      < from: Transthoracic Echocardiogram (03.18.21 @ 13:42) >  Mitral Valve: Mitral annular calcification, otherwise  normal mitral valve. Mild mitral regurgitation.  Aortic Root: Normal aortic root.  Aortic Valve: Calcified trileaflet aortic valve with normal  opening.  Left Atrium: Mildly dilated left atrium.  LA volume index =  38 cc/m2.  Left Ventricle: Moderate global left ventricular systolic  dysfunction. The mid to distal septum, apex and mid to  distal anterior walls are severely hypokinetic. Normal left  ventricular internal dimensions and wall thicknesses.  Severe diastolic dysfunction.  Right Heart: Normal right atrium. Normal right ventricular  size and function. Normal tricuspid valve. Mild tricuspid  regurgitation. Normal pulmonic valve.  Pericardium/PleuraNormal pericardiumwith no pericardial  effusion. Bilateral pleural effusions.  Hemodynamic: Estimated right ventricular systolic pressure  equals 35 mm Hg, assuming right atrial pressure equals 10  mm Hg, consistent with borderline pulmonary hypertension.    < end of copied text >      
cc  chest pain    [place] 66 year old male with history of CAD s/p multiple PCI ( PCI in Feb of 2019 with NAHUM to LAD,  LCx), PCI in March 2021 with  NAHUM to RCA ), HTN, DM, ESRD on HD (Tue, Th, Sat)  history of GIB  presented today for  laser atherectomy on LAD artery in-stent restenosis.  Patient denies SOB, palpitations,  dizziness, lightheadedness, diaphoresis, nausea, vomiting, diarrhea, cough ,fever, chills, recent hospitalization, prior history of PE , DVT, CVA here for PCI    Allergies NKDA    REVIEW OF SYSTEMS:    CONSTITUTIONAL: No weakness, fevers or chills  EYES/ENT: No visual changes;  No vertigo or throat pain   NECK: No pain or stiffness  RESPIRATORY: No cough, wheezing, hemoptysis; No shortness of breath  CARDIOVASCULAR: No chest pain or palpitations  GASTROINTESTINAL: No abdominal or epigastric pain. No nausea, vomiting, or hematemesis; No diarrhea or constipation. No melena or hematochezia.  GENITOURINARY: No dysuria, frequency or hematuria  NEUROLOGICAL: No numbness or weakness  SKIN: No itching, burning, rashes, or lesions   All other review of systems is negative unless indicated above.    labs    Lab Results:  CBC  CBC Full  -  ( 07 Apr 2021 05:54 )  WBC Count : 6.41 K/uL  RBC Count : 3.53 M/uL  Hemoglobin : 9.7 g/dL  Hematocrit : 33.2 %  Platelet Count - Automated : 136 K/uL  Mean Cell Volume : 94.1 fL  Mean Cell Hemoglobin : 27.5 pg  Mean Cell Hemoglobin Concentration : 29.2 gm/dL  Auto Neutrophil # : 3.90 K/uL  Auto Lymphocyte # : 1.24 K/uL  Auto Monocyte # : 1.03 K/uL  Auto Eosinophil # : 0.19 K/uL  Auto Basophil # : 0.03 K/uL  Auto Neutrophil % : 60.8 %  Auto Lymphocyte % : 19.3 %  Auto Monocyte % : 16.1 %  Auto Eosinophil % : 3.0 %  Auto Basophil % : 0.5 %    .		Differential:	[] Automated		[] Manual  Chemistry                        9.7    6.41  )-----------( 136      ( 07 Apr 2021 05:54 )             33.2     04-07    138  |  98  |  27<H>  ----------------------------<  118<H>  4.4   |  27  |  5.28<H>    Ca    8.8      07 Apr 2021 05:54  Phos  4.6     04-07  Mg     2.1     04-07    TPro  7.6  /  Alb  3.6  /  TBili  0.8  /  DBili  x   /  AST  12  /  ALT  16  /  AlkPhos  92  04-07    LIVER FUNCTIONS - ( 07 Apr 2021 05:54 )  Alb: 3.6 g/dL / Pro: 7.6 g/dL / ALK PHOS: 92 U/L / ALT: 16 U/L / AST: 12 U/L / GGT: x           PT/INR - ( 05 Apr 2021 22:42 )   PT: 11.6 sec;   INR: 1.02 ratio         PTT - ( 05 Apr 2021 22:42 )  PTT:33.4 sec      ABG - ( 05 Apr 2021 22:39 )  pH, Arterial: 7.35  pH, Blood: x     /  pCO2: 46    /  pO2: 69    / HCO3: 24    / Base Excess: 0.2   /  SaO2: 90.6                MICROBIOLOGY/CULTURES:      RADIOLOGY RESULTS: reviewed     Physical exam    General: WN/WD NAD  PERRLA  Neurology: A&Ox3, nonfocal, SUÁREZ x 4  Respiratory: CTA B/L  CV: RRR, S1S2, no murmurs, rubs or gallops  Abdominal: Soft, NT, ND +BS, Last BM  Extremities: No edema, + peripheral pulses  Skin Normal

## 2021-04-07 NOTE — DISCHARGE NOTE PROVIDER - CARE PROVIDER_API CALL
Edin Johnson)  Cardiology; Internal Medicine  [address], [address]  [place] [address]  Phone: [phone]  Fax: [phone]  Scheduled Appointment: 04/16/2021 12:00 PM    Your Nephrologist,   Phone: (   )    -  Fax: (   )    -  Follow Up Time:     Yasmany Huff  INTERNAL MEDICINE  [address], [address] [place] [address]  Phone: [phone]  Fax: [phone]  Follow Up Time:

## 2021-04-07 NOTE — PROGRESS NOTE ADULT - SUBJECTIVE AND OBJECTIVE BOX
[facility]   MD ANDRES AVALOS DO ANAM SIDDIQUI ANGELA WONG, PA    TEL:  OFFICE: [phone]  DR CUETO CELL: [phone]  DR. HUNTER CELL: [phone]  DR. HORTA CELL: [phone]  LIDA WINTERS CELL: [phone]    From 5pm-7am Answering Service [phone]    -- RENAL FOLLOW UP NOTE ---Date of Service 04-07-21 @ 13:07    Patient is a 66y old  Male who presents with a chief complaint of Elective cardiac catheterization (06 Apr 2021 00:13)      Patient seen and examined at bedside. No chest pain/sob    VITALS:  T(F): 97.8 (04-07-21 @ 09:51), Max: 98.8 (04-06-21 @ 15:55)  HR: 59 (04-07-21 @ 11:35)  BP: 134/70 (04-07-21 @ 09:51)  RR: 18 (04-07-21 @ 09:51)  SpO2: 98% (04-07-21 @ 11:35)  Wt(kg): --    04-06 @ 07:01  -  04-07 @ 07:00  --------------------------------------------------------  IN: 518 mL / OUT: 2500 mL / NET: -1982 mL          PHYSICAL EXAM:  Constitutional: NAD  Neck: No JVD  Respiratory: CTAB, no wheezes, rales or rhonchi  Cardiovascular: S1, S2, RRR  Gastrointestinal: BS+, soft, NT/ND  Extremities: No peripheral edema    Hospital Medications:   MEDICATIONS  (STANDING):  aspirin enteric coated 81 milliGRAM(s) Oral daily  atorvastatin 40 milliGRAM(s) Oral at bedtime  chlorhexidine 4% Liquid 1 Application(s) Topical daily  cloNIDine 0.1 milliGRAM(s) Oral two times a day  clopidogrel Tablet 75 milliGRAM(s) Oral daily  dextrose 40% Gel 15 Gram(s) Oral once  dextrose 5%. 1000 milliLiter(s) (100 mL/Hr) IV Continuous <Continuous>  dextrose 5%. 1000 milliLiter(s) (50 mL/Hr) IV Continuous <Continuous>  dextrose 50% Injectable 25 Gram(s) IV Push once  dextrose 50% Injectable 12.5 Gram(s) IV Push once  dextrose 50% Injectable 25 Gram(s) IV Push once  epoetin ralph-epbx (RETACRIT) Injectable 03515 Unit(s) IV Push <User Schedule>  glucagon  Injectable 1 milliGRAM(s) IntraMuscular once  heparin   Injectable 5000 Unit(s) SubCutaneous every 12 hours  hydrALAZINE 50 milliGRAM(s) Oral two times a day  insulin lispro (ADMELOG) corrective regimen sliding scale   SubCutaneous three times a day before meals  insulin lispro (ADMELOG) corrective regimen sliding scale   SubCutaneous at bedtime  isosorbide   dinitrate Tablet (ISORDIL) 10 milliGRAM(s) Oral three times a day  labetalol 200 milliGRAM(s) Oral two times a day  losartan 100 milliGRAM(s) Oral daily  NIFEdipine XL 60 milliGRAM(s) Oral daily  pantoprazole    Tablet 40 milliGRAM(s) Oral before breakfast  sevelamer carbonate 800 milliGRAM(s) Oral three times a day with meals      LABS:  04-07    138  |  98  |  27<H>  ----------------------------<  118<H>  4.4   |  27  |  5.28<H>    Ca    8.8      07 Apr 2021 05:54  Phos  4.6     04-07  Mg     2.1     04-07    TPro  7.6  /  Alb  3.6  /  TBili  0.8  /  DBili      /  AST  12  /  ALT  16  /  AlkPhos  92  04-07    Creatinine Trend: 5.28 <--, 6.51 <--, 9.30 <--, 8.90 <--    Phosphorus Level, Serum: 4.6 mg/dL (04-07 @ 05:54)  Albumin, Serum: 3.6 g/dL (04-07 @ 05:54)                              9.7    6.41  )-----------( 136      ( 07 Apr 2021 05:54 )             33.2     Urine Studies:      PTH -- (Ca --)      [03-19-21 @ 06:53]   400  HbA1c 7.8      [12-17-19 @ 05:54]  Lipid: chol 122, TG 78, HDL 50, LDL --      [04-07-21 @ 05:54]    HBsAb 32.6      [04-06-21 @ 10:06]  HBsAg Nonreact      [04-06-21 @ 10:06]  HBcAb Nonreact      [04-06-21 @ 10:06]  HCV 0.06, Nonreact      [04-06-21 @ 10:06]      RADIOLOGY & ADDITIONAL STUDIES:

## 2021-04-07 NOTE — DISCHARGE NOTE PROVIDER - INSTRUCTIONS
Low salt, low cholesterol, low fat, Renal diet (low protein, no concentrated potassium or phosphorus)

## 2021-04-07 NOTE — CONSULT NOTE ADULT - ASSESSMENT
66 year old male with history of CAD s/p multiple PCI ( PCI in Feb of 2019 with NAHUM to LAD,  LCx), PCI in March 2021 with  NAHUM to RCA ), HTN, DM, ESRD on HD (Tue, Th, Sat)  history of GIB  presented today for  laser atherectomy on LAD artery in-stent restenosis.  Patient denies SOB, palpitations,  dizziness, lightheadedness, diaphoresis, nausea, vomiting, diarrhea, cough ,fever, chills, recent hospitalization, prior history of PE , DVT, CVA.    CAD  -pt. s/p laser atherectomy and balloon angioplasty to the proximal LAD  -continue with dapt    ESRD on HD TTS  s/p urgent HD 4/5 for fluid overload  HD as scheduled  renal fu   monitor      HTN  Bp acceptable   monitor for now. continue current meds  UF with HD   low Sodium  diet  monitor BP closely   
ESRD: He was dialysed on Saturday at [facility].  He stated that he is dyspneic now.  - HD tonight for dyspnea. Will reassess in am for another session, possible UF.    - 2 L UF planned.  - Consent obtained from him and is in his chart.    Hyperkalemia:   He received Lokelma, but given dyspnea, will also dialyse.

## 2021-04-07 NOTE — PROGRESS NOTE ADULT - SUBJECTIVE AND OBJECTIVE BOX
chief complaint: chest pain     extended hpi: 66 year old male with history of CAD s/p multiple PCI ( PCI in Feb of 2019 with NAHUM to LAD,  LCx), PCI in March 2021 with  NAHUM to RCA ), HTN, DM, ESRD on HD (Tue, Th, Sat)  history of GIB  presented today for  laser atherectomy on LAD artery in-stent restenosis.  Patient denies SOB, palpitations,  dizziness, lightheadedness, diaphoresis, nausea, vomiting, diarrhea, cough ,fever, chills, recent hospitalization, prior history of PE , DVT, CVA.    S: no chest pain or sob; ROS otherwise -     Review of Systems:   Constitutional: [ ] fevers, [ ] chills.   Skin: [ ] dry skin. [ ] rashes.  Psychiatric: [ ] depression, [ ] anxiety.   Gastrointestinal: [ ] BRBPR, [ ] melena.   Neurological: [ ] confusion. [ ] seizures. [ ] shuffling gait.   Ears,Nose,Mouth and Throat: [ ] ear pain [ ] sore throat.   Eyes: [ ] diplopia.   Respiratory: [ ] hemoptysis. [ ] shortness of breath  Cardiovascular: See HPI above  Hematologic/Lymphatic: [ ] anemia. [ ] painful nodes. [ ] prolonged bleeding.   Genitourinary: [ ] hematuria. [ ] flank pain.   Endocrine: [ ] significant change in weight. [ ] intolerance to heat and cold.     Review of systems [x ] otherwise negative, [ ] otherwise unable to obtain    FH: no family history of sudden cardiac death in first degree relatives    SH: [ ] tobacco, [ ] alcohol, [ ] drugs    aspirin enteric coated 81 milliGRAM(s) Oral daily  atorvastatin 40 milliGRAM(s) Oral at bedtime  chlorhexidine 4% Liquid 1 Application(s) Topical daily  cloNIDine 0.1 milliGRAM(s) Oral two times a day  clopidogrel Tablet 75 milliGRAM(s) Oral daily  dextrose 40% Gel 15 Gram(s) Oral once  dextrose 5%. 1000 milliLiter(s) IV Continuous <Continuous>  dextrose 5%. 1000 milliLiter(s) IV Continuous <Continuous>  dextrose 50% Injectable 25 Gram(s) IV Push once  dextrose 50% Injectable 12.5 Gram(s) IV Push once  dextrose 50% Injectable 25 Gram(s) IV Push once  epoetin ralph-epbx (RETACRIT) Injectable 63094 Unit(s) IV Push <User Schedule>  glucagon  Injectable 1 milliGRAM(s) IntraMuscular once  heparin   Injectable 5000 Unit(s) SubCutaneous every 12 hours  hydrALAZINE 50 milliGRAM(s) Oral two times a day  insulin lispro (ADMELOG) corrective regimen sliding scale   SubCutaneous three times a day before meals  insulin lispro (ADMELOG) corrective regimen sliding scale   SubCutaneous at bedtime  isosorbide   dinitrate Tablet (ISORDIL) 10 milliGRAM(s) Oral three times a day  labetalol 200 milliGRAM(s) Oral two times a day  losartan 100 milliGRAM(s) Oral daily  NIFEdipine XL 60 milliGRAM(s) Oral daily  pantoprazole    Tablet 40 milliGRAM(s) Oral before breakfast  sevelamer carbonate 800 milliGRAM(s) Oral three times a day with meals                        9.7    6.41  )-----------( 136      ( 07 Apr 2021 05:54 )             33.2     138  |  98  |  27<H>  ----------------------------<  118<H>  4.4   |  27  |  5.28<H>    Ca    8.8      07 Apr 2021 05:54  Phos  4.6     04-07  Mg     2.1     04-07    TPro  7.6  /  Alb  3.6  /  TBili  0.8  /  DBili  x   /  AST  12  /  ALT  16  /  AlkPhos  92  04-07      T(C): 36.6 (04-07-21 @ 13:58), Max: 37.1 (04-06-21 @ 15:55)  HR: 56 (04-07-21 @ 13:58) (56 - 75)  BP: 137/66 (04-07-21 @ 13:58) (134/70 - 177/67)  RR: 17 (04-07-21 @ 13:58) (17 - 22)  SpO2: 99% (04-07-21 @ 13:58) (96% - 100%)    General: Well nourished in no acute distress. Alert and Oriented * 3.   Head: Normocephalic and atraumatic.   Neck: No JVD. No bruits. Supple. Does not appear to be enlarged.   Cardiovascular: + S1,S2 ; RRR Soft systolic murmur at the left lower sternal border. No rubs noted.    Lungs: CTA b/l. No rhonchi, rales or wheezes.   Abdomen: + BS, soft. Non tender. Non distended. No rebound. No guarding.   Extremities: No clubbing/cyanosis/edema.   Neurologic: Moves all four extremities. Full range of motion.   Skin: Warm and moist. The patient's skin has normal elasticity and good skin turgor.   Psychiatric: Appropriate mood and affect.  Musculoskeletal: Normal range of motion, normal strength      Tele: SR    A/P 66 year old male with history of CAD s/p multiple PCI ( PCI in Feb of 2019 with NAHUM to LAD,  LCx), PCI in March 2021 with  NAHUM to RCA ), HTN, DM, ESRD on HD (Tue, Th, Sat)  history of GIB  presented today for  laser atherectomy on LAD artery in-stent restenosis.  Patient denies SOB, palpitations,  dizziness, lightheadedness, diaphoresis, nausea, vomiting, diarrhea, cough ,fever, chills, recent hospitalization, prior history of PE , DVT, CVA.    -pt. s/p laser atherectomy and balloon angioplasty to the proximal LAD  -continue with dapt  -pt. with flash pulmonary edema last night in the setting of hypertensive urgency and ESRD  -symptoms have improved after BP control and after HD  -DC planning today if ok with all consultants  -OP F/U with Dr Johnson in 1 week  -OP HD as scheduled

## 2021-04-07 NOTE — DISCHARGE NOTE PROVIDER - NSDCMRMEDTOKEN_GEN_ALL_CORE_FT
Aspirin Enteric Coated 81 mg oral delayed release tablet: 1 tab(s) orally once a day  atorvastatin 40 mg oral tablet: 1 tab(s) orally once a day  cloNIDine 0.1 mg oral tablet: 1 tab(s) orally 2 times a day  clopidogrel 75 mg oral tablet: 1 tab(s) orally once a day  hydrALAZINE 50 mg oral tablet: 1 tab(s) orally 2 times a day  labetalol 200 mg oral tablet: 1 tab(s) orally 2 times a day  losartan 100 mg oral tablet: 1 tab(s) orally once a day  NIFEdipine 60 mg oral tablet, extended release: 1 tab(s) orally once a day  pantoprazole 40 mg oral delayed release tablet: 1 tab(s) orally once a day (before a meal)  sevelamer carbonate 800 mg oral tablet: 1 tab(s) orally 3 times a day (with meals)  Tradjenta 5 mg oral tablet: 1 tab(s) orally once a day   Aspirin Enteric Coated 81 mg oral delayed release tablet: 1 tab(s) orally once a day  atorvastatin 40 mg oral tablet: 1 tab(s) orally once a day  cloNIDine 0.1 mg oral tablet: 1 tab(s) orally 2 times a day  clopidogrel 75 mg oral tablet: 1 tab(s) orally once a day  hydrALAZINE 50 mg oral tablet: 1 tab(s) orally 2 times a day  isosorbide dinitrate 10 mg oral tablet: 1 tab(s) orally 3 times a day  labetalol 200 mg oral tablet: 1 tab(s) orally 2 times a day  losartan 100 mg oral tablet: 1 tab(s) orally once a day  NIFEdipine 60 mg oral tablet, extended release: 1 tab(s) orally once a day  pantoprazole 40 mg oral delayed release tablet: 1 tab(s) orally once a day (before a meal)  sevelamer carbonate 800 mg oral tablet: 1 tab(s) orally 3 times a day (with meals)  Tradjenta 5 mg oral tablet: 1 tab(s) orally once a day

## 2021-04-07 NOTE — DISCHARGE NOTE NURSING/CASE MANAGEMENT/SOCIAL WORK - PATIENT PORTAL LINK FT
You can access the FollowMyHealth Patient Portal offered by [facility] by registering at the following website: [email or web address]. By joining [facility]’s FollowMyHealth portal, you will also be able to view your health information using other applications (apps) compatible with our system.

## 2021-04-16 ENCOUNTER — NON-APPOINTMENT (OUTPATIENT)
Age: 66
End: 2021-04-16

## 2021-04-16 ENCOUNTER — APPOINTMENT (OUTPATIENT)
Dept: CARDIOLOGY | Facility: CLINIC | Age: 66
End: 2021-04-16
Payer: MEDICARE

## 2021-04-16 VITALS
DIASTOLIC BLOOD PRESSURE: 60 MMHG | WEIGHT: 130 LBS | HEART RATE: 53 BPM | BODY MASS INDEX: 20.98 KG/M2 | OXYGEN SATURATION: 99 % | SYSTOLIC BLOOD PRESSURE: 130 MMHG | TEMPERATURE: 98.2 F

## 2021-04-16 PROCEDURE — 93000 ELECTROCARDIOGRAM COMPLETE: CPT

## 2021-04-16 PROCEDURE — 99072 ADDL SUPL MATRL&STAF TM PHE: CPT

## 2021-04-16 PROCEDURE — 99215 OFFICE O/P EST HI 40 MIN: CPT

## 2021-04-16 RX ORDER — LABETALOL HYDROCHLORIDE 200 MG/1
200 TABLET, FILM COATED ORAL TWICE DAILY
Qty: 360 | Refills: 2 | Status: DISCONTINUED | COMMUNITY
Start: 1900-01-01 | End: 2021-04-16

## 2021-04-28 NOTE — REASON FOR VISIT
[Follow-Up - Clinic] : a clinic follow-up of [[employer] ] : provided by [employer]   [FreeTextEntry1] : [id number] [FreeTextEntry2] : Arin [TWNoteComboBox1] : Ila

## 2021-04-28 NOTE — HISTORY OF PRESENT ILLNESS
[FreeTextEntry1] : Patient is 66 year-old with cardiovascular risk factors of hypertension and diabetes, known coronary artery disease status post PCI x3 (2014) at [place] by Dr. Saúl Villafana, CKD, now ESRD on HD Pfokdr-Eaqeoyfos-Dosfyn) that began January 2019, who presents today for cardiac evaluation prior to possible renal transplant.\par He was working until October 2018 as [employer] .\par Patient does not formally exercise, but he can climb stairs and has no exertional symptoms.\par \par In January 2019, patient admitted to [facility] for shortness of breath and nosebleed. He was found to be volume overloaded in the setting of FELICITA on CKD and he was initiated on hemodialysis. \par \par In February 2019, patient seen to have abnormal nuclear stress test and was referred for left and right heart catheterization. The right heart cath showed elevated systemic blood pressures but normal PCWP (14 mmHg) and normal PA pressures (37/17 mmHg) with normal cardiac output and index of 6 L/min and 3.6 respectively. The left heart cath revealed significant distal LAD disease and significant in stent stenosis of LCx. He is now status post POBA to LCx and NAHUM to LAD.\par \par May 2019 - Patient presents today in his usual state of health. He reports occasional right leg pain that limits his exercise while walking on the treadmill. He is without chest pain, shortness of breath, lower extremity swelling.\par \par October 2019 - Patient presents today in his usual state of health. He reports occasional right leg pain that limits his exercise while walking on the treadmill. He is without chest pain, shortness of breath, lower extremity swelling. He had normal HOLLY/PVR and normal ultrasound of the abdominal vessels (and into iliacs). \par \par March 2020 - Patient returns for follow-up of his cardiovascular disease and to maintain standing with the Transplant Center. Patient recently traveled to [facility] (January 8 - February 6, 2020), and when patient returned having lost 8 lbs without a clear cause. It was unintentional weight loss. He is now being evaluated for cancer including lung cancer (former smoker). He will also need EGD/colonoscopy.\par \par PMD: Bassem Grove MD [phone]\par Cardiologist: LEONARD Gomez [phone]\par Nephrologist: Yasmany Huff MD [phone]

## 2021-04-28 NOTE — DISCUSSION/SUMMARY
[FreeTextEntry1] : Patient is a 66 year-old gentleman with known coronary artery disease who presents today for evaluation prior to possible renal transplant.\par In March 2021, elizabeth was admitted to [facility] with shortness of breath and was seen to have LAD and RCA disease. He is now status post PCI to both. \par \par For patient with newly reduced LVEF, will change from labetalol to carvedilol. Patient will report any symptoms of fatigue or dizziness after making the change.\par Repeat TTE in June 2021.\par \par Continue dual antiplatelet therapy, high intensity statin therapy, antihypertension regimen, and diabetes regimen.

## 2021-05-03 ENCOUNTER — APPOINTMENT (OUTPATIENT)
Dept: TRANSPLANT | Facility: CLINIC | Age: 66
End: 2021-05-03

## 2021-05-04 NOTE — HISTORY OF PRESENT ILLNESS
Caller: QUINTIN     Relationship to patient: SELF    Best call back number: [phone]    Chief complaint:     Type of visit: CT SCAN & LAB, & FOLLOW UP    Requested date: IN JUNE     If rescheduling, when is the original appointment:  5/21 CT SCAN  & 5/25 LAB & DR F/U    Additional notes: WILL BE ON VACATION   CALL PT WITH NEW APPTS          [FreeTextEntry1] : Patient is 64 year-old with cardiovascular risk factors of hypertension and diabetes, known coronary artery disease status post PCI x3 (2014) at [place] by Dr. Saúl Villafana, CKD, now ESRD on HD Mvidjn-Gtesedtwj-Pbitkw) that began January 2019, who presents today for cardiac evaluation prior to possible renal transplant.\par He was working until October 2018 as [employer] .\par Patient does not formally exercise, but he can climb stairs and has no exertional symptoms.\par \par In January 2019, patient admitted [facility] for shortness of breath and nosebleed. He was found to be volume overloaded in the setting of FELICITA on CKD and he was initiated on hemodialysis. \par \par PMD: Bassem Grove MD [phone]\par Cardiologist: LEONARD Gomez [phone]\par Nephrologist: Yasmany Huff MD [phone]

## 2021-05-05 ENCOUNTER — APPOINTMENT (OUTPATIENT)
Dept: TRANSPLANT | Facility: CLINIC | Age: 66
End: 2021-05-05

## 2021-06-02 NOTE — H&P PST ADULT - NEUROLOGICAL DETAILS
negative responds to pain/responds to verbal commands/alert and oriented x 3/sensation intact/normal strength Alert & oriented; no sensory, motor or coordination deficits, normal reflexes

## 2021-06-04 NOTE — ED PROVIDER NOTE - DOMESTIC TRAVEL HIGH RISK QUESTION
DISCHARGE INSTRUCTIONS:    FOLLOW UP WITH YOUR PRIMARY CARE PROVIDER OR THE [address] [place] [address]  MAKE AN APPOINTMENT TO BE SEEN  TAKE MEDICATION AS PRESCRIBED  IF RASH, SHORTNESS OF BREATH OR TROUBLE SWALLOWING, STOP TAKING THE MEDICATION AND BE SEEN  REST AND DRINK PLENTY OF FLUIDS  IF SYMPTOMS WORSEN OR NEW SYMPTOMS ARISE, RETURN TO THE ER TO BE SEEN 
No

## 2021-06-12 ENCOUNTER — INPATIENT (INPATIENT)
Facility: HOSPITAL | Age: 66
LOS: 0 days | Discharge: HOME | End: 2021-06-13
Attending: INTERNAL MEDICINE | Admitting: INTERNAL MEDICINE
Payer: MEDICARE

## 2021-06-12 ENCOUNTER — TRANSCRIPTION ENCOUNTER (OUTPATIENT)
Age: 66
End: 2021-06-12

## 2021-06-12 VITALS
DIASTOLIC BLOOD PRESSURE: 74 MMHG | RESPIRATION RATE: 18 BRPM | SYSTOLIC BLOOD PRESSURE: 154 MMHG | HEART RATE: 81 BPM | OXYGEN SATURATION: 100 % | TEMPERATURE: 98 F | HEIGHT: 66 IN

## 2021-06-12 DIAGNOSIS — E11.9 TYPE 2 DIABETES MELLITUS WITHOUT COMPLICATIONS: ICD-10-CM

## 2021-06-12 DIAGNOSIS — E78.00 PURE HYPERCHOLESTEROLEMIA, UNSPECIFIED: ICD-10-CM

## 2021-06-12 DIAGNOSIS — N18.6 END STAGE RENAL DISEASE: ICD-10-CM

## 2021-06-12 DIAGNOSIS — Z02.9 ENCOUNTER FOR ADMINISTRATIVE EXAMINATIONS, UNSPECIFIED: ICD-10-CM

## 2021-06-12 DIAGNOSIS — I77.0 ARTERIOVENOUS FISTULA, ACQUIRED: Chronic | ICD-10-CM

## 2021-06-12 DIAGNOSIS — Z98.890 OTHER SPECIFIED POSTPROCEDURAL STATES: Chronic | ICD-10-CM

## 2021-06-12 DIAGNOSIS — E87.5 HYPERKALEMIA: ICD-10-CM

## 2021-06-12 DIAGNOSIS — I25.10 ATHEROSCLEROTIC HEART DISEASE OF NATIVE CORONARY ARTERY WITHOUT ANGINA PECTORIS: ICD-10-CM

## 2021-06-12 DIAGNOSIS — Z29.9 ENCOUNTER FOR PROPHYLACTIC MEASURES, UNSPECIFIED: ICD-10-CM

## 2021-06-12 DIAGNOSIS — I10 ESSENTIAL (PRIMARY) HYPERTENSION: ICD-10-CM

## 2021-06-12 LAB
ALBUMIN SERPL ELPH-MCNC: 4 G/DL — SIGNIFICANT CHANGE UP (ref 3.3–5)
ALBUMIN SERPL ELPH-MCNC: 4.4 G/DL — SIGNIFICANT CHANGE UP (ref 3.3–5)
ALP SERPL-CCNC: 67 U/L — SIGNIFICANT CHANGE UP (ref 40–120)
ALP SERPL-CCNC: 72 U/L — SIGNIFICANT CHANGE UP (ref 40–120)
ALT FLD-CCNC: 11 U/L — SIGNIFICANT CHANGE UP (ref 4–41)
ALT FLD-CCNC: 11 U/L — SIGNIFICANT CHANGE UP (ref 4–41)
ANION GAP SERPL CALC-SCNC: 21 MMOL/L — HIGH (ref 7–14)
ANION GAP SERPL CALC-SCNC: 22 MMOL/L — HIGH (ref 7–14)
AST SERPL-CCNC: 14 U/L — SIGNIFICANT CHANGE UP (ref 4–40)
AST SERPL-CCNC: 15 U/L — SIGNIFICANT CHANGE UP (ref 4–40)
BASOPHILS # BLD AUTO: 0.05 K/UL — SIGNIFICANT CHANGE UP (ref 0–0.2)
BASOPHILS NFR BLD AUTO: 0.7 % — SIGNIFICANT CHANGE UP (ref 0–2)
BILIRUB SERPL-MCNC: 0.5 MG/DL — SIGNIFICANT CHANGE UP (ref 0.2–1.2)
BILIRUB SERPL-MCNC: 0.7 MG/DL — SIGNIFICANT CHANGE UP (ref 0.2–1.2)
BLOOD GAS VENOUS COMPREHENSIVE RESULT: SIGNIFICANT CHANGE UP
BUN SERPL-MCNC: 65 MG/DL — HIGH (ref 7–23)
BUN SERPL-MCNC: 68 MG/DL — HIGH (ref 7–23)
CALCIUM SERPL-MCNC: 9.1 MG/DL — SIGNIFICANT CHANGE UP (ref 8.4–10.5)
CALCIUM SERPL-MCNC: 9.4 MG/DL — SIGNIFICANT CHANGE UP (ref 8.4–10.5)
CHLORIDE SERPL-SCNC: 91 MMOL/L — LOW (ref 98–107)
CHLORIDE SERPL-SCNC: 94 MMOL/L — LOW (ref 98–107)
CO2 SERPL-SCNC: 20 MMOL/L — LOW (ref 22–31)
CO2 SERPL-SCNC: 24 MMOL/L — SIGNIFICANT CHANGE UP (ref 22–31)
CREAT SERPL-MCNC: 11.53 MG/DL — HIGH (ref 0.5–1.3)
CREAT SERPL-MCNC: 12.01 MG/DL — HIGH (ref 0.5–1.3)
EOSINOPHIL # BLD AUTO: 0.25 K/UL — SIGNIFICANT CHANGE UP (ref 0–0.5)
EOSINOPHIL NFR BLD AUTO: 3.6 % — SIGNIFICANT CHANGE UP (ref 0–6)
GLUCOSE BLDC GLUCOMTR-MCNC: 116 MG/DL — HIGH (ref 70–99)
GLUCOSE BLDC GLUCOMTR-MCNC: 130 MG/DL — HIGH (ref 70–99)
GLUCOSE BLDC GLUCOMTR-MCNC: 133 MG/DL — HIGH (ref 70–99)
GLUCOSE BLDC GLUCOMTR-MCNC: 148 MG/DL — HIGH (ref 70–99)
GLUCOSE BLDC GLUCOMTR-MCNC: 54 MG/DL — CRITICAL LOW (ref 70–99)
GLUCOSE BLDC GLUCOMTR-MCNC: 72 MG/DL — SIGNIFICANT CHANGE UP (ref 70–99)
GLUCOSE BLDC GLUCOMTR-MCNC: 77 MG/DL — SIGNIFICANT CHANGE UP (ref 70–99)
GLUCOSE BLDC GLUCOMTR-MCNC: 82 MG/DL — SIGNIFICANT CHANGE UP (ref 70–99)
GLUCOSE SERPL-MCNC: 121 MG/DL — HIGH (ref 70–99)
GLUCOSE SERPL-MCNC: 135 MG/DL — HIGH (ref 70–99)
HCT VFR BLD CALC: 41.2 % — SIGNIFICANT CHANGE UP (ref 39–50)
HCT VFR BLD CALC: 41.6 % — SIGNIFICANT CHANGE UP (ref 39–50)
HGB BLD-MCNC: 12.6 G/DL — LOW (ref 13–17)
HGB BLD-MCNC: 13.1 G/DL — SIGNIFICANT CHANGE UP (ref 13–17)
IANC: 4.14 K/UL — SIGNIFICANT CHANGE UP (ref 1.5–8.5)
IMM GRANULOCYTES NFR BLD AUTO: 0.3 % — SIGNIFICANT CHANGE UP (ref 0–1.5)
LYMPHOCYTES # BLD AUTO: 1.53 K/UL — SIGNIFICANT CHANGE UP (ref 1–3.3)
LYMPHOCYTES # BLD AUTO: 21.8 % — SIGNIFICANT CHANGE UP (ref 13–44)
MAGNESIUM SERPL-MCNC: 2.6 MG/DL — SIGNIFICANT CHANGE UP (ref 1.6–2.6)
MAGNESIUM SERPL-MCNC: 2.6 MG/DL — SIGNIFICANT CHANGE UP (ref 1.6–2.6)
MCHC RBC-ENTMCNC: 29.6 PG — SIGNIFICANT CHANGE UP (ref 27–34)
MCHC RBC-ENTMCNC: 29.7 PG — SIGNIFICANT CHANGE UP (ref 27–34)
MCHC RBC-ENTMCNC: 30.6 GM/DL — LOW (ref 32–36)
MCHC RBC-ENTMCNC: 31.5 GM/DL — LOW (ref 32–36)
MCV RBC AUTO: 94.3 FL — SIGNIFICANT CHANGE UP (ref 80–100)
MCV RBC AUTO: 96.7 FL — SIGNIFICANT CHANGE UP (ref 80–100)
MONOCYTES # BLD AUTO: 1.04 K/UL — HIGH (ref 0–0.9)
MONOCYTES NFR BLD AUTO: 14.8 % — HIGH (ref 2–14)
NEUTROPHILS # BLD AUTO: 4.14 K/UL — SIGNIFICANT CHANGE UP (ref 1.8–7.4)
NEUTROPHILS NFR BLD AUTO: 58.8 % — SIGNIFICANT CHANGE UP (ref 43–77)
NRBC # BLD: 0 /100 WBCS — SIGNIFICANT CHANGE UP
NRBC # BLD: 0 /100 WBCS — SIGNIFICANT CHANGE UP
NRBC # FLD: 0 K/UL — SIGNIFICANT CHANGE UP
NRBC # FLD: 0 K/UL — SIGNIFICANT CHANGE UP
PHOSPHATE SERPL-MCNC: 9.6 MG/DL — HIGH (ref 2.5–4.5)
PHOSPHATE SERPL-MCNC: 9.7 MG/DL — HIGH (ref 2.5–4.5)
PLATELET # BLD AUTO: 111 K/UL — LOW (ref 150–400)
PLATELET # BLD AUTO: 125 K/UL — LOW (ref 150–400)
POTASSIUM SERPL-MCNC: 5.3 MMOL/L — SIGNIFICANT CHANGE UP (ref 3.5–5.3)
POTASSIUM SERPL-MCNC: 5.4 MMOL/L — HIGH (ref 3.5–5.3)
POTASSIUM SERPL-SCNC: 5.3 MMOL/L — SIGNIFICANT CHANGE UP (ref 3.5–5.3)
POTASSIUM SERPL-SCNC: 5.4 MMOL/L — HIGH (ref 3.5–5.3)
PROT SERPL-MCNC: 7.9 G/DL — SIGNIFICANT CHANGE UP (ref 6–8.3)
PROT SERPL-MCNC: 8.4 G/DL — HIGH (ref 6–8.3)
RBC # BLD: 4.26 M/UL — SIGNIFICANT CHANGE UP (ref 4.2–5.8)
RBC # BLD: 4.41 M/UL — SIGNIFICANT CHANGE UP (ref 4.2–5.8)
RBC # FLD: 14.3 % — SIGNIFICANT CHANGE UP (ref 10.3–14.5)
RBC # FLD: 14.5 % — SIGNIFICANT CHANGE UP (ref 10.3–14.5)
SARS-COV-2 RNA SPEC QL NAA+PROBE: SIGNIFICANT CHANGE UP
SODIUM SERPL-SCNC: 135 MMOL/L — SIGNIFICANT CHANGE UP (ref 135–145)
SODIUM SERPL-SCNC: 137 MMOL/L — SIGNIFICANT CHANGE UP (ref 135–145)
WBC # BLD: 6.59 K/UL — SIGNIFICANT CHANGE UP (ref 3.8–10.5)
WBC # BLD: 7.03 K/UL — SIGNIFICANT CHANGE UP (ref 3.8–10.5)
WBC # FLD AUTO: 6.59 K/UL — SIGNIFICANT CHANGE UP (ref 3.8–10.5)
WBC # FLD AUTO: 7.03 K/UL — SIGNIFICANT CHANGE UP (ref 3.8–10.5)

## 2021-06-12 PROCEDURE — 99223 1ST HOSP IP/OBS HIGH 75: CPT

## 2021-06-12 PROCEDURE — 99291 CRITICAL CARE FIRST HOUR: CPT | Mod: 25,GC

## 2021-06-12 PROCEDURE — 93010 ELECTROCARDIOGRAM REPORT: CPT | Mod: GC

## 2021-06-12 RX ORDER — ASPIRIN/CALCIUM CARB/MAGNESIUM 324 MG
81 TABLET ORAL DAILY
Refills: 0 | Status: DISCONTINUED | OUTPATIENT
Start: 2021-06-12 | End: 2021-06-13

## 2021-06-12 RX ORDER — INSULIN LISPRO 100/ML
VIAL (ML) SUBCUTANEOUS AT BEDTIME
Refills: 0 | Status: DISCONTINUED | OUTPATIENT
Start: 2021-06-12 | End: 2021-06-13

## 2021-06-12 RX ORDER — CARVEDILOL PHOSPHATE 80 MG/1
3.12 CAPSULE, EXTENDED RELEASE ORAL EVERY 12 HOURS
Refills: 0 | Status: DISCONTINUED | OUTPATIENT
Start: 2021-06-12 | End: 2021-06-13

## 2021-06-12 RX ORDER — DEXTROSE 50 % IN WATER 50 %
25 SYRINGE (ML) INTRAVENOUS ONCE
Refills: 0 | Status: DISCONTINUED | OUTPATIENT
Start: 2021-06-12 | End: 2021-06-13

## 2021-06-12 RX ORDER — PANTOPRAZOLE SODIUM 20 MG/1
40 TABLET, DELAYED RELEASE ORAL
Refills: 0 | Status: DISCONTINUED | OUTPATIENT
Start: 2021-06-12 | End: 2021-06-13

## 2021-06-12 RX ORDER — GLUCAGON INJECTION, SOLUTION 0.5 MG/.1ML
1 INJECTION, SOLUTION SUBCUTANEOUS ONCE
Refills: 0 | Status: DISCONTINUED | OUTPATIENT
Start: 2021-06-12 | End: 2021-06-13

## 2021-06-12 RX ORDER — SODIUM CHLORIDE 9 MG/ML
1000 INJECTION, SOLUTION INTRAVENOUS
Refills: 0 | Status: DISCONTINUED | OUTPATIENT
Start: 2021-06-12 | End: 2021-06-13

## 2021-06-12 RX ORDER — ISOSORBIDE DINITRATE 5 MG/1
10 TABLET ORAL THREE TIMES A DAY
Refills: 0 | Status: DISCONTINUED | OUTPATIENT
Start: 2021-06-12 | End: 2021-06-13

## 2021-06-12 RX ORDER — INSULIN HUMAN 100 [IU]/ML
5 INJECTION, SOLUTION SUBCUTANEOUS ONCE
Refills: 0 | Status: COMPLETED | OUTPATIENT
Start: 2021-06-12 | End: 2021-06-12

## 2021-06-12 RX ORDER — CHLORHEXIDINE GLUCONATE 213 G/1000ML
1 SOLUTION TOPICAL DAILY
Refills: 0 | Status: DISCONTINUED | OUTPATIENT
Start: 2021-06-12 | End: 2021-06-13

## 2021-06-12 RX ORDER — DEXTROSE 50 % IN WATER 50 %
25 SYRINGE (ML) INTRAVENOUS ONCE
Refills: 0 | Status: COMPLETED | OUTPATIENT
Start: 2021-06-12 | End: 2021-06-12

## 2021-06-12 RX ORDER — INSULIN LISPRO 100/ML
VIAL (ML) SUBCUTANEOUS
Refills: 0 | Status: DISCONTINUED | OUTPATIENT
Start: 2021-06-12 | End: 2021-06-13

## 2021-06-12 RX ORDER — NIFEDIPINE 30 MG
60 TABLET, EXTENDED RELEASE 24 HR ORAL DAILY
Refills: 0 | Status: DISCONTINUED | OUTPATIENT
Start: 2021-06-12 | End: 2021-06-13

## 2021-06-12 RX ORDER — HYDRALAZINE HCL 50 MG
50 TABLET ORAL
Refills: 0 | Status: DISCONTINUED | OUTPATIENT
Start: 2021-06-12 | End: 2021-06-13

## 2021-06-12 RX ORDER — ATORVASTATIN CALCIUM 80 MG/1
40 TABLET, FILM COATED ORAL AT BEDTIME
Refills: 0 | Status: DISCONTINUED | OUTPATIENT
Start: 2021-06-12 | End: 2021-06-13

## 2021-06-12 RX ORDER — CLOPIDOGREL BISULFATE 75 MG/1
1 TABLET, FILM COATED ORAL
Qty: 0 | Refills: 0 | DISCHARGE

## 2021-06-12 RX ORDER — CLOPIDOGREL BISULFATE 75 MG/1
75 TABLET, FILM COATED ORAL DAILY
Refills: 0 | Status: DISCONTINUED | OUTPATIENT
Start: 2021-06-12 | End: 2021-06-13

## 2021-06-12 RX ORDER — DEXTROSE 50 % IN WATER 50 %
12.5 SYRINGE (ML) INTRAVENOUS ONCE
Refills: 0 | Status: DISCONTINUED | OUTPATIENT
Start: 2021-06-12 | End: 2021-06-13

## 2021-06-12 RX ORDER — DEXTROSE 50 % IN WATER 50 %
15 SYRINGE (ML) INTRAVENOUS ONCE
Refills: 0 | Status: COMPLETED | OUTPATIENT
Start: 2021-06-12 | End: 2021-06-12

## 2021-06-12 RX ORDER — DEXTROSE 50 % IN WATER 50 %
15 SYRINGE (ML) INTRAVENOUS ONCE
Refills: 0 | Status: DISCONTINUED | OUTPATIENT
Start: 2021-06-12 | End: 2021-06-13

## 2021-06-12 RX ORDER — SEVELAMER CARBONATE 2400 MG/1
800 POWDER, FOR SUSPENSION ORAL
Refills: 0 | Status: DISCONTINUED | OUTPATIENT
Start: 2021-06-12 | End: 2021-06-13

## 2021-06-12 RX ORDER — DEXTROSE 50 % IN WATER 50 %
100 SYRINGE (ML) INTRAVENOUS ONCE
Refills: 0 | Status: COMPLETED | OUTPATIENT
Start: 2021-06-12 | End: 2021-06-12

## 2021-06-12 RX ADMIN — Medication 50 MILLIGRAM(S): at 17:43

## 2021-06-12 RX ADMIN — Medication 0.1 MILLIGRAM(S): at 11:36

## 2021-06-12 RX ADMIN — Medication 60 MILLIGRAM(S): at 11:36

## 2021-06-12 RX ADMIN — Medication 15 GRAM(S): at 06:00

## 2021-06-12 RX ADMIN — INSULIN HUMAN 5 UNIT(S): 100 INJECTION, SOLUTION SUBCUTANEOUS at 03:28

## 2021-06-12 RX ADMIN — ATORVASTATIN CALCIUM 40 MILLIGRAM(S): 80 TABLET, FILM COATED ORAL at 23:32

## 2021-06-12 RX ADMIN — ISOSORBIDE DINITRATE 10 MILLIGRAM(S): 5 TABLET ORAL at 17:05

## 2021-06-12 RX ADMIN — SEVELAMER CARBONATE 800 MILLIGRAM(S): 2400 POWDER, FOR SUSPENSION ORAL at 17:14

## 2021-06-12 RX ADMIN — Medication 0.1 MILLIGRAM(S): at 17:43

## 2021-06-12 RX ADMIN — Medication 100 MILLILITER(S): at 03:28

## 2021-06-12 RX ADMIN — Medication 25 GRAM(S): at 04:48

## 2021-06-12 NOTE — H&P ADULT - ASSESSMENT
Patient is a 66 year old man with past medical history of CAD w/ 5 stents, CHF, HTN, DM, anemia, CVA, CAD, ESRD, GERD, presenting due to hyperkalemia as an outpatient.

## 2021-06-12 NOTE — ED PROVIDER NOTE - PMH
Anemia due to stage 5 chronic kidney disease, not on chronic dialysis    CAP (community acquired pneumonia)  6/18  Cerebrovascular accident (CVA), unspecified mechanism  diagnosed via CT of the head  Coronary artery disease    Diabetes mellitus  type 2  ESRD (end stage renal disease)  on Dialysis( M/W/F), By Dr. Huff  GERD (gastroesophageal reflux disease)    HTN (hypertension)    Hypercholesterolemia    Stented coronary artery  2014, 3 stents, [facility]

## 2021-06-12 NOTE — ED ADULT NURSE REASSESSMENT NOTE - NS ED NURSE REASSESS COMMENT FT1
Pt received to CDU bed 6. Pt A&Ox3, ambulatory, received with 20G intravenous access to RAC and shunt noted to left arm. Pt offered no complains at present. Denies cp, sob, dizziness, and palpitation. Respiration even and non-labored. in NAD. NSR on monitor.  Side rails up, bed at lowest position, call bell within reach, patient oriented to the unit, safety maintained. Awaiting for bed assignment

## 2021-06-12 NOTE — CONSULT NOTE ADULT - ASSESSMENT
ESRD:  HD today.    Hyperkalemia:  Treated with HD.  - Low K diet discussed.  - May need Lokelma or Veltassa after discharge.    Hyperphosphatemia:  - Resume binders.

## 2021-06-12 NOTE — DISCHARGE NOTE PROVIDER - NSDCCPCAREPLAN_GEN_ALL_CORE_FT
PRINCIPAL DISCHARGE DIAGNOSIS  Diagnosis: Hyperkalemia  Assessment and Plan of Treatment: You wre admitted due to high potassium levels.  Continue to follow a diet without potassium.  Follow up with your nephrologist and attend your next regularly scheduled dialysis session.      SECONDARY DISCHARGE DIAGNOSES  Diagnosis: HTN (hypertension)  Assessment and Plan of Treatment: Continue current blood pressure medication regimen as directed. Monitor for any visual changes, headaches or dizziness.  Monitor blood pressure regularly.  Follow up with your PCP for further management for high blood pressure, please call to make appointment within 1 week of discharge    Diagnosis: ESRD (end stage renal disease)  Assessment and Plan of Treatment: Please continue to follow your dialysis schedule and refer to your primary provider/nephrologist for further care and monitoring of kidney function and electrolytes. Continue a renal restricted diet (Avoiding foods high in potassium and phosphorus), your prescribed medications, and supplementations as directed.     PRINCIPAL DISCHARGE DIAGNOSIS  Diagnosis: Hyperkalemia  Assessment and Plan of Treatment: You wre admitted due to high potassium levels.  Continue to follow a diet without potassium.  Follow up with your nephrologist and attend your next regularly scheduled dialysis session. STOP taking your LOSARTAN as this can cause high potassium levels in the blood.  Monitor your blood pressure and follow up with your cardiologist within 1 week for further monitoring.  Your potassium on discharge was 4.4.      SECONDARY DISCHARGE DIAGNOSES  Diagnosis: Diabetes mellitus  Assessment and Plan of Treatment: Continue consistent carbohydrate diet.  Monitor blood glucose levels throughout the day before meals and at bedtime.  Record blood sugars and bring to outpatient providers appointment in order to be reviewed by your doctor for management modifications.  Be aware of diabetes management symptoms including feeling cool and clammy may be related to low glucose levels.  Feeling hot and dry may indicate high glucose levels.  If  you feel these symptoms, check your blood sugar.  Make regular podiatry appointments in order to have feet checked for wounds and toe nails cut by a doctor to prevent infections.      Diagnosis: HTN (hypertension)  Assessment and Plan of Treatment: STOP taking your LOSARTAN as this can cause high potassium levels in the blood.  Monitor your blood pressure and follo wup with your cardiologist within 1 week for further monitoring.  Your CARVEDILOL (coreg) dose was DECREASED to 3.125mg two times a day because your heart rate was low at times.  Please ensure you  the new prescription.   Continue to take your other medications as prescribed    Diagnosis: Coronary artery disease  Assessment and Plan of Treatment: Continue to take your aspirin & plavix as ordered, follo wup with your cardiologist.  You will need an echocardiogram as an outpatient, follow up with your cardiologist to have this done.    Diagnosis: ESRD (end stage renal disease)  Assessment and Plan of Treatment: Please continue to follow your dialysis schedule and refer to your primary provider/nephrologist for further care and monitoring of kidney function and electrolytes. Continue a renal restricted diet (Avoiding foods high in potassium and phosphorus), your prescribed medications, and supplementations as directed.

## 2021-06-12 NOTE — PATIENT PROFILE ADULT - FALL HARM RISK TYPE OF ASSESSMENT
GLAUCOMA SUSPECT DIAGNOSIS:  I have explained to the patient at length regarding the diagnosis of glaucoma and its pathophysiology. I have explained that damage to the optic nerve from glaucoma is irreversible and that the available treatments for glaucoma are designed to prevent further damage if we determine that glaucoma is present. I have explained the importance of regular follow-up to monitor for possible progression to definitive glaucoma. I have answered all questions to the patient's satisfaction. I will recheck in a year.  Dilate and OCT admission

## 2021-06-12 NOTE — DISCHARGE NOTE PROVIDER - HOSPITAL COURSE
Patient is a 66 year old man with past medical history of CAD w/ 5 stents, CHF, HTN, DM, anemia, CVA, CAD, ESRD, GERD, presenting due to hyperkalemia as an outpatient. Labs in ED notable for hyperkalemia, patient received insulin & dextrose for temporarization of potassium & underwent dialysis on 6/12.      Hyperkalemia.  Plan: Patient potassium now improved after administering insulin. Dialysis orders placed. Likely 2/2 to renal disease.   - dialysis today   - monitor potassium  - EKG w/o EKG changes from hyperkalemia  - hold home losartan in setting of hyperkalemia.     ESRD (end stage renal disease).  Plan: Patient w/ ESRD, renal following   - dialysis orders placed  - f/u nephro recs  - c/w sevelamer carbonate.     Coronary artery disease.  Plan: Patient w/ history of CAD w/ 5 stents  - repeat echocardiogram after dialysis per cardiology  - c/w clonidine, isosorbide, nifedipine, holding losartan in setting of hyperkalemia  - c/w plavix and aspirin  - reduced home coreg to 3.125mg BID per cardiology with hold parameters.     HTN (hypertension).  Plan: c/w home medications as detailed above, holding losartan.     Diabetes mellitus.  Plan: patient w/ diabetes mellitus on tradjenta  - c/w RUPESH.     Hypercholesterolemia. Plan: patient with history of hld  - c/w home atorvastatin.  On _____ patient medically cleared for discharge home by  ____ Patient is a 66 year old man with past medical history of CAD w/ 5 stents, CHF, HTN, DM, anemia, CVA, CAD, ESRD, GERD, presenting due to hyperkalemia as an outpatient. Labs in ED notable for hyperkalemia, patient received insulin & dextrose for temporarization of potassium & underwent dialysis on 6/12.      Hyperkalemia.  Plan: Patient potassium now improved after administering insulin. Dialysis orders placed. Likely 2/2 to renal disease.   - dialysis today   - monitor potassium  - EKG w/o EKG changes from hyperkalemia  - home losartan discontinued in setting of hyperkalemia, not restarted on discharge, patient to follow up with cardiology regarding resuming    ESRD (end stage renal disease).  Plan: Patient w/ ESRD, renal following   - Received Dialysis 6/13  - c/w sevelamer carbonate.     Coronary artery disease.  Plan: Patient w/ history of CAD w/ 5 stents  - repeat echocardiogram as outpatient  - c/w clonidine, isosorbide, nifedipine, holding losartan in setting of hyperkalemia  - c/w plavix and aspirin  - reduced home coreg to 3.125mg BID per cardiology with hold parameters due to bradycardia    HTN (hypertension).    -c/w home medications as detailed above, holding losartan.     Diabetes mellitus.  Plan: patient w/ diabetes mellitus on tradjenta  - c/w RUPESH.     Hypercholesterolemia. Plan: patient with history of hld  - c/w home atorvastatin.    On 6/13/2021 patient medically cleared for discharge home by Dr. Clements

## 2021-06-12 NOTE — DISCHARGE NOTE PROVIDER - PROVIDER TOKENS
FREE:[LAST:[Shahoziel],FIRST:[Marques],PHONE:[(   )    -],FAX:[(   )    -],FOLLOWUP:[1 week],ESTABLISHEDPATIENT:[T]]

## 2021-06-12 NOTE — ED PROVIDER NOTE - NS ED ATTENDING STATEMENT MOD
Pt reports excessive tearing to left eye for a couple days. Denies injury. I have personally provided the amount of critical care time documented below concurrently with the resident/fellow.  This time excludes time spent on separate procedures and time spent teaching. I have reviewed the resident’s / fellow’s documentation and I agree with the history, exam, and assessment and plan of care.

## 2021-06-12 NOTE — H&P ADULT - PROBLEM SELECTOR PLAN 2
Patient w/ ESRD, renal following   - dialysis orders placed  - f/u nephro recs  - c/w sevelamer carbonate

## 2021-06-12 NOTE — ED PROVIDER NOTE - NS ED ROS FT
CONSTITUTIONAL: No fevers, no chills  Cardiovascular: No Chest pain  Respiratory: No SOB  Gastrointestinal: No n/v/d, no abd pain  ROS otherwise negative unless indicated in HPI

## 2021-06-12 NOTE — H&P ADULT - HISTORY OF PRESENT ILLNESS
Ila: Glynn [id number]   Patient is a 66 year old man with past medical history of CAD w/ 5 stents, CHF, HTN, DM, anemia, CVA, CAD, ESRD, GERD, presenting due to hyperkalemia as an outpatient. Patient does not know how high his potassium was. Patient states his level was drawn two days ago and he was called that it was high so he came to the ED. Patient states he was symptomatic. Patient had burning and cramping. Patient otherwise without complaints. Patient states these symptoms have since resolved.   ED course: T 98.5, HR 81, /74, S 100% on RA. Potassium 5.4->5.3. Phos 9.7-> 9.6. Patient was hypoglycemic to 54 this AM. EKG sinus karla. Patient received clonidine, nifedipine. Patient received insulin for hyperkalemia. Patient admitted for dialysis.

## 2021-06-12 NOTE — H&P ADULT - NSHPPHYSICALEXAM_GEN_ALL_CORE
VITAL SIGNS:  T(F): 98 (06-12-21 @ 13:46), Max: 98.6 (06-12-21 @ 10:44)  HR: 52 (06-12-21 @ 13:46) (18 - 81)  BP: 174/81 (06-12-21 @ 13:46) (154/74 - 188/75)  BP(mean): --  RR: 17 (06-12-21 @ 13:46) (16 - 18)  SpO2: 100% (06-12-21 @ 13:46) (99% - 100%)    PHYSICAL EXAM:  Constitutional: WDWN resting comfortably in bed; NAD  HEENT: NC/AT, PERRL, EOMI, anicteric sclera, no nasal discharge; uvula midline, no oropharyngeal erythema or exudates; MMM  Neck: supple; no JVD or thyromegaly  Respiratory: CTA B/L; no W/R/R, no retractions  Cardiac: +S1/S2; RRR; no M/R/G; PMI non-displaced  Gastrointestinal: soft, NT/ND; no rebound or guarding; +BSx4  Genitourinary: normal external genitalia  Back: spine midline, no bony tenderness or step-offs; no CVAT B/L  Extremities: WWP, no clubbing or cyanosis; no peripheral edema  Musculoskeletal: NROM x4; no joint swelling, tenderness or erythema  Vascular: 2+ radial, femoral, DP/PT pulses B/L  Dermatologic: skin warm, dry and intact; no rashes, wounds, or scars  Lymphatic: no submandibular or cervical LAD  Neurologic: AAOx3; CNII-XII grossly intact; no focal deficits  Psychiatric: affect and characteristics of appearance, verbalizations, behaviors are appropriate, denies SI/HI/AH/VH VITAL SIGNS:  T(F): 98 (06-12-21 @ 13:46), Max: 98.6 (06-12-21 @ 10:44)  HR: 52 (06-12-21 @ 13:46) (18 - 81)  BP: 174/81 (06-12-21 @ 13:46) (154/74 - 188/75)  BP(mean): --  RR: 17 (06-12-21 @ 13:46) (16 - 18)  SpO2: 100% (06-12-21 @ 13:46) (99% - 100%)    PHYSICAL EXAM:  Constitutional: Middle aged man, WDWN resting comfortably in bed; NAD  HEENT: anicteric sclera, no nasal discharge; uvula midline, no oropharyngeal erythema or exudates; MMM  Neck: supple; no JVD or thyromegaly  Respiratory: CTA B/L; no W/R/R, no retractions  Cardiac: +S1/S2; RRR; no M/R/G; PMI non-displaced  Gastrointestinal: soft, NT/ND; no rebound or guarding; +BSx4  Genitourinary: not examined  Back: spine midline, no bony tenderness or step-offs; no CVAT B/L  Extremities: WWP, no clubbing or cyanosis; no peripheral edema  Musculoskeletal: NROM x4; no joint swelling, tenderness or erythema  Vascular: 2+ radial   Dermatologic: skin warm, dry and intact; no rashes, wounds, or scars  Lymphatic: no submandibular or cervical LAD  Neurologic: AAOx3; CNII-XII grossly intact; no focal deficits  Psychiatric: affect and characteristics of appearance, verbalizations, behaviors are appropriate, denies SI/HI/AH/VH

## 2021-06-12 NOTE — DISCHARGE NOTE PROVIDER - NSDCMRMEDTOKEN_GEN_ALL_CORE_FT
Aspirin Enteric Coated 81 mg oral delayed release tablet: 1 tab(s) orally once a day  atorvastatin 40 mg oral tablet: 1 tab(s) orally once a day  cloNIDine 0.1 mg oral tablet: 1 tab(s) orally 2 times a day  clopidogrel 75 mg oral tablet: 1 tab(s) orally once a day  Coreg 25 mg oral tablet: 1 tab(s) orally 2 times a day  hydrALAZINE 50 mg oral tablet: 1 tab(s) orally 2 times a day  isosorbide dinitrate 10 mg oral tablet: 1 tab(s) orally 3 times a day  losartan 100 mg oral tablet: 1 tab(s) orally once a day  NIFEdipine 60 mg oral tablet, extended release: 1 tab(s) orally once a day  pantoprazole 40 mg oral delayed release tablet: 1 tab(s) orally once a day (before a meal)  sevelamer carbonate 800 mg oral tablet: 1 tab(s) orally 3 times a day (with meals)  Tradjenta 5 mg oral tablet: 1 tab(s) orally once a day   Aspirin Enteric Coated 81 mg oral delayed release tablet: 1 tab(s) orally once a day  atorvastatin 40 mg oral tablet: 1 tab(s) orally once a day  carvedilol 3.125 mg oral tablet: 1 tab(s) orally every 12 hours  cloNIDine 0.1 mg oral tablet: 1 tab(s) orally 2 times a day  clopidogrel 75 mg oral tablet: 1 tab(s) orally once a day  hydrALAZINE 50 mg oral tablet: 1 tab(s) orally 2 times a day  isosorbide dinitrate 10 mg oral tablet: 1 tab(s) orally 3 times a day  NIFEdipine 60 mg oral tablet, extended release: 1 tab(s) orally once a day  pantoprazole 40 mg oral delayed release tablet: 1 tab(s) orally once a day (before a meal)  sevelamer carbonate 800 mg oral tablet: 1 tab(s) orally 3 times a day (with meals)  Tradjenta 5 mg oral tablet: 1 tab(s) orally once a day

## 2021-06-12 NOTE — H&P ADULT - NSICDXPASTMEDICALHX_GEN_ALL_CORE_FT
PAST MEDICAL HISTORY:  Anemia due to stage 5 chronic kidney disease, not on chronic dialysis     CAP (community acquired pneumonia) 6/18    Cerebrovascular accident (CVA), unspecified mechanism diagnosed via CT of the head    Coronary artery disease     Diabetes mellitus type 2    ESRD (end stage renal disease) on Dialysis( M/W/F), By Dr. Huff    GERD (gastroesophageal reflux disease)     HTN (hypertension)     Hypercholesterolemia     Stented coronary artery 2014, 3 stents, [facility]

## 2021-06-12 NOTE — PATIENT PROFILE ADULT - 
ADDITIONAL INFORMATION
Pt. does not remember exact date of last vaccination-pt. states he believes it was about a month ago

## 2021-06-12 NOTE — H&P ADULT - NSHPREVIEWOFSYSTEMS_GEN_ALL_CORE
REVIEW OF SYSTEMS:  CONSTITUTIONAL: Patient denies weakness, fevers or chills, + generalized cramping, and burning sensation   EYES/ENT: Patient denies visual changes;  denies vertigo or throat pain   NECK: Patient denies pain or stiffness  RESPIRATORY: Patient denies cough, wheezing, hemoptysis; denies shortness of breath  CARDIOVASCULAR: Patient denies chest pain or palpitations  GASTROINTESTINAL: Patient denies abdominal or epigastric pain, nausea, vomiting, or hematemesis, diarrhea or constipation, melena or hematochezia.  GENITOURINARY: Patient denies dysuria, frequency or hematuria  NEUROLOGICAL: Patient denies numbness or weakness  SKIN: Patient denies itching, rashes, or lesions   All other review of systems is negative unless indicated above.

## 2021-06-12 NOTE — ED PROVIDER NOTE - CLINICAL SUMMARY MEDICAL DECISION MAKING FREE TEXT BOX
Jn PGY-3:  67yo M pmhx as described in HPI sent in due to high K, had dialysis after this draw which likely lowered K. No ekg changes here to indicate hyperkalemia, will obtain bloodwork to assess K here and treat as necessary  Will monitor pt while in ED

## 2021-06-12 NOTE — ED PROVIDER NOTE - ATTENDING CONTRIBUTION TO CARE
DR. REAL, ATTENDING MD-  I performed a face to face bedside interview with the patient regarding history of present illness, review of symptoms and past medical history. I completed an independent physical exam.  I have discussed the patient's plan of care with the resident.   Documentation as above in the note.    67 y/o male h/o esrd on hd tts here with elevated potassium.  EKG with mild peaked t waves though similar to past ekg's.  Obtain cbc cmp covid swab place on tele likely admission for hd.

## 2021-06-12 NOTE — H&P ADULT - PROBLEM SELECTOR PLAN 3
Patient w/ history of CAD w/ 5 stents  - repeat echocardiogram after dialysis per cardiology  - c/w clonidine, isosorbide, nifedipine, holding losartan in setting of hyperkalemia  - c/w plavix and aspirin  - reduced home coreg to 3.125mg BID per cardiology with hold parameters

## 2021-06-12 NOTE — H&P ADULT - PROBLEM SELECTOR PLAN 1
Patient potassium now improved after administering insulin. Dialysis orders placed. Likely 2/2 to renal disease.   - dialysis today   - monitor potassium  - EKG w/o EKG changes from hyperkalemia  - hold home losartan in setting of hyperkalemia

## 2021-06-12 NOTE — ED PROVIDER NOTE - PROGRESS NOTE DETAILS
[id number] sum Jn PGY-3:  D/W Dr Costello partner need for admission for dialysis, states till come in AM to put orders in [id number] sum :  explained to pt need for admission states understanding, continues to feel well

## 2021-06-12 NOTE — ED ADULT TRIAGE NOTE - CHIEF COMPLAINT QUOTE
dialysis center called patient after telling him his potassium is "high" no numbers provided but patient c/o total body tingling x1 day. T,TH,S dialysis through left fistula. ekg to be completed. pt completed dialysis full session yesterday.

## 2021-06-12 NOTE — H&P ADULT - NSICDXFAMILYHX_GEN_ALL_CORE_FT
FAMILY HISTORY:  No pertinent family history in first degree relatives, no family history of kidney disease

## 2021-06-12 NOTE — DISCHARGE NOTE PROVIDER - NSDCFUSCHEDAPPT_GEN_ALL_CORE_FT
MD RYANN BOLAÑOS ; 07/16/2021 ; [facility]  MD RYANN BOLAÑOS ; 07/16/2021 ; [facility] [facility]

## 2021-06-12 NOTE — CONSULT NOTE ADULT - SUBJECTIVE AND OBJECTIVE BOX
Cardiology    HISTORY OF PRESENT ILLNESS:   Mr Rivero is a 66yoM who presents from home after undergoing dialysis, and then being told that post-treatment labs had high potassium.  Recommended for repeat dialysis treatment.  He is not having any muscle cramps or fasciculations, chest pain/pressure, shortness of breath, palpitations, or lightheadedness.    He has history of CAD w/ multiple coronary stents, and prior stroke.  He dialyzes  by left forearm fistula.  No pleuritic pain. No calf pain. No pain or abnormal sensation at dialysis access site.  A 10 pt ROS is otherwise negative.  History obtained via family assisting with Divehi-to-English translation.    In 3/2021, he underwent echocardiogram showing moderate LV dysfunction (EF 35-40%) with anterior wall motion abnormality.  In 4/2021, he was treated with balloon angioplasty to a site of LAD in-stent restenosis, by Dr Sawant.      PAST MEDICAL & SURGICAL HISTORY:  HTN (hypertension)    Coronary artery disease    CAP (community acquired pneumonia)  6/18    Diabetes mellitus  type 2    GERD (gastroesophageal reflux disease)    Stented coronary artery  2014, 3 stents, [facility]    ESRD (end stage renal disease)  on Dialysis( M/W/F), By Dr. Huff    Hypercholesterolemia    Cerebrovascular accident (CVA), unspecified mechanism  diagnosed via CT of the head    Anemia due to stage 5 chronic kidney disease, not on chronic dialysis    H/O coronary angiogram  2014 - x3 stents    AV fistula  10/12/18 L radiocephalic AV fistula    H/O eye surgery      MEDICATIONS  (STANDING):  cloNIDine 0.1 milliGRAM(s) Oral two times a day  dextrose 40% Gel 15 Gram(s) Oral once  dextrose 5%. 1000 milliLiter(s) (50 mL/Hr) IV Continuous <Continuous>  dextrose 5%. 1000 milliLiter(s) (100 mL/Hr) IV Continuous <Continuous>  dextrose 50% Injectable 25 Gram(s) IV Push once  dextrose 50% Injectable 12.5 Gram(s) IV Push once  dextrose 50% Injectable 25 Gram(s) IV Push once  glucagon  Injectable 1 milliGRAM(s) IntraMuscular once  insulin lispro (ADMELOG) corrective regimen sliding scale   SubCutaneous three times a day before meals  insulin lispro (ADMELOG) corrective regimen sliding scale   SubCutaneous at bedtime  NIFEdipine XL 60 milliGRAM(s) Oral daily      Allergies    No Known Allergies    Intolerances    FAMILY HISTORY:  No pertinent family history in first degree relatives      Non-contributary for premature coronary disease or sudden cardiac death    SOCIAL HISTORY:    [x ] Non-smoker  [ ] Smoker  [ ] Alcohol    PHYSICAL EXAM:  T(C): 37 (06-12-21 @ 10:44), Max: 37 (06-12-21 @ 10:44)  HR: 56 (06-12-21 @ 10:44) (18 - 81)  BP: 180/93 (06-12-21 @ 10:44) (154/74 - 188/75)  RR: 16 (06-12-21 @ 10:44) (16 - 18)  SpO2: 100% (06-12-21 @ 10:44) (99% - 100%)  Wt(kg): --    Appearance: thin adult male in no acute distress	  HEENT:   Normal oral mucosa, PERRL, EOMI	  Lymphatic: No lymphadenopathy , no edema  Cardiovascular: Normal S1 S2, No JVD, No murmurs , Peripheral pulses palpable 2+ bilaterally, left forearm fistula thrill + bruit.  Respiratory: Lungs clear to auscultation, normal effort 	  Gastrointestinal:  Soft, Non-tender, + BS	  Skin: No rashes, No ecchymoses, No cyanosis, warm to touch  Musculoskeletal: Normal range of motion, normal strength  Psychiatry:  Mood & affect appropriate      TELEMETRY: NSR, no T-wave changes 	    ECG: NSR, normal appearing ST segments and T-waves.	  Echo: per above  Cath: per above  	  	  LABS:	 	                          12.6   6.59  )-----------( 111      ( 12 Jun 2021 06:42 )             41.2     06-12    135  |  94<L>  |  68<H>  ----------------------------<  121<H>  5.3   |  20<L>  |  12.01<H>    Ca    9.1      12 Jun 2021 06:42  Phos  9.6     06-12  Mg     2.6     06-12    TPro  7.9  /  Alb  4.0  /  TBili  0.7  /  DBili  x   /  AST  14  /  ALT  11  /  AlkPhos  67  06-12    ASSESSMENT/PLAN: 	66y Male with CAD, chronic systolic CHF, and recent balloon angioplasty to in-stent restenosis of the LAD stent, correlating with echo wall motion abnormalities.    Dialysis as indicated.  (Nephrology consult required, he usually dialyzes MWF)  Start a low dose beta blocker, Carvedilol 3.125mg BID.  Will see after he dialyzes again and consider adding Isosorbide and Hydralazine instead of Clonidine and Nifedipine.  Consider limited echo repeated for LV EF after he dialyzes to see if he has improved after stenting.  Will follow.      Harrison Wright M.D.  Cardiac Electrophysiology    office [phone]  pager [phone]
MD MAMI  66y  Patient is a 66y old  Male who presents with a chief complaint of Hyperkalemia (12 Jun 2021 19:30)    HPI:  Admitted for Hyperkalemia.  HD earlier today. No new complaints.    HEALTH ISSUES - PROBLEM Dx:  Hyperkalemia  Hyperkalemia    ESRD (end stage renal disease)  ESRD (end stage renal disease)    Coronary artery disease  Coronary artery disease    HTN (hypertension)  Continue current blood pressure medication regimen as directed. Monitor for any visual changes, headaches or dizziness.  Monitor blood pressure regularly.  Follow up with your PCP for further management for high blood pressure, please call to make appointment within 1 week of discharge    Diabetes mellitus  Diabetes mellitus    Hypercholesterolemia  Hypercholesterolemia    Prophylactic measure  Prophylactic measure    Discharge planning issues  Discharge planning issues          MEDICATIONS  (STANDING):  aspirin enteric coated 81 milliGRAM(s) Oral daily  atorvastatin 40 milliGRAM(s) Oral at bedtime  carvedilol 3.125 milliGRAM(s) Oral every 12 hours  chlorhexidine 2% Cloths 1 Application(s) Topical daily  cloNIDine 0.1 milliGRAM(s) Oral two times a day  clopidogrel Tablet 75 milliGRAM(s) Oral daily  dextrose 40% Gel 15 Gram(s) Oral once  dextrose 5%. 1000 milliLiter(s) (50 mL/Hr) IV Continuous <Continuous>  dextrose 5%. 1000 milliLiter(s) (100 mL/Hr) IV Continuous <Continuous>  dextrose 50% Injectable 25 Gram(s) IV Push once  dextrose 50% Injectable 12.5 Gram(s) IV Push once  dextrose 50% Injectable 25 Gram(s) IV Push once  glucagon  Injectable 1 milliGRAM(s) IntraMuscular once  hydrALAZINE 50 milliGRAM(s) Oral two times a day  insulin lispro (ADMELOG) corrective regimen sliding scale   SubCutaneous three times a day before meals  insulin lispro (ADMELOG) corrective regimen sliding scale   SubCutaneous at bedtime  isosorbide   dinitrate Tablet (ISORDIL) 10 milliGRAM(s) Oral three times a day  NIFEdipine XL 60 milliGRAM(s) Oral daily  pantoprazole    Tablet 40 milliGRAM(s) Oral before breakfast  sevelamer carbonate 800 milliGRAM(s) Oral three times a day with meals    MEDICATIONS  (PRN):    Vital Signs Last 24 Hrs  T(C): 36.8 (12 Jun 2021 19:20), Max: 37 (12 Jun 2021 10:44)  T(F): 98.2 (12 Jun 2021 19:20), Max: 98.6 (12 Jun 2021 10:44)  HR: 53 (12 Jun 2021 19:20) (18 - 81)  BP: 125/81 (12 Jun 2021 19:20) (125/81 - 188/75)  BP(mean): --  RR: 18 (12 Jun 2021 19:20) (16 - 18)  SpO2: 100% (12 Jun 2021 17:46) (99% - 100%)  Daily Height in cm: 172.72 (12 Jun 2021 15:48)    Daily     PHYSICAL EXAM:  Constitutional:  He appears comfortable and not distressed. Not diaphoretic.    Respiratory: The lungs are clear to auscultation. No dullness and expansion is normal.    Cardiovascular: S1 and S2 are normal. No mummurs, rubs or gallops are present.    Gastrointestinal: The abdomen is soft. No tenderness is present. No masses are present. Bowel sounds are normal.    Genitourinary: The bladder is not distended. No CVA tenderness is present.    Extremities: No edema is noted. No deformities are present.    Neurological: Cognition is normal. Tone, power and sensation are normal. Gait is steady.    Skin: No lesions are seen  or palpated.    Lymph Nodes: No lymphadenopathy is present.    Psychiatric: Mood is appropriate. No hallucinations or flight of ideas are noted.                              12.6   6.59  )-----------( 111      ( 12 Jun 2021 06:42 )             41.2     06-12    135  |  94<L>  |  68<H>  ----------------------------<  121<H>  5.3   |  20<L>  |  12.01<H>    Ca    9.1      12 Jun 2021 06:42  Phos  9.6     06-12  Mg     2.6     06-12    TPro  7.9  /  Alb  4.0  /  TBili  0.7  /  DBili  x   /  AST  14  /  ALT  11  /  AlkPhos  67  06-12

## 2021-06-12 NOTE — H&P ADULT - NSHPLABSRESULTS_GEN_ALL_CORE
LABS:                         12.6   6.59  )-----------( 111      ( 12 Jun 2021 06:42 )             41.2     06-12    135  |  94<L>  |  68<H>  ----------------------------<  121<H>  5.3   |  20<L>  |  12.01<H>    Ca    9.1      12 Jun 2021 06:42  Phos  9.6     06-12  Mg     2.6     06-12    TPro  7.9  /  Alb  4.0  /  TBili  0.7  /  DBili  x   /  AST  14  /  ALT  11  /  AlkPhos  67  06-12    EKG read by me: sinus bradycardia. T- wave inversions V4-V6.                    RADIOLOGY, EKG & ADDITIONAL TESTS: Reviewed.

## 2021-06-12 NOTE — ED PROVIDER NOTE - OBJECTIVE STATEMENT
[id number] ximena Thorpe  65yo M Irish speaking ([id number] ximena Thorpe ) hx dialysis (T/TH/Sat), CAD s/p 5 stents, CHF, HTN, HLD, DM sent in due to high potassium    States had blood drawn before dialysis yesterday, subsequently had full session of dialysis. Was called tonight and was told the potassium is high and told to come to ED right away. Denies any cp/sob, no n/v/d, no f/c. Eating/drinking without issue.

## 2021-06-13 ENCOUNTER — TRANSCRIPTION ENCOUNTER (OUTPATIENT)
Age: 66
End: 2021-06-13

## 2021-06-13 VITALS
OXYGEN SATURATION: 100 % | SYSTOLIC BLOOD PRESSURE: 117 MMHG | TEMPERATURE: 98 F | RESPIRATION RATE: 15 BRPM | DIASTOLIC BLOOD PRESSURE: 67 MMHG | HEART RATE: 66 BPM

## 2021-06-13 LAB
A1C WITH ESTIMATED AVERAGE GLUCOSE RESULT: 6 % — HIGH (ref 4–5.6)
ANION GAP SERPL CALC-SCNC: 18 MMOL/L — HIGH (ref 7–14)
BUN SERPL-MCNC: 42 MG/DL — HIGH (ref 7–23)
CALCIUM SERPL-MCNC: 9.5 MG/DL — SIGNIFICANT CHANGE UP (ref 8.4–10.5)
CHLORIDE SERPL-SCNC: 94 MMOL/L — LOW (ref 98–107)
CO2 SERPL-SCNC: 23 MMOL/L — SIGNIFICANT CHANGE UP (ref 22–31)
COVID-19 SPIKE DOMAIN AB INTERP: POSITIVE
COVID-19 SPIKE DOMAIN ANTIBODY RESULT: >250 U/ML — HIGH
CREAT SERPL-MCNC: 8.56 MG/DL — HIGH (ref 0.5–1.3)
ESTIMATED AVERAGE GLUCOSE: 126 MG/DL — HIGH (ref 68–114)
GLUCOSE BLDC GLUCOMTR-MCNC: 114 MG/DL — HIGH (ref 70–99)
GLUCOSE BLDC GLUCOMTR-MCNC: 84 MG/DL — SIGNIFICANT CHANGE UP (ref 70–99)
GLUCOSE SERPL-MCNC: 161 MG/DL — HIGH (ref 70–99)
HCT VFR BLD CALC: 42.8 % — SIGNIFICANT CHANGE UP (ref 39–50)
HGB BLD-MCNC: 13.4 G/DL — SIGNIFICANT CHANGE UP (ref 13–17)
MAGNESIUM SERPL-MCNC: 2.4 MG/DL — SIGNIFICANT CHANGE UP (ref 1.6–2.6)
MCHC RBC-ENTMCNC: 29.1 PG — SIGNIFICANT CHANGE UP (ref 27–34)
MCHC RBC-ENTMCNC: 31.3 GM/DL — LOW (ref 32–36)
MCV RBC AUTO: 92.8 FL — SIGNIFICANT CHANGE UP (ref 80–100)
NRBC # BLD: 0 /100 WBCS — SIGNIFICANT CHANGE UP
NRBC # FLD: 0 K/UL — SIGNIFICANT CHANGE UP
PHOSPHATE SERPL-MCNC: 6.1 MG/DL — HIGH (ref 2.5–4.5)
PLATELET # BLD AUTO: 123 K/UL — LOW (ref 150–400)
POTASSIUM SERPL-MCNC: 4.4 MMOL/L — SIGNIFICANT CHANGE UP (ref 3.5–5.3)
POTASSIUM SERPL-SCNC: 4.4 MMOL/L — SIGNIFICANT CHANGE UP (ref 3.5–5.3)
RBC # BLD: 4.61 M/UL — SIGNIFICANT CHANGE UP (ref 4.2–5.8)
RBC # FLD: 14.2 % — SIGNIFICANT CHANGE UP (ref 10.3–14.5)
SARS-COV-2 IGG+IGM SERPL QL IA: >250 U/ML — HIGH
SARS-COV-2 IGG+IGM SERPL QL IA: POSITIVE
SODIUM SERPL-SCNC: 135 MMOL/L — SIGNIFICANT CHANGE UP (ref 135–145)
WBC # BLD: 6.48 K/UL — SIGNIFICANT CHANGE UP (ref 3.8–10.5)
WBC # FLD AUTO: 6.48 K/UL — SIGNIFICANT CHANGE UP (ref 3.8–10.5)

## 2021-06-13 RX ORDER — CARVEDILOL PHOSPHATE 80 MG/1
1 CAPSULE, EXTENDED RELEASE ORAL
Qty: 60 | Refills: 0
Start: 2021-06-13 | End: 2021-07-12

## 2021-06-13 RX ORDER — CARVEDILOL PHOSPHATE 80 MG/1
1 CAPSULE, EXTENDED RELEASE ORAL
Qty: 0 | Refills: 0 | DISCHARGE

## 2021-06-13 RX ADMIN — CHLORHEXIDINE GLUCONATE 1 APPLICATION(S): 213 SOLUTION TOPICAL at 12:57

## 2021-06-13 RX ADMIN — Medication 50 MILLIGRAM(S): at 05:11

## 2021-06-13 RX ADMIN — ISOSORBIDE DINITRATE 10 MILLIGRAM(S): 5 TABLET ORAL at 05:11

## 2021-06-13 RX ADMIN — ISOSORBIDE DINITRATE 10 MILLIGRAM(S): 5 TABLET ORAL at 12:39

## 2021-06-13 RX ADMIN — CARVEDILOL PHOSPHATE 3.12 MILLIGRAM(S): 80 CAPSULE, EXTENDED RELEASE ORAL at 05:11

## 2021-06-13 RX ADMIN — Medication 0.1 MILLIGRAM(S): at 05:11

## 2021-06-13 RX ADMIN — SEVELAMER CARBONATE 800 MILLIGRAM(S): 2400 POWDER, FOR SUSPENSION ORAL at 10:04

## 2021-06-13 RX ADMIN — Medication 60 MILLIGRAM(S): at 05:11

## 2021-06-13 RX ADMIN — Medication 81 MILLIGRAM(S): at 12:38

## 2021-06-13 RX ADMIN — PANTOPRAZOLE SODIUM 40 MILLIGRAM(S): 20 TABLET, DELAYED RELEASE ORAL at 05:11

## 2021-06-13 RX ADMIN — SEVELAMER CARBONATE 800 MILLIGRAM(S): 2400 POWDER, FOR SUSPENSION ORAL at 12:39

## 2021-06-13 RX ADMIN — CLOPIDOGREL BISULFATE 75 MILLIGRAM(S): 75 TABLET, FILM COATED ORAL at 12:38

## 2021-06-13 NOTE — DISCHARGE NOTE NURSING/CASE MANAGEMENT/SOCIAL WORK - PATIENT PORTAL LINK FT
You can access the FollowMyHealth Patient Portal offered by [facility] by registering at the following website: [email or web address]. By joining [email or web address]’s FollowMyHealth portal, you will also be able to view your health information using other applications (apps) compatible with our system.

## 2021-06-13 NOTE — PROGRESS NOTE ADULT - SUBJECTIVE AND OBJECTIVE BOX
MD MAMI  66y  Patient is a 66y old  Male who presents with a chief complaint of Hyperkalemia (12 Jun 2021 21:57)    HPI:  Seen and examined. HD yesterday without incident.    HEALTH ISSUES - PROBLEM Dx:  Hyperkalemia  Hyperkalemia    ESRD (end stage renal disease)  ESRD (end stage renal disease)    Coronary artery disease  continue to take your aspirin &amp; plavix as ordered, john holleyp with your cardiologist.    HTN (hypertension)  Continue current blood pressure medication regimen as directed. Monitor for any visual changes, headaches or dizziness.  Monitor blood pressure regularly.  Follow up with your PCP for further management for high blood pressure, please call to make appointment within 1 week of discharge    Diabetes mellitus  Continue consistent carbohydrate diet.  Monitor blood glucose levels throughout the day before meals and at bedtime.  Record blood sugars and bring to outpatient providers appointment in order to be reviewed by your doctor for management modifications.  Be aware of diabetes management symptoms including feeling cool and clammy may be related to low glucose levels.  Feeling hot and dry may indicate high glucose levels.  If  you feel these symptoms, check your blood sugar.  Make regular podiatry appointments in order to have feet checked for wounds and toe nails cut by a doctor to prevent infections.      Hypercholesterolemia  Hypercholesterolemia    Prophylactic measure  Prophylactic measure    Discharge planning issues  Discharge planning issues          MEDICATIONS  (STANDING):  aspirin enteric coated 81 milliGRAM(s) Oral daily  atorvastatin 40 milliGRAM(s) Oral at bedtime  carvedilol 3.125 milliGRAM(s) Oral every 12 hours  chlorhexidine 2% Cloths 1 Application(s) Topical daily  cloNIDine 0.1 milliGRAM(s) Oral two times a day  clopidogrel Tablet 75 milliGRAM(s) Oral daily  dextrose 40% Gel 15 Gram(s) Oral once  dextrose 5%. 1000 milliLiter(s) (50 mL/Hr) IV Continuous <Continuous>  dextrose 5%. 1000 milliLiter(s) (100 mL/Hr) IV Continuous <Continuous>  dextrose 50% Injectable 25 Gram(s) IV Push once  dextrose 50% Injectable 12.5 Gram(s) IV Push once  dextrose 50% Injectable 25 Gram(s) IV Push once  glucagon  Injectable 1 milliGRAM(s) IntraMuscular once  hydrALAZINE 50 milliGRAM(s) Oral two times a day  insulin lispro (ADMELOG) corrective regimen sliding scale   SubCutaneous three times a day before meals  insulin lispro (ADMELOG) corrective regimen sliding scale   SubCutaneous at bedtime  isosorbide   dinitrate Tablet (ISORDIL) 10 milliGRAM(s) Oral three times a day  NIFEdipine XL 60 milliGRAM(s) Oral daily  pantoprazole    Tablet 40 milliGRAM(s) Oral before breakfast  sevelamer carbonate 800 milliGRAM(s) Oral three times a day with meals    MEDICATIONS  (PRN):    Vital Signs Last 24 Hrs  T(C): 36.6 (13 Jun 2021 05:10), Max: 37 (12 Jun 2021 10:44)  T(F): 97.8 (13 Jun 2021 05:10), Max: 98.6 (12 Jun 2021 10:44)  HR: 68 (13 Jun 2021 05:10) (52 - 68)  BP: 138/95 (13 Jun 2021 05:10) (125/81 - 180/93)  BP(mean): --  RR: 18 (13 Jun 2021 05:10) (16 - 18)  SpO2: 100% (13 Jun 2021 05:10) (100% - 100%)  Daily Height in cm: 172.72 (12 Jun 2021 15:48)    Daily     PHYSICAL EXAM:  Constitutional:  She appears comfortable and not distressed. Not diaphoretic.    Neck:  The thyroid is normal. Trachea is midline.     Respiratory: The lungs are clear to auscultation. No dullness and expansion is normal.    Cardiovascular: S1 and S2 are normal. No mummurs, rubs or gallops are present.    Gastrointestinal: The abdomen is soft. No tenderness is present. No masses are present. Bowel sounds are normal.    Genitourinary: The bladder is not distended. No CVA tenderness is present.    Extremities: No edema is noted. No deformities are present.    Neurological: Cognition is normal. Tone, power and sensation are normal. Gait is steady.    Skin: No lesions are seen  or palpated.                            13.4   6.48  )-----------( 123      ( 13 Jun 2021 03:32 )             42.8     06-13    135  |  94<L>  |  42<H>  ----------------------------<  161<H>  4.4   |  23  |  8.56<H>    Ca    9.5      13 Jun 2021 03:32  Phos  6.1     06-13  Mg     2.4     06-13    TPro  7.9  /  Alb  4.0  /  TBili  0.7  /  DBili  x   /  AST  14  /  ALT  11  /  AlkPhos  67  06-12    
chief complaint:  need dialysis    extended hpi: 66 year old male with history of CAD s/p multiple PCI ( PCI in Feb of 2019 with NAHUM to LAD, LCx), PCI in March 2021 with  NAHUM to RCA, s/p laser atherectomy to LAD instent restenosis 4/2021 ), HTN, DM, ESRD on HD (Tue, Th, Sat)  history of GIB, admitted with hyperkalemia req repeat HD.    Denies CP SOB or Palps    Review of Systems:   Constitutional: [ ] fevers, [ ] chills.   Skin: [ ] dry skin. [ ] rashes.  Psychiatric: [ ] depression, [ ] anxiety.   Gastrointestinal: [ ] BRBPR, [ ] melena.   Neurological: [ ] confusion. [ ] seizures. [ ] shuffling gait.   Ears,Nose,Mouth and Throat: [ ] ear pain [ ] sore throat.   Eyes: [ ] diplopia.   Respiratory: [ ] hemoptysis. [ ] shortness of breath  Cardiovascular: See HPI above  Hematologic/Lymphatic: [ ] anemia. [ ] painful nodes. [ ] prolonged bleeding.   Genitourinary: [ ] hematuria. [ ] flank pain.   Endocrine: [ ] significant change in weight. [ ] intolerance to heat and cold.     Review of systems [ x] otherwise negative, [ ] otherwise unable to obtain    FH: no family history of sudden cardiac death in first degree relatives    SH: [ ] tobacco, [ ] alcohol, [ ] drugs    aspirin enteric coated 81 milliGRAM(s) Oral daily  atorvastatin 40 milliGRAM(s) Oral at bedtime  carvedilol 3.125 milliGRAM(s) Oral every 12 hours  chlorhexidine 2% Cloths 1 Application(s) Topical daily  cloNIDine 0.1 milliGRAM(s) Oral two times a day  clopidogrel Tablet 75 milliGRAM(s) Oral daily  dextrose 40% Gel 15 Gram(s) Oral once  dextrose 5%. 1000 milliLiter(s) IV Continuous <Continuous>  dextrose 5%. 1000 milliLiter(s) IV Continuous <Continuous>  dextrose 50% Injectable 25 Gram(s) IV Push once  dextrose 50% Injectable 12.5 Gram(s) IV Push once  dextrose 50% Injectable 25 Gram(s) IV Push once  glucagon  Injectable 1 milliGRAM(s) IntraMuscular once  hydrALAZINE 50 milliGRAM(s) Oral two times a day  insulin lispro (ADMELOG) corrective regimen sliding scale   SubCutaneous three times a day before meals  insulin lispro (ADMELOG) corrective regimen sliding scale   SubCutaneous at bedtime  isosorbide   dinitrate Tablet (ISORDIL) 10 milliGRAM(s) Oral three times a day  NIFEdipine XL 60 milliGRAM(s) Oral daily  pantoprazole    Tablet 40 milliGRAM(s) Oral before breakfast  sevelamer carbonate 800 milliGRAM(s) Oral three times a day with meals                            13.4   6.48  )-----------( 123      ( 13 Jun 2021 03:32 )             42.8       06-13    135  |  94<L>  |  42<H>  ----------------------------<  161<H>  4.4   |  23  |  8.56<H>    Ca    9.5      13 Jun 2021 03:32  Phos  6.1     06-13  Mg     2.4     06-13    TPro  7.9  /  Alb  4.0  /  TBili  0.7  /  DBili  x   /  AST  14  /  ALT  11  /  AlkPhos  67  06-12      T(C): 36.4 (06-13-21 @ 12:37), Max: 36.8 (06-12-21 @ 19:20)  HR: 66 (06-13-21 @ 12:37) (52 - 68)  BP: 117/67 (06-13-21 @ 12:37) (117/67 - 174/81)  RR: 15 (06-13-21 @ 12:37) (15 - 18)  SpO2: 100% (06-13-21 @ 12:37) (100% - 100%)      General: Well nourished in no acute distress. Alert and Oriented * 3.   Head: Normocephalic and atraumatic.   Neck: No JVD. No bruits. Supple. Does not appear to be enlarged.   Cardiovascular: + S1,S2 ; RRR Soft systolic murmur at the left lower sternal border. No rubs noted.    Lungs: CTA b/l. No rhonchi, rales or wheezes.   Abdomen: + BS, soft. Non tender. Non distended. No rebound. No guarding.   Extremities: No clubbing/cyanosis/edema.   Neurologic: Moves all four extremities. Full range of motion.   Skin: Warm and moist. The patient's skin has normal elasticity and good skin turgor.   Psychiatric: Appropriate mood and affect.  Musculoskeletal: Normal range of motion, normal strength    DATA    [facility]-     < from: Transthoracic Echocardiogram (03.18.21 @ 13:42) >  OBSERVATIONS:  Mitral Valve: Mitral annular calcification, otherwise  normal mitral valve. Mild mitral regurgitation.  Aortic Root: Normal aortic root.  Aortic Valve: Calcified trileaflet aortic valve with normal  opening.  Left Atrium: Mildly dilated left atrium.  LA volume index =  38 cc/m2.  Left Ventricle: Moderate global left ventricular systolic  dysfunction. The mid to distal septum, apex and mid to  distal anterior walls are severely hypokinetic. Normal left  ventricular internal dimensions and wall thicknesses.  Severe diastolic dysfunction.  Right Heart: Normal right atrium. Normal right ventricular  size and function. Normal tricuspid valve. Mild tricuspid  regurgitation. Normal pulmonic valve.  Pericardium/PleuraNormal pericardiumwith no pericardial  effusion. Bilateral pleural effusions.  Hemodynamic: Estimated right ventricular systolic pressure  equals 35 mm Hg, assuming right atrial pressure equals 10  mm Hg, consistent with borderline pulmonary hypertension.  ------------------------------------------------------------------------  CONCLUSIONS:  ------------------------------------------------------------------------  Confirmed on  3/18/2021 - 14:44:44 by DOROTHY Fuller    < end of copied text >    < from: Cardiac Cath Lab - Adult (04.05.21 @ 16:37) >  VENTRICLES: No left ventriculogram was performed.  CORONARY VESSELS: The coronary circulation is right dominant.  LM:   --  LM: Normal.  LAD:   --  Proximal LAD: There was a tubular 80 % stenosis at the site of a  prior stent.  CX:   --  Circumflex: This vessel was not injected, but was visualized  during a prior cardiac catheterization.  RCA:   --  RCA: This vessel was not injected.  COMPLICATIONS: There were no complications.  INTERVENTIONAL RECOMMENDATIONS: continue aspirin and plavix for at least 1  year.  Prepared and signed by  Ciera Taveras M.D.  Signed 04/05/2021 17:56:44    < end of copied text >        ASSESSMENT AND PLAN  66 year old male with history of CAD s/p multiple PCI ( PCI in Feb of 2019 with NAHUM to LAD, LCx), PCI in March 2021 with  NAHUM to RCA, s/p laser atherectomy to LAD instent restenosis 4/2021 ), HTN, DM, ESRD on HD (Tue, Th, Sat)  history of GIB , admitted with hyperkalemia req repeat HD.    --BP HR stable  --Cont ASA/PLAVIX for recent PCI  --no further inpatient cardiac work up planned  --f/u with OP cardiologist Dr Johnson after DC

## 2021-06-13 NOTE — DISCHARGE NOTE NURSING/CASE MANAGEMENT/SOCIAL WORK - NSDCVIVACCINE_GEN_ALL_CORE_FT
Nasir Maldonado is a 61 year old male presenting with physical.    No refills needed.    Denies known Latex allergy or symptoms of Latex sensitivity.  Medications verified, no changes.    Past Medical History:   Diagnosis Date   • Alcohol abuse, unspecified     dry since    • Anxiety state, unspecified     anxiety   • Cervical spinal stenosis    • DDD (degenerative disc disease), lumbar 2013    mild DDD   xray @ [facility]   • GERD (gastroesophageal reflux disease)    • Lichen sclerosus et atrophicus of glans penis and prepuce 2008   • Negative History of CA, HTN, DM, CAD, CVA, DVT, Asthma    • Negative History of ulcer, renal, liver, thyroid,TB,RF    • Urethral stricture unspecified 2008   • Viral warts, unspecified     genital warts/cryo tx     Past Surgical History:   Procedure Laterality Date   • ANTERIOR CERVICAL DISCECTOMY W/ FUSION  10/06/2016    C5-6   • COLONOSCOPY DIAGNOSTIC  08    normal, 10 yr recall, Dr. Dumont   • CYSTOSCOPY,DIL URETHRAL STRICTURE  08   • DIL URETHRA STRIC,MALE,INITIAL  2016    Meatus (BXO): 10 - 22 fr    • HERNIA REPAIR     • INJ EPIDURAL CERVICAL THORACIC  2011    C7-T1 interlaminar epidural steroid injection   • INJ EPIDURAL CERVICAL THORACIC  2012    C7-T1 interlaminar epidural steroid injection      • INJECT EPIDUR ANEST,CERV/THOR,SINGLE  2016    C7-T1   • LAPAROSCOPY,RP INI ING HERNIA  14    Tim   • NERVE BLOCK Left 2016    Facet joint, C3-6   • PAST SURGICAL HISTORY      rectal ulcer removed   • REMOVAL OF SPERM DUCT(S)  02    Vasectomy     Family Status   Relation Status   • Mother  at age 75    HTN, DM,   • Father     L/W, renal cancer, bladder CA   • Maternal Grandmother  at age 30   • Maternal Grandfather  at age 35   • Paternal Grandmother  at age 78   • Paternal Grandfather  at age 64   • Brother  at age 5    drowned   • Sister  at age 60    med  overdose   • Brother  at age 62    malignant brain tumor   • Brother Alive    A&W     Immunization History   Administered Date(s) Administered   • Hepatitis A - Adult 2008   • INFLUENZA QUADRIVALENT 10/24/2014   • Influenza 2013   • Td:Adult type tetanus/diphtheria 2003   • Tdap 2013          No Vaccines Administered.

## 2021-06-13 NOTE — PROGRESS NOTE ADULT - ASSESSMENT
ESRD:  HD yesterday.  - Discharge planning.    Hyperkalemia:  Treated with HD.  - Low K diet discussed.  - May need Lokelma or Veltassa after discharge.    Hyperphosphatemia:  - Resume binders.

## 2021-06-15 LAB
CULTURE RESULTS: SIGNIFICANT CHANGE UP
SPECIMEN SOURCE: SIGNIFICANT CHANGE UP

## 2021-06-18 ENCOUNTER — APPOINTMENT (OUTPATIENT)
Dept: TRANSPLANT | Facility: CLINIC | Age: 66
End: 2021-06-18

## 2021-07-16 ENCOUNTER — NON-APPOINTMENT (OUTPATIENT)
Age: 66
End: 2021-07-16

## 2021-07-16 ENCOUNTER — APPOINTMENT (OUTPATIENT)
Dept: CARDIOLOGY | Facility: CLINIC | Age: 66
End: 2021-07-16
Payer: MEDICARE

## 2021-07-16 VITALS
WEIGHT: 135 LBS | BODY MASS INDEX: 21.79 KG/M2 | OXYGEN SATURATION: 100 % | HEART RATE: 72 BPM | DIASTOLIC BLOOD PRESSURE: 80 MMHG | SYSTOLIC BLOOD PRESSURE: 168 MMHG

## 2021-07-16 PROCEDURE — 99215 OFFICE O/P EST HI 40 MIN: CPT

## 2021-07-16 PROCEDURE — 99072 ADDL SUPL MATRL&STAF TM PHE: CPT

## 2021-07-16 PROCEDURE — 93000 ELECTROCARDIOGRAM COMPLETE: CPT

## 2021-07-16 PROCEDURE — 93306 TTE W/DOPPLER COMPLETE: CPT

## 2021-07-28 ENCOUNTER — APPOINTMENT (OUTPATIENT)
Dept: TRANSPLANT | Facility: CLINIC | Age: 66
End: 2021-07-28

## 2021-07-28 NOTE — PHYSICAL EXAM
[General Appearance - Well Developed] : well developed [Normal Appearance] : normal appearance [Well Groomed] : well groomed [General Appearance - Well Nourished] : well nourished [No Deformities] : no deformities [General Appearance - In No Acute Distress] : no acute distress [Normal Oral Mucosa] : normal oral mucosa [No Oral Pallor] : no oral pallor [No Oral Cyanosis] : no oral cyanosis [Normal Oropharynx] : normal oropharynx [FreeTextEntry1] : No JVD [] : no respiratory distress [Respiration, Rhythm And Depth] : normal respiratory rhythm and effort [Exaggerated Use Of Accessory Muscles For Inspiration] : no accessory muscle use [Auscultation Breath Sounds / Voice Sounds] : lungs were clear to auscultation bilaterally [Bowel Sounds] : normal bowel sounds [Abdomen Soft] : soft [Abdomen Tenderness] : non-tender [Abnormal Walk] : normal gait [Gait - Sufficient For Exercise Testing] : the gait was sufficient for exercise testing [Nail Clubbing] : no clubbing of the fingernails [Cyanosis, Localized] : no localized cyanosis [Skin Color & Pigmentation] : normal skin color and pigmentation [No Venous Stasis] : no venous stasis [No Xanthoma] : no  xanthoma was observed [Oriented To Time, Place, And Person] : oriented to person, place, and time [Impaired Insight] : insight and judgment were intact [Affect] : the affect was normal [Mood] : the mood was normal [No Anxiety] : not feeling anxious [Conjunctiva] : the conjunctiva were normal in both eyes [PERRL] : pupils were equal in size, round, and reactive to light [EOM Intact] : extraocular movements were intact [Yellow Sclera (Icteric)] : no scleral icterus was seen [5th Left ICS - MCL] : palpated at the 5th LICS in the midclavicular line [Normal] : normal [No Precordial Heave] : no precordial heave was noted [Bradycardia] : bradycardic [Rhythm Regular] : regular [Normal S1] : normal S1 [Normal S2] : normal S2 [No Gallop] : no gallop heard [No Murmur] : no murmurs heard [Right Carotid Bruit] : no bruit heard over the right carotid [Left Carotid Bruit] : no bruit heard over the left carotid [2+] : right 2+ [No Pitting Edema] : no pitting edema present

## 2021-07-28 NOTE — CARDIOLOGY SUMMARY
[de-identified] : 11/7/2018, sinus 63 bpm with LVH with strain pattern, diffuse TWI in precordial leads (biphasic in V1-V3 and deep symmetrical negative T in V4-V6)  [de-identified] : 11/19/2018, exercised and completed almost 9 minutes of Bertram protocol, but could not achieve target heart rate, converted to pharmacologic nuclear stress test and seen to have severe predominantly fixed defects in the mid-anterior, anteroseptal, and apical regions that partially correct with prone imaging, LVEF 49% and LVEDV 126 mL \par \par 5/28/2020, pharmacologic nuclear stress test with severe fixed defects in anteroseptal wall and apex consistent with prior infarct without ischemia, LVEF 41%, LVEDV 155 mL  [de-identified] : 11/12/2018, normal LV function, no pulmonary hypertension, normal LA size LVEF 65-70%. \par \par 1/10/2019, normal LV function, no pulmonary hypertension, normal LA size, LVEF 63%\par \par 5/21/2020, mild segmental LV dysfunction (the distal anterior, lateral, septal, and apical segments are hypokinetic), normal pulmonary pressures, LVEF 50%\par \par 3/18/2021, moderate global LV dysfunction, LVEF 35-40%\par \par 7/16/2021, mild segmental LV dysfunction, LVEF 50-55% [de-identified] : 2/12/2019 by Iván Nathan MD at [facility] - left and right heart catheterization, elevated systemic blood pressures but normal wedge, 14 mmHg, normal PA pressures (37/17 mmHg, with normal cardiac output of 6.2 L/min and normal index of 3.6. Angiography revealed 90% prox LCx disease at site of prior stent with 80% distal LAD disease that was significant by iFR \par \par 3/22/2021 by Ciera Taveras MD at [facility] -  Proximal LAD: There was a tubular 80 % stenosis at the site of a prior stent. Distal RCA: Angiography showed minor luminal irregularities with no flow limiting lesions. --  RPLS: There was a tubular 80 % stenosis.\par \par 4/5/2021 by Ciera Taveras for staged PCI to LAD \par Stent: 2015 at [facility] \par 2/12/2019 - POBA to LCx and NAHUM to distal LAD\par 3/22/2021 - PCI to distal RCA\par 4/5/2021 - PCI to prox-LAD

## 2021-07-28 NOTE — HISTORY OF PRESENT ILLNESS
[FreeTextEntry1] : Patient is 66 year-old with cardiovascular risk factors of hypertension and diabetes, known coronary artery disease status post PCI x3 (2014) at [place] by Dr. Saúl Villafana, CKD, now ESRD on HD Dzeziq-Hlaakywfo-Mseoeo) that began January 2019, who presents today for cardiac evaluation prior to possible renal transplant.\par He was working until October 2018 as [employer] .\par Patient does not formally exercise, but he can climb stairs and has no exertional symptoms.\par \par In January 2019, patient admitted to [facility] for shortness of breath and nosebleed. He was found to be volume overloaded in the setting of FELICITA on CKD and he was initiated on hemodialysis. \par \par In February 2019, patient seen to have abnormal nuclear stress test and was referred for left and right heart catheterization. The right heart cath showed elevated systemic blood pressures but normal PCWP (14 mmHg) and normal PA pressures (37/17 mmHg) with normal cardiac output and index of 6 L/min and 3.6 respectively. The left heart cath revealed significant distal LAD disease and significant in stent stenosis of LCx. He is now status post POBA to LCx and NAHUM to LAD.\par \par May 2019 - Patient presents today in his usual state of health. He reports occasional right leg pain that limits his exercise while walking on the treadmill. He is without chest pain, shortness of breath, lower extremity swelling.\par \par October 2019 - Patient presents today in his usual state of health. He reports occasional right leg pain that limits his exercise while walking on the treadmill. He is without chest pain, shortness of breath, lower extremity swelling. He had normal HOLLY/PVR and normal ultrasound of the abdominal vessels (and into iliacs). \par \par March 2020 - Patient returns for follow-up of his cardiovascular disease and to maintain standing with the Transplant Center. Patient recently traveled to [facility] (January 8 - February 6, 2020), and when patient returned having lost 8 lbs without a clear cause. It was unintentional weight loss. He is now being evaluated for cancer including lung cancer (former smoker). He will also need EGD/colonoscopy.\par \par April 2021 - Patient returns today for follow-up after a recent hospitalization. He was hospitalized at [facility] in March 2021 for shortness of breath and poor appetite. Cath revealed both LAD and RCA disease. He had his RCA intervened on and was brought back to [facility] two weeks later for staged PCI to the LAD. He was very upset during the hospitalization because he felt he needed more dialysis prior to the PCI of the LAD. Since discharge, he has felt well.\par He continues to have HD via left radial AV fistula (Luxzlug-Bskforqb-Ahzfaalf). \par  service provided by [employer]  . \par 's Number: [id number]\par 's Name: Arin\par Language: [facility]. \par \par PMD: Bassem Grove MD [phone]\par Cardiologist: LEONARD Gomez [phone]\par Nephrologist: Yasmany Huff MD [phone]

## 2021-07-28 NOTE — REASON FOR VISIT
[Other: ____] : [unfilled] [FreeTextEntry1] : July 2021 - Patient returns today for follow-up three months after his PCI to prox-LAD. He had a repeat echocardiogram today that shows interval improvement in his LVEF.\par He has been feeling well since. He has no cardiovascular complaints.\par His carvedilol was increased to 37.5 mg BID, but he reports that sometimes his blood pressure remains elevated.\par He continues to have HD via left radial AV fistula (Vlefune-Lvajdlcs-Tjzesrpy). Blood pressure is usually best after HD.

## 2021-07-28 NOTE — DISCUSSION/SUMMARY
[FreeTextEntry1] : Patient is a 66 year-old gentleman with known coronary artery disease who presents today for evaluation prior to possible renal transplant.\par In March 2021, elizabeth was admitted to [facility] with shortness of breath and was seen to have LAD and RCA disease. He is now status post PCI to both. \par \par For patient with reduced LVEF, continue carvedilol and ARB.\par \par Continue dual antiplatelet therapy, high intensity statin therapy, antihypertension regimen, and diabetes regimen.

## 2021-08-30 ENCOUNTER — APPOINTMENT (OUTPATIENT)
Dept: TRANSPLANT | Facility: CLINIC | Age: 66
End: 2021-08-30

## 2021-09-20 NOTE — H&P PST ADULT - DOCUMENT STATUS
Called patient and she stated, \"I literally just spoke to a nurse named Barry Rider who scheduled an appt for me for today with Karyna. Authored by Resident/PA/NP

## 2021-09-27 NOTE — PROGRESS NOTE ADULT - PROBLEM SELECTOR PLAN 3
pt w/ anion gap 14 metabolic acidosis; pH 7.28 with bicarb 14 and pCO2 41  Likely secondary to CKD  Can start pt on bicarb; will f/u nephro recs pt w/ anion gap 14 metabolic acidosis; pH 7.28 with bicarb 14 and pCO2 41 on admission  AG currently 15  Likely secondary to CKD  Can start pt on bicarb; will f/u nephro recs pt w/ anion gap 14 metabolic acidosis; pH 7.28 with bicarb 14 and pCO2 41 on admission  AG currently 15  Likely secondary to CKD  pt to be started on HD, which should help to resolve the acidosis Constricted

## 2021-10-08 ENCOUNTER — APPOINTMENT (OUTPATIENT)
Dept: TRANSPLANT | Facility: CLINIC | Age: 66
End: 2021-10-08

## 2021-10-27 ENCOUNTER — APPOINTMENT (OUTPATIENT)
Dept: CARDIOLOGY | Facility: CLINIC | Age: 66
End: 2021-10-27
Payer: MEDICARE

## 2021-10-27 ENCOUNTER — NON-APPOINTMENT (OUTPATIENT)
Age: 66
End: 2021-10-27

## 2021-10-27 VITALS
HEART RATE: 77 BPM | OXYGEN SATURATION: 97 % | SYSTOLIC BLOOD PRESSURE: 150 MMHG | DIASTOLIC BLOOD PRESSURE: 80 MMHG | WEIGHT: 140 LBS | BODY MASS INDEX: 22.6 KG/M2

## 2021-10-27 PROCEDURE — 99215 OFFICE O/P EST HI 40 MIN: CPT

## 2021-10-27 PROCEDURE — 93000 ELECTROCARDIOGRAM COMPLETE: CPT

## 2021-10-27 PROCEDURE — 99072 ADDL SUPL MATRL&STAF TM PHE: CPT

## 2021-10-27 RX ORDER — LOSARTAN POTASSIUM 100 MG/1
100 TABLET, FILM COATED ORAL DAILY
Qty: 90 | Refills: 3 | Status: DISCONTINUED | COMMUNITY
Start: 2021-04-16 | End: 2021-10-27

## 2021-10-27 NOTE — REASON FOR VISIT
[Other: ____] : [unfilled] [FreeTextEntry1] : October 2021 - Patient returns today for follow-up.\par He had PCI to prox-LAD in April 2021. He had a repeat echocardiogram today that shows interval improvement in his LVEF.\par He has been feeling well since. He has no cardiovascular complaints.\par His carvedilol was increased to 37.5 mg BID, but he reports that sometimes his blood pressure remains elevated.\par He continues to have HD via left radial AV fistula (Iodiunx-Attnvjub-Nsbrgaqf). Blood pressure is usually best after HD.

## 2021-10-27 NOTE — HISTORY OF PRESENT ILLNESS
[FreeTextEntry1] : Patient is 66 year-old with cardiovascular risk factors of hypertension and diabetes, known coronary artery disease status post PCI x3 (2014) at [place] by Dr. Saúl Villafana, CKD, now ESRD on HD Hlhyyh-Hkgulwjst-Posemh) that began January 2019, who presents today for cardiac evaluation prior to possible renal transplant.\par He was working until October 2018 as [employer] .\par Patient does not formally exercise, but he can climb stairs and has no exertional symptoms.\par \par In January 2019, patient admitted to [facility] for shortness of breath and nosebleed. He was found to be volume overloaded in the setting of FELICITA on CKD and he was initiated on hemodialysis. \par \par In February 2019, patient seen to have abnormal nuclear stress test and was referred for left and right heart catheterization. The right heart cath showed elevated systemic blood pressures but normal PCWP (14 mmHg) and normal PA pressures (37/17 mmHg) with normal cardiac output and index of 6 L/min and 3.6 respectively. The left heart cath revealed significant distal LAD disease and significant in stent stenosis of LCx. He is now status post POBA to LCx and NAHUM to LAD.\par \par May 2019 - Patient presents today in his usual state of health. He reports occasional right leg pain that limits his exercise while walking on the treadmill. He is without chest pain, shortness of breath, lower extremity swelling.\par \par October 2019 - Patient presents today in his usual state of health. He reports occasional right leg pain that limits his exercise while walking on the treadmill. He is without chest pain, shortness of breath, lower extremity swelling. He had normal HOLLY/PVR and normal ultrasound of the abdominal vessels (and into iliacs). \par \par March 2020 - Patient returns for follow-up of his cardiovascular disease and to maintain standing with the Transplant Center. Patient recently traveled to [facility] (January 8 - February 6, 2020), and when patient returned having lost 8 lbs without a clear cause. It was unintentional weight loss. He is now being evaluated for cancer including lung cancer (former smoker). He will also need EGD/colonoscopy.\par \par April 2021 - Patient returns today for follow-up after a recent hospitalization. He was hospitalized at [facility] in March 2021 for shortness of breath and poor appetite. Cath revealed both LAD and RCA disease. He had his RCA intervened on and was brought back to [facility] two weeks later for staged PCI to the LAD. He was very upset during the hospitalization because he felt he needed more dialysis prior to the PCI of the LAD. Since discharge, he has felt well.\par He continues to have HD via left radial AV fistula (Jylofxl-Kugtwynb-Vkamlaae). \par  service provided by [employer]  . \par 's Number: [id number]\par 's Name: Arin\par Language: [facility]. \par \par July 2021 - Patient returns today for follow-up three months after his PCI to prox-LAD. He had a repeat echocardiogram today that shows interval improvement in his LVEF.\par He has been feeling well since. He has no cardiovascular complaints.\par His carvedilol was increased to 37.5 mg BID, but he reports that sometimes his blood pressure remains elevated.\par He continues to have HD via left radial AV fistula (Ewwsktj-Yuwcenxk-Pxwsmqmf). Blood pressure is usually best after HD.\par \par PMD: Bassem Grove MD [phone]\par Cardiologist: LEONARD Gomez [phone]\par Nephrologist: Yasmany Huff MD [phone]

## 2021-10-27 NOTE — DISCUSSION/SUMMARY
[FreeTextEntry1] : Patient is a 66 year-old gentleman with known coronary artery disease who presents today for evaluation prior to possible renal transplant.\par In March 2021, patient was admitted to [facility] with shortness of breath and was seen to have LAD and RCA disease. He is now status post PCI to both. \par \par For patient with reduced LVEF, continue carvedilol.\par \par Continue dual antiplatelet therapy, high intensity statin therapy, antihypertension regimen, and diabetes regimen. \par \par Follow-up repeat TTE and ischemic evaluation in mid 2022.

## 2021-10-27 NOTE — CARDIOLOGY SUMMARY
[de-identified] : 11/7/2018, sinus 63 bpm with LVH with strain pattern, diffuse TWI in precordial leads (biphasic in V1-V3 and deep symmetrical negative T in V4-V6)  [de-identified] : 11/19/2018, exercised and completed almost 9 minutes of Bertram protocol, but could not achieve target heart rate, converted to pharmacologic nuclear stress test and seen to have severe predominantly fixed defects in the mid-anterior, anteroseptal, and apical regions that partially correct with prone imaging, LVEF 49% and LVEDV 126 mL \par \par 5/28/2020, pharmacologic nuclear stress test with severe fixed defects in anteroseptal wall and apex consistent with prior infarct without ischemia, LVEF 41%, LVEDV 155 mL  [de-identified] : 11/12/2018, normal LV function, no pulmonary hypertension, normal LA size LVEF 65-70%. \par \par 1/10/2019, normal LV function, no pulmonary hypertension, normal LA size, LVEF 63%\par \par 5/21/2020, mild segmental LV dysfunction (the distal anterior, lateral, septal, and apical segments are hypokinetic), normal pulmonary pressures, LVEF 50%\par \par 3/18/2021, moderate global LV dysfunction, LVEF 35-40%\par \par 7/16/2021, mild segmental LV dysfunction, LVEF 50-55% [de-identified] : 2/12/2019 by Iván Nathan MD at [facility] - left and right heart catheterization, elevated systemic blood pressures but normal wedge, 14 mmHg, normal PA pressures (37/17 mmHg, with normal cardiac output of 6.2 L/min and normal index of 3.6. Angiography revealed 90% prox LCx disease at site of prior stent with 80% distal LAD disease that was significant by iFR \par \par 3/22/2021 by Ciera Taveras MD at [facility] -  Proximal LAD: There was a tubular 80 % stenosis at the site of a prior stent. Distal RCA: Angiography showed minor luminal irregularities with no flow limiting lesions. --  RPLS: There was a tubular 80 % stenosis.\par \par 4/5/2021 by Ciera Taveras for staged PCI to LAD \par Stent: 2015 at [place] \par 2/12/2019 - POBA to LCx and NAHUM to distal LAD\par 3/22/2021 - PCI to distal RCA\par 4/5/2021 - PCI to prox-LAD

## 2021-11-22 NOTE — H&P ADULT - PROBLEM SELECTOR PROBLEM 6
Patient Class: Observation [104]   REQUIRED: Diagnosis: AMS (altered mental status) [7047601]   Estimated Length of Stay: Estimated stay of more than 2 midnights   Future Attending Provider: Javan Ochoa [[id number]] Need for prophylactic measure Anemia R/O Diabetes mellitus Hypertension

## 2021-12-01 PROCEDURE — G9005: CPT

## 2022-01-06 NOTE — PATIENT PROFILE ADULT - NSPROHMDIABETBLDGLCUSUAL_GEN_A_NUR
NAME: Merrill Cummings   : 1952 SEX: CRISELDA MR#: [id number]   DIAGNOSIS: 1. High risk adenocarcinoma of the prostate diagnosed in 2021.  Group clinical stage IIIA. cT1c cN0 cM0  [place] score 4+3=7.  PSA = 21.   PSMA PET/CT scan to rule out distant metastases  is pending.    2. Patient's father and grandfather  had prostate cancer.    3. History of Charcot Mague Tooth muscular dystrophy.   REFERRING PHYSICIAN: Dilan Edmonds M.D.   DATE OF CONSULTATION: 2022     PRESENT ILLNESS:  The patient is a 69-year-old  white male.  He has a history of increased PSA.  In 2017, transrectal biopsies of the prostate were all negative.  In 2018, PSA was 12.  Later in 2018, transrectal biopsies were again repeated and biopsies were all negative.     In 2018, PSA was 9.8.  In 2019, PSA was 11.8.  On 2021, PSA had increased to 21.  On 2021, the patient saw Dr. Edmonds. Dr. Edmonds's digital rectal examination was unremarkable except for prostate size of 50 cc.     On 2021, MR scan of the prostate was performed with and without IV contrast. I have reviewed the images as well as read the radiologist's impression.  The prostate volume was estimated to be 46 cc. There was a 15 mm lesion in the left anterior transitional zone and anterior fibromuscular stoma extending from the base to the apex.  This was thought to be very concerning for malignancy and was rated as a 4/5 PI-RADS scale.  There was no extracapsular extension. There was no seminal vesicle or lymphadenopathy.     On 2021, an MR ultrasound fusion biopsy was performed.  Ten biopsies were performed of the targeted area and 12 routine biopsies were performed.  The  targeted biopsies showed adenocarcinoma Amaury score 3+4=7 and Amaury pattern of 4 occupied approximately 20-25%.  The tumor involved a total of 10-15% of the specimen. Zero out of six right routine biopsies  were positive. Three out of six left routine biopsies were positive, and this included the left mid biopsy, which showed one out of two biopsies showing Amaury score of 4+3=7.  The Amaury 4 pattern involved 70-80% of the specimen and tumor involved 5-10% of the specimen.  The left apex showed [place] score of 3+4=7 with less than 10% involving [place] pattern 4 and the tumor involving 15% of the specimen.     The patient has recently discussed treatment options with Dr. Edmonds.  The patient had done some online studying and is very interested in the PSMA scan.  Recent basic metabolic profile is fairly unremarkable except for a glucose elevated at 224.  Patient has been referred for Radiation Oncology consultation.     The patient denies any symptoms except for International Prostate Symptom Score of 17.  It should be noted that the patient is not on Flomax or other type of medicine.  A score of 17 corresponds with moderate bladder outlet obstruction symptoms.  The patient is mostly dissatisfied with his current quality of life due to urinary symptoms.  The patient says he does have some bowel problems and has fecal seepage after bowel movements. His Sexual Health Inventory for Men score is 17, which corresponds with mild erectile dysfunction.  One of the patient's greatest difficulties is associated with his long history of Charcot Mague Tooth muscular dystrophy. The patient is of [place] ancestry and a number of his ancestors have also had this problem.  This is a progressive muscular dystrophy.  When the patient walks a lot during the day he uses a brace. Otherwise, he does not use a brace.  The muscular dystrophy causes a number of difficulties including foot drop, which predisposes him to trip and fall.  In spite of the muscular dystrophy, he has a good performance status and remains active. His weight is stable at 213 pounds.    PAST MEDICAL HISTORY:  The patient has filled out forms that include symptoms as  well as past medical history, and relevant factors have been reviewed.     Medical:  Adenocarcinoma of the prostate, Charcot Mague Tooth muscular dystrophy, hypertension, diabetes, hyperlipidemia, history of kidney stone, history of mitral valve prolapse, history of obstructive sleep apnea.     No previous radiation therapy or chemotherapy history.     Previous surgeries: Fracture ankle surgery, lithotripsy for kidney stone, multiple tooth extractions.    FAMILY HISTORY:  Positive for father with prostate cancer, colon cancer and lung cancer.  The patient's grandfather also had prostate cancer.     TOBACCO AND ALCOHOL HISTORY:  Negative.    ALLERGIES:  Nexium.    MEDICATIONS:  The patient is on six medications and they are recorded in the clinic records.  These medications do not include any medications such as Flomax.    PHYSICAL EXAMINATION:  Exam shows a man in no distress. He has a fairly good performance status (performance score = 1).  Musculoskeletal examination shows a very distinctive gait consistent with muscular dystrophy.  Examination of the patient's feet is very characteristic for the toes, especially the great toe, associated with Charcot Mague Tooth syndrome.  There is no supraclavicular or groin lymphadenopathy.  Abdomen shows no masses and no surgical scars.  Digital rectal examination shows a 40-50 cc size prostate.  There are no suspicious prostate or rectal lesions.    LAB AND X-RAYS:  See Present Illness section.    ASSESSMENT/GOALS/PLAN:  The patient has recently been diagnosed with  a high risk adenocarcinoma of the prostate.  Group clinical stage IIIA. cT1c cN0 cM0  [place] score 4+3=7.  PSA = 21.       I have reviewed the NCCN guidelines for appropriate workup and treatment.  Because of the PSA greater than 20, he automatically falls in the high risk group.  According to the NCCN guidelines, imaging to rule out bone metastasis should be performed.  According to the NCCN guidelines, patients  such as this would be more effectively screened for distant metastasis with a PSMA scan rather than a bone scan, and the patient is greatly in favor of having a PSMA scan to rule out bone, lymph node, or other distant metastasis.      The average 67-year-old man has an expected survival of 16 years.  The PSMA scan would be performed for both diagnostic as well as radiation therapy treatment planning purposes.  Hopefully, this PSMA scan can get approval from his insurance company so that it can be performed in the near future.  Unless something unexpected shows up on the PSMA scan, the patient would have high risk but localized prostate cancer.  According to the NCCN guidelines, patients such as this with greater than five year expected survival have several options.  I discussed these options with the patient, and he and his wife have elected for external beam irradiation with androgen deprivation therapy.  Ordinarily a minimum of 1.5 years of androgen deprivation therapy is recommended, although in this situation it may be appropriate to consider a slightly shorter course of androgen deprivation therapy such as one year.  The patient has considerable muscle weakness from his Charcot Mague Tooth muscular dystrophy.  It is known that a PSA greater than 20 is the weakest of the predictors that  for high risk status and, therefore, I think it may be appropriate to consider one year rather than 1.5 years of androgen deprivation therapy with irradiation.  This decision will be delayed until after the upcoming PSMA scan.      I have contacted Dr. Edmonds's office to initiate the androgen deprivation therapy, which would be at least one year or possibly longer.  Several months after initiating androgen deprivation therapy, I would ask one of my partners to consider inserting gold fiducial markers for precise image guidance as well as a SpaceOAR.  I am anticipating that the prostate would receive either 70 Gy at 2.5 Gy  per fraction versus 78 Gy at 2 Gy per fraction.  I would also include some of the proximal seminal vesicle region.  Unless something unexpected showed up on the PSMA scan, I would not be recommending prophylactic ana paula irradiation.     According to the NCCN guidelines, patients with high risk prostate cancer should have germline testing, and this was discussed with the patient and his wife.  The patient's father and grandfather  also had prostate cancer.  The patient does have a son, and they are greatly in favor of seeing a genetic counselor, which I have arranged, and almost certainly gene testing will be performed.     Currently the patient's International Prostate Symptom Score is 17, and I think that very likely he has some degree of bladder outlet obstruction symptoms.  I will be asking Dr. Edmonds to consider evaluating the patient for Flomax, and I suspect he may have considerable benefit from a Flomax-type medicine.  One hundred forty minutes were spent on this very lengthy consult with very extensive questions.  Over 30 minutes were spent in record and image review, 95 minutes were spent directly with the patient and his wife, and well over 15 minutes were spent in documentation and guideline review.    SIDE EFFECTS AND POSSIBLE COMPLICATIONS:  The patient and his wife were informed of the possible acute and late effects of irradiation. Acute effects could include bowel or bladder bother. Late effects could include bowel or bladder injury.  I have reviewed a site specific consent form. All questions were answered and they have good understanding. The patient has signed a site specific consent form.     I would like to thank all of Mr. Cummings's physicians for the opportunity to consult on their patients. Any of these physicians should feel free to call me with any questions or comments.        Electronically Signed By:  Genaro Collins M.D.                                                         NICK/Rae  DD:  01/06/2022  DT:  01/06/2022  Job #:  [id number]    CC:  Dilan Edmonds M.D., [address], [place] [address], Fax: [phone]        Dustin Thompson MD, [address] [facility] [place] [address], Fax: [phone]        Go Raza M.D., [address] [facility] [place] [address], Fax: [phone]       ADDENDUM:  PSMA PET/CT scan for both diagnostic as well as radiation therapy treatment planning purposes is pending.         unknown

## 2022-01-31 ENCOUNTER — APPOINTMENT (OUTPATIENT)
Dept: TRANSPLANT | Facility: CLINIC | Age: 67
End: 2022-01-31

## 2022-02-01 ENCOUNTER — APPOINTMENT (OUTPATIENT)
Dept: TRANSPLANT | Facility: CLINIC | Age: 67
End: 2022-02-01

## 2022-03-25 ENCOUNTER — APPOINTMENT (OUTPATIENT)
Dept: TRANSPLANT | Facility: CLINIC | Age: 67
End: 2022-03-25

## 2022-03-25 NOTE — PATIENT PROFILE ADULT. - PRO SERVICES AT DISCH
Instructions: This plan will send the code FBSD to the PM system.  DO NOT or CHANGE the price.
Price (Do Not Change): 0.00
Detail Level: Simple
unsure

## 2022-04-15 ENCOUNTER — APPOINTMENT (OUTPATIENT)
Dept: NEPHROLOGY | Facility: CLINIC | Age: 67
End: 2022-04-15

## 2022-04-22 ENCOUNTER — LABORATORY RESULT (OUTPATIENT)
Age: 67
End: 2022-04-22

## 2022-04-22 ENCOUNTER — APPOINTMENT (OUTPATIENT)
Dept: NEPHROLOGY | Facility: CLINIC | Age: 67
End: 2022-04-22
Payer: COMMERCIAL

## 2022-04-22 VITALS
SYSTOLIC BLOOD PRESSURE: 142 MMHG | RESPIRATION RATE: 14 BRPM | HEART RATE: 88 BPM | TEMPERATURE: 97.8 F | HEIGHT: 66 IN | DIASTOLIC BLOOD PRESSURE: 71 MMHG | BODY MASS INDEX: 23.3 KG/M2 | WEIGHT: 145 LBS | OXYGEN SATURATION: 97 %

## 2022-04-22 PROCEDURE — 99072 ADDL SUPL MATRL&STAF TM PHE: CPT

## 2022-04-22 PROCEDURE — 99215 OFFICE O/P EST HI 40 MIN: CPT

## 2022-04-22 NOTE — ASSESSMENT
[FreeTextEntry1] : .Mr. BOLAÑOS 67 year He is evaluated for kidney transplantation.\par Pre transplant/ESRD: Patient  is a  moderate risk candidate, diabetes, CAD. Still on dual antiplatelet agents, has cardiology f/u with Dr Johnson later this month.\par Medical risks: Cardiovascular risk eval- Has appointment on April 27th\par Diabetes Mellitus: Discussed implications. Continue follow up with primary physicians\par Hypertension: Discussed implications. Continue follow up with primary physicians.\par Cardiac risk:  will get further evaluation; followed by Dr. Johnson, has follow up appointment. He is still on dual antiplatelet agents\par Cancer screening: PSA .  Colon ann-marie screening. No known h/o neoplastic disease, update as per protocol\par ID: Serology for acute and chronic viral infections. Screening for latent TB.- update\par Imaging: Renal/abdominal /chest /Iliac/  imaging \par Consults: Nutrition, social work, cardiology, Transplant surgery.\par Reviewed factors affecting survival and morbidity while on wait list and reviewed jamin-operative and long-term risk factors affecting outcome in kidney transplantation.\par Details of transplant surgery, immunosuppression and its complications and benefits of live donor transplantation as well as variability in wait times across regions and multiple listing were discussed. KDPI >85% and PHS high risk criteria donors were discussed. Discussed factors affecting morbidity and mortality while on hemodialysis.\par Current outcome for [facility] kidney transplant program and SRTR data were discussed. He has informative educational video and power point presentation regarding details of kidney transplantation at this center.\par Patient has potential live donor ( none) at present. \par Will proceed with completing/ updating work up and remain listed once work up is reviewed and found to be ok.\par

## 2022-04-22 NOTE — REASON FOR VISIT
[Follow-Up] : a follow-up visit [FreeTextEntry1] : Pre kidney transplant evaluation, listed follow up

## 2022-04-22 NOTE — PHYSICAL EXAM
[General Appearance - Alert] : alert [General Appearance - In No Acute Distress] : in no acute distress [Sclera] : the sclera and conjunctiva were normal [PERRL With Normal Accommodation] : pupils were equal in size, round, and reactive to light [Extraocular Movements] : extraocular movements were intact [Outer Ear] : the ears and nose were normal in appearance [Oropharynx] : the oropharynx was normal [Neck Appearance] : the appearance of the neck was normal [Neck Cervical Mass (___cm)] : no neck mass was observed [Jugular Venous Distention Increased] : there was no jugular-venous distention [Thyroid Diffuse Enlargement] : the thyroid was not enlarged [Thyroid Nodule] : there were no palpable thyroid nodules [Auscultation Breath Sounds / Voice Sounds] : lungs were clear to auscultation bilaterally [Heart Rate And Rhythm] : heart rate was normal and rhythm regular [Heart Sounds] : normal S1 and S2 [Heart Sounds Gallop] : no gallops [Murmurs] : no murmurs [Heart Sounds Pericardial Friction Rub] : no pericardial rub [Full Pulse] : the pedal pulses are present [Edema] : there was no peripheral edema [Bowel Sounds] : normal bowel sounds [Abdomen Soft] : soft [Abdomen Tenderness] : non-tender [Abdomen Mass (___ Cm)] : no abdominal mass palpated [Cervical Lymph Nodes Enlarged Posterior Bilaterally] : posterior cervical [Cervical Lymph Nodes Enlarged Anterior Bilaterally] : anterior cervical [Supraclavicular Lymph Nodes Enlarged Bilaterally] : supraclavicular [Involuntary Movements] : no involuntary movements were seen [___ (cm) Fistula] : [unfilled] (cm) fistula [Bruit] : a bruit was present [Thrill] : a thrill was present [] : no rash [No Focal Deficits] : no focal deficits [Oriented To Time, Place, And Person] : oriented to person, place, and time [Impaired Insight] : insight and judgment were intact [Affect] : the affect was normal

## 2022-04-22 NOTE — HISTORY OF PRESENT ILLNESS
[FreeTextEntry1] : 67 years old Indian male, DM, HTN, ESRD (Jan 2019) followed by Dr. Capellan. HD through left forearm AVF, no hypotension. Has had multiple PCI. MOst recent PCI to LAD in April 2021 by Dr. Taveras. Minimal urine output. Retired. Lives with wife and daughter.\par He has no SOB/chest pain, able to climb stairs and walk 2-3 blocks.\par Former smoker, quit more than 10 years previously.\par No h/o CVA/PVD/DVT/bleeding/transfusions/neoplasia/major infections.\par No transfusions.\par Past surgeries   AVF and eye surgeries and cholecystectomy\par Current meds\par Sevelamer\par Hydralazine\par Atorvastatin\par Aspirin\par Isosorbide\par Tradjenta\par Clonidine\par Nifedipine\par Clopidogrel\par Carvedilol\par Basaglar\par Nephrologist: Dr. Capellan\par Cardiologist: Dr. Johnson\par \par Prior work up: Echo July 2021: Ok\par Most recent PCI April 2021\par Still on dual antiplatelet agents\par

## 2022-04-25 ENCOUNTER — NON-APPOINTMENT (OUTPATIENT)
Age: 67
End: 2022-04-25

## 2022-04-25 LAB
ALBUMIN SERPL ELPH-MCNC: 5.2 G/DL
ALP BLD-CCNC: 84 U/L
ALT SERPL-CCNC: 13 U/L
ANION GAP SERPL CALC-SCNC: 18 MMOL/L
AST SERPL-CCNC: 10 U/L
BASOPHILS # BLD AUTO: 0.05 K/UL
BASOPHILS NFR BLD AUTO: 0.6 %
BILIRUB SERPL-MCNC: 0.8 MG/DL
BUN SERPL-MCNC: 41 MG/DL
C PEPTIDE SERPL-MCNC: 12 NG/ML
CALCIUM SERPL-MCNC: 9.7 MG/DL
CHLORIDE SERPL-SCNC: 93 MMOL/L
CMV IGG SERPL QL: 3.6 U/ML
CMV IGG SERPL-IMP: POSITIVE
CO2 SERPL-SCNC: 28 MMOL/L
COVID-19 SPIKE DOMAIN ANTIBODY INTERPRETATION: POSITIVE
CREAT SERPL-MCNC: 10.7 MG/DL
EBV EA AB SER IA-ACNC: 5 U/ML
EBV EA AB TITR SER IF: POSITIVE
EBV EA IGG SER QL IA: >600 U/ML
EBV EA IGG SER-ACNC: NEGATIVE
EBV EA IGM SER IA-ACNC: NEGATIVE
EBV PATRN SPEC IB-IMP: NORMAL
EBV VCA IGG SER IA-ACNC: >750 U/ML
EBV VCA IGM SER QL IA: <10 U/ML
EGFR: 5 ML/MIN/1.73M2
EOSINOPHIL # BLD AUTO: 0.28 K/UL
EOSINOPHIL NFR BLD AUTO: 3.1 %
EPSTEIN-BARR VIRUS CAPSID ANTIGEN IGG: POSITIVE
ESTIMATED AVERAGE GLUCOSE: 146 MG/DL
GLUCOSE SERPL-MCNC: 191 MG/DL
HBA1C MFR BLD HPLC: 6.7 %
HBV CORE IGG+IGM SER QL: NONREACTIVE
HBV SURFACE AB SER QL: NONREACTIVE
HBV SURFACE AB SERPL IA-ACNC: <3 MIU/ML
HBV SURFACE AG SER QL: NONREACTIVE
HCT VFR BLD CALC: 38.6 %
HCV AB SER QL: NONREACTIVE
HCV S/CO RATIO: 0.17 S/CO
HEPATITIS A IGG ANTIBODY: REACTIVE
HGB BLD-MCNC: 12.2 G/DL
HIV1+2 AB SPEC QL IA.RAPID: NONREACTIVE
HSV 1+2 IGG SER IA-IMP: NEGATIVE
HSV 1+2 IGG SER IA-IMP: POSITIVE
HSV1 IGG SER QL: 32.3 INDEX
HSV2 IGG SER QL: 0.1 INDEX
IMM GRANULOCYTES NFR BLD AUTO: 0.2 %
LYMPHOCYTES # BLD AUTO: 1.42 K/UL
LYMPHOCYTES NFR BLD AUTO: 15.8 %
M TB IFN-G BLD-IMP: NEGATIVE
MAGNESIUM SERPL-MCNC: 2.3 MG/DL
MAN DIFF?: NORMAL
MCHC RBC-ENTMCNC: 31.4 PG
MCHC RBC-ENTMCNC: 31.6 GM/DL
MCV RBC AUTO: 99.5 FL
MONOCYTES # BLD AUTO: 1.21 K/UL
MONOCYTES NFR BLD AUTO: 13.4 %
NEUTROPHILS # BLD AUTO: 6.03 K/UL
NEUTROPHILS NFR BLD AUTO: 66.9 %
PHOSPHATE SERPL-MCNC: 3.9 MG/DL
PLATELET # BLD AUTO: 138 K/UL
POTASSIUM SERPL-SCNC: 4.9 MMOL/L
PROT SERPL-MCNC: 8.8 G/DL
PSA SERPL-MCNC: 0.74 NG/ML
QUANTIFERON TB PLUS MITOGEN MINUS NIL: 9.99 IU/ML
QUANTIFERON TB PLUS NIL: 0.01 IU/ML
QUANTIFERON TB PLUS TB1 MINUS NIL: 0.03 IU/ML
QUANTIFERON TB PLUS TB2 MINUS NIL: 0.04 IU/ML
RBC # BLD: 3.88 M/UL
RBC # FLD: 14 %
RUBV IGG FLD-ACNC: 4.5 INDEX
RUBV IGG SER-IMP: POSITIVE
SARS-COV-2 AB SERPL IA-ACNC: 74.5 U/ML
SODIUM SERPL-SCNC: 140 MMOL/L
T GONDII AB SER-IMP: NEGATIVE
T GONDII IGG SER QL: <3 IU/ML
T PALLIDUM AB SER QL IA: NEGATIVE
URATE SERPL-MCNC: 4.9 MG/DL
VZV AB TITR SER: POSITIVE
VZV IGG SER IF-ACNC: 2887 INDEX
WBC # FLD AUTO: 9.01 K/UL

## 2022-04-26 PROBLEM — Z00.00 ENCOUNTER FOR PREVENTIVE HEALTH EXAMINATION: Noted: 2022-04-26

## 2022-04-29 NOTE — H&P ADULT - PROBLEM/PLAN-3
Tentative Weekend Discharge Note:     presented to patient bedside to discuss discharge plans, as well as assess any concerns or needs. Patient was alert and oriented x4, pleasant and polite. Patient is coping adequately with support from family. Patient presented with significant other/everton Leonarda (ph: [phone]), at bedside. Patient will discharge today to [facility] with the following needs. Patient has orders for home health PT/OT/SN.  notified [facility]. Patient had recommendations for DME, however states he does not need a walker or commode. Patient has a cane he uses as needed. Patient's caregiver will transport patient. Patient reports agreeing with the discharge plan and has no psychosocial concerns. Patient and caregiver verbalize understanding and are involved in treatment planning and discharge process. Patient nor caregiver had any further concerns or questions at this time. SW remains available and will continue to follow, providing psychosocial support, education and assistance as needed.        DISPLAY PLAN FREE TEXT

## 2022-05-16 ENCOUNTER — APPOINTMENT (OUTPATIENT)
Dept: RADIOLOGY | Facility: IMAGING CENTER | Age: 67
End: 2022-05-16

## 2022-05-16 ENCOUNTER — APPOINTMENT (OUTPATIENT)
Dept: CT IMAGING | Facility: IMAGING CENTER | Age: 67
End: 2022-05-16

## 2022-05-16 ENCOUNTER — APPOINTMENT (OUTPATIENT)
Dept: RADIOLOGY | Facility: IMAGING CENTER | Age: 67
End: 2022-05-16
Payer: COMMERCIAL

## 2022-05-16 ENCOUNTER — OUTPATIENT (OUTPATIENT)
Dept: OUTPATIENT SERVICES | Facility: HOSPITAL | Age: 67
LOS: 1 days | End: 2022-05-16
Payer: COMMERCIAL

## 2022-05-16 DIAGNOSIS — Z00.8 ENCOUNTER FOR OTHER GENERAL EXAMINATION: ICD-10-CM

## 2022-05-16 DIAGNOSIS — Z98.890 OTHER SPECIFIED POSTPROCEDURAL STATES: Chronic | ICD-10-CM

## 2022-05-16 DIAGNOSIS — I77.0 ARTERIOVENOUS FISTULA, ACQUIRED: Chronic | ICD-10-CM

## 2022-05-16 PROCEDURE — 71045 X-RAY EXAM CHEST 1 VIEW: CPT

## 2022-05-16 PROCEDURE — 71045 X-RAY EXAM CHEST 1 VIEW: CPT | Mod: 26

## 2022-05-23 ENCOUNTER — APPOINTMENT (OUTPATIENT)
Dept: CARDIOLOGY | Facility: CLINIC | Age: 67
End: 2022-05-23
Payer: MEDICARE

## 2022-05-23 ENCOUNTER — NON-APPOINTMENT (OUTPATIENT)
Age: 67
End: 2022-05-23

## 2022-05-23 VITALS
HEART RATE: 77 BPM | SYSTOLIC BLOOD PRESSURE: 161 MMHG | DIASTOLIC BLOOD PRESSURE: 71 MMHG | WEIGHT: 151 LBS | HEIGHT: 66 IN | BODY MASS INDEX: 24.27 KG/M2 | OXYGEN SATURATION: 100 %

## 2022-05-23 VITALS — SYSTOLIC BLOOD PRESSURE: 140 MMHG | DIASTOLIC BLOOD PRESSURE: 74 MMHG

## 2022-05-23 PROCEDURE — 99215 OFFICE O/P EST HI 40 MIN: CPT

## 2022-05-23 PROCEDURE — 93000 ELECTROCARDIOGRAM COMPLETE: CPT

## 2022-05-28 NOTE — DISCUSSION/SUMMARY
[FreeTextEntry1] : Patient is a 67 year-old gentleman with known coronary artery disease who presents today for evaluation prior to possible renal transplant.\par In March 2021, patient was admitted to [facility] with shortness of breath and was seen to have LAD and RCA disease. He is now status post PCI to both. \par \par For patient with reduced LVEF, continue carvedilol.\par \par Continue dual antiplatelet therapy, high intensity statin therapy, antihypertension regimen, and diabetes regimen. At this time, patient can discontinue clopidogrel and continue ASA monotherapy. \par \par Follow-up repeat TTE and ischemic evaluation in mid 2022.

## 2022-05-28 NOTE — HISTORY OF PRESENT ILLNESS
[FreeTextEntry1] : Patient is 67 year-old with cardiovascular risk factors of hypertension and diabetes, known coronary artery disease status post PCI x3 (2014) at [facility] by Dr. Saúl Villafana, CKD, now ESRD on HD Ygzcvi-Dvniyhrbt-Vlmjpq) that began January 2019, who presents today for cardiac evaluation prior to possible renal transplant.\par He was working until October 2018 as [employer] .\par Patient does not formally exercise, but he can climb stairs and has no exertional symptoms.\par \par In January 2019, patient admitted to [facility] for shortness of breath and nosebleed. He was found to be volume overloaded in the setting of FELICITA on CKD and he was initiated on hemodialysis. \par \par In February 2019, patient seen to have abnormal nuclear stress test and was referred for left and right heart catheterization. The right heart cath showed elevated systemic blood pressures but normal PCWP (14 mmHg) and normal PA pressures (37/17 mmHg) with normal cardiac output and index of 6 L/min and 3.6 respectively. The left heart cath revealed significant distal LAD disease and significant in stent stenosis of LCx. He is now status post POBA to LCx and NAHUM to LAD.\par \par May 2019 - Patient presents today in his usual state of health. He reports occasional right leg pain that limits his exercise while walking on the treadmill. He is without chest pain, shortness of breath, lower extremity swelling.\par \par October 2019 - Patient presents today in his usual state of health. He reports occasional right leg pain that limits his exercise while walking on the treadmill. He is without chest pain, shortness of breath, lower extremity swelling. He had normal HOLLY/PVR and normal ultrasound of the abdominal vessels (and into iliacs). \par \par March 2020 - Patient returns for follow-up of his cardiovascular disease and to maintain standing with the Transplant Center. Patient recently traveled to [facility] (January 8 - February 6, 2020), and when patient returned having lost 8 lbs without a clear cause. It was unintentional weight loss. He is now being evaluated for cancer including lung cancer (former smoker). He will also need EGD/colonoscopy.\par \par April 2021 - Patient returns today for follow-up after a recent hospitalization. He was hospitalized at [facility] in March 2021 for shortness of breath and poor appetite. Cath revealed both LAD and RCA disease. He had his RCA intervened on and was brought back to [facility] two weeks later for staged PCI to the LAD. He was very upset during the hospitalization because he felt he needed more dialysis prior to the PCI of the LAD. Since discharge, he has felt well.\par He continues to have HD via left radial AV fistula (Nxuulwa-Evmaxrim-Xdqhbcag). \par  service provided by [employer]  . \par 's Number: [id number]\par 's Name: Arin\par Language: Pashto. \par \par July 2021 - Patient returns today for follow-up three months after his PCI to prox-LAD. He had a repeat echocardiogram today that shows interval improvement in his LVEF.\par He has been feeling well since. He has no cardiovascular complaints.\par His carvedilol was increased to 37.5 mg BID, but he reports that sometimes his blood pressure remains elevated.\par He continues to have HD via left radial AV fistula (Oxmlbzt-Srvcptcq-Gtmchxll). Blood pressure is usually best after HD.\par \par October 2021 - Patient returns today for follow-up.\par He had PCI to prox-LAD in April 2021. He had a repeat echocardiogram today that shows interval improvement in his LVEF.\par He has been feeling well since. He has no cardiovascular complaints.\par His carvedilol was increased to 37.5 mg BID, but he reports that sometimes his blood pressure remains elevated.\par He continues to have HD via left radial AV fistula (Xkswwzk-Cznbjinp-Rclssxml). Blood pressure is usually best after HD.\par \par PMD: Bassem Grove MD [phone]\par Cardiologist: LEONARD Gomez [phone]\par Nephrologist: Yasmany Huff MD [phone]

## 2022-05-28 NOTE — CARDIOLOGY SUMMARY
[de-identified] : 11/7/2018, sinus 63 bpm with LVH with strain pattern, diffuse TWI in precordial leads (biphasic in V1-V3 and deep symmetrical negative T in V4-V6)  [de-identified] : 11/19/2018, exercised and completed almost 9 minutes of Bertram protocol, but could not achieve target heart rate, converted to pharmacologic nuclear stress test and seen to have severe predominantly fixed defects in the mid-anterior, anteroseptal, and apical regions that partially correct with prone imaging, LVEF 49% and LVEDV 126 mL \par \par 5/28/2020, pharmacologic nuclear stress test with severe fixed defects in anteroseptal wall and apex consistent with prior infarct without ischemia, LVEF 41%, LVEDV 155 mL  [de-identified] : 11/12/2018, normal LV function, no pulmonary hypertension, normal LA size LVEF 65-70%. \par \par 1/10/2019, normal LV function, no pulmonary hypertension, normal LA size, LVEF 63%\par \par 5/21/2020, mild segmental LV dysfunction (the distal anterior, lateral, septal, and apical segments are hypokinetic), normal pulmonary pressures, LVEF 50%\par \par 3/18/2021, moderate global LV dysfunction, LVEF 35-40%\par \par 7/16/2021, mild segmental LV dysfunction, LVEF 50-55% [de-identified] : 2/12/2019 by Iván Nathan MD at [facility] - left and right heart catheterization, elevated systemic blood pressures but normal wedge, 14 mmHg, normal PA pressures (37/17 mmHg, with normal cardiac output of 6.2 L/min and normal index of 3.6. Angiography revealed 90% prox LCx disease at site of prior stent with 80% distal LAD disease that was significant by iFR \par \par 3/22/2021 by Ciera Taveras MD at [facility] -  Proximal LAD: There was a tubular 80 % stenosis at the site of a prior stent. Distal RCA: Angiography showed minor luminal irregularities with no flow limiting lesions. --  RPLS: There was a tubular 80 % stenosis.\par \par 4/5/2021 by Ciera Taveras for staged PCI to LAD \par Stent: 2015 at [facility] \par 2/12/2019 - POBA to LCx and NAHUM to distal LAD\par 3/22/2021 - PCI to distal RCA\par 4/5/2021 - PCI to prox-LAD

## 2022-05-28 NOTE — REASON FOR VISIT
[Other: ____] : [unfilled] [FreeTextEntry1] : May 2022 - Patient returns today for follow-up. He had PCI to prox-LAD in April 2021. He had a repeat echocardiogram today that shows interval improvement in his LVEF.\par His carvedilol was increased to 37.5 mg BID, but he reports that sometimes his blood pressure remains elevated. \par He continues to have HD via left radial AV fistula (Iwsttzp-Zkxkqqtl-Ggzqcqjg). Blood pressure is usually best after HD.\par He has not been sick with Covid-19.

## 2022-06-08 ENCOUNTER — APPOINTMENT (OUTPATIENT)
Dept: TRANSPLANT | Facility: CLINIC | Age: 67
End: 2022-06-08

## 2022-06-13 ENCOUNTER — OUTPATIENT (OUTPATIENT)
Dept: OUTPATIENT SERVICES | Facility: HOSPITAL | Age: 67
LOS: 1 days | End: 2022-06-13
Payer: COMMERCIAL

## 2022-06-13 ENCOUNTER — APPOINTMENT (OUTPATIENT)
Dept: CT IMAGING | Facility: IMAGING CENTER | Age: 67
End: 2022-06-13
Payer: COMMERCIAL

## 2022-06-13 DIAGNOSIS — Z01.818 ENCOUNTER FOR OTHER PREPROCEDURAL EXAMINATION: ICD-10-CM

## 2022-06-13 DIAGNOSIS — Z98.890 OTHER SPECIFIED POSTPROCEDURAL STATES: Chronic | ICD-10-CM

## 2022-06-13 DIAGNOSIS — Z00.8 ENCOUNTER FOR OTHER GENERAL EXAMINATION: ICD-10-CM

## 2022-06-13 DIAGNOSIS — I77.0 ARTERIOVENOUS FISTULA, ACQUIRED: Chronic | ICD-10-CM

## 2022-06-13 PROCEDURE — 74177 CT ABD & PELVIS W/CONTRAST: CPT | Mod: 26

## 2022-06-13 PROCEDURE — 74177 CT ABD & PELVIS W/CONTRAST: CPT

## 2022-07-11 ENCOUNTER — APPOINTMENT (OUTPATIENT)
Dept: CARDIOLOGY | Facility: CLINIC | Age: 67
End: 2022-07-11

## 2022-07-11 PROCEDURE — 93306 TTE W/DOPPLER COMPLETE: CPT

## 2022-07-13 ENCOUNTER — APPOINTMENT (OUTPATIENT)
Dept: VASCULAR SURGERY | Facility: CLINIC | Age: 67
End: 2022-07-13

## 2022-07-13 ENCOUNTER — APPOINTMENT (OUTPATIENT)
Dept: VASCULAR SURGERY | Facility: CLINIC | Age: 67
End: 2022-07-13
Payer: MEDICARE

## 2022-07-13 VITALS
TEMPERATURE: 96.1 F | SYSTOLIC BLOOD PRESSURE: 164 MMHG | WEIGHT: 145 LBS | DIASTOLIC BLOOD PRESSURE: 79 MMHG | BODY MASS INDEX: 23.3 KG/M2 | HEIGHT: 66 IN | HEART RATE: 67 BPM

## 2022-07-13 PROCEDURE — 99203 OFFICE O/P NEW LOW 30 MIN: CPT

## 2022-07-13 PROCEDURE — XXXXX: CPT | Mod: 1L

## 2022-07-13 PROCEDURE — 93971 EXTREMITY STUDY: CPT

## 2022-07-13 NOTE — HISTORY OF PRESENT ILLNESS
[de-identified] : 68 yo M with PMH of DM, HTN, ESKD (multiple vascular risk factors) who presents for evaluation of his left arm radiocephalic AV fistula. The patient reports that there are no issues with the AVF and that he has been able to undergo dialysis sessions. He is not endorsing any distal numbness/tingling/weakness either currently or with dialysis. He is also reporting bilateral distal leg pain for the last two months. He states that the pain is an ache in his feet and anterior shins when he is walking, which limits his activity. He is not reporting any skin changes or coldness in his feet. No leg swelling. No open wounds or recent injuries. No issues with sensation or motor function in either leg. \par \par PMH: DM, HTN, HLD\par PSH: gallbladder removal\par Allergies: none reported\par Meds: please see EMR\par FHx: unknown\par SHx: former smoker\par

## 2022-07-13 NOTE — REVIEW OF SYSTEMS
[Negative] : Heme/Lymph [FreeTextEntry9] : Full range of motion [de-identified] : Brittle nails of bilateral feet

## 2022-07-13 NOTE — ASSESSMENT
[FreeTextEntry1] : ESRD on HD via L arm AVF, it's functioning well on HD\par Duplex showed patent AVF, no stenosis\par patent central venous stent\par ok to cont to use AVF on HD\par \par \par bl leg pain\par no arterial or venous insufficiency found to explain pt's symptoms\par PVRs normal no PVD\par \par f/up prn

## 2022-07-13 NOTE — PHYSICAL EXAM
[1+] : left 1+ [Alert] : alert [Oriented to Person] : oriented to person [Oriented to Place] : oriented to place [Oriented to Time] : oriented to time [Calm] : calm [de-identified] : Well appearing, no acute distress [de-identified] : Breathing without effort [de-identified] : Appears well perfused [de-identified] : No abdominal distension or masses noted. No tenderness to palpation.  [de-identified] : L AVF has two aneurysmal dilations without ulceration, has palpable thrill. Full sensation and motor function of left hand. Sensation, motor function, range of motion of ankle/knees of bilateral legs is intact. Distal pulses palpable but weak bilaterally (1+) [de-identified] : No skin or hair changes of bilateral legs but nails are noted to be brittle / callused.

## 2022-08-12 NOTE — PRE-OP CHECKLIST - WAS PATIENT ON BETA BLOCKER?
Attempted to contact the patient. Patient did not answer. Left detailed voicemail and requested a callback.     
No

## 2022-08-29 ENCOUNTER — APPOINTMENT (OUTPATIENT)
Dept: CARDIOLOGY | Facility: CLINIC | Age: 67
End: 2022-08-29

## 2022-08-29 ENCOUNTER — NON-APPOINTMENT (OUTPATIENT)
Age: 67
End: 2022-08-29

## 2022-08-29 VITALS
BODY MASS INDEX: 23.73 KG/M2 | HEART RATE: 67 BPM | SYSTOLIC BLOOD PRESSURE: 146 MMHG | DIASTOLIC BLOOD PRESSURE: 72 MMHG | OXYGEN SATURATION: 100 % | WEIGHT: 147 LBS

## 2022-08-29 PROCEDURE — 99215 OFFICE O/P EST HI 40 MIN: CPT

## 2022-08-29 PROCEDURE — 93000 ELECTROCARDIOGRAM COMPLETE: CPT | Mod: NC

## 2022-09-02 NOTE — HISTORY OF PRESENT ILLNESS
[FreeTextEntry1] : Patient is 67 year-old with cardiovascular risk factors of hypertension and diabetes, known coronary artery disease status post PCI x3 (2014) at [place] by Dr. Saúl Villafana, CKD, now ESRD on HD Qifkij-Kfqoulsuq-Nfvaip) that began January 2019, who presents today for cardiac evaluation prior to possible renal transplant.\par He was working until October 2018 as [employer] .\par Patient does not formally exercise, but he can climb stairs and has no exertional symptoms.\par \par In January 2019, patient admitted to [facility] for shortness of breath and nosebleed. He was found to be volume overloaded in the setting of FELICITA on CKD and he was initiated on hemodialysis. \par \par In February 2019, patient seen to have abnormal nuclear stress test and was referred for left and right heart catheterization. The right heart cath showed elevated systemic blood pressures but normal PCWP (14 mmHg) and normal PA pressures (37/17 mmHg) with normal cardiac output and index of 6 L/min and 3.6 respectively. The left heart cath revealed significant distal LAD disease and significant in stent stenosis of LCx. He is now status post POBA to LCx and NAHUM to LAD.\par \par May 2019 - Patient presents today in his usual state of health. He reports occasional right leg pain that limits his exercise while walking on the treadmill. He is without chest pain, shortness of breath, lower extremity swelling.\par \par October 2019 - Patient presents today in his usual state of health. He reports occasional right leg pain that limits his exercise while walking on the treadmill. He is without chest pain, shortness of breath, lower extremity swelling. He had normal HOLLY/PVR and normal ultrasound of the abdominal vessels (and into iliacs). \par \par March 2020 - Patient returns for follow-up of his cardiovascular disease and to maintain standing with the Transplant Center. Patient recently traveled to [facility] (January 8 - February 6, 2020), and when patient returned having lost 8 lbs without a clear cause. It was unintentional weight loss. He is now being evaluated for cancer including lung cancer (former smoker). He will also need EGD/colonoscopy.\par \par April 2021 - Patient returns today for follow-up after a recent hospitalization. He was hospitalized at [facility] in March 2021 for shortness of breath and poor appetite. Cath revealed both LAD and RCA disease. He had his RCA intervened on and was brought back to [facility] two weeks later for staged PCI to the LAD. He was very upset during the hospitalization because he felt he needed more dialysis prior to the PCI of the LAD. Since discharge, he has felt well.\par He continues to have HD via left radial AV fistula (Sbavbxq-Gjazzbvt-Cfwgrvwl). \par  service provided by [employer]  . \par 's Number: [id number]\par 's Name: Arin\par Language: Persian. \par \par July 2021 - Patient returns today for follow-up three months after his PCI to prox-LAD. He had a repeat echocardiogram today that shows interval improvement in his LVEF.\par He has been feeling well since. He has no cardiovascular complaints.\par His carvedilol was increased to 37.5 mg BID, but he reports that sometimes his blood pressure remains elevated.\par He continues to have HD via left radial AV fistula (Yleggmp-Ztilxjaj-Geggrgzn). Blood pressure is usually best after HD.\par \par October 2021 - Patient returns today for follow-up.\par He had PCI to prox-LAD in April 2021. He had a repeat echocardiogram today that shows interval improvement in his LVEF.\par He has been feeling well since. He has no cardiovascular complaints.\par His carvedilol was increased to 37.5 mg BID, but he reports that sometimes his blood pressure remains elevated.\par He continues to have HD via left radial AV fistula (Oukxwyu-Bircfhrk-Fdoefkhh). Blood pressure is usually best after HD.\par \par May 2022 - Patient returns today for follow-up. He had PCI to prox-LAD in April 2021. He had a repeat echocardiogram today that shows interval improvement in his LVEF.\par His carvedilol was increased to 37.5 mg BID, but he reports that sometimes his blood pressure remains elevated. \par He continues to have HD via left radial AV fistula (Ggcxdql-Fvpunwjj-Icsjczab). Blood pressure is usually best after HD.\par He has not been sick with Covid-19.\par \par PMD: Bassem Grove MD [phone]\par Cardiologist: LEONARD Gomez [phone]\par Nephrologist: Yasmany Huff MD [phone]

## 2022-09-02 NOTE — END OF VISIT
[Time Spent: ___ minutes] : I have spent [unfilled] minutes of time on the encounter. [FreeTextEntry3] : I saw the patient with Meche Al NP and I agree with the findings and plan as above.

## 2022-09-02 NOTE — DISCUSSION/SUMMARY
[EKG obtained to assist in diagnosis and management of assessed problem(s)] : EKG obtained to assist in diagnosis and management of assessed problem(s) [FreeTextEntry1] : Patient is a 67 year-old gentleman with known coronary artery disease who presents today for evaluation prior to possible renal transplant.\par In March 2021, patient was admitted to [facility] with shortness of breath and was seen to have LAD and RCA disease. He is now status post PCI to both. \par For patient with previously reduced LVEF, now recovered, continue carvedilol.\par \par Continue dual antiplatelet therapy, high intensity statin therapy, antihypertension regimen, and diabetes regimen. At this time, patient can discontinue clopidogrel and continue ASA monotherapy. \par \par Ischemic evaluation with nuclear stress test, and then likely repeat coronary angiography if abnormal.

## 2022-09-02 NOTE — REASON FOR VISIT
[Other: ____] : [unfilled] [FreeTextEntry1] : 8/29/2022. \par Mr. Rivero returns today for scheduled follow up and to maintain his status on the kidney transplant list.\par His daughter, Tamiko Mcqueen, serves as an  via phone at the patient's request.\par Since his last visit, he has been feeling very well. For exercise he walks 25-30 minutes twice daily at a moderate pace and denies any chest discomfort or shortness of breath.\par He continues on dialysis without any complications.\par There have been no changes to his medications and he has been taking all as directed.

## 2022-09-02 NOTE — CARDIOLOGY SUMMARY
[de-identified] : 11/7/2018, sinus 63 bpm with LVH with strain pattern, diffuse TWI in precordial leads (biphasic in V1-V3 and deep symmetrical negative T in V4-V6)  [de-identified] : 11/19/2018, exercised and completed almost 9 minutes of Bertram protocol, but could not achieve target heart rate, converted to pharmacologic nuclear stress test and seen to have severe predominantly fixed defects in the mid-anterior, anteroseptal, and apical regions that partially correct with prone imaging, LVEF 49% and LVEDV 126 mL \par \par 5/28/2020, pharmacologic nuclear stress test with severe fixed defects in anteroseptal wall and apex consistent with prior infarct without ischemia, LVEF 41%, LVEDV 155 mL  [de-identified] : 11/12/2018, normal LV function, no pulmonary hypertension, normal LA size LVEF 65-70%. \par \par 1/10/2019, normal LV function, no pulmonary hypertension, normal LA size, LVEF 63%\par \par 5/21/2020, mild segmental LV dysfunction (the distal anterior, lateral, septal, and apical segments are hypokinetic), normal pulmonary pressures, LVEF 50%\par \par 3/18/2021, moderate global LV dysfunction, LVEF 35-40%\par \par 7/16/2021, mild segmental LV dysfunction, LVEF 50-55%\par \par 7/11/2022. TTE: LVEF 55-60%\par Normal left atrium. LA volume index = 30 cc/m2.\par Moderate concentric left ventricular hypertrophy,\par Mild segmental left ventricular systolic dysfunction with overall preserved LVEF. The distal septum and apex are hypokinetic. \par Normal right ventricular size and function.\par Estimated pulmonary artery systolic pressure equals 27 mm Hg, assuming right atrial pressure equals 9 mm Hg, consistent with normal pulmonary pressures.\par Normal pericardium with no pericardial effusion.\par Compared to echocardiogram of 7/16/2021, no significant changes noted.\par  [de-identified] : 2/12/2019 by Iván Nathan MD at [facility] - left and right heart catheterization, elevated systemic blood pressures but normal wedge, 14 mmHg, normal PA pressures (37/17 mmHg, with normal cardiac output of 6.2 L/min and normal index of 3.6. Angiography revealed 90% prox LCx disease at site of prior stent with 80% distal LAD disease that was significant by iFR \par \par 3/22/2021 by Ciera Taveras MD at [facility] -  Proximal LAD: There was a tubular 80 % stenosis at the site of a prior stent. Distal RCA: Angiography showed minor luminal irregularities with no flow limiting lesions. --  RPLS: There was a tubular 80 % stenosis.\par \par 4/5/2021 by Ciera Taveras for staged PCI to LAD \par Stent: 2015 at [place] \par 2/12/2019 - POBA to LCx and NAHUM to distal LAD\par 3/22/2021 - PCI to distal RCA\par 4/5/2021 - PCI to prox-LAD

## 2022-09-19 ENCOUNTER — APPOINTMENT (OUTPATIENT)
Dept: CARDIOLOGY | Facility: CLINIC | Age: 67
End: 2022-09-19

## 2022-09-19 PROCEDURE — 78452 HT MUSCLE IMAGE SPECT MULT: CPT

## 2022-09-19 PROCEDURE — A9500: CPT

## 2022-09-19 PROCEDURE — 93015 CV STRESS TEST SUPVJ I&R: CPT

## 2022-09-27 NOTE — PATIENT PROFILE ADULT - DATE OF LAST VACCINATION
Medication Therapy Management (MTM) Encounter    ASSESSMENT:                            Medication Adherence/Access: No issues identified    Renal Transplant:  Discused vaccinations, see plan.     Hyperkalemia: Potassium <5.    Hyperlipidemia: Stable.     Fluid collection: Stable.     PLAN:                            1. At 6 months post transplant recommend a Shingrix Vaccine (2 doses weeks apart).   2. Due for COVID booster.     Follow-up: as needed    SUBJECTIVE/OBJECTIVE:                          Donald Blanco is a 61 year old male coming in for a follow-up visit.  Today's visit is a follow-up MTM visit from 7/19     Reason for visit: 4 months post txp.    Allergies/ADRs: Reviewed in chart  Past Medical History: Reviewed in chart  Tobacco: He reports that he has never smoked. He has never used smokeless tobacco.  Alcohol: 1-3 beverages / week    Medication Adherence/Access: Patient uses pill box(es). No missed doses.     Renal Transplant:  Current immunosuppressants include Tacrolimus 2.5mg twice daily and Mycophenolate Mofetil 750mg twice daily.  Pt reports no issues.   Vascular prophylaxis: Aspirin 325mg daily. Denies GIB sx.  Transplant date: 5/17/22  Estimated Creatinine Clearance: 54.7 mL/min (A) (based on SCr of 1.79 mg/dL (H)).  CMV prophylaxis: Valcyte 450 mg every other day. Treat 3 months post tx   PCP prophylaxis: Bactrim S S three times per week  Supplements: Vitamin D3 1000 units daily, B complex folic acid and C daily, Sodium Bicarb 1950mg TID.   Tx Coordinator: Katie Moore, Using Med Card: Yes  Immunizations: annual flu shot 2021; Pneumovax 23:  2020; Prevnar 13: 2021; TDaP:  2020; Shingrix: unknown, HBV: immunity per last titer, COVID: Pfizer-BioNTech 2021x2   Latest Reference Range & Units 09/09/22 07:38   Magnesium 1.6 - 2.3 mg/dL 1.8   (L): Data is abnormally low   Latest Reference Range & Units 09/06/22 07:01 09/09/22 07:38 09/16/22 07:07 09/26/22 07:02   Carbon Dioxide 20 - 32 mmol/L 24 23  20 23     Hyperkalemia: Pt is taking Veltassa 8.4 g daily.    Latest Reference Range & Units 09/06/22 07:01 09/09/22 07:38 09/16/22 07:07 09/26/22 07:02   Potassium 3.4 - 5.3 mmol/L 5.3 5.8 (H) 5.0 4.9   (H): Data is abnormally high    Hyperlipidemia: Current therapy includes Atorvastatin 10mg daily.  Patient reports no significant myalgias or other side effects.  The ASCVD Risk score ([place] DORA Jr., et al., 2013) failed to calculate for the following reasons:    The valid total cholesterol range is 130 to 320 mg/dL  Recent Labs   Lab Test 06/03/22  1048   CHOL 85   HDL 35*   LDL 28   TRIG 112     Fluid collection: Pt is taking Bumex 1mg daily fluid collection around kidney, no edema unless he is on his feet al lday. Weights increasing the last week or so. 232-238 lbs in the past week after vacation.     Today's Vitals: There were no vitals taken for this visit.  ----------------    I spent 20 minutes with this patient today.  A copy of the visit note was provided to the patient's provider(s).    The patient was given a summary of these recommendations.     Alejandro Garces, PharmD  [facility] Pharmacist    Phone: [phone]      Medication Therapy Recommendations  Kidney replaced by transplant    Rationale: Preventive therapy - Needs additional medication therapy - Indication   Recommendation: Order Vaccine - Shingrix 50 MCG/0.5ML Susr   Status: Patient Agreed - Adherence/Education                   13-May-2021

## 2022-09-29 NOTE — H&P PST ADULT - VENOUS THROMBOEMBOLISM SCORE
Show Aperture Variable?: Yes Application Tool (Optional): Liquid Nitrogen Sprayer Detail Level: Simple Post-Care Instructions: I reviewed with the patient in detail post-care instructions. Patient is to wear sunprotection, and avoid picking at any of the treated lesions. Pt may apply Vaseline to crusted or scabbing areas. Duration Of Freeze Thaw-Cycle (Seconds): 0 Render Post-Care Instructions In Note?: no Consent: The patient's consent was obtained including but not limited to risks of crusting, scabbing, blistering, scarring, darker or lighter pigmentary change, recurrence, incomplete removal and infection. 3

## 2022-10-03 ENCOUNTER — APPOINTMENT (OUTPATIENT)
Dept: TRANSPLANT | Facility: CLINIC | Age: 67
End: 2022-10-03

## 2022-10-17 ENCOUNTER — APPOINTMENT (OUTPATIENT)
Dept: CARDIOLOGY | Facility: CLINIC | Age: 67
End: 2022-10-17

## 2022-10-28 ENCOUNTER — TRANSCRIPTION ENCOUNTER (OUTPATIENT)
Age: 67
End: 2022-10-28

## 2022-10-28 ENCOUNTER — INPATIENT (INPATIENT)
Facility: HOSPITAL | Age: 67
LOS: 4 days | Discharge: HOME | DRG: 246 | End: 2022-11-02
Attending: STUDENT IN AN ORGANIZED HEALTH CARE EDUCATION/TRAINING PROGRAM | Admitting: INTERNAL MEDICINE
Payer: COMMERCIAL

## 2022-10-28 VITALS
RESPIRATION RATE: 18 BRPM | WEIGHT: 147.05 LBS | TEMPERATURE: 98 F | OXYGEN SATURATION: 98 % | DIASTOLIC BLOOD PRESSURE: 88 MMHG | HEART RATE: 70 BPM | HEIGHT: 66 IN | SYSTOLIC BLOOD PRESSURE: 193 MMHG

## 2022-10-28 DIAGNOSIS — Z98.890 OTHER SPECIFIED POSTPROCEDURAL STATES: Chronic | ICD-10-CM

## 2022-10-28 DIAGNOSIS — I25.10 ATHEROSCLEROTIC HEART DISEASE OF NATIVE CORONARY ARTERY WITHOUT ANGINA PECTORIS: ICD-10-CM

## 2022-10-28 DIAGNOSIS — I77.0 ARTERIOVENOUS FISTULA, ACQUIRED: Chronic | ICD-10-CM

## 2022-10-28 DIAGNOSIS — I10 ESSENTIAL (PRIMARY) HYPERTENSION: ICD-10-CM

## 2022-10-28 DIAGNOSIS — R94.39 ABNORMAL RESULT OF OTHER CARDIOVASCULAR FUNCTION STUDY: ICD-10-CM

## 2022-10-28 DIAGNOSIS — Z29.9 ENCOUNTER FOR PROPHYLACTIC MEASURES, UNSPECIFIED: ICD-10-CM

## 2022-10-28 DIAGNOSIS — N18.6 END STAGE RENAL DISEASE: ICD-10-CM

## 2022-10-28 LAB
ANION GAP SERPL CALC-SCNC: 15 MMOL/L — SIGNIFICANT CHANGE UP (ref 5–17)
BUN SERPL-MCNC: 42 MG/DL — HIGH (ref 7–23)
CALCIUM SERPL-MCNC: 9.5 MG/DL — SIGNIFICANT CHANGE UP (ref 8.4–10.5)
CHLORIDE SERPL-SCNC: 94 MMOL/L — LOW (ref 96–108)
CO2 SERPL-SCNC: 31 MMOL/L — SIGNIFICANT CHANGE UP (ref 22–31)
CREAT SERPL-MCNC: 9.21 MG/DL — HIGH (ref 0.5–1.3)
EGFR: 6 ML/MIN/1.73M2 — LOW
GLUCOSE BLDC GLUCOMTR-MCNC: 116 MG/DL — HIGH (ref 70–99)
GLUCOSE BLDC GLUCOMTR-MCNC: 129 MG/DL — HIGH (ref 70–99)
GLUCOSE BLDC GLUCOMTR-MCNC: 177 MG/DL — HIGH (ref 70–99)
GLUCOSE BLDC GLUCOMTR-MCNC: 225 MG/DL — HIGH (ref 70–99)
GLUCOSE SERPL-MCNC: 135 MG/DL — HIGH (ref 70–99)
HCT VFR BLD CALC: 33.9 % — LOW (ref 39–50)
HGB BLD-MCNC: 10.6 G/DL — LOW (ref 13–17)
MCHC RBC-ENTMCNC: 30.9 PG — SIGNIFICANT CHANGE UP (ref 27–34)
MCHC RBC-ENTMCNC: 31.3 GM/DL — LOW (ref 32–36)
MCV RBC AUTO: 98.8 FL — SIGNIFICANT CHANGE UP (ref 80–100)
NRBC # BLD: 0 /100 WBCS — SIGNIFICANT CHANGE UP (ref 0–0)
PLATELET # BLD AUTO: 160 K/UL — SIGNIFICANT CHANGE UP (ref 150–400)
POTASSIUM SERPL-MCNC: 4.5 MMOL/L — SIGNIFICANT CHANGE UP (ref 3.5–5.3)
POTASSIUM SERPL-SCNC: 4.5 MMOL/L — SIGNIFICANT CHANGE UP (ref 3.5–5.3)
RBC # BLD: 3.43 M/UL — LOW (ref 4.2–5.8)
RBC # FLD: 13.5 % — SIGNIFICANT CHANGE UP (ref 10.3–14.5)
SODIUM SERPL-SCNC: 140 MMOL/L — SIGNIFICANT CHANGE UP (ref 135–145)
WBC # BLD: 5.98 K/UL — SIGNIFICANT CHANGE UP (ref 3.8–10.5)
WBC # FLD AUTO: 5.98 K/UL — SIGNIFICANT CHANGE UP (ref 3.8–10.5)

## 2022-10-28 PROCEDURE — 99152 MOD SED SAME PHYS/QHP 5/>YRS: CPT

## 2022-10-28 PROCEDURE — 93010 ELECTROCARDIOGRAM REPORT: CPT | Mod: 77

## 2022-10-28 PROCEDURE — 93454 CORONARY ARTERY ANGIO S&I: CPT | Mod: 26,59

## 2022-10-28 PROCEDURE — 92928 PRQ TCAT PLMT NTRAC ST 1 LES: CPT | Mod: LC

## 2022-10-28 PROCEDURE — 99222 1ST HOSP IP/OBS MODERATE 55: CPT | Mod: GC

## 2022-10-28 RX ORDER — DEXTROSE 50 % IN WATER 50 %
15 SYRINGE (ML) INTRAVENOUS ONCE
Refills: 0 | Status: DISCONTINUED | OUTPATIENT
Start: 2022-10-28 | End: 2022-11-02

## 2022-10-28 RX ORDER — CARVEDILOL PHOSPHATE 80 MG/1
3.12 CAPSULE, EXTENDED RELEASE ORAL EVERY 12 HOURS
Refills: 0 | Status: DISCONTINUED | OUTPATIENT
Start: 2022-10-28 | End: 2022-10-30

## 2022-10-28 RX ORDER — INSULIN LISPRO 100/ML
VIAL (ML) SUBCUTANEOUS AT BEDTIME
Refills: 0 | Status: DISCONTINUED | OUTPATIENT
Start: 2022-10-28 | End: 2022-11-02

## 2022-10-28 RX ORDER — ISOSORBIDE DINITRATE 5 MG/1
10 TABLET ORAL THREE TIMES A DAY
Refills: 0 | Status: DISCONTINUED | OUTPATIENT
Start: 2022-10-28 | End: 2022-11-02

## 2022-10-28 RX ORDER — ACETAMINOPHEN 500 MG
650 TABLET ORAL ONCE
Refills: 0 | Status: COMPLETED | OUTPATIENT
Start: 2022-10-28 | End: 2022-10-28

## 2022-10-28 RX ORDER — INSULIN LISPRO 100/ML
VIAL (ML) SUBCUTANEOUS
Refills: 0 | Status: DISCONTINUED | OUTPATIENT
Start: 2022-10-28 | End: 2022-11-02

## 2022-10-28 RX ORDER — DEXTROSE 50 % IN WATER 50 %
25 SYRINGE (ML) INTRAVENOUS ONCE
Refills: 0 | Status: DISCONTINUED | OUTPATIENT
Start: 2022-10-28 | End: 2022-11-02

## 2022-10-28 RX ORDER — SEVELAMER CARBONATE 2400 MG/1
1600 POWDER, FOR SUSPENSION ORAL
Refills: 0 | Status: DISCONTINUED | OUTPATIENT
Start: 2022-10-28 | End: 2022-11-02

## 2022-10-28 RX ORDER — NIFEDIPINE 30 MG
90 TABLET, EXTENDED RELEASE 24 HR ORAL DAILY
Refills: 0 | Status: DISCONTINUED | OUTPATIENT
Start: 2022-10-28 | End: 2022-11-02

## 2022-10-28 RX ORDER — SODIUM CHLORIDE 9 MG/ML
1000 INJECTION, SOLUTION INTRAVENOUS
Refills: 0 | Status: DISCONTINUED | OUTPATIENT
Start: 2022-10-28 | End: 2022-11-02

## 2022-10-28 RX ORDER — NIFEDIPINE 30 MG
60 TABLET, EXTENDED RELEASE 24 HR ORAL DAILY
Refills: 0 | Status: DISCONTINUED | OUTPATIENT
Start: 2022-10-28 | End: 2022-10-28

## 2022-10-28 RX ORDER — NIFEDIPINE 30 MG
30 TABLET, EXTENDED RELEASE 24 HR ORAL ONCE
Refills: 0 | Status: DISCONTINUED | OUTPATIENT
Start: 2022-10-28 | End: 2022-10-29

## 2022-10-28 RX ORDER — HYDRALAZINE HCL 50 MG
5 TABLET ORAL ONCE
Refills: 0 | Status: COMPLETED | OUTPATIENT
Start: 2022-10-28 | End: 2022-10-28

## 2022-10-28 RX ORDER — ASPIRIN/CALCIUM CARB/MAGNESIUM 324 MG
81 TABLET ORAL DAILY
Refills: 0 | Status: DISCONTINUED | OUTPATIENT
Start: 2022-10-28 | End: 2022-11-02

## 2022-10-28 RX ORDER — CLOPIDOGREL BISULFATE 75 MG/1
75 TABLET, FILM COATED ORAL DAILY
Refills: 0 | Status: DISCONTINUED | OUTPATIENT
Start: 2022-10-28 | End: 2022-11-02

## 2022-10-28 RX ORDER — HYDRALAZINE HCL 50 MG
50 TABLET ORAL
Refills: 0 | Status: DISCONTINUED | OUTPATIENT
Start: 2022-10-28 | End: 2022-11-02

## 2022-10-28 RX ORDER — INSULIN GLARGINE 100 [IU]/ML
8 INJECTION, SOLUTION SUBCUTANEOUS AT BEDTIME
Refills: 0 | Status: DISCONTINUED | OUTPATIENT
Start: 2022-10-28 | End: 2022-11-02

## 2022-10-28 RX ORDER — SEVELAMER CARBONATE 2400 MG/1
800 POWDER, FOR SUSPENSION ORAL
Refills: 0 | Status: DISCONTINUED | OUTPATIENT
Start: 2022-10-28 | End: 2022-10-28

## 2022-10-28 RX ORDER — GLUCAGON INJECTION, SOLUTION 0.5 MG/.1ML
1 INJECTION, SOLUTION SUBCUTANEOUS ONCE
Refills: 0 | Status: DISCONTINUED | OUTPATIENT
Start: 2022-10-28 | End: 2022-11-02

## 2022-10-28 RX ORDER — ATORVASTATIN CALCIUM 80 MG/1
40 TABLET, FILM COATED ORAL AT BEDTIME
Refills: 0 | Status: DISCONTINUED | OUTPATIENT
Start: 2022-10-28 | End: 2022-11-02

## 2022-10-28 RX ORDER — DEXTROSE 50 % IN WATER 50 %
12.5 SYRINGE (ML) INTRAVENOUS ONCE
Refills: 0 | Status: DISCONTINUED | OUTPATIENT
Start: 2022-10-28 | End: 2022-11-02

## 2022-10-28 RX ADMIN — CARVEDILOL PHOSPHATE 3.12 MILLIGRAM(S): 80 CAPSULE, EXTENDED RELEASE ORAL at 16:19

## 2022-10-28 RX ADMIN — Medication 60 MILLIGRAM(S): at 12:46

## 2022-10-28 RX ADMIN — Medication 2: at 16:19

## 2022-10-28 RX ADMIN — SEVELAMER CARBONATE 1600 MILLIGRAM(S): 2400 POWDER, FOR SUSPENSION ORAL at 12:46

## 2022-10-28 RX ADMIN — Medication 50 MILLIGRAM(S): at 16:20

## 2022-10-28 RX ADMIN — Medication 0.1 MILLIGRAM(S): at 12:50

## 2022-10-28 RX ADMIN — SEVELAMER CARBONATE 1600 MILLIGRAM(S): 2400 POWDER, FOR SUSPENSION ORAL at 16:24

## 2022-10-28 RX ADMIN — Medication 650 MILLIGRAM(S): at 13:47

## 2022-10-28 RX ADMIN — Medication 5 MILLIGRAM(S): at 13:33

## 2022-10-28 RX ADMIN — Medication 650 MILLIGRAM(S): at 12:47

## 2022-10-28 RX ADMIN — Medication 0.1 MILLIGRAM(S): at 21:36

## 2022-10-28 RX ADMIN — ATORVASTATIN CALCIUM 40 MILLIGRAM(S): 80 TABLET, FILM COATED ORAL at 21:38

## 2022-10-28 RX ADMIN — SEVELAMER CARBONATE 800 MILLIGRAM(S): 2400 POWDER, FOR SUSPENSION ORAL at 12:19

## 2022-10-28 RX ADMIN — INSULIN GLARGINE 8 UNIT(S): 100 INJECTION, SOLUTION SUBCUTANEOUS at 21:36

## 2022-10-28 RX ADMIN — ISOSORBIDE DINITRATE 10 MILLIGRAM(S): 5 TABLET ORAL at 16:20

## 2022-10-28 NOTE — H&P CARDIOLOGY - NSICDXPASTMEDICALHX_GEN_ALL_CORE_FT
PAST MEDICAL HISTORY:  Anemia due to stage 5 chronic kidney disease, not on chronic dialysis     CAP (community acquired pneumonia) 6/18    Cerebrovascular accident (CVA), unspecified mechanism diagnosed via CT of the head    Coronary artery disease     Diabetes mellitus type 2    ESRD (end stage renal disease) on Dialysis( M/W/F), By Dr. Huff    GERD (gastroesophageal reflux disease)     HTN (hypertension)     Hypercholesterolemia     Stented coronary artery 2014, 3 stents, [facility]

## 2022-10-28 NOTE — PROGRESS NOTE ADULT - PROBLEM SELECTOR PLAN 4
DVT:   Diet: DASH  Dispo: DVT: none - recent R fem sheath removed  Diet: DASH  Dispo: to home, no indication for PT eval (fully functional)

## 2022-10-28 NOTE — PROGRESS NOTE ADULT - ASSESSMENT
This is a 66 yo/Male w/ PMHx CAD s/p 5 stents (pLAD, dLAD, LCX, RCA), CHF, HTN, DM, anemia, ESRD on HD (Tu/Th/Sat) who was sent in by Dr. Johnson for [facility], now pending staged PCI 10/31.

## 2022-10-28 NOTE — CHART NOTE - NSCHARTNOTEFT_GEN_A_CORE
Patient is a 67 year old man with past medical history of CAD w/ 5 stents( pLAD, dLAD, LCX, RCA) CHF, HTN, DM, anemia, ESRD on HD ( T/T/sat at [facility], Nephro: Dr. Huff), GERD, presents today for LHC following an abnormal NST. Patient is waiting for Renal transplant, seen by Dr. Johnson for cardiac clearance and referred  for LHC. Pt denies chest pain, SOB, palpitations/ syncope.   EF 72 from TTE (28 Oct 2022 08:36)  10/28-prox Cx (80%) x 1 NAHUM , Staged PCI of LAD on Monday.  Dr Dempsey contacted for HD, will arrange HD for pt on 10/29/22.

## 2022-10-28 NOTE — CHART NOTE - NSCHARTNOTEFT_GEN_A_CORE
Removal of Femoral Sheath    Pulses in the right/left lower extremity are palpable/audible by doppler/absent.   The patient was placed in the supine position. The insertion site was identified and the sutures were removed per protocol.    The _6___ Mongolian femoral sheath was then removed.   Direct pressure was applied for  __20____ minutes.   Complications: None, VSS, Good Hemostasis.     Monitoring of the right/left groin and both lower extremities including neuro-vascular checks and vital signs every 15 minutes x 4, then every 30 minutes x 2, then every 1 hour was ordered.    Discharge Instruction discussed with patient: ASA, Plavix/Brilinta, statin, diet, activities, access site care, follow up care, reportable signs and symptoms.     A/P   Patient is a 67 year old man with past medical history of CAD w/ 5 stents( pLAD, dLAD, LCX, RCA) CHF, HTN, DM, anemia, ESRD on HD ( T/T/sat at [facility] HD center, Nephro: Dr. Huff), GERD, presents today for LHC following an abnormal NST. Patient is waiting for Renal transplant, seen by Dr. Johnson for cardiac clearance and referred  for LHC. Pt denies chest pain, SOB, palpitations/ syncope.   EF 72 from TTE (28 Oct 2022 08:36)  10/28-prox Cx (80%) x 1 NAHUM Staged PCI of LAD Monday      Plan: continue to monitor, Continue ASA, Plavix, Statin,   HD tomorrow

## 2022-10-28 NOTE — PATIENT PROFILE ADULT - FALL HARM RISK - UNIVERSAL INTERVENTIONS
Bed in lowest position, wheels locked, appropriate side rails in place/Call bell, personal items and telephone in reach/Instruct patient to call for assistance before getting out of bed or chair/Non-slip footwear when patient is out of bed/Santa to call system/Physically safe environment - no spills, clutter or unnecessary equipment/Purposeful Proactive Rounding/Room/bathroom lighting operational, light cord in reach

## 2022-10-28 NOTE — CONSULT NOTE ADULT - ASSESSMENT
a 67 year old man with past medical history of CAD w/ 5 stents( pLAD, dLAD, LCX, RCA) CHF, HTN, DM, anemia, ESRD on HD ( T/T/sat at [facility] HD center, Nephro: Dr. Huff), GERD, presents today for LHC following an abnormal NST. Patient is waiting for Renal transplant, seen by Dr. Johnson for cardiac clearance and referred  for LHC. Pt denies chest pain, SOB, palpitations/ syncope. EF 72 from TTE. s/p 10/28-prox Cx (80%) x 1 NAHUM Staged PCI of LAD Monday. Nephrology consulted for HD.     A/P    ESRD on HD   TTS   from [place] dialysis    last HD 10/27/22, plan for HD tomorrow   renal diet   HD consent in the HD unit     HTN   elevated   titrate up hydralazine if BP remains elevated   monitor BP closely     Anemia   at goal for ESRD  monitor H/H    CKD;MBD   monitor Po4, Ca

## 2022-10-28 NOTE — ASU DISCHARGE PLAN (ADULT/PEDIATRIC) - NS MD DC FALL RISK RISK
For information on Fall & Injury Prevention, visit: [email or web address] OR  [email or web address] OR  https://www.cdc.gov/steadi/patient.html

## 2022-10-28 NOTE — ASU PATIENT PROFILE, ADULT - NSICDXPASTMEDICALHX_GEN_ALL_CORE_FT
PAST MEDICAL HISTORY:  Anemia due to stage 5 chronic kidney disease, not on chronic dialysis     CAP (community acquired pneumonia) 6/18    Cerebrovascular accident (CVA), unspecified mechanism diagnosed via CT of the head    Coronary artery disease     Diabetes mellitus type 2    ESRD (end stage renal disease) on Dialysis( M/W/F), By Dr. Huff    GERD (gastroesophageal reflux disease)     HTN (hypertension)     Hypercholesterolemia     Stented coronary artery 2014, 3 stents, [facility]

## 2022-10-28 NOTE — H&P CARDIOLOGY - HISTORY OF PRESENT ILLNESS
Patient is a 67 year old man with past medical history of CAD w/ 5 stents( pLAD, dLAD, LCX, RCA) CHF, HTN, DM, anemia, ESRD on HD ( T/T/sat at [facility], Nephro: Dr. Huff), GERD, presents today for LHC following an abnormal NST. Patient is waiting for Renal transplant, seen by Dr. Jhonson for cardiac clearance and referred  for LHC. Pt denies chest pain, SOB, palpitations/ syncope.   EF 72 from TTE

## 2022-10-28 NOTE — PROGRESS NOTE ADULT - PROBLEM SELECTOR PLAN 1
Extensive cardiac hx s/p stents 2014 and recent pLCX NAHUM 10/28 as part of cardiac clearance pending renal transplant. Pending staged PCI 10/31    - CTM R femoral access site for bleed or loss of pulses in feet or pain/inability to move  - c/w ASA and plavix 75mg s/p NAHUM placement  - f/u staged PCI 10/31  - BP control SBP goal <180  - c/w atorvastatin 40mg

## 2022-10-28 NOTE — PROGRESS NOTE ADULT - PROBLEM SELECTOR PLAN 2
Hx ESRD on HD Tu/Th/Sat. Follows with Dr. Huff.    - HD scheduled for tmr   - c/w home sevelamer 1600mg TID

## 2022-10-28 NOTE — ASU DISCHARGE PLAN (ADULT/PEDIATRIC) - HAVE YOU HAD COVID IN THE LAST 60 DAYS?
No

## 2022-10-28 NOTE — PROGRESS NOTE ADULT - SUBJECTIVE AND OBJECTIVE BOX
PROGRESS NOTE:     HPI: Patient is a 67y old  Male who presents with a chief complaint of         MEDICATIONS  (STANDING):  aspirin enteric coated 81 milliGRAM(s) Oral daily  atorvastatin 40 milliGRAM(s) Oral at bedtime  carvedilol 3.125 milliGRAM(s) Oral every 12 hours  cloNIDine 0.1 milliGRAM(s) Oral three times a day  clopidogrel Tablet 75 milliGRAM(s) Oral daily  dextrose 5%. 1000 milliLiter(s) (100 mL/Hr) IV Continuous <Continuous>  dextrose 5%. 1000 milliLiter(s) (50 mL/Hr) IV Continuous <Continuous>  dextrose 50% Injectable 25 Gram(s) IV Push once  dextrose 50% Injectable 12.5 Gram(s) IV Push once  dextrose 50% Injectable 25 Gram(s) IV Push once  glucagon  Injectable 1 milliGRAM(s) IntraMuscular once  hydrALAZINE 50 milliGRAM(s) Oral two times a day  insulin glargine Injectable (LANTUS) 8 Unit(s) SubCutaneous at bedtime  insulin lispro (ADMELOG) corrective regimen sliding scale   SubCutaneous three times a day before meals  insulin lispro (ADMELOG) corrective regimen sliding scale   SubCutaneous at bedtime  isosorbide   dinitrate Tablet (ISORDIL) 10 milliGRAM(s) Oral three times a day  NIFEdipine XL 90 milliGRAM(s) Oral daily  NIFEdipine XL 30 milliGRAM(s) Oral once  sevelamer carbonate 1600 milliGRAM(s) Oral three times a day with meals    MEDICATIONS  (PRN):  dextrose Oral Gel 15 Gram(s) Oral once PRN Blood Glucose LESS THAN 70 milliGRAM(s)/deciliter      CAPILLARY BLOOD GLUCOSE      POCT Blood Glucose.: 129 mg/dL (28 Oct 2022 08:16)    I&O's Summary      PHYSICAL EXAM:  Vital Signs Last 24 Hrs  T(C): 36.7 (28 Oct 2022 11:30), Max: 36.8 (28 Oct 2022 08:16)  T(F): 98.1 (28 Oct 2022 11:30), Max: 98.2 (28 Oct 2022 08:16)  HR: 68 (28 Oct 2022 14:15) (63 - 76)  BP: 172/74 (28 Oct 2022 14:15) (172/74 - 197/86)  BP(mean): 102 (28 Oct 2022 14:15) (97 - 115)  RR: 17 (28 Oct 2022 14:15) (13 - 22)  SpO2: 100% (28 Oct 2022 14:15) (98% - 100%)    Parameters below as of 28 Oct 2022 14:15  Patient On (Oxygen Delivery Method): room air        CONSTITUTIONAL: NAD, well-developed  HEENT:   RESPIRATORY: Normal respiratory effort; lungs are clear to auscultation bilaterally  CARDIOVASCULAR: Regular rate and rhythm, normal S1 and S2, no murmur/rub/gallop; No lower extremity edema; Peripheral pulses are 2+ bilaterally  ABDOMEN: Nontender to palpation, normoactive bowel sounds, no rebound/guarding; No hepatosplenomegaly  MUSCULOSKELETAL: no clubbing or cyanosis of digits; no joint swelling or tenderness to palpation  PSYCH: A+O to person, place, and time; affect appropriate    LABS:                        10.6   5.98  )-----------( 160      ( 28 Oct 2022 08:41 )             33.9     10-28    140  |  94<L>  |  42<H>  ----------------------------<  135<H>  4.5   |  31  |  9.21<H>    Ca    9.5      28 Oct 2022 08:41                  RADIOLOGY:        ******************************  Authored By: Rafia Martinez MD PGY1  Internal Medicine  MS Teams  ******************************   PROGRESS NOTE:     HPI: This is a 68 yo/Male w/ PMHx who was sent in by Dr. Johnson for [facility], accessed through [facility]. There was a NAHUM placed in proximal LCX to 80%.       MEDICATIONS  (STANDING):  aspirin enteric coated 81 milliGRAM(s) Oral daily  atorvastatin 40 milliGRAM(s) Oral at bedtime  carvedilol 3.125 milliGRAM(s) Oral every 12 hours  cloNIDine 0.1 milliGRAM(s) Oral three times a day  clopidogrel Tablet 75 milliGRAM(s) Oral daily  dextrose 5%. 1000 milliLiter(s) (100 mL/Hr) IV Continuous <Continuous>  dextrose 5%. 1000 milliLiter(s) (50 mL/Hr) IV Continuous <Continuous>  dextrose 50% Injectable 25 Gram(s) IV Push once  dextrose 50% Injectable 12.5 Gram(s) IV Push once  dextrose 50% Injectable 25 Gram(s) IV Push once  glucagon  Injectable 1 milliGRAM(s) IntraMuscular once  hydrALAZINE 50 milliGRAM(s) Oral two times a day  insulin glargine Injectable (LANTUS) 8 Unit(s) SubCutaneous at bedtime  insulin lispro (ADMELOG) corrective regimen sliding scale   SubCutaneous three times a day before meals  insulin lispro (ADMELOG) corrective regimen sliding scale   SubCutaneous at bedtime  isosorbide   dinitrate Tablet (ISORDIL) 10 milliGRAM(s) Oral three times a day  NIFEdipine XL 90 milliGRAM(s) Oral daily  NIFEdipine XL 30 milliGRAM(s) Oral once  sevelamer carbonate 1600 milliGRAM(s) Oral three times a day with meals    MEDICATIONS  (PRN):  dextrose Oral Gel 15 Gram(s) Oral once PRN Blood Glucose LESS THAN 70 milliGRAM(s)/deciliter      CAPILLARY BLOOD GLUCOSE      POCT Blood Glucose.: 129 mg/dL (28 Oct 2022 08:16)    I&O's Summary      PHYSICAL EXAM:  Vital Signs Last 24 Hrs  T(C): 36.7 (28 Oct 2022 11:30), Max: 36.8 (28 Oct 2022 08:16)  T(F): 98.1 (28 Oct 2022 11:30), Max: 98.2 (28 Oct 2022 08:16)  HR: 68 (28 Oct 2022 14:15) (63 - 76)  BP: 172/74 (28 Oct 2022 14:15) (172/74 - 197/86)  BP(mean): 102 (28 Oct 2022 14:15) (97 - 115)  RR: 17 (28 Oct 2022 14:15) (13 - 22)  SpO2: 100% (28 Oct 2022 14:15) (98% - 100%)    Parameters below as of 28 Oct 2022 14:15  Patient On (Oxygen Delivery Method): room air        CONSTITUTIONAL: NAD, well-developed  HEENT:   RESPIRATORY: Normal respiratory effort; lungs are clear to auscultation bilaterally  CARDIOVASCULAR: Regular rate and rhythm, normal S1 and S2, no murmur/rub/gallop; No lower extremity edema; Peripheral pulses are 2+ bilaterally  ABDOMEN: Nontender to palpation, normoactive bowel sounds, no rebound/guarding; No hepatosplenomegaly  MUSCULOSKELETAL: no clubbing or cyanosis of digits; no joint swelling or tenderness to palpation  PSYCH: A+O to person, place, and time; affect appropriate    LABS:                        10.6   5.98  )-----------( 160      ( 28 Oct 2022 08:41 )             33.9     10-28    140  |  94<L>  |  42<H>  ----------------------------<  135<H>  4.5   |  31  |  9.21<H>    Ca    9.5      28 Oct 2022 08:41                  RADIOLOGY:        ******************************  Authored By: Rafia Martinez MD PGY1  Internal Medicine  MS Teams  ******************************   PROGRESS NOTE: CORD:USE [facility]  [id number]     HPI: This is a 66 yo/Male w/ PMHx CAD s/p 5 stents (pLAD, dLAD, LCX, RCA), CHF, HTN, DM, anemia, ESRD on HD (Tu/Th/Sat) who was sent in by Dr. Johnson for LHC, accessed through [facility]. There was a NAHUM placed in proximal LCX to 80%. He was sent in for cardiac clearance pending renal transplant. Pt was examined at bedside and had complaints of pain, fever/chills, nausea/vomiting, CP, SOB, abd pain/diarrhea/constipation, or pain at femoral access site. He states that his ESRD is a result of DM and HTN over the years.     Interval history: Pt tolerated LHC well, without complaints of pain or discomfort at access site. Pt continues to deny CP and pain in lower extremities.     REVIEW OF SYSTEMS:  CONSTITUTIONAL: No weakness, fevers  EYES: No visual changes   RESPIRATORY: No cough, wheezing, hemoptysis; No shortness of breath  CARDIOVASCULAR: No chest pain or palpitations  GASTROINTESTINAL: No abdominal or epigastric pain. No nausea, vomiting, or hematemesis; No diarrhea or constipation.  GENITOURINARY: No dysuria, frequency or hematuria  NEUROLOGICAL: No numbness or weakness  SKIN: No itching, rashes, or bruises  Psych: Good mood, no substance use      Home Meds:  - carvedilol 3.125mg  - hydralazine 50mg BID  - sevelamer 800mg 2 tab TID  - Nifedipine 90mg  - clopidogrel 75mg  - ASA 81mg  - atorvastatin 40mg  - Tradjenta 5mg  - isosorbide dinitrate 10mg TID  - clonidine 0.1mg  - Basaglar 10U bedtime       Allergies: [facility] MEDICATIONS  (STANDING):  aspirin enteric coated 81 milliGRAM(s) Oral daily  atorvastatin 40 milliGRAM(s) Oral at bedtime  carvedilol 3.125 milliGRAM(s) Oral every 12 hours  cloNIDine 0.1 milliGRAM(s) Oral three times a day  clopidogrel Tablet 75 milliGRAM(s) Oral daily  dextrose 5%. 1000 milliLiter(s) (100 mL/Hr) IV Continuous <Continuous>  dextrose 5%. 1000 milliLiter(s) (50 mL/Hr) IV Continuous <Continuous>  dextrose 50% Injectable 25 Gram(s) IV Push once  dextrose 50% Injectable 12.5 Gram(s) IV Push once  dextrose 50% Injectable 25 Gram(s) IV Push once  glucagon  Injectable 1 milliGRAM(s) IntraMuscular once  hydrALAZINE 50 milliGRAM(s) Oral two times a day  insulin glargine Injectable (LANTUS) 8 Unit(s) SubCutaneous at bedtime  insulin lispro (ADMELOG) corrective regimen sliding scale   SubCutaneous three times a day before meals  insulin lispro (ADMELOG) corrective regimen sliding scale   SubCutaneous at bedtime  isosorbide   dinitrate Tablet (ISORDIL) 10 milliGRAM(s) Oral three times a day  NIFEdipine XL 90 milliGRAM(s) Oral daily  sevelamer carbonate 1600 milliGRAM(s) Oral three times a day with meals    MEDICATIONS  (PRN):  dextrose Oral Gel 15 Gram(s) Oral once PRN Blood Glucose LESS THAN 70 milliGRAM(s)/deciliter      Surgical History: cholecystectomy, AV fistula in L arm 2018, s/p cardiac stents 2014    Family history: denies any family history of DM, CAD, cancer    Social history: Lives at home with wife and daughter. Former smoker, stopped 10 years ago, no alcohol use, no recreational drug use. Doesn't use walker/cane at home, but does need a walker when coming back from dialysis.        LABS:                          10.6   5.98  )-----------( 160      ( 28 Oct 2022 08:41 )             33.9     10-28    140  |  94<L>  |  42<H>  ----------------------------<  135<H>  4.5   |  31  |  9.21<H>    Ca    9.5      28 Oct 2022 08:41      PHYSICAL EXAM:  Vital Signs Last 24 Hrs  T(C): 36.7 (28 Oct 2022 11:30), Max: 36.8 (28 Oct 2022 08:16)  T(F): 98.1 (28 Oct 2022 11:30), Max: 98.2 (28 Oct 2022 08:16)  HR: 68 (28 Oct 2022 14:15) (63 - 76)  BP: 172/74 (28 Oct 2022 14:15) (172/74 - 197/86)  BP(mean): 102 (28 Oct 2022 14:15) (97 - 115)  RR: 17 (28 Oct 2022 14:15) (13 - 22)  SpO2: 100% (28 Oct 2022 14:15) (98% - 100%)    Parameters below as of 28 Oct 2022 14:15  Patient On (Oxygen Delivery Method): room air        CONSTITUTIONAL: NAD, well-developed  HEENT: normocephalic, atraumatic  RESPIRATORY: Normal respiratory effort; lungs are clear to auscultation bilaterally  CARDIOVASCULAR: Regular rate and rhythm, No lower extremity edema; Peripheral pulses are 2+ bilaterally  ABDOMEN: Nontender to palpation, normoactive bowel sounds, no rebound/guarding; No hepatosplenomegaly  MUSCULOSKELETAL: no clubbing or cyanosis of digits; no joint swelling or tenderness to palpation. R femoral access site clean and covered in gauze; L forearm AV fistula noted   PSYCH: A+O to person, place, and time; affect appropriate    LABS:                        10.6   5.98  )-----------( 160      ( 28 Oct 2022 08:41 )             33.9     10-28    140  |  94<L>  |  42<H>  ----------------------------<  135<H>  4.5   |  31  |  9.21<H>    Ca    9.5      28 Oct 2022 08:41        ******************************  Authored By: Rafia Martinez MD PGY1  Internal Medicine  MS Teams  ******************************   PROGRESS NOTE: Attentive.[employer]  [id number]     HPI: This is a 68 yo/Male w/ PMHx CAD s/p 5 stents (pLAD, dLAD, LCX, RCA), CHF, HTN, DM, anemia, ESRD on HD (Tu/Th/Sat) who was sent in by Dr. Johnson for LHC, accessed through [facility]. There was a NAHUM placed in proximal LCX to 80%. He was sent in for cardiac clearance pending renal transplant. Pt was examined at bedside and had complaints of pain, fever/chills, nausea/vomiting, CP, SOB, abd pain/diarrhea/constipation, or pain at femoral access site. He states that his ESRD is a result of DM and HTN over the years. Recent Echo EF 72%.     Interval history: Pt tolerated LHC well, without complaints of pain or discomfort at access site. Pt continues to deny CP and pain in lower extremities.     REVIEW OF SYSTEMS:  CONSTITUTIONAL: No weakness, fevers  EYES: No visual changes   RESPIRATORY: No cough, wheezing, hemoptysis; No shortness of breath  CARDIOVASCULAR: No chest pain or palpitations  GASTROINTESTINAL: No abdominal or epigastric pain. No nausea, vomiting, or hematemesis; No diarrhea or constipation.  GENITOURINARY: No dysuria, frequency or hematuria  NEUROLOGICAL: No numbness or weakness  SKIN: No itching, rashes, or bruises  Psych: Good mood, no substance use      Home Meds:  - carvedilol 3.125mg  - hydralazine 50mg BID  - sevelamer 800mg 2 tab TID  - Nifedipine 90mg  - clopidogrel 75mg  - ASA 81mg  - atorvastatin 40mg  - Tradjenta 5mg  - isosorbide dinitrate 10mg TID  - clonidine 0.1mg  - Basaglar 10U bedtime       Allergies: [facility] MEDICATIONS  (STANDING):  aspirin enteric coated 81 milliGRAM(s) Oral daily  atorvastatin 40 milliGRAM(s) Oral at bedtime  carvedilol 3.125 milliGRAM(s) Oral every 12 hours  cloNIDine 0.1 milliGRAM(s) Oral three times a day  clopidogrel Tablet 75 milliGRAM(s) Oral daily  dextrose 5%. 1000 milliLiter(s) (100 mL/Hr) IV Continuous <Continuous>  dextrose 5%. 1000 milliLiter(s) (50 mL/Hr) IV Continuous <Continuous>  dextrose 50% Injectable 25 Gram(s) IV Push once  dextrose 50% Injectable 12.5 Gram(s) IV Push once  dextrose 50% Injectable 25 Gram(s) IV Push once  glucagon  Injectable 1 milliGRAM(s) IntraMuscular once  hydrALAZINE 50 milliGRAM(s) Oral two times a day  insulin glargine Injectable (LANTUS) 8 Unit(s) SubCutaneous at bedtime  insulin lispro (ADMELOG) corrective regimen sliding scale   SubCutaneous three times a day before meals  insulin lispro (ADMELOG) corrective regimen sliding scale   SubCutaneous at bedtime  isosorbide   dinitrate Tablet (ISORDIL) 10 milliGRAM(s) Oral three times a day  NIFEdipine XL 90 milliGRAM(s) Oral daily  sevelamer carbonate 1600 milliGRAM(s) Oral three times a day with meals    MEDICATIONS  (PRN):  dextrose Oral Gel 15 Gram(s) Oral once PRN Blood Glucose LESS THAN 70 milliGRAM(s)/deciliter      Surgical History: cholecystectomy, AV fistula in L arm 2018, s/p cardiac stents 2014    Family history: denies any family history of DM, CAD, cancer    Social history: Lives at home with wife and daughter. Former smoker, stopped 10 years ago, no alcohol use, no recreational drug use. Doesn't use walker/cane at home, but does need a walker when coming back from dialysis.        LABS:                          10.6   5.98  )-----------( 160      ( 28 Oct 2022 08:41 )             33.9     10-28    140  |  94<L>  |  42<H>  ----------------------------<  135<H>  4.5   |  31  |  9.21<H>    Ca    9.5      28 Oct 2022 08:41      PHYSICAL EXAM:  Vital Signs Last 24 Hrs  T(C): 36.7 (28 Oct 2022 11:30), Max: 36.8 (28 Oct 2022 08:16)  T(F): 98.1 (28 Oct 2022 11:30), Max: 98.2 (28 Oct 2022 08:16)  HR: 68 (28 Oct 2022 14:15) (63 - 76)  BP: 172/74 (28 Oct 2022 14:15) (172/74 - 197/86)  BP(mean): 102 (28 Oct 2022 14:15) (97 - 115)  RR: 17 (28 Oct 2022 14:15) (13 - 22)  SpO2: 100% (28 Oct 2022 14:15) (98% - 100%)    Parameters below as of 28 Oct 2022 14:15  Patient On (Oxygen Delivery Method): room air        CONSTITUTIONAL: NAD, well-developed  HEENT: normocephalic, atraumatic  RESPIRATORY: Normal respiratory effort; lungs are clear to auscultation bilaterally  CARDIOVASCULAR: Regular rate and rhythm, No lower extremity edema; Peripheral pulses are 2+ bilaterally  ABDOMEN: Nontender to palpation, normoactive bowel sounds, no rebound/guarding; No hepatosplenomegaly  MUSCULOSKELETAL: no clubbing or cyanosis of digits; no joint swelling or tenderness to palpation. R femoral access site clean and covered in gauze; L forearm AV fistula noted   PSYCH: A+O to person, place, and time; affect appropriate    LABS:                        10.6   5.98  )-----------( 160      ( 28 Oct 2022 08:41 )             33.9     10-28    140  |  94<L>  |  42<H>  ----------------------------<  135<H>  4.5   |  31  |  9.21<H>    Ca    9.5      28 Oct 2022 08:41        ******************************  Authored By: Rafia Martinez MD PGY1  Internal Medicine  MS Teams  ******************************

## 2022-10-28 NOTE — ASU PATIENT PROFILE, ADULT - FALL HARM RISK - UNIVERSAL INTERVENTIONS
Bed in lowest position, wheels locked, appropriate side rails in place/Call bell, personal items and telephone in reach/Instruct patient to call for assistance before getting out of bed or chair/Non-slip footwear when patient is out of bed/[place] to call system/Physically safe environment - no spills, clutter or unnecessary equipment/Purposeful Proactive Rounding/Room/bathroom lighting operational, light cord in reach

## 2022-10-29 LAB
A1C WITH ESTIMATED AVERAGE GLUCOSE RESULT: 6.4 % — HIGH (ref 4–5.6)
ANION GAP SERPL CALC-SCNC: 18 MMOL/L — HIGH (ref 5–17)
BUN SERPL-MCNC: 53 MG/DL — HIGH (ref 7–23)
CALCIUM SERPL-MCNC: 9.3 MG/DL — SIGNIFICANT CHANGE UP (ref 8.4–10.5)
CHLORIDE SERPL-SCNC: 93 MMOL/L — LOW (ref 96–108)
CO2 SERPL-SCNC: 29 MMOL/L — SIGNIFICANT CHANGE UP (ref 22–31)
CREAT SERPL-MCNC: 11.29 MG/DL — HIGH (ref 0.5–1.3)
EGFR: 5 ML/MIN/1.73M2 — LOW
ESTIMATED AVERAGE GLUCOSE: 137 MG/DL — HIGH (ref 68–114)
GLUCOSE BLDC GLUCOMTR-MCNC: 188 MG/DL — HIGH (ref 70–99)
GLUCOSE BLDC GLUCOMTR-MCNC: 238 MG/DL — HIGH (ref 70–99)
GLUCOSE BLDC GLUCOMTR-MCNC: 96 MG/DL — SIGNIFICANT CHANGE UP (ref 70–99)
GLUCOSE BLDC GLUCOMTR-MCNC: 99 MG/DL — SIGNIFICANT CHANGE UP (ref 70–99)
GLUCOSE SERPL-MCNC: 96 MG/DL — SIGNIFICANT CHANGE UP (ref 70–99)
HCT VFR BLD CALC: 33.4 % — LOW (ref 39–50)
HGB BLD-MCNC: 10.6 G/DL — LOW (ref 13–17)
MAGNESIUM SERPL-MCNC: 2.4 MG/DL — SIGNIFICANT CHANGE UP (ref 1.6–2.6)
MCHC RBC-ENTMCNC: 30.6 PG — SIGNIFICANT CHANGE UP (ref 27–34)
MCHC RBC-ENTMCNC: 31.7 GM/DL — LOW (ref 32–36)
MCV RBC AUTO: 96.5 FL — SIGNIFICANT CHANGE UP (ref 80–100)
NRBC # BLD: 0 /100 WBCS — SIGNIFICANT CHANGE UP (ref 0–0)
PHOSPHATE SERPL-MCNC: 6.3 MG/DL — HIGH (ref 2.5–4.5)
PLATELET # BLD AUTO: 162 K/UL — SIGNIFICANT CHANGE UP (ref 150–400)
POTASSIUM SERPL-MCNC: 4.6 MMOL/L — SIGNIFICANT CHANGE UP (ref 3.5–5.3)
POTASSIUM SERPL-SCNC: 4.6 MMOL/L — SIGNIFICANT CHANGE UP (ref 3.5–5.3)
RBC # BLD: 3.46 M/UL — LOW (ref 4.2–5.8)
RBC # FLD: 13.4 % — SIGNIFICANT CHANGE UP (ref 10.3–14.5)
SODIUM SERPL-SCNC: 140 MMOL/L — SIGNIFICANT CHANGE UP (ref 135–145)
WBC # BLD: 6.93 K/UL — SIGNIFICANT CHANGE UP (ref 3.8–10.5)
WBC # FLD AUTO: 6.93 K/UL — SIGNIFICANT CHANGE UP (ref 3.8–10.5)

## 2022-10-29 PROCEDURE — 99223 1ST HOSP IP/OBS HIGH 75: CPT

## 2022-10-29 PROCEDURE — 99233 SBSQ HOSP IP/OBS HIGH 50: CPT

## 2022-10-29 RX ORDER — CHLORHEXIDINE GLUCONATE 213 G/1000ML
1 SOLUTION TOPICAL DAILY
Refills: 0 | Status: DISCONTINUED | OUTPATIENT
Start: 2022-10-29 | End: 2022-11-02

## 2022-10-29 RX ADMIN — CARVEDILOL PHOSPHATE 3.12 MILLIGRAM(S): 80 CAPSULE, EXTENDED RELEASE ORAL at 17:01

## 2022-10-29 RX ADMIN — CARVEDILOL PHOSPHATE 3.12 MILLIGRAM(S): 80 CAPSULE, EXTENDED RELEASE ORAL at 05:35

## 2022-10-29 RX ADMIN — Medication 2: at 16:57

## 2022-10-29 RX ADMIN — ISOSORBIDE DINITRATE 10 MILLIGRAM(S): 5 TABLET ORAL at 12:34

## 2022-10-29 RX ADMIN — Medication 90 MILLIGRAM(S): at 05:34

## 2022-10-29 RX ADMIN — CLOPIDOGREL BISULFATE 75 MILLIGRAM(S): 75 TABLET, FILM COATED ORAL at 12:34

## 2022-10-29 RX ADMIN — CHLORHEXIDINE GLUCONATE 1 APPLICATION(S): 213 SOLUTION TOPICAL at 12:29

## 2022-10-29 RX ADMIN — Medication 50 MILLIGRAM(S): at 05:34

## 2022-10-29 RX ADMIN — ISOSORBIDE DINITRATE 10 MILLIGRAM(S): 5 TABLET ORAL at 16:57

## 2022-10-29 RX ADMIN — Medication 81 MILLIGRAM(S): at 12:34

## 2022-10-29 RX ADMIN — Medication 50 MILLIGRAM(S): at 17:01

## 2022-10-29 RX ADMIN — SEVELAMER CARBONATE 1600 MILLIGRAM(S): 2400 POWDER, FOR SUSPENSION ORAL at 16:58

## 2022-10-29 RX ADMIN — Medication 0.1 MILLIGRAM(S): at 05:34

## 2022-10-29 RX ADMIN — Medication 0.1 MILLIGRAM(S): at 21:39

## 2022-10-29 RX ADMIN — Medication 0.1 MILLIGRAM(S): at 13:29

## 2022-10-29 RX ADMIN — ATORVASTATIN CALCIUM 40 MILLIGRAM(S): 80 TABLET, FILM COATED ORAL at 21:39

## 2022-10-29 RX ADMIN — ISOSORBIDE DINITRATE 10 MILLIGRAM(S): 5 TABLET ORAL at 05:35

## 2022-10-29 RX ADMIN — SEVELAMER CARBONATE 1600 MILLIGRAM(S): 2400 POWDER, FOR SUSPENSION ORAL at 12:33

## 2022-10-29 RX ADMIN — SEVELAMER CARBONATE 1600 MILLIGRAM(S): 2400 POWDER, FOR SUSPENSION ORAL at 08:17

## 2022-10-29 RX ADMIN — INSULIN GLARGINE 8 UNIT(S): 100 INJECTION, SOLUTION SUBCUTANEOUS at 21:39

## 2022-10-29 NOTE — CONSULT NOTE ADULT - ASSESSMENT
67 year-old gentleman awaiting renal transplant.  He has known coronary artery disease status post PCI.    Now status post PCI to LCx yesterday with plans for staged PCI to LAD on Monday, 10/31.  Tolerated procedure well.    Continue dual antiplatelet therapy.  Continue high intensity statin therapy. Patient may also require PCSK-9 inhibitor (as outpatient).  Recommend uptitrating carvedilol as tolerated.

## 2022-10-29 NOTE — PROGRESS NOTE ADULT - SUBJECTIVE AND OBJECTIVE BOX
[facility] [place] [facility]   MD JORDAN AVALOS MD KRISTINE SOLTANPOUR, DO ANGELA WONG, PA    TEL:  OFFICE: [phone]  From 5pm-7am Answering Service [phone]    -- RENAL FOLLOW UP NOTE ---Date of Service 10-29-22 @ 21:06    Patient is a 67y old  Male who presents with a chief complaint of Cath (29 Oct 2022 10:31)      Patient seen and examined at bedside. No chest pain/sob    VITALS:  T(F): 97.8 (10-29-22 @ 21:01), Max: 98.5 (10-29-22 @ 03:10)  HR: 68 (10-29-22 @ 21:01)  BP: 147/72 (10-29-22 @ 21:01)  RR: 18 (10-29-22 @ 21:01)  SpO2: 98% (10-29-22 @ 21:01)  Wt(kg): --    10-29 @ 07:01  -  10-29 @ 21:06  --------------------------------------------------------  IN: 240 mL / OUT: 2000 mL / NET: -1760 mL          PHYSICAL EXAM:  General: NAD  Neck: No JVD  Respiratory: CTAB, no wheezes, rales or rhonchi  Cardiovascular: S1, S2, RRR  Gastrointestinal: BS+, soft, NT/ND  Extremities: No peripheral edema    Hospital Medications:   MEDICATIONS  (STANDING):  aspirin enteric coated 81 milliGRAM(s) Oral daily  atorvastatin 40 milliGRAM(s) Oral at bedtime  carvedilol 3.125 milliGRAM(s) Oral every 12 hours  chlorhexidine 2% Cloths 1 Application(s) Topical daily  cloNIDine 0.1 milliGRAM(s) Oral three times a day  clopidogrel Tablet 75 milliGRAM(s) Oral daily  dextrose 5%. 1000 milliLiter(s) (100 mL/Hr) IV Continuous <Continuous>  dextrose 5%. 1000 milliLiter(s) (50 mL/Hr) IV Continuous <Continuous>  dextrose 50% Injectable 25 Gram(s) IV Push once  dextrose 50% Injectable 12.5 Gram(s) IV Push once  dextrose 50% Injectable 25 Gram(s) IV Push once  glucagon  Injectable 1 milliGRAM(s) IntraMuscular once  hydrALAZINE 50 milliGRAM(s) Oral two times a day  insulin glargine Injectable (LANTUS) 8 Unit(s) SubCutaneous at bedtime  insulin lispro (ADMELOG) corrective regimen sliding scale   SubCutaneous three times a day before meals  insulin lispro (ADMELOG) corrective regimen sliding scale   SubCutaneous at bedtime  isosorbide   dinitrate Tablet (ISORDIL) 10 milliGRAM(s) Oral three times a day  NIFEdipine XL 90 milliGRAM(s) Oral daily  sevelamer carbonate 1600 milliGRAM(s) Oral three times a day with meals      LABS:  10-29    140  |  93<L>  |  53<H>  ----------------------------<  96  4.6   |  29  |  11.29<H>    Ca    9.3      29 Oct 2022 07:10  Phos  6.3     10-29  Mg     2.4     10-29      Creatinine Trend: 11.29 <--, 9.21 <--    Phosphorus Level, Serum: 6.3 mg/dL (10-29 @ 07:10)                              10.6   6.93  )-----------( 162      ( 29 Oct 2022 07:12 )             33.4     Urine Studies:      HbA1c 7.8      [12-17-19 @ 05:54]        RADIOLOGY & ADDITIONAL STUDIES:

## 2022-10-29 NOTE — CONSULT NOTE ADULT - SUBJECTIVE AND OBJECTIVE BOX
[facility] [place] [facility]ROLOGY [facility]ACT[facility]   MD JORDAN AVALOS MD, DO ANGELA WONG, PA INJUNG KO NP    TEL:  OFFICE: [phone]    From 5pm-7am answering service [phone]    --- INITIAL RENAL CONSULT NOTE ---date of service 10-28-22 @ 14:14    HPI:  Patient is a 67 year old man with past medical history of CAD w/ 5 stents( pLAD, dLAD, LCX, RCA) CHF, HTN, DM, anemia, ESRD on HD ( T/T/sat at [facility], Nephro: Dr. Huff), GERD, presents today for LHC following an abnormal NST. Patient is waiting for Renal transplant, seen by Dr. Johnson for cardiac clearance and referred  for LHC. Pt denies chest pain, SOB, palpitations/ syncope. EF 72 from TTE. Nephrology consulted for HD.         Allergies:  No Known Allergies      PAST MEDICAL & SURGICAL HISTORY:  HTN (hypertension)      Coronary artery disease      CAP (community acquired pneumonia)  6/18      Diabetes mellitus  type 2      GERD (gastroesophageal reflux disease)      Stented coronary artery  2014, 3 stents, [facility]      ESRD (end stage renal disease)  on Dialysis( M/W/F), By Dr. Huff      Hypercholesterolemia      Cerebrovascular accident (CVA), unspecified mechanism  diagnosed via CT of the head      Anemia due to stage 5 chronic kidney disease, not on chronic dialysis      H/O coronary angiogram  2014 - x3 stents      AV fistula  10/12/18 L radiocephalic AV fistula      H/O eye surgery          Home Medications Reviewed    Hospital Medications:   MEDICATIONS  (STANDING):  aspirin enteric coated 81 milliGRAM(s) Oral daily  atorvastatin 40 milliGRAM(s) Oral at bedtime  carvedilol 3.125 milliGRAM(s) Oral every 12 hours  cloNIDine 0.1 milliGRAM(s) Oral three times a day  clopidogrel Tablet 75 milliGRAM(s) Oral daily  dextrose 5%. 1000 milliLiter(s) (100 mL/Hr) IV Continuous <Continuous>  dextrose 5%. 1000 milliLiter(s) (50 mL/Hr) IV Continuous <Continuous>  dextrose 50% Injectable 25 Gram(s) IV Push once  dextrose 50% Injectable 12.5 Gram(s) IV Push once  dextrose 50% Injectable 25 Gram(s) IV Push once  glucagon  Injectable 1 milliGRAM(s) IntraMuscular once  hydrALAZINE 50 milliGRAM(s) Oral two times a day  insulin glargine Injectable (LANTUS) 8 Unit(s) SubCutaneous at bedtime  insulin lispro (ADMELOG) corrective regimen sliding scale   SubCutaneous three times a day before meals  insulin lispro (ADMELOG) corrective regimen sliding scale   SubCutaneous at bedtime  isosorbide   dinitrate Tablet (ISORDIL) 10 milliGRAM(s) Oral three times a day  NIFEdipine XL 90 milliGRAM(s) Oral daily  NIFEdipine XL 30 milliGRAM(s) Oral once  sevelamer carbonate 1600 milliGRAM(s) Oral three times a day with meals      SOCIAL HISTORY:  Denies ETOh, Smoking,     FAMILY HISTORY:  No pertinent family history in first degree relatives  no family history of kidney disease        REVIEW OF SYSTEMS:  CONSTITUTIONAL: No weakness, fevers or chills  EYES/ENT: No visual changes;  No vertigo or throat pain   NECK: No pain or stiffness  RESPIRATORY: No cough, wheezing, hemoptysis; No shortness of breath  CARDIOVASCULAR: No chest pain or palpitations.  GASTROINTESTINAL: No abdominal or epigastric pain. No nausea, vomiting, or hematemesis; No diarrhea or constipation. No melena or hematochezia.  GENITOURINARY: No dysuria, frequency, foamy urine, urinary urgency, incontinence or hematuria  NEUROLOGICAL: No numbness or weakness  SKIN: No itching, burning, rashes, or lesions   VASCULAR: No bilateral lower extremity edema.   All other review of systems is negative unless indicated above.    VITALS:  T(F): 98.1 (10-28-22 @ 11:30), Max: 98.2 (10-28-22 @ 08:16)  HR: 68 (10-28-22 @ 13:30)  BP: 175/77 (10-28-22 @ 13:30)  RR: 22 (10-28-22 @ 13:30)  SpO2: 100% (10-28-22 @ 13:30)  Wt(kg): --    Height (cm): 167.6 (10-28 @ 08:36)  Weight (kg): 66.7 (10-28 @ 08:36)  BMI (kg/m2): 23.7 (10-28 @ 08:36)  BSA (m2): 1.75 (10-28 @ 08:36)    PHYSICAL EXAM:  Constitutional: NAD  HEENT: anicteric sclera, oropharynx clear, MMM  Neck: No JVD  Respiratory: CTAB, no wheezes, rales or rhonchi  Cardiovascular: S1, S2, RRR  Gastrointestinal: BS+, soft, NT/ND  Extremities: No cyanosis or clubbing. No peripheral edema  Neurological: A/O x 3, no focal deficits  Psychiatric: Normal mood, normal affect  : No CVA tenderness. No hinds.   Skin: No rashes  Vascular Access: left AVF     LABS:  10-28    140  |  94<L>  |  42<H>  ----------------------------<  135<H>  4.5   |  31  |  9.21<H>    Ca    9.5      28 Oct 2022 08:41      Creatinine Trend: 9.21 <--                        10.6   5.98  )-----------( 160      ( 28 Oct 2022 08:41 )             33.9     Urine Studies:        RADIOLOGY & ADDITIONAL STUDIES:                
Patient seen and evaluated @ 8am on [address]  Chief Complaint: awaiting renal transplant    HPI:  Patient is a 67 year old man with past medical history of CAD w/ 5 stents( pLAD, dLAD, LCX, RCA) CHF, HTN, DM, anemia, ESRD on HD ( T/T/sat at [facility] HD center, Nephro: Dr. Huff), GERD, presents today for LHC following an abnormal NST. Patient is waiting for Renal transplant, seen by Dr. Johnson for cardiac clearance and referred  for LHC. Pt denies chest pain, SOB, palpitations/ syncope.   EF 72 from TTE (28 Oct 2022 08:36)    PMH:   Diabetes    HTN (hypertension)    CKD (chronic kidney disease)    Coronary artery disease    CAP (community acquired pneumonia)    CAD (coronary artery disease)    Diabetes mellitus    GERD (gastroesophageal reflux disease)    Stented coronary artery    ESRD (end stage renal disease)    Hypercholesterolemia    Cerebrovascular accident (CVA), unspecified mechanism    Anemia due to stage 5 chronic kidney disease, not on chronic dialysis      PSH:   No significant past surgical history    H/O coronary angiogram    AV fistula    H/O eye surgery      Medications:   aspirin enteric coated 81 milliGRAM(s) Oral daily  atorvastatin 40 milliGRAM(s) Oral at bedtime  carvedilol 3.125 milliGRAM(s) Oral every 12 hours  chlorhexidine 2% Cloths 1 Application(s) Topical daily  cloNIDine 0.1 milliGRAM(s) Oral three times a day  clopidogrel Tablet 75 milliGRAM(s) Oral daily  dextrose 5%. 1000 milliLiter(s) IV Continuous <Continuous>  dextrose 5%. 1000 milliLiter(s) IV Continuous <Continuous>  dextrose 50% Injectable 25 Gram(s) IV Push once  dextrose 50% Injectable 12.5 Gram(s) IV Push once  dextrose 50% Injectable 25 Gram(s) IV Push once  dextrose Oral Gel 15 Gram(s) Oral once PRN  glucagon  Injectable 1 milliGRAM(s) IntraMuscular once  hydrALAZINE 50 milliGRAM(s) Oral two times a day  insulin glargine Injectable (LANTUS) 8 Unit(s) SubCutaneous at bedtime  insulin lispro (ADMELOG) corrective regimen sliding scale   SubCutaneous three times a day before meals  insulin lispro (ADMELOG) corrective regimen sliding scale   SubCutaneous at bedtime  isosorbide   dinitrate Tablet (ISORDIL) 10 milliGRAM(s) Oral three times a day  NIFEdipine XL 90 milliGRAM(s) Oral daily  NIFEdipine XL 30 milliGRAM(s) Oral once  sevelamer carbonate 1600 milliGRAM(s) Oral three times a day with meals    Allergies:  No Known Allergies    FAMILY HISTORY:  No pertinent family history in first degree relatives  no family history of kidney disease      Social History: , retired  Smoking: nonsmoker  Alcohol: no EtOH abuse  Drugs: no illicit drug use    Review of Systems:    Constitutional: No fever, chills, fatigue, or changes in weight  HEENT: No blurry vision, eye pain, headache, runny nose, or sore throat  Respiratory: No shortness of breath, cough, or wheezing  Cardiovascular: No chest pain or palpitations  Gastrointestinal: No nausea, vomiting, diarrhea, or abdominal pain  Genitourinary: No dysuria or incontinence  Extremities: No lower extremity swelling  Neurologic: No focal findings  Lymphatic: No lymphadenopathy   Skin: No rash  Psychiatry: No anxiety or depression  10 point review of systems is otherwise negative except as mentioned above            Physical Exam:  T(C): 36.9 (10-29-22 @ 03:10), Max: 37 (10-28-22 @ 21:06)  HR: 63 (10-29-22 @ 03:10) (61 - 76)  BP: 157/70 (10-29-22 @ 03:10) (143/73 - 197/86)  RR: 18 (10-29-22 @ 03:10) (10 - 22)  SpO2: 98% (10-29-22 @ 03:10) (98% - 100%)  Wt(kg): --    Daily     Daily     Appearance: Normal, well groomed, NAD  Eyes: PERRLA, EOMI, pink conjunctiva, no scleral icterus   HENT: Normal oral mucosa  Cardiovascular: RRR, S1, S2, no murmur, rub, or gallop; no edema; no JVD  Procedural Access Site (right groin): Clean, dry, intact, without hematoma  Respiratory: Clear to auscultation bilaterally  Gastrointestinal: Soft, non-tender, non-distended, BS+  Musculoskeletal: No clubbing or joint deformity   Neurologic: No focal weakness  Lymphatic: No lymphadenopathy  Psychiatry: AAOx3 with appropriate mood and affect  Skin: No rashes, ecchymoses, or cyanosis    Cardiovascular Diagnostic Testing:  ECG:    Echo: July 2022 - LVEF has normalized    Stress Testing: nuclear stress test in September 2022 showing LVEF 49%    Cath:  < from: Cardiac Catheterization (10.28.22 @ 10:47) >  Procedures Performed   Procedures:               1.    Arterial Access - Right Femoral     2.    Diagnostic Coronary Angiography   3.    Ultrasound Guided Access   4.    PCI: NAHUM     Indications:               Abnormal stress test       PCI Status:       elective     Conclusions:   A successful intervention of the LCx was performed   Recommendations:     Return for PCI of LAD Monday     Procedure Narrative:   The risks and alternatives of the procedures and conscious sedation  were explained to the patient and informed consent was  obtained. The patient was brought to the cath lab and placed on the  exam table.  Access   Right femoral artery:   The puncture site was infiltrated with 1% Lidocaine. Vascular access  was obtained using modified seldinger technique.    Diagnostic Findings:     Coronary Angiography   LM   Left main artery: Angiography shows minor irregularities.      LAD   Proximal left anterior descending: There is a 90 % stenosis.      CX   Proximal circumflex: There is an80 % stenosis.      Patient: MD BOLAÑOS                   MRN: [id number]  Study Date: 10/28/2022   10:47 AM      Page 1 of 4          RCA   Mid right coronary artery: There is a 30 % stenosis.      < end of copied text >      Interpretation of Telemetry:    Imaging:    Labs:                        10.6   6.93  )-----------( 162      ( 29 Oct 2022 07:12 )             33.4     10-29    140  |  93<L>  |  53<H>  ----------------------------<  96  4.6   |  29  |  11.29<H>    Ca    9.3      29 Oct 2022 07:10  Phos  6.3     10-29  Mg     2.4     10-29

## 2022-10-29 NOTE — PROGRESS NOTE ADULT - ASSESSMENT
HTN, DM, anemia, ESRD on HD ( T/T/sat at [facility] HD center, Nephro: Dr. Huff), GERD, presents today for LHC following an abnormal NST. Patient is waiting for Renal transplant, seen by Dr. Johnson for cardiac clearance and referred  for LHC. Pt denies chest pain, SOB, palpitations/ syncope. EF 72 from TTE. s/p 10/28-prox Cx (80%) x 1 NAHUM Staged PCI of LAD Monday. Nephrology consulted for HD.     A/P    ESRD on HD   TTS   from [facility]    last HD 10/29  renal diet   HD consent in the HD unit     HTN   elevated   titrate up hydralazine if BP remains elevated   monitor BP closely     Anemia   at goal for ESRD  monitor H/H    CKD;MBD   monitor Po4, Ca

## 2022-10-29 NOTE — PROGRESS NOTE ADULT - SUBJECTIVE AND OBJECTIVE BOX
Patient is a 67y old  Male who presents with a chief complaint of abnormal stress test.   Pt denies chest pain, SOB or dizziness.       Allergies    No Known Allergies    Intolerances        Medications:  aspirin enteric coated 81 milliGRAM(s) Oral daily  atorvastatin 40 milliGRAM(s) Oral at bedtime  carvedilol 3.125 milliGRAM(s) Oral every 12 hours  chlorhexidine 2% Cloths 1 Application(s) Topical daily  cloNIDine 0.1 milliGRAM(s) Oral three times a day  clopidogrel Tablet 75 milliGRAM(s) Oral daily  dextrose 5%. 1000 milliLiter(s) IV Continuous <Continuous>  dextrose 5%. 1000 milliLiter(s) IV Continuous <Continuous>  dextrose 50% Injectable 25 Gram(s) IV Push once  dextrose 50% Injectable 12.5 Gram(s) IV Push once  dextrose 50% Injectable 25 Gram(s) IV Push once  dextrose Oral Gel 15 Gram(s) Oral once PRN  glucagon  Injectable 1 milliGRAM(s) IntraMuscular once  hydrALAZINE 50 milliGRAM(s) Oral two times a day  insulin glargine Injectable (LANTUS) 8 Unit(s) SubCutaneous at bedtime  insulin lispro (ADMELOG) corrective regimen sliding scale   SubCutaneous three times a day before meals  insulin lispro (ADMELOG) corrective regimen sliding scale   SubCutaneous at bedtime  isosorbide   dinitrate Tablet (ISORDIL) 10 milliGRAM(s) Oral three times a day  NIFEdipine XL 90 milliGRAM(s) Oral daily  NIFEdipine XL 30 milliGRAM(s) Oral once  sevelamer carbonate 1600 milliGRAM(s) Oral three times a day with meals      Vitals:  T(C): 36.9 (10-29-22 @ 03:10), Max: 37 (10-28-22 @ 21:06)  HR: 63 (10-29-22 @ 03:10) (61 - 76)  BP: 157/70 (10-29-22 @ 03:10) (143/73 - 197/86)  BP(mean): 105 (10-28-22 @ 15:45) (97 - 115)  RR: 18 (10-29-22 @ 03:10) (10 - )  SpO2: 98% (10-29-22 @ 03:10) (98% - 100%)  Wt(kg): --  Daily Height in cm: 167.64 (28 Oct 2022 08:36)    Daily Weight in k.7 (28 Oct 2022 08:36)  I&O's Summary        Physical Exam:  Appearance: Normal  Eyes: PERRL, EOMI  HENT: Normal oral [place], NC/AT  Cardiovascular: S1S2, RRR, No M/R/G, no JVD, No Lower extremity edema  Procedural Rt Femoral Access Site: No hematoma, Non-tender to palpation, 2+ pulse, No bruit, No Ecchymosis  Respiratory: Clear to auscultation bilaterally  Gastrointestinal: Soft, Non tender, Normal Bowel Sounds  Musculoskeletal: No clubbing, No joint deformity   Neurologic: Non-focal  Lymphatic: No lymphadenopathy  Psychiatry: AAOx3, Mood & affect appropriate  Skin: No rashes, No ecchymoses, No cyanosis    10-29    140  |  93<L>  |  53<H>  ----------------------------<  96  4.6   |  29  |  11.29<H>    Ca    9.3      29 Oct 2022 07:10  Phos  6.3     10-29  Mg     2.4     10-29      Lipid panel   Hgb A1c         ECG:    Echo:    Stress Testing:     Cath:  < from: Cardiac Catheterization (10.28.22 @ 10:47) >  Coronary Angiography   LM: Left main artery: Angiography shows minor irregularities.    LAD: Proximal left anterior descending: There is a 90 % stenosis.  CX: Proximal circumflex: There is an80 % stenosis.    RCA : mid right coronary artery: There is a 30 % stenosis.  < end of copied text >  < from: Cardiac Catheterization (10.28.22 @ 10:47) >  Interventional Details   Proximal circumflex: This was an 80 % De Aleisha stenosis. This was an ACC/AHA High/C lesion for intervention. Guidewire crossing was successful.    A successful Drug Eluting Stent was deployed using a BMW UNIVERSAL 190, a 6FR JL 4.0 LAUNCHER, and a FRONTIER RX [id number].      The inflation pressure was 26 austin for the duration of 18.0 seconds.   Following intervention there is a 1 % residual stenosis. There was JORGE Flow 3 before the procedure and JORGE Flow 3 following the procedure.    < end of copied text >      Imaging:    Interpretation of Telemetry: SR

## 2022-10-29 NOTE — PROGRESS NOTE ADULT - ASSESSMENT
67 year old man with PMHx of CAD w/ 5 stents( pLAD, dLAD, LCX, RCA) CHF, HTN, DM, anemia, ESRD on HD ( T/T/sat at [facility], Nephro: Dr. Huff), GERD, presents today for LHC following an abnormal NST. Patient is waiting for Renal transplant, seen by Dr. Johnson for cardiac clearance and referred  for LHC. Pt denies chest pain, SOB, palpitations/ syncope.   EF 72 from TTE 68 yo/Male w/ PMHx CAD s/p 5 stents (pLAD, dLAD, LCX, RCA), CHF, HTN, DM, anemia, ESRD on HD (Tu/Th/Sat) who needs cardiac clearance prior to renal transplant, had abnormal stress test, referred for cath by Dr. Johnson, now  s/p stent LCX on 10/28 and for staged PCI in LAD on 10/31.    1. CAD  s/p stent pLCX on 10/28, staged PCI in LAD on 10/31.   as part of cardiac clearance pending renal transplant  - c/w ASA and plavix 75mg and atorvastatin 40mg  - f/u staged PCI 10/31    2. HTN   - BP control SBP goal <180, can uptitrate carvedilol as needed   - repeat TTE this admission per Dr. Johnson to assess for improved LV function s/p stent placement.  - DASH diet     3. ESRD   - Dialysis after PCI Monday  - f/u with nephrologist, Dr. Yasmany Huff.

## 2022-10-29 NOTE — PROGRESS NOTE ADULT - PROBLEM SELECTOR PLAN 2
Hx ESRD on HD Tu/Th/Sat. Follows with Dr. Huff.    - HD scheduled for tmr   - c/w home sevelamer 1600mg TID Hx ESRD on HD Tu/Th/Sat. Follows with Dr. Huff.    - HD scheduled today  - c/w home sevelamer 1600mg TID

## 2022-10-29 NOTE — PROGRESS NOTE ADULT - ASSESSMENT
This is a 66 yo/Male w/ PMHx CAD s/p 5 stents (pLAD, dLAD, LCX, RCA), CHF, HTN, DM, anemia, ESRD on HD (Tu/Th/Sat) who was sent in by Dr. Johnson for [facility], now pending staged PCI 10/31.

## 2022-10-29 NOTE — PROGRESS NOTE ADULT - SUBJECTIVE AND OBJECTIVE BOX
PROGRESS NOTE:     Patient is a 67y old  Male who presents with a chief complaint of Cath (29 Oct 2022 10:31)      INTERVAL HISTORY:    MEDICATIONS  (STANDING):  aspirin enteric coated 81 milliGRAM(s) Oral daily  atorvastatin 40 milliGRAM(s) Oral at bedtime  carvedilol 3.125 milliGRAM(s) Oral every 12 hours  chlorhexidine 2% Cloths 1 Application(s) Topical daily  cloNIDine 0.1 milliGRAM(s) Oral three times a day  clopidogrel Tablet 75 milliGRAM(s) Oral daily  dextrose 5%. 1000 milliLiter(s) (100 mL/Hr) IV Continuous <Continuous>  dextrose 5%. 1000 milliLiter(s) (50 mL/Hr) IV Continuous <Continuous>  dextrose 50% Injectable 25 Gram(s) IV Push once  dextrose 50% Injectable 12.5 Gram(s) IV Push once  dextrose 50% Injectable 25 Gram(s) IV Push once  glucagon  Injectable 1 milliGRAM(s) IntraMuscular once  hydrALAZINE 50 milliGRAM(s) Oral two times a day  insulin glargine Injectable (LANTUS) 8 Unit(s) SubCutaneous at bedtime  insulin lispro (ADMELOG) corrective regimen sliding scale   SubCutaneous three times a day before meals  insulin lispro (ADMELOG) corrective regimen sliding scale   SubCutaneous at bedtime  isosorbide   dinitrate Tablet (ISORDIL) 10 milliGRAM(s) Oral three times a day  NIFEdipine XL 90 milliGRAM(s) Oral daily  NIFEdipine XL 30 milliGRAM(s) Oral once  sevelamer carbonate 1600 milliGRAM(s) Oral three times a day with meals    MEDICATIONS  (PRN):  dextrose Oral Gel 15 Gram(s) Oral once PRN Blood Glucose LESS THAN 70 milliGRAM(s)/deciliter      CAPILLARY BLOOD GLUCOSE      POCT Blood Glucose.: 96 mg/dL (29 Oct 2022 07:41)  POCT Blood Glucose.: 177 mg/dL (28 Oct 2022 21:33)  POCT Blood Glucose.: 225 mg/dL (28 Oct 2022 16:07)    I&O's Summary      PHYSICAL EXAM:  Vital Signs Last 24 Hrs  T(C): 36.5 (29 Oct 2022 10:20), Max: 37 (28 Oct 2022 21:06)  T(F): 97.7 (29 Oct 2022 10:20), Max: 98.6 (28 Oct 2022 21:06)  HR: 58 (29 Oct 2022 10:20) (58 - 71)  BP: 131/69 (29 Oct 2022 10:20) (131/69 - 197/86)  BP(mean): 105 (28 Oct 2022 15:45) (97 - 115)  RR: 18 (29 Oct 2022 10:20) (10 - 22)  SpO2: 98% (29 Oct 2022 10:20) (98% - 100%)    Parameters below as of 29 Oct 2022 10:20  Patient On (Oxygen Delivery Method): room air        CONSTITUTIONAL: NAD, well-developed  HEENT:   RESPIRATORY: Normal respiratory effort; lungs are clear to auscultation bilaterally  CARDIOVASCULAR: Regular rate and rhythm, normal S1 and S2, no murmur/rub/gallop; No lower extremity edema; Peripheral pulses are 2+ bilaterally  ABDOMEN: Nontender to palpation, normoactive bowel sounds, no rebound/guarding; No hepatosplenomegaly  MUSCULOSKELETAL: no clubbing or cyanosis of digits; no joint swelling or tenderness to palpation  PSYCH: A+O to person, place, and time; affect appropriate    LABS:                        10.6   6.93  )-----------( 162      ( 29 Oct 2022 07:12 )             33.4     10-29    140  |  93<L>  |  53<H>  ----------------------------<  96  4.6   |  29  |  11.29<H>    Ca    9.3      29 Oct 2022 07:10  Phos  6.3     10-29  Mg     2.4     10-29                  RADIOLOGY:        ******************************  Authored By: Rafia Martinez MD PGY1  Internal Medicine  MS Teams  ******************************   PROGRESS NOTE:     Patient is a 67y old  Male who presents with a chief complaint of Cath (29 Oct 2022 10:31)    INTERVAL HISTORY: NAEO. Pt doing well and no acute complaints of fever/chills/CP/palpitations or pain at femoral site.     MEDICATIONS  (STANDING):  aspirin enteric coated 81 milliGRAM(s) Oral daily  atorvastatin 40 milliGRAM(s) Oral at bedtime  carvedilol 3.125 milliGRAM(s) Oral every 12 hours  chlorhexidine 2% Cloths 1 Application(s) Topical daily  cloNIDine 0.1 milliGRAM(s) Oral three times a day  clopidogrel Tablet 75 milliGRAM(s) Oral daily  dextrose 5%. 1000 milliLiter(s) (100 mL/Hr) IV Continuous <Continuous>  dextrose 5%. 1000 milliLiter(s) (50 mL/Hr) IV Continuous <Continuous>  dextrose 50% Injectable 25 Gram(s) IV Push once  dextrose 50% Injectable 12.5 Gram(s) IV Push once  dextrose 50% Injectable 25 Gram(s) IV Push once  glucagon  Injectable 1 milliGRAM(s) IntraMuscular once  hydrALAZINE 50 milliGRAM(s) Oral two times a day  insulin glargine Injectable (LANTUS) 8 Unit(s) SubCutaneous at bedtime  insulin lispro (ADMELOG) corrective regimen sliding scale   SubCutaneous three times a day before meals  insulin lispro (ADMELOG) corrective regimen sliding scale   SubCutaneous at bedtime  isosorbide   dinitrate Tablet (ISORDIL) 10 milliGRAM(s) Oral three times a day  NIFEdipine XL 90 milliGRAM(s) Oral daily  NIFEdipine XL 30 milliGRAM(s) Oral once  sevelamer carbonate 1600 milliGRAM(s) Oral three times a day with meals    MEDICATIONS  (PRN):  dextrose Oral Gel 15 Gram(s) Oral once PRN Blood Glucose LESS THAN 70 milliGRAM(s)/deciliter      CAPILLARY BLOOD GLUCOSE      POCT Blood Glucose.: 96 mg/dL (29 Oct 2022 07:41)  POCT Blood Glucose.: 177 mg/dL (28 Oct 2022 21:33)  POCT Blood Glucose.: 225 mg/dL (28 Oct 2022 16:07)    I&O's Summary      PHYSICAL EXAM:  Vital Signs Last 24 Hrs  T(C): 36.5 (29 Oct 2022 10:20), Max: 37 (28 Oct 2022 21:06)  T(F): 97.7 (29 Oct 2022 10:20), Max: 98.6 (28 Oct 2022 21:06)  HR: 58 (29 Oct 2022 10:20) (58 - 71)  BP: 131/69 (29 Oct 2022 10:20) (131/69 - 197/86)  BP(mean): 105 (28 Oct 2022 15:45) (97 - 115)  RR: 18 (29 Oct 2022 10:20) (10 - 22)  SpO2: 98% (29 Oct 2022 10:20) (98% - 100%)    Parameters below as of 29 Oct 2022 10:20  Patient On (Oxygen Delivery Method): room air        CONSTITUTIONAL: NAD, well-developed  RESPIRATORY: Normal respiratory effort; lungs are clear to auscultation bilaterally  CARDIOVASCULAR: Regular rate and rhythm, no murmur/rub/gallop; No lower extremity edema; Peripheral pulses are 2+ bilaterally  ABDOMEN: Nontender to palpation, normoactive bowel sounds, no rebound/guarding; No hepatosplenomegaly  MUSCULOSKELETAL: no clubbing or cyanosis of digits; no joint swelling or tenderness to palpation; no hematoma noted on R femoral access site, covered in gauze  PSYCH: A+O to person, place, and time; affect appropriate    LABS:                        10.6   6.93  )-----------( 162      ( 29 Oct 2022 07:12 )             33.4     10-29    140  |  93<L>  |  53<H>  ----------------------------<  96  4.6   |  29  |  11.29<H>    Ca    9.3      29 Oct 2022 07:10  Phos  6.3     10-29  Mg     2.4     10-29      ******************************  Authored By: Rafia Martinez MD PGY1  Internal Medicine  MS Teams  ******************************

## 2022-10-29 NOTE — PROGRESS NOTE ADULT - PROBLEM SELECTOR PLAN 3
- c/w home carvedilol 3.125mg  - c/w home hydralazine 50mg BID  - c/w home nifedipine 90mg  - c/w home isosorbide dinitrate 10mg TID  - c/w home clonidine 0.1mg

## 2022-10-29 NOTE — PROGRESS NOTE ADULT - PROBLEM SELECTOR PLAN 4
DVT: none - recent R fem sheath removed  Diet: DASH  Dispo: to home, no indication for PT eval (fully functional)

## 2022-10-29 NOTE — PROGRESS NOTE ADULT - PROBLEM SELECTOR PLAN 1
Extensive cardiac hx s/p stents 2014 and recent pLCX NAHUM 10/28 as part of cardiac clearance pending renal transplant. Pending staged PCI 10/31    - CTM R femoral access site for bleed or loss of pulses in feet or pain/inability to move  - c/w ASA and plavix 75mg s/p NAHUM placement  - f/u staged PCI 10/31  - BP control SBP goal <180  - c/w atorvastatin 40mg Extensive cardiac hx s/p stents 2014 and recent pLCX NAHUM 10/28 as part of cardiac clearance pending renal transplant. Pending staged PCI 10/31    - CTM R femoral access site for bleed or loss of pulses in feet or pain/inability to move  - c/w ASA and plavix 75mg s/p NAHUM placement  - f/u staged PCI 10/31  - BP control SBP goal <180, can uptitrate carvedilol as needed   - c/w atorvastatin 40mg

## 2022-10-30 LAB
ANION GAP SERPL CALC-SCNC: 14 MMOL/L — SIGNIFICANT CHANGE UP (ref 5–17)
BLD GP AB SCN SERPL QL: NEGATIVE — SIGNIFICANT CHANGE UP
BUN SERPL-MCNC: 35 MG/DL — HIGH (ref 7–23)
CALCIUM SERPL-MCNC: 9.4 MG/DL — SIGNIFICANT CHANGE UP (ref 8.4–10.5)
CHLORIDE SERPL-SCNC: 95 MMOL/L — LOW (ref 96–108)
CO2 SERPL-SCNC: 29 MMOL/L — SIGNIFICANT CHANGE UP (ref 22–31)
CREAT SERPL-MCNC: 8.36 MG/DL — HIGH (ref 0.5–1.3)
EGFR: 7 ML/MIN/1.73M2 — LOW
GLUCOSE BLDC GLUCOMTR-MCNC: 108 MG/DL — HIGH (ref 70–99)
GLUCOSE BLDC GLUCOMTR-MCNC: 160 MG/DL — HIGH (ref 70–99)
GLUCOSE BLDC GLUCOMTR-MCNC: 193 MG/DL — HIGH (ref 70–99)
GLUCOSE BLDC GLUCOMTR-MCNC: 228 MG/DL — HIGH (ref 70–99)
GLUCOSE SERPL-MCNC: 152 MG/DL — HIGH (ref 70–99)
HCT VFR BLD CALC: 35.2 % — LOW (ref 39–50)
HGB BLD-MCNC: 10.9 G/DL — LOW (ref 13–17)
MAGNESIUM SERPL-MCNC: 2.2 MG/DL — SIGNIFICANT CHANGE UP (ref 1.6–2.6)
MCHC RBC-ENTMCNC: 30.7 PG — SIGNIFICANT CHANGE UP (ref 27–34)
MCHC RBC-ENTMCNC: 31 GM/DL — LOW (ref 32–36)
MCV RBC AUTO: 99.2 FL — SIGNIFICANT CHANGE UP (ref 80–100)
NRBC # BLD: 0 /100 WBCS — SIGNIFICANT CHANGE UP (ref 0–0)
PHOSPHATE SERPL-MCNC: 4.5 MG/DL — SIGNIFICANT CHANGE UP (ref 2.5–4.5)
PLATELET # BLD AUTO: 152 K/UL — SIGNIFICANT CHANGE UP (ref 150–400)
POTASSIUM SERPL-MCNC: 4.8 MMOL/L — SIGNIFICANT CHANGE UP (ref 3.5–5.3)
POTASSIUM SERPL-SCNC: 4.8 MMOL/L — SIGNIFICANT CHANGE UP (ref 3.5–5.3)
RBC # BLD: 3.55 M/UL — LOW (ref 4.2–5.8)
RBC # FLD: 13.2 % — SIGNIFICANT CHANGE UP (ref 10.3–14.5)
RH IG SCN BLD-IMP: NEGATIVE — SIGNIFICANT CHANGE UP
SARS-COV-2 RNA SPEC QL NAA+PROBE: SIGNIFICANT CHANGE UP
SODIUM SERPL-SCNC: 138 MMOL/L — SIGNIFICANT CHANGE UP (ref 135–145)
WBC # BLD: 6.66 K/UL — SIGNIFICANT CHANGE UP (ref 3.8–10.5)
WBC # FLD AUTO: 6.66 K/UL — SIGNIFICANT CHANGE UP (ref 3.8–10.5)

## 2022-10-30 PROCEDURE — 99233 SBSQ HOSP IP/OBS HIGH 50: CPT

## 2022-10-30 PROCEDURE — 99232 SBSQ HOSP IP/OBS MODERATE 35: CPT

## 2022-10-30 RX ORDER — POLYETHYLENE GLYCOL 3350 17 G/17G
17 POWDER, FOR SOLUTION ORAL DAILY
Refills: 0 | Status: DISCONTINUED | OUTPATIENT
Start: 2022-10-30 | End: 2022-11-01

## 2022-10-30 RX ORDER — SENNA PLUS 8.6 MG/1
2 TABLET ORAL AT BEDTIME
Refills: 0 | Status: DISCONTINUED | OUTPATIENT
Start: 2022-10-30 | End: 2022-11-02

## 2022-10-30 RX ORDER — CARVEDILOL PHOSPHATE 80 MG/1
6.25 CAPSULE, EXTENDED RELEASE ORAL EVERY 12 HOURS
Refills: 0 | Status: DISCONTINUED | OUTPATIENT
Start: 2022-10-30 | End: 2022-11-02

## 2022-10-30 RX ADMIN — Medication 0.1 MILLIGRAM(S): at 13:16

## 2022-10-30 RX ADMIN — CARVEDILOL PHOSPHATE 3.12 MILLIGRAM(S): 80 CAPSULE, EXTENDED RELEASE ORAL at 05:44

## 2022-10-30 RX ADMIN — Medication 90 MILLIGRAM(S): at 05:45

## 2022-10-30 RX ADMIN — Medication 50 MILLIGRAM(S): at 05:45

## 2022-10-30 RX ADMIN — Medication 50 MILLIGRAM(S): at 17:07

## 2022-10-30 RX ADMIN — SEVELAMER CARBONATE 1600 MILLIGRAM(S): 2400 POWDER, FOR SUSPENSION ORAL at 08:13

## 2022-10-30 RX ADMIN — ISOSORBIDE DINITRATE 10 MILLIGRAM(S): 5 TABLET ORAL at 17:08

## 2022-10-30 RX ADMIN — Medication 81 MILLIGRAM(S): at 11:13

## 2022-10-30 RX ADMIN — ISOSORBIDE DINITRATE 10 MILLIGRAM(S): 5 TABLET ORAL at 05:45

## 2022-10-30 RX ADMIN — ATORVASTATIN CALCIUM 40 MILLIGRAM(S): 80 TABLET, FILM COATED ORAL at 22:11

## 2022-10-30 RX ADMIN — SEVELAMER CARBONATE 1600 MILLIGRAM(S): 2400 POWDER, FOR SUSPENSION ORAL at 12:15

## 2022-10-30 RX ADMIN — POLYETHYLENE GLYCOL 3350 17 GRAM(S): 17 POWDER, FOR SOLUTION ORAL at 11:17

## 2022-10-30 RX ADMIN — Medication 0.1 MILLIGRAM(S): at 05:45

## 2022-10-30 RX ADMIN — SEVELAMER CARBONATE 1600 MILLIGRAM(S): 2400 POWDER, FOR SUSPENSION ORAL at 17:07

## 2022-10-30 RX ADMIN — ISOSORBIDE DINITRATE 10 MILLIGRAM(S): 5 TABLET ORAL at 11:17

## 2022-10-30 RX ADMIN — INSULIN GLARGINE 8 UNIT(S): 100 INJECTION, SOLUTION SUBCUTANEOUS at 22:11

## 2022-10-30 RX ADMIN — Medication 1: at 12:14

## 2022-10-30 RX ADMIN — CHLORHEXIDINE GLUCONATE 1 APPLICATION(S): 213 SOLUTION TOPICAL at 11:14

## 2022-10-30 RX ADMIN — SENNA PLUS 2 TABLET(S): 8.6 TABLET ORAL at 22:11

## 2022-10-30 RX ADMIN — Medication 1: at 08:13

## 2022-10-30 RX ADMIN — CLOPIDOGREL BISULFATE 75 MILLIGRAM(S): 75 TABLET, FILM COATED ORAL at 11:13

## 2022-10-30 RX ADMIN — Medication 0.1 MILLIGRAM(S): at 22:11

## 2022-10-30 NOTE — PROGRESS NOTE ADULT - PROBLEM SELECTOR PLAN 2
Hx ESRD on HD Tu/Th/Sat. Follows with Dr. Huff.    - HD scheduled today  - c/w home sevelamer 1600mg TID

## 2022-10-30 NOTE — PROGRESS NOTE ADULT - ASSESSMENT
67 year-old gentleman awaiting renal transplant.  He has known coronary artery disease status post PCI.    Now status post PCI to LCx yesterday with plans for staged PCI to LAD on Monday, 10/31.  Tolerated procedure well.    Continue dual antiplatelet therapy.  Continue high intensity statin therapy. Patient may also require PCSK-9 inhibitor (as outpatient).  Recommend uptitrating carvedilol as tolerated.    Dialysis after PCI on Monday. Discussed with Jim Jones MD and patient's nephrologist, Dr. Yasmany Huff.

## 2022-10-30 NOTE — PROGRESS NOTE ADULT - PROBLEM SELECTOR PLAN 1
Extensive cardiac hx s/p stents 2014 and recent pLCX NAHUM 10/28 as part of cardiac clearance pending renal transplant. Pending staged PCI 10/31    - CTM R femoral access site for bleed or loss of pulses in feet or pain/inability to move  - c/w ASA and plavix 75mg s/p NAHUM placement  - f/u staged PCI 10/31  - BP control SBP goal <180, can uptitrate carvedilol as needed   - c/w atorvastatin 40mg Extensive cardiac hx s/p stents 2014 and recent pLCX NAHUM 10/28 as part of cardiac clearance pending renal transplant. Pending staged PCI 10/31    - CTM R femoral access site for bleed or loss of pulses in feet or pain/inability to move  - c/w ASA and plavix 75mg s/p NAHUM placement  - f/u staged PCI 10/31  - BP control SBP goal <180, can uptitrate carvedilol as needed   - c/w atorvastatin 40mg  - repeat TTE this admission per Dr. Johnson to assess for improved LV function s/p stent placement

## 2022-10-30 NOTE — PROGRESS NOTE ADULT - SUBJECTIVE AND OBJECTIVE BOX
HPI:  Patient seen and examined at bedside on 4 Simone.  No events or complaints overnight.    Review Of Systems:           Respiratory: No shortness of breath, cough, or wheezing  Cardiovascular: No chest pain or palpitations  10 point review of systems is otherwise negative except as mentioned above        Medications:  aspirin enteric coated 81 milliGRAM(s) Oral daily  atorvastatin 40 milliGRAM(s) Oral at bedtime  carvedilol 3.125 milliGRAM(s) Oral every 12 hours  chlorhexidine 2% Cloths 1 Application(s) Topical daily  cloNIDine 0.1 milliGRAM(s) Oral three times a day  clopidogrel Tablet 75 milliGRAM(s) Oral daily  dextrose 5%. 1000 milliLiter(s) IV Continuous <Continuous>  dextrose 5%. 1000 milliLiter(s) IV Continuous <Continuous>  dextrose 50% Injectable 25 Gram(s) IV Push once  dextrose 50% Injectable 12.5 Gram(s) IV Push once  dextrose 50% Injectable 25 Gram(s) IV Push once  dextrose Oral Gel 15 Gram(s) Oral once PRN  glucagon  Injectable 1 milliGRAM(s) IntraMuscular once  hydrALAZINE 50 milliGRAM(s) Oral two times a day  insulin glargine Injectable (LANTUS) 8 Unit(s) SubCutaneous at bedtime  insulin lispro (ADMELOG) corrective regimen sliding scale   SubCutaneous three times a day before meals  insulin lispro (ADMELOG) corrective regimen sliding scale   SubCutaneous at bedtime  isosorbide   dinitrate Tablet (ISORDIL) 10 milliGRAM(s) Oral three times a day  NIFEdipine XL 90 milliGRAM(s) Oral daily  polyethylene glycol 3350 17 Gram(s) Oral daily  senna 2 Tablet(s) Oral at bedtime  sevelamer carbonate 1600 milliGRAM(s) Oral three times a day with meals    PAST MEDICAL & SURGICAL HISTORY:  HTN (hypertension)      Coronary artery disease      CAP (community acquired pneumonia)  6/18      Diabetes mellitus  type 2      GERD (gastroesophageal reflux disease)      Stented coronary artery  2014, 3 stents, [facility]      ESRD (end stage renal disease)  on Dialysis( M/W/F), By Dr. Huff      Hypercholesterolemia      Cerebrovascular accident (CVA), unspecified mechanism  diagnosed via CT of the head      Anemia due to stage 5 chronic kidney disease, not on chronic dialysis      H/O coronary angiogram  2014 - x3 stents      AV fistula  10/12/18 L radiocephalic AV fistula      H/O eye surgery        Vitals:  T(C): 36.7 (10-30-22 @ 10:57), Max: 36.9 (10-30-22 @ 05:21)  HR: 65 (10-30-22 @ 10:57) (65 - 70)  BP: 146/63 (10-30-22 @ 10:57) (141/73 - 152/76)  BP(mean): --  RR: 18 (10-30-22 @ 10:57) (18 - 18)  SpO2: 99% (10-30-22 @ 10:57) (97% - 99%)  Wt(kg): --  Daily     Daily   I&O's Summary    29 Oct 2022 07:01  -  30 Oct 2022 07:00  --------------------------------------------------------  IN: 240 mL / OUT: 2000 mL / NET: -1760 mL    30 Oct 2022 07:01  -  30 Oct 2022 13:54  --------------------------------------------------------  IN: 240 mL / OUT: 0 mL / NET: 240 mL        Physical Exam:  Appearance: Normal, well groomed, NAD  Eyes: PERRLA, EOMI, pink conjunctiva, no scleral icterus   HENT: Normal oral mucosa  Cardiovascular: RRR, S1, S2, no murmur, rub, or gallop; no edema; no JVD  Procedural Access Site (right groin): Clean, dry, intact, without hematoma  Respiratory: Clear to auscultation bilaterally  Gastrointestinal: Soft, non-tender, non-distended, BS+  Musculoskeletal: No clubbing or joint deformity   Neurologic: No focal weakness  Lymphatic: No lymphadenopathy  Psychiatry: AAOx3 with appropriate mood and affect  Skin: No rashes, ecchymoses, or cyanosis                          10.9   6.66  )-----------( 152      ( 30 Oct 2022 06:36 )             35.2     10-30    138  |  95<L>  |  35<H>  ----------------------------<  152<H>  4.8   |  29  |  8.36<H>    Ca    9.4      30 Oct 2022 06:31  Phos  4.5     10-30  Mg     2.2     10-30      Cardiovascular Diagnostic Testing:  ECG:    Echo: July 2022 - LVEF has normalized    Stress Testing: nuclear stress test in September 2022 showing LVEF 49%    Cath:  < from: Cardiac Catheterization (10.28.22 @ 10:47) >  Procedures Performed   Procedures:               1.    Arterial Access - Right Femoral     2.    Diagnostic Coronary Angiography   3.    Ultrasound Guided Access   4.    PCI: NAHUM     Indications:               Abnormal stress test       PCI Status:       elective     Conclusions:   A successful intervention of the LCx was performed   Recommendations:     Return for PCI of LAD Monday     Procedure Narrative:   The risks and alternatives of the procedures and conscious sedation  were explained to the patient and informed consent was  obtained. The patient was brought to the cath lab and placed on the  exam table.  Access   Right femoral artery:   The puncture site was infiltrated with 1% Lidocaine. Vascular access  was obtained using modified seldinger technique.    Diagnostic Findings:     Coronary Angiography   LM   Left main artery: Angiography shows minor irregularities.      LAD   Proximal left anterior descending: There is a 90 % stenosis.      CX   Proximal circumflex: There is an80 % stenosis.      Patient: MD BOLAÑOS                   MRN: [id number]  Study Date: 10/28/2022   10:47 AM      Page 1 of 4    RCA   Mid right coronary artery: There is a 30 % stenosis.      < end of copied text >      Interpretation of Telemetry: Normal Sinus Rhythm

## 2022-10-30 NOTE — PROGRESS NOTE ADULT - SUBJECTIVE AND OBJECTIVE BOX
[facility]   MD JORDAN AVALOS MD KRISTINE SOLTANPOUR, DO ANGELA WONG, PA    TEL:  OFFICE: [phone]  From 5pm-7am Answering Service [phone]    -- RENAL FOLLOW UP NOTE ---Date of Service 10-30-22 @ 21:02    Patient is a 67y old  Male who presents with a chief complaint of coronary artery disease (30 Oct 2022 13:51)      Patient seen and examined at bedside. No chest pain/sob    VITALS:  T(F): 98 (10-30-22 @ 10:57), Max: 98.4 (10-30-22 @ 05:21)  HR: 55 (10-30-22 @ 16:29)  BP: 122/70 (10-30-22 @ 16:29)  RR: 18 (10-30-22 @ 10:57)  SpO2: 99% (10-30-22 @ 10:57)  Wt(kg): --    10-29 @ 07:01  -  10-30 @ 07:00  --------------------------------------------------------  IN: 240 mL / OUT: 2000 mL / NET: -1760 mL    10-30 @ 07:01  -  10-30 @ 21:02  --------------------------------------------------------  IN: 240 mL / OUT: 0 mL / NET: 240 mL          PHYSICAL EXAM:  General: NAD  Neck: No JVD  Respiratory: CTAB, no wheezes, rales or rhonchi  Cardiovascular: S1, S2, RRR  Gastrointestinal: BS+, soft, NT/ND  Extremities: No peripheral edema    Hospital Medications:   MEDICATIONS  (STANDING):  aspirin enteric coated 81 milliGRAM(s) Oral daily  atorvastatin 40 milliGRAM(s) Oral at bedtime  carvedilol 6.25 milliGRAM(s) Oral every 12 hours  chlorhexidine 2% Cloths 1 Application(s) Topical daily  cloNIDine 0.1 milliGRAM(s) Oral three times a day  clopidogrel Tablet 75 milliGRAM(s) Oral daily  dextrose 5%. 1000 milliLiter(s) (50 mL/Hr) IV Continuous <Continuous>  dextrose 5%. 1000 milliLiter(s) (100 mL/Hr) IV Continuous <Continuous>  dextrose 50% Injectable 25 Gram(s) IV Push once  dextrose 50% Injectable 12.5 Gram(s) IV Push once  dextrose 50% Injectable 25 Gram(s) IV Push once  glucagon  Injectable 1 milliGRAM(s) IntraMuscular once  hydrALAZINE 50 milliGRAM(s) Oral two times a day  insulin glargine Injectable (LANTUS) 8 Unit(s) SubCutaneous at bedtime  insulin lispro (ADMELOG) corrective regimen sliding scale   SubCutaneous at bedtime  insulin lispro (ADMELOG) corrective regimen sliding scale   SubCutaneous three times a day before meals  isosorbide   dinitrate Tablet (ISORDIL) 10 milliGRAM(s) Oral three times a day  NIFEdipine XL 90 milliGRAM(s) Oral daily  polyethylene glycol 3350 17 Gram(s) Oral daily  senna 2 Tablet(s) Oral at bedtime  sevelamer carbonate 1600 milliGRAM(s) Oral three times a day with meals      LABS:  10-30    138  |  95<L>  |  35<H>  ----------------------------<  152<H>  4.8   |  29  |  8.36<H>    Ca    9.4      30 Oct 2022 06:31  Phos  4.5     10-30  Mg     2.2     10-30      Creatinine Trend: 8.36 <--, 11.29 <--, 9.21 <--    Phosphorus Level, Serum: 4.5 mg/dL (10-30 @ 06:31)                              10.9   6.66  )-----------( 152      ( 30 Oct 2022 06:36 )             35.2     Urine Studies:      HbA1c 7.8      [12-17-19 @ 05:54]        RADIOLOGY & ADDITIONAL STUDIES:

## 2022-10-30 NOTE — PROGRESS NOTE ADULT - ASSESSMENT
This is a 66 yo/Male w/ PMHx CAD s/p 5 stents (pLAD, dLAD, LCX, RCA), CHF, HTN, DM, anemia, ESRD on HD (Tu/Th/Sat) who was sent in by Dr. Johnson for [facility], now pending staged PCI 10/31.

## 2022-10-30 NOTE — PROGRESS NOTE ADULT - SUBJECTIVE AND OBJECTIVE BOX
PROGRESS NOTE:     Patient is a 67y old  Male who presents with a chief complaint of Cath (29 Oct 2022 10:31)    INTERVAL HISTORY: NAEO. Pt doing well and no acute complaints of fever/chills/CP/palpitations or pain at femoral site.     MEDICATIONS  (STANDING):  aspirin enteric coated 81 milliGRAM(s) Oral daily  atorvastatin 40 milliGRAM(s) Oral at bedtime  carvedilol 3.125 milliGRAM(s) Oral every 12 hours  chlorhexidine 2% Cloths 1 Application(s) Topical daily  cloNIDine 0.1 milliGRAM(s) Oral three times a day  clopidogrel Tablet 75 milliGRAM(s) Oral daily  dextrose 5%. 1000 milliLiter(s) (100 mL/Hr) IV Continuous <Continuous>  dextrose 5%. 1000 milliLiter(s) (50 mL/Hr) IV Continuous <Continuous>  dextrose 50% Injectable 25 Gram(s) IV Push once  dextrose 50% Injectable 12.5 Gram(s) IV Push once  dextrose 50% Injectable 25 Gram(s) IV Push once  glucagon  Injectable 1 milliGRAM(s) IntraMuscular once  hydrALAZINE 50 milliGRAM(s) Oral two times a day  insulin glargine Injectable (LANTUS) 8 Unit(s) SubCutaneous at bedtime  insulin lispro (ADMELOG) corrective regimen sliding scale   SubCutaneous three times a day before meals  insulin lispro (ADMELOG) corrective regimen sliding scale   SubCutaneous at bedtime  isosorbide   dinitrate Tablet (ISORDIL) 10 milliGRAM(s) Oral three times a day  NIFEdipine XL 90 milliGRAM(s) Oral daily  NIFEdipine XL 30 milliGRAM(s) Oral once  sevelamer carbonate 1600 milliGRAM(s) Oral three times a day with meals    MEDICATIONS  (PRN):  dextrose Oral Gel 15 Gram(s) Oral once PRN Blood Glucose LESS THAN 70 milliGRAM(s)/deciliter      CAPILLARY BLOOD GLUCOSE      POCT Blood Glucose.: 96 mg/dL (29 Oct 2022 07:41)  POCT Blood Glucose.: 177 mg/dL (28 Oct 2022 21:33)  POCT Blood Glucose.: 225 mg/dL (28 Oct 2022 16:07)    I&O's Summary      PHYSICAL EXAM:  Vital Signs Last 24 Hrs  T(C): 36.5 (29 Oct 2022 10:20), Max: 37 (28 Oct 2022 21:06)  T(F): 97.7 (29 Oct 2022 10:20), Max: 98.6 (28 Oct 2022 21:06)  HR: 58 (29 Oct 2022 10:20) (58 - 71)  BP: 131/69 (29 Oct 2022 10:20) (131/69 - 197/86)  BP(mean): 105 (28 Oct 2022 15:45) (97 - 115)  RR: 18 (29 Oct 2022 10:20) (10 - 22)  SpO2: 98% (29 Oct 2022 10:20) (98% - 100%)    Parameters below as of 29 Oct 2022 10:20  Patient On (Oxygen Delivery Method): room air        CONSTITUTIONAL: NAD, well-developed  RESPIRATORY: Normal respiratory effort; lungs are clear to auscultation bilaterally  CARDIOVASCULAR: Regular rate and rhythm, no murmur/rub/gallop; No lower extremity edema; Peripheral pulses are 2+ bilaterally  ABDOMEN: Nontender to palpation, normoactive bowel sounds, no rebound/guarding; No hepatosplenomegaly  MUSCULOSKELETAL: no clubbing or cyanosis of digits; no joint swelling or tenderness to palpation; no hematoma noted on R femoral access site, covered in gauze  PSYCH: A+O to person, place, and time; affect appropriate    LABS:                        10.6   6.93  )-----------( 162      ( 29 Oct 2022 07:12 )             33.4     10-29    140  |  93<L>  |  53<H>  ----------------------------<  96  4.6   |  29  |  11.29<H>    Ca    9.3      29 Oct 2022 07:10  Phos  6.3     10-29  Mg     2.4     10-29      ******************************  Authored By: Rafia Martinez MD PGY1  Internal Medicine  MS Teams  ******************************   PROGRESS NOTE:     Patient is a 67y old  Male who presents with a chief complaint of Cath (29 Oct 2022 10:31)    INTERVAL HISTORY:   No acute events overnight. Patient reports no bowel movement for past 2 days. Usually has one each morning. Denies chest pain, shortness of breath, or palpitations. No other concerns at this time.     MEDICATIONS  (STANDING):  aspirin enteric coated 81 milliGRAM(s) Oral daily  atorvastatin 40 milliGRAM(s) Oral at bedtime  carvedilol 3.125 milliGRAM(s) Oral every 12 hours  chlorhexidine 2% Cloths 1 Application(s) Topical daily  cloNIDine 0.1 milliGRAM(s) Oral three times a day  clopidogrel Tablet 75 milliGRAM(s) Oral daily  dextrose 5%. 1000 milliLiter(s) (100 mL/Hr) IV Continuous <Continuous>  dextrose 5%. 1000 milliLiter(s) (50 mL/Hr) IV Continuous <Continuous>  dextrose 50% Injectable 25 Gram(s) IV Push once  dextrose 50% Injectable 12.5 Gram(s) IV Push once  dextrose 50% Injectable 25 Gram(s) IV Push once  glucagon  Injectable 1 milliGRAM(s) IntraMuscular once  hydrALAZINE 50 milliGRAM(s) Oral two times a day  insulin glargine Injectable (LANTUS) 8 Unit(s) SubCutaneous at bedtime  insulin lispro (ADMELOG) corrective regimen sliding scale   SubCutaneous three times a day before meals  insulin lispro (ADMELOG) corrective regimen sliding scale   SubCutaneous at bedtime  isosorbide   dinitrate Tablet (ISORDIL) 10 milliGRAM(s) Oral three times a day  NIFEdipine XL 90 milliGRAM(s) Oral daily  NIFEdipine XL 30 milliGRAM(s) Oral once  sevelamer carbonate 1600 milliGRAM(s) Oral three times a day with meals    MEDICATIONS  (PRN):  dextrose Oral Gel 15 Gram(s) Oral once PRN Blood Glucose LESS THAN 70 milliGRAM(s)/deciliter      CAPILLARY BLOOD GLUCOSE      POCT Blood Glucose.: 96 mg/dL (29 Oct 2022 07:41)  POCT Blood Glucose.: 177 mg/dL (28 Oct 2022 21:33)  POCT Blood Glucose.: 225 mg/dL (28 Oct 2022 16:07)    I&O's Summary      PHYSICAL EXAM:  Vital Signs Last 24 Hrs  T(C): 36.5 (29 Oct 2022 10:20), Max: 37 (28 Oct 2022 21:06)  T(F): 97.7 (29 Oct 2022 10:20), Max: 98.6 (28 Oct 2022 21:06)  HR: 58 (29 Oct 2022 10:20) (58 - 71)  BP: 131/69 (29 Oct 2022 10:20) (131/69 - 197/86)  BP(mean): 105 (28 Oct 2022 15:45) (97 - 115)  RR: 18 (29 Oct 2022 10:20) (10 - 22)  SpO2: 98% (29 Oct 2022 10:20) (98% - 100%)    Parameters below as of 29 Oct 2022 10:20  Patient On (Oxygen Delivery Method): room air        CONSTITUTIONAL: NAD, well-developed  RESPIRATORY: Normal respiratory effort; lungs are clear to auscultation bilaterally  CARDIOVASCULAR: Regular rate and rhythm, no murmur/rub/gallop; No lower extremity edema; Peripheral pulses are 2+ bilaterally  ABDOMEN: Nontender to palpation, normoactive bowel sounds, no rebound/guarding; No hepatosplenomegaly  MUSCULOSKELETAL: no clubbing or cyanosis of digits; no joint swelling or tenderness to palpation; no hematoma noted on R femoral access site, covered in gauze  PSYCH: A+O to person, place, and time; affect appropriate    LABS:                        10.6   6.93  )-----------( 162      ( 29 Oct 2022 07:12 )             33.4     10-29    140  |  93<L>  |  53<H>  ----------------------------<  96  4.6   |  29  |  11.29<H>    Ca    9.3      29 Oct 2022 07:10  Phos  6.3     10-29  Mg     2.4     10-29      ******************************  Authored By: Rafia Martinez MD PGY1  Internal Medicine  MS Teams  ******************************   Carlos Douglass MD  Internal Medicine, PGY-2    PROGRESS NOTE:     Patient is a 67y old  Male who presents with a chief complaint of Cath (29 Oct 2022 10:31)    INTERVAL HISTORY:   No acute events overnight. Patient reports no bowel movement for past 2 days. Usually has one each morning. Denies chest pain, shortness of breath, or palpitations. No other concerns at this time.     MEDICATIONS  (STANDING):  aspirin enteric coated 81 milliGRAM(s) Oral daily  atorvastatin 40 milliGRAM(s) Oral at bedtime  carvedilol 3.125 milliGRAM(s) Oral every 12 hours  chlorhexidine 2% Cloths 1 Application(s) Topical daily  cloNIDine 0.1 milliGRAM(s) Oral three times a day  clopidogrel Tablet 75 milliGRAM(s) Oral daily  dextrose 5%. 1000 milliLiter(s) (100 mL/Hr) IV Continuous <Continuous>  dextrose 5%. 1000 milliLiter(s) (50 mL/Hr) IV Continuous <Continuous>  dextrose 50% Injectable 25 Gram(s) IV Push once  dextrose 50% Injectable 12.5 Gram(s) IV Push once  dextrose 50% Injectable 25 Gram(s) IV Push once  glucagon  Injectable 1 milliGRAM(s) IntraMuscular once  hydrALAZINE 50 milliGRAM(s) Oral two times a day  insulin glargine Injectable (LANTUS) 8 Unit(s) SubCutaneous at bedtime  insulin lispro (ADMELOG) corrective regimen sliding scale   SubCutaneous three times a day before meals  insulin lispro (ADMELOG) corrective regimen sliding scale   SubCutaneous at bedtime  isosorbide   dinitrate Tablet (ISORDIL) 10 milliGRAM(s) Oral three times a day  NIFEdipine XL 90 milliGRAM(s) Oral daily  NIFEdipine XL 30 milliGRAM(s) Oral once  sevelamer carbonate 1600 milliGRAM(s) Oral three times a day with meals    MEDICATIONS  (PRN):  dextrose Oral Gel 15 Gram(s) Oral once PRN Blood Glucose LESS THAN 70 milliGRAM(s)/deciliter      CAPILLARY BLOOD GLUCOSE      POCT Blood Glucose.: 96 mg/dL (29 Oct 2022 07:41)  POCT Blood Glucose.: 177 mg/dL (28 Oct 2022 21:33)  POCT Blood Glucose.: 225 mg/dL (28 Oct 2022 16:07)    I&O's Summary      PHYSICAL EXAM:  Vital Signs Last 24 Hrs  T(C): 36.5 (29 Oct 2022 10:20), Max: 37 (28 Oct 2022 21:06)  T(F): 97.7 (29 Oct 2022 10:20), Max: 98.6 (28 Oct 2022 21:06)  HR: 58 (29 Oct 2022 10:20) (58 - 71)  BP: 131/69 (29 Oct 2022 10:20) (131/69 - 197/86)  BP(mean): 105 (28 Oct 2022 15:45) (97 - 115)  RR: 18 (29 Oct 2022 10:20) (10 - 22)  SpO2: 98% (29 Oct 2022 10:20) (98% - 100%)    Parameters below as of 29 Oct 2022 10:20  Patient On (Oxygen Delivery Method): room air        CONSTITUTIONAL: NAD, well-developed  RESPIRATORY: Normal respiratory effort; lungs are clear to auscultation bilaterally  CARDIOVASCULAR: Regular rate and rhythm, no murmur/rub/gallop; No lower extremity edema; Peripheral pulses are 2+ bilaterally  ABDOMEN: Nontender to palpation, normoactive bowel sounds, no rebound/guarding; No hepatosplenomegaly  MUSCULOSKELETAL: no clubbing or cyanosis of digits; no joint swelling or tenderness to palpation; no hematoma noted on R femoral access site, covered in gauze  PSYCH: A+O to person, place, and time; affect appropriate    LABS:                        10.6   6.93  )-----------( 162      ( 29 Oct 2022 07:12 )             33.4     10-29    140  |  93<L>  |  53<H>  ----------------------------<  96  4.6   |  29  |  11.29<H>    Ca    9.3      29 Oct 2022 07:10  Phos  6.3     10-29  Mg     2.4     10-29      ******************************  Authored By: Rafia Martinez MD PGY1  Internal Medicine  MS Teams  ******************************

## 2022-10-31 LAB
ANION GAP SERPL CALC-SCNC: 16 MMOL/L — SIGNIFICANT CHANGE UP (ref 5–17)
BUN SERPL-MCNC: 48 MG/DL — HIGH (ref 7–23)
CALCIUM SERPL-MCNC: 9.7 MG/DL — SIGNIFICANT CHANGE UP (ref 8.4–10.5)
CHLORIDE SERPL-SCNC: 93 MMOL/L — LOW (ref 96–108)
CO2 SERPL-SCNC: 27 MMOL/L — SIGNIFICANT CHANGE UP (ref 22–31)
CREAT SERPL-MCNC: 10.36 MG/DL — HIGH (ref 0.5–1.3)
EGFR: 5 ML/MIN/1.73M2 — LOW
GLUCOSE BLDC GLUCOMTR-MCNC: 100 MG/DL — HIGH (ref 70–99)
GLUCOSE BLDC GLUCOMTR-MCNC: 124 MG/DL — HIGH (ref 70–99)
GLUCOSE BLDC GLUCOMTR-MCNC: 145 MG/DL — HIGH (ref 70–99)
GLUCOSE BLDC GLUCOMTR-MCNC: 285 MG/DL — HIGH (ref 70–99)
GLUCOSE SERPL-MCNC: 118 MG/DL — HIGH (ref 70–99)
HCT VFR BLD CALC: 31.4 % — LOW (ref 39–50)
HGB BLD-MCNC: 9.9 G/DL — LOW (ref 13–17)
MAGNESIUM SERPL-MCNC: 2.3 MG/DL — SIGNIFICANT CHANGE UP (ref 1.6–2.6)
MCHC RBC-ENTMCNC: 31 PG — SIGNIFICANT CHANGE UP (ref 27–34)
MCHC RBC-ENTMCNC: 31.5 GM/DL — LOW (ref 32–36)
MCV RBC AUTO: 98.4 FL — SIGNIFICANT CHANGE UP (ref 80–100)
NRBC # BLD: 0 /100 WBCS — SIGNIFICANT CHANGE UP (ref 0–0)
PHOSPHATE SERPL-MCNC: 5.5 MG/DL — HIGH (ref 2.5–4.5)
PLATELET # BLD AUTO: 125 K/UL — LOW (ref 150–400)
POTASSIUM SERPL-MCNC: 5.2 MMOL/L — SIGNIFICANT CHANGE UP (ref 3.5–5.3)
POTASSIUM SERPL-SCNC: 5.2 MMOL/L — SIGNIFICANT CHANGE UP (ref 3.5–5.3)
RBC # BLD: 3.19 M/UL — LOW (ref 4.2–5.8)
RBC # FLD: 13.3 % — SIGNIFICANT CHANGE UP (ref 10.3–14.5)
SODIUM SERPL-SCNC: 136 MMOL/L — SIGNIFICANT CHANGE UP (ref 135–145)
WBC # BLD: 6.97 K/UL — SIGNIFICANT CHANGE UP (ref 3.8–10.5)
WBC # FLD AUTO: 6.97 K/UL — SIGNIFICANT CHANGE UP (ref 3.8–10.5)

## 2022-10-31 PROCEDURE — 99233 SBSQ HOSP IP/OBS HIGH 50: CPT

## 2022-10-31 PROCEDURE — 93010 ELECTROCARDIOGRAM REPORT: CPT

## 2022-10-31 PROCEDURE — 99152 MOD SED SAME PHYS/QHP 5/>YRS: CPT

## 2022-10-31 PROCEDURE — 92978 ENDOLUMINL IVUS OCT C 1ST: CPT | Mod: 26,LD

## 2022-10-31 PROCEDURE — 99232 SBSQ HOSP IP/OBS MODERATE 35: CPT | Mod: GC

## 2022-10-31 PROCEDURE — 92920 PRQ TRLUML C ANGIOP 1ART&/BR: CPT | Mod: LD

## 2022-10-31 PROCEDURE — 99232 SBSQ HOSP IP/OBS MODERATE 35: CPT

## 2022-10-31 RX ORDER — ACETAMINOPHEN 500 MG
650 TABLET ORAL ONCE
Refills: 0 | Status: COMPLETED | OUTPATIENT
Start: 2022-10-31 | End: 2022-10-31

## 2022-10-31 RX ADMIN — Medication 90 MILLIGRAM(S): at 05:51

## 2022-10-31 RX ADMIN — Medication 650 MILLIGRAM(S): at 14:54

## 2022-10-31 RX ADMIN — SEVELAMER CARBONATE 1600 MILLIGRAM(S): 2400 POWDER, FOR SUSPENSION ORAL at 18:26

## 2022-10-31 RX ADMIN — Medication 0.1 MILLIGRAM(S): at 05:50

## 2022-10-31 RX ADMIN — ISOSORBIDE DINITRATE 10 MILLIGRAM(S): 5 TABLET ORAL at 11:00

## 2022-10-31 RX ADMIN — CLOPIDOGREL BISULFATE 75 MILLIGRAM(S): 75 TABLET, FILM COATED ORAL at 11:01

## 2022-10-31 RX ADMIN — CARVEDILOL PHOSPHATE 6.25 MILLIGRAM(S): 80 CAPSULE, EXTENDED RELEASE ORAL at 18:25

## 2022-10-31 RX ADMIN — Medication 50 MILLIGRAM(S): at 05:51

## 2022-10-31 RX ADMIN — SEVELAMER CARBONATE 1600 MILLIGRAM(S): 2400 POWDER, FOR SUSPENSION ORAL at 08:02

## 2022-10-31 RX ADMIN — Medication 50 MILLIGRAM(S): at 18:25

## 2022-10-31 RX ADMIN — Medication 0.1 MILLIGRAM(S): at 18:26

## 2022-10-31 RX ADMIN — Medication 81 MILLIGRAM(S): at 11:00

## 2022-10-31 RX ADMIN — CHLORHEXIDINE GLUCONATE 1 APPLICATION(S): 213 SOLUTION TOPICAL at 11:02

## 2022-10-31 RX ADMIN — POLYETHYLENE GLYCOL 3350 17 GRAM(S): 17 POWDER, FOR SOLUTION ORAL at 11:00

## 2022-10-31 RX ADMIN — ISOSORBIDE DINITRATE 10 MILLIGRAM(S): 5 TABLET ORAL at 05:51

## 2022-10-31 RX ADMIN — ISOSORBIDE DINITRATE 10 MILLIGRAM(S): 5 TABLET ORAL at 18:25

## 2022-10-31 RX ADMIN — CARVEDILOL PHOSPHATE 6.25 MILLIGRAM(S): 80 CAPSULE, EXTENDED RELEASE ORAL at 05:51

## 2022-10-31 RX ADMIN — Medication 650 MILLIGRAM(S): at 15:21

## 2022-10-31 NOTE — PROGRESS NOTE ADULT - ASSESSMENT
This is a 66 yo/Male w/ PMHx CAD s/p 5 stents (pLAD, dLAD, LCX, RCA), CHF, HTN, DM, anemia, ESRD on HD (Tu/Th/Sat) who was sent in by Dr. Johnson for [facility], now pending staged PCI 10/31.

## 2022-10-31 NOTE — PROGRESS NOTE ADULT - PROBLEM SELECTOR PLAN 2
Hx ESRD on HD Tu/Th/Sat. Follows with Dr. Huff.    - HD scheduled for after PCI 10/31 - will then go back to regular schedule  - c/w home sevelamer 1600mg TID

## 2022-10-31 NOTE — PROGRESS NOTE ADULT - PROBLEM SELECTOR PLAN 1
Extensive cardiac hx s/p stents 2014 and recent pLCX NAHUM 10/28 as part of cardiac clearance pending renal transplant. Pending staged PCI 10/31    - CTM R femoral access site for bleed or loss of pulses in feet or pain/inability to move  - c/w ASA and plavix 75mg s/p NAHUM placement  - f/u staged PCI 10/31  - BP control SBP goal <180, can uptitrate carvedilol as needed   - c/w atorvastatin 40mg  - repeat TTE this admission per Dr. Johnson to assess for improved LV function s/p stent placement

## 2022-10-31 NOTE — PROGRESS NOTE ADULT - SUBJECTIVE AND OBJECTIVE BOX
Patient is a 67y old  Male who presents with a chief complaint of coronary artery disease (30 Oct 2022 13:51)        Allergies    No Known Allergies    Intolerances        Medications:  aspirin enteric coated 81 milliGRAM(s) Oral daily  atorvastatin 40 milliGRAM(s) Oral at bedtime  carvedilol 6.25 milliGRAM(s) Oral every 12 hours  chlorhexidine 2% Cloths 1 Application(s) Topical daily  cloNIDine 0.1 milliGRAM(s) Oral three times a day  clopidogrel Tablet 75 milliGRAM(s) Oral daily  dextrose 5%. 1000 milliLiter(s) IV Continuous <Continuous>  dextrose 5%. 1000 milliLiter(s) IV Continuous <Continuous>  dextrose 50% Injectable 25 Gram(s) IV Push once  dextrose 50% Injectable 12.5 Gram(s) IV Push once  dextrose 50% Injectable 25 Gram(s) IV Push once  dextrose Oral Gel 15 Gram(s) Oral once PRN  glucagon  Injectable 1 milliGRAM(s) IntraMuscular once  hydrALAZINE 50 milliGRAM(s) Oral two times a day  insulin glargine Injectable (LANTUS) 8 Unit(s) SubCutaneous at bedtime  insulin lispro (ADMELOG) corrective regimen sliding scale   SubCutaneous three times a day before meals  insulin lispro (ADMELOG) corrective regimen sliding scale   SubCutaneous at bedtime  isosorbide   dinitrate Tablet (ISORDIL) 10 milliGRAM(s) Oral three times a day  NIFEdipine XL 90 milliGRAM(s) Oral daily  polyethylene glycol 3350 17 Gram(s) Oral daily  senna 2 Tablet(s) Oral at bedtime  sevelamer carbonate 1600 milliGRAM(s) Oral three times a day with meals      Vitals:  T(C): 36.5 (10-31-22 @ 04:10), Max: 36.7 (10-30-22 @ 10:57)  HR: 60 (10-31-22 @ 04:10) (55 - 65)  BP: 152/77 (10-31-22 @ 04:10) (122/70 - 152/77)  BP(mean): --  RR: 18 (10-31-22 @ 04:10) (18 - 18)  SpO2: 97% (10-31-22 @ 04:10) (97% - 99%)  Wt(kg): --  Daily     Daily   I&O's Summary    30 Oct 2022 07:01  -  31 Oct 2022 07:00  --------------------------------------------------------  IN: 240 mL / OUT: 0 mL / NET: 240 mL      Physical Exam:  Appearance: Normal  Eyes: PERRL, EOMI  HENT: Normal oral muscosa, NC/AT  Cardiovascular: S1S2, RRR, No M/R/G, no JVD, No Lower extremity edema  Procedural Access Site: No hematoma, Non-tender to palpation, 2+ pulse, No bruit, No Ecchymosis  Respiratory: Clear to auscultation bilaterally  Gastrointestinal: Soft, Non tender, Normal Bowel Sounds  Musculoskeletal: No clubbing, No joint deformity   Neurologic: Non-focal  Lymphatic: No lymphadenopathy  Psychiatry: AAOx3, Mood & affect appropriate  Skin: No rashes, No ecchymoses, No cyanosis    10-31    136  |  93<L>  |  48<H>  ----------------------------<  118<H>  5.2   |  27  |  10.36<H>    Ca    9.7      31 Oct 2022 06:18  Phos  5.5     10-31  Mg     2.3     10-31              Lipid panel   Hgb A1c         ECG:    Echo:  < from: Transthoracic Echocardiogram (03.18.21 @ 13:42) >  Ejection Fraction (Visual Estimate): 35-40 %  ------------------------------------------------------------------------  OBSERVATIONS:  Mitral Valve: Mitral annular calcification, otherwise  normal mitral valve. Mild mitral regurgitation.  Aortic Root: Normal aortic root.  Aortic Valve: Calcified trileaflet aortic valve with normal  opening.  Left Atrium: Mildly dilated left atrium.  LA volume index =  38 cc/m2.  Left Ventricle: Moderate global left ventricular systolic  dysfunction. The mid to distal septum, apex and mid to  distal anterior walls are severely hypokinetic. Normal left  ventricular internal dimensions and wall thicknesses.  Severe diastolic dysfunction.  Right Heart: Normal right atrium. Normal right ventricular  size and function. Normal tricuspid valve. Mild tricuspid  regurgitation. Normal pulmonic valve.  Pericardium/PleuraNormal pericardiumwith no pericardial  effusion. Bilateral pleural effusions.  Hemodynamic: Estimated right ventricular systolic pressure  equals 35 mm Hg, assuming right atrial pressure equals 10  mm Hg, consistent with borderline pulmonary hypertension.    < end of copied text >      Stress Testing:     Cath:  < from: Cardiac Catheterization (10.28.22 @ 10:47) >  Diagnostic Findings:     Coronary Angiography   LM   Left main artery: Angiography shows minor irregularities.      LAD   Proximal left anterior descending: There is a 90 % stenosis.      CX   Proximal circumflex: There is an80 % stenosis.      RCA   Mid right coronary artery: There is a 30 % stenosis.    < from: Cardiac Catheterization (10.28.22 @ 10:47) >  Interventional Details   Proximal circumflex: This was an 80 % De Aleisha stenosis. This was an  ACC/AHA High/C lesion for intervention. Guidewire  crossing was successful.      A successful Drug Eluting Stent was deployed using a [employer] UNIVERSAL  190, a 6FR JL 4.0 LAUNCHER, and a FRONTIER RX  [id number].      The inflation pressure was 26 austin for the duration of 18.0 seconds.     Following intervention there is a 1 % residual stenosis. There was JORGE Flow 3 before the procedure and JORGE Flow 3 following the procedure.  < end of copied text >    Imaging:  Interpretation of Telemetry:   Patient is a 67y old  Male who presents with a chief complaint of coronary artery disease (30 Oct 2022 13:51)        Allergies    No Known Allergies    Intolerances        Medications:  aspirin enteric coated 81 milliGRAM(s) Oral daily  atorvastatin 40 milliGRAM(s) Oral at bedtime  carvedilol 6.25 milliGRAM(s) Oral every 12 hours  chlorhexidine 2% Cloths 1 Application(s) Topical daily  cloNIDine 0.1 milliGRAM(s) Oral three times a day  clopidogrel Tablet 75 milliGRAM(s) Oral daily  dextrose 5%. 1000 milliLiter(s) IV Continuous <Continuous>  dextrose 5%. 1000 milliLiter(s) IV Continuous <Continuous>  dextrose 50% Injectable 25 Gram(s) IV Push once  dextrose 50% Injectable 12.5 Gram(s) IV Push once  dextrose 50% Injectable 25 Gram(s) IV Push once  dextrose Oral Gel 15 Gram(s) Oral once PRN  glucagon  Injectable 1 milliGRAM(s) IntraMuscular once  hydrALAZINE 50 milliGRAM(s) Oral two times a day  insulin glargine Injectable (LANTUS) 8 Unit(s) SubCutaneous at bedtime  insulin lispro (ADMELOG) corrective regimen sliding scale   SubCutaneous three times a day before meals  insulin lispro (ADMELOG) corrective regimen sliding scale   SubCutaneous at bedtime  isosorbide   dinitrate Tablet (ISORDIL) 10 milliGRAM(s) Oral three times a day  NIFEdipine XL 90 milliGRAM(s) Oral daily  polyethylene glycol 3350 17 Gram(s) Oral daily  senna 2 Tablet(s) Oral at bedtime  sevelamer carbonate 1600 milliGRAM(s) Oral three times a day with meals      Vitals:  T(C): 36.5 (10-31-22 @ 04:10), Max: 36.7 (10-30-22 @ 10:57)  HR: 60 (10-31-22 @ 04:10) (55 - 65)  BP: 152/77 (10-31-22 @ 04:10) (122/70 - 152/77)  BP(mean): --  RR: 18 (10-31-22 @ 04:10) (18 - 18)  SpO2: 97% (10-31-22 @ 04:10) (97% - 99%)  Wt(kg): --  Daily     Daily   I&O's Summary    30 Oct 2022 07:01  -  31 Oct 2022 07:00  --------------------------------------------------------  IN: 240 mL / OUT: 0 mL / NET: 240 mL      Physical Exam:  Appearance: Normal  Eyes: PERRL, EOMI  HENT: Normal oral muscosa, NC/AT  Cardiovascular: S1S2, RRR, No M/R/G, no JVD, No Lower extremity edema  Procedural Access Site: No hematoma, Non-tender to palpation, 2+ pulse, No bruit, No Ecchymosis  Respiratory: Clear to auscultation bilaterally  Gastrointestinal: Soft, Non tender, Normal Bowel Sounds  Musculoskeletal: No clubbing, No joint deformity   Neurologic: Non-focal  Lymphatic: No lymphadenopathy  Psychiatry: AAOx3, Mood & affect appropriate  Skin: No rashes, No ecchymoses, No cyanosis    10-31    136  |  93<L>  |  48<H>  ----------------------------<  118<H>  5.2   |  27  |  10.36<H>    Ca    9.7      31 Oct 2022 06:18  Phos  5.5     10-31  Mg     2.3     10-31              Lipid panel   Hgb A1c         ECG:    Echo:  < from: Transthoracic Echocardiogram (03.18.21 @ 13:42) >  Ejection Fraction (Visual Estimate): 35-40 %  ------------------------------------------------------------------------  OBSERVATIONS:  Mitral Valve: Mitral annular calcification, otherwise normal mitral valve. Mild mitral regurgitation.  Aortic Root: Normal aortic root.  Aortic Valve: Calcified trileaflet aortic valve with normal opening.  Left Atrium: Mildly dilated left atrium.  LA volume index =38 cc/m2.  Left Ventricle: Moderate global left ventricular systolic dysfunction. The mid to distal septum, apex and mid to distal anterior walls are severely hypokinetic. Normal left ventricular internal dimensions and wall thicknesses.  Severe diastolic dysfunction.  Right Heart: Normal right atrium. Normal right ventricular size and function. Normal tricuspid valve. Mild tricuspid regurgitation. Normal pulmonic valve.  Pericardium/PleuraNormal pericardiumwith no pericardial effusion. Bilateral pleural effusions.  Hemodynamic: Estimated right ventricular systolic pressure equals 35 mm Hg, assuming right atrial pressure equals 10mm Hg, consistent with borderline pulmonary hypertension. < end of copied text >      Stress Testing:     Cath:  < from: Cardiac Catheterization (10.28.22 @ 10:47) >  Diagnostic Findings:     Coronary Angiography   LM: Left main artery: Angiography shows minor irregularities.    LAD: Proximal left anterior descending: There is a 90 % stenosis.    CX: Proximal circumflex: There is an80 % stenosis.    RCA: Mid right coronary artery: There is a 30 % stenosis.    < from: Cardiac Catheterization (10.28.22 @ 10:47) >  Interventional Details   Proximal circumflex: This was an 80 % De Aleisha stenosis. This was an ACC/AHA High/C lesion for intervention. Guidewire crossing was successful.    A successful Drug Eluting Stent was deployed using a [employer] UNIVERSAL  190, a 6FR JL 4.0 LAUNCHER, and a FRONTIER RX [id number]. The inflation pressure was 26 austin for the duration of 18.0 seconds.   Following intervention there is a 1 % residual stenosis. There was JORGE Flow 3 before the procedure and JORGE Flow 3 following the procedure.  < end of copied text >    Imaging:  Interpretation of Telemetry: SR 60-70's

## 2022-10-31 NOTE — PROGRESS NOTE ADULT - ASSESSMENT
68 yo/Male w/ PMHx CAD s/p 5 stents (pLAD, dLAD, LCX, RCA), CHF, HTN, DM, anemia, ESRD on HD (Tu/Th/Sat) who was sent in by Dr. Johnson for [facility], s/p stent LCX on 10/28,   for staged PCI in LAD on 10/31.    1. CAD  s/p stent pLCX on 10/28, staged PCI in LAD on 10/31.   as part of cardiac clearance pending renal transplant  - c/w ASA and plavix 75mg and atorvastatin 40mg  - f/u staged PCI 10/31    2. HTN   - BP control SBP goal <180, can uptitrate carvedilol as needed   - repeat TTE this admission per Dr. Johnson to assess for improved LV function s/p stent placement.  - DASH diet     3. ESRD   - Dialysis after PCI today   - f/u with nephrologist, Dr. Yasmany Huff. 66 yo/Male w/ PMHx CAD s/p 5 stents (pLAD, dLAD, LCX, RCA), CHF, HTN, DM, anemia, ESRD on HD (Tu/Th/Sat) who was referred for C  as part of cardiac clearance pending renal transplant (by Dr. Johnson), s/p stent LCX on 10/28, for staged PCI in LAD on 10/31.    1. CAD  s/p stent pLCX on 10/28, staged PCI in LAD on 10/31.  - Continue ASA and Plavix 75mg  atorvastatin 40mg  - Continue Atorvastatin 40mg  - f/u staged PCI 10/31    2. HTN   - BP control SBP goal <180, can uptitrate carvedilol as needed   - repeat TTE on this admission as per Dr. Johnson to assess for improved LV function s/p stent placement.  - DASH diet     3. ESRD   - Dialysis after PCI today   - f/u with nephrologist, Dr. Yasmany Huff.

## 2022-10-31 NOTE — CHART NOTE - NSCHARTNOTEFT_GEN_A_CORE
Removal of Femoral Sheath    Pulses in the right lower extremity are palpable. The patient was placed in the supine position. The insertion site was identified and the sutures were removed per protocol.  The __6__ Turkmen femoral sheath was then removed by ALEXANDER Crook NP. Direct pressure was applied for  __20____ minutes.     Monitoring of the right groin and both lower extremities including neuro-vascular checks and vital signs every 15 minutes x 4, then every 30 minutes x 2, then every 1 hour was ordered.    Complications: None    Comments: Activity restrictions and reportable symptoms discussed with patient    Harini Campbelloli ANP-C  [phone]

## 2022-10-31 NOTE — PROGRESS NOTE ADULT - ASSESSMENT
67 M with HTN, DM, anemia, ESRD on HD ( T/T/sat at [facility] HD center, Nephro: Dr. Huff), GERD, presents today for LHC following an abnormal NST. Patient is waiting for Renal transplant, seen by Dr. Johnson for cardiac clearance and referred  for LHC. Pt denies chest pain, SOB, palpitations/ syncope. EF 72 from TTE. s/p 10/28-prox Cx (80%) x 1 NAHUM Staged PCI of LAD Monday. Nephrology consulted for HD.     A/P    ESRD on HD   TTS   from [facility]    last HD 10/29  renal diet   Going to cath today  and CT wants HD after Cath.   HD consent in the HD unit     HTN   Bp fluctutaing  titrate up hydralazine if BP remains elevated   monitor BP closely     Anemia   ALESHA with HD  monitor H/H    CKD;MBD   monitor Po4, Ca

## 2022-10-31 NOTE — PROGRESS NOTE ADULT - SUBJECTIVE AND OBJECTIVE BOX
[facility] [place] [facility]   MD JORDAN AVALOS MD RUORU WONG, PA    TEL:  FROM 9 AM to 5 PM ---OFFICE: [phone]    FROM 5 PM - 9 AM PLEASE CALL ANSWERING SERVICE: [phone]    RENAL FOLLOW UP NOTE--Date of Service 10-31-22 @ 09:06  --------------------------------------------------------------------------------  HPI:      Pt seen and examined at bedside.   Sherrill SOB, chest pain     PAST HISTORY  --------------------------------------------------------------------------------  No significant changes to PMH, PSH, FHx, SHx, unless otherwise noted    ALLERGIES & MEDICATIONS  --------------------------------------------------------------------------------  Allergies    No Known Allergies    Intolerances      Standing Inpatient Medications  aspirin enteric coated 81 milliGRAM(s) Oral daily  atorvastatin 40 milliGRAM(s) Oral at bedtime  carvedilol 6.25 milliGRAM(s) Oral every 12 hours  chlorhexidine 2% Cloths 1 Application(s) Topical daily  cloNIDine 0.1 milliGRAM(s) Oral three times a day  clopidogrel Tablet 75 milliGRAM(s) Oral daily  dextrose 5%. 1000 milliLiter(s) IV Continuous <Continuous>  dextrose 5%. 1000 milliLiter(s) IV Continuous <Continuous>  dextrose 50% Injectable 25 Gram(s) IV Push once  dextrose 50% Injectable 12.5 Gram(s) IV Push once  dextrose 50% Injectable 25 Gram(s) IV Push once  glucagon  Injectable 1 milliGRAM(s) IntraMuscular once  hydrALAZINE 50 milliGRAM(s) Oral two times a day  insulin glargine Injectable (LANTUS) 8 Unit(s) SubCutaneous at bedtime  insulin lispro (ADMELOG) corrective regimen sliding scale   SubCutaneous three times a day before meals  insulin lispro (ADMELOG) corrective regimen sliding scale   SubCutaneous at bedtime  isosorbide   dinitrate Tablet (ISORDIL) 10 milliGRAM(s) Oral three times a day  NIFEdipine XL 90 milliGRAM(s) Oral daily  polyethylene glycol 3350 17 Gram(s) Oral daily  senna 2 Tablet(s) Oral at bedtime  sevelamer carbonate 1600 milliGRAM(s) Oral three times a day with meals    PRN Inpatient Medications  dextrose Oral Gel 15 Gram(s) Oral once PRN      REVIEW OF SYSTEMS  --------------------------------------------------------------------------------  General: no fever  CVS: no chest pain  RESP: no sob, no cough  ABD: no abdominal pain  : no dysuria,  MSK: no edema     VITALS/PHYSICAL EXAM  --------------------------------------------------------------------------------  T(C): 36.5 (10-31-22 @ 04:10), Max: 36.7 (10-30-22 @ 10:57)  HR: 60 (10-31-22 @ 04:10) (55 - 65)  BP: 152/77 (10-31-22 @ 04:10) (122/70 - 152/77)  RR: 18 (10-31-22 @ 04:10) (18 - 18)  SpO2: 97% (10-31-22 @ 04:10) (97% - 99%)  Wt(kg): --        10-30-22 @ 07:01  -  10-31-22 @ 07:00  --------------------------------------------------------  IN: 240 mL / OUT: 0 mL / NET: 240 mL      Physical Exam:  	Gen: NAD  	HEENT: MMM  	Pulm: CTA B/L  	CV: S1S2  	Abd: Soft, +BS  	Ext: No LE edema B/L                      Neuro: Awake   	Skin: Warm and Dry   	Vascular access: avf          JHONATHAN hinds  LABS/STUDIES  --------------------------------------------------------------------------------              9.9    6.97  >-----------<  125      [10-31-22 @ 06:19]              31.4     136  |  93  |  48  ----------------------------<  118      [10-31-22 @ 06:18]  5.2   |  27  |  10.36        Ca     9.7     [10-31-22 @ 06:18]      Mg     2.3     [10-31-22 @ 06:18]      Phos  5.5     [10-31-22 @ 06:18]            Creatinine Trend:  SCr 10.36 [10-31 @ 06:18]  SCr 8.36 [10-30 @ 06:31]  SCr 11.29 [10-29 @ 07:10]  SCr 9.21 [10-28 @ 08:41]        HbA1c 7.8      [12-17-19 @ 05:54]

## 2022-10-31 NOTE — PROGRESS NOTE ADULT - SUBJECTIVE AND OBJECTIVE BOX
Milli Britt MD          SUBJECTIVE / OVERNIGHT EVENTS:      No acute events overnight. Patient seen and evaluated at bedside. No fever/chills.  Denies SOB at rest, chest pain, palpitations, abdominal pain, nausea/vomiting.   2U corrective insulin last 24h.     MEDICATIONS  (STANDING):  aspirin enteric coated 81 milliGRAM(s) Oral daily  atorvastatin 40 milliGRAM(s) Oral at bedtime  carvedilol 6.25 milliGRAM(s) Oral every 12 hours  chlorhexidine 2% Cloths 1 Application(s) Topical daily  cloNIDine 0.1 milliGRAM(s) Oral three times a day  clopidogrel Tablet 75 milliGRAM(s) Oral daily  dextrose 5%. 1000 milliLiter(s) (100 mL/Hr) IV Continuous <Continuous>  dextrose 5%. 1000 milliLiter(s) (50 mL/Hr) IV Continuous <Continuous>  dextrose 50% Injectable 25 Gram(s) IV Push once  dextrose 50% Injectable 12.5 Gram(s) IV Push once  dextrose 50% Injectable 25 Gram(s) IV Push once  glucagon  Injectable 1 milliGRAM(s) IntraMuscular once  hydrALAZINE 50 milliGRAM(s) Oral two times a day  insulin glargine Injectable (LANTUS) 8 Unit(s) SubCutaneous at bedtime  insulin lispro (ADMELOG) corrective regimen sliding scale   SubCutaneous three times a day before meals  insulin lispro (ADMELOG) corrective regimen sliding scale   SubCutaneous at bedtime  isosorbide   dinitrate Tablet (ISORDIL) 10 milliGRAM(s) Oral three times a day  NIFEdipine XL 90 milliGRAM(s) Oral daily  polyethylene glycol 3350 17 Gram(s) Oral daily  senna 2 Tablet(s) Oral at bedtime  sevelamer carbonate 1600 milliGRAM(s) Oral three times a day with meals    MEDICATIONS  (PRN):  dextrose Oral Gel 15 Gram(s) Oral once PRN Blood Glucose LESS THAN 70 milliGRAM(s)/deciliter      CAPILLARY BLOOD GLUCOSE      POCT Blood Glucose.: 145 mg/dL (31 Oct 2022 07:35)  POCT Blood Glucose.: 228 mg/dL (30 Oct 2022 21:43)  POCT Blood Glucose.: 108 mg/dL (30 Oct 2022 16:41)  POCT Blood Glucose.: 193 mg/dL (30 Oct 2022 12:04)  POCT Blood Glucose.: 160 mg/dL (30 Oct 2022 08:01)    I&O's Summary    30 Oct 2022 07:01  -  31 Oct 2022 07:00  --------------------------------------------------------  IN: 240 mL / OUT: 0 mL / NET: 240 mL        PHYSICAL EXAM:  Vital Signs Last 24 Hrs  T(C): 36.5 (31 Oct 2022 04:10), Max: 36.7 (30 Oct 2022 10:57)  T(F): 97.7 (31 Oct 2022 04:10), Max: 98 (30 Oct 2022 10:57)  HR: 60 (31 Oct 2022 04:10) (55 - 65)  BP: 152/77 (31 Oct 2022 04:10) (122/70 - 152/77)  BP(mean): --  RR: 18 (31 Oct 2022 04:10) (18 - 18)  SpO2: 97% (31 Oct 2022 04:10) (97% - 99%)    Parameters below as of 31 Oct 2022 04:10  Patient On (Oxygen Delivery Method): room air        ____________________  PHYSICAL EXAM:  CONSTITUTIONAL: NAD, well-developed  RESPIRATORY: Normal respiratory effort; lungs are clear to auscultation bilaterally  CARDIOVASCULAR: Regular rate and rhythm, no murmur/rub/gallop; No lower extremity edema; Peripheral pulses are 2+ bilaterally  ABDOMEN: Nontender to palpation, normoactive bowel sounds, no rebound/guarding; No hepatosplenomegaly  MUSCULOSKELETAL: no clubbing or cyanosis of digits; no joint swelling or tenderness to palpation; no hematoma noted on R femoral access site, covered in gauze  PSYCH: A+O to person, place, and time; affect appropriate      LABS:                        9.9    6.97  )-----------( 125      ( 31 Oct 2022 06:19 )             31.4     10-31    136  |  93<L>  |  48<H>  ----------------------------<  118<H>  5.2   |  27  |  10.36<H>    Ca    9.7      31 Oct 2022 06:18  Phos  5.5     10-31  Mg     2.3     10-31                      *******************************************************************************  *******************************************************************************  *******************************************************************************  *******************************************************************************  *******************************************************************************  CAPILLARY BLOOD GLUCOSE      POCT Blood Glucose.: 145 mg/dL (31 Oct 2022 07:35)  POCT Blood Glucose.: 228 mg/dL (30 Oct 2022 21:43)  POCT Blood Glucose.: 108 mg/dL (30 Oct 2022 16:41)  POCT Blood Glucose.: 193 mg/dL (30 Oct 2022 12:04)  POCT Blood Glucose.: 160 mg/dL (30 Oct 2022 08:01)      aspirin enteric coated   81 milliGRAM(s) Oral (10-30 @ 11:13)    atorvastatin   40 milliGRAM(s) Oral (10-30 @ 22:11)    carvedilol   6.25 milliGRAM(s) Oral (10-31 @ 05:51)    chlorhexidine 2% Cloths   1 Application(s) Topical (10-30 @ 11:14)    cloNIDine   0.1 milliGRAM(s) Oral (10-30 @ 13:16)   0.1 milliGRAM(s) Oral (10-30 @ 22:11)   0.1 milliGRAM(s) Oral (10-31 @ 05:50)    clopidogrel Tablet   75 milliGRAM(s) Oral (10-30 @ 11:13)    hydrALAZINE   50 milliGRAM(s) Oral (10-30 @ 17:07)   50 milliGRAM(s) Oral (10-31 @ 05:51)    insulin glargine Injectable (LANTUS)   8 Unit(s) SubCutaneous (10-30 @ 22:11)    insulin lispro (ADMELOG) corrective regimen sliding scale   1 Unit(s) SubCutaneous (10-30 @ 08:13)   1 Unit(s) SubCutaneous (10-30 @ 12:14)    isosorbide   dinitrate Tablet (ISORDIL)   10 milliGRAM(s) Oral (10-30 @ 11:17)   10 milliGRAM(s) Oral (10-30 @ 17:08)   10 milliGRAM(s) Oral (10-31 @ 05:51)    NIFEdipine XL   90 milliGRAM(s) Oral (10-31 @ 05:51)    polyethylene glycol 3350   17 Gram(s) Oral (10-30 @ 11:17)    senna   2 Tablet(s) Oral (10-30 @ 22:11)    sevelamer carbonate   1600 milliGRAM(s) Oral (10-30 @ 08:13)   1600 milliGRAM(s) Oral (10-30 @ 12:15)   1600 milliGRAM(s) Oral (10-30 @ 17:07)      Vital Signs Last 24 Hrs  T(C): 36.5 (31 Oct 2022 04:10), Max: 36.7 (30 Oct 2022 10:57)  T(F): 97.7 (31 Oct 2022 04:10), Max: 98 (30 Oct 2022 10:57)  HR: 60 (31 Oct 2022 04:10) (55 - 65)  BP: 152/77 (31 Oct 2022 04:10) (122/70 - 152/77)  BP(mean): --  RR: 18 (31 Oct 2022 04:10) (18 - 18)  SpO2: 97% (31 Oct 2022 04:10) (97% - 99%)    Parameters below as of 31 Oct 2022 04:10  Patient On (Oxygen Delivery Method): room air        10-30-22 @ 07:01  -  10-31-22 @ 07:00  --------------------------------------------------------  IN:    Oral Fluid: 240 mL  Total IN: 240 mL    OUT:  Total OUT: 0 mL    Total NET: 240 mL       Milli Britt MD       SUBJECTIVE / OVERNIGHT EVENTS:      No acute events overnight. Patient seen and evaluated at bedside. No fever/chills.  Denies SOB at rest, chest pain, palpitations, abdominal pain, nausea/vomiting.   2U corrective insulin last 24h. Tele reviewed sinus 50-60, no ectopy.     MEDICATIONS  (STANDING):  aspirin enteric coated 81 milliGRAM(s) Oral daily  atorvastatin 40 milliGRAM(s) Oral at bedtime  carvedilol 6.25 milliGRAM(s) Oral every 12 hours  chlorhexidine 2% Cloths 1 Application(s) Topical daily  cloNIDine 0.1 milliGRAM(s) Oral three times a day  clopidogrel Tablet 75 milliGRAM(s) Oral daily  dextrose 5%. 1000 milliLiter(s) (100 mL/Hr) IV Continuous <Continuous>  dextrose 5%. 1000 milliLiter(s) (50 mL/Hr) IV Continuous <Continuous>  dextrose 50% Injectable 25 Gram(s) IV Push once  dextrose 50% Injectable 12.5 Gram(s) IV Push once  dextrose 50% Injectable 25 Gram(s) IV Push once  glucagon  Injectable 1 milliGRAM(s) IntraMuscular once  hydrALAZINE 50 milliGRAM(s) Oral two times a day  insulin glargine Injectable (LANTUS) 8 Unit(s) SubCutaneous at bedtime  insulin lispro (ADMELOG) corrective regimen sliding scale   SubCutaneous three times a day before meals  insulin lispro (ADMELOG) corrective regimen sliding scale   SubCutaneous at bedtime  isosorbide   dinitrate Tablet (ISORDIL) 10 milliGRAM(s) Oral three times a day  NIFEdipine XL 90 milliGRAM(s) Oral daily  polyethylene glycol 3350 17 Gram(s) Oral daily  senna 2 Tablet(s) Oral at bedtime  sevelamer carbonate 1600 milliGRAM(s) Oral three times a day with meals    MEDICATIONS  (PRN):  dextrose Oral Gel 15 Gram(s) Oral once PRN Blood Glucose LESS THAN 70 milliGRAM(s)/deciliter      CAPILLARY BLOOD GLUCOSE      POCT Blood Glucose.: 145 mg/dL (31 Oct 2022 07:35)  POCT Blood Glucose.: 228 mg/dL (30 Oct 2022 21:43)  POCT Blood Glucose.: 108 mg/dL (30 Oct 2022 16:41)  POCT Blood Glucose.: 193 mg/dL (30 Oct 2022 12:04)  POCT Blood Glucose.: 160 mg/dL (30 Oct 2022 08:01)    I&O's Summary    30 Oct 2022 07:01  -  31 Oct 2022 07:00  --------------------------------------------------------  IN: 240 mL / OUT: 0 mL / NET: 240 mL        PHYSICAL EXAM:  Vital Signs Last 24 Hrs  T(C): 36.5 (31 Oct 2022 04:10), Max: 36.7 (30 Oct 2022 10:57)  T(F): 97.7 (31 Oct 2022 04:10), Max: 98 (30 Oct 2022 10:57)  HR: 60 (31 Oct 2022 04:10) (55 - 65)  BP: 152/77 (31 Oct 2022 04:10) (122/70 - 152/77)  BP(mean): --  RR: 18 (31 Oct 2022 04:10) (18 - 18)  SpO2: 97% (31 Oct 2022 04:10) (97% - 99%)    Parameters below as of 31 Oct 2022 04:10  Patient On (Oxygen Delivery Method): room air        ____________________  PHYSICAL EXAM:  CONSTITUTIONAL: NAD, well-developed  RESPIRATORY: Normal respiratory effort; lungs are clear to auscultation bilaterally  CARDIOVASCULAR: Regular rate and rhythm, no murmur/rub/gallop; No lower extremity edema; Peripheral pulses are 2+ bilaterally  ABDOMEN: Nontender to palpation, normoactive bowel sounds, no rebound/guarding; No hepatosplenomegaly  MUSCULOSKELETAL: no clubbing or cyanosis of digits; no joint swelling or tenderness to palpation; no hematoma noted on R femoral access site  PSYCH: A+O to person, place, and time; affect appropriate      LABS:                        9.9    6.97  )-----------( 125      ( 31 Oct 2022 06:19 )             31.4     10-31    136  |  93<L>  |  48<H>  ----------------------------<  118<H>  5.2   |  27  |  10.36<H>    Ca    9.7      31 Oct 2022 06:18  Phos  5.5     10-31  Mg     2.3     10-31                      *******************************************************************************  *******************************************************************************  *******************************************************************************  *******************************************************************************  *******************************************************************************  CAPILLARY BLOOD GLUCOSE      POCT Blood Glucose.: 145 mg/dL (31 Oct 2022 07:35)  POCT Blood Glucose.: 228 mg/dL (30 Oct 2022 21:43)  POCT Blood Glucose.: 108 mg/dL (30 Oct 2022 16:41)  POCT Blood Glucose.: 193 mg/dL (30 Oct 2022 12:04)  POCT Blood Glucose.: 160 mg/dL (30 Oct 2022 08:01)      aspirin enteric coated   81 milliGRAM(s) Oral (10-30 @ 11:13)    atorvastatin   40 milliGRAM(s) Oral (10-30 @ 22:11)    carvedilol   6.25 milliGRAM(s) Oral (10-31 @ 05:51)    chlorhexidine 2% Cloths   1 Application(s) Topical (10-30 @ 11:14)    cloNIDine   0.1 milliGRAM(s) Oral (10-30 @ 13:16)   0.1 milliGRAM(s) Oral (10-30 @ 22:11)   0.1 milliGRAM(s) Oral (10-31 @ 05:50)    clopidogrel Tablet   75 milliGRAM(s) Oral (10-30 @ 11:13)    hydrALAZINE   50 milliGRAM(s) Oral (10-30 @ 17:07)   50 milliGRAM(s) Oral (10-31 @ 05:51)    insulin glargine Injectable (LANTUS)   8 Unit(s) SubCutaneous (10-30 @ 22:11)    insulin lispro (ADMELOG) corrective regimen sliding scale   1 Unit(s) SubCutaneous (10-30 @ 08:13)   1 Unit(s) SubCutaneous (10-30 @ 12:14)    isosorbide   dinitrate Tablet (ISORDIL)   10 milliGRAM(s) Oral (10-30 @ 11:17)   10 milliGRAM(s) Oral (10-30 @ 17:08)   10 milliGRAM(s) Oral (10-31 @ 05:51)    NIFEdipine XL   90 milliGRAM(s) Oral (10-31 @ 05:51)    polyethylene glycol 3350   17 Gram(s) Oral (10-30 @ 11:17)    senna   2 Tablet(s) Oral (10-30 @ 22:11)    sevelamer carbonate   1600 milliGRAM(s) Oral (10-30 @ 08:13)   1600 milliGRAM(s) Oral (10-30 @ 12:15)   1600 milliGRAM(s) Oral (10-30 @ 17:07)      Vital Signs Last 24 Hrs  T(C): 36.5 (31 Oct 2022 04:10), Max: 36.7 (30 Oct 2022 10:57)  T(F): 97.7 (31 Oct 2022 04:10), Max: 98 (30 Oct 2022 10:57)  HR: 60 (31 Oct 2022 04:10) (55 - 65)  BP: 152/77 (31 Oct 2022 04:10) (122/70 - 152/77)  BP(mean): --  RR: 18 (31 Oct 2022 04:10) (18 - 18)  SpO2: 97% (31 Oct 2022 04:10) (97% - 99%)    Parameters below as of 31 Oct 2022 04:10  Patient On (Oxygen Delivery Method): room air        10-30-22 @ 07:01  -  10-31-22 @ 07:00  --------------------------------------------------------  IN:    Oral Fluid: 240 mL  Total IN: 240 mL    OUT:  Total OUT: 0 mL    Total NET: 240 mL

## 2022-10-31 NOTE — PROGRESS NOTE ADULT - ASSESSMENT
67 year-old gentleman awaiting renal transplant.  He has known coronary artery disease status post PCI.    Now status post PCI to LCx yesterday with plans for staged PCI to LAD today.  Tolerated procedure well.    Continue dual antiplatelet therapy.  Continue high intensity statin therapy. Patient may also require PCSK-9 inhibitor (as outpatient).  Recommend uptitrating carvedilol as tolerated.    Dialysis after PCI on Monday. Discussed with Jim Jones MD and patient's nephrologist, Dr. Yasmany Huff.

## 2022-10-31 NOTE — PROGRESS NOTE ADULT - SUBJECTIVE AND OBJECTIVE BOX
HPI:  Patient seen and examined at bedside on 4 Simone.  No cardiac events overnight.  Plan for PCI to LAD today.    Review Of Systems:           Respiratory: No shortness of breath, cough, or wheezing  Cardiovascular: No chest pain or palpitations  10 point review of systems is otherwise negative except as mentioned above        Medications:  aspirin enteric coated 81 milliGRAM(s) Oral daily  atorvastatin 40 milliGRAM(s) Oral at bedtime  carvedilol 6.25 milliGRAM(s) Oral every 12 hours  chlorhexidine 2% Cloths 1 Application(s) Topical daily  cloNIDine 0.1 milliGRAM(s) Oral three times a day  clopidogrel Tablet 75 milliGRAM(s) Oral daily  dextrose 5%. 1000 milliLiter(s) IV Continuous <Continuous>  dextrose 5%. 1000 milliLiter(s) IV Continuous <Continuous>  dextrose 50% Injectable 25 Gram(s) IV Push once  dextrose 50% Injectable 12.5 Gram(s) IV Push once  dextrose 50% Injectable 25 Gram(s) IV Push once  dextrose Oral Gel 15 Gram(s) Oral once PRN  glucagon  Injectable 1 milliGRAM(s) IntraMuscular once  hydrALAZINE 50 milliGRAM(s) Oral two times a day  insulin glargine Injectable (LANTUS) 8 Unit(s) SubCutaneous at bedtime  insulin lispro (ADMELOG) corrective regimen sliding scale   SubCutaneous three times a day before meals  insulin lispro (ADMELOG) corrective regimen sliding scale   SubCutaneous at bedtime  isosorbide   dinitrate Tablet (ISORDIL) 10 milliGRAM(s) Oral three times a day  NIFEdipine XL 90 milliGRAM(s) Oral daily  polyethylene glycol 3350 17 Gram(s) Oral daily  senna 2 Tablet(s) Oral at bedtime  sevelamer carbonate 1600 milliGRAM(s) Oral three times a day with meals    PAST MEDICAL & SURGICAL HISTORY:  HTN (hypertension)      Coronary artery disease      CAP (community acquired pneumonia)  6/18      Diabetes mellitus  type 2      GERD (gastroesophageal reflux disease)      Stented coronary artery  2014, 3 stents, [facility]      ESRD (end stage renal disease)  on Dialysis( M/W/F), By Dr. Huff      Hypercholesterolemia      Cerebrovascular accident (CVA), unspecified mechanism  diagnosed via CT of the head      Anemia due to stage 5 chronic kidney disease, not on chronic dialysis      H/O coronary angiogram  2014 - x3 stents      AV fistula  10/12/18 L radiocephalic AV fistula      H/O eye surgery        Vitals:  T(C): 36.6 (10-31-22 @ 20:35), Max: 36.6 (10-31-22 @ 11:02)  HR: 64 (10-31-22 @ 20:35) (56 - 67)  BP: 147/67 (10-31-22 @ 20:35) (125/65 - 161/73)  BP(mean): 80 (10-31-22 @ 17:15) (78 - 89)  RR: 18 (10-31-22 @ 20:35) (11 - 18)  SpO2: 99% (10-31-22 @ 20:35) (96% - 99%)  Wt(kg): --  Daily     Daily   I&O's Summary    30 Oct 2022 07:01  -  31 Oct 2022 07:00  --------------------------------------------------------  IN: 240 mL / OUT: 0 mL / NET: 240 mL        Physical Exam:  Appearance: Normal, well groomed, NAD  Eyes: PERRLA, EOMI, pink conjunctiva, no scleral icterus   HENT: Normal oral mucosa  Cardiovascular: RRR, S1, S2, no murmur, rub, or gallop; no edema; no JVD  Procedural Access Site (right groin): Clean, dry, intact, without hematoma  Respiratory: Clear to auscultation bilaterally  Gastrointestinal: Soft, non-tender, non-distended, BS+  Musculoskeletal: No clubbing or joint deformity   Neurologic: No focal weakness  Lymphatic: No lymphadenopathy  Psychiatry: AAOx3 with appropriate mood and affect  Skin: No rashes, ecchymoses, or cyanosis                          9.9    6.97  )-----------( 125      ( 31 Oct 2022 06:19 )             31.4     10-31    136  |  93<L>  |  48<H>  ----------------------------<  118<H>  5.2   |  27  |  10.36<H>    Ca    9.7      31 Oct 2022 06:18  Phos  5.5     10-31  Mg     2.3     10-31      Cardiovascular Diagnostic Testing:  ECG:    Echo: July 2022 - LVEF has normalized    Stress Testing: nuclear stress test in September 2022 showing LVEF 49%    Cath:  < from: Cardiac Catheterization (10.28.22 @ 10:47) >  Procedures Performed   Procedures:               1.    Arterial Access - Right Femoral     2.    Diagnostic Coronary Angiography   3.    Ultrasound Guided Access   4.    PCI: NAHUM     Indications:               Abnormal stress test       PCI Status:       elective     Conclusions:   A successful intervention of the LCx was performed   Recommendations:     Return for PCI of LAD Monday     Procedure Narrative:   The risks and alternatives of the procedures and conscious sedation  were explained to the patient and informed consent was  obtained. The patient was brought to the cath lab and placed on the  exam table.  Access   Right femoral artery:   The puncture site was infiltrated with 1% Lidocaine. Vascular access  was obtained using modified seldinger technique.    Diagnostic Findings:     Coronary Angiography   LM   Left main artery: Angiography shows minor irregularities.      LAD   Proximal left anterior descending: There is a 90 % stenosis.      CX   Proximal circumflex: There is an80 % stenosis.      Patient: MD BOLAÑOS                   MRN: [id number]  Study Date: 10/28/2022   10:47 AM      Page 1 of 4    RCA   Mid right coronary artery: There is a 30 % stenosis.      < end of copied text >      Interpretation of Telemetry: Normal Sinus Rhythm

## 2022-10-31 NOTE — PROGRESS NOTE ADULT - PROBLEM SELECTOR PLAN 3
- c/w home carvedilol 3.125mg --> increased to 6.25mg q12h  - c/w home hydralazine 50mg BID  - c/w home nifedipine 90mg  - c/w home isosorbide dinitrate 10mg TID  - c/w home clonidine 0.1mg

## 2022-11-01 LAB
ANION GAP SERPL CALC-SCNC: 15 MMOL/L — SIGNIFICANT CHANGE UP (ref 5–17)
BUN SERPL-MCNC: 31 MG/DL — HIGH (ref 7–23)
CALCIUM SERPL-MCNC: 9.4 MG/DL — SIGNIFICANT CHANGE UP (ref 8.4–10.5)
CHLORIDE SERPL-SCNC: 94 MMOL/L — LOW (ref 96–108)
CO2 SERPL-SCNC: 27 MMOL/L — SIGNIFICANT CHANGE UP (ref 22–31)
CREAT SERPL-MCNC: 7.16 MG/DL — HIGH (ref 0.5–1.3)
EGFR: 8 ML/MIN/1.73M2 — LOW
GLUCOSE BLDC GLUCOMTR-MCNC: 128 MG/DL — HIGH (ref 70–99)
GLUCOSE BLDC GLUCOMTR-MCNC: 131 MG/DL — HIGH (ref 70–99)
GLUCOSE BLDC GLUCOMTR-MCNC: 220 MG/DL — HIGH (ref 70–99)
GLUCOSE BLDC GLUCOMTR-MCNC: 223 MG/DL — HIGH (ref 70–99)
GLUCOSE BLDC GLUCOMTR-MCNC: 92 MG/DL — SIGNIFICANT CHANGE UP (ref 70–99)
GLUCOSE SERPL-MCNC: 147 MG/DL — HIGH (ref 70–99)
HCT VFR BLD CALC: 30.6 % — LOW (ref 39–50)
HGB BLD-MCNC: 9.7 G/DL — LOW (ref 13–17)
MAGNESIUM SERPL-MCNC: 2.1 MG/DL — SIGNIFICANT CHANGE UP (ref 1.6–2.6)
MCHC RBC-ENTMCNC: 30.9 PG — SIGNIFICANT CHANGE UP (ref 27–34)
MCHC RBC-ENTMCNC: 31.7 GM/DL — LOW (ref 32–36)
MCV RBC AUTO: 97.5 FL — SIGNIFICANT CHANGE UP (ref 80–100)
NRBC # BLD: 0 /100 WBCS — SIGNIFICANT CHANGE UP (ref 0–0)
PHOSPHATE SERPL-MCNC: 3.7 MG/DL — SIGNIFICANT CHANGE UP (ref 2.5–4.5)
PLATELET # BLD AUTO: 131 K/UL — LOW (ref 150–400)
POTASSIUM SERPL-MCNC: 4.5 MMOL/L — SIGNIFICANT CHANGE UP (ref 3.5–5.3)
POTASSIUM SERPL-SCNC: 4.5 MMOL/L — SIGNIFICANT CHANGE UP (ref 3.5–5.3)
RBC # BLD: 3.14 M/UL — LOW (ref 4.2–5.8)
RBC # FLD: 13.3 % — SIGNIFICANT CHANGE UP (ref 10.3–14.5)
SODIUM SERPL-SCNC: 136 MMOL/L — SIGNIFICANT CHANGE UP (ref 135–145)
WBC # BLD: 6.54 K/UL — SIGNIFICANT CHANGE UP (ref 3.8–10.5)
WBC # FLD AUTO: 6.54 K/UL — SIGNIFICANT CHANGE UP (ref 3.8–10.5)

## 2022-11-01 PROCEDURE — 99232 SBSQ HOSP IP/OBS MODERATE 35: CPT | Mod: GC

## 2022-11-01 PROCEDURE — 93306 TTE W/DOPPLER COMPLETE: CPT | Mod: 26

## 2022-11-01 PROCEDURE — 99233 SBSQ HOSP IP/OBS HIGH 50: CPT

## 2022-11-01 RX ORDER — CALCIUM CARBONATE 500(1250)
1 TABLET ORAL ONCE
Refills: 0 | Status: COMPLETED | OUTPATIENT
Start: 2022-11-01 | End: 2022-11-01

## 2022-11-01 RX ORDER — LACTULOSE 10 G/15ML
15 SOLUTION ORAL ONCE
Refills: 0 | Status: COMPLETED | OUTPATIENT
Start: 2022-11-01 | End: 2022-11-01

## 2022-11-01 RX ORDER — POLYETHYLENE GLYCOL 3350 17 G/17G
17 POWDER, FOR SOLUTION ORAL EVERY 12 HOURS
Refills: 0 | Status: DISCONTINUED | OUTPATIENT
Start: 2022-11-01 | End: 2022-11-02

## 2022-11-01 RX ORDER — LACTULOSE 10 G/15ML
15 SOLUTION ORAL DAILY
Refills: 0 | Status: DISCONTINUED | OUTPATIENT
Start: 2022-11-02 | End: 2022-11-02

## 2022-11-01 RX ADMIN — Medication 50 MILLIGRAM(S): at 05:44

## 2022-11-01 RX ADMIN — CARVEDILOL PHOSPHATE 6.25 MILLIGRAM(S): 80 CAPSULE, EXTENDED RELEASE ORAL at 05:44

## 2022-11-01 RX ADMIN — ISOSORBIDE DINITRATE 10 MILLIGRAM(S): 5 TABLET ORAL at 05:44

## 2022-11-01 RX ADMIN — Medication 50 MILLIGRAM(S): at 17:21

## 2022-11-01 RX ADMIN — ATORVASTATIN CALCIUM 40 MILLIGRAM(S): 80 TABLET, FILM COATED ORAL at 01:31

## 2022-11-01 RX ADMIN — INSULIN GLARGINE 8 UNIT(S): 100 INJECTION, SOLUTION SUBCUTANEOUS at 21:57

## 2022-11-01 RX ADMIN — CLOPIDOGREL BISULFATE 75 MILLIGRAM(S): 75 TABLET, FILM COATED ORAL at 12:25

## 2022-11-01 RX ADMIN — SEVELAMER CARBONATE 1600 MILLIGRAM(S): 2400 POWDER, FOR SUSPENSION ORAL at 12:25

## 2022-11-01 RX ADMIN — Medication 81 MILLIGRAM(S): at 12:25

## 2022-11-01 RX ADMIN — SENNA PLUS 2 TABLET(S): 8.6 TABLET ORAL at 01:31

## 2022-11-01 RX ADMIN — Medication 0.1 MILLIGRAM(S): at 21:57

## 2022-11-01 RX ADMIN — CHLORHEXIDINE GLUCONATE 1 APPLICATION(S): 213 SOLUTION TOPICAL at 12:18

## 2022-11-01 RX ADMIN — Medication 0.1 MILLIGRAM(S): at 05:45

## 2022-11-01 RX ADMIN — INSULIN GLARGINE 8 UNIT(S): 100 INJECTION, SOLUTION SUBCUTANEOUS at 01:32

## 2022-11-01 RX ADMIN — SEVELAMER CARBONATE 1600 MILLIGRAM(S): 2400 POWDER, FOR SUSPENSION ORAL at 17:22

## 2022-11-01 RX ADMIN — Medication 90 MILLIGRAM(S): at 05:44

## 2022-11-01 RX ADMIN — LACTULOSE 15 GRAM(S): 10 SOLUTION ORAL at 14:32

## 2022-11-01 RX ADMIN — Medication 0.1 MILLIGRAM(S): at 01:32

## 2022-11-01 RX ADMIN — CARVEDILOL PHOSPHATE 6.25 MILLIGRAM(S): 80 CAPSULE, EXTENDED RELEASE ORAL at 17:21

## 2022-11-01 RX ADMIN — ATORVASTATIN CALCIUM 40 MILLIGRAM(S): 80 TABLET, FILM COATED ORAL at 21:57

## 2022-11-01 RX ADMIN — SENNA PLUS 2 TABLET(S): 8.6 TABLET ORAL at 21:57

## 2022-11-01 RX ADMIN — SEVELAMER CARBONATE 1600 MILLIGRAM(S): 2400 POWDER, FOR SUSPENSION ORAL at 07:58

## 2022-11-01 RX ADMIN — Medication 0.1 MILLIGRAM(S): at 14:32

## 2022-11-01 RX ADMIN — ISOSORBIDE DINITRATE 10 MILLIGRAM(S): 5 TABLET ORAL at 12:25

## 2022-11-01 RX ADMIN — ISOSORBIDE DINITRATE 10 MILLIGRAM(S): 5 TABLET ORAL at 17:22

## 2022-11-01 RX ADMIN — Medication 2: at 12:26

## 2022-11-01 NOTE — PROGRESS NOTE ADULT - ASSESSMENT
66 yo/Male w/ PMHx CAD s/p 5 stents (pLAD, dLAD, LCX, RCA), CHF, HTN, DM, anemia, ESRD on HD (Tu/Th/Sat) who was referred for C  as part of cardiac clearance pending renal transplant (by Dr. Johnson), s/p stent LCX on 10/28, for staged PCI in LAD on 10/31.    1. CAD  s/p stent pLCX on 10/28, staged PCI in LAD on 10/31.  - Continue ASA and Plavix 75mg  atorvastatin 40mg  - Continue Atorvastatin 40mg  - f/u staged PCI 10/31    2. HTN   - BP control SBP goal <180, can uptitrate carvedilol as needed   - repeat TTE on this admission as per Dr. Johnson to assess for improved LV function s/p stent placement.  - DASH diet     3. ESRD   - Dialysis after PCI today   - f/u with nephrologist, Dr. Yasmany Huff.   68 yo/Male w/ PMHx CAD s/p 5 stents (pLAD, dLAD, LCX, RCA), CHF, HTN, DM, anemia, ESRD on HD (Tu/Th/Sat) who was referred for C  as part of cardiac clearance pending renal transplant (by Dr. Johnson), s/p stent LCX on 10/28, PCI to LAD on 10/31/22, with no complaints of CP, palpitations, pain at access site of RFA      for staged PCI in LAD on 10/31.    1. CAD  s/p stent pLCX on 10/28, staged PCI to  LAD on 10/31.  - Continue ASA and Plavix 75mg  Atorvastatin 40mg  - Continue Atorvastatin 40mg  - repeat TTE on this admission as per Dr. Johnson to assess for improved LV function s/p stent placement.  -TTE pending results from this am 11/1/22 []       2. HTN   - BP control SBP goal <180, can uptitrate carvedilol as needed   -- DASH diet     3. ESRD   - Dialysis scheduled for tomorrow 11/2/22 - f/u with nephrologist, Dr. Yasmany Huff.   68 yo/Male w/ PMHx CAD s/p 5 stents (pLAD, dLAD, LCX, RCA), CHF, HTN, DM, anemia, ESRD on HD (Tu/Th/Sat) who was referred for [facility]  as part of cardiac clearance pending renal transplant (by Dr. Johnson), s/p stent LCX on 10/28, PCI to LAD on 10/31/22, with no complaints of CP, palpitations, pain at access site of RFA      for staged PCI in LAD on 10/31.    1. CAD  s/p stent pLCX on 10/28, staged PCI to  LAD on 10/31.  - Continue ASA and Plavix 75mg  Atorvastatin 40mg  - Continue Atorvastatin 40mg  - repeat TTE on this admission as per Dr. Johnson to assess for improved LV function s/p stent placement.  -TTE this am results below     < from: TTE with Doppler (w/Cont) (11.01.22 @ 08:23) >  Conclusions:  1. Tethered mitral valve leaflets with normal opening. Mild  mitral regurgitation.  2. Calcified trileaflet aortic valve with normal opening.  No aortic valve regurgitation seen.  3. Normal left atrium.  LA volume index = 34 cc/m2.  4.  Endocardial visualization enhanced with intravenous  injection of Ultrasonic Enhancing Agent (Definity). Overall  preserved left ventricular ejection fraction. No left  ventricular thrombus. The apical inferior wall, and the  apical septum are hypokinetic.  5. Mild diastolic dysfunction (Stage I).  6. Normal right ventricular size and function.  *** No previous Echo exam.  ------------------------------------------------------------------------  Confirmed on  11/1/2022 - 11:04:17 by Tristen Anne M.D.  ------------------------------------------------------------------------    < end of copied text >    2. HTN   - BP control SBP goal <180, can uptitrate carvedilol as needed   -- DASH diet     3. ESRD   - Dialysis scheduled for tomorrow 11/2/22 - f/u with nephrologist, Dr. Yasmany Huff.    Please check [email or web address] password [email or web address] for cardiology service schedule and contact information via TEAMS     Anita Cazares Np  Interventional Cardiology   [phone]    68 yo/Male w/ PMHx CAD s/p 5 stents (pLAD, dLAD, LCX, RCA), CHF, HTN, DM, anemia, ESRD on HD (Tu/Th/Sat) who was referred for [facility]  as part of cardiac clearance pending renal transplant (by Dr. Johnson), s/p stent LCX on 10/28, PCI to LAD on 10/31/22, with no complaints of CP, palpitations, pain at access site of RFA      for staged PCI in LAD on 10/31.    1. CAD  s/p stent pLCX on 10/28, staged PCI to  LAD on 10/31.  - Continue ASA and Plavix 75mg  Atorvastatin 40mg  - Continue Atorvastatin 40mg  - repeat TTE on this admission as per Dr. Johnson to assess for improved LV function s/p stent placement.  -TTE this am results below   Addendum :   < from: TTE with Doppler (w/Cont) (11.01.22 @ 08:23) >  Conclusions:  1. Tethered mitral valve leaflets with normal opening. Mild  mitral regurgitation.  2. Calcified trileaflet aortic valve with normal opening.  No aortic valve regurgitation seen.  3. Normal left atrium.  LA volume index = 34 cc/m2.  4.  Endocardial visualization enhanced with intravenous  injection of Ultrasonic Enhancing Agent (Definity). Overall  preserved left ventricular ejection fraction. No left  ventricular thrombus. The apical inferior wall, and the  apical septum are hypokinetic.  5. Mild diastolic dysfunction (Stage I).  6. Normal right ventricular size and function.  *** No previous Echo exam.  ------------------------------------------------------------------------  Confirmed on  11/1/2022 - 11:04:17 by Tristen Anne M.D.  ------------------------------------------------------------------------    < end of copied text >    2. HTN   - BP control SBP goal <180, can uptitrate carvedilol as needed   -- DASH diet     3. ESRD   - Dialysis scheduled for tomorrow 11/2/22 - f/u with nephrologist, Dr. Yasmany Huff.    Please check [email or web address] password [email or web address] for cardiology service schedule and contact information via TEAMS     Anita Cazares Np  Interventional Cardiology   [phone]

## 2022-11-01 NOTE — PROGRESS NOTE ADULT - SUBJECTIVE AND OBJECTIVE BOX
HPI:  Patient seen and examined at bedside on 4 Mont[place].  Tolerated staged PCI well. HD after cath yesterday.  No cardiac events overnight.    Review Of Systems:           Respiratory: No shortness of breath, cough, or wheezing  Cardiovascular: No chest pain or palpitations  10 point review of systems is otherwise negative except as mentioned above        Medications:  aspirin enteric coated 81 milliGRAM(s) Oral daily  atorvastatin 40 milliGRAM(s) Oral at bedtime  carvedilol 6.25 milliGRAM(s) Oral every 12 hours  chlorhexidine 2% Cloths 1 Application(s) Topical daily  cloNIDine 0.1 milliGRAM(s) Oral three times a day  clopidogrel Tablet 75 milliGRAM(s) Oral daily  dextrose 5%. 1000 milliLiter(s) IV Continuous <Continuous>  dextrose 5%. 1000 milliLiter(s) IV Continuous <Continuous>  dextrose 50% Injectable 25 Gram(s) IV Push once  dextrose 50% Injectable 12.5 Gram(s) IV Push once  dextrose 50% Injectable 25 Gram(s) IV Push once  dextrose Oral Gel 15 Gram(s) Oral once PRN  glucagon  Injectable 1 milliGRAM(s) IntraMuscular once  hydrALAZINE 50 milliGRAM(s) Oral two times a day  insulin glargine Injectable (LANTUS) 8 Unit(s) SubCutaneous at bedtime  insulin lispro (ADMELOG) corrective regimen sliding scale   SubCutaneous three times a day before meals  insulin lispro (ADMELOG) corrective regimen sliding scale   SubCutaneous at bedtime  isosorbide   dinitrate Tablet (ISORDIL) 10 milliGRAM(s) Oral three times a day  NIFEdipine XL 90 milliGRAM(s) Oral daily  polyethylene glycol 3350 17 Gram(s) Oral every 12 hours  senna 2 Tablet(s) Oral at bedtime  sevelamer carbonate 1600 milliGRAM(s) Oral three times a day with meals    PAST MEDICAL & SURGICAL HISTORY:  HTN (hypertension)      Coronary artery disease      CAP (community acquired pneumonia)  6/18      Diabetes mellitus  type 2      GERD (gastroesophageal reflux disease)      Stented coronary artery  2014, 3 stents, [facility]      ESRD (end stage renal disease)  on Dialysis( M/W/F), By Dr. Huff      Hypercholesterolemia      Cerebrovascular accident (CVA), unspecified mechanism  diagnosed via CT of the head      Anemia due to stage 5 chronic kidney disease, not on chronic dialysis      H/O coronary angiogram  2014 - x3 stents      AV fistula  10/12/18 L radiocephalic AV fistula      H/O eye surgery        Vitals:  T(C): 36.9 (11-01-22 @ 20:36), Max: 36.9 (11-01-22 @ 04:41)  HR: 63 (11-01-22 @ 20:36) (60 - 70)  BP: 136/73 (11-01-22 @ 20:36) (130/71 - 163/83)  BP(mean): --  RR: 20 (11-01-22 @ 20:36) (18 - 20)  SpO2: 99% (11-01-22 @ 20:36) (96% - 99%)  Wt(kg): --  Daily     Daily   I&O's Summary    31 Oct 2022 07:01  -  01 Nov 2022 07:00  --------------------------------------------------------  IN: 0 mL / OUT: 1000 mL / NET: -1000 mL        Physical Exam:  Appearance: Normal, well groomed, NAD  Eyes: PERRLA, EOMI, pink conjunctiva, no scleral icterus   HENT: Normal oral mucosa  Cardiovascular: RRR, S1, S2, no murmur, rub, or gallop; no edema; no JVD  Procedural Access Site (right groin): Clean, dry, intact, without hematoma  Respiratory: Clear to auscultation bilaterally  Gastrointestinal: Soft, non-tender, non-distended, BS+  Musculoskeletal: No clubbing or joint deformity   Neurologic: No focal weakness  Lymphatic: No lymphadenopathy  Psychiatry: AAOx3 with appropriate mood and affect  Skin: No rashes, ecchymoses, or cyanosis                            9.7    6.54  )-----------( 131      ( 01 Nov 2022 07:32 )             30.6     11-01    136  |  94<L>  |  31<H>  ----------------------------<  147<H>  4.5   |  27  |  7.16<H>    Ca    9.4      01 Nov 2022 07:36  Phos  3.7     11-01  Mg     2.1     11-01    Cardiovascular Diagnostic Testing:  ECG:    Echo: July 2022 - LVEF has normalized    Stress Testing: nuclear stress test in September 2022 showing LVEF 49%    Cath:  < from: Cardiac Catheterization (10.28.22 @ 10:47) >  Procedures Performed   Procedures:               1.    Arterial Access - Right Femoral     2.    Diagnostic Coronary Angiography   3.    Ultrasound Guided Access   4.    PCI: NAHUM     Indications:               Abnormal stress test       PCI Status:       elective     Conclusions:   A successful intervention of the LCx was performed   Recommendations:     Return for PCI of LAD Monday     Procedure Narrative:   The risks and alternatives of the procedures and conscious sedation  were explained to the patient and informed consent was  obtained. The patient was brought to the cath lab and placed on the  exam table.  Access   Right femoral artery:   The puncture site was infiltrated with 1% Lidocaine. Vascular access  was obtained using modified seldinger technique.    Diagnostic Findings:     Coronary Angiography   LM   Left main artery: Angiography shows minor irregularities.      LAD   Proximal left anterior descending: There is a 90 % stenosis.      CX   Proximal circumflex: There is an80 % stenosis.      Patient: MD BOLAÑOS                   MRN: [id number]  Study Date: 10/28/2022   10:47 AM      Page 1 of 4    RCA   Mid right coronary artery: There is a 30 % stenosis.      < end of copied text >    < from: Cardiac Catheterization (10.31.22 @ 13:20) >  Procedures Performed   Procedures:               1.    Arterial Access - Right Femoral     2.    Ultrasound Guided Access   3.    IVUS   4.    PCI: POBA     Indications:                Staged Procedures   Positive Stress Test     PCI Status:                elective     Conclusions:   Found to have severe disease in pLAD at the site of prior stenting.  IVUS performed and found that stent appeared    underexpanded with worsening restenosis.  Successful PCI with NC POBA  with a 3.5 x 20 mm in mLAD and 4.0 x 27 in pLAD.  Recommendations:   Aggressive risk factor modification with diet, exercise, and a goal  LDL of less than 70.  DAPT for at least 6 months.       Procedure Narrative:   The risks and alternatives of the procedures and conscious sedation  were explained to the patient and informed consent was  obtained. The patient was brought to the cath lab and placed on the  exam table.  Access   Right femoral artery:   The puncture site was infiltrated with 1% Lidocaine. Vascular access  was obtained using modified seldinger technique.    Diagnostic Findings:     Coronary Angiography   The coronary circulation is right dominant.      LAD   Proximal left anterior descending: There is a 90 % stenosis. Imaging  shows distal reference diameter of 3.6 mm and proximal  reference diameter of 4.6 mm.      Interventional Findings:     Interventional Details     Patient: MD BOLAÑOS                   MRN: [id number]  Study Date: 10/31/2022   01:20 PM      Page 1 of 3          Proximal left anterior descending: The initial stenosis was 90 %.  Guidewire crossing was successful.    < end of copied text >      Interpretation of Telemetry: Normal Sinus Rhythm

## 2022-11-01 NOTE — PROGRESS NOTE ADULT - SUBJECTIVE AND OBJECTIVE BOX
Interventional Cardiology Post Cath Progress Note:                Subjective:   Patient seen at bedside  feels well- complained of abdominal discomfort " I have not had a bowel movement in 4 days" - Denies chest pain, shortness of breath. Denies pain, numbness, tingling or swelling around (RFA site dsg intact no bleeding or hematoma ) access site    Tele 24hrs: SB 50-NSR 70    MEDICATIONS  (STANDING):  aspirin enteric coated 81 milliGRAM(s) Oral daily  atorvastatin 40 milliGRAM(s) Oral at bedtime  carvedilol 6.25 milliGRAM(s) Oral every 12 hours  chlorhexidine 2% Cloths 1 Application(s) Topical daily  cloNIDine 0.1 milliGRAM(s) Oral three times a day  clopidogrel Tablet 75 milliGRAM(s) Oral daily  dextrose 5%. 1000 milliLiter(s) (50 mL/Hr) IV Continuous <Continuous>  dextrose 5%. 1000 milliLiter(s) (100 mL/Hr) IV Continuous <Continuous>  dextrose 50% Injectable 25 Gram(s) IV Push once  dextrose 50% Injectable 12.5 Gram(s) IV Push once  dextrose 50% Injectable 25 Gram(s) IV Push once  glucagon  Injectable 1 milliGRAM(s) IntraMuscular once  hydrALAZINE 50 milliGRAM(s) Oral two times a day  insulin glargine Injectable (LANTUS) 8 Unit(s) SubCutaneous at bedtime  insulin lispro (ADMELOG) corrective regimen sliding scale   SubCutaneous three times a day before meals  insulin lispro (ADMELOG) corrective regimen sliding scale   SubCutaneous at bedtime  isosorbide   dinitrate Tablet (ISORDIL) 10 milliGRAM(s) Oral three times a day  NIFEdipine XL 90 milliGRAM(s) Oral daily  polyethylene glycol 3350 17 Gram(s) Oral every 12 hours  senna 2 Tablet(s) Oral at bedtime  sevelamer carbonate 1600 milliGRAM(s) Oral three times a day with meals    MEDICATIONS  (PRN):  dextrose Oral Gel 15 Gram(s) Oral once PRN Blood Glucose LESS THAN 70 milliGRAM(s)/deciliter      Objective:  Vital Signs Last 24 Hrs  T(C): 36.8 (01 Nov 2022 10:54), Max: 36.9 (01 Nov 2022 04:41)  T(F): 98.2 (01 Nov 2022 10:54), Max: 98.5 (01 Nov 2022 04:41)  HR: 60 (01 Nov 2022 10:54) (56 - 72)  BP: 130/71 (01 Nov 2022 10:54) (125/65 - 163/83)  BP(mean): 80 (31 Oct 2022 17:15) (78 - 89)  RR: 18 (01 Nov 2022 10:54) (11 - 18)  SpO2: 99% (01 Nov 2022 10:54) (96% - 99%)    Parameters below as of 01 Nov 2022 10:54  Patient On (Oxygen Delivery Method): room air        10-31-22 @ 07:01  -  11-01-22 @ 07:00  --------------------------------------------------------  IN: 0 mL / OUT: 1000 mL / NET: -1000 mL                              9.7    6.54  )-----------( 131      ( 01 Nov 2022 07:32 )             30.6     11-01    136  |  94<L>  |  31<H>  ----------------------------<  147<H>  4.5   |  27  |  7.16<H>    Ca    9.4      01 Nov 2022 07:36  Phos  3.7     11-01  Mg     2.1     11-01    < from: Cardiac Catheterization (10.28.22 @ 10:47) >  2.    Diagnostic Coronary Angiography   3.    Ultrasound Guided Access   4.    PCI: NAHUM     Indications:               Abnormal stress test       PCI Status:       elective     Conclusions:   A successful intervention of the LCx was performed   Recommendations:     Return for PCI of LAD Monday     < end of copied text >  < from: Cardiac Catheterization (10.28.22 @ 10:47) >  Diagnostic Findings:     Coronary Angiography   LM   Left main artery: Angiography shows minor irregularities.      LAD   Proximal left anterior descending: There is a 90 % stenosis.      CX   Proximal circumflex: There is an80 % stenosis.      Patient: MD BOLAÑOS                   MRN: [id number]  Study Date: 10/28/2022   10:47 AM      Page 1 of 4  RCA   Mid right coronary artery: There is a 30 % stenosis.      X-Ray:   Diagnostic Flouro time:       1.2 min.             Exam record  DAP:           2356.20 cGycm2  Interventional Flouro time:   3.3 min.                     Exam fluoro  DAP:           653.40 cGycm2  Total Flouro Time:            4.5 min.                     Exam total  DAP:            3009.60  cGycm2    Diagnostic Physician Signature:     Electronically signed by Jim Jones MD on 10/28/2022 at 01:53 PM     Interventional Findings:     Interventional Details   Proximal circumflex: This was an 80 % De Aleisha stenosis. This was an  ACC/AHA High/C lesion for intervention. Guidewire  crossing was successful.      A successful Drug Eluting Stent was deployed using a [employer]    < end of copied text >    Physical Exam:  No apparent distress, alert and oriented times three, appropriate affect  JVD is not elevated, supple  Clear to auscultation with no wheezing, ronchi or crackles  Regular rate and rhythm with no murmur, rub or gallop  Positive bowel sounds, soft, non-tender, non-distended, no masses/guarding or rebound tenderness  IF groin:  (Right) groin: Soft, non tender, no bleeding or hematoma, clean/dry/intact- RLE/LLE +2 (palpable femoral pulse/audible by doppler/absent)  No clubbing, cyanosis or edema                                                                                                   Interventional Cardiology Post Cath Progress Note:                Subjective:   Patient seen at bedside  feels well- complained of abdominal discomfort " I have not had a bowel movement in 4 days" - Denies chest pain, shortness of breath. Denies pain, numbness, tingling or swelling around (RFA site dsg intact no bleeding or hematoma ) access site  s/p stent LCX on 10/28, PCI to LAD on 10/31/22, with no complaints of CP, palpitations, pain at access site of RFA      Tele 24hrs: SB 50-NSR 70    MEDICATIONS  (STANDING):  aspirin enteric coated 81 milliGRAM(s) Oral daily  atorvastatin 40 milliGRAM(s) Oral at bedtime  carvedilol 6.25 milliGRAM(s) Oral every 12 hours  chlorhexidine 2% Cloths 1 Application(s) Topical daily  cloNIDine 0.1 milliGRAM(s) Oral three times a day  clopidogrel Tablet 75 milliGRAM(s) Oral daily  dextrose 5%. 1000 milliLiter(s) (50 mL/Hr) IV Continuous <Continuous>  dextrose 5%. 1000 milliLiter(s) (100 mL/Hr) IV Continuous <Continuous>  dextrose 50% Injectable 25 Gram(s) IV Push once  dextrose 50% Injectable 12.5 Gram(s) IV Push once  dextrose 50% Injectable 25 Gram(s) IV Push once  glucagon  Injectable 1 milliGRAM(s) IntraMuscular once  hydrALAZINE 50 milliGRAM(s) Oral two times a day  insulin glargine Injectable (LANTUS) 8 Unit(s) SubCutaneous at bedtime  insulin lispro (ADMELOG) corrective regimen sliding scale   SubCutaneous three times a day before meals  insulin lispro (ADMELOG) corrective regimen sliding scale   SubCutaneous at bedtime  isosorbide   dinitrate Tablet (ISORDIL) 10 milliGRAM(s) Oral three times a day  NIFEdipine XL 90 milliGRAM(s) Oral daily  polyethylene glycol 3350 17 Gram(s) Oral every 12 hours  senna 2 Tablet(s) Oral at bedtime  sevelamer carbonate 1600 milliGRAM(s) Oral three times a day with meals    MEDICATIONS  (PRN):  dextrose Oral Gel 15 Gram(s) Oral once PRN Blood Glucose LESS THAN 70 milliGRAM(s)/deciliter      Objective:  Vital Signs Last 24 Hrs  T(C): 36.8 (01 Nov 2022 10:54), Max: 36.9 (01 Nov 2022 04:41)  T(F): 98.2 (01 Nov 2022 10:54), Max: 98.5 (01 Nov 2022 04:41)  HR: 60 (01 Nov 2022 10:54) (56 - 72)  BP: 130/71 (01 Nov 2022 10:54) (125/65 - 163/83)  BP(mean): 80 (31 Oct 2022 17:15) (78 - 89)  RR: 18 (01 Nov 2022 10:54) (11 - 18)  SpO2: 99% (01 Nov 2022 10:54) (96% - 99%)    Parameters below as of 01 Nov 2022 10:54  Patient On (Oxygen Delivery Method): room air        10-31-22 @ 07:01  -  11-01-22 @ 07:00  --------------------------------------------------------  IN: 0 mL / OUT: 1000 mL / NET: -1000 mL                              9.7    6.54  )-----------( 131      ( 01 Nov 2022 07:32 )             30.6     11-01    136  |  94<L>  |  31<H>  ----------------------------<  147<H>  4.5   |  27  |  7.16<H>    Ca    9.4      01 Nov 2022 07:36  Phos  3.7     11-01  Mg     2.1     11-01    < from: Cardiac Catheterization (10.28.22 @ 10:47) >  2.    Diagnostic Coronary Angiography   3.    Ultrasound Guided Access   4.    PCI: NAHUM     Indications:               Abnormal stress test       PCI Status:       elective     Conclusions:   A successful intervention of the LCx was performed   Recommendations:     Return for PCI of LAD Monday     < end of copied text >  < from: Cardiac Catheterization (10.28.22 @ 10:47) >  Diagnostic Findings:     Coronary Angiography   LM   Left main artery: Angiography shows minor irregularities.      LAD   Proximal left anterior descending: There is a 90 % stenosis.      CX   Proximal circumflex: There is an80 % stenosis.      Patient: MD BOLAÑOS                   MRN: [id number]  Study Date: 10/28/2022   10:47 AM      Page 1 of 4  RCA   Mid right coronary artery: There is a 30 % stenosis.      X-Ray:   Diagnostic Flouro time:       1.2 min.             Exam record  DAP:           2356.20 cGycm2  Interventional Flouro time:   3.3 min.                     Exam fluoro  DAP:           653.40 cGycm2  Total Flouro Time:            4.5 min.                     Exam total  DAP:            3009.60  cGycm2    Diagnostic Physician Signature:     Electronically signed by Jim Jones MD on 10/28/2022 at 01:53 PM     Interventional Findings:     Interventional Details   Proximal circumflex: This was an 80 % De Aleisha stenosis. This was an  ACC/AHA High/C lesion for intervention. Guidewire  crossing was successful.      A successful Drug Eluting Stent was deployed using a [employer]    < end of copied text >    Physical Exam:  No apparent distress, alert and oriented times three, appropriate affect  JVD is not elevated, supple  Clear to auscultation with no wheezing, ronchi or crackles  Regular rate and rhythm with no murmur, rub or gallop  Positive bowel sounds, soft, non-tender, non-distended, no masses/guarding or rebound tenderness  IF groin:  (Right) groin: Soft, non tender, no bleeding or hematoma, clean/dry/intact- RLE/LLE +2 (palpable femoral pulse/audible by doppler/absent)  No clubbing, cyanosis or edema                                                                                                   Interventional Cardiology Post Cath Progress Note:                Subjective:   Patient seen at bedside  feels well- complained of abdominal discomfort " I have not had a bowel movement in 4 days" - Denies chest pain, shortness of breath. Denies pain, numbness, tingling or swelling around (RFA site dsg intact no bleeding or hematoma ) access site  s/p stent LCX on 10/28, PCI to LAD on 10/31/22, with no complaints of CP, palpitations, pain at access site of RFA      Tele 24hrs: SB 50-NSR 70    MEDICATIONS  (STANDING):  aspirin enteric coated 81 milliGRAM(s) Oral daily  atorvastatin 40 milliGRAM(s) Oral at bedtime  carvedilol 6.25 milliGRAM(s) Oral every 12 hours  chlorhexidine 2% Cloths 1 Application(s) Topical daily  cloNIDine 0.1 milliGRAM(s) Oral three times a day  clopidogrel Tablet 75 milliGRAM(s) Oral daily  dextrose 5%. 1000 milliLiter(s) (50 mL/Hr) IV Continuous <Continuous>  dextrose 5%. 1000 milliLiter(s) (100 mL/Hr) IV Continuous <Continuous>  dextrose 50% Injectable 25 Gram(s) IV Push once  dextrose 50% Injectable 12.5 Gram(s) IV Push once  dextrose 50% Injectable 25 Gram(s) IV Push once  glucagon  Injectable 1 milliGRAM(s) IntraMuscular once  hydrALAZINE 50 milliGRAM(s) Oral two times a day  insulin glargine Injectable (LANTUS) 8 Unit(s) SubCutaneous at bedtime  insulin lispro (ADMELOG) corrective regimen sliding scale   SubCutaneous three times a day before meals  insulin lispro (ADMELOG) corrective regimen sliding scale   SubCutaneous at bedtime  isosorbide   dinitrate Tablet (ISORDIL) 10 milliGRAM(s) Oral three times a day  NIFEdipine XL 90 milliGRAM(s) Oral daily  polyethylene glycol 3350 17 Gram(s) Oral every 12 hours  senna 2 Tablet(s) Oral at bedtime  sevelamer carbonate 1600 milliGRAM(s) Oral three times a day with meals    MEDICATIONS  (PRN):  dextrose Oral Gel 15 Gram(s) Oral once PRN Blood Glucose LESS THAN 70 milliGRAM(s)/deciliter      Objective:  Vital Signs Last 24 Hrs  T(C): 36.8 (01 Nov 2022 10:54), Max: 36.9 (01 Nov 2022 04:41)  T(F): 98.2 (01 Nov 2022 10:54), Max: 98.5 (01 Nov 2022 04:41)  HR: 60 (01 Nov 2022 10:54) (56 - 72)  BP: 130/71 (01 Nov 2022 10:54) (125/65 - 163/83)  BP(mean): 80 (31 Oct 2022 17:15) (78 - 89)  RR: 18 (01 Nov 2022 10:54) (11 - 18)  SpO2: 99% (01 Nov 2022 10:54) (96% - 99%)    Parameters below as of 01 Nov 2022 10:54  Patient On (Oxygen Delivery Method): room air        10-31-22 @ 07:01  -  11-01-22 @ 07:00  --------------------------------------------------------  IN: 0 mL / OUT: 1000 mL / NET: -1000 mL                              9.7    6.54  )-----------( 131      ( 01 Nov 2022 07:32 )             30.6     11-01    136  |  94<L>  |  31<H>  ----------------------------<  147<H>  4.5   |  27  |  7.16<H>    Ca    9.4      01 Nov 2022 07:36  Phos  3.7     11-01  Mg     2.1     11-01    < from: Cardiac Catheterization (10.28.22 @ 10:47) >  2.    Diagnostic Coronary Angiography   3.    Ultrasound Guided Access   4.    PCI: NAHUM     Indications:               Abnormal stress test       PCI Status:       elective     Conclusions:   A successful intervention of the LCx was performed   Recommendations:     Return for PCI of LAD Monday     < end of copied text >  < from: Cardiac Catheterization (10.28.22 @ 10:47) >  Diagnostic Findings:     Coronary Angiography   LM   Left main artery: Angiography shows minor irregularities.      LAD   Proximal left anterior descending: There is a 90 % stenosis.      CX   Proximal circumflex: There is an80 % stenosis.      Patient: MD BOLAÑOS                   MRN: [id number]  Study Date: 10/28/2022   10:47 AM      Page 1 of 4  RCA   Mid right coronary artery: There is a 30 % stenosis.      X-Ray:   Diagnostic Flouro time:       1.2 min.             Exam record  DAP:           2356.20 cGycm2  Interventional Flouro time:   3.3 min.                     Exam fluoro  DAP:           653.40 cGycm2  Total Flouro Time:            4.5 min.                     Exam total  DAP:            3009.60  cGycm2    Diagnostic Physician Signature:     Electronically signed by Jim Jones MD on 10/28/2022 at 01:53 PM     Interventional Findings:     Interventional Details   Proximal circumflex: This was an 80 % De Aleisha stenosis. This was an  ACC/AHA High/C lesion for intervention. Guidewire  crossing was successful.      A successful Drug Eluting Stent was deployed using a [employer]    < end of copied text >    Physical Exam:  No apparent distress, alert and oriented times three, appropriate affect  JVD is not elevated, supple  Clear to auscultation with no wheezing, ronchi or crackles  Regular rate and rhythm with no murmur, rub or gallop  Positive bowel sounds, softly distended , non-tender, non-distended, no masses/guarding or rebound tenderness  IF groin:  Procedural site RFA with dsg soft nt nd +bsx4 (RLE/LLE +2 (palpable femoral pulse)  No clubbing, cyanosis or edema

## 2022-11-01 NOTE — PROGRESS NOTE ADULT - PROBLEM SELECTOR PLAN 3
- c/w home carvedilol 3.125mg --> increased to 6.25mg q12h  - c/w home hydralazine 50mg BID  - c/w home nifedipine 90mg  - c/w home isosorbide dinitrate 10mg TID  - c/w home clonidine 0.1mg - increased carvedilol to 6.25mg q12h  - c/w home hydralazine 50mg BID  - c/w home nifedipine 90mg  - c/w home isosorbide dinitrate 10mg TID  - c/w home clonidine 0.1mg

## 2022-11-01 NOTE — PROGRESS NOTE ADULT - PROBLEM SELECTOR PLAN 2
Hx ESRD on HD Tu/Th/Sat. Follows with Dr. Huff.    - HD scheduled for after PCI 10/31 - will then go back to regular schedule  - c/w home sevelamer 1600mg TID Hx ESRD on HD Tu/Th/Sat. Follows with Dr. Huff.    - HD scheduled for after PCI 10/31 - following with nephro regarding what his HD schedule will be   - c/w home sevelamer 1600mg TID

## 2022-11-01 NOTE — PROGRESS NOTE ADULT - SUBJECTIVE AND OBJECTIVE BOX
PROGRESS NOTE:     Patient is a 67y old  Male who presents with a chief complaint of Coronary artery disease (31 Oct 2022 09:37)      INTERVAL HISTORY:    MEDICATIONS  (STANDING):  aspirin enteric coated 81 milliGRAM(s) Oral daily  atorvastatin 40 milliGRAM(s) Oral at bedtime  carvedilol 6.25 milliGRAM(s) Oral every 12 hours  chlorhexidine 2% Cloths 1 Application(s) Topical daily  cloNIDine 0.1 milliGRAM(s) Oral three times a day  clopidogrel Tablet 75 milliGRAM(s) Oral daily  dextrose 5%. 1000 milliLiter(s) (50 mL/Hr) IV Continuous <Continuous>  dextrose 5%. 1000 milliLiter(s) (100 mL/Hr) IV Continuous <Continuous>  dextrose 50% Injectable 25 Gram(s) IV Push once  dextrose 50% Injectable 12.5 Gram(s) IV Push once  dextrose 50% Injectable 25 Gram(s) IV Push once  glucagon  Injectable 1 milliGRAM(s) IntraMuscular once  hydrALAZINE 50 milliGRAM(s) Oral two times a day  insulin glargine Injectable (LANTUS) 8 Unit(s) SubCutaneous at bedtime  insulin lispro (ADMELOG) corrective regimen sliding scale   SubCutaneous three times a day before meals  insulin lispro (ADMELOG) corrective regimen sliding scale   SubCutaneous at bedtime  isosorbide   dinitrate Tablet (ISORDIL) 10 milliGRAM(s) Oral three times a day  NIFEdipine XL 90 milliGRAM(s) Oral daily  polyethylene glycol 3350 17 Gram(s) Oral daily  senna 2 Tablet(s) Oral at bedtime  sevelamer carbonate 1600 milliGRAM(s) Oral three times a day with meals    MEDICATIONS  (PRN):  dextrose Oral Gel 15 Gram(s) Oral once PRN Blood Glucose LESS THAN 70 milliGRAM(s)/deciliter      CAPILLARY BLOOD GLUCOSE      POCT Blood Glucose.: 131 mg/dL (01 Nov 2022 00:57)  POCT Blood Glucose.: 100 mg/dL (31 Oct 2022 17:59)  POCT Blood Glucose.: 124 mg/dL (31 Oct 2022 14:36)  POCT Blood Glucose.: 285 mg/dL (31 Oct 2022 11:40)  POCT Blood Glucose.: 145 mg/dL (31 Oct 2022 07:35)    I&O's Summary    31 Oct 2022 07:01  -  01 Nov 2022 07:00  --------------------------------------------------------  IN: 0 mL / OUT: 1000 mL / NET: -1000 mL        PHYSICAL EXAM:  Vital Signs Last 24 Hrs  T(C): 36.9 (01 Nov 2022 04:41), Max: 36.9 (01 Nov 2022 04:41)  T(F): 98.5 (01 Nov 2022 04:41), Max: 98.5 (01 Nov 2022 04:41)  HR: 60 (01 Nov 2022 04:41) (56 - 72)  BP: 163/83 (01 Nov 2022 04:41) (125/65 - 163/83)  BP(mean): 80 (31 Oct 2022 17:15) (78 - 89)  RR: 18 (01 Nov 2022 04:41) (11 - 18)  SpO2: 99% (01 Nov 2022 04:41) (96% - 99%)    Parameters below as of 01 Nov 2022 04:41  Patient On (Oxygen Delivery Method): room air        CONSTITUTIONAL: NAD, well-developed  HEENT:   RESPIRATORY: Normal respiratory effort; lungs are clear to auscultation bilaterally  CARDIOVASCULAR: Regular rate and rhythm, normal S1 and S2, no murmur/rub/gallop; No lower extremity edema; Peripheral pulses are 2+ bilaterally  ABDOMEN: Nontender to palpation, normoactive bowel sounds, no rebound/guarding; No hepatosplenomegaly  MUSCULOSKELETAL: no clubbing or cyanosis of digits; no joint swelling or tenderness to palpation  PSYCH: A+O to person, place, and time; affect appropriate    LABS:                        9.9    6.97  )-----------( 125      ( 31 Oct 2022 06:19 )             31.4     10-31    136  |  93<L>  |  48<H>  ----------------------------<  118<H>  5.2   |  27  |  10.36<H>    Ca    9.7      31 Oct 2022 06:18  Phos  5.5     10-31  Mg     2.3     10-31                  RADIOLOGY:        ******************************  Authored By: Rafia Martinez MD PGY1  Internal Medicine  MS Teams  ******************************   PROGRESS NOTE:     Patient is a 67y old  Male who presents with a chief complaint of Coronary artery disease (31 Oct 2022 09:37)    INTERVAL HISTORY: NAEO. Tolerated PCI to LAD yesterday, with no complaints of CP, palpitations, pain at access site of R fem., with just complaints of constipation.     MEDICATIONS  (STANDING):  aspirin enteric coated 81 milliGRAM(s) Oral daily  atorvastatin 40 milliGRAM(s) Oral at bedtime  carvedilol 6.25 milliGRAM(s) Oral every 12 hours  chlorhexidine 2% Cloths 1 Application(s) Topical daily  cloNIDine 0.1 milliGRAM(s) Oral three times a day  clopidogrel Tablet 75 milliGRAM(s) Oral daily  dextrose 5%. 1000 milliLiter(s) (50 mL/Hr) IV Continuous <Continuous>  dextrose 5%. 1000 milliLiter(s) (100 mL/Hr) IV Continuous <Continuous>  dextrose 50% Injectable 25 Gram(s) IV Push once  dextrose 50% Injectable 12.5 Gram(s) IV Push once  dextrose 50% Injectable 25 Gram(s) IV Push once  glucagon  Injectable 1 milliGRAM(s) IntraMuscular once  hydrALAZINE 50 milliGRAM(s) Oral two times a day  insulin glargine Injectable (LANTUS) 8 Unit(s) SubCutaneous at bedtime  insulin lispro (ADMELOG) corrective regimen sliding scale   SubCutaneous three times a day before meals  insulin lispro (ADMELOG) corrective regimen sliding scale   SubCutaneous at bedtime  isosorbide   dinitrate Tablet (ISORDIL) 10 milliGRAM(s) Oral three times a day  NIFEdipine XL 90 milliGRAM(s) Oral daily  polyethylene glycol 3350 17 Gram(s) Oral daily  senna 2 Tablet(s) Oral at bedtime  sevelamer carbonate 1600 milliGRAM(s) Oral three times a day with meals    MEDICATIONS  (PRN):  dextrose Oral Gel 15 Gram(s) Oral once PRN Blood Glucose LESS THAN 70 milliGRAM(s)/deciliter      CAPILLARY BLOOD GLUCOSE      POCT Blood Glucose.: 131 mg/dL (01 Nov 2022 00:57)  POCT Blood Glucose.: 100 mg/dL (31 Oct 2022 17:59)  POCT Blood Glucose.: 124 mg/dL (31 Oct 2022 14:36)  POCT Blood Glucose.: 285 mg/dL (31 Oct 2022 11:40)  POCT Blood Glucose.: 145 mg/dL (31 Oct 2022 07:35)    I&O's Summary    31 Oct 2022 07:01  -  01 Nov 2022 07:00  --------------------------------------------------------  IN: 0 mL / OUT: 1000 mL / NET: -1000 mL        PHYSICAL EXAM:  Vital Signs Last 24 Hrs  T(C): 36.9 (01 Nov 2022 04:41), Max: 36.9 (01 Nov 2022 04:41)  T(F): 98.5 (01 Nov 2022 04:41), Max: 98.5 (01 Nov 2022 04:41)  HR: 60 (01 Nov 2022 04:41) (56 - 72)  BP: 163/83 (01 Nov 2022 04:41) (125/65 - 163/83)  BP(mean): 80 (31 Oct 2022 17:15) (78 - 89)  RR: 18 (01 Nov 2022 04:41) (11 - 18)  SpO2: 99% (01 Nov 2022 04:41) (96% - 99%)    Parameters below as of 01 Nov 2022 04:41  Patient On (Oxygen Delivery Method): room air        CONSTITUTIONAL: NAD, well-developed  RESPIRATORY: Normal respiratory effort; lungs are clear to auscultation bilaterally  CARDIOVASCULAR: Regular rate and rhythm, normal S1 and S2, no murmur/rub/gallop; No lower extremity edema; Peripheral pulses are 2+ bilaterally  ABDOMEN: Nontender to palpation, normoactive bowel sounds, no rebound/guarding; No hepatosplenomegaly  MUSCULOSKELETAL: no clubbing or cyanosis of digits; no joint swelling or tenderness to palpation; R fem access site no evidence of hematoma, pulses 2+ and warm extremities with full ROM  PSYCH: A+O to person, place, and time; affect appropriate    LABS:                        9.9    6.97  )-----------( 125      ( 31 Oct 2022 06:19 )             31.4     10-31    136  |  93<L>  |  48<H>  ----------------------------<  118<H>  5.2   |  27  |  10.36<H>    Ca    9.7      31 Oct 2022 06:18  Phos  5.5     10-31  Mg     2.3     10-31          ******************************  Authored By: Rafia Martinez MD PGY1  Internal Medicine  MS Teams  ******************************

## 2022-11-01 NOTE — PROGRESS NOTE ADULT - PROBLEM SELECTOR PLAN 1
Extensive cardiac hx s/p stents 2014 and recent pLCX NAHUM 10/28 as part of cardiac clearance pending renal transplant. Pending staged PCI 10/31    - CTM R femoral access site for bleed or loss of pulses in feet or pain/inability to move  - c/w ASA and plavix 75mg s/p NAHUM placement  - f/u staged PCI 10/31  - BP control SBP goal <180, can uptitrate carvedilol as needed   - c/w atorvastatin 40mg  - repeat TTE this admission per Dr. Johnson to assess for improved LV function s/p stent placement Extensive cardiac hx s/p stents 2014 and recent pLCX NAHUM 10/28 as part of cardiac clearance pending renal transplant. Pending staged PCI 10/31    - CTM R femoral access site for bleed or loss of pulses in feet or pain/inability to move  - c/w ASA and plavix 75mg s/p NAHUM placement  - s/p staged PCI 10/31  - BP control SBP goal <180  - c/w atorvastatin 40mg  - repeat TTE per Dr. Johnson to assess for improved LV function s/p stent placement

## 2022-11-01 NOTE — PROGRESS NOTE ADULT - ASSESSMENT
67 year-old gentleman awaiting renal transplant.  He has known coronary artery disease status post PCI.    Now status post PCI to LCx on Friday, 10/28 and staged PCI to LAD 10/31.  Tolerated procedures well.  HD yesterday after staged PCI.    Continue dual antiplatelet therapy.  Continue high intensity statin therapy. Patient may also require PCSK-9 inhibitor (as outpatient).  Recommend uptitrating carvedilol as tolerated.    Dialysis 11/2 per patient's nephrologist, Dr. Yasmany Huff.    Discharge planning per primary team.

## 2022-11-01 NOTE — PROGRESS NOTE ADULT - ASSESSMENT
67 M with HTN, DM, anemia, ESRD on HD ( T/T/sat at [facility] HD center, Nephro: Dr. Huff), GERD, presents today for LHC following an abnormal NST. Patient is waiting for Renal transplant, seen by Dr. Johnson for cardiac clearance and referred  for LHC. Pt denies chest pain, SOB, palpitations/ syncope. EF 72 from TTE. s/p 10/28-prox Cx (80%) x 1 NAHUM Staged PCI of LAD Monday. Nephrology consulted for HD.     A/P    ESRD on HD   TTS  via AVF  from [place] dialysis    last HD 10/31  renal diet   Plan for HD tomorrow   HD consent in the HD unit     HTN   Bp fluctutaing  monitor BP closely     Anemia   ALESHA with HD  monitor H/H    CKD;MBD   monitor Po4, Ca

## 2022-11-01 NOTE — PROGRESS NOTE ADULT - ASSESSMENT
This is a 68 yo/Male w/ PMHx CAD s/p 5 stents (pLAD, dLAD, LCX, RCA), CHF, HTN, DM, anemia, ESRD on HD (Tu/Th/Sat) who was sent in by Dr. Johnson for [facility], now pending staged PCI 10/31. This is a 66 yo/Male w/ PMHx CAD s/p 5 stents (pLAD, dLAD, LCX, RCA), CHF, HTN, DM, anemia, ESRD on HD (Tu/Th/Sat) who was sent in by Dr. Johnson for [facility], now s/p staged PCI 10/31. Pending repeat echo 11/1

## 2022-11-01 NOTE — PROGRESS NOTE ADULT - SUBJECTIVE AND OBJECTIVE BOX
[facility]   MD JORDAN AVALOS MD RUORU WONG, PA    TEL:  FROM 9 AM to 5 PM ---OFFICE: [phone]    FROM 5 PM - 9 AM PLEASE CALL ANSWERING SERVICE: [phone]    RENAL FOLLOW UP NOTE--Date of Service 11-01-22 @ 12:20  --------------------------------------------------------------------------------  HPI:      Pt seen and examined at bedside.       PAST HISTORY  --------------------------------------------------------------------------------  No significant changes to PMH, PSH, FHx, SHx, unless otherwise noted    ALLERGIES & MEDICATIONS  --------------------------------------------------------------------------------  Allergies    No Known Allergies    Intolerances      Standing Inpatient Medications  aspirin enteric coated 81 milliGRAM(s) Oral daily  atorvastatin 40 milliGRAM(s) Oral at bedtime  carvedilol 6.25 milliGRAM(s) Oral every 12 hours  chlorhexidine 2% Cloths 1 Application(s) Topical daily  cloNIDine 0.1 milliGRAM(s) Oral three times a day  clopidogrel Tablet 75 milliGRAM(s) Oral daily  dextrose 5%. 1000 milliLiter(s) IV Continuous <Continuous>  dextrose 5%. 1000 milliLiter(s) IV Continuous <Continuous>  dextrose 50% Injectable 25 Gram(s) IV Push once  dextrose 50% Injectable 12.5 Gram(s) IV Push once  dextrose 50% Injectable 25 Gram(s) IV Push once  glucagon  Injectable 1 milliGRAM(s) IntraMuscular once  hydrALAZINE 50 milliGRAM(s) Oral two times a day  insulin glargine Injectable (LANTUS) 8 Unit(s) SubCutaneous at bedtime  insulin lispro (ADMELOG) corrective regimen sliding scale   SubCutaneous three times a day before meals  insulin lispro (ADMELOG) corrective regimen sliding scale   SubCutaneous at bedtime  isosorbide   dinitrate Tablet (ISORDIL) 10 milliGRAM(s) Oral three times a day  NIFEdipine XL 90 milliGRAM(s) Oral daily  polyethylene glycol 3350 17 Gram(s) Oral every 12 hours  senna 2 Tablet(s) Oral at bedtime  sevelamer carbonate 1600 milliGRAM(s) Oral three times a day with meals    PRN Inpatient Medications  dextrose Oral Gel 15 Gram(s) Oral once PRN      REVIEW OF SYSTEMS  --------------------------------------------------------------------------------  General: no fever  MSK: no edema     VITALS/PHYSICAL EXAM  --------------------------------------------------------------------------------  T(C): 36.8 (11-01-22 @ 10:54), Max: 36.9 (11-01-22 @ 04:41)  HR: 60 (11-01-22 @ 10:54) (57 - 72)  BP: 130/71 (11-01-22 @ 10:54) (130/70 - 163/83)  RR: 18 (11-01-22 @ 10:54) (11 - 18)  SpO2: 99% (11-01-22 @ 10:54) (96% - 99%)  Wt(kg): --        10-31-22 @ 07:01  -  11-01-22 @ 07:00  --------------------------------------------------------  IN: 0 mL / OUT: 1000 mL / NET: -1000 mL      Physical Exam:  	Gen: NAD  	HEENT: MMM  	Pulm: CTA B/L  	CV: S1S2  	Abd: Soft, +BS  	Ext: No LE edema B/L                      Neuro: Awake   	Skin: Warm and Dry   	Vascular access: avf           no  lizet  LABS/STUDIES  --------------------------------------------------------------------------------              9.7    6.54  >-----------<  131      [11-01-22 @ 07:32]              30.6     136  |  94  |  31  ----------------------------<  147      [11-01-22 @ 07:36]  4.5   |  27  |  7.16        Ca     9.4     [11-01-22 @ 07:36]      Mg     2.1     [11-01-22 @ 07:36]      Phos  3.7     [11-01-22 @ 07:36]            Creatinine Trend:  SCr 7.16 [11-01 @ 07:36]  SCr 10.36 [10-31 @ 06:18]  SCr 8.36 [10-30 @ 06:31]  SCr 11.29 [10-29 @ 07:10]  SCr 9.21 [10-28 @ 08:41]        HbA1c 7.8      [12-17-19 @ 05:54]

## 2022-11-02 ENCOUNTER — TRANSCRIPTION ENCOUNTER (OUTPATIENT)
Age: 67
End: 2022-11-02

## 2022-11-02 VITALS — DIASTOLIC BLOOD PRESSURE: 73 MMHG | HEART RATE: 58 BPM | SYSTOLIC BLOOD PRESSURE: 130 MMHG

## 2022-11-02 LAB
ALBUMIN SERPL ELPH-MCNC: 4.3 G/DL — SIGNIFICANT CHANGE UP (ref 3.3–5)
ALP SERPL-CCNC: 59 U/L — SIGNIFICANT CHANGE UP (ref 40–120)
ALT FLD-CCNC: 9 U/L — LOW (ref 10–45)
ANION GAP SERPL CALC-SCNC: 15 MMOL/L — SIGNIFICANT CHANGE UP (ref 5–17)
AST SERPL-CCNC: 11 U/L — SIGNIFICANT CHANGE UP (ref 10–40)
BILIRUB SERPL-MCNC: 0.5 MG/DL — SIGNIFICANT CHANGE UP (ref 0.2–1.2)
BUN SERPL-MCNC: 58 MG/DL — HIGH (ref 7–23)
CALCIUM SERPL-MCNC: 9.4 MG/DL — SIGNIFICANT CHANGE UP (ref 8.4–10.5)
CHLORIDE SERPL-SCNC: 95 MMOL/L — LOW (ref 96–108)
CO2 SERPL-SCNC: 25 MMOL/L — SIGNIFICANT CHANGE UP (ref 22–31)
CREAT SERPL-MCNC: 10.37 MG/DL — HIGH (ref 0.5–1.3)
EGFR: 5 ML/MIN/1.73M2 — LOW
GLUCOSE BLDC GLUCOMTR-MCNC: 111 MG/DL — HIGH (ref 70–99)
GLUCOSE BLDC GLUCOMTR-MCNC: 125 MG/DL — HIGH (ref 70–99)
GLUCOSE SERPL-MCNC: 91 MG/DL — SIGNIFICANT CHANGE UP (ref 70–99)
HCT VFR BLD CALC: 29.7 % — LOW (ref 39–50)
HGB BLD-MCNC: 9.4 G/DL — LOW (ref 13–17)
MAGNESIUM SERPL-MCNC: 2.4 MG/DL — SIGNIFICANT CHANGE UP (ref 1.6–2.6)
MCHC RBC-ENTMCNC: 30.7 PG — SIGNIFICANT CHANGE UP (ref 27–34)
MCHC RBC-ENTMCNC: 31.6 GM/DL — LOW (ref 32–36)
MCV RBC AUTO: 97.1 FL — SIGNIFICANT CHANGE UP (ref 80–100)
NRBC # BLD: 0 /100 WBCS — SIGNIFICANT CHANGE UP (ref 0–0)
PHOSPHATE SERPL-MCNC: 5 MG/DL — HIGH (ref 2.5–4.5)
PLATELET # BLD AUTO: 129 K/UL — LOW (ref 150–400)
POTASSIUM SERPL-MCNC: 5.6 MMOL/L — HIGH (ref 3.5–5.3)
POTASSIUM SERPL-SCNC: 5.6 MMOL/L — HIGH (ref 3.5–5.3)
PROT SERPL-MCNC: 7.9 G/DL — SIGNIFICANT CHANGE UP (ref 6–8.3)
RBC # BLD: 3.06 M/UL — LOW (ref 4.2–5.8)
RBC # FLD: 13.2 % — SIGNIFICANT CHANGE UP (ref 10.3–14.5)
SODIUM SERPL-SCNC: 135 MMOL/L — SIGNIFICANT CHANGE UP (ref 135–145)
WBC # BLD: 7.94 K/UL — SIGNIFICANT CHANGE UP (ref 3.8–10.5)
WBC # FLD AUTO: 7.94 K/UL — SIGNIFICANT CHANGE UP (ref 3.8–10.5)

## 2022-11-02 PROCEDURE — 84100 ASSAY OF PHOSPHORUS: CPT

## 2022-11-02 PROCEDURE — C1753: CPT

## 2022-11-02 PROCEDURE — 86901 BLOOD TYPING SEROLOGIC RH(D): CPT

## 2022-11-02 PROCEDURE — C9600: CPT | Mod: LC

## 2022-11-02 PROCEDURE — 86850 RBC ANTIBODY SCREEN: CPT

## 2022-11-02 PROCEDURE — C8929: CPT

## 2022-11-02 PROCEDURE — 86900 BLOOD TYPING SEROLOGIC ABO: CPT

## 2022-11-02 PROCEDURE — 92978 ENDOLUMINL IVUS OCT C 1ST: CPT | Mod: LD

## 2022-11-02 PROCEDURE — U0005: CPT

## 2022-11-02 PROCEDURE — C1769: CPT

## 2022-11-02 PROCEDURE — 36415 COLL VENOUS BLD VENIPUNCTURE: CPT

## 2022-11-02 PROCEDURE — 92920 PRQ TRLUML C ANGIOP 1ART&/BR: CPT | Mod: LD

## 2022-11-02 PROCEDURE — 93005 ELECTROCARDIOGRAM TRACING: CPT

## 2022-11-02 PROCEDURE — 99239 HOSP IP/OBS DSCHRG MGMT >30: CPT | Mod: GC

## 2022-11-02 PROCEDURE — C1894: CPT

## 2022-11-02 PROCEDURE — 83036 HEMOGLOBIN GLYCOSYLATED A1C: CPT

## 2022-11-02 PROCEDURE — C1874: CPT

## 2022-11-02 PROCEDURE — 83735 ASSAY OF MAGNESIUM: CPT

## 2022-11-02 PROCEDURE — 80048 BASIC METABOLIC PNL TOTAL CA: CPT

## 2022-11-02 PROCEDURE — 82962 GLUCOSE BLOOD TEST: CPT

## 2022-11-02 PROCEDURE — U0003: CPT

## 2022-11-02 PROCEDURE — 93454 CORONARY ARTERY ANGIO S&I: CPT | Mod: 59

## 2022-11-02 PROCEDURE — 85027 COMPLETE CBC AUTOMATED: CPT

## 2022-11-02 PROCEDURE — 99261: CPT

## 2022-11-02 PROCEDURE — 99233 SBSQ HOSP IP/OBS HIGH 50: CPT

## 2022-11-02 PROCEDURE — C1887: CPT

## 2022-11-02 PROCEDURE — 80053 COMPREHEN METABOLIC PANEL: CPT

## 2022-11-02 PROCEDURE — C1725: CPT

## 2022-11-02 PROCEDURE — 99232 SBSQ HOSP IP/OBS MODERATE 35: CPT

## 2022-11-02 RX ORDER — CARVEDILOL PHOSPHATE 80 MG/1
1 CAPSULE, EXTENDED RELEASE ORAL
Qty: 120 | Refills: 0
Start: 2022-11-02 | End: 2022-12-31

## 2022-11-02 RX ADMIN — CHLORHEXIDINE GLUCONATE 1 APPLICATION(S): 213 SOLUTION TOPICAL at 12:05

## 2022-11-02 RX ADMIN — ISOSORBIDE DINITRATE 10 MILLIGRAM(S): 5 TABLET ORAL at 05:51

## 2022-11-02 RX ADMIN — LACTULOSE 15 GRAM(S): 10 SOLUTION ORAL at 12:00

## 2022-11-02 RX ADMIN — Medication 1 TABLET(S): at 05:53

## 2022-11-02 RX ADMIN — Medication 0.1 MILLIGRAM(S): at 05:51

## 2022-11-02 RX ADMIN — Medication 50 MILLIGRAM(S): at 05:51

## 2022-11-02 RX ADMIN — SEVELAMER CARBONATE 1600 MILLIGRAM(S): 2400 POWDER, FOR SUSPENSION ORAL at 08:09

## 2022-11-02 RX ADMIN — SEVELAMER CARBONATE 1600 MILLIGRAM(S): 2400 POWDER, FOR SUSPENSION ORAL at 12:01

## 2022-11-02 RX ADMIN — Medication 81 MILLIGRAM(S): at 12:01

## 2022-11-02 RX ADMIN — POLYETHYLENE GLYCOL 3350 17 GRAM(S): 17 POWDER, FOR SOLUTION ORAL at 05:52

## 2022-11-02 RX ADMIN — CLOPIDOGREL BISULFATE 75 MILLIGRAM(S): 75 TABLET, FILM COATED ORAL at 12:01

## 2022-11-02 RX ADMIN — CARVEDILOL PHOSPHATE 6.25 MILLIGRAM(S): 80 CAPSULE, EXTENDED RELEASE ORAL at 05:51

## 2022-11-02 RX ADMIN — Medication 90 MILLIGRAM(S): at 05:51

## 2022-11-02 RX ADMIN — Medication 0.1 MILLIGRAM(S): at 13:23

## 2022-11-02 NOTE — PROGRESS NOTE ADULT - PROBLEM SELECTOR PLAN 3
- increased carvedilol to 6.25mg q12h  - c/w home hydralazine 50mg BID  - c/w home nifedipine 90mg  - c/w home isosorbide dinitrate 10mg TID  - c/w home clonidine 0.1mg - c/w increased carvedilol 6.25mg q12h  - c/w home hydralazine 50mg BID  - c/w home nifedipine 90mg  - c/w home isosorbide dinitrate 10mg TID  - c/w home clonidine 0.1mg

## 2022-11-02 NOTE — DISCHARGE NOTE PROVIDER - NSDCCPTREATMENT_GEN_ALL_CORE_FT
PRINCIPAL PROCEDURE  Procedure: Echocardiogram, 3D  Findings and Treatment: Conclusions:  1. Tethered mitral valve leaflets with normal opening. Mild  mitral regurgitation.  2. Calcified trileaflet aortic valve with normal opening.  No aortic valve regurgitation seen.  3. Normal left atrium.  LA volume index = 34 cc/m2.  4.  Endocardial visualization enhanced with intravenous  injection of Ultrasonic Enhancing Agent (Definity). Overall  preserved left ventricular ejection fraction. No left  ventricular thrombus. The apical inferior wall, and the  apical septum are hypokinetic.  5. Mild diastolic dysfunction (Stage I).  6. Normal right ventricular size and function.

## 2022-11-02 NOTE — PROGRESS NOTE ADULT - PROBLEM SELECTOR PLAN 1
Extensive cardiac hx s/p stents 2014 and recent pLCX NAHUM 10/28 as part of cardiac clearance pending renal transplant. Pending staged PCI 10/31    - CTM R femoral access site for bleed or loss of pulses in feet or pain/inability to move  - c/w ASA and plavix 75mg s/p NAHUM placement  - s/p staged PCI 10/31  - BP control SBP goal <180  - c/w atorvastatin 40mg  - repeat TTE per Dr. Johnson to assess for improved LV function s/p stent placement Extensive cardiac hx s/p stents 2014 and recent pLCX NAHUM 10/28 as part of cardiac clearance pending renal transplant. Pending staged PCI 10/31    - CTM R femoral access site for bleed or loss of pulses in feet or pain/inability to move  - c/w ASA and plavix 75mg s/p NAHUM placement  - s/p staged PCI 10/31  - BP control SBP goal <180  - c/w atorvastatin 40mg  - repeat TTE per Dr. Johnson to assess for improved LV function s/p stent placement on 11/1

## 2022-11-02 NOTE — PROGRESS NOTE ADULT - ASSESSMENT
67 M with HTN, DM, anemia, ESRD on HD ( T/T/sat at [facility] HD center, Nephro: Dr. Huff), GERD, presents today for LHC following an abnormal NST. Patient is waiting for Renal transplant, seen by Dr. Johnson for cardiac clearance and referred  for LHC. Pt denies chest pain, SOB, palpitations/ syncope. EF 72 from TTE. s/p 10/28-prox Cx (80%) x 1 NAHUM Staged PCI of LAD Monday. Nephrology consulted for HD.     A/P    ESRD on HD   TTS  via AVF  from [place] [facility]    last HD 10/31  renal diet   Plan for HD today  HD consent in the HD unit     HTN   Bp fluctutaing  monitor BP closely     Anemia   ALESHA with HD  monitor H/H    CKD;MBD   monitor Po4, Ca

## 2022-11-02 NOTE — PROGRESS NOTE ADULT - REASON FOR ADMISSION
Chest pain
chest pain
coronary artery disease
abnormal stress test
cardiac cath for renal transplant clearance
coronary artery disease

## 2022-11-02 NOTE — PROGRESS NOTE ADULT - SUBJECTIVE AND OBJECTIVE BOX
Interventional Cardiology Post Cath Progress Note:                Subjective:   Patient seen lying in bed  feels well- no current complaints- Denies chest pain, shortness of breath. Denies pain, numbness, tingling or swelling around (groin access site)    Tele 24hrs: NSR 60-80s       MEDICATIONS  (STANDING):  aspirin enteric coated 81 milliGRAM(s) Oral daily  atorvastatin 40 milliGRAM(s) Oral at bedtime  carvedilol 6.25 milliGRAM(s) Oral every 12 hours  chlorhexidine 2% Cloths 1 Application(s) Topical daily  cloNIDine 0.1 milliGRAM(s) Oral three times a day  clopidogrel Tablet 75 milliGRAM(s) Oral daily  dextrose 5%. 1000 milliLiter(s) (50 mL/Hr) IV Continuous <Continuous>  dextrose 5%. 1000 milliLiter(s) (100 mL/Hr) IV Continuous <Continuous>  dextrose 50% Injectable 25 Gram(s) IV Push once  dextrose 50% Injectable 12.5 Gram(s) IV Push once  dextrose 50% Injectable 25 Gram(s) IV Push once  glucagon  Injectable 1 milliGRAM(s) IntraMuscular once  hydrALAZINE 50 milliGRAM(s) Oral two times a day  insulin glargine Injectable (LANTUS) 8 Unit(s) SubCutaneous at bedtime  insulin lispro (ADMELOG) corrective regimen sliding scale   SubCutaneous three times a day before meals  insulin lispro (ADMELOG) corrective regimen sliding scale   SubCutaneous at bedtime  isosorbide   dinitrate Tablet (ISORDIL) 10 milliGRAM(s) Oral three times a day  lactulose Syrup 15 Gram(s) Oral daily  NIFEdipine XL 90 milliGRAM(s) Oral daily  polyethylene glycol 3350 17 Gram(s) Oral every 12 hours  senna 2 Tablet(s) Oral at bedtime  sevelamer carbonate 1600 milliGRAM(s) Oral three times a day with meals    MEDICATIONS  (PRN):  dextrose Oral Gel 15 Gram(s) Oral once PRN Blood Glucose LESS THAN 70 milliGRAM(s)/deciliter  Objective:  Vital Signs Last 24 Hrs  T(C): 36.4 (02 Nov 2022 12:50), Max: 36.9 (01 Nov 2022 20:36)  T(F): 97.6 (02 Nov 2022 12:50), Max: 98.5 (01 Nov 2022 20:36)  HR: 58 (02 Nov 2022 13:20) (54 - 68)  BP: 130/73 (02 Nov 2022 13:20) (130/73 - 170/78)  BP(mean): --  RR: 18 (02 Nov 2022 12:50) (18 - 20)  SpO2: 97% (02 Nov 2022 12:50) (97% - 99%)    Parameters below as of 02 Nov 2022 12:50  Patient On (Oxygen Delivery Method): room air        11-02-22 @ 07:01  -  11-02-22 @ 13:43  --------------------------------------------------------  IN: 0 mL / OUT: 2000 mL / NET: -2000 mL                              9.4    7.94  )-----------( 129      ( 02 Nov 2022 06:13 )             29.7     11-02    135  |  95<L>  |  58<H>  ----------------------------<  91  5.6<H>   |  25  |  10.37<H>    Ca    9.4      02 Nov 2022 06:13  Phos  5.0     11-02  Mg     2.4     11-02    TPro  7.9  /  Alb  4.3  /  TBili  0.5  /  DBili  x   /  AST  11  /  ALT  9<L>  /  AlkPhos  59  11-02    Physical Exam:  No apparent distress, alert and oriented times three, appropriate affect  JVD is not elevated, supple  Clear to auscultation with no wheezing, ronchi or crackles  Regular rate and rhythm with no murmur, rub or gallop  Positive bowel sounds, soft, non-tender, non-distended, no masses/guarding or rebound tenderness  Procedural site noted (RFA ) groin: Soft, non tender, no bleeding or hematoma, clean/dry/intact- RLE/LLE +2 (palpable femoral pulse)  No clubbing, cyanosis or edema      Assessment/Plan: 67y Male PMH (insert PMH)  s/p (insert vessel/intervention) via RFA/RRA access on (insert date).    - +-Staged procedure.  - Groin site stable.   - Continue DAPT (aspirin 81mg and clopidogrel 75mg)     +-Anticoagulant and if so how long triple therapy.  If aspirin discontiued have to write reason.  - Continue atorvastatin  - EF >35% or <=35%.  If <=35% is patient on an ACE/ARB/ARNI.  If not, why?  ?creatinine  - Care per primary team    Please check [email or web address] password [email or web address] for cardiology service schedule and contact information via TEAMS.                                                                                             Interventional Cardiology Post Cath Progress Note:                Subjective:   Patient seen lying in bed  feels well- no current complaints- Denies chest pain, shortness of breath. Denies pain, numbness, tingling or swelling around (groin access site)    Tele 24hrs: NSR 60-80s       MEDICATIONS  (STANDING):  aspirin enteric coated 81 milliGRAM(s) Oral daily  atorvastatin 40 milliGRAM(s) Oral at bedtime  carvedilol 6.25 milliGRAM(s) Oral every 12 hours  chlorhexidine 2% Cloths 1 Application(s) Topical daily  cloNIDine 0.1 milliGRAM(s) Oral three times a day  clopidogrel Tablet 75 milliGRAM(s) Oral daily  dextrose 5%. 1000 milliLiter(s) (50 mL/Hr) IV Continuous <Continuous>  dextrose 5%. 1000 milliLiter(s) (100 mL/Hr) IV Continuous <Continuous>  dextrose 50% Injectable 25 Gram(s) IV Push once  dextrose 50% Injectable 12.5 Gram(s) IV Push once  dextrose 50% Injectable 25 Gram(s) IV Push once  glucagon  Injectable 1 milliGRAM(s) IntraMuscular once  hydrALAZINE 50 milliGRAM(s) Oral two times a day  insulin glargine Injectable (LANTUS) 8 Unit(s) SubCutaneous at bedtime  insulin lispro (ADMELOG) corrective regimen sliding scale   SubCutaneous three times a day before meals  insulin lispro (ADMELOG) corrective regimen sliding scale   SubCutaneous at bedtime  isosorbide   dinitrate Tablet (ISORDIL) 10 milliGRAM(s) Oral three times a day  lactulose Syrup 15 Gram(s) Oral daily  NIFEdipine XL 90 milliGRAM(s) Oral daily  polyethylene glycol 3350 17 Gram(s) Oral every 12 hours  senna 2 Tablet(s) Oral at bedtime  sevelamer carbonate 1600 milliGRAM(s) Oral three times a day with meals    MEDICATIONS  (PRN):  dextrose Oral Gel 15 Gram(s) Oral once PRN Blood Glucose LESS THAN 70 milliGRAM(s)/deciliter  Objective:  Vital Signs Last 24 Hrs  T(C): 36.4 (02 Nov 2022 12:50), Max: 36.9 (01 Nov 2022 20:36)  T(F): 97.6 (02 Nov 2022 12:50), Max: 98.5 (01 Nov 2022 20:36)  HR: 58 (02 Nov 2022 13:20) (54 - 68)  BP: 130/73 (02 Nov 2022 13:20) (130/73 - 170/78)  BP(mean): --  RR: 18 (02 Nov 2022 12:50) (18 - 20)  SpO2: 97% (02 Nov 2022 12:50) (97% - 99%)    Parameters below as of 02 Nov 2022 12:50  Patient On (Oxygen Delivery Method): room air        11-02-22 @ 07:01  -  11-02-22 @ 13:43  --------------------------------------------------------  IN: 0 mL / OUT: 2000 mL / NET: -2000 mL                              9.4    7.94  )-----------( 129      ( 02 Nov 2022 06:13 )             29.7     11-02    135  |  95<L>  |  58<H>  ----------------------------<  91  5.6<H>   |  25  |  10.37<H>    Ca    9.4      02 Nov 2022 06:13  Phos  5.0     11-02  Mg     2.4     11-02    TPro  7.9  /  Alb  4.3  /  TBili  0.5  /  DBili  x   /  AST  11  /  ALT  9<L>  /  AlkPhos  59  11-02    < from: TTE with Doppler (w/Cont) (11.01.22 @ 08:23) >  Conclusions:  1. Tethered mitral valve leaflets with normal opening. Mild  mitral regurgitation.  2. Calcified trileaflet aortic valve with normal opening.  No aortic valve regurgitation seen.  3. Normal left atrium.  LA volume index = 34 cc/m2.  4.  Endocardial visualization enhanced with intravenous  injection of Ultrasonic Enhancing Agent (Definity). Overall  preserved left ventricular ejection fraction. No left  ventricular thrombus. The apical inferior wall, and the  apical septum are hypokinetic.  5. Mild diastolic dysfunction (Stage I).  6. Normal right ventricular size and function.  *** No previous Echo exam.  ------------------------------------------------------------------------  Confirmed on  11/1/2022 - 11:04:17 by Tristen Anne M.D.  ------------------------------------------------------------------------      Physical Exam:  No apparent distress, alert and oriented times three, appropriate affect  JVD is not elevated, supple  Clear to auscultation with no wheezing, ronchi or crackles  Regular rate and rhythm with no murmur, rub or gallop  Positive bowel sounds, soft, non-tender, non-distended, no masses/guarding or rebound tenderness  Procedural site noted (RFA ) groin: Soft, non tender, no bleeding or hematoma, clean/dry/intact- RLE/LLE +2 (palpable femoral pulse)  No clubbing, cyanosis or edema    66 yo/Male w/ PMHx CAD s/p 5 stents (pLAD, dLAD, LCX, RCA), CHF, HTN, DM, anemia, ESRD on HD (Tu/Th/Sat) who was referred for C  as part of cardiac clearance pending renal transplant (by Dr. Johnson), s/p stent LCX on 10/28, PCI to LAD on 10/31/22,   with no complaints of CP, palpitations, pain at access site of RFA        1. CAD  s/p stent pLCX on 10/28, staged PCI to  LAD on 10/31.  - Continue ASA and Plavix 75mg    - Continue Atorvastatin 40mg    2. HTN   - BP control SBP goal <180, can uptitrate carvedilol as needed   -- DASH diet     3. ESRD   - Dialysis scheduled for today 11/2/22 - f/u with nephrologist, Dr. Yasmany Huff.  - discharge to home today after Dialysis    Diet education completed Low protein low sodium low cholesterol low fat diet   In addition education to Avoid using NSAIDS ( Alevenu Excedrin , Ibuprofen, Naproxen) while on DAPT , please utilize Tylenol for pain control not to exceed 4gm in 24hr     Please check [email or web address] password [email or web address] for cardiology service schedule and contact information via TEAMS     Anita Cazares Np  Interventional Cardiology   [phone]

## 2022-11-02 NOTE — DISCHARGE NOTE PROVIDER - NSDCCPCAREPLAN_GEN_ALL_CORE_FT
PRINCIPAL DISCHARGE DIAGNOSIS  Diagnosis: CAD (coronary artery disease)  Assessment and Plan of Treatment: You came in for a scheduled procedure for additional stents to your heart to help with your heart function pending a renal transplant in the future. Please continue to take your aspirin and plavix (clopidogrel) when you leave the hospital, as these medications keep your stents open. Please follow up with Dr Johnson when you leave the hospital.      SECONDARY DISCHARGE DIAGNOSES  Diagnosis: HTN (hypertension)  Assessment and Plan of Treatment: When you came into the hospital, you had high blood pressure on your blood pressure medicines. We adjusted your BP meds by increasing your carvedilol to 6.25mg twice a day. Please continue to take your clonidine, hydralazine, nifedipine, isosorbide dinitrate    Diagnosis: ESRD on dialysis  Assessment and Plan of Treatment: While you were hospitalized for your stents, we performed dialysis based on your labs and procedures. When you leave the hospital, please continue your hemodialysis schedule Tuesday/Thursday/Saturday.

## 2022-11-02 NOTE — PROGRESS NOTE ADULT - ATTENDING COMMENTS
68 yo/Male w/ PMHx CAD s/p 5 stents (pLAD, dLAD, LCX, RCA), CHF, HTN, DM, anemia, ESRD on HD (Tu/Th/Sat) who was sent in by Dr. Johnson for [facility] for renal txp eval, s/p NAHUM to pLCx now pending staged PCI 10/31 for 90% LAD lesion.    #+nuclear stress test outpatient  #CAD s/p stents - ASA/Plavix  #CHF - C/w carvedilol, uptitrate as tolerated. Discussed with Dr. Johnson re: possible GDMT for HF. Dr. Johnson recs repeat TTE given possible improvement in EF on outpatient echo.  #ESRD  #DM  - Extensive cardiac hx s/p stents 2014 and recent pLCX NAHUM 10/28 as part of cardiac clearance pending renal transplant.  - Pending staged PCI 10/31  - s/p R sided femoral access for [facility]  - dong well no complaints, no chest pain, sob, orthopnea, palpitations  - management as above, c/w home anti-HTN meds,   - nephrology on board for HD
66 yo/Male w/ PMHx CAD s/p 5 stents (pLAD, dLAD, LCX, RCA), CHF, HTN, DM, anemia, ESRD on HD (Tu/Th/Sat) who was sent in by Dr. Johnson for [facility] for renal txp eval, s/p NAHUM to pLCx now pending staged PCI 10/31 for 90% LAD lesion.    #+nuclear stress test outpatient  #CAD s/p stents - ASA/Plavix  #CHF - C/w carvedilol, uptitrate as tolerated. Discuss other GDMT with cardiology  #ESRD  #DM  - Extensive cardiac hx s/p stents 2014 and recent pLCX NAHUM 10/28 as part of cardiac clearance pending renal transplant.  - Pending staged PCI 10/31  - s/p R sided femoral access for C  - dong well no complaints, no chest pain, sob, orthopnea, palpitations  - management as above, c/w home anti-HTN meds, nephrology consult for HD, appreciate dialysis.
66 yo/Male w/ PMHx CAD s/p 5 stents (pLAD, dLAD, LCX, RCA), CHF, HTN, DM, anemia, ESRD on HD (Tu/Th/Sat) who was sent in by Dr. Johnson for [facility] for renal txp eval, s/p NAHUM to pLCx now pending staged PCI 10/31 for 90% LAD lesion.    #+nuclear stress test outpatient  #CAD s/p stents - ASA/Plavix  #CHF - C/w carvedilol, uptitrate as tolerated. Discussed with Dr. Johnson re: possible GDMT for HF. Dr. Johnson recs repeat TTE given possible improvement in EF on outpatient echo.  #ESRD  #DM  - Extensive cardiac hx s/p stents 2014 and recent pLCX NAHUM 10/28 as part of cardiac clearance pending renal transplant.  - Pending staged PCI 10/31  - s/p R sided femoral access for [facility]  - dong well no complaints, no chest pain, sob, orthopnea, palpitations  - management as above, c/w home anti-HTN meds, nephrology consult for HD, appreciate dialysis.
66 yo/Male w/ PMHx CAD s/p 5 stents (pLAD, dLAD, LCX, RCA), CHF, HTN, DM, anemia, ESRD on HD (Tu/Th/Sat) who was sent in by Dr. Johnson for [facility], now pending staged PCI 10/31.    #+nuclear stress test outpatient  #CAD s/p stents  #CHF   #ESRD  #DM  - Extensive cardiac hx s/p stents 2014 and recent pLCX NAHUM 10/28 as part of cardiac clearance pending renal transplant.  - Pending staged PCI 10/31  - s/p R sided femoral access for [facility]  - dong well no complaints, no chest pain, sob, orthopnea, palpitations  - management as above, c/w home anti-HTN meds, nephrology consult for HD
66 yo/Male w/ PMHx CAD s/p 5 stents (pLAD, dLAD, LCX, RCA), CHF, HTN, DM, anemia, ESRD on HD (Tu/Th/Sat) who was sent in by Dr. Johnson for [facility] for renal txp eval, s/p NAHUM to pLCx now pending staged PCI 10/31 for 90% LAD lesion.    #+nuclear stress test outpatient  #CAD s/p stents - ASA/Plavix  #CHF -  #ESRD  #DM  - Extensive cardiac hx s/p stents 2014 and recent pLCX NAHUM 10/28 as part of cardiac clearance pending renal transplant.  - staged PCI 10/31, pending repeat TTE as per Dr. Johnson  - dong well no complaints, no chest pain, sob, orthopnea, palpitations  - management as above, c/w home anti-HTN meds,   - nephrology on board for HD, will have repeat session of dialysis before discharge tmrw  - bowel regimen added today
66 yo/Male w/ PMHx CAD s/p 5 stents (pLAD, dLAD, LCX, RCA), CHF, HTN, DM, anemia, ESRD on HD (Tu/Th/Sat) who was sent in by Dr. Johnson for [facility] for renal txp eval, s/p NAHUM to pLCx now pending staged PCI 10/31 for 90% LAD lesion.    #+nuclear stress test outpatient  #CAD s/p stents - ASA/Plavix  #CHF -  #ESRD  #DM  - Extensive cardiac hx s/p stents 2014 and recent pLCX NAHUM 10/28 as part of cardiac clearance pending renal transplant.  - staged PCI 10/31, pending repeat TTE as per Dr. Johnson  - dong well no complaints, no chest pain, sob, orthopnea, palpitations  - management as above, c/w home anti-HTN meds,   - nephrology on board for HD, will have dialysis today and then be discharged

## 2022-11-02 NOTE — PROGRESS NOTE ADULT - SUBJECTIVE AND OBJECTIVE BOX
PROGRESS NOTE:     Patient is a 67y old  Male who presents with a chief complaint of chest pain (01 Nov 2022 10:20)      INTERVAL HISTORY:    MEDICATIONS  (STANDING):  aspirin enteric coated 81 milliGRAM(s) Oral daily  atorvastatin 40 milliGRAM(s) Oral at bedtime  carvedilol 6.25 milliGRAM(s) Oral every 12 hours  chlorhexidine 2% Cloths 1 Application(s) Topical daily  cloNIDine 0.1 milliGRAM(s) Oral three times a day  clopidogrel Tablet 75 milliGRAM(s) Oral daily  dextrose 5%. 1000 milliLiter(s) (100 mL/Hr) IV Continuous <Continuous>  dextrose 5%. 1000 milliLiter(s) (50 mL/Hr) IV Continuous <Continuous>  dextrose 50% Injectable 25 Gram(s) IV Push once  dextrose 50% Injectable 12.5 Gram(s) IV Push once  dextrose 50% Injectable 25 Gram(s) IV Push once  glucagon  Injectable 1 milliGRAM(s) IntraMuscular once  hydrALAZINE 50 milliGRAM(s) Oral two times a day  insulin glargine Injectable (LANTUS) 8 Unit(s) SubCutaneous at bedtime  insulin lispro (ADMELOG) corrective regimen sliding scale   SubCutaneous three times a day before meals  insulin lispro (ADMELOG) corrective regimen sliding scale   SubCutaneous at bedtime  isosorbide   dinitrate Tablet (ISORDIL) 10 milliGRAM(s) Oral three times a day  lactulose Syrup 15 Gram(s) Oral daily  NIFEdipine XL 90 milliGRAM(s) Oral daily  polyethylene glycol 3350 17 Gram(s) Oral every 12 hours  senna 2 Tablet(s) Oral at bedtime  sevelamer carbonate 1600 milliGRAM(s) Oral three times a day with meals    MEDICATIONS  (PRN):  dextrose Oral Gel 15 Gram(s) Oral once PRN Blood Glucose LESS THAN 70 milliGRAM(s)/deciliter      CAPILLARY BLOOD GLUCOSE      POCT Blood Glucose.: 220 mg/dL (01 Nov 2022 21:13)  POCT Blood Glucose.: 92 mg/dL (01 Nov 2022 16:30)  POCT Blood Glucose.: 223 mg/dL (01 Nov 2022 11:51)  POCT Blood Glucose.: 128 mg/dL (01 Nov 2022 07:45)    I&O's Summary      PHYSICAL EXAM:  Vital Signs Last 24 Hrs  T(C): 36.9 (02 Nov 2022 04:37), Max: 36.9 (01 Nov 2022 20:36)  T(F): 98.4 (02 Nov 2022 04:37), Max: 98.5 (01 Nov 2022 20:36)  HR: 68 (02 Nov 2022 04:37) (60 - 68)  BP: 170/78 (02 Nov 2022 04:37) (130/71 - 170/78)  BP(mean): --  RR: 18 (02 Nov 2022 04:37) (18 - 20)  SpO2: 98% (02 Nov 2022 04:37) (98% - 99%)    Parameters below as of 02 Nov 2022 04:37  Patient On (Oxygen Delivery Method): room air        CONSTITUTIONAL: NAD, well-developed  HEENT:   RESPIRATORY: Normal respiratory effort; lungs are clear to auscultation bilaterally  CARDIOVASCULAR: Regular rate and rhythm, normal S1 and S2, no murmur/rub/gallop; No lower extremity edema; Peripheral pulses are 2+ bilaterally  ABDOMEN: Nontender to palpation, normoactive bowel sounds, no rebound/guarding; No hepatosplenomegaly  MUSCULOSKELETAL: no clubbing or cyanosis of digits; no joint swelling or tenderness to palpation  PSYCH: A+O to person, place, and time; affect appropriate    LABS:                        9.4    7.94  )-----------( 129      ( 02 Nov 2022 06:13 )             29.7     11-02    135  |  95<L>  |  58<H>  ----------------------------<  91  5.6<H>   |  25  |  10.37<H>    Ca    9.4      02 Nov 2022 06:13  Phos  5.0     11-02  Mg     2.4     11-02    TPro  7.9  /  Alb  4.3  /  TBili  0.5  /  DBili  x   /  AST  11  /  ALT  9<L>  /  AlkPhos  59  11-02                RADIOLOGY:        ******************************  Authored By: Rafia Martinez MD PGY1  Internal Medicine  MS Teams  ******************************   PROGRESS NOTE:     Patient is a 67y old  Male who presents with a chief complaint of chest pain (01 Nov 2022 10:20)    INTERVAL HISTORY: NAEO. Pt had no complaints of CP, palpitations or pain at R fem access site. Pt is due for HD this am.    MEDICATIONS  (STANDING):  aspirin enteric coated 81 milliGRAM(s) Oral daily  atorvastatin 40 milliGRAM(s) Oral at bedtime  carvedilol 6.25 milliGRAM(s) Oral every 12 hours  chlorhexidine 2% Cloths 1 Application(s) Topical daily  cloNIDine 0.1 milliGRAM(s) Oral three times a day  clopidogrel Tablet 75 milliGRAM(s) Oral daily  dextrose 5%. 1000 milliLiter(s) (100 mL/Hr) IV Continuous <Continuous>  dextrose 5%. 1000 milliLiter(s) (50 mL/Hr) IV Continuous <Continuous>  dextrose 50% Injectable 25 Gram(s) IV Push once  dextrose 50% Injectable 12.5 Gram(s) IV Push once  dextrose 50% Injectable 25 Gram(s) IV Push once  glucagon  Injectable 1 milliGRAM(s) IntraMuscular once  hydrALAZINE 50 milliGRAM(s) Oral two times a day  insulin glargine Injectable (LANTUS) 8 Unit(s) SubCutaneous at bedtime  insulin lispro (ADMELOG) corrective regimen sliding scale   SubCutaneous three times a day before meals  insulin lispro (ADMELOG) corrective regimen sliding scale   SubCutaneous at bedtime  isosorbide   dinitrate Tablet (ISORDIL) 10 milliGRAM(s) Oral three times a day  lactulose Syrup 15 Gram(s) Oral daily  NIFEdipine XL 90 milliGRAM(s) Oral daily  polyethylene glycol 3350 17 Gram(s) Oral every 12 hours  senna 2 Tablet(s) Oral at bedtime  sevelamer carbonate 1600 milliGRAM(s) Oral three times a day with meals    MEDICATIONS  (PRN):  dextrose Oral Gel 15 Gram(s) Oral once PRN Blood Glucose LESS THAN 70 milliGRAM(s)/deciliter      CAPILLARY BLOOD GLUCOSE      POCT Blood Glucose.: 220 mg/dL (01 Nov 2022 21:13)  POCT Blood Glucose.: 92 mg/dL (01 Nov 2022 16:30)  POCT Blood Glucose.: 223 mg/dL (01 Nov 2022 11:51)  POCT Blood Glucose.: 128 mg/dL (01 Nov 2022 07:45)    I&O's Summary      PHYSICAL EXAM:  Vital Signs Last 24 Hrs  T(C): 36.9 (02 Nov 2022 04:37), Max: 36.9 (01 Nov 2022 20:36)  T(F): 98.4 (02 Nov 2022 04:37), Max: 98.5 (01 Nov 2022 20:36)  HR: 68 (02 Nov 2022 04:37) (60 - 68)  BP: 170/78 (02 Nov 2022 04:37) (130/71 - 170/78)  BP(mean): --  RR: 18 (02 Nov 2022 04:37) (18 - 20)  SpO2: 98% (02 Nov 2022 04:37) (98% - 99%)    Parameters below as of 02 Nov 2022 04:37  Patient On (Oxygen Delivery Method): room air        CONSTITUTIONAL: NAD, well-developed  RESPIRATORY: Normal respiratory effort; lungs are clear to auscultation bilaterally  CARDIOVASCULAR: Regular rate and rhythm, normal S1 and S2, no murmur/rub/gallop; No lower extremity edema; Peripheral pulses are 2+ bilaterally  ABDOMEN: Nontender to palpation, normoactive bowel sounds, no rebound/guarding; No hepatosplenomegaly  MUSCULOSKELETAL: no clubbing or cyanosis of digits; no joint swelling or tenderness to palpation; no hematoma on R fem access site   PSYCH: A+O to person, place, and time; affect appropriate    LABS:                        9.4    7.94  )-----------( 129      ( 02 Nov 2022 06:13 )             29.7     11-02    135  |  95<L>  |  58<H>  ----------------------------<  91  5.6<H>   |  25  |  10.37<H>    Ca    9.4      02 Nov 2022 06:13  Phos  5.0     11-02  Mg     2.4     11-02    TPro  7.9  /  Alb  4.3  /  TBili  0.5  /  DBili  x   /  AST  11  /  ALT  9<L>  /  AlkPhos  59  11-02          ******************************  Authored By: Rafia Martinez MD PGY1  Internal Medicine  MS Teams  ******************************

## 2022-11-02 NOTE — DISCHARGE NOTE NURSING/CASE MANAGEMENT/SOCIAL WORK - PATIENT PORTAL LINK FT
You can access the FollowMyHealth Patient Portal offered by [facility] by registering at the following website: [email or web address]. By joining [facility]’s FollowMyHealth portal, you will also be able to view your health information using other applications (apps) compatible with our system.

## 2022-11-02 NOTE — DISCHARGE NOTE PROVIDER - CARE PROVIDERS DIRECT ADDRESSES
,DirectAddress_Unknown ,DirectAddress_Unknown,[email or web address]

## 2022-11-02 NOTE — PROGRESS NOTE ADULT - ASSESSMENT
67 year-old gentleman awaiting renal transplant.  He has known coronary artery disease status post PCI.    Now status post PCI to LCx on Friday, 10/28 and staged PCI to LAD 10/31.  Tolerated procedures well.  HD after staged PCI.    Continue dual antiplatelet therapy.  Continue high intensity statin therapy. Patient may also require PCSK-9 inhibitor (as outpatient).  Recommend uptitrating carvedilol as tolerated.    Dialysis today per patient's nephrologist, Dr. Yasmany Huff.    Discharge planning per primary team.

## 2022-11-02 NOTE — PROGRESS NOTE ADULT - SUBJECTIVE AND OBJECTIVE BOX
[facility] NEPHROLOGY PRACTICE   MD JORDAN AVALOS MD RUORU WONG, PA    TEL:  FROM 9 AM to 5 PM ---OFFICE: [phone]    FROM 5 PM - 9 AM PLEASE CALL ANSWERING SERVICE: [phone]    RENAL FOLLOW UP NOTE--Date of Service 11-02-22 @ 09:07  --------------------------------------------------------------------------------  HPI:      Pt seen and examined at bedside.   Sherrill SOB, chest pain     PAST HISTORY  --------------------------------------------------------------------------------  No significant changes to PMH, PSH, FHx, SHx, unless otherwise noted    ALLERGIES & MEDICATIONS  --------------------------------------------------------------------------------  Allergies    No Known Allergies    Intolerances      Standing Inpatient Medications  aspirin enteric coated 81 milliGRAM(s) Oral daily  atorvastatin 40 milliGRAM(s) Oral at bedtime  carvedilol 6.25 milliGRAM(s) Oral every 12 hours  chlorhexidine 2% Cloths 1 Application(s) Topical daily  cloNIDine 0.1 milliGRAM(s) Oral three times a day  clopidogrel Tablet 75 milliGRAM(s) Oral daily  dextrose 5%. 1000 milliLiter(s) IV Continuous <Continuous>  dextrose 5%. 1000 milliLiter(s) IV Continuous <Continuous>  dextrose 50% Injectable 25 Gram(s) IV Push once  dextrose 50% Injectable 12.5 Gram(s) IV Push once  dextrose 50% Injectable 25 Gram(s) IV Push once  glucagon  Injectable 1 milliGRAM(s) IntraMuscular once  hydrALAZINE 50 milliGRAM(s) Oral two times a day  insulin glargine Injectable (LANTUS) 8 Unit(s) SubCutaneous at bedtime  insulin lispro (ADMELOG) corrective regimen sliding scale   SubCutaneous three times a day before meals  insulin lispro (ADMELOG) corrective regimen sliding scale   SubCutaneous at bedtime  isosorbide   dinitrate Tablet (ISORDIL) 10 milliGRAM(s) Oral three times a day  lactulose Syrup 15 Gram(s) Oral daily  NIFEdipine XL 90 milliGRAM(s) Oral daily  polyethylene glycol 3350 17 Gram(s) Oral every 12 hours  senna 2 Tablet(s) Oral at bedtime  sevelamer carbonate 1600 milliGRAM(s) Oral three times a day with meals    PRN Inpatient Medications  dextrose Oral Gel 15 Gram(s) Oral once PRN      REVIEW OF SYSTEMS  --------------------------------------------------------------------------------  General: no fever    MSK: no edema     VITALS/PHYSICAL EXAM  --------------------------------------------------------------------------------  T(C): 36.9 (11-02-22 @ 04:37), Max: 36.9 (11-01-22 @ 20:36)  HR: 68 (11-02-22 @ 04:37) (60 - 68)  BP: 170/78 (11-02-22 @ 04:37) (130/71 - 170/78)  RR: 18 (11-02-22 @ 04:37) (18 - 20)  SpO2: 98% (11-02-22 @ 04:37) (98% - 99%)  Wt(kg): --        Physical Exam:  	Gen: NAD  	HEENT: MMM  	Pulm: CTA B/L  	CV: S1S2  	Abd: Soft, +BS  	Ext: No LE edema B/L                      Neuro: Awake   	Skin: Warm and Dry   	Vascular access: avf          JHONATHAN hinds  LABS/STUDIES  --------------------------------------------------------------------------------              9.4    7.94  >-----------<  129      [11-02-22 @ 06:13]              29.7     135  |  95  |  58  ----------------------------<  91      [11-02-22 @ 06:13]  5.6   |  25  |  10.37        Ca     9.4     [11-02-22 @ 06:13]      Mg     2.4     [11-02-22 @ 06:13]      Phos  5.0     [11-02-22 @ 06:13]    TPro  7.9  /  Alb  4.3  /  TBili  0.5  /  DBili  x   /  AST  11  /  ALT  9   /  AlkPhos  59  [11-02-22 @ 06:13]          Creatinine Trend:  SCr 10.37 [11-02 @ 06:13]  SCr 7.16 [11-01 @ 07:36]  SCr 10.36 [10-31 @ 06:18]  SCr 8.36 [10-30 @ 06:31]  SCr 11.29 [10-29 @ 07:10]        HbA1c 7.8      [12-17-19 @ 05:54]

## 2022-11-02 NOTE — DISCHARGE NOTE PROVIDER - PROVIDER TOKENS
PROVIDER:[TOKEN:[[id number]],FOLLOWUP:[2 weeks]] PROVIDER:[TOKEN:[[id number]],FOLLOWUP:[2 weeks]],PROVIDER:[TOKEN:[[id number]],FOLLOWUP:[1 week]]

## 2022-11-02 NOTE — DISCHARGE NOTE PROVIDER - NSDCFUADDAPPT_GEN_ALL_CORE_FT
You have an appointment with Dr. Johnson, your cardiologist (heart doctor) on November 9th at 3pm. Address and phone number for the office are above.

## 2022-11-02 NOTE — PROGRESS NOTE ADULT - NS ATTEND AMEND GEN_ALL_CORE FT
I have personally seen and examined the patient.  I fully participated in the care of this patient.  I have made amendments to the documentation where necessary, and agree with the history, physical exam, and plan as documented by the nurse practitioner.    The patient reports that during the nighttime she sometimes feels short of breath when she is laying flat.  Denies any change in her two-pillow orthopnea.  Denies any fevers, chills, sweats, light headed sensation, dizziness or palpitation.  No cardiopulmonary complaints at rest or upon minimal exertion.  Telemetry demonstrates sinus rhythm with rates in the 60s to 70s with no VT/VF/SVT.  Agree with physical examination as noted above.    The patient underwent cardiac catheterization as part of renal transplant evaluation.  Status post stent to left circumflex on 10/20.  Plan for staged PCI to LAD later today.  Discussed with patient indications and details for staged PCI.  Benefits and risks of procedure reviewed.  Risk include but not limited to infection, bleeding, arrhythmia, TIA/stroke, new neck instability, vascular injury, need for urgent surgery and death.     Continue aspirin 81 mg daily and apical 75 mg daily.  Concern for drop in H/H.  Recommend daily CBC.  Indications for these medications were reviewed.  Signs and symptoms of bleeding were discussed.  Avoid all NSAIDs.      EF 35-40%.    The patient is on dialysis.  Unable to be started on ACE/ARB/ARNI.    Continue atorvastatin 40mg daily.    Aim for potassium greater than 4 magnesium greater than 2.    Continue telemetry monitoring.    All questions and concerns of the patient were addressed.    And plan were discussed with hospitalist team    Time-based billing (NON-critical care).     25 minutes spent on total encounter; more than 50% of the visit was spent counseling and / or coordinating care by the attending physician.  The necessity of the time spent during the encounter on this date of service was due to:     education, assessment and coordination of care.
No acute events reported overnight.  The patient is in good spirits.  Denies any Cardioplen or complaints at rest or upon exertion.  Denies any pain at his groin site.  Agree with physical nation as noted above.  Right femoral artery puncture site no bleeding/hematoma.  14 point review of systems is otherwise unremarkable except what is described above.  Telemetry demonstrates sinus bradycardia/rhythm with rates in the 60s to 80s with no VT/VF    The patient is status post stent to left circumflex on 10/28.  PCI to LAD was performed on 10/31.  Continue aspirin 81 mg daily and clopidogrel 75 mg daily.  The patient was told to please avoid all NSAIDs.  May take Tylenol for discomfort.  Continue atorvastatin 40 mg daily.  Uptitrate hypertensive medications as tolerated.    TTE demonstrated preserved LV function with no left ventricular thrombus.    Attestation Statements:   Time-based billing (NON-critical care).     25 minutes spent on total encounter; more than 50% of the visit was spent counseling and / or coordinating care by the attending physician.  The necessity of the time spent during the encounter on this date of service was due to:     education, assessment and coordination of care.
Patient complaining of abdominal discomfort which he attributes to not having bowel movement for many days.  Denies any cardiopulmonary complaints at rest or upon minimal examination.  Agree with physical nation as noted above.  Right femoral artery puncture site no bleeding/hematoma.  14 point review of systems otherwise unremarkable separate described above.  Telemetry demonstrates sinus bradycardia/rhythm with rates in the 50s to 70s with no VT/VF    The patient is status post stent to left circumflex on 10/28.  PCI to LAD was performed on 10/31.  Continue aspirin 81 mg daily and clopidogrel 75 mg daily.  The patient was told to please avoid all NSAIDs.  May take Tylenol for discomfort.  Continue atorvastatin 40 mg daily.  Uptitrate hypertensive medications as tolerated.    TTE demonstrated preserved LV function with no left ventricular thrombus.    Attestation Statements:   Time-based billing (NON-critical care).     35 minutes spent on total encounter; more than 50% of the visit was spent counseling and / or coordinating care by the attending physician.  The necessity of the time spent during the encounter on this date of service was due to:     education, assessment and coordination of care.

## 2022-11-02 NOTE — DISCHARGE NOTE NURSING/CASE MANAGEMENT/SOCIAL WORK - NSDCPEFALRISK_GEN_ALL_CORE
For information on Fall & Injury Prevention, visit: [email or web address] OR  [email or web address] OR  https://www.cdc.gov/steadi/patient.html

## 2022-11-02 NOTE — DISCHARGE NOTE PROVIDER - NSDCFUSCHEDAPPT_GEN_ALL_CORE_FT
Edin Johnson  [facility] Physician [facility]  CARDIOLOGY [address] [facility]   Scheduled Appointment: 11/09/2022

## 2022-11-02 NOTE — DISCHARGE NOTE PROVIDER - ATTENDING DISCHARGE PHYSICAL EXAMINATION:
T(C): 36.4 (11-02-22 @ 12:50), Max: 36.9 (11-01-22 @ 20:36)  HR: 58 (11-02-22 @ 13:20) (54 - 68)  BP: 130/73 (11-02-22 @ 13:20) (130/73 - 170/78)  RR: 18 (11-02-22 @ 12:50) (18 - 20)  SpO2: 97% (11-02-22 @ 12:50) (97% - 99%)  General: NAD, comfortable  Eyes: no conjunctival erythema  ENT: MMM  Neck: Neck supple, No JVD  Respiratory: CTA B/L, No wheezing, rales, rhonchi  CV: RRR no murmurs  Abdominal: Soft, NT, ND +BS  MSK: no focal weakness  Extremities: No edema, 2+ peripheral pulses  Neurology: A&Ox3, nonfocal, SUÁREZ x 4  Skin: No Rashes, Hematoma, Ecchymosis  Psych: Calm and appropriate

## 2022-11-02 NOTE — DISCHARGE NOTE PROVIDER - NSDCMRMEDTOKEN_GEN_ALL_CORE_FT
Aspirin Enteric Coated 81 mg oral delayed release tablet: 1 tab(s) orally once a day  atorvastatin 40 mg oral tablet: 1 tab(s) orally once a day  Basaglar KwikPen 100 units/mL subcutaneous solution: 10 unit(s) subcutaneous once a day (at bedtime)  carvedilol 3.125 mg oral tablet: 1 tab(s) orally every 12 hours  cloNIDine 0.1 mg oral tablet: 1 tab(s) orally 3 times a day  clopidogrel 75 mg oral tablet: 1 tab(s) orally once a day  hydrALAZINE 50 mg oral tablet: 1 tab(s) orally 2 times a day  isosorbide dinitrate 10 mg oral tablet: 1 tab(s) orally 3 times a day  NIFEdipine 60 mg oral tablet, extended release: 1 tab(s) orally once a day  sevelamer carbonate 800 mg oral tablet: 2 tab(s) orally 3 times a day (with meals)  Tradjenta 5 mg oral tablet: 1 tab(s) orally once a day   Aspirin Enteric Coated 81 mg oral delayed release tablet: 1 tab(s) orally once a day  atorvastatin 40 mg oral tablet: 1 tab(s) orally once a day  Basaglar KwikPen 100 units/mL subcutaneous solution: 10 unit(s) subcutaneous once a day (at bedtime)  carvedilol 6.25 mg oral tablet: 1 tab(s) orally every 12 hours  cloNIDine 0.1 mg oral tablet: 1 tab(s) orally 3 times a day  clopidogrel 75 mg oral tablet: 1 tab(s) orally once a day  hydrALAZINE 50 mg oral tablet: 1 tab(s) orally 2 times a day  isosorbide dinitrate 10 mg oral tablet: 1 tab(s) orally 3 times a day  NIFEdipine 60 mg oral tablet, extended release: 1 tab(s) orally once a day  sevelamer carbonate 800 mg oral tablet: 2 tab(s) orally 3 times a day (with meals)  Tradjenta 5 mg oral tablet: 1 tab(s) orally once a day   tells me to kill

## 2022-11-02 NOTE — PROGRESS NOTE ADULT - TIME BILLING
Please see above for:    A review of diagnostic results  Prognosis, treatment options  Instructions for treatment and/or follow-up  Importance of compliance with chosen treatment options  Risk factor reduction  Patient and family education

## 2022-11-02 NOTE — PROGRESS NOTE ADULT - PROVIDER SPECIALTY LIST ADULT
Internal Medicine
Internal Medicine
Cardiology
Cardiology
Nephrology
Cardiology
Intervent Cardiology
Intervent Cardiology
Cardiology
Internal Medicine

## 2022-11-02 NOTE — DISCHARGE NOTE PROVIDER - HOSPITAL COURSE
This is a 66 yo/Male w/ PMHx CAD s/p 5 stents (pLAD, dLAD, LCX, RCA), CHF, HTN, DM, anemia, ESRD on HD (Tu/Th/Sat) who was sent in by Dr. Johnson for LHC, accessed through R fem on 10/28. There was a NAHUM placed in proximal LCX to 80%. He was sent in for cardiac clearance pending renal transplant. He tolerated the C to LCx NAHUM on 10/28 well without issues of CP/palpitations or hemodynamic instability. He then proceeded to have a staged PCI to LAD on 10/31, for which he also tolerated well. He received HD right after PCI. During this hospitalization, due to hypertension, we continued his home medications but increased his carvedilol to 6.25mg. He then received HD the day after, and is discharged to resume HD continuing with his Tu/Th/Sat schedule outpatient. Post-PCI echo unremarkable.     He is stable to be discharged with outpatient follow-up. This is a 66 yo/Male w/ PMHx CAD s/p 5 stents (pLAD, dLAD, LCX, RCA), CHF, HTN, DM, anemia, ESRD on HD (Tu/Th/Sat) who was sent in by Dr. Johnson for LHC, accessed through R fem on 10/28. There was a NAHUM placed in proximal LCX that had 80% stenosis. He was sent in for cardiac clearance pending renal transplant. He tolerated the LHC to LCx NAHUM on 10/28 well without issues of CP/palpitations or hemodynamic instability. He then proceeded to have a staged PCI to LAD on 10/31, for which he also tolerated well. He received HD right after PCI. During this hospitalization, due to hypertension, we continued his home medications but increased his carvedilol to 6.25mg. He then received HD the day after, and is discharged to resume HD continuing with his Tu/Th/Sat schedule outpatient. Post-PCI echo unremarkable.     He is stable to be discharged with outpatient follow-up. Will see Dr. Johnson (cardiologist) on Nov 9th.

## 2022-11-02 NOTE — DISCHARGE NOTE PROVIDER - CARE PROVIDER_API CALL
Mago Parker)  [facility] Medicine; Internal Medicine  [address], [address]  [place] [address]  Phone: [phone]  Fax: [phone]  Follow Up Time: 2 weeks   Mago Parker)  [facility]; [facility] Medicine  [address] [place] [address], [address]  [place] [address]  Phone: [phone]  Fax: [phone]  Follow Up Time: 2 weeks    Edin Johnson)  Cardiology; Internal Medicine  [address], [address]  [place] [address]  Phone: [phone]  Fax: [phone]  Follow Up Time: 1 week

## 2022-11-02 NOTE — PROGRESS NOTE ADULT - PROBLEM SELECTOR PLAN 2
Hx ESRD on HD Tu/Th/Sat. Follows with Dr. Huff.    - HD scheduled for after PCI 10/31 - following with nephro regarding what his HD schedule will be   - c/w home sevelamer 1600mg TID Hx ESRD on HD Tu/Th/Sat. Follows with Dr. Huff.    - HD scheduled today - following with nephro regarding what his HD schedule will be   - c/w home sevelamer 1600mg TID Hx ESRD on HD Tu/Th/Sat. Follows with Dr. Huff.    - HD scheduled today, and to resume home Tu/Th/Sat  - c/w home sevelamer 1600mg TID

## 2022-11-02 NOTE — PROGRESS NOTE ADULT - SUBJECTIVE AND OBJECTIVE BOX
HPI:  Patient seen and examined at bedside on 4 Simone.  No events overnight.    Review Of Systems:           Respiratory: No shortness of breath, cough, or wheezing  Cardiovascular: No chest pain or palpitations  10 point review of systems is otherwise negative except as mentioned above        Medications:  aspirin enteric coated 81 milliGRAM(s) Oral daily  atorvastatin 40 milliGRAM(s) Oral at bedtime  carvedilol 6.25 milliGRAM(s) Oral every 12 hours  chlorhexidine 2% Cloths 1 Application(s) Topical daily  cloNIDine 0.1 milliGRAM(s) Oral three times a day  clopidogrel Tablet 75 milliGRAM(s) Oral daily  dextrose 5%. 1000 milliLiter(s) IV Continuous <Continuous>  dextrose 5%. 1000 milliLiter(s) IV Continuous <Continuous>  dextrose 50% Injectable 25 Gram(s) IV Push once  dextrose 50% Injectable 25 Gram(s) IV Push once  dextrose 50% Injectable 12.5 Gram(s) IV Push once  dextrose Oral Gel 15 Gram(s) Oral once PRN  glucagon  Injectable 1 milliGRAM(s) IntraMuscular once  hydrALAZINE 50 milliGRAM(s) Oral two times a day  insulin glargine Injectable (LANTUS) 8 Unit(s) SubCutaneous at bedtime  insulin lispro (ADMELOG) corrective regimen sliding scale   SubCutaneous at bedtime  insulin lispro (ADMELOG) corrective regimen sliding scale   SubCutaneous three times a day before meals  isosorbide   dinitrate Tablet (ISORDIL) 10 milliGRAM(s) Oral three times a day  lactulose Syrup 15 Gram(s) Oral daily  NIFEdipine XL 90 milliGRAM(s) Oral daily  polyethylene glycol 3350 17 Gram(s) Oral every 12 hours  senna 2 Tablet(s) Oral at bedtime  sevelamer carbonate 1600 milliGRAM(s) Oral three times a day with meals    PAST MEDICAL & SURGICAL HISTORY:  HTN (hypertension)      Coronary artery disease      CAP (community acquired pneumonia)        Diabetes mellitus  type 2      GERD (gastroesophageal reflux disease)      Stented coronary artery  , 3 stents, [facility]      ESRD (end stage renal disease)  on Dialysis( M/W/F), By Dr. Huff      Hypercholesterolemia      Cerebrovascular accident (CVA), unspecified mechanism  diagnosed via CT of the head      Anemia due to stage 5 chronic kidney disease, not on chronic dialysis      H/O coronary angiogram   - x3 stents      AV fistula  10/12/18 L radiocephalic AV fistula      H/O eye surgery        Vitals:  T(C): 36.4 (22 @ 12:50), Max: 36.9 (22 @ 04:37)  HR: 58 (22 @ 13:20) (54 - 68)  BP: 130/73 (22 @ 13:20) (130/73 - 170/78)  BP(mean): --  RR: 18 (22 @ 12:50) (18 - 18)  SpO2: 97% (22 @ 12:50) (97% - 98%)  Wt(kg): --  Daily     Daily Weight in k (2022 12:50)  I&O's Summary    2022 07:01  -  2022 20:56  --------------------------------------------------------  IN: 480 mL / OUT: 2000 mL / NET: -1520 mL        Physical Exam:  Appearance: Normal, well groomed, NAD  Eyes: PERRLA, EOMI, pink conjunctiva, no scleral icterus   HENT: Normal oral mucosa  Cardiovascular: RRR, S1, S2, no murmur, rub, or gallop; no edema; no JVD  Procedural Access Site (right groin): Clean, dry, intact, without hematoma  Respiratory: Clear to auscultation bilaterally  Gastrointestinal: Soft, non-tender, non-distended, BS+  Musculoskeletal: No clubbing or joint deformity   Neurologic: No focal weakness  Lymphatic: No lymphadenopathy  Psychiatry: AAOx3 with appropriate mood and affect  Skin: No rashes, ecchymoses, or cyanosis                          9.4    7.94  )-----------( 129      ( 2022 06:13 )             29.7         135  |  95<L>  |  58<H>  ----------------------------<  91  5.6<H>   |  25  |  10.37<H>    Ca    9.4      2022 06:13  Phos  5.0       Mg     2.4         TPro  7.9  /  Alb  4.3  /  TBili  0.5  /  DBili  x   /  AST  11  /  ALT  9<L>  /  AlkPhos  59        Cardiovascular Diagnostic Testing:  ECG:    Echo: 2022 - LVEF has normalized    Stress Testing: nuclear stress test in 2022 showing LVEF 49%    Cath:  < from: Cardiac Catheterization (10.28.22 @ 10:47) >  Procedures Performed   Procedures:               1.    Arterial Access - Right Femoral     2.    Diagnostic Coronary Angiography   3.    Ultrasound Guided Access   4.    PCI: NAHUM     Indications:               Abnormal stress test       PCI Status:       elective     Conclusions:   A successful intervention of the LCx was performed   Recommendations:     Return for PCI of LAD Monday     Procedure Narrative:   The risks and alternatives of the procedures and conscious sedation  were explained to the patient and informed consent was  obtained. The patient was brought to the cath lab and placed on the  exam table.  Access   Right femoral artery:   The puncture site was infiltrated with 1% Lidocaine. Vascular access  was obtained using modified seldinger technique.    Diagnostic Findings:     Coronary Angiography   LM   Left main artery: Angiography shows minor irregularities.      LAD   Proximal left anterior descending: There is a 90 % stenosis.      CX   Proximal circumflex: There is an80 % stenosis.      Patient: MD BOLAÑOS                   MRN: [id number]  Study Date: 10/28/2022   10:47 AM      Page 1 of 4    RCA   Mid right coronary artery: There is a 30 % stenosis.      < end of copied text >    < from: Cardiac Catheterization (10.31.22 @ 13:20) >  Procedures Performed   Procedures:               1.    Arterial Access - Right Femoral     2.    Ultrasound Guided Access   3.    IVUS   4.    PCI: POBA     Indications:                Staged Procedures   Positive Stress Test     PCI Status:                elective     Conclusions:   Found to have severe disease in pLAD at the site of prior stenting.  IVUS performed and found that stent appeared    underexpanded with worsening restenosis.  Successful PCI with NC POBA  with a 3.5 x 20 mm in mLAD and 4.0 x 27 in pLAD.  Recommendations:   Aggressive risk factor modification with diet, exercise, and a goal  LDL of less than 70.  DAPT for at least 6 months.       Procedure Narrative:   The risks and alternatives of the procedures and conscious sedation  were explained to the patient and informed consent was  obtained. The patient was brought to the cath lab and placed on the  exam table.  Access   Right femoral artery:   The puncture site was infiltrated with 1% Lidocaine. Vascular access  was obtained using modified seldinger technique.    Diagnostic Findings:     Coronary Angiography   The coronary circulation is right dominant.      LAD   Proximal left anterior descending: There is a 90 % stenosis. Imaging  shows distal reference diameter of 3.6 mm and proximal  reference diameter of 4.6 mm.      Interventional Findings:     Interventional Details     Patient: MD BOLAÑOS                   MRN: [id number]  Study Date: 10/31/2022   01:20 PM      Page 1 of 3          Proximal left anterior descending: The initial stenosis was 90 %.  Guidewire crossing was successful.    < end of copied text >      Interpretation of Telemetry: Normal Sinus Rhythm

## 2022-11-03 ENCOUNTER — NON-APPOINTMENT (OUTPATIENT)
Age: 67
End: 2022-11-03

## 2022-11-09 ENCOUNTER — NON-APPOINTMENT (OUTPATIENT)
Age: 67
End: 2022-11-09

## 2022-11-09 ENCOUNTER — APPOINTMENT (OUTPATIENT)
Dept: CARDIOLOGY | Facility: CLINIC | Age: 67
End: 2022-11-09

## 2022-11-09 VITALS
HEIGHT: 66 IN | BODY MASS INDEX: 23.78 KG/M2 | SYSTOLIC BLOOD PRESSURE: 166 MMHG | WEIGHT: 148 LBS | HEART RATE: 64 BPM | OXYGEN SATURATION: 100 % | DIASTOLIC BLOOD PRESSURE: 76 MMHG | RESPIRATION RATE: 17 BRPM

## 2022-11-09 PROCEDURE — 93000 ELECTROCARDIOGRAM COMPLETE: CPT

## 2022-11-09 PROCEDURE — 99215 OFFICE O/P EST HI 40 MIN: CPT

## 2022-11-17 NOTE — HISTORY OF PRESENT ILLNESS
[FreeTextEntry1] : Patient is 67 year-old with cardiovascular risk factors of hypertension and diabetes, known coronary artery disease status post PCI x3 (2014) at [place] by Dr. Saúl Villafana, CKD, now ESRD on HD Myxnth-Fzsbrojhs-Pkruam) that began January 2019, who presents today for cardiac evaluation prior to possible renal transplant.\par He was working until October 2018 as [employer] .\par Patient does not formally exercise, but he can climb stairs and has no exertional symptoms.\par \par In January 2019, patient admitted to [facility] for shortness of breath and nosebleed. He was found to be volume overloaded in the setting of FELICITA on CKD and he was initiated on hemodialysis. \par \par In February 2019, patient seen to have abnormal nuclear stress test and was referred for left and right heart catheterization. The right heart cath showed elevated systemic blood pressures but normal PCWP (14 mmHg) and normal PA pressures (37/17 mmHg) with normal cardiac output and index of 6 L/min and 3.6 respectively. The left heart cath revealed significant distal LAD disease and significant in stent stenosis of LCx. He is now status post POBA to LCx and NAHUM to LAD.\par \par May 2019 - Patient presents today in his usual state of health. He reports occasional right leg pain that limits his exercise while walking on the treadmill. He is without chest pain, shortness of breath, lower extremity swelling.\par \par October 2019 - Patient presents today in his usual state of health. He reports occasional right leg pain that limits his exercise while walking on the treadmill. He is without chest pain, shortness of breath, lower extremity swelling. He had normal HOLLY/PVR and normal ultrasound of the abdominal vessels (and into iliacs). \par \par March 2020 - Patient returns for follow-up of his cardiovascular disease and to maintain standing with the Transplant Center. Patient recently traveled to [facility] (January 8 - February 6, 2020), and when patient returned having lost 8 lbs without a clear cause. It was unintentional weight loss. He is now being evaluated for cancer including lung cancer (former smoker). He will also need EGD/colonoscopy.\par \par April 2021 - Patient returns today for follow-up after a recent hospitalization. He was hospitalized at [facility] in March 2021 for shortness of breath and poor appetite. Cath revealed both LAD and RCA disease. He had his RCA intervened on and was brought back to [facility] two weeks later for staged PCI to the LAD. He was very upset during the hospitalization because he felt he needed more dialysis prior to the PCI of the LAD. Since discharge, he has felt well.\par He continues to have HD via left radial AV fistula (Ptqhkwp-Kvbxhwig-Xdqsxflo). \par  service provided by [employer]  . \par 's Number: [id number]\par 's Name: Arin\par Language: Kinyarwanda. \par \par July 2021 - Patient returns today for follow-up three months after his PCI to prox-LAD. He had a repeat echocardiogram today that shows interval improvement in his LVEF.\par He has been feeling well since. He has no cardiovascular complaints.\par His carvedilol was increased to 37.5 mg BID, but he reports that sometimes his blood pressure remains elevated.\par He continues to have HD via left radial AV fistula (Hlhrdxr-Vecfvgoe-Wtkoffhb). Blood pressure is usually best after HD.\par \par October 2021 - Patient returns today for follow-up.\par He had PCI to prox-LAD in April 2021. He had a repeat echocardiogram today that shows interval improvement in his LVEF.\par He has been feeling well since. He has no cardiovascular complaints.\par His carvedilol was increased to 37.5 mg BID, but he reports that sometimes his blood pressure remains elevated.\par He continues to have HD via left radial AV fistula (Lvjegrx-Envzphmy-Swjiggou). Blood pressure is usually best after HD.\par \par May 2022 - Patient returns today for follow-up. He had PCI to prox-LAD in April 2021. He had a repeat echocardiogram today that shows interval improvement in his LVEF.\par His carvedilol was increased to 37.5 mg BID, but he reports that sometimes his blood pressure remains elevated. \par He continues to have HD via left radial AV fistula (Wjnshuy-Rrefbzfv-Tztvxcxe). Blood pressure is usually best after HD.\par He has not been sick with Covid-19.\par \par 8/29/2022. \par Mr. Rivero returns today for scheduled follow up and to maintain his status on the kidney transplant list.\par His daughter, Tamiko Mcqueen, serves as an  via phone at the patient's request.\par Since his last visit, he has been feeling very well. For exercise he walks 25-30 minutes twice daily at a moderate pace and denies any chest discomfort or shortness of breath.\par He continues on dialysis without any complications.\par There have been no changes to his medications and he has been taking all as directed. \par \par PMD: Bassem Grove MD [phone]\par Cardiologist: LEONARD Gomez [phone]\par Nephrologist: Yasmany Huff MD [phone]

## 2022-11-17 NOTE — CARDIOLOGY SUMMARY
[de-identified] : 11/7/2018, sinus 63 bpm with LVH with strain pattern, diffuse TWI in precordial leads (biphasic in V1-V3 and deep symmetrical negative T in V4-V6)  [de-identified] : 11/19/2018, exercised and completed almost 9 minutes of Bertram protocol, but could not achieve target heart rate, converted to pharmacologic nuclear stress test and seen to have severe predominantly fixed defects in the mid-anterior, anteroseptal, and apical regions that partially correct with prone imaging, LVEF 49% and LVEDV 126 mL \par \par 5/28/2020, pharmacologic nuclear stress test with severe fixed defects in anteroseptal wall and apex consistent with prior infarct without ischemia, LVEF 41%, LVEDV 155 mL \par \par 9/19/2022, pharmacologic nuclear stress test showing april-spetal, septal, and apical infarct with ischemia in the basal april-septum and distal inferior wall, LVEF 49%, LVEDV 131 mL [de-identified] : 11/12/2018, normal LV function, no pulmonary hypertension, normal LA size LVEF 65-70%. \par \par 1/10/2019, normal LV function, no pulmonary hypertension, normal LA size, LVEF 63%\par \par 5/21/2020, mild segmental LV dysfunction (the distal anterior, lateral, septal, and apical segments are hypokinetic), normal pulmonary pressures, LVEF 50%\par \par 3/18/2021, moderate global LV dysfunction, LVEF 35-40%\par \par 7/16/2021, mild segmental LV dysfunction, LVEF 50-55%\par \par 7/11/2022. TTE: LVEF 55-60%\par Normal left atrium. LA volume index = 30 cc/m2.\par Moderate concentric left ventricular hypertrophy,\par Mild segmental left ventricular systolic dysfunction with overall preserved LVEF. The distal septum and apex are hypokinetic. \par Normal right ventricular size and function.\par Estimated pulmonary artery systolic pressure equals 27 mm Hg, assuming right atrial pressure equals 9 mm Hg, consistent with normal pulmonary pressures.\par Normal pericardium with no pericardial effusion.\par Compared to echocardiogram of 7/16/2021, no significant changes noted.\par  [de-identified] : 2/12/2019 by Iván Nathan MD at [facility] - left and right heart catheterization, elevated systemic blood pressures but normal wedge, 14 mmHg, normal PA pressures (37/17 mmHg, with normal cardiac output of 6.2 L/min and normal index of 3.6. Angiography revealed 90% prox LCx disease at site of prior stent with 80% distal LAD disease that was significant by iFR \par \par 3/22/2021 by Ciera Taveras MD at [facility] -  Proximal LAD: There was a tubular 80 % stenosis at the site of a prior stent. Distal RCA: Angiography showed minor luminal irregularities with no flow limiting lesions. --  RPLS: There was a tubular 80 % stenosis.\par \par 4/5/2021 by Ciera Taveras for staged PCI to LAD \par Stent: 2015 at [place] \par 2/12/2019 - POBA to LCx and NAHUM to distal LAD\par 3/22/2021 - PCI to distal RCA\par 4/5/2021 - PCI to prox-LAD \par \par 10/28/2022 with Jim Jones MD - PCI to LCx\par 10/31/2022 with Jim Jones MD - PCI to LAD

## 2022-11-17 NOTE — DISCUSSION/SUMMARY
[FreeTextEntry1] : Patient is a 67 year-old gentleman with known coronary artery disease who presents today for evaluation prior to possible renal transplant.\par In March 2021, patient was admitted to [facility] with shortness of breath and was seen to have LAD and RCA disease. He is now status post PCI to both. \par In October 2022, he underwent PCI to both LCx and LAD.\par For patient with previously reduced LVEF, now recovered, continue carvedilol.\par \par Continue dual antiplatelet therapy, high intensity statin therapy, antihypertension regimen, and diabetes regimen. He will require dual antiplatelet therapy for six months post PCI. [EKG obtained to assist in diagnosis and management of assessed problem(s)] : EKG obtained to assist in diagnosis and management of assessed problem(s)

## 2022-11-17 NOTE — REASON FOR VISIT
[Other: ____] : [unfilled] [Other: _____] : [unfilled] [FreeTextEntry1] : November 2022 - Patient returns today for follow-up after recent hospitalization for PCI to LCx on 10/28/2022 and then PCI to the LAD on 10/31/2022. The right groin site remains clean, dry, and intact without hematoma. \par \par ESRD on HD via left radial AV fistula (Vkhxffq-Bfdylhni-Qybldrrw).

## 2023-01-01 ENCOUNTER — EMERGENCY (EMERGENCY)
Facility: HOSPITAL | Age: 68
LOS: 1 days | Discharge: HOME | End: 2023-01-01
Attending: EMERGENCY MEDICINE | Admitting: EMERGENCY MEDICINE
Payer: MEDICARE

## 2023-01-01 VITALS
HEART RATE: 54 BPM | SYSTOLIC BLOOD PRESSURE: 198 MMHG | RESPIRATION RATE: 18 BRPM | OXYGEN SATURATION: 100 % | DIASTOLIC BLOOD PRESSURE: 84 MMHG | TEMPERATURE: 98 F

## 2023-01-01 VITALS
OXYGEN SATURATION: 98 % | TEMPERATURE: 98 F | SYSTOLIC BLOOD PRESSURE: 197 MMHG | HEART RATE: 67 BPM | DIASTOLIC BLOOD PRESSURE: 74 MMHG | RESPIRATION RATE: 18 BRPM

## 2023-01-01 DIAGNOSIS — I77.0 ARTERIOVENOUS FISTULA, ACQUIRED: Chronic | ICD-10-CM

## 2023-01-01 DIAGNOSIS — Z98.890 OTHER SPECIFIED POSTPROCEDURAL STATES: Chronic | ICD-10-CM

## 2023-01-01 LAB
ALBUMIN SERPL ELPH-MCNC: 4.5 G/DL — SIGNIFICANT CHANGE UP (ref 3.3–5)
ALP SERPL-CCNC: 85 U/L — SIGNIFICANT CHANGE UP (ref 40–120)
ALT FLD-CCNC: 15 U/L — SIGNIFICANT CHANGE UP (ref 4–41)
ANION GAP SERPL CALC-SCNC: 14 MMOL/L — SIGNIFICANT CHANGE UP (ref 7–14)
AST SERPL-CCNC: 11 U/L — SIGNIFICANT CHANGE UP (ref 4–40)
BASOPHILS # BLD AUTO: 0.03 K/UL — SIGNIFICANT CHANGE UP (ref 0–0.2)
BASOPHILS NFR BLD AUTO: 0.5 % — SIGNIFICANT CHANGE UP (ref 0–2)
BILIRUB SERPL-MCNC: 0.6 MG/DL — SIGNIFICANT CHANGE UP (ref 0.2–1.2)
BUN SERPL-MCNC: 44 MG/DL — HIGH (ref 7–23)
CALCIUM SERPL-MCNC: 8.7 MG/DL — SIGNIFICANT CHANGE UP (ref 8.4–10.5)
CHLORIDE SERPL-SCNC: 96 MMOL/L — LOW (ref 98–107)
CO2 SERPL-SCNC: 27 MMOL/L — SIGNIFICANT CHANGE UP (ref 22–31)
CREAT SERPL-MCNC: 8.64 MG/DL — HIGH (ref 0.5–1.3)
EGFR: 6 ML/MIN/1.73M2 — LOW
EOSINOPHIL # BLD AUTO: 0.23 K/UL — SIGNIFICANT CHANGE UP (ref 0–0.5)
EOSINOPHIL NFR BLD AUTO: 3.5 % — SIGNIFICANT CHANGE UP (ref 0–6)
GLUCOSE SERPL-MCNC: 201 MG/DL — HIGH (ref 70–99)
HCT VFR BLD CALC: 33.8 % — LOW (ref 39–50)
HGB BLD-MCNC: 10.5 G/DL — LOW (ref 13–17)
IANC: 4.15 K/UL — SIGNIFICANT CHANGE UP (ref 1.8–7.4)
IMM GRANULOCYTES NFR BLD AUTO: 0.5 % — SIGNIFICANT CHANGE UP (ref 0–0.9)
LYMPHOCYTES # BLD AUTO: 1.5 K/UL — SIGNIFICANT CHANGE UP (ref 1–3.3)
LYMPHOCYTES # BLD AUTO: 22.7 % — SIGNIFICANT CHANGE UP (ref 13–44)
MCHC RBC-ENTMCNC: 30.4 PG — SIGNIFICANT CHANGE UP (ref 27–34)
MCHC RBC-ENTMCNC: 31.1 GM/DL — LOW (ref 32–36)
MCV RBC AUTO: 98 FL — SIGNIFICANT CHANGE UP (ref 80–100)
MONOCYTES # BLD AUTO: 0.68 K/UL — SIGNIFICANT CHANGE UP (ref 0–0.9)
MONOCYTES NFR BLD AUTO: 10.3 % — SIGNIFICANT CHANGE UP (ref 2–14)
NEUTROPHILS # BLD AUTO: 4.15 K/UL — SIGNIFICANT CHANGE UP (ref 1.8–7.4)
NEUTROPHILS NFR BLD AUTO: 62.5 % — SIGNIFICANT CHANGE UP (ref 43–77)
NRBC # BLD: 0 /100 WBCS — SIGNIFICANT CHANGE UP (ref 0–0)
NRBC # FLD: 0 K/UL — SIGNIFICANT CHANGE UP (ref 0–0)
PLATELET # BLD AUTO: 161 K/UL — SIGNIFICANT CHANGE UP (ref 150–400)
POTASSIUM SERPL-MCNC: 5.3 MMOL/L — SIGNIFICANT CHANGE UP (ref 3.5–5.3)
POTASSIUM SERPL-SCNC: 5.3 MMOL/L — SIGNIFICANT CHANGE UP (ref 3.5–5.3)
PROT SERPL-MCNC: 7.9 G/DL — SIGNIFICANT CHANGE UP (ref 6–8.3)
RBC # BLD: 3.45 M/UL — LOW (ref 4.2–5.8)
RBC # FLD: 14.6 % — HIGH (ref 10.3–14.5)
SODIUM SERPL-SCNC: 137 MMOL/L — SIGNIFICANT CHANGE UP (ref 135–145)
WBC # BLD: 6.62 K/UL — SIGNIFICANT CHANGE UP (ref 3.8–10.5)
WBC # FLD AUTO: 6.62 K/UL — SIGNIFICANT CHANGE UP (ref 3.8–10.5)

## 2023-01-01 PROCEDURE — 99285 EMERGENCY DEPT VISIT HI MDM: CPT | Mod: FS

## 2023-01-01 RX ORDER — LIDOCAINE 4 G/100G
1 CREAM TOPICAL ONCE
Refills: 0 | Status: COMPLETED | OUTPATIENT
Start: 2023-01-01 | End: 2023-01-01

## 2023-01-01 RX ORDER — KETOROLAC TROMETHAMINE 30 MG/ML
15 SYRINGE (ML) INJECTION ONCE
Refills: 0 | Status: DISCONTINUED | OUTPATIENT
Start: 2023-01-01 | End: 2023-01-01

## 2023-01-01 RX ORDER — DIAZEPAM 5 MG
5 TABLET ORAL ONCE
Refills: 0 | Status: DISCONTINUED | OUTPATIENT
Start: 2023-01-01 | End: 2023-01-01

## 2023-01-01 RX ORDER — DIAZEPAM 5 MG
1 TABLET ORAL
Qty: 9 | Refills: 0
Start: 2023-01-01 | End: 2023-01-03

## 2023-01-01 RX ORDER — ACETAMINOPHEN 500 MG
650 TABLET ORAL ONCE
Refills: 0 | Status: COMPLETED | OUTPATIENT
Start: 2023-01-01 | End: 2023-01-01

## 2023-01-01 RX ADMIN — Medication 15 MILLIGRAM(S): at 14:32

## 2023-01-01 RX ADMIN — Medication 650 MILLIGRAM(S): at 14:32

## 2023-01-01 RX ADMIN — LIDOCAINE 1 PATCH: 4 CREAM TOPICAL at 14:32

## 2023-01-01 RX ADMIN — Medication 5 MILLIGRAM(S): at 14:32

## 2023-01-01 NOTE — ED PROVIDER NOTE - PATIENT PORTAL LINK FT
You can access the FollowMyHealth Patient Portal offered by [facility] by registering at the following website: [email or web address]. By joining [employer]’s FollowMyHealth portal, you will also be able to view your health information using other applications (apps) compatible with our system.

## 2023-01-01 NOTE — ED PROVIDER NOTE - NSICDXPASTMEDICALHX_GEN_ALL_CORE_FT
PAST MEDICAL HISTORY:  Anemia due to stage 5 chronic kidney disease, not on chronic dialysis     CAP (community acquired pneumonia) 6/18    Cerebrovascular accident (CVA), unspecified mechanism diagnosed via CT of the head    Coronary artery disease     Diabetes mellitus type 2    ESRD (end stage renal disease) on Dialysis( M/W/F), By Dr. Huff    GERD (gastroesophageal reflux disease)     HTN (hypertension)     Hypercholesterolemia     Stented coronary artery 2014, 3 stents, [facility]

## 2023-01-01 NOTE — ED ADULT NURSE NOTE - CHIEF COMPLAINT QUOTE
Pt AOX4 c/o back pain started approx 1 month ago, last night could not sleep due to back pain, is having hard time sitting/laying down; Pt is a Tue/Thu/Sat dialysis pt, Left forearm fistula, ++ thrill; had complete dialysis yesterday; pt has 7 stents (most recent placed in Nov 2022) on Plavix; was seen at [facility] yesterday for same compliant - told to go home on Tylenol but pain is worse and unmanageable w/ Tylenol; fs glu 254 in triage

## 2023-01-01 NOTE — ED ADULT NURSE REASSESSMENT NOTE - NS ED NURSE REASSESS COMMENT FT1
Break RN: SAEIDOx4, no signs of distress, no complaints at this time, reports readiness for discharge, fall precautions maintained

## 2023-01-01 NOTE — ED PROVIDER NOTE - OBJECTIVE STATEMENT
68-year-old male with a past medical history of CAD with 7 stents, CHF, hypertension, insulin-dependent diabetes, anemia, end-stage renal disease on hemodialysis Tuesday, Thursday, Saturday, and nephrologist is Dr. Capellan, received full dialysis without issues yesterday.  Presents to the ER complaining about worsening right-sided lower back pain radiating down right lower extremity x2 days.  Patient reports that the pain first developed approximately 1 month ago.  Atraumatic, no injuries no falls, no heavy lifting.  Patient presented to an outside facility yesterday had an x-ray performed of his lumbar spine was told everything was fine and was discharged home on Tylenol.  Patient has been taking Tylenol without relief of the pain.  Patient denies radiation of pain to the abdomen, fevers/chills, incontinence, saddle anesthesia, weakness, numbness, tingling.  Denies chest pain, shortness of breath, abdominal pain, nausea, vomiting, dizziness.  Patient does not make urine.

## 2023-01-01 NOTE — ED PROVIDER NOTE - PROGRESS NOTE DETAILS
ISSAC Ruiz: patient with no acute abn in labs. Pain improved DC with valium and PCP f/u. return precautions given.

## 2023-01-01 NOTE — ED PROVIDER NOTE - CLINICAL SUMMARY MEDICAL DECISION MAKING FREE TEXT BOX
68-year-old male with a past medical history of CAD with 7 stents, CHF, hypertension, insulin-dependent diabetes, anemia, end-stage renal disease on hemodialysis Tuesday, Thursday, Saturday, and nephrologist is Dr. Capellan, received full dialysis without issues yesterday.  Presents to the ER complaining about worsening right-sided lower back pain radiating down right lower extremity x2 days. worse with movment.  Patient reports that the pain first developed approximately 1 month ago.  Atraumatic, no injuries no falls, no heavy lifting.  Patient presented to an outside facility yesterday had an x-ray performed of his lumbar spine was told everything was fine and was discharged home on Tylenol.  Patient has been taking Tylenol without relief of the pain.  pain on exam point TTP over RLB, paraspinal.   plan, for labs,   muscle relaxer, NSAID, Tylenol, lido patch reassess

## 2023-01-01 NOTE — ED PROVIDER NOTE - ATTENDING APP SHARED VISIT CONTRIBUTION OF CARE
Gong: I have seen and examined the patient face to face, have reviewed and addended the HPI, PE and a/p as necessary.     67 yo M with CAD with 7 stents, CHF, HTN, IDDM, anemia ESRD on HD (TRS, nephrologist is Timi, s/p HD yesterday with no issues) a/w R lower back pain radiating to R buttock x 2 days.  Reports symptoms started 1 month ago with no acute onset, no traumatic injuries, no falls, no heavy lifting.  Pt went outside facility with xray performed with no acute fractures and discharged on tylenol after receiving Robaxin in the ED.  Pt reports pain is been not relieved with tylenol.  No fevers, no chills, no recent spinal procedures, no bowel/bladder incontinence, no IVDU, no h/o cancer, no recent weight loss, no trauma, no weakness no numbness, no tingling, no dysuria, no hematuria.  No chest pain, saddle anesthesia    [facility]  [id number]    GEN - NAD; well appearing; A+O x3; non-toxic appearing  CARD -s1s2, RRR, no M,G,R;   PULM - CTA b/l, symmetric breath sounds;   ABD -  +BS, ND, NT, soft, no guarding, no rebound, no masses;   BACK - no CVA tenderness, Normal  spine; TTP R paraspinal area radiating down to R buttocks, no numbness, no strength deficits  EXT - symmetric pulses, 2+ dp, capillary refill < 2 seconds, no cyanosis, no edema; LUE with +thrill   NEURO - no focal neuro deficits, no slurred speech, no saddle anesthesia    Likely MSK back pain given focal tenderness overlying paraspinal region.  Atraumatic, with no midline tenderness to suggest fracture with paperwork showing xray with no acute fractures.  Reassuring exam given no numbness and no focal weakness to suggest cord compression.  Will check basic labs given chronic medical issues like ESRD to assess for electrolyte abnl, and give pain control with lidocaine patch, valium and toradol (discussed case with Timi prior to administration). Will reassess.  Outpt follow up with PCP for MRI suggested.

## 2023-01-01 NOTE — ED PROVIDER NOTE - NSFOLLOWUPINSTRUCTIONS_ED_ALL_ED_FT
Follow with your PMD within 48-72 hours.  Rest, no heavy lifting.      Warm compresses to area.     Recommend PMD or Ortho consult to discuss possible MRI vs Physical Therapy- referral list provided.  Light walking.     Take Tylenol 650mg every 4-6 hours as needed for pain.       Valium 5mg every 8 hours as needed for muscle spasm- caution drowsiness/do not drive.     Any worsening pain, weakness, numbness, bowel or urinary incontinence return to ER

## 2023-01-01 NOTE — ED ADULT NURSE NOTE - OBJECTIVE STATEMENT
67y/o male with right sided back pain that began a month ago but has increased as of 2 days ago, pt denies any injury or trauma. Pt HD patient, completed HD yesterday and took home BP meds. pt denies any chest pain, SOB, numbness or tingling. +ROM to all extremities. will continue to monitor

## 2023-01-01 NOTE — ED ADULT TRIAGE NOTE - CHIEF COMPLAINT QUOTE
Pt AOX4 c/o back pain started approx 1 month ago, last night could not sleep due to back pain, is having hard time sitting/laying down; Pt is a Tue/Thu/Sat dialysis pt, Left forearm fistula, ++ thrill; had complete dialysis yesterday; pt has 7 stents (most recent placed in Nov 2022) on Plavix; was seen at [facility] yesterday for same compliant - told to go home on Tylenol but pain is worse and unmanageable w/ Tylenol; fs glu 254 in triage

## 2023-01-01 NOTE — ED PROVIDER NOTE - PHYSICAL EXAMINATION
Vital signs reviewed.   CONSTITUTIONAL: Well-appearing; well-nourished; in no apparent distress. Non-toxic appearing.   HEAD: Normocephalic, atraumatic.  EYES: PERRL, EOM intact, conjunctiva and sclera WNL.  CARD: Normal S1, S2; no murmurs, rubs, or gallops noted.  RESP: Normal chest excursion with respiration; breath sounds clear and equal bilaterally; no wheezes, rhonchi, or rales.  ABD/GI: soft, non-distended; non-tender; no palpable organomegaly, no pulsatile mass.  EXT/MS: moves all extremities; distal pulses are normal, no pedal edema. No midline spinal TTP, throughout, + right lumbar spinal TTP, 5/5 strength, NVI. ambulatory.   SKIN: Normal for age and race; warm; dry; good turgor; no apparent lesions or exudate noted.  NEURO: Awake, alert, oriented x 3, no gross deficits, CN II-XII grossly intact, no motor or sensory deficit noted.  PSYCH: Normal mood; appropriate affect.

## 2023-01-17 NOTE — PROGRESS NOTE ADULT - PROBLEM/PLAN-3
DISPLAY PLAN FREE TEXT
no

## 2023-01-18 NOTE — ED PROVIDER NOTE - PSH
H/O coronary angiogram  2014 - x3 stents
PROCEDURES:  Laparoscopic cholecystectomy 18-Jan-2023 15:33:04  Najma Beck

## 2023-03-01 NOTE — ED ADULT NURSE NOTE - IS THE PATIENT ABLE TO BE SCREENED?
ERLIN: Anticipate discharge home pending medical progress. Transportation likely in car with family. Ruby Hightower (Mother) [phone] Fausto Lopez (Father) [phone]    Disposition:   Josefa Feliz was born [birth year] at 23w 5d and was admitted to [facility] NICU for prematurity. CM participated in interdisciplinary rounds to receive updates on the patient. He was switched to 3L High flow nasal cannula from BCPAP 5. He is going to get a 36w head ct tomorrow. He is in an open crib. PT/OT/SLP are following. He is eating mothers milk; formula; similac; premature SSC; fortifer; NG/OG at 26 yocasta.    CM will continue to follow.     [id number] Intern Yes

## 2023-03-02 ENCOUNTER — NON-APPOINTMENT (OUTPATIENT)
Age: 68
End: 2023-03-02

## 2023-03-02 ENCOUNTER — APPOINTMENT (OUTPATIENT)
Dept: CARDIOLOGY | Facility: CLINIC | Age: 68
End: 2023-03-02
Payer: MEDICARE

## 2023-03-02 VITALS
OXYGEN SATURATION: 98 % | DIASTOLIC BLOOD PRESSURE: 60 MMHG | HEART RATE: 72 BPM | SYSTOLIC BLOOD PRESSURE: 100 MMHG | WEIGHT: 143 LBS | BODY MASS INDEX: 23.08 KG/M2

## 2023-03-02 DIAGNOSIS — R06.00 DYSPNEA, UNSPECIFIED: ICD-10-CM

## 2023-03-02 PROCEDURE — 93000 ELECTROCARDIOGRAM COMPLETE: CPT

## 2023-03-02 PROCEDURE — 99215 OFFICE O/P EST HI 40 MIN: CPT

## 2023-03-02 NOTE — REASON FOR VISIT
[Other: ____] : [unfilled] [Other: _____] : [unfilled] [FreeTextEntry1] : March 2023 - Patient returns today for follow-up. He reports that he is noticing orthopnea. He has no daytime or exertional symptoms. He continues to have ESRD on HD via left radial AV fistula (Ihwrzyv-Zyeqzniu-Bzbhgerh).

## 2023-03-02 NOTE — CARDIOLOGY SUMMARY
[de-identified] : 11/7/2018, sinus 63 bpm with LVH with strain pattern, diffuse TWI in precordial leads (biphasic in V1-V3 and deep symmetrical negative T in V4-V6)  [de-identified] : 11/19/2018, exercised and completed almost 9 minutes of Bertram protocol, but could not achieve target heart rate, converted to pharmacologic nuclear stress test and seen to have severe predominantly fixed defects in the mid-anterior, anteroseptal, and apical regions that partially correct with prone imaging, LVEF 49% and LVEDV 126 mL \par \par 5/28/2020, pharmacologic nuclear stress test with severe fixed defects in anteroseptal wall and apex consistent with prior infarct without ischemia, LVEF 41%, LVEDV 155 mL \par \par 9/19/2022, pharmacologic nuclear stress test showing april-spetal, septal, and apical infarct with ischemia in the basal april-septum and distal inferior wall, LVEF 49%, LVEDV 131 mL [de-identified] : 11/12/2018, normal LV function, no pulmonary hypertension, normal LA size LVEF 65-70%. \par \par 1/10/2019, normal LV function, no pulmonary hypertension, normal LA size, LVEF 63%\par \par 5/21/2020, mild segmental LV dysfunction (the distal anterior, lateral, septal, and apical segments are hypokinetic), normal pulmonary pressures, LVEF 50%\par \par 3/18/2021, moderate global LV dysfunction, LVEF 35-40%\par \par 7/16/2021, mild segmental LV dysfunction, LVEF 50-55%\par \par 7/11/2022. TTE: LVEF 55-60%\par Normal left atrium. LA volume index = 30 cc/m2.\par Moderate concentric left ventricular hypertrophy,\par Mild segmental left ventricular systolic dysfunction with overall preserved LVEF. The distal septum and apex are hypokinetic. \par Normal right ventricular size and function.\par Estimated pulmonary artery systolic pressure equals 27 mm Hg, assuming right atrial pressure equals 9 mm Hg, consistent with normal pulmonary pressures.\par Normal pericardium with no pericardial effusion.\par Compared to echocardiogram of 7/16/2021, no significant changes noted.\par  [de-identified] : 2/12/2019 by Iván Nathan MD at [facility] - left and right heart catheterization, elevated systemic blood pressures but normal wedge, 14 mmHg, normal PA pressures (37/17 mmHg, with normal cardiac output of 6.2 L/min and normal index of 3.6. Angiography revealed 90% prox LCx disease at site of prior stent with 80% distal LAD disease that was significant by iFR \par \par 3/22/2021 by Ciera Taveras MD at [facility] -  Proximal LAD: There was a tubular 80 % stenosis at the site of a prior stent. Distal RCA: Angiography showed minor luminal irregularities with no flow limiting lesions. --  RPLS: There was a tubular 80 % stenosis.\par \par 4/5/2021 by Ciera Taveras for staged PCI to LAD \par Stent: 2015 at [place] \par 2/12/2019 - POBA to LCx and NAHUM to distal LAD\par 3/22/2021 - PCI to distal RCA\par 4/5/2021 - PCI to prox-LAD \par \par 10/28/2022 with Jim Jones MD - PCI to LCx\par 10/31/2022 with Jim Jones MD - PCI to LAD

## 2023-03-02 NOTE — DISCUSSION/SUMMARY
[EKG obtained to assist in diagnosis and management of assessed problem(s)] : EKG obtained to assist in diagnosis and management of assessed problem(s) [FreeTextEntry1] : Patient is a 68 year-old gentleman with known coronary artery disease who presents today for evaluation prior to possible renal transplant.\par In March 2021, patient was admitted to [facility] with shortness of breath and was seen to have LAD and RCA disease. He is now status post PCI to both. \par In October 2022, he underwent PCI to both LCx and LAD.\par For patient with previously reduced LVEF, now recovered, continue carvedilol.\par \par Continue dual antiplatelet therapy, high intensity statin therapy, antihypertension regimen, and diabetes regimen. He will require dual antiplatelet therapy for six months post PCI. He can discontinue clopidogrel on May 1, 2023.

## 2023-03-02 NOTE — HISTORY OF PRESENT ILLNESS
[FreeTextEntry1] : Patient is 68 year-old with cardiovascular risk factors of hypertension and diabetes, known coronary artery disease status post PCI x3 (2014) at [place] by Dr. Saúl Villafana, CKD, now ESRD on HD Xvpixb-Lbqwjyyqo-Uhvoho) that began January 2019, who presents today for cardiac evaluation prior to possible renal transplant.\par He was working until October 2018 as [employer] .\par Patient does not formally exercise, but he can climb stairs and has no exertional symptoms.\par \par In January 2019, patient admitted to [facility] for shortness of breath and nosebleed. He was found to be volume overloaded in the setting of FELICITA on CKD and he was initiated on hemodialysis. \par \par In February 2019, patient seen to have abnormal nuclear stress test and was referred for left and right heart catheterization. The right heart cath showed elevated systemic blood pressures but normal PCWP (14 mmHg) and normal PA pressures (37/17 mmHg) with normal cardiac output and index of 6 L/min and 3.6 respectively. The left heart cath revealed significant distal LAD disease and significant in stent stenosis of LCx. He is now status post POBA to LCx and NAHUM to LAD.\par \par May 2019 - Patient presents today in his usual state of health. He reports occasional right leg pain that limits his exercise while walking on the treadmill. He is without chest pain, shortness of breath, lower extremity swelling.\par \par October 2019 - Patient presents today in his usual state of health. He reports occasional right leg pain that limits his exercise while walking on the treadmill. He is without chest pain, shortness of breath, lower extremity swelling. He had normal OHLLY/PVR and normal ultrasound of the abdominal vessels (and into iliacs). \par \par March 2020 - Patient returns for follow-up of his cardiovascular disease and to maintain standing with the [facility]plant [facility]. Patient recently traveled to [facility] (January 8 - February 6, 2020), and when patient returned having lost 8 lbs without a clear cause. It was unintentional weight loss. He is now being evaluated for cancer including lung cancer (former smoker). He will also need EGD/colonoscopy.\par \par April 2021 - Patient returns today for follow-up after a recent hospitalization. He was hospitalized at [facility] in March 2021 for shortness of breath and poor appetite. Cath revealed both LAD and RCA disease. He had his RCA intervened on and was brought back to [facility] two weeks later for staged PCI to the LAD. He was very upset during the hospitalization because he felt he needed more dialysis prior to the PCI of the LAD. Since discharge, he has felt well.\par He continues to have HD via left radial AV fistula (Mkytbos-Tywpxlny-Spdrcxyy). \par  service provided by [employer]  . \par 's Number: [id number]\par 's Name: Arin\par Language: French. \par \par July 2021 - Patient returns today for follow-up three months after his PCI to prox-LAD. He had a repeat echocardiogram today that shows interval improvement in his LVEF.\par He has been feeling well since. He has no cardiovascular complaints.\par His carvedilol was increased to 37.5 mg BID, but he reports that sometimes his blood pressure remains elevated.\par He continues to have HD via left radial AV fistula (Psnjcrk-Qtqwivma-Ccrkwrms). Blood pressure is usually best after HD.\par \par October 2021 - Patient returns today for follow-up.\par He had PCI to prox-LAD in April 2021. He had a repeat echocardiogram today that shows interval improvement in his LVEF.\par He has been feeling well since. He has no cardiovascular complaints.\par His carvedilol was increased to 37.5 mg BID, but he reports that sometimes his blood pressure remains elevated.\par He continues to have HD via left radial AV fistula (Vaqofqz-Uilslisj-Xklxermp). Blood pressure is usually best after HD.\par \par May 2022 - Patient returns today for follow-up. He had PCI to prox-LAD in April 2021. He had a repeat echocardiogram today that shows interval improvement in his LVEF.\par His carvedilol was increased to 37.5 mg BID, but he reports that sometimes his blood pressure remains elevated. \par He continues to have HD via left radial AV fistula (Qmkhsxx-Vqyafmjf-Oykufygz). Blood pressure is usually best after HD.\par He has not been sick with Covid-19.\par \par 8/29/2022. \par Mr. Rivero returns today for scheduled follow up and to maintain his status on the kidney transplant list.\par His daughter, Tamiko Mcqueen, serves as an  via phone at the patient's request.\par Since his last visit, he has been feeling very well. For exercise he walks 25-30 minutes twice daily at a moderate pace and denies any chest discomfort or shortness of breath.\par He continues on dialysis without any complications.\par There have been no changes to his medications and he has been taking all as directed. \par \par November 2022 - Patient returns today for follow-up after recent hospitalization for PCI to LCx on 10/28/2022 and then PCI to the LAD on 10/31/2022. The right groin site remains clean, dry, and intact without hematoma. \par \par ESRD on HD via left radial AV fistula (Ojwhvmz-Mfqybktk-Haccfuxi).\par \par PMD: Bassem Grove MD [phone]\par Cardiologist: LEONARD Gomez [phone]\par Nephrologist: Yasmany Huff MD [phone]

## 2023-03-02 NOTE — PHYSICAL EXAM
[General Appearance - Well Developed] : well developed [Normal Appearance] : normal appearance [Well Groomed] : well groomed [General Appearance - Well Nourished] : well nourished [No Deformities] : no deformities [General Appearance - In No Acute Distress] : no acute distress [Normal Oral Mucosa] : normal oral mucosa [No Oral Pallor] : no oral pallor [No Oral Cyanosis] : no oral cyanosis [Normal Oropharynx] : normal oropharynx [Respiration, Rhythm And Depth] : normal respiratory rhythm and effort [] : no respiratory distress [Exaggerated Use Of Accessory Muscles For Inspiration] : no accessory muscle use [Auscultation Breath Sounds / Voice Sounds] : lungs were clear to auscultation bilaterally [Bowel Sounds] : normal bowel sounds [Abdomen Soft] : soft [Abdomen Tenderness] : non-tender [Abnormal Walk] : normal gait [Gait - Sufficient For Exercise Testing] : the gait was sufficient for exercise testing [Nail Clubbing] : no clubbing of the fingernails [Cyanosis, Localized] : no localized cyanosis [Skin Color & Pigmentation] : normal skin color and pigmentation [No Venous Stasis] : no venous stasis [No Xanthoma] : no  xanthoma was observed [Oriented To Time, Place, And Person] : oriented to person, place, and time [Impaired Insight] : insight and judgment were intact [Affect] : the affect was normal [Mood] : the mood was normal [No Anxiety] : not feeling anxious [Conjunctiva] : the conjunctiva were normal in both eyes [PERRL] : pupils were equal in size, round, and reactive to light [EOM Intact] : extraocular movements were intact [5th Left ICS - MCL] : palpated at the 5th LICS in the midclavicular line [Normal] : normal [No Precordial Heave] : no precordial heave was noted [Bradycardia] : bradycardic [Rhythm Regular] : regular [Normal S1] : normal S1 [Normal S2] : normal S2 [No Gallop] : no gallop heard [No Murmur] : no murmurs heard [2+] : right 2+ [No Pitting Edema] : no pitting edema present [FreeTextEntry1] : No JVD [Yellow Sclera (Icteric)] : no scleral icterus was seen [Right Carotid Bruit] : no bruit heard over the right carotid [Left Carotid Bruit] : no bruit heard over the left carotid

## 2023-03-15 NOTE — ED PROVIDER NOTE - CROS ED MUSC ALL NEG
Render In Strict Bullet Format?: No Continue Regimen: Triamcinolone \\nHydroxyzine QHS Detail Level: Zone - - -

## 2023-03-29 ENCOUNTER — APPOINTMENT (OUTPATIENT)
Dept: CARDIOLOGY | Facility: CLINIC | Age: 68
End: 2023-03-29
Payer: MEDICARE

## 2023-03-29 PROCEDURE — 93306 TTE W/DOPPLER COMPLETE: CPT

## 2023-04-27 NOTE — PROGRESS NOTE ADULT - ASSESSMENT
33M with PMHx of HTN, CAD s/p stent placement x3 in 2015, CKD, DMII presents with fevers and hemoptysis concerning for CAP vs active TB infection. declines 33M with PMHx of HTN, CAD s/p stent placement x3 in 2015, CKD, DMII presents with fevers and hemoptysis concerning for CAP vs active TB infection vs vasculitis

## 2023-04-28 ENCOUNTER — APPOINTMENT (OUTPATIENT)
Dept: NEPHROLOGY | Facility: CLINIC | Age: 68
End: 2023-04-28
Payer: COMMERCIAL

## 2023-04-28 ENCOUNTER — LABORATORY RESULT (OUTPATIENT)
Age: 68
End: 2023-04-28

## 2023-04-28 VITALS
OXYGEN SATURATION: 96 % | HEART RATE: 76 BPM | BODY MASS INDEX: 23.3 KG/M2 | RESPIRATION RATE: 17 BRPM | DIASTOLIC BLOOD PRESSURE: 77 MMHG | SYSTOLIC BLOOD PRESSURE: 163 MMHG | WEIGHT: 145 LBS | TEMPERATURE: 97.3 F | HEIGHT: 66 IN

## 2023-04-28 PROCEDURE — 99072 ADDL SUPL MATRL&STAF TM PHE: CPT

## 2023-04-28 PROCEDURE — 99215 OFFICE O/P EST HI 40 MIN: CPT

## 2023-04-28 NOTE — PHYSICAL EXAM
[General Appearance - Alert] : alert [General Appearance - In No Acute Distress] : in no acute distress [Sclera] : the sclera and conjunctiva were normal [PERRL With Normal Accommodation] : pupils were equal in size, round, and reactive to light [Extraocular Movements] : extraocular movements were intact [Outer Ear] : the ears and nose were normal in appearance [Oropharynx] : the oropharynx was normal [Neck Appearance] : the appearance of the neck was normal [Neck Cervical Mass (___cm)] : no neck mass was observed [Jugular Venous Distention Increased] : there was no jugular-venous distention [Thyroid Diffuse Enlargement] : the thyroid was not enlarged [Thyroid Nodule] : there were no palpable thyroid nodules [Auscultation Breath Sounds / Voice Sounds] : lungs were clear to auscultation bilaterally [Heart Rate And Rhythm] : heart rate was normal and rhythm regular [Heart Sounds] : normal S1 and S2 [Heart Sounds Gallop] : no gallops [Murmurs] : no murmurs [Full Pulse] : the pedal pulses are present [Heart Sounds Pericardial Friction Rub] : no pericardial rub [Edema] : there was no peripheral edema [Bowel Sounds] : normal bowel sounds [Abdomen Soft] : soft [Abdomen Tenderness] : non-tender [Abdomen Mass (___ Cm)] : no abdominal mass palpated [Cervical Lymph Nodes Enlarged Posterior Bilaterally] : posterior cervical [Cervical Lymph Nodes Enlarged Anterior Bilaterally] : anterior cervical [Supraclavicular Lymph Nodes Enlarged Bilaterally] : supraclavicular [Involuntary Movements] : no involuntary movements were seen [___ (cm) Fistula] : [unfilled] (cm) fistula [Bruit] : a bruit was present [Thrill] : a thrill was present [] : no rash [No Focal Deficits] : no focal deficits [Oriented To Time, Place, And Person] : oriented to person, place, and time [Impaired Insight] : insight and judgment were intact [Affect] : the affect was normal

## 2023-04-28 NOTE — ASSESSMENT
[FreeTextEntry1] : Patient  is a  moderate risk candidate, diabetes, CAD.\par CT A/P and CXR from 2022 reviewed and wnl\par He underwent cardiac cath Octobers 2022 s/p PCI. seen by Dr Johnson. will d/c plavix on May 1st, 2023. cleared by cardiology

## 2023-04-28 NOTE — HISTORY OF PRESENT ILLNESS
[FreeTextEntry1] : 68 years old [place] male, DM, HTN, ESRD (Jan 2019) followed by Dr. Capellan. HD through left forearm AVF, no hypotension. Has had multiple PCI. MOst recent PCI to LAD in April 2021 by Dr. Taveras. Minimal urine output. Retired. Lives with wife and daughter.\par He has no SOB/chest pain, able to climb stairs and walk 2-3 blocks.\par Former smoker, quit more than 10 years previously.\par No h/o CVA/PVD/DVT/bleeding/transfusions/neoplasia/major infections.\par No transfusions.\par Past surgeries   AVF and eye surgeries and cholecystectomy\par \par Nephrologist: Dr. Capellan\par Cardiologist: Dr. Johnson\par \par \par CT A/P and CXR from 2022 reviewed and wnl\par He underwent cardiac cath Octobers 2022 s/p PCI. seen by Dr Johnson. will d/c plavix on May 1st, 2023. cleared by cardiology

## 2023-05-03 LAB
ABO + RH PNL BLD: NORMAL
ALBUMIN SERPL ELPH-MCNC: 4.7 G/DL
ALP BLD-CCNC: 95 U/L
ALT SERPL-CCNC: 14 U/L
ANION GAP SERPL CALC-SCNC: 19 MMOL/L
AST SERPL-CCNC: 13 U/L
BASOPHILS # BLD AUTO: 0.05 K/UL
BASOPHILS NFR BLD AUTO: 0.9 %
BILIRUB SERPL-MCNC: 0.9 MG/DL
BUN SERPL-MCNC: 38 MG/DL
C PEPTIDE SERPL-MCNC: 19.4 NG/ML
CALCIUM SERPL-MCNC: 10.2 MG/DL
CHLORIDE SERPL-SCNC: 91 MMOL/L
CHOLEST SERPL-MCNC: 147 MG/DL
CMV IGG SERPL QL: 4.3 U/ML
CMV IGG SERPL-IMP: POSITIVE
CO2 SERPL-SCNC: 29 MMOL/L
COVID-19 SPIKE DOMAIN ANTIBODY INTERPRETATION: POSITIVE
CREAT SERPL-MCNC: 9.46 MG/DL
EBV EA AB SER IA-ACNC: <5 U/ML
EBV EA AB TITR SER IF: POSITIVE
EBV EA IGG SER QL IA: >600 U/ML
EBV EA IGG SER-ACNC: NEGATIVE
EBV EA IGM SER IA-ACNC: NEGATIVE
EBV PATRN SPEC IB-IMP: NORMAL
EBV VCA IGG SER IA-ACNC: >750 U/ML
EBV VCA IGM SER QL IA: <10 U/ML
EGFR: 6 ML/MIN/1.73M2
EOSINOPHIL # BLD AUTO: 0.2 K/UL
EOSINOPHIL NFR BLD AUTO: 3.8 %
EPSTEIN-BARR VIRUS CAPSID ANTIGEN IGG: POSITIVE
ESTIMATED AVERAGE GLUCOSE: 183 MG/DL
GLUCOSE SERPL-MCNC: 322 MG/DL
HBA1C MFR BLD HPLC: 8 %
HBV CORE IGG+IGM SER QL: NONREACTIVE
HBV SURFACE AB SER QL: REACTIVE
HBV SURFACE AB SERPL IA-ACNC: 17.4 MIU/ML
HBV SURFACE AG SER QL: NONREACTIVE
HCT VFR BLD CALC: 46.2 %
HCV AB SER QL: NONREACTIVE
HCV S/CO RATIO: 0.13 S/CO
HDLC SERPL-MCNC: 38 MG/DL
HEPATITIS A IGG ANTIBODY: REACTIVE
HGB BLD-MCNC: 14.7 G/DL
HIV1+2 AB SPEC QL IA.RAPID: NONREACTIVE
HSV 1+2 IGG SER IA-IMP: NEGATIVE
HSV 1+2 IGG SER IA-IMP: POSITIVE
HSV1 IGG SER QL: 33.9 INDEX
HSV2 IGG SER QL: 0.07 INDEX
IMM GRANULOCYTES NFR BLD AUTO: 0.2 %
LDLC SERPL CALC-MCNC: 67 MG/DL
LYMPHOCYTES # BLD AUTO: 1.45 K/UL
LYMPHOCYTES NFR BLD AUTO: 27.3 %
M TB IFN-G BLD-IMP: NEGATIVE
MAGNESIUM SERPL-MCNC: 2.4 MG/DL
MAN DIFF?: NORMAL
MCHC RBC-ENTMCNC: 31.7 PG
MCHC RBC-ENTMCNC: 31.8 GM/DL
MCV RBC AUTO: 99.8 FL
MONOCYTES # BLD AUTO: 0.68 K/UL
MONOCYTES NFR BLD AUTO: 12.8 %
NEUTROPHILS # BLD AUTO: 2.92 K/UL
NEUTROPHILS NFR BLD AUTO: 55 %
NONHDLC SERPL-MCNC: 109 MG/DL
PHOSPHATE SERPL-MCNC: 4.6 MG/DL
PLATELET # BLD AUTO: 157 K/UL
POTASSIUM SERPL-SCNC: 5.4 MMOL/L
PROT SERPL-MCNC: 8 G/DL
PSA SERPL-MCNC: 0.59 NG/ML
QUANTIFERON TB PLUS MITOGEN MINUS NIL: >10 IU/ML
QUANTIFERON TB PLUS NIL: 0.02 IU/ML
QUANTIFERON TB PLUS TB1 MINUS NIL: 0 IU/ML
QUANTIFERON TB PLUS TB2 MINUS NIL: 0 IU/ML
RBC # BLD: 4.63 M/UL
RBC # FLD: 14.9 %
ROGOSIN: NORMAL
RUBV IGG FLD-ACNC: 3.8 INDEX
RUBV IGG SER-IMP: POSITIVE
SARS-COV-2 AB SERPL IA-ACNC: 154 U/ML
SODIUM SERPL-SCNC: 139 MMOL/L
T GONDII AB SER-IMP: NEGATIVE
T GONDII IGG SER QL: <3 IU/ML
T PALLIDUM AB SER QL IA: NEGATIVE
TRIGL SERPL-MCNC: 211 MG/DL
URATE SERPL-MCNC: 4.5 MG/DL
VZV AB TITR SER: POSITIVE
VZV IGG SER IF-ACNC: 2646 INDEX
WBC # FLD AUTO: 5.31 K/UL

## 2023-05-12 ENCOUNTER — OUTPATIENT (OUTPATIENT)
Dept: OUTPATIENT SERVICES | Facility: HOSPITAL | Age: 68
LOS: 1 days | End: 2023-05-12
Payer: COMMERCIAL

## 2023-05-12 ENCOUNTER — APPOINTMENT (OUTPATIENT)
Dept: CT IMAGING | Facility: CLINIC | Age: 68
End: 2023-05-12

## 2023-05-12 ENCOUNTER — APPOINTMENT (OUTPATIENT)
Dept: RADIOLOGY | Facility: CLINIC | Age: 68
End: 2023-05-12

## 2023-05-12 ENCOUNTER — RESULT REVIEW (OUTPATIENT)
Age: 68
End: 2023-05-12

## 2023-05-12 DIAGNOSIS — Z98.890 OTHER SPECIFIED POSTPROCEDURAL STATES: Chronic | ICD-10-CM

## 2023-05-12 DIAGNOSIS — Z01.818 ENCOUNTER FOR OTHER PREPROCEDURAL EXAMINATION: ICD-10-CM

## 2023-05-12 PROCEDURE — 74177 CT ABD & PELVIS W/CONTRAST: CPT

## 2023-05-12 PROCEDURE — 71046 X-RAY EXAM CHEST 2 VIEWS: CPT | Mod: 26

## 2023-05-12 PROCEDURE — 74177 CT ABD & PELVIS W/CONTRAST: CPT | Mod: 26

## 2023-05-12 PROCEDURE — 71046 X-RAY EXAM CHEST 2 VIEWS: CPT

## 2023-05-16 ENCOUNTER — NON-APPOINTMENT (OUTPATIENT)
Age: 68
End: 2023-05-16

## 2023-05-19 ENCOUNTER — NON-APPOINTMENT (OUTPATIENT)
Age: 68
End: 2023-05-19

## 2023-05-30 NOTE — ED PROVIDER NOTE - DATE/TIME 1
CERTIFICATE OF WORK       May 30, 2023      Re: Tiffanie Haque  [address]  [place] [address]      This is to certify that Tiffanie Haque has been under my care from 5/30/2023 and can return to regular work on 5/31/2023    RESTRICTIONS: NON            SIGNATURE:___________________________________________          Alexa Polo MD  [facility], [facility] [address]  [address] [address] [place] [address] [address]  [place] [address]  Dept Phone: [phone]  
01-Jan-2023 18:12

## 2023-06-07 ENCOUNTER — APPOINTMENT (OUTPATIENT)
Dept: CARDIOLOGY | Facility: CLINIC | Age: 68
End: 2023-06-07
Payer: MEDICARE

## 2023-06-07 ENCOUNTER — NON-APPOINTMENT (OUTPATIENT)
Age: 68
End: 2023-06-07

## 2023-06-07 VITALS
HEART RATE: 53 BPM | HEIGHT: 66 IN | DIASTOLIC BLOOD PRESSURE: 76 MMHG | SYSTOLIC BLOOD PRESSURE: 144 MMHG | OXYGEN SATURATION: 99 % | BODY MASS INDEX: 23.78 KG/M2 | WEIGHT: 148 LBS

## 2023-06-07 PROCEDURE — 99214 OFFICE O/P EST MOD 30 MIN: CPT

## 2023-06-07 PROCEDURE — 93000 ELECTROCARDIOGRAM COMPLETE: CPT

## 2023-06-07 NOTE — HISTORY OF PRESENT ILLNESS
[FreeTextEntry1] : Patient is 68 year-old with cardiovascular risk factors of hypertension and diabetes, known coronary artery disease status post PCI x3 (2014) at [place] by Dr. Saúl Villafana, CKD, now ESRD on HD Zkwmlj-Mnrnemfvy-Vdbmvy) that began January 2019, who presents today for cardiac evaluation prior to possible renal transplant.\par He was working until October 2018 as [employer] .\par Patient does not formally exercise, but he can climb stairs and has no exertional symptoms.\par \par In January 2019, patient admitted to [facility] for shortness of breath and nosebleed. He was found to be volume overloaded in the setting of FELICITA on CKD and he was initiated on hemodialysis. \par \par In February 2019, patient seen to have abnormal nuclear stress test and was referred for left and right heart catheterization. The right heart cath showed elevated systemic blood pressures but normal PCWP (14 mmHg) and normal PA pressures (37/17 mmHg) with normal cardiac output and index of 6 L/min and 3.6 respectively. The left heart cath revealed significant distal LAD disease and significant in stent stenosis of LCx. He is now status post POBA to LCx and NAHUM to LAD.\par \par May 2019 - Patient presents today in his usual state of health. He reports occasional right leg pain that limits his exercise while walking on the treadmill. He is without chest pain, shortness of breath, lower extremity swelling.\par \par October 2019 - Patient presents today in his usual state of health. He reports occasional right leg pain that limits his exercise while walking on the treadmill. He is without chest pain, shortness of breath, lower extremity swelling. He had normal HOLLY/PVR and normal ultrasound of the abdominal vessels (and into iliacs). \par \par March 2020 - Patient returns for follow-up of his cardiovascular disease and to maintain standing with the Transplant Center. Patient recently traveled to [facility] (January 8 - February 6, 2020), and when patient returned having lost 8 lbs without a clear cause. It was unintentional weight loss. He is now being evaluated for cancer including lung cancer (former smoker). He will also need EGD/colonoscopy.\par \par April 2021 - Patient returns today for follow-up after a recent hospitalization. He was hospitalized at [facility] in March 2021 for shortness of breath and poor appetite. Cath revealed both LAD and RCA disease. He had his RCA intervened on and was brought back to [facility] two weeks later for staged PCI to the LAD. He was very upset during the hospitalization because he felt he needed more dialysis prior to the PCI of the LAD. Since discharge, he has felt well.\par He continues to have HD via left radial AV fistula (Jdevyxl-Yfiyeocr-Ykdxnvkb). \par  service provided by [employer]  . \par 's Number: [id number]\par 's Name: Arin\par Language: Swedish. \par \par July 2021 - Patient returns today for follow-up three months after his PCI to prox-LAD. He had a repeat echocardiogram today that shows interval improvement in his LVEF.\par He has been feeling well since. He has no cardiovascular complaints.\par His carvedilol was increased to 37.5 mg BID, but he reports that sometimes his blood pressure remains elevated.\par He continues to have HD via left radial AV fistula (Kdjeagd-Juntzbmh-Ggcmudtr). Blood pressure is usually best after HD.\par \par October 2021 - Patient returns today for follow-up.\par He had PCI to prox-LAD in April 2021. He had a repeat echocardiogram today that shows interval improvement in his LVEF.\par He has been feeling well since. He has no cardiovascular complaints.\par His carvedilol was increased to 37.5 mg BID, but he reports that sometimes his blood pressure remains elevated.\par He continues to have HD via left radial AV fistula (Ofghord-Cbnyhxjc-Uovynmaz). Blood pressure is usually best after HD.\par \par May 2022 - Patient returns today for follow-up. He had PCI to prox-LAD in April 2021. He had a repeat echocardiogram today that shows interval improvement in his LVEF.\par His carvedilol was increased to 37.5 mg BID, but he reports that sometimes his blood pressure remains elevated. \par He continues to have HD via left radial AV fistula (Jolvpre-Dadwpmdr-Wmffkdgu). Blood pressure is usually best after HD.\par He has not been sick with Covid-19.\par \par 8/29/2022. \par Mr. Rivero returns today for scheduled follow up and to maintain his status on the kidney transplant list.\par His daughter, Tamiko Mcqueen, serves as an  via phone at the patient's request.\par Since his last visit, he has been feeling very well. For exercise he walks 25-30 minutes twice daily at a moderate pace and denies any chest discomfort or shortness of breath.\par He continues on dialysis without any complications.\par There have been no changes to his medications and he has been taking all as directed. \par \par November 2022 - Patient returns today for follow-up after recent hospitalization for PCI to LCx on 10/28/2022 and then PCI to the LAD on 10/31/2022. The right groin site remains clean, dry, and intact without hematoma. \par \par ESRD on HD via left radial AV fistula (Qnigxoj-Tsrtciku-Hviairwe).\par \par PMD: Bassem Grove MD [phone]\par Cardiologist: LEONARD Gomez [phone]\par Nephrologist: Yasmany Huff MD [phone]

## 2023-06-07 NOTE — REASON FOR VISIT
[Other: ____] : [unfilled] [Other: _____] : [unfilled] [FreeTextEntry1] : March 2023 - Patient returns today for follow-up. He reports that he is noticing orthopnea. He has no daytime or exertional symptoms. He continues to have ESRD on HD via left radial AV fistula (Sevcmgm-Tnajxdgx-Hwnbafmt).

## 2023-06-07 NOTE — DISCUSSION/SUMMARY
[EKG obtained to assist in diagnosis and management of assessed problem(s)] : EKG obtained to assist in diagnosis and management of assessed problem(s) [FreeTextEntry1] : Patient is a 68 year-old gentleman with known coronary artery disease who presents today for evaluation prior to possible renal transplant.\par In March 2021, patient was admitted to [facility] with shortness of breath and was seen to have LAD and RCA disease. He is now status post PCI to both. \par In October 2022, he underwent PCI to both LCx and LAD.\par For patient with previously reduced LVEF, now recovered, continue carvedilol.\par \par Continue ASA monotherapy (he discontinued clopidogrel on 5/1/2023).\par Continue high intensity statin therapy\par Continue current antihypertension regimen and diabetes regimen.

## 2023-06-07 NOTE — CARDIOLOGY SUMMARY
[de-identified] : 11/7/2018, sinus 63 bpm with LVH with strain pattern, diffuse TWI in precordial leads (biphasic in V1-V3 and deep symmetrical negative T in V4-V6)  [de-identified] : 11/19/2018, exercised and completed almost 9 minutes of Bertram protocol, but could not achieve target heart rate, converted to pharmacologic nuclear stress test and seen to have severe predominantly fixed defects in the mid-anterior, anteroseptal, and apical regions that partially correct with prone imaging, LVEF 49% and LVEDV 126 mL \par \par 5/28/2020, pharmacologic nuclear stress test with severe fixed defects in anteroseptal wall and apex consistent with prior infarct without ischemia, LVEF 41%, LVEDV 155 mL \par \par 9/19/2022, pharmacologic nuclear stress test showing april-spetal, septal, and apical infarct with ischemia in the basal april-septum and distal inferior wall, LVEF 49%, LVEDV 131 mL [de-identified] : 11/12/2018, normal LV function, no pulmonary hypertension, normal LA size LVEF 65-70%. \par \par 1/10/2019, normal LV function, no pulmonary hypertension, normal LA size, LVEF 63%\par \par 5/21/2020, mild segmental LV dysfunction (the distal anterior, lateral, septal, and apical segments are hypokinetic), normal pulmonary pressures, LVEF 50%\par \par 3/18/2021, moderate global LV dysfunction, LVEF 35-40%\par \par 7/16/2021, mild segmental LV dysfunction, LVEF 50-55%\par \par 7/11/2022. TTE: LVEF 55-60%\par Normal left atrium. LA volume index = 30 cc/m2.\par Moderate concentric left ventricular hypertrophy,\par Mild segmental left ventricular systolic dysfunction with overall preserved LVEF. The distal septum and apex are hypokinetic. \par Normal right ventricular size and function.\par Estimated pulmonary artery systolic pressure equals 27 mm Hg, assuming right atrial pressure equals 9 mm Hg, consistent with normal pulmonary pressures.\par Normal pericardium with no pericardial effusion.\par Compared to echocardiogram of 7/16/2021, no significant changes noted.\par \par 3/29/2023, normal LA, normal pulmonary pressures, normal LV systolic function, LVEF 55%\par  [de-identified] : 2/12/2019 by Iván Nathan MD at [facility] - left and right heart catheterization, elevated systemic blood pressures but normal wedge, 14 mmHg, normal PA pressures (37/17 mmHg, with normal cardiac output of 6.2 L/min and normal index of 3.6. Angiography revealed 90% prox LCx disease at site of prior stent with 80% distal LAD disease that was significant by iFR \par \par 3/22/2021 by Ciera Taveras MD at [facility] -  Proximal LAD: There was a tubular 80 % stenosis at the site of a prior stent. Distal RCA: Angiography showed minor luminal irregularities with no flow limiting lesions. --  RPLS: There was a tubular 80 % stenosis.\par \par 4/5/2021 by Ciera Taveras for staged PCI to LAD \par Stent: 2015 at [place] \par 2/12/2019 - POBA to LCx and NAHUM to distal LAD\par 3/22/2021 - PCI to distal RCA\par 4/5/2021 - PCI to prox-LAD \par \par 10/28/2022 with Jim Jones MD - PCI to LCx\par 10/31/2022 with Jim Jones MD - PCI to LAD

## 2023-07-12 ENCOUNTER — APPOINTMENT (OUTPATIENT)
Dept: VASCULAR SURGERY | Facility: CLINIC | Age: 68
End: 2023-07-12
Payer: MEDICARE

## 2023-07-12 ENCOUNTER — APPOINTMENT (OUTPATIENT)
Dept: VASCULAR SURGERY | Facility: CLINIC | Age: 68
End: 2023-07-12

## 2023-07-12 DIAGNOSIS — N18.6 END STAGE RENAL DISEASE: ICD-10-CM

## 2023-07-12 DIAGNOSIS — Z99.2 END STAGE RENAL DISEASE: ICD-10-CM

## 2023-07-12 PROCEDURE — 99213 OFFICE O/P EST LOW 20 MIN: CPT

## 2023-07-13 PROBLEM — N18.6 ESRD (END STAGE RENAL DISEASE) ON DIALYSIS: Status: ACTIVE | Noted: 2019-01-28

## 2023-07-13 NOTE — ASSESSMENT
[Arterial/Venous Disease] : arterial/venous disease [Other: _____] : [unfilled] [FreeTextEntry1] : Impression - ESRD on HD via left avf, prolonged bleeding after HD poss avf stenosis\par \par Plan\par Continue using left avf for HD Tues/Thurs/Sat\par d/w pt indications, risks, and benefits of left av fistulogram to evaluate avf for prolonged bleeding\par pt verbalizes understanding and agreement \par pt wishes to proceed\par left av fistulogram to be scheduled

## 2023-07-13 NOTE — HISTORY OF PRESENT ILLNESS
[FreeTextEntry1] : 66 yo M with PMH of DM, HTN, ESKD (multiple vascular risk factors) who presents for evaluation of his left arm radiocephalic AV fistula. The patient reports that there are no issues with the AVF and that he has been able to undergo dialysis sessions. He is not endorsing any distal numbness/tingling/weakness either currently or with dialysis. He is also reporting bilateral distal leg pain for the last two months. He states that the pain is an ache in his feet and anterior shins when he is walking, which limits his activity. He is not reporting any skin changes or coldness in his feet. No leg swelling. No open wounds or recent injuries. No issues with sensation or motor function in either leg. \par \par PMH: DM, HTN, HLD\par PSH: gallbladder removal\par Allergies: none reported\par Meds: please see EMR\par FHx: unknown\par SHx: former smoker [de-identified] : 7/12/2023 - Pt is here to evaluate left radiocephalic avf. He is complaining of prolonged bleeding after dialysis. He denies difficulty with needle cannulation. His avf has aneurysmal dilatations. He is on HD Tues/Thurs/Sat. Denies any arm pain, swelling, numbness or tingling. He is complaining of right heel pain when walking barefoot. The pain started 2-3 months ago. The heel pain is not felt when he's wearing shoes. Denies any injuries or trauma. He has been seeing his podiatrist for his right heel pain and is currently getting physical therapy. He denies claudication, rest pain, wounds or ulcers.

## 2023-07-13 NOTE — PHYSICAL EXAM
[2+] : left 2+ [1+] : left 1+ [No Rash or Lesion] : No rash or lesion [Alert] : alert [Oriented to Person] : oriented to person [Oriented to Place] : oriented to place [Oriented to Time] : oriented to time [Calm] : calm [Ankle Swelling (On Exam)] : not present [Varicose Veins Of Lower Extremities] : not present [] : not present [de-identified] : NAD [de-identified] : NCAT [de-identified] : unlabored breathing [FreeTextEntry1] : RUE avf with good palpable thrill\par bilateral lower extremities soft, warm and nontender\par intact skin\par no wounds or ulcers [de-identified] : ZEYADL [de-identified] : em cranial nerves 2-12 em grossly intact [de-identified] : cooperative

## 2023-07-19 ENCOUNTER — NON-APPOINTMENT (OUTPATIENT)
Age: 68
End: 2023-07-19

## 2023-07-19 DIAGNOSIS — Z01.818 ENCOUNTER FOR OTHER PREPROCEDURAL EXAMINATION: ICD-10-CM

## 2023-07-21 ENCOUNTER — OUTPATIENT (OUTPATIENT)
Dept: OUTPATIENT SERVICES | Facility: HOSPITAL | Age: 68
LOS: 1 days | Discharge: HOME | End: 2023-07-21
Payer: MEDICARE

## 2023-07-21 DIAGNOSIS — N18.6 END STAGE RENAL DISEASE: ICD-10-CM

## 2023-07-21 DIAGNOSIS — Z98.890 OTHER SPECIFIED POSTPROCEDURAL STATES: Chronic | ICD-10-CM

## 2023-07-21 DIAGNOSIS — I77.0 ARTERIOVENOUS FISTULA, ACQUIRED: Chronic | ICD-10-CM

## 2023-07-21 PROCEDURE — 36902 INTRO CATH DIALYSIS CIRCUIT: CPT | Mod: LT

## 2023-07-21 PROCEDURE — 76937 US GUIDE VASCULAR ACCESS: CPT | Mod: 26

## 2023-07-21 PROCEDURE — 93010 ELECTROCARDIOGRAM REPORT: CPT

## 2023-07-21 PROCEDURE — 99152 MOD SED SAME PHYS/QHP 5/>YRS: CPT

## 2023-07-21 RX ORDER — SODIUM CHLORIDE 9 MG/ML
3 INJECTION INTRAMUSCULAR; INTRAVENOUS; SUBCUTANEOUS EVERY 8 HOURS
Refills: 0 | Status: DISCONTINUED | OUTPATIENT
Start: 2023-07-21 | End: 2023-08-04

## 2023-07-21 NOTE — H&P CARDIOLOGY - NSICDXPASTMEDICALHX_GEN_ALL_CORE_FT
PAST MEDICAL HISTORY:  Anemia due to stage 5 chronic kidney disease, not on chronic dialysis     CAP (community acquired pneumonia) 6/18    Cerebrovascular accident (CVA), unspecified mechanism diagnosed via CT of the head    Coronary artery disease     Diabetes mellitus type 2    ESRD (end stage renal disease) on Dialysis( M/W/F), By Dr. Huff    GERD (gastroesophageal reflux disease)     HTN (hypertension)     Hypercholesterolemia     Stented coronary artery 2014, 3 stents, [facility]

## 2023-07-21 NOTE — H&P CARDIOLOGY - EF ASSESSMENT
27 yo female c/o tooth #10 pain since this morning, 9/10, nonradiating. Has hx of a filling in that tooth that was done back in [place]. No fever/chills. No abscess.  No hx of trauma. No medications taken for this. Normal

## 2023-07-21 NOTE — H&P CARDIOLOGY - HISTORY OF PRESENT ILLNESS
67 year old man with past medical history of CAD S/P pLCx 10/28/22 LAD 10/31/22, NL LV EF 3/23, HTN, DM, HLD, Anemia, ESRD on HD via L AVF ( T/T/sat at [facility] HD center, Nephro: Dr. Huff, last dialyses 7/22/23), GERD, presents to [facility] for L AVF fistulogram pt has prolonged bleeding r/o possible stenosis. Pt is minimally active w/o active complaints of CP or SOB. Pt has occ LE edema PM. Pt denies palpitations, dizziness, falls, LOC, GI/ bleeding, hx/o DVT/PE, claudication.
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
## 2023-08-18 ENCOUNTER — INPATIENT (INPATIENT)
Facility: HOSPITAL | Age: 68
LOS: 0 days | Discharge: HOME | DRG: 291 | End: 2023-08-18
Attending: TRANSPLANT SURGERY | Admitting: TRANSPLANT SURGERY
Payer: COMMERCIAL

## 2023-08-18 ENCOUNTER — TRANSCRIPTION ENCOUNTER (OUTPATIENT)
Age: 68
End: 2023-08-18

## 2023-08-18 ENCOUNTER — APPOINTMENT (OUTPATIENT)
Dept: TRANSPLANT | Facility: HOSPITAL | Age: 68
End: 2023-08-18

## 2023-08-18 VITALS
OXYGEN SATURATION: 95 % | HEART RATE: 76 BPM | TEMPERATURE: 99 F | DIASTOLIC BLOOD PRESSURE: 78 MMHG | HEIGHT: 66 IN | WEIGHT: 141.98 LBS | SYSTOLIC BLOOD PRESSURE: 160 MMHG | RESPIRATION RATE: 20 BRPM

## 2023-08-18 DIAGNOSIS — Z98.890 OTHER SPECIFIED POSTPROCEDURAL STATES: Chronic | ICD-10-CM

## 2023-08-18 DIAGNOSIS — I77.0 ARTERIOVENOUS FISTULA, ACQUIRED: Chronic | ICD-10-CM

## 2023-08-18 DIAGNOSIS — N18.6 END STAGE RENAL DISEASE: ICD-10-CM

## 2023-08-18 DIAGNOSIS — Z94.0 KIDNEY TRANSPLANT STATUS: ICD-10-CM

## 2023-08-18 LAB
ALBUMIN SERPL ELPH-MCNC: 4.6 G/DL — SIGNIFICANT CHANGE UP (ref 3.3–5)
ALP SERPL-CCNC: 71 U/L — SIGNIFICANT CHANGE UP (ref 40–120)
ALT FLD-CCNC: 17 U/L — SIGNIFICANT CHANGE UP (ref 10–45)
ANION GAP SERPL CALC-SCNC: 17 MMOL/L — SIGNIFICANT CHANGE UP (ref 5–17)
APTT BLD: 29.5 SEC — SIGNIFICANT CHANGE UP (ref 24.5–35.6)
AST SERPL-CCNC: 16 U/L — SIGNIFICANT CHANGE UP (ref 10–40)
BASE EXCESS BLDV CALC-SCNC: 7.9 MMOL/L — HIGH (ref -2–3)
BASOPHILS # BLD AUTO: 0 K/UL — SIGNIFICANT CHANGE UP (ref 0–0.2)
BASOPHILS NFR BLD AUTO: 0 % — SIGNIFICANT CHANGE UP (ref 0–2)
BILIRUB SERPL-MCNC: 1 MG/DL — SIGNIFICANT CHANGE UP (ref 0.2–1.2)
BLD GP AB SCN SERPL QL: NEGATIVE — SIGNIFICANT CHANGE UP
BUN SERPL-MCNC: 36 MG/DL — HIGH (ref 7–23)
CA-I SERPL-SCNC: 1.2 MMOL/L — SIGNIFICANT CHANGE UP (ref 1.15–1.33)
CALCIUM SERPL-MCNC: 10.3 MG/DL — SIGNIFICANT CHANGE UP (ref 8.4–10.5)
CHLORIDE BLDV-SCNC: 93 MMOL/L — LOW (ref 96–108)
CHLORIDE SERPL-SCNC: 91 MMOL/L — LOW (ref 96–108)
CO2 BLDV-SCNC: 36 MMOL/L — HIGH (ref 22–26)
CO2 SERPL-SCNC: 30 MMOL/L — SIGNIFICANT CHANGE UP (ref 22–31)
CREAT SERPL-MCNC: 9.97 MG/DL — HIGH (ref 0.5–1.3)
EGFR: 5 ML/MIN/1.73M2 — LOW
EOSINOPHIL # BLD AUTO: 0.05 K/UL — SIGNIFICANT CHANGE UP (ref 0–0.5)
EOSINOPHIL NFR BLD AUTO: 0.8 % — SIGNIFICANT CHANGE UP (ref 0–6)
GAS PNL BLDV: 133 MMOL/L — LOW (ref 136–145)
GAS PNL BLDV: SIGNIFICANT CHANGE UP
GAS PNL BLDV: SIGNIFICANT CHANGE UP
GLUCOSE BLDC GLUCOMTR-MCNC: 209 MG/DL — HIGH (ref 70–99)
GLUCOSE BLDV-MCNC: 218 MG/DL — HIGH (ref 70–99)
GLUCOSE SERPL-MCNC: 204 MG/DL — HIGH (ref 70–99)
HBV SURFACE AG SER-ACNC: SIGNIFICANT CHANGE UP
HCO3 BLDV-SCNC: 35 MMOL/L — HIGH (ref 22–29)
HCT VFR BLD CALC: 45.6 % — SIGNIFICANT CHANGE UP (ref 39–50)
HCT VFR BLDA CALC: 44 % — SIGNIFICANT CHANGE UP (ref 39–51)
HGB BLD CALC-MCNC: 14.5 G/DL — SIGNIFICANT CHANGE UP (ref 12.6–17.4)
HGB BLD-MCNC: 14 G/DL — SIGNIFICANT CHANGE UP (ref 13–17)
HIV 1+2 AB+HIV1 P24 AG SERPL QL IA: SIGNIFICANT CHANGE UP
INR BLD: 0.99 RATIO — SIGNIFICANT CHANGE UP (ref 0.85–1.18)
LACTATE BLDV-MCNC: 1.7 MMOL/L — SIGNIFICANT CHANGE UP (ref 0.5–2)
LDH SERPL L TO P-CCNC: 205 U/L — SIGNIFICANT CHANGE UP (ref 50–242)
LYMPHOCYTES # BLD AUTO: 1.11 K/UL — SIGNIFICANT CHANGE UP (ref 1–3.3)
LYMPHOCYTES # BLD AUTO: 19.5 % — SIGNIFICANT CHANGE UP (ref 13–44)
MAGNESIUM SERPL-MCNC: 2.6 MG/DL — SIGNIFICANT CHANGE UP (ref 1.6–2.6)
MANUAL SMEAR VERIFICATION: SIGNIFICANT CHANGE UP
MCHC RBC-ENTMCNC: 30.7 GM/DL — LOW (ref 32–36)
MCHC RBC-ENTMCNC: 30.8 PG — SIGNIFICANT CHANGE UP (ref 27–34)
MCV RBC AUTO: 100.2 FL — HIGH (ref 80–100)
MONOCYTES # BLD AUTO: 0.97 K/UL — HIGH (ref 0–0.9)
MONOCYTES NFR BLD AUTO: 17 % — HIGH (ref 2–14)
NEUTROPHILS # BLD AUTO: 3.58 K/UL — SIGNIFICANT CHANGE UP (ref 1.8–7.4)
NEUTROPHILS NFR BLD AUTO: 62.7 % — SIGNIFICANT CHANGE UP (ref 43–77)
PCO2 BLDV: 56 MMHG — HIGH (ref 42–55)
PH BLDV: 7.4 — SIGNIFICANT CHANGE UP (ref 7.32–7.43)
PHOSPHATE SERPL-MCNC: 5.5 MG/DL — HIGH (ref 2.5–4.5)
PLAT MORPH BLD: NORMAL — SIGNIFICANT CHANGE UP
PLATELET # BLD AUTO: 117 K/UL — LOW (ref 150–400)
PO2 BLDV: 58 MMHG — HIGH (ref 25–45)
POTASSIUM BLDV-SCNC: 4.7 MMOL/L — SIGNIFICANT CHANGE UP (ref 3.5–5.1)
POTASSIUM SERPL-MCNC: 4.6 MMOL/L — SIGNIFICANT CHANGE UP (ref 3.5–5.3)
POTASSIUM SERPL-SCNC: 4.6 MMOL/L — SIGNIFICANT CHANGE UP (ref 3.5–5.3)
PROT SERPL-MCNC: 8.6 G/DL — HIGH (ref 6–8.3)
PROTHROM AB SERPL-ACNC: 10.4 SEC — SIGNIFICANT CHANGE UP (ref 9.5–13)
RBC # BLD: 4.55 M/UL — SIGNIFICANT CHANGE UP (ref 4.2–5.8)
RBC # FLD: 14.8 % — HIGH (ref 10.3–14.5)
RBC BLD AUTO: SIGNIFICANT CHANGE UP
RH IG SCN BLD-IMP: NEGATIVE — SIGNIFICANT CHANGE UP
SAO2 % BLDV: 84.4 % — SIGNIFICANT CHANGE UP (ref 67–88)
SARS-COV-2 RNA SPEC QL NAA+PROBE: DETECTED
SARS-COV-2 RNA SPEC QL NAA+PROBE: DETECTED
SMUDGE CELLS # BLD: PRESENT — SIGNIFICANT CHANGE UP
SODIUM SERPL-SCNC: 138 MMOL/L — SIGNIFICANT CHANGE UP (ref 135–145)
WBC # BLD: 5.71 K/UL — SIGNIFICANT CHANGE UP (ref 3.8–10.5)
WBC # FLD AUTO: 5.71 K/UL — SIGNIFICANT CHANGE UP (ref 3.8–10.5)

## 2023-08-18 PROCEDURE — 71045 X-RAY EXAM CHEST 1 VIEW: CPT | Mod: 26

## 2023-08-18 PROCEDURE — 85730 THROMBOPLASTIN TIME PARTIAL: CPT

## 2023-08-18 PROCEDURE — 87522 HEPATITIS C REVRS TRNSCRPJ: CPT

## 2023-08-18 PROCEDURE — 82947 ASSAY GLUCOSE BLOOD QUANT: CPT

## 2023-08-18 PROCEDURE — 71045 X-RAY EXAM CHEST 1 VIEW: CPT

## 2023-08-18 PROCEDURE — 82330 ASSAY OF CALCIUM: CPT

## 2023-08-18 PROCEDURE — 85610 PROTHROMBIN TIME: CPT

## 2023-08-18 PROCEDURE — 82803 BLOOD GASES ANY COMBINATION: CPT

## 2023-08-18 PROCEDURE — 86901 BLOOD TYPING SEROLOGIC RH(D): CPT

## 2023-08-18 PROCEDURE — 86704 HEP B CORE ANTIBODY TOTAL: CPT

## 2023-08-18 PROCEDURE — 85018 HEMOGLOBIN: CPT

## 2023-08-18 PROCEDURE — 93010 ELECTROCARDIOGRAM REPORT: CPT

## 2023-08-18 PROCEDURE — 84295 ASSAY OF SERUM SODIUM: CPT

## 2023-08-18 PROCEDURE — 80053 COMPREHEN METABOLIC PANEL: CPT

## 2023-08-18 PROCEDURE — 93005 ELECTROCARDIOGRAM TRACING: CPT

## 2023-08-18 PROCEDURE — 84100 ASSAY OF PHOSPHORUS: CPT

## 2023-08-18 PROCEDURE — 85025 COMPLETE CBC W/AUTO DIFF WBC: CPT

## 2023-08-18 PROCEDURE — 86803 HEPATITIS C AB TEST: CPT

## 2023-08-18 PROCEDURE — 82435 ASSAY OF BLOOD CHLORIDE: CPT

## 2023-08-18 PROCEDURE — 86900 BLOOD TYPING SEROLOGIC ABO: CPT

## 2023-08-18 PROCEDURE — 86850 RBC ANTIBODY SCREEN: CPT

## 2023-08-18 PROCEDURE — 99222 1ST HOSP IP/OBS MODERATE 55: CPT | Mod: GC

## 2023-08-18 PROCEDURE — 82962 GLUCOSE BLOOD TEST: CPT

## 2023-08-18 PROCEDURE — 36415 COLL VENOUS BLD VENIPUNCTURE: CPT

## 2023-08-18 PROCEDURE — 85014 HEMATOCRIT: CPT

## 2023-08-18 PROCEDURE — 83605 ASSAY OF LACTIC ACID: CPT

## 2023-08-18 PROCEDURE — 87635 SARS-COV-2 COVID-19 AMP PRB: CPT

## 2023-08-18 PROCEDURE — 87340 HEPATITIS B SURFACE AG IA: CPT

## 2023-08-18 PROCEDURE — 86706 HEP B SURFACE ANTIBODY: CPT

## 2023-08-18 PROCEDURE — 83735 ASSAY OF MAGNESIUM: CPT

## 2023-08-18 PROCEDURE — 83615 LACTATE (LD) (LDH) ENZYME: CPT

## 2023-08-18 PROCEDURE — 87389 HIV-1 AG W/HIV-1&-2 AB AG IA: CPT

## 2023-08-18 PROCEDURE — 84132 ASSAY OF SERUM POTASSIUM: CPT

## 2023-08-18 RX ORDER — CEFAZOLIN SODIUM 1 G
2000 VIAL (EA) INJECTION ONCE
Refills: 0 | Status: COMPLETED | OUTPATIENT
Start: 2023-08-18 | End: 2023-08-18

## 2023-08-18 RX ORDER — CARVEDILOL PHOSPHATE 80 MG/1
1 CAPSULE, EXTENDED RELEASE ORAL
Refills: 0 | DISCHARGE

## 2023-08-18 RX ORDER — CLOPIDOGREL BISULFATE 75 MG/1
1 TABLET, FILM COATED ORAL
Refills: 0 | DISCHARGE

## 2023-08-18 RX ORDER — BASILIXIMAB 20 MG/5ML
20 INJECTION, POWDER, FOR SOLUTION INTRAVENOUS ONCE
Refills: 0 | Status: DISCONTINUED | OUTPATIENT
Start: 2023-08-18 | End: 2023-08-18

## 2023-08-18 RX ORDER — INSULIN GLARGINE 100 [IU]/ML
10 INJECTION, SOLUTION SUBCUTANEOUS
Qty: 0 | Refills: 0 | DISCHARGE

## 2023-08-18 NOTE — H&P ADULT - NSICDXPASTMEDICALHX_GEN_ALL_CORE_FT
PAST MEDICAL HISTORY:  Anemia due to stage 5 chronic kidney disease, not on chronic dialysis     CAP (community acquired pneumonia) 6/18    Cerebrovascular accident (CVA), unspecified mechanism diagnosed via CT of the head    Coronary artery disease     Diabetes mellitus type 2    ESRD (end stage renal disease) on Dialysis( M/W/F), By Dr. Huff    GERD (gastroesophageal reflux disease)     HTN (hypertension)     Hypercholesterolemia     Stented coronary artery 2014, 3 stents, [facility]

## 2023-08-18 NOTE — CONSULT NOTE ADULT - PROBLEM SELECTOR RECOMMENDATION 9
Pt with hx of ESRD on HD TTS here as a renal transplant candidate on 8/18. Last outpatient HD done via LUE fistula on Thursday, 8/17. HD consent was signed by the patient and placed in the chart. Labs pending. Patient vitals stable    Will do simulect induction and high dose steroid.     If you have any questions, please feel free to contact me  Zachary Hoang  Nephrology Fellow  [phone]; Prefer [employer] TEAMS  (After 5pm or on weekends please page the on-call fellow).

## 2023-08-18 NOTE — DISCHARGE NOTE PROVIDER - NSDCMRMEDTOKEN_GEN_ALL_CORE_FT
Aspirin Enteric Coated 81 mg oral delayed release tablet: 1 tab(s) orally once a day  atorvastatin 40 mg oral tablet: 1 tab(s) orally once a day  cloNIDine 0.1 mg oral tablet: 1 tab(s) orally 3 times a day  Coreg 3.125 mg oral tablet: 1 tab(s) orally 2 times a day  hydrALAZINE 50 mg oral tablet: 1 tab(s) orally 2 times a day  isosorbide dinitrate 10 mg oral tablet: 1 tab(s) orally 3 times a day  NIFEdipine 60 mg oral tablet, extended release: 1.5 tab(s) orally once a day  Protonix 40 mg oral delayed release tablet: 1 tab(s) orally once a day  Renvela 800 mg oral tablet: 2 tab(s) orally 4 times a day (with meals and at bedtime)  Tradjenta 5 mg oral tablet: 1 tab(s) orally once a day

## 2023-08-18 NOTE — H&P ADULT - HISTORY OF PRESENT ILLNESS
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 Past surgical history significant for cholecystectomy, AVF creation and eye surgery in the past. He presents to [facility] on 8/18/23 as renal transplant candidate.      Last HD yesterday, Thursday 8/17/23. Last PO intake ___. No hospitalizations __, no blood transfusions __      Cleared by cardiology and no further cardiac testing required 7/2023. Last TTE completed 3/9/23 with normal LV systolic function, LVEF 55%, mild diastolic LV dysfunction.   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 Past surgical history significant for open cholecystectomy, AVF creation and eye surgery in the past. He presents to [facility] on 8/18/23 as renal transplant candidate.      Last HD yesterday, Thursday 8/17/23. Makes no urine (1 drop every few days). Last PO intake this AM. No hospitalizations in past 6 months other than for outpatient fistulogram 7/2023. History of blood transfusion 3 years ago during open cholecystectomy. Reports dry cough related to weather, otherwise denies fevers, chills, shortness of breath, chest pain.     Cleared by cardiology and no further cardiac testing required 7/2023. Last TTE completed 3/9/23 with normal LV systolic function, LVEF 55%, mild diastolic LV dysfunction.   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 Past surgical history significant for open cholecystectomy, AVF creation and eye surgery in the past. He presents to [facility] on 8/18/23 as renal transplant candidate.      Last HD yesterday, Thursday 8/17/23. Makes no urine (1 drop every few days). Last PO intake 130pm. No hospitalizations in past 6 months other than for outpatient fistulogram 7/2023. History of blood transfusion 3 years ago during open cholecystectomy. Reports dry cough related to weather, otherwise denies fevers, chills, shortness of breath, chest pain.     Cleared by cardiology and no further cardiac testing required 7/2023. Last TTE completed 3/9/23 with normal LV systolic function, LVEF 55%, mild diastolic LV dysfunction.

## 2023-08-18 NOTE — PATIENT PROFILE ADULT - NSPRESCRALCFREQ_GEN_A_NUR
Oral Chemotherapy Monitoring Program      Call placed to Barbi to alert her I was releasing the refill of her Temodar and to expect a call from the pharmacy to set up delivery. She should not take the medication until 9/23 the start of her next Temodar cycle. Barbi understood the plan and thanked me for the call.     Briseida Brooke, Pharm.D., [facility]  [phone]  09/16/20     Never

## 2023-08-18 NOTE — DISCHARGE NOTE PROVIDER - CARE PROVIDER_API CALL
Verito Bae  Nephrology  [address] [place] [id number]  Phone: [phone]  Fax: [phone]  Follow Up Time: Routine

## 2023-08-18 NOTE — H&P ADULT - NSHPPHYSICALEXAM_GEN_ALL_CORE
General: Well apperaing, resting comfortably in bed in no acute distress   Neuro: Grossly intact bilaterally   HEENT: Normocephalic, atraumatic, trachea midline, no JVD   Chest:   Heart: Regular S1/S2, no murmurs rubs or gallops   Lungs: Unlabored breathing on ***; Clear to auscultation bilaterally, no adventitious sounds   Abdomen: Soft, non-distended, normoactive bowel sounds throughout, no tenderness to palpation in all 4 quadrants   Upper Extremities: No edema, freely mobile bilaterally   Lower Extremities: No edema, SCDs in place, feet warm bilaterally   Skin: Warm, non-diaphoretic throughout General: Well appearing male, resting comfortably in bed in no acute distress   Neuro: Grossly intact bilaterally   HEENT: Normocephalic, atraumatic, trachea midline, no JVD   Heart: Regular S1/S2, no murmurs rubs or gallops   Lungs: Unlabored breathing on RA; Clear to auscultation bilaterally, no adventitious sounds   Abdomen: Soft, non-distended, normoactive bowel sounds throughout, no tenderness to palpation in all 4 quadrants; RUQ incision well healed  Upper Extremities: No edema, freely mobile bilaterally; LUE AVF +thrill/bruit   Lower Extremities: No edema, 2+ DP pulses, feet warm bilaterally   Skin: Warm, non-diaphoretic throughout

## 2023-08-18 NOTE — DISCHARGE NOTE NURSING/CASE MANAGEMENT/SOCIAL WORK - PATIENT PORTAL LINK FT
You can access the FollowMyHealth Patient Portal offered by [facility] by registering at the following website: [email or web address]. By joining [facility]’s FollowMyHealth portal, you will also be able to view your health information using other applications (apps) compatible with our system.

## 2023-08-18 NOTE — DISCHARGE NOTE PROVIDER - NSDCFUSCHEDAPPT_GEN_ALL_CORE_FT
Edin Johnson  [facility] Physician [facility]  CARDIOLOGY [address] [facility]   Scheduled Appointment: 10/11/2023

## 2023-08-18 NOTE — H&P ADULT - ASSESSMENT
67 y/o M former smoker with past medical history significant for GERD, HTN, HLD, anemia of chronic disease, T2DM, CAD s/p stent x3 (2014 at [place]) & x2 (pLCx 10/28/22, LAD 10/31/22, off of plavix as of 5/1/23), CVA and ESRD (diagnosed Jan 2019) on HD via LUE AVF T/Th/S (Nephrologist Dr Huff) with recent AV fistulogram 7/2023 with cephalic stenosis s/p balloon angioplasty. Past surgical history significant for cholecystectomy, AVF creation and eye surgery in the past. He presents to [facility] on 8/18/23 as renal transplant candidate.      #Renal transplant candidate  - NPO except meds  - CBC, CMP, Mg/P, Coags  - T&Sx2  - ABBOTT Covid swab  - PHS labs   - Simulect induction  - Consent to be obtained   - Estimated OR time ~4pm 67 y/o M former smoker with past medical history significant for GERD, HTN, HLD, anemia of chronic disease, T2DM, CAD s/p stent x3 (2014 at [place]) & x2 (pLCx 10/28/22, LAD 10/31/22, off of plavix as of 5/1/23), CVA and ESRD (diagnosed Jan 2019) on HD via LUE AVF T/Th/S (Nephrologist Dr Huff) with recent AV fistulogram 7/2023 with cephalic stenosis s/p balloon angioplasty. Past surgical history significant for open cholecystectomy, AVF creation and eye surgery in the past. He presents to [facility] on 8/18/23 as renal transplant candidate.      #Renal transplant candidate  - NPO except meds  - CBC, CMP, Mg/P, Coags  - T&Sx2  - ABBOTT Covid swab  - PHS labs   - Simulect induction  - Consent to be obtained   - Estimated OR time ~4pm 67 y/o M former smoker with past medical history significant for GERD, HTN, HLD, anemia of chronic disease, T2DM, CAD s/p stent x3 (2014 at [place]) & x2 (pLCx 10/28/22, LAD 10/31/22, off of plavix as of 5/1/23), CVA and ESRD (diagnosed Jan 2019) on HD via LUE AVF T/Th/S (Nephrologist Dr Huff) with recent AV fistulogram 7/2023 with cephalic stenosis s/p balloon angioplasty. Past surgical history significant for open cholecystectomy, AVF creation and eye surgery in the past. He presents to [facility] on 8/18/23 as renal transplant candidate.      #Renal transplant candidate  - NPO except meds  - CBC, CMP, Mg/P, Coags  - T&Sx2  - ABBOTT Covid swab  - PHS labs   - Simulect induction  - Consent to be obtained   - Estimated OR time ~4pm    ****Recipient needs urgent operation as this is a kidney transplant recipient surgery****

## 2023-08-18 NOTE — H&P ADULT - NSHPLABSRESULTS_GEN_ALL_CORE
CAPILLARY BLOOD GLUCOSE 14.0   5.71  )-----------( 117      ( 18 Aug 2023 16:23 )             45.6           CAPILLARY BLOOD GLUCOSE      POCT Blood Glucose.: 209 mg/dL (18 Aug 2023 16:25)    PT/INR - ( 18 Aug 2023 16:23 )   PT: 10.4 sec;   INR: 0.99 ratio         PTT - ( 18 Aug 2023 16:23 )  PTT:29.5 sec

## 2023-08-18 NOTE — CONSULT NOTE ADULT - ASSESSMENT
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 Past surgical history significant for cholecystectomy, AVF creation and eye surgery in the past. He presents to [facility] on 8/18/23 as renal transplant candidate.  Nephrology consulted for ESRD on Hd, Renal transplant candidate.     A/P    ESRD on HD   TTS  via AVF  from [place] dialysis    AVF -- L AVF    s/p HD 8/17  Renal Transplant Candidate   Renal diet   HD consent in chart - obtained by Transplant.     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 Past surgical history significant for cholecystectomy, AVF creation and eye surgery in the past. He presents to [facility] on 8/18/23 as renal transplant candidate.  Nephrology consulted for ESRD on Hd, Renal transplant candidate.     A/P    ESRD on HD   TTS  via AVF  from [place] dialysis    AVF -- L AVF    s/p HD 8/17  Renal Transplant Candidate , canceled sec to mild covid  Renal diet   HD consent in chart - obtained by Transplant.     HTN   BP acceptable  Mgmt per TP   monitor BP closely     Anemia   Hgb at goal   Monitor H&H     CKD- MBD  Check PTH  Check PO4  Monitor PO4, Ca daily

## 2023-08-18 NOTE — CONSULT NOTE ADULT - ASSESSMENT
68 y.o M w/ PMHx of HTN, HLD, T2DM, CAD s/p stent, CVA, ESRD on HD via LUE AVF TTS here for a renal transplant.

## 2023-08-18 NOTE — PATIENT PROFILE ADULT - FALL HARM RISK - UNIVERSAL INTERVENTIONS
Bed in lowest position, wheels locked, appropriate side rails in place/Call bell, personal items and telephone in reach/Instruct patient to call for assistance before getting out of bed or chair/Non-slip footwear when patient is out of bed/[place] to call system/Physically safe environment - no spills, clutter or unnecessary equipment/Purposeful Proactive Rounding/Room/bathroom lighting operational, light cord in reach

## 2023-08-18 NOTE — DISCHARGE NOTE PROVIDER - NSDCCPCAREPLAN_GEN_ALL_CORE_FT
PRINCIPAL DISCHARGE DIAGNOSIS  Diagnosis: ESRD on dialysis  Assessment and Plan of Treatment: Continue on Hemodialysis according to your home HD schedule T/TH/S      SECONDARY DISCHARGE DIAGNOSES  Diagnosis: 2019 novel coronavirus disease (COVID-19)  Assessment and Plan of Treatment: Monitor symptoms for any worsening cough, shortness of breath, fevers, chills; go to your PCP, urgent care or neared ED if symptoms worsen in severity or call 911 if you are unable to breathe, lethargic and/or too weak to go to dialysis. Maintain precautions, wear mask to dialysis.

## 2023-08-18 NOTE — DISCHARGE NOTE PROVIDER - HOSPITAL COURSE
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 Past surgical history significant for open cholecystectomy, AVF creation and eye surgery in the past. He presents to [facility] on 8/18/23 as renal transplant candidate.      Last HD yesterday, Thursday 8/17/23. Makes no urine (1 drop every few days). Last PO intake 130pm. No hospitalizations in past 6 months other than for outpatient fistulogram 7/2023. History of blood transfusion 3 years ago during open cholecystectomy. Reports dry cough related to weather, otherwise denies fevers, chills, shortness of breath, chest pain.     Cleared by cardiology and no further cardiac testing required 7/2023. Last TTE completed 3/9/23 with normal LV systolic function, LVEF 55%, mild diastolic LV dysfunction.        Labs resulted, noted to be COVID positive and patient is symptomatic (+cough). Case cancelled. Patient and family member notified.

## 2023-08-18 NOTE — CONSULT NOTE ADULT - SUBJECTIVE AND OBJECTIVE BOX
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 Past surgical history significant for cholecystectomy, AVF creation and eye surgery in the past. He presents to [facility] on 8/18/23 as renal transplant candidate.  Last HD yesterday, Thursday 8/17/23. Last PO intake ___. No hospitalizations __, no blood transfusions.  Cleared by cardiology and no further cardiac testing required 7/2023. Last TTE completed 3/9/23 with normal LV systolic function, LVEF 55%, mild diastolic LV dysfunction.  Nephrology consulted for ESRD on HD, Renal Transplant candidate.       Allergies:  No Known Allergies      PAST MEDICAL & SURGICAL HISTORY:  HTN (hypertension)      Coronary artery disease      CAP (community acquired pneumonia)  6/18      Diabetes mellitus  type 2      GERD (gastroesophageal reflux disease)      Stented coronary artery  2014, 3 stents, [facility]      ESRD (end stage renal disease)  on Dialysis( M/W/F), By Dr. Huff      Hypercholesterolemia      Cerebrovascular accident (CVA), unspecified mechanism  diagnosed via CT of the head      Anemia due to stage 5 chronic kidney disease, not on chronic dialysis      H/O coronary angiogram  2014 - x3 stents      AV fistula  10/12/18 L radiocephalic AV fistula      H/O eye surgery          Home Medications Reviewed    Hospital Medications:   MEDICATIONS  (STANDING):  basiliximab  IVPB 20 milliGRAM(s) IV Intermittent once  methylPREDNISolone sodium succinate IVPB 500 milliGRAM(s) IV Intermittent once      SOCIAL HISTORY:  Denies ETOh, Smoking,     FAMILY HISTORY:  Denies family hx of kidney disease.     REVIEW OF SYSTEMS:  CONSTITUTIONAL: No weakness, fevers or chills  EYES/ENT: No visual changes;  No vertigo or throat pain   NECK: No pain or stiffness  RESPIRATORY: No cough, wheezing, hemoptysis; No shortness of breath  CARDIOVASCULAR: No chest pain or palpitations.  GASTROINTESTINAL: No abdominal or epigastric pain. No nausea, vomiting, or hematemesis; No diarrhea or constipation. No melena or hematochezia.  GENITOURINARY: No dysuria, frequency, foamy urine, urinary urgency, incontinence or hematuria  NEUROLOGICAL: No numbness or weakness  SKIN: No itching, burning, rashes, or lesions   VASCULAR: No bilateral lower extremity edema.   All other review of systems is negative unless indicated above.      PHYSICAL EXAM:  Constitutional: NAD  HEENT: anicteric sclera, oropharynx clear, MMM  Neck: No JVD  Respiratory: CTAB, no wheezes, rales or rhonchi  Cardiovascular: S1, S2, RRR  Gastrointestinal: BS+, soft, NT/ND  Extremities: No cyanosis or clubbing. No peripheral edema  Neurological: A/O x 3, no focal deficits  Psychiatric: Normal mood, normal affect  : No CVA tenderness. No hinds.   Skin: No rashes  Vascular Access: AVF - -palpable thrill, bruit auscultated     LABS:        Creatinine Trend:     Urine Studies:        RADIOLOGY & ADDITIONAL STUDIES:                
[facility] DIVISION OF KIDNEY DISEASES AND HYPERTENSION -- INITIAL CONSULT NOTE  --------------------------------------------------------------------------------  HPI:  68 y.o M w/ PMHx of HTN, HLD, T2DM, CAD s/p stent, CVA, ESRD on HD via LUE AVF TTS here for a renal transplant. Patient follows with Dr. Huff at [facility]. Last HD was Thursday, 8/17/23. Patient seen at bedside with daughter present. Reports he started HD in Jan, 2019. Makes some urine once in a while not consistent. Denies any headaches, fevers/chills, chest pain, palpitations, SOB, and leg swelling.      PAST HISTORY  --------------------------------------------------------------------------------  PAST MEDICAL & SURGICAL HISTORY:  HTN (hypertension)      Coronary artery disease      CAP (community acquired pneumonia)  6/18      Diabetes mellitus  type 2      GERD (gastroesophageal reflux disease)      Stented coronary artery  2014, 3 stents, [facility]      ESRD (end stage renal disease)  on Dialysis( M/W/F), By Dr. Huff      Hypercholesterolemia      Cerebrovascular accident (CVA), unspecified mechanism  diagnosed via CT of the head      Anemia due to stage 5 chronic kidney disease, not on chronic dialysis      H/O coronary angiogram  2014 - x3 stents      AV fistula  10/12/18 L radiocephalic AV fistula      H/O eye surgery        FAMILY HISTORY:    PAST SOCIAL HISTORY:    ALLERGIES & MEDICATIONS  --------------------------------------------------------------------------------  Allergies    No Known Allergies    Intolerances      Standing Inpatient Medications  basiliximab  IVPB 20 milliGRAM(s) IV Intermittent once  methylPREDNISolone sodium succinate IVPB 500 milliGRAM(s) IV Intermittent once    PRN Inpatient Medications      REVIEW OF SYSTEMS  --------------------------------------------------------------------------------  per above     VITALS/PHYSICAL EXAM  --------------------------------------------------------------------------------  T(C): --  HR: --  BP: --  RR: --  SpO2: --  Wt(kg): --        Physical Exam:  	Gen: NAD, well-appearing  	HEENT: atraumatic   	Pulm: CTA B/L  	CV: RRR, S1S2; no rub  	Back: No spinal or CVA tenderness; no sacral edema  	Abd: +BS, soft, nontender/nondistended  	: No suprapubic tenderness  	UE: Warm, FROM, no clubbing, intact strength; no edema; no asterixis  	LE: Warm, FROM, no clubbing, intact strength; no edema  	Neuro: No focal deficits, intact gait  	Psych: Normal affect and mood  	Skin: Warm, without rashes  	Vascular access: left upper extremity fistula     LABS/STUDIES  --------------------------------------------------------------------------------                Creatinine Trend:    Urinalysis - [01-09-19 @ 10:32]      Color COLORLESS / Appearance CLEAR / SG 1.007 / pH 6.0      Gluc NEGATIVE / Ketone NEGATIVE  / Bili NEGATIVE / Urobili NORMAL       Blood SMALL / Protein 200 / Leuk Est NEGATIVE / Nitrite NEGATIVE      RBC 0-2 / WBC  / Hyaline  / Gran  / Sq Epi  / Non Sq Epi  / Bacteria       HbA1c 7.8      [12-17-19 @ 05:54]    HBsAb 32.6      [04-06-21 @ 10:06]  HBsAb Nonreactive      [01-10-19 @ 17:00]  HBsAg Nonreact      [04-06-21 @ 10:06]  HBcAb Nonreact      [04-06-21 @ 10:06]  HCV 0.06, Nonreact      [04-06-21 @ 10:06]      Tacrolimus  Cyclosporine  Sirolimus  Mycophenolate  BK PCR  CMV PCR  Parvo PCR  EBV PCR

## 2023-08-19 LAB
HBV CORE AB SER-ACNC: SIGNIFICANT CHANGE UP
HBV SURFACE AB SER-ACNC: 7.2 MIU/ML — LOW
HCV AB S/CO SERPL IA: 0.12 S/CO — SIGNIFICANT CHANGE UP (ref 0–0.99)
HCV AB SERPL-IMP: SIGNIFICANT CHANGE UP
HCV RNA SPEC NAA+PROBE-LOG IU: SIGNIFICANT CHANGE UP
HCV RNA SPEC NAA+PROBE-LOG IU: SIGNIFICANT CHANGE UP LOGIU/ML

## 2023-09-15 ENCOUNTER — TRANSCRIPTION ENCOUNTER (OUTPATIENT)
Age: 68
End: 2023-09-15

## 2023-09-15 PROCEDURE — 93010 ELECTROCARDIOGRAM REPORT: CPT

## 2023-09-16 ENCOUNTER — RESULT REVIEW (OUTPATIENT)
Age: 68
End: 2023-09-16

## 2023-09-16 ENCOUNTER — APPOINTMENT (OUTPATIENT)
Dept: TRANSPLANT | Facility: HOSPITAL | Age: 68
End: 2023-09-16

## 2023-09-16 ENCOUNTER — INPATIENT (INPATIENT)
Facility: HOSPITAL | Age: 68
LOS: 5 days | Discharge: HOME HEALTH | DRG: 650 | End: 2023-09-22
Payer: COMMERCIAL

## 2023-09-16 VITALS
OXYGEN SATURATION: 99 % | DIASTOLIC BLOOD PRESSURE: 80 MMHG | HEIGHT: 66 IN | SYSTOLIC BLOOD PRESSURE: 179 MMHG | WEIGHT: 143.52 LBS | RESPIRATION RATE: 18 BRPM | TEMPERATURE: 98 F | HEART RATE: 60 BPM

## 2023-09-16 DIAGNOSIS — Z94.0 KIDNEY TRANSPLANT STATUS: ICD-10-CM

## 2023-09-16 DIAGNOSIS — Z98.890 OTHER SPECIFIED POSTPROCEDURAL STATES: Chronic | ICD-10-CM

## 2023-09-16 DIAGNOSIS — I77.0 ARTERIOVENOUS FISTULA, ACQUIRED: Chronic | ICD-10-CM

## 2023-09-16 LAB
ALBUMIN SERPL ELPH-MCNC: 3.5 G/DL — SIGNIFICANT CHANGE UP (ref 3.3–5)
ALBUMIN SERPL ELPH-MCNC: 3.5 G/DL — SIGNIFICANT CHANGE UP (ref 3.3–5)
ALBUMIN SERPL ELPH-MCNC: 4.7 G/DL — SIGNIFICANT CHANGE UP (ref 3.3–5)
ALP SERPL-CCNC: 52 U/L — SIGNIFICANT CHANGE UP (ref 40–120)
ALP SERPL-CCNC: 57 U/L — SIGNIFICANT CHANGE UP (ref 40–120)
ALP SERPL-CCNC: 66 U/L — SIGNIFICANT CHANGE UP (ref 40–120)
ALT FLD-CCNC: 12 U/L — SIGNIFICANT CHANGE UP (ref 10–45)
ALT FLD-CCNC: 14 U/L — SIGNIFICANT CHANGE UP (ref 10–45)
ALT FLD-CCNC: 17 U/L — SIGNIFICANT CHANGE UP (ref 10–45)
ANION GAP SERPL CALC-SCNC: 18 MMOL/L — HIGH (ref 5–17)
ANION GAP SERPL CALC-SCNC: 18 MMOL/L — HIGH (ref 5–17)
ANION GAP SERPL CALC-SCNC: 19 MMOL/L — HIGH (ref 5–17)
APTT BLD: 26 SEC — SIGNIFICANT CHANGE UP (ref 24.5–35.6)
AST SERPL-CCNC: 15 U/L — SIGNIFICANT CHANGE UP (ref 10–40)
AST SERPL-CCNC: 16 U/L — SIGNIFICANT CHANGE UP (ref 10–40)
AST SERPL-CCNC: 18 U/L — SIGNIFICANT CHANGE UP (ref 10–40)
BASE EXCESS BLDV CALC-SCNC: 7.5 MMOL/L — HIGH (ref -2–3)
BASOPHILS # BLD AUTO: 0 K/UL — SIGNIFICANT CHANGE UP (ref 0–0.2)
BASOPHILS # BLD AUTO: 0.01 K/UL — SIGNIFICANT CHANGE UP (ref 0–0.2)
BASOPHILS # BLD AUTO: 0.03 K/UL — SIGNIFICANT CHANGE UP (ref 0–0.2)
BASOPHILS NFR BLD AUTO: 0 % — SIGNIFICANT CHANGE UP (ref 0–2)
BASOPHILS NFR BLD AUTO: 0.1 % — SIGNIFICANT CHANGE UP (ref 0–2)
BASOPHILS NFR BLD AUTO: 0.6 % — SIGNIFICANT CHANGE UP (ref 0–2)
BILIRUB SERPL-MCNC: 0.6 MG/DL — SIGNIFICANT CHANGE UP (ref 0.2–1.2)
BILIRUB SERPL-MCNC: 0.7 MG/DL — SIGNIFICANT CHANGE UP (ref 0.2–1.2)
BILIRUB SERPL-MCNC: 0.8 MG/DL — SIGNIFICANT CHANGE UP (ref 0.2–1.2)
BLD GP AB SCN SERPL QL: NEGATIVE — SIGNIFICANT CHANGE UP
BUN SERPL-MCNC: 31 MG/DL — HIGH (ref 7–23)
BUN SERPL-MCNC: 35 MG/DL — HIGH (ref 7–23)
BUN SERPL-MCNC: 46 MG/DL — HIGH (ref 7–23)
CA-I SERPL-SCNC: 1.14 MMOL/L — LOW (ref 1.15–1.33)
CALCIUM SERPL-MCNC: 10 MG/DL — SIGNIFICANT CHANGE UP (ref 8.4–10.5)
CALCIUM SERPL-MCNC: 8 MG/DL — LOW (ref 8.4–10.5)
CALCIUM SERPL-MCNC: 8 MG/DL — LOW (ref 8.4–10.5)
CHLORIDE BLDV-SCNC: 96 MMOL/L — SIGNIFICANT CHANGE UP (ref 96–108)
CHLORIDE SERPL-SCNC: 92 MMOL/L — LOW (ref 96–108)
CHLORIDE SERPL-SCNC: 97 MMOL/L — SIGNIFICANT CHANGE UP (ref 96–108)
CHLORIDE SERPL-SCNC: 97 MMOL/L — SIGNIFICANT CHANGE UP (ref 96–108)
CO2 BLDV-SCNC: 36 MMOL/L — HIGH (ref 22–26)
CO2 SERPL-SCNC: 21 MMOL/L — LOW (ref 22–31)
CO2 SERPL-SCNC: 21 MMOL/L — LOW (ref 22–31)
CO2 SERPL-SCNC: 29 MMOL/L — SIGNIFICANT CHANGE UP (ref 22–31)
COVID-19 SPIKE DOMAIN AB INTERP: POSITIVE
COVID-19 SPIKE DOMAIN ANTIBODY RESULT: 189 U/ML — HIGH
CREAT SERPL-MCNC: 10.92 MG/DL — HIGH (ref 0.5–1.3)
CREAT SERPL-MCNC: 7.4 MG/DL — HIGH (ref 0.5–1.3)
CREAT SERPL-MCNC: 7.55 MG/DL — HIGH (ref 0.5–1.3)
EGFR: 5 ML/MIN/1.73M2 — LOW
EGFR: 7 ML/MIN/1.73M2 — LOW
EGFR: 8 ML/MIN/1.73M2 — LOW
EOSINOPHIL # BLD AUTO: 0 K/UL — SIGNIFICANT CHANGE UP (ref 0–0.5)
EOSINOPHIL # BLD AUTO: 0.02 K/UL — SIGNIFICANT CHANGE UP (ref 0–0.5)
EOSINOPHIL # BLD AUTO: 0.19 K/UL — SIGNIFICANT CHANGE UP (ref 0–0.5)
EOSINOPHIL NFR BLD AUTO: 0 % — SIGNIFICANT CHANGE UP (ref 0–6)
EOSINOPHIL NFR BLD AUTO: 0.2 % — SIGNIFICANT CHANGE UP (ref 0–6)
EOSINOPHIL NFR BLD AUTO: 3.6 % — SIGNIFICANT CHANGE UP (ref 0–6)
GAS PNL BLDV: 136 MMOL/L — SIGNIFICANT CHANGE UP (ref 136–145)
GAS PNL BLDV: SIGNIFICANT CHANGE UP
GAS PNL BLDV: SIGNIFICANT CHANGE UP
GLUCOSE BLDC GLUCOMTR-MCNC: 125 MG/DL — HIGH (ref 70–99)
GLUCOSE BLDC GLUCOMTR-MCNC: 178 MG/DL — HIGH (ref 70–99)
GLUCOSE BLDC GLUCOMTR-MCNC: 189 MG/DL — HIGH (ref 70–99)
GLUCOSE BLDC GLUCOMTR-MCNC: 202 MG/DL — HIGH (ref 70–99)
GLUCOSE BLDV-MCNC: 114 MG/DL — HIGH (ref 70–99)
GLUCOSE SERPL-MCNC: 118 MG/DL — HIGH (ref 70–99)
GLUCOSE SERPL-MCNC: 197 MG/DL — HIGH (ref 70–99)
GLUCOSE SERPL-MCNC: 199 MG/DL — HIGH (ref 70–99)
HBV CORE AB SER-ACNC: SIGNIFICANT CHANGE UP
HBV SURFACE AB SER-ACNC: 6.6 MIU/ML — LOW
HBV SURFACE AG SER-ACNC: SIGNIFICANT CHANGE UP
HCO3 BLDV-SCNC: 35 MMOL/L — HIGH (ref 22–29)
HCT VFR BLD CALC: 34.6 % — LOW (ref 39–50)
HCT VFR BLD CALC: 36.1 % — LOW (ref 39–50)
HCT VFR BLD CALC: 42 % — SIGNIFICANT CHANGE UP (ref 39–50)
HCT VFR BLDA CALC: 39 % — SIGNIFICANT CHANGE UP (ref 39–51)
HCV AB S/CO SERPL IA: 0.1 S/CO — SIGNIFICANT CHANGE UP (ref 0–0.99)
HCV AB SERPL-IMP: SIGNIFICANT CHANGE UP
HCV RNA SPEC NAA+PROBE-LOG IU: SIGNIFICANT CHANGE UP
HCV RNA SPEC NAA+PROBE-LOG IU: SIGNIFICANT CHANGE UP LOGIU/ML
HGB BLD CALC-MCNC: 12.9 G/DL — SIGNIFICANT CHANGE UP (ref 12.6–17.4)
HGB BLD-MCNC: 11 G/DL — LOW (ref 13–17)
HGB BLD-MCNC: 11.5 G/DL — LOW (ref 13–17)
HGB BLD-MCNC: 13 G/DL — SIGNIFICANT CHANGE UP (ref 13–17)
HIV 1+2 AB+HIV1 P24 AG SERPL QL IA: SIGNIFICANT CHANGE UP
IMM GRANULOCYTES NFR BLD AUTO: 0.2 % — SIGNIFICANT CHANGE UP (ref 0–0.9)
IMM GRANULOCYTES NFR BLD AUTO: 0.3 % — SIGNIFICANT CHANGE UP (ref 0–0.9)
INR BLD: 0.91 RATIO — SIGNIFICANT CHANGE UP (ref 0.85–1.18)
LACTATE BLDV-MCNC: 1.1 MMOL/L — SIGNIFICANT CHANGE UP (ref 0.5–2)
LYMPHOCYTES # BLD AUTO: 0.18 K/UL — LOW (ref 1–3.3)
LYMPHOCYTES # BLD AUTO: 1.17 K/UL — SIGNIFICANT CHANGE UP (ref 1–3.3)
LYMPHOCYTES # BLD AUTO: 1.4 K/UL — SIGNIFICANT CHANGE UP (ref 1–3.3)
LYMPHOCYTES # BLD AUTO: 1.8 % — LOW (ref 13–44)
LYMPHOCYTES # BLD AUTO: 13.1 % — SIGNIFICANT CHANGE UP (ref 13–44)
LYMPHOCYTES # BLD AUTO: 26.7 % — SIGNIFICANT CHANGE UP (ref 13–44)
MAGNESIUM SERPL-MCNC: 1.9 MG/DL — SIGNIFICANT CHANGE UP (ref 1.6–2.6)
MAGNESIUM SERPL-MCNC: 2.5 MG/DL — SIGNIFICANT CHANGE UP (ref 1.6–2.6)
MANUAL SMEAR VERIFICATION: SIGNIFICANT CHANGE UP
MCHC RBC-ENTMCNC: 30.4 PG — SIGNIFICANT CHANGE UP (ref 27–34)
MCHC RBC-ENTMCNC: 30.6 PG — SIGNIFICANT CHANGE UP (ref 27–34)
MCHC RBC-ENTMCNC: 30.9 PG — SIGNIFICANT CHANGE UP (ref 27–34)
MCHC RBC-ENTMCNC: 31 GM/DL — LOW (ref 32–36)
MCHC RBC-ENTMCNC: 31.8 GM/DL — LOW (ref 32–36)
MCHC RBC-ENTMCNC: 31.9 GM/DL — LOW (ref 32–36)
MCV RBC AUTO: 96.1 FL — SIGNIFICANT CHANGE UP (ref 80–100)
MCV RBC AUTO: 97 FL — SIGNIFICANT CHANGE UP (ref 80–100)
MCV RBC AUTO: 98.1 FL — SIGNIFICANT CHANGE UP (ref 80–100)
MONOCYTES # BLD AUTO: 0.2 K/UL — SIGNIFICANT CHANGE UP (ref 0–0.9)
MONOCYTES # BLD AUTO: 0.26 K/UL — SIGNIFICANT CHANGE UP (ref 0–0.9)
MONOCYTES # BLD AUTO: 0.85 K/UL — SIGNIFICANT CHANGE UP (ref 0–0.9)
MONOCYTES NFR BLD AUTO: 16.2 % — HIGH (ref 2–14)
MONOCYTES NFR BLD AUTO: 2.2 % — SIGNIFICANT CHANGE UP (ref 2–14)
MONOCYTES NFR BLD AUTO: 2.6 % — SIGNIFICANT CHANGE UP (ref 2–14)
NEUTROPHILS # BLD AUTO: 2.76 K/UL — SIGNIFICANT CHANGE UP (ref 1.8–7.4)
NEUTROPHILS # BLD AUTO: 7.53 K/UL — HIGH (ref 1.8–7.4)
NEUTROPHILS # BLD AUTO: 9.72 K/UL — HIGH (ref 1.8–7.4)
NEUTROPHILS NFR BLD AUTO: 52.7 % — SIGNIFICANT CHANGE UP (ref 43–77)
NEUTROPHILS NFR BLD AUTO: 84.1 % — HIGH (ref 43–77)
NEUTROPHILS NFR BLD AUTO: 94.7 % — HIGH (ref 43–77)
NEUTS BAND # BLD: 0.9 % — SIGNIFICANT CHANGE UP (ref 0–8)
NRBC # BLD: 0 /100 WBCS — SIGNIFICANT CHANGE UP (ref 0–0)
NRBC # BLD: 0 /100 WBCS — SIGNIFICANT CHANGE UP (ref 0–0)
PCO2 BLDV: 60 MMHG — HIGH (ref 42–55)
PH BLDV: 7.37 — SIGNIFICANT CHANGE UP (ref 7.32–7.43)
PHOSPHATE SERPL-MCNC: 3.2 MG/DL — SIGNIFICANT CHANGE UP (ref 2.5–4.5)
PHOSPHATE SERPL-MCNC: 5.9 MG/DL — HIGH (ref 2.5–4.5)
PLAT MORPH BLD: NORMAL — SIGNIFICANT CHANGE UP
PLATELET # BLD AUTO: 127 K/UL — LOW (ref 150–400)
PLATELET # BLD AUTO: 134 K/UL — LOW (ref 150–400)
PLATELET # BLD AUTO: 142 K/UL — LOW (ref 150–400)
PO2 BLDV: 37 MMHG — SIGNIFICANT CHANGE UP (ref 25–45)
POLYCHROMASIA BLD QL SMEAR: SLIGHT — SIGNIFICANT CHANGE UP
POTASSIUM BLDV-SCNC: 5.6 MMOL/L — HIGH (ref 3.5–5.1)
POTASSIUM SERPL-MCNC: 4.2 MMOL/L — SIGNIFICANT CHANGE UP (ref 3.5–5.3)
POTASSIUM SERPL-MCNC: 4.4 MMOL/L — SIGNIFICANT CHANGE UP (ref 3.5–5.3)
POTASSIUM SERPL-MCNC: 5 MMOL/L — SIGNIFICANT CHANGE UP (ref 3.5–5.3)
POTASSIUM SERPL-SCNC: 4.2 MMOL/L — SIGNIFICANT CHANGE UP (ref 3.5–5.3)
POTASSIUM SERPL-SCNC: 4.4 MMOL/L — SIGNIFICANT CHANGE UP (ref 3.5–5.3)
POTASSIUM SERPL-SCNC: 5 MMOL/L — SIGNIFICANT CHANGE UP (ref 3.5–5.3)
PROT SERPL-MCNC: 6.7 G/DL — SIGNIFICANT CHANGE UP (ref 6–8.3)
PROT SERPL-MCNC: 7 G/DL — SIGNIFICANT CHANGE UP (ref 6–8.3)
PROT SERPL-MCNC: 8.7 G/DL — HIGH (ref 6–8.3)
PROTHROM AB SERPL-ACNC: 10 SEC — SIGNIFICANT CHANGE UP (ref 9.5–13)
RBC # BLD: 3.6 M/UL — LOW (ref 4.2–5.8)
RBC # BLD: 3.72 M/UL — LOW (ref 4.2–5.8)
RBC # BLD: 4.28 M/UL — SIGNIFICANT CHANGE UP (ref 4.2–5.8)
RBC # FLD: 15.3 % — HIGH (ref 10.3–14.5)
RBC # FLD: 15.4 % — HIGH (ref 10.3–14.5)
RBC # FLD: 15.6 % — HIGH (ref 10.3–14.5)
RBC BLD AUTO: SIGNIFICANT CHANGE UP
RH IG SCN BLD-IMP: NEGATIVE — SIGNIFICANT CHANGE UP
SAO2 % BLDV: 54.7 % — LOW (ref 67–88)
SARS-COV-2 IGG+IGM SERPL QL IA: 189 U/ML — HIGH
SARS-COV-2 IGG+IGM SERPL QL IA: POSITIVE
SARS-COV-2 RNA SPEC QL NAA+PROBE: SIGNIFICANT CHANGE UP
SODIUM SERPL-SCNC: 136 MMOL/L — SIGNIFICANT CHANGE UP (ref 135–145)
SODIUM SERPL-SCNC: 137 MMOL/L — SIGNIFICANT CHANGE UP (ref 135–145)
SODIUM SERPL-SCNC: 139 MMOL/L — SIGNIFICANT CHANGE UP (ref 135–145)
WBC # BLD: 10.17 K/UL — SIGNIFICANT CHANGE UP (ref 3.8–10.5)
WBC # BLD: 5.24 K/UL — SIGNIFICANT CHANGE UP (ref 3.8–10.5)
WBC # BLD: 8.96 K/UL — SIGNIFICANT CHANGE UP (ref 3.8–10.5)
WBC # FLD AUTO: 10.17 K/UL — SIGNIFICANT CHANGE UP (ref 3.8–10.5)
WBC # FLD AUTO: 5.24 K/UL — SIGNIFICANT CHANGE UP (ref 3.8–10.5)
WBC # FLD AUTO: 8.96 K/UL — SIGNIFICANT CHANGE UP (ref 3.8–10.5)

## 2023-09-16 PROCEDURE — 76776 US EXAM K TRANSPL W/DOPPLER: CPT | Mod: 26,RT

## 2023-09-16 PROCEDURE — 71045 X-RAY EXAM CHEST 1 VIEW: CPT | Mod: 26

## 2023-09-16 PROCEDURE — 88305 TISSUE EXAM BY PATHOLOGIST: CPT | Mod: 26

## 2023-09-16 PROCEDURE — 93010 ELECTROCARDIOGRAM REPORT: CPT

## 2023-09-16 PROCEDURE — 50605 INSERT URETERAL SUPPORT: CPT

## 2023-09-16 PROCEDURE — 90935 HEMODIALYSIS ONE EVALUATION: CPT

## 2023-09-16 PROCEDURE — 50205 RENAL BX SURG EXPOSURE KDN: CPT

## 2023-09-16 PROCEDURE — 88313 SPECIAL STAINS GROUP 2: CPT | Mod: 26

## 2023-09-16 PROCEDURE — 50360 RNL ALTRNSPLJ W/O RCP NFRCT: CPT

## 2023-09-16 PROCEDURE — 76998 US GUIDE INTRAOP: CPT | Mod: 26

## 2023-09-16 DEVICE — SURGICEL 2 X 14": Type: IMPLANTABLE DEVICE | Site: RIGHT | Status: FUNCTIONAL

## 2023-09-16 DEVICE — KIT A-LINE 1LUM 20G X 12CM SAFE KIT: Type: IMPLANTABLE DEVICE | Site: RIGHT | Status: FUNCTIONAL

## 2023-09-16 DEVICE — STENT URET LUBRIFLEX 4.5X12CM: Type: IMPLANTABLE DEVICE | Site: RIGHT | Status: FUNCTIONAL

## 2023-09-16 RX ORDER — HYDRALAZINE HCL 50 MG
10 TABLET ORAL ONCE
Refills: 0 | Status: COMPLETED | OUTPATIENT
Start: 2023-09-16 | End: 2023-09-16

## 2023-09-16 RX ORDER — HYDROMORPHONE HYDROCHLORIDE 2 MG/ML
0.25 INJECTION INTRAMUSCULAR; INTRAVENOUS; SUBCUTANEOUS ONCE
Refills: 0 | Status: DISCONTINUED | OUTPATIENT
Start: 2023-09-16 | End: 2023-09-16

## 2023-09-16 RX ORDER — PANTOPRAZOLE SODIUM 20 MG/1
40 TABLET, DELAYED RELEASE ORAL
Refills: 0 | Status: DISCONTINUED | OUTPATIENT
Start: 2023-09-16 | End: 2023-09-19

## 2023-09-16 RX ORDER — TACROLIMUS 5 MG/1
2 CAPSULE ORAL ONCE
Refills: 0 | Status: COMPLETED | OUTPATIENT
Start: 2023-09-16 | End: 2023-09-16

## 2023-09-16 RX ORDER — ACETAMINOPHEN 500 MG
650 TABLET ORAL EVERY 6 HOURS
Refills: 0 | Status: DISCONTINUED | OUTPATIENT
Start: 2023-09-16 | End: 2023-09-22

## 2023-09-16 RX ORDER — GLUCAGON INJECTION, SOLUTION 0.5 MG/.1ML
1 INJECTION, SOLUTION SUBCUTANEOUS ONCE
Refills: 0 | Status: DISCONTINUED | OUTPATIENT
Start: 2023-09-16 | End: 2023-09-17

## 2023-09-16 RX ORDER — HYDROMORPHONE HYDROCHLORIDE 2 MG/ML
0.5 INJECTION INTRAMUSCULAR; INTRAVENOUS; SUBCUTANEOUS
Refills: 0 | Status: DISCONTINUED | OUTPATIENT
Start: 2023-09-16 | End: 2023-09-16

## 2023-09-16 RX ORDER — SODIUM CHLORIDE 9 MG/ML
1000 INJECTION INTRAMUSCULAR; INTRAVENOUS; SUBCUTANEOUS
Refills: 0 | Status: DISCONTINUED | OUTPATIENT
Start: 2023-09-16 | End: 2023-09-16

## 2023-09-16 RX ORDER — CEFAZOLIN SODIUM 1 G
2000 VIAL (EA) INJECTION ONCE
Refills: 0 | Status: COMPLETED | OUTPATIENT
Start: 2023-09-16 | End: 2023-09-16

## 2023-09-16 RX ORDER — INSULIN LISPRO 100/ML
VIAL (ML) SUBCUTANEOUS EVERY 4 HOURS
Refills: 0 | Status: DISCONTINUED | OUTPATIENT
Start: 2023-09-16 | End: 2023-09-17

## 2023-09-16 RX ORDER — SODIUM CHLORIDE 9 MG/ML
1000 INJECTION INTRAMUSCULAR; INTRAVENOUS; SUBCUTANEOUS
Refills: 0 | Status: DISCONTINUED | OUTPATIENT
Start: 2023-09-16 | End: 2023-09-17

## 2023-09-16 RX ORDER — ONDANSETRON 8 MG/1
4 TABLET, FILM COATED ORAL EVERY 6 HOURS
Refills: 0 | Status: DISCONTINUED | OUTPATIENT
Start: 2023-09-16 | End: 2023-09-17

## 2023-09-16 RX ORDER — CARVEDILOL PHOSPHATE 80 MG/1
12.5 CAPSULE, EXTENDED RELEASE ORAL EVERY 12 HOURS
Refills: 0 | Status: DISCONTINUED | OUTPATIENT
Start: 2023-09-16 | End: 2023-09-17

## 2023-09-16 RX ORDER — VALGANCICLOVIR 450 MG/1
450 TABLET, FILM COATED ORAL
Refills: 0 | Status: DISCONTINUED | OUTPATIENT
Start: 2023-09-16 | End: 2023-09-22

## 2023-09-16 RX ORDER — SODIUM CHLORIDE 9 MG/ML
1000 INJECTION, SOLUTION INTRAVENOUS
Refills: 0 | Status: DISCONTINUED | OUTPATIENT
Start: 2023-09-16 | End: 2023-09-17

## 2023-09-16 RX ORDER — LABETALOL HCL 100 MG
10 TABLET ORAL ONCE
Refills: 0 | Status: COMPLETED | OUTPATIENT
Start: 2023-09-16 | End: 2023-09-16

## 2023-09-16 RX ORDER — DEXTROSE 50 % IN WATER 50 %
15 SYRINGE (ML) INTRAVENOUS ONCE
Refills: 0 | Status: DISCONTINUED | OUTPATIENT
Start: 2023-09-16 | End: 2023-09-17

## 2023-09-16 RX ORDER — TRAMADOL HYDROCHLORIDE 50 MG/1
25 TABLET ORAL THREE TIMES A DAY
Refills: 0 | Status: DISCONTINUED | OUTPATIENT
Start: 2023-09-16 | End: 2023-09-16

## 2023-09-16 RX ORDER — TRAMADOL HYDROCHLORIDE 50 MG/1
50 TABLET ORAL EVERY 4 HOURS
Refills: 0 | Status: DISCONTINUED | OUTPATIENT
Start: 2023-09-16 | End: 2023-09-22

## 2023-09-16 RX ORDER — NYSTATIN 500MM UNIT
500000 POWDER (EA) MISCELLANEOUS
Refills: 0 | Status: DISCONTINUED | OUTPATIENT
Start: 2023-09-17 | End: 2023-09-17

## 2023-09-16 RX ORDER — BASILIXIMAB 20 MG/5ML
20 INJECTION, POWDER, FOR SOLUTION INTRAVENOUS ONCE
Refills: 0 | Status: COMPLETED | OUTPATIENT
Start: 2023-09-20 | End: 2023-09-20

## 2023-09-16 RX ORDER — HYDRALAZINE HCL 50 MG
50 TABLET ORAL
Refills: 0 | Status: DISCONTINUED | OUTPATIENT
Start: 2023-09-16 | End: 2023-09-16

## 2023-09-16 RX ORDER — CHLORHEXIDINE GLUCONATE 213 G/1000ML
1 SOLUTION TOPICAL DAILY
Refills: 0 | Status: DISCONTINUED | OUTPATIENT
Start: 2023-09-16 | End: 2023-09-17

## 2023-09-16 RX ORDER — CARVEDILOL PHOSPHATE 80 MG/1
3.12 CAPSULE, EXTENDED RELEASE ORAL EVERY 12 HOURS
Refills: 0 | Status: DISCONTINUED | OUTPATIENT
Start: 2023-09-16 | End: 2023-09-16

## 2023-09-16 RX ORDER — TRAMADOL HYDROCHLORIDE 50 MG/1
25 TABLET ORAL
Refills: 0 | Status: DISCONTINUED | OUTPATIENT
Start: 2023-09-16 | End: 2023-09-16

## 2023-09-16 RX ORDER — SODIUM CHLORIDE 9 MG/ML
1000 INJECTION, SOLUTION INTRAVENOUS
Refills: 0 | Status: DISCONTINUED | OUTPATIENT
Start: 2023-09-16 | End: 2023-09-16

## 2023-09-16 RX ORDER — BASILIXIMAB 20 MG/5ML
20 INJECTION, POWDER, FOR SOLUTION INTRAVENOUS ONCE
Refills: 0 | Status: DISCONTINUED | OUTPATIENT
Start: 2023-09-16 | End: 2023-09-16

## 2023-09-16 RX ORDER — DEXTROSE 50 % IN WATER 50 %
25 SYRINGE (ML) INTRAVENOUS ONCE
Refills: 0 | Status: DISCONTINUED | OUTPATIENT
Start: 2023-09-16 | End: 2023-09-17

## 2023-09-16 RX ORDER — TACROLIMUS 5 MG/1
2 CAPSULE ORAL ONCE
Refills: 0 | Status: DISCONTINUED | OUTPATIENT
Start: 2023-09-16 | End: 2023-09-16

## 2023-09-16 RX ORDER — MYCOPHENOLATE MOFETIL 250 MG/1
1 CAPSULE ORAL EVERY 12 HOURS
Refills: 0 | Status: DISCONTINUED | OUTPATIENT
Start: 2023-09-17 | End: 2023-09-17

## 2023-09-16 RX ORDER — TACROLIMUS 5 MG/1
5 CAPSULE ORAL
Refills: 0 | Status: DISCONTINUED | OUTPATIENT
Start: 2023-09-17 | End: 2023-09-18

## 2023-09-16 RX ORDER — SENNA PLUS 8.6 MG/1
2 TABLET ORAL AT BEDTIME
Refills: 0 | Status: DISCONTINUED | OUTPATIENT
Start: 2023-09-16 | End: 2023-09-22

## 2023-09-16 RX ADMIN — HYDROMORPHONE HYDROCHLORIDE 0.25 MILLIGRAM(S): 2 INJECTION INTRAMUSCULAR; INTRAVENOUS; SUBCUTANEOUS at 19:35

## 2023-09-16 RX ADMIN — TRAMADOL HYDROCHLORIDE 50 MILLIGRAM(S): 50 TABLET ORAL at 23:31

## 2023-09-16 RX ADMIN — Medication 10 MILLIGRAM(S): at 23:31

## 2023-09-16 RX ADMIN — Medication 4: at 22:08

## 2023-09-16 RX ADMIN — Medication 50 MILLIGRAM(S): at 20:22

## 2023-09-16 RX ADMIN — SODIUM CHLORIDE 70 MILLILITER(S): 9 INJECTION INTRAMUSCULAR; INTRAVENOUS; SUBCUTANEOUS at 22:07

## 2023-09-16 RX ADMIN — CARVEDILOL PHOSPHATE 3.12 MILLIGRAM(S): 80 CAPSULE, EXTENDED RELEASE ORAL at 22:15

## 2023-09-16 RX ADMIN — SENNA PLUS 2 TABLET(S): 8.6 TABLET ORAL at 22:09

## 2023-09-16 RX ADMIN — SODIUM CHLORIDE 70 MILLILITER(S): 9 INJECTION INTRAMUSCULAR; INTRAVENOUS; SUBCUTANEOUS at 14:30

## 2023-09-16 RX ADMIN — TACROLIMUS 2 MILLIGRAM(S): 5 CAPSULE ORAL at 20:22

## 2023-09-16 RX ADMIN — TRAMADOL HYDROCHLORIDE 25 MILLIGRAM(S): 50 TABLET ORAL at 20:00

## 2023-09-16 RX ADMIN — HYDROMORPHONE HYDROCHLORIDE 0.25 MILLIGRAM(S): 2 INJECTION INTRAMUSCULAR; INTRAVENOUS; SUBCUTANEOUS at 19:19

## 2023-09-16 RX ADMIN — HYDROMORPHONE HYDROCHLORIDE 0.5 MILLIGRAM(S): 2 INJECTION INTRAMUSCULAR; INTRAVENOUS; SUBCUTANEOUS at 17:55

## 2023-09-16 RX ADMIN — TRAMADOL HYDROCHLORIDE 25 MILLIGRAM(S): 50 TABLET ORAL at 19:45

## 2023-09-16 RX ADMIN — Medication 2: at 18:29

## 2023-09-16 RX ADMIN — HYDROMORPHONE HYDROCHLORIDE 0.5 MILLIGRAM(S): 2 INJECTION INTRAMUSCULAR; INTRAVENOUS; SUBCUTANEOUS at 17:39

## 2023-09-16 RX ADMIN — Medication 10 MILLIGRAM(S): at 08:10

## 2023-09-16 RX ADMIN — Medication 10 MILLIGRAM(S): at 07:43

## 2023-09-16 NOTE — H&P ADULT - NS ATTEND AMEND GEN_ALL_CORE FT
I personally saw and examined patient.  Alert, oriented, responsive.  Speaks very basi English.  Communicate also via .  ABdomen soft and non-tender.  Palpable but weak DP pulses bilaterally.  patient understands that donor has FELICITA, and that he will need dialysis for weeks or months, also being a small possibility that the kidney may not work at   I obtained the surgical and transplant consents myself.  Patient agrees to proceed. I personally saw and examined patient.  Alert, oriented, responsive.  Speaks very basic English.  Communicate also via .  Abdomen soft and non-tender.  Palpable but weak DP pulses bilaterally.  patient understands that donor has FELICITA, and that he will need dialysis for weeks or months, also being a small possibility that the kidney may not work at   I obtained the surgical and transplant consents myself.  Patient agrees to proceed.  I discussed case with Dr. Yasmany Huff

## 2023-09-16 NOTE — PRE-ANESTHESIA EVALUATION ADULT - NSANTHOSAYNRD_GEN_A_CORE
No. HORACIO screening performed.  STOP BANG Legend: 0-2 = LOW Risk; 3-4 = INTERMEDIATE Risk; 5-8 = HIGH Risk
No. HORACIO screening performed.  STOP BANG Legend: 0-2 = LOW Risk; 3-4 = INTERMEDIATE Risk; 5-8 = HIGH Risk

## 2023-09-16 NOTE — CONSULT NOTE ADULT - SUBJECTIVE AND OBJECTIVE BOX
[facility] DIVISION OF KIDNEY DISEASES AND HYPERTENSION -- INITIAL CONSULT NOTE  --------------------------------------------------------------------------------  HPI:  68 year old male  former smoker with past medical history significant for GERD, HTN, HLD, anemia of chronic disease, T2DM, CAD s/p stent x3 (2014 at [place]) & x2 (pLCx 10/28/22, LAD 10/31/22, off of plavix as of 5/1/23), CVA and ESRD (diagnosed Jan 2019) on HD via LUE AVF T/Th/S (Nephrologist Dr Huff) with recent AV fistulogram 7/2023 with cephalic stenosis s/p balloon angioplasty. Past surgical history significant for open cholecystectomy, AVF creation and eye surgery in the past. He presents to [facility] on 9/16/23 as renal transplant candidate.      Last HD yesterday, 9/15/23. Makes no urine (1 drop every few days). Last PO intake 2200pm. No hospitalizations in past 6 months other than for outpatient fistulogram 7/2023. History of blood transfusion 3 years ago during open cholecystectomy. otherwise denies fevers, chills, shortness of breath, chest pain.     Cleared by cardiology and no further cardiac testing required 7/2023. Last TTE completed 3/9/23 with normal LV systolic function, LVEF 55%, mild diastolic LV dysfunction.        PAST HISTORY  --------------------------------------------------------------------------------  PAST MEDICAL & SURGICAL HISTORY:  HTN (hypertension)      Coronary artery disease      CAP (community acquired pneumonia)  6/18      Diabetes mellitus  type 2      GERD (gastroesophageal reflux disease)      Stented coronary artery  2014, 3 stents, [facility]      ESRD (end stage renal disease)  on Dialysis( M/W/F), By Dr. Huff      Hypercholesterolemia      Cerebrovascular accident (CVA), unspecified mechanism  diagnosed via CT of the head      Anemia due to stage 5 chronic kidney disease, not on chronic dialysis      H/O coronary angiogram  2014 - x3 stents      AV fistula  10/12/18 L radiocephalic AV fistula      H/O eye surgery        FAMILY HISTORY:    PAST SOCIAL HISTORY:    ALLERGIES & MEDICATIONS  --------------------------------------------------------------------------------  Allergies    No Known Allergies    Intolerances      Standing Inpatient Medications  basiliximab  IVPB 20 milliGRAM(s) IV Intermittent once  methylPREDNISolone sodium succinate IVPB 500 milliGRAM(s) IV Intermittent once    PRN Inpatient Medications      REVIEW OF SYSTEMS  --------------------------------------------------------------------------------  Gen: No weight changes, fatigue, fevers/chills, weakness  Skin: No rashes  Head/Eyes/Ears/Mouth: No headache; Normal hearing; Normal vision w/o blurriness; No sinus pain/discomfort, sore throat  Respiratory: No dyspnea, cough, wheezing, hemoptysis  CV: No chest pain, PND, orthopnea  GI: No abdominal pain, diarrhea, constipation, nausea, vomiting, melena, hematochezia  : No increased frequency, dysuria, hematuria, nocturia  MSK: No joint pain/swelling; no back pain; no edema  Neuro: No dizziness/lightheadedness, weakness, seizures, numbness, tingling  Heme: No easy bruising or bleeding  Endo: No heat/cold intolerance  Psych: No significant nervousness, anxiety, stress, depression    All other systems were reviewed and are negative, except as noted.    VITALS/PHYSICAL EXAM  --------------------------------------------------------------------------------  T(C): 36.5 (09-16-23 @ 09:41), Max: 36.8 (09-16-23 @ 04:37)  HR: 70 (09-16-23 @ 09:41) (60 - 70)  BP: 166/72 (09-16-23 @ 09:41) (166/72 - 201/94)  RR: 18 (09-16-23 @ 09:41) (18 - 18)  SpO2: 98% (09-16-23 @ 09:41) (98% - 100%)  Wt(kg): --  Height (cm): 167.6 (09-16-23 @ 09:41)  Weight (kg): 65.1 (09-16-23 @ 09:41)  BMI (kg/m2): 23.2 (09-16-23 @ 09:41)  BSA (m2): 1.74 (09-16-23 @ 09:41)      09-16-23 @ 07:01  -  09-16-23 @ 13:37  --------------------------------------------------------  IN: 0 mL / OUT: 0 mL / NET: 0 mL      Physical Exam:  	Gen: NAD, well-appearing  	HEENT: PERRL, supple neck, clear oropharynx  	Pulm: CTA B/L  	CV: RRR, S1S2; no rub  	Back: No spinal or CVA tenderness; no sacral edema  	Abd: +BS, soft, nontender/nondistended                      Transplant:  	: No suprapubic tenderness  	UE: Warm, FROM, no clubbing, intact strength; no edema; no asterixis  	LE: Warm, FROM, no clubbing, intact strength; no edema  	Neuro: No focal deficits, intact gait  	Psych: Normal affect and mood  	Skin: Warm, without rashes  	Vascular access:    LABS/STUDIES  --------------------------------------------------------------------------------              13.0   5.24  >-----------<  142      [09-16-23 @ 04:41]              42.0     139  |  92  |  46  ----------------------------<  118      [09-16-23 @ 04:41]  4.4   |  29  |  10.92        Ca     10.0     [09-16-23 @ 04:41]      Mg     2.5     [09-16-23 @ 04:41]      Phos  5.9     [09-16-23 @ 04:41]    TPro  8.7  /  Alb  4.7  /  TBili  0.8  /  DBili  x   /  AST  15  /  ALT  17  /  AlkPhos  66  [09-16-23 @ 04:41]    PT/INR: PT 10.0 , INR 0.91       [09-16-23 @ 04:41]  PTT: 26.0       [09-16-23 @ 04:41]      Creatinine Trend:  SCr 10.92 [09-16 @ 04:41]  SCr 9.97 [08-18 @ 16:23]    Urinalysis - [09-16-23 @ 04:41]      Color  / Appearance  / SG  / pH       Gluc 118 / Ketone   / Bili  / Urobili        Blood  / Protein  / Leuk Est  / Nitrite       RBC  / WBC  / Hyaline  / Gran  / Sq Epi  / Non Sq Epi  / Bacteria       HbA1c 7.8      [12-17-19 @ 05:54]    HBsAb 7.2      [08-18-23 @ 16:23]  HBsAb Nonreactive      [01-10-19 @ 17:00]  HBsAg Nonreact      [09-16-23 @ 04:41]  HBcAb Nonreact      [08-18-23 @ 16:23]  HCV 0.12, Nonreact      [08-18-23 @ 16:23]  HIV Nonreact      [09-16-23 @ 04:41]      Tacrolimus  Cyclosporine  Sirolimus  Mycophenolate  BK PCR  CMV PCR  Parvo PCR  EBV PCR
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 Past surgical history significant for open cholecystectomy, AVF creation and eye surgery in the past. He presents to [facility] on 9/16/23 as renal transplant candidate.        PAST HISTORY  --------------------------------------------------------------------------------  PAST MEDICAL & SURGICAL HISTORY:  HTN (hypertension)      Coronary artery disease      CAP (community acquired pneumonia)  6/18      Diabetes mellitus  type 2      GERD (gastroesophageal reflux disease)      Stented coronary artery  2014, 3 stents, [facility]      ESRD (end stage renal disease)  on Dialysis( M/W/F), By Dr. Huff      Hypercholesterolemia      Cerebrovascular accident (CVA), unspecified mechanism  diagnosed via CT of the head      Anemia due to stage 5 chronic kidney disease, not on chronic dialysis      H/O coronary angiogram  2014 - x3 stents      AV fistula  10/12/18 L radiocephalic AV fistula      H/O eye surgery        FAMILY HISTORY:    PAST SOCIAL HISTORY:    ALLERGIES & MEDICATIONS  --------------------------------------------------------------------------------  Allergies    No Known Allergies    Intolerances      Standing Inpatient Medications  basiliximab  IVPB 20 milliGRAM(s) IV Intermittent once  hydrALAZINE Injectable 10 milliGRAM(s) IV Push once  methylPREDNISolone sodium succinate IVPB 500 milliGRAM(s) IV Intermittent once    PRN Inpatient Medications      REVIEW OF SYSTEMS  --------------------------------------------------------------------------------  Gen: No fevers/chills  Skin: No rashes  Head/Eyes/Ears: Normal hearing,  Normal vision   Respiratory: No dyspnea, cough  CV: No chest pain  GI: No abdominal pain, diarrhea, constipation, nausea, vomiting  : No dysuria, hematuria  MSK: No  edema  Heme: No easy bruising or bleeding  Psych: No significant depression    All other systems were reviewed and are negative, except as noted.    VITALS/PHYSICAL EXAM  --------------------------------------------------------------------------------  T(C): 36.8 (09-16-23 @ 04:37), Max: 36.8 (09-16-23 @ 04:37)  HR: 60 (09-16-23 @ 04:37) (60 - 60)  BP: 179/80 (09-16-23 @ 04:37) (179/80 - 179/80)  RR: 18 (09-16-23 @ 04:37) (18 - 18)  SpO2: 99% (09-16-23 @ 04:37) (99% - 99%)  Wt(kg): --  Height (cm): 167.6 (09-16-23 @ 04:37)  Weight (kg): 65.1 (09-16-23 @ 04:37)  BMI (kg/m2): 23.2 (09-16-23 @ 04:37)  BSA (m2): 1.74 (09-16-23 @ 04:37)      Physical Exam:  	Gen: NAD  	HEENT: MMM  	Pulm: CTA B/L  	CV: S1S2  	Abd: Soft, +BS   	Ext: No LE edema B/L  	Neuro: Awake, alert  	Skin: Warm and dry  	Vascular access: avf          : no  lizet  LABS/STUDIES  --------------------------------------------------------------------------------              13.0   5.24  >-----------<  142      [09-16-23 @ 04:41]              42.0     139  |  92  |  46  ----------------------------<  118      [09-16-23 @ 04:41]  4.4   |  29  |  10.92        Ca     10.0     [09-16-23 @ 04:41]      Mg     2.5     [09-16-23 @ 04:41]      Phos  5.9     [09-16-23 @ 04:41]    TPro  8.7  /  Alb  4.7  /  TBili  0.8  /  DBili  x   /  AST  15  /  ALT  17  /  AlkPhos  66  [09-16-23 @ 04:41]    PT/INR: PT 10.0 , INR 0.91       [09-16-23 @ 04:41]  PTT: 26.0       [09-16-23 @ 04:41]      Creatinine Trend:  SCr 10.92 [09-16 @ 04:41]  SCr 9.97 [08-18 @ 16:23]    Urinalysis - [09-16-23 @ 04:41]      Color  / Appearance  / SG  / pH       Gluc 118 / Ketone   / Bili  / Urobili        Blood  / Protein  / Leuk Est  / Nitrite       RBC  / WBC  / Hyaline  / Gran  / Sq Epi  / Non Sq Epi  / Bacteria       HbA1c 7.8      [12-17-19 @ 05:54]    HBsAb 7.2      [08-18-23 @ 16:23]  HBsAb Nonreactive      [01-10-19 @ 17:00]  HBsAg Nonreact      [09-16-23 @ 04:41]  HBcAb Nonreact      [08-18-23 @ 16:23]  HCV 0.12, Nonreact      [08-18-23 @ 16:23]  HIV Nonreact      [08-18-23 @ 16:23]

## 2023-09-16 NOTE — H&P ADULT - HISTORY OF PRESENT ILLNESS
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 Past surgical history significant for open cholecystectomy, AVF creation and eye surgery in the past. He presents to [facility] on 9/16/23 as renal transplant candidate.      Last HD yesterday, 9/15/23. Makes no urine (1 drop every few days). Last PO intake 2200pm. No hospitalizations in past 6 months other than for outpatient fistulogram 7/2023. History of blood transfusion 3 years ago during open cholecystectomy. otherwise denies fevers, chills, shortness of breath, chest pain.     Cleared by cardiology and no further cardiac testing required 7/2023. Last TTE completed 3/9/23 with normal LV systolic function, LVEF 55%, mild diastolic LV dysfunction.

## 2023-09-16 NOTE — BRIEF OPERATIVE NOTE - NSICDXBRIEFPROCEDURE_GEN_ALL_CORE_FT
PROCEDURES:  Transplant, kidney,  donor 16-Sep-2023 13:52:02 1-  donor right kidney transplant to right external iliac vessels  2- Preparation of kidney in back table  3- Reconstruction of right renal vein with donor IVC  4- Post-perfusion biopsy  5- Placement of ureteral stent  6- Intra-operative ultrasound  7- Transposition of right external iliac vessels Edmund Lambert P

## 2023-09-16 NOTE — CONSULT NOTE ADULT - ASSESSMENT
68 year old male  former smoker with past medical history significant for GERD, HTN, HLD, anemia of chronic disease, T2DM, CAD s/p stent x3 (2014 at [place]) & x2 (pLCx 10/28/22, LAD 10/31/22, off of plavix as of 5/1/23), CVA and ESRD (diagnosed Jan 2019) on HD via LUE AVF T/Th/S (Nephrologist Dr Huff) with recent AV fistulogram 7/2023 with cephalic stenosis s/p balloon angioplasty. Past surgical history significant for open cholecystectomy, AVF creation and eye surgery in the past. He presents to [facility] on 9/16/23 as renal transplant candidate.        PLAN  IHD today   OR after dialysis   Simulect Induction 
69 y/o M former smoker with past medical history significant for GERD, HTN, HLD, anemia of chronic disease, T2DM, CAD s/p stent x3 (2014 at [place]) & x2 (pLCx 10/28/22, LAD 10/31/22, off of plavix as of 5/1/23), CVA and ESRD (diagnosed Jan 2019) on HD via LUE AVF T/Th/S (Nephrologist Dr Huff) with recent AV fistulogram 7/2023 with cephalic stenosis s/p balloon angioplasty. Past surgical history significant for open cholecystectomy, AVF creation and eye surgery in the past. He presents to [facility] on 9/16/23 as renal transplant candidate.      ESRD On HD   outpt [facility]   Plan for HD today prior to OR today   Planned for Renal transplant today   Immunosuppression per transplant team  Monitor     HTN  BP elevated  UF with HD  Monitor

## 2023-09-16 NOTE — BRIEF OPERATIVE NOTE - OPERATION/FINDINGS
Donor UNOS [id number]  right kidney, single arterial opening into aorta with very early bifurcation, single vein, single ureter  Male in his 30s  Terminal creatinine: 6.2  KDPI 36%  cPRA 23%  Simulect induction  Both donor and recipient: blood type O, CMV+, EBV+  Cold ischemia time: 14 hs 13 mins  Warm ischemia time: 42 mins  Weight of the kidney: 300 grams  The right renal vein was reconstructed with donor IVC in a horizontal fashion with running 6-0 Prolene sutures

## 2023-09-16 NOTE — H&P ADULT - NSHPPHYSICALEXAM_GEN_ALL_CORE
Well appearing male, resting comfortably in bed in no acute distress   Neuro: Grossly intact bilaterally   HEENT: Normocephalic, atraumatic, trachea midline, no JVD   Heart: Regular S1/S2, no murmurs rubs or gallops   Lungs: Unlabored breathing on RA; Clear to auscultation bilaterally, no adventitious sounds   Abdomen: Soft, non-distended, normoactive bowel sounds throughout, no tenderness to palpation in all 4 quadrants;  Upper Extremities: No edema, freely mobile bilaterally; LUE AVF +thrill/bruit   Lower Extremities: No edema, 2+ DP pulses, feet warm bilaterally   Skin: Warm, non-diaphoretic throughout

## 2023-09-16 NOTE — H&P ADULT - NSICDXPASTMEDICALHX_GEN_ALL_CORE_FT
PAST MEDICAL HISTORY:  Anemia due to stage 5 chronic kidney disease, not on chronic dialysis     CAP (community acquired pneumonia) 6/18    Cerebrovascular accident (CVA), unspecified mechanism diagnosed via CT of the head    Coronary artery disease     Diabetes mellitus type 2    ESRD (end stage renal disease) on Dialysis( M/W/F), By Dr. Huff    GERD (gastroesophageal reflux disease)     HTN (hypertension)     Hypercholesterolemia     Stented coronary artery 2014, 3 stents, [facility]

## 2023-09-16 NOTE — PRE-ANESTHESIA EVALUATION ADULT - NSATTENDATTESTRD_GEN_ALL_CORE
Normal
The patient has been re-examined and I agree with the above assessment or I updated with my findings.
The patient has been re-examined and I agree with the above assessment or I updated with my findings.

## 2023-09-16 NOTE — PATIENT PROFILE ADULT - NSPROPOAPRESSUREINJURY_GEN_A_NUR
----- Message from Silvia Schaffer sent at 9/30/2020  2:01 PM CDT -----  Pt calling refills on Chlordiazepoxide Clidinium and Naproxen to [facility] # [phone]     no

## 2023-09-16 NOTE — H&P ADULT - ASSESSMENT
69 y/o M former smoker with past medical history significant for GERD, HTN, HLD, anemia of chronic disease, T2DM, CAD s/p stent x3 (2014 at [place]) & x2 (pLCx 10/28/22, LAD 10/31/22, off of plavix as of 5/1/23), CVA and ESRD (diagnosed Jan 2019) on HD via LUE AVF T/Th/S (Nephrologist Dr Huff) with recent AV fistulogram 7/2023 with cephalic stenosis s/p balloon angioplasty. Past surgical history significant for open cholecystectomy, AVF creation and eye surgery in the past. He presents to [facility] on 9/16/23 as renal transplant candidate.     #Renal transplant candidate  - NPO except meds  - CBC, CMP, Mg/P, Coags  - T&Sx2  - ABBOTT Covid swab  - PHS labs   - Simulect induction  - SoluMedrol 500mg.   - Vancomycin 1gm.  - Consent to be obtained.   - OR time 9/16 @0730am.       Donor  Age: 38  ABO:  A  KDPI: 36%  Terminal Creat: 6.2  CMV IgG: POSITIVE  EBV IgG:  POSITIVE  HCV: Negative  HCV DAYNE:  Negative    # [id number]   Transplant PA.    69 y/o Slovenian speaking M former smoker with past medical history significant for GERD, HTN, HLD, anemia of chronic disease, T2DM, CAD s/p stent x3 (2014 at [place]) & x2 (pLCx 10/28/22, LAD 10/31/22, off of plavix as of 5/1/23), CVA and ESRD (diagnosed Jan 2019) on HD via LUE AVF T/Th/S (Nephrologist Dr Huff) with recent AV fistulogram 7/2023 with cephalic stenosis s/p balloon angioplasty. Past surgical history significant for open cholecystectomy, AVF creation and eye surgery in the past. He presents to [facility] on 9/16/23 as renal transplant candidate.     #Renal transplant candidate  - NPO except meds  - CBC, CMP, Mg/P, Coags  - T&Sx2  - ABBOTT Covid swab  - PHS labs   - Simulect induction  - SoluMedrol 500mg.   - Vancomycin 1gm.  - Consent to be obtained.   - OR time 9/16 @0730am.       Donor  Age: 30-40  ABO:  A  KDPI: 36%  Terminal Creat: 6.2  CMV IgG: POSITIVE  EBV IgG:  POSITIVE  HCV: Negative  HCV DAYNE:  Negative

## 2023-09-17 LAB
A1C WITH ESTIMATED AVERAGE GLUCOSE RESULT: 7.7 % — HIGH (ref 4–5.6)
ALBUMIN SERPL ELPH-MCNC: 3.5 G/DL — SIGNIFICANT CHANGE UP (ref 3.3–5)
ALP SERPL-CCNC: 55 U/L — SIGNIFICANT CHANGE UP (ref 40–120)
ALT FLD-CCNC: 11 U/L — SIGNIFICANT CHANGE UP (ref 10–45)
ANION GAP SERPL CALC-SCNC: 19 MMOL/L — HIGH (ref 5–17)
AST SERPL-CCNC: 21 U/L — SIGNIFICANT CHANGE UP (ref 10–40)
BASOPHILS # BLD AUTO: 0.01 K/UL — SIGNIFICANT CHANGE UP (ref 0–0.2)
BASOPHILS NFR BLD AUTO: 0.1 % — SIGNIFICANT CHANGE UP (ref 0–2)
BILIRUB SERPL-MCNC: 0.5 MG/DL — SIGNIFICANT CHANGE UP (ref 0.2–1.2)
BUN SERPL-MCNC: 42 MG/DL — HIGH (ref 7–23)
CALCIUM SERPL-MCNC: 8.2 MG/DL — LOW (ref 8.4–10.5)
CHLORIDE SERPL-SCNC: 97 MMOL/L — SIGNIFICANT CHANGE UP (ref 96–108)
CO2 SERPL-SCNC: 20 MMOL/L — LOW (ref 22–31)
CREAT SERPL-MCNC: 7.89 MG/DL — HIGH (ref 0.5–1.3)
EGFR: 7 ML/MIN/1.73M2 — LOW
EOSINOPHIL # BLD AUTO: 0.01 K/UL — SIGNIFICANT CHANGE UP (ref 0–0.5)
EOSINOPHIL NFR BLD AUTO: 0.1 % — SIGNIFICANT CHANGE UP (ref 0–6)
ESTIMATED AVERAGE GLUCOSE: 174 MG/DL — HIGH (ref 68–114)
GLUCOSE BLDC GLUCOMTR-MCNC: 164 MG/DL — HIGH (ref 70–99)
GLUCOSE BLDC GLUCOMTR-MCNC: 170 MG/DL — HIGH (ref 70–99)
GLUCOSE BLDC GLUCOMTR-MCNC: 225 MG/DL — HIGH (ref 70–99)
GLUCOSE BLDC GLUCOMTR-MCNC: 240 MG/DL — HIGH (ref 70–99)
GLUCOSE BLDC GLUCOMTR-MCNC: 285 MG/DL — HIGH (ref 70–99)
GLUCOSE SERPL-MCNC: 166 MG/DL — HIGH (ref 70–99)
HCT VFR BLD CALC: 37.1 % — LOW (ref 39–50)
HGB BLD-MCNC: 11.6 G/DL — LOW (ref 13–17)
IMM GRANULOCYTES NFR BLD AUTO: 0.4 % — SIGNIFICANT CHANGE UP (ref 0–0.9)
LYMPHOCYTES # BLD AUTO: 0.55 K/UL — LOW (ref 1–3.3)
LYMPHOCYTES # BLD AUTO: 5.7 % — LOW (ref 13–44)
MAGNESIUM SERPL-MCNC: 2.1 MG/DL — SIGNIFICANT CHANGE UP (ref 1.6–2.6)
MCHC RBC-ENTMCNC: 31.2 PG — SIGNIFICANT CHANGE UP (ref 27–34)
MCHC RBC-ENTMCNC: 31.3 GM/DL — LOW (ref 32–36)
MCV RBC AUTO: 99.7 FL — SIGNIFICANT CHANGE UP (ref 80–100)
MONOCYTES # BLD AUTO: 1.03 K/UL — HIGH (ref 0–0.9)
MONOCYTES NFR BLD AUTO: 10.6 % — SIGNIFICANT CHANGE UP (ref 2–14)
NEUTROPHILS # BLD AUTO: 8.05 K/UL — HIGH (ref 1.8–7.4)
NEUTROPHILS NFR BLD AUTO: 83.1 % — HIGH (ref 43–77)
NRBC # BLD: 0 /100 WBCS — SIGNIFICANT CHANGE UP (ref 0–0)
PHOSPHATE SERPL-MCNC: 5.3 MG/DL — HIGH (ref 2.5–4.5)
PLATELET # BLD AUTO: 134 K/UL — LOW (ref 150–400)
POTASSIUM SERPL-MCNC: 5 MMOL/L — SIGNIFICANT CHANGE UP (ref 3.5–5.3)
POTASSIUM SERPL-SCNC: 5 MMOL/L — SIGNIFICANT CHANGE UP (ref 3.5–5.3)
PROT SERPL-MCNC: 6.8 G/DL — SIGNIFICANT CHANGE UP (ref 6–8.3)
RBC # BLD: 3.72 M/UL — LOW (ref 4.2–5.8)
RBC # FLD: 15.8 % — HIGH (ref 10.3–14.5)
SODIUM SERPL-SCNC: 136 MMOL/L — SIGNIFICANT CHANGE UP (ref 135–145)
TACROLIMUS SERPL-MCNC: 5.6 NG/ML — SIGNIFICANT CHANGE UP
WBC # BLD: 9.69 K/UL — SIGNIFICANT CHANGE UP (ref 3.8–10.5)
WBC # FLD AUTO: 9.69 K/UL — SIGNIFICANT CHANGE UP (ref 3.8–10.5)

## 2023-09-17 PROCEDURE — 99232 SBSQ HOSP IP/OBS MODERATE 35: CPT | Mod: 24

## 2023-09-17 PROCEDURE — 99232 SBSQ HOSP IP/OBS MODERATE 35: CPT

## 2023-09-17 RX ORDER — HEPARIN SODIUM 5000 [USP'U]/ML
5000 INJECTION INTRAVENOUS; SUBCUTANEOUS EVERY 12 HOURS
Refills: 0 | Status: DISCONTINUED | OUTPATIENT
Start: 2023-09-17 | End: 2023-09-22

## 2023-09-17 RX ORDER — ONDANSETRON 8 MG/1
4 TABLET, FILM COATED ORAL EVERY 6 HOURS
Refills: 0 | Status: DISCONTINUED | OUTPATIENT
Start: 2023-09-17 | End: 2023-09-22

## 2023-09-17 RX ORDER — DEXTROSE 50 % IN WATER 50 %
15 SYRINGE (ML) INTRAVENOUS ONCE
Refills: 0 | Status: DISCONTINUED | OUTPATIENT
Start: 2023-09-17 | End: 2023-09-22

## 2023-09-17 RX ORDER — ATORVASTATIN CALCIUM 80 MG/1
40 TABLET, FILM COATED ORAL AT BEDTIME
Refills: 0 | Status: DISCONTINUED | OUTPATIENT
Start: 2023-09-17 | End: 2023-09-22

## 2023-09-17 RX ORDER — SODIUM CHLORIDE 9 MG/ML
1000 INJECTION, SOLUTION INTRAVENOUS
Refills: 0 | Status: DISCONTINUED | OUTPATIENT
Start: 2023-09-17 | End: 2023-09-22

## 2023-09-17 RX ORDER — CHLORHEXIDINE GLUCONATE 213 G/1000ML
1 SOLUTION TOPICAL DAILY
Refills: 0 | Status: DISCONTINUED | OUTPATIENT
Start: 2023-09-17 | End: 2023-09-22

## 2023-09-17 RX ORDER — DEXTROSE 50 % IN WATER 50 %
25 SYRINGE (ML) INTRAVENOUS ONCE
Refills: 0 | Status: DISCONTINUED | OUTPATIENT
Start: 2023-09-17 | End: 2023-09-22

## 2023-09-17 RX ORDER — NIFEDIPINE 30 MG
30 TABLET, EXTENDED RELEASE 24 HR ORAL DAILY
Refills: 0 | Status: DISCONTINUED | OUTPATIENT
Start: 2023-09-17 | End: 2023-09-21

## 2023-09-17 RX ORDER — SIMETHICONE 80 MG/1
80 TABLET, CHEWABLE ORAL ONCE
Refills: 0 | Status: COMPLETED | OUTPATIENT
Start: 2023-09-17 | End: 2023-09-17

## 2023-09-17 RX ORDER — MYCOPHENOLATE MOFETIL 250 MG/1
1 CAPSULE ORAL
Refills: 0 | Status: DISCONTINUED | OUTPATIENT
Start: 2023-09-17 | End: 2023-09-22

## 2023-09-17 RX ORDER — LABETALOL HCL 100 MG
5 TABLET ORAL ONCE
Refills: 0 | Status: COMPLETED | OUTPATIENT
Start: 2023-09-17 | End: 2023-09-17

## 2023-09-17 RX ORDER — GLUCAGON INJECTION, SOLUTION 0.5 MG/.1ML
1 INJECTION, SOLUTION SUBCUTANEOUS ONCE
Refills: 0 | Status: DISCONTINUED | OUTPATIENT
Start: 2023-09-17 | End: 2023-09-22

## 2023-09-17 RX ORDER — NIFEDIPINE 30 MG
90 TABLET, EXTENDED RELEASE 24 HR ORAL DAILY
Refills: 0 | Status: DISCONTINUED | OUTPATIENT
Start: 2023-09-17 | End: 2023-09-17

## 2023-09-17 RX ORDER — ASPIRIN/CALCIUM CARB/MAGNESIUM 324 MG
81 TABLET ORAL DAILY
Refills: 0 | Status: DISCONTINUED | OUTPATIENT
Start: 2023-09-17 | End: 2023-09-22

## 2023-09-17 RX ORDER — CARVEDILOL PHOSPHATE 80 MG/1
25 CAPSULE, EXTENDED RELEASE ORAL EVERY 12 HOURS
Refills: 0 | Status: DISCONTINUED | OUTPATIENT
Start: 2023-09-17 | End: 2023-09-22

## 2023-09-17 RX ORDER — POLYETHYLENE GLYCOL 3350 17 G/17G
17 POWDER, FOR SOLUTION ORAL ONCE
Refills: 0 | Status: COMPLETED | OUTPATIENT
Start: 2023-09-17 | End: 2023-09-17

## 2023-09-17 RX ORDER — POLYETHYLENE GLYCOL 3350 17 G/17G
17 POWDER, FOR SOLUTION ORAL DAILY
Refills: 0 | Status: DISCONTINUED | OUTPATIENT
Start: 2023-09-18 | End: 2023-09-22

## 2023-09-17 RX ORDER — INSULIN LISPRO 100/ML
VIAL (ML) SUBCUTANEOUS
Refills: 0 | Status: DISCONTINUED | OUTPATIENT
Start: 2023-09-17 | End: 2023-09-20

## 2023-09-17 RX ORDER — HYDRALAZINE HCL 50 MG
10 TABLET ORAL ONCE
Refills: 0 | Status: COMPLETED | OUTPATIENT
Start: 2023-09-17 | End: 2023-09-17

## 2023-09-17 RX ORDER — FUROSEMIDE 40 MG
80 TABLET ORAL
Refills: 0 | Status: COMPLETED | OUTPATIENT
Start: 2023-09-17 | End: 2023-09-17

## 2023-09-17 RX ORDER — NIFEDIPINE 30 MG
60 TABLET, EXTENDED RELEASE 24 HR ORAL
Refills: 0 | Status: DISCONTINUED | OUTPATIENT
Start: 2023-09-17 | End: 2023-09-17

## 2023-09-17 RX ORDER — ACETAMINOPHEN 500 MG
1000 TABLET ORAL ONCE
Refills: 0 | Status: COMPLETED | OUTPATIENT
Start: 2023-09-17 | End: 2023-09-17

## 2023-09-17 RX ORDER — HYDROMORPHONE HYDROCHLORIDE 2 MG/ML
0.5 INJECTION INTRAMUSCULAR; INTRAVENOUS; SUBCUTANEOUS ONCE
Refills: 0 | Status: DISCONTINUED | OUTPATIENT
Start: 2023-09-17 | End: 2023-09-17

## 2023-09-17 RX ORDER — NYSTATIN 500MM UNIT
500000 POWDER (EA) MISCELLANEOUS
Refills: 0 | Status: DISCONTINUED | OUTPATIENT
Start: 2023-09-17 | End: 2023-09-22

## 2023-09-17 RX ORDER — LIDOCAINE 4 G/100G
1 CREAM TOPICAL ONCE
Refills: 0 | Status: COMPLETED | OUTPATIENT
Start: 2023-09-17 | End: 2023-09-17

## 2023-09-17 RX ADMIN — TRAMADOL HYDROCHLORIDE 50 MILLIGRAM(S): 50 TABLET ORAL at 00:26

## 2023-09-17 RX ADMIN — PANTOPRAZOLE SODIUM 40 MILLIGRAM(S): 20 TABLET, DELAYED RELEASE ORAL at 06:13

## 2023-09-17 RX ADMIN — Medication 80 MILLIGRAM(S): at 12:53

## 2023-09-17 RX ADMIN — TRAMADOL HYDROCHLORIDE 50 MILLIGRAM(S): 50 TABLET ORAL at 10:38

## 2023-09-17 RX ADMIN — Medication 650 MILLIGRAM(S): at 20:52

## 2023-09-17 RX ADMIN — TRAMADOL HYDROCHLORIDE 50 MILLIGRAM(S): 50 TABLET ORAL at 14:41

## 2023-09-17 RX ADMIN — CARVEDILOL PHOSPHATE 25 MILLIGRAM(S): 80 CAPSULE, EXTENDED RELEASE ORAL at 17:38

## 2023-09-17 RX ADMIN — Medication 500000 UNIT(S): at 17:37

## 2023-09-17 RX ADMIN — Medication 125 MILLIGRAM(S): at 05:16

## 2023-09-17 RX ADMIN — LIDOCAINE 1 APPLICATION(S): 4 CREAM TOPICAL at 01:07

## 2023-09-17 RX ADMIN — Medication 1000 MILLIGRAM(S): at 18:36

## 2023-09-17 RX ADMIN — Medication 30 MILLIGRAM(S): at 09:22

## 2023-09-17 RX ADMIN — Medication 500000 UNIT(S): at 00:24

## 2023-09-17 RX ADMIN — Medication 125 MILLIGRAM(S): at 17:37

## 2023-09-17 RX ADMIN — Medication 1 TABLET(S): at 13:40

## 2023-09-17 RX ADMIN — Medication 5 MILLIGRAM(S): at 01:07

## 2023-09-17 RX ADMIN — MYCOPHENOLATE MOFETIL 1 GRAM(S): 250 CAPSULE ORAL at 08:22

## 2023-09-17 RX ADMIN — TRAMADOL HYDROCHLORIDE 50 MILLIGRAM(S): 50 TABLET ORAL at 13:41

## 2023-09-17 RX ADMIN — HYDROMORPHONE HYDROCHLORIDE 0.5 MILLIGRAM(S): 2 INJECTION INTRAMUSCULAR; INTRAVENOUS; SUBCUTANEOUS at 04:04

## 2023-09-17 RX ADMIN — CHLORHEXIDINE GLUCONATE 1 APPLICATION(S): 213 SOLUTION TOPICAL at 13:41

## 2023-09-17 RX ADMIN — Medication 500000 UNIT(S): at 12:54

## 2023-09-17 RX ADMIN — CARVEDILOL PHOSPHATE 25 MILLIGRAM(S): 80 CAPSULE, EXTENDED RELEASE ORAL at 02:31

## 2023-09-17 RX ADMIN — Medication 10 MILLIGRAM(S): at 02:31

## 2023-09-17 RX ADMIN — TRAMADOL HYDROCHLORIDE 50 MILLIGRAM(S): 50 TABLET ORAL at 20:30

## 2023-09-17 RX ADMIN — Medication 2: at 06:13

## 2023-09-17 RX ADMIN — Medication 81 MILLIGRAM(S): at 12:53

## 2023-09-17 RX ADMIN — Medication 80 MILLIGRAM(S): at 17:37

## 2023-09-17 RX ADMIN — HEPARIN SODIUM 5000 UNIT(S): 5000 INJECTION INTRAVENOUS; SUBCUTANEOUS at 17:38

## 2023-09-17 RX ADMIN — POLYETHYLENE GLYCOL 3350 17 GRAM(S): 17 POWDER, FOR SOLUTION ORAL at 18:35

## 2023-09-17 RX ADMIN — Medication 4: at 17:37

## 2023-09-17 RX ADMIN — MYCOPHENOLATE MOFETIL 1 GRAM(S): 250 CAPSULE ORAL at 19:32

## 2023-09-17 RX ADMIN — TRAMADOL HYDROCHLORIDE 50 MILLIGRAM(S): 50 TABLET ORAL at 09:38

## 2023-09-17 RX ADMIN — Medication 400 MILLIGRAM(S): at 17:36

## 2023-09-17 RX ADMIN — ATORVASTATIN CALCIUM 40 MILLIGRAM(S): 80 TABLET, FILM COATED ORAL at 21:04

## 2023-09-17 RX ADMIN — Medication 90 MILLIGRAM(S): at 01:08

## 2023-09-17 RX ADMIN — Medication 6: at 21:04

## 2023-09-17 RX ADMIN — Medication 650 MILLIGRAM(S): at 21:50

## 2023-09-17 RX ADMIN — SENNA PLUS 2 TABLET(S): 8.6 TABLET ORAL at 21:04

## 2023-09-17 RX ADMIN — SIMETHICONE 80 MILLIGRAM(S): 80 TABLET, CHEWABLE ORAL at 18:35

## 2023-09-17 RX ADMIN — TRAMADOL HYDROCHLORIDE 50 MILLIGRAM(S): 50 TABLET ORAL at 19:33

## 2023-09-17 RX ADMIN — Medication 4: at 12:53

## 2023-09-17 RX ADMIN — TACROLIMUS 5 MILLIGRAM(S): 5 CAPSULE ORAL at 08:22

## 2023-09-17 RX ADMIN — Medication 500000 UNIT(S): at 05:16

## 2023-09-17 RX ADMIN — Medication 2: at 02:09

## 2023-09-17 RX ADMIN — HYDROMORPHONE HYDROCHLORIDE 0.5 MILLIGRAM(S): 2 INJECTION INTRAMUSCULAR; INTRAVENOUS; SUBCUTANEOUS at 05:04

## 2023-09-17 NOTE — PROGRESS NOTE ADULT - SUBJECTIVE AND OBJECTIVE BOX
[facility]   MD JORDAN AVALOS MD RUORU WONG, PA    TEL:  FROM 9 AM to 5 PM ---OFFICE: [phone]    FROM 5 PM - 9 AM PLEASE CALL ANSWERING SERVICE: [phone]    RENAL FOLLOW UP NOTE--Date of Service 09-17-23 @ 08:42  --------------------------------------------------------------------------------  HPI:    Pt seen and examined at bedside.     PAST HISTORY  --------------------------------------------------------------------------------  No significant changes to PMH, PSH, FHx, SHx, unless otherwise noted    ALLERGIES & MEDICATIONS  --------------------------------------------------------------------------------  Allergies    No Known Allergies    Intolerances      Standing Inpatient Medications  atorvastatin 40 milliGRAM(s) Oral at bedtime  carvedilol 25 milliGRAM(s) Oral every 12 hours  chlorhexidine 2% Cloths 1 Application(s) Topical daily  dextrose 5%. 1000 milliLiter(s) IV Continuous <Continuous>  dextrose 50% Injectable 25 Gram(s) IV Push once  glucagon  Injectable 1 milliGRAM(s) IntraMuscular once  insulin lispro (ADMELOG) corrective regimen sliding scale   SubCutaneous every 4 hours  methylPREDNISolone sodium succinate Injectable   IV Push   methylPREDNISolone sodium succinate Injectable 125 milliGRAM(s) IV Push two times a day  mycophenolate mofetil 1 Gram(s) Oral every 12 hours  NIFEdipine XL 30 milliGRAM(s) Oral daily  nystatin    Suspension 042562 Unit(s) Swish and Swallow four times a day  pantoprazole    Tablet 40 milliGRAM(s) Oral before breakfast  senna 2 Tablet(s) Oral at bedtime  sodium chloride 0.9%. 1000 milliLiter(s) IV Continuous <Continuous>  sodium chloride 0.9%. 1000 milliLiter(s) IV Continuous <Continuous>  tacrolimus ER Tablet (ENVARSUS XR) 5 milliGRAM(s) Oral <User Schedule>  trimethoprim   80 mG/sulfamethoxazole 400 mG 1 Tablet(s) Oral daily  valGANciclovir 450 milliGRAM(s) Oral <User Schedule>    PRN Inpatient Medications  acetaminophen     Tablet .. 650 milliGRAM(s) Oral every 6 hours PRN  dextrose Oral Gel 15 Gram(s) Oral once PRN  ondansetron Injectable 4 milliGRAM(s) IV Push every 6 hours PRN  traMADol 50 milliGRAM(s) Oral every 4 hours PRN      REVIEW OF SYSTEMS  --------------------------------------------------------------------------------  General: no fever  MSK: no edema     VITALS/PHYSICAL EXAM  --------------------------------------------------------------------------------  T(C): 36.9 (09-17-23 @ 05:00), Max: 36.9 (09-16-23 @ 22:30)  HR: 81 (09-17-23 @ 05:00) (68 - 91)  BP: 155/72 (09-17-23 @ 05:00) (120/62 - 212/94)  RR: 18 (09-17-23 @ 05:00) (16 - 18)  SpO2: 99% (09-17-23 @ 05:00) (96% - 100%)  Wt(kg): --  Height (cm): 167.6 (09-16-23 @ 09:41)  Weight (kg): 65.1 (09-16-23 @ 09:41)  BMI (kg/m2): 23.2 (09-16-23 @ 09:41)  BSA (m2): 1.74 (09-16-23 @ 09:41)      09-16-23 @ 07:01  -  09-17-23 @ 07:00  --------------------------------------------------------  IN: 1290 mL / OUT: 1124 mL / NET: 166 mL    09-17-23 @ 07:01  -  09-17-23 @ 08:42  --------------------------------------------------------  IN: 120 mL / OUT: 60 mL / NET: 60 mL      Physical Exam:  	Gen: NAD  	HEENT: MMM  	Pulm: CTA B/L  	CV: S1S2  	Abd: Soft, +BS  	Ext: No LE edema B/L                      Neuro: Awake   	Skin: Warm and Dry   	Vascular access: NO HD catheter           JHONATHAN hinds  LABS/STUDIES  --------------------------------------------------------------------------------              11.6   9.69  >-----------<  134      [09-17-23 @ 06:33]              37.1     136  |  97  |  42  ----------------------------<  166      [09-17-23 @ 06:32]  5.0   |  20  |  7.89        Ca     8.2     [09-17-23 @ 06:32]      Mg     2.1     [09-17-23 @ 06:32]      Phos  5.3     [09-17-23 @ 06:32]    TPro  6.8  /  Alb  3.5  /  TBili  0.5  /  DBili  x   /  AST  21  /  ALT  11  /  AlkPhos  55  [09-17-23 @ 06:32]    PT/INR: PT 10.0 , INR 0.91       [09-16-23 @ 04:41]  PTT: 26.0       [09-16-23 @ 04:41]      Creatinine Trend:  SCr 7.89 [09-17 @ 06:32]  SCr 7.55 [09-16 @ 20:22]  SCr 7.40 [09-16 @ 14:35]  SCr 10.92 [09-16 @ 04:41]  SCr 9.97 [08-18 @ 16:23]    Urinalysis - [09-17-23 @ 06:32]      Color  / Appearance  / SG  / pH       Gluc 166 / Ketone   / Bili  / Urobili        Blood  / Protein  / Leuk Est  / Nitrite       RBC  / WBC  / Hyaline  / Gran  / Sq Epi  / Non Sq Epi  / Bacteria       HbA1c 7.8      [12-17-19 @ 05:54]    HBsAb 6.6      [09-16-23 @ 04:41]  HBsAg Nonreact      [09-16-23 @ 04:41]  HBcAb Nonreact      [09-16-23 @ 04:41]  HCV 0.10, Nonreact      [09-16-23 @ 04:41]  HIV Nonreact      [09-16-23 @ 04:41]

## 2023-09-17 NOTE — PROGRESS NOTE ADULT - ASSESSMENT
68 French speaking M former smoker with past medical history significant for GERD, HTN, HLD, anemia of chronic disease, T2DM, CAD s/p stent x3 (2014 at [place]) & x2 (pLCx 10/28/22, LAD 10/31/22, off of plavix as of 5/1/23), CVA and ESRD (diagnosed Jan 2019) on HD via LUE AVF T/Th/S (Nephrologist Dr Huff) with recent AV fistulogram 7/2023 with cephalic stenosis s/p balloon angioplasty. Past surgical history significant for open cholecystectomy, AVF creation and eye surgery in the past. Anuric. S/p R DDRT on 9/16/23    s/p DDRT (POD#1)  -slow graft function. will continue to trend. HD-----  -decrease IVF as able  -ADAT  -strict I&Os: JPs x2, hinds  -SCDs/spirometry/PT c/s  -bowel regimen    Immunosuppression  -Envarsus per level, MMF 1/1, SST  -Simulect on POD#4  -Valcyte/Nystatin/PPI/Bactrim for PPx    HTN  -Coreg/Nifedipine/Hydralazine, adjust prn    DM  -on Tradjenta at home, will adjust with RUPESH for now    CAD  -will start ASA accordingly  -tele   68 [place]wana speaking M former smoker with past medical history significant for GERD, HTN, HLD, anemia of chronic disease, T2DM, CAD s/p stent x3 (2014 at [place]) & x2 (pLCx 10/28/22, LAD 10/31/22, off of plavix as of 5/1/23), CVA and ESRD (diagnosed Jan 2019) on HD via LUE AVF T/Th/S (Nephrologist Dr Huff) with recent AV fistulogram 7/2023 with cephalic stenosis s/p balloon angioplasty. Past surgical history significant for open cholecystectomy, AVF creation and eye surgery in the past. Anuric. S/p R DDRT on 9/16/23    s/p DDRT (POD#1)  - slow graft function. will continue to trend. no indication for HD today  - Dc IVF/replacements  - Trial of Lasix 80mg IVP x 2 doses   - Diabetic/renal diet  - Start ASA/SQH  - strict I&Os: JPs x2, hinds  - SCDs/spirometry/PT c/s  - bowel regimen  - Send ALCON fluid for creatinine on Monday    Immunosuppression  -Envarsus per level, MMF 1/1, SST  -Simulect on POD#4  -Valcyte/Nystatin/PPI/Bactrim for PPx    HTN  -Coreg/Nifedipine/Hydralazine, adjust prn    DM  -on Tradjenta at home, will adjust with RUPESH for now    CAD  -resume asa 81mg   -tele

## 2023-09-17 NOTE — PHYSICAL THERAPY INITIAL EVALUATION ADULT - PATIENT/FAMILY AGREES WITH PLAN
You can access the FollowMyHealth Patient Portal offered by [facility] by registering at the following website: [email or web address]. By joining [facility]’s FollowMyHealth portal, you will also be able to view your health information using other applications (apps) compatible with our system. yes

## 2023-09-17 NOTE — PHYSICAL THERAPY INITIAL EVALUATION ADULT - ADDITIONAL COMMENTS
Patient lives with his 2 daughters in a private house; no BRIDGET, 7 steps inside to reach bedroom. Pt. reports, PTA, he was independent in ADLs & IADLs w/o AD, denies any hx of fall 2/2 LOB. Pt. reports his one daughter stays at home and will be able to assist w/ADLs, if required.

## 2023-09-17 NOTE — PROGRESS NOTE ADULT - ASSESSMENT
68 year old male  former smoker with past medical history significant for GERD, HTN, HLD, anemia of chronic disease, T2DM, CAD s/p stent x3 (2014 at [place]) & x2 (pLCx 10/28/22, LAD 10/31/22, off of plavix as of 5/1/23), CVA and ESRD (diagnosed Jan 2019) on HD via LUE AVF T/Th/S (Nephrologist Dr Capellan) with recent AV fistulogram 7/2023 with cephalic stenosis s/p balloon angioplasty. Past surgical history significant for open cholecystectomy, AVF creation and eye surgery. Now admitted for DDRT that he underwent on 9/16/23.    Donor details -Male in his 30s, Terminal creatinine: 6.2, KDPI 36%, cPRA 23%. Both donor and recipient: blood type O, CMV+, EBV+. Cold ischemia time: 14 hs 13 mins, Warm ischemia time: 42 mins  OR details- right kidney, single arterial opening into aorta with very early bifurcation, single vein, single ureter. The right renal vein was reconstructed with donor IVC in a horizontal fashion with running 6-0 Prolene sutures    1. s/p DDRT on  9/16/23 - Allograft function with UOP ~ 800 cc so far, solutes plateauing  . No urgent indication for dialysis today   2. IS meds- Simulect induction. Dosing tac by level. goal level 8-10 , MMF 1gm po bid and steroid taper per protocol.   3. HTN - on Coreg 25 mg po bid and Nifedipine 30 mg po daily. will titrate up accordingly.   4. DM - on sliding scale

## 2023-09-17 NOTE — PROGRESS NOTE ADULT - SUBJECTIVE AND OBJECTIVE BOX
Transplant Surgery - Multidisciplinary Progress Note  --------------------------------------------------------------  DDRT 9/16/23 POD 1    Present:   Patient seen and examined with multidisciplinary Transplant team including  Surgeon: Dr. Lambert. ISSAC Maurer and unit RN during am rounds.  Disciplines not in attendance will be notified of the plan.     68 English speaking M former smoker with past medical history significant for GERD, HTN, HLD, anemia of chronic disease, T2DM, CAD s/p stent x3 (2014 at [place]) & x2 (pLCx 10/28/22, LAD 10/31/22, off of plavix as of 5/1/23), CVA and ESRD (diagnosed Jan 2019) on HD via LUE AVF T/Th/S (Nephrologist Dr Huff) with recent AV fistulogram 7/2023 with cephalic stenosis s/p balloon angioplasty. Past surgical history significant for open cholecystectomy, AVF creation and eye surgery in the past. Anuric.    s/p R DDRT under Simulect on 9/17/23    Interval Events:  POD1  Afebrile, asymptomatic HTN o/n  slow graft function, UO 40-70/h  JPs ss  pain controlled    Immunosuppression:  ENV per level, MMF 1/1, SST  Simulect on POD#4  Ongoing monitoring for signs of rejection    Potential Discharge date: TBD     Education:  Medications    Plan of care:  See Below                    Review of systems  Gen: No weight changes, fatigue, fevers/chills, weakness  Skin: No rashes  Head/Eyes/Ears/Mouth: No headache; Normal hearing; Normal vision w/o blurriness; No sinus pain/discomfort, sore throat  Respiratory: No dyspnea, cough, wheezing, hemoptysis  CV: No chest pain, PND, orthopnea  GI: Mild abdominal pain at surgical incision site; denies diarrhea, constipation, nausea, vomiting, melena, hematochezia  : No increased frequency, dysuria, hematuria, nocturia  MSK: No joint pain/swelling; no back pain; no edema  Neuro: No dizziness/lightheadedness, weakness, seizures, numbness, tingling  Heme: No easy bruising or bleeding  Endo: No heat/cold intolerance  Psych: No significant nervousness, anxiety, stress, depression  All other systems were reviewed and are negative, except as noted.      PHYSICAL EXAM:  Constitutional: Well developed / well nourished  Eyes: Anicteric, PERRLA  ENMT: nc/at  Neck: supple  Respiratory: CTA B/L  Cardiovascular: RRR  Gastrointestinal: Soft, non distended, mild tenderness at the incision site; incision c/d/i; JPss ss  Genitourinary: Urinary catheter in place, hematuria  Extremities: SCD's in place and working bilaterally, AVF  palpable  Vascular: Palpable dp pulses bilaterally  Neurological: A&O x3  Skin: no rashes, ulcerations or lesions;  Musculoskeletal: Moving all extremities  Psychiatric: Responsive     Transplant Surgery - Multidisciplinary Progress Note  --------------------------------------------------------------  DDRT 9/16/23 POD 1    Present:   Patient seen and examined with multidisciplinary Transplant team including  Surgeon: Dr. Lambert. ISSAC Maurer and unit RN during am rounds.  Disciplines not in attendance will be notified of the plan.     68 Lithuanian speaking M former smoker with past medical history significant for GERD, HTN, HLD, anemia of chronic disease, T2DM, CAD s/p stent x3 (2014 at [place]) & x2 (pLCx 10/28/22, LAD 10/31/22, off of plavix as of 5/1/23), CVA and ESRD (diagnosed Jan 2019) on HD via LUE AVF T/Th/S (Nephrologist Dr Huff) with recent AV fistulogram 7/2023 with cephalic stenosis s/p balloon angioplasty. Past surgical history significant for open cholecystectomy, AVF creation and eye surgery in the past. Anuric.    s/p R DDRT under Simulect on 9/17/23    Interval Events:  -POD1, renal doppler with good perfusion  -slow graft function, UO 40-70/h   -Hypertensive overnight   -JPs ss    Immunosuppression:  ENV per level, MMF 1/1, SST  Induction: Simulect on  POD#4  Ongoing monitoring for signs of rejection    Potential Discharge date: TBD   Education:  Medications  Plan of care:  See Below    MEDICATIONS  (STANDING):  aspirin enteric coated 81 milliGRAM(s) Oral daily  atorvastatin 40 milliGRAM(s) Oral at bedtime  carvedilol 25 milliGRAM(s) Oral every 12 hours  chlorhexidine 2% Cloths 1 Application(s) Topical daily  dextrose 5%. 1000 milliLiter(s) (50 mL/Hr) IV Continuous <Continuous>  dextrose 50% Injectable 25 Gram(s) IV Push once  furosemide   Injectable 80 milliGRAM(s) IV Push <User Schedule>  glucagon  Injectable 1 milliGRAM(s) IntraMuscular once  heparin   Injectable 5000 Unit(s) SubCutaneous every 12 hours  insulin lispro (ADMELOG) corrective regimen sliding scale   SubCutaneous four times a day before meals  methylPREDNISolone sodium succinate Injectable   IV Push   methylPREDNISolone sodium succinate Injectable 125 milliGRAM(s) IV Push two times a day  mycophenolate mofetil 1 Gram(s) Oral <User Schedule>  NIFEdipine XL 30 milliGRAM(s) Oral daily  nystatin    Suspension 241596 Unit(s) Swish and Swallow four times a day  pantoprazole    Tablet 40 milliGRAM(s) Oral before breakfast  senna 2 Tablet(s) Oral at bedtime  tacrolimus ER Tablet (ENVARSUS XR) 5 milliGRAM(s) Oral <User Schedule>  trimethoprim   80 mG/sulfamethoxazole 400 mG 1 Tablet(s) Oral daily  valGANciclovir 450 milliGRAM(s) Oral <User Schedule>    MEDICATIONS  (PRN):  acetaminophen     Tablet .. 650 milliGRAM(s) Oral every 6 hours PRN Mild Pain (1 - 3)  dextrose Oral Gel 15 Gram(s) Oral once PRN Blood Glucose LESS THAN 70 milliGRAM(s)/deciliter  ondansetron Injectable 4 milliGRAM(s) IV Push every 6 hours PRN Nausea and/or Vomiting  traMADol 50 milliGRAM(s) Oral every 4 hours PRN Moderate Pain (4 - 6)      PAST MEDICAL & SURGICAL HISTORY:  HTN (hypertension)  Coronary artery disease  CAP (community acquired pneumonia) 6/18  Diabetes mellitus type 2  GERD (gastroesophageal reflux disease)  Stented coronary artery 2014, 3 stents, [facility]  ESRD (end stage renal disease) on Dialysis( M/W/F), By Dr. Huff  Hypercholesterolemia  Cerebrovascular accident (CVA), unspecified mechanism diagnosed via CT of the head  Anemia due to stage 5 chronic kidney disease, not on chronic dialysis  H/O coronary angiogram 2014 - x3 stents  AV fistula 10/12/18 L radiocephalic AV fistula  H/O eye surgery    Vital Signs Last 24 Hrs  T(C): 37.1 (17 Sep 2023 12:35), Max: 37.1 (17 Sep 2023 12:35)  T(F): 98.7 (17 Sep 2023 12:35), Max: 98.7 (17 Sep 2023 12:35)  HR: 78 (17 Sep 2023 12:35) (68 - 91)  BP: 155/78 (17 Sep 2023 12:35) (121/65 - 212/94)  BP(mean): 112 (16 Sep 2023 21:00) (87 - 118)  RR: 17 (17 Sep 2023 12:35) (16 - 18)  SpO2: 96% (17 Sep 2023 12:35) (96% - 100%)    Parameters below as of 17 Sep 2023 12:35  Patient On (Oxygen Delivery Method): room air    I&O's Summary    16 Sep 2023 07:01  -  17 Sep 2023 07:00  --------------------------------------------------------  IN: 1290 mL / OUT: 1124 mL / NET: 166 mL    17 Sep 2023 07:01  -  17 Sep 2023 14:51  --------------------------------------------------------  IN: 500 mL / OUT: 437 mL / NET: 63 mL                       11.6   9.69  )-----------( 134      ( 17 Sep 2023 06:33 )             37.1     09-17    136  |  97  |  42<H>  ----------------------------<  166<H>  5.0   |  20<L>  |  7.89<H>    Ca    8.2<L>      17 Sep 2023 06:32  Phos  5.3     09-17  Mg     2.1     09-17    TPro  6.8  /  Alb  3.5  /  TBili  0.5  /  DBili  x   /  AST  21  /  ALT  11  /  AlkPhos  55  09-17    Tacrolimus (), Serum: 5.6 ng/mL (09-17 @ 06:35)    Review of systems  Gen: No weight changes, fatigue, fevers/chills, weakness  Skin: No rashes  Head/Eyes/Ears/Mouth: No headache; Normal hearing; Normal vision w/o blurriness; No sinus pain/discomfort, sore throat  Respiratory: No dyspnea, cough, wheezing, hemoptysis  CV: No chest pain, PND, orthopnea  GI: Mild abdominal pain at surgical incision site; denies diarrhea, constipation, nausea, vomiting, melena, hematochezia  : No increased frequency, dysuria, hematuria, nocturia  MSK: No joint pain/swelling; no back pain; no edema  Neuro: No dizziness/lightheadedness, weakness, seizures, numbness, tingling  Heme: No easy bruising or bleeding  Endo: No heat/cold intolerance  Psych: No significant nervousness, anxiety, stress, depression  All other systems were reviewed and are negative, except as noted.      PHYSICAL EXAM:  Constitutional: Well developed / well nourished  Eyes: Anicteric, PERRLA  ENMT: nc/at  Neck: supple  Respiratory: CTA B/L  Cardiovascular: RRR  Gastrointestinal: Soft, non distended, mild tenderness at the incision site; incision c/d/i; JPss ss  Genitourinary: Urinary catheter in place, hematuria  Extremities: SCD's in place and working bilaterally, AVF  palpable, + edema   Vascular: Palpable dp pulses bilaterally  Neurological: A&O x3  Skin: no rashes, ulcerations or lesions;  Musculoskeletal: Moving all extremities  Psychiatric: Responsive

## 2023-09-17 NOTE — PHYSICAL THERAPY INITIAL EVALUATION ADULT - PLANNED THERAPY INTERVENTIONS, PT EVAL
GOAL: Patient will be able to negotiate 7 steps in 2 weeks/bed mobility training/gait training/transfer training

## 2023-09-17 NOTE — PHYSICAL THERAPY INITIAL EVALUATION ADULT - ACTIVE RANGE OF MOTION EXAMINATION, REHAB EVAL
UE AROM limited 2/2 pain/bilateral upper extremity Active ROM was WFL (within functional limits)/bilateral  lower extremity Active ROM was WFL (within functional limits)

## 2023-09-17 NOTE — PROGRESS NOTE ADULT - ASSESSMENT
67 y/o M former smoker with past medical history significant for GERD, HTN, HLD, anemia of chronic disease, T2DM, CAD s/p stent x3 (2014 at [place]) & x2 (pLCx 10/28/22, LAD 10/31/22, off of plavix as of 5/1/23), CVA and ESRD (diagnosed Jan 2019) on HD via LUE AVF T/Th/S (Nephrologist Dr Huff) with recent AV fistulogram 7/2023 with cephalic stenosis s/p balloon angioplasty. Past surgical history significant for open cholecystectomy, AVF creation and eye surgery in the past. He presents to [facility] on 9/16/23 as renal transplant candidate.      ESRD On HD   outpt [facility]   s/p DDRT on 9/16/2023   Immunosuppression per transplant team  Monitor BMP and Urineoutpt  Monitor     HTN  BP fluctating  Monitor

## 2023-09-17 NOTE — PHYSICAL THERAPY INITIAL EVALUATION ADULT - PERTINENT HX OF CURRENT PROBLEM, REHAB EVAL
67 y/o M former smoker with past medical history significant for GERD, HTN, HLD, anemia of chronic disease, T2DM, CAD s/p stent x3 (2014 at [place]) & x2 (pLCx 10/28/22, LAD 10/31/22, off of plavix as of 5/1/23), CVA and ESRD (diagnosed Jan 2019) on HD via LUE AVF T/Th/S (Nephrologist Dr Huff) with recent AV fistulogram 7/2023 with cephalic stenosis s/p balloon angioplasty. Past surgical history significant for open cholecystectomy, AVF creation and eye surgery in the past. He presents to [facility] on 9/16/23 as renal transplant candidate. Hospital course: S/P DDRT R kidney 9/16

## 2023-09-17 NOTE — PROGRESS NOTE ADULT - SUBJECTIVE AND OBJECTIVE BOX
[facility] DIVISION OF KIDNEY DISEASES AND HYPERTENSION -- FOLLOW UP NOTE  --------------------------------------------------------------------------------  Chief Complaint:    24 hour events/subjective:  Patient was seen and examined at bedside  UOP ~ 800 cc so far     PAST HISTORY  --------------------------------------------------------------------------------  No significant changes to PMH, PSH, FHx, SHx, unless otherwise noted    ALLERGIES & MEDICATIONS  --------------------------------------------------------------------------------  Allergies    No Known Allergies    Intolerances      Standing Inpatient Medications  atorvastatin 40 milliGRAM(s) Oral at bedtime  carvedilol 25 milliGRAM(s) Oral every 12 hours  chlorhexidine 2% Cloths 1 Application(s) Topical daily  dextrose 5%. 1000 milliLiter(s) IV Continuous <Continuous>  dextrose 50% Injectable 25 Gram(s) IV Push once  glucagon  Injectable 1 milliGRAM(s) IntraMuscular once  insulin lispro (ADMELOG) corrective regimen sliding scale   SubCutaneous every 4 hours  methylPREDNISolone sodium succinate Injectable   IV Push   methylPREDNISolone sodium succinate Injectable 125 milliGRAM(s) IV Push two times a day  mycophenolate mofetil 1 Gram(s) Oral every 12 hours  NIFEdipine XL 30 milliGRAM(s) Oral daily  nystatin    Suspension 019961 Unit(s) Swish and Swallow four times a day  pantoprazole    Tablet 40 milliGRAM(s) Oral before breakfast  senna 2 Tablet(s) Oral at bedtime  sodium chloride 0.9%. 1000 milliLiter(s) IV Continuous <Continuous>  sodium chloride 0.9%. 1000 milliLiter(s) IV Continuous <Continuous>  tacrolimus ER Tablet (ENVARSUS XR) 5 milliGRAM(s) Oral <User Schedule>  trimethoprim   80 mG/sulfamethoxazole 400 mG 1 Tablet(s) Oral daily  valGANciclovir 450 milliGRAM(s) Oral <User Schedule>    PRN Inpatient Medications  acetaminophen     Tablet .. 650 milliGRAM(s) Oral every 6 hours PRN  dextrose Oral Gel 15 Gram(s) Oral once PRN  ondansetron Injectable 4 milliGRAM(s) IV Push every 6 hours PRN  traMADol 50 milliGRAM(s) Oral every 4 hours PRN      REVIEW OF SYSTEMS  --------------------------------------------------------------------------------  Gen: No fatigue, fevers/chills, weakness  Skin: No rashes  Head/Eyes/Ears/Mouth: No headache;No sore throat  Respiratory: No dyspnea, cough,   CV: No chest pain, PND, orthopnea  GI: No abdominal pain, diarrhea, constipation, nausea, vomiting  Transplant: No pain  : No increased frequency, dysuria, hematuria, nocturia  MSK: No joint pain/swelling; no back pain; no edema  Neuro: No dizziness/lightheadedness, weakness, seizures, numbness, tingling  Psych: No significant nervousness, anxiety, stress, depression    All other systems were reviewed and are negative, except as noted.    VITALS/PHYSICAL EXAM  --------------------------------------------------------------------------------  T(C): 36.9 (09-17-23 @ 05:00), Max: 36.9 (09-16-23 @ 22:30)  HR: 81 (09-17-23 @ 05:00) (68 - 91)  BP: 155/72 (09-17-23 @ 05:00) (120/62 - 212/94)  RR: 18 (09-17-23 @ 05:00) (16 - 18)  SpO2: 99% (09-17-23 @ 05:00) (96% - 100%)  Wt(kg): --  Height (cm): 167.6 (09-16-23 @ 09:41)  Weight (kg): 65.1 (09-16-23 @ 09:41)  BMI (kg/m2): 23.2 (09-16-23 @ 09:41)  BSA (m2): 1.74 (09-16-23 @ 09:41)      09-16-23 @ 07:01  -  09-17-23 @ 07:00  --------------------------------------------------------  IN: 1290 mL / OUT: 1124 mL / NET: 166 mL      Physical Exam:  	Gen: NAD  	HEENT: PERRL, supple neck, clear oropharynx  	Pulm: CTA B/L  	CV: RRR, S1S2; no rub  	Back: No spinal or CVA tenderness; no sacral edema  	Abd: +BS, soft, nontender/nondistended                      Transplant: No tenderness, swelling  	: No suprapubic tenderness  	UE: Warm, FROM; no edema; no asterixis  	LE: Warm, FROM; no edema  	Neuro: No focal deficits  	Psych: Normal affect and mood  	Skin: Warm, without rashes      LABS/STUDIES  --------------------------------------------------------------------------------              11.6   9.69  >-----------<  134      [09-17-23 @ 06:33]              37.1     136  |  97  |  42  ----------------------------<  166      [09-17-23 @ 06:32]  5.0   |  20  |  7.89        Ca     8.2     [09-17-23 @ 06:32]      Mg     2.1     [09-17-23 @ 06:32]      Phos  5.3     [09-17-23 @ 06:32]    TPro  6.8  /  Alb  3.5  /  TBili  0.5  /  DBili  x   /  AST  21  /  ALT  11  /  AlkPhos  55  [09-17-23 @ 06:32]    PT/INR: PT 10.0 , INR 0.91       [09-16-23 @ 04:41]  PTT: 26.0       [09-16-23 @ 04:41]      Creatinine Trend:  SCr 7.89 [09-17 @ 06:32]  SCr 7.55 [09-16 @ 20:22]  SCr 7.40 [09-16 @ 14:35]  SCr 10.92 [09-16 @ 04:41]  SCr 9.97 [08-18 @ 16:23]              Urinalysis - [09-17-23 @ 06:32]      Color  / Appearance  / SG  / pH       Gluc 166 / Ketone   / Bili  / Urobili        Blood  / Protein  / Leuk Est  / Nitrite       RBC  / WBC  / Hyaline  / Gran  / Sq Epi  / Non Sq Epi  / Bacteria       HbA1c 7.8      [12-17-19 @ 05:54]    HBsAb 6.6      [09-16-23 @ 04:41]  HBsAg Nonreact      [09-16-23 @ 04:41]  HBcAb Nonreact      [09-16-23 @ 04:41]  HCV 0.10, Nonreact      [09-16-23 @ 04:41]  HIV Nonreact      [09-16-23 @ 04:41]

## 2023-09-18 LAB
ALBUMIN SERPL ELPH-MCNC: 3.5 G/DL — SIGNIFICANT CHANGE UP (ref 3.3–5)
ALP SERPL-CCNC: 57 U/L — SIGNIFICANT CHANGE UP (ref 40–120)
ALT FLD-CCNC: 9 U/L — LOW (ref 10–45)
ANION GAP SERPL CALC-SCNC: 21 MMOL/L — HIGH (ref 5–17)
AST SERPL-CCNC: 19 U/L — SIGNIFICANT CHANGE UP (ref 10–40)
BASOPHILS # BLD AUTO: 0.01 K/UL — SIGNIFICANT CHANGE UP (ref 0–0.2)
BASOPHILS NFR BLD AUTO: 0.1 % — SIGNIFICANT CHANGE UP (ref 0–2)
BILIRUB SERPL-MCNC: 0.6 MG/DL — SIGNIFICANT CHANGE UP (ref 0.2–1.2)
BUN SERPL-MCNC: 66 MG/DL — HIGH (ref 7–23)
CALCIUM SERPL-MCNC: 8.5 MG/DL — SIGNIFICANT CHANGE UP (ref 8.4–10.5)
CHLORIDE SERPL-SCNC: 95 MMOL/L — LOW (ref 96–108)
CO2 SERPL-SCNC: 20 MMOL/L — LOW (ref 22–31)
CREAT FLD-MCNC: 6.92 MG/DL — SIGNIFICANT CHANGE UP
CREAT FLD-MCNC: 7.33 MG/DL — SIGNIFICANT CHANGE UP
CREAT SERPL-MCNC: 6.9 MG/DL — HIGH (ref 0.5–1.3)
EGFR: 8 ML/MIN/1.73M2 — LOW
EOSINOPHIL # BLD AUTO: 0.07 K/UL — SIGNIFICANT CHANGE UP (ref 0–0.5)
EOSINOPHIL NFR BLD AUTO: 0.7 % — SIGNIFICANT CHANGE UP (ref 0–6)
GLUCOSE BLDC GLUCOMTR-MCNC: 206 MG/DL — HIGH (ref 70–99)
GLUCOSE BLDC GLUCOMTR-MCNC: 262 MG/DL — HIGH (ref 70–99)
GLUCOSE BLDC GLUCOMTR-MCNC: 286 MG/DL — HIGH (ref 70–99)
GLUCOSE BLDC GLUCOMTR-MCNC: 308 MG/DL — HIGH (ref 70–99)
GLUCOSE SERPL-MCNC: 254 MG/DL — HIGH (ref 70–99)
HCT VFR BLD CALC: 34.3 % — LOW (ref 39–50)
HGB BLD-MCNC: 10.7 G/DL — LOW (ref 13–17)
IMM GRANULOCYTES NFR BLD AUTO: 0.6 % — SIGNIFICANT CHANGE UP (ref 0–0.9)
LYMPHOCYTES # BLD AUTO: 0.67 K/UL — LOW (ref 1–3.3)
LYMPHOCYTES # BLD AUTO: 6.9 % — LOW (ref 13–44)
MAGNESIUM SERPL-MCNC: 2.1 MG/DL — SIGNIFICANT CHANGE UP (ref 1.6–2.6)
MCHC RBC-ENTMCNC: 30.9 PG — SIGNIFICANT CHANGE UP (ref 27–34)
MCHC RBC-ENTMCNC: 31.2 GM/DL — LOW (ref 32–36)
MCV RBC AUTO: 99.1 FL — SIGNIFICANT CHANGE UP (ref 80–100)
MONOCYTES # BLD AUTO: 0.7 K/UL — SIGNIFICANT CHANGE UP (ref 0–0.9)
MONOCYTES NFR BLD AUTO: 7.3 % — SIGNIFICANT CHANGE UP (ref 2–14)
NEUTROPHILS # BLD AUTO: 8.14 K/UL — HIGH (ref 1.8–7.4)
NEUTROPHILS NFR BLD AUTO: 84.4 % — HIGH (ref 43–77)
NRBC # BLD: 0 /100 WBCS — SIGNIFICANT CHANGE UP (ref 0–0)
PHOSPHATE SERPL-MCNC: 7.2 MG/DL — HIGH (ref 2.5–4.5)
PLATELET # BLD AUTO: 117 K/UL — LOW (ref 150–400)
POTASSIUM SERPL-MCNC: 4.1 MMOL/L — SIGNIFICANT CHANGE UP (ref 3.5–5.3)
POTASSIUM SERPL-SCNC: 4.1 MMOL/L — SIGNIFICANT CHANGE UP (ref 3.5–5.3)
PROT SERPL-MCNC: 6.7 G/DL — SIGNIFICANT CHANGE UP (ref 6–8.3)
RBC # BLD: 3.46 M/UL — LOW (ref 4.2–5.8)
RBC # FLD: 16 % — HIGH (ref 10.3–14.5)
SODIUM SERPL-SCNC: 136 MMOL/L — SIGNIFICANT CHANGE UP (ref 135–145)
TACROLIMUS SERPL-MCNC: 9.8 NG/ML — SIGNIFICANT CHANGE UP
WBC # BLD: 9.65 K/UL — SIGNIFICANT CHANGE UP (ref 3.8–10.5)
WBC # FLD AUTO: 9.65 K/UL — SIGNIFICANT CHANGE UP (ref 3.8–10.5)

## 2023-09-18 PROCEDURE — 99232 SBSQ HOSP IP/OBS MODERATE 35: CPT

## 2023-09-18 PROCEDURE — 99232 SBSQ HOSP IP/OBS MODERATE 35: CPT | Mod: GC,24

## 2023-09-18 RX ORDER — ATORVASTATIN CALCIUM 40 MG/1
40 TABLET, FILM COATED ORAL
Qty: 30 | Refills: 5 | Status: ACTIVE | COMMUNITY
Start: 2023-09-18 | End: 1900-01-01

## 2023-09-18 RX ORDER — VALGANCICLOVIR HYDROCHLORIDE 450 MG/1
450 TABLET ORAL
Qty: 30 | Refills: 2 | Status: ACTIVE | COMMUNITY
Start: 2023-09-18 | End: 1900-01-01

## 2023-09-18 RX ORDER — LANOLIN ALCOHOL/MO/W.PET/CERES
5 CREAM (GRAM) TOPICAL ONCE
Refills: 0 | Status: COMPLETED | OUTPATIENT
Start: 2023-09-18 | End: 2023-09-18

## 2023-09-18 RX ORDER — SENNOSIDES 8.6 MG TABLETS 8.6 MG/1
8.6 TABLET ORAL
Qty: 30 | Refills: 3 | Status: ACTIVE | COMMUNITY
Start: 2023-09-18 | End: 1900-01-01

## 2023-09-18 RX ORDER — SIMETHICONE 80 MG/1
80 TABLET, CHEWABLE ORAL ONCE
Refills: 0 | Status: COMPLETED | OUTPATIENT
Start: 2023-09-18 | End: 2023-09-18

## 2023-09-18 RX ADMIN — TRAMADOL HYDROCHLORIDE 50 MILLIGRAM(S): 50 TABLET ORAL at 18:55

## 2023-09-18 RX ADMIN — ATORVASTATIN CALCIUM 40 MILLIGRAM(S): 80 TABLET, FILM COATED ORAL at 20:08

## 2023-09-18 RX ADMIN — TRAMADOL HYDROCHLORIDE 50 MILLIGRAM(S): 50 TABLET ORAL at 08:28

## 2023-09-18 RX ADMIN — Medication 60 MILLIGRAM(S): at 17:50

## 2023-09-18 RX ADMIN — TRAMADOL HYDROCHLORIDE 50 MILLIGRAM(S): 50 TABLET ORAL at 17:55

## 2023-09-18 RX ADMIN — TRAMADOL HYDROCHLORIDE 50 MILLIGRAM(S): 50 TABLET ORAL at 13:57

## 2023-09-18 RX ADMIN — HEPARIN SODIUM 5000 UNIT(S): 5000 INJECTION INTRAVENOUS; SUBCUTANEOUS at 17:50

## 2023-09-18 RX ADMIN — TACROLIMUS 5 MILLIGRAM(S): 5 CAPSULE ORAL at 08:28

## 2023-09-18 RX ADMIN — POLYETHYLENE GLYCOL 3350 17 GRAM(S): 17 POWDER, FOR SOLUTION ORAL at 13:18

## 2023-09-18 RX ADMIN — Medication 5 MILLIGRAM(S): at 23:37

## 2023-09-18 RX ADMIN — MYCOPHENOLATE MOFETIL 1 GRAM(S): 250 CAPSULE ORAL at 19:36

## 2023-09-18 RX ADMIN — Medication 60 MILLIGRAM(S): at 06:13

## 2023-09-18 RX ADMIN — Medication 500000 UNIT(S): at 06:13

## 2023-09-18 RX ADMIN — TRAMADOL HYDROCHLORIDE 50 MILLIGRAM(S): 50 TABLET ORAL at 14:57

## 2023-09-18 RX ADMIN — TRAMADOL HYDROCHLORIDE 50 MILLIGRAM(S): 50 TABLET ORAL at 09:28

## 2023-09-18 RX ADMIN — MYCOPHENOLATE MOFETIL 1 GRAM(S): 250 CAPSULE ORAL at 08:28

## 2023-09-18 RX ADMIN — Medication 6: at 13:18

## 2023-09-18 RX ADMIN — HEPARIN SODIUM 5000 UNIT(S): 5000 INJECTION INTRAVENOUS; SUBCUTANEOUS at 06:13

## 2023-09-18 RX ADMIN — Medication 500000 UNIT(S): at 17:50

## 2023-09-18 RX ADMIN — CHLORHEXIDINE GLUCONATE 1 APPLICATION(S): 213 SOLUTION TOPICAL at 13:28

## 2023-09-18 RX ADMIN — Medication 10 MILLIGRAM(S): at 09:41

## 2023-09-18 RX ADMIN — Medication 6: at 09:12

## 2023-09-18 RX ADMIN — Medication 500000 UNIT(S): at 13:18

## 2023-09-18 RX ADMIN — PANTOPRAZOLE SODIUM 40 MILLIGRAM(S): 20 TABLET, DELAYED RELEASE ORAL at 06:13

## 2023-09-18 RX ADMIN — Medication 30 MILLIGRAM(S): at 08:28

## 2023-09-18 RX ADMIN — CARVEDILOL PHOSPHATE 25 MILLIGRAM(S): 80 CAPSULE, EXTENDED RELEASE ORAL at 17:50

## 2023-09-18 RX ADMIN — SENNA PLUS 2 TABLET(S): 8.6 TABLET ORAL at 20:08

## 2023-09-18 RX ADMIN — VALGANCICLOVIR 450 MILLIGRAM(S): 450 TABLET, FILM COATED ORAL at 08:28

## 2023-09-18 RX ADMIN — Medication 81 MILLIGRAM(S): at 13:18

## 2023-09-18 RX ADMIN — TRAMADOL HYDROCHLORIDE 50 MILLIGRAM(S): 50 TABLET ORAL at 04:20

## 2023-09-18 RX ADMIN — Medication 8: at 17:51

## 2023-09-18 RX ADMIN — Medication 4: at 22:25

## 2023-09-18 RX ADMIN — Medication 500000 UNIT(S): at 00:04

## 2023-09-18 RX ADMIN — Medication 1 TABLET(S): at 13:18

## 2023-09-18 RX ADMIN — TRAMADOL HYDROCHLORIDE 50 MILLIGRAM(S): 50 TABLET ORAL at 05:17

## 2023-09-18 RX ADMIN — SIMETHICONE 80 MILLIGRAM(S): 80 TABLET, CHEWABLE ORAL at 20:08

## 2023-09-18 RX ADMIN — Medication 10 MILLIGRAM(S): at 13:19

## 2023-09-18 NOTE — DIETITIAN INITIAL EVALUATION ADULT - ADD RECOMMEND
1) Continue  Consistent Carbohydrate Renal diet. Defer diet/texture modification to medical team/SLP as indicated    2) Encourage adequate PO intakes to support post-surgical healing.    3) Reinforce post-transplant nutrition therapy and food safety guidelines in-house and prior to discharge.   4) Discharge diet: Continue as above. Recommend follow up visit with Transplant MD and outpatient RD for dietary modifications as warranted.  5) Monitor PO intake,  Urine Output, GI tolerance, skin integrity,and labs. RD remains available if needed, pt is aware.

## 2023-09-18 NOTE — PROGRESS NOTE ADULT - SUBJECTIVE AND OBJECTIVE BOX
[facility]   MD JORDAN AVALOS MD RUORU WONG, PA    TEL:  FROM 9 AM to 5 PM ---OFFICE: [phone]    FROM 5 PM - 9 AM PLEASE CALL ANSWERING SERVICE: [phone]    RENAL FOLLOW UP NOTE--Date of Service 09-18-23 @ 11:00  --------------------------------------------------------------------------------  HPI:  Pt seen and examined at bedside.       PAST HISTORY  --------------------------------------------------------------------------------  No significant changes to PMH, PSH, FHx, SHx, unless otherwise noted    ALLERGIES & MEDICATIONS  --------------------------------------------------------------------------------  Allergies    No Known Allergies    Intolerances      Standing Inpatient Medications  aspirin enteric coated 81 milliGRAM(s) Oral daily  atorvastatin 40 milliGRAM(s) Oral at bedtime  bisacodyl Suppository 10 milliGRAM(s) Rectal once  carvedilol 25 milliGRAM(s) Oral every 12 hours  chlorhexidine 2% Cloths 1 Application(s) Topical daily  dextrose 5%. 1000 milliLiter(s) IV Continuous <Continuous>  dextrose 50% Injectable 25 Gram(s) IV Push once  glucagon  Injectable 1 milliGRAM(s) IntraMuscular once  heparin   Injectable 5000 Unit(s) SubCutaneous every 12 hours  insulin lispro (ADMELOG) corrective regimen sliding scale   SubCutaneous four times a day before meals  methylPREDNISolone sodium succinate Injectable 60 milliGRAM(s) IV Push two times a day  methylPREDNISolone sodium succinate Injectable   IV Push   mycophenolate mofetil 1 Gram(s) Oral <User Schedule>  NIFEdipine XL 30 milliGRAM(s) Oral daily  nystatin    Suspension 808189 Unit(s) Swish and Swallow four times a day  pantoprazole    Tablet 40 milliGRAM(s) Oral before breakfast  polyethylene glycol 3350 17 Gram(s) Oral daily  senna 2 Tablet(s) Oral at bedtime  trimethoprim   80 mG/sulfamethoxazole 400 mG 1 Tablet(s) Oral daily  valGANciclovir 450 milliGRAM(s) Oral <User Schedule>    PRN Inpatient Medications  acetaminophen     Tablet .. 650 milliGRAM(s) Oral every 6 hours PRN  dextrose Oral Gel 15 Gram(s) Oral once PRN  ondansetron Injectable 4 milliGRAM(s) IV Push every 6 hours PRN  traMADol 50 milliGRAM(s) Oral every 4 hours PRN      REVIEW OF SYSTEMS  --------------------------------------------------------------------------------  General: no fever  MSK: no edema     VITALS/PHYSICAL EXAM  --------------------------------------------------------------------------------  T(C): 36.3 (09-18-23 @ 08:00), Max: 37.1 (09-17-23 @ 12:35)  HR: 71 (09-18-23 @ 08:00) (70 - 78)  BP: 119/69 (09-18-23 @ 08:00) (119/69 - 155/78)  RR: 16 (09-18-23 @ 08:00) (16 - 18)  SpO2: 92% (09-18-23 @ 08:00) (92% - 96%)  Wt(kg): --        09-17-23 @ 07:01  -  09-18-23 @ 07:00  --------------------------------------------------------  IN: 1460 mL / OUT: 1577 mL / NET: -117 mL    09-18-23 @ 07:01  -  09-18-23 @ 11:00  --------------------------------------------------------  IN: 240 mL / OUT: 85 mL / NET: 155 mL      Physical Exam:  	Gen: NAD  	HEENT: MMM  	Pulm: CTA B/L  	CV: S1S2  	Abd: Soft, +BS  	Ext: No LE edema B/L                      Neuro: Awake   	Skin: Warm and Dry   	Vascular accesavmitchell hinds  LABS/STUDIES  --------------------------------------------------------------------------------              10.7   9.65  >-----------<  117      [09-18-23 @ 07:02]              34.3     136  |  95  |  66  ----------------------------<  254      [09-18-23 @ 06:59]  4.1   |  20  |  6.90        Ca     8.5     [09-18-23 @ 06:59]      Mg     2.1     [09-18-23 @ 06:59]      Phos  7.2     [09-18-23 @ 06:59]    TPro  6.7  /  Alb  3.5  /  TBili  0.6  /  DBili  x   /  AST  19  /  ALT  9   /  AlkPhos  57  [09-18-23 @ 06:59]          Creatinine Trend:  SCr 6.90 [09-18 @ 06:59]  SCr 7.89 [09-17 @ 06:32]  SCr 7.55 [09-16 @ 20:22]  SCr 7.40 [09-16 @ 14:35]  SCr 10.92 [09-16 @ 04:41]    Urinalysis - [09-18-23 @ 06:59]      Color  / Appearance  / SG  / pH       Gluc 254 / Ketone   / Bili  / Urobili        Blood  / Protein  / Leuk Est  / Nitrite       RBC  / WBC  / Hyaline  / Gran  / Sq Epi  / Non Sq Epi  / Bacteria       HbA1c 7.8      [12-17-19 @ 05:54]    HBsAb 6.6      [09-16-23 @ 04:41]  HBsAg Nonreact      [09-16-23 @ 04:41]  HBcAb Nonreact      [09-16-23 @ 04:41]  HCV 0.10, Nonreact      [09-16-23 @ 04:41]  HIV Nonreact      [09-16-23 @ 04:41]

## 2023-09-18 NOTE — PROGRESS NOTE ADULT - SUBJECTIVE AND OBJECTIVE BOX
[facility] DIVISION OF KIDNEY DISEASES AND HYPERTENSION -- FOLLOW UP NOTE  --------------------------------------------------------------------------------  Chief Complaint:    24 hour events/subjective:  Patient was seen and examined at bedside  Had good UOP and Cr is trending down        PAST HISTORY  --------------------------------------------------------------------------------  No significant changes to PMH, PSH, FHx, SHx, unless otherwise noted    ALLERGIES & MEDICATIONS  --------------------------------------------------------------------------------  Allergies    No Known Allergies    Intolerances      Standing Inpatient Medications  aspirin enteric coated 81 milliGRAM(s) Oral daily  atorvastatin 40 milliGRAM(s) Oral at bedtime  bisacodyl Suppository 10 milliGRAM(s) Rectal once  carvedilol 25 milliGRAM(s) Oral every 12 hours  chlorhexidine 2% Cloths 1 Application(s) Topical daily  dextrose 5%. 1000 milliLiter(s) IV Continuous <Continuous>  dextrose 50% Injectable 25 Gram(s) IV Push once  glucagon  Injectable 1 milliGRAM(s) IntraMuscular once  heparin   Injectable 5000 Unit(s) SubCutaneous every 12 hours  insulin lispro (ADMELOG) corrective regimen sliding scale   SubCutaneous four times a day before meals  methylPREDNISolone sodium succinate Injectable 60 milliGRAM(s) IV Push two times a day  methylPREDNISolone sodium succinate Injectable   IV Push   mycophenolate mofetil 1 Gram(s) Oral <User Schedule>  NIFEdipine XL 30 milliGRAM(s) Oral daily  nystatin    Suspension 251223 Unit(s) Swish and Swallow four times a day  pantoprazole    Tablet 40 milliGRAM(s) Oral before breakfast  polyethylene glycol 3350 17 Gram(s) Oral daily  senna 2 Tablet(s) Oral at bedtime  trimethoprim   80 mG/sulfamethoxazole 400 mG 1 Tablet(s) Oral daily  valGANciclovir 450 milliGRAM(s) Oral <User Schedule>    PRN Inpatient Medications  acetaminophen     Tablet .. 650 milliGRAM(s) Oral every 6 hours PRN  dextrose Oral Gel 15 Gram(s) Oral once PRN  ondansetron Injectable 4 milliGRAM(s) IV Push every 6 hours PRN  traMADol 50 milliGRAM(s) Oral every 4 hours PRN      REVIEW OF SYSTEMS  --------------------------------------------------------------------------------  Gen: No fatigue, fevers/chills, weakness  Skin: No rashes  Head/Eyes/Ears/Mouth: No headache;No sore throat  Respiratory: No dyspnea, cough,   CV: No chest pain, PND, orthopnea  GI: No abdominal pain, diarrhea, constipation, nausea, vomiting  Transplant: No pain  : No increased frequency, dysuria, hematuria, nocturia  MSK: No joint pain/swelling; no back pain; no edema  Neuro: No dizziness/lightheadedness, weakness, seizures, numbness, tingling  Psych: No significant nervousness, anxiety, stress, depression    All other systems were reviewed and are negative, except as noted.    VITALS/PHYSICAL EXAM  --------------------------------------------------------------------------------  T(C): 36.3 (09-18-23 @ 08:00), Max: 37.1 (09-17-23 @ 12:35)  HR: 71 (09-18-23 @ 08:00) (70 - 78)  BP: 119/69 (09-18-23 @ 08:00) (119/69 - 155/78)  RR: 16 (09-18-23 @ 08:00) (16 - 18)  SpO2: 92% (09-18-23 @ 08:00) (92% - 96%)  Wt(kg): --        09-17-23 @ 07:01  -  09-18-23 @ 07:00  --------------------------------------------------------  IN: 1460 mL / OUT: 1577 mL / NET: -117 mL    09-18-23 @ 07:01  -  09-18-23 @ 11:11  --------------------------------------------------------  IN: 240 mL / OUT: 85 mL / NET: 155 mL      Physical Exam:  	Gen: NAD  	HEENT: PERRL, supple neck, clear oropharynx  	Pulm: CTA B/L  	CV: RRR, S1S2; no rub  	Back: No spinal or CVA tenderness; no sacral edema  	Abd: +BS, soft, nontender/nondistended                      Transplant: No tenderness, swelling  	: No suprapubic tenderness  	UE: Warm, FROM; no edema; no asterixis  	LE: Warm, FROM; no edema  	Neuro: No focal deficits  	Psych: Normal affect and mood  	Skin: Warm, without rashes      LABS/STUDIES  --------------------------------------------------------------------------------              10.7   9.65  >-----------<  117      [09-18-23 @ 07:02]              34.3     136  |  95  |  66  ----------------------------<  254      [09-18-23 @ 06:59]  4.1   |  20  |  6.90        Ca     8.5     [09-18-23 @ 06:59]      Mg     2.1     [09-18-23 @ 06:59]      Phos  7.2     [09-18-23 @ 06:59]    TPro  6.7  /  Alb  3.5  /  TBili  0.6  /  DBili  x   /  AST  19  /  ALT  9   /  AlkPhos  57  [09-18-23 @ 06:59]          Creatinine Trend:  SCr 6.90 [09-18 @ 06:59]  SCr 7.89 [09-17 @ 06:32]  SCr 7.55 [09-16 @ 20:22]  SCr 7.40 [09-16 @ 14:35]  SCr 10.92 [09-16 @ 04:41]    Tacrolimus (), Serum: 9.8 ng/mL (09-18 @ 07:08)  Tacrolimus (), Serum: 5.6 ng/mL (09-17 @ 06:35)        Urinalysis - [09-18-23 @ 06:59]      Color  / Appearance  / SG  / pH       Gluc 254 / Ketone   / Bili  / Urobili        Blood  / Protein  / Leuk Est  / Nitrite       RBC  / WBC  / Hyaline  / Gran  / Sq Epi  / Non Sq Epi  / Bacteria       HbA1c 7.8      [12-17-19 @ 05:54]    HBsAb 6.6      [09-16-23 @ 04:41]  HBsAg Nonreact      [09-16-23 @ 04:41]  HBcAb Nonreact      [09-16-23 @ 04:41]  HCV 0.10, Nonreact      [09-16-23 @ 04:41]  HIV Nonreact      [09-16-23 @ 04:41]

## 2023-09-18 NOTE — PROGRESS NOTE ADULT - SUBJECTIVE AND OBJECTIVE BOX
Transplant Surgery - Multidisciplinary Progress Note  --------------------------------------------------------------  DDRT 9/16/23 POD #2    Present:   Patient seen and examined with multidisciplinary Transplant team including  Surgeon: Dr. Rivera. Transplant Nephrologist: Dr. INDER Bae; ISSAC Whaley, Pharmacist: Maria Alejandra and unit RN during am rounds.  Disciplines not in attendance will be notified of the plan.     68 Yi speaking M former smoker with past medical history significant for GERD, HTN, HLD, anemia of chronic disease, T2DM, CAD s/p stent x3 (2014 at [place]) & x2 (pLCx 10/28/22, LAD 10/31/22, off of plavix as of 5/1/23), CVA and ESRD (diagnosed Jan 2019) on HD via LUE AVF T/Th/S (Nephrologist Dr Huff) with recent AV fistulogram 7/2023 with cephalic stenosis s/p balloon angioplasty. Past surgical history significant for open cholecystectomy, AVF creation and eye surgery in the past. Anuric.    s/p R DDRT under Simulect on 9/17/23    Interval Events:  - POD#2, UOP 1.35L   - given lasix 80 mg IV x2 yesterday with good UOP  - distended this AM with no flatus or BM yet    Immunosuppression:  ENV per level, MMF 1/1, SST  Induction: Simulect on  POD#4  Ongoing monitoring for signs of rejection    Potential Discharge date: TBD   Education:  Medications  Plan of care:  See Below      MEDICATIONS  (STANDING):  aspirin enteric coated 81 milliGRAM(s) Oral daily  atorvastatin 40 milliGRAM(s) Oral at bedtime  bisacodyl Suppository 10 milliGRAM(s) Rectal once  carvedilol 25 milliGRAM(s) Oral every 12 hours  chlorhexidine 2% Cloths 1 Application(s) Topical daily  dextrose 5%. 1000 milliLiter(s) (50 mL/Hr) IV Continuous <Continuous>  dextrose 50% Injectable 25 Gram(s) IV Push once  glucagon  Injectable 1 milliGRAM(s) IntraMuscular once  heparin   Injectable 5000 Unit(s) SubCutaneous every 12 hours  insulin lispro (ADMELOG) corrective regimen sliding scale   SubCutaneous four times a day before meals  methylPREDNISolone sodium succinate Injectable 60 milliGRAM(s) IV Push two times a day  methylPREDNISolone sodium succinate Injectable   IV Push   mycophenolate mofetil 1 Gram(s) Oral <User Schedule>  NIFEdipine XL 30 milliGRAM(s) Oral daily  nystatin    Suspension 381522 Unit(s) Swish and Swallow four times a day  pantoprazole    Tablet 40 milliGRAM(s) Oral before breakfast  polyethylene glycol 3350 17 Gram(s) Oral daily  senna 2 Tablet(s) Oral at bedtime  trimethoprim   80 mG/sulfamethoxazole 400 mG 1 Tablet(s) Oral daily  valGANciclovir 450 milliGRAM(s) Oral <User Schedule>    MEDICATIONS  (PRN):  acetaminophen     Tablet .. 650 milliGRAM(s) Oral every 6 hours PRN Mild Pain (1 - 3)  dextrose Oral Gel 15 Gram(s) Oral once PRN Blood Glucose LESS THAN 70 milliGRAM(s)/deciliter  ondansetron Injectable 4 milliGRAM(s) IV Push every 6 hours PRN Nausea and/or Vomiting  traMADol 50 milliGRAM(s) Oral every 4 hours PRN Moderate Pain (4 - 6)      PAST MEDICAL & SURGICAL HISTORY:  HTN (hypertension)      Coronary artery disease      CAP (community acquired pneumonia)  6/18      Diabetes mellitus  type 2      GERD (gastroesophageal reflux disease)      Stented coronary artery  2014, 3 stents, [facility]      ESRD (end stage renal disease)  on Dialysis( M/W/F), By Dr. Huff      Hypercholesterolemia      Cerebrovascular accident (CVA), unspecified mechanism  diagnosed via CT of the head      Anemia due to stage 5 chronic kidney disease, not on chronic dialysis      H/O coronary angiogram  2014 - x3 stents      AV fistula  10/12/18 L radiocephalic AV fistula      H/O eye surgery          Vital Signs Last 24 Hrs  T(C): 36.3 (18 Sep 2023 08:00), Max: 37.1 (17 Sep 2023 12:35)  T(F): 97.4 (18 Sep 2023 08:00), Max: 98.7 (17 Sep 2023 12:35)  HR: 71 (18 Sep 2023 08:00) (70 - 78)  BP: 119/69 (18 Sep 2023 08:00) (119/69 - 155/78)  BP(mean): --  RR: 16 (18 Sep 2023 08:00) (16 - 18)  SpO2: 92% (18 Sep 2023 08:00) (92% - 96%)    Parameters below as of 18 Sep 2023 08:00  Patient On (Oxygen Delivery Method): room air        I&O's Summary    17 Sep 2023 07:01  -  18 Sep 2023 07:00  --------------------------------------------------------  IN: 1460 mL / OUT: 1577 mL / NET: -117 mL    18 Sep 2023 07:01  -  18 Sep 2023 10:40  --------------------------------------------------------  IN: 240 mL / OUT: 85 mL / NET: 155 mL                          10.7   9.65  )-----------( 117      ( 18 Sep 2023 07:02 )             34.3     09-18    136  |  95<L>  |  66<H>  ----------------------------<  254<H>  4.1   |  20<L>  |  6.90<H>    Ca    8.5      18 Sep 2023 06:59  Phos  7.2     09-18  Mg     2.1     09-18    TPro  6.7  /  Alb  3.5  /  TBili  0.6  /  DBili  x   /  AST  19  /  ALT  9<L>  /  AlkPhos  57  09-18    Tacrolimus (), Serum: 9.8 ng/mL (09-18 @ 07:08)      Review of systems  Gen: No weight changes, fatigue, fevers/chills, weakness  Skin: No rashes  Head/Eyes/Ears/Mouth: No headache; Normal hearing; Normal vision w/o blurriness; No sinus pain/discomfort, sore throat  Respiratory: No dyspnea, cough, wheezing, hemoptysis  CV: No chest pain, PND, orthopnea  GI: Mild abdominal pain at surgical incision site; denies diarrhea, constipation, nausea, vomiting, melena, hematochezia  : No increased frequency, dysuria, hematuria, nocturia  MSK: No joint pain/swelling; no back pain; no edema  Neuro: No dizziness/lightheadedness, weakness, seizures, numbness, tingling  Heme: No easy bruising or bleeding  Endo: No heat/cold intolerance  Psych: No significant nervousness, anxiety, stress, depression  All other systems were reviewed and are negative, except as noted.      PHYSICAL EXAM:  Constitutional: Well developed / well nourished  Eyes: Anicteric, PERRLA  ENMT: nc/at  Neck: supple  Respiratory: CTA B/L  Cardiovascular: RRR  Gastrointestinal: Soft, mildly distended, mild tenderness at the incision site; incision c/d/i; JPss ss  Genitourinary: Urinary catheter in place, hematuria  Extremities: SCD's in place and working bilaterally, AVF  palpable, + edema   Vascular: Palpable dp pulses bilaterally  Neurological: A&O x3  Skin: no rashes, ulcerations or lesions;  Musculoskeletal: Moving all extremities  Psychiatric: Responsive

## 2023-09-18 NOTE — DIETITIAN INITIAL EVALUATION ADULT - PERTINENT LABORATORY DATA
09-18    136  |  95<L>  |  66<H>  ----------------------------<  254<H>  4.1   |  20<L>  |  6.90<H>    Ca    8.5      18 Sep 2023 06:59  Phos  7.2     09-18  Mg     2.1     09-18    TPro  6.7  /  Alb  3.5  /  TBili  0.6  /  DBili  x   /  AST  19  /  ALT  9<L>  /  AlkPhos  57  09-18    POCT Blood Glucose.: 262 mg/dL (09-18-23 @ 08:54)  POCT Blood Glucose.: 285 mg/dL (09-17-23 @ 21:02)  POCT Blood Glucose.: 240 mg/dL (09-17-23 @ 17:21)  POCT Blood Glucose.: 225 mg/dL (09-17-23 @ 12:16)    A1C with Estimated Average Glucose Result: 7.7 % (09-17-23 @ 06:33)  A1C with Estimated Average Glucose Result: 6.4 % (10-29-22 @ 07:12)

## 2023-09-18 NOTE — DIETITIAN INITIAL EVALUATION ADULT - REASON FOR ADMISSION
Chart Reviewed, Events Noted  "68 year old male  former smoker with past medical history significant for GERD, HTN, HLD, anemia of chronic disease, T2DM, CAD s/p stent x3 (2014 at [place]) & x2 (pLCx 10/28/22, LAD 10/31/22, off of plavix as of 5/1/23), CVA and ESRD (diagnosed Jan 2019) on HD via LUE AVF T/Th/S (Nephrologist Dr Capellan) with recent AV fistulogram 7/2023 with cephalic stenosis s/p balloon angioplasty. Past surgical history significant for open cholecystectomy, AVF creation and eye surgery. Now admitted for DDRT that he underwent on 9/16/23"

## 2023-09-18 NOTE — DIETITIAN INITIAL EVALUATION ADULT - REASON INDICATOR FOR ASSESSMENT
Pt seen for post kidney transplant recipient nutrition evaluation per department protocol.   Information obtained from: Review of pt's current medical record, interview with pt in his assigned room on [id number]

## 2023-09-18 NOTE — PROGRESS NOTE ADULT - ASSESSMENT
68 Amharic speaking M former smoker with past medical history significant for GERD, HTN, HLD, anemia of chronic disease, T2DM, CAD s/p stent x3 (2014 at [place]) & x2 (pLCx 10/28/22, LAD 10/31/22, off of plavix as of 5/1/23), CVA and ESRD (diagnosed Jan 2019) on HD via LUE AVF T/Th/S (Nephrologist Dr Huff) with recent AV fistulogram 7/2023 with cephalic stenosis s/p balloon angioplasty. Past surgical history significant for open cholecystectomy, AVF creation and eye surgery in the past. Anuric. S/p R DDRT on 9/16/23    s/p DDRT (POD#2)  - Cr downtrending Cr 7.89--> 6.90 today, no need for HD  - no need for lasix today  - Diabetic/renal diet  - ASA/SQH  - strict I&Os: JPs x2, hinds  - SCDs/spirometry/PT c/s  - bowel regimen, give bisacodyl suppository and PO bisacodyl today  - send ALCON for fluid creatinine today    Immunosuppression  -Envarsus per level, MMF 1/1, SST  -Simulect on POD#4  -Valcyte/Nystatin/PPI/Bactrim for PPx    HTN  -Coreg/Nifedipine/Hydralazine, adjust prn    DM  -on Tradjenta at home, will adjust with RUPESH for now    CAD  - ASA  - Tele

## 2023-09-18 NOTE — DIETITIAN INITIAL EVALUATION ADULT - PHYSCIAL ASSESSMENT
Weights:  - Source: Patient  - UBW: 65 kg   - Reported weight changes: None     Current Admission Weights:  - Dosing weight: 65.1 kg/ 143.5 pounds  (9/16/23)  - Daily weight: 69 kg/ 152.1 pounds  (09-17),  68  kg/ 149.9 pounds (09-16)    [facility]:  64.4 kg (8/18/23), 67.1 kg (6/7/23), 64.9 kg (3/2)       Weight Change:  - Weight fluctuations in setting of fluid shifts (IVF, edema, intraoperative fluids). Will continue to monitor weight trends as available/able.     IBW:  142 pounds   %IBW: 107 %

## 2023-09-18 NOTE — PROGRESS NOTE ADULT - ASSESSMENT
69 y/o M former smoker with past medical history significant for GERD, HTN, HLD, anemia of chronic disease, T2DM, CAD s/p stent x3 (2014 at [place]) & x2 (pLCx 10/28/22, LAD 10/31/22, off of plavix as of 5/1/23), CVA and ESRD (diagnosed Jan 2019) on HD via LUE AVF T/Th/S (Nephrologist Dr Huff) with recent AV fistulogram 7/2023 with cephalic stenosis s/p balloon angioplasty. Past surgical history significant for open cholecystectomy, AVF creation and eye surgery in the past. He presents to [facility] on 9/16/23 as renal transplant candidate.      ESRD On HD   outpt [facility]   s/p DDRT on 9/16/2023   Immunosuppression per transplant team  Monitor BMP and Urineoutpt  Monitor     HTN  BP fluctating  Monitor

## 2023-09-18 NOTE — DIETITIAN INITIAL EVALUATION ADULT - PERTINENT MEDS FT
MEDICATIONS  (STANDING):  aspirin enteric coated 81 milliGRAM(s) Oral daily  atorvastatin 40 milliGRAM(s) Oral at bedtime  bisacodyl Suppository 10 milliGRAM(s) Rectal once  carvedilol 25 milliGRAM(s) Oral every 12 hours  chlorhexidine 2% Cloths 1 Application(s) Topical daily  dextrose 5%. 1000 milliLiter(s) (50 mL/Hr) IV Continuous <Continuous>  dextrose 50% Injectable 25 Gram(s) IV Push once  glucagon  Injectable 1 milliGRAM(s) IntraMuscular once  heparin   Injectable 5000 Unit(s) SubCutaneous every 12 hours  insulin lispro (ADMELOG) corrective regimen sliding scale   SubCutaneous four times a day before meals  methylPREDNISolone sodium succinate Injectable 60 milliGRAM(s) IV Push two times a day  methylPREDNISolone sodium succinate Injectable   IV Push   mycophenolate mofetil 1 Gram(s) Oral <User Schedule>  NIFEdipine XL 30 milliGRAM(s) Oral daily  nystatin    Suspension 539714 Unit(s) Swish and Swallow four times a day  pantoprazole    Tablet 40 milliGRAM(s) Oral before breakfast  polyethylene glycol 3350 17 Gram(s) Oral daily  senna 2 Tablet(s) Oral at bedtime  trimethoprim   80 mG/sulfamethoxazole 400 mG 1 Tablet(s) Oral daily  valGANciclovir 450 milliGRAM(s) Oral <User Schedule>    MEDICATIONS  (PRN):  acetaminophen     Tablet .. 650 milliGRAM(s) Oral every 6 hours PRN Mild Pain (1 - 3)  dextrose Oral Gel 15 Gram(s) Oral once PRN Blood Glucose LESS THAN 70 milliGRAM(s)/deciliter  ondansetron Injectable 4 milliGRAM(s) IV Push every 6 hours PRN Nausea and/or Vomiting  traMADol 50 milliGRAM(s) Oral every 4 hours PRN Moderate Pain (4 - 6)

## 2023-09-18 NOTE — DIETITIAN INITIAL EVALUATION ADULT - OTHER INFO
Pt is s/p DDRT 9/16/23 POD2:  -- Deltasone, Simulect, Solu-medrol, Tacrolimus, Cellcept transplant meds ordered  -- BG management: SSI, hx of diabetes, A1c= 7.7 % (09-17-23), moderate glycemic control.   -- Urine output per flow sheets  75ml thus far (9/17), 1,412ml (9/17), 823 ml (9/16)

## 2023-09-18 NOTE — PROGRESS NOTE ADULT - ASSESSMENT
68 year old male  former smoker with past medical history significant for GERD, HTN, HLD, anemia of chronic disease, T2DM, CAD s/p stent x3 (2014 at [place]) & x2 (pLCx 10/28/22, LAD 10/31/22, off of plavix as of 5/1/23), CVA and ESRD (diagnosed Jan 2019) on HD via LUE AVF T/Th/S (Nephrologist Dr Capellan) with recent AV fistulogram 7/2023 with cephalic stenosis s/p balloon angioplasty. Past surgical history significant for open cholecystectomy, AVF creation and eye surgery. Now admitted for DDRT that he underwent on 9/16/23.    Donor details -Male in his 30s, Terminal creatinine: 6.2, KDPI 36%, cPRA 23%. Both donor and recipient: blood type O, CMV+, EBV+. Cold ischemia time: 14 hs 13 mins, Warm ischemia time: 42 mins  OR details- right kidney, single arterial opening into aorta with very early bifurcation, single vein, single ureter. The right renal vein was reconstructed with donor IVC in a horizontal fashion with running 6-0 Prolene sutures    1. s/p DDRT on  9/16/23 - Allograft function with good UOP and Cr is trending down   2. IS meds- Simulect induction. Dosing tac by level. goal level 8-10 , MMF 1gm po bid and steroid taper per protocol.   3. HTN -controlled  on Coreg 25 mg po bid and Nifedipine 30 mg po daily.  4. DM - on sliding scale

## 2023-09-18 NOTE — PROGRESS NOTE ADULT - SUBJECTIVE AND OBJECTIVE BOX
MD BOLAÑOS is a 68y Male s/p DDRT on 9/16/23. PMH is significant for GERD, HTN, HLD, anemia, T2DM, CAD s/p stent x 3 (off plavix May 2023)     NKDA  CMV +/+    Transplant Medications  Induction  -Basiliximab 20 mg POD 0 (given in OR) and POD 4  -Methyprednisolone taper (switch to PO prednisone on POD 4)            POD 0: 500 mg IV in OR            POD 1: 125 mg IV Q12H            POD 2: 60 mg IV Q12H            POD 3: 30 mg IV Q12H        Maintenance Immunosuppression  -Tacrolimus (Envarsus) 0.14 mg/kg daily (Adjust for goal trough: 8-10)  -Mycophenolate 1,000 mg PO Q12H  -Prednisone             POD 4: 20 mg PO Q12H            POD 5: 10 mg PO Q12H            POD 6: 5 mg PO Q12H            POD 7-: 5 mg daily     Anti-infection   -Bactrim SS tablet (frequency based on renal function)  -Valganciclovir (dose based on CMV serostatus and frequency based on renal function)  -Nystatin swish and swallow 5 mL four times daily    Surgical prophylaxis pre- and intra-operative dosing  -Cefazolin    Prophylaxis  -GI ppx: famotidine 20 mg daily  -Bowel ppx: senna  -DVT: sequential compression device  -Pain:            Mild: Acetaminophen 650 mg every 6 hours PRN           Moderate: Tramadol 25 mg every 4 hours PRN (adjust for renal function)           Severe: Tramadol 50 mg every 4 hours PRN (adjust for renal function)    Home Medications:  cloNIDine 0.1 mg oral tablet: 1 tab(s) orally 3 times a day (21 Jul 2023 09:19)  Coreg 3.125 mg oral tablet: 1 tab(s) orally 2 times a day (21 Jul 2023 09:15)  hydrALAZINE 50 mg oral tablet: 1 tab(s) orally 2 times a day (18 Aug 2023 16:05)  NIFEdipine 60 mg oral tablet, extended release: 1.5 tab(s) orally once a day (18 Aug 2023 16:03)  Protonix 40 mg oral delayed release tablet: 1 tab(s) orally once a day (18 Aug 2023 16:00)  Renvela 800 mg oral tablet: 2 tab(s) orally 4 times a day (with meals and at bedtime) (18 Aug 2023 16:04)      Outpatient medication reconciliation reviewed and will be re-started appropriately.  Plan discussed with multidisciplinary team.

## 2023-09-19 ENCOUNTER — TRANSCRIPTION ENCOUNTER (OUTPATIENT)
Age: 68
End: 2023-09-19

## 2023-09-19 LAB
ALBUMIN SERPL ELPH-MCNC: 3.6 G/DL — SIGNIFICANT CHANGE UP (ref 3.3–5)
ALP SERPL-CCNC: 54 U/L — SIGNIFICANT CHANGE UP (ref 40–120)
ALT FLD-CCNC: 13 U/L — SIGNIFICANT CHANGE UP (ref 10–45)
ANION GAP SERPL CALC-SCNC: 17 MMOL/L — SIGNIFICANT CHANGE UP (ref 5–17)
AST SERPL-CCNC: 23 U/L — SIGNIFICANT CHANGE UP (ref 10–40)
BASOPHILS # BLD AUTO: 0.01 K/UL — SIGNIFICANT CHANGE UP (ref 0–0.2)
BASOPHILS NFR BLD AUTO: 0.1 % — SIGNIFICANT CHANGE UP (ref 0–2)
BILIRUB SERPL-MCNC: 0.6 MG/DL — SIGNIFICANT CHANGE UP (ref 0.2–1.2)
BUN SERPL-MCNC: 80 MG/DL — HIGH (ref 7–23)
CALCIUM SERPL-MCNC: 8.7 MG/DL — SIGNIFICANT CHANGE UP (ref 8.4–10.5)
CHLORIDE SERPL-SCNC: 95 MMOL/L — LOW (ref 96–108)
CO2 SERPL-SCNC: 23 MMOL/L — SIGNIFICANT CHANGE UP (ref 22–31)
CREAT SERPL-MCNC: 4.96 MG/DL — HIGH (ref 0.5–1.3)
EGFR: 12 ML/MIN/1.73M2 — LOW
EOSINOPHIL # BLD AUTO: 0.1 K/UL — SIGNIFICANT CHANGE UP (ref 0–0.5)
EOSINOPHIL NFR BLD AUTO: 1 % — SIGNIFICANT CHANGE UP (ref 0–6)
GLUCOSE BLDC GLUCOMTR-MCNC: 225 MG/DL — HIGH (ref 70–99)
GLUCOSE BLDC GLUCOMTR-MCNC: 271 MG/DL — HIGH (ref 70–99)
GLUCOSE BLDC GLUCOMTR-MCNC: 282 MG/DL — HIGH (ref 70–99)
GLUCOSE BLDC GLUCOMTR-MCNC: 292 MG/DL — HIGH (ref 70–99)
GLUCOSE SERPL-MCNC: 168 MG/DL — HIGH (ref 70–99)
HCT VFR BLD CALC: 31.4 % — LOW (ref 39–50)
HGB BLD-MCNC: 9.9 G/DL — LOW (ref 13–17)
IMM GRANULOCYTES NFR BLD AUTO: 1.3 % — HIGH (ref 0–0.9)
LYMPHOCYTES # BLD AUTO: 0.87 K/UL — LOW (ref 1–3.3)
LYMPHOCYTES # BLD AUTO: 8.5 % — LOW (ref 13–44)
MAGNESIUM SERPL-MCNC: 2.1 MG/DL — SIGNIFICANT CHANGE UP (ref 1.6–2.6)
MCHC RBC-ENTMCNC: 31 PG — SIGNIFICANT CHANGE UP (ref 27–34)
MCHC RBC-ENTMCNC: 31.5 GM/DL — LOW (ref 32–36)
MCV RBC AUTO: 98.4 FL — SIGNIFICANT CHANGE UP (ref 80–100)
MONOCYTES # BLD AUTO: 0.94 K/UL — HIGH (ref 0–0.9)
MONOCYTES NFR BLD AUTO: 9.2 % — SIGNIFICANT CHANGE UP (ref 2–14)
NEUTROPHILS # BLD AUTO: 8.14 K/UL — HIGH (ref 1.8–7.4)
NEUTROPHILS NFR BLD AUTO: 79.9 % — HIGH (ref 43–77)
NRBC # BLD: 0 /100 WBCS — SIGNIFICANT CHANGE UP (ref 0–0)
PHOSPHATE SERPL-MCNC: 5.8 MG/DL — HIGH (ref 2.5–4.5)
PLATELET # BLD AUTO: 113 K/UL — LOW (ref 150–400)
POTASSIUM SERPL-MCNC: 4.1 MMOL/L — SIGNIFICANT CHANGE UP (ref 3.5–5.3)
POTASSIUM SERPL-SCNC: 4.1 MMOL/L — SIGNIFICANT CHANGE UP (ref 3.5–5.3)
PROT SERPL-MCNC: 6.6 G/DL — SIGNIFICANT CHANGE UP (ref 6–8.3)
RBC # BLD: 3.19 M/UL — LOW (ref 4.2–5.8)
RBC # FLD: 15.9 % — HIGH (ref 10.3–14.5)
SODIUM SERPL-SCNC: 135 MMOL/L — SIGNIFICANT CHANGE UP (ref 135–145)
TACROLIMUS SERPL-MCNC: 8.2 NG/ML — SIGNIFICANT CHANGE UP
WBC # BLD: 10.19 K/UL — SIGNIFICANT CHANGE UP (ref 3.8–10.5)
WBC # FLD AUTO: 10.19 K/UL — SIGNIFICANT CHANGE UP (ref 3.8–10.5)

## 2023-09-19 PROCEDURE — 99232 SBSQ HOSP IP/OBS MODERATE 35: CPT

## 2023-09-19 PROCEDURE — 99232 SBSQ HOSP IP/OBS MODERATE 35: CPT | Mod: GC,24

## 2023-09-19 RX ORDER — PANTOPRAZOLE SODIUM 20 MG/1
40 TABLET, DELAYED RELEASE ORAL
Refills: 0 | Status: DISCONTINUED | OUTPATIENT
Start: 2023-09-19 | End: 2023-09-22

## 2023-09-19 RX ORDER — LINAGLIPTIN 5 MG/1
5 TABLET, FILM COATED ORAL DAILY
Refills: 0 | Status: DISCONTINUED | OUTPATIENT
Start: 2023-09-19 | End: 2023-09-22

## 2023-09-19 RX ORDER — TACROLIMUS 5 MG/1
5 CAPSULE ORAL ONCE
Refills: 0 | Status: COMPLETED | OUTPATIENT
Start: 2023-09-19 | End: 2023-09-19

## 2023-09-19 RX ORDER — BACLOFEN 100 %
2.5 POWDER (GRAM) MISCELLANEOUS EVERY 8 HOURS
Refills: 0 | Status: DISCONTINUED | OUTPATIENT
Start: 2023-09-19 | End: 2023-09-22

## 2023-09-19 RX ORDER — BACLOFEN 100 %
10 POWDER (GRAM) MISCELLANEOUS EVERY 8 HOURS
Refills: 0 | Status: DISCONTINUED | OUTPATIENT
Start: 2023-09-19 | End: 2023-09-19

## 2023-09-19 RX ORDER — CHLORPROMAZINE HCL 10 MG
25 TABLET ORAL ONCE
Refills: 0 | Status: COMPLETED | OUTPATIENT
Start: 2023-09-19 | End: 2023-09-19

## 2023-09-19 RX ORDER — TACROLIMUS 5 MG/1
5 CAPSULE ORAL
Refills: 0 | Status: DISCONTINUED | OUTPATIENT
Start: 2023-09-20 | End: 2023-09-21

## 2023-09-19 RX ADMIN — Medication 6: at 17:39

## 2023-09-19 RX ADMIN — Medication 6: at 21:00

## 2023-09-19 RX ADMIN — POLYETHYLENE GLYCOL 3350 17 GRAM(S): 17 POWDER, FOR SOLUTION ORAL at 12:42

## 2023-09-19 RX ADMIN — SENNA PLUS 2 TABLET(S): 8.6 TABLET ORAL at 20:14

## 2023-09-19 RX ADMIN — Medication 1 TABLET(S): at 12:44

## 2023-09-19 RX ADMIN — Medication 500000 UNIT(S): at 17:39

## 2023-09-19 RX ADMIN — TACROLIMUS 5 MILLIGRAM(S): 5 CAPSULE ORAL at 12:41

## 2023-09-19 RX ADMIN — HEPARIN SODIUM 5000 UNIT(S): 5000 INJECTION INTRAVENOUS; SUBCUTANEOUS at 17:40

## 2023-09-19 RX ADMIN — Medication 30 MILLIGRAM(S): at 06:41

## 2023-09-19 RX ADMIN — TRAMADOL HYDROCHLORIDE 50 MILLIGRAM(S): 50 TABLET ORAL at 11:48

## 2023-09-19 RX ADMIN — Medication 25 MILLIGRAM(S): at 22:49

## 2023-09-19 RX ADMIN — CARVEDILOL PHOSPHATE 25 MILLIGRAM(S): 80 CAPSULE, EXTENDED RELEASE ORAL at 06:38

## 2023-09-19 RX ADMIN — Medication 2.5 MILLIGRAM(S): at 12:40

## 2023-09-19 RX ADMIN — MYCOPHENOLATE MOFETIL 1 GRAM(S): 250 CAPSULE ORAL at 20:13

## 2023-09-19 RX ADMIN — CARVEDILOL PHOSPHATE 25 MILLIGRAM(S): 80 CAPSULE, EXTENDED RELEASE ORAL at 17:39

## 2023-09-19 RX ADMIN — Medication 30 MILLIGRAM(S): at 17:40

## 2023-09-19 RX ADMIN — Medication 2.5 MILLIGRAM(S): at 20:13

## 2023-09-19 RX ADMIN — MYCOPHENOLATE MOFETIL 1 GRAM(S): 250 CAPSULE ORAL at 08:43

## 2023-09-19 RX ADMIN — ATORVASTATIN CALCIUM 40 MILLIGRAM(S): 80 TABLET, FILM COATED ORAL at 21:02

## 2023-09-19 RX ADMIN — Medication 81 MILLIGRAM(S): at 12:41

## 2023-09-19 RX ADMIN — Medication 500000 UNIT(S): at 00:23

## 2023-09-19 RX ADMIN — PANTOPRAZOLE SODIUM 40 MILLIGRAM(S): 20 TABLET, DELAYED RELEASE ORAL at 17:39

## 2023-09-19 RX ADMIN — HEPARIN SODIUM 5000 UNIT(S): 5000 INJECTION INTRAVENOUS; SUBCUTANEOUS at 06:38

## 2023-09-19 RX ADMIN — Medication 4: at 09:22

## 2023-09-19 RX ADMIN — Medication 30 MILLIGRAM(S): at 06:37

## 2023-09-19 RX ADMIN — LINAGLIPTIN 5 MILLIGRAM(S): 5 TABLET, FILM COATED ORAL at 12:40

## 2023-09-19 RX ADMIN — TRAMADOL HYDROCHLORIDE 50 MILLIGRAM(S): 50 TABLET ORAL at 10:48

## 2023-09-19 RX ADMIN — PANTOPRAZOLE SODIUM 40 MILLIGRAM(S): 20 TABLET, DELAYED RELEASE ORAL at 06:37

## 2023-09-19 RX ADMIN — Medication 6: at 12:42

## 2023-09-19 RX ADMIN — Medication 500000 UNIT(S): at 12:41

## 2023-09-19 RX ADMIN — TRAMADOL HYDROCHLORIDE 50 MILLIGRAM(S): 50 TABLET ORAL at 17:38

## 2023-09-19 RX ADMIN — Medication 500000 UNIT(S): at 06:37

## 2023-09-19 RX ADMIN — CHLORHEXIDINE GLUCONATE 1 APPLICATION(S): 213 SOLUTION TOPICAL at 12:49

## 2023-09-19 NOTE — PROGRESS NOTE ADULT - ASSESSMENT
68 Maori speaking M former smoker with past medical history significant for GERD, HTN, HLD, anemia of chronic disease, T2DM, CAD s/p stent x3 (2014 at [place]) & x2 (pLCx 10/28/22, LAD 10/31/22, off of plavix as of 5/1/23), CVA and ESRD (diagnosed Jan 2019) on HD via LUE AVF T/Th/S (Nephrologist Dr Huff) with recent AV fistulogram 7/2023 with cephalic stenosis s/p balloon angioplasty. Past surgical history significant for open cholecystectomy, AVF creation and eye surgery in the past. Anuric. S/p R DDRT on 9/16/23    s/p DDRT (POD#2)  - Cr downtrending Cr 6.90--> 4.96 today, no need for HD  - Diabetic/renal diet  - ASA/SQH  - strict I&Os: JPs x2, hinds  - SCDs/spirometry/PT c/s  - continue bowel regimen  - start baclofen for hiccups  - ALCON fluid negative for leak on 9/18/23  - plan to remove hinds on POD#4    Immunosuppression  -Envarsus per level, MMF 1/1, SST  -Simulect on POD#4  -Valcyte/Nystatin/PPI/Bactrim for PPx    HTN  -Coreg/Nifedipine/Hydralazine, adjust prn    DM  - restart home tradjenta  - insulin sliding scale    DM  -on Tradjenta at home, will adjust with RUPESH for now    CAD  - ASA  - Tele

## 2023-09-19 NOTE — PROGRESS NOTE ADULT - ASSESSMENT
69 y/o M former smoker with past medical history significant for GERD, HTN, HLD, anemia of chronic disease, T2DM, CAD s/p stent x3 (2014 at [place]) & x2 (pLCx 10/28/22, LAD 10/31/22, off of plavix as of 5/1/23), CVA and ESRD (diagnosed Jan 2019) on HD via LUE AVF T/Th/S (Nephrologist Dr Huff) with recent AV fistulogram 7/2023 with cephalic stenosis s/p balloon angioplasty. Past surgical history significant for open cholecystectomy, AVF creation and eye surgery in the past. He presents to [facility] on 9/16/23 as renal transplant candidate.      ESRD On HD   outpt [facility]   s/p DDRT on 9/16/2023   Immunosuppression per transplant team  Monitor BMP and Urineoutpt  Monitor     HTN  BP fluctating  Monitor

## 2023-09-19 NOTE — PROGRESS NOTE ADULT - SUBJECTIVE AND OBJECTIVE BOX
[facility] DIVISION OF KIDNEY DISEASES AND HYPERTENSION -- FOLLOW UP NOTE  --------------------------------------------------------------------------------  Chief Complaint:    24 hour events/subjective:  Patient was seen and examined at bedside        PAST HISTORY  --------------------------------------------------------------------------------  No significant changes to PMH, PSH, FHx, SHx, unless otherwise noted    ALLERGIES & MEDICATIONS  --------------------------------------------------------------------------------  Allergies    No Known Allergies    Intolerances      Standing Inpatient Medications  aspirin enteric coated 81 milliGRAM(s) Oral daily  atorvastatin 40 milliGRAM(s) Oral at bedtime  carvedilol 25 milliGRAM(s) Oral every 12 hours  chlorhexidine 2% Cloths 1 Application(s) Topical daily  dextrose 5%. 1000 milliLiter(s) IV Continuous <Continuous>  dextrose 50% Injectable 25 Gram(s) IV Push once  glucagon  Injectable 1 milliGRAM(s) IntraMuscular once  heparin   Injectable 5000 Unit(s) SubCutaneous every 12 hours  insulin lispro (ADMELOG) corrective regimen sliding scale   SubCutaneous four times a day before meals  methylPREDNISolone sodium succinate Injectable   IV Push   methylPREDNISolone sodium succinate Injectable 30 milliGRAM(s) IV Push two times a day  mycophenolate mofetil 1 Gram(s) Oral <User Schedule>  NIFEdipine XL 30 milliGRAM(s) Oral daily  nystatin    Suspension 806646 Unit(s) Swish and Swallow four times a day  pantoprazole    Tablet 40 milliGRAM(s) Oral before breakfast  polyethylene glycol 3350 17 Gram(s) Oral daily  senna 2 Tablet(s) Oral at bedtime  trimethoprim   80 mG/sulfamethoxazole 400 mG 1 Tablet(s) Oral daily  valGANciclovir 450 milliGRAM(s) Oral <User Schedule>    PRN Inpatient Medications  acetaminophen     Tablet .. 650 milliGRAM(s) Oral every 6 hours PRN  dextrose Oral Gel 15 Gram(s) Oral once PRN  ondansetron Injectable 4 milliGRAM(s) IV Push every 6 hours PRN  traMADol 50 milliGRAM(s) Oral every 4 hours PRN      REVIEW OF SYSTEMS  --------------------------------------------------------------------------------  Gen: No fatigue, fevers/chills, weakness  Skin: No rashes  Head/Eyes/Ears/Mouth: No headache;No sore throat  Respiratory: No dyspnea, cough,   CV: No chest pain, PND, orthopnea  GI: No abdominal pain, diarrhea, constipation, nausea, vomiting  Transplant: No pain  : No increased frequency, dysuria, hematuria, nocturia  MSK: No joint pain/swelling; no back pain; no edema  Neuro: No dizziness/lightheadedness, weakness, seizures, numbness, tingling  Psych: No significant nervousness, anxiety, stress, depression    All other systems were reviewed and are negative, except as noted.    VITALS/PHYSICAL EXAM  --------------------------------------------------------------------------------  T(C): 36.3 (09-19-23 @ 05:00), Max: 36.7 (09-18-23 @ 21:00)  HR: 75 (09-19-23 @ 05:00) (75 - 83)  BP: 147/73 (09-19-23 @ 05:00) (138/71 - 158/72)  RR: 20 (09-19-23 @ 05:00) (20 - 20)  SpO2: 95% (09-19-23 @ 05:00) (94% - 97%)  Wt(kg): --        09-18-23 @ 07:01  -  09-19-23 @ 07:00  --------------------------------------------------------  IN: 1080 mL / OUT: 1720 mL / NET: -640 mL    09-19-23 @ 07:01  -  09-19-23 @ 08:04  --------------------------------------------------------  IN: 0 mL / OUT: 100 mL / NET: -100 mL      Physical Exam:  	Gen: NAD  	HEENT: PERRL, supple neck, clear oropharynx  	Pulm: CTA B/L  	CV: RRR, S1S2; no rub  	Back: No spinal or CVA tenderness; no sacral edema  	Abd: +BS, soft, nontender/nondistended                      Transplant: No tenderness, swelling  	: No suprapubic tenderness  	UE: Warm, FROM; no edema; no asterixis  	LE: Warm, FROM; no edema  	Neuro: No focal deficits  	Psych: Normal affect and mood  	Skin: Warm, without rashes      LABS/STUDIES  --------------------------------------------------------------------------------              9.9    10.19 >-----------<  113      [09-19-23 @ 07:08]              31.4     135  |  95  |  80  ----------------------------<  168      [09-19-23 @ 07:06]  4.1   |  23  |  4.96        Ca     8.7     [09-19-23 @ 07:06]      Mg     2.1     [09-19-23 @ 07:06]      Phos  5.8     [09-19-23 @ 07:06]    TPro  6.6  /  Alb  3.6  /  TBili  0.6  /  DBili  x   /  AST  23  /  ALT  13  /  AlkPhos  54  [09-19-23 @ 07:06]          Creatinine Trend:  SCr 4.96 [09-19 @ 07:06]  SCr 6.90 [09-18 @ 06:59]  SCr 7.89 [09-17 @ 06:32]  SCr 7.55 [09-16 @ 20:22]  SCr 7.40 [09-16 @ 14:35]    Tacrolimus (), Serum: 9.8 ng/mL (09-18 @ 07:08)  Tacrolimus (), Serum: 5.6 ng/mL (09-17 @ 06:35)            Urinalysis - [09-19-23 @ 07:06]      Color  / Appearance  / SG  / pH       Gluc 168 / Ketone   / Bili  / Urobili        Blood  / Protein  / Leuk Est  / Nitrite       RBC  / WBC  / Hyaline  / Gran  / Sq Epi  / Non Sq Epi  / Bacteria       HbA1c 7.8      [12-17-19 @ 05:54]    HBsAb 6.6      [09-16-23 @ 04:41]  HBsAg Nonreact      [09-16-23 @ 04:41]  HBcAb Nonreact      [09-16-23 @ 04:41]  HCV 0.10, Nonreact      [09-16-23 @ 04:41]  HIV Nonreact      [09-16-23 @ 04:41]

## 2023-09-19 NOTE — PROGRESS NOTE ADULT - SUBJECTIVE AND OBJECTIVE BOX
Transplant Surgery - Multidisciplinary Progress Note  --------------------------------------------------------------  DDRT 9/16/23 POD #3    Present:   Patient seen and examined with multidisciplinary Transplant team including  Surgeon: Dr. Rivera. Transplant Nephrologist: Dr. INDER Bae; ISSAC Whaley, Pharmacist: Maria Alejandra and unit RN during am rounds.  Disciplines not in attendance will be notified of the plan.     68 Kyrgyz speaking M former smoker with past medical history significant for GERD, HTN, HLD, anemia of chronic disease, T2DM, CAD s/p stent x3 (2014 at [place]) & x2 (pLCx 10/28/22, LAD 10/31/22, off of plavix as of 5/1/23), CVA and ESRD (diagnosed Jan 2019) on HD via LUE AVF T/Th/S (Nephrologist Dr Huff) with recent AV fistulogram 7/2023 with cephalic stenosis s/p balloon angioplasty. Past surgical history significant for open cholecystectomy, AVF creation and eye surgery in the past. Anuric.    s/p R DDRT under Simulect on 9/17/23    Interval Events:  - POD#3, UOP 1.6L   - Cr downtrending 6.90-->4.96  - had BM yesterday  - having hiccups this morning    Immunosuppression:  ENV per level, MMF 1/1, SST  Induction: Simulect on  POD#4  Ongoing monitoring for signs of rejection    Potential Discharge date: TBD   Education:  Medications  Plan of care:  See Below      MEDICATIONS  (STANDING):  aspirin enteric coated 81 milliGRAM(s) Oral daily  atorvastatin 40 milliGRAM(s) Oral at bedtime  carvedilol 25 milliGRAM(s) Oral every 12 hours  chlorhexidine 2% Cloths 1 Application(s) Topical daily  dextrose 5%. 1000 milliLiter(s) (50 mL/Hr) IV Continuous <Continuous>  dextrose 50% Injectable 25 Gram(s) IV Push once  glucagon  Injectable 1 milliGRAM(s) IntraMuscular once  heparin   Injectable 5000 Unit(s) SubCutaneous every 12 hours  insulin lispro (ADMELOG) corrective regimen sliding scale   SubCutaneous four times a day before meals  linagliptin 5 milliGRAM(s) Oral daily  methylPREDNISolone sodium succinate Injectable   IV Push   methylPREDNISolone sodium succinate Injectable 30 milliGRAM(s) IV Push two times a day  mycophenolate mofetil 1 Gram(s) Oral <User Schedule>  NIFEdipine XL 30 milliGRAM(s) Oral daily  nystatin    Suspension 234628 Unit(s) Swish and Swallow four times a day  pantoprazole    Tablet 40 milliGRAM(s) Oral two times a day  polyethylene glycol 3350 17 Gram(s) Oral daily  senna 2 Tablet(s) Oral at bedtime  tacrolimus ER Tablet (ENVARSUS XR) 5 milliGRAM(s) Oral once  trimethoprim   80 mG/sulfamethoxazole 400 mG 1 Tablet(s) Oral daily  valGANciclovir 450 milliGRAM(s) Oral <User Schedule>    MEDICATIONS  (PRN):  acetaminophen     Tablet .. 650 milliGRAM(s) Oral every 6 hours PRN Mild Pain (1 - 3)  baclofen 2.5 milliGRAM(s) Oral every 8 hours PRN Hiccups  dextrose Oral Gel 15 Gram(s) Oral once PRN Blood Glucose LESS THAN 70 milliGRAM(s)/deciliter  ondansetron Injectable 4 milliGRAM(s) IV Push every 6 hours PRN Nausea and/or Vomiting  traMADol 50 milliGRAM(s) Oral every 4 hours PRN Moderate Pain (4 - 6)      PAST MEDICAL & SURGICAL HISTORY:  HTN (hypertension)  Coronary artery disease  CAP (community acquired pneumonia)  6/18  Diabetes mellitus  type 2  GERD (gastroesophageal reflux disease)  Stented coronary artery  2014, 3 stents, [facility]  ESRD (end stage renal disease)  on Dialysis( M/W/F), By Dr. Huff  Hypercholesterolemia  Cerebrovascular accident (CVA), unspecified mechanism  diagnosed via CT of the head  Anemia due to stage 5 chronic kidney disease, not on chronic dialysis  H/O coronary angiogram  2014 - x3 stents  AV fistula  10/12/18 L radiocephalic AV fistula  H/O eye surgery      Vital Signs Last 24 Hrs  T(C): 36.6 (19 Sep 2023 09:06), Max: 36.7 (18 Sep 2023 21:00)  T(F): 97.9 (19 Sep 2023 09:06), Max: 98.1 (18 Sep 2023 21:00)  HR: 74 (19 Sep 2023 09:06) (74 - 83)  BP: 131/76 (19 Sep 2023 09:06) (131/76 - 158/72)  BP(mean): 3 (18 Sep 2023 21:00) (3 - 3)  RR: 20 (19 Sep 2023 09:06) (20 - 20)  SpO2: 95% (19 Sep 2023 09:06) (94% - 97%)    Parameters below as of 19 Sep 2023 09:06  Patient On (Oxygen Delivery Method): room air        I&O's Summary    18 Sep 2023 07:01  -  19 Sep 2023 07:00  --------------------------------------------------------  IN: 1080 mL / OUT: 1720 mL / NET: -640 mL    19 Sep 2023 07:01  -  19 Sep 2023 12:25  --------------------------------------------------------  IN: 240 mL / OUT: 330 mL / NET: -90 mL                              9.9    10.19 )-----------( 113      ( 19 Sep 2023 07:08 )             31.4     09-19    135  |  95<L>  |  80<H>  ----------------------------<  168<H>  4.1   |  23  |  4.96<H>    Ca    8.7      19 Sep 2023 07:06  Phos  5.8     09-19  Mg     2.1     09-19    TPro  6.6  /  Alb  3.6  /  TBili  0.6  /  DBili  x   /  AST  23  /  ALT  13  /  AlkPhos  54  09-19    Tacrolimus (), Serum: 8.2 ng/mL (09-19 @ 07:08)      Review of systems  Gen: No weight changes, fatigue, fevers/chills, weakness  Skin: No rashes  Head/Eyes/Ears/Mouth: No headache; Normal hearing; Normal vision w/o blurriness; No sinus pain/discomfort, sore throat  Respiratory: No dyspnea, cough, wheezing, hemoptysis  CV: No chest pain, PND, orthopnea  GI: Mild abdominal pain at surgical incision site; denies diarrhea, constipation, nausea, vomiting, melena, hematochezia  : No increased frequency, dysuria, hematuria, nocturia  MSK: No joint pain/swelling; no back pain; no edema  Neuro: No dizziness/lightheadedness, weakness, seizures, numbness, tingling  Heme: No easy bruising or bleeding  Endo: No heat/cold intolerance  Psych: No significant nervousness, anxiety, stress, depression  All other systems were reviewed and are negative, except as noted.      PHYSICAL EXAM:  Constitutional: Well developed / well nourished  Eyes: Anicteric, PERRLA  ENMT: nc/at  Neck: supple  Respiratory: CTA B/L  Cardiovascular: RRR  Gastrointestinal: Soft, mildly distended, mild tenderness at the incision site; incision c/d/i; JPss ss  Genitourinary: Urinary catheter in place, hematuria  Extremities: SCD's in place and working bilaterally, AVF  palpable, no edema  Vascular: Palpable dp pulses bilaterally  Neurological: A&O x3  Skin: no rashes, ulcerations or lesions;  Musculoskeletal: Moving all extremities  Psychiatric: Responsive

## 2023-09-19 NOTE — PROGRESS NOTE ADULT - SUBJECTIVE AND OBJECTIVE BOX
[facility] NEPHROLOGY PRACTICE   MD JORDAN AVALOS MD RUORU WONG, PA    TEL:  FROM 9 AM to 5 PM ---OFFICE: [phone]    FROM 5 PM - 9 AM PLEASE CALL ANSWERING SERVICE: [phone]    RENAL FOLLOW UP NOTE--Date of Service 09-19-23 @ 09:16  --------------------------------------------------------------------------------  HPI:      Pt seen and examined at bedside.       PAST HISTORY  --------------------------------------------------------------------------------  No significant changes to PMH, PSH, FHx, SHx, unless otherwise noted    ALLERGIES & MEDICATIONS  --------------------------------------------------------------------------------  Allergies    No Known Allergies    Intolerances      Standing Inpatient Medications  aspirin enteric coated 81 milliGRAM(s) Oral daily  atorvastatin 40 milliGRAM(s) Oral at bedtime  carvedilol 25 milliGRAM(s) Oral every 12 hours  chlorhexidine 2% Cloths 1 Application(s) Topical daily  dextrose 5%. 1000 milliLiter(s) IV Continuous <Continuous>  dextrose 50% Injectable 25 Gram(s) IV Push once  glucagon  Injectable 1 milliGRAM(s) IntraMuscular once  heparin   Injectable 5000 Unit(s) SubCutaneous every 12 hours  insulin lispro (ADMELOG) corrective regimen sliding scale   SubCutaneous four times a day before meals  linagliptin 5 milliGRAM(s) Oral daily  methylPREDNISolone sodium succinate Injectable   IV Push   methylPREDNISolone sodium succinate Injectable 30 milliGRAM(s) IV Push two times a day  mycophenolate mofetil 1 Gram(s) Oral <User Schedule>  NIFEdipine XL 30 milliGRAM(s) Oral daily  nystatin    Suspension 931504 Unit(s) Swish and Swallow four times a day  pantoprazole    Tablet 40 milliGRAM(s) Oral before breakfast  polyethylene glycol 3350 17 Gram(s) Oral daily  senna 2 Tablet(s) Oral at bedtime  trimethoprim   80 mG/sulfamethoxazole 400 mG 1 Tablet(s) Oral daily  valGANciclovir 450 milliGRAM(s) Oral <User Schedule>    PRN Inpatient Medications  acetaminophen     Tablet .. 650 milliGRAM(s) Oral every 6 hours PRN  baclofen 10 milliGRAM(s) Oral every 8 hours PRN  dextrose Oral Gel 15 Gram(s) Oral once PRN  ondansetron Injectable 4 milliGRAM(s) IV Push every 6 hours PRN  traMADol 50 milliGRAM(s) Oral every 4 hours PRN      REVIEW OF SYSTEMS  --------------------------------------------------------------------------------  General: no fever  MSK: no edema     VITALS/PHYSICAL EXAM  --------------------------------------------------------------------------------  T(C): 36.6 (09-19-23 @ 09:06), Max: 36.7 (09-18-23 @ 21:00)  HR: 74 (09-19-23 @ 09:06) (74 - 83)  BP: 131/76 (09-19-23 @ 09:06) (131/76 - 158/72)  RR: 20 (09-19-23 @ 09:06) (20 - 20)  SpO2: 95% (09-19-23 @ 09:06) (94% - 97%)  Wt(kg): --        09-18-23 @ 07:01  -  09-19-23 @ 07:00  --------------------------------------------------------  IN: 1080 mL / OUT: 1720 mL / NET: -640 mL    09-19-23 @ 07:01  -  09-19-23 @ 09:16  --------------------------------------------------------  IN: 240 mL / OUT: 205 mL / NET: 35 mL      Physical Exam:  	Gen: NAD  	HEENT: MMM  	Pulm: CTA B/L  	CV: S1S2  	Abd: Soft, +BS  	Ext: No LE edema B/L                      Neuro: Awake   	Skin: Warm and Dry   	Vascular access: NO HD catheter           [facility]  LABS/STUDIES  --------------------------------------------------------------------------------              9.9    10.19 >-----------<  113      [09-19-23 @ 07:08]              31.4     135  |  95  |  80  ----------------------------<  168      [09-19-23 @ 07:06]  4.1   |  23  |  4.96        Ca     8.7     [09-19-23 @ 07:06]      Mg     2.1     [09-19-23 @ 07:06]      Phos  5.8     [09-19-23 @ 07:06]    TPro  6.6  /  Alb  3.6  /  TBili  0.6  /  DBili  x   /  AST  23  /  ALT  13  /  AlkPhos  54  [09-19-23 @ 07:06]          Creatinine Trend:  SCr 4.96 [09-19 @ 07:06]  SCr 6.90 [09-18 @ 06:59]  SCr 7.89 [09-17 @ 06:32]  SCr 7.55 [09-16 @ 20:22]  SCr 7.40 [09-16 @ 14:35]    Urinalysis - [09-19-23 @ 07:06]      Color  / Appearance  / SG  / pH       Gluc 168 / Ketone   / Bili  / Urobili        Blood  / Protein  / Leuk Est  / Nitrite       RBC  / WBC  / Hyaline  / Gran  / Sq Epi  / Non Sq Epi  / Bacteria       HbA1c 7.8      [12-17-19 @ 05:54]    HBsAb 6.6      [09-16-23 @ 04:41]  HBsAg Nonreact      [09-16-23 @ 04:41]  HBcAb Nonreact      [09-16-23 @ 04:41]  HCV 0.10, Nonreact      [09-16-23 @ 04:41]  HIV Nonreact      [09-16-23 @ 04:41]

## 2023-09-19 NOTE — PROGRESS NOTE ADULT - ASSESSMENT
68 year old male  former smoker with past medical history significant for GERD, HTN, HLD, anemia of chronic disease, T2DM, CAD s/p stent x3 (2014 at [place]) & x2 (pLCx 10/28/22, LAD 10/31/22, off of plavix as of 5/1/23), CVA and ESRD (diagnosed Jan 2019) on HD via LUE AVF T/Th/S (Nephrologist Dr Capellan) with recent AV fistulogram 7/2023 with cephalic stenosis s/p balloon angioplasty. Past surgical history significant for open cholecystectomy, AVF creation and eye surgery. Now admitted for DDRT that he underwent on 9/16/23.    Donor details -Male in his 30s, Terminal creatinine: 6.2, KDPI 36%, cPRA 23%. Both donor and recipient: blood type O, CMV+, EBV+. Cold ischemia time: 14 hs 13 mins, Warm ischemia time: 42 mins  OR details- right kidney, single arterial opening into aorta with very early bifurcation, single vein, single ureter. The right renal vein was reconstructed with donor IVC in a horizontal fashion with running 6-0 Prolene sutures    1. s/p DDRT on  9/16/23 - Allograft function with good UOP and Cr is trending down   2. IS meds- Simulect induction. Dosing tac by level. goal level 8-10 , MMF 1gm po bid and steroid taper per protocol.   3. HTN -controlled  on Coreg 25 mg po bid and Nifedipine 30 mg po daily.  4. DM - on sliding scale

## 2023-09-20 ENCOUNTER — TRANSCRIPTION ENCOUNTER (OUTPATIENT)
Age: 68
End: 2023-09-20

## 2023-09-20 LAB
ALBUMIN SERPL ELPH-MCNC: 3.6 G/DL — SIGNIFICANT CHANGE UP (ref 3.3–5)
ALP SERPL-CCNC: 56 U/L — SIGNIFICANT CHANGE UP (ref 40–120)
ALT FLD-CCNC: 20 U/L — SIGNIFICANT CHANGE UP (ref 10–45)
ANION GAP SERPL CALC-SCNC: 15 MMOL/L — SIGNIFICANT CHANGE UP (ref 5–17)
AST SERPL-CCNC: 23 U/L — SIGNIFICANT CHANGE UP (ref 10–40)
BASOPHILS # BLD AUTO: 0.02 K/UL — SIGNIFICANT CHANGE UP (ref 0–0.2)
BASOPHILS NFR BLD AUTO: 0.2 % — SIGNIFICANT CHANGE UP (ref 0–2)
BILIRUB SERPL-MCNC: 0.8 MG/DL — SIGNIFICANT CHANGE UP (ref 0.2–1.2)
BLD GP AB SCN SERPL QL: NEGATIVE — SIGNIFICANT CHANGE UP
BUN SERPL-MCNC: 90 MG/DL — HIGH (ref 7–23)
CALCIUM SERPL-MCNC: 8.8 MG/DL — SIGNIFICANT CHANGE UP (ref 8.4–10.5)
CHLORIDE SERPL-SCNC: 97 MMOL/L — SIGNIFICANT CHANGE UP (ref 96–108)
CO2 SERPL-SCNC: 21 MMOL/L — LOW (ref 22–31)
CREAT SERPL-MCNC: 4.04 MG/DL — HIGH (ref 0.5–1.3)
EGFR: 16 ML/MIN/1.73M2 — LOW
EOSINOPHIL # BLD AUTO: 0.09 K/UL — SIGNIFICANT CHANGE UP (ref 0–0.5)
EOSINOPHIL NFR BLD AUTO: 0.9 % — SIGNIFICANT CHANGE UP (ref 0–6)
GLUCOSE BLDC GLUCOMTR-MCNC: 287 MG/DL — HIGH (ref 70–99)
GLUCOSE BLDC GLUCOMTR-MCNC: 314 MG/DL — HIGH (ref 70–99)
GLUCOSE BLDC GLUCOMTR-MCNC: 321 MG/DL — HIGH (ref 70–99)
GLUCOSE BLDC GLUCOMTR-MCNC: 366 MG/DL — HIGH (ref 70–99)
GLUCOSE BLDC GLUCOMTR-MCNC: 393 MG/DL — HIGH (ref 70–99)
GLUCOSE SERPL-MCNC: 282 MG/DL — HIGH (ref 70–99)
HCT VFR BLD CALC: 31.3 % — LOW (ref 39–50)
HGB BLD-MCNC: 10 G/DL — LOW (ref 13–17)
IMM GRANULOCYTES NFR BLD AUTO: 0.9 % — SIGNIFICANT CHANGE UP (ref 0–0.9)
LYMPHOCYTES # BLD AUTO: 0.83 K/UL — LOW (ref 1–3.3)
LYMPHOCYTES # BLD AUTO: 8.3 % — LOW (ref 13–44)
MAGNESIUM SERPL-MCNC: 2.2 MG/DL — SIGNIFICANT CHANGE UP (ref 1.6–2.6)
MCHC RBC-ENTMCNC: 31.3 PG — SIGNIFICANT CHANGE UP (ref 27–34)
MCHC RBC-ENTMCNC: 31.9 GM/DL — LOW (ref 32–36)
MCV RBC AUTO: 97.8 FL — SIGNIFICANT CHANGE UP (ref 80–100)
MONOCYTES # BLD AUTO: 1.02 K/UL — HIGH (ref 0–0.9)
MONOCYTES NFR BLD AUTO: 10.2 % — SIGNIFICANT CHANGE UP (ref 2–14)
NEUTROPHILS # BLD AUTO: 7.91 K/UL — HIGH (ref 1.8–7.4)
NEUTROPHILS NFR BLD AUTO: 79.5 % — HIGH (ref 43–77)
NRBC # BLD: 0 /100 WBCS — SIGNIFICANT CHANGE UP (ref 0–0)
PHOSPHATE SERPL-MCNC: 4.1 MG/DL — SIGNIFICANT CHANGE UP (ref 2.5–4.5)
PLATELET # BLD AUTO: 115 K/UL — LOW (ref 150–400)
POTASSIUM SERPL-MCNC: 4.4 MMOL/L — SIGNIFICANT CHANGE UP (ref 3.5–5.3)
POTASSIUM SERPL-SCNC: 4.4 MMOL/L — SIGNIFICANT CHANGE UP (ref 3.5–5.3)
PROT SERPL-MCNC: 6.6 G/DL — SIGNIFICANT CHANGE UP (ref 6–8.3)
RBC # BLD: 3.2 M/UL — LOW (ref 4.2–5.8)
RBC # FLD: 15.6 % — HIGH (ref 10.3–14.5)
RH IG SCN BLD-IMP: NEGATIVE — SIGNIFICANT CHANGE UP
SODIUM SERPL-SCNC: 133 MMOL/L — LOW (ref 135–145)
TACROLIMUS SERPL-MCNC: 8.5 NG/ML — SIGNIFICANT CHANGE UP
WBC # BLD: 9.96 K/UL — SIGNIFICANT CHANGE UP (ref 3.8–10.5)
WBC # FLD AUTO: 9.96 K/UL — SIGNIFICANT CHANGE UP (ref 3.8–10.5)

## 2023-09-20 PROCEDURE — 99232 SBSQ HOSP IP/OBS MODERATE 35: CPT | Mod: GC,24

## 2023-09-20 PROCEDURE — 99232 SBSQ HOSP IP/OBS MODERATE 35: CPT | Mod: GC

## 2023-09-20 RX ORDER — INSULIN GLARGINE 100 [IU]/ML
5 INJECTION, SOLUTION SUBCUTANEOUS AT BEDTIME
Refills: 0 | Status: DISCONTINUED | OUTPATIENT
Start: 2023-09-20 | End: 2023-09-21

## 2023-09-20 RX ORDER — INSULIN LISPRO 100/ML
VIAL (ML) SUBCUTANEOUS
Refills: 0 | Status: DISCONTINUED | OUTPATIENT
Start: 2023-09-20 | End: 2023-09-22

## 2023-09-20 RX ORDER — NIFEDIPINE 30 MG
30 TABLET, EXTENDED RELEASE 24 HR ORAL ONCE
Refills: 0 | Status: COMPLETED | OUTPATIENT
Start: 2023-09-20 | End: 2023-09-20

## 2023-09-20 RX ORDER — POLYETHYLENE GLYCOL 3350 17 G/17G
17 POWDER, FOR SOLUTION ORAL ONCE
Refills: 0 | Status: COMPLETED | OUTPATIENT
Start: 2023-09-20 | End: 2023-09-20

## 2023-09-20 RX ORDER — INSULIN LISPRO 100/ML
3 VIAL (ML) SUBCUTANEOUS
Refills: 0 | Status: DISCONTINUED | OUTPATIENT
Start: 2023-09-20 | End: 2023-09-21

## 2023-09-20 RX ADMIN — LINAGLIPTIN 5 MILLIGRAM(S): 5 TABLET, FILM COATED ORAL at 11:25

## 2023-09-20 RX ADMIN — Medication 6: at 09:25

## 2023-09-20 RX ADMIN — ATORVASTATIN CALCIUM 40 MILLIGRAM(S): 80 TABLET, FILM COATED ORAL at 20:32

## 2023-09-20 RX ADMIN — TACROLIMUS 5 MILLIGRAM(S): 5 CAPSULE ORAL at 08:42

## 2023-09-20 RX ADMIN — HEPARIN SODIUM 5000 UNIT(S): 5000 INJECTION INTRAVENOUS; SUBCUTANEOUS at 17:22

## 2023-09-20 RX ADMIN — Medication 1 TABLET(S): at 11:25

## 2023-09-20 RX ADMIN — PANTOPRAZOLE SODIUM 40 MILLIGRAM(S): 20 TABLET, DELAYED RELEASE ORAL at 05:38

## 2023-09-20 RX ADMIN — CARVEDILOL PHOSPHATE 25 MILLIGRAM(S): 80 CAPSULE, EXTENDED RELEASE ORAL at 05:25

## 2023-09-20 RX ADMIN — Medication 10: at 13:02

## 2023-09-20 RX ADMIN — Medication 20 MILLIGRAM(S): at 17:22

## 2023-09-20 RX ADMIN — MYCOPHENOLATE MOFETIL 1 GRAM(S): 250 CAPSULE ORAL at 20:32

## 2023-09-20 RX ADMIN — CARVEDILOL PHOSPHATE 25 MILLIGRAM(S): 80 CAPSULE, EXTENDED RELEASE ORAL at 17:22

## 2023-09-20 RX ADMIN — Medication 20 MILLIGRAM(S): at 05:25

## 2023-09-20 RX ADMIN — POLYETHYLENE GLYCOL 3350 17 GRAM(S): 17 POWDER, FOR SOLUTION ORAL at 21:58

## 2023-09-20 RX ADMIN — Medication 30 MILLIGRAM(S): at 21:16

## 2023-09-20 RX ADMIN — Medication 8: at 17:22

## 2023-09-20 RX ADMIN — Medication 500000 UNIT(S): at 05:25

## 2023-09-20 RX ADMIN — Medication 500000 UNIT(S): at 22:23

## 2023-09-20 RX ADMIN — SENNA PLUS 2 TABLET(S): 8.6 TABLET ORAL at 20:32

## 2023-09-20 RX ADMIN — BASILIXIMAB 100 MILLIGRAM(S): 20 INJECTION, POWDER, FOR SOLUTION INTRAVENOUS at 11:25

## 2023-09-20 RX ADMIN — Medication 81 MILLIGRAM(S): at 11:25

## 2023-09-20 RX ADMIN — INSULIN GLARGINE 5 UNIT(S): 100 INJECTION, SOLUTION SUBCUTANEOUS at 21:58

## 2023-09-20 RX ADMIN — MYCOPHENOLATE MOFETIL 1 GRAM(S): 250 CAPSULE ORAL at 08:42

## 2023-09-20 RX ADMIN — VALGANCICLOVIR 450 MILLIGRAM(S): 450 TABLET, FILM COATED ORAL at 08:41

## 2023-09-20 RX ADMIN — Medication 500000 UNIT(S): at 17:22

## 2023-09-20 RX ADMIN — Medication 3 UNIT(S): at 21:59

## 2023-09-20 RX ADMIN — HEPARIN SODIUM 5000 UNIT(S): 5000 INJECTION INTRAVENOUS; SUBCUTANEOUS at 05:25

## 2023-09-20 RX ADMIN — Medication 30 MILLIGRAM(S): at 05:25

## 2023-09-20 RX ADMIN — Medication 500000 UNIT(S): at 11:25

## 2023-09-20 RX ADMIN — CHLORHEXIDINE GLUCONATE 1 APPLICATION(S): 213 SOLUTION TOPICAL at 11:27

## 2023-09-20 RX ADMIN — PANTOPRAZOLE SODIUM 40 MILLIGRAM(S): 20 TABLET, DELAYED RELEASE ORAL at 17:22

## 2023-09-20 RX ADMIN — Medication 500000 UNIT(S): at 00:00

## 2023-09-20 NOTE — PROGRESS NOTE ADULT - ASSESSMENT
69 y/o M former smoker with past medical history significant for GERD, HTN, HLD, anemia of chronic disease, T2DM, CAD s/p stent x3 (2014 at [place]) & x2 (pLCx 10/28/22, LAD 10/31/22, off of plavix as of 5/1/23), CVA and ESRD (diagnosed Jan 2019) on HD via LUE AVF T/Th/S (Nephrologist Dr Huff) with recent AV fistulogram 7/2023 with cephalic stenosis s/p balloon angioplasty. Past surgical history significant for open cholecystectomy, AVF creation and eye surgery in the past. He presents to [facility] on 9/16/23 as renal transplant candidate.      ESRD On HD   outpt [facility]   s/p DDRT on 9/16/2023   Immunosuppression per transplant team  Monitor BMP and Urineoutpt  Monitor     HTN  BP elevated   Consider increasing nifedipine to 60mg QD  Monitor

## 2023-09-20 NOTE — DISCHARGE NOTE PROVIDER - HOSPITAL COURSE
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 Past surgical history significant for open cholecystectomy, AVF creation and eye surgery in the past. He presents to [facility] on 9/16/23 as renal transplant candidate.      Now s/p DDRT on 9/17/23 with Simulect induction. Transplant renal US with patent vasculature. Immediate renal graft function, creatinine continue to improve. Hypertensive, home BP medications restarted. Given lasix diuresis for slow graft function. Restarted home Tradjenta for hyperglycemia management. Hiccups- treated with Baclofen/Thorazine with resolution. Tolerated diet, pain controlled, ambulated out of bed. Grace removed, TOV passed.     Patient seen by the multidisciplinary renal transplant team and deemed stable for discharge today.    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 Past surgical history significant for open cholecystectomy, AVF creation and eye surgery in the past. He presents to [facility] on 9/16/23 as renal transplant candidate.      Now s/p DDRT on 9/17/23 with Simulect induction. Transplant renal US with patent vasculature. Immediate renal graft function, creatinine continue to improve. Hypertensive, home BP medications restarted. Given lasix diuresis for slow graft function. Restarted home Tradjenta for hyperglycemia management. Hiccups- treated with Baclofen/Thorazine with resolution. Tolerated diet, pain controlled, ambulated out of bed. Hinds removed, TOV passed.        Did well from surgical perspective. Tolerated PO, ambulated, had bowel function. passed TOV after hinds removal  Slow graft function. Cr downtrended to 3.19 on d/c. good flow on doppler  Completed Simulect    Was evaluated daily by our multidisciplinary transplant team of surgeons, nephrologists, pharmacists, ACPs, RNs, Nutrition and deemed safe for d/c home with the following plan:  -ENVARSUS 3mg, MMF 1g BID, Pred taper  -standard PPx Nystatin/Bactrim/Valcyte renally adjusted  -f/u with surgeon on Monday 9/25/23  -f/u with  in 4-6 weeks for ureteral stent removal.  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 Past surgical history significant for open cholecystectomy, AVF creation and eye surgery in the past. He presents to [facility] on 9/16/23 as renal transplant candidate.      Now s/p DDRT on 9/17/23 with Simulect induction. Transplant renal US with patent vasculature. Immediate renal graft function, creatinine continue to improve. Hypertensive, home BP medications restarted. Given lasix diuresis for slow graft function. Restarted home Tradjenta for hyperglycemia management. Hiccups- treated with Baclofen/Thorazine with resolution. Tolerated diet, pain controlled, ambulated out of bed. Hinds removed, TOV passed.        Did well from surgical perspective. Tolerated PO, ambulated, had bowel function. passed TOV after hinds removal  Slow graft function. Cr downtrended to 3.19 on d/c. good flow on doppler  Completed Simulect  dysuria--UCx negative. Rx for PPx Cipro x5D on d/c    Was evaluated daily by our multidisciplinary transplant team of surgeons, nephrologists, pharmacists, ACPs, RNs, Nutrition and deemed safe for d/c home with the following plan:  -ENVARSUS 3mg, MMF 1g BID, Pred taper  -standard PPx Nystatin/Bactrim/Valcyte renally adjusted  -f/u with surgeon on Monday 9/25/23  -f/u with  in 4-6 weeks for ureteral stent removal.  Education provided on  9/20/23

## 2023-09-20 NOTE — PROGRESS NOTE ADULT - ASSESSMENT
68 Urdu speaking M former smoker with past medical history significant for GERD, HTN, HLD, anemia of chronic disease, T2DM, CAD s/p stent x3 (2014 at [place]) & x2 (pLCx 10/28/22, LAD 10/31/22, off of plavix as of 5/1/23), CVA and ESRD (diagnosed Jan 2019) on HD via LUE AVF T/Th/S (Nephrologist Dr Huff) with recent AV fistulogram 7/2023 with cephalic stenosis s/p balloon angioplasty. Past surgical history significant for open cholecystectomy, AVF creation and eye surgery in the past. Anuric. S/p R DDRT on 9/16/23    s/p DDRT (POD#4)  - Cr downtrending Cr 4.96-->4.04 today, no need for HD  - Diabetic/renal diet  - ASA/SQH  - strict I&Os  - D/C hinds  - D/C subcutaneous ALCON  - SCDs/spirometry/PT c/s  - continue bowel regimen  - Phenergan for hiccups  - ALCON fluid negative for leak on 9/18/23    Immunosuppression  -Envarsus per level, MMF 1/1, SST  -Simulect on POD#4  -Valcyte/Nystatin/PPI/Bactrim for PPx    HTN  -Coreg/Nifedipine/Hydralazine, adjust prn    DM  - restart home tradjenta  - insulin sliding scale    DM  -on Tradjenta at home, will adjust with RUPESH for now    CAD  - ASA  - Tele    Dispo:  Discharge today with f/u in clinic Monday (9/25/2023)

## 2023-09-20 NOTE — PROGRESS NOTE ADULT - SUBJECTIVE AND OBJECTIVE BOX
Transplant Surgery - Multidisciplinary Progress Note  --------------------------------------------------------------  DDRT 9/16/23 POD #4    Present:   Patient seen and examined with multidisciplinary Transplant team including  Surgeon: Dr. Rivera. Transplant Nephrologist: Dr. INDER Bae;  Pharmacist: Maria Alejandra and unit RN during am rounds.  Disciplines not in attendance will be notified of the plan.     68 Occitan speaking M former smoker with past medical history significant for GERD, HTN, HLD, anemia of chronic disease, T2DM, CAD s/p stent x3 (2014 at [place]) & x2 (pLCx 10/28/22, LAD 10/31/22, off of plavix as of 5/1/23), CVA and ESRD (diagnosed Jan 2019) on HD via LUE AVF T/Th/S (Nephrologist Dr Huff) with recent AV fistulogram 7/2023 with cephalic stenosis s/p balloon angioplasty. Past surgical history significant for open cholecystectomy, AVF creation and eye surgery in the past. Anuric.    s/p R DDRT under Simulect on 9/17/23    Interval Events:  POD#3 Cr 4.96, tac level 8.2--no change, tradjenta 5, baclofen for hiccups, plan for d/c tomorrow;  ON-- chlorpromazine x1 for hiccups     Immunosuppression:  ENV per level, MMF 1/1, SST  Induction: Simulect on  POD#4  Ongoing monitoring for signs of rejection    Potential Discharge date: TBD   Education:  Medications  Plan of care:  See Below        MEDICATIONS  (STANDING):  aspirin enteric coated 81 milliGRAM(s) Oral daily  atorvastatin 40 milliGRAM(s) Oral at bedtime  carvedilol 25 milliGRAM(s) Oral every 12 hours  chlorhexidine 2% Cloths 1 Application(s) Topical daily  dextrose 5%. 1000 milliLiter(s) (50 mL/Hr) IV Continuous <Continuous>  dextrose 50% Injectable 25 Gram(s) IV Push once  glucagon  Injectable 1 milliGRAM(s) IntraMuscular once  heparin   Injectable 5000 Unit(s) SubCutaneous every 12 hours  insulin lispro (ADMELOG) corrective regimen sliding scale   SubCutaneous four times a day before meals  linagliptin 5 milliGRAM(s) Oral daily  mycophenolate mofetil 1 Gram(s) Oral <User Schedule>  NIFEdipine XL 30 milliGRAM(s) Oral daily  nystatin    Suspension 087381 Unit(s) Swish and Swallow four times a day  pantoprazole    Tablet 40 milliGRAM(s) Oral two times a day  polyethylene glycol 3350 17 Gram(s) Oral daily  predniSONE   Tablet 20 milliGRAM(s) Oral two times a day  predniSONE   Tablet   Oral   senna 2 Tablet(s) Oral at bedtime  tacrolimus ER Tablet (ENVARSUS XR) 5 milliGRAM(s) Oral <User Schedule>  trimethoprim   80 mG/sulfamethoxazole 400 mG 1 Tablet(s) Oral daily  valGANciclovir 450 milliGRAM(s) Oral <User Schedule>    MEDICATIONS  (PRN):  acetaminophen     Tablet .. 650 milliGRAM(s) Oral every 6 hours PRN Mild Pain (1 - 3)  baclofen 2.5 milliGRAM(s) Oral every 8 hours PRN Hiccups  dextrose Oral Gel 15 Gram(s) Oral once PRN Blood Glucose LESS THAN 70 milliGRAM(s)/deciliter  ondansetron Injectable 4 milliGRAM(s) IV Push every 6 hours PRN Nausea and/or Vomiting  traMADol 50 milliGRAM(s) Oral every 4 hours PRN Moderate Pain (4 - 6)      PAST MEDICAL & SURGICAL HISTORY:  HTN (hypertension)      Coronary artery disease      CAP (community acquired pneumonia)  6/18      Diabetes mellitus  type 2      GERD (gastroesophageal reflux disease)      Stented coronary artery  2014, 3 stents, [facility]      ESRD (end stage renal disease)  on Dialysis( M/W/F), By Dr. Huff      Hypercholesterolemia      Cerebrovascular accident (CVA), unspecified mechanism  diagnosed via CT of the head      Anemia due to stage 5 chronic kidney disease, not on chronic dialysis      H/O coronary angiogram  2014 - x3 stents      AV fistula  10/12/18 L radiocephalic AV fistula      H/O eye surgery          Vital Signs Last 24 Hrs  T(C): 36.1 (20 Sep 2023 05:23), Max: 36.8 (19 Sep 2023 21:00)  T(F): 97 (20 Sep 2023 05:23), Max: 98.3 (19 Sep 2023 21:00)  HR: 60 (20 Sep 2023 08:07) (60 - 70)  BP: 165/72 (20 Sep 2023 08:07) (124/66 - 176/76)  BP(mean): --  RR: 18 (20 Sep 2023 05:23) (18 - 20)  SpO2: 99% (20 Sep 2023 05:23) (96% - 99%)    Parameters below as of 20 Sep 2023 05:23  Patient On (Oxygen Delivery Method): room air        I&O's Summary    19 Sep 2023 07:01  -  20 Sep 2023 07:00  --------------------------------------------------------  IN: 840 mL / OUT: 1962 mL / NET: -1122 mL    20 Sep 2023 07:01  -  20 Sep 2023 12:13  --------------------------------------------------------  IN: 0 mL / OUT: 442 mL / NET: -442 mL                              10.0   9.96  )-----------( 115      ( 20 Sep 2023 05:53 )             31.3     09-20    133<L>  |  97  |  90<H>  ----------------------------<  282<H>  4.4   |  21<L>  |  4.04<H>    Ca    8.8      20 Sep 2023 05:53  Phos  4.1     09-20  Mg     2.2     09-20    TPro  6.6  /  Alb  3.6  /  TBili  0.8  /  DBili  x   /  AST  23  /  ALT  20  /  AlkPhos  56  09-20    Tacrolimus (), Serum: 8.5 ng/mL (09-20 @ 05:53)                    Review of systems  Gen: No weight changes, fatigue, fevers/chills, weakness  Skin: No rashes  Head/Eyes/Ears/Mouth: No headache; Normal hearing; Normal vision w/o blurriness; No sinus pain/discomfort, sore throat  Respiratory: No dyspnea, cough, wheezing, hemoptysis  CV: No chest pain, PND, orthopnea  GI: Mild abdominal pain at surgical incision site; denies diarrhea, constipation, nausea, vomiting, melena, hematochezia  : No increased frequency, dysuria, hematuria, nocturia  MSK: No joint pain/swelling; no back pain; no edema  Neuro: No dizziness/lightheadedness, weakness, seizures, numbness, tingling  Heme: No easy bruising or bleeding  Endo: No heat/cold intolerance  Psych: No significant nervousness, anxiety, stress, depression  All other systems were reviewed and are negative, except as noted.      PHYSICAL EXAM:  Constitutional: Well developed / well nourished  Eyes: Anicteric, PERRLA  ENMT: nc/at  Neck: supple  Respiratory: CTA B/L  Cardiovascular: RRR  Gastrointestinal: Soft, mildly distended, mild tenderness at the incision site; incision c/d/i; JPss ss  Genitourinary: Urinary catheter in place, hematuria  Extremities: SCD's in place and working bilaterally, AVF  palpable, no edema  Vascular: Palpable dp pulses bilaterally  Neurological: A&O x3  Skin: no rashes, ulcerations or lesions;  Musculoskeletal: Moving all extremities  Psychiatric: Responsive

## 2023-09-20 NOTE — PROGRESS NOTE ADULT - SUBJECTIVE AND OBJECTIVE BOX
[facility] DIVISION OF KIDNEY DISEASES AND HYPERTENSION -- FOLLOW UP NOTE  --------------------------------------------------------------------------------  Chief Complaint:    24 hour events/subjective:  No acute overnight events. No fresh complaints. BP was high.        PAST HISTORY  --------------------------------------------------------------------------------  No significant changes to PMH, PSH, FHx, SHx, unless otherwise noted    ALLERGIES & MEDICATIONS  --------------------------------------------------------------------------------  Allergies    No Known Allergies    Intolerances      Standing Inpatient Medications  aspirin enteric coated 81 milliGRAM(s) Oral daily  atorvastatin 40 milliGRAM(s) Oral at bedtime  basiliximab  IVPB 20 milliGRAM(s) IV Intermittent once  carvedilol 25 milliGRAM(s) Oral every 12 hours  chlorhexidine 2% Cloths 1 Application(s) Topical daily  dextrose 5%. 1000 milliLiter(s) IV Continuous <Continuous>  dextrose 50% Injectable 25 Gram(s) IV Push once  glucagon  Injectable 1 milliGRAM(s) IntraMuscular once  heparin   Injectable 5000 Unit(s) SubCutaneous every 12 hours  insulin lispro (ADMELOG) corrective regimen sliding scale   SubCutaneous four times a day before meals  linagliptin 5 milliGRAM(s) Oral daily  mycophenolate mofetil 1 Gram(s) Oral <User Schedule>  NIFEdipine XL 30 milliGRAM(s) Oral daily  nystatin    Suspension 061733 Unit(s) Swish and Swallow four times a day  pantoprazole    Tablet 40 milliGRAM(s) Oral two times a day  polyethylene glycol 3350 17 Gram(s) Oral daily  predniSONE   Tablet 20 milliGRAM(s) Oral two times a day  predniSONE   Tablet   Oral   senna 2 Tablet(s) Oral at bedtime  tacrolimus ER Tablet (ENVARSUS XR) 5 milliGRAM(s) Oral <User Schedule>  trimethoprim   80 mG/sulfamethoxazole 400 mG 1 Tablet(s) Oral daily  valGANciclovir 450 milliGRAM(s) Oral <User Schedule>    PRN Inpatient Medications  acetaminophen     Tablet .. 650 milliGRAM(s) Oral every 6 hours PRN  baclofen 2.5 milliGRAM(s) Oral every 8 hours PRN  dextrose Oral Gel 15 Gram(s) Oral once PRN  ondansetron Injectable 4 milliGRAM(s) IV Push every 6 hours PRN  traMADol 50 milliGRAM(s) Oral every 4 hours PRN      REVIEW OF SYSTEMS  --------------------------------------------------------------------------------  Gen: No fatigue, fevers/chills, weakness  Skin: No rashes  Head/Eyes/Ears/Mouth: No headache;No sore throat  Respiratory: No dyspnea, cough,   CV: No chest pain, PND, orthopnea  GI: No abdominal pain, diarrhea, constipation, nausea, vomiting  Transplant: No pain  : No increased frequency, dysuria, hematuria, nocturia  MSK: No joint pain/swelling; no back pain; no edema  Neuro: No dizziness/lightheadedness, weakness, seizures, numbness, tingling  Psych: No significant nervousness, anxiety, stress, depression    All other systems were reviewed and are negative, except as noted.    VITALS/PHYSICAL EXAM  --------------------------------------------------------------------------------  T(C): 36.1 (09-20-23 @ 05:23), Max: 36.8 (09-19-23 @ 21:00)  HR: 70 (09-20-23 @ 01:08) (67 - 74)  BP: 176/76 (09-20-23 @ 05:23) (124/66 - 176/76)  RR: 18 (09-20-23 @ 05:23) (18 - 20)  SpO2: 99% (09-20-23 @ 05:23) (95% - 99%)  Wt(kg): --        09-19-23 @ 07:01  -  09-20-23 @ 07:00  --------------------------------------------------------  IN: 840 mL / OUT: 1895 mL / NET: -1055 mL      Physical Exam:  Gen: Not in distress  HEENT: Supple neck, No JVD, PERRLA  Pulm: CTA B/L  CVS: S1 S2 +  Abd: Non - tender, not distended.  : No suprapubic tenderness, High colored urine in hinds.   transplant: Surgical site is clean. Drain + - 35 cc out put.   Extremities: No edema  Neuro: AAOx 3, No apparent focal neurological deficits.Tremor +      LABS/STUDIES  --------------------------------------------------------------------------------              10.0   9.96  >-----------<  115      [09-20-23 @ 05:53]              31.3     133  |  97  |  90  ----------------------------<  282      [09-20-23 @ 05:53]  4.4   |  21  |  4.04        Ca     8.8     [09-20-23 @ 05:53]      Mg     2.2     [09-20-23 @ 05:53]      Phos  4.1     [09-20-23 @ 05:53]    TPro  6.6  /  Alb  3.6  /  TBili  0.8  /  DBili  x   /  AST  23  /  ALT  20  /  AlkPhos  56  [09-20-23 @ 05:53]          Creatinine Trend:  SCr 4.04 [09-20 @ 05:53]  SCr 4.96 [09-19 @ 07:06]  SCr 6.90 [09-18 @ 06:59]  SCr 7.89 [09-17 @ 06:32]  SCr 7.55 [09-16 @ 20:22]    Tacrolimus (), Serum: 8.2 ng/mL (09-19 @ 07:08)  Tacrolimus (), Serum: 9.8 ng/mL (09-18 @ 07:08)  Tacrolimus (), Serum: 5.6 ng/mL (09-17 @ 06:35)            Urinalysis - [09-20-23 @ 05:53]      Color  / Appearance  / SG  / pH       Gluc 282 / Ketone   / Bili  / Urobili        Blood  / Protein  / Leuk Est  / Nitrite       RBC  / WBC  / Hyaline  / Gran  / Sq Epi  / Non Sq Epi  / Bacteria       HbA1c 7.8      [12-17-19 @ 05:54]    HBsAb 6.6      [09-16-23 @ 04:41]  HBsAg Nonreact      [09-16-23 @ 04:41]  HBcAb Nonreact      [09-16-23 @ 04:41]  HCV 0.10, Nonreact      [09-16-23 @ 04:41]  HIV Nonreact      [09-16-23 @ 04:41]

## 2023-09-20 NOTE — PROGRESS NOTE ADULT - ASSESSMENT
68 year old male  former smoker with past medical history significant for GERD, HTN, HLD, anemia of chronic disease, T2DM, CAD s/p stent x3 (2014 at [place]) & x2 (pLCx 10/28/22, LAD 10/31/22, off of plavix as of 5/1/23), CVA and ESRD (diagnosed Jan 2019) on HD via LUE AVF T/Th/S (Nephrologist Dr Capellan) with recent AV fistulogram 7/2023 with cephalic stenosis s/p balloon angioplasty. Past surgical history significant for open cholecystectomy, AVF creation and eye surgery. Now admitted for DDRT that he underwent on 9/16/23.    Donor details -Male in his 30s, Terminal creatinine: 6.2, KDPI 36%, cPRA 23%. Both donor and recipient: blood type O, CMV+, EBV+. Cold ischemia time: 14 hs 13 mins, Warm ischemia time: 42 mins  OR details- right kidney, single arterial opening into aorta with very early bifurcation, single vein, single ureter. The right renal vein was reconstructed with donor IVC in a horizontal fashion with running 6-0 Prolene sutures    1. s/p DDRT on  9/16/23 - Allograft function with good UOP and Cr is trending down   Grace and drain can come out. Check for any urinary retention. Potential discharge today. Pharmacy education. Follow up in out patient clinic.  2. IS meds- Simulect induction - 2nd dose today. Dosing tac by level. goal level 8-10 , MMF 1gm po bid and steroid taper per protocol.   3. HTN -BP is high. C/w Coreg 25 mg po bid and increase Nifedipine 30 mg to 60mg po daily.  4. DM - on sliding scale

## 2023-09-20 NOTE — PROGRESS NOTE ADULT - SUBJECTIVE AND OBJECTIVE BOX
[facility] NEPHROLOGY PRACTICE   MD JORDAN AVALOS MD RUORU WONG, PA    TEL:  FROM 9 AM to 5 PM ---OFFICE: [phone]    FROM 5 PM - 9 AM PLEASE CALL ANSWERING SERVICE: [phone]    RENAL FOLLOW UP NOTE--Date of Service 09-20-23 @ 10:35  --------------------------------------------------------------------------------  HPI:      Pt seen and examined at bedside.   Tavonies SOB, chest pain     PAST HISTORY  --------------------------------------------------------------------------------  No significant changes to PMH, PSH, FHx, SHx, unless otherwise noted    ALLERGIES & MEDICATIONS  --------------------------------------------------------------------------------  Allergies    No Known Allergies    Intolerances      Standing Inpatient Medications  aspirin enteric coated 81 milliGRAM(s) Oral daily  atorvastatin 40 milliGRAM(s) Oral at bedtime  basiliximab  IVPB 20 milliGRAM(s) IV Intermittent once  carvedilol 25 milliGRAM(s) Oral every 12 hours  chlorhexidine 2% Cloths 1 Application(s) Topical daily  dextrose 5%. 1000 milliLiter(s) IV Continuous <Continuous>  dextrose 50% Injectable 25 Gram(s) IV Push once  glucagon  Injectable 1 milliGRAM(s) IntraMuscular once  heparin   Injectable 5000 Unit(s) SubCutaneous every 12 hours  insulin lispro (ADMELOG) corrective regimen sliding scale   SubCutaneous four times a day before meals  linagliptin 5 milliGRAM(s) Oral daily  mycophenolate mofetil 1 Gram(s) Oral <User Schedule>  NIFEdipine XL 30 milliGRAM(s) Oral daily  nystatin    Suspension 327446 Unit(s) Swish and Swallow four times a day  pantoprazole    Tablet 40 milliGRAM(s) Oral two times a day  polyethylene glycol 3350 17 Gram(s) Oral daily  predniSONE   Tablet   Oral   predniSONE   Tablet 20 milliGRAM(s) Oral two times a day  senna 2 Tablet(s) Oral at bedtime  tacrolimus ER Tablet (ENVARSUS XR) 5 milliGRAM(s) Oral <User Schedule>  trimethoprim   80 mG/sulfamethoxazole 400 mG 1 Tablet(s) Oral daily  valGANciclovir 450 milliGRAM(s) Oral <User Schedule>    PRN Inpatient Medications  acetaminophen     Tablet .. 650 milliGRAM(s) Oral every 6 hours PRN  baclofen 2.5 milliGRAM(s) Oral every 8 hours PRN  dextrose Oral Gel 15 Gram(s) Oral once PRN  ondansetron Injectable 4 milliGRAM(s) IV Push every 6 hours PRN  traMADol 50 milliGRAM(s) Oral every 4 hours PRN      REVIEW OF SYSTEMS  --------------------------------------------------------------------------------  General: no fever  CVS: no chest pain  MSK: no edema     VITALS/PHYSICAL EXAM  --------------------------------------------------------------------------------  T(C): 36.1 (09-20-23 @ 05:23), Max: 36.8 (09-19-23 @ 21:00)  HR: 60 (09-20-23 @ 08:07) (60 - 70)  BP: 165/72 (09-20-23 @ 08:07) (124/66 - 176/76)  RR: 18 (09-20-23 @ 05:23) (18 - 20)  SpO2: 99% (09-20-23 @ 05:23) (96% - 99%)  Wt(kg): --        09-19-23 @ 07:01  -  09-20-23 @ 07:00  --------------------------------------------------------  IN: 840 mL / OUT: 1962 mL / NET: -1122 mL    09-20-23 @ 07:01  -  09-20-23 @ 10:35  --------------------------------------------------------  IN: 0 mL / OUT: 267 mL / NET: -267 mL      Physical Exam:  	Gen: NAD  	HEENT: MMM  	Pulm: CTA B/L  	CV: S1S2  	Abd: Soft, +BS  	Ext: No LE edema B/L                      Neuro: Awake   	Skin: Warm and Dry   	Vascular access: avf          JHONATHAN hinds  LABS/STUDIES  --------------------------------------------------------------------------------              10.0   9.96  >-----------<  115      [09-20-23 @ 05:53]              31.3     133  |  97  |  90  ----------------------------<  282      [09-20-23 @ 05:53]  4.4   |  21  |  4.04        Ca     8.8     [09-20-23 @ 05:53]      Mg     2.2     [09-20-23 @ 05:53]      Phos  4.1     [09-20-23 @ 05:53]    TPro  6.6  /  Alb  3.6  /  TBili  0.8  /  DBili  x   /  AST  23  /  ALT  20  /  AlkPhos  56  [09-20-23 @ 05:53]          Creatinine Trend:  SCr 4.04 [09-20 @ 05:53]  SCr 4.96 [09-19 @ 07:06]  SCr 6.90 [09-18 @ 06:59]  SCr 7.89 [09-17 @ 06:32]  SCr 7.55 [09-16 @ 20:22]    Urinalysis - [09-20-23 @ 05:53]      Color  / Appearance  / SG  / pH       Gluc 282 / Ketone   / Bili  / Urobili        Blood  / Protein  / Leuk Est  / Nitrite       RBC  / WBC  / Hyaline  / Gran  / Sq Epi  / Non Sq Epi  / Bacteria       HbA1c 7.8      [12-17-19 @ 05:54]    HBsAb 6.6      [09-16-23 @ 04:41]  HBsAg Nonreact      [09-16-23 @ 04:41]  HBcAb Nonreact      [09-16-23 @ 04:41]  HCV 0.10, Nonreact      [09-16-23 @ 04:41]  HIV Nonreact      [09-16-23 @ 04:41]

## 2023-09-20 NOTE — DISCHARGE NOTE PROVIDER - NSDCFUADDAPPT_GEN_ALL_CORE_FT
-f/u with surgeon on Monday 9/25/23  -f/u with  in 4-6 weeks for ureteral stent removal.  Education provided on  9/20/23

## 2023-09-20 NOTE — DISCHARGE NOTE PROVIDER - CARE PROVIDER_API CALL
Edmund Lambert Pompeo  Surgery  [address] [place] [id number]  Phone: [phone]  Fax: [phone]  Follow Up Time:

## 2023-09-20 NOTE — DISCHARGE NOTE PROVIDER - NSDCMRMEDTOKEN_GEN_ALL_CORE_FT
Aspirin Enteric Coated 81 mg oral delayed release tablet: 1 tab(s) orally once a day  atorvastatin 40 mg oral tablet: 1 tab(s) orally once a day  cloNIDine 0.1 mg oral tablet: 1 tab(s) orally 3 times a day  Coreg 3.125 mg oral tablet: 1 tab(s) orally 2 times a day  hydrALAZINE 50 mg oral tablet: 1 tab(s) orally 2 times a day  isosorbide dinitrate 10 mg oral tablet: 1 tab(s) orally 3 times a day  NIFEdipine 60 mg oral tablet, extended release: 1.5 tab(s) orally once a day  Protonix 40 mg oral delayed release tablet: 1 tab(s) orally once a day  Renvela 800 mg oral tablet: 2 tab(s) orally 4 times a day (with meals and at bedtime)  Tradjenta 5 mg oral tablet: 1 tab(s) orally once a day   acetaminophen 325 mg oral tablet: 2 tab(s) orally every 6 hours As needed Mild Pain (1 - 3)  aspirin 81 mg oral delayed release tablet: 1 tab(s) orally once a day  atorvastatin 40 mg oral tablet: 1 tab(s) orally once a day (at bedtime)  carvedilol 25 mg oral tablet: 1 tab(s) orally every 12 hours  mycophenolate mofetil 250 mg oral capsule: 1,000 milligram(s) orally 2 times a day  NIFEdipine 30 mg oral tablet, extended release: 1 tab(s) orally 2 times a day  nystatin 100,000 units/mL oral suspension: 5 milliliter(s) orally 4 times a day  pantoprazole 40 mg oral delayed release tablet: 1 tab(s) orally 2 times a day  predniSONE 5 mg oral tablet: 1 tab(s) orally once a day Follow up taper from med action  repaglinide 2 mg oral tablet: 1 tab(s) orally 3 times a day (before meals)  sulfamethoxazole-trimethoprim 400 mg-80 mg oral tablet: 1 tab(s) orally once a day  tacrolimus 1 mg oral tablet, extended release: 3 tab(s) orally once a day  Tradjenta 5 mg oral tablet: 1 tab(s) orally once a day  traMADol 50 mg oral tablet: 1 tab(s) orally every 8 hours as needed for Moderate Pain (4 - 6) MDD: 3  valGANciclovir 450 mg oral tablet: 1 tab(s) orally Monday, Wednesday, and Friday   acetaminophen 325 mg oral tablet: 2 tab(s) orally every 6 hours As needed Mild Pain (1 - 3)  aspirin 81 mg oral delayed release tablet: 1 tab(s) orally once a day  atorvastatin 40 mg oral tablet: 1 tab(s) orally once a day (at bedtime)  carvedilol 25 mg oral tablet: 1 tab(s) orally every 12 hours  ciprofloxacin 500 mg oral tablet: 1 tab(s) orally 2 times a day  glucometer (per patient&#x27;s insurance): Test blood sugars four times a day. Dispense #1 glucometer.  lancets: 1 application subcutaneously 4 times a day  mycophenolate mofetil 250 mg oral capsule: 1,000 milligram(s) orally 2 times a day  NIFEdipine 30 mg oral tablet, extended release: 1 tab(s) orally 2 times a day  nystatin 100,000 units/mL oral suspension: 5 milliliter(s) orally 4 times a day  pantoprazole 40 mg oral delayed release tablet: 1 tab(s) orally 2 times a day  predniSONE 5 mg oral tablet: 1 tab(s) orally once a day Follow up taper from med action  repaglinide 2 mg oral tablet: 1 tab(s) orally 3 times a day (before meals)  sulfamethoxazole-trimethoprim 400 mg-80 mg oral tablet: 1 tab(s) orally once a day  tacrolimus 1 mg oral tablet, extended release: 3 tab(s) orally once a day  test strips (per patient&#x27;s insurance): 1 application subcutaneously 4 times a day. ** Compatible with patient&#x27;s glucometer **  Tradjenta 5 mg oral tablet: 1 tab(s) orally once a day  traMADol 50 mg oral tablet: 1 tab(s) orally every 8 hours as needed for Moderate Pain (4 - 6) MDD: 3  valGANciclovir 450 mg oral tablet: 1 tab(s) orally Monday, Wednesday, and Friday

## 2023-09-20 NOTE — DISCHARGE NOTE PROVIDER - NSDCFUSCHEDAPPT_GEN_ALL_CORE_FT
Edin Johnson  [facility] Physician [facility]  CARDIOLOGY [address] [facility]   Scheduled Appointment: 10/11/2023

## 2023-09-20 NOTE — DISCHARGE NOTE NURSING/CASE MANAGEMENT/SOCIAL WORK - PATIENT PORTAL LINK FT
You can access the FollowMyHealth Patient Portal offered by [facility] by registering at the following website: [email or web address]. By joining [facility]’s FollowMyHealth portal, you will also be able to view your health information using other applications (apps) compatible with our system.

## 2023-09-20 NOTE — DISCHARGE NOTE PROVIDER - NSDCCPCAREPLAN_GEN_ALL_CORE_FT
PRINCIPAL DISCHARGE DIAGNOSIS  Diagnosis: Renal transplant recipient  Assessment and Plan of Treatment: - Avoid heavy lifting aything over 5lbs for six weeks following your surgery date. Do NOT drive a car or operate machinery unless cleared by your transplant surgeon during your follow up visit. Avoid straining, use stool softners as needed. Stop stool softners if diarrhea develops.   - Call transplant clinic ([phone]) if you develop fever (over 100.4F), increased abdominal pain, redness/swelling or bleeding around your incision site  - Call transplant clinic if you have difficulty urinating, notice decrease in urine output or blood in urine.   - Follow FOOD SAFETY instructions provided to you in your patient education guide booklet   - Bathing: Shower is allowed, you can let soap and water flow over your incision; do NOT rub the area. Bath is NOT allowed until your incision is healed and you have been cleared by your transplant surgeon.         SECONDARY DISCHARGE DIAGNOSES  Diagnosis: Immunosuppression  Assessment and Plan of Treatment: - Transplant recipients are at risk for developing infection because immune system is lowered due to transplant medications.   - Avoid contact with sick people; practice good hand hygiene;   - Take medications as directed by your translant team. Never stop taking medications unless instructed by your transplant physician.  - Do NOT double up medication dose if you missed your dose; call the transplant office for instructions.        Diagnosis: Hypertension  Assessment and Plan of Treatment: Be sure to follow a low salt diet, avoid caffeine, reduce alcohol intake.  If you have been prescribed antihypertensive medications to control your blood pressure, be sure to take them every day as prescribed and do not miss any doses, the medications do not work if they are not taken consistently. Follow up with your Primary Care Doctor and have your Blood Pressure checked regularly.       Diagnosis: Diabetes mellitus  Assessment and Plan of Treatment: If you have diabetes, you may need to monitor your blood sugar more frequently after transplant. Uncongrolled blood sugar levels can lead to poor wound healing and other complications. Follow a low carb and low sugar diet. Continue to take all anti-diabetic medications/insulin as prescribed. Follow up with your Primary Care Doctor regularly for blood sugar/A1c checks. Follow up with an opthalmologist and a podiatrist on an annual basis

## 2023-09-21 LAB
ALBUMIN SERPL ELPH-MCNC: 4 G/DL — SIGNIFICANT CHANGE UP (ref 3.3–5)
ALP SERPL-CCNC: 60 U/L — SIGNIFICANT CHANGE UP (ref 40–120)
ALT FLD-CCNC: 23 U/L — SIGNIFICANT CHANGE UP (ref 10–45)
ANION GAP SERPL CALC-SCNC: 16 MMOL/L — SIGNIFICANT CHANGE UP (ref 5–17)
APPEARANCE UR: ABNORMAL
AST SERPL-CCNC: 22 U/L — SIGNIFICANT CHANGE UP (ref 10–40)
BACTERIA # UR AUTO: NEGATIVE — SIGNIFICANT CHANGE UP
BASOPHILS # BLD AUTO: 0.02 K/UL — SIGNIFICANT CHANGE UP (ref 0–0.2)
BASOPHILS NFR BLD AUTO: 0.2 % — SIGNIFICANT CHANGE UP (ref 0–2)
BILIRUB SERPL-MCNC: 1 MG/DL — SIGNIFICANT CHANGE UP (ref 0.2–1.2)
BILIRUB UR-MCNC: NEGATIVE — SIGNIFICANT CHANGE UP
BUN SERPL-MCNC: 98 MG/DL — HIGH (ref 7–23)
CALCIUM SERPL-MCNC: 9.3 MG/DL — SIGNIFICANT CHANGE UP (ref 8.4–10.5)
CHLORIDE SERPL-SCNC: 97 MMOL/L — SIGNIFICANT CHANGE UP (ref 96–108)
CO2 SERPL-SCNC: 22 MMOL/L — SIGNIFICANT CHANGE UP (ref 22–31)
COLOR SPEC: ABNORMAL
CREAT FLD-MCNC: 4.07 MG/DL — SIGNIFICANT CHANGE UP
CREAT SERPL-MCNC: 3.68 MG/DL — HIGH (ref 0.5–1.3)
DIFF PNL FLD: ABNORMAL
EGFR: 17 ML/MIN/1.73M2 — LOW
EOSINOPHIL # BLD AUTO: 0.33 K/UL — SIGNIFICANT CHANGE UP (ref 0–0.5)
EOSINOPHIL NFR BLD AUTO: 2.8 % — SIGNIFICANT CHANGE UP (ref 0–6)
EPI CELLS # UR: 1 /HPF — SIGNIFICANT CHANGE UP
GLUCOSE BLDC GLUCOMTR-MCNC: 164 MG/DL — HIGH (ref 70–99)
GLUCOSE BLDC GLUCOMTR-MCNC: 230 MG/DL — HIGH (ref 70–99)
GLUCOSE BLDC GLUCOMTR-MCNC: 257 MG/DL — HIGH (ref 70–99)
GLUCOSE BLDC GLUCOMTR-MCNC: 303 MG/DL — HIGH (ref 70–99)
GLUCOSE BLDC GLUCOMTR-MCNC: 317 MG/DL — HIGH (ref 70–99)
GLUCOSE SERPL-MCNC: 277 MG/DL — HIGH (ref 70–99)
GLUCOSE UR QL: ABNORMAL
HCT VFR BLD CALC: 29.9 % — LOW (ref 39–50)
HGB BLD-MCNC: 9.8 G/DL — LOW (ref 13–17)
HYALINE CASTS # UR AUTO: 0 /LPF — SIGNIFICANT CHANGE UP (ref 0–2)
IMM GRANULOCYTES NFR BLD AUTO: 1.1 % — HIGH (ref 0–0.9)
KETONES UR-MCNC: NEGATIVE — SIGNIFICANT CHANGE UP
LEUKOCYTE ESTERASE UR-ACNC: ABNORMAL
LYMPHOCYTES # BLD AUTO: 1.04 K/UL — SIGNIFICANT CHANGE UP (ref 1–3.3)
LYMPHOCYTES # BLD AUTO: 8.9 % — LOW (ref 13–44)
MAGNESIUM SERPL-MCNC: 2.2 MG/DL — SIGNIFICANT CHANGE UP (ref 1.6–2.6)
MCHC RBC-ENTMCNC: 31.4 PG — SIGNIFICANT CHANGE UP (ref 27–34)
MCHC RBC-ENTMCNC: 32.8 GM/DL — SIGNIFICANT CHANGE UP (ref 32–36)
MCV RBC AUTO: 95.8 FL — SIGNIFICANT CHANGE UP (ref 80–100)
MONOCYTES # BLD AUTO: 1.31 K/UL — HIGH (ref 0–0.9)
MONOCYTES NFR BLD AUTO: 11.3 % — SIGNIFICANT CHANGE UP (ref 2–14)
NEUTROPHILS # BLD AUTO: 8.8 K/UL — HIGH (ref 1.8–7.4)
NEUTROPHILS NFR BLD AUTO: 75.7 % — SIGNIFICANT CHANGE UP (ref 43–77)
NITRITE UR-MCNC: NEGATIVE — SIGNIFICANT CHANGE UP
NRBC # BLD: 0 /100 WBCS — SIGNIFICANT CHANGE UP (ref 0–0)
PH UR: 6.5 — SIGNIFICANT CHANGE UP (ref 5–8)
PHOSPHATE SERPL-MCNC: 3.3 MG/DL — SIGNIFICANT CHANGE UP (ref 2.5–4.5)
PLATELET # BLD AUTO: 147 K/UL — LOW (ref 150–400)
POTASSIUM SERPL-MCNC: 4.4 MMOL/L — SIGNIFICANT CHANGE UP (ref 3.5–5.3)
POTASSIUM SERPL-SCNC: 4.4 MMOL/L — SIGNIFICANT CHANGE UP (ref 3.5–5.3)
PROT SERPL-MCNC: 7.2 G/DL — SIGNIFICANT CHANGE UP (ref 6–8.3)
PROT UR-MCNC: ABNORMAL
RBC # BLD: 3.12 M/UL — LOW (ref 4.2–5.8)
RBC # FLD: 15.7 % — HIGH (ref 10.3–14.5)
RBC CASTS # UR COMP ASSIST: >50 /HPF — HIGH (ref 0–4)
SODIUM SERPL-SCNC: 135 MMOL/L — SIGNIFICANT CHANGE UP (ref 135–145)
SP GR SPEC: 1.01 — SIGNIFICANT CHANGE UP (ref 1.01–1.02)
TACROLIMUS SERPL-MCNC: 11.7 NG/ML — SIGNIFICANT CHANGE UP
UROBILINOGEN FLD QL: NEGATIVE — SIGNIFICANT CHANGE UP
WBC # BLD: 11.63 K/UL — HIGH (ref 3.8–10.5)
WBC # FLD AUTO: 11.63 K/UL — HIGH (ref 3.8–10.5)
WBC UR QL: 5 /HPF — SIGNIFICANT CHANGE UP (ref 0–5)

## 2023-09-21 PROCEDURE — 99232 SBSQ HOSP IP/OBS MODERATE 35: CPT | Mod: GC

## 2023-09-21 RX ORDER — REPAGLINIDE 1 MG/1
1 TABLET ORAL
Qty: 90 | Refills: 0
Start: 2023-09-21 | End: 2023-10-20

## 2023-09-21 RX ORDER — NIFEDIPINE 30 MG
30 TABLET, EXTENDED RELEASE 24 HR ORAL
Refills: 0 | Status: DISCONTINUED | OUTPATIENT
Start: 2023-09-21 | End: 2023-09-22

## 2023-09-21 RX ORDER — CIPROFLOXACIN LACTATE 400MG/40ML
400 VIAL (ML) INTRAVENOUS ONCE
Refills: 0 | Status: COMPLETED | OUTPATIENT
Start: 2023-09-21 | End: 2023-09-21

## 2023-09-21 RX ORDER — REPAGLINIDE 1 MG/1
1 TABLET ORAL
Refills: 0 | Status: DISCONTINUED | OUTPATIENT
Start: 2023-09-21 | End: 2023-09-22

## 2023-09-21 RX ORDER — MULTIVIT WITH MIN/MFOLATE/K2 340-15/3 G
296 POWDER (GRAM) ORAL ONCE
Refills: 0 | Status: COMPLETED | OUTPATIENT
Start: 2023-09-21 | End: 2023-09-21

## 2023-09-21 RX ADMIN — CHLORHEXIDINE GLUCONATE 1 APPLICATION(S): 213 SOLUTION TOPICAL at 12:10

## 2023-09-21 RX ADMIN — Medication 10 MILLIGRAM(S): at 17:46

## 2023-09-21 RX ADMIN — PANTOPRAZOLE SODIUM 40 MILLIGRAM(S): 20 TABLET, DELAYED RELEASE ORAL at 17:47

## 2023-09-21 RX ADMIN — Medication 500000 UNIT(S): at 05:24

## 2023-09-21 RX ADMIN — TACROLIMUS 5 MILLIGRAM(S): 5 CAPSULE ORAL at 09:14

## 2023-09-21 RX ADMIN — Medication 500000 UNIT(S): at 12:08

## 2023-09-21 RX ADMIN — Medication 2: at 17:44

## 2023-09-21 RX ADMIN — ATORVASTATIN CALCIUM 40 MILLIGRAM(S): 80 TABLET, FILM COATED ORAL at 20:12

## 2023-09-21 RX ADMIN — LINAGLIPTIN 5 MILLIGRAM(S): 5 TABLET, FILM COATED ORAL at 12:08

## 2023-09-21 RX ADMIN — CARVEDILOL PHOSPHATE 25 MILLIGRAM(S): 80 CAPSULE, EXTENDED RELEASE ORAL at 05:24

## 2023-09-21 RX ADMIN — POLYETHYLENE GLYCOL 3350 17 GRAM(S): 17 POWDER, FOR SOLUTION ORAL at 12:10

## 2023-09-21 RX ADMIN — Medication 30 MILLIGRAM(S): at 05:24

## 2023-09-21 RX ADMIN — MYCOPHENOLATE MOFETIL 1 GRAM(S): 250 CAPSULE ORAL at 20:12

## 2023-09-21 RX ADMIN — TRAMADOL HYDROCHLORIDE 50 MILLIGRAM(S): 50 TABLET ORAL at 20:12

## 2023-09-21 RX ADMIN — HEPARIN SODIUM 5000 UNIT(S): 5000 INJECTION INTRAVENOUS; SUBCUTANEOUS at 05:25

## 2023-09-21 RX ADMIN — Medication 296 MILLILITER(S): at 17:50

## 2023-09-21 RX ADMIN — Medication 81 MILLIGRAM(S): at 12:09

## 2023-09-21 RX ADMIN — MYCOPHENOLATE MOFETIL 1 GRAM(S): 250 CAPSULE ORAL at 09:13

## 2023-09-21 RX ADMIN — Medication 500000 UNIT(S): at 20:12

## 2023-09-21 RX ADMIN — PANTOPRAZOLE SODIUM 40 MILLIGRAM(S): 20 TABLET, DELAYED RELEASE ORAL at 05:24

## 2023-09-21 RX ADMIN — Medication 200 MILLIGRAM(S): at 23:53

## 2023-09-21 RX ADMIN — Medication 30 MILLIGRAM(S): at 17:47

## 2023-09-21 RX ADMIN — CARVEDILOL PHOSPHATE 25 MILLIGRAM(S): 80 CAPSULE, EXTENDED RELEASE ORAL at 17:46

## 2023-09-21 RX ADMIN — REPAGLINIDE 1 MILLIGRAM(S): 1 TABLET ORAL at 12:08

## 2023-09-21 RX ADMIN — TRAMADOL HYDROCHLORIDE 50 MILLIGRAM(S): 50 TABLET ORAL at 21:00

## 2023-09-21 RX ADMIN — Medication 3 UNIT(S): at 12:50

## 2023-09-21 RX ADMIN — HEPARIN SODIUM 5000 UNIT(S): 5000 INJECTION INTRAVENOUS; SUBCUTANEOUS at 17:49

## 2023-09-21 RX ADMIN — SENNA PLUS 2 TABLET(S): 8.6 TABLET ORAL at 20:13

## 2023-09-21 RX ADMIN — REPAGLINIDE 1 MILLIGRAM(S): 1 TABLET ORAL at 18:02

## 2023-09-21 RX ADMIN — Medication 1 TABLET(S): at 12:10

## 2023-09-21 RX ADMIN — Medication 4: at 02:04

## 2023-09-21 RX ADMIN — Medication 8: at 12:50

## 2023-09-21 RX ADMIN — Medication 3 UNIT(S): at 09:17

## 2023-09-21 RX ADMIN — Medication 500000 UNIT(S): at 17:46

## 2023-09-21 RX ADMIN — Medication 10 MILLIGRAM(S): at 05:24

## 2023-09-21 RX ADMIN — Medication 6: at 09:17

## 2023-09-21 NOTE — PROGRESS NOTE ADULT - ASSESSMENT
69 y/o M former smoker with past medical history significant for GERD, HTN, HLD, anemia of chronic disease, T2DM, CAD s/p stent x3 (2014 at [place]) & x2 (pLCx 10/28/22, LAD 10/31/22, off of plavix as of 5/1/23), CVA and ESRD (diagnosed Jan 2019) on HD via LUE AVF T/Th/S (Nephrologist Dr Huff) with recent AV fistulogram 7/2023 with cephalic stenosis s/p balloon angioplasty. Past surgical history significant for open cholecystectomy, AVF creation and eye surgery in the past. He presents to [facility] on 9/16/23 as renal transplant candidate.      ESRD On HD   outpt [facility]   s/p DDRT on 9/16/2023   Immunosuppression per transplant team  Tacro level 11.7 (9/21)  S.Cr. continues to improve.  Monitor BMP and UO  Monitor     HTN  BP elevated   Consider increasing nifedipine to 60mg QD  Monitor     Proteinuria/Hematuria:  Large OB with >50RBCs  S/p renal transplant.  Repeat UA  Monitor.

## 2023-09-21 NOTE — PROGRESS NOTE ADULT - SUBJECTIVE AND OBJECTIVE BOX
[facility] NEPHROLOGY PRACTICE   MD JORDAN AVALOS MD KRISTINE SOLTANPOUR, DO ANGELA WONG, PA    TEL:  OFFICE: [phone]  From 5pm-7am Answering Service [phone]    -- RENAL FOLLOW UP NOTE ---Date of Service 09-21-23 @ 12:04    Patient is a 68y old  Male who presents with a chief complaint of Renal transplant candidate. (21 Sep 2023 11:10)      Patient seen and examined at bedside. No chest pain/sob    VITALS:  T(F): 98.4 (09-21-23 @ 09:04), Max: 98.6 (09-21-23 @ 05:00)  HR: 65 (09-21-23 @ 09:04)  BP: 153/76 (09-21-23 @ 09:04)  RR: 18 (09-21-23 @ 09:04)  SpO2: 99% (09-21-23 @ 09:04)  Wt(kg): --    09-20 @ 07:01  -  09-21 @ 07:00  --------------------------------------------------------  IN: 540 mL / OUT: 1742 mL / NET: -1202 mL    09-21 @ 07:01  -  09-21 @ 12:04  --------------------------------------------------------  IN: 240 mL / OUT: 120 mL / NET: 120 mL          PHYSICAL EXAM:  General: NAD  Neck: No JVD  Respiratory: CTAB, no wheezes, rales or rhonchi  Cardiovascular: S1, S2, RRR  Gastrointestinal: BS+, soft, NT/ND  Extremities: No peripheral edema    Hospital Medications:   MEDICATIONS  (STANDING):  aspirin enteric coated 81 milliGRAM(s) Oral daily  atorvastatin 40 milliGRAM(s) Oral at bedtime  carvedilol 25 milliGRAM(s) Oral every 12 hours  chlorhexidine 2% Cloths 1 Application(s) Topical daily  dextrose 5%. 1000 milliLiter(s) (50 mL/Hr) IV Continuous <Continuous>  dextrose 50% Injectable 25 Gram(s) IV Push once  glucagon  Injectable 1 milliGRAM(s) IntraMuscular once  heparin   Injectable 5000 Unit(s) SubCutaneous every 12 hours  insulin glargine Injectable (LANTUS) 5 Unit(s) SubCutaneous at bedtime  insulin lispro (ADMELOG) corrective regimen sliding scale   SubCutaneous three times a day before meals  insulin lispro (ADMELOG) corrective regimen sliding scale   SubCutaneous <User Schedule>  insulin lispro Injectable (ADMELOG) 3 Unit(s) SubCutaneous three times a day before meals  linagliptin 5 milliGRAM(s) Oral daily  magnesium citrate Oral Solution 296 milliLiter(s) Oral once  mycophenolate mofetil 1 Gram(s) Oral <User Schedule>  NIFEdipine XL 30 milliGRAM(s) Oral two times a day  nystatin    Suspension 713459 Unit(s) Swish and Swallow four times a day  pantoprazole    Tablet 40 milliGRAM(s) Oral two times a day  polyethylene glycol 3350 17 Gram(s) Oral daily  predniSONE   Tablet   Oral   predniSONE   Tablet 10 milliGRAM(s) Oral two times a day  repaglinide 1 milliGRAM(s) Oral three times a day before meals  senna 2 Tablet(s) Oral at bedtime  trimethoprim   80 mG/sulfamethoxazole 400 mG 1 Tablet(s) Oral daily  valGANciclovir 450 milliGRAM(s) Oral <User Schedule>    Tacrolimus (), Serum: 11.7 ng/mL (09-21 @ 06:12)    LABS:  09-21    135  |  97  |  98<H>  ----------------------------<  277<H>  4.4   |  22  |  3.68<H>    Ca    9.3      21 Sep 2023 06:10  Phos  3.3     09-21  Mg     2.2     09-21    TPro  7.2  /  Alb  4.0  /  TBili  1.0  /  DBili      /  AST  22  /  ALT  23  /  AlkPhos  60  09-21    Creatinine Trend: 3.68 <--, 4.04 <--, 4.96 <--, 6.90 <--, 7.89 <--, 7.55 <--, 7.40 <--, 10.92 <--    Albumin: 4.0 g/dL (09-21 @ 06:10)  Phosphorus: 3.3 mg/dL (09-21 @ 06:10)                              9.8    11.63 )-----------( 147      ( 21 Sep 2023 06:09 )             29.9     Urine Studies:  Urinalysis - [09-21-23 @ 06:10]      Color  / Appearance  / SG  / pH       Gluc 277 / Ketone   / Bili  / Urobili        Blood  / Protein  / Leuk Est  / Nitrite       RBC  / WBC  / Hyaline  / Gran  / Sq Epi  / Non Sq Epi  / Bacteria       HbA1c 7.8      [12-17-19 @ 05:54]    HBsAb 6.6      [09-16-23 @ 04:41]  HBsAg Nonreact      [09-16-23 @ 04:41]  HBcAb Nonreact      [09-16-23 @ 04:41]  HCV 0.10, Nonreact      [09-16-23 @ 04:41]  HIV Nonreact      [09-16-23 @ 04:41]      RADIOLOGY & ADDITIONAL STUDIES:   [facility] NEPHROLOGY PRACTICE   MD JORDAN AVALOS MD KRISTINE SOLTANPOUR, DO ANGELA WONG, PA    TEL:  OFFICE: [phone]  From 5pm-7am Answering Service [phone]    -- RENAL FOLLOW UP NOTE ---Date of Service 09-21-23 @ 12:04    Patient is a 68y old  Male who presents with a chief complaint of Renal transplant candidate. (21 Sep 2023 11:10)      Patient seen and examined at bedside. No chest pain/sob    VITALS:  T(F): 98.4 (09-21-23 @ 09:04), Max: 98.6 (09-21-23 @ 05:00)  HR: 65 (09-21-23 @ 09:04)  BP: 153/76 (09-21-23 @ 09:04)  RR: 18 (09-21-23 @ 09:04)  SpO2: 99% (09-21-23 @ 09:04)  Wt(kg): --    09-20 @ 07:01  -  09-21 @ 07:00  --------------------------------------------------------  IN: 540 mL / OUT: 1742 mL / NET: -1202 mL    09-21 @ 07:01  -  09-21 @ 12:04  --------------------------------------------------------  IN: 240 mL / OUT: 120 mL / NET: 120 mL          PHYSICAL EXAM:  General: NAD  Neck: No JVD  Respiratory: CTAB, no wheezes, rales or rhonchi  Cardiovascular: S1, S2, RRR  Gastrointestinal: BS+, soft, NT/ND.  + ALCON drain to RLQ; serosanguinous fluid.  Extremities:  1+ b/l LE edema.    Hospital Medications:   MEDICATIONS  (STANDING):  aspirin enteric coated 81 milliGRAM(s) Oral daily  atorvastatin 40 milliGRAM(s) Oral at bedtime  carvedilol 25 milliGRAM(s) Oral every 12 hours  chlorhexidine 2% Cloths 1 Application(s) Topical daily  dextrose 5%. 1000 milliLiter(s) (50 mL/Hr) IV Continuous <Continuous>  dextrose 50% Injectable 25 Gram(s) IV Push once  glucagon  Injectable 1 milliGRAM(s) IntraMuscular once  heparin   Injectable 5000 Unit(s) SubCutaneous every 12 hours  insulin glargine Injectable (LANTUS) 5 Unit(s) SubCutaneous at bedtime  insulin lispro (ADMELOG) corrective regimen sliding scale   SubCutaneous three times a day before meals  insulin lispro (ADMELOG) corrective regimen sliding scale   SubCutaneous <User Schedule>  insulin lispro Injectable (ADMELOG) 3 Unit(s) SubCutaneous three times a day before meals  linagliptin 5 milliGRAM(s) Oral daily  magnesium citrate Oral Solution 296 milliLiter(s) Oral once  mycophenolate mofetil 1 Gram(s) Oral <User Schedule>  NIFEdipine XL 30 milliGRAM(s) Oral two times a day  nystatin    Suspension 801468 Unit(s) Swish and Swallow four times a day  pantoprazole    Tablet 40 milliGRAM(s) Oral two times a day  polyethylene glycol 3350 17 Gram(s) Oral daily  predniSONE   Tablet   Oral   predniSONE   Tablet 10 milliGRAM(s) Oral two times a day  repaglinide 1 milliGRAM(s) Oral three times a day before meals  senna 2 Tablet(s) Oral at bedtime  trimethoprim   80 mG/sulfamethoxazole 400 mG 1 Tablet(s) Oral daily  valGANciclovir 450 milliGRAM(s) Oral <User Schedule>    Tacrolimus (), Serum: 11.7 ng/mL (09-21 @ 06:12)    LABS:  09-21    135  |  97  |  98<H>  ----------------------------<  277<H>  4.4   |  22  |  3.68<H>    Ca    9.3      21 Sep 2023 06:10  Phos  3.3     09-21  Mg     2.2     09-21    TPro  7.2  /  Alb  4.0  /  TBili  1.0  /  DBili      /  AST  22  /  ALT  23  /  AlkPhos  60  09-21    Creatinine Trend: 3.68 <--, 4.04 <--, 4.96 <--, 6.90 <--, 7.89 <--, 7.55 <--, 7.40 <--, 10.92 <--    Albumin: 4.0 g/dL (09-21 @ 06:10)  Phosphorus: 3.3 mg/dL (09-21 @ 06:10)                              9.8    11.63 )-----------( 147      ( 21 Sep 2023 06:09 )             29.9     Urine Studies:  Urinalysis - [09-21-23 @ 06:10]      Color  / Appearance  / SG  / pH       Gluc 277 / Ketone   / Bili  / Urobili        Blood  / Protein  / Leuk Est  / Nitrite       RBC  / WBC  / Hyaline  / Gran  / Sq Epi  / Non Sq Epi  / Bacteria       HbA1c 7.8      [12-17-19 @ 05:54]    HBsAb 6.6      [09-16-23 @ 04:41]  HBsAg Nonreact      [09-16-23 @ 04:41]  HBcAb Nonreact      [09-16-23 @ 04:41]  HCV 0.10, Nonreact      [09-16-23 @ 04:41]  HIV Nonreact      [09-16-23 @ 04:41]      RADIOLOGY & ADDITIONAL STUDIES:

## 2023-09-21 NOTE — PROGRESS NOTE ADULT - ATTENDING COMMENTS
68 year old male  former smoker with past medical history significant for GERD, HTN, HLD, anemia of chronic disease, T2DM, CAD s/p stent x3 (2014 at [place]) & x2 (pLCx 10/28/22, LAD 10/31/22, off of plavix as of 5/1/23), CVA and ESRD (diagnosed Jan 2019) on HD via LUE AVF T/Th/S (Nephrologist Dr Capellan) with recent AV fistulogram 7/2023 with cephalic stenosis s/p balloon angioplasty. Past surgical history significant for open cholecystectomy, AVF creation and eye surgery. Now admitted for DDRT that he underwent on 9/16/23.    Donor details -Male in his 30s, Terminal creatinine: 6.2, KDPI 36%, cPRA 23%. Both donor and recipient: blood type O, CMV+, EBV+. Cold ischemia time: 14 hs 13 mins, Warm ischemia time: 42 mins  OR details- right kidney, single arterial opening into aorta with very early bifurcation, single vein, single ureter. The right renal vein was reconstructed with donor IVC in a horizontal fashion with running 6-0 Prolene sutures    1. s/p DDRT on  9/16/23 - Allograft function with good UOP and Cr is trending down well  2. IS meds- Simulect induction. Dosing tac by level. goal level 8-10 , MMF 1gm po bid and steroid taper per protocol.   3. HTN -controlled  on Coreg 25 mg po bid and Nifedipine 30 mg po daily.  4. DM - on sliding scale
68 year old male  former smoker with past medical history significant for GERD, HTN, HLD, anemia of chronic disease, T2DM, CAD s/p stent x3 (2014 at [place]) & x2 (pLCx 10/28/22, LAD 10/31/22, off of plavix as of 5/1/23), CVA and ESRD (diagnosed Jan 2019) on HD via LUE AVF T/Th/S (Nephrologist Dr Capellan) with recent AV fistulogram 7/2023 with cephalic stenosis s/p balloon angioplasty. Past surgical history significant for open cholecystectomy, AVF creation and eye surgery. Now admitted for DDRT that he underwent on 9/16/23.    Donor details -Male in his 30s, Terminal creatinine: 6.2, KDPI 36%, cPRA 23%. Both donor and recipient: blood type O, CMV+, EBV+. Cold ischemia time: 14 hs 13 mins, Warm ischemia time: 42 mins  OR details- right kidney, single arterial opening into aorta with very early bifurcation, single vein, single ureter. The right renal vein was reconstructed with donor IVC in a horizontal fashion with running 6-0 Prolene sutures    1. s/p DDRT on  9/16/23 - Allograft function with good UOP and Cr is trending down well  2. IS meds- Simulect induction. Dosing tac by level. goal level 8-10 , MMF 1gm po bid and steroid taper per protocol.   3. HTN -Bp above goal .  on Coreg 25 mg po bid and increase Nifedipine to 30 mg po bid  4. DM - BS uncontrolled . on trajenta. adding prandin 1 mg po tid today

## 2023-09-21 NOTE — PROGRESS NOTE ADULT - ASSESSMENT
68 year old male  former smoker with past medical history significant for GERD, HTN, HLD, anemia of chronic disease, T2DM, CAD s/p stent x3 (2014 at [place]) & x2 (pLCx 10/28/22, LAD 10/31/22, off of plavix as of 5/1/23), CVA and ESRD (diagnosed Jan 2019) on HD via LUE AVF T/Th/S (Nephrologist Dr Capellan) with recent AV fistulogram 7/2023 with cephalic stenosis s/p balloon angioplasty. Past surgical history significant for open cholecystectomy, AVF creation and eye surgery. Now admitted for DDRT that he underwent on 9/16/23.    Donor details -Male in his 30s, Terminal creatinine: 6.2, KDPI 36%, cPRA 23%. Both donor and recipient: blood type O, CMV+, EBV+. Cold ischemia time: 14 hs 13 mins, Warm ischemia time: 42 mins  OR details- right kidney, single arterial opening into aorta with very early bifurcation, single vein, single ureter. The right renal vein was reconstructed with donor IVC in a horizontal fashion with running 6-0 Prolene sutures    1. s/p DDRT on  9/16/23 - Allograft function with good UOP and Cr is trending down. Pt has serosanguinous discharge from the ALCON drain site. Keep him under observation for 1 more day.     2. IS meds- Simulect induction - 2 sodes completed. Dosing tac by level. goal level 8-10 , MMF 1gm po bid and steroid taper per protocol.   3. HTN -BP is high. C/w Coreg 25 mg po bid and increase Nifedipine 30 mg BID today.   4. Pt has Dysuria - Send UA and Urine Cx. Send fluid culture too.   5. Pt has increased sugar levels. Lantus was added. He is on tradjenta. Add Prandin today. Follow with [facility] qAC and qHS.   4. DM - on sliding scale

## 2023-09-21 NOTE — PROGRESS NOTE ADULT - SUBJECTIVE AND OBJECTIVE BOX
· Subjective and Objective:   Transplant Surgery - Multidisciplinary Progress Note  --------------------------------------------------------------  DDRT 9/16/23 POD #5  Present:   Patient seen and examined with multidisciplinary Transplant team including  Surgeon: Dr. Rivera. Transplant Nephrologist: Dr. INDER Bae;  Pharmacist: Maria Alejandra and unit RN during am rounds.  Disciplines not in attendance will be notified of the plan.     68 Occitan speaking M former smoker with past medical history significant for GERD, HTN, HLD, anemia of chronic disease, T2DM, CAD s/p stent x3 (2014 at [place]) & x2 (pLCx 10/28/22, LAD 10/31/22, off of plavix as of 5/1/23), CVA and ESRD (diagnosed Jan 2019) on HD via LUE AVF T/Th/S (Nephrologist Dr Huff) with recent AV fistulogram 7/2023 with cephalic stenosis s/p balloon angioplasty. Past surgical history significant for open cholecystectomy, AVF creation and eye surgery in the past. Anuric.    s/p R DDRT under Simulect on 9/17/23    Interval Events:  POD#5 Cr 3.68, tac level 8.5-no change, tradjenta 5  Refers constipation  ~Serosanguinous output from bulb drain     Immunosuppression:  ENV per level, MMF 1/1, SST  Induction: Simulect on  POD#4  Ongoing monitoring for signs of rejection    Potential Discharge date: TBD   Education:  Medications  Plan of care:  See Below        MEDICATIONS  (STANDING):  aspirin enteric coated 81 milliGRAM(s) Oral daily  atorvastatin 40 milliGRAM(s) Oral at bedtime  carvedilol 25 milliGRAM(s) Oral every 12 hours  chlorhexidine 2% Cloths 1 Application(s) Topical daily  dextrose 5%. 1000 milliLiter(s) (50 mL/Hr) IV Continuous <Continuous>  dextrose 50% Injectable 25 Gram(s) IV Push once  glucagon  Injectable 1 milliGRAM(s) IntraMuscular once  heparin   Injectable 5000 Unit(s) SubCutaneous every 12 hours  insulin lispro (ADMELOG) corrective regimen sliding scale   SubCutaneous four times a day before meals  linagliptin 5 milliGRAM(s) Oral daily  mycophenolate mofetil 1 Gram(s) Oral <User Schedule>  NIFEdipine XL 30 milliGRAM(s) Oral daily  nystatin    Suspension 399250 Unit(s) Swish and Swallow four times a day  pantoprazole    Tablet 40 milliGRAM(s) Oral two times a day  polyethylene glycol 3350 17 Gram(s) Oral daily  predniSONE   Tablet 20 milliGRAM(s) Oral two times a day  predniSONE   Tablet   Oral   senna 2 Tablet(s) Oral at bedtime  tacrolimus ER Tablet (ENVARSUS XR) 5 milliGRAM(s) Oral <User Schedule>  trimethoprim   80 mG/sulfamethoxazole 400 mG 1 Tablet(s) Oral daily  valGANciclovir 450 milliGRAM(s) Oral <User Schedule>    MEDICATIONS  (PRN):  acetaminophen     Tablet .. 650 milliGRAM(s) Oral every 6 hours PRN Mild Pain (1 - 3)  baclofen 2.5 milliGRAM(s) Oral every 8 hours PRN Hiccups  dextrose Oral Gel 15 Gram(s) Oral once PRN Blood Glucose LESS THAN 70 milliGRAM(s)/deciliter  ondansetron Injectable 4 milliGRAM(s) IV Push every 6 hours PRN Nausea and/or Vomiting  traMADol 50 milliGRAM(s) Oral every 4 hours PRN Moderate Pain (4 - 6)      PAST MEDICAL & SURGICAL HISTORY:  HTN (hypertension)      Coronary artery disease      CAP (community acquired pneumonia)  6/18      Diabetes mellitus  type 2      GERD (gastroesophageal reflux disease)      Stented coronary artery  2014, 3 stents, [facility]      ESRD (end stage renal disease)  on Dialysis( M/W/F), By Dr. Huff      Hypercholesterolemia      Cerebrovascular accident (CVA), unspecified mechanism  diagnosed via CT of the head      Anemia due to stage 5 chronic kidney disease, not on chronic dialysis      H/O coronary angiogram  2014 - x3 stents      AV fistula  10/12/18 L radiocephalic AV fistula      H/O eye surgery            Review of systems  Gen: No weight changes, fatigue, fevers/chills, weakness  Skin: No rashes  Head/Eyes/Ears/Mouth: No headache; Normal hearing; Normal vision w/o blurriness; No sinus pain/discomfort, sore throat  Respiratory: No dyspnea, cough, wheezing, hemoptysis  CV: No chest pain, PND, orthopnea  GI: Mild abdominal pain at surgical incision site; denies diarrhea, constipation, nausea, vomiting, melena, hematochezia  : No increased frequency, dysuria, hematuria, nocturia  MSK: No joint pain/swelling; no back pain; no edema  Neuro: No dizziness/lightheadedness, weakness, seizures, numbness, tingling  Heme: No easy bruising or bleeding  Endo: No heat/cold intolerance  Psych: No significant nervousness, anxiety, stress, depression  All other systems were reviewed and are negative, except as noted.    MEDICATIONS  (STANDING):  aspirin enteric coated 81 milliGRAM(s) Oral daily  atorvastatin 40 milliGRAM(s) Oral at bedtime  carvedilol 25 milliGRAM(s) Oral every 12 hours  chlorhexidine 2% Cloths 1 Application(s) Topical daily  dextrose 5%. 1000 milliLiter(s) (50 mL/Hr) IV Continuous <Continuous>  dextrose 50% Injectable 25 Gram(s) IV Push once  glucagon  Injectable 1 milliGRAM(s) IntraMuscular once  heparin   Injectable 5000 Unit(s) SubCutaneous every 12 hours  insulin glargine Injectable (LANTUS) 5 Unit(s) SubCutaneous at bedtime  insulin lispro (ADMELOG) corrective regimen sliding scale   SubCutaneous three times a day before meals  insulin lispro (ADMELOG) corrective regimen sliding scale   SubCutaneous <User Schedule>  insulin lispro Injectable (ADMELOG) 3 Unit(s) SubCutaneous three times a day before meals  linagliptin 5 milliGRAM(s) Oral daily  magnesium citrate Oral Solution 296 milliLiter(s) Oral once  mycophenolate mofetil 1 Gram(s) Oral <User Schedule>  NIFEdipine XL 30 milliGRAM(s) Oral two times a day  nystatin    Suspension 746102 Unit(s) Swish and Swallow four times a day  pantoprazole    Tablet 40 milliGRAM(s) Oral two times a day  polyethylene glycol 3350 17 Gram(s) Oral daily  predniSONE   Tablet   Oral   predniSONE   Tablet 10 milliGRAM(s) Oral two times a day  repaglinide 1 milliGRAM(s) Oral three times a day before meals  senna 2 Tablet(s) Oral at bedtime  tacrolimus ER Tablet (ENVARSUS XR) 5 milliGRAM(s) Oral <User Schedule>  trimethoprim   80 mG/sulfamethoxazole 400 mG 1 Tablet(s) Oral daily  valGANciclovir 450 milliGRAM(s) Oral <User Schedule>    MEDICATIONS  (PRN):  acetaminophen     Tablet .. 650 milliGRAM(s) Oral every 6 hours PRN Mild Pain (1 - 3)  baclofen 2.5 milliGRAM(s) Oral every 8 hours PRN Hiccups  dextrose Oral Gel 15 Gram(s) Oral once PRN Blood Glucose LESS THAN 70 milliGRAM(s)/deciliter  ondansetron Injectable 4 milliGRAM(s) IV Push every 6 hours PRN Nausea and/or Vomiting  traMADol 50 milliGRAM(s) Oral every 4 hours PRN Moderate Pain (4 - 6)      PAST MEDICAL & SURGICAL HISTORY:  HTN (hypertension)      Coronary artery disease      CAP (community acquired pneumonia)  6/18      Diabetes mellitus  type 2      GERD (gastroesophageal reflux disease)      Stented coronary artery  2014, 3 stents, [facility]      ESRD (end stage renal disease)  on Dialysis( M/W/F), By Dr. Huff      Hypercholesterolemia      Cerebrovascular accident (CVA), unspecified mechanism  diagnosed via CT of the head      Anemia due to stage 5 chronic kidney disease, not on chronic dialysis      H/O coronary angiogram  2014 - x3 stents      AV fistula  10/12/18 L radiocephalic AV fistula      H/O eye surgery          Vital Signs Last 24 Hrs  T(C): 36.9 (21 Sep 2023 09:04), Max: 37 (21 Sep 2023 05:00)  T(F): 98.4 (21 Sep 2023 09:04), Max: 98.6 (21 Sep 2023 05:00)  HR: 65 (21 Sep 2023 09:04) (65 - 78)  BP: 153/76 (21 Sep 2023 09:04) (153/76 - 189/83)  BP(mean): 75 (20 Sep 2023 17:00) (75 - 75)  RR: 18 (21 Sep 2023 09:04) (18 - 18)  SpO2: 99% (21 Sep 2023 09:04) (99% - 100%)    Parameters below as of 21 Sep 2023 09:04  Patient On (Oxygen Delivery Method): room air        I&O's Summary    20 Sep 2023 07:01  -  21 Sep 2023 07:00  --------------------------------------------------------  IN: 540 mL / OUT: 1742 mL / NET: -1202 mL    21 Sep 2023 07:01  -  21 Sep 2023 11:09  --------------------------------------------------------  IN: 0 mL / OUT: 120 mL / NET: -120 mL                              9.8    11.63 )-----------( 147      ( 21 Sep 2023 06:09 )             29.9     09-21    135  |  97  |  98<H>  ----------------------------<  277<H>  4.4   |  22  |  3.68<H>    Ca    9.3      21 Sep 2023 06:10  Phos  3.3     09-21  Mg     2.2     09-21    TPro  7.2  /  Alb  4.0  /  TBili  1.0  /  DBili  x   /  AST  22  /  ALT  23  /  AlkPhos  60  09-21    Tacrolimus (), Serum: 11.7 ng/mL (09-21 @ 06:12)                  PHYSICAL EXAM:  Constitutional: Well developed / well nourished  Eyes: Anicteric, PERRLA  ENMT: nc/at  Neck: supple  Respiratory: CTA B/L  Cardiovascular: RRR  Gastrointestinal: Soft, mildly distended, mild tenderness at the incision site; incision c/d/i; JPss ss  Genitourinary: Urinary catheter in place, hematuria  Extremities: SCD's in place and working bilaterally, AVF  palpable, no edema  Vascular: Palpable dp pulses bilaterally  Neurological: A&O x3  Skin: no rashes, ulcerations or lesions;  Musculoskeletal: Moving all extremities  Psychiatric: Responsive          Assessment and Plan:   · Assessment	  68 Occitan speaking M former smoker with past medical history significant for GERD, HTN, HLD, anemia of chronic disease, T2DM, CAD s/p stent x3 (2014 at [place]) & x2 (pLCx 10/28/22, LAD 10/31/22, off of plavix as of 5/1/23), CVA and ESRD (diagnosed Jan 2019) on HD via LUE AVF T/Th/S (Nephrologist Dr Huff) with recent AV fistulogram 7/2023 with cephalic stenosis s/p balloon angioplasty. Past surgical history significant for open cholecystectomy, AVF creation and eye surgery in the past. Anuric. S/p R DDRT on 9/16/23    s/p DDRT (POD#5)  - Cr downtrending Cr 3.68, no need for HD  - Diabetic/renal diet  - ASA/SQH  - strict I&Os  - D/C subcutaneous ALCON  - SCDs/spirometry/PT c/s  - continue bowel regimen  - ALCON fluid negative for leak on 9/18/23  -Checking UA UCx     Immunosuppression  -Envarsus per level, MMF 1/1, SST  -Simulect on POD#4 completed  -Valcyte/Nystatin/PPI/Bactrim for PPx    HTN  -Coreg/Nifedipine/Hydralazine, adjust prn    DM  - restart home tradjenta  - insulin sliding scale  -Starting Pandrin 1 mg TID    DM  -on Tradjenta at home, will adjust with RUPESH for now    CAD  - ASA  - Tele      immuno: simulect induction.  tac/cellcept/pred.

## 2023-09-21 NOTE — PROVIDER CONTACT NOTE (OTHER) - ACTION/TREATMENT ORDERED:
As per provider + Deisy, ok to send urine culture. Will continue w/current care plan. Pending further instructions - hourly rounding to be continued.

## 2023-09-21 NOTE — CHART NOTE - NSCHARTNOTEFT_GEN_A_CORE
Nutrition Follow Up Note  Patient seen for: Nutrition Department protocol for post kidney transplant recipient follow up   Pt is s/p DDRT 23 POD #5    Chart reviewed, events noted. "68 Serbian speaking M former smoker with past medical history significant for GERD, HTN, HLD, anemia of chronic disease, T2DM, CAD s/p stent x3 ( at [facility]) & x2 (pLCx 10/28/22, LAD 10/31/22, off of plavix as of 23), CVA and ESRD (diagnosed 2019) on HD via LUE AVF T//S (Nephrologist Dr Huff) with recent AV fistulogram 2023 with cephalic stenosis s/p balloon angioplasty. Past surgical history significant for open cholecystectomy, AVF creation and eye surgery in the past. Anuric. S/p R DDRT on 23"    Source: [X] Patient       [x] Current Medical Record        [] RN        [] Family at bedside       [] Other:    -If unable to interview patient: [] Trach/Vent/BiPAP  [] Disoriented/confused/inappropriate to interview    Diet Order:   Diet, Consistent Carbohydrate Renal w/Evening Snack (23)    - Is current order appropriate/adequate? [X] Yes  []  No:     - PO intake :   [] >75%  Adequate    [X] 50-75%  Fair       [] <50%  Poor    - Nutrition-related concerns:      - Ordered for transplant meds: Deltasone, Tacrolimus, Cellcept       -  BG Management: Prandin, Tradjenta, Lantus 5U at bedtime, Lispro 3 U 3x/day before meals, SSI      - UOP: ml thus far (), 1,612 ml (),  1,872 ml ()      - Pt provided with post-transplant education on initial assessment, able to teach back 1-2 points at time of RD visit     GI:  Last BM 23  Bowel Regimen? [X] Yes- Senna and Miralax   [] No    Weights: Drug Dosing Weight  Weight (kg): 65.1 (16 Sep 2023 09:41)  BMI (kg/m2): 23.2 (16 Sep 2023 09:41)  Daily Weight in k.4 (-), Weight in k.8 (-20), Weight in k.5 (-), Weight in k (-), Weight in k (-), Weight in k ()  -- Weight fluctuations in setting of fluid shifts (IVF, intraoperative fluids). Will continue to monitor weight trends as available/able.     Nutritionally Pertinent MEDICATIONS  (STANDING):  atorvastatin  carvedilol  dextrose 5%.  dextrose 50% Injectable  glucagon  Injectable  insulin glargine Injectable (LANTUS)  insulin lispro (ADMELOG) corrective regimen sliding scale  insulin lispro (ADMELOG) corrective regimen sliding scale  insulin lispro Injectable (ADMELOG)  linagliptin  magnesium citrate Oral Solution  NIFEdipine XL  nystatin    Suspension  pantoprazole    Tablet  polyethylene glycol 3350  predniSONE   Tablet  predniSONE   Tablet  repaglinide  senna  trimethoprim   80 mG/sulfamethoxazole 400 mG  valGANciclovir    Pertinent Labs:  @ 06:10: Na 135, BUN 98<H>, Cr 3.68<H>, <H>, K+ 4.4, Phos 3.3, Mg 2.2, Alk Phos 60, ALT/SGPT 23, AST/SGOT 22, HbA1c --    A1C with Estimated Average Glucose Result: 7.7 % (23 @ 06:33)    Finger Sticks:  POCT Blood Glucose.: 257 mg/dL ( @ 09:00)  POCT Blood Glucose.: 303 mg/dL ( @ 02:01)  POCT Blood Glucose.: 321 mg/dL ( @ 23:58)  POCT Blood Glucose.: 393 mg/dL ( @ 21:33)  POCT Blood Glucose.: 314 mg/dL ( @ 16:43)  POCT Blood Glucose.: 366 mg/dL ( @ 12:55)      Skin per nursing documentation: -- No pressure injuries noted per flow sheets   Edema: -- +1 edema to left and right feet    Estimated Needs:   [X] no change since previous assessment  [] recalculated:     Previous Nutrition Diagnosis:   1. Increased Nutrient Needs  2. Food and Nutrition related knowledge deficit   Nutrition Diagnosis is: [X] ongoing  [] resolved [] not applicable     New Nutrition Diagnosis: [X] Not applicable    Nutrition Care Plan:  [X] In Progress  [] Achieved  [] Not applicable    Nutrition Interventions:     Education Provided:       [X] Yes: Reviewed post-transplant nutrition therapy and food safety guidelines for transplant recipients.  Reviewed recommendations to avoid grapefruit, pomegranate and star fruit while taking immunosuppressant medication.  Reviewed recommendations for moderate intake of sodium with transplant medications.  Reviewed Consistent Carbohydrate diet, including carbohydrate content of foods and portion sizes. Pt is advised that BG management may be more challenging after transplant; follow up with outpatient MD/Endocrinologist is recommended. [] No:        Recommendations:         1. Continue diet as ordered/tolerated upon discharge: Consistent Carbohydrate Renal diet      2. Recommend follow up visit with Transplant MD & outpatient RD as warranted      3. Provide encouragement with PO intakes, menu selections and assistance with meals as needed      4. Provided education on post-transplant nutrition therapy & food safety guidelines for transplant recipients with nutrition package before discharge- made aware RD remains available      Monitoring and Evaluation:   Continue to monitor patient's weights, labs, PO intakes, urine output, blood glucose levels, and bowel movements.     RD remains available upon request and will follow up per protocol  Vilma Sesay, MS,RDN,CDN AVAILABLE ON TEAMS
Patient is admitted for DDRT. VBG showed K of 5. Patient is due for hemodialysis today. D/w transplant team. Contacted patient's primary dialysis team covering nephrologist- Dr. Oglesby.  D/w dialysis nurse.  Will do hemodialysis until called to OR this morning.  Prescription reviewed with dialysis nurse . No fluid removal. Std bath. No heparin. Has functioning UE AVF.  Has consent for hemodialysis.  Vital Signs Last 24 Hrs  T(C): 36.8 (09-16-23 @ 07:41)  T(F): 97.5 (09-16-23 @ 07:10), Max: 98.3 (09-16-23 @ 04:37)  HR: 60 (09-16-23 @ 07:41) (60 - 60)  BP: 179/80 (09-16-23 @ 07:41)  RR: 18 (09-16-23 @ 07:41) (18 - 18)  SpO2: 99% (09-16-23 @ 07:41) (99% - 99%)    LABS/STUDIES  --------------------------------------------------------------------------------              13.0   5.24  >-----------<  142      [09-16-23 @ 04:41]              42.0     139  |  92  |  46  ----------------------------<  118      [09-16-23 @ 04:41]  4.4   |  29  |  10.92        Ca     10.0     [09-16-23 @ 04:41]      Mg     2.5     [09-16-23 @ 04:41]      Phos  5.9     [09-16-23 @ 04:41]    TPro  8.7  /  Alb  4.7  /  TBili  0.8  /  DBili  x   /  AST  15  /  ALT  17  /  AlkPhos  66  [09-16-23 @ 04:41]    PT/INR: PT 10.0 , INR 0.91       [09-16-23 @ 04:41]  PTT: 26.0       [09-16-23 @ 04:41]      Creatinine Trend:  SCr 10.92 [09-16 @ 04:41]  SCr 9.97 [08-18 @ 16:23]      I have reviewed dialysis prescription. Dialysis access is functioning well. Patient is tolerating dialysis well with no acute symptoms or distress. Dialysis prescription has been adjusted for optimized control of volume status, uremia and electrolytes. Management of additional metabolic abnormalities  will continue to be addressed on follow up.  D/w transplant team, dialysis team and primary nephrologist.
Post Operative Note  Patient: MD MAMI 68y (1955) Male   MRN: [id number]  Location: [facility] PACU 20  Visit: 09-16-23 Inpatient  Date: 09-16-23 @ 18:00    Procedure: Kidney transplanted     S/P DDRT R kidney        Subjective:   Patient resting comfortably, tolerating CLD, UOP 30,30,50 in past 3 hrs, no nausea/vomiting, pain well-controlled, incision without erythema/edema/fluctuance/drainage, Hgb stable, CMP pending, -160 Systolic.    Objective:  Vitals: T(F): 96.8 (09-16-23 @ 14:14), Max: 98.3 (09-16-23 @ 04:37)  HR: 86 (09-16-23 @ 17:00)  BP: 152/80 (09-16-23 @ 17:00) (120/62 - 201/94)  RR: 16 (09-16-23 @ 17:00)  SpO2: 100% (09-16-23 @ 17:00)  Vent Settings:     In:   09-16-23 @ 07:01  -  09-16-23 @ 18:00  --------------------------------------------------------  IN: 210 mL      IV Fluids: dextrose 5%. 1000 milliLiter(s) (50 mL/Hr) IV Continuous <Continuous>  lactated ringers. 1000 milliLiter(s) (75 mL/Hr) IV Continuous <Continuous>  sodium chloride 0.9%. 1000 milliLiter(s) (70 mL/Hr) IV Continuous <Continuous>      Out:   09-16-23 @ 07:01  -  09-16-23 @ 18:00  --------------------------------------------------------  OUT: 300 mL      EBL:     Voided Urine:   09-16-23 @ 07:01  -  09-16-23 @ 18:00  --------------------------------------------------------  OUT: 300 mL      Grace Catheter: yes no   Drains:   ALCON:   09-16-23 @ 07:01  -  09-16-23 @ 18:00  --------------------------------------------------------  OUT: 180 mL     ,   Chest Tube:      NG Tube:       Physical Examination:  General Appearance: NAD, AAOx3  HEENT: EOMI, sclera non-icteric.  Heart: RRR  Lungs: CTABL  Abdomen:  Soft, minimally tender, appropriate for post-op course, nondistended. No rigidity, guarding, or rebound tenderness.   MSK/Extremities: Warm & well-perfused. Peripheral pulses intact.  Skin: Warm, dry. No jaundice.   Incisions/Wounds: Dressings in place, clean, dry and intact, no signs of infection/active bleeding/drainage    Medications: [Standing]  acetaminophen     Tablet .. 650 milliGRAM(s) Oral every 6 hours PRN  basiliximab  IVPB 20 milliGRAM(s) IV Intermittent once  chlorhexidine 2% Cloths 1 Application(s) Topical daily  dextrose 5%. 1000 milliLiter(s) IV Continuous <Continuous>  dextrose 50% Injectable 25 Gram(s) IV Push once  dextrose Oral Gel 15 Gram(s) Oral once PRN  glucagon  Injectable 1 milliGRAM(s) IntraMuscular once  HYDROmorphone  Injectable 0.5 milliGRAM(s) IV Push every 10 minutes PRN  insulin lispro (ADMELOG) corrective regimen sliding scale   SubCutaneous every 4 hours  lactated ringers. 1000 milliLiter(s) IV Continuous <Continuous>  methylPREDNISolone sodium succinate IVPB 500 milliGRAM(s) IV Intermittent once  ondansetron Injectable 4 milliGRAM(s) IV Push every 6 hours PRN  sodium chloride 0.9%. 1000 milliLiter(s) IV Continuous <Continuous>  tacrolimus 2 milliGRAM(s) Oral once  traMADol 25 milliGRAM(s) Oral two times a day PRN    Medications: [PRN]  acetaminophen     Tablet .. 650 milliGRAM(s) Oral every 6 hours PRN  basiliximab  IVPB 20 milliGRAM(s) IV Intermittent once  chlorhexidine 2% Cloths 1 Application(s) Topical daily  dextrose 5%. 1000 milliLiter(s) IV Continuous <Continuous>  dextrose 50% Injectable 25 Gram(s) IV Push once  dextrose Oral Gel 15 Gram(s) Oral once PRN  glucagon  Injectable 1 milliGRAM(s) IntraMuscular once  HYDROmorphone  Injectable 0.5 milliGRAM(s) IV Push every 10 minutes PRN  insulin lispro (ADMELOG) corrective regimen sliding scale   SubCutaneous every 4 hours  lactated ringers. 1000 milliLiter(s) IV Continuous <Continuous>  methylPREDNISolone sodium succinate IVPB 500 milliGRAM(s) IV Intermittent once  ondansetron Injectable 4 milliGRAM(s) IV Push every 6 hours PRN  sodium chloride 0.9%. 1000 milliLiter(s) IV Continuous <Continuous>  tacrolimus 2 milliGRAM(s) Oral once  traMADol 25 milliGRAM(s) Oral two times a day PRN      DVT PROPHYLAXIS:   GI PROPHYLAXIS:   ANTICOAGULATION:   ANTIBIOTICS:        Labs:                        11.0   8.96  )-----------( 134      ( 16 Sep 2023 14:35 )             34.6     09-16    139  |  92<L>  |  46<H>  ----------------------------<  118<H>  4.4   |  29  |  10.92<H>    Ca    10.0      16 Sep 2023 04:41  Phos  3.2     09-16  Mg     1.9     09-16    TPro  8.7<H>  /  Alb  4.7  /  TBili  0.8  /  DBili  x   /  AST  15  /  ALT  17  /  AlkPhos  66  09-16    PT/INR - ( 16 Sep 2023 04:41 )   PT: 10.0 sec;   INR: 0.91 ratio         PTT - ( 16 Sep 2023 04:41 )  PTT:26.0 sec        Imaging:  No post-op imaging studies    Assessment:  68yM s/p DDRT Right side.    Plan:    - Monitor vitals  - Monitor UOP  - Trend Cr  - Pain control  - SBP >120  - Monitor post-op labs and replete as necessary      Date/Time: 09-16-23 @ 18:00

## 2023-09-21 NOTE — PROVIDER CONTACT NOTE (OTHER) - ASSESSMENT
Pt is alert orientedx4, denies sob,cp & ha @ this time. Pt has been complaining of burning while urinating.

## 2023-09-21 NOTE — PROGRESS NOTE ADULT - SUBJECTIVE AND OBJECTIVE BOX
[facility] DIVISION OF KIDNEY DISEASES AND HYPERTENSION -- FOLLOW UP NOTE  --------------------------------------------------------------------------------  Chief Complaint:    24 hour events/subjective:  Pt had one of the ALCON drains and hinds removed yesterday. Pt has serosanguinous fluid coming out from the ALCON drain site. Dysuria +, pain + in the transplant area. No other acute events.         PAST HISTORY  --------------------------------------------------------------------------------  No significant changes to PMH, PSH, FHx, SHx, unless otherwise noted    ALLERGIES & MEDICATIONS  --------------------------------------------------------------------------------  Allergies    No Known Allergies    Intolerances      Standing Inpatient Medications  aspirin enteric coated 81 milliGRAM(s) Oral daily  atorvastatin 40 milliGRAM(s) Oral at bedtime  carvedilol 25 milliGRAM(s) Oral every 12 hours  chlorhexidine 2% Cloths 1 Application(s) Topical daily  dextrose 5%. 1000 milliLiter(s) IV Continuous <Continuous>  dextrose 50% Injectable 25 Gram(s) IV Push once  glucagon  Injectable 1 milliGRAM(s) IntraMuscular once  heparin   Injectable 5000 Unit(s) SubCutaneous every 12 hours  insulin glargine Injectable (LANTUS) 5 Unit(s) SubCutaneous at bedtime  insulin lispro (ADMELOG) corrective regimen sliding scale   SubCutaneous three times a day before meals  insulin lispro (ADMELOG) corrective regimen sliding scale   SubCutaneous <User Schedule>  insulin lispro Injectable (ADMELOG) 3 Unit(s) SubCutaneous three times a day before meals  linagliptin 5 milliGRAM(s) Oral daily  magnesium citrate Oral Solution 296 milliLiter(s) Oral once  mycophenolate mofetil 1 Gram(s) Oral <User Schedule>  NIFEdipine XL 30 milliGRAM(s) Oral two times a day  nystatin    Suspension 706106 Unit(s) Swish and Swallow four times a day  pantoprazole    Tablet 40 milliGRAM(s) Oral two times a day  polyethylene glycol 3350 17 Gram(s) Oral daily  predniSONE   Tablet 10 milliGRAM(s) Oral two times a day  predniSONE   Tablet   Oral   repaglinide 1 milliGRAM(s) Oral three times a day before meals  senna 2 Tablet(s) Oral at bedtime  tacrolimus ER Tablet (ENVARSUS XR) 5 milliGRAM(s) Oral <User Schedule>  trimethoprim   80 mG/sulfamethoxazole 400 mG 1 Tablet(s) Oral daily  valGANciclovir 450 milliGRAM(s) Oral <User Schedule>    PRN Inpatient Medications  acetaminophen     Tablet .. 650 milliGRAM(s) Oral every 6 hours PRN  baclofen 2.5 milliGRAM(s) Oral every 8 hours PRN  dextrose Oral Gel 15 Gram(s) Oral once PRN  ondansetron Injectable 4 milliGRAM(s) IV Push every 6 hours PRN  traMADol 50 milliGRAM(s) Oral every 4 hours PRN      REVIEW OF SYSTEMS  --------------------------------------------------------------------------------  Gen: No fatigue, fevers/chills, weakness  Skin: No rashes  Head/Eyes/Ears/Mouth: No headache;No sore throat  Respiratory: No dyspnea, cough,   CV: No chest pain, PND, orthopnea  GI: No abdominal pain, diarrhea, constipation, nausea, vomiting  Transplant: pain +  : No increased frequency, hematuria, nocturia but dysuria+  MSK: No joint pain/swelling; no back pain; no edema  Neuro: No dizziness/lightheadedness, weakness, seizures, numbness, tingling  Psych: No significant nervousness, anxiety, stress, depression    All other systems were reviewed and are negative, except as noted.    VITALS/PHYSICAL EXAM  --------------------------------------------------------------------------------  T(C): 36.9 (09-21-23 @ 09:04), Max: 37 (09-21-23 @ 05:00)  HR: 65 (09-21-23 @ 09:04) (65 - 78)  BP: 153/76 (09-21-23 @ 09:04) (153/76 - 189/83)  RR: 18 (09-21-23 @ 09:04) (18 - 18)  SpO2: 99% (09-21-23 @ 09:04) (99% - 100%)  Wt(kg): --        09-20-23 @ 07:01  -  09-21-23 @ 07:00  --------------------------------------------------------  IN: 540 mL / OUT: 1742 mL / NET: -1202 mL    09-21-23 @ 07:01  -  09-21-23 @ 11:10  --------------------------------------------------------  IN: 0 mL / OUT: 120 mL / NET: -120 mL      Physical Exam:  Gen: Not in distress, Mild pain +  HEENT: Supple neck, No JVD, PERRLA  Pulm: CTA B/L  CVS: S1 S2 +  Abd: Non - tender, not distended.  : No suprapubic tenderness  Transplant: Serosanguinous fluid from the ALCON drain site, tenderness +  Extremities: No edema  Neuro: AAOx 3, No apparent focal neurological deficits.      LABS/STUDIES  --------------------------------------------------------------------------------              9.8    11.63 >-----------<  147      [09-21-23 @ 06:09]              29.9     135  |  97  |  98  ----------------------------<  277      [09-21-23 @ 06:10]  4.4   |  22  |  3.68        Ca     9.3     [09-21-23 @ 06:10]      Mg     2.2     [09-21-23 @ 06:10]      Phos  3.3     [09-21-23 @ 06:10]    TPro  7.2  /  Alb  4.0  /  TBili  1.0  /  DBili  x   /  AST  22  /  ALT  23  /  AlkPhos  60  [09-21-23 @ 06:10]          Creatinine Trend:  SCr 3.68 [09-21 @ 06:10]  SCr 4.04 [09-20 @ 05:53]  SCr 4.96 [09-19 @ 07:06]  SCr 6.90 [09-18 @ 06:59]  SCr 7.89 [09-17 @ 06:32]    Tacrolimus (), Serum: 11.7 ng/mL (09-21 @ 06:12)  Tacrolimus (), Serum: 8.5 ng/mL (09-20 @ 05:53)  Tacrolimus (), Serum: 8.2 ng/mL (09-19 @ 07:08)  Tacrolimus (), Serum: 9.8 ng/mL (09-18 @ 07:08)            Urinalysis - [09-21-23 @ 06:10]      Color  / Appearance  / SG  / pH       Gluc 277 / Ketone   / Bili  / Urobili        Blood  / Protein  / Leuk Est  / Nitrite       RBC  / WBC  / Hyaline  / Gran  / Sq Epi  / Non Sq Epi  / Bacteria       HbA1c 7.8      [12-17-19 @ 05:54]    HBsAb 6.6      [09-16-23 @ 04:41]  HBsAg Nonreact      [09-16-23 @ 04:41]  HBcAb Nonreact      [09-16-23 @ 04:41]  HCV 0.10, Nonreact      [09-16-23 @ 04:41]  HIV Nonreact      [09-16-23 @ 04:41]

## 2023-09-22 VITALS
DIASTOLIC BLOOD PRESSURE: 58 MMHG | TEMPERATURE: 98 F | HEART RATE: 62 BPM | RESPIRATION RATE: 16 BRPM | OXYGEN SATURATION: 100 % | SYSTOLIC BLOOD PRESSURE: 151 MMHG

## 2023-09-22 LAB
ALBUMIN SERPL ELPH-MCNC: 3.6 G/DL — SIGNIFICANT CHANGE UP (ref 3.3–5)
ALP SERPL-CCNC: 52 U/L — SIGNIFICANT CHANGE UP (ref 40–120)
ALT FLD-CCNC: 30 U/L — SIGNIFICANT CHANGE UP (ref 10–45)
ANION GAP SERPL CALC-SCNC: 15 MMOL/L — SIGNIFICANT CHANGE UP (ref 5–17)
AST SERPL-CCNC: 22 U/L — SIGNIFICANT CHANGE UP (ref 10–40)
BASOPHILS # BLD AUTO: 0.04 K/UL — SIGNIFICANT CHANGE UP (ref 0–0.2)
BASOPHILS NFR BLD AUTO: 0.4 % — SIGNIFICANT CHANGE UP (ref 0–2)
BILIRUB SERPL-MCNC: 1 MG/DL — SIGNIFICANT CHANGE UP (ref 0.2–1.2)
BUN SERPL-MCNC: 100 MG/DL — HIGH (ref 7–23)
CALCIUM SERPL-MCNC: 9.2 MG/DL — SIGNIFICANT CHANGE UP (ref 8.4–10.5)
CHLORIDE SERPL-SCNC: 96 MMOL/L — SIGNIFICANT CHANGE UP (ref 96–108)
CO2 SERPL-SCNC: 21 MMOL/L — LOW (ref 22–31)
CREAT SERPL-MCNC: 3.19 MG/DL — HIGH (ref 0.5–1.3)
CULTURE RESULTS: NO GROWTH — SIGNIFICANT CHANGE UP
EGFR: 21 ML/MIN/1.73M2 — LOW
EOSINOPHIL # BLD AUTO: 0.8 K/UL — HIGH (ref 0–0.5)
EOSINOPHIL NFR BLD AUTO: 7.4 % — HIGH (ref 0–6)
GLUCOSE BLDC GLUCOMTR-MCNC: 296 MG/DL — HIGH (ref 70–99)
GLUCOSE BLDC GLUCOMTR-MCNC: 312 MG/DL — HIGH (ref 70–99)
GLUCOSE BLDC GLUCOMTR-MCNC: 329 MG/DL — HIGH (ref 70–99)
GLUCOSE BLDC GLUCOMTR-MCNC: 336 MG/DL — HIGH (ref 70–99)
GLUCOSE BLDC GLUCOMTR-MCNC: 352 MG/DL — HIGH (ref 70–99)
GLUCOSE SERPL-MCNC: 335 MG/DL — HIGH (ref 70–99)
HCT VFR BLD CALC: 29.6 % — LOW (ref 39–50)
HGB BLD-MCNC: 9.4 G/DL — LOW (ref 13–17)
IMM GRANULOCYTES NFR BLD AUTO: 0.8 % — SIGNIFICANT CHANGE UP (ref 0–0.9)
LYMPHOCYTES # BLD AUTO: 0.98 K/UL — LOW (ref 1–3.3)
LYMPHOCYTES # BLD AUTO: 9 % — LOW (ref 13–44)
MAGNESIUM SERPL-MCNC: 2.9 MG/DL — HIGH (ref 1.6–2.6)
MCHC RBC-ENTMCNC: 31.2 PG — SIGNIFICANT CHANGE UP (ref 27–34)
MCHC RBC-ENTMCNC: 31.8 GM/DL — LOW (ref 32–36)
MCV RBC AUTO: 98.3 FL — SIGNIFICANT CHANGE UP (ref 80–100)
MONOCYTES # BLD AUTO: 1.15 K/UL — HIGH (ref 0–0.9)
MONOCYTES NFR BLD AUTO: 10.6 % — SIGNIFICANT CHANGE UP (ref 2–14)
NEUTROPHILS # BLD AUTO: 7.79 K/UL — HIGH (ref 1.8–7.4)
NEUTROPHILS NFR BLD AUTO: 71.8 % — SIGNIFICANT CHANGE UP (ref 43–77)
NRBC # BLD: 0 /100 WBCS — SIGNIFICANT CHANGE UP (ref 0–0)
PHOSPHATE SERPL-MCNC: 3 MG/DL — SIGNIFICANT CHANGE UP (ref 2.5–4.5)
PLATELET # BLD AUTO: 134 K/UL — LOW (ref 150–400)
POTASSIUM SERPL-MCNC: 4.5 MMOL/L — SIGNIFICANT CHANGE UP (ref 3.5–5.3)
POTASSIUM SERPL-SCNC: 4.5 MMOL/L — SIGNIFICANT CHANGE UP (ref 3.5–5.3)
PROT SERPL-MCNC: 6.5 G/DL — SIGNIFICANT CHANGE UP (ref 6–8.3)
RBC # BLD: 3.01 M/UL — LOW (ref 4.2–5.8)
RBC # FLD: 16.2 % — HIGH (ref 10.3–14.5)
SODIUM SERPL-SCNC: 132 MMOL/L — LOW (ref 135–145)
SPECIMEN SOURCE: SIGNIFICANT CHANGE UP
TACROLIMUS SERPL-MCNC: 11.1 NG/ML — SIGNIFICANT CHANGE UP
WBC # BLD: 10.85 K/UL — HIGH (ref 3.8–10.5)
WBC # FLD AUTO: 10.85 K/UL — HIGH (ref 3.8–10.5)

## 2023-09-22 PROCEDURE — 99232 SBSQ HOSP IP/OBS MODERATE 35: CPT

## 2023-09-22 RX ORDER — INSULIN NPH HUM/REG INSULIN HM 70-30/ML
0 VIAL (ML) SUBCUTANEOUS
Qty: 1 | Refills: 0
Start: 2023-09-22

## 2023-09-22 RX ORDER — INSULIN DETEMIR 100/ML (3)
0 INSULIN PEN (ML) SUBCUTANEOUS
Qty: 5 | Refills: 0
Start: 2023-09-22

## 2023-09-22 RX ORDER — MYCOPHENOLATE MOFETIL 250 MG/1
1000 CAPSULE ORAL
Refills: 0 | DISCHARGE
Start: 2023-09-22

## 2023-09-22 RX ORDER — INSULIN LISPRO 100/ML
0 VIAL (ML) SUBCUTANEOUS
Qty: 5 | Refills: 0
Start: 2023-09-22

## 2023-09-22 RX ORDER — NIFEDIPINE 30 MG
1 TABLET, EXTENDED RELEASE 24 HR ORAL
Qty: 0 | Refills: 0 | DISCHARGE
Start: 2023-09-22

## 2023-09-22 RX ORDER — PANTOPRAZOLE SODIUM 20 MG/1
1 TABLET, DELAYED RELEASE ORAL
Qty: 0 | Refills: 0 | DISCHARGE
Start: 2023-09-22

## 2023-09-22 RX ORDER — METFORMIN HYDROCHLORIDE 850 MG/1
1 TABLET ORAL
Qty: 60 | Refills: 0
Start: 2023-09-22

## 2023-09-22 RX ORDER — TRAMADOL HYDROCHLORIDE 50 MG/1
1 TABLET ORAL
Qty: 9 | Refills: 0
Start: 2023-09-22 | End: 2023-09-24

## 2023-09-22 RX ORDER — INSULIN GLARGINE 100 [IU]/ML
0 INJECTION, SOLUTION SUBCUTANEOUS
Qty: 5 | Refills: 0
Start: 2023-09-22

## 2023-09-22 RX ORDER — CIPROFLOXACIN LACTATE 400MG/40ML
1 VIAL (ML) INTRAVENOUS
Qty: 14 | Refills: 0
Start: 2023-09-22 | End: 2023-09-28

## 2023-09-22 RX ORDER — REPAGLINIDE 1 MG/1
1 TABLET ORAL
Qty: 0 | Refills: 0 | DISCHARGE
Start: 2023-09-22

## 2023-09-22 RX ORDER — INSULIN LISPRO 100/ML
0 VIAL (ML) SUBCUTANEOUS
Qty: 1 | Refills: 0
Start: 2023-09-22

## 2023-09-22 RX ORDER — ACETAMINOPHEN 500 MG
2 TABLET ORAL
Qty: 0 | Refills: 0 | DISCHARGE
Start: 2023-09-22

## 2023-09-22 RX ORDER — TACROLIMUS 5 MG/1
3 CAPSULE ORAL ONCE
Refills: 0 | Status: COMPLETED | OUTPATIENT
Start: 2023-09-22 | End: 2023-09-22

## 2023-09-22 RX ORDER — ISOPROPYL ALCOHOL, BENZOCAINE .7; .06 ML/ML; ML/ML
0 SWAB TOPICAL
Qty: 100 | Refills: 1
Start: 2023-09-22

## 2023-09-22 RX ORDER — TACROLIMUS 5 MG/1
3 CAPSULE ORAL
Refills: 0 | Status: DISCONTINUED | OUTPATIENT
Start: 2023-09-23 | End: 2023-09-22

## 2023-09-22 RX ORDER — PANTOPRAZOLE SODIUM 20 MG/1
1 TABLET, DELAYED RELEASE ORAL
Refills: 0 | DISCHARGE

## 2023-09-22 RX ORDER — HYDRALAZINE HCL 50 MG
1 TABLET ORAL
Refills: 0 | DISCHARGE

## 2023-09-22 RX ORDER — REPAGLINIDE 1 MG/1
1 TABLET ORAL ONCE
Refills: 0 | Status: COMPLETED | OUTPATIENT
Start: 2023-09-22 | End: 2023-09-22

## 2023-09-22 RX ORDER — GLUCAGON INJECTION, SOLUTION 0.5 MG/.1ML
0 INJECTION, SOLUTION SUBCUTANEOUS
Qty: 1 | Refills: 0
Start: 2023-09-22

## 2023-09-22 RX ORDER — ATORVASTATIN CALCIUM 80 MG/1
1 TABLET, FILM COATED ORAL
Qty: 0 | Refills: 0 | DISCHARGE
Start: 2023-09-22

## 2023-09-22 RX ORDER — SEVELAMER CARBONATE 2400 MG/1
2 POWDER, FOR SUSPENSION ORAL
Refills: 0 | DISCHARGE

## 2023-09-22 RX ORDER — ASPIRIN/CALCIUM CARB/MAGNESIUM 324 MG
1 TABLET ORAL
Qty: 0 | Refills: 0 | DISCHARGE
Start: 2023-09-22

## 2023-09-22 RX ORDER — CARVEDILOL PHOSPHATE 80 MG/1
1 CAPSULE, EXTENDED RELEASE ORAL
Qty: 0 | Refills: 0 | DISCHARGE
Start: 2023-09-22

## 2023-09-22 RX ORDER — TACROLIMUS 5 MG/1
5 CAPSULE ORAL
Qty: 0 | Refills: 0 | DISCHARGE
Start: 2023-09-22

## 2023-09-22 RX ORDER — VALGANCICLOVIR 450 MG/1
1 TABLET, FILM COATED ORAL
Qty: 0 | Refills: 0 | DISCHARGE
Start: 2023-09-22

## 2023-09-22 RX ORDER — INSULIN ASPART 100 [IU]/ML
0 INJECTION, SOLUTION SUBCUTANEOUS
Qty: 5 | Refills: 0
Start: 2023-09-22

## 2023-09-22 RX ORDER — REPAGLINIDE 1 MG/1
2 TABLET ORAL
Refills: 0 | Status: DISCONTINUED | OUTPATIENT
Start: 2023-09-22 | End: 2023-09-22

## 2023-09-22 RX ORDER — CARVEDILOL PHOSPHATE 80 MG/1
1 CAPSULE, EXTENDED RELEASE ORAL
Refills: 0 | DISCHARGE

## 2023-09-22 RX ORDER — NYSTATIN 500MM UNIT
5 POWDER (EA) MISCELLANEOUS
Qty: 0 | Refills: 0 | DISCHARGE
Start: 2023-09-22

## 2023-09-22 RX ADMIN — Medication 5 MILLIGRAM(S): at 05:20

## 2023-09-22 RX ADMIN — Medication 500000 UNIT(S): at 17:26

## 2023-09-22 RX ADMIN — TRAMADOL HYDROCHLORIDE 50 MILLIGRAM(S): 50 TABLET ORAL at 18:32

## 2023-09-22 RX ADMIN — Medication 4: at 01:41

## 2023-09-22 RX ADMIN — Medication 30 MILLIGRAM(S): at 01:15

## 2023-09-22 RX ADMIN — Medication 6: at 12:24

## 2023-09-22 RX ADMIN — REPAGLINIDE 2 MILLIGRAM(S): 1 TABLET ORAL at 19:47

## 2023-09-22 RX ADMIN — CHLORHEXIDINE GLUCONATE 1 APPLICATION(S): 213 SOLUTION TOPICAL at 12:05

## 2023-09-22 RX ADMIN — Medication 30 MILLIGRAM(S): at 12:07

## 2023-09-22 RX ADMIN — Medication 4: at 22:45

## 2023-09-22 RX ADMIN — TACROLIMUS 3 MILLIGRAM(S): 5 CAPSULE ORAL at 13:39

## 2023-09-22 RX ADMIN — CARVEDILOL PHOSPHATE 25 MILLIGRAM(S): 80 CAPSULE, EXTENDED RELEASE ORAL at 08:58

## 2023-09-22 RX ADMIN — Medication 81 MILLIGRAM(S): at 12:06

## 2023-09-22 RX ADMIN — CARVEDILOL PHOSPHATE 25 MILLIGRAM(S): 80 CAPSULE, EXTENDED RELEASE ORAL at 17:26

## 2023-09-22 RX ADMIN — Medication 500000 UNIT(S): at 05:18

## 2023-09-22 RX ADMIN — MYCOPHENOLATE MOFETIL 1 GRAM(S): 250 CAPSULE ORAL at 09:02

## 2023-09-22 RX ADMIN — Medication 1 TABLET(S): at 12:06

## 2023-09-22 RX ADMIN — LINAGLIPTIN 5 MILLIGRAM(S): 5 TABLET, FILM COATED ORAL at 12:07

## 2023-09-22 RX ADMIN — Medication 500000 UNIT(S): at 12:06

## 2023-09-22 RX ADMIN — HEPARIN SODIUM 5000 UNIT(S): 5000 INJECTION INTRAVENOUS; SUBCUTANEOUS at 17:26

## 2023-09-22 RX ADMIN — Medication 8: at 17:25

## 2023-09-22 RX ADMIN — REPAGLINIDE 1 MILLIGRAM(S): 1 TABLET ORAL at 12:05

## 2023-09-22 RX ADMIN — REPAGLINIDE 1 MILLIGRAM(S): 1 TABLET ORAL at 08:58

## 2023-09-22 RX ADMIN — PANTOPRAZOLE SODIUM 40 MILLIGRAM(S): 20 TABLET, DELAYED RELEASE ORAL at 17:26

## 2023-09-22 RX ADMIN — Medication 10: at 08:58

## 2023-09-22 RX ADMIN — Medication 5 MILLIGRAM(S): at 17:28

## 2023-09-22 RX ADMIN — PANTOPRAZOLE SODIUM 40 MILLIGRAM(S): 20 TABLET, DELAYED RELEASE ORAL at 05:19

## 2023-09-22 RX ADMIN — TRAMADOL HYDROCHLORIDE 50 MILLIGRAM(S): 50 TABLET ORAL at 17:32

## 2023-09-22 RX ADMIN — MYCOPHENOLATE MOFETIL 1 GRAM(S): 250 CAPSULE ORAL at 19:50

## 2023-09-22 RX ADMIN — VALGANCICLOVIR 450 MILLIGRAM(S): 450 TABLET, FILM COATED ORAL at 08:59

## 2023-09-22 RX ADMIN — HEPARIN SODIUM 5000 UNIT(S): 5000 INJECTION INTRAVENOUS; SUBCUTANEOUS at 05:19

## 2023-09-22 RX ADMIN — ATORVASTATIN CALCIUM 40 MILLIGRAM(S): 80 TABLET, FILM COATED ORAL at 22:44

## 2023-09-22 NOTE — PROGRESS NOTE ADULT - SUBJECTIVE AND OBJECTIVE BOX
Transplant Surgery - Multidisciplinary Progress Note  --------------------------------------------------------------  DDRT 9/16/23 POD #6    Present:   Patient seen and examined with multidisciplinary team including Transplant Surgeon: Dr. Rivera, Transplant fellow Dr. Aleman. Transplant Nephrologist: Dr. Verito Bae,  Pharmacist: Luis Felipe Bess, NP Reji and unit RN during am rounds.  Disciplines not in attendance will be notified of the plan.     68 Armenian speaking M former smoker with past medical history significant for GERD, HTN, HLD, anemia of chronic disease, T2DM, CAD s/p stent x3 (2014 at [place]) & x2 (pLCx 10/28/22, LAD 10/31/22, off of plavix as of 5/1/23), CVA and ESRD (diagnosed Jan 2019) on HD via LUE AVF T/Th/S (Nephrologist Dr Huff) with recent AV fistulogram 7/2023 with cephalic stenosis s/p balloon angioplasty. Past surgical history significant for open cholecystectomy, AVF creation and eye surgery in the past. Anuric.    s/p R DDRT under Simulect on 9/17/23    Interval Events:  POD#6 Cr 3.19 from 3.68  BG elevated started radjenta 5 and prandin  Diarrhea overnight post bowel regimen    Immunosuppression:  ENV per level, MMF 1/1, SST  Induction: Simulect on  POD#4  Ongoing monitoring for signs of rejection    Potential Discharge date: TBD   Education:  Medications  Plan of care:  See Below         MEDICATIONS  (STANDING):  aspirin enteric coated 81 milliGRAM(s) Oral daily  atorvastatin 40 milliGRAM(s) Oral at bedtime  carvedilol 25 milliGRAM(s) Oral every 12 hours  chlorhexidine 2% Cloths 1 Application(s) Topical daily  dextrose 5%. 1000 milliLiter(s) (50 mL/Hr) IV Continuous <Continuous>  dextrose 50% Injectable 25 Gram(s) IV Push once  glucagon  Injectable 1 milliGRAM(s) IntraMuscular once  heparin   Injectable 5000 Unit(s) SubCutaneous every 12 hours  insulin lispro (ADMELOG) corrective regimen sliding scale   SubCutaneous three times a day before meals  insulin lispro (ADMELOG) corrective regimen sliding scale   SubCutaneous <User Schedule>  linagliptin 5 milliGRAM(s) Oral daily  mycophenolate mofetil 1 Gram(s) Oral <User Schedule>  NIFEdipine XL 30 milliGRAM(s) Oral two times a day  nystatin    Suspension 313000 Unit(s) Swish and Swallow four times a day  pantoprazole    Tablet 40 milliGRAM(s) Oral two times a day  predniSONE   Tablet 5 milliGRAM(s) Oral two times a day  predniSONE   Tablet   Oral   repaglinide 2 milliGRAM(s) Oral three times a day before meals  tacrolimus ER Tablet (ENVARSUS XR) 3 milliGRAM(s) Oral once  trimethoprim   80 mG/sulfamethoxazole 400 mG 1 Tablet(s) Oral daily  valGANciclovir 450 milliGRAM(s) Oral <User Schedule>    MEDICATIONS  (PRN):  acetaminophen     Tablet .. 650 milliGRAM(s) Oral every 6 hours PRN Mild Pain (1 - 3)  baclofen 2.5 milliGRAM(s) Oral every 8 hours PRN Hiccups  dextrose Oral Gel 15 Gram(s) Oral once PRN Blood Glucose LESS THAN 70 milliGRAM(s)/deciliter  ondansetron Injectable 4 milliGRAM(s) IV Push every 6 hours PRN Nausea and/or Vomiting  traMADol 50 milliGRAM(s) Oral every 4 hours PRN Moderate Pain (4 - 6)      PAST MEDICAL & SURGICAL HISTORY:  HTN (hypertension)      Coronary artery disease      CAP (community acquired pneumonia)  6/18      Diabetes mellitus  type 2      GERD (gastroesophageal reflux disease)      Stented coronary artery  2014, 3 stents, [facility]      ESRD (end stage renal disease)  on Dialysis( M/W/F), By Dr. Huff      Hypercholesterolemia      Cerebrovascular accident (CVA), unspecified mechanism  diagnosed via CT of the head      Anemia due to stage 5 chronic kidney disease, not on chronic dialysis      H/O coronary angiogram  2014 - x3 stents      AV fistula  10/12/18 L radiocephalic AV fistula      H/O eye surgery          Vital Signs Last 24 Hrs  T(C): 36.3 (22 Sep 2023 12:16), Max: 36.8 (22 Sep 2023 01:00)  T(F): 97.4 (22 Sep 2023 12:16), Max: 98.2 (22 Sep 2023 01:00)  HR: 60 (22 Sep 2023 12:16) (60 - 80)  BP: 134/73 (22 Sep 2023 12:16) (125/68 - 177/79)  BP(mean): --  RR: 18 (22 Sep 2023 12:16) (18 - 18)  SpO2: 100% (22 Sep 2023 12:16) (97% - 100%)    Parameters below as of 22 Sep 2023 12:16  Patient On (Oxygen Delivery Method): room air        I&O's Summary    21 Sep 2023 07:01  -  22 Sep 2023 07:00  --------------------------------------------------------  IN: 720 mL / OUT: 955 mL / NET: -235 mL    22 Sep 2023 07:01  -  22 Sep 2023 13:38  --------------------------------------------------------  IN: 240 mL / OUT: 225 mL / NET: 15 mL                              9.4    10.85 )-----------( 134      ( 22 Sep 2023 07:04 )             29.6     09-22    132<L>  |  96  |  100<H>  ----------------------------<  335<H>  4.5   |  21<L>  |  3.19<H>    Ca    9.2      22 Sep 2023 07:02  Phos  3.0     09-22  Mg     2.9     09-22    TPro  6.5  /  Alb  3.6  /  TBili  1.0  /  DBili  x   /  AST  22  /  ALT  30  /  AlkPhos  52  09-22    Tacrolimus (), Serum: 11.1 ng/mL (09-22 @ 07:04)    Review of systems  Gen: No weight changes, fatigue, fevers/chills, weakness  Skin: No rashes  Head/Eyes/Ears/Mouth: No headache; Normal hearing; Normal vision w/o blurriness; No sinus pain/discomfort, sore throat  Respiratory: No dyspnea, cough, wheezing, hemoptysis  CV: No chest pain, PND, orthopnea  GI: Mild abdominal pain at surgical incision site; denies diarrhea, constipation, nausea, vomiting, melena, hematochezia  : No increased frequency, dysuria, hematuria, nocturia  MSK: No joint pain/swelling; no back pain; no edema  Neuro: No dizziness/lightheadedness, weakness, seizures, numbness, tingling  Heme: No easy bruising or bleeding  Endo: No heat/cold intolerance  Psych: No significant nervousness, anxiety, stress, depression  All other systems were reviewed and are negative, except as noted.      PHYSICAL EXAM:  Constitutional: Well developed / well nourished  Eyes: Anicteric, PERRLA  ENMT: nc/at  Neck: supple  Respiratory: CTA B/L  Cardiovascular: RRR  Gastrointestinal: Soft, mildly distended, mild tenderness at the incision site; incision c/d/i; JPss ss  Genitourinary: Urinary catheter in place, hematuria  Extremities: SCD's in place and working bilaterally, AVF  palpable, no edema  Vascular: Palpable dp pulses bilaterally  Neurological: A&O x3  Skin: no rashes, ulcerations or lesions;  Musculoskeletal: Moving all extremities  Psychiatric: Responsive

## 2023-09-22 NOTE — PROGRESS NOTE ADULT - SUBJECTIVE AND OBJECTIVE BOX
[facility]   MD JORDAN AVALOS MD KRISTINE SOLTANPOUR, DO ANGELA WONG, PA    TEL:  OFFICE: [phone]  From 5pm-7am Answering Service [phone]    -- RENAL FOLLOW UP NOTE ---Date of Service 09-22-23 @ 11:27    Patient is a 68y old  Male who presents with a chief complaint of Renal transplant candidate. (21 Sep 2023 12:03)      Patient seen and examined at bedside. No chest pain/sob.  Pt c/o diarrhea and dysuria and incontinence.    VITALS:  T(F): 97.6 (09-22-23 @ 08:31), Max: 98.4 (09-21-23 @ 12:36)  HR: 67 (09-22-23 @ 08:31)  BP: 133/68 (09-22-23 @ 08:31)  RR: 18 (09-22-23 @ 08:31)  SpO2: 97% (09-22-23 @ 08:31)  Wt(kg): --    09-21 @ 07:01  -  09-22 @ 07:00  --------------------------------------------------------  IN: 720 mL / OUT: 955 mL / NET: -235 mL    09-22 @ 07:01  -  09-22 @ 11:27  --------------------------------------------------------  IN: 120 mL / OUT: 35 mL / NET: 85 mL          PHYSICAL EXAM:  General: NAD  Neck: No JVD  Respiratory: CTAB, no wheezes, rales or rhonchi  Cardiovascular: S1, S2, RRR  Gastrointestinal: BS+, soft, NT/ND.  + ALCON bulb, serosanguinous drainage.  Extremities: 1+ b/l LE edema    Hospital Medications:   MEDICATIONS  (STANDING):  aspirin enteric coated 81 milliGRAM(s) Oral daily  atorvastatin 40 milliGRAM(s) Oral at bedtime  carvedilol 25 milliGRAM(s) Oral every 12 hours  chlorhexidine 2% Cloths 1 Application(s) Topical daily  dextrose 5%. 1000 milliLiter(s) (50 mL/Hr) IV Continuous <Continuous>  dextrose 50% Injectable 25 Gram(s) IV Push once  glucagon  Injectable 1 milliGRAM(s) IntraMuscular once  heparin   Injectable 5000 Unit(s) SubCutaneous every 12 hours  insulin lispro (ADMELOG) corrective regimen sliding scale   SubCutaneous three times a day before meals  insulin lispro (ADMELOG) corrective regimen sliding scale   SubCutaneous <User Schedule>  linagliptin 5 milliGRAM(s) Oral daily  mycophenolate mofetil 1 Gram(s) Oral <User Schedule>  NIFEdipine XL 30 milliGRAM(s) Oral two times a day  nystatin    Suspension 915846 Unit(s) Swish and Swallow four times a day  pantoprazole    Tablet 40 milliGRAM(s) Oral two times a day  predniSONE   Tablet 5 milliGRAM(s) Oral two times a day  predniSONE   Tablet   Oral   repaglinide 2 milliGRAM(s) Oral three times a day before meals  repaglinide 1 milliGRAM(s) Oral once  trimethoprim   80 mG/sulfamethoxazole 400 mG 1 Tablet(s) Oral daily  valGANciclovir 450 milliGRAM(s) Oral <User Schedule>    Tacrolimus (), Serum: 11.1 ng/mL (09-22 @ 07:04)    LABS:  09-22    132<L>  |  96  |  100<H>  ----------------------------<  335<H>  4.5   |  21<L>  |  3.19<H>    Ca    9.2      22 Sep 2023 07:02  Phos  3.0     09-22  Mg     2.9     09-22    TPro  6.5  /  Alb  3.6  /  TBili  1.0  /  DBili      /  AST  22  /  ALT  30  /  AlkPhos  52  09-22    Creatinine Trend: 3.19 <--, 3.68 <--, 4.04 <--, 4.96 <--, 6.90 <--, 7.89 <--, 7.55 <--, 7.40 <--, 10.92 <--    Albumin: 3.6 g/dL (09-22 @ 07:02)  Phosphorus: 3.0 mg/dL (09-22 @ 07:02)                              9.4    10.85 )-----------( 134      ( 22 Sep 2023 07:04 )             29.6     Urine Studies:  Urinalysis - [09-22-23 @ 07:02]      Color  / Appearance  / SG  / pH       Gluc 335 / Ketone   / Bili  / Urobili        Blood  / Protein  / Leuk Est  / Nitrite       RBC  / WBC  / Hyaline  / Gran  / Sq Epi  / Non Sq Epi  / Bacteria       HbA1c 7.8      [12-17-19 @ 05:54]    HBsAb 6.6      [09-16-23 @ 04:41]  HBsAg Nonreact      [09-16-23 @ 04:41]  HBcAb Nonreact      [09-16-23 @ 04:41]  HCV 0.10, Nonreact      [09-16-23 @ 04:41]  HIV Nonreact      [09-16-23 @ 04:41]      RADIOLOGY & ADDITIONAL STUDIES:

## 2023-09-22 NOTE — PROGRESS NOTE ADULT - SUBJECTIVE AND OBJECTIVE BOX
[facility] DIVISION OF KIDNEY DISEASES AND HYPERTENSION -- FOLLOW UP NOTE  --------------------------------------------------------------------------------  Chief Complaint:    24 hour events/subjective:        PAST HISTORY  --------------------------------------------------------------------------------  No significant changes to PMH, PSH, FHx, SHx, unless otherwise noted    ALLERGIES & MEDICATIONS  --------------------------------------------------------------------------------  Allergies    No Known Allergies    Intolerances      Standing Inpatient Medications  aspirin enteric coated 81 milliGRAM(s) Oral daily  atorvastatin 40 milliGRAM(s) Oral at bedtime  carvedilol 25 milliGRAM(s) Oral every 12 hours  chlorhexidine 2% Cloths 1 Application(s) Topical daily  dextrose 5%. 1000 milliLiter(s) IV Continuous <Continuous>  dextrose 50% Injectable 25 Gram(s) IV Push once  glucagon  Injectable 1 milliGRAM(s) IntraMuscular once  heparin   Injectable 5000 Unit(s) SubCutaneous every 12 hours  insulin lispro (ADMELOG) corrective regimen sliding scale   SubCutaneous three times a day before meals  insulin lispro (ADMELOG) corrective regimen sliding scale   SubCutaneous <User Schedule>  linagliptin 5 milliGRAM(s) Oral daily  mycophenolate mofetil 1 Gram(s) Oral <User Schedule>  NIFEdipine XL 30 milliGRAM(s) Oral two times a day  nystatin    Suspension 167524 Unit(s) Swish and Swallow four times a day  pantoprazole    Tablet 40 milliGRAM(s) Oral two times a day  predniSONE   Tablet 5 milliGRAM(s) Oral two times a day  predniSONE   Tablet   Oral   repaglinide 2 milliGRAM(s) Oral three times a day before meals  tacrolimus ER Tablet (ENVARSUS XR) 3 milliGRAM(s) Oral once  trimethoprim   80 mG/sulfamethoxazole 400 mG 1 Tablet(s) Oral daily  valGANciclovir 450 milliGRAM(s) Oral <User Schedule>    PRN Inpatient Medications  acetaminophen     Tablet .. 650 milliGRAM(s) Oral every 6 hours PRN  baclofen 2.5 milliGRAM(s) Oral every 8 hours PRN  dextrose Oral Gel 15 Gram(s) Oral once PRN  ondansetron Injectable 4 milliGRAM(s) IV Push every 6 hours PRN  traMADol 50 milliGRAM(s) Oral every 4 hours PRN      REVIEW OF SYSTEMS  --------------------------------------------------------------------------------  Gen: No fatigue, fevers/chills, weakness  Skin: No rashes  Head/Eyes/Ears/Mouth: No headache;No sore throat  Respiratory: No dyspnea, cough,   CV: No chest pain, PND, orthopnea  GI: No abdominal pain, diarrhea, constipation, nausea, vomiting  Transplant: No pain  : No increased frequency, dysuria, hematuria, nocturia  MSK: No joint pain/swelling; no back pain; no edema  Neuro: No dizziness/lightheadedness, weakness, seizures, numbness, tingling  Psych: No significant nervousness, anxiety, stress, depression    All other systems were reviewed and are negative, except as noted.    VITALS/PHYSICAL EXAM  --------------------------------------------------------------------------------  T(C): 36.3 (09-22-23 @ 12:16), Max: 36.8 (09-22-23 @ 01:00)  HR: 60 (09-22-23 @ 12:16) (60 - 80)  BP: 134/73 (09-22-23 @ 12:16) (125/68 - 177/79)  RR: 18 (09-22-23 @ 12:16) (18 - 18)  SpO2: 100% (09-22-23 @ 12:16) (97% - 100%)  Wt(kg): --        09-21-23 @ 07:01  -  09-22-23 @ 07:00  --------------------------------------------------------  IN: 720 mL / OUT: 955 mL / NET: -235 mL    09-22-23 @ 07:01  -  09-22-23 @ 12:40  --------------------------------------------------------  IN: 240 mL / OUT: 225 mL / NET: 15 mL      Physical Exam:  	Gen: NAD  	HEENT: PERRL, supple neck, clear oropharynx  	Pulm: CTA B/L  	CV: RRR, S1S2; no rub  	Back: No spinal or CVA tenderness; no sacral edema  	Abd: +BS, soft, nontender/nondistended                      Transplant: No tenderness, swelling  	: No suprapubic tenderness  	UE: Warm, FROM; no edema; no asterixis  	LE: Warm, FROM; no edema  	Neuro: No focal deficits  	Psych: Normal affect and mood  	Skin: Warm, without rashes      LABS/STUDIES  --------------------------------------------------------------------------------              9.4    10.85 >-----------<  134      [09-22-23 @ 07:04]              29.6     132  |  96  |  100  ----------------------------<  335      [09-22-23 @ 07:02]  4.5   |  21  |  3.19        Ca     9.2     [09-22-23 @ 07:02]      Mg     2.9     [09-22-23 @ 07:02]      Phos  3.0     [09-22-23 @ 07:02]    TPro  6.5  /  Alb  3.6  /  TBili  1.0  /  DBili  x   /  AST  22  /  ALT  30  /  AlkPhos  52  [09-22-23 @ 07:02]          Creatinine Trend:  SCr 3.19 [09-22 @ 07:02]  SCr 3.68 [09-21 @ 06:10]  SCr 4.04 [09-20 @ 05:53]  SCr 4.96 [09-19 @ 07:06]  SCr 6.90 [09-18 @ 06:59]    Tacrolimus (), Serum: 11.1 ng/mL (09-22 @ 07:04)  Tacrolimus (), Serum: 11.7 ng/mL (09-21 @ 06:12)  Tacrolimus (), Serum: 8.5 ng/mL (09-20 @ 05:53)  Tacrolimus (), Serum: 8.2 ng/mL (09-19 @ 07:08)            Urinalysis - [09-22-23 @ 07:02]      Color  / Appearance  / SG  / pH       Gluc 335 / Ketone   / Bili  / Urobili        Blood  / Protein  / Leuk Est  / Nitrite       RBC  / WBC  / Hyaline  / Gran  / Sq Epi  / Non Sq Epi  / Bacteria       HbA1c 7.8      [12-17-19 @ 05:54]    HBsAb 6.6      [09-16-23 @ 04:41]  HBsAg Nonreact      [09-16-23 @ 04:41]  HBcAb Nonreact      [09-16-23 @ 04:41]  HCV 0.10, Nonreact      [09-16-23 @ 04:41]  HIV Nonreact      [09-16-23 @ 04:41]       [facility] DIVISION OF KIDNEY DISEASES AND HYPERTENSION -- FOLLOW UP NOTE  --------------------------------------------------------------------------------  Chief Complaint:    24 hour events/subjective:  Patient was seen and examined at bedside  Good UOP and Cr trending down       PAST HISTORY  --------------------------------------------------------------------------------  No significant changes to PMH, PSH, FHx, SHx, unless otherwise noted    ALLERGIES & MEDICATIONS  --------------------------------------------------------------------------------  Allergies    No Known Allergies    Intolerances      Standing Inpatient Medications  aspirin enteric coated 81 milliGRAM(s) Oral daily  atorvastatin 40 milliGRAM(s) Oral at bedtime  carvedilol 25 milliGRAM(s) Oral every 12 hours  chlorhexidine 2% Cloths 1 Application(s) Topical daily  dextrose 5%. 1000 milliLiter(s) IV Continuous <Continuous>  dextrose 50% Injectable 25 Gram(s) IV Push once  glucagon  Injectable 1 milliGRAM(s) IntraMuscular once  heparin   Injectable 5000 Unit(s) SubCutaneous every 12 hours  insulin lispro (ADMELOG) corrective regimen sliding scale   SubCutaneous three times a day before meals  insulin lispro (ADMELOG) corrective regimen sliding scale   SubCutaneous <User Schedule>  linagliptin 5 milliGRAM(s) Oral daily  mycophenolate mofetil 1 Gram(s) Oral <User Schedule>  NIFEdipine XL 30 milliGRAM(s) Oral two times a day  nystatin    Suspension 841001 Unit(s) Swish and Swallow four times a day  pantoprazole    Tablet 40 milliGRAM(s) Oral two times a day  predniSONE   Tablet 5 milliGRAM(s) Oral two times a day  predniSONE   Tablet   Oral   repaglinide 2 milliGRAM(s) Oral three times a day before meals  tacrolimus ER Tablet (ENVARSUS XR) 3 milliGRAM(s) Oral once  trimethoprim   80 mG/sulfamethoxazole 400 mG 1 Tablet(s) Oral daily  valGANciclovir 450 milliGRAM(s) Oral <User Schedule>    PRN Inpatient Medications  acetaminophen     Tablet .. 650 milliGRAM(s) Oral every 6 hours PRN  baclofen 2.5 milliGRAM(s) Oral every 8 hours PRN  dextrose Oral Gel 15 Gram(s) Oral once PRN  ondansetron Injectable 4 milliGRAM(s) IV Push every 6 hours PRN  traMADol 50 milliGRAM(s) Oral every 4 hours PRN      REVIEW OF SYSTEMS  --------------------------------------------------------------------------------  Gen: No fatigue, fevers/chills, weakness  Skin: No rashes  Head/Eyes/Ears/Mouth: No headache;No sore throat  Respiratory: No dyspnea, cough,   CV: No chest pain, PND, orthopnea  GI: No abdominal pain, diarrhea, constipation, nausea, vomiting  Transplant: No pain  : No increased frequency, dysuria, hematuria, nocturia  MSK: No joint pain/swelling; no back pain; no edema  Neuro: No dizziness/lightheadedness, weakness, seizures, numbness, tingling  Psych: No significant nervousness, anxiety, stress, depression    All other systems were reviewed and are negative, except as noted.    VITALS/PHYSICAL EXAM  --------------------------------------------------------------------------------  T(C): 36.3 (09-22-23 @ 12:16), Max: 36.8 (09-22-23 @ 01:00)  HR: 60 (09-22-23 @ 12:16) (60 - 80)  BP: 134/73 (09-22-23 @ 12:16) (125/68 - 177/79)  RR: 18 (09-22-23 @ 12:16) (18 - 18)  SpO2: 100% (09-22-23 @ 12:16) (97% - 100%)  Wt(kg): --        09-21-23 @ 07:01  -  09-22-23 @ 07:00  --------------------------------------------------------  IN: 720 mL / OUT: 955 mL / NET: -235 mL    09-22-23 @ 07:01  -  09-22-23 @ 12:40  --------------------------------------------------------  IN: 240 mL / OUT: 225 mL / NET: 15 mL      Physical Exam:  	Gen: NAD  	HEENT: PERRL, supple neck, clear oropharynx  	Pulm: CTA B/L  	CV: RRR, S1S2; no rub  	Back: No spinal or CVA tenderness; no sacral edema  	Abd: +BS, soft, nontender/nondistended                      Transplant: No tenderness, swelling  	: No suprapubic tenderness  	UE: Warm, FROM; no edema; no asterixis  	LE: Warm, FROM; no edema  	Neuro: No focal deficits  	Psych: Normal affect and mood  	Skin: Warm, without rashes      LABS/STUDIES  --------------------------------------------------------------------------------              9.4    10.85 >-----------<  134      [09-22-23 @ 07:04]              29.6     132  |  96  |  100  ----------------------------<  335      [09-22-23 @ 07:02]  4.5   |  21  |  3.19        Ca     9.2     [09-22-23 @ 07:02]      Mg     2.9     [09-22-23 @ 07:02]      Phos  3.0     [09-22-23 @ 07:02]    TPro  6.5  /  Alb  3.6  /  TBili  1.0  /  DBili  x   /  AST  22  /  ALT  30  /  AlkPhos  52  [09-22-23 @ 07:02]          Creatinine Trend:  SCr 3.19 [09-22 @ 07:02]  SCr 3.68 [09-21 @ 06:10]  SCr 4.04 [09-20 @ 05:53]  SCr 4.96 [09-19 @ 07:06]  SCr 6.90 [09-18 @ 06:59]    Tacrolimus (), Serum: 11.1 ng/mL (09-22 @ 07:04)  Tacrolimus (), Serum: 11.7 ng/mL (09-21 @ 06:12)  Tacrolimus (), Serum: 8.5 ng/mL (09-20 @ 05:53)  Tacrolimus (), Serum: 8.2 ng/mL (09-19 @ 07:08)            Urinalysis - [09-22-23 @ 07:02]      Color  / Appearance  / SG  / pH       Gluc 335 / Ketone   / Bili  / Urobili        Blood  / Protein  / Leuk Est  / Nitrite       RBC  / WBC  / Hyaline  / Gran  / Sq Epi  / Non Sq Epi  / Bacteria       HbA1c 7.8      [12-17-19 @ 05:54]    HBsAb 6.6      [09-16-23 @ 04:41]  HBsAg Nonreact      [09-16-23 @ 04:41]  HBcAb Nonreact      [09-16-23 @ 04:41]  HCV 0.10, Nonreact      [09-16-23 @ 04:41]  HIV Nonreact      [09-16-23 @ 04:41]

## 2023-09-22 NOTE — PROGRESS NOTE ADULT - NS ATTEND AMEND GEN_ALL_CORE FT
POD1   good graft function  Immuno: tac/cellcept/pred
as above
doing well.   good graft function  immuno: tac/cellcept/pred. simulect induction.
I personally saw and examined patient  Kidney transplant recipient.  Alert, oriented, responsive.  Abdomen soft and non-tender.    IMMUNOSUPPRESSION MANAGEMENT:  Envarsus level today: 5.6  Envarsus level yesterday: not obtained  Aim for envarsus level of: 8-10  Envarsus dose:  5 mg daily by mouth  Envarsus dose management:  - no change in envarsus dose
as above
doing well. good graft function.   dispo preparations.  lizet to d/c.   immuno: simulect induction.  tac/cellcept/pred

## 2023-09-22 NOTE — PROGRESS NOTE ADULT - ASSESSMENT
68 Lao speaking M former smoker with past medical history significant for GERD, HTN, HLD, anemia of chronic disease, T2DM, CAD s/p stent x3 (2014 at [place]) & x2 (pLCx 10/28/22, LAD 10/31/22, off of plavix as of 5/1/23), CVA and ESRD (diagnosed Jan 2019) on HD via LUE AVF T/Th/S (Nephrologist Dr Huff) with recent AV fistulogram 7/2023 with cephalic stenosis s/p balloon angioplasty. Past surgical history significant for open cholecystectomy, AVF creation and eye surgery in the past. Anuric. S/p R DDRT on 9/16/23    s/p DDRT (POD#6)  - Cr downtrending Cr 3.19, no need for HD  - Diabetic/renal diet  - ASA/SQH  - strict I&Os  - SCDs/spirometry/PT c/s  - US negative pt with some burning with urination  - Keep ALCON due to high output     Immunosuppression  -Envarsus per level, MMF 1/1, SST  -Simulect on POD#4 completed  -Valcyte/Nystatin/PPI/Bactrim for PPx    HTN  -Coreg/Nifedipine/Hydralazine, adjust prn    DM  - C/W home tradjenta  - insulin sliding scale  - Increase Pandrin to 2 mg TID    Dispo:  - If diarrhea improves DC home later today  DM  -on Tradjenta at home, will adjust with RUPESH for now    CAD  - ASA  - Tele      immuno: simulect induction.  tac/cellcept/pred.

## 2023-09-22 NOTE — PROGRESS NOTE ADULT - REASON FOR ADMISSION
Renal transplant candidate.
DDRT
Renal transplant candidate.

## 2023-09-22 NOTE — PROVIDER CONTACT NOTE (OTHER) - SITUATION
Provider made aware of patients current /72 (non-symptomatic) and UA glucose of 500.
Pt has had multiple soft & diarrhea like BMs

## 2023-09-22 NOTE — PROGRESS NOTE ADULT - ASSESSMENT
68 year old male  former smoker with past medical history significant for GERD, HTN, HLD, anemia of chronic disease, T2DM, CAD s/p stent x3 (2014 at [place]) & x2 (pLCx 10/28/22, LAD 10/31/22, off of plavix as of 5/1/23), CVA and ESRD (diagnosed Jan 2019) on HD via LUE AVF T/Th/S (Nephrologist Dr Capellan) with recent AV fistulogram 7/2023 with cephalic stenosis s/p balloon angioplasty. Past surgical history significant for open cholecystectomy, AVF creation and eye surgery. Now admitted for DDRT that he underwent on 9/16/23.    Donor details -Male in his 30s, Terminal creatinine: 6.2, KDPI 36%, cPRA 23%. Both donor and recipient: blood type O, CMV+, EBV+. Cold ischemia time: 14 hs 13 mins, Warm ischemia time: 42 mins  OR details- right kidney, single arterial opening into aorta with very early bifurcation, single vein, single ureter. The right renal vein was reconstructed with donor IVC in a horizontal fashion with running 6-0 Prolene sutures    1. s/p DDRT on  9/16/23 - Allograft function with good UOP and Cr is trending down   2. IS meds- Simulect induction. Dosing tac by level. goal level 8-10 , MMF 1gm po bid and steroid taper per protocol.   3. HTN -controlled on Coreg 25 mg po bid and Nifedipine to 30 mg po bid  4. DM - BS still high . on trajenta. Increase prandin to 2 mg po tid today .

## 2023-09-22 NOTE — PROGRESS NOTE ADULT - PROVIDER SPECIALTY LIST ADULT
Nephrology
Transplant Nephrology
Transplant Surgery
Transplant Surgery
Pharmacy
Transplant Surgery
Transplant Nephrology
Transplant Surgery
Nephrology
Transplant Nephrology
Transplant Nephrology
Transplant Surgery
Transplant Surgery

## 2023-09-22 NOTE — PROGRESS NOTE ADULT - ASSESSMENT
69 y/o M former smoker with past medical history significant for GERD, HTN, HLD, anemia of chronic disease, T2DM, CAD s/p stent x3 (2014 at [place]) & x2 (pLCx 10/28/22, LAD 10/31/22, off of plavix as of 5/1/23), CVA and ESRD (diagnosed Jan 2019) on HD via LUE AVF T/Th/S (Nephrologist Dr Huff) with recent AV fistulogram 7/2023 with cephalic stenosis s/p balloon angioplasty. Past surgical history significant for open cholecystectomy, AVF creation and eye surgery in the past. He presents to [facility] on [facility]/16[facility]23 as renal transplant candidate.      ESRD On HD   outpt [facility]   s/p DDRT on 9/16/2023   Immunosuppression per transplant team  Tacro level 11.1 (9/22)  Renal function improving.  Monitor BMP and UO    HTN  BP better controlled.  Nifedipine increased to 30mg BID.  Monitor     Hyponatremia:  Worsened.  Possibly sec to hyperglycemia.  Optimize glycemic control; corrected Na 136.  Monitor Na.

## 2023-09-25 ENCOUNTER — LABORATORY RESULT (OUTPATIENT)
Age: 68
End: 2023-09-25

## 2023-09-25 ENCOUNTER — APPOINTMENT (OUTPATIENT)
Dept: NEPHROLOGY | Facility: CLINIC | Age: 68
End: 2023-09-25
Payer: MEDICARE

## 2023-09-25 ENCOUNTER — NON-APPOINTMENT (OUTPATIENT)
Age: 68
End: 2023-09-25

## 2023-09-25 VITALS
HEART RATE: 65 BPM | WEIGHT: 136 LBS | DIASTOLIC BLOOD PRESSURE: 76 MMHG | OXYGEN SATURATION: 100 % | BODY MASS INDEX: 21.86 KG/M2 | RESPIRATION RATE: 17 BRPM | SYSTOLIC BLOOD PRESSURE: 134 MMHG | TEMPERATURE: 96.7 F | HEIGHT: 66 IN

## 2023-09-25 LAB
ALBUMIN SERPL ELPH-MCNC: 4.3 G/DL
ALP BLD-CCNC: 64 U/L
ALT SERPL-CCNC: 17 U/L
ANION GAP SERPL CALC-SCNC: 13 MMOL/L
APPEARANCE: ABNORMAL
AST SERPL-CCNC: 12 U/L
BILIRUB SERPL-MCNC: 1.8 MG/DL
BILIRUBIN URINE: NEGATIVE
BLOOD URINE: ABNORMAL
BUN SERPL-MCNC: 63 MG/DL
CALCIUM SERPL-MCNC: 9.5 MG/DL
CHLORIDE SERPL-SCNC: 100 MMOL/L
CO2 SERPL-SCNC: 20 MMOL/L
COLOR: ABNORMAL
CREAT FLD-MCNC: 2.33 MG/DL
CREAT SERPL-MCNC: 2.16 MG/DL
CREAT SPEC-SCNC: 34 MG/DL
EGFR: 33 ML/MIN/1.73M2
GLUCOSE QUALITATIVE U: >=1000 MG/DL
GLUCOSE SERPL-MCNC: 408 MG/DL
HCT VFR BLD CALC: 28.7 %
HGB BLD-MCNC: 9.2 G/DL
KETONES URINE: NEGATIVE MG/DL
LEUKOCYTE ESTERASE URINE: ABNORMAL
MAGNESIUM SERPL-MCNC: 2.7 MG/DL
MCHC RBC-ENTMCNC: 31.6 PG
MCHC RBC-ENTMCNC: 32.1 GM/DL
MCV RBC AUTO: 98.6 FL
MICROALBUMIN 24H UR DL<=1MG/L-MCNC: 33.2 MG/DL
MICROALBUMIN/CREAT 24H UR-RTO: 967 MG/G
NITRITE URINE: NEGATIVE
PH URINE: 6.5
PHOSPHATE SERPL-MCNC: 3 MG/DL
PLATELET # BLD AUTO: 213 K/UL
POTASSIUM SERPL-SCNC: 4.9 MMOL/L
PROT SERPL-MCNC: 7.1 G/DL
PROTEIN URINE: 100 MG/DL
RBC # BLD: 2.91 M/UL
RBC # FLD: 16.4 %
SODIUM SERPL-SCNC: 132 MMOL/L
SPECIFIC GRAVITY URINE: 1.02
SURGICAL PATHOLOGY STUDY: SIGNIFICANT CHANGE UP
TACROLIMUS SERPL-MCNC: 13.4 NG/ML
URATE SERPL-MCNC: 8.2 MG/DL
UROBILINOGEN URINE: 0.2 MG/DL
WBC # FLD AUTO: 13.44 K/UL

## 2023-09-25 PROCEDURE — 99214 OFFICE O/P EST MOD 30 MIN: CPT

## 2023-09-25 RX ORDER — CARVEDILOL 25 MG/1
25 TABLET, FILM COATED ORAL TWICE DAILY
Qty: 180 | Refills: 3 | Status: DISCONTINUED | COMMUNITY
Start: 2021-04-16 | End: 2023-09-25

## 2023-09-25 RX ORDER — NIFEDIPINE 60 MG/1
60 TABLET, FILM COATED, EXTENDED RELEASE ORAL
Qty: 90 | Refills: 3 | Status: DISCONTINUED | COMMUNITY
Start: 2019-07-17 | End: 2023-09-25

## 2023-09-25 RX ORDER — TACROLIMUS 4 MG/1
4 TABLET, EXTENDED RELEASE ORAL
Qty: 90 | Refills: 11 | Status: DISCONTINUED | COMMUNITY
Start: 2023-09-18 | End: 2023-09-25

## 2023-09-25 RX ORDER — PREDNISONE 5 MG/1
5 TABLET ORAL
Qty: 42 | Refills: 0 | Status: DISCONTINUED | COMMUNITY
Start: 2023-09-18 | End: 2023-09-25

## 2023-09-25 RX ORDER — ATORVASTATIN CALCIUM 40 MG/1
40 TABLET, FILM COATED ORAL
Qty: 90 | Refills: 3 | Status: DISCONTINUED | COMMUNITY
Start: 2018-11-07 | End: 2023-09-25

## 2023-09-26 ENCOUNTER — INPATIENT (INPATIENT)
Facility: HOSPITAL | Age: 68
LOS: 1 days | Discharge: HOME HEALTH | DRG: 638 | End: 2023-09-28
Attending: SURGERY | Admitting: SURGERY
Payer: COMMERCIAL

## 2023-09-26 VITALS
RESPIRATION RATE: 18 BRPM | OXYGEN SATURATION: 100 % | DIASTOLIC BLOOD PRESSURE: 79 MMHG | WEIGHT: 141.1 LBS | SYSTOLIC BLOOD PRESSURE: 163 MMHG | HEART RATE: 66 BPM | HEIGHT: 66 IN | TEMPERATURE: 97 F

## 2023-09-26 DIAGNOSIS — R73.9 HYPERGLYCEMIA, UNSPECIFIED: ICD-10-CM

## 2023-09-26 DIAGNOSIS — Z98.890 OTHER SPECIFIED POSTPROCEDURAL STATES: Chronic | ICD-10-CM

## 2023-09-26 DIAGNOSIS — I77.0 ARTERIOVENOUS FISTULA, ACQUIRED: Chronic | ICD-10-CM

## 2023-09-26 LAB
ALBUMIN SERPL ELPH-MCNC: 4.3 G/DL — SIGNIFICANT CHANGE UP (ref 3.3–5)
ALP SERPL-CCNC: 70 U/L — SIGNIFICANT CHANGE UP (ref 40–120)
ALT FLD-CCNC: 22 U/L — SIGNIFICANT CHANGE UP (ref 10–45)
ANION GAP SERPL CALC-SCNC: 12 MMOL/L — SIGNIFICANT CHANGE UP (ref 5–17)
APPEARANCE UR: ABNORMAL
AST SERPL-CCNC: 14 U/L — SIGNIFICANT CHANGE UP (ref 10–40)
B-OH-BUTYR SERPL-SCNC: 0.1 MMOL/L — SIGNIFICANT CHANGE UP
BACTERIA # UR AUTO: NEGATIVE — SIGNIFICANT CHANGE UP
BASE EXCESS BLDV CALC-SCNC: -6 MMOL/L — LOW (ref -2–3)
BASOPHILS # BLD AUTO: 0.06 K/UL — SIGNIFICANT CHANGE UP (ref 0–0.2)
BASOPHILS NFR BLD AUTO: 0.5 % — SIGNIFICANT CHANGE UP (ref 0–2)
BILIRUB SERPL-MCNC: 1.4 MG/DL — HIGH (ref 0.2–1.2)
BILIRUB UR-MCNC: NEGATIVE — SIGNIFICANT CHANGE UP
BUN SERPL-MCNC: 64 MG/DL — HIGH (ref 7–23)
CA-I SERPL-SCNC: 1.33 MMOL/L — SIGNIFICANT CHANGE UP (ref 1.15–1.33)
CALCIUM SERPL-MCNC: 9.7 MG/DL — SIGNIFICANT CHANGE UP (ref 8.4–10.5)
CHLORIDE BLDV-SCNC: 98 MMOL/L — SIGNIFICANT CHANGE UP (ref 96–108)
CHLORIDE SERPL-SCNC: 97 MMOL/L — SIGNIFICANT CHANGE UP (ref 96–108)
CO2 BLDV-SCNC: 22 MMOL/L — SIGNIFICANT CHANGE UP (ref 22–26)
CO2 SERPL-SCNC: 19 MMOL/L — LOW (ref 22–31)
COLOR SPEC: SIGNIFICANT CHANGE UP
CREAT SERPL-MCNC: 2.22 MG/DL — HIGH (ref 0.5–1.3)
DIFF PNL FLD: ABNORMAL
EGFR: 32 ML/MIN/1.73M2 — LOW
EOSINOPHIL # BLD AUTO: 0.4 K/UL — SIGNIFICANT CHANGE UP (ref 0–0.5)
EOSINOPHIL NFR BLD AUTO: 3.4 % — SIGNIFICANT CHANGE UP (ref 0–6)
EPI CELLS # UR: 1 /HPF — SIGNIFICANT CHANGE UP
GAS PNL BLDV: 130 MMOL/L — LOW (ref 136–145)
GAS PNL BLDV: SIGNIFICANT CHANGE UP
GAS PNL BLDV: SIGNIFICANT CHANGE UP
GLUCOSE BLDC GLUCOMTR-MCNC: 242 MG/DL — HIGH (ref 70–99)
GLUCOSE BLDV-MCNC: 431 MG/DL — HIGH (ref 70–99)
GLUCOSE SERPL-MCNC: 468 MG/DL — CRITICAL HIGH (ref 70–99)
GLUCOSE UR QL: ABNORMAL
HCO3 BLDV-SCNC: 21 MMOL/L — LOW (ref 22–29)
HCT VFR BLD CALC: 29.2 % — LOW (ref 39–50)
HCT VFR BLDA CALC: 29 % — LOW (ref 39–51)
HGB BLD CALC-MCNC: 9.8 G/DL — LOW (ref 12.6–17.4)
HGB BLD-MCNC: 9.5 G/DL — LOW (ref 13–17)
HYALINE CASTS # UR AUTO: 0 /LPF — SIGNIFICANT CHANGE UP (ref 0–2)
IMM GRANULOCYTES NFR BLD AUTO: 1.1 % — HIGH (ref 0–0.9)
KETONES UR-MCNC: NEGATIVE — SIGNIFICANT CHANGE UP
LACTATE BLDV-MCNC: 1.7 MMOL/L — SIGNIFICANT CHANGE UP (ref 0.5–2)
LEUKOCYTE ESTERASE UR-ACNC: ABNORMAL
LIDOCAIN IGE QN: 42 U/L — SIGNIFICANT CHANGE UP (ref 7–60)
LYMPHOCYTES # BLD AUTO: 1.39 K/UL — SIGNIFICANT CHANGE UP (ref 1–3.3)
LYMPHOCYTES # BLD AUTO: 11.7 % — LOW (ref 13–44)
MAGNESIUM SERPL-MCNC: 2.7 MG/DL — HIGH (ref 1.6–2.6)
MCHC RBC-ENTMCNC: 31.8 PG — SIGNIFICANT CHANGE UP (ref 27–34)
MCHC RBC-ENTMCNC: 32.5 GM/DL — SIGNIFICANT CHANGE UP (ref 32–36)
MCV RBC AUTO: 97.7 FL — SIGNIFICANT CHANGE UP (ref 80–100)
MONOCYTES # BLD AUTO: 0.87 K/UL — SIGNIFICANT CHANGE UP (ref 0–0.9)
MONOCYTES NFR BLD AUTO: 7.3 % — SIGNIFICANT CHANGE UP (ref 2–14)
NEUTROPHILS # BLD AUTO: 8.99 K/UL — HIGH (ref 1.8–7.4)
NEUTROPHILS NFR BLD AUTO: 76 % — SIGNIFICANT CHANGE UP (ref 43–77)
NITRITE UR-MCNC: NEGATIVE — SIGNIFICANT CHANGE UP
NRBC # BLD: 0 /100 WBCS — SIGNIFICANT CHANGE UP (ref 0–0)
PCO2 BLDV: 45 MMHG — SIGNIFICANT CHANGE UP (ref 42–55)
PH BLDV: 7.27 — LOW (ref 7.32–7.43)
PH UR: 6.5 — SIGNIFICANT CHANGE UP (ref 5–8)
PHOSPHATE SERPL-MCNC: 3.2 MG/DL — SIGNIFICANT CHANGE UP (ref 2.5–4.5)
PLATELET # BLD AUTO: 213 K/UL — SIGNIFICANT CHANGE UP (ref 150–400)
PO2 BLDV: 20 MMHG — LOW (ref 25–45)
POTASSIUM BLDV-SCNC: 5.6 MMOL/L — HIGH (ref 3.5–5.1)
POTASSIUM SERPL-MCNC: 5.5 MMOL/L — HIGH (ref 3.5–5.3)
POTASSIUM SERPL-SCNC: 5.5 MMOL/L — HIGH (ref 3.5–5.3)
PROT SERPL-MCNC: 7.8 G/DL — SIGNIFICANT CHANGE UP (ref 6–8.3)
PROT UR-MCNC: ABNORMAL
RBC # BLD: 2.99 M/UL — LOW (ref 4.2–5.8)
RBC # FLD: 16.8 % — HIGH (ref 10.3–14.5)
RBC CASTS # UR COMP ASSIST: 1218 /HPF — HIGH (ref 0–4)
SAO2 % BLDV: 20.1 % — LOW (ref 67–88)
SODIUM SERPL-SCNC: 128 MMOL/L — LOW (ref 135–145)
SP GR SPEC: 1.01 — SIGNIFICANT CHANGE UP (ref 1.01–1.02)
UROBILINOGEN FLD QL: NEGATIVE — SIGNIFICANT CHANGE UP
WBC # BLD: 11.84 K/UL — HIGH (ref 3.8–10.5)
WBC # FLD AUTO: 11.84 K/UL — HIGH (ref 3.8–10.5)
WBC UR QL: 31 /HPF — HIGH (ref 0–5)

## 2023-09-26 PROCEDURE — 99222 1ST HOSP IP/OBS MODERATE 55: CPT | Mod: 24,GC

## 2023-09-26 PROCEDURE — 99285 EMERGENCY DEPT VISIT HI MDM: CPT

## 2023-09-26 RX ORDER — PANTOPRAZOLE SODIUM 20 MG/1
40 TABLET, DELAYED RELEASE ORAL EVERY 12 HOURS
Refills: 0 | Status: DISCONTINUED | OUTPATIENT
Start: 2023-09-26 | End: 2023-09-28

## 2023-09-26 RX ORDER — ASPIRIN/CALCIUM CARB/MAGNESIUM 324 MG
81 TABLET ORAL DAILY
Refills: 0 | Status: DISCONTINUED | OUTPATIENT
Start: 2023-09-27 | End: 2023-09-28

## 2023-09-26 RX ORDER — DEXTROSE 50 % IN WATER 50 %
12.5 SYRINGE (ML) INTRAVENOUS ONCE
Refills: 0 | Status: DISCONTINUED | OUTPATIENT
Start: 2023-09-26 | End: 2023-09-28

## 2023-09-26 RX ORDER — DEXTROSE 50 % IN WATER 50 %
25 SYRINGE (ML) INTRAVENOUS ONCE
Refills: 0 | Status: DISCONTINUED | OUTPATIENT
Start: 2023-09-26 | End: 2023-09-28

## 2023-09-26 RX ORDER — PIPERACILLIN AND TAZOBACTAM 4; .5 G/20ML; G/20ML
3.38 INJECTION, POWDER, LYOPHILIZED, FOR SOLUTION INTRAVENOUS EVERY 8 HOURS
Refills: 0 | Status: DISCONTINUED | OUTPATIENT
Start: 2023-09-27 | End: 2023-09-27

## 2023-09-26 RX ORDER — PIPERACILLIN AND TAZOBACTAM 4; .5 G/20ML; G/20ML
3.38 INJECTION, POWDER, LYOPHILIZED, FOR SOLUTION INTRAVENOUS ONCE
Refills: 0 | Status: DISCONTINUED | OUTPATIENT
Start: 2023-09-27 | End: 2023-09-27

## 2023-09-26 RX ORDER — NIFEDIPINE 30 MG
30 TABLET, EXTENDED RELEASE 24 HR ORAL
Refills: 0 | Status: DISCONTINUED | OUTPATIENT
Start: 2023-09-26 | End: 2023-09-28

## 2023-09-26 RX ORDER — SODIUM CHLORIDE 9 MG/ML
1000 INJECTION INTRAMUSCULAR; INTRAVENOUS; SUBCUTANEOUS ONCE
Refills: 0 | Status: COMPLETED | OUTPATIENT
Start: 2023-09-26 | End: 2023-09-26

## 2023-09-26 RX ORDER — SODIUM CHLORIDE 9 MG/ML
1000 INJECTION, SOLUTION INTRAVENOUS
Refills: 0 | Status: DISCONTINUED | OUTPATIENT
Start: 2023-09-26 | End: 2023-09-28

## 2023-09-26 RX ORDER — SODIUM CHLORIDE 9 MG/ML
1000 INJECTION INTRAMUSCULAR; INTRAVENOUS; SUBCUTANEOUS
Refills: 0 | Status: DISCONTINUED | OUTPATIENT
Start: 2023-09-26 | End: 2023-09-28

## 2023-09-26 RX ORDER — INSULIN HUMAN 100 [IU]/ML
12 INJECTION, SOLUTION SUBCUTANEOUS ONCE
Refills: 0 | Status: COMPLETED | OUTPATIENT
Start: 2023-09-26 | End: 2023-09-26

## 2023-09-26 RX ORDER — DEXTROSE 50 % IN WATER 50 %
15 SYRINGE (ML) INTRAVENOUS ONCE
Refills: 0 | Status: DISCONTINUED | OUTPATIENT
Start: 2023-09-26 | End: 2023-09-28

## 2023-09-26 RX ORDER — GLUCAGON INJECTION, SOLUTION 0.5 MG/.1ML
1 INJECTION, SOLUTION SUBCUTANEOUS ONCE
Refills: 0 | Status: DISCONTINUED | OUTPATIENT
Start: 2023-09-26 | End: 2023-09-28

## 2023-09-26 RX ORDER — HEPARIN SODIUM 5000 [USP'U]/ML
5000 INJECTION INTRAVENOUS; SUBCUTANEOUS EVERY 12 HOURS
Refills: 0 | Status: DISCONTINUED | OUTPATIENT
Start: 2023-09-26 | End: 2023-09-28

## 2023-09-26 RX ORDER — CARVEDILOL PHOSPHATE 80 MG/1
25 CAPSULE, EXTENDED RELEASE ORAL EVERY 12 HOURS
Refills: 0 | Status: DISCONTINUED | OUTPATIENT
Start: 2023-09-26 | End: 2023-09-28

## 2023-09-26 RX ORDER — TACROLIMUS 5 MG/1
2 CAPSULE ORAL
Refills: 0 | Status: DISCONTINUED | OUTPATIENT
Start: 2023-09-27 | End: 2023-09-27

## 2023-09-26 RX ORDER — VALGANCICLOVIR 450 MG/1
450 TABLET, FILM COATED ORAL
Refills: 0 | Status: DISCONTINUED | OUTPATIENT
Start: 2023-09-26 | End: 2023-09-28

## 2023-09-26 RX ORDER — INSULIN GLARGINE 100 [IU]/ML
12 INJECTION, SOLUTION SUBCUTANEOUS AT BEDTIME
Refills: 0 | Status: DISCONTINUED | OUTPATIENT
Start: 2023-09-27 | End: 2023-09-28

## 2023-09-26 RX ORDER — PIPERACILLIN AND TAZOBACTAM 4; .5 G/20ML; G/20ML
3.38 INJECTION, POWDER, LYOPHILIZED, FOR SOLUTION INTRAVENOUS ONCE
Refills: 0 | Status: COMPLETED | OUTPATIENT
Start: 2023-09-26 | End: 2023-09-26

## 2023-09-26 RX ORDER — ACETAMINOPHEN 500 MG
650 TABLET ORAL EVERY 6 HOURS
Refills: 0 | Status: DISCONTINUED | OUTPATIENT
Start: 2023-09-26 | End: 2023-09-28

## 2023-09-26 RX ORDER — PIPERACILLIN AND TAZOBACTAM 4; .5 G/20ML; G/20ML
3.38 INJECTION, POWDER, LYOPHILIZED, FOR SOLUTION INTRAVENOUS ONCE
Refills: 0 | Status: COMPLETED | OUTPATIENT
Start: 2023-09-27 | End: 2023-09-27

## 2023-09-26 RX ORDER — NYSTATIN 500MM UNIT
500000 POWDER (EA) MISCELLANEOUS
Refills: 0 | Status: DISCONTINUED | OUTPATIENT
Start: 2023-09-26 | End: 2023-09-28

## 2023-09-26 RX ORDER — TRAMADOL HYDROCHLORIDE 50 MG/1
50 TABLET ORAL EVERY 6 HOURS
Refills: 0 | Status: DISCONTINUED | OUTPATIENT
Start: 2023-09-26 | End: 2023-09-26

## 2023-09-26 RX ORDER — INSULIN GLARGINE 100 [IU]/ML
12 INJECTION, SOLUTION SUBCUTANEOUS ONCE
Refills: 0 | Status: COMPLETED | OUTPATIENT
Start: 2023-09-26 | End: 2023-09-26

## 2023-09-26 RX ORDER — INSULIN LISPRO 100/ML
VIAL (ML) SUBCUTANEOUS
Refills: 0 | Status: DISCONTINUED | OUTPATIENT
Start: 2023-09-26 | End: 2023-09-27

## 2023-09-26 RX ORDER — ATORVASTATIN CALCIUM 80 MG/1
40 TABLET, FILM COATED ORAL AT BEDTIME
Refills: 0 | Status: DISCONTINUED | OUTPATIENT
Start: 2023-09-26 | End: 2023-09-28

## 2023-09-26 RX ADMIN — PIPERACILLIN AND TAZOBACTAM 200 GRAM(S): 4; .5 INJECTION, POWDER, LYOPHILIZED, FOR SOLUTION INTRAVENOUS at 23:26

## 2023-09-26 RX ADMIN — SODIUM CHLORIDE 150 MILLILITER(S): 9 INJECTION INTRAMUSCULAR; INTRAVENOUS; SUBCUTANEOUS at 23:49

## 2023-09-26 RX ADMIN — SODIUM CHLORIDE 1000 MILLILITER(S): 9 INJECTION INTRAMUSCULAR; INTRAVENOUS; SUBCUTANEOUS at 21:41

## 2023-09-26 RX ADMIN — INSULIN HUMAN 12 UNIT(S): 100 INJECTION, SOLUTION SUBCUTANEOUS at 19:51

## 2023-09-26 RX ADMIN — INSULIN GLARGINE 12 UNIT(S): 100 INJECTION, SOLUTION SUBCUTANEOUS at 23:41

## 2023-09-26 RX ADMIN — ATORVASTATIN CALCIUM 40 MILLIGRAM(S): 80 TABLET, FILM COATED ORAL at 21:41

## 2023-09-26 RX ADMIN — Medication 500000 UNIT(S): at 23:26

## 2023-09-26 NOTE — ED PROVIDER NOTE - NSICDXPASTSURGICALHX_GEN_ALL_CORE_FT
PAST MEDICAL HISTORY:  No pertinent past medical history PAST SURGICAL HISTORY:  AV fistula 10/12/18 L radiocephalic AV fistula    H/O coronary angiogram 2014 - x3 stents    H/O eye surgery

## 2023-09-26 NOTE — ED PROVIDER NOTE - OBJECTIVE STATEMENT
68-year-old male patient past medical history of former smoker, GERD, hypertension, hyperlipidemia, anemia of chronic disease, diabetes, CAD status post stents, CVA and end-stage renal disease on dialysis now status post recent kidney transplant who presents to the emergency department for hyperglycemia since today.  At home, patient found fingerstick to be in the 500s, and of note is endorsing dysuria and hematuria for the past week.

## 2023-09-26 NOTE — PATIENT PROFILE ADULT - FALL HARM RISK - UNIVERSAL INTERVENTIONS
no
Bed in lowest position, wheels locked, appropriate side rails in place/Call bell, personal items and telephone in reach/Instruct patient to call for assistance before getting out of bed or chair/Non-slip footwear when patient is out of bed/[place] to call system/Physically safe environment - no spills, clutter or unnecessary equipment/Purposeful Proactive Rounding/Room/bathroom lighting operational, light cord in reach

## 2023-09-26 NOTE — ED PROVIDER NOTE - PHYSICAL EXAMINATION
GENERAL: Awake, alert, NAD  HEENT: NC/AT, moist mucous membranes, EOMI  LUNGS: CTAB, no wheezes or crackles   CARDIAC: RRR, no m/r/g  ABDOMEN: Soft, non tender, non distended, no rebound, no guarding.  Drain seen in place.  BACK: No midline spinal tenderness  EXT: No edema, no calf tenderness, 2+ PT pulses bilaterally, no deformities.  NEURO: A&Ox3. Moving all extremities.  SKIN: Warm and dry. No rash.  PSYCH: Normal affect.

## 2023-09-26 NOTE — ED ADULT NURSE NOTE - OBJECTIVE STATEMENT
67 y/o M w/ pmh of right kidney transplant 3 days ago, shunt in left arm came w/ complaints of hyperglycemia. Pt states that post surgery, he had been unable to control his sugar. Pt also reports burning sensation upon urination. Pt A&Ox3 gross neuro intact, no difficulty speaking in complete sentences, drainage bag noted on LLQ 69 y/o M w/ pmh of right kidney transplant 3 days ago, shunt in left arm came w/ complaints of hyperglycemia. Per EMS, pt had fgst of 4  Pt states that post surgery, he had been unable to control his sugar. Pt also reports burning sensation upon urination, and weakness. Pt A&Ox3 gross neuro intact, no difficulty speaking in complete sentences, drainage bag noted on LLQ, afebrile. IV 18G placed in RAC. Pt placed on cardiac monitor, reading nsr in 80s.

## 2023-09-26 NOTE — H&P ADULT - NSHPLABSRESULTS_GEN_ALL_CORE
.  LABS:                         9.5    11.84 )-----------( 213      ( 26 Sep 2023 18:59 )             29.2         128<L>  |  97  |  64<H>  ----------------------------<  468<HH>  5.5<H>   |  19<L>  |  2.22<H>    Ca    9.7      26 Sep 2023 18:59  Phos  3.2       Mg     2.7         TPro  7.8  /  Alb  4.3  /  TBili  1.4<H>  /  DBili  x   /  AST  14  /  ALT  22  /  AlkPhos  70        Urinalysis Basic - ( 26 Sep 2023 19:04 )    Color: LIGHT BROWN / Appearance: Slightly Turbid / S.015 / pH: x  Gluc: x / Ketone: Negative  / Bili: Negative / Urobili: Negative   Blood: x / Protein: 30 mg/dL / Nitrite: Negative   Leuk Esterase: Moderate / RBC: 1218 /hpf / WBC 31 /HPF   Sq Epi: x / Non Sq Epi: x / Bacteria: Negative            RADIOLOGY, EKG & ADDITIONAL TESTS: Reviewed.

## 2023-09-26 NOTE — H&P ADULT - ASSESSMENT
68M with pmh of prior smoking, GERD, HTN, HLD, DM2, CAD s/p 3 stents (last in 2022), CVA, cholecystectomy, ESRD on HD since 2019 via LUE AVF now s/p DDRT 9/16 with Simulect induction (donor/recipient EBV+/CMV+) complicated by slow graft function and discharged on 9/22 presents to the ED with hyperglycemia noticed at home and dysuria/hematuria since discharge. Patient states he noticed his sugars were elevated to 500 today at home and he came to the ED after calling the office. He also notes that he has had increased burning with urination since discharge and persistent hematuria. He denies decreased output, abdominal pain, nausea/vomiting, sob, chest pain, fevers/chills, diarrhea/constipation, dark/bloody stool, lightheadedness, syncope, fatigue. His preperitoneal ALCON fills 3x per  day since discharge and has been serosanguinous. He states he has been compliant with his repaglinide and trajenta, as well as his envarsus 3mg, MMF 1g BID, prednisone taper, nystatin, bactrim, and valcyte. Upon arrival patient is HD stable, afebrile, WBC 11.8 from 10.8 on discharge, Hgb 9.5 from 9.4, hyponatremic to 128, Cr 2.22 from 3.19. Sugars noted to be elevated to 466.    #Hyperglycemia  -Lantus 12 and ISS  -1.2L NS bolus  -NS @ 150  -BMP in 6 hrs and AM  -Add K+ to fluids if needed on repeat labs (5.5 on admission)  -Insulin gtt if no response to subcutaneous insulin  -Strict I and O, monitor ALCON output for purulent fluid  -Beta-hydroxybutyrate negative  -F/U UA  -Carb restricted, renal diet    #s/p DDRT  -Am tacro level, goal 8-10  - BID from 1g BID in case of infeciton  -F/U U/A, UCx, BCx  -Trend Cr (2.22 from 3.19 on discharge)  -Monitor urine output  -Continue Nystatin, Bactrim, Valcyte  -Continue prednisone 5  -trans renal US    #hyponatremia  -No evidence of fluid overload  -F/U repeat BMP  -Continue NS for now  -Asymptomatic    #HTN  -Continue home carvedilol 25 BID  -Continue nifedipine 30 ER BID    #HLD  -Continue home atorvastatin 40

## 2023-09-26 NOTE — H&P ADULT - ATTENDING COMMENTS
s/p DDRT 9/16/23 with improving renal function, now admitted with hyperglycemia, Glucose >500  Admit, start insulin lantus 12u overnight with sliding scale  If no improvement will need insulin drip  Cont current immunosuppression with Envarsus 2 mg daily, Pred 5mg daily, Cellcept 1gm bid.   Will check tacrolimus level and adjust dose for level ~8  Transplant Prophylaxis with nystatin, bactrim, valcyte  GI prophylaxis due to steroids

## 2023-09-26 NOTE — H&P ADULT - NSHPPHYSICALEXAM_GEN_ALL_CORE
General: NAD  Neuro: AAOx3, EOMI, PERRL  HEENT; NC/AT, sclera anicteric, no palpable lymphadenopathy  Cards: RRR  Pulm: CTAB, no increased work of breathing  Abd: soft, nondistended, nontender, RLQ incision without erythema/edema/fluctuance/drainage, ALCON serosanguinous  Ext: warm and well-perfused, no edema, palpable DP b/l  Skin: no jaundice, rashes, or breakdown  Psych: mood and affect appropriate

## 2023-09-26 NOTE — H&P ADULT - HISTORY OF PRESENT ILLNESS
68M with pmh of prior smoking, GERD, HTN, HLD, DM2, CAD s/p 3 stents (last in 2022), CVA, cholecystectomy, ESRD on HD since 2019 via LUE AVF now s/p DDRT 9/16 with Simulect induction (donor/recipient EBV+/CMV+) complicated by slow graft function and discharged on 9/22 presents to the ED with hyperglycemia noticed at home and dysuria/hematuria since discharge. Patient states he noticed his sugars were elevated to 500 today at home and he came to the ED after calling the office. He also notes that he has had increased burning with urination since discharge and persistent hematuria. He denies decreased output, abdominal pain, nausea/vomiting, sob, chest pain, fevers/chills, diarrhea/constipation, dark/bloody stool, lightheadedness, syncope, fatigue. He states he has been compliant with his repaglinide and trajenta, as well as his envarsus 3mg, MMF 1g BID, prednisone taper, nystatin, bactrim, and valcyte. Upon arrival patient is HD stable, afebrile, WBC 11.8 from 10.8 on discharge, Hgb 9.5 from 9.4, hyponatremic to 128, Cr 2.22 from 3.19. Sugars noted to be elevated to 466. 68M with pmh of prior smoking, GERD, HTN, HLD, DM2, CAD s/p 3 stents (last in 2022), CVA, cholecystectomy, ESRD on HD since 2019 via LUE AVF now s/p DDRT 9/16 with Simulect induction (donor/recipient EBV+/CMV+) complicated by slow graft function and discharged on 9/22 presents to the ED with hyperglycemia noticed at home and dysuria/hematuria since discharge. Patient states he noticed his sugars were elevated to 500 today at home and he came to the ED after calling the office. He also notes that he has had increased burning with urination since discharge and persistent hematuria. He denies decreased output, abdominal pain, nausea/vomiting, sob, chest pain, fevers/chills, diarrhea/constipation, dark/bloody stool, lightheadedness, syncope, fatigue. His preperitoneal ALCON fills 3x per  day since discharge and has been serosanguinous. He states he has been compliant with his repaglinide and trajenta, as well as his envarsus 3mg, MMF 1g BID, prednisone taper, nystatin, bactrim, and valcyte. Upon arrival patient is HD stable, afebrile, WBC 11.8 from 10.8 on discharge, Hgb 9.5 from 9.4, hyponatremic to 128, Cr 2.22 from 3.19. Sugars noted to be elevated to 466.

## 2023-09-26 NOTE — ED PROVIDER NOTE - NSICDXPASTMEDICALHX_GEN_ALL_CORE_FT
PAST MEDICAL HISTORY:  Anemia due to stage 5 chronic kidney disease, not on chronic dialysis     CAP (community acquired pneumonia) 6/18    Cerebrovascular accident (CVA), unspecified mechanism diagnosed via CT of the head    Coronary artery disease     Diabetes mellitus type 2    ESRD (end stage renal disease) on Dialysis( M/W/F), By Dr. Huff    GERD (gastroesophageal reflux disease)     HTN (hypertension)     Hypercholesterolemia     Stented coronary artery 2014, 3 stents, [facility]

## 2023-09-26 NOTE — ED PROVIDER NOTE - CLINICAL SUMMARY MEDICAL DECISION MAKING FREE TEXT BOX
68-year-old male patient with history of end-stage renal disease who recently received kidney transplant and is presenting to the emergency department for hyperglycemia and a week of dysuria and hematuria.  Patient found afebrile and physical exam unremarkable.  Otherwise, found with a fingerstick elevated in the 400s.  Will consult transplant surgery given recent discharge and surgery.  We will draw labs to rule out DKA and infectious source.  Will most likely admit.

## 2023-09-26 NOTE — ED ADULT TRIAGE NOTE - CHIEF COMPLAINT QUOTE
PDMP reviewed    LOV: 5/3/2021   Future OV: 11/15/2021  Last filled: 4/30/2021  Due: 5/23/2021      hyperglycemia

## 2023-09-27 DIAGNOSIS — I25.10 ATHEROSCLEROTIC HEART DISEASE OF NATIVE CORONARY ARTERY WITHOUT ANGINA PECTORIS: ICD-10-CM

## 2023-09-27 DIAGNOSIS — E11.9 TYPE 2 DIABETES MELLITUS WITHOUT COMPLICATIONS: ICD-10-CM

## 2023-09-27 DIAGNOSIS — I10 ESSENTIAL (PRIMARY) HYPERTENSION: ICD-10-CM

## 2023-09-27 DIAGNOSIS — N18.6 END STAGE RENAL DISEASE: ICD-10-CM

## 2023-09-27 LAB
ALBUMIN SERPL ELPH-MCNC: 3.4 G/DL — SIGNIFICANT CHANGE UP (ref 3.3–5)
ALP SERPL-CCNC: 60 U/L — SIGNIFICANT CHANGE UP (ref 40–120)
ALT FLD-CCNC: 16 U/L — SIGNIFICANT CHANGE UP (ref 10–45)
ANION GAP SERPL CALC-SCNC: 11 MMOL/L — SIGNIFICANT CHANGE UP (ref 5–17)
APTT BLD: 25.2 SEC — SIGNIFICANT CHANGE UP (ref 24.5–35.6)
AST SERPL-CCNC: 12 U/L — SIGNIFICANT CHANGE UP (ref 10–40)
BASOPHILS # BLD AUTO: 0.07 K/UL — SIGNIFICANT CHANGE UP (ref 0–0.2)
BASOPHILS NFR BLD AUTO: 0.6 % — SIGNIFICANT CHANGE UP (ref 0–2)
BILIRUB SERPL-MCNC: 1.2 MG/DL — SIGNIFICANT CHANGE UP (ref 0.2–1.2)
BKV DNA SPEC QL NAA+PROBE: NOT DETECTED IU/ML
BUN SERPL-MCNC: 55 MG/DL — HIGH (ref 7–23)
CALCIUM SERPL-MCNC: 9 MG/DL — SIGNIFICANT CHANGE UP (ref 8.4–10.5)
CHLORIDE SERPL-SCNC: 105 MMOL/L — SIGNIFICANT CHANGE UP (ref 96–108)
CO2 SERPL-SCNC: 17 MMOL/L — LOW (ref 22–31)
CREAT SERPL-MCNC: 2.1 MG/DL — HIGH (ref 0.5–1.3)
EGFR: 34 ML/MIN/1.73M2 — LOW
EOSINOPHIL # BLD AUTO: 0.54 K/UL — HIGH (ref 0–0.5)
EOSINOPHIL NFR BLD AUTO: 4.6 % — SIGNIFICANT CHANGE UP (ref 0–6)
GLUCOSE BLDC GLUCOMTR-MCNC: 119 MG/DL — HIGH (ref 70–99)
GLUCOSE BLDC GLUCOMTR-MCNC: 179 MG/DL — HIGH (ref 70–99)
GLUCOSE BLDC GLUCOMTR-MCNC: 189 MG/DL — HIGH (ref 70–99)
GLUCOSE BLDC GLUCOMTR-MCNC: 247 MG/DL — HIGH (ref 70–99)
GLUCOSE BLDC GLUCOMTR-MCNC: 250 MG/DL — HIGH (ref 70–99)
GLUCOSE SERPL-MCNC: 267 MG/DL — HIGH (ref 70–99)
HCT VFR BLD CALC: 25.5 % — LOW (ref 39–50)
HGB BLD-MCNC: 8.1 G/DL — LOW (ref 13–17)
IMM GRANULOCYTES NFR BLD AUTO: 0.8 % — SIGNIFICANT CHANGE UP (ref 0–0.9)
INR BLD: 0.93 RATIO — SIGNIFICANT CHANGE UP (ref 0.85–1.18)
LYMPHOCYTES # BLD AUTO: 1.71 K/UL — SIGNIFICANT CHANGE UP (ref 1–3.3)
LYMPHOCYTES # BLD AUTO: 14.7 % — SIGNIFICANT CHANGE UP (ref 13–44)
MAGNESIUM SERPL-MCNC: 2.5 MG/DL — SIGNIFICANT CHANGE UP (ref 1.6–2.6)
MCHC RBC-ENTMCNC: 31.5 PG — SIGNIFICANT CHANGE UP (ref 27–34)
MCHC RBC-ENTMCNC: 31.8 GM/DL — LOW (ref 32–36)
MCV RBC AUTO: 99.2 FL — SIGNIFICANT CHANGE UP (ref 80–100)
MONOCYTES # BLD AUTO: 0.86 K/UL — SIGNIFICANT CHANGE UP (ref 0–0.9)
MONOCYTES NFR BLD AUTO: 7.4 % — SIGNIFICANT CHANGE UP (ref 2–14)
NEUTROPHILS # BLD AUTO: 8.35 K/UL — HIGH (ref 1.8–7.4)
NEUTROPHILS NFR BLD AUTO: 71.9 % — SIGNIFICANT CHANGE UP (ref 43–77)
NRBC # BLD: 0 /100 WBCS — SIGNIFICANT CHANGE UP (ref 0–0)
PHOSPHATE SERPL-MCNC: 2.3 MG/DL — LOW (ref 2.5–4.5)
PLATELET # BLD AUTO: 182 K/UL — SIGNIFICANT CHANGE UP (ref 150–400)
POTASSIUM SERPL-MCNC: 4.9 MMOL/L — SIGNIFICANT CHANGE UP (ref 3.5–5.3)
POTASSIUM SERPL-SCNC: 4.9 MMOL/L — SIGNIFICANT CHANGE UP (ref 3.5–5.3)
PROT SERPL-MCNC: 6.7 G/DL — SIGNIFICANT CHANGE UP (ref 6–8.3)
PROTHROM AB SERPL-ACNC: 9.8 SEC — SIGNIFICANT CHANGE UP (ref 9.5–13)
RBC # BLD: 2.57 M/UL — LOW (ref 4.2–5.8)
RBC # FLD: 16.9 % — HIGH (ref 10.3–14.5)
SODIUM SERPL-SCNC: 133 MMOL/L — LOW (ref 135–145)
TACROLIMUS SERPL-MCNC: 19.9 NG/ML — SIGNIFICANT CHANGE UP
WBC # BLD: 11.62 K/UL — HIGH (ref 3.8–10.5)
WBC # FLD AUTO: 11.62 K/UL — HIGH (ref 3.8–10.5)

## 2023-09-27 PROCEDURE — 99232 SBSQ HOSP IP/OBS MODERATE 35: CPT | Mod: 24

## 2023-09-27 PROCEDURE — 99223 1ST HOSP IP/OBS HIGH 75: CPT | Mod: GC

## 2023-09-27 RX ORDER — TRAMADOL HYDROCHLORIDE 50 MG/1
25 TABLET ORAL ONCE
Refills: 0 | Status: DISCONTINUED | OUTPATIENT
Start: 2023-09-27 | End: 2023-09-27

## 2023-09-27 RX ORDER — INSULIN LISPRO 100/ML
VIAL (ML) SUBCUTANEOUS
Refills: 0 | Status: DISCONTINUED | OUTPATIENT
Start: 2023-09-27 | End: 2023-09-28

## 2023-09-27 RX ORDER — CEFTRIAXONE 500 MG/1
1000 INJECTION, POWDER, FOR SOLUTION INTRAMUSCULAR; INTRAVENOUS ONCE
Refills: 0 | Status: COMPLETED | OUTPATIENT
Start: 2023-09-27 | End: 2023-09-27

## 2023-09-27 RX ORDER — INSULIN LISPRO 100/ML
VIAL (ML) SUBCUTANEOUS ONCE
Refills: 0 | Status: COMPLETED | OUTPATIENT
Start: 2023-09-27 | End: 2023-09-27

## 2023-09-27 RX ORDER — CEFTRIAXONE 500 MG/1
1000 INJECTION, POWDER, FOR SOLUTION INTRAMUSCULAR; INTRAVENOUS EVERY 24 HOURS
Refills: 0 | Status: DISCONTINUED | OUTPATIENT
Start: 2023-09-28 | End: 2023-09-28

## 2023-09-27 RX ORDER — CEFTRIAXONE 500 MG/1
INJECTION, POWDER, FOR SOLUTION INTRAMUSCULAR; INTRAVENOUS
Refills: 0 | Status: DISCONTINUED | OUTPATIENT
Start: 2023-09-27 | End: 2023-09-28

## 2023-09-27 RX ORDER — MYCOPHENOLATE MOFETIL 250 MG/1
1000 CAPSULE ORAL
Refills: 0 | Status: DISCONTINUED | OUTPATIENT
Start: 2023-09-27 | End: 2023-09-28

## 2023-09-27 RX ORDER — INSULIN LISPRO 100/ML
VIAL (ML) SUBCUTANEOUS AT BEDTIME
Refills: 0 | Status: DISCONTINUED | OUTPATIENT
Start: 2023-09-27 | End: 2023-09-28

## 2023-09-27 RX ADMIN — TRAMADOL HYDROCHLORIDE 25 MILLIGRAM(S): 50 TABLET ORAL at 07:40

## 2023-09-27 RX ADMIN — Medication 4: at 06:19

## 2023-09-27 RX ADMIN — SODIUM CHLORIDE 150 MILLILITER(S): 9 INJECTION INTRAMUSCULAR; INTRAVENOUS; SUBCUTANEOUS at 11:47

## 2023-09-27 RX ADMIN — Medication 500000 UNIT(S): at 05:29

## 2023-09-27 RX ADMIN — Medication 4: at 12:42

## 2023-09-27 RX ADMIN — Medication 30 MILLIGRAM(S): at 09:09

## 2023-09-27 RX ADMIN — MYCOPHENOLATE MOFETIL 1000 MILLIGRAM(S): 250 CAPSULE ORAL at 10:22

## 2023-09-27 RX ADMIN — Medication 1: at 17:11

## 2023-09-27 RX ADMIN — TRAMADOL HYDROCHLORIDE 25 MILLIGRAM(S): 50 TABLET ORAL at 22:50

## 2023-09-27 RX ADMIN — HEPARIN SODIUM 5000 UNIT(S): 5000 INJECTION INTRAVENOUS; SUBCUTANEOUS at 17:10

## 2023-09-27 RX ADMIN — TRAMADOL HYDROCHLORIDE 25 MILLIGRAM(S): 50 TABLET ORAL at 06:19

## 2023-09-27 RX ADMIN — INSULIN GLARGINE 12 UNIT(S): 100 INJECTION, SOLUTION SUBCUTANEOUS at 21:36

## 2023-09-27 RX ADMIN — ATORVASTATIN CALCIUM 40 MILLIGRAM(S): 80 TABLET, FILM COATED ORAL at 21:36

## 2023-09-27 RX ADMIN — CARVEDILOL PHOSPHATE 25 MILLIGRAM(S): 80 CAPSULE, EXTENDED RELEASE ORAL at 17:11

## 2023-09-27 RX ADMIN — MYCOPHENOLATE MOFETIL 1000 MILLIGRAM(S): 250 CAPSULE ORAL at 19:32

## 2023-09-27 RX ADMIN — Medication 1 TABLET(S): at 11:47

## 2023-09-27 RX ADMIN — Medication 5 MILLIGRAM(S): at 05:30

## 2023-09-27 RX ADMIN — Medication 30 MILLIGRAM(S): at 17:11

## 2023-09-27 RX ADMIN — Medication 81 MILLIGRAM(S): at 11:47

## 2023-09-27 RX ADMIN — PANTOPRAZOLE SODIUM 40 MILLIGRAM(S): 20 TABLET, DELAYED RELEASE ORAL at 17:11

## 2023-09-27 RX ADMIN — CEFTRIAXONE 1000 MILLIGRAM(S): 500 INJECTION, POWDER, FOR SOLUTION INTRAMUSCULAR; INTRAVENOUS at 10:22

## 2023-09-27 RX ADMIN — Medication 500000 UNIT(S): at 11:47

## 2023-09-27 RX ADMIN — PIPERACILLIN AND TAZOBACTAM 25 GRAM(S): 4; .5 INJECTION, POWDER, LYOPHILIZED, FOR SOLUTION INTRAVENOUS at 03:45

## 2023-09-27 RX ADMIN — PANTOPRAZOLE SODIUM 40 MILLIGRAM(S): 20 TABLET, DELAYED RELEASE ORAL at 05:30

## 2023-09-27 RX ADMIN — CARVEDILOL PHOSPHATE 25 MILLIGRAM(S): 80 CAPSULE, EXTENDED RELEASE ORAL at 09:09

## 2023-09-27 RX ADMIN — VALGANCICLOVIR 450 MILLIGRAM(S): 450 TABLET, FILM COATED ORAL at 05:29

## 2023-09-27 RX ADMIN — Medication 500000 UNIT(S): at 17:10

## 2023-09-27 RX ADMIN — TRAMADOL HYDROCHLORIDE 25 MILLIGRAM(S): 50 TABLET ORAL at 21:50

## 2023-09-27 RX ADMIN — HEPARIN SODIUM 5000 UNIT(S): 5000 INJECTION INTRAVENOUS; SUBCUTANEOUS at 05:29

## 2023-09-27 RX ADMIN — TACROLIMUS 2 MILLIGRAM(S): 5 CAPSULE ORAL at 09:10

## 2023-09-27 NOTE — CONSULT NOTE ADULT - SUBJECTIVE AND OBJECTIVE BOX
HPI:  68M with pmh of prior smoking, GERD, HTN, HLD, DM2, CAD s/p 3 stents (last in 2022), CVA, cholecystectomy, ESRD on HD since 2019 via LUE AVF now s/p DDRT 9/16 with Simulect induction (donor/recipient EBV+/CMV+) complicated by slow graft function and discharged on 9/22 presents to the ED with hyperglycemia noticed at home and dysuria/hematuria since discharge. Patient states he noticed his sugars were elevated to 500 today at home and he came to the ED after calling the office. He also notes that he has had increased burning with urination since discharge and persistent hematuria. He denies decreased output, abdominal pain, nausea/vomiting, sob, chest pain, fevers/chills, diarrhea/constipation, dark/bloody stool, lightheadedness, syncope, fatigue. His preperitoneal ALCON fills 3x per  day since discharge and has been serosanguinous. He states he has been compliant with his repaglinide and trajenta, as well as his envarsus 3mg, MMF 1g BID, prednisone taper, nystatin, bactrim, and valcyte. Upon arrival patient is HD stable, afebrile, WBC 11.8 from 10.8 on discharge, Hgb 9.5 from 9.4, hyponatremic to 128, Cr 2.22 from 3.19. Sugars noted to be elevated to 466. (26 Sep 2023 20:57)      Patient has history of diabetes, A1C  7.7 % on home Tradjenta and Leanne Johnson was consulted for glycemic control.      PAST MEDICAL & SURGICAL HISTORY:  HTN (hypertension)      Coronary artery disease      CAP (community acquired pneumonia)  6/18      Diabetes mellitus  type 2      GERD (gastroesophageal reflux disease)      Stented coronary artery  2014, 3 stents, [facility]      ESRD (end stage renal disease)  on Dialysis( M/W/F), By Dr. Huff      Hypercholesterolemia      Cerebrovascular accident (CVA), unspecified mechanism  diagnosed via CT of the head      Anemia due to stage 5 chronic kidney disease, not on chronic dialysis      H/O coronary angiogram  2014 - x3 stents      AV fistula  10/12/18 L radiocephalic AV fistula      H/O eye surgery          FAMILY HISTORY:      Social History:            HOME MEDICATIONS:  Home Medications:  acetaminophen 325 mg oral tablet: 2 tab(s) orally every 6 hours As needed Mild Pain (1 - 3) (22 Sep 2023 17:14)  aspirin 81 mg oral delayed release tablet: 1 tab(s) orally once a day (22 Sep 2023 17:14)  atorvastatin 40 mg oral tablet: 1 tab(s) orally once a day (at bedtime) (22 Sep 2023 17:14)  carvedilol 25 mg oral tablet: 1 tab(s) orally every 12 hours (22 Sep 2023 17:14)  mycophenolate mofetil 250 mg oral capsule: 1,000 milligram(s) orally 2 times a day (22 Sep 2023 17:14)  NIFEdipine 30 mg oral tablet, extended release: 1 tab(s) orally 2 times a day (22 Sep 2023 17:14)  nystatin 100,000 units/mL oral suspension: 5 milliliter(s) orally 4 times a day (22 Sep 2023 17:14)  pantoprazole 40 mg oral delayed release tablet: 1 tab(s) orally 2 times a day (22 Sep 2023 17:14)  predniSONE 5 mg oral tablet: 1 tab(s) orally once a day Follow up taper from med action (22 Sep 2023 17:14)  repaglinide 2 mg oral tablet: 1 tab(s) orally 3 times a day (before meals) (22 Sep 2023 17:14)  sulfamethoxazole-trimethoprim 400 mg-80 mg oral tablet: 1 tab(s) orally once a day (22 Sep 2023 17:14)  tacrolimus 1 mg oral tablet, extended release: 3 tab(s) orally once a day (22 Sep 2023 17:14)  valGANciclovir 450 mg oral tablet: 1 tab(s) orally Monday, Wednesday, and Friday (22 Sep 2023 17:14)            MEDICATIONS  (STANDING):  aspirin enteric coated 81 milliGRAM(s) Oral daily  atorvastatin 40 milliGRAM(s) Oral at bedtime  carvedilol 25 milliGRAM(s) Oral every 12 hours  cefTRIAXone   IVPB      dextrose 5%. 1000 milliLiter(s) (50 mL/Hr) IV Continuous <Continuous>  dextrose 5%. 1000 milliLiter(s) (100 mL/Hr) IV Continuous <Continuous>  dextrose 50% Injectable 25 Gram(s) IV Push once  dextrose 50% Injectable 25 Gram(s) IV Push once  dextrose 50% Injectable 12.5 Gram(s) IV Push once  glucagon  Injectable 1 milliGRAM(s) IntraMuscular once  heparin   Injectable 5000 Unit(s) SubCutaneous every 12 hours  insulin glargine Injectable (LANTUS) 12 Unit(s) SubCutaneous at bedtime  insulin lispro (ADMELOG) corrective regimen sliding scale   SubCutaneous at bedtime  insulin lispro (ADMELOG) corrective regimen sliding scale   SubCutaneous three times a day before meals  mycophenolate mofetil 1000 milliGRAM(s) Oral two times a day  NIFEdipine XL 30 milliGRAM(s) Oral two times a day  nystatin    Suspension 662380 Unit(s) Oral four times a day  pantoprazole    Tablet 40 milliGRAM(s) Oral every 12 hours  piperacillin/tazobactam IVPB.. 3.375 Gram(s) IV Intermittent every 8 hours  predniSONE   Tablet 5 milliGRAM(s) Oral daily  sodium chloride 0.9%. 1000 milliLiter(s) (150 mL/Hr) IV Continuous <Continuous>  trimethoprim   80 mG/sulfamethoxazole 400 mG 1 Tablet(s) Oral daily  valGANciclovir 450 milliGRAM(s) Oral <User Schedule>    MEDICATIONS  (PRN):  acetaminophen     Tablet .. 650 milliGRAM(s) Oral every 6 hours PRN Temp greater or equal to 38C (100.4F), Mild Pain (1 - 3)  dextrose Oral Gel 15 Gram(s) Oral once PRN Blood Glucose LESS THAN 70 milliGRAM(s)/deciliter      Allergies    No Known Allergies    Intolerances        Review of Systems:  Neuro: No HA, no dizziness  Cardiovascular: No chest pain, no palpitations  Respiratory: no SOB, no cough  GI: No nausea, vomiting, abdominal pain  MSK: Denies joint/muscle pain      ALL OTHER SYSTEMS REVIEWED     PHYSICAL EXAM:  VITALS: T(C): 36.6 (09-27-23 @ 13:13)  T(F): 97.8 (09-27-23 @ 13:13), Max: 98 (09-27-23 @ 05:07)  HR: 66 (09-27-23 @ 13:13) (61 - 69)  BP: 131/67 (09-27-23 @ 13:13) (125/67 - 163/79)  RR:  (14 - 18)  SpO2:  (100% - 100%)  Wt(kg): --  GENERAL: NAD, well-groomed, well-developed  NEURO:  alert and oriented  RESPIRATORY: Clear to auscultation bilaterally; No rales, rhonchi, wheezing  CARDIOVASCULAR: Si S2  GI: Soft, non distended, normal bowel sounds  MUSCULOSKELETAL: Moves all extremities equally       POCT Blood Glucose.: 250 mg/dL (09-27-23 @ 12:39)  POCT Blood Glucose.: 119 mg/dL (09-27-23 @ 09:08)  POCT Blood Glucose.: 247 mg/dL (09-27-23 @ 05:35)  POCT Blood Glucose.: 242 mg/dL (09-26-23 @ 23:16)  POCT Blood Glucose.: 422 mg/dL (09-26-23 @ 19:50)  POCT Blood Glucose.: 426 mg/dL (09-26-23 @ 18:40)  POCT Blood Glucose.: 423 mg/dL (09-26-23 @ 18:38)                            8.1    11.62 )-----------( 182      ( 27 Sep 2023 06:06 )             25.5       09-27    133<L>  |  105  |  55<H>  ----------------------------<  267<H>  4.9   |  17<L>  |  2.10<H>    eGFR: 34<L>    Ca    9.0      09-27  Mg     2.5     09-27  Phos  2.3     09-27    TPro  6.7  /  Alb  3.4  /  TBili  1.2  /  DBili  x   /  AST  12  /  ALT  16  /  AlkPhos  60  09-27      Thyroid Function Tests:    Diet, Consistent Carbohydrate Renal w/Evening Snack (09-27-23 @ 00:23) [Active]          A1C with Estimated Average Glucose Result: 7.7 % (09-17-23 @ 06:33)  A1C with Estimated Average Glucose Result: 6.4 % (10-29-22 @ 07:12)                     HPI:  68M with pmh of prior smoking, GERD, HTN, HLD, DM2, CAD s/p 3 stents (last in 2022), CVA, cholecystectomy, ESRD on HD since 2019 via LUE AVF now s/p DDRT 9/16 with Simulect induction (donor/recipient EBV+/CMV+) complicated by slow graft function and discharged on 9/22 presents to the ED with hyperglycemia noticed at home and dysuria/hematuria since discharge. Patient states he noticed his sugars were elevated to 500 today at home and he came to the ED after calling the office. He also notes that he has had increased burning with urination since discharge and persistent hematuria. He denies decreased output, abdominal pain, nausea/vomiting, sob, chest pain, fevers/chills, diarrhea/constipation, dark/bloody stool, lightheadedness, syncope, fatigue. His preperitoneal ALCON fills 3x per  day since discharge and has been serosanguinous. He states he has been compliant with his repaglinide and trajenta, as well as his envarsus 3mg, MMF 1g BID, prednisone taper, nystatin, bactrim, and valcyte. Upon arrival patient is HD stable, afebrile, WBC 11.8 from 10.8 on discharge, Hgb 9.5 from 9.4, hyponatremic to 128, Cr 2.22 from 3.19. Sugars noted to be elevated to 466. (26 Sep 2023 20:57)      Patient has history of diabetes, A1C  7.7 % on home Tradjenta and Prandin recently, has hx of insulin use  Endo was consulted for glycemic control. Previously seen by Dr Li service in the past      PAST MEDICAL & SURGICAL HISTORY:  HTN (hypertension)      Coronary artery disease      CAP (community acquired pneumonia)  6/18      Diabetes mellitus  type 2      GERD (gastroesophageal reflux disease)      Stented coronary artery  2014, 3 stents, [facility]      ESRD (end stage renal disease)  on Dialysis( M/W/F), By Dr. Huff      Hypercholesterolemia      Cerebrovascular accident (CVA), unspecified mechanism  diagnosed via CT of the head      Anemia due to stage 5 chronic kidney disease, not on chronic dialysis      H/O coronary angiogram  2014 - x3 stents      AV fistula  10/12/18 L radiocephalic AV fistula      H/O eye surgery          FAMILY HISTORY:      Social History:            HOME MEDICATIONS:  Home Medications:  acetaminophen 325 mg oral tablet: 2 tab(s) orally every 6 hours As needed Mild Pain (1 - 3) (22 Sep 2023 17:14)  aspirin 81 mg oral delayed release tablet: 1 tab(s) orally once a day (22 Sep 2023 17:14)  atorvastatin 40 mg oral tablet: 1 tab(s) orally once a day (at bedtime) (22 Sep 2023 17:14)  carvedilol 25 mg oral tablet: 1 tab(s) orally every 12 hours (22 Sep 2023 17:14)  mycophenolate mofetil 250 mg oral capsule: 1,000 milligram(s) orally 2 times a day (22 Sep 2023 17:14)  NIFEdipine 30 mg oral tablet, extended release: 1 tab(s) orally 2 times a day (22 Sep 2023 17:14)  nystatin 100,000 units/mL oral suspension: 5 milliliter(s) orally 4 times a day (22 Sep 2023 17:14)  pantoprazole 40 mg oral delayed release tablet: 1 tab(s) orally 2 times a day (22 Sep 2023 17:14)  predniSONE 5 mg oral tablet: 1 tab(s) orally once a day Follow up taper from med action (22 Sep 2023 17:14)  repaglinide 2 mg oral tablet: 1 tab(s) orally 3 times a day (before meals) (22 Sep 2023 17:14)  sulfamethoxazole-trimethoprim 400 mg-80 mg oral tablet: 1 tab(s) orally once a day (22 Sep 2023 17:14)  tacrolimus 1 mg oral tablet, extended release: 3 tab(s) orally once a day (22 Sep 2023 17:14)  valGANciclovir 450 mg oral tablet: 1 tab(s) orally Monday, Wednesday, and Friday (22 Sep 2023 17:14)            MEDICATIONS  (STANDING):  aspirin enteric coated 81 milliGRAM(s) Oral daily  atorvastatin 40 milliGRAM(s) Oral at bedtime  carvedilol 25 milliGRAM(s) Oral every 12 hours  cefTRIAXone   IVPB      dextrose 5%. 1000 milliLiter(s) (50 mL/Hr) IV Continuous <Continuous>  dextrose 5%. 1000 milliLiter(s) (100 mL/Hr) IV Continuous <Continuous>  dextrose 50% Injectable 25 Gram(s) IV Push once  dextrose 50% Injectable 25 Gram(s) IV Push once  dextrose 50% Injectable 12.5 Gram(s) IV Push once  glucagon  Injectable 1 milliGRAM(s) IntraMuscular once  heparin   Injectable 5000 Unit(s) SubCutaneous every 12 hours  insulin glargine Injectable (LANTUS) 12 Unit(s) SubCutaneous at bedtime  insulin lispro (ADMELOG) corrective regimen sliding scale   SubCutaneous at bedtime  insulin lispro (ADMELOG) corrective regimen sliding scale   SubCutaneous three times a day before meals  mycophenolate mofetil 1000 milliGRAM(s) Oral two times a day  NIFEdipine XL 30 milliGRAM(s) Oral two times a day  nystatin    Suspension [id number] Unit(s) Oral four times a day  pantoprazole    Tablet 40 milliGRAM(s) Oral every 12 hours  piperacillin/tazobactam IVPB.. 3.375 Gram(s) IV Intermittent every 8 hours  predniSONE   Tablet 5 milliGRAM(s) Oral daily  sodium chloride 0.9%. 1000 milliLiter(s) (150 mL/Hr) IV Continuous <Continuous>  trimethoprim   80 mG/sulfamethoxazole 400 mG 1 Tablet(s) Oral daily  valGANciclovir 450 milliGRAM(s) Oral <User Schedule>    MEDICATIONS  (PRN):  acetaminophen     Tablet .. 650 milliGRAM(s) Oral every 6 hours PRN Temp greater or equal to 38C (100.4F), Mild Pain (1 - 3)  dextrose Oral Gel 15 Gram(s) Oral once PRN Blood Glucose LESS THAN 70 milliGRAM(s)/deciliter      Allergies    No Known Allergies    Intolerances        Review of Systems:  Neuro: No HA, no dizziness  Cardiovascular: No chest pain, no palpitations  Respiratory: no SOB, no cough  GI: No nausea, vomiting, abdominal pain  MSK: Denies joint/muscle pain      ALL OTHER SYSTEMS REVIEWED     PHYSICAL EXAM:  VITALS: T(C): 36.6 (09-27-23 @ 13:13)  T(F): 97.8 (09-27-23 @ 13:13), Max: 98 (09-27-23 @ 05:07)  HR: 66 (09-27-23 @ 13:13) (61 - 69)  BP: 131/67 (09-27-23 @ 13:13) (125/67 - 163/79)  RR:  (14 - 18)  SpO2:  (100% - 100%)  Wt(kg): --  GENERAL: NAD, well-groomed, well-developed  NEURO:  alert and oriented  RESPIRATORY: Clear to auscultation bilaterally; No rales, rhonchi, wheezing  CARDIOVASCULAR: Si S2  GI: Soft, non distended, normal bowel sounds  MUSCULOSKELETAL: Moves all extremities equally       POCT Blood Glucose.: 250 mg/dL (09-27-23 @ 12:39)  POCT Blood Glucose.: 119 mg/dL (09-27-23 @ 09:08)  POCT Blood Glucose.: 247 mg/dL (09-27-23 @ 05:35)  POCT Blood Glucose.: 242 mg/dL (09-26-23 @ 23:16)  POCT Blood Glucose.: 422 mg/dL (09-26-23 @ 19:50)  POCT Blood Glucose.: 426 mg/dL (09-26-23 @ 18:40)  POCT Blood Glucose.: 423 mg/dL (09-26-23 @ 18:38)                            8.1    11.62 )-----------( 182      ( 27 Sep 2023 06:06 )             25.5       09-27    133<L>  |  105  |  55<H>  ----------------------------<  267<H>  4.9   |  17<L>  |  2.10<H>    eGFR: 34<L>    Ca    9.0      09-27  Mg     2.5     09-27  Phos  2.3     09-27    TPro  6.7  /  Alb  3.4  /  TBili  1.2  /  DBili  x   /  AST  12  /  ALT  16  /  AlkPhos  60  09-27      Thyroid Function Tests:    Diet, Consistent Carbohydrate Renal w/Evening Snack (09-27-23 @ 00:23) [Active]          A1C with Estimated Average Glucose Result: 7.7 % (09-17-23 @ 06:33)  A1C with Estimated Average Glucose Result: 6.4 % (10-29-22 @ 07:12)                     HPI:  68M with pmh of prior smoking, GERD, HTN, HLD, DM2, CAD s/p 3 stents (last in 2022), CVA, cholecystectomy, ESRD on HD since 2019 via  LUE AVF now s/p DDRT 9/16 with Simulect induction (donor/recipient EBV+/CMV+) complicated by slow graft function and discharged on 9/22 presents to the ED with hyperglycemia noticed at home and dysuria/hematuria since discharge. Patient states he noticed his sugars were elevated to 500 today at home and he came to the ED after calling the office. He also notes that he has had increased burning with urination since discharge and persistent hematuria. He denies decreased output, abdominal pain, nausea/vomiting, sob, chest pain, fevers/chills, diarrhea/constipation, dark/bloody stool, lightheadedness, syncope, fatigue. His preperitoneal ALCON fills 3x per  day since discharge and has been serosanguinous. He states he has been compliant with his repaglinide and trajenta, as well as his envarsus 3mg, MMF 1g BID, prednisone taper, nystatin, bactrim, and valcyte. Upon arrival patient is HD stable, afebrile, WBC 11.8 from 10.8 on discharge, Hgb 9.5 from 9.4, hyponatremic to 128, Cr 2.22 from 3.19. Sugars noted to be elevated to 466. (26 Sep 2023 20:57)      Patient has history of diabetes, A1C  7.7 % on home Tradjenta and Prandin recently, has hx of insulin use  Endo was consulted for glycemic control. Previously seen by Dr Li service in the past      PAST MEDICAL & SURGICAL HISTORY:  HTN (hypertension)      Coronary artery disease      CAP (community acquired pneumonia)  6/18      Diabetes mellitus  type 2      GERD (gastroesophageal reflux disease)      Stented coronary artery  2014, 3 stents, [facility]      ESRD (end stage renal disease)  on Dialysis( M/W/F), By Dr. Huff      Hypercholesterolemia      Cerebrovascular accident (CVA), unspecified mechanism  diagnosed via CT of the head      Anemia due to stage 5 chronic kidney disease, not on chronic dialysis      H/O coronary angiogram  2014 - x3 stents      AV fistula  10/12/18 L radiocephalic AV fistula      H/O eye surgery          FAMILY HISTORY:      Social History:            HOME MEDICATIONS:  Home Medications:  acetaminophen 325 mg oral tablet: 2 tab(s) orally every 6 hours As needed Mild Pain (1 - 3) (22 Sep 2023 17:14)  aspirin 81 mg oral delayed release tablet: 1 tab(s) orally once a day (22 Sep 2023 17:14)  atorvastatin 40 mg oral tablet: 1 tab(s) orally once a day (at bedtime) (22 Sep 2023 17:14)  carvedilol 25 mg oral tablet: 1 tab(s) orally every 12 hours (22 Sep 2023 17:14)  mycophenolate mofetil 250 mg oral capsule: 1,000 milligram(s) orally 2 times a day (22 Sep 2023 17:14)  NIFEdipine 30 mg oral tablet, extended release: 1 tab(s) orally 2 times a day (22 Sep 2023 17:14)  nystatin 100,000 units/mL oral suspension: 5 milliliter(s) orally 4 times a day (22 Sep 2023 17:14)  pantoprazole 40 mg oral delayed release tablet: 1 tab(s) orally 2 times a day (22 Sep 2023 17:14)  predniSONE 5 mg oral tablet: 1 tab(s) orally once a day Follow up taper from med action (22 Sep 2023 17:14)  repaglinide 2 mg oral tablet: 1 tab(s) orally 3 times a day (before meals) (22 Sep 2023 17:14)  sulfamethoxazole-trimethoprim 400 mg-80 mg oral tablet: 1 tab(s) orally once a day (22 Sep 2023 17:14)  tacrolimus 1 mg oral tablet, extended release: 3 tab(s) orally once a day (22 Sep 2023 17:14)  valGANciclovir 450 mg oral tablet: 1 tab(s) orally Monday, Wednesday, and Friday (22 Sep 2023 17:14)            MEDICATIONS  (STANDING):  aspirin enteric coated 81 milliGRAM(s) Oral daily  atorvastatin 40 milliGRAM(s) Oral at bedtime  carvedilol 25 milliGRAM(s) Oral every 12 hours  cefTRIAXone   IVPB      dextrose 5%. 1000 milliLiter(s) (50 mL/Hr) IV Continuous <Continuous>  dextrose 5%. 1000 milliLiter(s) (100 mL/Hr) IV Continuous <Continuous>  dextrose 50% Injectable 25 Gram(s) IV Push once  dextrose 50% Injectable 25 Gram(s) IV Push once  dextrose 50% Injectable 12.5 Gram(s) IV Push once  glucagon  Injectable 1 milliGRAM(s) IntraMuscular once  heparin   Injectable 5000 Unit(s) SubCutaneous every 12 hours  insulin glargine Injectable (LANTUS) 12 Unit(s) SubCutaneous at bedtime  insulin lispro (ADMELOG) corrective regimen sliding scale   SubCutaneous at bedtime  insulin lispro (ADMELOG) corrective regimen sliding scale   SubCutaneous three times a day before meals  mycophenolate mofetil 1000 milliGRAM(s) Oral two times a day  NIFEdipine XL 30 milliGRAM(s) Oral two times a day  nystatin    Suspension [id number] Unit(s) Oral four times a day  pantoprazole    Tablet 40 milliGRAM(s) Oral every 12 hours  piperacillin/tazobactam IVPB.. 3.375 Gram(s) IV Intermittent every 8 hours  predniSONE   Tablet 5 milliGRAM(s) Oral daily  sodium chloride 0.9%. 1000 milliLiter(s) (150 mL/Hr) IV Continuous <Continuous>  trimethoprim   80 mG/sulfamethoxazole 400 mG 1 Tablet(s) Oral daily  valGANciclovir 450 milliGRAM(s) Oral <User Schedule>    MEDICATIONS  (PRN):  acetaminophen     Tablet .. 650 milliGRAM(s) Oral every 6 hours PRN Temp greater or equal to 38C (100.4F), Mild Pain (1 - 3)  dextrose Oral Gel 15 Gram(s) Oral once PRN Blood Glucose LESS THAN 70 milliGRAM(s)/deciliter      Allergies    No Known Allergies    Intolerances        Review of Systems:  Neuro: No HA, no dizziness  Cardiovascular: No chest pain, no palpitations  Respiratory: no SOB, no cough  GI: No nausea, vomiting, abdominal pain  MSK: Denies joint/muscle pain      ALL OTHER SYSTEMS REVIEWED     PHYSICAL EXAM:  VITALS: T(C): 36.6 (09-27-23 @ 13:13)  T(F): 97.8 (09-27-23 @ 13:13), Max: 98 (09-27-23 @ 05:07)  HR: 66 (09-27-23 @ 13:13) (61 - 69)  BP: 131/67 (09-27-23 @ 13:13) (125/67 - 163/79)  RR:  (14 - 18)  SpO2:  (100% - 100%)  Wt(kg): --  GENERAL: NAD, well-groomed, well-developed  NEURO:  alert and oriented  RESPIRATORY: Clear to auscultation bilaterally; No rales, rhonchi, wheezing  CARDIOVASCULAR: Si S2  GI: Soft, non distended, normal bowel sounds  MUSCULOSKELETAL: Moves all extremities equally       POCT Blood Glucose.: 250 mg/dL (09-27-23 @ 12:39)  POCT Blood Glucose.: 119 mg/dL (09-27-23 @ 09:08)  POCT Blood Glucose.: 247 mg/dL (09-27-23 @ 05:35)  POCT Blood Glucose.: 242 mg/dL (09-26-23 @ 23:16)  POCT Blood Glucose.: 422 mg/dL (09-26-23 @ 19:50)  POCT Blood Glucose.: 426 mg/dL (09-26-23 @ 18:40)  POCT Blood Glucose.: 423 mg/dL (09-26-23 @ 18:38)                            8.1    11.62 )-----------( 182      ( 27 Sep 2023 06:06 )             25.5       09-27    133<L>  |  105  |  55<H>  ----------------------------<  267<H>  4.9   |  17<L>  |  2.10<H>    eGFR: 34<L>    Ca    9.0      09-27  Mg     2.5     09-27  Phos  2.3     09-27    TPro  6.7  /  Alb  3.4  /  TBili  1.2  /  DBili  x   /  AST  12  /  ALT  16  /  AlkPhos  60  09-27      Thyroid Function Tests:    Diet, Consistent Carbohydrate Renal w/Evening Snack (09-27-23 @ 00:23) [Active]          A1C with Estimated Average Glucose Result: 7.7 % (09-17-23 @ 06:33)  A1C with Estimated Average Glucose Result: 6.4 % (10-29-22 @ 07:12)

## 2023-09-27 NOTE — CONSULT NOTE ADULT - ASSESSMENT
67 y/o M with past medical hx of GERD, HTN, HLD, CAD, DM s/p 3 stents (last in 2022), CVA, cholecystectomy, ESRD on HD since 2019 via LUE AVF T/Th/S (Nephrologist Dr Huff) with recent AV fistulogram 7/2023 with cephalic stenosis s/p balloon angioplasty.now s/p DDRT 9/16/23 with Simulect induction with DGF and was discharged on 9/22/23 presents to ED with hyperglycemia that was noticed at home, dysuria and hematuria.   His Scr was improving since the transplant and has good urine output.       1.Uncontrolled DM:  Pt has Hx of DM and was on insulin and diet control. On Tradjenta and Prandin at the time of discharge.  Continue basal bolus insulin. Consult endocrine dept. for further reccs.     2.s/p DDRT with simulect induction.  Scr is improving.   3. Immunosuppression:  -tacro per with goal of 8-10, MMF 1g BID, Pred 5mg po qd  -Valcyte TIW/Nystatin/PPI/Bactrim for PPx    4. Urine culture - pending results and c/w ceftriaxone for now.

## 2023-09-27 NOTE — CONSULT NOTE ADULT - ASSESSMENT
68M with pmh of prior smoking, GERD, HTN, HLD, DM2, CAD s/p 3 stents (last in 2022), CVA, cholecystectomy, ESRD on HD since 2019 via LUE AVF now s/p DDRT 9/16 with Simulect induction (donor/recipient EBV+/CMV+) complicated by slow graft function and discharged on 9/22 presents to the ED with hyperglycemia    DDRT  s/p DDRT on 9/16/2023 at [facility]   Immunosuppression per transplant team  Monitor FK  level   Monitor BMP and UO    HTN  BP fluctuating   Monitor

## 2023-09-27 NOTE — PROGRESS NOTE ADULT - ASSESSMENT
68 Lithuanian speaking M former smoker with past medical history significant for GERD, HTN, HLD, anemia of chronic disease, T2DM, CAD s/p stent x3 (2014 at [place]) & x2 (pLCx 10/28/22, LAD 10/31/22, off of plavix as of 5/1/23), CVA and ESRD (diagnosed Jan 2019) on HD via LUE AVF T/Th/S (Nephrologist Dr Huff) with recent AV fistulogram 7/2023 with cephalic stenosis s/p balloon angioplasty. Past surgical history significant for open cholecystectomy, AVF creation and eye surgery in the past. Anuric. S/p R DDRT on 9/16/23 under Simulect. Returns for management of hyperglycemia.    Steroid induced Hyperglycemia  -previously on Tradjenta-->Prandin, now on insulin therapy. Will involve Endocrine  -insulin teaching by RN  -Diabetic/Renal diet    s/p DDRT   - Cr downtrending  - ASA/SQH  - I&Os  - SCDs/spirometry  - f/u pan culture given continued dysuria despite Cipro therapy, on empiric Zosyn  - Keep ALCON, no leak (2.33 on 9/25)    Immunosuppression  -Envarsus per level, MMF 1/1, SST  -Simulect completed  -Valcyte/Nystatin/PPI/Bactrim for PPx    HTN  -Coreg/Nifedipine/Hydralazine, adjust prn     68 Portuguese speaking M former smoker with past medical history significant for GERD, HTN, HLD, anemia of chronic disease, T2DM, CAD s/p stent x3 (2014 at [place]) & x2 (pLCx 10/28/22, LAD 10/31/22, off of plavix as of 5/1/23), CVA and ESRD (diagnosed Jan 2019) on HD via LUE AVF T/Th/S (Nephrologist Dr Huff) with recent AV fistulogram 7/2023 with cephalic stenosis s/p balloon angioplasty. Past surgical history significant for open cholecystectomy, AVF creation and eye surgery in the past. Anuric. S/p R DDRT on 9/16/23 under Simulect. Returns for management of hyperglycemia.    Steroid induced Hyperglycemia  -previously on Tradjenta and Prandin  - Endocrine consult today  - Continue basal bolus insulin  -insulin teaching by RN  -Diabetic/Renal diet    s/p DDRT   - Cr downtrending  - ASA/SQH  - I&Os  - SCDs/spirometry  - Change Zosyn to Ceftriaxone.  FU BCx, UCx  - Keep ALCON, no leak (2.33 on 9/25)    Immunosuppression  -Envarsus per level, resume MMF 1/1, Pred 5mg po qd  -Valcyte TIW/Nystatin/PPI/Bactrim for PPx    HTN  -Coreg/Nifedipine/Hydralazine, adjust prn

## 2023-09-27 NOTE — CONSULT NOTE ADULT - SUBJECTIVE AND OBJECTIVE BOX
[facility]   MD JORDAN AVALOS MD RUORU WONG, PA    TEL:  FROM 9 AM to 5 PM--OFFICE: [phone]    FROM 5 PM- 9 AM PLEASE CALL ANSWERING SERVICE AT [phone]    -- INITIAL RENAL CONSULT NOTE --- Date Of service 09-27-23 @ 10:53  --------------------------------------------------------------------------------  HPI:  HPI:  68M with pmh of prior smoking, GERD, HTN, HLD, DM2, CAD s/p 3 stents (last in 2022), CVA, cholecystectomy, ESRD on HD since 2019 via LUE AVF now s/p DDRT 9/16 with Simulect induction (donor/recipient EBV+/CMV+) complicated by slow graft function and discharged on 9/22 presents to the ED with hyperglycemia noticed at home and dysuria/hematuria since discharge. Patient states he noticed his sugars were elevated to 500 today at home and he came to the ED after calling the office. He also notes that he has had increased burning with urination since discharge and persistent hematuria. He denies decreased output, abdominal pain, nausea/vomiting, sob, chest pain, fevers/chills, diarrhea/constipation, dark/bloody stool, lightheadedness, syncope, fatigue. His preperitoneal ALCON fills 3x per  day since discharge and has been serosanguinous. He states he has been compliant with his repaglinide and trajenta, as well as his envarsus 3mg, MMF 1g BID, prednisone taper, nystatin, bactrim, and valcyte. Upon arrival patient is HD stable, afebrile, WBC 11.8 from 10.8 on discharge, Hgb 9.5 from 9.4, hyponatremic to 128, Cr 2.22 from 3.19. Sugars noted to be elevated to 466. (26 Sep 2023 20:57)        PAST HISTORY  --------------------------------------------------------------------------------  PAST MEDICAL & SURGICAL HISTORY:  HTN (hypertension)      Coronary artery disease      CAP (community acquired pneumonia)  6/18      Diabetes mellitus  type 2      GERD (gastroesophageal reflux disease)      Stented coronary artery  2014, 3 stents, [facility]      ESRD (end stage renal disease)  on Dialysis( M/W/F), By Dr. Huff      Hypercholesterolemia      Cerebrovascular accident (CVA), unspecified mechanism  diagnosed via CT of the head      Anemia due to stage 5 chronic kidney disease, not on chronic dialysis      H/O coronary angiogram  2014 - x3 stents      AV fistula  10/12/18 L radiocephalic AV fistula      H/O eye surgery        FAMILY HISTORY:    PAST SOCIAL HISTORY:    ALLERGIES & MEDICATIONS  --------------------------------------------------------------------------------  Allergies    No Known Allergies    Intolerances      Standing Inpatient Medications  aspirin enteric coated 81 milliGRAM(s) Oral daily  atorvastatin 40 milliGRAM(s) Oral at bedtime  carvedilol 25 milliGRAM(s) Oral every 12 hours  cefTRIAXone   IVPB      dextrose 5%. 1000 milliLiter(s) IV Continuous <Continuous>  dextrose 5%. 1000 milliLiter(s) IV Continuous <Continuous>  dextrose 50% Injectable 25 Gram(s) IV Push once  dextrose 50% Injectable 25 Gram(s) IV Push once  dextrose 50% Injectable 12.5 Gram(s) IV Push once  glucagon  Injectable 1 milliGRAM(s) IntraMuscular once  heparin   Injectable 5000 Unit(s) SubCutaneous every 12 hours  insulin glargine Injectable (LANTUS) 12 Unit(s) SubCutaneous at bedtime  insulin lispro (ADMELOG) corrective regimen sliding scale   SubCutaneous three times a day before meals  mycophenolate mofetil 1000 milliGRAM(s) Oral two times a day  NIFEdipine XL 30 milliGRAM(s) Oral two times a day  nystatin    Suspension 556522 Unit(s) Oral four times a day  pantoprazole    Tablet 40 milliGRAM(s) Oral every 12 hours  piperacillin/tazobactam IVPB.. 3.375 Gram(s) IV Intermittent every 8 hours  predniSONE   Tablet 5 milliGRAM(s) Oral daily  sodium chloride 0.9%. 1000 milliLiter(s) IV Continuous <Continuous>  trimethoprim   80 mG/sulfamethoxazole 400 mG 1 Tablet(s) Oral daily  valGANciclovir 450 milliGRAM(s) Oral <User Schedule>    PRN Inpatient Medications  acetaminophen     Tablet .. 650 milliGRAM(s) Oral every 6 hours PRN  dextrose Oral Gel 15 Gram(s) Oral once PRN      REVIEW OF SYSTEMS  --------------------------------------------------------------------------------  Gen: No fevers/chills  Skin: No rashes  Head/Eyes/Ears: Normal hearing,  Normal vision   Respiratory: No dyspnea, cough  CV: No chest pain  GI: No abdominal pain, diarrhea, constipation, nausea, vomiting  : No dysuria, hematuria  MSK: No  edema  Heme: No easy bruising or bleeding  Psych: No significant depression    All other systems were reviewed and are negative, except as noted.    VITALS/PHYSICAL EXAM  --------------------------------------------------------------------------------  T(C): 36.6 (09-27-23 @ 09:00), Max: 36.7 (09-27-23 @ 05:07)  HR: 69 (09-27-23 @ 09:00) (61 - 69)  BP: 151/78 (09-27-23 @ 09:00) (125/67 - 163/79)  RR: 18 (09-27-23 @ 09:00) (14 - 18)  SpO2: 100% (09-27-23 @ 09:00) (100% - 100%)  Wt(kg): --  Height (cm): 167.6 (09-26-23 @ 23:15)  Weight (kg): 63 (09-26-23 @ 23:15)  BMI (kg/m2): 22.4 (09-26-23 @ 23:15)  BSA (m2): 1.71 (09-26-23 @ 23:15)      09-26-23 @ 07:01  -  09-27-23 @ 07:00  --------------------------------------------------------  IN: 1690 mL / OUT: 485 mL / NET: 1205 mL      Physical Exam:  	Gen: NAD  	HEENT: MMM  	Pulm: CTA B/L  	CV: S1S2  	Abd: Soft, +BS   	Ext: No LE edema B/L  	Neuro: Awake, alert  	Skin: Warm and dry  	Vascular access: avf    LABS/STUDIES  --------------------------------------------------------------------------------              8.1    11.62 >-----------<  182      [09-27-23 @ 06:06]              25.5     133  |  105  |  55  ----------------------------<  267      [09-27-23 @ 06:06]  4.9   |  17  |  2.10        Ca     9.0     [09-27-23 @ 06:06]      Mg     2.5     [09-27-23 @ 06:06]      Phos  2.3     [09-27-23 @ 06:06]    TPro  6.7  /  Alb  3.4  /  TBili  1.2  /  DBili  x   /  AST  12  /  ALT  16  /  AlkPhos  60  [09-27-23 @ 06:06]    PT/INR: PT 9.8  , INR 0.93       [09-27-23 @ 06:06]  PTT: 25.2       [09-27-23 @ 06:06]      Creatinine Trend:  SCr 2.10 [09-27 @ 06:06]  SCr 2.22 [09-26 @ 18:59]  SCr 3.19 [09-22 @ 07:02]  SCr 3.68 [09-21 @ 06:10]  SCr 4.04 [09-20 @ 05:53]    Urinalysis - [09-27-23 @ 06:06]      Color  / Appearance  / SG  / pH       Gluc 267 / Ketone   / Bili  / Urobili        Blood  / Protein  / Leuk Est  / Nitrite       RBC  / WBC  / Hyaline  / Gran  / Sq Epi  / Non Sq Epi  / Bacteria       HbA1c 7.8      [12-17-19 @ 05:54]    HBsAb 6.6      [09-16-23 @ 04:41]  HBsAb Nonreactive      [01-10-19 @ 17:00]  HBsAg Nonreact      [09-16-23 @ 04:41]  HBcAb Nonreact      [09-16-23 @ 04:41]  HCV 0.10, Nonreact      [09-16-23 @ 04:41]  HIV Nonreact      [09-16-23 @ 04:41]

## 2023-09-27 NOTE — PROGRESS NOTE ADULT - SUBJECTIVE AND OBJECTIVE BOX
Transplant Surgery - Multidisciplinary Progress Note  --------------------------------------------------------------  DDRT 9/16/23     Present:   Patient seen and examined with multidisciplinary team including Transplant Surgeon: Dr. Sharma, TONY Suarez. Transplant Nephrologist: Dr. Montano, and unit RN during am rounds.  Disciplines not in attendance will be notified of the plan.     68 Occitan speaking M former smoker with past medical history significant for GERD, HTN, HLD, anemia of chronic disease, T2DM, CAD s/p stent x3 (2014 at [place]) & x2 (pLCx 10/28/22, LAD 10/31/22, off of plavix as of 5/1/23), CVA and ESRD (diagnosed Jan 2019) on HD via LUE AVF T/Th/S (Nephrologist Dr Huff) with recent AV fistulogram 7/2023 with cephalic stenosis s/p balloon angioplasty. Past surgical history significant for open cholecystectomy, AVF creation and eye surgery in the past. Anuric.    s/p R DDRT under Simulect on 9/17/23 with good graft function.  Returns for management of uncontrolled hyperglycemia    Interval Events:  Afebrile, VSS  glucose with much improvement on insulin therapy  dysuria/hematuria since post-op continues, UA+, started on Zosyn o/n empirically      Immunosuppression:  ENV per level, MMF 1/1, SST  Induction: Simulect   Ongoing monitoring for signs of rejection    Potential Discharge date: TBD   Education:  Medications  Plan of care:  See Below                     Review of systems  Gen: No weight changes, fatigue, fevers/chills, weakness  Skin: No rashes  Head/Eyes/Ears/Mouth: No headache; Normal hearing; Normal vision w/o blurriness; No sinus pain/discomfort, sore throat  Respiratory: No dyspnea, cough, wheezing, hemoptysis  CV: No chest pain, PND, orthopnea  GI: Mild abdominal pain at surgical incision site; denies diarrhea, constipation, nausea, vomiting, melena, hematochezia  : No increased frequency, dysuria, hematuria, nocturia  MSK: No joint pain/swelling; no back pain; no edema  Neuro: No dizziness/lightheadedness, weakness, seizures, numbness, tingling  Heme: No easy bruising or bleeding  Endo: No heat/cold intolerance  Psych: No significant nervousness, anxiety, stress, depression  All other systems were reviewed and are negative, except as noted.      PHYSICAL EXAM:  Constitutional: Well developed / well nourished  Eyes: Anicteric, PERRLA  ENMT: nc/at  Neck: supple  Respiratory: CTA B/L  Cardiovascular: RRR  Gastrointestinal: Soft, ND, mild tenderness at the incision site; incision c/d/i; JPss ss  Genitourinary: voiding, mild hematuria  Extremities: SCD's in place and working bilaterally, AVF  palpable, no edema  Vascular: Palpable dp pulses bilaterally  Neurological: A&O x3  Skin: no rashes, ulcerations or lesions;  Musculoskeletal: Moving all extremities  Psychiatric: Responsive       Transplant Surgery - Multidisciplinary Progress Note  --------------------------------------------------------------  DDRT 9/16/23     Present:   Patient seen and examined with multidisciplinary team including Transplant Surgeon: Dr. Sharma, TONY Suarez. Transplant Nephrologist: Dr. Montano, and unit RN during am rounds.  Disciplines not in attendance will be notified of the plan.     68 Syriac speaking M former smoker with past medical history significant for GERD, HTN, HLD, anemia of chronic disease, T2DM, CAD s/p stent x3 (2014 at [place]) & x2 (pLCx 10/28/22, LAD 10/31/22, off of plavix as of 5/1/23), CVA and ESRD (diagnosed Jan 2019) on HD via LUE AVF T/Th/S (Nephrologist Dr Huff) with recent AV fistulogram 7/2023 with cephalic stenosis s/p balloon angioplasty. Past surgical history significant for open cholecystectomy, AVF creation and eye surgery in the past. Anuric.    s/p R DDRT under Simulect on 9/17/23 with good graft function.  Returns for management of uncontrolled hyperglycemia    Interval Events:  Afebrile, VSS  glucose with much improvement on insulin therapy  dysuria/hematuria since post-op continues, UA+, started on Zosyn o/n empirically  Urine and Blood cultures sent      Immunosuppression:  ENV per level, MMF held, Prednisone 5mg po qd  Induction: Simulect   Ongoing monitoring for signs of rejection    Potential Discharge date: TBD   Education:  Medications  Plan of care:  See Below        MEDICATIONS  (STANDING):  aspirin enteric coated 81 milliGRAM(s) Oral daily  atorvastatin 40 milliGRAM(s) Oral at bedtime  carvedilol 25 milliGRAM(s) Oral every 12 hours  cefTRIAXone   IVPB      dextrose 5%. 1000 milliLiter(s) (50 mL/Hr) IV Continuous <Continuous>  dextrose 5%. 1000 milliLiter(s) (100 mL/Hr) IV Continuous <Continuous>  dextrose 50% Injectable 25 Gram(s) IV Push once  dextrose 50% Injectable 12.5 Gram(s) IV Push once  dextrose 50% Injectable 25 Gram(s) IV Push once  glucagon  Injectable 1 milliGRAM(s) IntraMuscular once  heparin   Injectable 5000 Unit(s) SubCutaneous every 12 hours  insulin glargine Injectable (LANTUS) 12 Unit(s) SubCutaneous at bedtime  insulin lispro (ADMELOG) corrective regimen sliding scale   SubCutaneous three times a day before meals  mycophenolate mofetil 1000 milliGRAM(s) Oral two times a day  NIFEdipine XL 30 milliGRAM(s) Oral two times a day  nystatin    Suspension 500619 Unit(s) Oral four times a day  pantoprazole    Tablet 40 milliGRAM(s) Oral every 12 hours  piperacillin/tazobactam IVPB.. 3.375 Gram(s) IV Intermittent every 8 hours  predniSONE   Tablet 5 milliGRAM(s) Oral daily  sodium chloride 0.9%. 1000 milliLiter(s) (150 mL/Hr) IV Continuous <Continuous>  trimethoprim   80 mG/sulfamethoxazole 400 mG 1 Tablet(s) Oral daily  valGANciclovir 450 milliGRAM(s) Oral <User Schedule>    MEDICATIONS  (PRN):  acetaminophen     Tablet .. 650 milliGRAM(s) Oral every 6 hours PRN Temp greater or equal to 38C (100.4F), Mild Pain (1 - 3)  dextrose Oral Gel 15 Gram(s) Oral once PRN Blood Glucose LESS THAN 70 milliGRAM(s)/deciliter      PAST MEDICAL & SURGICAL HISTORY:  HTN (hypertension)      Coronary artery disease      CAP (community acquired pneumonia)  6/18      Diabetes mellitus  type 2      GERD (gastroesophageal reflux disease)      Stented coronary artery  2014, 3 stents, [facility]      ESRD (end stage renal disease)  on Dialysis( M/W/F), By Dr. Huff      Hypercholesterolemia      Cerebrovascular accident (CVA), unspecified mechanism  diagnosed via CT of the head      Anemia due to stage 5 chronic kidney disease, not on chronic dialysis      H/O coronary angiogram  2014 - x3 stents      AV fistula  10/12/18 L radiocephalic AV fistula      H/O eye surgery          Vital Signs Last 24 Hrs  T(C): 36.6 (27 Sep 2023 09:00), Max: 36.7 (27 Sep 2023 05:07)  T(F): 97.9 (27 Sep 2023 09:00), Max: 98 (27 Sep 2023 05:07)  HR: 69 (27 Sep 2023 09:00) (61 - 69)  BP: 151/78 (27 Sep 2023 09:00) (125/67 - 163/79)  BP(mean): 79 (26 Sep 2023 19:58) (79 - 79)  RR: 18 (27 Sep 2023 09:00) (14 - 18)  SpO2: 100% (27 Sep 2023 09:00) (100% - 100%)    Parameters below as of 27 Sep 2023 05:07  Patient On (Oxygen Delivery Method): room air        I&O's Summary    26 Sep 2023 07:01  -  27 Sep 2023 07:00  --------------------------------------------------------  IN: 1690 mL / OUT: 485 mL / NET: 1205 mL                              8.1    11.62 )-----------( 182      ( 27 Sep 2023 06:06 )             25.5     09-27    133<L>  |  105  |  55<H>  ----------------------------<  267<H>  4.9   |  17<L>  |  2.10<H>    Ca    9.0      27 Sep 2023 06:06  Phos  2.3     09-27  Mg     2.5     09-27    TPro  6.7  /  Alb  3.4  /  TBili  1.2  /  DBili  x   /  AST  12  /  ALT  16  /  AlkPhos  60  09-27    Tacrolimus (), Serum: 19.9 ng/mL (09-27 @ 06:06)        Culture - Urine (collected 09-21-23 @ 13:08)  Source: Clean Catch Clean Catch (Midstream)  Final Report (09-22-23 @ 13:42):    No growth        Review of systems  Gen: No weight changes, fatigue, fevers/chills, weakness  Skin: No rashes  Head/Eyes/Ears/Mouth: No headache; Normal hearing; Normal vision w/o blurriness; No sinus pain/discomfort, sore throat  Respiratory: No dyspnea, cough, wheezing, hemoptysis  CV: No chest pain, PND, orthopnea  GI: denies abdominal pain, denies diarrhea, constipation, nausea, vomiting, melena, hematochezia  : No increased frequency, + dysuria, + hematuria, denies nocturia  MSK: No joint pain/swelling; no back pain; no edema  Neuro: No dizziness/lightheadedness, weakness, seizures, numbness, tingling  Heme: No easy bruising or bleeding  Endo: No heat/cold intolerance  Psych: No significant nervousness, anxiety, stress, depression  All other systems were reviewed and are negative, except as noted.      PHYSICAL EXAM:  Constitutional: Well developed / well nourished  Eyes: Anicteric, PERRLA  ENMT: nc/at  Neck: supple  Respiratory: CTA B/L  Cardiovascular: RRR  Gastrointestinal: Soft, ND, mild tenderness at the incision site; incision c/d/i; JPss ss  Genitourinary: voiding, mild hematuria  Extremities: SCD's in place and working bilaterally, AVF  palpable, no edema  Vascular: Palpable dp pulses bilaterally  Neurological: A&O x3  Skin: no rashes, ulcerations or lesions;  Musculoskeletal: Moving all extremities  Psychiatric: Responsive

## 2023-09-27 NOTE — CONSULT NOTE ADULT - SUBJECTIVE AND OBJECTIVE BOX
[facility] DIVISION OF KIDNEY DISEASES AND HYPERTENSION -- INITIAL CONSULT NOTE  --------------------------------------------------------------------------------  HPI:  67 y/o M with past medical hx of GERD, DM, HTN, HLD, CAD s/p 3 stents (last in 2022), CVA, cholecystectomy, ESRD on HD since 2019 via LUE AVF now s/p DDRT 9/16/23 with Simulect induction with DGF and was discharged on 9/22/23 presents to ED with hyperglycemia that was noticed at home, dysuria and hematuria.    He denies decreased output, abdominal pain, nausea/vomiting, sob, chest pain, fevers/chills, diarrhea/constipation, dark/bloody stool, lightheadedness, syncope, fatigue. His preperitoneal ALCON fills 3x per  day since discharge and has been serosanguinous. He states he has been compliant with his repaglinide and tradjenta, as well as his envarsus 3mg, MMF 1g BID, prednisone taper, nystatin, bactrim, and valcyte.   Upon arrival to the ED pt was found to have hyperglycemia and hyponatremia.      PAST HISTORY  --------------------------------------------------------------------------------  PAST MEDICAL & SURGICAL HISTORY:  HTN (hypertension)      Coronary artery disease      CAP (community acquired pneumonia)  6/18      Diabetes mellitus  type 2      GERD (gastroesophageal reflux disease)      Stented coronary artery  2014, 3 stents, [facility]      ESRD (end stage renal disease)  on Dialysis( M/W/F), By Dr. Huff      Hypercholesterolemia      Cerebrovascular accident (CVA), unspecified mechanism  diagnosed via CT of the head      Anemia due to stage 5 chronic kidney disease, not on chronic dialysis      H/O coronary angiogram  2014 - x3 stents      AV fistula  10/12/18 L radiocephalic AV fistula      H/O eye surgery        FAMILY HISTORY:    Social History:        ALLERGIES & MEDICATIONS  --------------------------------------------------------------------------------  Allergies    No Known Allergies    Intolerances      Standing Inpatient Medications  aspirin enteric coated 81 milliGRAM(s) Oral daily  atorvastatin 40 milliGRAM(s) Oral at bedtime  carvedilol 25 milliGRAM(s) Oral every 12 hours  cefTRIAXone   IVPB      dextrose 5%. 1000 milliLiter(s) IV Continuous <Continuous>  dextrose 5%. 1000 milliLiter(s) IV Continuous <Continuous>  dextrose 50% Injectable 25 Gram(s) IV Push once  dextrose 50% Injectable 25 Gram(s) IV Push once  dextrose 50% Injectable 12.5 Gram(s) IV Push once  glucagon  Injectable 1 milliGRAM(s) IntraMuscular once  heparin   Injectable 5000 Unit(s) SubCutaneous every 12 hours  insulin glargine Injectable (LANTUS) 12 Unit(s) SubCutaneous at bedtime  insulin lispro (ADMELOG) corrective regimen sliding scale   SubCutaneous three times a day before meals  mycophenolate mofetil 1000 milliGRAM(s) Oral two times a day  NIFEdipine XL 30 milliGRAM(s) Oral two times a day  nystatin    Suspension 838702 Unit(s) Oral four times a day  pantoprazole    Tablet 40 milliGRAM(s) Oral every 12 hours  piperacillin/tazobactam IVPB.. 3.375 Gram(s) IV Intermittent every 8 hours  predniSONE   Tablet 5 milliGRAM(s) Oral daily  sodium chloride 0.9%. 1000 milliLiter(s) IV Continuous <Continuous>  trimethoprim   80 mG/sulfamethoxazole 400 mG 1 Tablet(s) Oral daily  valGANciclovir 450 milliGRAM(s) Oral <User Schedule>    PRN Inpatient Medications  acetaminophen     Tablet .. 650 milliGRAM(s) Oral every 6 hours PRN  dextrose Oral Gel 15 Gram(s) Oral once PRN      REVIEW OF SYSTEMS  --------------------------------------------------------------------------------  Gen: No weight changes, fatigue, fevers/chills, weakness  Skin: No rashes  Head/Eyes/Ears/Mouth: No headache; Normal hearing; Normal vision w/o blurriness; No sinus pain/discomfort, sore throat  Respiratory: No dyspnea, cough, wheezing, hemoptysis  CV: No chest pain, PND, orthopnea  GI: No abdominal pain, diarrhea, constipation, nausea, vomiting, melena, hematochezia  : No increased frequency, nocturia. But dysuria, hematuria.  MSK: No joint pain/swelling; no back pain; no edema  Neuro: No dizziness/lightheadedness, weakness, seizures, numbness, tingling  Heme: No easy bruising or bleeding  Endo: No heat/cold intolerance  Psych: No significant nervousness, anxiety, stress, depression        VITALS/PHYSICAL EXAM  --------------------------------------------------------------------------------  T(C): 36.6 (09-27-23 @ 09:00), Max: 36.7 (09-27-23 @ 05:07)  HR: 69 (09-27-23 @ 09:00) (61 - 69)  BP: 151/78 (09-27-23 @ 09:00) (125/67 - 163/79)  RR: 18 (09-27-23 @ 09:00) (14 - 18)  SpO2: 100% (09-27-23 @ 09:00) (100% - 100%)  Wt(kg): --  Height (cm): 167.6 (09-26-23 @ 23:15)  Weight (kg): 63 (09-26-23 @ 23:15)  BMI (kg/m2): 22.4 (09-26-23 @ 23:15)  BSA (m2): 1.71 (09-26-23 @ 23:15)      09-26-23 @ 07:01  -  09-27-23 @ 07:00  --------------------------------------------------------  IN: 1690 mL / OUT: 485 mL / NET: 1205 mL        Physical Exam:  Gen: Not in distress  HEENT:Supple neck, No JVD  Pulm: CTA B/L  CV:S1S2+   Abd: Non- tender, no distention, Bowel sounds +  Transplant: non tender, no bruit, ALCON drain +  : No suprapubic tenderness  Extremities: No edema.  Skin: warm  Neuro: AAOx 3 No apparent FNDs      LABS/STUDIES  --------------------------------------------------------------------------------              8.1    11.62 >-----------<  182      [09-27-23 @ 06:06]              25.5     133  |  105  |  55  ----------------------------<  267      [09-27-23 @ 06:06]  4.9   |  17  |  2.10        Ca     9.0     [09-27-23 @ 06:06]      Mg     2.5     [09-27-23 @ 06:06]      Phos  2.3     [09-27-23 @ 06:06]    TPro  6.7  /  Alb  3.4  /  TBili  1.2  /  DBili  x   /  AST  12  /  ALT  16  /  AlkPhos  60  [09-27-23 @ 06:06]    PT/INR: PT 9.8  , INR 0.93       [09-27-23 @ 06:06]  PTT: 25.2       [09-27-23 @ 06:06]      Creatinine Trend:  SCr 2.10 [09-27 @ 06:06]  SCr 2.22 [09-26 @ 18:59]  SCr 3.19 [09-22 @ 07:02]  SCr 3.68 [09-21 @ 06:10]  SCr 4.04 [09-20 @ 05:53]    Urinalysis - [09-27-23 @ 06:06]      Color  / Appearance  / SG  / pH       Gluc 267 / Ketone   / Bili  / Urobili        Blood  / Protein  / Leuk Est  / Nitrite       RBC  / WBC  / Hyaline  / Gran  / Sq Epi  / Non Sq Epi  / Bacteria       HbA1c 7.8      [12-17-19 @ 05:54]    HBsAb 6.6      [09-16-23 @ 04:41]  HBsAb Nonreactive      [01-10-19 @ 17:00]  HBsAg Nonreact      [09-16-23 @ 04:41]  HBcAb Nonreact      [09-16-23 @ 04:41]  HCV 0.10, Nonreact      [09-16-23 @ 04:41]  HIV Nonreact      [09-16-23 @ 04:41]      TacrolimusTacrolimus (), Serum: 19.9 ng/mL (09-27 @ 06:06)    Cyclosporine  Sirolimus  Mycophenolate  BK PCR  CMV PCR  Parvo PCR  EBV PCR

## 2023-09-27 NOTE — CONSULT NOTE ADULT - PROBLEM SELECTOR RECOMMENDATION 9
Pt with decreased eGFR, monitor for insulin stacking   Continue basal insulin nightly   Decreased sliding scale to low scale coverage  Will continue monitoring FS, log, and glucose trends, will Follow up.  Patient counseled for compliance with consistent low carb diet and exercise as tolerated outpatient.  Insulin teaching, would rec home insulin, basal + tradjenta. Pt with decreased eGFR, monitor for insulin stacking   Continue basal insulin nightly   Decreased sliding scale to low scale coverage  Will continue monitoring FS, log, and glucose trends, will Follow up.  Patient counseled for compliance with consistent low carb diet and exercise as tolerated outpatient.  Insulin teaching, knows insulin administration, would rec home insulin, basal + tradjenta.    DC recs:  Pt was on Basaglar 10 units nightly prior to surgery, knows insulin administration  Recent DDRT, decreased eGFR, He will benefit from Basal insulin + Tradjenta 5mg daily, stop Prandin  Likely discharge on 10-12 units Basaglar nightly + Tradjenta  Outpatient follow up Pt with decreased eGFR, monitor for insulin stacking   Continue current basal insulin nightly   Decreased sliding scale to low scale coverage  Will continue monitoring FS, log, and glucose trends, will Follow up.  he is on home prednisone, he knows insulin administration, would rec home insulin, basal + tradjenta.    DC recs:  Pt was on Basaglar 10 units nightly prior to surgery, knows insulin administration  Recent DDRT, decreased eGFR, He will benefit from Basal insulin + Tradjenta 5mg daily, stop Prandin  Likely discharge on 10-12 units Basaglar nightly + Tradjenta - will follow up insulin requirements/FS log   Outpatient follow up

## 2023-09-27 NOTE — CONSULT NOTE ADULT - ASSESSMENT
68M with pmh of prior smoking, GERD, HTN, HLD, DM2, CAD s/p 3 stents , CVA, cholecystectomy, ESRD on HD since 2019 via LUE AVF now s/p DDRT, now with hyperglycemias.   Assessment  DMT2: 68y Male with DM T2 with hyperglycemia, A1C 7.7% , was on oral meds at home, Prandin and Tradjenta, also on home prednisone, came in with hyperglycemias.   He was discharged last week with Tradjenta and Prandin, he was having elevated blood sugars las admission, likely steroid induced.   CAD: on medications, stable, monitored. Hx of stents   HTN: on antihypertensive medications, monitored, asymptomatic.  Renal Tx: Monitor labs/BMP, renal transplant DDRT           Discussed plan and management wit Dr Jen Li MD  Cell: [phone]  Office: [phone]               68M with pmh of prior smoking, GERD, HTN, HLD, DM2, CAD s/p 3 stents , CVA, cholecystectomy, ESRD on HD since 2019 via LUE AVF now s/p DDRT, now with hyperglycemias.   Assessment  DMT2: 68y Male with DM T2 with hyperglycemia, A1C 7.7% , was on oral meds at home, Prandin and Tradjenta, also on home prednisone, came in with hyperglycemias.   He was discharged last week with Tradjenta and Prandin, he was having elevated blood sugars last admission postop, was having stress and steroid induced hyperglycemias.   CAD: on medications, stable, monitored. Hx of stents   HTN: on antihypertensive medications, monitored, asymptomatic.  Renal Tx: Monitor labs/BMP, renal transplant DDRT           Discussed plan and management wit Dr Jen Li MD  Cell: [phone]  Office: [phone]               68M with pmh of prior smoking, GERD, HTN, HLD, DM2, CAD s/p 3 stents , CVA, cholecystectomy, ESRD on HD since 2019 via LUE AVF now s/p DDRT, now with hyperglycemias.   Assessment  DMT2: 68y Male with DM T2 with hyperglycemia, A1C 7.7% , was on oral meds at home, Prandin and Tradjenta, also on home prednisone, came in with hyperglycemias.   He was discharged last week with Tradjenta and Prandin, he was having elevated blood sugars last admission postop, was having stress and steroid induced hyperglycemias.   CAD: on medications, stable, monitored. Hx of stents   HTN: on antihypertensive medications, monitored, asymptomatic.  Renal Tx: Monitor labs/BMP, renal transplant DDRT           Discussed plan and management wit Dr Jen Tavarez NP-TEAMS  Rustam Li MD  Cell: [phone]  Office: [phone]

## 2023-09-28 ENCOUNTER — TRANSCRIPTION ENCOUNTER (OUTPATIENT)
Age: 68
End: 2023-09-28

## 2023-09-28 VITALS
SYSTOLIC BLOOD PRESSURE: 167 MMHG | HEART RATE: 68 BPM | RESPIRATION RATE: 20 BRPM | DIASTOLIC BLOOD PRESSURE: 73 MMHG | TEMPERATURE: 99 F | OXYGEN SATURATION: 100 %

## 2023-09-28 LAB
ALBUMIN SERPL ELPH-MCNC: 3.6 G/DL — SIGNIFICANT CHANGE UP (ref 3.3–5)
ALP SERPL-CCNC: 58 U/L — SIGNIFICANT CHANGE UP (ref 40–120)
ALT FLD-CCNC: 15 U/L — SIGNIFICANT CHANGE UP (ref 10–45)
ANION GAP SERPL CALC-SCNC: 11 MMOL/L — SIGNIFICANT CHANGE UP (ref 5–17)
AST SERPL-CCNC: 14 U/L — SIGNIFICANT CHANGE UP (ref 10–40)
BASOPHILS # BLD AUTO: 0.08 K/UL — SIGNIFICANT CHANGE UP (ref 0–0.2)
BASOPHILS NFR BLD AUTO: 0.8 % — SIGNIFICANT CHANGE UP (ref 0–2)
BILIRUB SERPL-MCNC: 0.9 MG/DL — SIGNIFICANT CHANGE UP (ref 0.2–1.2)
BUN SERPL-MCNC: 35 MG/DL — HIGH (ref 7–23)
CALCIUM SERPL-MCNC: 9 MG/DL — SIGNIFICANT CHANGE UP (ref 8.4–10.5)
CHLORIDE SERPL-SCNC: 110 MMOL/L — HIGH (ref 96–108)
CO2 SERPL-SCNC: 15 MMOL/L — LOW (ref 22–31)
CREAT SERPL-MCNC: 1.79 MG/DL — HIGH (ref 0.5–1.3)
CULTURE RESULTS: SIGNIFICANT CHANGE UP
EGFR: 41 ML/MIN/1.73M2 — LOW
EOSINOPHIL # BLD AUTO: 0.3 K/UL — SIGNIFICANT CHANGE UP (ref 0–0.5)
EOSINOPHIL NFR BLD AUTO: 2.9 % — SIGNIFICANT CHANGE UP (ref 0–6)
GLUCOSE BLDC GLUCOMTR-MCNC: 129 MG/DL — HIGH (ref 70–99)
GLUCOSE BLDC GLUCOMTR-MCNC: 240 MG/DL — HIGH (ref 70–99)
GLUCOSE SERPL-MCNC: 165 MG/DL — HIGH (ref 70–99)
HCT VFR BLD CALC: 28.2 % — LOW (ref 39–50)
HGB BLD-MCNC: 8.8 G/DL — LOW (ref 13–17)
IMM GRANULOCYTES NFR BLD AUTO: 0.8 % — SIGNIFICANT CHANGE UP (ref 0–0.9)
LYMPHOCYTES # BLD AUTO: 1.7 K/UL — SIGNIFICANT CHANGE UP (ref 1–3.3)
LYMPHOCYTES # BLD AUTO: 16.3 % — SIGNIFICANT CHANGE UP (ref 13–44)
MAGNESIUM SERPL-MCNC: 2 MG/DL — SIGNIFICANT CHANGE UP (ref 1.6–2.6)
MCHC RBC-ENTMCNC: 31.2 GM/DL — LOW (ref 32–36)
MCHC RBC-ENTMCNC: 31.3 PG — SIGNIFICANT CHANGE UP (ref 27–34)
MCV RBC AUTO: 100.4 FL — HIGH (ref 80–100)
MONOCYTES # BLD AUTO: 0.71 K/UL — SIGNIFICANT CHANGE UP (ref 0–0.9)
MONOCYTES NFR BLD AUTO: 6.8 % — SIGNIFICANT CHANGE UP (ref 2–14)
NEUTROPHILS # BLD AUTO: 7.55 K/UL — HIGH (ref 1.8–7.4)
NEUTROPHILS NFR BLD AUTO: 72.4 % — SIGNIFICANT CHANGE UP (ref 43–77)
NRBC # BLD: 0 /100 WBCS — SIGNIFICANT CHANGE UP (ref 0–0)
PHOSPHATE SERPL-MCNC: 2.2 MG/DL — LOW (ref 2.5–4.5)
PLATELET # BLD AUTO: 203 K/UL — SIGNIFICANT CHANGE UP (ref 150–400)
POTASSIUM SERPL-MCNC: 4.8 MMOL/L — SIGNIFICANT CHANGE UP (ref 3.5–5.3)
POTASSIUM SERPL-SCNC: 4.8 MMOL/L — SIGNIFICANT CHANGE UP (ref 3.5–5.3)
PROT SERPL-MCNC: 6.9 G/DL — SIGNIFICANT CHANGE UP (ref 6–8.3)
RBC # BLD: 2.81 M/UL — LOW (ref 4.2–5.8)
RBC # FLD: 17.2 % — HIGH (ref 10.3–14.5)
SODIUM SERPL-SCNC: 136 MMOL/L — SIGNIFICANT CHANGE UP (ref 135–145)
SPECIMEN SOURCE: SIGNIFICANT CHANGE UP
TACROLIMUS SERPL-MCNC: 14.6 NG/ML — SIGNIFICANT CHANGE UP
WBC # BLD: 10.42 K/UL — SIGNIFICANT CHANGE UP (ref 3.8–10.5)
WBC # FLD AUTO: 10.42 K/UL — SIGNIFICANT CHANGE UP (ref 3.8–10.5)

## 2023-09-28 PROCEDURE — 99233 SBSQ HOSP IP/OBS HIGH 50: CPT | Mod: GC

## 2023-09-28 PROCEDURE — 99232 SBSQ HOSP IP/OBS MODERATE 35: CPT | Mod: 24

## 2023-09-28 RX ORDER — ACETAMINOPHEN 500 MG
1000 TABLET ORAL ONCE
Refills: 0 | Status: COMPLETED | OUTPATIENT
Start: 2023-09-28 | End: 2023-09-28

## 2023-09-28 RX ORDER — ATORVASTATIN CALCIUM 80 MG/1
1 TABLET, FILM COATED ORAL
Qty: 0 | Refills: 0 | DISCHARGE
Start: 2023-09-28

## 2023-09-28 RX ORDER — PANTOPRAZOLE SODIUM 20 MG/1
1 TABLET, DELAYED RELEASE ORAL
Qty: 0 | Refills: 0 | DISCHARGE
Start: 2023-09-28

## 2023-09-28 RX ORDER — VALGANCICLOVIR 450 MG/1
450 TABLET, FILM COATED ORAL DAILY
Refills: 0 | Status: DISCONTINUED | OUTPATIENT
Start: 2023-09-28 | End: 2023-09-28

## 2023-09-28 RX ORDER — NYSTATIN 500MM UNIT
5 POWDER (EA) MISCELLANEOUS
Qty: 0 | Refills: 0 | DISCHARGE
Start: 2023-09-28

## 2023-09-28 RX ORDER — ACETAMINOPHEN 500 MG
2 TABLET ORAL
Qty: 0 | Refills: 0 | DISCHARGE
Start: 2023-09-28

## 2023-09-28 RX ORDER — NIFEDIPINE 30 MG
1 TABLET, EXTENDED RELEASE 24 HR ORAL
Qty: 0 | Refills: 0 | DISCHARGE
Start: 2023-09-28

## 2023-09-28 RX ORDER — LINAGLIPTIN 5 MG/1
1 TABLET, FILM COATED ORAL
Qty: 90 | Refills: 0
Start: 2023-09-28 | End: 2023-12-26

## 2023-09-28 RX ORDER — TACROLIMUS 5 MG/1
1 CAPSULE ORAL ONCE
Refills: 0 | Status: COMPLETED | OUTPATIENT
Start: 2023-09-28 | End: 2023-09-28

## 2023-09-28 RX ORDER — SODIUM BICARBONATE 1 MEQ/ML
1300 SYRINGE (ML) INTRAVENOUS
Refills: 0 | Status: DISCONTINUED | OUTPATIENT
Start: 2023-09-28 | End: 2023-09-28

## 2023-09-28 RX ORDER — INSULIN GLARGINE 100 [IU]/ML
12 INJECTION, SOLUTION SUBCUTANEOUS
Qty: 3 | Refills: 0
Start: 2023-09-28 | End: 2023-12-26

## 2023-09-28 RX ORDER — ISOPROPYL ALCOHOL, BENZOCAINE .7; .06 ML/ML; ML/ML
0 SWAB TOPICAL
Qty: 100 | Refills: 1
Start: 2023-09-28

## 2023-09-28 RX ORDER — CEFPODOXIME PROXETIL 100 MG
1 TABLET ORAL
Qty: 6 | Refills: 0
Start: 2023-09-28 | End: 2023-09-30

## 2023-09-28 RX ORDER — VALGANCICLOVIR 450 MG/1
1 TABLET, FILM COATED ORAL
Qty: 0 | Refills: 0 | DISCHARGE
Start: 2023-09-28

## 2023-09-28 RX ORDER — CARVEDILOL PHOSPHATE 80 MG/1
1 CAPSULE, EXTENDED RELEASE ORAL
Qty: 0 | Refills: 0 | DISCHARGE
Start: 2023-09-28

## 2023-09-28 RX ORDER — SODIUM BICARBONATE 1 MEQ/ML
2 SYRINGE (ML) INTRAVENOUS
Qty: 120 | Refills: 0
Start: 2023-09-28 | End: 2023-10-27

## 2023-09-28 RX ORDER — ASPIRIN/CALCIUM CARB/MAGNESIUM 324 MG
1 TABLET ORAL
Qty: 0 | Refills: 0 | DISCHARGE
Start: 2023-09-28

## 2023-09-28 RX ADMIN — Medication 500000 UNIT(S): at 06:09

## 2023-09-28 RX ADMIN — Medication 81 MILLIGRAM(S): at 11:42

## 2023-09-28 RX ADMIN — CARVEDILOL PHOSPHATE 25 MILLIGRAM(S): 80 CAPSULE, EXTENDED RELEASE ORAL at 06:08

## 2023-09-28 RX ADMIN — Medication 1 TABLET(S): at 11:42

## 2023-09-28 RX ADMIN — VALGANCICLOVIR 450 MILLIGRAM(S): 450 TABLET, FILM COATED ORAL at 11:47

## 2023-09-28 RX ADMIN — SODIUM CHLORIDE 150 MILLILITER(S): 9 INJECTION INTRAMUSCULAR; INTRAVENOUS; SUBCUTANEOUS at 08:18

## 2023-09-28 RX ADMIN — Medication 400 MILLIGRAM(S): at 01:07

## 2023-09-28 RX ADMIN — MYCOPHENOLATE MOFETIL 1000 MILLIGRAM(S): 250 CAPSULE ORAL at 08:05

## 2023-09-28 RX ADMIN — SODIUM CHLORIDE 150 MILLILITER(S): 9 INJECTION INTRAMUSCULAR; INTRAVENOUS; SUBCUTANEOUS at 01:07

## 2023-09-28 RX ADMIN — HEPARIN SODIUM 5000 UNIT(S): 5000 INJECTION INTRAVENOUS; SUBCUTANEOUS at 06:09

## 2023-09-28 RX ADMIN — Medication 30 MILLIGRAM(S): at 06:09

## 2023-09-28 RX ADMIN — Medication 2: at 13:30

## 2023-09-28 RX ADMIN — Medication 500000 UNIT(S): at 01:07

## 2023-09-28 RX ADMIN — Medication 500000 UNIT(S): at 11:41

## 2023-09-28 RX ADMIN — PANTOPRAZOLE SODIUM 40 MILLIGRAM(S): 20 TABLET, DELAYED RELEASE ORAL at 06:08

## 2023-09-28 RX ADMIN — Medication 5 MILLIGRAM(S): at 06:08

## 2023-09-28 RX ADMIN — Medication 1000 MILLIGRAM(S): at 01:40

## 2023-09-28 RX ADMIN — TACROLIMUS 1 MILLIGRAM(S): 5 CAPSULE ORAL at 13:38

## 2023-09-28 RX ADMIN — CEFTRIAXONE 100 MILLIGRAM(S): 500 INJECTION, POWDER, FOR SOLUTION INTRAMUSCULAR; INTRAVENOUS at 09:18

## 2023-09-28 NOTE — PROGRESS NOTE ADULT - SUBJECTIVE AND OBJECTIVE BOX
[facility]   MD JORDAN AVALOS MD RUORU WONG, PA    TEL:  FROM 9 AM to 5 PM ---OFFICE: [phone]    FROM 5 PM - 9 AM PLEASE CALL ANSWERING SERVICE: [phone]    RENAL FOLLOW UP NOTE--Date of Service 09-28-23 @ 09:39  --------------------------------------------------------------------------------  HPI:      Pt seen and examined at bedside.   Sherrill SOB, chest pain     PAST HISTORY  --------------------------------------------------------------------------------  No significant changes to PMH, PSH, FHx, SHx, unless otherwise noted    ALLERGIES & MEDICATIONS  --------------------------------------------------------------------------------  Allergies    No Known Allergies    Intolerances      Standing Inpatient Medications  aspirin enteric coated 81 milliGRAM(s) Oral daily  atorvastatin 40 milliGRAM(s) Oral at bedtime  carvedilol 25 milliGRAM(s) Oral every 12 hours  cefTRIAXone   IVPB 1000 milliGRAM(s) IV Intermittent every 24 hours  cefTRIAXone   IVPB      dextrose 5%. 1000 milliLiter(s) IV Continuous <Continuous>  dextrose 5%. 1000 milliLiter(s) IV Continuous <Continuous>  dextrose 50% Injectable 25 Gram(s) IV Push once  dextrose 50% Injectable 12.5 Gram(s) IV Push once  dextrose 50% Injectable 25 Gram(s) IV Push once  glucagon  Injectable 1 milliGRAM(s) IntraMuscular once  heparin   Injectable 5000 Unit(s) SubCutaneous every 12 hours  insulin glargine Injectable (LANTUS) 12 Unit(s) SubCutaneous at bedtime  insulin lispro (ADMELOG) corrective regimen sliding scale   SubCutaneous at bedtime  insulin lispro (ADMELOG) corrective regimen sliding scale   SubCutaneous three times a day before meals  mycophenolate mofetil 1000 milliGRAM(s) Oral two times a day  NIFEdipine XL 30 milliGRAM(s) Oral two times a day  nystatin    Suspension 807288 Unit(s) Oral four times a day  pantoprazole    Tablet 40 milliGRAM(s) Oral every 12 hours  predniSONE   Tablet 5 milliGRAM(s) Oral daily  sodium chloride 0.9%. 1000 milliLiter(s) IV Continuous <Continuous>  trimethoprim   80 mG/sulfamethoxazole 400 mG 1 Tablet(s) Oral daily  valGANciclovir 450 milliGRAM(s) Oral <User Schedule>    PRN Inpatient Medications  acetaminophen     Tablet .. 650 milliGRAM(s) Oral every 6 hours PRN  dextrose Oral Gel 15 Gram(s) Oral once PRN      REVIEW OF SYSTEMS  --------------------------------------------------------------------------------  General: no fever  CVS: no chest pain  RESP: no sob, no cough    MSK: no edema     VITALS/PHYSICAL EXAM  --------------------------------------------------------------------------------  T(C): 36.7 (09-28-23 @ 09:19), Max: 36.8 (09-28-23 @ 01:00)  HR: 60 (09-28-23 @ 09:19) (60 - 72)  BP: 143/68 (09-28-23 @ 09:19) (131/67 - 167/72)  RR: 20 (09-28-23 @ 09:19) (18 - 20)  SpO2: 100% (09-28-23 @ 09:19) (98% - 100%)  Wt(kg): --  Height (cm): 167.6 (09-26-23 @ 23:15)  Weight (kg): 63 (09-26-23 @ 23:15)  BMI (kg/m2): 22.4 (09-26-23 @ 23:15)  BSA (m2): 1.71 (09-26-23 @ 23:15)      09-27-23 @ 07:01  -  09-28-23 @ 07:00  --------------------------------------------------------  IN: 5080 mL / OUT: 2120 mL / NET: 2960 mL    09-28-23 @ 07:01  -  09-28-23 @ 09:39  --------------------------------------------------------  IN: 400 mL / OUT: 320 mL / NET: 80 mL      Physical Exam:  	Gen: NAD  	HEENT: MMM  	Pulm: CTA B/L  	CV: S1S2  	Abd: Soft, +BS  	Ext: No LE edema B/L                      Neuro: Awake   	Skin: Warm and Dry   	Vascular access: NO HD catheter             no lizet  LABS/STUDIES  --------------------------------------------------------------------------------              8.8    10.42 >-----------<  203      [09-28-23 @ 06:29]              28.2     136  |  110  |  35  ----------------------------<  165      [09-28-23 @ 06:28]  4.8   |  15  |  1.79        Ca     9.0     [09-28-23 @ 06:28]      Mg     2.0     [09-28-23 @ 06:28]      Phos  2.2     [09-28-23 @ 06:28]    TPro  6.9  /  Alb  3.6  /  TBili  0.9  /  DBili  x   /  AST  14  /  ALT  15  /  AlkPhos  58  [09-28-23 @ 06:28]    PT/INR: PT 9.8  , INR 0.93       [09-27-23 @ 06:06]  PTT: 25.2       [09-27-23 @ 06:06]      Creatinine Trend:  SCr 1.79 [09-28 @ 06:28]  SCr 2.10 [09-27 @ 06:06]  SCr 2.22 [09-26 @ 18:59]  SCr 3.19 [09-22 @ 07:02]  SCr 3.68 [09-21 @ 06:10]    Urinalysis - [09-28-23 @ 06:28]      Color  / Appearance  / SG  / pH       Gluc 165 / Ketone   / Bili  / Urobili        Blood  / Protein  / Leuk Est  / Nitrite       RBC  / WBC  / Hyaline  / Gran  / Sq Epi  / Non Sq Epi  / Bacteria       HbA1c 7.8      [12-17-19 @ 05:54]    HBsAb 6.6      [09-16-23 @ 04:41]  HBsAg Nonreact      [09-16-23 @ 04:41]  HBcAb Nonreact      [09-16-23 @ 04:41]  HCV 0.10, Nonreact      [09-16-23 @ 04:41]  HIV Nonreact      [09-16-23 @ 04:41]

## 2023-09-28 NOTE — PROGRESS NOTE ADULT - ASSESSMENT
68 yr old man with ESRD due to DM, HTN, CAD, CVA was on HD since 2019. S/p DDRT on 9/16/23. Had slow graft function but did not require dialysis. Graft function has been improving. Admitted with severe hyperglycemia > 500.   Started on insulin, glucose improving  Graft function improving, cr downt o 1.79 which is his red   Has metabolic acidosis   C/o dysyuria, U/A +ve, culture normal arnie     Continue lantus and sliding scale, resume tradjenta . Pt comfortable administering insulin.   IS: Envarsus trough goal 8-10, MMF 1 gram po bid, pred 5mg daily   Infection prophylaxis: nystatin, bactrim, valcyte   Sodium bicarb 1300mg po bid   Cefpodoxime 200mg po bid x 3 days.  D/c home   F/u in clinic next week

## 2023-09-28 NOTE — PROGRESS NOTE ADULT - ASSESSMENT
68M with pmh of prior smoking, GERD, HTN, HLD, DM2, CAD s/p 3 stents , CVA, cholecystectomy, ESRD on HD since 2019 via LUE AVF now s/p DDRT, now with hyperglycemias.   Assessment  DMT2: 68y Male with DM T2 with hyperglycemia, A1C 7.7% , was on oral meds at home, Prandin and Tradjenta, also on home prednisone, came in with hyperglycemias.   He was discharged last week with Tradjenta and Prandin, he was having elevated blood sugars last admission postop, was having stress and steroid induced hyperglycemias.   9/28 GLucose improved on basal insulin, feels better, comfortable with home basal insulin   CAD: on medications, stable, monitored. Hx of stents   HTN: on antihypertensive medications, monitored, asymptomatic.  Renal Tx: Monitor labs/BMP, renal transplant DDRT           Discussed plan and management wit Dr Jen Tavarez NP-TEAMS  Rustam Li MD  Cell: [phone]  Office: [phone]               68M with pmh of prior smoking, GERD, HTN, HLD, DM2, CAD s/p 3 stents , CVA, cholecystectomy, ESRD on HD since 2019 via LUE AVF now s/p DDRT, now with hyperglycemias.   Assessment  DMT2: 68y Male with DM T2 with hyperglycemia, A1C 7.7% , was on oral meds at home, Prandin and Tradjenta, also on home prednisone, came in with hyperglycemias.   He was discharged last week with Tradjenta and Prandin, he was having elevated blood sugars last admission postop, was having stress and  steroid induced hyperglycemias.   9/28 GLucose improved on basal insulin, feels better, comfortable with home basal insulin   CAD: on medications, stable, monitored. Hx of stents   HTN: on antihypertensive medications, monitored, asymptomatic.  Renal Tx: Monitor labs/BMP, renal transplant DDRT           Discussed plan and management wit Dr Jen Tavarez NP-TEAMS  Rustam Li MD  Cell: [phone]  Office: [phone]

## 2023-09-28 NOTE — PROGRESS NOTE ADULT - SUBJECTIVE AND OBJECTIVE BOX
Chief complaint  Patient is a 68y old  Male who presents with a chief complaint of hyperglycemia (28 Sep 2023 12:50)         Labs and Fingersticks  CAPILLARY BLOOD GLUCOSE      POCT Blood Glucose.: 240 mg/dL (28 Sep 2023 13:23)  POCT Blood Glucose.: 129 mg/dL (28 Sep 2023 08:57)  POCT Blood Glucose.: 189 mg/dL (27 Sep 2023 21:11)  POCT Blood Glucose.: 179 mg/dL (27 Sep 2023 16:48)      Anion Gap: 11 (09-28 @ 06:28)  Anion Gap: 11 (09-27 @ 06:06)  Anion Gap: 12 (09-26 @ 18:59)      Calcium: 9.0 (09-28 @ 06:28)  Calcium: 9.0 (09-27 @ 06:06)  Calcium: 9.7 (09-26 @ 18:59)  Albumin: 3.6 (09-28 @ 06:28)  Albumin: 3.4 (09-27 @ 06:06)  Albumin: 4.3 (09-26 @ 18:59)    Alanine Aminotransferase (ALT/SGPT): 15 (09-28 @ 06:28)  Alanine Aminotransferase (ALT/SGPT): 16 (09-27 @ 06:06)  Alanine Aminotransferase (ALT/SGPT): 22 (09-26 @ 18:59)  Alkaline Phosphatase: 58 (09-28 @ 06:28)  Alkaline Phosphatase: 60 (09-27 @ 06:06)  Alkaline Phosphatase: 70 (09-26 @ 18:59)  Aspartate Aminotransferase (AST/SGOT): 14 (09-28 @ 06:28)  Aspartate Aminotransferase (AST/SGOT): 12 (09-27 @ 06:06)  Aspartate Aminotransferase (AST/SGOT): 14 (09-26 @ 18:59)        09-28    136  |  110<H>  |  35<H>  ----------------------------<  165<H>  4.8   |  15<L>  |  1.79<H>    Ca    9.0      28 Sep 2023 06:28  Phos  2.2     09-28  Mg     2.0     09-28    TPro  6.9  /  Alb  3.6  /  TBili  0.9  /  DBili  x   /  AST  14  /  ALT  15  /  AlkPhos  58  09-28                        8.8    10.42 )-----------( 203      ( 28 Sep 2023 06:29 )             28.2     Medications  MEDICATIONS  (STANDING):  aspirin enteric coated 81 milliGRAM(s) Oral daily  atorvastatin 40 milliGRAM(s) Oral at bedtime  carvedilol 25 milliGRAM(s) Oral every 12 hours  cefTRIAXone   IVPB      cefTRIAXone   IVPB 1000 milliGRAM(s) IV Intermittent every 24 hours  dextrose 5%. 1000 milliLiter(s) (50 mL/Hr) IV Continuous <Continuous>  dextrose 5%. 1000 milliLiter(s) (100 mL/Hr) IV Continuous <Continuous>  dextrose 50% Injectable 12.5 Gram(s) IV Push once  dextrose 50% Injectable 25 Gram(s) IV Push once  dextrose 50% Injectable 25 Gram(s) IV Push once  glucagon  Injectable 1 milliGRAM(s) IntraMuscular once  heparin   Injectable 5000 Unit(s) SubCutaneous every 12 hours  insulin glargine Injectable (LANTUS) 12 Unit(s) SubCutaneous at bedtime  insulin lispro (ADMELOG) corrective regimen sliding scale   SubCutaneous three times a day before meals  insulin lispro (ADMELOG) corrective regimen sliding scale   SubCutaneous at bedtime  mycophenolate mofetil 1000 milliGRAM(s) Oral two times a day  NIFEdipine XL 30 milliGRAM(s) Oral two times a day  nystatin    Suspension 040634 Unit(s) Oral four times a day  pantoprazole    Tablet 40 milliGRAM(s) Oral every 12 hours  predniSONE   Tablet 5 milliGRAM(s) Oral daily  sodium bicarbonate 1300 milliGRAM(s) Oral two times a day  sodium chloride 0.9%. 1000 milliLiter(s) (150 mL/Hr) IV Continuous <Continuous>  trimethoprim   80 mG/sulfamethoxazole 400 mG 1 Tablet(s) Oral daily  valGANciclovir 450 milliGRAM(s) Oral daily      Physical Exam  General: Patient comfortable in bed  Vital Signs Last 12 Hrs  T(F): 98 (09-28-23 @ 09:19), Max: 98.3 (09-28-23 @ 05:00)  HR: 60 (09-28-23 @ 09:19) (60 - 63)  BP: 143/68 (09-28-23 @ 09:19) (143/68 - 150/71)  BP(mean): --  RR: 20 (09-28-23 @ 09:19) (18 - 20)  SpO2: 100% (09-28-23 @ 09:19) (98% - 100%)    CVS: S1S2   Respiratory: No wheezing, no crepitations  GI: Abdomen soft, bowel sounds positive  Musculoskeletal:  moves all extremities  : Voiding       Chief complaint  Patient is a 68y old  Male who presents with a chief complaint of hyperglycemia (28 Sep 2023 12:50)         Labs and Fingersticks  CAPILLARY BLOOD GLUCOSE      POCT Blood Glucose.: 240 mg/dL (28 Sep 2023 13:23)  POCT Blood Glucose.: 129 mg/dL (28 Sep 2023 08:57)  POCT Blood Glucose.: 189 mg/dL (27 Sep 2023 21:11)  POCT Blood Glucose.: 179 mg/dL (27 Sep 2023 16:48)      Anion Gap: 11 (09-28 @ 06:28)  Anion Gap: 11 (09-27 @ 06:06)  Anion Gap: 12 (09-26 @ 18:59)      Calcium: 9.0 (09-28 @ 06:28)  Calcium: 9.0 (09-27 @ 06:06)  Calcium: 9.7 (09-26 @ 18:59)  Albumin: 3.6 (09-28 @ 06:28)  Albumin: 3.4 (09-27 @ 06:06)  Albumin: 4.3 (09-26 @ 18:59)    Alanine Aminotransferase (ALT/SGPT): 15 (09-28 @ 06:28)  Alanine Aminotransferase (ALT/SGPT): 16 (09-27 @ 06:06)  Alanine Aminotransferase (ALT/SGPT): 22 (09-26 @ 18:59)  Alkaline Phosphatase: 58 (09-28 @ 06:28)  Alkaline Phosphatase: 60 (09-27 @ 06:06)  Alkaline Phosphatase: 70 (09-26 @ 18:59)  Aspartate Aminotransferase (AST/SGOT): 14 (09-28 @ 06:28)  Aspartate Aminotransferase (AST/SGOT): 12 (09-27 @ 06:06)  Aspartate Aminotransferase (AST/SGOT): 14 (09-26 @ 18:59)        09-28    136  |  110<H>  |  35<H>  ----------------------------<  165<H>  4.8   |  15<L>  |  1.79<H>    Ca    9.0      28 Sep 2023 06:28  Phos  2.2     09-28  Mg     2.0     09-28    TPro  6.9  /  Alb  3.6  /  TBili  0.9  /  DBili  x   /  AST  14  /  ALT  15  /  AlkPhos  58  09-28                        8.8    10.42 )-----------( 203      ( 28 Sep 2023 06:29 )             28.2     Medications  MEDICATIONS  (STANDING):  aspirin enteric coated 81 milliGRAM(s) Oral daily  atorvastatin 40 milliGRAM(s) Oral at bedtime  carvedilol 25 milliGRAM(s) Oral every 12 hours  cefTRIAXone   IVPB      cefTRIAXone   IVPB 1000 milliGRAM(s) IV Intermittent every 24 hours  dextrose 5%. 1000 milliLiter(s) (50 mL/Hr) IV Continuous <Continuous>  dextrose 5%. 1000 milliLiter(s) (100 mL/Hr) IV Continuous <Continuous>  dextrose 50% Injectable 12.5 Gram(s) IV Push once  dextrose 50% Injectable 25 Gram(s) IV Push once  dextrose 50% Injectable 25 Gram(s) IV Push once  glucagon  Injectable 1 milliGRAM(s) IntraMuscular once  heparin   Injectable 5000 Unit(s) SubCutaneous every 12 hours  insulin glargine Injectable (LANTUS) 12 Unit(s) SubCutaneous at bedtime  insulin lispro (ADMELOG) corrective regimen sliding scale   SubCutaneous three times a day before meals  insulin lispro (ADMELOG) corrective regimen sliding scale   SubCutaneous at bedtime  mycophenolate mofetil 1000 milliGRAM(s) Oral two times a day  NIFEdipine XL 30 milliGRAM(s) Oral two times a day  nystatin    Suspension 116807 Unit(s) Oral four times a day  pantoprazole    Tablet 40 milliGRAM(s) Oral every 12 hours  predniSONE   Tablet 5 milliGRAM(s) Oral daily  sodium bicarbonate 1300 milliGRAM(s) Oral two times a day  sodium chloride 0.9%. 1000 milliLiter(s) (150 mL/Hr) IV Continuous <Continuous>  trimethoprim   80 mG/sulfamethoxazole 400 mG 1 Tablet(s) Oral daily  valGANciclovir 450 milliGRAM(s) Oral daily      Physical Exam  General: Patient comfortable in bed  Vital Signs Last 12 Hrs  T(F): 98 (09-28-23 @ 09:19), Max: 98.3 (09-28-23 @ 05:00)  HR: 60 (09-28-23 @ 09:19) (60 - 63)  BP: 143/68 (09-28-23 @ 09:19) (143/68 - 150/71)  BP(mean): --  RR: 20 (09-28-23 @ 09:19) (18 - 20)  SpO2: 100% (09-28-23 @ 09:19) (98% - 100%)    CVS: S1S2   Respiratory: No wheezing, no crepitations  GI: Abdomen soft, bowel sounds positive  Musculoskeletal:  moves all extremities  : Voiding

## 2023-09-28 NOTE — PROGRESS NOTE ADULT - ASSESSMENT
68M with pmh of prior smoking, GERD, HTN, HLD, DM2, CAD s/p 3 stents (last in 2022), CVA, cholecystectomy, ESRD on HD since 2019 via LUE AVF now s/p DDRT 9/16 with Simulect induction (donor/recipient EBV+/CMV+) complicated by slow graft function and discharged on 9/22 presents to the ED with hyperglycemia    DDRT  s/p DDRT on 9/16/2023 at [facility]   Immunosuppression per transplant team  Monitor FK  level   Monitor BMP and UO    HTN  BP fluctuating   Monitor

## 2023-09-28 NOTE — DISCHARGE NOTE PROVIDER - HOSPITAL COURSE
68 Korean speaking M former smoker with past medical history significant for GERD, HTN, HLD, anemia of chronic disease, T2DM, CAD s/p stent x3 (2014 at [place]) & x2 (pLCx 10/28/22, LAD 10/31/22, off of plavix as of 5/1/23), CVA and ESRD (diagnosed Jan 2019) on HD via LUE AVF T/Th/S (Nephrologist Dr uHff) with recent AV fistulogram 7/2023 with cephalic stenosis s/p balloon angioplasty. Past surgical history significant for open cholecystectomy, AVF creation and eye surgery in the past. Anuric.    s/p R DDRT under Simulect on 9/17/23 with good graft function.  Returns for management of uncontrolled hyperglycemia    Seen by endocrinology started on insulin and now cleared for discharge with the following plan:    Steroid induced Hyperglycemia  -FU with Endocrine   -Diabetic/Renal diet    s/p DDRT   - Cr downtrending  - ASA/SQH  - I&Os  - SCDs/spirometry  - Discharge on 3 days of cefpodoxime 200mg BID  - Keep ALCON will be assessed in the clinic     Immunosuppression  -Envarsus  1mg QD,  MMF 1g BID, Pred 5mg po qd  -Valcyte/Nystatin/PPI/Bactrim for PPx    HTN  -Coreg/Nifedipine/Hydralazine, adjust prn    F/U at transplant clinic on Monday 10/2/23 68 Japanese speaking M former smoker with past medical history significant for GERD, HTN, HLD, anemia of chronic disease, T2DM, CAD s/p stent x3 (2014 at [place]) & x2 (pLCx 10/28/22, LAD 10/31/22, off of plavix as of 5/1/23), CVA and ESRD (diagnosed Jan 2019) on HD via LUE AVF T/Th/S (Nephrologist Dr Huff) with recent AV fistulogram 7/2023 with cephalic stenosis s/p balloon angioplasty. Past surgical history significant for open cholecystectomy, AVF creation and eye surgery in the past. Anuric.    s/p R DDRT under Simulect on 9/17/23 with good graft function.  Returns for management of uncontrolled hyperglycemia    Seen by endocrinology started on insulin and now cleared for discharge with the following plan:    Steroid induced Hyperglycemia  -FU with Endocrine   -Diabetic/Renal diet    s/p DDRT   - Discharge on 3 days of cefpodoxime 200mg BID  - Keep ALCON will be assessed in the clinic     Immunosuppression  -Envarsus  1mg QD, MMF decreased to 500mg bid due to diarrhea, Pred 5mg po qd  -Valcyte/Nystatin/PPI/Bactrim for PPx    HTN  -Coreg/Nifedipine/Hydralazine, adjust prn    F/U at transplant clinic on Monday 10/2/23

## 2023-09-28 NOTE — PROGRESS NOTE ADULT - SUBJECTIVE AND OBJECTIVE BOX
Transplant Surgery - Multidisciplinary Progress Note  --------------------------------------------------------------  DDRT 9/16/23     Present:   Patient seen and examined with multidisciplinary team including Transplant Surgeon: Dr. Sharma, TONY Mendoza. Transplant Nephrologist: Dr. Montano, and unit RN during am rounds.  Disciplines not in attendance will be notified of the plan.     68 Maltese speaking M former smoker with past medical history significant for GERD, HTN, HLD, anemia of chronic disease, T2DM, CAD s/p stent x3 (2014 at [place]) & x2 (pLCx 10/28/22, LAD 10/31/22, off of plavix as of 5/1/23), CVA and ESRD (diagnosed Jan 2019) on HD via LUE AVF T/Th/S (Nephrologist Dr Huff) with recent AV fistulogram 7/2023 with cephalic stenosis s/p balloon angioplasty. Past surgical history significant for open cholecystectomy, AVF creation and eye surgery in the past. Anuric.    s/p R DDRT under Simulect on 9/17/23 with good graft function.  Returns for management of uncontrolled hyperglycemia    Interval Events:  Afebrile, VSS  glucose with much improvement on insulin therapy  dysuria/hematuria since post-op continues, on CTX      Immunosuppression:  ENV per level, MMF held, Prednisone 5mg po qd  Induction: Simulect   Ongoing monitoring for signs of rejection    Potential Discharge date: TBD   Education:  Medications  Plan of care:  See Below                      Review of systems  Gen: No weight changes, fatigue, fevers/chills, weakness  Skin: No rashes  Head/Eyes/Ears/Mouth: No headache; Normal hearing; Normal vision w/o blurriness; No sinus pain/discomfort, sore throat  Respiratory: No dyspnea, cough, wheezing, hemoptysis  CV: No chest pain, PND, orthopnea  GI: denies abdominal pain, denies diarrhea, constipation, nausea, vomiting, melena, hematochezia  : No increased frequency, + dysuria, + hematuria, denies nocturia  MSK: No joint pain/swelling; no back pain; no edema  Neuro: No dizziness/lightheadedness, weakness, seizures, numbness, tingling  Heme: No easy bruising or bleeding  Endo: No heat/cold intolerance  Psych: No significant nervousness, anxiety, stress, depression  All other systems were reviewed and are negative, except as noted.      PHYSICAL EXAM:  Constitutional: Well developed / well nourished  Eyes: Anicteric, PERRLA  ENMT: nc/at  Neck: supple  Respiratory: CTA B/L  Cardiovascular: RRR  Gastrointestinal: Soft, ND, mild tenderness at the incision site; incision c/d/i; JPss ss  Genitourinary: voiding, mild hematuria  Extremities: SCD's in place and working bilaterally, AVF  palpable, no edema  Vascular: Palpable dp pulses bilaterally  Neurological: A&O x3  Skin: no rashes, ulcerations or lesions;  Musculoskeletal: Moving all extremities  Psychiatric: Responsive       Transplant Surgery - Multidisciplinary Progress Note  --------------------------------------------------------------  DDRT 9/16/23     Present:   Patient seen and examined with multidisciplinary team including Transplant Surgeon: Dr. Sharma, TONY Mendoza. Transplant Nephrologist: Dr. Montano, and unit RN during am rounds.  Disciplines not in attendance will be notified of the plan.     68 Hungarian speaking M former smoker with past medical history significant for GERD, HTN, HLD, anemia of chronic disease, T2DM, CAD s/p stent x3 (2014 at [place]) & x2 (pLCx 10/28/22, LAD 10/31/22, off of plavix as of 5/1/23), CVA and ESRD (diagnosed Jan 2019) on HD via LUE AVF T/Th/S (Nephrologist Dr Huff) with recent AV fistulogram 7/2023 with cephalic stenosis s/p balloon angioplasty. Past surgical history significant for open cholecystectomy, AVF creation and eye surgery in the past. Anuric.    s/p R DDRT under Simulect on 9/17/23 with good graft function.  Returns for management of uncontrolled hyperglycemia    Interval Events:  MMF resumed. Changed Zosyn to Ceftriaxone pending UCx. Endo consulted. [place] level 19.9, received ENV 2, held for AM pending level    Immunosuppression:  ENV per level, MMF held, Prednisone 5mg po qd  Induction: Simulect   Ongoing monitoring for signs of rejection    Potential Discharge date: TBD   Education:  Medications  Plan of care:  See Below            MEDICATIONS  (STANDING):  aspirin enteric coated 81 milliGRAM(s) Oral daily  atorvastatin 40 milliGRAM(s) Oral at bedtime  carvedilol 25 milliGRAM(s) Oral every 12 hours  cefTRIAXone   IVPB      cefTRIAXone   IVPB 1000 milliGRAM(s) IV Intermittent every 24 hours  dextrose 5%. 1000 milliLiter(s) (50 mL/Hr) IV Continuous <Continuous>  dextrose 5%. 1000 milliLiter(s) (100 mL/Hr) IV Continuous <Continuous>  dextrose 50% Injectable 25 Gram(s) IV Push once  dextrose 50% Injectable 25 Gram(s) IV Push once  dextrose 50% Injectable 12.5 Gram(s) IV Push once  glucagon  Injectable 1 milliGRAM(s) IntraMuscular once  heparin   Injectable 5000 Unit(s) SubCutaneous every 12 hours  insulin glargine Injectable (LANTUS) 12 Unit(s) SubCutaneous at bedtime  insulin lispro (ADMELOG) corrective regimen sliding scale   SubCutaneous at bedtime  insulin lispro (ADMELOG) corrective regimen sliding scale   SubCutaneous three times a day before meals  mycophenolate mofetil 1000 milliGRAM(s) Oral two times a day  NIFEdipine XL 30 milliGRAM(s) Oral two times a day  nystatin    Suspension 967917 Unit(s) Oral four times a day  pantoprazole    Tablet 40 milliGRAM(s) Oral every 12 hours  predniSONE   Tablet 5 milliGRAM(s) Oral daily  sodium bicarbonate 1300 milliGRAM(s) Oral two times a day  sodium chloride 0.9%. 1000 milliLiter(s) (150 mL/Hr) IV Continuous <Continuous>  trimethoprim   80 mG/sulfamethoxazole 400 mG 1 Tablet(s) Oral daily  valGANciclovir 450 milliGRAM(s) Oral daily    MEDICATIONS  (PRN):  acetaminophen     Tablet .. 650 milliGRAM(s) Oral every 6 hours PRN Temp greater or equal to 38C (100.4F), Mild Pain (1 - 3)  dextrose Oral Gel 15 Gram(s) Oral once PRN Blood Glucose LESS THAN 70 milliGRAM(s)/deciliter      PAST MEDICAL & SURGICAL HISTORY:  HTN (hypertension)      Coronary artery disease      CAP (community acquired pneumonia)  6/18      Diabetes mellitus  type 2      GERD (gastroesophageal reflux disease)      Stented coronary artery  2014, 3 stents, [facility]      ESRD (end stage renal disease)  on Dialysis( M/W/F), By Dr. Huff      Hypercholesterolemia      Cerebrovascular accident (CVA), unspecified mechanism  diagnosed via CT of the head      Anemia due to stage 5 chronic kidney disease, not on chronic dialysis      H/O coronary angiogram  2014 - x3 stents      AV fistula  10/12/18 L radiocephalic AV fistula      H/O eye surgery          Vital Signs Last 24 Hrs  T(C): 36.7 (28 Sep 2023 09:19), Max: 36.8 (28 Sep 2023 01:00)  T(F): 98 (28 Sep 2023 09:19), Max: 98.3 (28 Sep 2023 05:00)  HR: 60 (28 Sep 2023 09:19) (60 - 72)  BP: 143/68 (28 Sep 2023 09:19) (131/67 - 167/72)  BP(mean): --  RR: 20 (28 Sep 2023 09:19) (18 - 20)  SpO2: 100% (28 Sep 2023 09:19) (98% - 100%)    Parameters below as of 28 Sep 2023 09:19  Patient On (Oxygen Delivery Method): room air        I&O's Summary    27 Sep 2023 07:01  -  28 Sep 2023 07:00  --------------------------------------------------------  IN: 5080 mL / OUT: 2120 mL / NET: 2960 mL    28 Sep 2023 07:01  -  28 Sep 2023 12:20  --------------------------------------------------------  IN: 400 mL / OUT: 320 mL / NET: 80 mL                              8.8    10.42 )-----------( 203      ( 28 Sep 2023 06:29 )             28.2     09-28    136  |  110<H>  |  35<H>  ----------------------------<  165<H>  4.8   |  15<L>  |  1.79<H>    Ca    9.0      28 Sep 2023 06:28  Phos  2.2     09-28  Mg     2.0     09-28    TPro  6.9  /  Alb  3.6  /  TBili  0.9  /  DBili  x   /  AST  14  /  ALT  15  /  AlkPhos  58  09-28    Tacrolimus (), Serum: 14.6 ng/mL (09-28 @ 06:30)        Culture - Blood (collected 09-26-23 @ 21:45)  Source: .Blood Blood-Peripheral  Preliminary Report (09-28-23 @ 02:02):    No growth at 24 hours    Culture - Blood (collected 09-26-23 @ 21:30)  Source: .Blood Blood-Peripheral  Preliminary Report (09-28-23 @ 02:02):    No growth at 24 hours    Culture - Urine (collected 09-26-23 @ 19:04)  Source: Clean Catch Clean Catch (Midstream)  Final Report (09-28-23 @ 06:33):    <10,000 CFU/mL Normal Urogenital Jacquelyn    Culture - Urine (collected 09-21-23 @ 13:08)  Source: Clean Catch Clean Catch (Midstream)  Final Report (09-22-23 @ 13:42):    No growth        Review of systems  Gen: No weight changes, fatigue, fevers/chills, weakness  Skin: No rashes  Head/Eyes/Ears/Mouth: No headache; Normal hearing; Normal vision w/o blurriness; No sinus pain/discomfort, sore throat  Respiratory: No dyspnea, cough, wheezing, hemoptysis  CV: No chest pain, PND, orthopnea  GI: denies abdominal pain, denies diarrhea, constipation, nausea, vomiting, melena, hematochezia  : No increased frequency, + dysuria, + hematuria, denies nocturia  MSK: No joint pain/swelling; no back pain; no edema  Neuro: No dizziness/lightheadedness, weakness, seizures, numbness, tingling  Heme: No easy bruising or bleeding  Endo: No heat/cold intolerance  Psych: No significant nervousness, anxiety, stress, depression  All other systems were reviewed and are negative, except as noted.      PHYSICAL EXAM:  Constitutional: Well developed / well nourished  Eyes: Anicteric, PERRLA  ENMT: nc/at  Neck: supple  Respiratory: CTA B/L  Cardiovascular: RRR  Gastrointestinal: Soft, ND, mild tenderness at the incision site; incision c/d/i; JPss ss  Genitourinary: voiding, mild hematuria  Extremities: SCD's in place and working bilaterally, AVF  palpable, no edema  Vascular: Palpable dp pulses bilaterally  Neurological: A&O x3  Skin: no rashes, ulcerations or lesions;  Musculoskeletal: Moving all extremities  Psychiatric: Responsive

## 2023-09-28 NOTE — DISCHARGE NOTE NURSING/CASE MANAGEMENT/SOCIAL WORK - PATIENT PORTAL LINK FT
You can access the FollowMyHealth Patient Portal offered by [facility] by registering at the following website: [email or web address]. By joining [employer]’s FollowMyHealth portal, you will also be able to view your health information using other applications (apps) compatible with our system.

## 2023-09-28 NOTE — PROGRESS NOTE ADULT - SUBJECTIVE AND OBJECTIVE BOX
[facility] DIVISION OF KIDNEY DISEASES AND HYPERTENSION -- FOLLOW UP NOTE  --------------------------------------------------------------------------------  Authored by: Marti Montano   Cell # [phone]     MD RYANN BOLAÑOS was seen and examined at bedside.   Patient is a 68y old  Male who presents with a chief complaint of hyperglycemia (09-28-23)      24 hour events/subjective: Dysuria improving but still present, started insulin blood glucose improved, feels well.       Standing Inpatient Medications  aspirin enteric coated 81 milliGRAM(s) Oral daily  atorvastatin 40 milliGRAM(s) Oral at bedtime  carvedilol 25 milliGRAM(s) Oral every 12 hours  cefTRIAXone   IVPB      cefTRIAXone   IVPB 1000 milliGRAM(s) IV Intermittent every 24 hours  dextrose 5%. 1000 milliLiter(s) IV Continuous <Continuous>  dextrose 5%. 1000 milliLiter(s) IV Continuous <Continuous>  dextrose 50% Injectable 12.5 Gram(s) IV Push once  dextrose 50% Injectable 25 Gram(s) IV Push once  dextrose 50% Injectable 25 Gram(s) IV Push once  glucagon  Injectable 1 milliGRAM(s) IntraMuscular once  heparin   Injectable 5000 Unit(s) SubCutaneous every 12 hours  insulin glargine Injectable (LANTUS) 12 Unit(s) SubCutaneous at bedtime  insulin lispro (ADMELOG) corrective regimen sliding scale   SubCutaneous three times a day before meals  insulin lispro (ADMELOG) corrective regimen sliding scale   SubCutaneous at bedtime  mycophenolate mofetil 1000 milliGRAM(s) Oral two times a day  NIFEdipine XL 30 milliGRAM(s) Oral two times a day  nystatin    Suspension 952112 Unit(s) Oral four times a day  pantoprazole    Tablet 40 milliGRAM(s) Oral every 12 hours  predniSONE   Tablet 5 milliGRAM(s) Oral daily  sodium bicarbonate 1300 milliGRAM(s) Oral two times a day  sodium chloride 0.9%. 1000 milliLiter(s) IV Continuous <Continuous>  trimethoprim   80 mG/sulfamethoxazole 400 mG 1 Tablet(s) Oral daily  valGANciclovir 450 milliGRAM(s) Oral daily    PRN Inpatient Medications  acetaminophen     Tablet .. 650 milliGRAM(s) Oral every 6 hours PRN  dextrose Oral Gel 15 Gram(s) Oral once PRN        VITALS/PHYSICAL EXAM  --------------------------------------------------------------------------------  T(C): 36.7 (09-28-23 @ 09:19), Max: 36.8 (09-28-23 @ 01:00)  HR: 60 (09-28-23 @ 09:19) (60 - 72)  BP: 143/68 (09-28-23 @ 09:19) (131/67 - 167/72)  RR: 20 (09-28-23 @ 09:19) (18 - 20)  SpO2: 100% (09-28-23 @ 09:19) (98% - 100%)  Wt(kg): --  Height (cm): 167.6 (09-26-23 @ 23:15)  Weight (kg): 63 (09-26-23 @ 23:15)  BMI (kg/m2): 22.4 (09-26-23 @ 23:15)  BSA (m2): 1.71 (09-26-23 @ 23:15)      09-27-23 @ 07:01  -  09-28-23 @ 07:00  --------------------------------------------------------  IN: 5080 mL / OUT: 2120 mL / NET: 2960 mL    09-28-23 @ 07:01  -  09-28-23 @ 12:05  --------------------------------------------------------  IN: 400 mL / OUT: 320 mL / NET: 80 mL      Physical Exam:  Awake and alert  Comfortable  Lungs clear   Abdomen soft  ALCON drain + serosanguineous   No edema   Alert and oriented     LABS/STUDIES  --------------------------------------------------------------------------------              8.8    10.42 >-----------<  203      [09-28-23 @ 06:29]              28.2     136  |  110  |  35  ----------------------------<  165      [09-28-23 @ 06:28]  4.8   |  15  |  1.79        Ca     9.0     [09-28-23 @ 06:28]      Mg     2.0     [09-28-23 @ 06:28]      Phos  2.2     [09-28-23 @ 06:28]    TPro  6.9  /  Alb  3.6  /  TBili  0.9  /  DBili  x   /  AST  14  /  ALT  15  /  AlkPhos  58  [09-28-23 @ 06:28]    PT/INR: PT 9.8  , INR 0.93       [09-27-23 @ 06:06]  PTT: 25.2       [09-27-23 @ 06:06]      Creatinine Trend:  SCr 1.79 [09-28 @ 06:28]  SCr 2.10 [09-27 @ 06:06]  SCr 2.22 [09-26 @ 18:59]  SCr 3.19 [09-22 @ 07:02]  SCr 3.68 [09-21 @ 06:10]    Tacrolimus (), Serum: 14.6 ng/mL (09-28 @ 06:30)  Tacrolimus (), Serum: 19.9 ng/mL (09-27 @ 06:06)  Tacrolimus (), Serum: 11.1 ng/mL (09-22 @ 07:04)        CAPILLARY BLOOD GLUCOSE  POCT Blood Glucose.: 129 mg/dL (28 Sep 2023 08:57)  POCT Blood Glucose.: 189 mg/dL (27 Sep 2023 21:11)  POCT Blood Glucose.: 179 mg/dL (27 Sep 2023 16:48)  POCT Blood Glucose.: 250 mg/dL (27 Sep 2023 12:39)      Urinalysis - [09-28-23 @ 06:28]      Color  / Appearance  / SG  / pH       Gluc 165 / Ketone   / Bili  / Urobili        Blood  / Protein  / Leuk Est  / Nitrite       RBC  / WBC  / Hyaline  / Gran  / Sq Epi  / Non Sq Epi  / Bacteria       HbA1c 7.8      [12-17-19 @ 05:54]    HBsAb 6.6      [09-16-23 @ 04:41]  HBsAg Nonreact      [09-16-23 @ 04:41]  HBcAb Nonreact      [09-16-23 @ 04:41]  HCV 0.10, Nonreact      [09-16-23 @ 04:41]  HIV Nonreact      [09-16-23 @ 04:41]

## 2023-09-28 NOTE — PROGRESS NOTE ADULT - PROBLEM SELECTOR PLAN 1
Pt with decreased eGFR, monitor for insulin stacking   Continue current basal insulin nightly   Decreased sliding scale to low scale coverage  Will continue monitoring FS, log, and glucose trends, will Follow up.  he is on home prednisone, he knows insulin administration, would rec home insulin, basal + tradjenta.  DC recs:  Pt was on Basaglar 10 units nightly prior to surgery, knows insulin administration  Recent DDRT, decreased eGFR, He will benefit from Basal insulin + Tradjenta 5mg daily, stop Prandin  Suggest discharge on 12 units Basaglar nightly + Tradjenta   Outpatient follow up.

## 2023-09-28 NOTE — DISCHARGE NOTE PROVIDER - CARE PROVIDER_API CALL
Verito Bae  Nephrology  [address] [place] [id number]  Phone: [phone]  Fax: [phone]  Follow Up Time:

## 2023-09-28 NOTE — DISCHARGE NOTE PROVIDER - DISCHARGE DIET
Consistent Carbohydrate Diabetic Diets Eliquis 5 mg oral tablet: 1 tab(s) orally 2 times a day  finasteride: orally once a day  Flomax 0.4 mg oral capsule: 1 cap(s) orally once a day  gabapentin 400 mg oral capsule: orally 2 times a day  Lipitor 80 mg oral tablet: 1 tab(s) orally once a day  metoprolol tartrate 100 mg oral tablet: orally once a day  Plavix 75 mg oral tablet: 1 tab(s) orally once a day

## 2023-09-28 NOTE — PROGRESS NOTE ADULT - ASSESSMENT
68 Wol[place] speaking M former smoker with past medical history significant for GERD, HTN, HLD, anemia of chronic disease, T2DM, CAD s/p stent x3 (2014 at [place]) & x2 (pLCx 10/28/22, LAD 10/31/22, off of plavix as of 5/1/23), CVA and ESRD (diagnosed Jan 2019) on HD via LUE AVF T/Th/S (Nephrologist Dr Huff) with recent AV fistulogram 7/2023 with cephalic stenosis s/p balloon angioplasty. Past surgical history significant for open cholecystectomy, AVF creation and eye surgery in the past. Anuric. S/p R DDRT on 9/16/23 under Simulect. Returns for management of hyperglycemia.    Steroid induced Hyperglycemia  -Endocrine following, appreciate recs  -insulin teaching by RN  -Diabetic/Renal diet    s/p DDRT   - Cr downtrending  - ASA/SQH  - I&Os  - SCDs/spirometry  - CTX  FU BCx, UCx  - Keep ALCON, no leak (2.33 on 9/25)    Immunosuppression  -Envarsus per level,  MMF 1/1, Pred 5mg po qd  -Valcyte TIW/Nystatin/PPI/Bactrim for PPx    HTN  -Coreg/Nifedipine/Hydralazine, adjust prn     68 Macedonian speaking M former smoker with past medical history significant for GERD, HTN, HLD, anemia of chronic disease, T2DM, CAD s/p stent x3 (2014 at [place]) & x2 (pLCx 10/28/22, LAD 10/31/22, off of plavix as of 5/1/23), CVA and ESRD (diagnosed Jan 2019) on HD via LUE AVF T/Th/S (Nephrologist Dr Huff) with recent AV fistulogram 7/2023 with cephalic stenosis s/p balloon angioplasty. Past surgical history significant for open cholecystectomy, AVF creation and eye surgery in the past. Anuric. S/p R DDRT on 9/16/23 under Simulect. Returns for management of hyperglycemia.    Steroid induced Hyperglycemia  -Endocrine following, appreciate recs  -insulin teaching by RN, will dc on lantus  -Diabetic/Renal diet    s/p DDRT   - Cr downtrending  - ASA/SQH  - I&Os  - SCDs/spirometry  - On ceftriaxone, BCx NGTD, UCx with normal urogenital arnie, d/c on x3 days cefpodoxime 200mg BID  - Keep ALCON, no leak (2.33 on 9/25)  - Sodium bicarb today    Immunosuppression  -Envarsus per level,  MMF 1/1, Pred 5mg po qd  -Valcyte qD/Nystatin/PPI/Bactrim for PPx    HTN  -Coreg/Nifedipine/Hydralazine, adjust prn

## 2023-09-28 NOTE — DISCHARGE NOTE PROVIDER - NSDCMRMEDTOKEN_GEN_ALL_CORE_FT
acetaminophen 325 mg oral tablet: 2 tab(s) orally every 6 hours As needed Temp greater or equal to 38C (100.4F), Mild Pain (1 - 3)  alcohol swabs: Apply topically to affected area 4 times a day  aspirin 81 mg oral delayed release tablet: 1 tab(s) orally once a day  atorvastatin 40 mg oral tablet: 1 tab(s) orally once a day (at bedtime)  Basaglar KwikPen 100 units/mL subcutaneous solution: 12 unit(s) subcutaneous once a day (at bedtime) unit(s) subcutaneous once a day  carvedilol 25 mg oral tablet: 1 tab(s) orally every 12 hours  cefpodoxime 200 mg oral tablet: 1 tab(s) orally 2 times a day  glucometer (per patient&#x27;s insurance): Test blood sugars four times a day. Dispense #1 glucometer.  Insulin Pen Needles, 4mm: 1 application subcutaneously 4 times a day. ** Use with insulin pen **  lancets: 1 application subcutaneously 4 times a day  linagliptin 5 mg oral tablet: 1 tab(s) orally once a day  mycophenolate mofetil 250 mg oral capsule: 1,000 milligram(s) orally 2 times a day  NIFEdipine 30 mg oral tablet, extended release: 1 tab(s) orally 2 times a day  nystatin 100,000 units/mL oral suspension: 5 milliliter(s) orally 4 times a day  pantoprazole 40 mg oral delayed release tablet: 1 tab(s) orally every 12 hours  predniSONE 5 mg oral tablet: 1 tab(s) orally once a day  sodium bicarbonate 650 mg oral tablet: 2 tab(s) orally 2 times a day  sulfamethoxazole-trimethoprim 400 mg-80 mg oral tablet: 1 tab(s) orally once a day  tacrolimus 1 mg oral tablet, extended release: 1 tab(s) orally once a day  test strips (per patient&#x27;s insurance): 1 application subcutaneously 4 times a day. ** Compatible with patient&#x27;s glucometer **  valGANciclovir 450 mg oral tablet: 1 tab(s) orally once a day

## 2023-09-28 NOTE — DISCHARGE NOTE PROVIDER - NSDCCPCAREPLAN_GEN_ALL_CORE_FT
PRINCIPAL DISCHARGE DIAGNOSIS  Diagnosis: Hyperglycemia  Assessment and Plan of Treatment: Follow up with endocrinology in one week  Take all your medications as directed      SECONDARY DISCHARGE DIAGNOSES  Diagnosis: Renal transplant recipient  Assessment and Plan of Treatment: Kidney replaced by transplant  No heavy lifting anything more than 10-15lbs or straining. Otherwise, you may return to your usual level of physical activity. If you are taking narcotic pain medication (such as Percocet), do NOT drive a car, operate machinery or make important decisions.  Call trnansplant clinic If you developed any of the following, fever, pain, redness, swelling at incision site, cough, nausea, vomiting, painful urination, difficulty urination, or not making any urine.  NOTIFY YOUR SURGEON IF: You have any bleeding that does not stop, any pus draining from your wound, any fever (over 100.4 F) or chills, persistent nausea/vomiting with inability to tolerate food or liquids, persistent diarrhea, or if your pain is not controlled on your discharge pain medications.  Immunosuppression:   Keep away from people who have cough, cold, and symptom of flu.  Only take medications that are on your discharge list  If you missed your medications call the transplant office, do not double up medication because you missed a dose.  If you have any question regarding your medication please call transplant office.

## 2023-09-28 NOTE — PROGRESS NOTE ADULT - NS ATTEND AMEND GEN_ALL_CORE FT
s/p DDRT 9/16/23 with improving renal function, now admitted with hyperglycemia, Glucose >500  Started on lantus 12u overnight. Cont to monitor glucose and adjust insulin. Add premeal insulin  If no improvement will need insulin drip  Cont current immunosuppression with Envarsus 2 mg daily, Pred 5mg daily, Cellcept 1gm bid.   Will check tacrolimus level and adjust dose for level ~8  Transplant Prophylaxis with nystatin, bactrim, valcyte  GI prophylaxis due to steroids
s/p DDRT 9/16/23 with improving renal function, now admitted with hyperglycemia, Glucose >500  significantly improved with lantus and premeal insulin  Cr continues to improve.  Immunosuppression - decrease to Envarsus 1 mg daily, Cont Pred 5mg daily, Cellcept 1gm bid.   Will check tacrolimus level and adjust dose for level ~8  Transplant Prophylaxis with nystatin, bactrim, valcyte  GI prophylaxis due to steroids  d/c home with cefpodoxime for symptomatic UTI
Chart, labs, vitals, radiology reviewed. Above H&P reviewed and edited where appropriate. Agree with history and physical exam. Agree with assessment and plan. I reviewed the overnight course of events and discussed the care with the patient/ family.  All the decisions in assessment and plan are solely made by me..

## 2023-10-02 ENCOUNTER — APPOINTMENT (OUTPATIENT)
Dept: TRANSPLANT | Facility: CLINIC | Age: 68
End: 2023-10-02

## 2023-10-03 ENCOUNTER — APPOINTMENT (OUTPATIENT)
Dept: TRANSPLANT | Facility: CLINIC | Age: 68
End: 2023-10-03
Payer: MEDICARE

## 2023-10-03 ENCOUNTER — APPOINTMENT (OUTPATIENT)
Dept: NEPHROLOGY | Facility: CLINIC | Age: 68
End: 2023-10-03

## 2023-10-03 VITALS
TEMPERATURE: 97.8 F | SYSTOLIC BLOOD PRESSURE: 177 MMHG | HEIGHT: 66 IN | DIASTOLIC BLOOD PRESSURE: 82 MMHG | BODY MASS INDEX: 26.03 KG/M2 | OXYGEN SATURATION: 100 % | HEART RATE: 67 BPM | WEIGHT: 162 LBS

## 2023-10-03 PROCEDURE — 99213 OFFICE O/P EST LOW 20 MIN: CPT | Mod: 24

## 2023-10-03 RX ORDER — PEN NEEDLE, DIABETIC 29 G X1/2"
32G X 4 MM NEEDLE, DISPOSABLE MISCELLANEOUS
Qty: 1 | Refills: 5 | Status: ACTIVE | COMMUNITY
Start: 2023-10-03 | End: 1900-01-01

## 2023-10-04 ENCOUNTER — APPOINTMENT (OUTPATIENT)
Dept: TRANSPLANT | Facility: CLINIC | Age: 68
End: 2023-10-04

## 2023-10-04 LAB
ALBUMIN SERPL ELPH-MCNC: 4 G/DL
ALP BLD-CCNC: 70 U/L
ALT SERPL-CCNC: 17 U/L
ANION GAP SERPL CALC-SCNC: 11 MMOL/L
APPEARANCE: ABNORMAL
AST SERPL-CCNC: 14 U/L
BACTERIA: NEGATIVE /HPF
BASOPHILS # BLD AUTO: 0.2 K/UL
BASOPHILS NFR BLD AUTO: 1.8 %
BILIRUB SERPL-MCNC: 0.7 MG/DL
BILIRUBIN URINE: NEGATIVE
BKV DNA SPEC QL NAA+PROBE: NOT DETECTED IU/ML
BLOOD URINE: ABNORMAL
BUN SERPL-MCNC: 30 MG/DL
CALCIUM SERPL-MCNC: 9.7 MG/DL
CAST: 0 /LPF
CHLORIDE SERPL-SCNC: 116 MMOL/L
CO2 SERPL-SCNC: 16 MMOL/L
COLOR: YELLOW
CREAT SERPL-MCNC: 1.18 MG/DL
CREAT SPEC-SCNC: 84 MG/DL
CREAT/PROT UR: 1.1 RATIO
EGFR: 68 ML/MIN/1.73M2
EOSINOPHIL # BLD AUTO: 0.19 K/UL
EOSINOPHIL NFR BLD AUTO: 1.7 %
EPITHELIAL CELLS: 2 /HPF
GLUCOSE QUALITATIVE U: 250 MG/DL
GLUCOSE SERPL-MCNC: 171 MG/DL
HCT VFR BLD CALC: 27.2 %
HGB BLD-MCNC: 8.4 G/DL
IMM GRANULOCYTES NFR BLD AUTO: 0.5 %
KETONES URINE: NEGATIVE MG/DL
LEUKOCYTE ESTERASE URINE: ABNORMAL
LYMPHOCYTES # BLD AUTO: 1.34 K/UL
LYMPHOCYTES NFR BLD AUTO: 12 %
MAGNESIUM SERPL-MCNC: 1.7 MG/DL
MAN DIFF?: NORMAL
MCHC RBC-ENTMCNC: 30.9 GM/DL
MCHC RBC-ENTMCNC: 31.5 PG
MCV RBC AUTO: 101.9 FL
MICROSCOPIC-UA: NORMAL
MONOCYTES # BLD AUTO: 0.52 K/UL
MONOCYTES NFR BLD AUTO: 4.6 %
NEUTROPHILS # BLD AUTO: 8.9 K/UL
NEUTROPHILS NFR BLD AUTO: 79.4 %
NITRITE URINE: NEGATIVE
PH URINE: 6
PHOSPHATE SERPL-MCNC: 2.1 MG/DL
PLATELET # BLD AUTO: 181 K/UL
POTASSIUM SERPL-SCNC: 4.9 MMOL/L
PROT SERPL-MCNC: 6.8 G/DL
PROT UR-MCNC: 95 MG/DL
PROTEIN URINE: 100 MG/DL
RBC # BLD: 2.67 M/UL
RBC # FLD: 17 %
RED BLOOD CELLS URINE: 170 /HPF
SODIUM SERPL-SCNC: 143 MMOL/L
SPECIFIC GRAVITY URINE: 1.02
TACROLIMUS SERPL-MCNC: 3.1 NG/ML
URATE SERPL-MCNC: 7 MG/DL
UROBILINOGEN URINE: 0.2 MG/DL
WBC # FLD AUTO: 11.21 K/UL
WHITE BLOOD CELLS URINE: 24 /HPF

## 2023-10-07 ENCOUNTER — NON-APPOINTMENT (OUTPATIENT)
Age: 68
End: 2023-10-07

## 2023-10-10 ENCOUNTER — APPOINTMENT (OUTPATIENT)
Dept: NEPHROLOGY | Facility: CLINIC | Age: 68
End: 2023-10-10
Payer: MEDICARE

## 2023-10-10 VITALS
HEART RATE: 64 BPM | DIASTOLIC BLOOD PRESSURE: 80 MMHG | BODY MASS INDEX: 22.66 KG/M2 | TEMPERATURE: 97.1 F | HEIGHT: 66 IN | OXYGEN SATURATION: 100 % | SYSTOLIC BLOOD PRESSURE: 163 MMHG | WEIGHT: 141 LBS

## 2023-10-10 LAB
ALBUMIN SERPL ELPH-MCNC: 3.8 G/DL
ALP BLD-CCNC: 89 U/L
ALT SERPL-CCNC: 31 U/L
ANION GAP SERPL CALC-SCNC: 12 MMOL/L
AST SERPL-CCNC: 21 U/L
BASOPHILS # BLD AUTO: 0.17 K/UL
BASOPHILS NFR BLD AUTO: 2.8 %
BILIRUB SERPL-MCNC: 0.6 MG/DL
BUN SERPL-MCNC: 24 MG/DL
CALCIUM SERPL-MCNC: 9.3 MG/DL
CHLORIDE SERPL-SCNC: 112 MMOL/L
CO2 SERPL-SCNC: 17 MMOL/L
CREAT SERPL-MCNC: 1.07 MG/DL
CREAT SPEC-SCNC: 91 MG/DL
CREAT/PROT UR: 0.8 RATIO
EGFR: 77 ML/MIN/1.73M2
EOSINOPHIL # BLD AUTO: 0.12 K/UL
EOSINOPHIL NFR BLD AUTO: 2 %
GLUCOSE SERPL-MCNC: 153 MG/DL
HCT VFR BLD CALC: 29.1 %
HGB BLD-MCNC: 9 G/DL
IMM GRANULOCYTES NFR BLD AUTO: 0.3 %
LYMPHOCYTES # BLD AUTO: 1.16 K/UL
LYMPHOCYTES NFR BLD AUTO: 19.2 %
MAGNESIUM SERPL-MCNC: 1.5 MG/DL
MAN DIFF?: NORMAL
MCHC RBC-ENTMCNC: 30.9 GM/DL
MCHC RBC-ENTMCNC: 30.9 PG
MCV RBC AUTO: 100 FL
MONOCYTES # BLD AUTO: 0.48 K/UL
MONOCYTES NFR BLD AUTO: 7.9 %
NEUTROPHILS # BLD AUTO: 4.1 K/UL
NEUTROPHILS NFR BLD AUTO: 67.8 %
PHOSPHATE SERPL-MCNC: 1.7 MG/DL
PLATELET # BLD AUTO: 156 K/UL
POTASSIUM SERPL-SCNC: 5.5 MMOL/L
PROT SERPL-MCNC: 6.5 G/DL
PROT UR-MCNC: 76 MG/DL
RBC # BLD: 2.91 M/UL
RBC # FLD: 16.1 %
SODIUM SERPL-SCNC: 140 MMOL/L
TACROLIMUS SERPL-MCNC: 4.7 NG/ML
URATE SERPL-MCNC: 5.9 MG/DL
WBC # FLD AUTO: 6.05 K/UL

## 2023-10-10 PROCEDURE — 99214 OFFICE O/P EST MOD 30 MIN: CPT

## 2023-10-11 ENCOUNTER — APPOINTMENT (OUTPATIENT)
Dept: CARDIOLOGY | Facility: CLINIC | Age: 68
End: 2023-10-11
Payer: MEDICARE

## 2023-10-11 ENCOUNTER — NON-APPOINTMENT (OUTPATIENT)
Age: 68
End: 2023-10-11

## 2023-10-11 VITALS
DIASTOLIC BLOOD PRESSURE: 60 MMHG | BODY MASS INDEX: 22.66 KG/M2 | OXYGEN SATURATION: 100 % | HEART RATE: 60 BPM | SYSTOLIC BLOOD PRESSURE: 138 MMHG | WEIGHT: 141 LBS | HEIGHT: 66 IN

## 2023-10-11 DIAGNOSIS — Z92.89 PERSONAL HISTORY OF OTHER MEDICAL TREATMENT: ICD-10-CM

## 2023-10-11 DIAGNOSIS — Z95.5 PRESENCE OF CORONARY ANGIOPLASTY IMPLANT AND GRAFT: ICD-10-CM

## 2023-10-11 LAB
APPEARANCE: CLEAR
BACTERIA: NEGATIVE /HPF
BILIRUBIN URINE: NEGATIVE
BLOOD URINE: ABNORMAL
CAST: 1 /LPF
COLOR: YELLOW
CREAT SPEC-SCNC: <2 MG/DL
EPITHELIAL CELLS: 1 /HPF
GLUCOSE QUALITATIVE U: 100 MG/DL
KETONES URINE: NEGATIVE MG/DL
LEUKOCYTE ESTERASE URINE: ABNORMAL
MICROSCOPIC-UA: NORMAL
NITRITE URINE: NEGATIVE
PH URINE: 6
PROTEIN URINE: 100 MG/DL
RED BLOOD CELLS URINE: 125 /HPF
SPECIFIC GRAVITY URINE: 1.02
UROBILINOGEN URINE: 0.2 MG/DL
WHITE BLOOD CELLS URINE: 4 /HPF

## 2023-10-11 PROCEDURE — 99215 OFFICE O/P EST HI 40 MIN: CPT

## 2023-10-11 PROCEDURE — 93000 ELECTROCARDIOGRAM COMPLETE: CPT

## 2023-10-18 ENCOUNTER — APPOINTMENT (OUTPATIENT)
Dept: TRANSPLANT | Facility: CLINIC | Age: 68
End: 2023-10-18
Payer: MEDICARE

## 2023-10-18 VITALS
HEART RATE: 67 BPM | TEMPERATURE: 98.6 F | BODY MASS INDEX: 23.63 KG/M2 | DIASTOLIC BLOOD PRESSURE: 77 MMHG | SYSTOLIC BLOOD PRESSURE: 155 MMHG | WEIGHT: 147 LBS | HEIGHT: 66 IN | RESPIRATION RATE: 17 BRPM | OXYGEN SATURATION: 98 %

## 2023-10-18 PROCEDURE — 99215 OFFICE O/P EST HI 40 MIN: CPT | Mod: 24

## 2023-10-18 RX ORDER — CARVEDILOL 25 MG/1
25 TABLET, FILM COATED ORAL TWICE DAILY
Qty: 60 | Refills: 5 | Status: ACTIVE | COMMUNITY
Start: 2023-09-18 | End: 1900-01-01

## 2023-10-19 LAB
ALBUMIN SERPL ELPH-MCNC: 4.2 G/DL
ALP BLD-CCNC: 101 U/L
ALT SERPL-CCNC: 30 U/L
ANION GAP SERPL CALC-SCNC: 8 MMOL/L
APPEARANCE: CLEAR
AST SERPL-CCNC: 16 U/L
BACTERIA: NEGATIVE /HPF
BASOPHILS # BLD AUTO: 0.12 K/UL
BASOPHILS NFR BLD AUTO: 2 %
BILIRUB SERPL-MCNC: 0.8 MG/DL
BILIRUBIN URINE: NEGATIVE
BLOOD URINE: NEGATIVE
BUN SERPL-MCNC: 19 MG/DL
CALCIUM SERPL-MCNC: 9.5 MG/DL
CAST: 0 /LPF
CHLORIDE SERPL-SCNC: 107 MMOL/L
CO2 SERPL-SCNC: 24 MMOL/L
COLOR: YELLOW
CREAT SERPL-MCNC: 0.95 MG/DL
CREAT SPEC-SCNC: 36 MG/DL
CREAT/PROT UR: 0.6 RATIO
EGFR: 88 ML/MIN/1.73M2
EOSINOPHIL # BLD AUTO: 0.08 K/UL
EOSINOPHIL NFR BLD AUTO: 1.3 %
EPITHELIAL CELLS: 0 /HPF
GLUCOSE QUALITATIVE U: >=1000 MG/DL
GLUCOSE SERPL-MCNC: 275 MG/DL
HCT VFR BLD CALC: 34.7 %
HGB BLD-MCNC: 10.6 G/DL
IMM GRANULOCYTES NFR BLD AUTO: 0.7 %
KETONES URINE: NEGATIVE MG/DL
LEUKOCYTE ESTERASE URINE: NEGATIVE
LYMPHOCYTES # BLD AUTO: 1.08 K/UL
LYMPHOCYTES NFR BLD AUTO: 17.7 %
MAGNESIUM SERPL-MCNC: 1.5 MG/DL
MAN DIFF?: NORMAL
MCHC RBC-ENTMCNC: 30 PG
MCHC RBC-ENTMCNC: 30.5 GM/DL
MCV RBC AUTO: 98.3 FL
MICROSCOPIC-UA: NORMAL
MONOCYTES # BLD AUTO: 0.6 K/UL
MONOCYTES NFR BLD AUTO: 9.9 %
NEUTROPHILS # BLD AUTO: 4.17 K/UL
NEUTROPHILS NFR BLD AUTO: 68.4 %
NITRITE URINE: NEGATIVE
PH URINE: 6.5
PHOSPHATE SERPL-MCNC: 1.5 MG/DL
PLATELET # BLD AUTO: 213 K/UL
POTASSIUM SERPL-SCNC: 4.9 MMOL/L
PROT SERPL-MCNC: 7.1 G/DL
PROT UR-MCNC: 23 MG/DL
PROTEIN URINE: 30 MG/DL
RBC # BLD: 3.53 M/UL
RBC # FLD: 15.2 %
RED BLOOD CELLS URINE: 1 /HPF
REVIEW: NORMAL
SODIUM SERPL-SCNC: 138 MMOL/L
SPECIFIC GRAVITY URINE: 1.01
TACROLIMUS SERPL-MCNC: 29.9 NG/ML
URATE SERPL-MCNC: 6.2 MG/DL
UROBILINOGEN URINE: 0.2 MG/DL
WBC # FLD AUTO: 6.09 K/UL
WHITE BLOOD CELLS URINE: 1 /HPF

## 2023-10-20 ENCOUNTER — APPOINTMENT (OUTPATIENT)
Dept: TRANSPLANT | Facility: CLINIC | Age: 68
End: 2023-10-20

## 2023-10-25 ENCOUNTER — APPOINTMENT (OUTPATIENT)
Dept: NEPHROLOGY | Facility: CLINIC | Age: 68
End: 2023-10-25
Payer: MEDICARE

## 2023-10-25 PROCEDURE — 99214 OFFICE O/P EST MOD 30 MIN: CPT

## 2023-10-26 ENCOUNTER — NON-APPOINTMENT (OUTPATIENT)
Age: 68
End: 2023-10-26

## 2023-10-26 LAB
ALBUMIN SERPL ELPH-MCNC: 4.4 G/DL
ALP BLD-CCNC: 92 U/L
ALT SERPL-CCNC: 33 U/L
ANION GAP SERPL CALC-SCNC: 8 MMOL/L
APPEARANCE: CLEAR
AST SERPL-CCNC: 19 U/L
BACTERIA: NEGATIVE /HPF
BASOPHILS # BLD AUTO: 0.06 K/UL
BASOPHILS NFR BLD AUTO: 0.9 %
BILIRUB SERPL-MCNC: 0.8 MG/DL
BILIRUBIN URINE: NEGATIVE
BKV DNA SPEC QL NAA+PROBE: NOT DETECTED IU/ML
BLOOD URINE: NEGATIVE
BUN SERPL-MCNC: 14 MG/DL
CALCIUM SERPL-MCNC: 9.6 MG/DL
CAST: 0 /LPF
CHLORIDE SERPL-SCNC: 110 MMOL/L
CO2 SERPL-SCNC: 25 MMOL/L
COLOR: YELLOW
CREAT SERPL-MCNC: 0.98 MG/DL
CREAT SPEC-SCNC: 66 MG/DL
CREAT/PROT UR: 0.4 RATIO
EGFR: 85 ML/MIN/1.73M2
EOSINOPHIL # BLD AUTO: 0.05 K/UL
EOSINOPHIL NFR BLD AUTO: 0.7 %
EPITHELIAL CELLS: 0 /HPF
GLUCOSE QUALITATIVE U: 100 MG/DL
GLUCOSE SERPL-MCNC: 164 MG/DL
HBV SURFACE AG SER QL: NONREACTIVE
HCT VFR BLD CALC: 36.7 %
HCV AB SER QL: NONREACTIVE
HCV RNA SERPL NAA+PROBE-LOG IU: NOT DETECTED LOGIU/ML
HCV S/CO RATIO: 0.08 S/CO
HEPB DNA PCR INT: NOT DETECTED
HEPB DNA PCR LOG: NOT DETECTED LOGIU/ML
HEPC RNA INTERP: NOT DETECTED
HGB BLD-MCNC: 11.4 G/DL
HIV1 RNA # SERPL NAA+PROBE: NORMAL
HIV1 RNA # SERPL NAA+PROBE: NORMAL COPIES/ML
HIV1+2 AB SPEC QL IA.RAPID: NONREACTIVE
IMM GRANULOCYTES NFR BLD AUTO: 0.4 %
KETONES URINE: NEGATIVE MG/DL
LEUKOCYTE ESTERASE URINE: NEGATIVE
LYMPHOCYTES # BLD AUTO: 1.22 K/UL
LYMPHOCYTES NFR BLD AUTO: 17.7 %
MAGNESIUM SERPL-MCNC: 1.4 MG/DL
MAN DIFF?: NORMAL
MCHC RBC-ENTMCNC: 30.6 PG
MCHC RBC-ENTMCNC: 31.1 GM/DL
MCV RBC AUTO: 98.7 FL
MICROSCOPIC-UA: NORMAL
MONOCYTES # BLD AUTO: 0.66 K/UL
MONOCYTES NFR BLD AUTO: 9.6 %
NEUTROPHILS # BLD AUTO: 4.88 K/UL
NEUTROPHILS NFR BLD AUTO: 70.7 %
NITRITE URINE: NEGATIVE
PH URINE: 6
PHOSPHATE SERPL-MCNC: 1.8 MG/DL
PLATELET # BLD AUTO: 171 K/UL
POTASSIUM SERPL-SCNC: 5 MMOL/L
PROT SERPL-MCNC: 7.2 G/DL
PROT UR-MCNC: 25 MG/DL
PROTEIN URINE: 30 MG/DL
RBC # BLD: 3.72 M/UL
RBC # FLD: 14.9 %
RED BLOOD CELLS URINE: 0 /HPF
SODIUM SERPL-SCNC: 143 MMOL/L
SPECIFIC GRAVITY URINE: 1.01
TACROLIMUS SERPL-MCNC: 9.5 NG/ML
URATE SERPL-MCNC: 5.9 MG/DL
UROBILINOGEN URINE: 0.2 MG/DL
VIRAL LOAD INTERP: NORMAL
VIRAL LOAD LOG: NORMAL LG COP/ML
WBC # FLD AUTO: 6.9 K/UL
WHITE BLOOD CELLS URINE: 1 /HPF

## 2023-10-27 LAB — TACROLIMUS SERPL-MCNC: 4.5 NG/ML

## 2023-10-30 RX ORDER — BLOOD SUGAR DIAGNOSTIC
STRIP MISCELLANEOUS 4 TIMES DAILY
Qty: 1 | Refills: 0 | Status: ACTIVE | COMMUNITY
Start: 2023-10-30 | End: 1900-01-01

## 2023-10-30 RX ORDER — LANCETS 28 GAUGE
EACH MISCELLANEOUS
Qty: 120 | Refills: 5 | Status: ACTIVE | COMMUNITY
Start: 2023-10-30 | End: 1900-01-01

## 2023-10-31 ENCOUNTER — NON-APPOINTMENT (OUTPATIENT)
Age: 68
End: 2023-10-31

## 2023-11-06 ENCOUNTER — TRANSCRIPTION ENCOUNTER (OUTPATIENT)
Age: 68
End: 2023-11-06

## 2023-11-07 ENCOUNTER — INPATIENT (INPATIENT)
Facility: HOSPITAL | Age: 68
LOS: 0 days | Discharge: HOME HEALTH | DRG: 696 | End: 2023-11-08
Attending: SURGERY | Admitting: SURGERY
Payer: COMMERCIAL

## 2023-11-07 ENCOUNTER — APPOINTMENT (OUTPATIENT)
Dept: TRANSPLANT | Facility: HOSPITAL | Age: 68
End: 2023-11-07

## 2023-11-07 ENCOUNTER — TRANSCRIPTION ENCOUNTER (OUTPATIENT)
Age: 68
End: 2023-11-07

## 2023-11-07 ENCOUNTER — OUTPATIENT (OUTPATIENT)
Dept: OUTPATIENT SERVICES | Facility: HOSPITAL | Age: 68
LOS: 1 days | End: 2023-11-07
Payer: COMMERCIAL

## 2023-11-07 VITALS
SYSTOLIC BLOOD PRESSURE: 142 MMHG | TEMPERATURE: 97 F | HEART RATE: 61 BPM | OXYGEN SATURATION: 100 % | RESPIRATION RATE: 14 BRPM | DIASTOLIC BLOOD PRESSURE: 62 MMHG

## 2023-11-07 VITALS
WEIGHT: 154.98 LBS | HEART RATE: 66 BPM | OXYGEN SATURATION: 100 % | HEIGHT: 66.75 IN | TEMPERATURE: 97 F | RESPIRATION RATE: 16 BRPM | SYSTOLIC BLOOD PRESSURE: 178 MMHG | DIASTOLIC BLOOD PRESSURE: 80 MMHG

## 2023-11-07 VITALS
WEIGHT: 145.06 LBS | DIASTOLIC BLOOD PRESSURE: 76 MMHG | HEIGHT: 66.75 IN | TEMPERATURE: 98 F | SYSTOLIC BLOOD PRESSURE: 150 MMHG | OXYGEN SATURATION: 99 % | RESPIRATION RATE: 17 BRPM | HEART RATE: 71 BPM

## 2023-11-07 DIAGNOSIS — Z98.890 OTHER SPECIFIED POSTPROCEDURAL STATES: Chronic | ICD-10-CM

## 2023-11-07 DIAGNOSIS — I77.0 ARTERIOVENOUS FISTULA, ACQUIRED: Chronic | ICD-10-CM

## 2023-11-07 DIAGNOSIS — Z94.0 KIDNEY TRANSPLANT STATUS: ICD-10-CM

## 2023-11-07 LAB
ALBUMIN SERPL ELPH-MCNC: 4.9 G/DL — SIGNIFICANT CHANGE UP (ref 3.3–5)
ALBUMIN SERPL ELPH-MCNC: 4.9 G/DL — SIGNIFICANT CHANGE UP (ref 3.3–5)
ALP SERPL-CCNC: 106 U/L — SIGNIFICANT CHANGE UP (ref 40–120)
ALP SERPL-CCNC: 106 U/L — SIGNIFICANT CHANGE UP (ref 40–120)
ALT FLD-CCNC: 26 U/L — SIGNIFICANT CHANGE UP (ref 10–45)
ALT FLD-CCNC: 26 U/L — SIGNIFICANT CHANGE UP (ref 10–45)
ANION GAP SERPL CALC-SCNC: 16 MMOL/L — SIGNIFICANT CHANGE UP (ref 5–17)
ANION GAP SERPL CALC-SCNC: 16 MMOL/L — SIGNIFICANT CHANGE UP (ref 5–17)
APTT BLD: 28.2 SEC — SIGNIFICANT CHANGE UP (ref 24.5–35.6)
APTT BLD: 28.2 SEC — SIGNIFICANT CHANGE UP (ref 24.5–35.6)
AST SERPL-CCNC: 17 U/L — SIGNIFICANT CHANGE UP (ref 10–40)
AST SERPL-CCNC: 17 U/L — SIGNIFICANT CHANGE UP (ref 10–40)
BASOPHILS # BLD AUTO: 0.08 K/UL — SIGNIFICANT CHANGE UP (ref 0–0.2)
BASOPHILS # BLD AUTO: 0.08 K/UL — SIGNIFICANT CHANGE UP (ref 0–0.2)
BASOPHILS NFR BLD AUTO: 0.7 % — SIGNIFICANT CHANGE UP (ref 0–2)
BASOPHILS NFR BLD AUTO: 0.7 % — SIGNIFICANT CHANGE UP (ref 0–2)
BILIRUB SERPL-MCNC: 0.9 MG/DL — SIGNIFICANT CHANGE UP (ref 0.2–1.2)
BILIRUB SERPL-MCNC: 0.9 MG/DL — SIGNIFICANT CHANGE UP (ref 0.2–1.2)
BUN SERPL-MCNC: 24 MG/DL — HIGH (ref 7–23)
BUN SERPL-MCNC: 24 MG/DL — HIGH (ref 7–23)
CALCIUM SERPL-MCNC: 10 MG/DL — SIGNIFICANT CHANGE UP (ref 8.4–10.5)
CALCIUM SERPL-MCNC: 10 MG/DL — SIGNIFICANT CHANGE UP (ref 8.4–10.5)
CHLORIDE SERPL-SCNC: 98 MMOL/L — SIGNIFICANT CHANGE UP (ref 96–108)
CHLORIDE SERPL-SCNC: 98 MMOL/L — SIGNIFICANT CHANGE UP (ref 96–108)
CO2 SERPL-SCNC: 19 MMOL/L — LOW (ref 22–31)
CO2 SERPL-SCNC: 19 MMOL/L — LOW (ref 22–31)
CREAT SERPL-MCNC: 1.34 MG/DL — HIGH (ref 0.5–1.3)
CREAT SERPL-MCNC: 1.34 MG/DL — HIGH (ref 0.5–1.3)
EGFR: 58 ML/MIN/1.73M2 — LOW
EGFR: 58 ML/MIN/1.73M2 — LOW
EOSINOPHIL # BLD AUTO: 0.03 K/UL — SIGNIFICANT CHANGE UP (ref 0–0.5)
EOSINOPHIL # BLD AUTO: 0.03 K/UL — SIGNIFICANT CHANGE UP (ref 0–0.5)
EOSINOPHIL NFR BLD AUTO: 0.2 % — SIGNIFICANT CHANGE UP (ref 0–6)
EOSINOPHIL NFR BLD AUTO: 0.2 % — SIGNIFICANT CHANGE UP (ref 0–6)
GLUCOSE BLDC GLUCOMTR-MCNC: 275 MG/DL — HIGH (ref 70–99)
GLUCOSE BLDC GLUCOMTR-MCNC: 275 MG/DL — HIGH (ref 70–99)
GLUCOSE BLDC GLUCOMTR-MCNC: 298 MG/DL — HIGH (ref 70–99)
GLUCOSE BLDC GLUCOMTR-MCNC: 298 MG/DL — HIGH (ref 70–99)
GLUCOSE SERPL-MCNC: 528 MG/DL — CRITICAL HIGH (ref 70–99)
GLUCOSE SERPL-MCNC: 528 MG/DL — CRITICAL HIGH (ref 70–99)
HCT VFR BLD CALC: 40.6 % — SIGNIFICANT CHANGE UP (ref 39–50)
HCT VFR BLD CALC: 40.6 % — SIGNIFICANT CHANGE UP (ref 39–50)
HGB BLD-MCNC: 12.6 G/DL — LOW (ref 13–17)
HGB BLD-MCNC: 12.6 G/DL — LOW (ref 13–17)
IMM GRANULOCYTES NFR BLD AUTO: 0.5 % — SIGNIFICANT CHANGE UP (ref 0–0.9)
IMM GRANULOCYTES NFR BLD AUTO: 0.5 % — SIGNIFICANT CHANGE UP (ref 0–0.9)
INR BLD: 0.93 RATIO — SIGNIFICANT CHANGE UP (ref 0.85–1.18)
INR BLD: 0.93 RATIO — SIGNIFICANT CHANGE UP (ref 0.85–1.18)
LYMPHOCYTES # BLD AUTO: 0.83 K/UL — LOW (ref 1–3.3)
LYMPHOCYTES # BLD AUTO: 0.83 K/UL — LOW (ref 1–3.3)
LYMPHOCYTES # BLD AUTO: 6.9 % — LOW (ref 13–44)
LYMPHOCYTES # BLD AUTO: 6.9 % — LOW (ref 13–44)
MCHC RBC-ENTMCNC: 29.6 PG — SIGNIFICANT CHANGE UP (ref 27–34)
MCHC RBC-ENTMCNC: 29.6 PG — SIGNIFICANT CHANGE UP (ref 27–34)
MCHC RBC-ENTMCNC: 31 GM/DL — LOW (ref 32–36)
MCHC RBC-ENTMCNC: 31 GM/DL — LOW (ref 32–36)
MCV RBC AUTO: 95.3 FL — SIGNIFICANT CHANGE UP (ref 80–100)
MCV RBC AUTO: 95.3 FL — SIGNIFICANT CHANGE UP (ref 80–100)
MONOCYTES # BLD AUTO: 0.66 K/UL — SIGNIFICANT CHANGE UP (ref 0–0.9)
MONOCYTES # BLD AUTO: 0.66 K/UL — SIGNIFICANT CHANGE UP (ref 0–0.9)
MONOCYTES NFR BLD AUTO: 5.5 % — SIGNIFICANT CHANGE UP (ref 2–14)
MONOCYTES NFR BLD AUTO: 5.5 % — SIGNIFICANT CHANGE UP (ref 2–14)
NEUTROPHILS # BLD AUTO: 10.4 K/UL — HIGH (ref 1.8–7.4)
NEUTROPHILS # BLD AUTO: 10.4 K/UL — HIGH (ref 1.8–7.4)
NEUTROPHILS NFR BLD AUTO: 86.2 % — HIGH (ref 43–77)
NEUTROPHILS NFR BLD AUTO: 86.2 % — HIGH (ref 43–77)
NRBC # BLD: 0 /100 WBCS — SIGNIFICANT CHANGE UP (ref 0–0)
NRBC # BLD: 0 /100 WBCS — SIGNIFICANT CHANGE UP (ref 0–0)
PLATELET # BLD AUTO: 165 K/UL — SIGNIFICANT CHANGE UP (ref 150–400)
PLATELET # BLD AUTO: 165 K/UL — SIGNIFICANT CHANGE UP (ref 150–400)
POTASSIUM SERPL-MCNC: 5.5 MMOL/L — HIGH (ref 3.5–5.3)
POTASSIUM SERPL-MCNC: 5.5 MMOL/L — HIGH (ref 3.5–5.3)
POTASSIUM SERPL-SCNC: 5.5 MMOL/L — HIGH (ref 3.5–5.3)
POTASSIUM SERPL-SCNC: 5.5 MMOL/L — HIGH (ref 3.5–5.3)
PROT SERPL-MCNC: 8.1 G/DL — SIGNIFICANT CHANGE UP (ref 6–8.3)
PROT SERPL-MCNC: 8.1 G/DL — SIGNIFICANT CHANGE UP (ref 6–8.3)
PROTHROM AB SERPL-ACNC: 10.3 SEC — SIGNIFICANT CHANGE UP (ref 9.5–13)
PROTHROM AB SERPL-ACNC: 10.3 SEC — SIGNIFICANT CHANGE UP (ref 9.5–13)
RBC # BLD: 4.26 M/UL — SIGNIFICANT CHANGE UP (ref 4.2–5.8)
RBC # BLD: 4.26 M/UL — SIGNIFICANT CHANGE UP (ref 4.2–5.8)
RBC # FLD: 13.8 % — SIGNIFICANT CHANGE UP (ref 10.3–14.5)
RBC # FLD: 13.8 % — SIGNIFICANT CHANGE UP (ref 10.3–14.5)
SODIUM SERPL-SCNC: 133 MMOL/L — LOW (ref 135–145)
SODIUM SERPL-SCNC: 133 MMOL/L — LOW (ref 135–145)
WBC # BLD: 12.06 K/UL — HIGH (ref 3.8–10.5)
WBC # BLD: 12.06 K/UL — HIGH (ref 3.8–10.5)
WBC # FLD AUTO: 12.06 K/UL — HIGH (ref 3.8–10.5)
WBC # FLD AUTO: 12.06 K/UL — HIGH (ref 3.8–10.5)

## 2023-11-07 PROCEDURE — 82962 GLUCOSE BLOOD TEST: CPT

## 2023-11-07 PROCEDURE — 99285 EMERGENCY DEPT VISIT HI MDM: CPT

## 2023-11-07 PROCEDURE — C1747: CPT

## 2023-11-07 PROCEDURE — 52310 CYSTOSCOPY AND TREATMENT: CPT | Mod: RT

## 2023-11-07 DEVICE — ASCOPE 4 CYSTOSCOPE FLEX 6X390MM
Type: IMPLANTABLE DEVICE | Status: NON-FUNCTIONAL
Removed: 2023-11-07

## 2023-11-07 RX ORDER — SODIUM CHLORIDE 9 MG/ML
500 INJECTION INTRAMUSCULAR; INTRAVENOUS; SUBCUTANEOUS ONCE
Refills: 0 | Status: COMPLETED | OUTPATIENT
Start: 2023-11-07 | End: 2023-11-07

## 2023-11-07 RX ORDER — SODIUM CHLORIDE 9 MG/ML
3 INJECTION INTRAMUSCULAR; INTRAVENOUS; SUBCUTANEOUS EVERY 8 HOURS
Refills: 0 | Status: DISCONTINUED | OUTPATIENT
Start: 2023-11-07 | End: 2023-11-21

## 2023-11-07 RX ORDER — FENTANYL CITRATE 50 UG/ML
25 INJECTION INTRAVENOUS
Refills: 0 | Status: DISCONTINUED | OUTPATIENT
Start: 2023-11-07 | End: 2023-11-07

## 2023-11-07 RX ADMIN — SODIUM CHLORIDE 500 MILLILITER(S): 9 INJECTION INTRAMUSCULAR; INTRAVENOUS; SUBCUTANEOUS at 23:52

## 2023-11-07 NOTE — ED PROVIDER NOTE - NSICDXPASTMEDICALHX_GEN_ALL_CORE_FT
PAST MEDICAL HISTORY:  Anemia due to stage 5 chronic kidney disease, not on chronic dialysis     CAP (community acquired pneumonia) 6/18    Cerebrovascular accident (CVA), unspecified mechanism diagnosed via CT of the head    Coronary artery disease     Diabetes mellitus type 2    ESRD (end stage renal disease) on Dialysis( M/W/F), By Dr. Huff    GERD (gastroesophageal reflux disease)     HTN (hypertension)     Hypercholesterolemia     Stented coronary artery 2014, 3 stents, [facility]

## 2023-11-07 NOTE — ED PROVIDER NOTE - OBJECTIVE STATEMENT
68-year-old male with past medical history of hypertension, coronary artery disease, diabetes mellitus, GERD, end-stage renal disease with  donor renal transplant in the middle of September of this year at [facility], stent removal earlier today from the transplanted kidney with subsequent hematuria, presenting to the emergency department due to persistent hematuria and now inability to urinate.  He endorses suprapubic discomfort.  He denies any fevers or chills or flank pain. He is taking all of his antirejection and prophylactic medications as instructed.

## 2023-11-07 NOTE — ED ADULT NURSE NOTE - NSICDXPASTMEDICALHX_GEN_ALL_CORE_FT
PAST MEDICAL HISTORY:  Anemia due to stage 5 chronic kidney disease, not on chronic dialysis     CAP (community acquired pneumonia) 6/18    Cerebrovascular accident (CVA), unspecified mechanism diagnosed via CT of the head    Coronary artery disease     Diabetes mellitus type 2    ESRD (end stage renal disease) on Dialysis( M/W/F), By Dr. Huff    GERD (gastroesophageal reflux disease)     HTN (hypertension)     Hypercholesterolemia     Stented coronary artery 2014, 3 stents, [facility]

## 2023-11-07 NOTE — ED ADULT NURSE NOTE - OBJECTIVE STATEMENT
68-year-old male with past medical history of hypertension, coronary artery disease, diabetes mellitus, GERD, end-stage renal disease with  donor renal transplant in the middle of September of this year at [facility], stent removal earlier today from the transplanted kidney with subsequent hematuria, presenting to the emergency department due to persistent hematuria and now inability to urinate.  He endorses suprapubic discomfort.  He denies any fevers or chills or flank pain. He is taking all of his antirejection and prophylactic medications as instructed.

## 2023-11-07 NOTE — H&P PST ADULT - HISTORY OF PRESENT ILLNESS
68M s/p DDRT 9/16/23 with excellent renal function, Cr 1, for cystoscopy and removal of ureteral stent from right transplant kidney.  Patient denies any fevers, abdominal pain, nausea, diarrhea, hematuria or dysuria.

## 2023-11-07 NOTE — ED PROVIDER NOTE - CLINICAL SUMMARY MEDICAL DECISION MAKING FREE TEXT BOX
68-year-old male who is status post renal transplant approximately 2 months ago presenting for hematuria and acute urinary retention.  Bedside point-of-care ultrasound reveals a bladder volume of approximately 600 mL.  Patient will require an indwelling urethral catheter to aid in passage of urine.  We will send off subsequent urine studies.  Transplant team has already been made aware of patient and will evaluate.  Spoke on the phone with urology, they are happy to assist the transplant team if necessary.  Patient afebrile, does not require work-up for parvovirus.

## 2023-11-07 NOTE — ED PROVIDER NOTE - CARE PLAN
1 Principal Discharge DX:	Hematuria   Principal Discharge DX:	Acute urinary retention  Secondary Diagnosis:	Hyperglycemia

## 2023-11-07 NOTE — H&P PST ADULT - ATTENDING COMMENTS
68M s/p DDRT 9/16/23 with excellent renal function, Cr 1, for cystoscopy and removal of ureteral stent from right transplant kidney.  Details of procedure discussed, including risks of hematuria, dysuria, FELICITA, UTI

## 2023-11-07 NOTE — ED ADULT TRIAGE NOTE - CHIEF COMPLAINT QUOTE
C/o hematuria and lower abdominal pain since today s/p urethral stent removal this AM. Takes aspirin

## 2023-11-07 NOTE — ED PROVIDER NOTE - PHYSICAL EXAMINATION
General: uncomfortable, alert, oriented to person, time, place  Psych: mood appropriate  Head: normocephalic; atraumatic  Eyes: conjunctivae clear bilaterally, sclerae anicteric  ENT: no nasal flaring, patent nares  Cardio: non-tachycardic; skin warm and well perfused  Resp: normal respiratory effort; no accessory muscle use  GI: suprapubic tenderness to palpation  Neuro: normal sensation, moving all four extremities equally  Skin: No evidence of rash or bruising  MSK: normal movement of all extremities  Lymph/Vasc: no LE edema

## 2023-11-07 NOTE — ASU PATIENT PROFILE, ADULT - NSICDXPASTMEDICALHX_GEN_ALL_CORE_FT
PAST MEDICAL HISTORY:  Anemia due to stage 5 chronic kidney disease, not on chronic dialysis     CAP (community acquired pneumonia) 6/18    Cerebrovascular accident (CVA), unspecified mechanism diagnosed via CT of the head    Coronary artery disease     Diabetes mellitus type 2    ESRD (end stage renal disease) on Dialysis( M/W/F), By Dr. Huff    GERD (gastroesophageal reflux disease)     HTN (hypertension)     Hypercholesterolemia     Stented coronary artery 2014, 3 stents, [facility]

## 2023-11-07 NOTE — BRIEF OPERATIVE NOTE - NSICDXBRIEFPOSTOP_GEN_ALL_CORE_FT
Pt resting comfortably in bed, on phone. Respirations even and unlabored. Will continue to monitor.   POST-OP DIAGNOSIS:  H/O kidney transplant 07-Nov-2023 11:03:12  Jose Sharma

## 2023-11-07 NOTE — ED ADULT NURSE NOTE - NSFALLUNIVINTERV_ED_ALL_ED
Bed/Stretcher in lowest position, wheels locked, appropriate side rails in place/Call bell, personal items and telephone in reach/Instruct patient to call for assistance before getting out of bed/chair/stretcher/Non-slip footwear applied when patient is off stretcher/[place] to call system/Physically safe environment - no spills, clutter or unnecessary equipment/Purposeful proactive rounding/Room/bathroom lighting operational, light cord in reach

## 2023-11-07 NOTE — ASU PREOP CHECKLIST - BMI (KG/M2)
Pt with h/o DARRIN but not on CPAP at home, states she is due to get additional training  - continue to monitor
24.5
Pt with h/o DARRIN but not on CPAP at home, states she is due to get additional training  - continue to monitor

## 2023-11-07 NOTE — ED PROVIDER NOTE - PROGRESS NOTE DETAILS
Branden Naqvi MD: Patient to be admitted due to elevated glucose, urinary retention, transplant service aware and accepting him.  His glucose was initially greater than 500, he was given 6 units of short acting insulin.  No evidence of diabetic ketoacidosis.  He will continue on insulin while in the hospital.

## 2023-11-07 NOTE — BRIEF OPERATIVE NOTE - PRIMARY SURGEON
[facility]  Vaginal Delivery Note   Review the Delivery Report for details.       Delivery     Delivery: Vaginal, Spontaneous     YOB: 2020    Time of Birth:  Gestational Age 10:50 AM   38w4d     Anesthesia: Epidural     Delivering clinician:  Lexis Salazar MD   Forceps?   No   Vacuum? No    Shoulder dystocia present: No        Delivery narrative:  The patient progressed to complete presentation was noted to be face with mentum posterior. Patient put up in lithotomy and fetal head was tucked and rotated to OP.  Position held while patient pushed and engaged in the pelvis. Pt then pushed effectively and fetal head delivered OA.via  with epidural anesthesia. Shoulders were delivered easily. The cord was clamped and cut after 60 second delay and the infant was placed on the mother's chest for skin- to - skin. Cord blood was obtained and the placenta was delivered spontaneously intact. 10 units of IM Pitocin and 800 mcg of rectal Cytotec were given. Uterine tone was appropriate.   no lacerations were noted   The patient tolerated the procedure well and remained in the LDR for recovery. All counts correct.      Infant    Findings: male  infant  3750 g (8 lb 4.3 oz)     Apgars: 7  @ 1 minute /    9  @ 5 minutes         Placenta, Cord, and Fluid    Placenta delivered  Spontaneous  at   10/12/2022 10:54 AM     Cord:   present.   Nuchal Cord?  no   Cord blood obtained:  yes   Cord gases obtained:   no   Cord gas results: Venous:  No results found for: PHCVEN    Arterial:  No results found for: PHCART     Repair    Episiotomy: None     No    Lacerations: No     Quantitative Blood Loss:  50 mL            Complications  none    Disposition  Mother to Mother Baby/Postpartum  in stable condition currently.  Baby to remains with mom  in stable condition currently.      Lexis Salazar MD  10/12/22  11:12 EDT         Jose Sharma MD

## 2023-11-08 ENCOUNTER — APPOINTMENT (OUTPATIENT)
Dept: NEPHROLOGY | Facility: CLINIC | Age: 68
End: 2023-11-08

## 2023-11-08 ENCOUNTER — TRANSCRIPTION ENCOUNTER (OUTPATIENT)
Age: 68
End: 2023-11-08

## 2023-11-08 VITALS
TEMPERATURE: 98 F | SYSTOLIC BLOOD PRESSURE: 154 MMHG | DIASTOLIC BLOOD PRESSURE: 84 MMHG | HEART RATE: 60 BPM | RESPIRATION RATE: 17 BRPM | OXYGEN SATURATION: 99 %

## 2023-11-08 DIAGNOSIS — R33.8 OTHER RETENTION OF URINE: ICD-10-CM

## 2023-11-08 LAB
APPEARANCE UR: ABNORMAL
APPEARANCE UR: ABNORMAL
BACTERIA # UR AUTO: NEGATIVE /HPF — SIGNIFICANT CHANGE UP
BACTERIA # UR AUTO: NEGATIVE /HPF — SIGNIFICANT CHANGE UP
BILIRUB UR-MCNC: NEGATIVE — SIGNIFICANT CHANGE UP
BILIRUB UR-MCNC: NEGATIVE — SIGNIFICANT CHANGE UP
BLD GP AB SCN SERPL QL: NEGATIVE — SIGNIFICANT CHANGE UP
BLD GP AB SCN SERPL QL: NEGATIVE — SIGNIFICANT CHANGE UP
CAST: 0 /LPF — SIGNIFICANT CHANGE UP (ref 0–4)
CAST: 0 /LPF — SIGNIFICANT CHANGE UP (ref 0–4)
COLOR SPEC: ABNORMAL
COLOR SPEC: ABNORMAL
DIFF PNL FLD: ABNORMAL
DIFF PNL FLD: ABNORMAL
GLUCOSE BLDC GLUCOMTR-MCNC: 191 MG/DL — HIGH (ref 70–99)
GLUCOSE BLDC GLUCOMTR-MCNC: 191 MG/DL — HIGH (ref 70–99)
GLUCOSE BLDC GLUCOMTR-MCNC: 199 MG/DL — HIGH (ref 70–99)
GLUCOSE BLDC GLUCOMTR-MCNC: 199 MG/DL — HIGH (ref 70–99)
GLUCOSE BLDC GLUCOMTR-MCNC: 255 MG/DL — HIGH (ref 70–99)
GLUCOSE BLDC GLUCOMTR-MCNC: 255 MG/DL — HIGH (ref 70–99)
GLUCOSE BLDC GLUCOMTR-MCNC: 334 MG/DL — HIGH (ref 70–99)
GLUCOSE BLDC GLUCOMTR-MCNC: 334 MG/DL — HIGH (ref 70–99)
GLUCOSE UR QL: >=1000 MG/DL
GLUCOSE UR QL: >=1000 MG/DL
KETONES UR-MCNC: NEGATIVE MG/DL — SIGNIFICANT CHANGE UP
KETONES UR-MCNC: NEGATIVE MG/DL — SIGNIFICANT CHANGE UP
LEUKOCYTE ESTERASE UR-ACNC: ABNORMAL
LEUKOCYTE ESTERASE UR-ACNC: ABNORMAL
NITRITE UR-MCNC: NEGATIVE — SIGNIFICANT CHANGE UP
NITRITE UR-MCNC: NEGATIVE — SIGNIFICANT CHANGE UP
PH UR: 6.5 — SIGNIFICANT CHANGE UP (ref 5–8)
PH UR: 6.5 — SIGNIFICANT CHANGE UP (ref 5–8)
PROT UR-MCNC: 300 MG/DL
PROT UR-MCNC: 300 MG/DL
RBC CASTS # UR COMP ASSIST: >1900 /HPF — HIGH (ref 0–4)
RBC CASTS # UR COMP ASSIST: >1900 /HPF — HIGH (ref 0–4)
RH IG SCN BLD-IMP: NEGATIVE — SIGNIFICANT CHANGE UP
RH IG SCN BLD-IMP: NEGATIVE — SIGNIFICANT CHANGE UP
SP GR SPEC: 1.02 — SIGNIFICANT CHANGE UP (ref 1–1.03)
SP GR SPEC: 1.02 — SIGNIFICANT CHANGE UP (ref 1–1.03)
SQUAMOUS # UR AUTO: 1 /HPF — SIGNIFICANT CHANGE UP (ref 0–5)
SQUAMOUS # UR AUTO: 1 /HPF — SIGNIFICANT CHANGE UP (ref 0–5)
UROBILINOGEN FLD QL: 0.2 MG/DL — SIGNIFICANT CHANGE UP (ref 0.2–1)
UROBILINOGEN FLD QL: 0.2 MG/DL — SIGNIFICANT CHANGE UP (ref 0.2–1)
WBC UR QL: 9 /HPF — HIGH (ref 0–5)
WBC UR QL: 9 /HPF — HIGH (ref 0–5)

## 2023-11-08 RX ORDER — INSULIN LISPRO 100/ML
10 VIAL (ML) SUBCUTANEOUS
Refills: 0 | DISCHARGE
Start: 2023-11-08

## 2023-11-08 RX ORDER — VALGANCICLOVIR 450 MG/1
450 TABLET, FILM COATED ORAL DAILY
Refills: 0 | Status: DISCONTINUED | OUTPATIENT
Start: 2023-11-08 | End: 2023-11-08

## 2023-11-08 RX ORDER — DEXTROSE 50 % IN WATER 50 %
25 SYRINGE (ML) INTRAVENOUS ONCE
Refills: 0 | Status: DISCONTINUED | OUTPATIENT
Start: 2023-11-08 | End: 2023-11-08

## 2023-11-08 RX ORDER — NIFEDIPINE 30 MG
60 TABLET, EXTENDED RELEASE 24 HR ORAL
Refills: 0 | Status: DISCONTINUED | OUTPATIENT
Start: 2023-11-09 | End: 2023-11-08

## 2023-11-08 RX ORDER — NIFEDIPINE 30 MG
30 TABLET, EXTENDED RELEASE 24 HR ORAL ONCE
Refills: 0 | Status: COMPLETED | OUTPATIENT
Start: 2023-11-08 | End: 2023-11-08

## 2023-11-08 RX ORDER — DEXTROSE 50 % IN WATER 50 %
12.5 SYRINGE (ML) INTRAVENOUS ONCE
Refills: 0 | Status: DISCONTINUED | OUTPATIENT
Start: 2023-11-08 | End: 2023-11-08

## 2023-11-08 RX ORDER — INSULIN LISPRO 100/ML
VIAL (ML) SUBCUTANEOUS AT BEDTIME
Refills: 0 | Status: DISCONTINUED | OUTPATIENT
Start: 2023-11-08 | End: 2023-11-08

## 2023-11-08 RX ORDER — INSULIN GLARGINE 100 [IU]/ML
20 INJECTION, SOLUTION SUBCUTANEOUS AT BEDTIME
Refills: 0 | Status: DISCONTINUED | OUTPATIENT
Start: 2023-11-08 | End: 2023-11-08

## 2023-11-08 RX ORDER — CARVEDILOL PHOSPHATE 80 MG/1
25 CAPSULE, EXTENDED RELEASE ORAL EVERY 12 HOURS
Refills: 0 | Status: DISCONTINUED | OUTPATIENT
Start: 2023-11-08 | End: 2023-11-08

## 2023-11-08 RX ORDER — INSULIN GLARGINE 100 [IU]/ML
20 INJECTION, SOLUTION SUBCUTANEOUS
Qty: 0 | Refills: 0 | DISCHARGE
Start: 2023-11-08

## 2023-11-08 RX ORDER — DEXTROSE 50 % IN WATER 50 %
15 SYRINGE (ML) INTRAVENOUS ONCE
Refills: 0 | Status: DISCONTINUED | OUTPATIENT
Start: 2023-11-08 | End: 2023-11-08

## 2023-11-08 RX ORDER — MYCOPHENOLATE MOFETIL 250 MG/1
1000 CAPSULE ORAL
Refills: 0 | Status: DISCONTINUED | OUTPATIENT
Start: 2023-11-08 | End: 2023-11-08

## 2023-11-08 RX ORDER — GLUCAGON INJECTION, SOLUTION 0.5 MG/.1ML
1 INJECTION, SOLUTION SUBCUTANEOUS ONCE
Refills: 0 | Status: DISCONTINUED | OUTPATIENT
Start: 2023-11-08 | End: 2023-11-08

## 2023-11-08 RX ORDER — VALGANCICLOVIR 450 MG/1
1 TABLET, FILM COATED ORAL
Qty: 0 | Refills: 0 | DISCHARGE
Start: 2023-11-08

## 2023-11-08 RX ORDER — INSULIN LISPRO 100/ML
VIAL (ML) SUBCUTANEOUS
Refills: 0 | Status: DISCONTINUED | OUTPATIENT
Start: 2023-11-08 | End: 2023-11-08

## 2023-11-08 RX ORDER — CARVEDILOL PHOSPHATE 80 MG/1
1 CAPSULE, EXTENDED RELEASE ORAL
Qty: 0 | Refills: 0 | DISCHARGE
Start: 2023-11-08

## 2023-11-08 RX ORDER — NYSTATIN 500MM UNIT
500000 POWDER (EA) MISCELLANEOUS
Refills: 0 | Status: DISCONTINUED | OUTPATIENT
Start: 2023-11-08 | End: 2023-11-08

## 2023-11-08 RX ORDER — TAMSULOSIN HYDROCHLORIDE 0.4 MG/1
0.4 CAPSULE ORAL AT BEDTIME
Refills: 0 | Status: DISCONTINUED | OUTPATIENT
Start: 2023-11-08 | End: 2023-11-08

## 2023-11-08 RX ORDER — INSULIN LISPRO 100/ML
6 VIAL (ML) SUBCUTANEOUS ONCE
Refills: 0 | Status: COMPLETED | OUTPATIENT
Start: 2023-11-08 | End: 2023-11-08

## 2023-11-08 RX ORDER — NIFEDIPINE 30 MG
30 TABLET, EXTENDED RELEASE 24 HR ORAL
Refills: 0 | Status: DISCONTINUED | OUTPATIENT
Start: 2023-11-08 | End: 2023-11-08

## 2023-11-08 RX ORDER — NIFEDIPINE 30 MG
2 TABLET, EXTENDED RELEASE 24 HR ORAL
Qty: 0 | Refills: 0 | DISCHARGE
Start: 2023-11-08

## 2023-11-08 RX ORDER — INSULIN GLARGINE 100 [IU]/ML
20 INJECTION, SOLUTION SUBCUTANEOUS ONCE
Refills: 0 | Status: COMPLETED | OUTPATIENT
Start: 2023-11-08 | End: 2023-11-08

## 2023-11-08 RX ORDER — TAMSULOSIN HYDROCHLORIDE 0.4 MG/1
1 CAPSULE ORAL
Qty: 30 | Refills: 3
Start: 2023-11-08

## 2023-11-08 RX ORDER — PANTOPRAZOLE SODIUM 20 MG/1
40 TABLET, DELAYED RELEASE ORAL
Refills: 0 | Status: DISCONTINUED | OUTPATIENT
Start: 2023-11-08 | End: 2023-11-08

## 2023-11-08 RX ORDER — ATORVASTATIN CALCIUM 80 MG/1
40 TABLET, FILM COATED ORAL AT BEDTIME
Refills: 0 | Status: DISCONTINUED | OUTPATIENT
Start: 2023-11-08 | End: 2023-11-08

## 2023-11-08 RX ORDER — INSULIN LISPRO 100/ML
8 VIAL (ML) SUBCUTANEOUS
Refills: 0 | Status: DISCONTINUED | OUTPATIENT
Start: 2023-11-08 | End: 2023-11-08

## 2023-11-08 RX ORDER — SODIUM CHLORIDE 9 MG/ML
1000 INJECTION, SOLUTION INTRAVENOUS
Refills: 0 | Status: DISCONTINUED | OUTPATIENT
Start: 2023-11-08 | End: 2023-11-08

## 2023-11-08 RX ADMIN — Medication 3: at 08:30

## 2023-11-08 RX ADMIN — MYCOPHENOLATE MOFETIL 1000 MILLIGRAM(S): 250 CAPSULE ORAL at 11:53

## 2023-11-08 RX ADMIN — Medication 1 TABLET(S): at 11:53

## 2023-11-08 RX ADMIN — MYCOPHENOLATE MOFETIL 1000 MILLIGRAM(S): 250 CAPSULE ORAL at 17:54

## 2023-11-08 RX ADMIN — Medication 8 UNIT(S): at 11:56

## 2023-11-08 RX ADMIN — INSULIN GLARGINE 20 UNIT(S): 100 INJECTION, SOLUTION SUBCUTANEOUS at 02:55

## 2023-11-08 RX ADMIN — VALGANCICLOVIR 450 MILLIGRAM(S): 450 TABLET, FILM COATED ORAL at 11:53

## 2023-11-08 RX ADMIN — Medication 30 MILLIGRAM(S): at 11:57

## 2023-11-08 RX ADMIN — Medication 500000 UNIT(S): at 17:55

## 2023-11-08 RX ADMIN — Medication 1: at 11:55

## 2023-11-08 RX ADMIN — Medication 30 MILLIGRAM(S): at 17:54

## 2023-11-08 RX ADMIN — Medication 6 UNIT(S): at 00:53

## 2023-11-08 RX ADMIN — Medication 30 MILLIGRAM(S): at 16:28

## 2023-11-08 RX ADMIN — Medication 1: at 18:21

## 2023-11-08 RX ADMIN — Medication 500000 UNIT(S): at 06:40

## 2023-11-08 RX ADMIN — CARVEDILOL PHOSPHATE 25 MILLIGRAM(S): 80 CAPSULE, EXTENDED RELEASE ORAL at 16:28

## 2023-11-08 RX ADMIN — CARVEDILOL PHOSPHATE 25 MILLIGRAM(S): 80 CAPSULE, EXTENDED RELEASE ORAL at 06:39

## 2023-11-08 RX ADMIN — Medication 5 MILLIGRAM(S): at 06:41

## 2023-11-08 RX ADMIN — Medication 8 UNIT(S): at 08:28

## 2023-11-08 RX ADMIN — Medication 500000 UNIT(S): at 11:52

## 2023-11-08 NOTE — H&P ADULT - NSHPPHYSICALEXAM_GEN_ALL_CORE
Physical exam:  General; NAD  Respiratory: nonlabored breathing  Abdomen: soft, ND, NT. No rebound or guarding  : hinds in place, hematuria   Extremities: WWP

## 2023-11-08 NOTE — DISCHARGE NOTE PROVIDER - NSDCFUSCHEDAPPT_GEN_ALL_CORE_FT
Verito Bae  [facility]  [address]  Scheduled Appointment: 11/22/2023    Verito Bae  [facility]  [address]  Scheduled Appointment: 12/06/2023    Verito Bae  [facility]  [address]  Scheduled Appointment: 12/19/2023    Verito Bae  [facility]  [address]  Scheduled Appointment: 01/03/2024    Edin Johnson  [facility]  CARDIOLOGY [address] [facility]   Scheduled Appointment: 01/17/2024    Verito Bae  [facility]  [address]  Scheduled Appointment: 01/24/2024

## 2023-11-08 NOTE — H&P ADULT - HISTORY OF PRESENT ILLNESS
68 Greek speaking M former smoker with past medical history significant for GERD, HTN, HLD, anemia of chronic disease, T2DM, CAD s/p stent x3 (2014 at [place]) & x2 (pLCx 10/28/22, LAD 10/31/22, off of plavix as of 5/1/23), CVA and ESRD (diagnosed Jan 2019) on HD via LUE AVF T/Th/S (Nephrologist Dr Huff) with recent AV fistulogram 7/2023 with cephalic stenosis s/p balloon angioplasty. Past surgical history significant for open cholecystectomy, AVF creation and eye surgery in the past.  s/p R DDRT under Simulect on 9/17/23 with good graft function. Patient had ureteral stent removed today. Passed trial of void in the PACU and had 2 voids after surgery. However could not void after 2pm and has been passing clots. Felt increasing more distended and came in the ED.     Bedside ultrasound showed distended bladder with 600cc of fluid. Labs significant for K 5.5, Cr 1.34 and glucose 568.

## 2023-11-08 NOTE — DISCHARGE NOTE NURSING/CASE MANAGEMENT/SOCIAL WORK - PATIENT PORTAL LINK FT
You can access the FollowMyHealth Patient Portal offered by [facility] by registering at the following website: [email or web address]. By joining [facility] FollowMyHealth portal, you will also be able to view your health information using other applications (apps) compatible with our system.

## 2023-11-08 NOTE — DISCHARGE NOTE PROVIDER - NSDCCPCAREPLAN_GEN_ALL_CORE_FT
PRINCIPAL DISCHARGE DIAGNOSIS  Diagnosis: Acute urinary retention  Assessment and Plan of Treatment: Discharged home with hinds catheter in place  Follow up on      SECONDARY DISCHARGE DIAGNOSES  Diagnosis: Hyperglycemia  Assessment and Plan of Treatment: Uncongrolled blood sugar levels can lead to poor wound healing and other complications. Follow a low carb and low sugar diet. Continue to take all anti-diabetic medications/insulin as prescribed. Follow up with your Primary Care Doctor regularly for blood sugar/A1c checks. Follow up with an opthalmologist and a podiatrist on an annual basis.      Diagnosis: Renal transplant recipient  Assessment and Plan of Treatment: Contact our Transplant clinic at [phone], return to our Emergency Department or NOTIFY YOUR NEPHROLOGIST IF:  You have any bleeding that does not stop, any fever (over 100.4 F) or chills, persistent nausea/vomiting with inability to tolerate food or liquids, persistent diarrhea, and/or if you develop severe abdominal pain, confusion, any signs of bleeding, any urinary changes.

## 2023-11-08 NOTE — CONSULT NOTE ADULT - SUBJECTIVE AND OBJECTIVE BOX
TRANSPLANT SURGERY CONSULT NOTE  Consulting surgical team: Transplant Surgery  Consulting attending:  Dr. Sharma     HPI:  HPI:   68 Divehi speaking M former smoker with past medical history significant for GERD, HTN, HLD, anemia of chronic disease, T2DM, CAD s/p stent x3 (2014 at [place]) & x2 (pLCx 10/28/22, LAD 10/31/22, off of plavix as of 5/1/23), CVA and ESRD (diagnosed Jan 2019) on HD via LUE AVF T/Th/S (Nephrologist Dr Huff) with recent AV fistulogram 7/2023 with cephalic stenosis s/p balloon angioplasty. Past surgical history significant for open cholecystectomy, AVF creation and eye surgery in the past.  s/p R DDRT under Simulect on 9/17/23 with good graft function. Patient had ureteral stent removed today. Passed trial of void in the PACU and had 2 voids after surgery. However could not void after 2pm and has been passing clots. Felt increasing more distended and came in the ED.     Bedside ultrasound showed distended bladder with 600cc of fluid.     PAST MEDICAL HISTORY:  Diabetes    HTN (hypertension)    CKD (chronic kidney disease)    Coronary artery disease    CAP (community acquired pneumonia)    CAD (coronary artery disease)    Diabetes mellitus    GERD (gastroesophageal reflux disease)    Stented coronary artery    ESRD (end stage renal disease)    Hypercholesterolemia    Cerebrovascular accident (CVA), unspecified mechanism    Anemia due to stage 5 chronic kidney disease, not on chronic dialysis        PAST SURGICAL HISTORY:  No significant past surgical history    H/O coronary angiogram    AV fistula    H/O eye surgery        SOCIAL HISTORY:  - Denies EtOH abuse, smoking, IVDA    MEDICATIONS:      ALLERGIES:  No Known Allergies      VITALS & I/Os:  Vital Signs Last 24 Hrs  T(C): 36.8 (07 Nov 2023 21:35), Max: 36.8 (07 Nov 2023 21:35)  T(F): 98.2 (07 Nov 2023 21:35), Max: 98.2 (07 Nov 2023 21:35)  HR: 88 (07 Nov 2023 21:35) (59 - 88)  BP: 181/77 (07 Nov 2023 21:35) (125/64 - 181/77)  BP(mean): --  RR: 18 (07 Nov 2023 21:35) (14 - 18)  SpO2: 99% (07 Nov 2023 21:35) (99% - 100%)    Parameters below as of 07 Nov 2023 21:35  Patient On (Oxygen Delivery Method): room air        I&O's Summary      PHYSICAL EXAM:  GEN: resting comfortably in bed, in NAD  RESP: no acute respiratory distress, breathing comfortably   ABD: soft, non-distended, mild suprapubic tenderness   EXT:  WWP, SUÁREZ   NEURO:  no focal neuro deficits     LABS:                        12.6   12.06 )-----------( 165      ( 07 Nov 2023 23:03 )             40.6           Lactate:                  IMAGING:  
[facility]   MD JORDAN AVALOS MD RUORU WONG, PA    TEL:  FROM 9 AM to 5 PM--OFFICE: [phone]  AVAILABLE oN TEAMS   FROM 5 PM- 9 AM PLEASE CALL ANSWERING SERVICE AT [phone]    -- INITIAL RENAL CONSULT NOTE --- Date Of service 11-08-23 @ 11:42  --------------------------------------------------------------------------------  HPI:  HPI:   68 Ukrainian speaking M former smoker with past medical history significant for GERD, HTN, HLD, anemia of chronic disease, T2DM, CAD s/p stent x3 (2014 at [place]) & x2 (pLCx 10/28/22, LAD 10/31/22, off of plavix as of 5/1/23), CVA and ESRD (diagnosed Jan 2019) on HD via LUE AVF T/Th/S (Nephrologist Dr Huff) with recent AV fistulogram 7/2023 with cephalic stenosis s/p balloon angioplasty. Past surgical history significant for open cholecystectomy, AVF creation and eye surgery in the past.  s/p R DDRT under Simulect on 9/17/23 with good graft function. Patient had ureteral stent removed today. Passed trial of void in the PACU and had 2 voids after surgery. However could not void after 2pm and has been passing clots. Felt increasing more distended and came in the ED.     Bedside ultrasound showed distended bladder with 600cc of fluid. Labs significant for K 5.5, Cr 1.34 and glucose 568.  (08 Nov 2023 00:01)        PAST HISTORY  --------------------------------------------------------------------------------  PAST MEDICAL & SURGICAL HISTORY:  HTN (hypertension)      Coronary artery disease      CAP (community acquired pneumonia)  6/18      Diabetes mellitus  type 2      GERD (gastroesophageal reflux disease)      Stented coronary artery  2014, 3 stents, [facility]      ESRD (end stage renal disease)  on Dialysis( M/W/F), By Dr. Huff      Hypercholesterolemia      Cerebrovascular accident (CVA), unspecified mechanism  diagnosed via CT of the head      Anemia due to stage 5 chronic kidney disease, not on chronic dialysis      H/O coronary angiogram  2014 - x3 stents      AV fistula  10/12/18 L radiocephalic AV fistula      H/O eye surgery        FAMILY HISTORY:    PAST SOCIAL HISTORY:    ALLERGIES & MEDICATIONS  --------------------------------------------------------------------------------  Allergies    No Known Allergies    Intolerances      Standing Inpatient Medications  atorvastatin 40 milliGRAM(s) Oral at bedtime  carvedilol 25 milliGRAM(s) Oral every 12 hours  dextrose 5%. 1000 milliLiter(s) IV Continuous <Continuous>  dextrose 5%. 1000 milliLiter(s) IV Continuous <Continuous>  dextrose 50% Injectable 25 Gram(s) IV Push once  dextrose 50% Injectable 12.5 Gram(s) IV Push once  dextrose 50% Injectable 25 Gram(s) IV Push once  glucagon  Injectable 1 milliGRAM(s) IntraMuscular once  insulin glargine Injectable (LANTUS) 20 Unit(s) SubCutaneous at bedtime  insulin lispro (ADMELOG) corrective regimen sliding scale   SubCutaneous at bedtime  insulin lispro (ADMELOG) corrective regimen sliding scale   SubCutaneous three times a day before meals  insulin lispro Injectable (ADMELOG) 8 Unit(s) SubCutaneous three times a day before meals  mycophenolate mofetil 1000 milliGRAM(s) Oral two times a day  NIFEdipine XL 30 milliGRAM(s) Oral two times a day  nystatin    Suspension 037189 Unit(s) Oral four times a day  pantoprazole    Tablet 40 milliGRAM(s) Oral before breakfast  predniSONE   Tablet 5 milliGRAM(s) Oral daily  tamsulosin 0.4 milliGRAM(s) Oral at bedtime  trimethoprim   80 mG/sulfamethoxazole 400 mG 1 Tablet(s) Oral daily  valGANciclovir 450 milliGRAM(s) Oral daily    PRN Inpatient Medications  dextrose Oral Gel 15 Gram(s) Oral once PRN      REVIEW OF SYSTEMS  --------------------------------------------------------------------------------  Gen: No fevers/chills  Skin: No rashes  Head/Eyes/Ears: Normal hearing,  Normal vision   Respiratory: No dyspnea, cough  CV: No chest pain  GI: No abdominal pain, diarrhea, constipation, nausea, vomiting  : No dysuria, hematuria  MSK: No  edema  Heme: No easy bruising or bleeding  Psych: No significant depression    All other systems were reviewed and are negative, except as noted.    VITALS/PHYSICAL EXAM  --------------------------------------------------------------------------------  T(C): 36.8 (11-08-23 @ 09:31), Max: 36.8 (11-07-23 @ 21:35)  HR: 69 (11-08-23 @ 09:31) (61 - 88)  BP: 159/71 (11-08-23 @ 09:31) (131/59 - 181/77)  RR: 17 (11-08-23 @ 09:31) (14 - 18)  SpO2: 100% (11-08-23 @ 09:31) (99% - 100%)  Wt(kg): --  Height (cm): 169.5 (11-07-23 @ 20:30)  Weight (kg): 65.771 (11-07-23 @ 20:30)  BMI (kg/m2): 22.9 (11-07-23 @ 20:30)  BSA (m2): 1.76 (11-07-23 @ 20:30)      11-08-23 @ 07:01  -  11-08-23 @ 11:42  --------------------------------------------------------  IN: 0 mL / OUT: 1700 mL / NET: -1700 mL      Physical Exam:  	Gen: NAD  	HEENT: MMM  	Pulm: CTA B/L  	CV: S1S2  	Abd: Soft, +BS   	Ext: No LE edema B/L  	Neuro: Awake, alert  	Skin: Warm and dry  	Vascular access: No HD catheter           : no  lizet  LABS/STUDIES  --------------------------------------------------------------------------------              12.6   12.06 >-----------<  165      [11-07-23 @ 23:03]              40.6     133  |  98  |  24  ----------------------------<  528      [11-07-23 @ 23:03]  5.5   |  19  |  1.34        Ca     10.0     [11-07-23 @ 23:03]    TPro  8.1  /  Alb  4.9  /  TBili  0.9  /  DBili  x   /  AST  17  /  ALT  26  /  AlkPhos  106  [11-07-23 @ 23:03]    PT/INR: PT 10.3 , INR 0.93       [11-07-23 @ 23:03]  PTT: 28.2       [11-07-23 @ 23:03]      Creatinine Trend:  SCr 1.34 [11-07 @ 23:03]    Urinalysis - [11-08-23 @ 01:17]      Color Red / Appearance Slightly Cloudy / SG 1.021 / pH 6.5      Gluc >=1000 / Ketone Negative  / Bili Negative / Urobili 0.2       Blood Large / Protein 300 / Leuk Est Small / Nitrite Negative      RBC >1900 / WBC 9 / Hyaline  / Gran  / Sq Epi  / Non Sq Epi 1 / Bacteria Negative      HbA1c 7.8      [12-17-19 @ 05:54]    HBsAb 6.6      [09-16-23 @ 04:41]  HBsAb Nonreactive      [01-10-19 @ 17:00]  HBsAg Nonreact      [09-16-23 @ 04:41]  HBcAb Nonreact      [09-16-23 @ 04:41]  HCV 0.10, Nonreact      [09-16-23 @ 04:41]  HIV Nonreact      [09-16-23 @ 04:41]

## 2023-11-08 NOTE — H&P ADULT - ATTENDING COMMENTS
s/p DDRT with good renal function, s/p removal of ureteral stent 11/7/23 with urinary retention and hyperglycemia  admitted for glycemic control with glucose 500  hinds catheter for retention, plan for discharge with leg bag and removal in the office  insulin  endocrinology eval

## 2023-11-08 NOTE — DISCHARGE NOTE PROVIDER - PROVIDER TOKENS
PROVIDER:[TOKEN:[[id number]],SCHEDULEDAPPT:[11/22/2023]],FREE:[LAST:[Transplant Clinic],PHONE:[phone]:[(   )    -],ADDRESS:[[address] [place] [address]],FOLLOWUP:[1-3 days]] PROVIDER:[TOKEN:[[id number]],SCHEDULEDAPPT:[11/22/2023]],FREE:[LAST:[Transplant Clinic],PHONE:[phone]],FAX:[(   )    -],ADDRESS:[[address] [place] [address]],SCHEDULEDAPPT:[11/14/2023]]

## 2023-11-08 NOTE — CONSULT NOTE ADULT - ASSESSMENT
68 Welsh speaking M former smoker with past medical history significant for GERD, HTN, HLD, anemia of chronic disease, T2DM, CAD s/p stent x3 (2014 at [place]) & x2 (pLCx 10/28/22, LAD 10/31/22, off of plavix as of 5/1/23), CVA and ESRD (diagnosed Jan 2019) on HD via LUE AVF T/Th/S (Nephrologist Dr Huff) with recent AV fistulogram 7/2023 with cephalic stenosis s/p balloon angioplasty. Past surgical history significant for open cholecystectomy, AVF creation and eye surgery in the past.  s/p R DDRT under Simulect on 9/17/23 with good graft function. Patient had ureteral stent removed today presents with urinary retention     Plan:  - Please insert hinds and can be discharged with hinds   - Please discharge with flomax  - Patient to follow up with Dr. Sharma on Thursday     Plan discussed with Dr. Sharma     [id number]
s/p R DDRT under Simulect on 9/17/23   Continue immunossuppressants per transplant team  Monitor BMP     Hyperkalemia  sec to urinary retention , bactrim, hyperglycemia  recommend optimize glucose,  lokelma 10gm one dose  repeat bmp in pm  repeat bladder scan s/p straight cath  low k diet

## 2023-11-08 NOTE — DISCHARGE NOTE PROVIDER - HOSPITAL COURSE
69 y/o Lithuanian speaking M former smoker with past medical history significant for GERD, HTN, HLD, anemia of chronic disease, T2DM, CAD s/p stent x3 (2014 at [place]) & x2 (pLCx 10/28/22, LAD 10/31/22, off of plavix as of 5/1/23), CVA and ESRD (diagnosed Jan 2019) on HD via LUE AVF T/Th/S (Nephrologist Dr Huff) with recent AV fistulogram 7/2023 with cephalic stenosis s/p balloon angioplasty. Past surgical history significant for open cholecystectomy, AVF creation and eye surgery in the past.  s/p R DDRT under Simulect on 9/17/23 with good graft function. Patient had ureteral stent removed today. Passed trial of void in the PACU and had 2 voids after surgery. However could not void after 2pm and has been passing clots. Felt increasing more distended and came in the ED.     Bedside ultrasound showed distended bladder with 600cc of fluid. Labs significant for K 5.5, Cr 1.34 and glucose 568.   Hinds catheter was placed, improvement in bloody urine and retention was resolved after hinds insertion. Restarted home insulin regimen with improvement in glycemic control.     Patient seen by the multidisciplinary renal transplant team and deemed stable for discharge home with hinds in place. He will follow up in 2 days with transplant clinic at [address] [place] [address] ([phone]) for repeat TOV.    67 y/o Luxembourgish speaking M former smoker with past medical history significant for GERD, HTN, HLD, anemia of chronic disease, T2DM, CAD s/p stent x3 (2014 at [place]) & x2 (pLCx 10/28/22, LAD 10/31/22, off of plavix as of 5/1/23), CVA and ESRD (diagnosed Jan 2019) on HD via LUE AVF T/Th/S (Nephrologist Dr Huff) with recent AV fistulogram 7/2023 with cephalic stenosis s/p balloon angioplasty. Past surgical history significant for open cholecystectomy, AVF creation and eye surgery in the past.  s/p R DDRT under Simulect on 9/17/23 with good graft function. Patient had ureteral stent removed today. Passed trial of void in the PACU and had 2 voids after surgery. However could not void after 2pm and has been passing clots. Felt increasing more distended and came in the ED.     Bedside ultrasound showed distended bladder with 600cc of fluid. Labs significant for K 5.5, Cr 1.34 and glucose 568.   Hinds catheter was placed, improvement in bloody urine and retention was resolved after hinds insertion. Restarted home insulin regimen with improvement in glycemic control.     Patient seen by the multidisciplinary renal transplant team and deemed stable for discharge home with hinds in place. He will follow up on Tuesday 11/14/23 with transplant clinic at [address] [place] [address] ([phone]) for repeat TOV.

## 2023-11-08 NOTE — ED ADULT NURSE REASSESSMENT NOTE - NS ED NURSE REASSESS COMMENT FT1
0722 Pt in ER gold rm 1 left. TBA. No bed yet, A&Ox4. IVL intact without sx of infilt. Grace cath intact draining reddish urine. Minimal pain at present.

## 2023-11-08 NOTE — DISCHARGE NOTE PROVIDER - NSDCMRMEDTOKEN_GEN_ALL_CORE_FT
acetaminophen 325 mg oral tablet: 2 tab(s) orally every 6 hours As needed Temp greater or equal to 38C (100.4F), Mild Pain (1 - 3)  alcohol swabs: Apply topically to affected area 4 times a day  aspirin 81 mg oral delayed release tablet: 1 tab(s) orally once a day  atorvastatin 40 mg oral tablet: 1 tab(s) orally once a day (at bedtime)  carvedilol 25 mg oral tablet: 1 tab(s) orally every 12 hours  glucometer (per patient&#x27;s insurance): Test blood sugars four times a day. Dispense #1 glucometer.  HumaLOG KwikPen 100 units/mL injectable solution: 8 unit(s) injectable 3 times a day  insulin glargine 100 units/mL subcutaneous solution: 20 unit(s) subcutaneous once a day (at bedtime)  Insulin Pen Needles, 4mm: 1 application subcutaneously 4 times a day. ** Use with insulin pen **  lancets: 1 application subcutaneously 4 times a day  linagliptin 5 mg oral tablet: 1 tab(s) orally once a day  mycophenolate mofetil 250 mg oral capsule: 500 milligram(s) orally 2 times a day  NIFEdipine 30 mg oral tablet, extended release: 1 tab(s) orally 2 times a day  nystatin 100,000 units/mL oral suspension: 5 milliliter(s) orally 4 times a day  pantoprazole 40 mg oral delayed release tablet: 1 tab(s) orally every 12 hours  predniSONE 5 mg oral tablet: 1 tab(s) orally once a day  sodium bicarbonate 650 mg oral tablet: 2 tab(s) orally 2 times a day  sulfamethoxazole-trimethoprim 400 mg-80 mg oral tablet: 1 tab(s) orally once a day  tacrolimus 1 mg oral tablet, extended release: 5 milligram(s) orally once a day  tamsulosin 0.4 mg oral capsule: 1 cap(s) orally once a day (at bedtime)  test strips (per patient&#x27;s insurance): 1 application subcutaneously 4 times a day. ** Compatible with patient&#x27;s glucometer **  valGANciclovir 450 mg oral tablet: 1 tab(s) orally once a day

## 2023-11-08 NOTE — H&P ADULT - ASSESSMENT
68 Kyrgyz speaking M former smoker with past medical history significant for GERD, HTN, HLD, anemia of chronic disease, T2DM, CAD s/p stent x3 (2014 at [place]) & x2 (pLCx 10/28/22, LAD 10/31/22, off of plavix as of 5/1/23), CVA and ESRD (diagnosed Jan 2019) on HD via LUE AVF T/Th/S (Nephrologist Dr Huff) with recent AV fistulogram 7/2023 with cephalic stenosis s/p balloon angioplasty. Past surgical history significant for open cholecystectomy, AVF creation and eye surgery in the past.  s/p R DDRT under Simulect on 9/17/23 with good graft function. Patient had ureteral stent removed today presents with urinary retention and hyperglycemia     [ ] hyperglycemia  - takes lantus 20 units and lispro 8 units with meals   - f/u beta hydroxybutyrate and tacrolimus level   - regular diet   - endocrine c/s in AM  - may need insulin gtt if glucose does not respond to subcutaneous insulin     [ ] urinary retention  - c/w hinds   - start flomax qhs   - f/u ua, ucx     [ ] s/p DDRT 9/17/23  - monitor Cr   - monitor urine output   - strict I&O  - SCD/spirometry    [ ] immunosuppression  -Envarsus per level,  MMF 1/1, Pred 5mg po qd  -Valcyte qD/Nystatin/PPI/Bactrim for PPx

## 2023-11-08 NOTE — ED ADULT NURSE REASSESSMENT NOTE - NS ED NURSE REASSESS COMMENT FT1
0820 Fingerstick glucose done by [facility]. [phone]. [phone] 3 Units SQ given with 8Units SQ of AM dose of 8 units. Breakfast given. Pt to go to [facility].

## 2023-11-08 NOTE — H&P ADULT - NSHPLABSRESULTS_GEN_ALL_CORE
LABS:                        12.6   12.06 )-----------( 165      ( 07 Nov 2023 23:03 )             40.6     11-07    133<L>  |  98  |  24<H>  ----------------------------<  528<HH>  5.5<H>   |  19<L>  |  1.34<H>    Ca    10.0      07 Nov 2023 23:03    TPro  8.1  /  Alb  4.9  /  TBili  0.9  /  DBili  x   /  AST  17  /  ALT  26  /  AlkPhos  106  11-07    Lactate:    PT/INR - ( 07 Nov 2023 23:03 )   PT: 10.3 sec;   INR: 0.93 ratio         PTT - ( 07 Nov 2023 23:03 )  PTT:28.2 sec          Urinalysis Basic - ( 07 Nov 2023 23:03 )    Color: x / Appearance: x / SG: x / pH: x  Gluc: 528 mg/dL / Ketone: x  / Bili: x / Urobili: x   Blood: x / Protein: x / Nitrite: x   Leuk Esterase: x / RBC: x / WBC x   Sq Epi: x / Non Sq Epi: x / Bacteria: x        IMAGING:

## 2023-11-08 NOTE — DISCHARGE NOTE PROVIDER - CARE PROVIDER_API CALL
Verito Bae  Nephrology  [address] [place] [address]  Phone: [phone]  Fax: [phone]  Scheduled Appointment: 11/22/2023    Transplant Clinic,   [address] [place] [address]  Phone: [phone]  Fax: (   )    -  Follow Up Time: 1-3 days   Verito Bae  Nephrology  [address] [place] [address]  Phone: [phone]  Fax: [phone]  Scheduled Appointment: 11/22/2023    Transplant Clinic,   [address] [place] [address]  Phone: [phone]  Fax: (   )    -  Scheduled Appointment: 11/14/2023

## 2023-11-09 ENCOUNTER — EMERGENCY (EMERGENCY)
Facility: HOSPITAL | Age: 68
LOS: 1 days | Discharge: HOME | End: 2023-11-09
Attending: STUDENT IN AN ORGANIZED HEALTH CARE EDUCATION/TRAINING PROGRAM
Payer: COMMERCIAL

## 2023-11-09 VITALS
WEIGHT: 141.98 LBS | SYSTOLIC BLOOD PRESSURE: 167 MMHG | RESPIRATION RATE: 20 BRPM | TEMPERATURE: 98 F | HEART RATE: 74 BPM | DIASTOLIC BLOOD PRESSURE: 84 MMHG | HEIGHT: 66.75 IN | OXYGEN SATURATION: 100 %

## 2023-11-09 VITALS
OXYGEN SATURATION: 100 % | HEART RATE: 64 BPM | TEMPERATURE: 98 F | RESPIRATION RATE: 17 BRPM | DIASTOLIC BLOOD PRESSURE: 74 MMHG | SYSTOLIC BLOOD PRESSURE: 147 MMHG

## 2023-11-09 DIAGNOSIS — Z98.890 OTHER SPECIFIED POSTPROCEDURAL STATES: Chronic | ICD-10-CM

## 2023-11-09 DIAGNOSIS — I77.0 ARTERIOVENOUS FISTULA, ACQUIRED: Chronic | ICD-10-CM

## 2023-11-09 LAB
ALBUMIN SERPL ELPH-MCNC: 4.2 G/DL — SIGNIFICANT CHANGE UP (ref 3.3–5)
ALBUMIN SERPL ELPH-MCNC: 4.2 G/DL — SIGNIFICANT CHANGE UP (ref 3.3–5)
ALP SERPL-CCNC: 89 U/L — SIGNIFICANT CHANGE UP (ref 40–120)
ALP SERPL-CCNC: 89 U/L — SIGNIFICANT CHANGE UP (ref 40–120)
ALT FLD-CCNC: 21 U/L — SIGNIFICANT CHANGE UP (ref 10–45)
ALT FLD-CCNC: 21 U/L — SIGNIFICANT CHANGE UP (ref 10–45)
ANION GAP SERPL CALC-SCNC: 13 MMOL/L — SIGNIFICANT CHANGE UP (ref 5–17)
ANION GAP SERPL CALC-SCNC: 13 MMOL/L — SIGNIFICANT CHANGE UP (ref 5–17)
APPEARANCE UR: CLEAR — SIGNIFICANT CHANGE UP
APPEARANCE UR: CLEAR — SIGNIFICANT CHANGE UP
AST SERPL-CCNC: 23 U/L — SIGNIFICANT CHANGE UP (ref 10–40)
AST SERPL-CCNC: 23 U/L — SIGNIFICANT CHANGE UP (ref 10–40)
BACTERIA # UR AUTO: NEGATIVE /HPF — SIGNIFICANT CHANGE UP
BACTERIA # UR AUTO: NEGATIVE /HPF — SIGNIFICANT CHANGE UP
BASOPHILS # BLD AUTO: 0.06 K/UL — SIGNIFICANT CHANGE UP (ref 0–0.2)
BASOPHILS # BLD AUTO: 0.06 K/UL — SIGNIFICANT CHANGE UP (ref 0–0.2)
BASOPHILS NFR BLD AUTO: 0.7 % — SIGNIFICANT CHANGE UP (ref 0–2)
BASOPHILS NFR BLD AUTO: 0.7 % — SIGNIFICANT CHANGE UP (ref 0–2)
BILIRUB SERPL-MCNC: 0.7 MG/DL — SIGNIFICANT CHANGE UP (ref 0.2–1.2)
BILIRUB SERPL-MCNC: 0.7 MG/DL — SIGNIFICANT CHANGE UP (ref 0.2–1.2)
BILIRUB UR-MCNC: NEGATIVE — SIGNIFICANT CHANGE UP
BILIRUB UR-MCNC: NEGATIVE — SIGNIFICANT CHANGE UP
BUN SERPL-MCNC: 16 MG/DL — SIGNIFICANT CHANGE UP (ref 7–23)
BUN SERPL-MCNC: 16 MG/DL — SIGNIFICANT CHANGE UP (ref 7–23)
CALCIUM SERPL-MCNC: 9.7 MG/DL — SIGNIFICANT CHANGE UP (ref 8.4–10.5)
CALCIUM SERPL-MCNC: 9.7 MG/DL — SIGNIFICANT CHANGE UP (ref 8.4–10.5)
CAST: 2 /LPF — SIGNIFICANT CHANGE UP (ref 0–4)
CAST: 2 /LPF — SIGNIFICANT CHANGE UP (ref 0–4)
CHLORIDE SERPL-SCNC: 106 MMOL/L — SIGNIFICANT CHANGE UP (ref 96–108)
CHLORIDE SERPL-SCNC: 106 MMOL/L — SIGNIFICANT CHANGE UP (ref 96–108)
CO2 SERPL-SCNC: 19 MMOL/L — LOW (ref 22–31)
CO2 SERPL-SCNC: 19 MMOL/L — LOW (ref 22–31)
COLOR SPEC: YELLOW — SIGNIFICANT CHANGE UP
COLOR SPEC: YELLOW — SIGNIFICANT CHANGE UP
CREAT SERPL-MCNC: 1.1 MG/DL — SIGNIFICANT CHANGE UP (ref 0.5–1.3)
CREAT SERPL-MCNC: 1.1 MG/DL — SIGNIFICANT CHANGE UP (ref 0.5–1.3)
CULTURE RESULTS: NO GROWTH — SIGNIFICANT CHANGE UP
CULTURE RESULTS: NO GROWTH — SIGNIFICANT CHANGE UP
DIFF PNL FLD: ABNORMAL
DIFF PNL FLD: ABNORMAL
EGFR: 74 ML/MIN/1.73M2 — SIGNIFICANT CHANGE UP
EGFR: 74 ML/MIN/1.73M2 — SIGNIFICANT CHANGE UP
EOSINOPHIL # BLD AUTO: 0.08 K/UL — SIGNIFICANT CHANGE UP (ref 0–0.5)
EOSINOPHIL # BLD AUTO: 0.08 K/UL — SIGNIFICANT CHANGE UP (ref 0–0.5)
EOSINOPHIL NFR BLD AUTO: 0.9 % — SIGNIFICANT CHANGE UP (ref 0–6)
EOSINOPHIL NFR BLD AUTO: 0.9 % — SIGNIFICANT CHANGE UP (ref 0–6)
GLUCOSE SERPL-MCNC: 253 MG/DL — HIGH (ref 70–99)
GLUCOSE SERPL-MCNC: 253 MG/DL — HIGH (ref 70–99)
GLUCOSE UR QL: >=1000 MG/DL
GLUCOSE UR QL: >=1000 MG/DL
HCT VFR BLD CALC: 39.9 % — SIGNIFICANT CHANGE UP (ref 39–50)
HCT VFR BLD CALC: 39.9 % — SIGNIFICANT CHANGE UP (ref 39–50)
HGB BLD-MCNC: 12.4 G/DL — LOW (ref 13–17)
HGB BLD-MCNC: 12.4 G/DL — LOW (ref 13–17)
IMM GRANULOCYTES NFR BLD AUTO: 0.3 % — SIGNIFICANT CHANGE UP (ref 0–0.9)
IMM GRANULOCYTES NFR BLD AUTO: 0.3 % — SIGNIFICANT CHANGE UP (ref 0–0.9)
KETONES UR-MCNC: NEGATIVE MG/DL — SIGNIFICANT CHANGE UP
KETONES UR-MCNC: NEGATIVE MG/DL — SIGNIFICANT CHANGE UP
LEUKOCYTE ESTERASE UR-ACNC: ABNORMAL
LEUKOCYTE ESTERASE UR-ACNC: ABNORMAL
LYMPHOCYTES # BLD AUTO: 1.41 K/UL — SIGNIFICANT CHANGE UP (ref 1–3.3)
LYMPHOCYTES # BLD AUTO: 1.41 K/UL — SIGNIFICANT CHANGE UP (ref 1–3.3)
LYMPHOCYTES # BLD AUTO: 16.3 % — SIGNIFICANT CHANGE UP (ref 13–44)
LYMPHOCYTES # BLD AUTO: 16.3 % — SIGNIFICANT CHANGE UP (ref 13–44)
MCHC RBC-ENTMCNC: 30 PG — SIGNIFICANT CHANGE UP (ref 27–34)
MCHC RBC-ENTMCNC: 30 PG — SIGNIFICANT CHANGE UP (ref 27–34)
MCHC RBC-ENTMCNC: 31.1 GM/DL — LOW (ref 32–36)
MCHC RBC-ENTMCNC: 31.1 GM/DL — LOW (ref 32–36)
MCV RBC AUTO: 96.6 FL — SIGNIFICANT CHANGE UP (ref 80–100)
MCV RBC AUTO: 96.6 FL — SIGNIFICANT CHANGE UP (ref 80–100)
MONOCYTES # BLD AUTO: 0.68 K/UL — SIGNIFICANT CHANGE UP (ref 0–0.9)
MONOCYTES # BLD AUTO: 0.68 K/UL — SIGNIFICANT CHANGE UP (ref 0–0.9)
MONOCYTES NFR BLD AUTO: 7.9 % — SIGNIFICANT CHANGE UP (ref 2–14)
MONOCYTES NFR BLD AUTO: 7.9 % — SIGNIFICANT CHANGE UP (ref 2–14)
NEUTROPHILS # BLD AUTO: 6.39 K/UL — SIGNIFICANT CHANGE UP (ref 1.8–7.4)
NEUTROPHILS # BLD AUTO: 6.39 K/UL — SIGNIFICANT CHANGE UP (ref 1.8–7.4)
NEUTROPHILS NFR BLD AUTO: 73.9 % — SIGNIFICANT CHANGE UP (ref 43–77)
NEUTROPHILS NFR BLD AUTO: 73.9 % — SIGNIFICANT CHANGE UP (ref 43–77)
NITRITE UR-MCNC: NEGATIVE — SIGNIFICANT CHANGE UP
NITRITE UR-MCNC: NEGATIVE — SIGNIFICANT CHANGE UP
NRBC # BLD: 0 /100 WBCS — SIGNIFICANT CHANGE UP (ref 0–0)
NRBC # BLD: 0 /100 WBCS — SIGNIFICANT CHANGE UP (ref 0–0)
PH UR: 6 — SIGNIFICANT CHANGE UP (ref 5–8)
PH UR: 6 — SIGNIFICANT CHANGE UP (ref 5–8)
PLATELET # BLD AUTO: 153 K/UL — SIGNIFICANT CHANGE UP (ref 150–400)
PLATELET # BLD AUTO: 153 K/UL — SIGNIFICANT CHANGE UP (ref 150–400)
POTASSIUM SERPL-MCNC: 4.8 MMOL/L — SIGNIFICANT CHANGE UP (ref 3.5–5.3)
POTASSIUM SERPL-MCNC: 4.8 MMOL/L — SIGNIFICANT CHANGE UP (ref 3.5–5.3)
POTASSIUM SERPL-SCNC: 4.8 MMOL/L — SIGNIFICANT CHANGE UP (ref 3.5–5.3)
POTASSIUM SERPL-SCNC: 4.8 MMOL/L — SIGNIFICANT CHANGE UP (ref 3.5–5.3)
PROT SERPL-MCNC: 7.3 G/DL — SIGNIFICANT CHANGE UP (ref 6–8.3)
PROT SERPL-MCNC: 7.3 G/DL — SIGNIFICANT CHANGE UP (ref 6–8.3)
PROT UR-MCNC: 30 MG/DL
PROT UR-MCNC: 30 MG/DL
RBC # BLD: 4.13 M/UL — LOW (ref 4.2–5.8)
RBC # BLD: 4.13 M/UL — LOW (ref 4.2–5.8)
RBC # FLD: 14 % — SIGNIFICANT CHANGE UP (ref 10.3–14.5)
RBC # FLD: 14 % — SIGNIFICANT CHANGE UP (ref 10.3–14.5)
RBC CASTS # UR COMP ASSIST: 111 /HPF — HIGH (ref 0–4)
RBC CASTS # UR COMP ASSIST: 111 /HPF — HIGH (ref 0–4)
SODIUM SERPL-SCNC: 138 MMOL/L — SIGNIFICANT CHANGE UP (ref 135–145)
SODIUM SERPL-SCNC: 138 MMOL/L — SIGNIFICANT CHANGE UP (ref 135–145)
SP GR SPEC: 1.02 — SIGNIFICANT CHANGE UP (ref 1–1.03)
SP GR SPEC: 1.02 — SIGNIFICANT CHANGE UP (ref 1–1.03)
SPECIMEN SOURCE: SIGNIFICANT CHANGE UP
SPECIMEN SOURCE: SIGNIFICANT CHANGE UP
SQUAMOUS # UR AUTO: 3 /HPF — SIGNIFICANT CHANGE UP (ref 0–5)
SQUAMOUS # UR AUTO: 3 /HPF — SIGNIFICANT CHANGE UP (ref 0–5)
UROBILINOGEN FLD QL: 0.2 MG/DL — SIGNIFICANT CHANGE UP (ref 0.2–1)
UROBILINOGEN FLD QL: 0.2 MG/DL — SIGNIFICANT CHANGE UP (ref 0.2–1)
WBC # BLD: 8.65 K/UL — SIGNIFICANT CHANGE UP (ref 3.8–10.5)
WBC # BLD: 8.65 K/UL — SIGNIFICANT CHANGE UP (ref 3.8–10.5)
WBC # FLD AUTO: 8.65 K/UL — SIGNIFICANT CHANGE UP (ref 3.8–10.5)
WBC # FLD AUTO: 8.65 K/UL — SIGNIFICANT CHANGE UP (ref 3.8–10.5)
WBC UR QL: 18 /HPF — HIGH (ref 0–5)
WBC UR QL: 18 /HPF — HIGH (ref 0–5)

## 2023-11-09 PROCEDURE — 81001 URINALYSIS AUTO W/SCOPE: CPT

## 2023-11-09 PROCEDURE — 99285 EMERGENCY DEPT VISIT HI MDM: CPT

## 2023-11-09 PROCEDURE — 76770 US EXAM ABDO BACK WALL COMP: CPT

## 2023-11-09 PROCEDURE — 99284 EMERGENCY DEPT VISIT MOD MDM: CPT | Mod: 25

## 2023-11-09 PROCEDURE — 85025 COMPLETE CBC W/AUTO DIFF WBC: CPT

## 2023-11-09 PROCEDURE — 99053 MED SERV 10PM-8AM 24 HR FAC: CPT

## 2023-11-09 PROCEDURE — 80053 COMPREHEN METABOLIC PANEL: CPT

## 2023-11-09 PROCEDURE — 87086 URINE CULTURE/COLONY COUNT: CPT

## 2023-11-09 NOTE — ED PROVIDER NOTE - NSFOLLOWUPINSTRUCTIONS_ED_ALL_ED_FT
1. TAKE ALL MEDICATIONS AS DIRECTED.    2. FOR PAIN OR FEVER YOU CAN TAKE ACETAMINOPHEN (TYLENOL) AS NEEDED, AS DIRECTED ON PACKAGING.  3. FOLLOW UP WITH YOUR TRANSPLANT DOCTOR WITHIN 5 DAYS AS DIRECTED.  4. IF YOU HAD LABS OR IMAGING DONE, YOU WERE GIVEN COPIES OF ALL LABS AND/OR IMAGING RESULTS FROM YOUR ER VISIT--PLEASE TAKE THEM WITH YOU TO YOUR FOLLOW UP APPOINTMENTS.  5. RETURN TO THE ER FOR ANY WORSENING SYMPTOMS OR CONCERNS.    How to take care of your Grace catheter  To take care of your Grace catheter, you will need to:    Clean your catheter every day.  Change your drainage bags. You will change your drainage bag 2 times a day:  In the morning, change the night bag to the leg bag.  At night before you go to bed, change the leg bag to the night bag.  Replace your drainage bags with new bags once a week. You should also change your drainage bag if it gets clogged or blocked.  Wash your drainage bags every day.  Drink 1 to 2 glasses of liquids every 2 hours while you’re awake to keep you hydrated.  You may see some blood or urine around where the catheter enters your body. This may happen when you’re walking or having a bowel movement (pooping). This is normal if there’s urine draining into the drainage bag. If you do not have urine draining into the drainage bag, call your healthcare provider.    Back to top  How to comfortably wear your Grace catheter and leg bag  The tubing from your leg bag should fit down to your calf with your leg slightly bent. If you have extra tubing, you may need to cut it. Your healthcare provider will show you how to do this.  Always wear the leg bag below your knee. This will help it drain.  Place the leg bag on your calf using the Velcro® straps your healthcare provider gave you. Use a leg strap to secure the tubing to your thigh.  If the straps leave a josiane on your leg, they are too tight. Loosen them. Leaving the straps too tight can lower your blood flow and cause blood clots.  Use a water-based lubricant (such as Astroglide® or K-Y®) to keep your penis or vagina opening from getting sore.  Keep your penis or vagina opening clean by taking a shower every day. This will help prevent infections when your Grace catheter is in place.  Back to top  How to shower with your Grace catheter  You can shower while you have your catheter in place.  Do not take a bath until your catheter is removed. Taking a bath while you have your catheter in place puts you at risk for infections.  Make sure you always shower with your night bag. Your night bag is waterproof. Do not shower with your leg bag. Your leg bag has cloth on the side and will not dry as fast.  You may find it easier to shower in the morning before you change your night bag to your leg bag.  Back to top  How to clean your Grace catheter  You can clean your catheter while you’re in the shower. Follow these instructions.    Gather your supplies. You will need:  Mild soap, such as Dove®.  1 Cath-Secure®.  Clean your hands with soap and water or an alcohol-based hand .  If you’re washing your hands with soap and water, wet your hands and apply soap. Rub your hands together well for at least 20 seconds, then rinse. Dry your hands with a paper towel. Use that same towel to turn off the faucet.  If you’re using an alcohol-based hand , cover your hands with it. Rub them together until they’re dry.  Using mild soap and water, clean your penis or vagina.  If you have a penis, pull back your foreskin (the skin around the tip of your penis), if needed. Clean the area, including your penis.  If you have a vagina, separate your labia (the smaller folds of skin around your vaginal opening). Clean the area from front to back.  Clean the area where the catheter enters your body. This is called your urethra.  Clean the catheter from where it enters your body and then down, away from your body. Hold the catheter at the point it enters your body so that you do not put tension on it.  Rinse the area well and dry it gently.  If you removed your old Cath-Secure, attach the catheter to your leg with a new Cath-Secure. This will keep the catheter from moving.  Back to top  When to change your drainage bags  You will change your drainage bag 2 times a day. Change it:    In the morning after you shower, change the night bag to the leg bag.  At night before you go to bed, change the leg bag to the night bag.  Replace your drainage bags with new bags once a week. You should also change your drainage bag if it gets clogged or blocked.    Back to top  How to change your drainage bag    This video demonstrates how to change your urinary (Grace) catheter drainage bag.  Video Details  Gather your supplies. You will need:  A clean cloth (not one you’re using for bathing) or a 4 x 4 piece of gauze.  Your night or leg bag (whichever one you are switching to).  2 alcohol pads.  Clean your hands with soap and water or an alcohol-based hand .  If you’re washing your hands with soap and water, wet your hands and apply soap. Rub your hands together well for at least 20 seconds, then rinse. Dry your hands with a paper towel. Use that same towel to turn off the faucet.  If you’re using an alcohol-based hand , cover your hands with it. Rub them together until they’re dry.  Empty the urine from the drainage bag into the toilet. Make sure the spout of the drainage bag never touches the side of the toilet or any emptying container. If it does, wipe it with an alcohol pad for 15 seconds.  Place the clean cloth or gauze under the connector to catch any leakage.  Pinch the catheter with your fingers and disconnect the used bag.  Wipe the end of the catheter with an alcohol pad.  Wipe the connector on the new bag with the second alcohol pad.  Connect the clean bag to the catheter and release your finger pinch. Make sure the catheter is tightly connected to the bag to keep it from opening or leaking.  Check all connections. Straighten any kinks or twists in the tubing.  Back to top  How to take care of your drainage bags  Caring for your leg drainage bag  Empty the leg bag into the toilet every 2 to 4 hours, as needed. You can do this through the spout at the bottom of the bag. Do not let the bag get completely full.  Do not lie down for longer than 2 hours while you’re wearing the leg bag. This can keep your urine from draining the way it should.  Caring for your night drainage bag  Always keep the night bag below the level of your bladder.  When you go to sleep, hang your night bag off the bed. You can do this by using a small trash can. Place a clean plastic bag inside the trash can. Hang your night bag inside of the trash can.  Cleaning your drainage bags  Clean your leg bag and night bag every day. Follow these instructions.    Gather your supplies. You will need:  White vinegar.  Cool water.  Clean your hands with soap and water or an alcohol-based hand .  If you’re washing your hands with soap and water, wet your hands and apply soap. Rub your hands together well for at least 20 seconds, then rinse. Dry your hands with a paper towel. Use that same towel to turn off the faucet.  If you’re using an alcohol-based hand , cover your hands with it. Rub them together until they’re dry.  Rinse the bag with cool water. Do not use hot water because it can damage the plastic.  To help get rid of the smell, fill the bag intermediate with a mixture of 1part white vinegar and 3 parts water. Shake the bag and let it sit for 15 minutes.  If you cannot get the mixture into the bag, try putting the vinegar and water into a measuring cup with a pour spout. Then use the spout to help pour the mixture into the bag.  Rinse the bag with cool water. Hang it up to dry.  Back to top  How to prevent Grace catheter infections  Follow these guidelines to prevent getting infections while you have your catheter in place:    Keep the drainage bag below the level of your bladder.  Always keep your drainage bag off the floor.  Keep the catheter secured to your thigh to keep it from moving.  Do not lie on your catheter or block the flow of urine in the tubing.  Take a shower every day to keep the catheter clean.  Wash your hands before and after touching the catheter or bag.  Back to top  Common questions about caring for your Grace catheter  Can I place a Grace catheter myself?  No. Your catheter is placed by your healthcare provider.    How long can I wear my Grace catheter before it needs to be changed?  Your catheter should be replaced about once a month, if it stops working, or if you have an infection.    Can I poop when I have a Grace catheter?  Yes. Your catheter will not affect your pooping.    Can I get an erection with a Grace catheter?  Yes. You can get an erection with a catheter in place.    Can I drive with a Grace catheter?  You can drive with a catheter unless your healthcare provider gives you other instructions.    Can I exercise with a Grace catheter?  Ask your healthcare provider if you can exercise while you have a Grace catheter in place.    Can I swim with a Grace catheter?  No. You cannot swim with a catheter in place.    Can I fly on an airplane with a Grace catheter?  Yes. You can fly on an airplane with a catheter.    Back to top  When to call your healthcare provider  Call your healthcare provider right away if:    Your catheter comes out. Do not try to put it back in yourself.  You have a fever of 101°F (38.3 °C) or higher.  You’re making less urine than usual.  You do not have urine draining into your drainage bag.  Your urine smells bad.  You have bright red blood or large blood clots in your urine.  You have abdominal (belly) pain and no urine in your catheter bag.

## 2023-11-09 NOTE — ED ADULT NURSE NOTE - NSFALLUNIVINTERV_ED_ALL_ED
Bed/Stretcher in lowest position, wheels locked, appropriate side rails in place/Call bell, personal items and telephone in reach/Instruct patient to call for assistance before getting out of bed/chair/stretcher/Non-slip footwear applied when patient is off stretcher/[place] to call system/Physically safe environment - no spills, clutter or unnecessary equipment/Purposeful proactive rounding/Room/bathroom lighting operational, light cord in reach

## 2023-11-09 NOTE — ED PROVIDER NOTE - ATTENDING CONTRIBUTION TO CARE
I, Jorden Beach, performed a history and physical exam of the patient and discussed their management with the resident and/or advanced care provider. I reviewed the resident and/or advanced care provider's note and agree with the documented findings and plan of care. I was present and available for all procedures.    68M s/p ddrt 2 months ago, seen and evaluated yesterday in the emergency department by same team, briefly admitted after indwelling urethral catheter placed for acute urinary obstruction in the setting of hematuria due to ureteral stent removal, presents to the emergency department again due to no output from his catheter. He endorses suprapubic discomfort at this time.  No other new symptoms reported.    Well appearing and in NAD, head normal appearing atraumatic, trachea midline, no respiratory distress, lungs cta bilaterally, rrr no murmurs, soft NT mild sp distention abdomen, no visible extremity deformities, Alert and oriented, non focal neuro exam, skin warm and dry, normal affect and mood, no leg swelling, calf ttp or jvd, hinds bag no uop    Hinds catheter with no output likely secondary to acute hematuria causing clot retention otherwise point-of-care ultrasound verifying this without large amount of residual clot in bladder gently aspirated 20 cc of clots otherwise clear urine since we will monitor send screening labs urine analysis clearance from transplant anticipate discharge with outpatient follow-up discussed with patient agreed with plan

## 2023-11-09 NOTE — ED ADULT TRIAGE NOTE - CHIEF COMPLAINT QUOTE
kidney transplant in September, seen yesterday for urinary retention, had catheter placed, now catheter not draining.

## 2023-11-09 NOTE — ED ADULT NURSE NOTE - NSICDXPASTMEDICALHX_GEN_ALL_CORE_FT
PAST MEDICAL HISTORY:  Anemia due to stage 5 chronic kidney disease, not on chronic dialysis     CAP (community acquired pneumonia) 6/18    Cerebrovascular accident (CVA), unspecified mechanism diagnosed via CT of the head    Coronary artery disease     Diabetes mellitus type 2    ESRD (end stage renal disease) on Dialysis( M/W/F), By Dr. Huff    GERD (gastroesophageal reflux disease)     HTN (hypertension)     Hypercholesterolemia     Stented coronary artery 2014, 3 stents, [facility]

## 2023-11-09 NOTE — ED PROVIDER NOTE - PATIENT PORTAL LINK FT
You can access the FollowMyHealth Patient Portal offered by [facility] by registering at the following website: [email or web address]. By joining [employer]’s FollowMyHealth portal, you will also be able to view your health information using other applications (apps) compatible with our system.

## 2023-11-09 NOTE — ED PROVIDER NOTE - PHYSICAL EXAMINATION
General: alert, oriented to person, time, place  Psych: mood appropriate  Head: normocephalic; atraumatic  Eyes: conjunctivae clear bilaterally, sclerae anicteric  ENT: no nasal flaring, patent nares  Cardio: non-tachycardic; skin warm and well perfused  Resp: normal respiratory effort; no accessory muscle use  GI: supbrapubic disfomfort  Neuro: normal sensation, moving all four extremities equally  Skin: No evidence of rash or bruising  MSK: normal movement of all extremities  Lymph/Vasc: no LE edema

## 2023-11-09 NOTE — ED ADULT NURSE NOTE - OBJECTIVE STATEMENT
68M s/p ddrt 2 months ago, seen and evaluated yesterday in the emergency department by same team, briefly admitted after indwelling urethral catheter placed for acute urinary obstruction in the setting of hematuria due to ureteral stent removal, presents to the emergency department again due to no output from his catheter. He endorses suprapubic discomfort at this time.  No other new symptoms reported. Denies SOB, CP, N/V/D. A&Ox4 airway patent with spontaneous unlabored breathing, fistula noted to left arm. Safety maintained bed in lowest position and locked.

## 2023-11-09 NOTE — ED PROVIDER NOTE - NSICDXPASTMEDICALHX_GEN_ALL_CORE_FT
PAST MEDICAL HISTORY:  Anemia due to stage 5 chronic kidney disease, not on chronic dialysis     CAP (community acquired pneumonia) 6/18    Cerebrovascular accident (CVA), unspecified mechanism diagnosed via CT of the head    Coronary artery disease     Diabetes mellitus type 2    ESRD (end stage renal disease) on Dialysis( M/W/F), By Dr. Huff    GERD (gastroesophageal reflux disease)     HTN (hypertension)     Hypercholesterolemia     Stented coronary artery 2014, 3 stents, [facility]

## 2023-11-09 NOTE — ED PROVIDER NOTE - CLINICAL SUMMARY MEDICAL DECISION MAKING FREE TEXT BOX
68 male here for obstruction of Grace catheter placed yesterday by same team.  We will flush catheter to attempt to dislodge clots.  We will obtain lab work to assess for acute kidney injury and infection.  Transplant team to be made aware of patient's return. 68 male here for obstruction of Grace catheter placed yesterday by same team.  We will flush catheter to attempt to dislodge clots.  We will obtain lab work to assess for acute kidney injury and infection.  Transplant team to be made aware of patient's return.    pettet attending- see attending attestation for my mdm

## 2023-11-09 NOTE — ED PROVIDER NOTE - OBJECTIVE STATEMENT
68M s/p ddrt 2 months ago, seen and evaluated yesterday in the emergency department by same team, briefly admitted after indwelling urethral catheter placed for acute urinary obstruction in the setting of hematuria due to ureteral stent removal, presents to the emergency department again due to no output from his catheter. He endorses suprapubic discomfort at this time.  No other new symptoms reported.

## 2023-11-09 NOTE — ED PROVIDER NOTE - PROGRESS NOTE DETAILS
Branden Naqvi MD: Clots cleared from catheter, patient now has good urine output.  Lab work not consistent with acute kidney injury.  Urinalysis pending at this time.  Spoke with transplant team, they are comfortable with patient being discharged and following up in office as originally scheduled. [facility] ED Attending- Patient feels well, tolerating PO. Discussed lab findings with patient. Patient feels comfortable going home. Gave home care and follow up instructions. Discussed which symptoms to look out for and when to return to the ED for further evaluation. Patient given opportunity to ask questions about their medical condition and had all questions answered.

## 2023-11-10 LAB
CULTURE RESULTS: NO GROWTH — SIGNIFICANT CHANGE UP
CULTURE RESULTS: NO GROWTH — SIGNIFICANT CHANGE UP
SPECIMEN SOURCE: SIGNIFICANT CHANGE UP
SPECIMEN SOURCE: SIGNIFICANT CHANGE UP

## 2023-11-13 PROCEDURE — 81001 URINALYSIS AUTO W/SCOPE: CPT

## 2023-11-13 PROCEDURE — 82330 ASSAY OF CALCIUM: CPT

## 2023-11-13 PROCEDURE — 82010 KETONE BODYS QUAN: CPT

## 2023-11-13 PROCEDURE — 97530 THERAPEUTIC ACTIVITIES: CPT

## 2023-11-13 PROCEDURE — 87086 URINE CULTURE/COLONY COUNT: CPT

## 2023-11-13 PROCEDURE — 82435 ASSAY OF BLOOD CHLORIDE: CPT

## 2023-11-13 PROCEDURE — 85610 PROTHROMBIN TIME: CPT

## 2023-11-13 PROCEDURE — 86850 RBC ANTIBODY SCREEN: CPT

## 2023-11-13 PROCEDURE — 83690 ASSAY OF LIPASE: CPT

## 2023-11-13 PROCEDURE — 86900 BLOOD TYPING SEROLOGIC ABO: CPT

## 2023-11-13 PROCEDURE — 84100 ASSAY OF PHOSPHORUS: CPT

## 2023-11-13 PROCEDURE — 99285 EMERGENCY DEPT VISIT HI MDM: CPT

## 2023-11-13 PROCEDURE — 82803 BLOOD GASES ANY COMBINATION: CPT

## 2023-11-13 PROCEDURE — 82962 GLUCOSE BLOOD TEST: CPT

## 2023-11-13 PROCEDURE — 80197 ASSAY OF TACROLIMUS: CPT

## 2023-11-13 PROCEDURE — 93005 ELECTROCARDIOGRAM TRACING: CPT

## 2023-11-13 PROCEDURE — 85018 HEMOGLOBIN: CPT

## 2023-11-13 PROCEDURE — 87635 SARS-COV-2 COVID-19 AMP PRB: CPT

## 2023-11-13 PROCEDURE — 71045 X-RAY EXAM CHEST 1 VIEW: CPT

## 2023-11-13 PROCEDURE — 86704 HEP B CORE ANTIBODY TOTAL: CPT

## 2023-11-13 PROCEDURE — 80053 COMPREHEN METABOLIC PANEL: CPT

## 2023-11-13 PROCEDURE — 86901 BLOOD TYPING SEROLOGIC RH(D): CPT

## 2023-11-13 PROCEDURE — 83735 ASSAY OF MAGNESIUM: CPT

## 2023-11-13 PROCEDURE — 83605 ASSAY OF LACTIC ACID: CPT

## 2023-11-13 PROCEDURE — 97161 PT EVAL LOW COMPLEX 20 MIN: CPT

## 2023-11-13 PROCEDURE — 97116 GAIT TRAINING THERAPY: CPT

## 2023-11-13 PROCEDURE — 87522 HEPATITIS C REVRS TRNSCRPJ: CPT

## 2023-11-13 PROCEDURE — 36415 COLL VENOUS BLD VENIPUNCTURE: CPT

## 2023-11-13 PROCEDURE — 82947 ASSAY GLUCOSE BLOOD QUANT: CPT

## 2023-11-13 PROCEDURE — C1769: CPT

## 2023-11-13 PROCEDURE — 86706 HEP B SURFACE ANTIBODY: CPT

## 2023-11-13 PROCEDURE — 87340 HEPATITIS B SURFACE AG IA: CPT

## 2023-11-13 PROCEDURE — 85025 COMPLETE CBC W/AUTO DIFF WBC: CPT

## 2023-11-13 PROCEDURE — 84132 ASSAY OF SERUM POTASSIUM: CPT

## 2023-11-13 PROCEDURE — 85730 THROMBOPLASTIN TIME PARTIAL: CPT

## 2023-11-13 PROCEDURE — 87040 BLOOD CULTURE FOR BACTERIA: CPT

## 2023-11-13 PROCEDURE — 85014 HEMATOCRIT: CPT

## 2023-11-13 PROCEDURE — 76776 US EXAM K TRANSPL W/DOPPLER: CPT

## 2023-11-13 PROCEDURE — C9399: CPT

## 2023-11-13 PROCEDURE — 99261: CPT

## 2023-11-13 PROCEDURE — 83036 HEMOGLOBIN GLYCOSYLATED A1C: CPT

## 2023-11-13 PROCEDURE — 84295 ASSAY OF SERUM SODIUM: CPT

## 2023-11-13 PROCEDURE — 87389 HIV-1 AG W/HIV-1&-2 AB AG IA: CPT

## 2023-11-13 PROCEDURE — C2617: CPT

## 2023-11-13 PROCEDURE — C1889: CPT

## 2023-11-13 PROCEDURE — 86769 SARS-COV-2 COVID-19 ANTIBODY: CPT

## 2023-11-13 PROCEDURE — G0378: CPT

## 2023-11-13 PROCEDURE — 88313 SPECIAL STAINS GROUP 2: CPT

## 2023-11-13 PROCEDURE — 82570 ASSAY OF URINE CREATININE: CPT

## 2023-11-13 PROCEDURE — 88305 TISSUE EXAM BY PATHOLOGIST: CPT

## 2023-11-13 PROCEDURE — 99285 EMERGENCY DEPT VISIT HI MDM: CPT | Mod: 25

## 2023-11-13 PROCEDURE — 86803 HEPATITIS C AB TEST: CPT

## 2023-11-14 ENCOUNTER — APPOINTMENT (OUTPATIENT)
Dept: TRANSPLANT | Facility: CLINIC | Age: 68
End: 2023-11-14

## 2023-11-14 VITALS
SYSTOLIC BLOOD PRESSURE: 127 MMHG | HEIGHT: 66 IN | HEART RATE: 65 BPM | OXYGEN SATURATION: 100 % | TEMPERATURE: 99 F | WEIGHT: 143 LBS | BODY MASS INDEX: 22.98 KG/M2 | DIASTOLIC BLOOD PRESSURE: 69 MMHG

## 2023-11-14 RX ORDER — TAMSULOSIN HYDROCHLORIDE 0.4 MG/1
0.4 CAPSULE ORAL
Qty: 30 | Refills: 5 | Status: ACTIVE | COMMUNITY
Start: 2023-11-14 | End: 1900-01-01

## 2023-11-16 ENCOUNTER — NON-APPOINTMENT (OUTPATIENT)
Age: 68
End: 2023-11-16

## 2023-11-16 LAB
ALBUMIN SERPL ELPH-MCNC: 4.3 G/DL
ALP BLD-CCNC: 91 U/L
ALT SERPL-CCNC: 31 U/L
ANION GAP SERPL CALC-SCNC: 13 MMOL/L
APPEARANCE: CLEAR
AST SERPL-CCNC: 23 U/L
BACTERIA: ABNORMAL /HPF
BASOPHILS # BLD AUTO: 0.06 K/UL
BASOPHILS NFR BLD AUTO: 0.7 %
BILIRUB SERPL-MCNC: 0.6 MG/DL
BILIRUBIN URINE: NEGATIVE
BKV DNA SPEC QL NAA+PROBE: NOT DETECTED IU/ML
BLOOD URINE: ABNORMAL
BUN SERPL-MCNC: 19 MG/DL
CALCIUM SERPL-MCNC: 9.4 MG/DL
CAST: 0 /LPF
CHLORIDE SERPL-SCNC: 105 MMOL/L
CO2 SERPL-SCNC: 22 MMOL/L
COLOR: YELLOW
CREAT SERPL-MCNC: 1.48 MG/DL
CREAT SPEC-SCNC: 99 MG/DL
CREAT/PROT UR: 0.8 RATIO
EGFR: 52 ML/MIN/1.73M2
EOSINOPHIL # BLD AUTO: 0.1 K/UL
EOSINOPHIL NFR BLD AUTO: 1.1 %
EPITHELIAL CELLS: 0 /HPF
GLUCOSE QUALITATIVE U: 500 MG/DL
GLUCOSE SERPL-MCNC: 145 MG/DL
HCT VFR BLD CALC: 38.6 %
HGB BLD-MCNC: 11.9 G/DL
IMM GRANULOCYTES NFR BLD AUTO: 0.6 %
KETONES URINE: NEGATIVE MG/DL
LEUKOCYTE ESTERASE URINE: NEGATIVE
LYMPHOCYTES # BLD AUTO: 1.34 K/UL
LYMPHOCYTES NFR BLD AUTO: 15.4 %
MAGNESIUM SERPL-MCNC: 1.2 MG/DL
MAN DIFF?: NORMAL
MCHC RBC-ENTMCNC: 30.1 PG
MCHC RBC-ENTMCNC: 30.8 GM/DL
MCV RBC AUTO: 97.5 FL
MICROSCOPIC-UA: NORMAL
MONOCYTES # BLD AUTO: 0.53 K/UL
MONOCYTES NFR BLD AUTO: 6.1 %
NEUTROPHILS # BLD AUTO: 6.64 K/UL
NEUTROPHILS NFR BLD AUTO: 76.1 %
NITRITE URINE: NEGATIVE
PH URINE: 6.5
PHOSPHATE SERPL-MCNC: 2.8 MG/DL
PLATELET # BLD AUTO: 166 K/UL
POTASSIUM SERPL-SCNC: 5 MMOL/L
PROT SERPL-MCNC: 6.9 G/DL
PROT UR-MCNC: 77 MG/DL
PROTEIN URINE: 100 MG/DL
RBC # BLD: 3.96 M/UL
RBC # FLD: 13.7 %
RED BLOOD CELLS URINE: 10 /HPF
SODIUM SERPL-SCNC: 141 MMOL/L
SPECIFIC GRAVITY URINE: 1.02
TACROLIMUS SERPL-MCNC: 54 NG/ML
TACROLIMUS SERPL-MCNC: 7.8 NG/ML
URATE SERPL-MCNC: 7.7 MG/DL
UROBILINOGEN URINE: 0.2 MG/DL
WBC # FLD AUTO: 8.72 K/UL
WHITE BLOOD CELLS URINE: 0 /HPF

## 2023-11-17 LAB
ALBUMIN SERPL ELPH-MCNC: 4.3 G/DL
ANION GAP SERPL CALC-SCNC: 12 MMOL/L
BUN SERPL-MCNC: 14 MG/DL
CALCIUM SERPL-MCNC: 9.5 MG/DL
CHLORIDE SERPL-SCNC: 105 MMOL/L
CO2 SERPL-SCNC: 25 MMOL/L
CREAT SERPL-MCNC: 1.26 MG/DL
EGFR: 63 ML/MIN/1.73M2
GLUCOSE SERPL-MCNC: 98 MG/DL
PHOSPHATE SERPL-MCNC: 2.9 MG/DL
POTASSIUM SERPL-SCNC: 4.8 MMOL/L
SODIUM SERPL-SCNC: 141 MMOL/L
TACROLIMUS SERPL-MCNC: 15.5 NG/ML

## 2023-11-22 ENCOUNTER — APPOINTMENT (OUTPATIENT)
Dept: NEPHROLOGY | Facility: CLINIC | Age: 68
End: 2023-11-22
Payer: MEDICARE

## 2023-11-22 ENCOUNTER — NON-APPOINTMENT (OUTPATIENT)
Age: 68
End: 2023-11-22

## 2023-11-22 VITALS
WEIGHT: 142 LBS | OXYGEN SATURATION: 98 % | BODY MASS INDEX: 22.82 KG/M2 | HEIGHT: 66 IN | DIASTOLIC BLOOD PRESSURE: 73 MMHG | SYSTOLIC BLOOD PRESSURE: 133 MMHG | HEART RATE: 63 BPM | TEMPERATURE: 98.3 F | RESPIRATION RATE: 17 BRPM

## 2023-11-22 DIAGNOSIS — E11.29 TYPE 2 DIABETES MELLITUS WITH OTHER DIABETIC KIDNEY COMPLICATION: ICD-10-CM

## 2023-11-22 LAB
ALBUMIN SERPL ELPH-MCNC: 4.6 G/DL
ALP BLD-CCNC: 85 U/L
ALT SERPL-CCNC: 15 U/L
ANION GAP SERPL CALC-SCNC: 10 MMOL/L
APPEARANCE: CLEAR
AST SERPL-CCNC: 15 U/L
BACTERIA: NEGATIVE /HPF
BASOPHILS # BLD AUTO: 0.05 K/UL
BASOPHILS NFR BLD AUTO: 0.6 %
BILIRUB SERPL-MCNC: 0.6 MG/DL
BILIRUBIN URINE: NEGATIVE
BLOOD URINE: NEGATIVE
BUN SERPL-MCNC: 15 MG/DL
CALCIUM SERPL-MCNC: 9.9 MG/DL
CAST: 0 /LPF
CHLORIDE SERPL-SCNC: 105 MMOL/L
CO2 SERPL-SCNC: 23 MMOL/L
COLOR: YELLOW
CREAT SERPL-MCNC: 1.14 MG/DL
CREAT SPEC-SCNC: 43 MG/DL
CREAT/PROT UR: 0.3 RATIO
EGFR: 71 ML/MIN/1.73M2
EOSINOPHIL # BLD AUTO: 0.07 K/UL
EOSINOPHIL NFR BLD AUTO: 0.8 %
EPITHELIAL CELLS: 0 /HPF
GLUCOSE QUALITATIVE U: >=1000 MG/DL
GLUCOSE SERPL-MCNC: 338 MG/DL
HCT VFR BLD CALC: 38.3 %
HGB BLD-MCNC: 12.2 G/DL
IMM GRANULOCYTES NFR BLD AUTO: 0.2 %
KETONES URINE: NEGATIVE MG/DL
LEUKOCYTE ESTERASE URINE: NEGATIVE
LYMPHOCYTES # BLD AUTO: 1.21 K/UL
LYMPHOCYTES NFR BLD AUTO: 14.5 %
MAGNESIUM SERPL-MCNC: 1.4 MG/DL
MAN DIFF?: NORMAL
MCHC RBC-ENTMCNC: 30.2 PG
MCHC RBC-ENTMCNC: 31.9 GM/DL
MCV RBC AUTO: 94.8 FL
MICROSCOPIC-UA: NORMAL
MONOCYTES # BLD AUTO: 0.57 K/UL
MONOCYTES NFR BLD AUTO: 6.8 %
NEUTROPHILS # BLD AUTO: 6.41 K/UL
NEUTROPHILS NFR BLD AUTO: 77.1 %
NITRITE URINE: NEGATIVE
PH URINE: 6.5
PHOSPHATE SERPL-MCNC: 2.4 MG/DL
PLATELET # BLD AUTO: 179 K/UL
POTASSIUM SERPL-SCNC: 5.4 MMOL/L
PROT SERPL-MCNC: 7.3 G/DL
PROT UR-MCNC: 11 MG/DL
PROTEIN URINE: NORMAL MG/DL
RBC # BLD: 4.04 M/UL
RBC # FLD: 13.4 %
RED BLOOD CELLS URINE: 0 /HPF
SODIUM SERPL-SCNC: 138 MMOL/L
SPECIFIC GRAVITY URINE: 1.02
TACROLIMUS SERPL-MCNC: 11.6 NG/ML
URATE SERPL-MCNC: 6.3 MG/DL
UROBILINOGEN URINE: 0.2 MG/DL
WBC # FLD AUTO: 8.33 K/UL
WHITE BLOOD CELLS URINE: 0 /HPF

## 2023-11-22 PROCEDURE — 99214 OFFICE O/P EST MOD 30 MIN: CPT

## 2023-11-26 LAB
BKV DNA SPEC QL NAA+PROBE: NOT DETECTED IU/ML
CMV DNA SPEC QL NAA+PROBE: NOT DETECTED IU/ML
CMVPCR LOG: NOT DETECTED LOG10IU/ML

## 2023-11-28 NOTE — PROGRESS NOTE ADULT - PROBLEM SELECTOR PLAN 3
- c/w home carvedilol 3.125mg  - c/w home hydralazine 50mg BID  - c/w home nifedipine 90mg  - c/w home isosorbide dinitrate 10mg TID  - c/w home clonidine 0.1mg Female

## 2023-12-06 ENCOUNTER — APPOINTMENT (OUTPATIENT)
Dept: TRANSPLANT | Facility: CLINIC | Age: 68
End: 2023-12-06

## 2023-12-06 ENCOUNTER — APPOINTMENT (OUTPATIENT)
Dept: NEPHROLOGY | Facility: CLINIC | Age: 68
End: 2023-12-06

## 2023-12-06 RX ORDER — CARVEDILOL 25 MG/1
25 TABLET, FILM COATED ORAL
Qty: 60 | Refills: 3 | Status: COMPLETED | COMMUNITY
Start: 2023-10-03 | End: 2023-12-06

## 2023-12-06 RX ORDER — REPAGLINIDE 1 MG/1
1 TABLET ORAL
Qty: 180 | Refills: 3 | Status: COMPLETED | COMMUNITY
Start: 2023-09-25 | End: 2023-12-06

## 2023-12-06 RX ORDER — CALCITRIOL 0.25 UG/1
0.25 CAPSULE, LIQUID FILLED ORAL
Refills: 0 | Status: COMPLETED | COMMUNITY
End: 2023-12-06

## 2023-12-06 RX ORDER — CLONIDINE HYDROCHLORIDE 0.1 MG/1
0.1 TABLET ORAL TWICE DAILY
Qty: 180 | Refills: 3 | Status: COMPLETED | COMMUNITY
End: 2023-12-06

## 2023-12-06 RX ORDER — CIPROFLOXACIN HYDROCHLORIDE 500 MG/1
500 TABLET, FILM COATED ORAL
Qty: 14 | Refills: 0 | Status: COMPLETED | COMMUNITY
Start: 2023-10-03 | End: 2023-12-06

## 2023-12-06 RX ORDER — INSULIN LISPRO 100 U/ML
100 INJECTION, SOLUTION SUBCUTANEOUS TWICE DAILY
Qty: 5 | Refills: 0 | Status: COMPLETED | COMMUNITY
Start: 2018-11-07 | End: 2023-12-06

## 2023-12-06 RX ORDER — LINAGLIPTIN 5 MG/1
5 TABLET, FILM COATED ORAL DAILY
Qty: 30 | Refills: 11 | Status: COMPLETED | COMMUNITY
Start: 2023-09-25 | End: 2023-12-06

## 2023-12-06 RX ORDER — SODIUM BICARBONATE 650 MG/1
650 TABLET ORAL
Refills: 0 | Status: COMPLETED | COMMUNITY
End: 2023-12-06

## 2023-12-06 RX ORDER — POLYETHYLENE GLYCOL 3350 AND ELECTROLYTES WITH LEMON FLAVOR 236; 22.74; 6.74; 5.86; 2.97 G/4L; G/4L; G/4L; G/4L; G/4L
236 POWDER, FOR SOLUTION ORAL
Qty: 1 | Refills: 0 | Status: COMPLETED | COMMUNITY
Start: 2018-10-29 | End: 2023-12-06

## 2023-12-06 RX ORDER — RANITIDINE HYDROCHLORIDE 150 MG/1
150 CAPSULE ORAL
Refills: 0 | Status: COMPLETED | COMMUNITY
End: 2023-12-06

## 2023-12-06 RX ORDER — CLOPIDOGREL BISULFATE 75 MG/1
75 TABLET, FILM COATED ORAL
Qty: 90 | Refills: 2 | Status: COMPLETED | COMMUNITY
Start: 2019-02-26 | End: 2023-12-06

## 2023-12-07 ENCOUNTER — NON-APPOINTMENT (OUTPATIENT)
Age: 68
End: 2023-12-07

## 2023-12-07 LAB
ALBUMIN SERPL ELPH-MCNC: 4.6 G/DL
ALP BLD-CCNC: 83 U/L
ALT SERPL-CCNC: 11 U/L
ANION GAP SERPL CALC-SCNC: 11 MMOL/L
APPEARANCE: CLEAR
AST SERPL-CCNC: 12 U/L
BACTERIA: NEGATIVE /HPF
BASOPHILS # BLD AUTO: 0.05 K/UL
BASOPHILS NFR BLD AUTO: 0.7 %
BILIRUB SERPL-MCNC: 0.6 MG/DL
BILIRUBIN URINE: NEGATIVE
BKV DNA SPEC QL NAA+PROBE: NOT DETECTED IU/ML
BLOOD URINE: NEGATIVE
BUN SERPL-MCNC: 17 MG/DL
CALCIUM SERPL-MCNC: 10 MG/DL
CAST: 0 /LPF
CHLORIDE SERPL-SCNC: 105 MMOL/L
CO2 SERPL-SCNC: 23 MMOL/L
COLOR: YELLOW
CREAT SERPL-MCNC: 1.36 MG/DL
CREAT SPEC-SCNC: 82 MG/DL
CREAT/PROT UR: 0.4 RATIO
EGFR: 57 ML/MIN/1.73M2
EOSINOPHIL # BLD AUTO: 0.09 K/UL
EOSINOPHIL NFR BLD AUTO: 1.2 %
EPITHELIAL CELLS: 0 /HPF
GLUCOSE QUALITATIVE U: >=1000 MG/DL
GLUCOSE SERPL-MCNC: 250 MG/DL
HCT VFR BLD CALC: 42.9 %
HGB BLD-MCNC: 13.4 G/DL
IMM GRANULOCYTES NFR BLD AUTO: 0.3 %
KETONES URINE: NEGATIVE MG/DL
LEUKOCYTE ESTERASE URINE: NEGATIVE
LYMPHOCYTES # BLD AUTO: 1.25 K/UL
LYMPHOCYTES NFR BLD AUTO: 16.8 %
MAGNESIUM SERPL-MCNC: 1.5 MG/DL
MAN DIFF?: NORMAL
MCHC RBC-ENTMCNC: 28.8 PG
MCHC RBC-ENTMCNC: 31.2 GM/DL
MCV RBC AUTO: 92.1 FL
MICROSCOPIC-UA: NORMAL
MONOCYTES # BLD AUTO: 0.52 K/UL
MONOCYTES NFR BLD AUTO: 7 %
NEUTROPHILS # BLD AUTO: 5.49 K/UL
NEUTROPHILS NFR BLD AUTO: 74 %
NITRITE URINE: NEGATIVE
PH URINE: 6.5
PHOSPHATE SERPL-MCNC: 2.5 MG/DL
PLATELET # BLD AUTO: 166 K/UL
POTASSIUM SERPL-SCNC: 5.7 MMOL/L
PROT SERPL-MCNC: 7.4 G/DL
PROT UR-MCNC: 30 MG/DL
PROTEIN URINE: 30 MG/DL
RBC # BLD: 4.66 M/UL
RBC # FLD: 13.3 %
RED BLOOD CELLS URINE: 1 /HPF
SODIUM SERPL-SCNC: 139 MMOL/L
SPECIFIC GRAVITY URINE: 1.02
TACROLIMUS SERPL-MCNC: 12.7 NG/ML
URATE SERPL-MCNC: 6.1 MG/DL
UROBILINOGEN URINE: 0.2 MG/DL
WBC # FLD AUTO: 7.42 K/UL
WHITE BLOOD CELLS URINE: 0 /HPF

## 2023-12-08 NOTE — DISCHARGE NOTE PROVIDER - CARE PROVIDERS DIRECT ADDRESSES
Pt here for eval of laceration to left pointer finger. Pt reports that he was cooking dinner tonight when a glass bottle fell; pt was trying to catch it but the glass shattered cutting his finger. Pt has wrapped lac in gauze and ace wrap from home. No noted bleeding through home dressing.     Triage Assessment (Adult)       Row Name 12/07/23 6980          Triage Assessment    Airway WDL WDL        Respiratory WDL    Respiratory WDL WDL        Cardiac WDL    Cardiac WDL WDL        Peripheral/Neurovascular WDL    Peripheral Neurovascular WDL WDL        Cognitive/Neuro/Behavioral WDL    Cognitive/Neuro/Behavioral WDL WDL                     
[email or web address]

## 2023-12-19 ENCOUNTER — APPOINTMENT (OUTPATIENT)
Dept: NEPHROLOGY | Facility: CLINIC | Age: 68
End: 2023-12-19
Payer: MEDICARE

## 2023-12-19 VITALS
RESPIRATION RATE: 17 BRPM | SYSTOLIC BLOOD PRESSURE: 183 MMHG | BODY MASS INDEX: 23.14 KG/M2 | DIASTOLIC BLOOD PRESSURE: 79 MMHG | HEIGHT: 66 IN | OXYGEN SATURATION: 100 % | WEIGHT: 144 LBS | TEMPERATURE: 96.6 F | HEART RATE: 62 BPM

## 2023-12-19 PROCEDURE — 99214 OFFICE O/P EST MOD 30 MIN: CPT

## 2023-12-19 RX ORDER — NYSTATIN 100000 [USP'U]/ML
100000 SUSPENSION ORAL 4 TIMES DAILY
Qty: 2 | Refills: 2 | Status: DISCONTINUED | COMMUNITY
Start: 2023-09-18 | End: 2023-12-19

## 2023-12-19 NOTE — ASSESSMENT
[FreeTextEntry1] : 68 year old male with ESRD was on IHD since 2019 now s/p s/p DDRT on 9/16/23. Donor details -Male in his 30s, Terminal creatinine: 6.2, KDPI 36%, cPRA 23%. Cold ischemia time: 14 hs13 mins,  1. s/p DDRT on 9/16/23 - Allograft function with good UOP and Cr is trending down well. 2. IS meds- Simulect induction. On Envarsus for goal level 8-10 , MMF 1 gm po bid and prednisone 5 mg po daily. 3. HTN - on Coreg 25 mg po bid and Nifedipine 30 mg po bid.  4. DM - increase Lantus to 24 units at night and continue Lispro 10 units tid with meals.   
show

## 2023-12-19 NOTE — HISTORY OF PRESENT ILLNESS
[FreeTextEntry1] : 68 year old male  with ESRD was on IHD since 2019 now s/p s/p DDRT on  9/16/23. Nephrologist Dr Capellan.  Other PMH includes- former smoker, GERD, HTN, HLD, anemia of chronic disease, T2DM, CAD s/p stent x3 (2014 at [facility]) & x2 (pLCx 10/28/22, LAD 10/31/22, off of plavix as of 5/1/23), CVA , AV fistulogram 7/2023 with cephalic stenosis s/p balloon angioplasty.  OSH- open cholecystectomy, AVF creation and eye surgery.    Donor details -Male in his 30s, Terminal creatinine: 6.2, KDPI 36%, cPRA 23%.Both donor and recipient: blood type O, CMV+, EBV+. Cold ischemia time: 14 hs13 mins, Warm ischemia time: 42 mins OR details- right kidney, single arterial opening into aorta with very early bifurcation, single vein, single ureter. The right renal vein was reconstructed with donor IVC in a horizontal fashion with running 6-0 Prolene sutures.   Was recently admitted for hyperglycemia and started on Lantus and lispro. Found to have UTI , completed Abx course.  BP is now well controlled. BS is better controlled now but FBS is high in 200's  Has good UOP  Denies any fever, chills, dysuria, LE edema, cough, sob, chest pain , nausea, vomiting , diarrhea, constipation or any other active complaints.

## 2023-12-20 ENCOUNTER — NON-APPOINTMENT (OUTPATIENT)
Age: 68
End: 2023-12-20

## 2023-12-20 LAB
ALBUMIN SERPL ELPH-MCNC: 4.6 G/DL
ALP BLD-CCNC: 82 U/L
ALT SERPL-CCNC: 15 U/L
ANION GAP SERPL CALC-SCNC: 10 MMOL/L
APPEARANCE: CLEAR
AST SERPL-CCNC: 13 U/L
BACTERIA: NEGATIVE /HPF
BASOPHILS # BLD AUTO: 0.05 K/UL
BASOPHILS NFR BLD AUTO: 0.8 %
BILIRUB SERPL-MCNC: 0.7 MG/DL
BILIRUBIN URINE: NEGATIVE
BLOOD URINE: NEGATIVE
BUN SERPL-MCNC: 19 MG/DL
CALCIUM SERPL-MCNC: 10 MG/DL
CAST: 0 /LPF
CHLORIDE SERPL-SCNC: 106 MMOL/L
CMV DNA SPEC QL NAA+PROBE: NOT DETECTED IU/ML
CMVPCR LOG: NOT DETECTED LOG10IU/ML
CO2 SERPL-SCNC: 22 MMOL/L
COLOR: YELLOW
CREAT SERPL-MCNC: 1.35 MG/DL
CREAT SPEC-SCNC: 47 MG/DL
CREAT/PROT UR: 0.3 RATIO
EGFR: 58 ML/MIN/1.73M2
EOSINOPHIL # BLD AUTO: 0.07 K/UL
EOSINOPHIL NFR BLD AUTO: 1.1 %
EPITHELIAL CELLS: 0 /HPF
GLUCOSE QUALITATIVE U: >=1000 MG/DL
GLUCOSE SERPL-MCNC: 254 MG/DL
HCT VFR BLD CALC: 43 %
HGB BLD-MCNC: 13.5 G/DL
IMM GRANULOCYTES NFR BLD AUTO: 0.2 %
KETONES URINE: NEGATIVE MG/DL
LEUKOCYTE ESTERASE URINE: NEGATIVE
LYMPHOCYTES # BLD AUTO: 1.14 K/UL
LYMPHOCYTES NFR BLD AUTO: 17.6 %
MAGNESIUM SERPL-MCNC: 1.7 MG/DL
MAN DIFF?: NORMAL
MCHC RBC-ENTMCNC: 29 PG
MCHC RBC-ENTMCNC: 31.4 GM/DL
MCV RBC AUTO: 92.3 FL
MICROSCOPIC-UA: NORMAL
MONOCYTES # BLD AUTO: 0.73 K/UL
MONOCYTES NFR BLD AUTO: 11.3 %
NEUTROPHILS # BLD AUTO: 4.48 K/UL
NEUTROPHILS NFR BLD AUTO: 69 %
NITRITE URINE: NEGATIVE
PH URINE: 6
PHOSPHATE SERPL-MCNC: 2.9 MG/DL
PLATELET # BLD AUTO: 161 K/UL
POTASSIUM SERPL-SCNC: 5.1 MMOL/L
PROT SERPL-MCNC: 7.1 G/DL
PROT UR-MCNC: 16 MG/DL
PROTEIN URINE: NORMAL MG/DL
RBC # BLD: 4.66 M/UL
RBC # FLD: 12.9 %
RED BLOOD CELLS URINE: 0 /HPF
SODIUM SERPL-SCNC: 139 MMOL/L
SPECIFIC GRAVITY URINE: 1.02
TACROLIMUS SERPL-MCNC: 11.4 NG/ML
URATE SERPL-MCNC: 6.5 MG/DL
UROBILINOGEN URINE: 0.2 MG/DL
WBC # FLD AUTO: 6.48 K/UL
WHITE BLOOD CELLS URINE: 0 /HPF

## 2023-12-27 LAB — BKV DNA SPEC QL NAA+PROBE: NOT DETECTED IU/ML

## 2023-12-30 ENCOUNTER — EMERGENCY (EMERGENCY)
Facility: HOSPITAL | Age: 68
LOS: 1 days | Discharge: ANOTHER HOSPITAL | End: 2023-12-30
Attending: EMERGENCY MEDICINE | Admitting: EMERGENCY MEDICINE
Payer: MEDICARE

## 2023-12-30 ENCOUNTER — INPATIENT (INPATIENT)
Facility: HOSPITAL | Age: 68
LOS: 3 days | Discharge: HOME | DRG: 392 | End: 2024-01-03
Payer: COMMERCIAL

## 2023-12-30 VITALS
TEMPERATURE: 98 F | SYSTOLIC BLOOD PRESSURE: 190 MMHG | OXYGEN SATURATION: 97 % | HEIGHT: 66.75 IN | DIASTOLIC BLOOD PRESSURE: 78 MMHG | RESPIRATION RATE: 18 BRPM | HEART RATE: 88 BPM | WEIGHT: 143.3 LBS

## 2023-12-30 VITALS — TEMPERATURE: 98 F

## 2023-12-30 VITALS
HEART RATE: 67 BPM | HEIGHT: 66.75 IN | RESPIRATION RATE: 22 BRPM | TEMPERATURE: 98 F | OXYGEN SATURATION: 99 % | DIASTOLIC BLOOD PRESSURE: 79 MMHG | SYSTOLIC BLOOD PRESSURE: 184 MMHG

## 2023-12-30 DIAGNOSIS — Z98.890 OTHER SPECIFIED POSTPROCEDURAL STATES: Chronic | ICD-10-CM

## 2023-12-30 DIAGNOSIS — I77.0 ARTERIOVENOUS FISTULA, ACQUIRED: Chronic | ICD-10-CM

## 2023-12-30 LAB
ALBUMIN SERPL ELPH-MCNC: 4.3 G/DL — SIGNIFICANT CHANGE UP (ref 3.3–5)
ALBUMIN SERPL ELPH-MCNC: 4.3 G/DL — SIGNIFICANT CHANGE UP (ref 3.3–5)
ALP SERPL-CCNC: 68 U/L — SIGNIFICANT CHANGE UP (ref 40–120)
ALP SERPL-CCNC: 68 U/L — SIGNIFICANT CHANGE UP (ref 40–120)
ALT FLD-CCNC: SIGNIFICANT CHANGE UP U/L (ref 4–41)
ALT FLD-CCNC: SIGNIFICANT CHANGE UP U/L (ref 4–41)
ANION GAP SERPL CALC-SCNC: 2 MMOL/L — LOW (ref 7–14)
ANION GAP SERPL CALC-SCNC: 2 MMOL/L — LOW (ref 7–14)
APTT BLD: 26.8 SEC — SIGNIFICANT CHANGE UP (ref 24.5–35.6)
APTT BLD: 26.8 SEC — SIGNIFICANT CHANGE UP (ref 24.5–35.6)
AST SERPL-CCNC: SIGNIFICANT CHANGE UP U/L (ref 4–40)
AST SERPL-CCNC: SIGNIFICANT CHANGE UP U/L (ref 4–40)
B PERT DNA SPEC QL NAA+PROBE: SIGNIFICANT CHANGE UP
B PERT DNA SPEC QL NAA+PROBE: SIGNIFICANT CHANGE UP
B PERT+PARAPERT DNA PNL SPEC NAA+PROBE: SIGNIFICANT CHANGE UP
B PERT+PARAPERT DNA PNL SPEC NAA+PROBE: SIGNIFICANT CHANGE UP
BASE EXCESS BLDV CALC-SCNC: -4.5 MMOL/L — LOW (ref -2–3)
BASE EXCESS BLDV CALC-SCNC: -4.5 MMOL/L — LOW (ref -2–3)
BASOPHILS # BLD AUTO: 0.04 K/UL — SIGNIFICANT CHANGE UP (ref 0–0.2)
BASOPHILS # BLD AUTO: 0.04 K/UL — SIGNIFICANT CHANGE UP (ref 0–0.2)
BASOPHILS NFR BLD AUTO: 0.2 % — SIGNIFICANT CHANGE UP (ref 0–2)
BASOPHILS NFR BLD AUTO: 0.2 % — SIGNIFICANT CHANGE UP (ref 0–2)
BILIRUB SERPL-MCNC: 1 MG/DL — SIGNIFICANT CHANGE UP (ref 0.2–1.2)
BILIRUB SERPL-MCNC: 1 MG/DL — SIGNIFICANT CHANGE UP (ref 0.2–1.2)
BLD GP AB SCN SERPL QL: NEGATIVE — SIGNIFICANT CHANGE UP
BLD GP AB SCN SERPL QL: NEGATIVE — SIGNIFICANT CHANGE UP
BLOOD GAS VENOUS COMPREHENSIVE RESULT: SIGNIFICANT CHANGE UP
BLOOD GAS VENOUS COMPREHENSIVE RESULT: SIGNIFICANT CHANGE UP
BORDETELLA PARAPERTUSSIS (RAPRVP): SIGNIFICANT CHANGE UP
BORDETELLA PARAPERTUSSIS (RAPRVP): SIGNIFICANT CHANGE UP
BUN SERPL-MCNC: 22 MG/DL — SIGNIFICANT CHANGE UP (ref 7–23)
BUN SERPL-MCNC: 22 MG/DL — SIGNIFICANT CHANGE UP (ref 7–23)
C PNEUM DNA SPEC QL NAA+PROBE: SIGNIFICANT CHANGE UP
C PNEUM DNA SPEC QL NAA+PROBE: SIGNIFICANT CHANGE UP
CALCIUM SERPL-MCNC: 9.2 MG/DL — SIGNIFICANT CHANGE UP (ref 8.4–10.5)
CALCIUM SERPL-MCNC: 9.2 MG/DL — SIGNIFICANT CHANGE UP (ref 8.4–10.5)
CHLORIDE BLDV-SCNC: 101 MMOL/L — SIGNIFICANT CHANGE UP (ref 96–108)
CHLORIDE BLDV-SCNC: 101 MMOL/L — SIGNIFICANT CHANGE UP (ref 96–108)
CHLORIDE SERPL-SCNC: 95 MMOL/L — LOW (ref 98–107)
CHLORIDE SERPL-SCNC: 95 MMOL/L — LOW (ref 98–107)
CO2 BLDV-SCNC: 21.4 MMOL/L — LOW (ref 22–26)
CO2 BLDV-SCNC: 21.4 MMOL/L — LOW (ref 22–26)
CO2 SERPL-SCNC: 23 MMOL/L — SIGNIFICANT CHANGE UP (ref 22–31)
CO2 SERPL-SCNC: 23 MMOL/L — SIGNIFICANT CHANGE UP (ref 22–31)
CREAT SERPL-MCNC: 0.6 MG/DL — SIGNIFICANT CHANGE UP (ref 0.5–1.3)
CREAT SERPL-MCNC: 0.6 MG/DL — SIGNIFICANT CHANGE UP (ref 0.5–1.3)
EGFR: 106 ML/MIN/1.73M2 — SIGNIFICANT CHANGE UP
EGFR: 106 ML/MIN/1.73M2 — SIGNIFICANT CHANGE UP
EOSINOPHIL # BLD AUTO: 0 K/UL — SIGNIFICANT CHANGE UP (ref 0–0.5)
EOSINOPHIL # BLD AUTO: 0 K/UL — SIGNIFICANT CHANGE UP (ref 0–0.5)
EOSINOPHIL NFR BLD AUTO: 0 % — SIGNIFICANT CHANGE UP (ref 0–6)
EOSINOPHIL NFR BLD AUTO: 0 % — SIGNIFICANT CHANGE UP (ref 0–6)
FLUAV SUBTYP SPEC NAA+PROBE: SIGNIFICANT CHANGE UP
FLUAV SUBTYP SPEC NAA+PROBE: SIGNIFICANT CHANGE UP
FLUBV RNA SPEC QL NAA+PROBE: SIGNIFICANT CHANGE UP
FLUBV RNA SPEC QL NAA+PROBE: SIGNIFICANT CHANGE UP
GAS PNL BLDV: 129 MMOL/L — LOW (ref 136–145)
GAS PNL BLDV: 129 MMOL/L — LOW (ref 136–145)
GAS PNL BLDV: SIGNIFICANT CHANGE UP
GAS PNL BLDV: SIGNIFICANT CHANGE UP
GLUCOSE BLDV-MCNC: 255 MG/DL — HIGH (ref 70–99)
GLUCOSE BLDV-MCNC: 255 MG/DL — HIGH (ref 70–99)
GLUCOSE SERPL-MCNC: 228 MG/DL — HIGH (ref 70–99)
GLUCOSE SERPL-MCNC: 228 MG/DL — HIGH (ref 70–99)
HADV DNA SPEC QL NAA+PROBE: SIGNIFICANT CHANGE UP
HADV DNA SPEC QL NAA+PROBE: SIGNIFICANT CHANGE UP
HCO3 BLDV-SCNC: 20 MMOL/L — LOW (ref 22–29)
HCO3 BLDV-SCNC: 20 MMOL/L — LOW (ref 22–29)
HCOV 229E RNA SPEC QL NAA+PROBE: SIGNIFICANT CHANGE UP
HCOV 229E RNA SPEC QL NAA+PROBE: SIGNIFICANT CHANGE UP
HCOV HKU1 RNA SPEC QL NAA+PROBE: SIGNIFICANT CHANGE UP
HCOV HKU1 RNA SPEC QL NAA+PROBE: SIGNIFICANT CHANGE UP
HCOV NL63 RNA SPEC QL NAA+PROBE: SIGNIFICANT CHANGE UP
HCOV NL63 RNA SPEC QL NAA+PROBE: SIGNIFICANT CHANGE UP
HCOV OC43 RNA SPEC QL NAA+PROBE: SIGNIFICANT CHANGE UP
HCOV OC43 RNA SPEC QL NAA+PROBE: SIGNIFICANT CHANGE UP
HCT VFR BLD CALC: 46.1 % — SIGNIFICANT CHANGE UP (ref 39–50)
HCT VFR BLD CALC: 46.1 % — SIGNIFICANT CHANGE UP (ref 39–50)
HCT VFR BLDA CALC: 47 % — SIGNIFICANT CHANGE UP (ref 39–51)
HCT VFR BLDA CALC: 47 % — SIGNIFICANT CHANGE UP (ref 39–51)
HGB BLD CALC-MCNC: 15.5 G/DL — SIGNIFICANT CHANGE UP (ref 12.6–17.4)
HGB BLD CALC-MCNC: 15.5 G/DL — SIGNIFICANT CHANGE UP (ref 12.6–17.4)
HGB BLD-MCNC: 14.9 G/DL — SIGNIFICANT CHANGE UP (ref 13–17)
HGB BLD-MCNC: 14.9 G/DL — SIGNIFICANT CHANGE UP (ref 13–17)
HMPV RNA SPEC QL NAA+PROBE: SIGNIFICANT CHANGE UP
HMPV RNA SPEC QL NAA+PROBE: SIGNIFICANT CHANGE UP
HPIV1 RNA SPEC QL NAA+PROBE: SIGNIFICANT CHANGE UP
HPIV1 RNA SPEC QL NAA+PROBE: SIGNIFICANT CHANGE UP
HPIV2 RNA SPEC QL NAA+PROBE: SIGNIFICANT CHANGE UP
HPIV2 RNA SPEC QL NAA+PROBE: SIGNIFICANT CHANGE UP
HPIV3 RNA SPEC QL NAA+PROBE: SIGNIFICANT CHANGE UP
HPIV3 RNA SPEC QL NAA+PROBE: SIGNIFICANT CHANGE UP
HPIV4 RNA SPEC QL NAA+PROBE: SIGNIFICANT CHANGE UP
HPIV4 RNA SPEC QL NAA+PROBE: SIGNIFICANT CHANGE UP
IANC: 15.03 K/UL — HIGH (ref 1.8–7.4)
IANC: 15.03 K/UL — HIGH (ref 1.8–7.4)
IMM GRANULOCYTES NFR BLD AUTO: 0.5 % — SIGNIFICANT CHANGE UP (ref 0–0.9)
IMM GRANULOCYTES NFR BLD AUTO: 0.5 % — SIGNIFICANT CHANGE UP (ref 0–0.9)
INR BLD: <0.9 RATIO — SIGNIFICANT CHANGE UP (ref 0.85–1.18)
INR BLD: <0.9 RATIO — SIGNIFICANT CHANGE UP (ref 0.85–1.18)
LACTATE BLDV-MCNC: 3.3 MMOL/L — HIGH (ref 0.5–2)
LACTATE BLDV-MCNC: 3.3 MMOL/L — HIGH (ref 0.5–2)
LIDOCAIN IGE QN: 24 U/L — SIGNIFICANT CHANGE UP (ref 7–60)
LIDOCAIN IGE QN: 24 U/L — SIGNIFICANT CHANGE UP (ref 7–60)
LYMPHOCYTES # BLD AUTO: 0.65 K/UL — LOW (ref 1–3.3)
LYMPHOCYTES # BLD AUTO: 0.65 K/UL — LOW (ref 1–3.3)
LYMPHOCYTES # BLD AUTO: 3.9 % — LOW (ref 13–44)
LYMPHOCYTES # BLD AUTO: 3.9 % — LOW (ref 13–44)
M PNEUMO DNA SPEC QL NAA+PROBE: SIGNIFICANT CHANGE UP
M PNEUMO DNA SPEC QL NAA+PROBE: SIGNIFICANT CHANGE UP
MAGNESIUM SERPL-MCNC: 1.8 MG/DL — SIGNIFICANT CHANGE UP (ref 1.6–2.6)
MAGNESIUM SERPL-MCNC: 1.8 MG/DL — SIGNIFICANT CHANGE UP (ref 1.6–2.6)
MCHC RBC-ENTMCNC: 29 PG — SIGNIFICANT CHANGE UP (ref 27–34)
MCHC RBC-ENTMCNC: 29 PG — SIGNIFICANT CHANGE UP (ref 27–34)
MCHC RBC-ENTMCNC: 32.3 GM/DL — SIGNIFICANT CHANGE UP (ref 32–36)
MCHC RBC-ENTMCNC: 32.3 GM/DL — SIGNIFICANT CHANGE UP (ref 32–36)
MCV RBC AUTO: 89.9 FL — SIGNIFICANT CHANGE UP (ref 80–100)
MCV RBC AUTO: 89.9 FL — SIGNIFICANT CHANGE UP (ref 80–100)
MONOCYTES # BLD AUTO: 0.84 K/UL — SIGNIFICANT CHANGE UP (ref 0–0.9)
MONOCYTES # BLD AUTO: 0.84 K/UL — SIGNIFICANT CHANGE UP (ref 0–0.9)
MONOCYTES NFR BLD AUTO: 5 % — SIGNIFICANT CHANGE UP (ref 2–14)
MONOCYTES NFR BLD AUTO: 5 % — SIGNIFICANT CHANGE UP (ref 2–14)
NEUTROPHILS # BLD AUTO: 15.03 K/UL — HIGH (ref 1.8–7.4)
NEUTROPHILS # BLD AUTO: 15.03 K/UL — HIGH (ref 1.8–7.4)
NEUTROPHILS NFR BLD AUTO: 90.4 % — HIGH (ref 43–77)
NEUTROPHILS NFR BLD AUTO: 90.4 % — HIGH (ref 43–77)
NRBC # BLD: 0 /100 WBCS — SIGNIFICANT CHANGE UP (ref 0–0)
NRBC # BLD: 0 /100 WBCS — SIGNIFICANT CHANGE UP (ref 0–0)
NRBC # FLD: 0 K/UL — SIGNIFICANT CHANGE UP (ref 0–0)
NRBC # FLD: 0 K/UL — SIGNIFICANT CHANGE UP (ref 0–0)
PCO2 BLDV: 36 MMHG — LOW (ref 42–55)
PCO2 BLDV: 36 MMHG — LOW (ref 42–55)
PH BLDV: 7.36 — SIGNIFICANT CHANGE UP (ref 7.32–7.43)
PH BLDV: 7.36 — SIGNIFICANT CHANGE UP (ref 7.32–7.43)
PHOSPHATE SERPL-MCNC: SIGNIFICANT CHANGE UP MG/DL (ref 2.5–4.5)
PHOSPHATE SERPL-MCNC: SIGNIFICANT CHANGE UP MG/DL (ref 2.5–4.5)
PLATELET # BLD AUTO: 156 K/UL — SIGNIFICANT CHANGE UP (ref 150–400)
PLATELET # BLD AUTO: 156 K/UL — SIGNIFICANT CHANGE UP (ref 150–400)
PO2 BLDV: 42 MMHG — SIGNIFICANT CHANGE UP (ref 25–45)
PO2 BLDV: 42 MMHG — SIGNIFICANT CHANGE UP (ref 25–45)
POTASSIUM BLDV-SCNC: 4.6 MMOL/L — SIGNIFICANT CHANGE UP (ref 3.5–5.1)
POTASSIUM BLDV-SCNC: 4.6 MMOL/L — SIGNIFICANT CHANGE UP (ref 3.5–5.1)
POTASSIUM SERPL-MCNC: SIGNIFICANT CHANGE UP MMOL/L (ref 3.5–5.3)
POTASSIUM SERPL-MCNC: SIGNIFICANT CHANGE UP MMOL/L (ref 3.5–5.3)
POTASSIUM SERPL-SCNC: SIGNIFICANT CHANGE UP MMOL/L (ref 3.5–5.3)
POTASSIUM SERPL-SCNC: SIGNIFICANT CHANGE UP MMOL/L (ref 3.5–5.3)
PROT SERPL-MCNC: SIGNIFICANT CHANGE UP G/DL (ref 6–8.3)
PROT SERPL-MCNC: SIGNIFICANT CHANGE UP G/DL (ref 6–8.3)
PROTHROM AB SERPL-ACNC: 9.9 SEC — SIGNIFICANT CHANGE UP (ref 9.5–13)
PROTHROM AB SERPL-ACNC: 9.9 SEC — SIGNIFICANT CHANGE UP (ref 9.5–13)
RAPID RVP RESULT: SIGNIFICANT CHANGE UP
RAPID RVP RESULT: SIGNIFICANT CHANGE UP
RBC # BLD: 5.13 M/UL — SIGNIFICANT CHANGE UP (ref 4.2–5.8)
RBC # BLD: 5.13 M/UL — SIGNIFICANT CHANGE UP (ref 4.2–5.8)
RBC # FLD: 12.9 % — SIGNIFICANT CHANGE UP (ref 10.3–14.5)
RBC # FLD: 12.9 % — SIGNIFICANT CHANGE UP (ref 10.3–14.5)
RH IG SCN BLD-IMP: NEGATIVE — SIGNIFICANT CHANGE UP
RH IG SCN BLD-IMP: NEGATIVE — SIGNIFICANT CHANGE UP
RSV RNA SPEC QL NAA+PROBE: SIGNIFICANT CHANGE UP
RSV RNA SPEC QL NAA+PROBE: SIGNIFICANT CHANGE UP
RV+EV RNA SPEC QL NAA+PROBE: SIGNIFICANT CHANGE UP
RV+EV RNA SPEC QL NAA+PROBE: SIGNIFICANT CHANGE UP
SAO2 % BLDV: 67.8 % — SIGNIFICANT CHANGE UP (ref 67–88)
SAO2 % BLDV: 67.8 % — SIGNIFICANT CHANGE UP (ref 67–88)
SARS-COV-2 RNA SPEC QL NAA+PROBE: SIGNIFICANT CHANGE UP
SARS-COV-2 RNA SPEC QL NAA+PROBE: SIGNIFICANT CHANGE UP
SODIUM SERPL-SCNC: 120 MMOL/L — CRITICAL LOW (ref 135–145)
SODIUM SERPL-SCNC: 120 MMOL/L — CRITICAL LOW (ref 135–145)
TROPONIN T, HIGH SENSITIVITY RESULT: 11 NG/L — SIGNIFICANT CHANGE UP
TROPONIN T, HIGH SENSITIVITY RESULT: 11 NG/L — SIGNIFICANT CHANGE UP
WBC # BLD: 16.64 K/UL — HIGH (ref 3.8–10.5)
WBC # BLD: 16.64 K/UL — HIGH (ref 3.8–10.5)
WBC # FLD AUTO: 16.64 K/UL — HIGH (ref 3.8–10.5)
WBC # FLD AUTO: 16.64 K/UL — HIGH (ref 3.8–10.5)

## 2023-12-30 PROCEDURE — 99285 EMERGENCY DEPT VISIT HI MDM: CPT

## 2023-12-30 PROCEDURE — 99053 MED SERV 10PM-8AM 24 HR FAC: CPT

## 2023-12-30 PROCEDURE — 93010 ELECTROCARDIOGRAM REPORT: CPT

## 2023-12-30 PROCEDURE — 71045 X-RAY EXAM CHEST 1 VIEW: CPT | Mod: 26

## 2023-12-30 RX ORDER — FAMOTIDINE 10 MG/ML
20 INJECTION INTRAVENOUS ONCE
Refills: 0 | Status: COMPLETED | OUTPATIENT
Start: 2023-12-30 | End: 2023-12-30

## 2023-12-30 RX ORDER — ACETAMINOPHEN 500 MG
1000 TABLET ORAL ONCE
Refills: 0 | Status: COMPLETED | OUTPATIENT
Start: 2023-12-30 | End: 2023-12-30

## 2023-12-30 RX ORDER — ONDANSETRON 8 MG/1
4 TABLET, FILM COATED ORAL ONCE
Refills: 0 | Status: COMPLETED | OUTPATIENT
Start: 2023-12-30 | End: 2023-12-30

## 2023-12-30 RX ORDER — SODIUM CHLORIDE 9 MG/ML
1000 INJECTION INTRAMUSCULAR; INTRAVENOUS; SUBCUTANEOUS ONCE
Refills: 0 | Status: COMPLETED | OUTPATIENT
Start: 2023-12-30 | End: 2023-12-30

## 2023-12-30 RX ADMIN — SODIUM CHLORIDE 1000 MILLILITER(S): 9 INJECTION INTRAMUSCULAR; INTRAVENOUS; SUBCUTANEOUS at 22:15

## 2023-12-30 RX ADMIN — Medication 1000 MILLIGRAM(S): at 23:00

## 2023-12-30 RX ADMIN — SODIUM CHLORIDE 1000 MILLILITER(S): 9 INJECTION INTRAMUSCULAR; INTRAVENOUS; SUBCUTANEOUS at 23:15

## 2023-12-30 RX ADMIN — Medication 1000 MILLIGRAM(S): at 22:34

## 2023-12-30 RX ADMIN — ONDANSETRON 4 MILLIGRAM(S): 8 TABLET, FILM COATED ORAL at 22:15

## 2023-12-30 RX ADMIN — FAMOTIDINE 20 MILLIGRAM(S): 10 INJECTION INTRAVENOUS at 22:15

## 2023-12-30 RX ADMIN — Medication 400 MILLIGRAM(S): at 22:15

## 2023-12-30 NOTE — ED PROVIDER NOTE - NSICDXPASTMEDICALHX_GEN_ALL_CORE_FT
PAST MEDICAL HISTORY:  Anemia due to stage 5 chronic kidney disease, not on chronic dialysis     CAP (community acquired pneumonia) 6/18    Cerebrovascular accident (CVA), unspecified mechanism diagnosed via CT of the head    Coronary artery disease     Diabetes mellitus type 2    ESRD (end stage renal disease) on Dialysis( M/W/F), By Dr. Huff    GERD (gastroesophageal reflux disease)     HTN (hypertension)     Hypercholesterolemia     Stented coronary artery 2014, 3 stents, [facility]     PAST MEDICAL HISTORY:  Anemia due to stage 5 chronic kidney disease, not on chronic dialysis     CAP (community acquired pneumonia) 6/18    Cerebrovascular accident (CVA), unspecified mechanism diagnosed via CT of the head    Coronary artery disease     Diabetes mellitus type 2    ESRD (end stage renal disease) on Dialysis( M/W/F), By Dr. Huff    GERD (gastroesophageal reflux disease)     HTN (hypertension)     Hypercholesterolemia     Stented coronary artery 2014, 3 stents, [facility]

## 2023-12-30 NOTE — ED PROVIDER NOTE - OBJECTIVE STATEMENT
Md Windy Rivero is a 68 y.o. M PMHx significant for ESRD, s/p kidney transplant, coronary artery stent, GERD, HTN, HLD, DM, on aspirin who presents to the ED with complaints of sudden onset of epigastric ABD pain approximately 5 PM this afternoon with multiple episodes of N/V.  Per patient, he is not had HA, F/C, CP, SOB, changes in urination or BM.  Pain was nonradiating and primarily localized to the epigastrium.  Denies pain such as this before, denies alleviating or aggravating factors.  Patient presents to [facility] ED 2/2 increasing ABD pain.    Of note, patient had kidney transplant at [facility] on 09/16/2023 with Dr. Jose Sharma. Md Windy Rivero is a 68 y.o. M PMHx significant for ESRD, s/p kidney transplant, coronary artery stent, GERD, HTN, HLD, DM, on aspirin who presents to the ED with complaints of sudden onset of epigastric ABD pain approximately 5 PM this afternoon with multiple episodes of N/V.  Per patient, he is not had HA, F/C, CP, SOB, changes in urination or BM.  Pain was nonradiating and primarily localized to the epigastrium.  Denies pain such as this before, denies alleviating or aggravating factors.  Patient presents to [facility] ED 2/2 increasing ABD pain.    Of note, patient had kidney transplant at [facility] on 09/16/2023 with Dr. Jose Sharma.

## 2023-12-30 NOTE — ED ADULT NURSE NOTE - NSFALLHARMRISKINTERV_ED_ALL_ED
Communicate risk of Fall with Harm to all staff, patient, and family/Provide visual cue: red socks, yellow wristband, yellow gown, etc/Reinforce activity limits and safety measures with patient and family/Bed in lowest position, wheels locked, appropriate side rails in place/Call bell, personal items and telephone in reach/Instruct patient to call for assistance before getting out of bed/chair/stretcher/Non-slip footwear applied when patient is off stretcher/[place] to call system/Physically safe environment - no spills, clutter or unnecessary equipment/Purposeful Proactive Rounding/Room/bathroom lighting operational, light cord in reach Communicate risk of Fall with Harm to all staff, patient, and family/Provide visual cue: red socks, yellow wristband, yellow gown, etc/Reinforce activity limits and safety measures with patient and family/Bed in lowest position, wheels locked, appropriate side rails in place/Call bell, personal items and telephone in reach/Instruct patient to call for assistance before getting out of bed/chair/stretcher/Non-slip footwear applied when patient is off stretcher/[place] to call system/Physically safe environment - no spills, clutter or unnecessary equipment/Purposeful Proactive Rounding/Room/bathroom lighting operational, light cord in reach

## 2023-12-30 NOTE — ED ADULT NURSE NOTE - NSICDXPASTMEDICALHX_GEN_ALL_CORE_FT
PAST MEDICAL HISTORY:  Anemia due to stage 5 chronic kidney disease, not on chronic dialysis     CAP (community acquired pneumonia) 6/18    Cerebrovascular accident (CVA), unspecified mechanism diagnosed via CT of the head    Coronary artery disease     Diabetes mellitus type 2    ESRD (end stage renal disease) on Dialysis( M/W/F), By Dr. Huff    GERD (gastroesophageal reflux disease)     HTN (hypertension)     Hypercholesterolemia     Stented coronary artery 2014, 3 stents, [facility]     PAST MEDICAL HISTORY:  Anemia due to stage 5 chronic kidney disease, not on chronic dialysis     CAP (community acquired pneumonia) 6/18    Cerebrovascular accident (CVA), unspecified mechanism diagnosed via CT of the head    Coronary artery disease     Diabetes mellitus type 2    ESRD (end stage renal disease) on Dialysis( M/W/F), By Dr. Huff    GERD (gastroesophageal reflux disease)     HTN (hypertension)     Hypercholesterolemia     Stented coronary artery 2014, 3 stents, [facility]

## 2023-12-30 NOTE — ED PROVIDER NOTE - PROGRESS NOTE DETAILS
Pt had kidney transplant at [facility] with Dr. Jose Sharma. As pt had surgery there, Kidney transplant team contacted for recommendations regarding pt work up. Spoke to Dr. Edmund Lambert (kidney transplant surgeon). Per Dr. Lambert, would prefer patient be seen at [facility] ED and complete CT scan and additional workup there once stable to be transferred. Pt had kidney transplant at [facility] with Dr. Jose Sharma. As pt had surgery there, Kidney transplant team contacted for recommendations regarding pt work up. Spoke to Dr. Edmund Lambert (kidney transplant surgeon). Per Dr. Lambert, would prefer patient be seen at [facility] ED and complete CT scan and additional workup there once stable to be transferred. Transfer center contacted, transfer initiated to [facility] ED. accepting kidney transplant surgeon Dr. Lambert. Transfer center contacted, transfer initiated to [facility] ED. accepting kidney transplant surgeon Dr. Lambert.

## 2023-12-30 NOTE — ED ADULT NURSE NOTE - NSFALLUNIVINTERV_ED_ALL_ED
Bed/Stretcher in lowest position, wheels locked, appropriate side rails in place/Call bell, personal items and telephone in reach/Instruct patient to call for assistance before getting out of bed/chair/stretcher/Non-slip footwear applied when patient is off stretcher/[place] to call system/Physically safe environment - no spills, clutter or unnecessary equipment/Purposeful proactive rounding/Room/bathroom lighting operational, light cord in reach Bed/Stretcher in lowest position, wheels locked, appropriate side rails in place/Call bell, personal items and telephone in reach/Instruct patient to call for assistance before getting out of bed/chair/stretcher/Non-slip footwear applied when patient is off stretcher/[place] to call system/Physically safe environment - no spills, clutter or unnecessary equipment/Purposeful proactive rounding/Room/bathroom lighting operational, light cord in reach

## 2023-12-30 NOTE — ED PROVIDER NOTE - CLINICAL SUMMARY MEDICAL DECISION MAKING FREE TEXT BOX
Md Windy Rivero is a 68 y.o. M PMHx significant for ESRD, s/p kidney transplant, coronary artery stent, GERD, HTN, HLD, DM, on aspirin who presents to the ED with complaints of sudden onset of epigastric ABD pain approximately 5 PM this afternoon with multiple episodes of N/V. Given HPI, concerning differentials include but not limited to pancreatitis, cholecystitis, gastritis. Given recent surgery, SBO is considered on the differential.  Additionally, given location of pain ACS is considered. Will obtain screening labs including CBC, CMP. Lipase will be obtained to evaluate for pancreatitis.  EKG, troponin, chest x-ray to evaluate for ACS.  Otherwise given recent transplant, CTAP will be ordered to evaluate for acute intra-abdominal pathology.  As this patient performed kidney transplant surgery at [place], we will contact transplant team for further recommendations regarding workup of this patient at [place] or here at [facility]. Md Windy Rivero is a 68 y.o. M PMHx significant for ESRD, s/p kidney transplant, coronary artery stent, GERD, HTN, HLD, DM, on aspirin who presents to the ED with complaints of sudden onset of epigastric ABD pain approximately 5 PM this afternoon with multiple episodes of N/V. Given HPI, concerning differentials include but not limited to pancreatitis, cholecystitis, gastritis. Given recent surgery, SBO is considered on the differential.  Additionally, given location of pain ACS is considered. Will obtain screening labs including CBC, CMP. Lipase will be obtained to evaluate for pancreatitis.  EKG, troponin, chest x-ray to evaluate for ACS.  Otherwise given recent transplant, CTAP will be ordered to evaluate for acute intra-abdominal pathology.  As this patient performed kidney transplant surgery at [place], we will contact transplant team for further recommendations regarding workup of this patient at [place] or here at [facility]. Md Windy Rivero is a 68 y.o. M PMHx significant for ESRD, s/p kidney transplant, coronary artery stent, GERD, HTN, HLD, DM, on aspirin who presents to the ED with complaints of sudden onset of epigastric ABD pain approximately 5 PM this afternoon with multiple episodes of N/V. Given HPI, concerning differentials include but not limited to pancreatitis, cholecystitis, gastritis. Given recent surgery, SBO is considered on the differential.  Additionally, given location of pain ACS is considered. Will obtain screening labs including CBC, CMP. Lipase will be obtained to evaluate for pancreatitis.  EKG, troponin, chest x-ray to evaluate for ACS.  Otherwise given recent transplant, CTAP will be ordered to evaluate for acute intra-abdominal pathology.  As this patient performed kidney transplant surgery at [place], we will contact transplant team for further recommendations regarding workup of this patient at [place] or here at [facility].    flor: Patient is a renal transplant patient on all of the antivirals recently had surgery done at [place].  Patient comes in with abdominal pain moaning and very uncomfortable.  History of end-stage renal disease coronary artery stents GERD hypertension high lipids diabetes on aspirin.  Epigastric pain suddenly.  With multiple episodes of nausea and vomiting.  On exam patient has soft abdomen but diffusely tender more in the epigastrium.  Denies fever at home but when I examined him he is rigoring.  Temp was 98 at the time however.    Transplant surgeon covering renal transplant was called to discuss the case.  We have baseline labs that have been drawn.  Results including a white count of 16,000 and with this shift a troponin of 11 magnesium 180 still pending is the chemistry and a lactate of 3.3.  Blood type A-.  The patient should be transferred to [place] as per the transplant surgeon we will call transfer center to make that happen Md Windy Rivero is a 68 y.o. M PMHx significant for ESRD, s/p kidney transplant, coronary artery stent, GERD, HTN, HLD, DM, on aspirin who presents to the ED with complaints of sudden onset of epigastric ABD pain approximately 5 PM this afternoon with multiple episodes of N/V. Given HPI, concerning differentials include but not limited to pancreatitis, cholecystitis, gastritis. Given recent surgery, SBO is considered on the differential.  Additionally, given location of pain ACS is considered. Will obtain screening labs including CBC, CMP. Lipase will be obtained to evaluate for pancreatitis.  EKG, troponin, chest x-ray to evaluate for ACS.  Otherwise given recent transplant, CTAP will be ordered to evaluate for acute intra-abdominal pathology.  As this patient performed kidney transplant surgery at [place], we will contact transplant team for further recommendations regarding workup of this patient at [place] or here at [facility].    flor: Patient is a renal transplant patient on all of the antivirals recently had surgery done at [place].  Patient comes in with abdominal pain moaning and very uncomfortable.  History of end-stage renal disease coronary artery stents GERD hypertension high lipids diabetes on aspirin.  Epigastric pain suddenly.  With multiple episodes of nausea and vomiting.  On exam patient has soft abdomen but diffusely tender more in the epigastrium.  Denies fever at home but when I examined him he is rigoring.  Temp was 98 at the time however.    Transplant surgeon covering renal transplant was called to discuss the case.  We have baseline labs that have been drawn.  Results including a white count of 16,000 and with this shift a troponin of 11 magnesium 180 still pending is the chemistry and a lactate of 3.3.  Blood type A-.  The patient should be transferred to [place] as per the transplant surgeon we will call transfer center to make that happen

## 2023-12-30 NOTE — ED PROVIDER NOTE - PHYSICAL EXAMINATION
GEN: Patient awake and alert. moderate acute distress  Head: normocephalic, atraumatic.  Neck: full ROM  Eyes: PERRLA b/l. EOMI, no scleral icterus  CARDIAC: RRR. Normal S1, S2. No murmur. No peripheral edema noted.  PULM: CTA B/L no wheeze, rales or rhonchi. No signs of respiratory distress, no accessory muscle usage or nasal flaring.  ABD: Soft, epigastric tenderness, non-distended  : No CVA tenderness  MSK: Moving all extremities spontaneously  NEURO: A&Ox3,  SKIN: Warm, dry

## 2023-12-30 NOTE — DISCHARGE NOTE PROVIDER - NSDCACTIVITY_GEN_ALL_CORE
Stairs allowed/Walking - Indoors allowed/Walking - Outdoors allowed/Follow Instructions Provided by your Surgical Team
patient

## 2023-12-30 NOTE — ED ADULT NURSE REASSESSMENT NOTE - NS ED NURSE REASSESS COMMENT FT1
Patient will be transferred to [facility] ED. Report given to SOBIA Anand. Patient will be transferred to [facility] ED. Report given to SOBIA Anand.

## 2023-12-30 NOTE — ED ADULT NURSE NOTE - OBJECTIVE STATEMENT
Problem: Infection - Central Venous Catheter-Associated Bloodstream Infection:  Goal: Will show no infection signs and symptoms  Will show no infection signs and symptoms   Outcome: Met This Shift Adriane RN: Patient received in 24A, A&Ox4 ambulatory at baseline hx: anemia due to stage 5 kidney disease, CVA, ESRD, GERD, CAD, a/v graft left arm, no longer on dialysis. Pt c/o abdominal pain and vomiting since 5pm today; pt is recent kidney transplant x3 months ago. Pt endorsing "burning in his chest."  +vomiting. Abdomen soft nontender, nondistended. Denies CP, SOB, diarrhea, fever chills, urinary symptoms, blurry vision, lightheadedness. Breathing even and unlabored. Right 20g placed, labs drawn and sent. Report given to primary RN, Eugene. Adriane RN: Patient received in 24A, A&Ox4 ambulatory at baseline hx: anemia due to stage 5 kidney disease, ESRD, GERD, CAD, a/v graft left arm, no longer on dialysis. Pt c/o sudden onset epigastric abdominal pain, non-radiating  and vomiting since 5pm today; pt is recent kidney transplant x3 months ago. Pt endorsing "burning in his chest."  +vomiting. Abdomen soft nontender, nondistended. Denies CP, SOB, diarrhea, fever chills, urinary symptoms, blurry vision, lightheadedness. Breathing even and unlabored. Right 20g placed, labs drawn and sent. Report given to primary RN, Eugene.

## 2023-12-31 DIAGNOSIS — N12 TUBULO-INTERSTITIAL NEPHRITIS, NOT SPECIFIED AS ACUTE OR CHRONIC: ICD-10-CM

## 2023-12-31 LAB
ALBUMIN SERPL ELPH-MCNC: 4.5 G/DL — SIGNIFICANT CHANGE UP (ref 3.3–5)
ALBUMIN SERPL ELPH-MCNC: 4.5 G/DL — SIGNIFICANT CHANGE UP (ref 3.3–5)
ALBUMIN SERPL ELPH-MCNC: 4.6 G/DL — SIGNIFICANT CHANGE UP (ref 3.3–5)
ALBUMIN SERPL ELPH-MCNC: 4.6 G/DL — SIGNIFICANT CHANGE UP (ref 3.3–5)
ALBUMIN SERPL ELPH-MCNC: 5.4 G/DL — HIGH (ref 3.3–5)
ALBUMIN SERPL ELPH-MCNC: 5.4 G/DL — HIGH (ref 3.3–5)
ALP SERPL-CCNC: 107 U/L — SIGNIFICANT CHANGE UP (ref 40–120)
ALP SERPL-CCNC: 107 U/L — SIGNIFICANT CHANGE UP (ref 40–120)
ALP SERPL-CCNC: 137 U/L — HIGH (ref 40–120)
ALP SERPL-CCNC: 137 U/L — HIGH (ref 40–120)
ALP SERPL-CCNC: 93 U/L — SIGNIFICANT CHANGE UP (ref 40–120)
ALP SERPL-CCNC: 93 U/L — SIGNIFICANT CHANGE UP (ref 40–120)
ALT FLD-CCNC: 15 U/L — SIGNIFICANT CHANGE UP (ref 10–45)
ALT FLD-CCNC: 15 U/L — SIGNIFICANT CHANGE UP (ref 10–45)
ALT FLD-CCNC: 23 U/L — SIGNIFICANT CHANGE UP (ref 10–45)
ALT FLD-CCNC: 23 U/L — SIGNIFICANT CHANGE UP (ref 10–45)
ALT FLD-CCNC: <5 U/L — LOW (ref 10–45)
ALT FLD-CCNC: <5 U/L — LOW (ref 10–45)
AMYLASE P1 CFR SERPL: 107 U/L — SIGNIFICANT CHANGE UP (ref 25–125)
AMYLASE P1 CFR SERPL: 107 U/L — SIGNIFICANT CHANGE UP (ref 25–125)
ANION GAP SERPL CALC-SCNC: 12 MMOL/L — SIGNIFICANT CHANGE UP (ref 5–17)
ANION GAP SERPL CALC-SCNC: 12 MMOL/L — SIGNIFICANT CHANGE UP (ref 5–17)
ANION GAP SERPL CALC-SCNC: 15 MMOL/L — SIGNIFICANT CHANGE UP (ref 5–17)
ANION GAP SERPL CALC-SCNC: 15 MMOL/L — SIGNIFICANT CHANGE UP (ref 5–17)
ANION GAP SERPL CALC-SCNC: 17 MMOL/L — SIGNIFICANT CHANGE UP (ref 5–17)
ANION GAP SERPL CALC-SCNC: 17 MMOL/L — SIGNIFICANT CHANGE UP (ref 5–17)
APPEARANCE UR: CLEAR — SIGNIFICANT CHANGE UP
APPEARANCE UR: CLEAR — SIGNIFICANT CHANGE UP
APTT BLD: 28 SEC — SIGNIFICANT CHANGE UP (ref 24.5–35.6)
APTT BLD: 28 SEC — SIGNIFICANT CHANGE UP (ref 24.5–35.6)
AST SERPL-CCNC: 17 U/L — SIGNIFICANT CHANGE UP (ref 10–40)
AST SERPL-CCNC: 17 U/L — SIGNIFICANT CHANGE UP (ref 10–40)
AST SERPL-CCNC: 22 U/L — SIGNIFICANT CHANGE UP (ref 10–40)
AST SERPL-CCNC: 22 U/L — SIGNIFICANT CHANGE UP (ref 10–40)
AST SERPL-CCNC: 70 U/L — HIGH (ref 10–40)
AST SERPL-CCNC: 70 U/L — HIGH (ref 10–40)
BACTERIA # UR AUTO: NEGATIVE /HPF — SIGNIFICANT CHANGE UP
BACTERIA # UR AUTO: NEGATIVE /HPF — SIGNIFICANT CHANGE UP
BASE EXCESS BLDV CALC-SCNC: -5.2 MMOL/L — LOW (ref -2–3)
BASE EXCESS BLDV CALC-SCNC: -5.2 MMOL/L — LOW (ref -2–3)
BASOPHILS # BLD AUTO: 0.04 K/UL — SIGNIFICANT CHANGE UP (ref 0–0.2)
BASOPHILS # BLD AUTO: 0.04 K/UL — SIGNIFICANT CHANGE UP (ref 0–0.2)
BASOPHILS NFR BLD AUTO: 0.3 % — SIGNIFICANT CHANGE UP (ref 0–2)
BASOPHILS NFR BLD AUTO: 0.3 % — SIGNIFICANT CHANGE UP (ref 0–2)
BILIRUB SERPL-MCNC: 1.1 MG/DL — SIGNIFICANT CHANGE UP (ref 0.2–1.2)
BILIRUB SERPL-MCNC: 1.6 MG/DL — HIGH (ref 0.2–1.2)
BILIRUB SERPL-MCNC: 1.6 MG/DL — HIGH (ref 0.2–1.2)
BILIRUB UR-MCNC: NEGATIVE — SIGNIFICANT CHANGE UP
BILIRUB UR-MCNC: NEGATIVE — SIGNIFICANT CHANGE UP
BUN SERPL-MCNC: 15 MG/DL — SIGNIFICANT CHANGE UP (ref 7–23)
BUN SERPL-MCNC: 15 MG/DL — SIGNIFICANT CHANGE UP (ref 7–23)
BUN SERPL-MCNC: 18 MG/DL — SIGNIFICANT CHANGE UP (ref 7–23)
BUN SERPL-MCNC: 18 MG/DL — SIGNIFICANT CHANGE UP (ref 7–23)
BUN SERPL-MCNC: 19 MG/DL — SIGNIFICANT CHANGE UP (ref 7–23)
BUN SERPL-MCNC: 19 MG/DL — SIGNIFICANT CHANGE UP (ref 7–23)
CA-I SERPL-SCNC: 1.27 MMOL/L — SIGNIFICANT CHANGE UP (ref 1.15–1.33)
CA-I SERPL-SCNC: 1.27 MMOL/L — SIGNIFICANT CHANGE UP (ref 1.15–1.33)
CALCIUM SERPL-MCNC: 10.1 MG/DL — SIGNIFICANT CHANGE UP (ref 8.4–10.5)
CALCIUM SERPL-MCNC: 10.1 MG/DL — SIGNIFICANT CHANGE UP (ref 8.4–10.5)
CALCIUM SERPL-MCNC: 11 MG/DL — HIGH (ref 8.4–10.5)
CALCIUM SERPL-MCNC: 11 MG/DL — HIGH (ref 8.4–10.5)
CALCIUM SERPL-MCNC: 9.9 MG/DL — SIGNIFICANT CHANGE UP (ref 8.4–10.5)
CALCIUM SERPL-MCNC: 9.9 MG/DL — SIGNIFICANT CHANGE UP (ref 8.4–10.5)
CAST: 0 /LPF — SIGNIFICANT CHANGE UP (ref 0–4)
CAST: 0 /LPF — SIGNIFICANT CHANGE UP (ref 0–4)
CHLORIDE BLDV-SCNC: 101 MMOL/L — SIGNIFICANT CHANGE UP (ref 96–108)
CHLORIDE BLDV-SCNC: 101 MMOL/L — SIGNIFICANT CHANGE UP (ref 96–108)
CHLORIDE SERPL-SCNC: 101 MMOL/L — SIGNIFICANT CHANGE UP (ref 96–108)
CHLORIDE SERPL-SCNC: 101 MMOL/L — SIGNIFICANT CHANGE UP (ref 96–108)
CHLORIDE SERPL-SCNC: 103 MMOL/L — SIGNIFICANT CHANGE UP (ref 96–108)
CHLORIDE SERPL-SCNC: 103 MMOL/L — SIGNIFICANT CHANGE UP (ref 96–108)
CHLORIDE SERPL-SCNC: 99 MMOL/L — SIGNIFICANT CHANGE UP (ref 96–108)
CHLORIDE SERPL-SCNC: 99 MMOL/L — SIGNIFICANT CHANGE UP (ref 96–108)
CK MB CFR SERPL CALC: 3.8 NG/ML — SIGNIFICANT CHANGE UP (ref 0–6.7)
CK MB CFR SERPL CALC: 3.8 NG/ML — SIGNIFICANT CHANGE UP (ref 0–6.7)
CK SERPL-CCNC: 54 U/L — SIGNIFICANT CHANGE UP (ref 30–200)
CK SERPL-CCNC: 54 U/L — SIGNIFICANT CHANGE UP (ref 30–200)
CO2 BLDV-SCNC: 22 MMOL/L — SIGNIFICANT CHANGE UP (ref 22–26)
CO2 BLDV-SCNC: 22 MMOL/L — SIGNIFICANT CHANGE UP (ref 22–26)
CO2 SERPL-SCNC: 17 MMOL/L — LOW (ref 22–31)
CO2 SERPL-SCNC: 17 MMOL/L — LOW (ref 22–31)
CO2 SERPL-SCNC: 19 MMOL/L — LOW (ref 22–31)
CO2 SERPL-SCNC: 19 MMOL/L — LOW (ref 22–31)
CO2 SERPL-SCNC: 20 MMOL/L — LOW (ref 22–31)
CO2 SERPL-SCNC: 20 MMOL/L — LOW (ref 22–31)
COLOR SPEC: YELLOW — SIGNIFICANT CHANGE UP
COLOR SPEC: YELLOW — SIGNIFICANT CHANGE UP
CREAT SERPL-MCNC: 0.86 MG/DL — SIGNIFICANT CHANGE UP (ref 0.5–1.3)
CREAT SERPL-MCNC: 0.86 MG/DL — SIGNIFICANT CHANGE UP (ref 0.5–1.3)
CREAT SERPL-MCNC: 0.9 MG/DL — SIGNIFICANT CHANGE UP (ref 0.5–1.3)
CREAT SERPL-MCNC: 0.9 MG/DL — SIGNIFICANT CHANGE UP (ref 0.5–1.3)
CREAT SERPL-MCNC: 0.97 MG/DL — SIGNIFICANT CHANGE UP (ref 0.5–1.3)
CREAT SERPL-MCNC: 0.97 MG/DL — SIGNIFICANT CHANGE UP (ref 0.5–1.3)
DIFF PNL FLD: NEGATIVE — SIGNIFICANT CHANGE UP
DIFF PNL FLD: NEGATIVE — SIGNIFICANT CHANGE UP
EGFR: 86 ML/MIN/1.73M2 — SIGNIFICANT CHANGE UP
EGFR: 86 ML/MIN/1.73M2 — SIGNIFICANT CHANGE UP
EGFR: 94 ML/MIN/1.73M2 — SIGNIFICANT CHANGE UP
EGFR: 94 ML/MIN/1.73M2 — SIGNIFICANT CHANGE UP
EGFR: 96 ML/MIN/1.73M2 — SIGNIFICANT CHANGE UP
EGFR: 96 ML/MIN/1.73M2 — SIGNIFICANT CHANGE UP
EOSINOPHIL # BLD AUTO: 0 K/UL — SIGNIFICANT CHANGE UP (ref 0–0.5)
EOSINOPHIL # BLD AUTO: 0 K/UL — SIGNIFICANT CHANGE UP (ref 0–0.5)
EOSINOPHIL NFR BLD AUTO: 0 % — SIGNIFICANT CHANGE UP (ref 0–6)
EOSINOPHIL NFR BLD AUTO: 0 % — SIGNIFICANT CHANGE UP (ref 0–6)
GAS PNL BLDV: 130 MMOL/L — LOW (ref 136–145)
GAS PNL BLDV: 130 MMOL/L — LOW (ref 136–145)
GAS PNL BLDV: SIGNIFICANT CHANGE UP
GLUCOSE BLDC GLUCOMTR-MCNC: 200 MG/DL — HIGH (ref 70–99)
GLUCOSE BLDC GLUCOMTR-MCNC: 200 MG/DL — HIGH (ref 70–99)
GLUCOSE BLDC GLUCOMTR-MCNC: 237 MG/DL — HIGH (ref 70–99)
GLUCOSE BLDC GLUCOMTR-MCNC: 237 MG/DL — HIGH (ref 70–99)
GLUCOSE BLDC GLUCOMTR-MCNC: 256 MG/DL — HIGH (ref 70–99)
GLUCOSE BLDC GLUCOMTR-MCNC: 256 MG/DL — HIGH (ref 70–99)
GLUCOSE BLDC GLUCOMTR-MCNC: 283 MG/DL — HIGH (ref 70–99)
GLUCOSE BLDC GLUCOMTR-MCNC: 283 MG/DL — HIGH (ref 70–99)
GLUCOSE BLDC GLUCOMTR-MCNC: 82 MG/DL — SIGNIFICANT CHANGE UP (ref 70–99)
GLUCOSE BLDC GLUCOMTR-MCNC: 82 MG/DL — SIGNIFICANT CHANGE UP (ref 70–99)
GLUCOSE BLDV-MCNC: 256 MG/DL — HIGH (ref 70–99)
GLUCOSE BLDV-MCNC: 256 MG/DL — HIGH (ref 70–99)
GLUCOSE SERPL-MCNC: 229 MG/DL — HIGH (ref 70–99)
GLUCOSE SERPL-MCNC: 229 MG/DL — HIGH (ref 70–99)
GLUCOSE SERPL-MCNC: 240 MG/DL — HIGH (ref 70–99)
GLUCOSE SERPL-MCNC: 240 MG/DL — HIGH (ref 70–99)
GLUCOSE SERPL-MCNC: 279 MG/DL — HIGH (ref 70–99)
GLUCOSE SERPL-MCNC: 279 MG/DL — HIGH (ref 70–99)
GLUCOSE UR QL: 500 MG/DL
GLUCOSE UR QL: 500 MG/DL
HCO3 BLDV-SCNC: 21 MMOL/L — LOW (ref 22–29)
HCO3 BLDV-SCNC: 21 MMOL/L — LOW (ref 22–29)
HCT VFR BLD CALC: 52.4 % — HIGH (ref 39–50)
HCT VFR BLD CALC: 52.4 % — HIGH (ref 39–50)
HCT VFR BLDA CALC: 45 % — SIGNIFICANT CHANGE UP (ref 39–51)
HCT VFR BLDA CALC: 45 % — SIGNIFICANT CHANGE UP (ref 39–51)
HGB BLD CALC-MCNC: 15 G/DL — SIGNIFICANT CHANGE UP (ref 12.6–17.4)
HGB BLD CALC-MCNC: 15 G/DL — SIGNIFICANT CHANGE UP (ref 12.6–17.4)
HGB BLD-MCNC: 16.6 G/DL — SIGNIFICANT CHANGE UP (ref 13–17)
HGB BLD-MCNC: 16.6 G/DL — SIGNIFICANT CHANGE UP (ref 13–17)
IMM GRANULOCYTES NFR BLD AUTO: 0.8 % — SIGNIFICANT CHANGE UP (ref 0–0.9)
IMM GRANULOCYTES NFR BLD AUTO: 0.8 % — SIGNIFICANT CHANGE UP (ref 0–0.9)
INR BLD: 0.91 RATIO — SIGNIFICANT CHANGE UP (ref 0.85–1.18)
INR BLD: 0.91 RATIO — SIGNIFICANT CHANGE UP (ref 0.85–1.18)
KETONES UR-MCNC: 15 MG/DL
KETONES UR-MCNC: 15 MG/DL
LACTATE BLDV-MCNC: 2.4 MMOL/L — HIGH (ref 0.5–2)
LACTATE BLDV-MCNC: 2.4 MMOL/L — HIGH (ref 0.5–2)
LEUKOCYTE ESTERASE UR-ACNC: NEGATIVE — SIGNIFICANT CHANGE UP
LEUKOCYTE ESTERASE UR-ACNC: NEGATIVE — SIGNIFICANT CHANGE UP
LIDOCAIN IGE QN: 25 U/L — SIGNIFICANT CHANGE UP (ref 7–60)
LIDOCAIN IGE QN: 25 U/L — SIGNIFICANT CHANGE UP (ref 7–60)
LYMPHOCYTES # BLD AUTO: 0.62 K/UL — LOW (ref 1–3.3)
LYMPHOCYTES # BLD AUTO: 0.62 K/UL — LOW (ref 1–3.3)
LYMPHOCYTES # BLD AUTO: 4.7 % — LOW (ref 13–44)
LYMPHOCYTES # BLD AUTO: 4.7 % — LOW (ref 13–44)
MAGNESIUM SERPL-MCNC: 1.6 MG/DL — SIGNIFICANT CHANGE UP (ref 1.6–2.6)
MAGNESIUM SERPL-MCNC: 1.6 MG/DL — SIGNIFICANT CHANGE UP (ref 1.6–2.6)
MAGNESIUM SERPL-MCNC: 1.7 MG/DL — SIGNIFICANT CHANGE UP (ref 1.6–2.6)
MAGNESIUM SERPL-MCNC: 1.7 MG/DL — SIGNIFICANT CHANGE UP (ref 1.6–2.6)
MCHC RBC-ENTMCNC: 28.8 PG — SIGNIFICANT CHANGE UP (ref 27–34)
MCHC RBC-ENTMCNC: 28.8 PG — SIGNIFICANT CHANGE UP (ref 27–34)
MCHC RBC-ENTMCNC: 31.7 GM/DL — LOW (ref 32–36)
MCHC RBC-ENTMCNC: 31.7 GM/DL — LOW (ref 32–36)
MCV RBC AUTO: 90.8 FL — SIGNIFICANT CHANGE UP (ref 80–100)
MCV RBC AUTO: 90.8 FL — SIGNIFICANT CHANGE UP (ref 80–100)
MONOCYTES # BLD AUTO: 0.65 K/UL — SIGNIFICANT CHANGE UP (ref 0–0.9)
MONOCYTES # BLD AUTO: 0.65 K/UL — SIGNIFICANT CHANGE UP (ref 0–0.9)
MONOCYTES NFR BLD AUTO: 4.9 % — SIGNIFICANT CHANGE UP (ref 2–14)
MONOCYTES NFR BLD AUTO: 4.9 % — SIGNIFICANT CHANGE UP (ref 2–14)
NEUTROPHILS # BLD AUTO: 11.86 K/UL — HIGH (ref 1.8–7.4)
NEUTROPHILS # BLD AUTO: 11.86 K/UL — HIGH (ref 1.8–7.4)
NEUTROPHILS NFR BLD AUTO: 89.3 % — HIGH (ref 43–77)
NEUTROPHILS NFR BLD AUTO: 89.3 % — HIGH (ref 43–77)
NITRITE UR-MCNC: NEGATIVE — SIGNIFICANT CHANGE UP
NITRITE UR-MCNC: NEGATIVE — SIGNIFICANT CHANGE UP
NRBC # BLD: 0 /100 WBCS — SIGNIFICANT CHANGE UP (ref 0–0)
NRBC # BLD: 0 /100 WBCS — SIGNIFICANT CHANGE UP (ref 0–0)
PCO2 BLDV: 40 MMHG — LOW (ref 42–55)
PCO2 BLDV: 40 MMHG — LOW (ref 42–55)
PH BLDV: 7.32 — SIGNIFICANT CHANGE UP (ref 7.32–7.43)
PH BLDV: 7.32 — SIGNIFICANT CHANGE UP (ref 7.32–7.43)
PH UR: 5.5 — SIGNIFICANT CHANGE UP (ref 5–8)
PH UR: 5.5 — SIGNIFICANT CHANGE UP (ref 5–8)
PHOSPHATE SERPL-MCNC: 2.3 MG/DL — LOW (ref 2.5–4.5)
PHOSPHATE SERPL-MCNC: 2.3 MG/DL — LOW (ref 2.5–4.5)
PHOSPHATE SERPL-MCNC: 2.5 MG/DL — SIGNIFICANT CHANGE UP (ref 2.5–4.5)
PHOSPHATE SERPL-MCNC: 2.5 MG/DL — SIGNIFICANT CHANGE UP (ref 2.5–4.5)
PLATELET # BLD AUTO: 161 K/UL — SIGNIFICANT CHANGE UP (ref 150–400)
PLATELET # BLD AUTO: 161 K/UL — SIGNIFICANT CHANGE UP (ref 150–400)
PO2 BLDV: 51 MMHG — HIGH (ref 25–45)
PO2 BLDV: 51 MMHG — HIGH (ref 25–45)
POTASSIUM BLDV-SCNC: 7.4 MMOL/L — CRITICAL HIGH (ref 3.5–5.1)
POTASSIUM BLDV-SCNC: 7.4 MMOL/L — CRITICAL HIGH (ref 3.5–5.1)
POTASSIUM SERPL-MCNC: 4.6 MMOL/L — SIGNIFICANT CHANGE UP (ref 3.5–5.3)
POTASSIUM SERPL-MCNC: 4.6 MMOL/L — SIGNIFICANT CHANGE UP (ref 3.5–5.3)
POTASSIUM SERPL-MCNC: 4.7 MMOL/L — SIGNIFICANT CHANGE UP (ref 3.5–5.3)
POTASSIUM SERPL-MCNC: 4.7 MMOL/L — SIGNIFICANT CHANGE UP (ref 3.5–5.3)
POTASSIUM SERPL-MCNC: SIGNIFICANT CHANGE UP MMOL/L (ref 3.5–5.3)
POTASSIUM SERPL-MCNC: SIGNIFICANT CHANGE UP MMOL/L (ref 3.5–5.3)
POTASSIUM SERPL-SCNC: 4.6 MMOL/L — SIGNIFICANT CHANGE UP (ref 3.5–5.3)
POTASSIUM SERPL-SCNC: 4.6 MMOL/L — SIGNIFICANT CHANGE UP (ref 3.5–5.3)
POTASSIUM SERPL-SCNC: 4.7 MMOL/L — SIGNIFICANT CHANGE UP (ref 3.5–5.3)
POTASSIUM SERPL-SCNC: 4.7 MMOL/L — SIGNIFICANT CHANGE UP (ref 3.5–5.3)
POTASSIUM SERPL-SCNC: SIGNIFICANT CHANGE UP MMOL/L (ref 3.5–5.3)
POTASSIUM SERPL-SCNC: SIGNIFICANT CHANGE UP MMOL/L (ref 3.5–5.3)
PROT SERPL-MCNC: 7.6 G/DL — SIGNIFICANT CHANGE UP (ref 6–8.3)
PROT SERPL-MCNC: 7.6 G/DL — SIGNIFICANT CHANGE UP (ref 6–8.3)
PROT SERPL-MCNC: 8.4 G/DL — HIGH (ref 6–8.3)
PROT SERPL-MCNC: 8.4 G/DL — HIGH (ref 6–8.3)
PROT SERPL-MCNC: 9.3 G/DL — HIGH (ref 6–8.3)
PROT SERPL-MCNC: 9.3 G/DL — HIGH (ref 6–8.3)
PROT UR-MCNC: 30 MG/DL
PROT UR-MCNC: 30 MG/DL
PROTHROM AB SERPL-ACNC: 10 SEC — SIGNIFICANT CHANGE UP (ref 9.5–13)
PROTHROM AB SERPL-ACNC: 10 SEC — SIGNIFICANT CHANGE UP (ref 9.5–13)
RBC # BLD: 5.77 M/UL — SIGNIFICANT CHANGE UP (ref 4.2–5.8)
RBC # BLD: 5.77 M/UL — SIGNIFICANT CHANGE UP (ref 4.2–5.8)
RBC # FLD: 13 % — SIGNIFICANT CHANGE UP (ref 10.3–14.5)
RBC # FLD: 13 % — SIGNIFICANT CHANGE UP (ref 10.3–14.5)
RBC CASTS # UR COMP ASSIST: 0 /HPF — SIGNIFICANT CHANGE UP (ref 0–4)
RBC CASTS # UR COMP ASSIST: 0 /HPF — SIGNIFICANT CHANGE UP (ref 0–4)
SAO2 % BLDV: 80.4 % — SIGNIFICANT CHANGE UP (ref 67–88)
SAO2 % BLDV: 80.4 % — SIGNIFICANT CHANGE UP (ref 67–88)
SODIUM SERPL-SCNC: 130 MMOL/L — LOW (ref 135–145)
SODIUM SERPL-SCNC: 130 MMOL/L — LOW (ref 135–145)
SODIUM SERPL-SCNC: 136 MMOL/L — SIGNIFICANT CHANGE UP (ref 135–145)
SODIUM SERPL-SCNC: 136 MMOL/L — SIGNIFICANT CHANGE UP (ref 135–145)
SODIUM SERPL-SCNC: 137 MMOL/L — SIGNIFICANT CHANGE UP (ref 135–145)
SODIUM SERPL-SCNC: 137 MMOL/L — SIGNIFICANT CHANGE UP (ref 135–145)
SP GR SPEC: 1.02 — SIGNIFICANT CHANGE UP (ref 1–1.03)
SP GR SPEC: 1.02 — SIGNIFICANT CHANGE UP (ref 1–1.03)
SQUAMOUS # UR AUTO: 1 /HPF — SIGNIFICANT CHANGE UP (ref 0–5)
SQUAMOUS # UR AUTO: 1 /HPF — SIGNIFICANT CHANGE UP (ref 0–5)
TACROLIMUS SERPL-MCNC: 6.7 NG/ML — SIGNIFICANT CHANGE UP
TACROLIMUS SERPL-MCNC: 6.7 NG/ML — SIGNIFICANT CHANGE UP
TACROLIMUS SERPL-MCNC: 8.3 NG/ML — SIGNIFICANT CHANGE UP
TACROLIMUS SERPL-MCNC: 8.3 NG/ML — SIGNIFICANT CHANGE UP
TROPONIN T, HIGH SENSITIVITY RESULT: 33 NG/L — SIGNIFICANT CHANGE UP (ref 0–51)
TROPONIN T, HIGH SENSITIVITY RESULT: 33 NG/L — SIGNIFICANT CHANGE UP (ref 0–51)
UROBILINOGEN FLD QL: 0.2 MG/DL — SIGNIFICANT CHANGE UP (ref 0.2–1)
UROBILINOGEN FLD QL: 0.2 MG/DL — SIGNIFICANT CHANGE UP (ref 0.2–1)
WBC # BLD: 13.27 K/UL — HIGH (ref 3.8–10.5)
WBC # BLD: 13.27 K/UL — HIGH (ref 3.8–10.5)
WBC # FLD AUTO: 13.27 K/UL — HIGH (ref 3.8–10.5)
WBC # FLD AUTO: 13.27 K/UL — HIGH (ref 3.8–10.5)
WBC UR QL: 0 /HPF — SIGNIFICANT CHANGE UP (ref 0–5)
WBC UR QL: 0 /HPF — SIGNIFICANT CHANGE UP (ref 0–5)

## 2023-12-31 PROCEDURE — 93010 ELECTROCARDIOGRAM REPORT: CPT | Mod: 76

## 2023-12-31 PROCEDURE — 99232 SBSQ HOSP IP/OBS MODERATE 35: CPT | Mod: 24

## 2023-12-31 PROCEDURE — 99223 1ST HOSP IP/OBS HIGH 75: CPT

## 2023-12-31 PROCEDURE — 76776 US EXAM K TRANSPL W/DOPPLER: CPT | Mod: 26,RT

## 2023-12-31 PROCEDURE — 74176 CT ABD & PELVIS W/O CONTRAST: CPT | Mod: 26,MA

## 2023-12-31 RX ORDER — ATORVASTATIN CALCIUM 80 MG/1
40 TABLET, FILM COATED ORAL AT BEDTIME
Refills: 0 | Status: DISCONTINUED | OUTPATIENT
Start: 2023-12-31 | End: 2024-01-03

## 2023-12-31 RX ORDER — PANTOPRAZOLE SODIUM 20 MG/1
40 TABLET, DELAYED RELEASE ORAL
Refills: 0 | Status: DISCONTINUED | OUTPATIENT
Start: 2023-12-31 | End: 2024-01-01

## 2023-12-31 RX ORDER — HEPARIN SODIUM 5000 [USP'U]/ML
5000 INJECTION INTRAVENOUS; SUBCUTANEOUS EVERY 8 HOURS
Refills: 0 | Status: DISCONTINUED | OUTPATIENT
Start: 2023-12-31 | End: 2024-01-02

## 2023-12-31 RX ORDER — CARVEDILOL PHOSPHATE 80 MG/1
25 CAPSULE, EXTENDED RELEASE ORAL EVERY 12 HOURS
Refills: 0 | Status: DISCONTINUED | OUTPATIENT
Start: 2023-12-31 | End: 2024-01-03

## 2023-12-31 RX ORDER — INSULIN GLARGINE 100 [IU]/ML
24 INJECTION, SOLUTION SUBCUTANEOUS AT BEDTIME
Refills: 0 | Status: DISCONTINUED | OUTPATIENT
Start: 2023-12-31 | End: 2024-01-02

## 2023-12-31 RX ORDER — HYDRALAZINE HCL 50 MG
10 TABLET ORAL ONCE
Refills: 0 | Status: DISCONTINUED | OUTPATIENT
Start: 2023-12-31 | End: 2024-01-03

## 2023-12-31 RX ORDER — INSULIN LISPRO 100/ML
10 VIAL (ML) SUBCUTANEOUS
Refills: 0 | Status: DISCONTINUED | OUTPATIENT
Start: 2023-12-31 | End: 2024-01-03

## 2023-12-31 RX ORDER — HYDROMORPHONE HYDROCHLORIDE 2 MG/ML
0.2 INJECTION INTRAMUSCULAR; INTRAVENOUS; SUBCUTANEOUS ONCE
Refills: 0 | Status: DISCONTINUED | OUTPATIENT
Start: 2023-12-31 | End: 2023-12-31

## 2023-12-31 RX ORDER — MEROPENEM 1 G/30ML
1000 INJECTION INTRAVENOUS EVERY 8 HOURS
Refills: 0 | Status: DISCONTINUED | OUTPATIENT
Start: 2023-12-31 | End: 2023-12-31

## 2023-12-31 RX ORDER — SUCRALFATE 1 G
1 TABLET ORAL
Refills: 0 | Status: DISCONTINUED | OUTPATIENT
Start: 2023-12-31 | End: 2024-01-03

## 2023-12-31 RX ORDER — TACROLIMUS 5 MG/1
4 CAPSULE ORAL
Refills: 0 | Status: DISCONTINUED | OUTPATIENT
Start: 2023-12-31 | End: 2024-01-02

## 2023-12-31 RX ORDER — PIPERACILLIN AND TAZOBACTAM 4; .5 G/20ML; G/20ML
3.38 INJECTION, POWDER, LYOPHILIZED, FOR SOLUTION INTRAVENOUS ONCE
Refills: 0 | Status: COMPLETED | OUTPATIENT
Start: 2024-01-01 | End: 2024-01-01

## 2023-12-31 RX ORDER — PIPERACILLIN AND TAZOBACTAM 4; .5 G/20ML; G/20ML
3.38 INJECTION, POWDER, LYOPHILIZED, FOR SOLUTION INTRAVENOUS ONCE
Refills: 0 | Status: COMPLETED | OUTPATIENT
Start: 2023-12-31 | End: 2023-12-31

## 2023-12-31 RX ORDER — TAMSULOSIN HYDROCHLORIDE 0.4 MG/1
0.4 CAPSULE ORAL AT BEDTIME
Refills: 0 | Status: DISCONTINUED | OUTPATIENT
Start: 2023-12-31 | End: 2024-01-03

## 2023-12-31 RX ORDER — MORPHINE SULFATE 50 MG/1
4 CAPSULE, EXTENDED RELEASE ORAL ONCE
Refills: 0 | Status: DISCONTINUED | OUTPATIENT
Start: 2023-12-31 | End: 2023-12-31

## 2023-12-31 RX ORDER — NIFEDIPINE 30 MG
60 TABLET, EXTENDED RELEASE 24 HR ORAL
Refills: 0 | Status: DISCONTINUED | OUTPATIENT
Start: 2023-12-31 | End: 2024-01-03

## 2023-12-31 RX ORDER — NYSTATIN 500MM UNIT
500000 POWDER (EA) MISCELLANEOUS
Refills: 0 | Status: DISCONTINUED | OUTPATIENT
Start: 2023-12-31 | End: 2024-01-03

## 2023-12-31 RX ORDER — TACROLIMUS 5 MG/1
4 CAPSULE ORAL
Refills: 0 | Status: DISCONTINUED | OUTPATIENT
Start: 2023-12-31 | End: 2023-12-31

## 2023-12-31 RX ORDER — MEROPENEM 1 G/30ML
1000 INJECTION INTRAVENOUS ONCE
Refills: 0 | Status: COMPLETED | OUTPATIENT
Start: 2023-12-31 | End: 2023-12-31

## 2023-12-31 RX ORDER — MYCOPHENOLIC ACID 180 MG/1
2 TABLET, DELAYED RELEASE ORAL
Qty: 120 | Refills: 9
Start: 2023-12-31

## 2023-12-31 RX ORDER — SODIUM CHLORIDE 9 MG/ML
1000 INJECTION, SOLUTION INTRAVENOUS
Refills: 0 | Status: DISCONTINUED | OUTPATIENT
Start: 2023-12-31 | End: 2024-01-01

## 2023-12-31 RX ORDER — INSULIN LISPRO 100/ML
VIAL (ML) SUBCUTANEOUS AT BEDTIME
Refills: 0 | Status: DISCONTINUED | OUTPATIENT
Start: 2023-12-31 | End: 2024-01-03

## 2023-12-31 RX ORDER — ACETAMINOPHEN 500 MG
1000 TABLET ORAL ONCE
Refills: 0 | Status: COMPLETED | OUTPATIENT
Start: 2023-12-31 | End: 2023-12-31

## 2023-12-31 RX ORDER — LINAGLIPTIN 5 MG/1
5 TABLET, FILM COATED ORAL DAILY
Refills: 0 | Status: DISCONTINUED | OUTPATIENT
Start: 2023-12-31 | End: 2024-01-03

## 2023-12-31 RX ORDER — MAGNESIUM SULFATE 500 MG/ML
1 VIAL (ML) INJECTION ONCE
Refills: 0 | Status: COMPLETED | OUTPATIENT
Start: 2023-12-31 | End: 2023-12-31

## 2023-12-31 RX ORDER — FAMOTIDINE 10 MG/ML
20 INJECTION INTRAVENOUS ONCE
Refills: 0 | Status: COMPLETED | OUTPATIENT
Start: 2023-12-31 | End: 2023-12-31

## 2023-12-31 RX ORDER — MYCOPHENOLIC ACID 180 MG/1
720 TABLET, DELAYED RELEASE ORAL
Refills: 0 | Status: DISCONTINUED | OUTPATIENT
Start: 2023-12-31 | End: 2024-01-03

## 2023-12-31 RX ORDER — INSULIN LISPRO 100/ML
VIAL (ML) SUBCUTANEOUS
Refills: 0 | Status: DISCONTINUED | OUTPATIENT
Start: 2023-12-31 | End: 2024-01-03

## 2023-12-31 RX ORDER — INSULIN GLARGINE 100 [IU]/ML
20 INJECTION, SOLUTION SUBCUTANEOUS AT BEDTIME
Refills: 0 | Status: DISCONTINUED | OUTPATIENT
Start: 2023-12-31 | End: 2023-12-31

## 2023-12-31 RX ORDER — VALGANCICLOVIR 450 MG/1
450 TABLET, FILM COATED ORAL DAILY
Refills: 0 | Status: DISCONTINUED | OUTPATIENT
Start: 2023-12-31 | End: 2024-01-03

## 2023-12-31 RX ORDER — METOCLOPRAMIDE HCL 10 MG
5 TABLET ORAL THREE TIMES A DAY
Refills: 0 | Status: DISCONTINUED | OUTPATIENT
Start: 2023-12-31 | End: 2024-01-02

## 2023-12-31 RX ORDER — PIPERACILLIN AND TAZOBACTAM 4; .5 G/20ML; G/20ML
3.38 INJECTION, POWDER, LYOPHILIZED, FOR SOLUTION INTRAVENOUS EVERY 8 HOURS
Refills: 0 | Status: DISCONTINUED | OUTPATIENT
Start: 2024-01-01 | End: 2024-01-01

## 2023-12-31 RX ORDER — ASPIRIN/CALCIUM CARB/MAGNESIUM 324 MG
81 TABLET ORAL DAILY
Refills: 0 | Status: DISCONTINUED | OUTPATIENT
Start: 2023-12-31 | End: 2024-01-02

## 2023-12-31 RX ADMIN — Medication 500000 UNIT(S): at 11:11

## 2023-12-31 RX ADMIN — Medication 5 MILLIGRAM(S): at 10:09

## 2023-12-31 RX ADMIN — MEROPENEM 100 MILLIGRAM(S): 1 INJECTION INTRAVENOUS at 06:56

## 2023-12-31 RX ADMIN — ATORVASTATIN CALCIUM 40 MILLIGRAM(S): 80 TABLET, FILM COATED ORAL at 22:34

## 2023-12-31 RX ADMIN — Medication 10 UNIT(S): at 17:49

## 2023-12-31 RX ADMIN — Medication 500000 UNIT(S): at 23:20

## 2023-12-31 RX ADMIN — Medication 10 UNIT(S): at 14:26

## 2023-12-31 RX ADMIN — Medication 1 GRAM(S): at 17:48

## 2023-12-31 RX ADMIN — Medication 63.75 MILLIMOLE(S): at 17:45

## 2023-12-31 RX ADMIN — CARVEDILOL PHOSPHATE 25 MILLIGRAM(S): 80 CAPSULE, EXTENDED RELEASE ORAL at 17:48

## 2023-12-31 RX ADMIN — Medication 5 MILLIGRAM(S): at 22:35

## 2023-12-31 RX ADMIN — PIPERACILLIN AND TAZOBACTAM 200 GRAM(S): 4; .5 INJECTION, POWDER, LYOPHILIZED, FOR SOLUTION INTRAVENOUS at 10:08

## 2023-12-31 RX ADMIN — HEPARIN SODIUM 5000 UNIT(S): 5000 INJECTION INTRAVENOUS; SUBCUTANEOUS at 14:25

## 2023-12-31 RX ADMIN — Medication 60 MILLIGRAM(S): at 17:48

## 2023-12-31 RX ADMIN — Medication 500000 UNIT(S): at 17:48

## 2023-12-31 RX ADMIN — Medication 1 TABLET(S): at 11:11

## 2023-12-31 RX ADMIN — PANTOPRAZOLE SODIUM 40 MILLIGRAM(S): 20 TABLET, DELAYED RELEASE ORAL at 10:09

## 2023-12-31 RX ADMIN — Medication 400 MILLIGRAM(S): at 05:14

## 2023-12-31 RX ADMIN — PIPERACILLIN AND TAZOBACTAM 25 GRAM(S): 4; .5 INJECTION, POWDER, LYOPHILIZED, FOR SOLUTION INTRAVENOUS at 14:24

## 2023-12-31 RX ADMIN — TAMSULOSIN HYDROCHLORIDE 0.4 MILLIGRAM(S): 0.4 CAPSULE ORAL at 22:34

## 2023-12-31 RX ADMIN — Medication 1 GRAM(S): at 23:19

## 2023-12-31 RX ADMIN — SODIUM CHLORIDE 75 MILLILITER(S): 9 INJECTION, SOLUTION INTRAVENOUS at 14:51

## 2023-12-31 RX ADMIN — MYCOPHENOLIC ACID 720 MILLIGRAM(S): 180 TABLET, DELAYED RELEASE ORAL at 17:45

## 2023-12-31 RX ADMIN — Medication 6: at 14:25

## 2023-12-31 RX ADMIN — PIPERACILLIN AND TAZOBACTAM 25 GRAM(S): 4; .5 INJECTION, POWDER, LYOPHILIZED, FOR SOLUTION INTRAVENOUS at 22:33

## 2023-12-31 RX ADMIN — MORPHINE SULFATE 4 MILLIGRAM(S): 50 CAPSULE, EXTENDED RELEASE ORAL at 01:53

## 2023-12-31 RX ADMIN — Medication 2: at 17:49

## 2023-12-31 RX ADMIN — Medication 100 GRAM(S): at 10:49

## 2023-12-31 RX ADMIN — INSULIN GLARGINE 24 UNIT(S): 100 INJECTION, SOLUTION SUBCUTANEOUS at 22:33

## 2023-12-31 RX ADMIN — Medication 6: at 10:11

## 2023-12-31 RX ADMIN — Medication 5 MILLIGRAM(S): at 14:26

## 2023-12-31 RX ADMIN — TACROLIMUS 4 MILLIGRAM(S): 5 CAPSULE ORAL at 10:08

## 2023-12-31 RX ADMIN — VALGANCICLOVIR 450 MILLIGRAM(S): 450 TABLET, FILM COATED ORAL at 11:11

## 2023-12-31 RX ADMIN — Medication 81 MILLIGRAM(S): at 11:11

## 2023-12-31 RX ADMIN — HYDROMORPHONE HYDROCHLORIDE 0.2 MILLIGRAM(S): 2 INJECTION INTRAMUSCULAR; INTRAVENOUS; SUBCUTANEOUS at 07:40

## 2023-12-31 RX ADMIN — Medication 30 MILLILITER(S): at 07:40

## 2023-12-31 RX ADMIN — Medication 1 GRAM(S): at 11:10

## 2023-12-31 RX ADMIN — FAMOTIDINE 20 MILLIGRAM(S): 10 INJECTION INTRAVENOUS at 11:10

## 2023-12-31 RX ADMIN — LINAGLIPTIN 5 MILLIGRAM(S): 5 TABLET, FILM COATED ORAL at 14:25

## 2023-12-31 RX ADMIN — HEPARIN SODIUM 5000 UNIT(S): 5000 INJECTION INTRAVENOUS; SUBCUTANEOUS at 22:35

## 2023-12-31 RX ADMIN — SODIUM CHLORIDE 75 MILLILITER(S): 9 INJECTION, SOLUTION INTRAVENOUS at 22:33

## 2023-12-31 NOTE — ED PROVIDER NOTE - PROGRESS NOTE DETAILS
Mirian García MD - Attending Physician: Spoke with Transplant NP. Agreeable with CT noncontrast. UA ordered and to be sent. Aware of abnormal labs at [facility] - grossly hemolyzed so reordered here for confirmation. Mirian García MD - Attending Physician: Spoke with Transplant NP. Agreeable with CT noncontrast. UA ordered and to be sent. Aware of abnormal labs at [facility] - grossly hemolyzed so reordered here for confirmation. Liz Hurley MD, PGY2: pt with evidence of cystitis and Hydronephrosis and perinephric stranding of the transplanted kidney.  UA negative for infection at this time.  Discussed with transplant, recommend starting patient on meropenem and admitting patient for further management.  Lactate 2.4, pending repeat. Labs and otherwise nonactionable at this time. iLz Hurley MD, PGY2: pt with evidence of cystitis and Hydronephrosis and perinephric stranding of the transplanted kidney.  UA negative for infection at this time.  Discussed with transplant, recommend starting patient on meropenem and admitting patient for further management.  Lactate 2.4, pending repeat. Labs and otherwise nonactionable at this time.

## 2023-12-31 NOTE — ED CLERICAL - DIVISION
[facility]... [facility]... [facility]... [facility]...

## 2023-12-31 NOTE — CONSULT NOTE ADULT - SUBJECTIVE AND OBJECTIVE BOX
[facility]   MD JORDAN AVALOS MD ANGELA WONG, PA QIAN CHEN, NP      TEL:  OFFICE: [phone]  From 5pm-7am answering service [phone]    --- INITIAL RENAL CONSULT NOTE ---date of service 12-31-23 @ 13:42    HPI: 68 Yoruba speaking Male w/ PMH of GERD, HTN, HLD, anemia of chronic disease, T2DM, CAD s/p stent x3 (2014 at [place]) & x2 (pLCx 10/28/22, LAD 10/31/22, off of plavix as of 5/1/23), CVA and ESRD (diagnosed Jan 2019) on HD via LUE AVF T/Th/S (Nephrologist Dr Huff), s/p R DDRT under [facility] on 9/17/23.  Patient had ureteral stent removed on 11/7.     Pt presents as a transfer from [facility] for abdominal pain, N/V X5 Episodes. Denies fever/chills, Diarrhea, CP, SOB, HA, dysuria, hematuria, melena, hematemesis, changes in meds, recent travel/sick contacts.     Allergies:  No Known Allergies      PAST MEDICAL & SURGICAL HISTORY:  HTN (hypertension)    Coronary artery disease    CAP (community acquired pneumonia)  6/18    Diabetes mellitus  type 2    GERD (gastroesophageal reflux disease)    Stented coronary artery 2014 - 3 stents, [facility]    ESRD (end stage renal disease)  on Dialysis( M/W/F), By Dr. Huff    Hypercholesterolemia    Cerebrovascular accident (CVA), unspecified mechanism  diagnosed via CT of the head    Anemia due to stage 5 chronic kidney disease, not on chronic dialysis    H/O coronary angiogram  2014 - x3 stents    AV fistula  10/12/18 L radiocephalic AV fistula    H/O eye surgery      Home Medications Reviewed    Hospital Medications:   MEDICATIONS  (STANDING):  aspirin enteric coated 81 milliGRAM(s) Oral daily  atorvastatin 40 milliGRAM(s) Oral at bedtime  carvedilol 25 milliGRAM(s) Oral every 12 hours  heparin   Injectable 5000 Unit(s) SubCutaneous every 8 hours  insulin glargine Injectable (LANTUS) 24 Unit(s) SubCutaneous at bedtime  insulin lispro (ADMELOG) corrective regimen sliding scale   SubCutaneous at bedtime  insulin lispro (ADMELOG) corrective regimen sliding scale   SubCutaneous three times a day before meals  insulin lispro Injectable (ADMELOG) 10 Unit(s) SubCutaneous three times a day before meals  lactated ringers. 1000 milliLiter(s) (75 mL/Hr) IV Continuous <Continuous>  linagliptin 5 milliGRAM(s) Oral daily  metoclopramide 5 milliGRAM(s) Oral three times a day  NIFEdipine XL 60 milliGRAM(s) Oral two times a day  nystatin    Suspension 964983 Unit(s) Oral four times a day  pantoprazole    Tablet 40 milliGRAM(s) Oral before breakfast  piperacillin/tazobactam IVPB.- 3.375 Gram(s) IV Intermittent once  piperacillin/tazobactam IVPB.- 3.375 Gram(s) IV Intermittent once  predniSONE   Tablet 5 milliGRAM(s) Oral daily  sodium phosphate 15 milliMole(s)/250 mL IVPB 15 milliMole(s) IV Intermittent once  sucralfate suspension 1 Gram(s) Oral four times a day  tacrolimus ER Tablet (ENVARSUS XR) 4 milliGRAM(s) Oral <User Schedule>  tamsulosin 0.4 milliGRAM(s) Oral at bedtime  trimethoprim   80 mG/sulfamethoxazole 400 mG 1 Tablet(s) Oral daily  valGANciclovir 450 milliGRAM(s) Oral daily      SOCIAL HISTORY:  Denies ETOh, Smoking,     FAMILY HISTORY:      REVIEW OF SYSTEMS:  CONSTITUTIONAL: No weakness, fevers or chills  EYES/ENT: No visual changes;  No vertigo or throat pain   NECK: No pain or stiffness  RESPIRATORY: No cough, wheezing, hemoptysis; No shortness of breath  CARDIOVASCULAR: No chest pain or palpitations.  GASTROINTESTINAL: Per HPI  GENITOURINARY: No dysuria, frequency, foamy urine, urinary urgency, incontinence or hematuria.  NEUROLOGICAL: No numbness or weakness  SKIN: No itching, burning, rashes, or lesions   VASCULAR: No bilateral lower extremity edema.   All other review of systems is negative unless indicated above.    VITALS:  T(F): 98.5 (12-31-23 @ 12:43), Max: 99 (12-31-23 @ 06:45)  HR: 79 (12-31-23 @ 12:43)  BP: 149/83 (12-31-23 @ 12:43)  RR: 20 (12-31-23 @ 12:43)  SpO2: 98% (12-31-23 @ 12:43)  Wt(kg): --    Height (cm): 154.2 (12-31 @ 06:45), 169.5 (12-30 @ 19:46)  Weight (kg): 69 (12-31 @ 06:45)  BMI (kg/m2): 29 (12-31 @ 06:45)  BSA (m2): 1.68 (12-31 @ 06:45)    PHYSICAL EXAM:  General: NAD  HEENT: anicteric sclera, oropharynx clear, MMM  Neck: No JVD  Respiratory: CTAB, no wheezes, rales or rhonchi  Cardiovascular: S1, S2, RRR  Gastrointestinal: BS+, soft, NT/ND  Extremities: No cyanosis or clubbing. No peripheral edema  Neurological: A/O x 3, no focal deficits  Psychiatric: Normal mood, normal affect  : No CVA tenderness. No hinds.   Skin: No rashes  Vascular Access: LUE AVF     LABS:  12-31    136  |  99  |  15  ----------------------------<  279<H>  4.7   |  20<L>  |  0.90    Ca    11.0<H>      31 Dec 2023 08:36  Phos  2.3     12-31  Mg     1.7     12-31    TPro  9.3<H>  /  Alb  5.4<H>  /  TBili  1.6<H>  /  DBili      /  AST  22  /  ALT  23  /  AlkPhos  137<H>  12-31    Creatinine Trend: 0.90 <--, 0.97 <--, 0.86 <--, 0.60 <--                        16.6   13.27 )-----------( 161      ( 31 Dec 2023 08:36 )             52.4     Urine Studies:  Urinalysis Basic - ( 31 Dec 2023 08:36 )    Color:  / Appearance:  / SG:  / pH:   Gluc: 279 mg/dL / Ketone:   / Bili:  / Urobili:    Blood:  / Protein:  / Nitrite:    Leuk Esterase:  / RBC:  / WBC    Sq Epi:  / Non Sq Epi:  / Bacteria:           RADIOLOGY & ADDITIONAL STUDIES:                 [facility]   MD JORDAN AVALOS MD ANGELA WONG, PA QIAN CHEN, NP      TEL:  OFFICE: [phone]  From 5pm-7am answering service [phone]    --- INITIAL RENAL CONSULT NOTE ---date of service 12-31-23 @ 13:42    HPI: 68 Serbian speaking Male w/ PMH of GERD, HTN, HLD, anemia of chronic disease, T2DM, CAD s/p stent x3 (2014 at [place]) & x2 (pLCx 10/28/22, LAD 10/31/22, off of plavix as of 5/1/23), CVA and ESRD (diagnosed Jan 2019) on HD via LUE AVF T/Th/S (Nephrologist Dr Huff), s/p R DDRT under [facility] on 9/17/23.  Patient had ureteral stent removed on 11/7.     Pt presents as a transfer from [facility] for abdominal pain, N/V X5 Episodes. Denies fever/chills, Diarrhea, CP, SOB, HA, dysuria, hematuria, melena, hematemesis, changes in meds, recent travel/sick contacts.     Allergies:  No Known Allergies      PAST MEDICAL & SURGICAL HISTORY:  HTN (hypertension)    Coronary artery disease    CAP (community acquired pneumonia)  6/18    Diabetes mellitus  type 2    GERD (gastroesophageal reflux disease)    Stented coronary artery 2014 - 3 stents, [facility]    ESRD (end stage renal disease)  on Dialysis( M/W/F), By Dr. Huff    Hypercholesterolemia    Cerebrovascular accident (CVA), unspecified mechanism  diagnosed via CT of the head    Anemia due to stage 5 chronic kidney disease, not on chronic dialysis    H/O coronary angiogram  2014 - x3 stents    AV fistula  10/12/18 L radiocephalic AV fistula    H/O eye surgery      Home Medications Reviewed    Hospital Medications:   MEDICATIONS  (STANDING):  aspirin enteric coated 81 milliGRAM(s) Oral daily  atorvastatin 40 milliGRAM(s) Oral at bedtime  carvedilol 25 milliGRAM(s) Oral every 12 hours  heparin   Injectable 5000 Unit(s) SubCutaneous every 8 hours  insulin glargine Injectable (LANTUS) 24 Unit(s) SubCutaneous at bedtime  insulin lispro (ADMELOG) corrective regimen sliding scale   SubCutaneous at bedtime  insulin lispro (ADMELOG) corrective regimen sliding scale   SubCutaneous three times a day before meals  insulin lispro Injectable (ADMELOG) 10 Unit(s) SubCutaneous three times a day before meals  lactated ringers. 1000 milliLiter(s) (75 mL/Hr) IV Continuous <Continuous>  linagliptin 5 milliGRAM(s) Oral daily  metoclopramide 5 milliGRAM(s) Oral three times a day  NIFEdipine XL 60 milliGRAM(s) Oral two times a day  nystatin    Suspension [id number] Unit(s) Oral four times a day  pantoprazole    Tablet 40 milliGRAM(s) Oral before breakfast  piperacillin/tazobactam IVPB.- 3.375 Gram(s) IV Intermittent once  piperacillin/tazobactam IVPB.- 3.375 Gram(s) IV Intermittent once  predniSONE   Tablet 5 milliGRAM(s) Oral daily  sodium phosphate 15 milliMole(s)/250 mL IVPB 15 milliMole(s) IV Intermittent once  sucralfate suspension 1 Gram(s) Oral four times a day  tacrolimus ER Tablet (ENVARSUS XR) 4 milliGRAM(s) Oral <User Schedule>  tamsulosin 0.4 milliGRAM(s) Oral at bedtime  trimethoprim   80 mG/sulfamethoxazole 400 mG 1 Tablet(s) Oral daily  valGANciclovir 450 milliGRAM(s) Oral daily      SOCIAL HISTORY:  Denies ETOh, Smoking,     FAMILY HISTORY:      REVIEW OF SYSTEMS:  CONSTITUTIONAL: No weakness, fevers or chills  EYES/ENT: No visual changes;  No vertigo or throat pain   NECK: No pain or stiffness  RESPIRATORY: No cough, wheezing, hemoptysis; No shortness of breath  CARDIOVASCULAR: No chest pain or palpitations.  GASTROINTESTINAL: Per HPI  GENITOURINARY: No dysuria, frequency, foamy urine, urinary urgency, incontinence or hematuria.  NEUROLOGICAL: No numbness or weakness  SKIN: No itching, burning, rashes, or lesions   VASCULAR: No bilateral lower extremity edema.   All other review of systems is negative unless indicated above.    VITALS:  T(F): 98.5 (12-31-23 @ 12:43), Max: 99 (12-31-23 @ 06:45)  HR: 79 (12-31-23 @ 12:43)  BP: 149/83 (12-31-23 @ 12:43)  RR: 20 (12-31-23 @ 12:43)  SpO2: 98% (12-31-23 @ 12:43)  Wt(kg): --    Height (cm): 154.2 (12-31 @ 06:45), 169.5 (12-30 @ 19:46)  Weight (kg): 69 (12-31 @ 06:45)  BMI (kg/m2): 29 (12-31 @ 06:45)  BSA (m2): 1.68 (12-31 @ 06:45)    PHYSICAL EXAM:  General: NAD  HEENT: anicteric sclera, oropharynx clear, MMM  Neck: No JVD  Respiratory: CTAB, no wheezes, rales or rhonchi  Cardiovascular: S1, S2, RRR  Gastrointestinal: BS+, soft, NT/ND  Extremities: No cyanosis or clubbing. No peripheral edema  Neurological: A/O x 3, no focal deficits  Psychiatric: Normal mood, normal affect  : No CVA tenderness. No hinds.   Skin: No rashes  Vascular Access: LUE AVF     LABS:  12-31    136  |  99  |  15  ----------------------------<  279<H>  4.7   |  20<L>  |  0.90    Ca    11.0<H>      31 Dec 2023 08:36  Phos  2.3     12-31  Mg     1.7     12-31    TPro  9.3<H>  /  Alb  5.4<H>  /  TBili  1.6<H>  /  DBili      /  AST  22  /  ALT  23  /  AlkPhos  137<H>  12-31    Creatinine Trend: 0.90 <--, 0.97 <--, 0.86 <--, 0.60 <--                        16.6   13.27 )-----------( 161      ( 31 Dec 2023 08:36 )             52.4     Urine Studies:  Urinalysis Basic - ( 31 Dec 2023 08:36 )    Color:  / Appearance:  / SG:  / pH:   Gluc: 279 mg/dL / Ketone:   / Bili:  / Urobili:    Blood:  / Protein:  / Nitrite:    Leuk Esterase:  / RBC:  / WBC    Sq Epi:  / Non Sq Epi:  / Bacteria:           RADIOLOGY & ADDITIONAL STUDIES:

## 2023-12-31 NOTE — ED PROVIDER NOTE - OBJECTIVE STATEMENT
68-year-old male past medical history of ESRD s/p kidney transplant, CVA,  CAD status post stent, GERD, hypertension, hyperlipidemia, diabetes, on ASA presenting for evaluation of epigastric pain onset 5 PM this afternoon with multiple episodes of nausea and vomiting.  Patient was transferred from [facility].  Patient denies any fevers, pain with urination, chest pain, shortness of breath, black or bloody stools, diarrhea, sick contacts. 68-year-old male past medical history of ESRD s/p kidney transplant, CVA,  CAD status post stent, GERD, hypertension, hyperlipidemia, diabetes, on ASA presenting for evaluation of epigastric pain onset 5 PM this afternoon with multiple episodes of nausea and vomiting.  Patient was transferred from [facility].  Patient denies any fevers, pain with urination, chest pain, shortness of breath, black or bloody stools, diarrhea, sick contacts. 68-year-old male past medical history of ESRD s/p kidney transplant 9/23, CVA,  CAD status post stent, GERD, hypertension, hyperlipidemia, diabetes, on ASA presenting for evaluation of epigastric pain onset 5 PM this afternoon with multiple episodes of nausea and vomiting.  Patient was transferred from [facility] for Transplant Eval.  Patient denies any fevers, pain with urination, chest pain, shortness of breath, black or bloody stools, diarrhea, sick contacts. 68-year-old male past medical history of ESRD s/p kidney transplant 9/23, CVA,  CAD status post stent, GERD, hypertension, hyperlipidemia, diabetes, on ASA presenting for evaluation of epigastric pain onset 5 PM this afternoon with multiple episodes of nausea and vomiting.  Patient was transferred from [facility] for Transplant Eval.  Patient denies any fevers, pain with urination, chest pain, shortness of breath, black or bloody stools, diarrhea, sick contacts.

## 2023-12-31 NOTE — PATIENT PROFILE ADULT - FUNCTIONAL ASSESSMENT - BASIC MOBILITY 6.
4-calculated by average/Not able to assess (calculate score using [facility] averaging method) 4-calculated by average/Not able to assess (calculate score using [facility] averaging method)

## 2023-12-31 NOTE — CONSULT NOTE ADULT - ASSESSMENT
68 yr old man with ESRD due to DM, HTN, CAD, CVA was on HD since 2019. S/p DDRT on 9/16/23. Had slow graft function but did not require dialysis. Graft function improved to scr of 1mg/dL. He presents with epigastric pain, nausea and vomiting.     s/p DDRT 9/2023  Graft function stable cr 0.9  Labs with dehydration (hemoconcentration, high calcium)  - give LR 1 liter   Bladder distended on CT and US. Check post void residual. Continue Flomax     Immunosuppression   Envarsus 4mg daily, aim for trough level 8-10  Stop Cellcept, change to Myfortic 720mg po bid for GI symptoms  Continue Prednisone 5mg po daily     Infection prophylaxis   Continue bactrim ss daily   Valcyte 450mg po daily     HTN   Coreg 25mg po bid , Nifedipine xl 60mg po bid     CAD s/p stents last in 2022. EKG no acute changes, trops negative.   Continue ASA 81, Atorvastatin 40, Coreg.     DM - continue Lantus 24, Admelog 10 units premeals and SS    NV :- may be due to medications (MMF) vs GERD/ gastroparesis   - LFTs normal, check amylase and lipase. CT abdomen unremarkable.   - Zofran and reglan  PRN , continue sucralfate and protonix    - Change MMF to myfortic     ID - leukocytosis 15 on admission, 13 today,  but no fever, CXR . U/A negative. Started on zosyn. F/u cultures, d/c antibiotics if negative.

## 2023-12-31 NOTE — H&P ADULT - NS ATTEND BILL GEN_ALL_CORE
Price (Do Not Change): 0.00 Detail Level: Simple Instructions: This plan will send the code FBSE to the PM system.  DO NOT or CHANGE the price. Attending to bill

## 2023-12-31 NOTE — ED ADULT NURSE NOTE - NSFALLUNIVINTERV_ED_ALL_ED
Bed/Stretcher in lowest position, wheels locked, appropriate side rails in place/Call bell, personal items and telephone in reach/Instruct patient to call for assistance before getting out of bed/chair/stretcher/Non-slip footwear applied when patient is off stretcher/[place] to call system/Physically safe environment - no spills, clutter or unnecessary equipment/Purposeful proactive rounding/Room/bathroom lighting operational, light cord in reach Bed/Stretcher in lowest position, wheels locked, appropriate side rails in place/Call bell, personal items and telephone in reach/Instruct patient to call for assistance before getting out of bed/chair/stretcher/Non-slip footwear applied when patient is off stretcher/[place] to call system/Physically safe environment - no spills, clutter or unnecessary equipment/Purposeful proactive rounding/Room/bathroom lighting operational, light cord in reach

## 2023-12-31 NOTE — ED ADULT NURSE NOTE - OBJECTIVE STATEMENT
68 year old male transferred from [facility]. Patient has a PMH of ESDR with old AV fistula on left arm, pt has kidney transplant in sep of this year, HTN, HLD, DM. Patient went to [facility] for vomiting that began around 5pm today. The vomiting is associated with abdominal pain and nausea. Vomit is bile colors/ non bloody. Patient was transferred here to see transplant team. No shortness of breath or difficulty breathing. No chest pain, pressure or palpitations. No fever, chills, or body aches. No urinary symptoms. Patient does feel warm to the touch. 20g in right hand WDL., patient was given Tylenol, Zofran, and Pepcid at [facility] with mild relief/ Patient states the pain is back at this time. No obvious bruising or swelling noted on stomach. 68 year old male transferred from [facility]. Patient has a PMH of ESDR with old AV fistula on left arm, pt has kidney transplant in sep of this year, HTN, HLD, DM. Patient went to [facility] for vomiting that began around 5pm today. The vomiting is associated with abdominal pain and nausea. Vomit is bile colors/ non bloody. Patient was transferred here to see transplant team. No shortness of breath or difficulty breathing. No chest pain, pressure or palpitations. No fever, chills, or body aches. No urinary symptoms. Patient does feel warm to the touch. 20g in right hand WDL., patient was given Tylenol, Zofran, and Pepcid at [facility] with mild relief/ Patient states the pain is back at this time. No obvious bruising or swelling noted on stomach.

## 2023-12-31 NOTE — H&P ADULT - NSHPPHYSICALEXAM_GEN_ALL_CORE
PHYSICAL EXAM:  Constitutional: Well developed / well nourished  Eyes: Anicteric, PERRLA  ENMT: nc/at  Neck: supple  Respiratory: CTA B/L  Cardiovascular: RRR  Gastrointestinal: Soft, mildly distended, mild tenderness at the incision site;  incision site c/d/i  Genitourinary: Voiding.   Extremities: SCD's in place and working bilaterally, AVF  palpable, no edema  Vascular: Palpable dp pulses bilaterally  Neurological: A&O x3  Skin: no rashes, ulcerations or lesions;  Musculoskeletal: Moving all extremities  Psychiatric: Responsive

## 2023-12-31 NOTE — H&P ADULT - NSICDXPASTMEDICALHX_GEN_ALL_CORE_FT
PAST MEDICAL HISTORY:  Anemia due to stage 5 chronic kidney disease, not on chronic dialysis     CAP (community acquired pneumonia) 6/18    Cerebrovascular accident (CVA), unspecified mechanism diagnosed via CT of the head    Coronary artery disease     Diabetes mellitus type 2    ESRD (end stage renal disease) on Dialysis( M/W/F), By Dr. Huff    GERD (gastroesophageal reflux disease)     HTN (hypertension)     Hypercholesterolemia     Stented coronary artery 2014, 3 stents, [facility]     PAST MEDICAL HISTORY:  Anemia due to stage 5 chronic kidney disease, not on chronic dialysis     CAP (community acquired pneumonia) 6/18    Cerebrovascular accident (CVA), unspecified mechanism diagnosed via CT of the head    Coronary artery disease     Diabetes mellitus type 2    ESRD (end stage renal disease) on Dialysis( M/W/F), By Dr. Huff    GERD (gastroesophageal reflux disease)     HTN (hypertension)     Hypercholesterolemia     Stented coronary artery 2014, 3 stents, [facility]

## 2023-12-31 NOTE — ED PROVIDER NOTE - CLINICAL SUMMARY MEDICAL DECISION MAKING FREE TEXT BOX
68-year-old male past medical history of ESRD s/p kidney transplant, CAD status post stent, GERD, hypertension, hyperlipidemia, diabetes, on ASA presenting transferred from [facility] for evaluation of epigastric pain onset 5 PM this afternoon with multiple episodes of nausea and vomiting. afebrile, HDS.     Differential diagnosis includes but is not limited to acute colitis versus pancreatitis versus diverticulitis versus acute renal rejection versus occult infection given patient's white blood cell count at [facility] was 16.6, reviewed results from [facility], patient needs repeat CMP given significant hemolysis, RVP was negative.  Will discuss with transplant team. will give morphine for pain 68-year-old male past medical history of ESRD s/p kidney transplant, CAD status post stent, GERD, hypertension, hyperlipidemia, diabetes, on ASA presenting transferred from [facility] for evaluation of epigastric pain onset 5 PM this afternoon with multiple episodes of nausea and vomiting. afebrile, HDS.     Differential diagnosis includes but is not limited to acute colitis versus pancreatitis versus diverticulitis versus acute renal rejection versus occult infection given patient's white blood cell count at [facility] was 16.6, reviewed results from [facility], patient needs repeat CMP given significant hemolysis, RVP was negative.  Will discuss with transplant team. will give morphine for pain 68-year-old male past medical history of ESRD s/p kidney transplant, CAD status post stent, GERD, hypertension, hyperlipidemia, diabetes, on ASA presenting transferred from [facility] for evaluation of epigastric pain onset 5 PM this afternoon with multiple episodes of nausea and vomiting. afebrile, HDS.     Differential diagnosis includes but is not limited to acute colitis versus pancreatitis versus diverticulitis versus acute renal rejection versus occult infection given patient's white blood cell count at [facility] was 16.6, reviewed results from [facility], patient needs repeat CMP given significant hemolysis, RVP was negative.  Will discuss with transplant team. will give morphine for pain    Mirian García MD - Attending Physician: Pt h/o recent renal transplant, here with abd pain/vomiting. Afebrile. Seen in [facility] and transferred here for further eval. Dry appearing, tender on exam. Noted Leukocytosis at OSH labs. CMP grossly hemolyzed so will repeat. CT abd and UA ordered. Will d/w TransplantNephro 68-year-old male past medical history of ESRD s/p kidney transplant, CAD status post stent, GERD, hypertension, hyperlipidemia, diabetes, on ASA presenting transferred from [facility] for evaluation of epigastric pain onset 5 PM this afternoon with multiple episodes of nausea and vomiting. afebrile, HDS.     Differential diagnosis includes but is not limited to acute colitis versus pancreatitis versus diverticulitis versus acute renal rejection versus occult infection given patient's white blood cell count at [facility] was 16.6, reviewed results from [facility], patient needs repeat CMP given significant hemolysis, RVP was negative.  Will discuss with transplant team. will give morphine for pain    Mirian García MD - Attending Physician: Pt h/o recent renal transplant, here with abd pain/vomiting. Afebrile. Seen in [facility] and transferred here for further eval. Dry appearing, tender on exam. Noted Leukocytosis at OSH labs. CMP grossly hemolyzed so will repeat. CT abd and UA ordered. Will d/w TransplantNephro

## 2023-12-31 NOTE — ED PROVIDER NOTE - PRINCIPAL DIAGNOSIS

## 2023-12-31 NOTE — CONSULT NOTE ADULT - SUBJECTIVE AND OBJECTIVE BOX
[facility] DIVISION OF KIDNEY DISEASES AND HYPERTENSION -- INITIAL CONSULT NOTE  --------------------------------------------------------------------------------  Authored by: Marti Montano   Cell # [phone]     HPI:  68 year old man with ESRD due to DM, HTN,  ESRD was on HD since 2019. S/p DDRT on 9/16/23. Had slow graft function but did not require dialysis. Graft function gradually improved to scr of 1mg/dL. Transplant ureteral stent removed 11/7/23.  PMH includes Type II DM on insulin, HTN, HLD,  CAD s/p 3 stents (last in 10/2022), CVA, cholecystectomy, GERD.     Hospitalized 11/8 for hyperglycemia, started on Insulin, also rx for UTI  He presented to [facility] with epigastric pain, NV x 5 episodes. No diarrhea.  No chest pain, shortness of breath, fever, dysuria.          PAST HISTORY  --------------------------------------------------------------------------------  PAST MEDICAL & SURGICAL HISTORY:  HTN (hypertension)  Diabetes mellitus type 2  GERD (gastroesophageal reflux disease)  Coronary artery disease Stented coronary artery 2014, 3 stents, [facility]  ESRD (end stage renal disease)  Hypercholesterolemia  Cerebrovascular accident (CVA), unspecified mechanism  Anemia due to stage 5 chronic kidney disease, not on chronic dialysis        AV fistula  10/12/18 L radiocephalic AV fistula  H/O eye surgery    ALLERGIES & MEDICATIONS  --------------------------------------------------------------------------------  Allergies  No Known Allergies    Standing Inpatient Medications  aspirin enteric coated 81 milliGRAM(s) Oral daily  atorvastatin 40 milliGRAM(s) Oral at bedtime  carvedilol 25 milliGRAM(s) Oral every 12 hours  heparin   Injectable 5000 Unit(s) SubCutaneous every 8 hours  insulin glargine Injectable (LANTUS) 24 Unit(s) SubCutaneous at bedtime  insulin lispro (ADMELOG) corrective regimen sliding scale   SubCutaneous at bedtime  insulin lispro (ADMELOG) corrective regimen sliding scale   SubCutaneous three times a day before meals  insulin lispro Injectable (ADMELOG) 10 Unit(s) SubCutaneous three times a day before meals  lactated ringers. 1000 milliLiter(s) IV Continuous <Continuous>  linagliptin 5 milliGRAM(s) Oral daily  metoclopramide 5 milliGRAM(s) Oral three times a day  NIFEdipine XL 60 milliGRAM(s) Oral two times a day  nystatin    Suspension 031342 Unit(s) Oral four times a day  pantoprazole    Tablet 40 milliGRAM(s) Oral before breakfast  piperacillin/tazobactam IVPB.- 3.375 Gram(s) IV Intermittent once  piperacillin/tazobactam IVPB.- 3.375 Gram(s) IV Intermittent once  predniSONE   Tablet 5 milliGRAM(s) Oral daily  sodium phosphate 15 milliMole(s)/250 mL IVPB 15 milliMole(s) IV Intermittent once  sucralfate suspension 1 Gram(s) Oral four times a day  tacrolimus ER Tablet (ENVARSUS XR) 4 milliGRAM(s) Oral <User Schedule>  tamsulosin 0.4 milliGRAM(s) Oral at bedtime  trimethoprim   80 mG/sulfamethoxazole 400 mG 1 Tablet(s) Oral daily  valGANciclovir 450 milliGRAM(s) Oral daily    PRN Inpatient Medications  aluminum hydroxide/magnesium hydroxide/simethicone Suspension 30 milliLiter(s) Oral every 6 hours PRN      REVIEW OF SYSTEMS: Negative except for mentioned in HPI         VITALS/PHYSICAL EXAM  --------------------------------------------------------------------------------  T(C): 36.9 (12-31-23 @ 12:43), Max: 37.2 (12-31-23 @ 05:15)  HR: 79 (12-31-23 @ 12:43) (52 - 97)  BP: 149/83 (12-31-23 @ 12:43) (149/83 - 190/78)  RR: 20 (12-31-23 @ 12:43) (16 - 22)  SpO2: 98% (12-31-23 @ 12:43) (97% - 100%)    Height (cm): 154.2 (12-31-23 @ 06:45)  Weight (kg): 69 (12-31-23 @ 06:45)  BMI (kg/m2): 29 (12-31-23 @ 06:45)  BSA (m2): 1.68 (12-31-23 @ 06:45)      Physical Exam:  Awake and alert  No thrush   Not in distress, resting comfortably in bed  Lungs clear   Abdomen soft, non tender, not distended, no graft tenderness  No edema         LABS/STUDIES  --------------------------------------------------------------------------------              16.6   13.27 >-----------<  161      [12-31-23 @ 08:36]              52.4     136  |  99  |  15  ----------------------------<  279      [12-31-23 @ 08:36]  4.7   |  20  |  0.90        Ca     11.0     [12-31-23 @ 08:36]      Mg     1.7     [12-31-23 @ 08:36]      Phos  2.3     [12-31-23 @ 08:36]    TPro  9.3  /  Alb  5.4  /  TBili  1.6  /  DBili  x   /  AST  22  /  ALT  23  /  AlkPhos  137  [12-31-23 @ 08:36]    PT/INR: PT 10.0 , INR 0.91       [12-31-23 @ 08:36]  PTT: 28.0       [12-31-23 @ 08:36]    CK 54      [12-31-23 @ 08:36]    Creatinine Trend:  SCr 0.90 [12-31 @ 08:36]  SCr 0.97 [12-31 @ 01:19]  SCr 0.86 [12-31 @ 00:27]  SCr 0.60 [12-30 @ 22:00]    Urinalysis - [12-31-23 @ 08:36]      Color  / Appearance  / SG  / pH       Gluc 279 / Ketone   / Bili  / Urobili        Blood  / Protein  / Leuk Est  / Nitrite       RBC  / WBC  / Hyaline  / Gran  / Sq Epi  / Non Sq Epi  / Bacteria            [facility] DIVISION OF KIDNEY DISEASES AND HYPERTENSION -- INITIAL CONSULT NOTE  --------------------------------------------------------------------------------  Authored by: Marti Montano   Cell # [phone]     HPI:  68 year old man with ESRD due to DM, HTN,  ESRD was on HD since 2019. S/p DDRT on 9/16/23. Had slow graft function but did not require dialysis. Graft function gradually improved to scr of 1mg/dL. Transplant ureteral stent removed 11/7/23.  PMH includes Type II DM on insulin, HTN, HLD,  CAD s/p 3 stents (last in 10/2022), CVA, cholecystectomy, GERD.     Hospitalized 11/8 for hyperglycemia, started on Insulin, also rx for UTI  He presented to [facility] with epigastric pain, NV x 5 episodes. No diarrhea.  No chest pain, shortness of breath, fever, dysuria.          PAST HISTORY  --------------------------------------------------------------------------------  PAST MEDICAL & SURGICAL HISTORY:  HTN (hypertension)  Diabetes mellitus type 2  GERD (gastroesophageal reflux disease)  Coronary artery disease Stented coronary artery 2014, 3 stents, [facility]  ESRD (end stage renal disease)  Hypercholesterolemia  Cerebrovascular accident (CVA), unspecified mechanism  Anemia due to stage 5 chronic kidney disease, not on chronic dialysis        AV fistula  10/12/18 L radiocephalic AV fistula  H/O eye surgery    ALLERGIES & MEDICATIONS  --------------------------------------------------------------------------------  Allergies  No Known Allergies    Standing Inpatient Medications  aspirin enteric coated 81 milliGRAM(s) Oral daily  atorvastatin 40 milliGRAM(s) Oral at bedtime  carvedilol 25 milliGRAM(s) Oral every 12 hours  heparin   Injectable 5000 Unit(s) SubCutaneous every 8 hours  insulin glargine Injectable (LANTUS) 24 Unit(s) SubCutaneous at bedtime  insulin lispro (ADMELOG) corrective regimen sliding scale   SubCutaneous at bedtime  insulin lispro (ADMELOG) corrective regimen sliding scale   SubCutaneous three times a day before meals  insulin lispro Injectable (ADMELOG) 10 Unit(s) SubCutaneous three times a day before meals  lactated ringers. 1000 milliLiter(s) IV Continuous <Continuous>  linagliptin 5 milliGRAM(s) Oral daily  metoclopramide 5 milliGRAM(s) Oral three times a day  NIFEdipine XL 60 milliGRAM(s) Oral two times a day  nystatin    Suspension 836819 Unit(s) Oral four times a day  pantoprazole    Tablet 40 milliGRAM(s) Oral before breakfast  piperacillin/tazobactam IVPB.- 3.375 Gram(s) IV Intermittent once  piperacillin/tazobactam IVPB.- 3.375 Gram(s) IV Intermittent once  predniSONE   Tablet 5 milliGRAM(s) Oral daily  sodium phosphate 15 milliMole(s)/250 mL IVPB 15 milliMole(s) IV Intermittent once  sucralfate suspension 1 Gram(s) Oral four times a day  tacrolimus ER Tablet (ENVARSUS XR) 4 milliGRAM(s) Oral <User Schedule>  tamsulosin 0.4 milliGRAM(s) Oral at bedtime  trimethoprim   80 mG/sulfamethoxazole 400 mG 1 Tablet(s) Oral daily  valGANciclovir 450 milliGRAM(s) Oral daily    PRN Inpatient Medications  aluminum hydroxide/magnesium hydroxide/simethicone Suspension 30 milliLiter(s) Oral every 6 hours PRN      REVIEW OF SYSTEMS: Negative except for mentioned in HPI         VITALS/PHYSICAL EXAM  --------------------------------------------------------------------------------  T(C): 36.9 (12-31-23 @ 12:43), Max: 37.2 (12-31-23 @ 05:15)  HR: 79 (12-31-23 @ 12:43) (52 - 97)  BP: 149/83 (12-31-23 @ 12:43) (149/83 - 190/78)  RR: 20 (12-31-23 @ 12:43) (16 - 22)  SpO2: 98% (12-31-23 @ 12:43) (97% - 100%)    Height (cm): 154.2 (12-31-23 @ 06:45)  Weight (kg): 69 (12-31-23 @ 06:45)  BMI (kg/m2): 29 (12-31-23 @ 06:45)  BSA (m2): 1.68 (12-31-23 @ 06:45)      Physical Exam:  Awake and alert  No thrush   Not in distress, resting comfortably in bed  Lungs clear   Abdomen soft, non tender, not distended, no graft tenderness  No edema         LABS/STUDIES  --------------------------------------------------------------------------------              16.6   13.27 >-----------<  161      [12-31-23 @ 08:36]              52.4     136  |  99  |  15  ----------------------------<  279      [12-31-23 @ 08:36]  4.7   |  20  |  0.90        Ca     11.0     [12-31-23 @ 08:36]      Mg     1.7     [12-31-23 @ 08:36]      Phos  2.3     [12-31-23 @ 08:36]    TPro  9.3  /  Alb  5.4  /  TBili  1.6  /  DBili  x   /  AST  22  /  ALT  23  /  AlkPhos  137  [12-31-23 @ 08:36]    PT/INR: PT 10.0 , INR 0.91       [12-31-23 @ 08:36]  PTT: 28.0       [12-31-23 @ 08:36]    CK 54      [12-31-23 @ 08:36]    Creatinine Trend:  SCr 0.90 [12-31 @ 08:36]  SCr 0.97 [12-31 @ 01:19]  SCr 0.86 [12-31 @ 00:27]  SCr 0.60 [12-30 @ 22:00]    Urinalysis - [12-31-23 @ 08:36]      Color  / Appearance  / SG  / pH       Gluc 279 / Ketone   / Bili  / Urobili        Blood  / Protein  / Leuk Est  / Nitrite       RBC  / WBC  / Hyaline  / Gran  / Sq Epi  / Non Sq Epi  / Bacteria

## 2023-12-31 NOTE — ED ADULT NURSE NOTE - NS ED PATIENT SAFETY CONCERN
I have personally provided the amount of critical care time documented below concurrently with the resident/fellow.  This time excludes time spent on separate procedures and time spent teaching. I have reviewed the resident’s/fellow’s documentation and I agree with the assessment and plan of care
No

## 2023-12-31 NOTE — H&P ADULT - NS ATTEND AMEND GEN_ALL_CORE FT
I personally saw and examined patient.  Alert, oriented, responsive.  Kidney transplant recipient.  Abdomen soft and non-tender.  Presents with epigastric pain.    IMMUNOSUPPRESSION MANAGEMENT:  Envarsus level today: 8.3  Envarsus level yesterday: not obtained  Aim for level of 8-10  Envarsus dose: 4 mg by mouth daily  ENVARSUS DOSE MANAGEMENT:  - no change in dose

## 2023-12-31 NOTE — PATIENT PROFILE ADULT - CAREGIVER ADDRESS
[phone] [place] [id number] [phone] [place] [id number]

## 2023-12-31 NOTE — H&P ADULT - HISTORY OF PRESENT ILLNESS
68 Mongolian speaking M former smoker with past medical history significant for GERD, HTN, HLD, anemia of chronic disease, T2DM, CAD s/p stent x3 (2014 at [place]) & x2 (pLCx 10/28/22, LAD 10/31/22, off of plavix as of 5/1/23), CVA and ESRD (diagnosed Jan 2019) on HD via LUE AVF T/Th/S (Nephrologist Dr Huff) with recent AV fistulogram 7/2023 with cephalic stenosis s/p balloon angioplasty. Past surgical history significant for open cholecystectomy, AVF creation and eye surgery in the past.  s/p R DDRT under Simulect on 9/17/23 with good graft function. Patient had ureteral stent removed on 11/7.     Was recently admitted for hyperglycemia on 11/8 and started on Lantus and lispro. Found to have UTI , completed Abx course. BP is now well controlled. BS is better controlled. Has good UOP.     Pt presents as a transfer from [facility] for abdominal pain, N/V X5 Episodes. Denies fever/chills, Diarrhea, CP, SOB, HA, dysuria, hematuria, melena, hematemesis, changes in meds, recent travel/sick contacts.    68 Lao speaking M former smoker with past medical history significant for GERD, HTN, HLD, anemia of chronic disease, T2DM, CAD s/p stent x3 (2014 at [place]) & x2 (pLCx 10/28/22, LAD 10/31/22, off of plavix as of 5/1/23), CVA and ESRD (diagnosed Jan 2019) on HD via LUE AVF T/Th/S (Nephrologist Dr Huff) with recent AV fistulogram 7/2023 with cephalic stenosis s/p balloon angioplasty. Past surgical history significant for open cholecystectomy, AVF creation and eye surgery in the past.  s/p R DDRT under Simulect on 9/17/23 with good graft function. Patient had ureteral stent removed on 11/7.     Was recently admitted for hyperglycemia on 11/8 and started on Lantus and lispro. Found to have UTI , completed Abx course. BP is now well controlled. BS is better controlled. Has good UOP.     Pt presents as a transfer from [facility] for abdominal pain, N/V X5 Episodes. Denies fever/chills, Diarrhea, CP, SOB, HA, dysuria, hematuria, melena, hematemesis, changes in meds, recent travel/sick contacts.

## 2023-12-31 NOTE — ED PROVIDER NOTE - NSICDXPASTMEDICALHX_GEN_ALL_CORE_FT
PAST MEDICAL HISTORY:  Anemia due to stage 5 chronic kidney disease, not on chronic dialysis     CAP (community acquired pneumonia) 6/18    Cerebrovascular accident (CVA), unspecified mechanism diagnosed via CT of the head    Coronary artery disease     Diabetes mellitus type 2    ESRD (end stage renal disease) on Dialysis( M/W/F), By Dr. Huff    GERD (gastroesophageal reflux disease)     HTN (hypertension)     Hypercholesterolemia     Stented coronary artery 2014, 3 stents, [facility]     PAST MEDICAL HISTORY:  Anemia due to stage 5 chronic kidney disease, not on chronic dialysis     CAP (community acquired pneumonia) 6/18    Cerebrovascular accident (CVA), unspecified mechanism diagnosed via CT of the head    Coronary artery disease     Diabetes mellitus type 2    ESRD (end stage renal disease) on Dialysis( M/W/F), By Dr. Huff    GERD (gastroesophageal reflux disease)     HTN (hypertension)     Hypercholesterolemia     Stented coronary artery 2014, 3 stents, [facility]

## 2023-12-31 NOTE — H&P ADULT - ASSESSMENT
68 Portuguese speaking M former smoker with past medical history significant for GERD, HTN, HLD, anemia of chronic disease, T2DM, CAD s/p stent x3 (2014 at [place]) & x2 (pLCx 10/28/22, LAD 10/31/22, off of plavix as of 5/1/23), CVA and ESRD (diagnosed Jan 2019) on HD via LUE AVF T/Th/S (Nephrologist Dr Huff) with recent AV fistulogram 7/2023 with cephalic stenosis s/p balloon angioplasty. Past surgical history significant for open cholecystectomy, AVF creation and eye surgery in the past.  s/p R DDRT under Simulect on 9/17/23 with good graft function. Patient had ureteral stent removed on 11/7. Was recently admitted for hyperglycemia on 11/8 and started on Lantus and lispro. Found to have UTI , completed Abx course. BP is now well controlled. BS is better controlled. Has good UOP. Pt presents as a transfer from [facility] for abdominal pain, N/V X5 Episodes.     [ ] Abdominal Pain, N/V/ UTI  - F/U UA, UCX, BCX X2.  - CT Abd/Pelvis: Mild cystitis with urinary retention. Transplant right lower quadrant   kidney with hydronephrosis and perinephric stranding concerning for   infection.Nonspecific mild increased size retroperitoneal lymph nodes.  - Hold Cellcept.   - NPO  - Pain meds prn  - Meropenem IV  - Trasnplant ID c/s.     [ ] s/p DDRT 9/17/23  - monitor Cr   - monitor urine output   - strict I&O  - SCD/spirometry  - s/p removal of ureteral stent 11/7/23     [ ] immunosuppression  -Envarsus 4mg  [per level],  MMF 1/1 on hold. , Pred 5mg po qd  -Valcyte qD/Nystatin/PPI/Bactrim for PPx    [ ] HTN  - Coreg 25 mg po bid and   - Nifedipine 30 mg po bid.     [ ]  DM   - Lantus to 24 units at night and continue   - Lispro 10 units tid with meals.   - ADA Diet.    # [id number]  Transplant PA 68 Bulgarian speaking M former smoker with past medical history significant for GERD, HTN, HLD, anemia of chronic disease, T2DM, CAD s/p stent x3 (2014 at [place]) & x2 (pLCx 10/28/22, LAD 10/31/22, off of plavix as of 5/1/23), CVA and ESRD (diagnosed Jan 2019) on HD via LUE AVF T/Th/S (Nephrologist Dr Huff) with recent AV fistulogram 7/2023 with cephalic stenosis s/p balloon angioplasty. Past surgical history significant for open cholecystectomy, AVF creation and eye surgery in the past.  s/p R DDRT under Simulect on 9/17/23 with good graft function. Patient had ureteral stent removed on 11/7. Was recently admitted for hyperglycemia on 11/8 and started on Lantus and lispro. Found to have UTI , completed Abx course. BP is now well controlled. BS is better controlled. Has good UOP. Pt presents as a transfer from [facility] for abdominal pain, N/V X5 Episodes.     [ ] Abdominal Pain, N/V/ UTI  - F/U UA, UCX, BCX X2.  - CT Abd/Pelvis: Mild cystitis with urinary retention. Transplant right lower quadrant   kidney with hydronephrosis and perinephric stranding concerning for   infection.Nonspecific mild increased size retroperitoneal lymph nodes.  - Hold Cellcept.   - NPO  - Pain meds prn  - Meropenem IV  - Trasnplant ID c/s.     [ ] s/p DDRT 9/17/23  - monitor Cr   - monitor urine output   - strict I&O  - SCD/spirometry  - s/p removal of ureteral stent 11/7/23     [ ] immunosuppression  -Envarsus 4mg  [per level],  MMF 1/1 on hold. , Pred 5mg po qd  -Valcyte qD/Nystatin/PPI/Bactrim for PPx    [ ] HTN  - Coreg 25 mg po bid and   - Nifedipine 30 mg po bid.     [ ]  DM   - Lantus to 24 units at night and continue   - Lispro 10 units tid with meals.   - ADA Diet.    # [id number]  Transplant PA

## 2023-12-31 NOTE — CONSULT NOTE ADULT - ASSESSMENT
68 Yi speaking Male w/ PMH of GERD, HTN, HLD, anemia of chronic disease, T2DM, CAD s/p stent x3 (2014 at [place]) & x2 (pLCx 10/28/22, LAD 10/31/22, off of plavix as of 5/1/23), CVA and ESRD (diagnosed Jan 2019) on HD via LUE AVF T/Th/S (Nephrologist Dr Huff), s/p R DDRT under Simulect on 9/17/23.  Patient had ureteral stent removed on 11/7.  Nephrology consulted for renal transplant status.      A/P:  s/p DDRT 9/2023  Renal function stable on admission; S.Cr. 0.9.  Immunosuppressants per transplant: Envarsus 4mg daily; Stop Cellcept, change to Myfortic 720mg PO BID for GI symptoms; continue Prednisone 5mg po daily.  Check tacro level 30min before next dose (goal 8-10).  On Bactrim  400mg and Valcyte 450mg po daily for infection prophyaxis.   CT A/P noncon 12/31 - Mild cystitis with urinary retention. Transplant right lower quadrant   kidney with hydronephrosis and perinephric stranding concerning for infection.  Pt denies urinary complaints.  Check bladder scan.  Consider placing hinds catheter.  Continue Flomax.  Monitor BMP and UO.    HTN   Fluctuating.  C/W current antihypertensives.  Monitor BP.    Hypercalcemia:  Concern for dehydration in view of hemoconcentration.  On LR @75mL/hr.  Monitor Ca.  Consider workup if Ca remains high s/p hydration.    Hypophosphatemia:  Possibly in setting of poor PO intake d/t n/v.  Supplemented w/ NaPhos 15mmol.  Replete as needed.  Monitor PO4 daily.    Proteinuria:  Mild.  Check UCr/Pr.  Monitor. 68 Romansh speaking Male w/ PMH of GERD, HTN, HLD, anemia of chronic disease, T2DM, CAD s/p stent x3 (2014 at [place]) & x2 (pLCx 10/28/22, LAD 10/31/22, off of plavix as of 5/1/23), CVA and ESRD (diagnosed Jan 2019) on HD via LUE AVF T/Th/S (Nephrologist Dr Huff), s/p R DDRT under Simulect on 9/17/23.  Patient had ureteral stent removed on 11/7.  Nephrology consulted for renal transplant status.      A/P:  s/p DDRT 9/2023  Renal function stable on admission; S.Cr. 0.9.  Immunosuppressants per transplant: Envarsus 4mg daily; Stop Cellcept, change to Myfortic 720mg PO BID for GI symptoms; continue Prednisone 5mg po daily.  Check tacro level 30min before next dose (goal 8-10).  On Bactrim  400mg and Valcyte 450mg po daily for infection prophyaxis.   CT A/P noncon 12/31 - Mild cystitis with urinary retention. Transplant right lower quadrant   kidney with hydronephrosis and perinephric stranding concerning for infection.  Pt denies urinary complaints.  Check bladder scan.  Consider placing hinds catheter.  Continue Flomax.  Monitor BMP and UO.    HTN   Fluctuating.  C/W current antihypertensives.  Monitor BP.    Hypercalcemia:  Concern for dehydration in view of hemoconcentration.  On LR @75mL/hr.  Monitor Ca.  Consider workup if Ca remains high s/p hydration.    Hypophosphatemia:  Possibly in setting of poor PO intake d/t n/v.  Supplemented w/ NaPhos 15mmol.  Replete as needed.  Monitor PO4 daily.    Proteinuria:  Mild.  Check UCr/Pr.  Monitor. 68 Georgian speaking Male w/ PMH of GERD, HTN, HLD, anemia of chronic disease, T2DM, CAD s/p stent x3 (2014 at [place]) & x2 (pLCx 10/28/22, LAD 10/31/22, off of plavix as of 5/1/23), CVA and ESRD (diagnosed Jan 2019) on HD via LUE AVF T/Th/S (Nephrologist Dr Huff), s/p R DDRT under Simulect on 9/17/23.  Patient had ureteral stent removed on 11/7.  Nephrology consulted for renal transplant status.      A/P:  s/p DDRT 9/2023  Renal function stable on admission; S.Cr. 0.9.  Immunosuppressants per transplant: Envarsus 4mg daily; Stop Cellcept, change to Myfortic 720mg PO BID for GI symptoms; continue Prednisone 5mg po daily.  Check tacro level 30min before next dose (goal 8-10).  On Bactrim  400mg and Valcyte 450mg po daily for infection prophyaxis.   CT A/P noncon 12/31 - Mild cystitis with urinary retention. Transplant right lower quadrant   kidney with hydronephrosis and perinephric stranding concerning for infection.  Pt denies urinary complaints.  Check bladder scan.  Consider placing hinds catheter if still retaining.  Continue Flomax.  Monitor BMP and UO.    HTN   Fluctuating.  C/W current antihypertensives.  Monitor BP.    Hypercalcemia:  Concern for dehydration in view of hemoconcentration.  On LR @75mL/hr.  Monitor Ca.  Consider workup if Ca remains high s/p hydration.    Hypophosphatemia:  Possibly in setting of poor PO intake d/t n/v.  Supplemented w/ NaPhos 15mmol.  Replete as needed.  Monitor PO4 daily.    Proteinuria:  Mild.  Check UCr/Pr.  Monitor. 68 Syriac speaking Male w/ PMH of GERD, HTN, HLD, anemia of chronic disease, T2DM, CAD s/p stent x3 (2014 at [place]) & x2 (pLCx 10/28/22, LAD 10/31/22, off of plavix as of 5/1/23), CVA and ESRD (diagnosed Jan 2019) on HD via LUE AVF T/Th/S (Nephrologist Dr Huff), s/p R DDRT under Simulect on 9/17/23.  Patient had ureteral stent removed on 11/7.  Nephrology consulted for renal transplant status.      A/P:  s/p DDRT 9/2023  Renal function stable on admission; S.Cr. 0.9.  Immunosuppressants per transplant: Envarsus 4mg daily; Stop Cellcept, change to Myfortic 720mg PO BID for GI symptoms; continue Prednisone 5mg po daily.  Check tacro level 30min before next dose (goal 8-10).  On Bactrim  400mg and Valcyte 450mg po daily for infection prophyaxis.   CT A/P noncon 12/31 - Mild cystitis with urinary retention. Transplant right lower quadrant   kidney with hydronephrosis and perinephric stranding concerning for infection.  Pt denies urinary complaints.  Check bladder scan.  Consider placing hinds catheter if still retaining.  Continue Flomax.  Monitor BMP and UO.    HTN   Fluctuating.  C/W current antihypertensives.  Monitor BP.    Hypercalcemia:  Concern for dehydration in view of hemoconcentration.  On LR @75mL/hr.  Monitor Ca.  Consider workup if Ca remains high s/p hydration.    Hypophosphatemia:  Possibly in setting of poor PO intake d/t n/v.  Supplemented w/ NaPhos 15mmol.  Replete as needed.  Monitor PO4 daily.    Proteinuria:  Mild.  Check UCr/Pr.  Monitor.

## 2023-12-31 NOTE — H&P ADULT - NSHPLABSRESULTS_GEN_ALL_CORE
14.9   16.64 )-----------( 156      ( 30 Dec 2023 22:00 )             46.1     12-31    137  |  103  |  18  ----------------------------<  240<H>  4.6   |  19<L>  |  0.97    Ca    10.1      31 Dec 2023 01:19  Phos  2.5     12-31  Mg     1.6     12-31    TPro  7.6  /  Alb  4.5  /  TBili  1.1  /  DBili  x   /  AST  17  /  ALT  15  /  AlkPhos  107  12-31    PT/INR - ( 30 Dec 2023 22:00 )   PT: 9.9 sec;   INR: <0.90 ratio         PTT - ( 30 Dec 2023 22:00 )  PTT:26.8 sec

## 2023-12-31 NOTE — ED ADULT NURSE NOTE - SUICIDE SCREENING QUESTION 3
Assessment  Well baby female   No active medical issues    Plan  Continue routine  care and teaching  Infant's care discussed with family  Anticipate discharge in     1-2    day(s)
No

## 2023-12-31 NOTE — ED ADULT NURSE REASSESSMENT NOTE - NS ED NURSE REASSESS COMMENT FT1
RN attempted to call report to 6 [facility] x2; first time was told they will call back and second time no answer. RN attempted to call report to [facility] x2; first time was told they will call back and second time no answer.

## 2023-12-31 NOTE — PATIENT PROFILE ADULT - FALL HARM RISK - UNIVERSAL INTERVENTIONS
Bed in lowest position, wheels locked, appropriate side rails in place/Call bell, personal items and telephone in reach/Instruct patient to call for assistance before getting out of bed or chair/Non-slip footwear when patient is out of bed/[place] to call system/Physically safe environment - no spills, clutter or unnecessary equipment/Purposeful Proactive Rounding/Room/bathroom lighting operational, light cord in reach Bed in lowest position, wheels locked, appropriate side rails in place/Call bell, personal items and telephone in reach/Instruct patient to call for assistance before getting out of bed or chair/Non-slip footwear when patient is out of bed/[place] to call system/Physically safe environment - no spills, clutter or unnecessary equipment/Purposeful Proactive Rounding/Room/bathroom lighting operational, light cord in reach

## 2023-12-31 NOTE — ED PROVIDER NOTE - PHYSICAL EXAMINATION
GENERAL: no acute distress  HEAD: normocephalic, atraumatic  HEENT: normal conjunctiva, oral mucosa moist,  CARDIAC: regular rate and rhythm, no appreciable murmurs, 2+ pulses in UE/LE b/l  PULM: normal breath sounds, clear to ascultation bilaterally, no rales, rhonchi, wheezing  GI: abdomen nondistended, soft, diffusely tender, no guarding, no rebound tenderness  : no CVA tenderness b/l, no suprapubic tenderness  NEURO:  AAOx3  MSK: no peripheral edema, no calf tenderness b/l  SKIN: well-perfused, extremities warm, no visible rashes  PSYCH: appropriate mood and affect

## 2024-01-01 LAB
ALBUMIN SERPL ELPH-MCNC: 4.1 G/DL — SIGNIFICANT CHANGE UP (ref 3.3–5)
ALBUMIN SERPL ELPH-MCNC: 4.1 G/DL — SIGNIFICANT CHANGE UP (ref 3.3–5)
ALP SERPL-CCNC: 97 U/L — SIGNIFICANT CHANGE UP (ref 40–120)
ALP SERPL-CCNC: 97 U/L — SIGNIFICANT CHANGE UP (ref 40–120)
ALT FLD-CCNC: 13 U/L — SIGNIFICANT CHANGE UP (ref 10–45)
ALT FLD-CCNC: 13 U/L — SIGNIFICANT CHANGE UP (ref 10–45)
ANION GAP SERPL CALC-SCNC: 14 MMOL/L — SIGNIFICANT CHANGE UP (ref 5–17)
ANION GAP SERPL CALC-SCNC: 14 MMOL/L — SIGNIFICANT CHANGE UP (ref 5–17)
AST SERPL-CCNC: 14 U/L — SIGNIFICANT CHANGE UP (ref 10–40)
AST SERPL-CCNC: 14 U/L — SIGNIFICANT CHANGE UP (ref 10–40)
BASOPHILS # BLD AUTO: 0.04 K/UL — SIGNIFICANT CHANGE UP (ref 0–0.2)
BASOPHILS # BLD AUTO: 0.04 K/UL — SIGNIFICANT CHANGE UP (ref 0–0.2)
BASOPHILS NFR BLD AUTO: 0.4 % — SIGNIFICANT CHANGE UP (ref 0–2)
BASOPHILS NFR BLD AUTO: 0.4 % — SIGNIFICANT CHANGE UP (ref 0–2)
BILIRUB SERPL-MCNC: 0.9 MG/DL — SIGNIFICANT CHANGE UP (ref 0.2–1.2)
BILIRUB SERPL-MCNC: 0.9 MG/DL — SIGNIFICANT CHANGE UP (ref 0.2–1.2)
BUN SERPL-MCNC: 12 MG/DL — SIGNIFICANT CHANGE UP (ref 7–23)
BUN SERPL-MCNC: 12 MG/DL — SIGNIFICANT CHANGE UP (ref 7–23)
CALCIUM SERPL-MCNC: 10.1 MG/DL — SIGNIFICANT CHANGE UP (ref 8.4–10.5)
CALCIUM SERPL-MCNC: 10.1 MG/DL — SIGNIFICANT CHANGE UP (ref 8.4–10.5)
CHLORIDE SERPL-SCNC: 106 MMOL/L — SIGNIFICANT CHANGE UP (ref 96–108)
CHLORIDE SERPL-SCNC: 106 MMOL/L — SIGNIFICANT CHANGE UP (ref 96–108)
CO2 SERPL-SCNC: 19 MMOL/L — LOW (ref 22–31)
CO2 SERPL-SCNC: 19 MMOL/L — LOW (ref 22–31)
CREAT SERPL-MCNC: 1.06 MG/DL — SIGNIFICANT CHANGE UP (ref 0.5–1.3)
CREAT SERPL-MCNC: 1.06 MG/DL — SIGNIFICANT CHANGE UP (ref 0.5–1.3)
CULTURE RESULTS: NO GROWTH — SIGNIFICANT CHANGE UP
CULTURE RESULTS: NO GROWTH — SIGNIFICANT CHANGE UP
EGFR: 77 ML/MIN/1.73M2 — SIGNIFICANT CHANGE UP
EGFR: 77 ML/MIN/1.73M2 — SIGNIFICANT CHANGE UP
EOSINOPHIL # BLD AUTO: 0.03 K/UL — SIGNIFICANT CHANGE UP (ref 0–0.5)
EOSINOPHIL # BLD AUTO: 0.03 K/UL — SIGNIFICANT CHANGE UP (ref 0–0.5)
EOSINOPHIL NFR BLD AUTO: 0.3 % — SIGNIFICANT CHANGE UP (ref 0–6)
EOSINOPHIL NFR BLD AUTO: 0.3 % — SIGNIFICANT CHANGE UP (ref 0–6)
GLUCOSE BLDC GLUCOMTR-MCNC: 108 MG/DL — HIGH (ref 70–99)
GLUCOSE BLDC GLUCOMTR-MCNC: 108 MG/DL — HIGH (ref 70–99)
GLUCOSE BLDC GLUCOMTR-MCNC: 127 MG/DL — HIGH (ref 70–99)
GLUCOSE BLDC GLUCOMTR-MCNC: 127 MG/DL — HIGH (ref 70–99)
GLUCOSE BLDC GLUCOMTR-MCNC: 129 MG/DL — HIGH (ref 70–99)
GLUCOSE BLDC GLUCOMTR-MCNC: 129 MG/DL — HIGH (ref 70–99)
GLUCOSE BLDC GLUCOMTR-MCNC: 170 MG/DL — HIGH (ref 70–99)
GLUCOSE BLDC GLUCOMTR-MCNC: 170 MG/DL — HIGH (ref 70–99)
GLUCOSE SERPL-MCNC: 172 MG/DL — HIGH (ref 70–99)
GLUCOSE SERPL-MCNC: 172 MG/DL — HIGH (ref 70–99)
HCT VFR BLD CALC: 46.2 % — SIGNIFICANT CHANGE UP (ref 39–50)
HCT VFR BLD CALC: 46.2 % — SIGNIFICANT CHANGE UP (ref 39–50)
HGB BLD-MCNC: 14.3 G/DL — SIGNIFICANT CHANGE UP (ref 13–17)
HGB BLD-MCNC: 14.3 G/DL — SIGNIFICANT CHANGE UP (ref 13–17)
IMM GRANULOCYTES NFR BLD AUTO: 0.6 % — SIGNIFICANT CHANGE UP (ref 0–0.9)
IMM GRANULOCYTES NFR BLD AUTO: 0.6 % — SIGNIFICANT CHANGE UP (ref 0–0.9)
LYMPHOCYTES # BLD AUTO: 1 K/UL — SIGNIFICANT CHANGE UP (ref 1–3.3)
LYMPHOCYTES # BLD AUTO: 1 K/UL — SIGNIFICANT CHANGE UP (ref 1–3.3)
LYMPHOCYTES # BLD AUTO: 9.1 % — LOW (ref 13–44)
LYMPHOCYTES # BLD AUTO: 9.1 % — LOW (ref 13–44)
MAGNESIUM SERPL-MCNC: 2 MG/DL — SIGNIFICANT CHANGE UP (ref 1.6–2.6)
MAGNESIUM SERPL-MCNC: 2 MG/DL — SIGNIFICANT CHANGE UP (ref 1.6–2.6)
MCHC RBC-ENTMCNC: 28.3 PG — SIGNIFICANT CHANGE UP (ref 27–34)
MCHC RBC-ENTMCNC: 28.3 PG — SIGNIFICANT CHANGE UP (ref 27–34)
MCHC RBC-ENTMCNC: 31 GM/DL — LOW (ref 32–36)
MCHC RBC-ENTMCNC: 31 GM/DL — LOW (ref 32–36)
MCV RBC AUTO: 91.5 FL — SIGNIFICANT CHANGE UP (ref 80–100)
MCV RBC AUTO: 91.5 FL — SIGNIFICANT CHANGE UP (ref 80–100)
MONOCYTES # BLD AUTO: 0.88 K/UL — SIGNIFICANT CHANGE UP (ref 0–0.9)
MONOCYTES # BLD AUTO: 0.88 K/UL — SIGNIFICANT CHANGE UP (ref 0–0.9)
MONOCYTES NFR BLD AUTO: 8 % — SIGNIFICANT CHANGE UP (ref 2–14)
MONOCYTES NFR BLD AUTO: 8 % — SIGNIFICANT CHANGE UP (ref 2–14)
NEUTROPHILS # BLD AUTO: 8.99 K/UL — HIGH (ref 1.8–7.4)
NEUTROPHILS # BLD AUTO: 8.99 K/UL — HIGH (ref 1.8–7.4)
NEUTROPHILS NFR BLD AUTO: 81.6 % — HIGH (ref 43–77)
NEUTROPHILS NFR BLD AUTO: 81.6 % — HIGH (ref 43–77)
NRBC # BLD: 0 /100 WBCS — SIGNIFICANT CHANGE UP (ref 0–0)
NRBC # BLD: 0 /100 WBCS — SIGNIFICANT CHANGE UP (ref 0–0)
PHOSPHATE SERPL-MCNC: 1.6 MG/DL — LOW (ref 2.5–4.5)
PHOSPHATE SERPL-MCNC: 1.6 MG/DL — LOW (ref 2.5–4.5)
PLATELET # BLD AUTO: 146 K/UL — LOW (ref 150–400)
PLATELET # BLD AUTO: 146 K/UL — LOW (ref 150–400)
POTASSIUM SERPL-MCNC: 4.3 MMOL/L — SIGNIFICANT CHANGE UP (ref 3.5–5.3)
POTASSIUM SERPL-MCNC: 4.3 MMOL/L — SIGNIFICANT CHANGE UP (ref 3.5–5.3)
POTASSIUM SERPL-SCNC: 4.3 MMOL/L — SIGNIFICANT CHANGE UP (ref 3.5–5.3)
POTASSIUM SERPL-SCNC: 4.3 MMOL/L — SIGNIFICANT CHANGE UP (ref 3.5–5.3)
PROT SERPL-MCNC: 7.1 G/DL — SIGNIFICANT CHANGE UP (ref 6–8.3)
PROT SERPL-MCNC: 7.1 G/DL — SIGNIFICANT CHANGE UP (ref 6–8.3)
PTH-INTACT IO % DIF SERPL: 197 PG/ML — HIGH (ref 10–65)
PTH-INTACT IO % DIF SERPL: 197 PG/ML — HIGH (ref 10–65)
RBC # BLD: 5.05 M/UL — SIGNIFICANT CHANGE UP (ref 4.2–5.8)
RBC # BLD: 5.05 M/UL — SIGNIFICANT CHANGE UP (ref 4.2–5.8)
RBC # FLD: 13.2 % — SIGNIFICANT CHANGE UP (ref 10.3–14.5)
RBC # FLD: 13.2 % — SIGNIFICANT CHANGE UP (ref 10.3–14.5)
SODIUM SERPL-SCNC: 139 MMOL/L — SIGNIFICANT CHANGE UP (ref 135–145)
SODIUM SERPL-SCNC: 139 MMOL/L — SIGNIFICANT CHANGE UP (ref 135–145)
SPECIMEN SOURCE: SIGNIFICANT CHANGE UP
SPECIMEN SOURCE: SIGNIFICANT CHANGE UP
TACROLIMUS SERPL-MCNC: 9.3 NG/ML — SIGNIFICANT CHANGE UP
TACROLIMUS SERPL-MCNC: 9.3 NG/ML — SIGNIFICANT CHANGE UP
WBC # BLD: 11.01 K/UL — HIGH (ref 3.8–10.5)
WBC # BLD: 11.01 K/UL — HIGH (ref 3.8–10.5)
WBC # FLD AUTO: 11.01 K/UL — HIGH (ref 3.8–10.5)
WBC # FLD AUTO: 11.01 K/UL — HIGH (ref 3.8–10.5)

## 2024-01-01 PROCEDURE — 99232 SBSQ HOSP IP/OBS MODERATE 35: CPT | Mod: 24

## 2024-01-01 PROCEDURE — 99233 SBSQ HOSP IP/OBS HIGH 50: CPT

## 2024-01-01 RX ORDER — CALCIUM CARBONATE 500(1250)
1 TABLET ORAL EVERY 6 HOURS
Refills: 0 | Status: DISCONTINUED | OUTPATIENT
Start: 2024-01-01 | End: 2024-01-03

## 2024-01-01 RX ORDER — PANTOPRAZOLE SODIUM 20 MG/1
40 TABLET, DELAYED RELEASE ORAL EVERY 12 HOURS
Refills: 0 | Status: DISCONTINUED | OUTPATIENT
Start: 2024-01-01 | End: 2024-01-03

## 2024-01-01 RX ORDER — POLYETHYLENE GLYCOL 3350 17 G/17G
17 POWDER, FOR SOLUTION ORAL DAILY
Refills: 0 | Status: DISCONTINUED | OUTPATIENT
Start: 2024-01-01 | End: 2024-01-03

## 2024-01-01 RX ADMIN — Medication 500000 UNIT(S): at 05:03

## 2024-01-01 RX ADMIN — Medication 60 MILLIGRAM(S): at 05:03

## 2024-01-01 RX ADMIN — Medication 30 MILLILITER(S): at 18:19

## 2024-01-01 RX ADMIN — PIPERACILLIN AND TAZOBACTAM 25 GRAM(S): 4; .5 INJECTION, POWDER, LYOPHILIZED, FOR SOLUTION INTRAVENOUS at 06:34

## 2024-01-01 RX ADMIN — LINAGLIPTIN 5 MILLIGRAM(S): 5 TABLET, FILM COATED ORAL at 12:42

## 2024-01-01 RX ADMIN — MYCOPHENOLIC ACID 720 MILLIGRAM(S): 180 TABLET, DELAYED RELEASE ORAL at 17:22

## 2024-01-01 RX ADMIN — Medication 2: at 09:04

## 2024-01-01 RX ADMIN — Medication 1 GRAM(S): at 23:04

## 2024-01-01 RX ADMIN — Medication 10 UNIT(S): at 12:43

## 2024-01-01 RX ADMIN — Medication 63.75 MILLIMOLE(S): at 09:06

## 2024-01-01 RX ADMIN — Medication 5 MILLIGRAM(S): at 21:56

## 2024-01-01 RX ADMIN — Medication 1 GRAM(S): at 17:22

## 2024-01-01 RX ADMIN — INSULIN GLARGINE 24 UNIT(S): 100 INJECTION, SOLUTION SUBCUTANEOUS at 22:01

## 2024-01-01 RX ADMIN — PANTOPRAZOLE SODIUM 40 MILLIGRAM(S): 20 TABLET, DELAYED RELEASE ORAL at 17:22

## 2024-01-01 RX ADMIN — Medication 5 MILLIGRAM(S): at 05:04

## 2024-01-01 RX ADMIN — TAMSULOSIN HYDROCHLORIDE 0.4 MILLIGRAM(S): 0.4 CAPSULE ORAL at 21:56

## 2024-01-01 RX ADMIN — CARVEDILOL PHOSPHATE 25 MILLIGRAM(S): 80 CAPSULE, EXTENDED RELEASE ORAL at 17:22

## 2024-01-01 RX ADMIN — MYCOPHENOLIC ACID 720 MILLIGRAM(S): 180 TABLET, DELAYED RELEASE ORAL at 05:03

## 2024-01-01 RX ADMIN — Medication 500000 UNIT(S): at 17:22

## 2024-01-01 RX ADMIN — CARVEDILOL PHOSPHATE 25 MILLIGRAM(S): 80 CAPSULE, EXTENDED RELEASE ORAL at 05:03

## 2024-01-01 RX ADMIN — HEPARIN SODIUM 5000 UNIT(S): 5000 INJECTION INTRAVENOUS; SUBCUTANEOUS at 05:03

## 2024-01-01 RX ADMIN — Medication 10 UNIT(S): at 09:04

## 2024-01-01 RX ADMIN — ATORVASTATIN CALCIUM 40 MILLIGRAM(S): 80 TABLET, FILM COATED ORAL at 21:56

## 2024-01-01 RX ADMIN — Medication 81 MILLIGRAM(S): at 12:42

## 2024-01-01 RX ADMIN — Medication 1 TABLET(S): at 15:55

## 2024-01-01 RX ADMIN — HEPARIN SODIUM 5000 UNIT(S): 5000 INJECTION INTRAVENOUS; SUBCUTANEOUS at 21:56

## 2024-01-01 RX ADMIN — Medication 60 MILLIGRAM(S): at 22:02

## 2024-01-01 RX ADMIN — Medication 500000 UNIT(S): at 12:42

## 2024-01-01 RX ADMIN — Medication 1 GRAM(S): at 12:42

## 2024-01-01 RX ADMIN — TACROLIMUS 4 MILLIGRAM(S): 5 CAPSULE ORAL at 09:06

## 2024-01-01 RX ADMIN — Medication 1 TABLET(S): at 12:42

## 2024-01-01 RX ADMIN — PANTOPRAZOLE SODIUM 40 MILLIGRAM(S): 20 TABLET, DELAYED RELEASE ORAL at 05:04

## 2024-01-01 RX ADMIN — Medication 500000 UNIT(S): at 23:04

## 2024-01-01 RX ADMIN — Medication 5 MILLIGRAM(S): at 12:43

## 2024-01-01 RX ADMIN — HEPARIN SODIUM 5000 UNIT(S): 5000 INJECTION INTRAVENOUS; SUBCUTANEOUS at 12:43

## 2024-01-01 RX ADMIN — Medication 30 MILLILITER(S): at 09:03

## 2024-01-01 RX ADMIN — VALGANCICLOVIR 450 MILLIGRAM(S): 450 TABLET, FILM COATED ORAL at 12:43

## 2024-01-01 RX ADMIN — Medication 1 GRAM(S): at 05:10

## 2024-01-01 NOTE — PROGRESS NOTE ADULT - ASSESSMENT
68 Frisian speaking Male w/ PMH of GERD, HTN, HLD, anemia of chronic disease, T2DM, CAD s/p stent x3 (2014 at [place]) & x2 (pLCx 10/28/22, LAD 10/31/22, off of plavix as of 5/1/23), CVA and ESRD (diagnosed Jan 2019) on HD via LUE AVF T/Th/S (Nephrologist Dr Huff), s/p R DDRT under Simulect on 9/17/23.  Patient had ureteral stent removed on 11/7.  Nephrology consulted for renal transplant status.      A/P:  s/p DDRT 9/2023  Renal function stable on admission; S.Cr. 0.9.  Immunosuppressants per transplant: Envarsus 4mg daily; Stop Cellcept, change to Myfortic 720mg PO BID for GI symptoms; continue Prednisone 5mg po daily.  Check tacro level 30min before next dose (goal 8-10); at goal on 12/31.  On Bactrim  400mg and Valcyte 450mg po daily for infection prophyaxis.   CT A/P noncon 12/31 - Mild cystitis with urinary retention. Transplant right lower quadrant   kidney with hydronephrosis and perinephric stranding concerning for infection.  US of transplanted kidney shows mild hydro and distended bladder.  Post void bladder scan neg for retention on 12/31.  Pt denies urinary complaints.  Continue Flomax.  Monitor BMP and UO.    HTN   Fluctuating.  C/W current antihypertensives.  On hydralazine 10mg IVP.  Monitor BP.    Hypercalcemia:  Concern for dehydration in view of hemoconcentration.  Improved s/p LR @75mL/hr.  Consider workup if Ca remains high s/p hydration.  Monitor Ca.    Hypophosphatemia:  Possibly in setting of poor PO intake d/t n/v.  Supplemented w/ NaPhos 15mmol.  Replete as needed.  Monitor PO4 daily.    Proteinuria:  Mild.  Check UCr/Pr.  Monitor. 68 Thai speaking Male w/ PMH of GERD, HTN, HLD, anemia of chronic disease, T2DM, CAD s/p stent x3 (2014 at [place]) & x2 (pLCx 10/28/22, LAD 10/31/22, off of plavix as of 5/1/23), CVA and ESRD (diagnosed Jan 2019) on HD via LUE AVF T/Th/S (Nephrologist Dr Huff), s/p R DDRT under Simulect on 9/17/23.  Patient had ureteral stent removed on 11/7.  Nephrology consulted for renal transplant status.      A/P:  s/p DDRT 9/2023  Renal function stable on admission; S.Cr. 0.9.  Immunosuppressants per transplant: Envarsus 4mg daily; Stop Cellcept, change to Myfortic 720mg PO BID for GI symptoms; continue Prednisone 5mg po daily.  Check tacro level 30min before next dose (goal 8-10); at goal on 12/31.  On Bactrim  400mg and Valcyte 450mg po daily for infection prophyaxis.   CT A/P noncon 12/31 - Mild cystitis with urinary retention. Transplant right lower quadrant   kidney with hydronephrosis and perinephric stranding concerning for infection.  US of transplanted kidney shows mild hydro and distended bladder.  Post void bladder scan neg for retention on 12/31.  Pt denies urinary complaints.  Continue Flomax.  Monitor BMP and UO.    HTN   Fluctuating.  C/W current antihypertensives.  On hydralazine 10mg IVP.  Monitor BP.    Hypercalcemia:  Concern for dehydration in view of hemoconcentration.  Improved s/p LR @75mL/hr.  Consider workup if Ca remains high s/p hydration.  Monitor Ca.    Hypophosphatemia:  Possibly in setting of poor PO intake d/t n/v.  Supplemented w/ NaPhos 15mmol.  Replete as needed.  Monitor PO4 daily.    Proteinuria:  Mild.  Check UCr/Pr.  Monitor. 68 Portuguese speaking Male w/ PMH of GERD, HTN, HLD, anemia of chronic disease, T2DM, CAD s/p stent x3 (2014 at [place]) & x2 (pLCx 10/28/22, LAD 10/31/22, off of plavix as of 5/1/23), CVA and ESRD (diagnosed Jan 2019) on HD via LUE AVF T/Th/S (Nephrologist Dr Huff), s/p R DDRT under Simulect on 9/17/23.  Patient had ureteral stent removed on 11/7.  Nephrology consulted for renal transplant status.      A/P:  s/p DDRT 9/2023  Renal function stable on admission; S.Cr. 0.9.  Immunosuppressants per transplant: Envarsus 4mg daily; Stop Cellcept, change to Myfortic 720mg PO BID for GI symptoms; continue Prednisone 5mg po daily.  Check tacro level 30min before next dose (goal 8-10); at goal on 12/31.  On Bactrim  400mg and Valcyte 450mg po daily for infection prophyaxis.   CT A/P noncon 12/31 - Mild cystitis with urinary retention. Transplant right lower quadrant   kidney with hydronephrosis and perinephric stranding concerning for infection.  US of transplanted kidney shows mild hydro and distended bladder.  Post void bladder scan neg for retention on 12/31.  Pt denies urinary complaints.  Continue Flomax.  Monitor BMP and UO.    HTN   Fluctuating.  C/W current antihypertensives.  On hydralazine 10mg IVP.  Monitor BP.    Acidosis:  Possibly in setting of GI losses.  Monitor CO2.    Hypercalcemia:  Concern for dehydration in view of hemoconcentration.  Improved s/p LR @75mL/hr.  Consider workup if Ca remains high s/p hydration.   (high) and PO4 low - possible primary hyperparathyroidism.  Monitor Ca.    Hypophosphatemia:  Possibly in setting of poor PO intake d/t n/v.  Supplemented w/ NaPhos 15mmol.  Replete as needed.  Monitor PO4 daily.    Proteinuria:  Mild.  Check UCr/Pr.  Monitor. 68 Yakut speaking Male w/ PMH of GERD, HTN, HLD, anemia of chronic disease, T2DM, CAD s/p stent x3 (2014 at [place]) & x2 (pLCx 10/28/22, LAD 10/31/22, off of plavix as of 5/1/23), CVA and ESRD (diagnosed Jan 2019) on HD via LUE AVF T/Th/S (Nephrologist Dr Huff), s/p R DDRT under Simulect on 9/17/23.  Patient had ureteral stent removed on 11/7.  Nephrology consulted for renal transplant status.      A/P:  s/p DDRT 9/2023  Renal function stable on admission; S.Cr. 0.9.  Immunosuppressants per transplant: Envarsus 4mg daily; Stop Cellcept, change to Myfortic 720mg PO BID for GI symptoms; continue Prednisone 5mg po daily.  Check tacro level 30min before next dose (goal 8-10); at goal on 12/31.  On Bactrim  400mg and Valcyte 450mg po daily for infection prophyaxis.   CT A/P noncon 12/31 - Mild cystitis with urinary retention. Transplant right lower quadrant   kidney with hydronephrosis and perinephric stranding concerning for infection.  US of transplanted kidney shows mild hydro and distended bladder.  Post void bladder scan neg for retention on 12/31.  Pt denies urinary complaints.  Continue Flomax.  Monitor BMP and UO.    HTN   Fluctuating.  C/W current antihypertensives.  On hydralazine 10mg IVP.  Monitor BP.    Acidosis:  Possibly in setting of GI losses.  Monitor CO2.    Hypercalcemia:  Concern for dehydration in view of hemoconcentration.  Improved s/p LR @75mL/hr.  Consider workup if Ca remains high s/p hydration.   (high) and PO4 low - possible primary hyperparathyroidism.  Monitor Ca.    Hypophosphatemia:  Possibly in setting of poor PO intake d/t n/v.  Supplemented w/ NaPhos 15mmol.  Replete as needed.  Monitor PO4 daily.    Proteinuria:  Mild.  Check UCr/Pr.  Monitor.

## 2024-01-01 NOTE — PROGRESS NOTE ADULT - SUBJECTIVE AND OBJECTIVE BOX
[facility] DIVISION OF KIDNEY DISEASES AND HYPERTENSION -- FOLLOW UP NOTE  --------------------------------------------------------------------------------  Authored by: Marti Montano   Cell # [phone]     MD BOLAÑOS was seen and examined at bedside.   Patient is a 69y old  Male who presents with a chief complaint of Abdominal Pain, N/V x 1 day. (01-01-24)      24 hour events/subjective: C/o epigastric burning pain. Ate a little bit of his breakfast. No fever. No NV.   Received 1 liter IVF. Voiding urine without difficulty.       Standing Inpatient Medications  aspirin enteric coated 81 milliGRAM(s) Oral daily  atorvastatin 40 milliGRAM(s) Oral at bedtime  carvedilol 25 milliGRAM(s) Oral every 12 hours  heparin   Injectable 5000 Unit(s) SubCutaneous every 8 hours  hydrALAZINE Injectable 10 milliGRAM(s) IV Push once  insulin glargine Injectable (LANTUS) 24 Unit(s) SubCutaneous at bedtime  insulin lispro (ADMELOG) corrective regimen sliding scale   SubCutaneous at bedtime  insulin lispro (ADMELOG) corrective regimen sliding scale   SubCutaneous three times a day before meals  insulin lispro Injectable (ADMELOG) 10 Unit(s) SubCutaneous three times a day before meals  lactated ringers. 1000 milliLiter(s) IV Continuous <Continuous>  linagliptin 5 milliGRAM(s) Oral daily  metoclopramide 5 milliGRAM(s) Oral three times a day  mycophenolic acid  milliGRAM(s) Oral two times a day  NIFEdipine XL 60 milliGRAM(s) Oral two times a day  nystatin    Suspension 770653 Unit(s) Oral four times a day  pantoprazole    Tablet 40 milliGRAM(s) Oral before breakfast  piperacillin/tazobactam IVPB.. 3.375 Gram(s) IV Intermittent every 8 hours  predniSONE   Tablet 5 milliGRAM(s) Oral daily  sucralfate suspension 1 Gram(s) Oral four times a day  tacrolimus ER Tablet (ENVARSUS XR) 4 milliGRAM(s) Oral <User Schedule>  tamsulosin 0.4 milliGRAM(s) Oral at bedtime  trimethoprim   80 mG/sulfamethoxazole 400 mG 1 Tablet(s) Oral daily  valGANciclovir 450 milliGRAM(s) Oral daily    PRN Inpatient Medications  aluminum hydroxide/magnesium hydroxide/simethicone Suspension 30 milliLiter(s) Oral every 6 hours PRN        VITALS/PHYSICAL EXAM  --------------------------------------------------------------------------------  T(C): 36.8 (01-01-24 @ 09:29), Max: 37.4 (12-31-23 @ 21:49)  HR: 71 (01-01-24 @ 09:29) (66 - 79)  BP: 134/81 (01-01-24 @ 09:29) (134/81 - 184/72)  RR: 20 (01-01-24 @ 09:29) (18 - 20)  SpO2: 99% (01-01-24 @ 09:29) (98% - 99%)  Wt(kg): --  Height (cm): 154.2 (12-31-23 @ 06:45)  Weight (kg): 69 (12-31-23 @ 06:45)  BMI (kg/m2): 29 (12-31-23 @ 06:45)  BSA (m2): 1.68 (12-31-23 @ 06:45)      12-31-23 @ 07:01  -  01-01-24 @ 07:00  --------------------------------------------------------  IN: 2160 mL / OUT: 1600 mL / NET: 560 mL    01-01-24 @ 07:01  -  01-01-24 @ 10:47  --------------------------------------------------------  IN: 150 mL / OUT: 700 mL / NET: -550 mL      Physical Exam:  Awake and alert  No thrush   Not in distress, resting comfortably in bed  Lungs clear   Abdomen soft, non tender, not distended, no graft tenderness  No edema     LABS/STUDIES  --------------------------------------------------------------------------------              14.3   11.01 >-----------<  146      [01-01-24 @ 06:35]              46.2     139  |  106  |  12  ----------------------------<  172      [01-01-24 @ 06:35]  4.3   |  19  |  1.06        Ca     10.1     [01-01-24 @ 06:35]      Mg     2.0     [01-01-24 @ 06:35]      Phos  1.6     [01-01-24 @ 06:35]    TPro  7.1  /  Alb  4.1  /  TBili  0.9  /  DBili  x   /  AST  14  /  ALT  13  /  AlkPhos  97  [01-01-24 @ 06:35]    PT/INR: PT 10.0 , INR 0.91       [12-31-23 @ 08:36]  PTT: 28.0       [12-31-23 @ 08:36]    CK 54      [12-31-23 @ 08:36]    Creatinine Trend:  SCr 1.06 [01-01 @ 06:35]  SCr 0.90 [12-31 @ 08:36]  SCr 0.97 [12-31 @ 01:19]  SCr 0.86 [12-31 @ 00:27]  SCr 0.60 [12-30 @ 22:00]    Tacrolimus (), Serum: 9.3 ng/mL (01-01 @ 06:35)  Tacrolimus (), Serum: 8.3 ng/mL (12-31 @ 08:36)  Tacrolimus (), Serum: 6.7 ng/mL (12-30 @ 22:00)      CAPILLARY BLOOD GLUCOSE  POCT Blood Glucose.: 170 mg/dL (01 Jan 2024 09:03)  POCT Blood Glucose.: 82 mg/dL (31 Dec 2023 21:47)  POCT Blood Glucose.: 200 mg/dL (31 Dec 2023 17:14)  POCT Blood Glucose.: 256 mg/dL (31 Dec 2023 14:23)     [facility] DIVISION OF KIDNEY DISEASES AND HYPERTENSION -- FOLLOW UP NOTE  --------------------------------------------------------------------------------  Authored by: Marti Montano   Cell # [phone]     MD BOLAÑOS was seen and examined at bedside.   Patient is a 69y old  Male who presents with a chief complaint of Abdominal Pain, N/V x 1 day. (01-01-24)      24 hour events/subjective: C/o epigastric burning pain. Ate a little bit of his breakfast. No fever. No NV.   Received 1 liter IVF. Voiding urine without difficulty.       Standing Inpatient Medications  aspirin enteric coated 81 milliGRAM(s) Oral daily  atorvastatin 40 milliGRAM(s) Oral at bedtime  carvedilol 25 milliGRAM(s) Oral every 12 hours  heparin   Injectable 5000 Unit(s) SubCutaneous every 8 hours  hydrALAZINE Injectable 10 milliGRAM(s) IV Push once  insulin glargine Injectable (LANTUS) 24 Unit(s) SubCutaneous at bedtime  insulin lispro (ADMELOG) corrective regimen sliding scale   SubCutaneous at bedtime  insulin lispro (ADMELOG) corrective regimen sliding scale   SubCutaneous three times a day before meals  insulin lispro Injectable (ADMELOG) 10 Unit(s) SubCutaneous three times a day before meals  lactated ringers. 1000 milliLiter(s) IV Continuous <Continuous>  linagliptin 5 milliGRAM(s) Oral daily  metoclopramide 5 milliGRAM(s) Oral three times a day  mycophenolic acid  milliGRAM(s) Oral two times a day  NIFEdipine XL 60 milliGRAM(s) Oral two times a day  nystatin    Suspension 792326 Unit(s) Oral four times a day  pantoprazole    Tablet 40 milliGRAM(s) Oral before breakfast  piperacillin/tazobactam IVPB.. 3.375 Gram(s) IV Intermittent every 8 hours  predniSONE   Tablet 5 milliGRAM(s) Oral daily  sucralfate suspension 1 Gram(s) Oral four times a day  tacrolimus ER Tablet (ENVARSUS XR) 4 milliGRAM(s) Oral <User Schedule>  tamsulosin 0.4 milliGRAM(s) Oral at bedtime  trimethoprim   80 mG/sulfamethoxazole 400 mG 1 Tablet(s) Oral daily  valGANciclovir 450 milliGRAM(s) Oral daily    PRN Inpatient Medications  aluminum hydroxide/magnesium hydroxide/simethicone Suspension 30 milliLiter(s) Oral every 6 hours PRN        VITALS/PHYSICAL EXAM  --------------------------------------------------------------------------------  T(C): 36.8 (01-01-24 @ 09:29), Max: 37.4 (12-31-23 @ 21:49)  HR: 71 (01-01-24 @ 09:29) (66 - 79)  BP: 134/81 (01-01-24 @ 09:29) (134/81 - 184/72)  RR: 20 (01-01-24 @ 09:29) (18 - 20)  SpO2: 99% (01-01-24 @ 09:29) (98% - 99%)  Wt(kg): --  Height (cm): 154.2 (12-31-23 @ 06:45)  Weight (kg): 69 (12-31-23 @ 06:45)  BMI (kg/m2): 29 (12-31-23 @ 06:45)  BSA (m2): 1.68 (12-31-23 @ 06:45)      12-31-23 @ 07:01  -  01-01-24 @ 07:00  --------------------------------------------------------  IN: 2160 mL / OUT: 1600 mL / NET: 560 mL    01-01-24 @ 07:01  -  01-01-24 @ 10:47  --------------------------------------------------------  IN: 150 mL / OUT: 700 mL / NET: -550 mL      Physical Exam:  Awake and alert  No thrush   Not in distress, resting comfortably in bed  Lungs clear   Abdomen soft, non tender, not distended, no graft tenderness  No edema     LABS/STUDIES  --------------------------------------------------------------------------------              14.3   11.01 >-----------<  146      [01-01-24 @ 06:35]              46.2     139  |  106  |  12  ----------------------------<  172      [01-01-24 @ 06:35]  4.3   |  19  |  1.06        Ca     10.1     [01-01-24 @ 06:35]      Mg     2.0     [01-01-24 @ 06:35]      Phos  1.6     [01-01-24 @ 06:35]    TPro  7.1  /  Alb  4.1  /  TBili  0.9  /  DBili  x   /  AST  14  /  ALT  13  /  AlkPhos  97  [01-01-24 @ 06:35]    PT/INR: PT 10.0 , INR 0.91       [12-31-23 @ 08:36]  PTT: 28.0       [12-31-23 @ 08:36]    CK 54      [12-31-23 @ 08:36]    Creatinine Trend:  SCr 1.06 [01-01 @ 06:35]  SCr 0.90 [12-31 @ 08:36]  SCr 0.97 [12-31 @ 01:19]  SCr 0.86 [12-31 @ 00:27]  SCr 0.60 [12-30 @ 22:00]    Tacrolimus (), Serum: 9.3 ng/mL (01-01 @ 06:35)  Tacrolimus (), Serum: 8.3 ng/mL (12-31 @ 08:36)  Tacrolimus (), Serum: 6.7 ng/mL (12-30 @ 22:00)      CAPILLARY BLOOD GLUCOSE  POCT Blood Glucose.: 170 mg/dL (01 Jan 2024 09:03)  POCT Blood Glucose.: 82 mg/dL (31 Dec 2023 21:47)  POCT Blood Glucose.: 200 mg/dL (31 Dec 2023 17:14)  POCT Blood Glucose.: 256 mg/dL (31 Dec 2023 14:23)

## 2024-01-01 NOTE — CONSULT NOTE ADULT - ASSESSMENT
68 Portuguese speaking M former smoker with past medical history significant for GERD, HTN, HLD, anemia of chronic disease, T2DM, CAD s/p stent x3 (2014 at [place]) & x2 (pLCx 10/28/22, LAD 10/31/22, off of plavix as of 5/1/23), CVA and ESRD (diagnosed Jan 2019) on HD via LUE AVF T/Th/S (Nephrologist Dr Huff) with recent AV fistulogram 7/2023 with cephalic stenosis s/p balloon angioplasty. Pt presents as a transfer from [facility] for abdominal pain, N/V X5 Episodes. GI consulted for GERD sxs.     #GERD- likely iso vomiting      Recommendations:  - would put on PPI BID   - trend CBC and BMP   - no plan for endoscopy at this time     Mariah Duron MD  Gastroenterology/Hepatology Fellow, PGY-4  Please contact via TEAMS    NON-URGENT CONSULTS:  Please email [email or web address]   68 Tamazight speaking M former smoker with past medical history significant for GERD, HTN, HLD, anemia of chronic disease, T2DM, CAD s/p stent x3 (2014 at [place]) & x2 (pLCx 10/28/22, LAD 10/31/22, off of plavix as of 5/1/23), CVA and ESRD (diagnosed Jan 2019) on HD via LUE AVF T/Th/S (Nephrologist Dr Huff) with recent AV fistulogram 7/2023 with cephalic stenosis s/p balloon angioplasty. Pt presents as a transfer from [facility] for abdominal pain, N/V X5 Episodes. GI consulted for GERD sxs.     #GERD- likely iso vomiting      Recommendations:  - would put on PPI BID   - trend CBC and BMP   - no plan for endoscopy at this time     Mariah Duron MD  Gastroenterology/Hepatology Fellow, PGY-4  Please contact via TEAMS    NON-URGENT CONSULTS:  Please email [email or web address] [place]  [email or web address]   68 Azeri speaking M former smoker with past medical history significant for GERD, HTN, HLD, anemia of chronic disease, T2DM, CAD s/p stent x3 (2014 at [place]) & x2 (pLCx 10/28/22, LAD 10/31/22, off of plavix as of 5/1/23), CVA and ESRD (diagnosed Jan 2019) on HD via LUE AVF T/Th/S (Nephrologist Dr Huff) with recent AV fistulogram 7/2023 with cephalic stenosis s/p balloon angioplasty. Pt presents as a transfer from [facility] for abdominal pain, N/V X5 Episodes. GI consulted for GERD sxs.     #GERD- likely iso vomiting      Recommendations:  - would put on PPI BID   - trend CBC and BMP     Mariah Duron MD  Gastroenterology/Hepatology Fellow, PGY-4  Please contact via TEAMS    NON-URGENT CONSULTS:  Please email [email or web address]   68 Bulgarian speaking M former smoker with past medical history significant for GERD, HTN, HLD, anemia of chronic disease, T2DM, CAD s/p stent x3 (2014 at [place]) & x2 (pLCx 10/28/22, LAD 10/31/22, off of plavix as of 5/1/23), CVA and ESRD (diagnosed Jan 2019) on HD via LUE AVF T/Th/S (Nephrologist Dr Huff) with recent AV fistulogram 7/2023 with cephalic stenosis s/p balloon angioplasty. Pt presents as a transfer from [facility] for abdominal pain, N/V X5 Episodes. GI consulted for GERD sxs.     #GERD- likely iso vomiting      Recommendations:  - would put on PPI BID   - trend CBC and BMP     Mariah Duron MD  Gastroenterology/Hepatology Fellow, PGY-4  Please contact via TEAMS    NON-URGENT CONSULTS:  Please email [email or web address]  [email or web address]

## 2024-01-01 NOTE — PROGRESS NOTE ADULT - ASSESSMENT
68 yr old man with ESRD due to DM, HTN, CAD, CVA was on HD since 2019. S/p DDRT on 9/16/23. Had slow graft function but did not require dialysis. Graft function improved to scr of 1mg/dL. He presents with epigastric pain, nausea and vomiting.      s/p DDRT 9/2023  Graft function stable cr 1.06mg/dL today.   No PVR on bladder US    Immunosuppression   Envarsus 4mg daily, level 9.3 ok.  Trough goal is  8-10  Cellcept changed to  Myfortic 720mg po bid for GI symptoms  Continue Prednisone 5mg po daily     Infection prophylaxis   Continue bactrim ss daily   Valcyte 450mg po daily     HTN   Coreg 25mg po bid , Nifedipine xl 60mg po bid     CAD s/p stents last in 2022. EKG no acute changes, trops negative.   Continue ASA 81, Atorvastatin 40, Coreg.     DM - continue Lantus 24, Admelog 10 units premeals and SS    GI - NV has resolved, still has epigastric burning pain.   - LFTs normal, Lipase normal. CT abdomen unremarkable.   - Continue protonix and reglan.   - F/u CMV PCR  - GI consult for EGD     ID - leukocytosis 15 on admission, improving, 11 today. No fever, CXR . U/A negative.   On empiric zosyn - D/c since no evidence of infection

## 2024-01-01 NOTE — PROGRESS NOTE ADULT - SUBJECTIVE AND OBJECTIVE BOX
Transplant Surgery Multidisciplinary Progress Note  --------------------------------------------------------------  DDRT 9/16/23    Present:   Patient seen and examined with multidisciplinary Transplant team including  Surgeon: Dr. Lambert, Nephrologist: Dr. Montano and unit RN during am rounds.  Disciplines not in attendance will be notified of the plan.     69 year old man with ESRD due to DM, HTN,  ESRD was on HD since 2019. S/p DDRT on 9/16/23. Had slow graft function but did not require dialysis. Graft function gradually improved to sCr of 1mg/dL. Transplant ureteral stent removed 11/7/23.  PMH includes Type II DM on insulin, HTN, HLD,  CAD s/p 3 stents (last in 10/2022), CVA, cholecystectomy, GERD.     Hospitalized 11/8 for hyperglycemia, started on Insulin, also rx for UTI  He presented to [facility] with epigastric pain, NV x 5 episodes. No diarrhea.  No chest pain, shortness of breath, fever, dysuria.        Interval Events:  Afebrile, VSS  GI symptoms have improved  no o/n events    Immunosuppression:  ENV per level, Myfortic 720BID, Pred 5  Ongoing monitoring for signs of rejection    Potential Discharge date: TBD    Education:  Medications    Plan of care:  See Below    MEDICATIONS  (STANDING):  aspirin enteric coated 81 milliGRAM(s) Oral daily  atorvastatin 40 milliGRAM(s) Oral at bedtime  carvedilol 25 milliGRAM(s) Oral every 12 hours  heparin   Injectable 5000 Unit(s) SubCutaneous every 8 hours  hydrALAZINE Injectable 10 milliGRAM(s) IV Push once  insulin glargine Injectable (LANTUS) 24 Unit(s) SubCutaneous at bedtime  insulin lispro (ADMELOG) corrective regimen sliding scale   SubCutaneous at bedtime  insulin lispro (ADMELOG) corrective regimen sliding scale   SubCutaneous three times a day before meals  insulin lispro Injectable (ADMELOG) 10 Unit(s) SubCutaneous three times a day before meals  lactated ringers. 1000 milliLiter(s) (75 mL/Hr) IV Continuous <Continuous>  linagliptin 5 milliGRAM(s) Oral daily  metoclopramide 5 milliGRAM(s) Oral three times a day  mycophenolic acid  milliGRAM(s) Oral two times a day  NIFEdipine XL 60 milliGRAM(s) Oral two times a day  nystatin    Suspension 614835 Unit(s) Oral four times a day  pantoprazole    Tablet 40 milliGRAM(s) Oral before breakfast  piperacillin/tazobactam IVPB.- 3.375 Gram(s) IV Intermittent once  piperacillin/tazobactam IVPB.. 3.375 Gram(s) IV Intermittent every 8 hours  predniSONE   Tablet 5 milliGRAM(s) Oral daily  sucralfate suspension 1 Gram(s) Oral four times a day  tacrolimus ER Tablet (ENVARSUS XR) 4 milliGRAM(s) Oral <User Schedule>  tamsulosin 0.4 milliGRAM(s) Oral at bedtime  trimethoprim   80 mG/sulfamethoxazole 400 mG 1 Tablet(s) Oral daily  valGANciclovir 450 milliGRAM(s) Oral daily    MEDICATIONS  (PRN):  aluminum hydroxide/magnesium hydroxide/simethicone Suspension 30 milliLiter(s) Oral every 6 hours PRN Dyspepsia      PAST MEDICAL & SURGICAL HISTORY:  HTN (hypertension)      Coronary artery disease      CAP (community acquired pneumonia)  6/18      Diabetes mellitus  type 2      GERD (gastroesophageal reflux disease)      Stented coronary artery  2014, 3 stents, [facility]      ESRD (end stage renal disease)  on Dialysis( M/W/F), By Dr. Huff      Hypercholesterolemia      Cerebrovascular accident (CVA), unspecified mechanism  diagnosed via CT of the head      Anemia due to stage 5 chronic kidney disease, not on chronic dialysis      H/O coronary angiogram  2014 - x3 stents      AV fistula  10/12/18 L radiocephalic AV fistula      H/O eye surgery          Vital Signs Last 24 Hrs  T(C): 37.4 (31 Dec 2023 21:49), Max: 37.4 (31 Dec 2023 21:49)  T(F): 99.4 (31 Dec 2023 21:49), Max: 99.4 (31 Dec 2023 21:49)  HR: 67 (31 Dec 2023 21:49) (67 - 82)  BP: 184/72 (31 Dec 2023 21:49) (149/83 - 184/72)  BP(mean): --  RR: 18 (31 Dec 2023 21:49) (16 - 20)  SpO2: 99% (31 Dec 2023 21:49) (98% - 100%)    Parameters below as of 31 Dec 2023 21:49  Patient On (Oxygen Delivery Method): room air        I&O's Summary    31 Dec 2023 07:01  -  01 Jan 2024 02:36  --------------------------------------------------------  IN: 1140 mL / OUT: 1600 mL / NET: -460 mL                              16.6   13.27 )-----------( 161      ( 31 Dec 2023 08:36 )             52.4     12-31    136  |  99  |  15  ----------------------------<  279<H>  4.7   |  20<L>  |  0.90    Ca    11.0<H>      31 Dec 2023 08:36  Phos  2.3     12-31  Mg     1.7     12-31    TPro  9.3<H>  /  Alb  5.4<H>  /  TBili  1.6<H>  /  DBili  x   /  AST  22  /  ALT  23  /  AlkPhos  137<H>  12-31    Tacrolimus (), Serum: 8.3 ng/mL (12-31 @ 08:36)          Review of systems  unremarkable unless indicated above      PHYSICAL EXAM:  Constitutional: Well developed / well nourished  Eyes: Anicteric, PERRLA  ENMT: nc/at  Neck: supple  Respiratory: CTA B/L  Cardiovascular: RRR  Gastrointestinal: Soft, non distended, NT  Genitourinary: Voiding spontaneously  Extremities: SCD's in place and working bilaterally  Vascular: Palpable dp pulses bilaterally  Neurological: A&O x3  Skin: no rashes, ulcerations or lesions;  Musculoskeletal: Moving all extremities  Psychiatric: Responsive     Transplant Surgery Multidisciplinary Progress Note  --------------------------------------------------------------  DDRT 9/16/23    Present:   Patient seen and examined with multidisciplinary Transplant team including  Surgeon: Dr. Lambert, Nephrologist: Dr. Montano and unit RN during am rounds.  Disciplines not in attendance will be notified of the plan.     69 year old man with ESRD due to DM, HTN,  ESRD was on HD since 2019. S/p DDRT on 9/16/23. Had slow graft function but did not require dialysis. Graft function gradually improved to sCr of 1mg/dL. Transplant ureteral stent removed 11/7/23.  PMH includes Type II DM on insulin, HTN, HLD,  CAD s/p 3 stents (last in 10/2022), CVA, cholecystectomy, GERD.     Hospitalized 11/8 for hyperglycemia, started on Insulin, also rx for UTI  He presented to [facility] with epigastric pain, NV x 5 episodes. No diarrhea.  No chest pain, shortness of breath, fever, dysuria.        Interval Events:  Afebrile, VSS  GI symptoms have improved  no o/n events    Immunosuppression:  ENV per level, Myfortic 720BID, Pred 5  Ongoing monitoring for signs of rejection    Potential Discharge date: TBD    Education:  Medications    Plan of care:  See Below    MEDICATIONS  (STANDING):  aspirin enteric coated 81 milliGRAM(s) Oral daily  atorvastatin 40 milliGRAM(s) Oral at bedtime  carvedilol 25 milliGRAM(s) Oral every 12 hours  heparin   Injectable 5000 Unit(s) SubCutaneous every 8 hours  hydrALAZINE Injectable 10 milliGRAM(s) IV Push once  insulin glargine Injectable (LANTUS) 24 Unit(s) SubCutaneous at bedtime  insulin lispro (ADMELOG) corrective regimen sliding scale   SubCutaneous at bedtime  insulin lispro (ADMELOG) corrective regimen sliding scale   SubCutaneous three times a day before meals  insulin lispro Injectable (ADMELOG) 10 Unit(s) SubCutaneous three times a day before meals  lactated ringers. 1000 milliLiter(s) (75 mL/Hr) IV Continuous <Continuous>  linagliptin 5 milliGRAM(s) Oral daily  metoclopramide 5 milliGRAM(s) Oral three times a day  mycophenolic acid  milliGRAM(s) Oral two times a day  NIFEdipine XL 60 milliGRAM(s) Oral two times a day  nystatin    Suspension [id number] Unit(s) Oral four times a day  pantoprazole    Tablet 40 milliGRAM(s) Oral before breakfast  piperacillin/tazobactam IVPB.- 3.375 Gram(s) IV Intermittent once  piperacillin/tazobactam IVPB.. 3.375 Gram(s) IV Intermittent every 8 hours  predniSONE   Tablet 5 milliGRAM(s) Oral daily  sucralfate suspension 1 Gram(s) Oral four times a day  tacrolimus ER Tablet (ENVARSUS XR) 4 milliGRAM(s) Oral <User Schedule>  tamsulosin 0.4 milliGRAM(s) Oral at bedtime  trimethoprim   80 mG/sulfamethoxazole 400 mG 1 Tablet(s) Oral daily  valGANciclovir 450 milliGRAM(s) Oral daily    MEDICATIONS  (PRN):  aluminum hydroxide/magnesium hydroxide/simethicone Suspension 30 milliLiter(s) Oral every 6 hours PRN Dyspepsia      PAST MEDICAL & SURGICAL HISTORY:  HTN (hypertension)      Coronary artery disease      CAP (community acquired pneumonia)  6/18      Diabetes mellitus  type 2      GERD (gastroesophageal reflux disease)      Stented coronary artery  2014, 3 stents, [facility]      ESRD (end stage renal disease)  on Dialysis( M/W/F), By Dr. Huff      Hypercholesterolemia      Cerebrovascular accident (CVA), unspecified mechanism  diagnosed via CT of the head      Anemia due to stage 5 chronic kidney disease, not on chronic dialysis      H/O coronary angiogram  2014 - x3 stents      AV fistula  10/12/18 L radiocephalic AV fistula      H/O eye surgery          Vital Signs Last 24 Hrs  T(C): 37.4 (31 Dec 2023 21:49), Max: 37.4 (31 Dec 2023 21:49)  T(F): 99.4 (31 Dec 2023 21:49), Max: 99.4 (31 Dec 2023 21:49)  HR: 67 (31 Dec 2023 21:49) (67 - 82)  BP: 184/72 (31 Dec 2023 21:49) (149/83 - 184/72)  BP(mean): --  RR: 18 (31 Dec 2023 21:49) (16 - 20)  SpO2: 99% (31 Dec 2023 21:49) (98% - 100%)    Parameters below as of 31 Dec 2023 21:49  Patient On (Oxygen Delivery Method): room air        I&O's Summary    31 Dec 2023 07:01  -  01 Jan 2024 02:36  --------------------------------------------------------  IN: 1140 mL / OUT: 1600 mL / NET: -460 mL                              16.6   13.27 )-----------( 161      ( 31 Dec 2023 08:36 )             52.4     12-31    136  |  99  |  15  ----------------------------<  279<H>  4.7   |  20<L>  |  0.90    Ca    11.0<H>      31 Dec 2023 08:36  Phos  2.3     12-31  Mg     1.7     12-31    TPro  9.3<H>  /  Alb  5.4<H>  /  TBili  1.6<H>  /  DBili  x   /  AST  22  /  ALT  23  /  AlkPhos  137<H>  12-31    Tacrolimus (), Serum: 8.3 ng/mL (12-31 @ 08:36)          Review of systems  unremarkable unless indicated above      PHYSICAL EXAM:  Constitutional: Well developed / well nourished  Eyes: Anicteric, PERRLA  ENMT: nc/at  Neck: supple  Respiratory: CTA B/L  Cardiovascular: RRR  Gastrointestinal: Soft, non distended, NT  Genitourinary: Voiding spontaneously  Extremities: SCD's in place and working bilaterally  Vascular: Palpable dp pulses bilaterally  Neurological: A&O x3  Skin: no rashes, ulcerations or lesions;  Musculoskeletal: Moving all extremities  Psychiatric: Responsive

## 2024-01-01 NOTE — CONSULT NOTE ADULT - SUBJECTIVE AND OBJECTIVE BOX
Initial GI Consult    Patient is a 69y old  Male who presents with a chief complaint of Abdominal Pain, N/V x 1 day. (01 Jan 2024 10:46)    HPI:  68 Maltese speaking M former smoker with past medical history significant for GERD, HTN, HLD, anemia of chronic disease, T2DM, CAD s/p stent x3 (2014 at [place]) & x2 (pLCx 10/28/22, LAD 10/31/22, off of plavix as of 5/1/23), CVA and ESRD (diagnosed Jan 2019) on HD via LUE AVF T/Th/S (Nephrologist Dr Huff) with recent AV fistulogram 7/2023 with cephalic stenosis s/p balloon angioplasty. Pt presents as a transfer from [facility] for abdominal pain, N/V X5 Episodes. GI consulted for GERD sxs.     Patient reports that he started to have burning pain in epigastric area after having multiple episodes of vomiting on admission a few days ago. He has never had this pain before and prior to this was eating and drinking normal type and amount of food. Patient denies nausea/ vomiting currently. Denies any blood in vomit. He denies NSAID use and alcohol use. Patient has also not had BM in 2 days.     Patients last EGD from 2020 with a duodenal ulcer     PMH/PSH:  PAST MEDICAL & SURGICAL HISTORY:  HTN (hypertension)      Coronary artery disease      CAP (community acquired pneumonia)  6/18      Diabetes mellitus  type 2      GERD (gastroesophageal reflux disease)      Stented coronary artery  2014, 3 stents, [facility]      ESRD (end stage renal disease)  on Dialysis( M/W/F), By Dr. Huff      Hypercholesterolemia      Cerebrovascular accident (CVA), unspecified mechanism  diagnosed via CT of the head      Anemia due to stage 5 chronic kidney disease, not on chronic dialysis      H/O coronary angiogram  2014 - x3 stents      AV fistula  10/12/18 L radiocephalic AV fistula      H/O eye surgery        FH:  FAMILY HISTORY:      MEDS:  MEDICATIONS  (STANDING):  aspirin enteric coated 81 milliGRAM(s) Oral daily  atorvastatin 40 milliGRAM(s) Oral at bedtime  carvedilol 25 milliGRAM(s) Oral every 12 hours  heparin   Injectable 5000 Unit(s) SubCutaneous every 8 hours  hydrALAZINE Injectable 10 milliGRAM(s) IV Push once  insulin glargine Injectable (LANTUS) 24 Unit(s) SubCutaneous at bedtime  insulin lispro (ADMELOG) corrective regimen sliding scale   SubCutaneous at bedtime  insulin lispro (ADMELOG) corrective regimen sliding scale   SubCutaneous three times a day before meals  insulin lispro Injectable (ADMELOG) 10 Unit(s) SubCutaneous three times a day before meals  linagliptin 5 milliGRAM(s) Oral daily  metoclopramide 5 milliGRAM(s) Oral three times a day  mycophenolic acid  milliGRAM(s) Oral two times a day  NIFEdipine XL 60 milliGRAM(s) Oral two times a day  nystatin    Suspension 648318 Unit(s) Oral four times a day  pantoprazole    Tablet 40 milliGRAM(s) Oral every 12 hours  polyethylene glycol 3350 17 Gram(s) Oral daily  predniSONE   Tablet 5 milliGRAM(s) Oral daily  sucralfate suspension 1 Gram(s) Oral four times a day  tacrolimus ER Tablet (ENVARSUS XR) 4 milliGRAM(s) Oral <User Schedule>  tamsulosin 0.4 milliGRAM(s) Oral at bedtime  trimethoprim   80 mG/sulfamethoxazole 400 mG 1 Tablet(s) Oral daily  valGANciclovir 450 milliGRAM(s) Oral daily    MEDICATIONS  (PRN):  aluminum hydroxide/magnesium hydroxide/simethicone Suspension 30 milliLiter(s) Oral every 6 hours PRN Dyspepsia  calcium carbonate    500 mG (Tums) Chewable 1 Tablet(s) Chew every 6 hours PRN Heartburn    Allergies    No Known Allergies    Intolerances            CONSTITUTIONAL:  Has not been able to eat much, some fatigue  HEENT:  Eyes:  No visual loss, blurred vision, double vision or yellow sclerae.  SKIN:  No rash or itching.  CARDIOVASCULAR:  No chest pain, chest pressure or chest discomfort.  RESPIRATORY:  No shortness of breath, cough or sputum.  GASTROINTESTINAL:  SEE HPI  GENITOURINARY:  No dysuria, hematuria, urinary frequency  NEUROLOGICAL:  No headache, dizziness, syncope, paralysis, ataxia, numbness or tingling in the extremities.  MUSCULOSKELETAL:  No muscle, back pain, joint pain or stiffness.  ENDOCRINOLOGIC:  No reports of sweating, cold or heat intolerance. No polyuria or polydipsia.      ______________________________________________________________________  PHYSICAL EXAM:  T(C): 36.9 (01-01-24 @ 17:00), Max: 37.4 (12-31-23 @ 21:49)  HR: 64 (01-01-24 @ 17:00)  BP: 136/77 (01-01-24 @ 17:00)  RR: 20 (01-01-24 @ 17:00)  SpO2: 100% (01-01-24 @ 17:00)  Wt(kg): --    12-31 - 01-01  --------------------------------------------------------  IN:    Lactated Ringers: 1200 mL    Oral Fluid: 960 mL  Total IN: 2160 mL    OUT:    Voided (mL): 1600 mL  Total OUT: 1600 mL    Total NET: 560 mL      01-01 - 01-01  --------------------------------------------------------  IN:    Lactated Ringers: 150 mL    Oral Fluid: 200 mL  Total IN: 350 mL    OUT:    Voided (mL): 1400 mL  Total OUT: 1400 mL    Total NET: -1050 mL      GEN: NAD, normocephalic  CVS: S1S2+  CHEST: clear to auscultation  ABD: soft , nontender, nondistended, bowel sounds present  EXTR: no cyanosis, no clubbing, no edema  NEURO: A&OX3  SKIN:  warm;  non icteric    ______________________________________________________________________  LABS:                        14.3   11.01 )-----------( 146      ( 01 Jan 2024 06:35 )             46.2     01-01    139  |  106  |  12  ----------------------------<  172<H>  4.3   |  19<L>  |  1.06    Ca    10.1      01 Jan 2024 06:35  Phos  1.6     01-01  Mg     2.0     01-01    TPro  7.1  /  Alb  4.1  /  TBili  0.9  /  DBili  x   /  AST  14  /  ALT  13  /  AlkPhos  97  01-01    LIVER FUNCTIONS - ( 01 Jan 2024 06:35 )  Alb: 4.1 g/dL / Pro: 7.1 g/dL / ALK PHOS: 97 U/L / ALT: 13 U/L / AST: 14 U/L / GGT: x           PT/INR - ( 31 Dec 2023 08:36 )   PT: 10.0 sec;   INR: 0.91 ratio         PTT - ( 31 Dec 2023 08:36 )  PTT:28.0 sec  ____________________________________________         Initial GI Consult    Patient is a 69y old  Male who presents with a chief complaint of Abdominal Pain, N/V x 1 day. (01 Jan 2024 10:46)    HPI:  68 Ukrainian speaking M former smoker with past medical history significant for GERD, HTN, HLD, anemia of chronic disease, T2DM, CAD s/p stent x3 (2014 at [place]) & x2 (pLCx 10/28/22, LAD 10/31/22, off of plavix as of 5/1/23), CVA and ESRD (diagnosed Jan 2019) on HD via LUE AVF T/Th/S (Nephrologist Dr Huff) with recent AV fistulogram 7/2023 with cephalic stenosis s/p balloon angioplasty. Pt presents as a transfer from [facility] for abdominal pain, N/V X5 Episodes. GI consulted for GERD sxs.     Patient reports that he started to have burning pain in epigastric area after having multiple episodes of vomiting on admission a few days ago. He has never had this pain before and prior to this was eating and drinking normal type and amount of food. Patient denies nausea/ vomiting currently. Denies any blood in vomit. He denies NSAID use and alcohol use. Patient has also not had BM in 2 days.     Patients last EGD from 2020 with a duodenal ulcer     PMH/PSH:  PAST MEDICAL & SURGICAL HISTORY:  HTN (hypertension)      Coronary artery disease      CAP (community acquired pneumonia)  6/18      Diabetes mellitus  type 2      GERD (gastroesophageal reflux disease)      Stented coronary artery  2014, 3 stents, [facility]      ESRD (end stage renal disease)  on Dialysis( M/W/F), By Dr. Huff      Hypercholesterolemia      Cerebrovascular accident (CVA), unspecified mechanism  diagnosed via CT of the head      Anemia due to stage 5 chronic kidney disease, not on chronic dialysis      H/O coronary angiogram  2014 - x3 stents      AV fistula  10/12/18 L radiocephalic AV fistula      H/O eye surgery        FH:  FAMILY HISTORY:      MEDS:  MEDICATIONS  (STANDING):  aspirin enteric coated 81 milliGRAM(s) Oral daily  atorvastatin 40 milliGRAM(s) Oral at bedtime  carvedilol 25 milliGRAM(s) Oral every 12 hours  heparin   Injectable 5000 Unit(s) SubCutaneous every 8 hours  hydrALAZINE Injectable 10 milliGRAM(s) IV Push once  insulin glargine Injectable (LANTUS) 24 Unit(s) SubCutaneous at bedtime  insulin lispro (ADMELOG) corrective regimen sliding scale   SubCutaneous at bedtime  insulin lispro (ADMELOG) corrective regimen sliding scale   SubCutaneous three times a day before meals  insulin lispro Injectable (ADMELOG) 10 Unit(s) SubCutaneous three times a day before meals  linagliptin 5 milliGRAM(s) Oral daily  metoclopramide 5 milliGRAM(s) Oral three times a day  mycophenolic acid  milliGRAM(s) Oral two times a day  NIFEdipine XL 60 milliGRAM(s) Oral two times a day  nystatin    Suspension 845507 Unit(s) Oral four times a day  pantoprazole    Tablet 40 milliGRAM(s) Oral every 12 hours  polyethylene glycol 3350 17 Gram(s) Oral daily  predniSONE   Tablet 5 milliGRAM(s) Oral daily  sucralfate suspension 1 Gram(s) Oral four times a day  tacrolimus ER Tablet (ENVARSUS XR) 4 milliGRAM(s) Oral <User Schedule>  tamsulosin 0.4 milliGRAM(s) Oral at bedtime  trimethoprim   80 mG/sulfamethoxazole 400 mG 1 Tablet(s) Oral daily  valGANciclovir 450 milliGRAM(s) Oral daily    MEDICATIONS  (PRN):  aluminum hydroxide/magnesium hydroxide/simethicone Suspension 30 milliLiter(s) Oral every 6 hours PRN Dyspepsia  calcium carbonate    500 mG (Tums) Chewable 1 Tablet(s) Chew every 6 hours PRN Heartburn    Allergies    No Known Allergies    Intolerances            CONSTITUTIONAL:  Has not been able to eat much, some fatigue  HEENT:  Eyes:  No visual loss, blurred vision, double vision or yellow sclerae.  SKIN:  No rash or itching.  CARDIOVASCULAR:  No chest pain, chest pressure or chest discomfort.  RESPIRATORY:  No shortness of breath, cough or sputum.  GASTROINTESTINAL:  SEE HPI  GENITOURINARY:  No dysuria, hematuria, urinary frequency  NEUROLOGICAL:  No headache, dizziness, syncope, paralysis, ataxia, numbness or tingling in the extremities.  MUSCULOSKELETAL:  No muscle, back pain, joint pain or stiffness.  ENDOCRINOLOGIC:  No reports of sweating, cold or heat intolerance. No polyuria or polydipsia.      ______________________________________________________________________  PHYSICAL EXAM:  T(C): 36.9 (01-01-24 @ 17:00), Max: 37.4 (12-31-23 @ 21:49)  HR: 64 (01-01-24 @ 17:00)  BP: 136/77 (01-01-24 @ 17:00)  RR: 20 (01-01-24 @ 17:00)  SpO2: 100% (01-01-24 @ 17:00)  Wt(kg): --    12-31 - 01-01  --------------------------------------------------------  IN:    Lactated Ringers: 1200 mL    Oral Fluid: 960 mL  Total IN: 2160 mL    OUT:    Voided (mL): 1600 mL  Total OUT: 1600 mL    Total NET: 560 mL      01-01 - 01-01  --------------------------------------------------------  IN:    Lactated Ringers: 150 mL    Oral Fluid: 200 mL  Total IN: 350 mL    OUT:    Voided (mL): 1400 mL  Total OUT: 1400 mL    Total NET: -1050 mL      GEN: NAD, normocephalic  CVS: S1S2+  CHEST: clear to auscultation  ABD: soft , nontender, nondistended, bowel sounds present  EXTR: no cyanosis, no clubbing, no edema  NEURO: A&OX3  SKIN:  warm;  non icteric    ______________________________________________________________________  LABS:                        14.3   11.01 )-----------( 146      ( 01 Jan 2024 06:35 )             46.2     01-01    139  |  106  |  12  ----------------------------<  172<H>  4.3   |  19<L>  |  1.06    Ca    10.1      01 Jan 2024 06:35  Phos  1.6     01-01  Mg     2.0     01-01    TPro  7.1  /  Alb  4.1  /  TBili  0.9  /  DBili  x   /  AST  14  /  ALT  13  /  AlkPhos  97  01-01    LIVER FUNCTIONS - ( 01 Jan 2024 06:35 )  Alb: 4.1 g/dL / Pro: 7.1 g/dL / ALK PHOS: 97 U/L / ALT: 13 U/L / AST: 14 U/L / GGT: x           PT/INR - ( 31 Dec 2023 08:36 )   PT: 10.0 sec;   INR: 0.91 ratio         PTT - ( 31 Dec 2023 08:36 )  PTT:28.0 sec  ____________________________________________

## 2024-01-01 NOTE — PROGRESS NOTE ADULT - ASSESSMENT
69 yr old man with ESRD due to DM, HTN, CAD, CVA was on HD since 2019. S/p DDRT on 9/16/23. Had slow graft function but did not require dialysis. Graft function improved to scr of 1mg/dL. He presents with epigastric pain, nausea and vomiting.       Gastroparesis/GERD  -Medication related? now on Myfortic  -empiric Zosyn, f/u cultures. stop if negative  -Reglan/Carafate/protonix    s/p DDRT 9/2023  -Graft function stable  -fluid challenge  -PVR negative.  Continue Flomax     Immunosuppression   -Envarsus per level  -off Cellcept,  now on Myfortic 720mg po bid for GI symptoms  -Continue Prednisone 5mg po daily     Infection prophylaxis   Continue bactrim ss daily   Valcyte 450mg po daily     HTN   -Coreg 25mg po bid , Nifedipine xl 60mg po bid , adjust prn    CAD  -s/p stents last in 2022. EKG no acute changes, trops negative.   -Continue ASA 81, Atorvastatin 40, Coreg.     DM   -adjust insulin regimen prn

## 2024-01-01 NOTE — PROGRESS NOTE ADULT - SUBJECTIVE AND OBJECTIVE BOX
[facility]   MD JORDAN AVALOS MD ANGELA WONG, PA QIAN CHEN, NP    TEL:  OFFICE: [phone]  From 5pm-7am Answering Service [phone]    -- RENAL FOLLOW UP NOTE ---Date of Service 01-01-24 @ 08:54    Patient is a 69y old  Male who presents with a chief complaint of Abdominal Pain, N/V x 1 day.    Patient seen and examined at bedside. No chest pain/sob    VITALS:  T(F): 98.7 (01-01-24 @ 05:00), Max: 99.4 (12-31-23 @ 21:49)  HR: 66 (01-01-24 @ 05:00)  BP: 167/91 (01-01-24 @ 05:00)  RR: 18 (01-01-24 @ 05:00)  SpO2: 99% (01-01-24 @ 05:00)  Wt(kg): --    12-31 @ 07:01 - 01-01 @ 07:00  --------------------------------------------------------  IN: 2160 mL / OUT: 1600 mL / NET: 560 mL    01-01 @ 07:01  -  01-01 @ 08:54  --------------------------------------------------------  IN: 150 mL / OUT: 700 mL / NET: -550 mL      PHYSICAL EXAM:  General: NAD  Neck: No JVD  Respiratory: CTAB, no wheezes, rales or rhonchi  Cardiovascular: S1, S2, RRR  Gastrointestinal: BS+, soft, NT/ND  Extremities: No peripheral edema    Hospital Medications:   MEDICATIONS  (STANDING):  aspirin enteric coated 81 milliGRAM(s) Oral daily  atorvastatin 40 milliGRAM(s) Oral at bedtime  carvedilol 25 milliGRAM(s) Oral every 12 hours  heparin   Injectable 5000 Unit(s) SubCutaneous every 8 hours  hydrALAZINE Injectable 10 milliGRAM(s) IV Push once  insulin glargine Injectable (LANTUS) 24 Unit(s) SubCutaneous at bedtime  insulin lispro (ADMELOG) corrective regimen sliding scale   SubCutaneous at bedtime  insulin lispro (ADMELOG) corrective regimen sliding scale   SubCutaneous three times a day before meals  insulin lispro Injectable (ADMELOG) 10 Unit(s) SubCutaneous three times a day before meals  lactated ringers. 1000 milliLiter(s) (75 mL/Hr) IV Continuous <Continuous>  linagliptin 5 milliGRAM(s) Oral daily  metoclopramide 5 milliGRAM(s) Oral three times a day  mycophenolic acid  milliGRAM(s) Oral two times a day  NIFEdipine XL 60 milliGRAM(s) Oral two times a day  nystatin    Suspension 106428 Unit(s) Oral four times a day  pantoprazole    Tablet 40 milliGRAM(s) Oral before breakfast  piperacillin/tazobactam IVPB.. 3.375 Gram(s) IV Intermittent every 8 hours  predniSONE   Tablet 5 milliGRAM(s) Oral daily  sodium phosphate 15 milliMole(s)/250 mL IVPB 15 milliMole(s) IV Intermittent once  sucralfate suspension 1 Gram(s) Oral four times a day  tacrolimus ER Tablet (ENVARSUS XR) 4 milliGRAM(s) Oral <User Schedule>  tamsulosin 0.4 milliGRAM(s) Oral at bedtime  trimethoprim   80 mG/sulfamethoxazole 400 mG 1 Tablet(s) Oral daily  valGANciclovir 450 milliGRAM(s) Oral daily      LABS:  01-01    139  |  106  |  12  ----------------------------<  172<H>  4.3   |  19<L>  |  1.06    Ca    10.1      01 Jan 2024 06:35  Phos  1.6     01-01  Mg     2.0     01-01    TPro  7.1  /  Alb  4.1  /  TBili  0.9  /  DBili      /  AST  14  /  ALT  13  /  AlkPhos  97  01-01    Creatinine Trend: 1.06 <--, 0.90 <--, 0.97 <--, 0.86 <--, 0.60 <--    Albumin: 4.1 g/dL (01-01 @ 06:35)  Phosphorus: 1.6 mg/dL (01-01 @ 06:35)  PTH Intact, Intraoperative.: 197 pg/mL (01-01 @ 06:35)                          14.3   11.01 )-----------( 146      ( 01 Jan 2024 06:35 )             46.2     Urine Studies:  Urinalysis - [01-01-24 @ 06:35]      Color  / Appearance  / SG  / pH       Gluc 172 / Ketone   / Bili  / Urobili        Blood  / Protein  / Leuk Est  / Nitrite       RBC  / WBC  / Hyaline  / Gran  / Sq Epi  / Non Sq Epi  / Bacteria       HbA1c 7.8      [12-17-19 @ 05:54]        RADIOLOGY & ADDITIONAL STUDIES:   [facility]   MD JORDAN AVALOS MD ANGELA WONG, PA QIAN CHEN, NP    TEL:  OFFICE: [phone]  From 5pm-7am Answering Service [phone]    -- RENAL FOLLOW UP NOTE ---Date of Service 01-01-24 @ 08:54    Patient is a 69y old  Male who presents with a chief complaint of Abdominal Pain, N/V x 1 day.    Patient seen and examined at bedside. No chest pain/sob    VITALS:  T(F): 98.7 (01-01-24 @ 05:00), Max: 99.4 (12-31-23 @ 21:49)  HR: 66 (01-01-24 @ 05:00)  BP: 167/91 (01-01-24 @ 05:00)  RR: 18 (01-01-24 @ 05:00)  SpO2: 99% (01-01-24 @ 05:00)  Wt(kg): --    12-31 @ 07:01 - 01-01 @ 07:00  --------------------------------------------------------  IN: 2160 mL / OUT: 1600 mL / NET: 560 mL    01-01 @ 07:01  -  01-01 @ 08:54  --------------------------------------------------------  IN: 150 mL / OUT: 700 mL / NET: -550 mL      PHYSICAL EXAM:  General: NAD  Neck: No JVD  Respiratory: CTAB, no wheezes, rales or rhonchi  Cardiovascular: S1, S2, RRR  Gastrointestinal: BS+, soft, NT/ND  Extremities: No peripheral edema    Hospital Medications:   MEDICATIONS  (STANDING):  aspirin enteric coated 81 milliGRAM(s) Oral daily  atorvastatin 40 milliGRAM(s) Oral at bedtime  carvedilol 25 milliGRAM(s) Oral every 12 hours  heparin   Injectable 5000 Unit(s) SubCutaneous every 8 hours  hydrALAZINE Injectable 10 milliGRAM(s) IV Push once  insulin glargine Injectable (LANTUS) 24 Unit(s) SubCutaneous at bedtime  insulin lispro (ADMELOG) corrective regimen sliding scale   SubCutaneous at bedtime  insulin lispro (ADMELOG) corrective regimen sliding scale   SubCutaneous three times a day before meals  insulin lispro Injectable (ADMELOG) 10 Unit(s) SubCutaneous three times a day before meals  lactated ringers. 1000 milliLiter(s) (75 mL/Hr) IV Continuous <Continuous>  linagliptin 5 milliGRAM(s) Oral daily  metoclopramide 5 milliGRAM(s) Oral three times a day  mycophenolic acid  milliGRAM(s) Oral two times a day  NIFEdipine XL 60 milliGRAM(s) Oral two times a day  nystatin    Suspension 717963 Unit(s) Oral four times a day  pantoprazole    Tablet 40 milliGRAM(s) Oral before breakfast  piperacillin/tazobactam IVPB.. 3.375 Gram(s) IV Intermittent every 8 hours  predniSONE   Tablet 5 milliGRAM(s) Oral daily  sodium phosphate 15 milliMole(s)/250 mL IVPB 15 milliMole(s) IV Intermittent once  sucralfate suspension 1 Gram(s) Oral four times a day  tacrolimus ER Tablet (ENVARSUS XR) 4 milliGRAM(s) Oral <User Schedule>  tamsulosin 0.4 milliGRAM(s) Oral at bedtime  trimethoprim   80 mG/sulfamethoxazole 400 mG 1 Tablet(s) Oral daily  valGANciclovir 450 milliGRAM(s) Oral daily      LABS:  01-01    139  |  106  |  12  ----------------------------<  172<H>  4.3   |  19<L>  |  1.06    Ca    10.1      01 Jan 2024 06:35  Phos  1.6     01-01  Mg     2.0     01-01    TPro  7.1  /  Alb  4.1  /  TBili  0.9  /  DBili      /  AST  14  /  ALT  13  /  AlkPhos  97  01-01    Creatinine Trend: 1.06 <--, 0.90 <--, 0.97 <--, 0.86 <--, 0.60 <--    Albumin: 4.1 g/dL (01-01 @ 06:35)  Phosphorus: 1.6 mg/dL (01-01 @ 06:35)  PTH Intact, Intraoperative.: 197 pg/mL (01-01 @ 06:35)                          14.3   11.01 )-----------( 146      ( 01 Jan 2024 06:35 )             46.2     Urine Studies:  Urinalysis - [01-01-24 @ 06:35]      Color  / Appearance  / SG  / pH       Gluc 172 / Ketone   / Bili  / Urobili        Blood  / Protein  / Leuk Est  / Nitrite       RBC  / WBC  / Hyaline  / Gran  / Sq Epi  / Non Sq Epi  / Bacteria       HbA1c 7.8      [12-17-19 @ 05:54]        RADIOLOGY & ADDITIONAL STUDIES:

## 2024-01-01 NOTE — CONSULT NOTE ADULT - ATTENDING COMMENTS
GI consulted for abdominal pain, heartburn, n/v.   Very slight improvement on ppi  +symptoms with eating and even w drinking water     IV PPI BID   diet as tolerated   plan for EGD tomorrow   NPO at [facility]     GI to follow, please call w questions

## 2024-01-02 DIAGNOSIS — D84.9 IMMUNODEFICIENCY, UNSPECIFIED: ICD-10-CM

## 2024-01-02 DIAGNOSIS — B99.9 UNSPECIFIED INFECTIOUS DISEASE: ICD-10-CM

## 2024-01-02 DIAGNOSIS — I25.10 ATHEROSCLEROTIC HEART DISEASE OF NATIVE CORONARY ARTERY WITHOUT ANGINA PECTORIS: ICD-10-CM

## 2024-01-02 DIAGNOSIS — I10 ESSENTIAL (PRIMARY) HYPERTENSION: ICD-10-CM

## 2024-01-02 DIAGNOSIS — Z94.0 KIDNEY TRANSPLANT STATUS: ICD-10-CM

## 2024-01-02 DIAGNOSIS — E11.9 TYPE 2 DIABETES MELLITUS WITHOUT COMPLICATIONS: ICD-10-CM

## 2024-01-02 DIAGNOSIS — R10.9 UNSPECIFIED ABDOMINAL PAIN: ICD-10-CM

## 2024-01-02 LAB
ALBUMIN SERPL ELPH-MCNC: 3.7 G/DL — SIGNIFICANT CHANGE UP (ref 3.3–5)
ALBUMIN SERPL ELPH-MCNC: 3.7 G/DL — SIGNIFICANT CHANGE UP (ref 3.3–5)
ALP SERPL-CCNC: 81 U/L — SIGNIFICANT CHANGE UP (ref 40–120)
ALP SERPL-CCNC: 81 U/L — SIGNIFICANT CHANGE UP (ref 40–120)
ALT FLD-CCNC: 12 U/L — SIGNIFICANT CHANGE UP (ref 10–45)
ALT FLD-CCNC: 12 U/L — SIGNIFICANT CHANGE UP (ref 10–45)
ANION GAP SERPL CALC-SCNC: 11 MMOL/L — SIGNIFICANT CHANGE UP (ref 5–17)
ANION GAP SERPL CALC-SCNC: 11 MMOL/L — SIGNIFICANT CHANGE UP (ref 5–17)
APTT BLD: 27.6 SEC — SIGNIFICANT CHANGE UP (ref 24.5–35.6)
APTT BLD: 27.6 SEC — SIGNIFICANT CHANGE UP (ref 24.5–35.6)
AST SERPL-CCNC: 13 U/L — SIGNIFICANT CHANGE UP (ref 10–40)
AST SERPL-CCNC: 13 U/L — SIGNIFICANT CHANGE UP (ref 10–40)
BASOPHILS # BLD AUTO: 0.05 K/UL — SIGNIFICANT CHANGE UP (ref 0–0.2)
BASOPHILS # BLD AUTO: 0.05 K/UL — SIGNIFICANT CHANGE UP (ref 0–0.2)
BASOPHILS NFR BLD AUTO: 0.7 % — SIGNIFICANT CHANGE UP (ref 0–2)
BASOPHILS NFR BLD AUTO: 0.7 % — SIGNIFICANT CHANGE UP (ref 0–2)
BILIRUB SERPL-MCNC: 0.6 MG/DL — SIGNIFICANT CHANGE UP (ref 0.2–1.2)
BILIRUB SERPL-MCNC: 0.6 MG/DL — SIGNIFICANT CHANGE UP (ref 0.2–1.2)
BUN SERPL-MCNC: 11 MG/DL — SIGNIFICANT CHANGE UP (ref 7–23)
BUN SERPL-MCNC: 11 MG/DL — SIGNIFICANT CHANGE UP (ref 7–23)
CALCIUM SERPL-MCNC: 9.6 MG/DL — SIGNIFICANT CHANGE UP (ref 8.4–10.5)
CALCIUM SERPL-MCNC: 9.6 MG/DL — SIGNIFICANT CHANGE UP (ref 8.4–10.5)
CHLORIDE SERPL-SCNC: 107 MMOL/L — SIGNIFICANT CHANGE UP (ref 96–108)
CHLORIDE SERPL-SCNC: 107 MMOL/L — SIGNIFICANT CHANGE UP (ref 96–108)
CMV DNA CSF QL NAA+PROBE: SIGNIFICANT CHANGE UP IU/ML
CMV DNA CSF QL NAA+PROBE: SIGNIFICANT CHANGE UP IU/ML
CMV DNA SPEC NAA+PROBE-LOG#: SIGNIFICANT CHANGE UP LOG10IU/ML
CMV DNA SPEC NAA+PROBE-LOG#: SIGNIFICANT CHANGE UP LOG10IU/ML
CO2 SERPL-SCNC: 23 MMOL/L — SIGNIFICANT CHANGE UP (ref 22–31)
CO2 SERPL-SCNC: 23 MMOL/L — SIGNIFICANT CHANGE UP (ref 22–31)
CREAT SERPL-MCNC: 1.01 MG/DL — SIGNIFICANT CHANGE UP (ref 0.5–1.3)
CREAT SERPL-MCNC: 1.01 MG/DL — SIGNIFICANT CHANGE UP (ref 0.5–1.3)
EGFR: 82 ML/MIN/1.73M2 — SIGNIFICANT CHANGE UP
EGFR: 82 ML/MIN/1.73M2 — SIGNIFICANT CHANGE UP
EOSINOPHIL # BLD AUTO: 0.05 K/UL — SIGNIFICANT CHANGE UP (ref 0–0.5)
EOSINOPHIL # BLD AUTO: 0.05 K/UL — SIGNIFICANT CHANGE UP (ref 0–0.5)
EOSINOPHIL NFR BLD AUTO: 0.7 % — SIGNIFICANT CHANGE UP (ref 0–6)
EOSINOPHIL NFR BLD AUTO: 0.7 % — SIGNIFICANT CHANGE UP (ref 0–6)
GLUCOSE BLDC GLUCOMTR-MCNC: 135 MG/DL — HIGH (ref 70–99)
GLUCOSE BLDC GLUCOMTR-MCNC: 135 MG/DL — HIGH (ref 70–99)
GLUCOSE BLDC GLUCOMTR-MCNC: 138 MG/DL — HIGH (ref 70–99)
GLUCOSE BLDC GLUCOMTR-MCNC: 138 MG/DL — HIGH (ref 70–99)
GLUCOSE BLDC GLUCOMTR-MCNC: 197 MG/DL — HIGH (ref 70–99)
GLUCOSE BLDC GLUCOMTR-MCNC: 197 MG/DL — HIGH (ref 70–99)
GLUCOSE BLDC GLUCOMTR-MCNC: 88 MG/DL — SIGNIFICANT CHANGE UP (ref 70–99)
GLUCOSE BLDC GLUCOMTR-MCNC: 88 MG/DL — SIGNIFICANT CHANGE UP (ref 70–99)
GLUCOSE SERPL-MCNC: 82 MG/DL — SIGNIFICANT CHANGE UP (ref 70–99)
GLUCOSE SERPL-MCNC: 82 MG/DL — SIGNIFICANT CHANGE UP (ref 70–99)
HCT VFR BLD CALC: 43.8 % — SIGNIFICANT CHANGE UP (ref 39–50)
HCT VFR BLD CALC: 43.8 % — SIGNIFICANT CHANGE UP (ref 39–50)
HGB BLD-MCNC: 13.7 G/DL — SIGNIFICANT CHANGE UP (ref 13–17)
HGB BLD-MCNC: 13.7 G/DL — SIGNIFICANT CHANGE UP (ref 13–17)
IMM GRANULOCYTES NFR BLD AUTO: 0.3 % — SIGNIFICANT CHANGE UP (ref 0–0.9)
IMM GRANULOCYTES NFR BLD AUTO: 0.3 % — SIGNIFICANT CHANGE UP (ref 0–0.9)
INR BLD: 0.95 RATIO — SIGNIFICANT CHANGE UP (ref 0.85–1.18)
INR BLD: 0.95 RATIO — SIGNIFICANT CHANGE UP (ref 0.85–1.18)
LYMPHOCYTES # BLD AUTO: 1.51 K/UL — SIGNIFICANT CHANGE UP (ref 1–3.3)
LYMPHOCYTES # BLD AUTO: 1.51 K/UL — SIGNIFICANT CHANGE UP (ref 1–3.3)
LYMPHOCYTES # BLD AUTO: 22.6 % — SIGNIFICANT CHANGE UP (ref 13–44)
LYMPHOCYTES # BLD AUTO: 22.6 % — SIGNIFICANT CHANGE UP (ref 13–44)
MAGNESIUM SERPL-MCNC: 1.9 MG/DL — SIGNIFICANT CHANGE UP (ref 1.6–2.6)
MAGNESIUM SERPL-MCNC: 1.9 MG/DL — SIGNIFICANT CHANGE UP (ref 1.6–2.6)
MCHC RBC-ENTMCNC: 28.9 PG — SIGNIFICANT CHANGE UP (ref 27–34)
MCHC RBC-ENTMCNC: 28.9 PG — SIGNIFICANT CHANGE UP (ref 27–34)
MCHC RBC-ENTMCNC: 31.3 GM/DL — LOW (ref 32–36)
MCHC RBC-ENTMCNC: 31.3 GM/DL — LOW (ref 32–36)
MCV RBC AUTO: 92.4 FL — SIGNIFICANT CHANGE UP (ref 80–100)
MCV RBC AUTO: 92.4 FL — SIGNIFICANT CHANGE UP (ref 80–100)
MONOCYTES # BLD AUTO: 0.82 K/UL — SIGNIFICANT CHANGE UP (ref 0–0.9)
MONOCYTES # BLD AUTO: 0.82 K/UL — SIGNIFICANT CHANGE UP (ref 0–0.9)
MONOCYTES NFR BLD AUTO: 12.3 % — SIGNIFICANT CHANGE UP (ref 2–14)
MONOCYTES NFR BLD AUTO: 12.3 % — SIGNIFICANT CHANGE UP (ref 2–14)
NEUTROPHILS # BLD AUTO: 4.23 K/UL — SIGNIFICANT CHANGE UP (ref 1.8–7.4)
NEUTROPHILS # BLD AUTO: 4.23 K/UL — SIGNIFICANT CHANGE UP (ref 1.8–7.4)
NEUTROPHILS NFR BLD AUTO: 63.4 % — SIGNIFICANT CHANGE UP (ref 43–77)
NEUTROPHILS NFR BLD AUTO: 63.4 % — SIGNIFICANT CHANGE UP (ref 43–77)
NRBC # BLD: 0 /100 WBCS — SIGNIFICANT CHANGE UP (ref 0–0)
NRBC # BLD: 0 /100 WBCS — SIGNIFICANT CHANGE UP (ref 0–0)
PHOSPHATE SERPL-MCNC: 1.9 MG/DL — LOW (ref 2.5–4.5)
PHOSPHATE SERPL-MCNC: 1.9 MG/DL — LOW (ref 2.5–4.5)
PLATELET # BLD AUTO: 138 K/UL — LOW (ref 150–400)
PLATELET # BLD AUTO: 138 K/UL — LOW (ref 150–400)
POTASSIUM SERPL-MCNC: 3.5 MMOL/L — SIGNIFICANT CHANGE UP (ref 3.5–5.3)
POTASSIUM SERPL-MCNC: 3.5 MMOL/L — SIGNIFICANT CHANGE UP (ref 3.5–5.3)
POTASSIUM SERPL-SCNC: 3.5 MMOL/L — SIGNIFICANT CHANGE UP (ref 3.5–5.3)
POTASSIUM SERPL-SCNC: 3.5 MMOL/L — SIGNIFICANT CHANGE UP (ref 3.5–5.3)
PROT SERPL-MCNC: 6.3 G/DL — SIGNIFICANT CHANGE UP (ref 6–8.3)
PROT SERPL-MCNC: 6.3 G/DL — SIGNIFICANT CHANGE UP (ref 6–8.3)
PROTHROM AB SERPL-ACNC: 10.5 SEC — SIGNIFICANT CHANGE UP (ref 9.5–13)
PROTHROM AB SERPL-ACNC: 10.5 SEC — SIGNIFICANT CHANGE UP (ref 9.5–13)
RBC # BLD: 4.74 M/UL — SIGNIFICANT CHANGE UP (ref 4.2–5.8)
RBC # BLD: 4.74 M/UL — SIGNIFICANT CHANGE UP (ref 4.2–5.8)
RBC # FLD: 13.1 % — SIGNIFICANT CHANGE UP (ref 10.3–14.5)
RBC # FLD: 13.1 % — SIGNIFICANT CHANGE UP (ref 10.3–14.5)
SODIUM SERPL-SCNC: 141 MMOL/L — SIGNIFICANT CHANGE UP (ref 135–145)
SODIUM SERPL-SCNC: 141 MMOL/L — SIGNIFICANT CHANGE UP (ref 135–145)
TACROLIMUS SERPL-MCNC: 12.3 NG/ML — SIGNIFICANT CHANGE UP
TACROLIMUS SERPL-MCNC: 12.3 NG/ML — SIGNIFICANT CHANGE UP
WBC # BLD: 6.68 K/UL — SIGNIFICANT CHANGE UP (ref 3.8–10.5)
WBC # BLD: 6.68 K/UL — SIGNIFICANT CHANGE UP (ref 3.8–10.5)
WBC # FLD AUTO: 6.68 K/UL — SIGNIFICANT CHANGE UP (ref 3.8–10.5)
WBC # FLD AUTO: 6.68 K/UL — SIGNIFICANT CHANGE UP (ref 3.8–10.5)

## 2024-01-02 PROCEDURE — 99232 SBSQ HOSP IP/OBS MODERATE 35: CPT | Mod: GC

## 2024-01-02 RX ORDER — INSULIN GLARGINE 100 [IU]/ML
22 INJECTION, SOLUTION SUBCUTANEOUS AT BEDTIME
Refills: 0 | Status: DISCONTINUED | OUTPATIENT
Start: 2024-01-02 | End: 2024-01-03

## 2024-01-02 RX ORDER — METOCLOPRAMIDE HCL 10 MG
5 TABLET ORAL THREE TIMES A DAY
Refills: 0 | Status: DISCONTINUED | OUTPATIENT
Start: 2024-01-02 | End: 2024-01-03

## 2024-01-02 RX ORDER — TRAMADOL HYDROCHLORIDE 50 MG/1
50 TABLET ORAL ONCE
Refills: 0 | Status: DISCONTINUED | OUTPATIENT
Start: 2024-01-02 | End: 2024-01-02

## 2024-01-02 RX ORDER — MAGNESIUM SULFATE 500 MG/ML
2 VIAL (ML) INJECTION ONCE
Refills: 0 | Status: COMPLETED | OUTPATIENT
Start: 2024-01-02 | End: 2024-01-02

## 2024-01-02 RX ADMIN — Medication 1 GRAM(S): at 05:40

## 2024-01-02 RX ADMIN — Medication 25 GRAM(S): at 08:50

## 2024-01-02 RX ADMIN — TRAMADOL HYDROCHLORIDE 50 MILLIGRAM(S): 50 TABLET ORAL at 20:57

## 2024-01-02 RX ADMIN — HEPARIN SODIUM 5000 UNIT(S): 5000 INJECTION INTRAVENOUS; SUBCUTANEOUS at 05:40

## 2024-01-02 RX ADMIN — Medication 81 MILLIGRAM(S): at 11:47

## 2024-01-02 RX ADMIN — VALGANCICLOVIR 450 MILLIGRAM(S): 450 TABLET, FILM COATED ORAL at 11:54

## 2024-01-02 RX ADMIN — Medication 500000 UNIT(S): at 11:47

## 2024-01-02 RX ADMIN — Medication 2: at 11:46

## 2024-01-02 RX ADMIN — Medication 1 GRAM(S): at 18:04

## 2024-01-02 RX ADMIN — Medication 85 MILLIMOLE(S): at 11:45

## 2024-01-02 RX ADMIN — Medication 500000 UNIT(S): at 05:40

## 2024-01-02 RX ADMIN — Medication 1 GRAM(S): at 11:48

## 2024-01-02 RX ADMIN — PANTOPRAZOLE SODIUM 40 MILLIGRAM(S): 20 TABLET, DELAYED RELEASE ORAL at 05:41

## 2024-01-02 RX ADMIN — Medication 1 TABLET(S): at 11:47

## 2024-01-02 RX ADMIN — CARVEDILOL PHOSPHATE 25 MILLIGRAM(S): 80 CAPSULE, EXTENDED RELEASE ORAL at 05:41

## 2024-01-02 RX ADMIN — Medication 500000 UNIT(S): at 23:42

## 2024-01-02 RX ADMIN — Medication 30 MILLILITER(S): at 20:53

## 2024-01-02 RX ADMIN — PANTOPRAZOLE SODIUM 40 MILLIGRAM(S): 20 TABLET, DELAYED RELEASE ORAL at 18:03

## 2024-01-02 RX ADMIN — ATORVASTATIN CALCIUM 40 MILLIGRAM(S): 80 TABLET, FILM COATED ORAL at 20:58

## 2024-01-02 RX ADMIN — TAMSULOSIN HYDROCHLORIDE 0.4 MILLIGRAM(S): 0.4 CAPSULE ORAL at 20:58

## 2024-01-02 RX ADMIN — LINAGLIPTIN 5 MILLIGRAM(S): 5 TABLET, FILM COATED ORAL at 11:48

## 2024-01-02 RX ADMIN — TRAMADOL HYDROCHLORIDE 50 MILLIGRAM(S): 50 TABLET ORAL at 22:00

## 2024-01-02 RX ADMIN — POLYETHYLENE GLYCOL 3350 17 GRAM(S): 17 POWDER, FOR SOLUTION ORAL at 11:48

## 2024-01-02 RX ADMIN — MYCOPHENOLIC ACID 720 MILLIGRAM(S): 180 TABLET, DELAYED RELEASE ORAL at 05:41

## 2024-01-02 RX ADMIN — Medication 10 UNIT(S): at 18:05

## 2024-01-02 RX ADMIN — INSULIN GLARGINE 22 UNIT(S): 100 INJECTION, SOLUTION SUBCUTANEOUS at 21:08

## 2024-01-02 RX ADMIN — Medication 60 MILLIGRAM(S): at 18:04

## 2024-01-02 RX ADMIN — Medication 60 MILLIGRAM(S): at 05:41

## 2024-01-02 RX ADMIN — Medication 500000 UNIT(S): at 18:03

## 2024-01-02 RX ADMIN — CARVEDILOL PHOSPHATE 25 MILLIGRAM(S): 80 CAPSULE, EXTENDED RELEASE ORAL at 18:03

## 2024-01-02 RX ADMIN — Medication 10 UNIT(S): at 11:47

## 2024-01-02 RX ADMIN — MYCOPHENOLIC ACID 720 MILLIGRAM(S): 180 TABLET, DELAYED RELEASE ORAL at 18:03

## 2024-01-02 RX ADMIN — HEPARIN SODIUM 5000 UNIT(S): 5000 INJECTION INTRAVENOUS; SUBCUTANEOUS at 14:16

## 2024-01-02 RX ADMIN — Medication 5 MILLIGRAM(S): at 05:41

## 2024-01-02 RX ADMIN — TACROLIMUS 4 MILLIGRAM(S): 5 CAPSULE ORAL at 08:43

## 2024-01-02 RX ADMIN — Medication 1 GRAM(S): at 23:42

## 2024-01-02 NOTE — PROGRESS NOTE ADULT - ASSESSMENT
69 yr old man with ESRD due to DM, HTN, CAD, CVA was on HD since 2019. S/p DDRT on 9/16/23. Had slow graft function but did not require dialysis. Graft function improved to scr of 1mg/dL. He presents with epigastric pain, nausea and vomiting.       Gastroparesis/GERD  -Medication related? now on Myfortic  -Cultures neg, dced zosyn  -Reglan/Carafate/protonix    s/p DDRT 9/2023  -Graft function stable  -fluid challenge  -PVR negative.  Continue Flomax     Immunosuppression   -Envarsus per level  -off Cellcept,  now on Myfortic 720mg po bid for GI symptoms  -Continue Prednisone 5mg po daily     Infection prophylaxis   Continue bactrim ss daily   Valcyte 450mg po daily     HTN   -Coreg 25mg po bid , Nifedipine xl 60mg po bid , adjust prn    CAD  -s/p stents last in 2022. EKG no acute changes, trops negative.   -Continue ASA 81, Atorvastatin 40, Coreg.     DM   -adjust insulin regimen prn

## 2024-01-02 NOTE — PROGRESS NOTE ADULT - PROBLEM SELECTOR PLAN 1
Pt with ESRD due to DM, HTN, ESRD was on HD since 2019. S/p DDRT on 9/16/23 (Dr. Lambert, [facility], Dr. [facility]). Had slow graft function but did not require dialysis. Graft function gradually improved to scr of 1mg/dL. Transplant ureteral stent removed 11/7/23. SCr stable at 1.01 today. Monitor labs and urine output. Avoid any potential nephrotoxins. Dose medications as per eGFR. Pt with ESRD due to DM, HTN, ESRD was on HD since 2019. S/p DDRT on 9/16/23 (Dr. Lambert, [facility], Dr. [facility]). Had slow graft function but did not require dialysis. Graft function gradually improved to scr of 1mg/dL. Transplant ureteral stent removed 11/7/23. SCr stable at 1.01 today. Monitor labs and urine output. Avoid any potential nephrotoxins. Dose medications as per eGFR.

## 2024-01-02 NOTE — PROGRESS NOTE ADULT - ASSESSMENT
68 Sami speaking Male w/ PMH of GERD, HTN, HLD, anemia of chronic disease, T2DM, CAD s/p stent x3 (2014 at [place]) & x2 (pLCx 10/28/22, LAD 10/31/22, off of plavix as of 5/1/23), CVA and ESRD (diagnosed Jan 2019) on HD via LUE AVF T/Th/S (Nephrologist Dr Huff), s/p R DDRT under Simulect on 9/17/23.  Patient had ureteral stent removed on 11/7.  Nephrology consulted for renal transplant status.      A/P:  s/p DDRT 9/2023  Renal function stable on admission; S.Cr. 0.9.  Immunosuppressants per transplant: Envarsus 4mg daily; Stop Cellcept, change to Myfortic 720mg PO BID for GI symptoms; continue Prednisone 5mg po daily.  Check tacro level 30min before next dose (goal 8-10); at goal on 1/2  On Bactrim  400mg and Valcyte 450mg po daily for infection prophyaxis.   CT A/P noncon 12/31 - Mild cystitis with urinary retention. Transplant right lower quadrant   kidney with hydronephrosis and perinephric stranding concerning for infection.  US of transplanted kidney shows mild hydro and distended bladder.  Post void bladder scan neg for retention on 12/31.  Pt denies urinary complaints.  Continue Flomax.  Monitor BMP and UO.    HTN   Fluctuating.  C/W current antihypertensives.  On hydralazine 10mg IVP.  Monitor BP.    Acidosis:  Possibly in setting of GI losses.  stable  Monitor CO2.    Hypercalcemia:  Concern for dehydration in view of hemoconcentration.  Improved s/p LR @75mL/hr.   (high) and PO4 low - possible primary hyperparathyroidism.  Monitor Ca.    Hypophosphatemia:  Possibly in setting of poor PO intake d/t n/v.  improving 1.9  Supplemented w/ NaPhos 15mmol.  Replete as needed.  Monitor PO4 daily.    Proteinuria:  Mild.  Check UCr/Pr.  Monitor. 68 Yakut speaking Male w/ PMH of GERD, HTN, HLD, anemia of chronic disease, T2DM, CAD s/p stent x3 (2014 at [place]) & x2 (pLCx 10/28/22, LAD 10/31/22, off of plavix as of 5/1/23), CVA and ESRD (diagnosed Jan 2019) on HD via LUE AVF T/Th/S (Nephrologist Dr Huff), s/p R DDRT under Simulect on 9/17/23.  Patient had ureteral stent removed on 11/7.  Nephrology consulted for renal transplant status.      A/P:  s/p DDRT 9/2023  Renal function stable on admission; S.Cr. 0.9.  Immunosuppressants per transplant: Envarsus 4mg daily; Stop Cellcept, change to Myfortic 720mg PO BID for GI symptoms; continue Prednisone 5mg po daily.  Check tacro level 30min before next dose (goal 8-10); at goal on 1/2  On Bactrim  400mg and Valcyte 450mg po daily for infection prophyaxis.   CT A/P noncon 12/31 - Mild cystitis with urinary retention. Transplant right lower quadrant   kidney with hydronephrosis and perinephric stranding concerning for infection.  US of transplanted kidney shows mild hydro and distended bladder.  Post void bladder scan neg for retention on 12/31.  Pt denies urinary complaints.  Continue Flomax.  Monitor BMP and UO.    HTN   Fluctuating.  C/W current antihypertensives.  On hydralazine 10mg IVP.  Monitor BP.    Acidosis:  Possibly in setting of GI losses.  stable  Monitor CO2.    Hypercalcemia:  Concern for dehydration in view of hemoconcentration.  Improved s/p LR @75mL/hr.   (high) and PO4 low - possible primary hyperparathyroidism.  Monitor Ca.    Hypophosphatemia:  Possibly in setting of poor PO intake d/t n/v.  improving 1.9  Supplemented w/ NaPhos 15mmol.  Replete as needed.  Monitor PO4 daily.    Proteinuria:  Mild.  Check UCr/Pr.  Monitor. 68 Armenian speaking Male w/ PMH of GERD, HTN, HLD, anemia of chronic disease, T2DM, CAD s/p stent x3 (2014 at [place]) & x2 (pLCx 10/28/22, LAD 10/31/22, off of plavix as of 5/1/23), CVA and ESRD (diagnosed Jan 2019) on HD via LUE AVF T/Th/S (Nephrologist Dr Huff), s/p R DDRT under Simulect on 9/17/23.  Patient had ureteral stent removed on 11/7.  Nephrology consulted for renal transplant status.      A/P:  s/p DDRT 9/2023  Renal function stable on admission; S.Cr. 0.9.  Immunosuppressants per transplant: Envarsus 4mg daily; Stop Cellcept, change to Myfortic 720mg PO BID for GI symptoms; continue Prednisone 5mg po daily.  Check tacro level 30min before next dose (goal 8-10); at goal on 1/2  On Bactrim  400mg and Valcyte 450mg po daily for infection prophyaxis.   CT A/P noncon 12/31 - Mild cystitis with urinary retention. Transplant right lower quadrant   kidney with hydronephrosis and perinephric stranding concerning for infection.  US of transplanted kidney shows mild hydro and distended bladder.  Post void bladder scan neg for retention on 12/31.  Pt denies urinary complaints.  Continue Flomax.  Monitor BMP and UO.    HTN   Fluctuating.  C/W current antihypertensives.  On hydralazine 10mg IVP.  Monitor BP.    Acidosis:  Possibly in setting of GI losses.  stable  Monitor CO2.    Hypercalcemia:  Concern for dehydration in view of hemoconcentration.  Improved s/p LR @75mL/hr.   (high) and PO4 low - possible primary hyperparathyroidism.  Monitor Ca.    Hypophosphatemia/ Hypomagnesemia  Possibly in setting of poor PO intake d/t n/v.  Supplemented w/ NaPhos 15mmol and MG sulphate  Replete as needed.  Monitor PO4 daily.    Proteinuria:  Mild.  Check UCr/Pr.  Monitor. 68 Hungarian speaking Male w/ PMH of GERD, HTN, HLD, anemia of chronic disease, T2DM, CAD s/p stent x3 (2014 at [place]) & x2 (pLCx 10/28/22, LAD 10/31/22, off of plavix as of 5/1/23), CVA and ESRD (diagnosed Jan 2019) on HD via LUE AVF T/Th/S (Nephrologist Dr Huff), s/p R DDRT under Simulect on 9/17/23.  Patient had ureteral stent removed on 11/7.  Nephrology consulted for renal transplant status.      A/P:  s/p DDRT 9/2023  Renal function stable on admission; S.Cr. 0.9.  Immunosuppressants per transplant: Envarsus 4mg daily; Stop Cellcept, change to Myfortic 720mg PO BID for GI symptoms; continue Prednisone 5mg po daily.  Check tacro level 30min before next dose (goal 8-10); at goal on 1/2  On Bactrim  400mg and Valcyte 450mg po daily for infection prophyaxis.   CT A/P noncon 12/31 - Mild cystitis with urinary retention. Transplant right lower quadrant   kidney with hydronephrosis and perinephric stranding concerning for infection.  US of transplanted kidney shows mild hydro and distended bladder.  Post void bladder scan neg for retention on 12/31.  Pt denies urinary complaints.  Continue Flomax.  Monitor BMP and UO.    HTN   Fluctuating.  C/W current antihypertensives.  On hydralazine 10mg IVP.  Monitor BP.    Acidosis:  Possibly in setting of GI losses.  stable  Monitor CO2.    Hypercalcemia:  Concern for dehydration in view of hemoconcentration.  Improved s/p LR @75mL/hr.   (high) and PO4 low - possible primary hyperparathyroidism.  Monitor Ca.    Hypophosphatemia/ Hypomagnesemia  Possibly in setting of poor PO intake d/t n/v.  Supplemented w/ NaPhos 15mmol and MG sulphate  Replete as needed.  Monitor PO4 daily.    Proteinuria:  Mild.  Check UCr/Pr.  Monitor.

## 2024-01-02 NOTE — PROGRESS NOTE ADULT - SUBJECTIVE AND OBJECTIVE BOX
Transplant Surgery Multidisciplinary Progress Note  --------------------------------------------------------------  DDRT 9/16/23    Present: Patient seen and examined with multidisciplinary Transplant team including  Surgeon: Dr. Keller, Nephrologist: Dr. Montano, JM Franco, Pharmacy resident Maritza and unit RN during am rounds.  Disciplines not in attendance will be notified of the plan.     69 year old man with ESRD due to DM, HTN,  ESRD was on HD since 2019. S/p DDRT on 9/16/23. Had slow graft function but did not require dialysis. Graft function gradually improved to sCr of 1mg/dL. Transplant ureteral stent removed 11/7/23.  PMH includes Type II DM on insulin, HTN, HLD,  CAD s/p 3 stents (last in 10/2022), CVA, cholecystectomy, GERD.     Hospitalized 11/8 for hyperglycemia, started on Insulin, also rx for UTI  He presented to [facility] with epigastric pain, NV x 5 episodes. No diarrhea.  No chest pain, shortness of breath, fever, dysuria.        Interval Events:  - Afebrile, VSS  - Heartburn , GI consulted  - no o/n events    Immunosuppression:  ENV per level, Myfortic 720BID, Pred 5  Ongoing monitoring for signs of rejection    Potential Discharge date: TBD    Education:  Medications    Plan of care:  See Below      MEDICATIONS  (STANDING):  aspirin enteric coated 81 milliGRAM(s) Oral daily  atorvastatin 40 milliGRAM(s) Oral at bedtime  carvedilol 25 milliGRAM(s) Oral every 12 hours  heparin   Injectable 5000 Unit(s) SubCutaneous every 8 hours  hydrALAZINE Injectable 10 milliGRAM(s) IV Push once  insulin glargine Injectable (LANTUS) 22 Unit(s) SubCutaneous at bedtime  insulin lispro (ADMELOG) corrective regimen sliding scale   SubCutaneous at bedtime  insulin lispro (ADMELOG) corrective regimen sliding scale   SubCutaneous three times a day before meals  insulin lispro Injectable (ADMELOG) 10 Unit(s) SubCutaneous three times a day before meals  linagliptin 5 milliGRAM(s) Oral daily  mycophenolic acid  milliGRAM(s) Oral two times a day  NIFEdipine XL 60 milliGRAM(s) Oral two times a day  nystatin    Suspension 219502 Unit(s) Oral four times a day  pantoprazole    Tablet 40 milliGRAM(s) Oral every 12 hours  polyethylene glycol 3350 17 Gram(s) Oral daily  predniSONE   Tablet 5 milliGRAM(s) Oral daily  sucralfate suspension 1 Gram(s) Oral four times a day  tamsulosin 0.4 milliGRAM(s) Oral at bedtime  trimethoprim   80 mG/sulfamethoxazole 400 mG 1 Tablet(s) Oral daily  valGANciclovir 450 milliGRAM(s) Oral daily    MEDICATIONS  (PRN):  aluminum hydroxide/magnesium hydroxide/simethicone Suspension 30 milliLiter(s) Oral every 6 hours PRN Dyspepsia  calcium carbonate    500 mG (Tums) Chewable 1 Tablet(s) Chew every 6 hours PRN Heartburn  metoclopramide 5 milliGRAM(s) Oral three times a day PRN Nausea      PAST MEDICAL & SURGICAL HISTORY:  HTN (hypertension)      Coronary artery disease      CAP (community acquired pneumonia)  6/18      Diabetes mellitus  type 2      GERD (gastroesophageal reflux disease)      Stented coronary artery  2014, 3 stents, [facility]      ESRD (end stage renal disease)  on Dialysis( M/W/F), By Dr. Huff      Hypercholesterolemia      Cerebrovascular accident (CVA), unspecified mechanism  diagnosed via CT of the head      Anemia due to stage 5 chronic kidney disease, not on chronic dialysis      H/O coronary angiogram  2014 - x3 stents      AV fistula  10/12/18 L radiocephalic AV fistula      H/O eye surgery          Vital Signs Last 24 Hrs  T(C): 36.7 (02 Jan 2024 12:51), Max: 37.2 (01 Jan 2024 21:00)  T(F): 98.1 (02 Jan 2024 12:51), Max: 98.9 (01 Jan 2024 21:00)  HR: 60 (02 Jan 2024 12:51) (60 - 68)  BP: 151/75 (02 Jan 2024 12:51) (128/75 - 151/75)  BP(mean): --  RR: 18 (02 Jan 2024 12:51) (18 - 20)  SpO2: 99% (02 Jan 2024 12:51) (96% - 100%)    Parameters below as of 02 Jan 2024 12:51  Patient On (Oxygen Delivery Method): room air        I&O's Summary    01 Jan 2024 07:01  -  02 Jan 2024 07:00  --------------------------------------------------------  IN: 590 mL / OUT: 1400 mL / NET: -810 mL                              13.7   6.68  )-----------( 138      ( 02 Jan 2024 07:09 )             43.8     01-02    141  |  107  |  11  ----------------------------<  82  3.5   |  23  |  1.01    Ca    9.6      02 Jan 2024 07:11  Phos  1.9     01-02  Mg     1.9     01-02    TPro  6.3  /  Alb  3.7  /  TBili  0.6  /  DBili  x   /  AST  13  /  ALT  12  /  AlkPhos  81  01-02    Tacrolimus (), Serum: 12.3 ng/mL (01-02 @ 07:09)        Culture - Blood (collected 12-31-23 @ 09:18)  Source: .Blood Blood-Peripheral  Preliminary Report (01-01-24 @ 20:02):    No growth at 24 hours    Culture - Blood (collected 12-31-23 @ 09:17)  Source: .Blood Blood-Peripheral  Preliminary Report (01-01-24 @ 20:02):    No growth at 24 hours    Culture - Urine (collected 12-31-23 @ 01:35)  Source: Clean Catch Clean Catch (Midstream)  Final Report (01-01-24 @ 00:40):    No growth    Culture - Blood (collected 12-30-23 @ 22:00)  Source: .Blood Blood-Peripheral  Preliminary Report (01-02-24 @ 07:02):    No growth at 48 Hours    Culture - Blood (collected 12-30-23 @ 21:45)  Source: .Blood Blood-Peripheral  Preliminary Report (01-02-24 @ 07:02):    No growth at 48 Hours      Review of systems  unremarkable unless indicated above      PHYSICAL EXAM:  Constitutional: Well developed / well nourished  Eyes: Anicteric, PERRLA  ENMT: nc/at  Neck: supple  Respiratory: CTA B/L  Cardiovascular: RRR  Gastrointestinal: Soft, non distended, NT  Genitourinary: Voiding spontaneously  Extremities: SCD's in place and working bilaterally  Vascular: Palpable dp pulses bilaterally  Neurological: A&O x3  Skin: no rashes, ulcerations or lesions;  Musculoskeletal: Moving all extremities  Psychiatric: Responsive     Transplant Surgery Multidisciplinary Progress Note  --------------------------------------------------------------  DDRT 9/16/23    Present: Patient seen and examined with multidisciplinary Transplant team including  Surgeon: Dr. Keller, Nephrologist: Dr. Montano, JM Franco, Pharmacy resident Maritza and unit RN during am rounds.  Disciplines not in attendance will be notified of the plan.     69 year old man with ESRD due to DM, HTN,  ESRD was on HD since 2019. S/p DDRT on 9/16/23. Had slow graft function but did not require dialysis. Graft function gradually improved to sCr of 1mg/dL. Transplant ureteral stent removed 11/7/23.  PMH includes Type II DM on insulin, HTN, HLD,  CAD s/p 3 stents (last in 10/2022), CVA, cholecystectomy, GERD.     Hospitalized 11/8 for hyperglycemia, started on Insulin, also rx for UTI  He presented to [facility] with epigastric pain, NV x 5 episodes. No diarrhea.  No chest pain, shortness of breath, fever, dysuria.        Interval Events:  - Afebrile, VSS  - Heartburn , GI consulted  - no o/n events    Immunosuppression:  ENV per level, Myfortic 720BID, Pred 5  Ongoing monitoring for signs of rejection    Potential Discharge date: TBD    Education:  Medications    Plan of care:  See Below      MEDICATIONS  (STANDING):  aspirin enteric coated 81 milliGRAM(s) Oral daily  atorvastatin 40 milliGRAM(s) Oral at bedtime  carvedilol 25 milliGRAM(s) Oral every 12 hours  heparin   Injectable 5000 Unit(s) SubCutaneous every 8 hours  hydrALAZINE Injectable 10 milliGRAM(s) IV Push once  insulin glargine Injectable (LANTUS) 22 Unit(s) SubCutaneous at bedtime  insulin lispro (ADMELOG) corrective regimen sliding scale   SubCutaneous at bedtime  insulin lispro (ADMELOG) corrective regimen sliding scale   SubCutaneous three times a day before meals  insulin lispro Injectable (ADMELOG) 10 Unit(s) SubCutaneous three times a day before meals  linagliptin 5 milliGRAM(s) Oral daily  mycophenolic acid  milliGRAM(s) Oral two times a day  NIFEdipine XL 60 milliGRAM(s) Oral two times a day  nystatin    Suspension 960793 Unit(s) Oral four times a day  pantoprazole    Tablet 40 milliGRAM(s) Oral every 12 hours  polyethylene glycol 3350 17 Gram(s) Oral daily  predniSONE   Tablet 5 milliGRAM(s) Oral daily  sucralfate suspension 1 Gram(s) Oral four times a day  tamsulosin 0.4 milliGRAM(s) Oral at bedtime  trimethoprim   80 mG/sulfamethoxazole 400 mG 1 Tablet(s) Oral daily  valGANciclovir 450 milliGRAM(s) Oral daily    MEDICATIONS  (PRN):  aluminum hydroxide/magnesium hydroxide/simethicone Suspension 30 milliLiter(s) Oral every 6 hours PRN Dyspepsia  calcium carbonate    500 mG (Tums) Chewable 1 Tablet(s) Chew every 6 hours PRN Heartburn  metoclopramide 5 milliGRAM(s) Oral three times a day PRN Nausea      PAST MEDICAL & SURGICAL HISTORY:  HTN (hypertension)      Coronary artery disease      CAP (community acquired pneumonia)  6/18      Diabetes mellitus  type 2      GERD (gastroesophageal reflux disease)      Stented coronary artery  2014, 3 stents, [facility]      ESRD (end stage renal disease)  on Dialysis( M/W/F), By Dr. Huff      Hypercholesterolemia      Cerebrovascular accident (CVA), unspecified mechanism  diagnosed via CT of the head      Anemia due to stage 5 chronic kidney disease, not on chronic dialysis      H/O coronary angiogram  2014 - x3 stents      AV fistula  10/12/18 L radiocephalic AV fistula      H/O eye surgery          Vital Signs Last 24 Hrs  T(C): 36.7 (02 Jan 2024 12:51), Max: 37.2 (01 Jan 2024 21:00)  T(F): 98.1 (02 Jan 2024 12:51), Max: 98.9 (01 Jan 2024 21:00)  HR: 60 (02 Jan 2024 12:51) (60 - 68)  BP: 151/75 (02 Jan 2024 12:51) (128/75 - 151/75)  BP(mean): --  RR: 18 (02 Jan 2024 12:51) (18 - 20)  SpO2: 99% (02 Jan 2024 12:51) (96% - 100%)    Parameters below as of 02 Jan 2024 12:51  Patient On (Oxygen Delivery Method): room air        I&O's Summary    01 Jan 2024 07:01  -  02 Jan 2024 07:00  --------------------------------------------------------  IN: 590 mL / OUT: 1400 mL / NET: -810 mL                              13.7   6.68  )-----------( 138      ( 02 Jan 2024 07:09 )             43.8     01-02    141  |  107  |  11  ----------------------------<  82  3.5   |  23  |  1.01    Ca    9.6      02 Jan 2024 07:11  Phos  1.9     01-02  Mg     1.9     01-02    TPro  6.3  /  Alb  3.7  /  TBili  0.6  /  DBili  x   /  AST  13  /  ALT  12  /  AlkPhos  81  01-02    Tacrolimus (), Serum: 12.3 ng/mL (01-02 @ 07:09)        Culture - Blood (collected 12-31-23 @ 09:18)  Source: .Blood Blood-Peripheral  Preliminary Report (01-01-24 @ 20:02):    No growth at 24 hours    Culture - Blood (collected 12-31-23 @ 09:17)  Source: .Blood Blood-Peripheral  Preliminary Report (01-01-24 @ 20:02):    No growth at 24 hours    Culture - Urine (collected 12-31-23 @ 01:35)  Source: Clean Catch Clean Catch (Midstream)  Final Report (01-01-24 @ 00:40):    No growth    Culture - Blood (collected 12-30-23 @ 22:00)  Source: .Blood Blood-Peripheral  Preliminary Report (01-02-24 @ 07:02):    No growth at 48 Hours    Culture - Blood (collected 12-30-23 @ 21:45)  Source: .Blood Blood-Peripheral  Preliminary Report (01-02-24 @ 07:02):    No growth at 48 Hours      Review of systems  unremarkable unless indicated above      PHYSICAL EXAM:  Constitutional: Well developed / well nourished  Eyes: Anicteric, PERRLA  ENMT: nc/at  Neck: supple  Respiratory: CTA B/L  Cardiovascular: RRR  Gastrointestinal: Soft, non distended, NT  Genitourinary: Voiding spontaneously  Extremities: SCD's in place and working bilaterally  Vascular: Palpable dp pulses bilaterally  Neurological: A&O x3  Skin: no rashes, ulcerations or lesions;  Musculoskeletal: Moving all extremities  Psychiatric: Responsive

## 2024-01-02 NOTE — PROGRESS NOTE ADULT - SUBJECTIVE AND OBJECTIVE BOX
Dr. Daria Marinelli MD  Office [phone] (9 am to 5 pm)  Service: [phone] (5pm to 9am)  Sima Acosta NP       RENAL PROGRESS NOTE: DATE OF SERVICE 01-02-24 @ 13:26    Patient is a 69y old  Male who presents with a chief complaint of Abdominal Pain, N/V x 1 day. (02 Jan 2024 12:45)      Patient seen and examined at bedside. No chest pain/sob    VITALS:  T(F): 98.1 (01-02-24 @ 12:51), Max: 98.9 (01-01-24 @ 21:00)  HR: 60 (01-02-24 @ 12:51)  BP: 151/75 (01-02-24 @ 12:51)  RR: 18 (01-02-24 @ 12:51)  SpO2: 99% (01-02-24 @ 12:51)  Wt(kg): --    01-01 @ 07:01  -  01-02 @ 07:00  --------------------------------------------------------  IN: 590 mL / OUT: 1400 mL / NET: -810 mL          PHYSICAL EXAM:  Constitutional: NAD  Neck: No JVD  Respiratory: CTAB, no wheezes, rales or rhonchi  Cardiovascular: S1, S2, RRR  Gastrointestinal: BS+, soft, NT/ND  Extremities: No peripheral edema    Hospital Medications:   MEDICATIONS  (STANDING):  aspirin enteric coated 81 milliGRAM(s) Oral daily  atorvastatin 40 milliGRAM(s) Oral at bedtime  carvedilol 25 milliGRAM(s) Oral every 12 hours  heparin   Injectable 5000 Unit(s) SubCutaneous every 8 hours  hydrALAZINE Injectable 10 milliGRAM(s) IV Push once  insulin glargine Injectable (LANTUS) 22 Unit(s) SubCutaneous at bedtime  insulin lispro (ADMELOG) corrective regimen sliding scale   SubCutaneous at bedtime  insulin lispro (ADMELOG) corrective regimen sliding scale   SubCutaneous three times a day before meals  insulin lispro Injectable (ADMELOG) 10 Unit(s) SubCutaneous three times a day before meals  linagliptin 5 milliGRAM(s) Oral daily  mycophenolic acid  milliGRAM(s) Oral two times a day  NIFEdipine XL 60 milliGRAM(s) Oral two times a day  nystatin    Suspension 679146 Unit(s) Oral four times a day  pantoprazole    Tablet 40 milliGRAM(s) Oral every 12 hours  polyethylene glycol 3350 17 Gram(s) Oral daily  predniSONE   Tablet 5 milliGRAM(s) Oral daily  sucralfate suspension 1 Gram(s) Oral four times a day  tamsulosin 0.4 milliGRAM(s) Oral at bedtime  trimethoprim   80 mG/sulfamethoxazole 400 mG 1 Tablet(s) Oral daily  valGANciclovir 450 milliGRAM(s) Oral daily    Tacrolimus (), Serum: 12.3 ng/mL (01-02 @ 07:09)    LABS:  01-02    141  |  107  |  11  ----------------------------<  82  3.5   |  23  |  1.01    Ca    9.6      02 Jan 2024 07:11  Phos  1.9     01-02  Mg     1.9     01-02    TPro  6.3  /  Alb  3.7  /  TBili  0.6  /  DBili      /  AST  13  /  ALT  12  /  AlkPhos  81  01-02    Creatinine Trend: 1.01 <--, 1.06 <--, 0.90 <--, 0.97 <--, 0.86 <--, 0.60 <--    Albumin: 3.7 g/dL (01-02 @ 07:11)  Phosphorus: 1.9 mg/dL (01-02 @ 07:11)                              13.7   6.68  )-----------( 138      ( 02 Jan 2024 07:09 )             43.8     Urine Studies:  Urinalysis - [01-02-24 @ 07:11]      Color  / Appearance  / SG  / pH       Gluc 82 / Ketone   / Bili  / Urobili        Blood  / Protein  / Leuk Est  / Nitrite       RBC  / WBC  / Hyaline  / Gran  / Sq Epi  / Non Sq Epi  / Bacteria       HbA1c 7.8      [12-17-19 @ 05:54]        RADIOLOGY & ADDITIONAL STUDIES:     Dr. Daria Marinelli MD  Office [phone] (9 am to 5 pm)  Service: [phone] (5pm to 9am)  Sima Acosta NP       RENAL PROGRESS NOTE: DATE OF SERVICE 01-02-24 @ 13:26    Patient is a 69y old  Male who presents with a chief complaint of Abdominal Pain, N/V x 1 day. (02 Jan 2024 12:45)      Patient seen and examined at bedside. No chest pain/sob    VITALS:  T(F): 98.1 (01-02-24 @ 12:51), Max: 98.9 (01-01-24 @ 21:00)  HR: 60 (01-02-24 @ 12:51)  BP: 151/75 (01-02-24 @ 12:51)  RR: 18 (01-02-24 @ 12:51)  SpO2: 99% (01-02-24 @ 12:51)  Wt(kg): --    01-01 @ 07:01  -  01-02 @ 07:00  --------------------------------------------------------  IN: 590 mL / OUT: 1400 mL / NET: -810 mL          PHYSICAL EXAM:  Constitutional: NAD  Neck: No JVD  Respiratory: CTAB, no wheezes, rales or rhonchi  Cardiovascular: S1, S2, RRR  Gastrointestinal: BS+, soft, NT/ND  Extremities: No peripheral edema    Hospital Medications:   MEDICATIONS  (STANDING):  aspirin enteric coated 81 milliGRAM(s) Oral daily  atorvastatin 40 milliGRAM(s) Oral at bedtime  carvedilol 25 milliGRAM(s) Oral every 12 hours  heparin   Injectable 5000 Unit(s) SubCutaneous every 8 hours  hydrALAZINE Injectable 10 milliGRAM(s) IV Push once  insulin glargine Injectable (LANTUS) 22 Unit(s) SubCutaneous at bedtime  insulin lispro (ADMELOG) corrective regimen sliding scale   SubCutaneous at bedtime  insulin lispro (ADMELOG) corrective regimen sliding scale   SubCutaneous three times a day before meals  insulin lispro Injectable (ADMELOG) 10 Unit(s) SubCutaneous three times a day before meals  linagliptin 5 milliGRAM(s) Oral daily  mycophenolic acid  milliGRAM(s) Oral two times a day  NIFEdipine XL 60 milliGRAM(s) Oral two times a day  nystatin    Suspension 106267 Unit(s) Oral four times a day  pantoprazole    Tablet 40 milliGRAM(s) Oral every 12 hours  polyethylene glycol 3350 17 Gram(s) Oral daily  predniSONE   Tablet 5 milliGRAM(s) Oral daily  sucralfate suspension 1 Gram(s) Oral four times a day  tamsulosin 0.4 milliGRAM(s) Oral at bedtime  trimethoprim   80 mG/sulfamethoxazole 400 mG 1 Tablet(s) Oral daily  valGANciclovir 450 milliGRAM(s) Oral daily    Tacrolimus (), Serum: 12.3 ng/mL (01-02 @ 07:09)    LABS:  01-02    141  |  107  |  11  ----------------------------<  82  3.5   |  23  |  1.01    Ca    9.6      02 Jan 2024 07:11  Phos  1.9     01-02  Mg     1.9     01-02    TPro  6.3  /  Alb  3.7  /  TBili  0.6  /  DBili      /  AST  13  /  ALT  12  /  AlkPhos  81  01-02    Creatinine Trend: 1.01 <--, 1.06 <--, 0.90 <--, 0.97 <--, 0.86 <--, 0.60 <--    Albumin: 3.7 g/dL (01-02 @ 07:11)  Phosphorus: 1.9 mg/dL (01-02 @ 07:11)                              13.7   6.68  )-----------( 138      ( 02 Jan 2024 07:09 )             43.8     Urine Studies:  Urinalysis - [01-02-24 @ 07:11]      Color  / Appearance  / SG  / pH       Gluc 82 / Ketone   / Bili  / Urobili        Blood  / Protein  / Leuk Est  / Nitrite       RBC  / WBC  / Hyaline  / Gran  / Sq Epi  / Non Sq Epi  / Bacteria       HbA1c 7.8      [12-17-19 @ 05:54]        RADIOLOGY & ADDITIONAL STUDIES:

## 2024-01-02 NOTE — PROGRESS NOTE ADULT - SUBJECTIVE AND OBJECTIVE BOX
[facility] DIVISION OF KIDNEY DISEASES AND HYPERTENSION   FOLLOW UP NOTE  --------------------------------------------------------------------------------  Chief Complaint: Pt S/p DDRT on 9/16/23 presented with epigastric pain, nausea and vomiting.     24 hour events/subjective: Pt. was seen and examined today. He is still having abdominal discomfort when drinking and eating but denied nausea, vomiting, fevers, chills, constipation.     PAST HISTORY  --------------------------------------------------------------------------------  No significant changes to PMH, PSH, FHx, SHx, unless otherwise noted    ALLERGIES & MEDICATIONS  --------------------------------------------------------------------------------  Allergies  No Known Allergies    Intolerances    Standing Inpatient Medications  aspirin enteric coated 81 milliGRAM(s) Oral daily  atorvastatin 40 milliGRAM(s) Oral at bedtime  carvedilol 25 milliGRAM(s) Oral every 12 hours  heparin   Injectable 5000 Unit(s) SubCutaneous every 8 hours  hydrALAZINE Injectable 10 milliGRAM(s) IV Push once  insulin glargine Injectable (LANTUS) 22 Unit(s) SubCutaneous at bedtime  insulin lispro (ADMELOG) corrective regimen sliding scale   SubCutaneous at bedtime  insulin lispro (ADMELOG) corrective regimen sliding scale   SubCutaneous three times a day before meals  insulin lispro Injectable (ADMELOG) 10 Unit(s) SubCutaneous three times a day before meals  linagliptin 5 milliGRAM(s) Oral daily  mycophenolic acid  milliGRAM(s) Oral two times a day  NIFEdipine XL 60 milliGRAM(s) Oral two times a day  nystatin    Suspension 605699 Unit(s) Oral four times a day  pantoprazole    Tablet 40 milliGRAM(s) Oral every 12 hours  polyethylene glycol 3350 17 Gram(s) Oral daily  predniSONE   Tablet 5 milliGRAM(s) Oral daily  sucralfate suspension 1 Gram(s) Oral four times a day  tamsulosin 0.4 milliGRAM(s) Oral at bedtime  trimethoprim   80 mG/sulfamethoxazole 400 mG 1 Tablet(s) Oral daily  valGANciclovir 450 milliGRAM(s) Oral daily    PRN Inpatient Medications  aluminum hydroxide/magnesium hydroxide/simethicone Suspension 30 milliLiter(s) Oral every 6 hours PRN  calcium carbonate    500 mG (Tums) Chewable 1 Tablet(s) Chew every 6 hours PRN  metoclopramide 5 milliGRAM(s) Oral three times a day PRN    REVIEW OF SYSTEMS  --------------------------------------------------------------------------------  Gen: No fevers/chills  Head/Eyes/Ears: No HA   Respiratory: No dyspnea, cough  CV: No chest pain  GI: +abdominal discomfort  : No dysuria, hematuria  MSK: No  edema  Skin: No rashes  Heme: No easy bruising or bleeding    All other systems were reviewed and are negative, except as noted.    VITALS/PHYSICAL EXAM  --------------------------------------------------------------------------------  T(C): 36.7 (01-02-24 @ 12:51), Max: 37.2 (01-01-24 @ 21:00)  HR: 60 (01-02-24 @ 12:51) (60 - 64)  BP: 151/75 (01-02-24 @ 12:51) (128/75 - 151/75)  RR: 18 (01-02-24 @ 12:51) (18 - 20)  SpO2: 99% (01-02-24 @ 12:51) (96% - 100%)  Wt(kg): --    01-01-24 @ 07:01  -  01-02-24 @ 07:00  --------------------------------------------------------  IN: 590 mL / OUT: 1400 mL / NET: -810 mL    Physical Exam:  	Gen: Sitting up in bed and in NAD   	HEENT: Anicteric  	Pulm: CTA B/L  	CV: S1S2+  	Abd: Soft, +BS, transplant site non tender   	Ext: No LE edema B/L  	Neuro: Awake  	Skin: Warm and dry    LABS/STUDIES  --------------------------------------------------------------------------------              13.7   6.68  >-----------<  138      [01-02-24 @ 07:09]              43.8     141  |  107  |  11  ----------------------------<  82      [01-02-24 @ 07:11]  3.5   |  23  |  1.01        Ca     9.6     [01-02-24 @ 07:11]      Mg     1.9     [01-02-24 @ 07:11]      Phos  1.9     [01-02-24 @ 07:11]    TPro  6.3  /  Alb  3.7  /  TBili  0.6  /  DBili  x   /  AST  13  /  ALT  12  /  AlkPhos  81  [01-02-24 @ 07:11]    PT/INR: PT 10.5 , INR 0.95       [01-02-24 @ 07:10]  PTT: 27.6       [01-02-24 @ 07:10]    Creatinine Trend:  SCr 1.01 [01-02 @ 07:11]  SCr 1.06 [01-01 @ 06:35]  SCr 0.90 [12-31 @ 08:36]  SCr 0.97 [12-31 @ 01:19]  SCr 0.86 [12-31 @ 00:27]    Tacrolimus (), Serum: 12.3 ng/mL (01-02 @ 07:09)  Tacrolimus (), Serum: 9.3 ng/mL (01-01 @ 06:35)  Tacrolimus (), Serum: 8.3 ng/mL (12-31 @ 08:36)  Tacrolimus (), Serum: 6.7 ng/mL (12-30 @ 22:00)  CMV PCR Log: NotDetec Bct98BN/mL (01-01 @ 06:35) [facility] DIVISION OF KIDNEY DISEASES AND HYPERTENSION   FOLLOW UP NOTE  --------------------------------------------------------------------------------  Chief Complaint: Pt S/p DDRT on 9/16/23 presented with epigastric pain, nausea and vomiting.     24 hour events/subjective: Pt. was seen and examined today. He is still having abdominal discomfort when drinking and eating but denied nausea, vomiting, fevers, chills, constipation.     PAST HISTORY  --------------------------------------------------------------------------------  No significant changes to PMH, PSH, FHx, SHx, unless otherwise noted    ALLERGIES & MEDICATIONS  --------------------------------------------------------------------------------  Allergies  No Known Allergies    Intolerances    Standing Inpatient Medications  aspirin enteric coated 81 milliGRAM(s) Oral daily  atorvastatin 40 milliGRAM(s) Oral at bedtime  carvedilol 25 milliGRAM(s) Oral every 12 hours  heparin   Injectable 5000 Unit(s) SubCutaneous every 8 hours  hydrALAZINE Injectable 10 milliGRAM(s) IV Push once  insulin glargine Injectable (LANTUS) 22 Unit(s) SubCutaneous at bedtime  insulin lispro (ADMELOG) corrective regimen sliding scale   SubCutaneous at bedtime  insulin lispro (ADMELOG) corrective regimen sliding scale   SubCutaneous three times a day before meals  insulin lispro Injectable (ADMELOG) 10 Unit(s) SubCutaneous three times a day before meals  linagliptin 5 milliGRAM(s) Oral daily  mycophenolic acid  milliGRAM(s) Oral two times a day  NIFEdipine XL 60 milliGRAM(s) Oral two times a day  nystatin    Suspension 183189 Unit(s) Oral four times a day  pantoprazole    Tablet 40 milliGRAM(s) Oral every 12 hours  polyethylene glycol 3350 17 Gram(s) Oral daily  predniSONE   Tablet 5 milliGRAM(s) Oral daily  sucralfate suspension 1 Gram(s) Oral four times a day  tamsulosin 0.4 milliGRAM(s) Oral at bedtime  trimethoprim   80 mG/sulfamethoxazole 400 mG 1 Tablet(s) Oral daily  valGANciclovir 450 milliGRAM(s) Oral daily    PRN Inpatient Medications  aluminum hydroxide/magnesium hydroxide/simethicone Suspension 30 milliLiter(s) Oral every 6 hours PRN  calcium carbonate    500 mG (Tums) Chewable 1 Tablet(s) Chew every 6 hours PRN  metoclopramide 5 milliGRAM(s) Oral three times a day PRN    REVIEW OF SYSTEMS  --------------------------------------------------------------------------------  Gen: No fevers/chills  Head/Eyes/Ears: No HA   Respiratory: No dyspnea, cough  CV: No chest pain  GI: +abdominal discomfort  : No dysuria, hematuria  MSK: No  edema  Skin: No rashes  Heme: No easy bruising or bleeding    All other systems were reviewed and are negative, except as noted.    VITALS/PHYSICAL EXAM  --------------------------------------------------------------------------------  T(C): 36.7 (01-02-24 @ 12:51), Max: 37.2 (01-01-24 @ 21:00)  HR: 60 (01-02-24 @ 12:51) (60 - 64)  BP: 151/75 (01-02-24 @ 12:51) (128/75 - 151/75)  RR: 18 (01-02-24 @ 12:51) (18 - 20)  SpO2: 99% (01-02-24 @ 12:51) (96% - 100%)  Wt(kg): --    01-01-24 @ 07:01  -  01-02-24 @ 07:00  --------------------------------------------------------  IN: 590 mL / OUT: 1400 mL / NET: -810 mL    Physical Exam:  	Gen: Sitting up in bed and in NAD   	HEENT: Anicteric  	Pulm: CTA B/L  	CV: S1S2+  	Abd: Soft, +BS, transplant site non tender   	Ext: No LE edema B/L  	Neuro: Awake  	Skin: Warm and dry    LABS/STUDIES  --------------------------------------------------------------------------------              13.7   6.68  >-----------<  138      [01-02-24 @ 07:09]              43.8     141  |  107  |  11  ----------------------------<  82      [01-02-24 @ 07:11]  3.5   |  23  |  1.01        Ca     9.6     [01-02-24 @ 07:11]      Mg     1.9     [01-02-24 @ 07:11]      Phos  1.9     [01-02-24 @ 07:11]    TPro  6.3  /  Alb  3.7  /  TBili  0.6  /  DBili  x   /  AST  13  /  ALT  12  /  AlkPhos  81  [01-02-24 @ 07:11]    PT/INR: PT 10.5 , INR 0.95       [01-02-24 @ 07:10]  PTT: 27.6       [01-02-24 @ 07:10]    Creatinine Trend:  SCr 1.01 [01-02 @ 07:11]  SCr 1.06 [01-01 @ 06:35]  SCr 0.90 [12-31 @ 08:36]  SCr 0.97 [12-31 @ 01:19]  SCr 0.86 [12-31 @ 00:27]    Tacrolimus (), Serum: 12.3 ng/mL (01-02 @ 07:09)  Tacrolimus (), Serum: 9.3 ng/mL (01-01 @ 06:35)  Tacrolimus (), Serum: 8.3 ng/mL (12-31 @ 08:36)  Tacrolimus (), Serum: 6.7 ng/mL (12-30 @ 22:00)  CMV PCR Log: NotDetec Mit05CW/mL (01-01 @ 06:35)

## 2024-01-02 NOTE — PROGRESS NOTE ADULT - ASSESSMENT
68 yr old man with ESRD due to DM, HTN, CAD, CVA was on HD since 2019. S/p DDRT on 9/16/23. Had slow graft function but did not require dialysis. Graft function improved to scr of 1mg/dL. He presents with epigastric pain, nausea and vomiting.

## 2024-01-03 ENCOUNTER — RESULT REVIEW (OUTPATIENT)
Age: 69
End: 2024-01-03

## 2024-01-03 ENCOUNTER — APPOINTMENT (OUTPATIENT)
Dept: NEPHROLOGY | Facility: CLINIC | Age: 69
End: 2024-01-03

## 2024-01-03 ENCOUNTER — TRANSCRIPTION ENCOUNTER (OUTPATIENT)
Age: 69
End: 2024-01-03

## 2024-01-03 VITALS
TEMPERATURE: 98 F | SYSTOLIC BLOOD PRESSURE: 149 MMHG | RESPIRATION RATE: 20 BRPM | HEART RATE: 58 BPM | DIASTOLIC BLOOD PRESSURE: 78 MMHG | OXYGEN SATURATION: 98 %

## 2024-01-03 LAB
ALBUMIN SERPL ELPH-MCNC: 3.5 G/DL — SIGNIFICANT CHANGE UP (ref 3.3–5)
ALBUMIN SERPL ELPH-MCNC: 3.5 G/DL — SIGNIFICANT CHANGE UP (ref 3.3–5)
ALP SERPL-CCNC: 77 U/L — SIGNIFICANT CHANGE UP (ref 40–120)
ALP SERPL-CCNC: 77 U/L — SIGNIFICANT CHANGE UP (ref 40–120)
ALT FLD-CCNC: 13 U/L — SIGNIFICANT CHANGE UP (ref 10–45)
ALT FLD-CCNC: 13 U/L — SIGNIFICANT CHANGE UP (ref 10–45)
ANION GAP SERPL CALC-SCNC: 10 MMOL/L — SIGNIFICANT CHANGE UP (ref 5–17)
ANION GAP SERPL CALC-SCNC: 10 MMOL/L — SIGNIFICANT CHANGE UP (ref 5–17)
AST SERPL-CCNC: 17 U/L — SIGNIFICANT CHANGE UP (ref 10–40)
AST SERPL-CCNC: 17 U/L — SIGNIFICANT CHANGE UP (ref 10–40)
BASOPHILS # BLD AUTO: 0.04 K/UL — SIGNIFICANT CHANGE UP (ref 0–0.2)
BASOPHILS # BLD AUTO: 0.04 K/UL — SIGNIFICANT CHANGE UP (ref 0–0.2)
BASOPHILS NFR BLD AUTO: 0.7 % — SIGNIFICANT CHANGE UP (ref 0–2)
BASOPHILS NFR BLD AUTO: 0.7 % — SIGNIFICANT CHANGE UP (ref 0–2)
BILIRUB SERPL-MCNC: 0.6 MG/DL — SIGNIFICANT CHANGE UP (ref 0.2–1.2)
BILIRUB SERPL-MCNC: 0.6 MG/DL — SIGNIFICANT CHANGE UP (ref 0.2–1.2)
BUN SERPL-MCNC: 11 MG/DL — SIGNIFICANT CHANGE UP (ref 7–23)
BUN SERPL-MCNC: 11 MG/DL — SIGNIFICANT CHANGE UP (ref 7–23)
CALCIUM SERPL-MCNC: 9 MG/DL — SIGNIFICANT CHANGE UP (ref 8.4–10.5)
CALCIUM SERPL-MCNC: 9 MG/DL — SIGNIFICANT CHANGE UP (ref 8.4–10.5)
CHLORIDE SERPL-SCNC: 106 MMOL/L — SIGNIFICANT CHANGE UP (ref 96–108)
CHLORIDE SERPL-SCNC: 106 MMOL/L — SIGNIFICANT CHANGE UP (ref 96–108)
CO2 SERPL-SCNC: 22 MMOL/L — SIGNIFICANT CHANGE UP (ref 22–31)
CO2 SERPL-SCNC: 22 MMOL/L — SIGNIFICANT CHANGE UP (ref 22–31)
CREAT SERPL-MCNC: 1.08 MG/DL — SIGNIFICANT CHANGE UP (ref 0.5–1.3)
CREAT SERPL-MCNC: 1.08 MG/DL — SIGNIFICANT CHANGE UP (ref 0.5–1.3)
EGFR: 75 ML/MIN/1.73M2 — SIGNIFICANT CHANGE UP
EGFR: 75 ML/MIN/1.73M2 — SIGNIFICANT CHANGE UP
EOSINOPHIL # BLD AUTO: 0.05 K/UL — SIGNIFICANT CHANGE UP (ref 0–0.5)
EOSINOPHIL # BLD AUTO: 0.05 K/UL — SIGNIFICANT CHANGE UP (ref 0–0.5)
EOSINOPHIL NFR BLD AUTO: 0.9 % — SIGNIFICANT CHANGE UP (ref 0–6)
EOSINOPHIL NFR BLD AUTO: 0.9 % — SIGNIFICANT CHANGE UP (ref 0–6)
GLUCOSE BLDC GLUCOMTR-MCNC: 156 MG/DL — HIGH (ref 70–99)
GLUCOSE BLDC GLUCOMTR-MCNC: 156 MG/DL — HIGH (ref 70–99)
GLUCOSE BLDC GLUCOMTR-MCNC: 183 MG/DL — HIGH (ref 70–99)
GLUCOSE BLDC GLUCOMTR-MCNC: 183 MG/DL — HIGH (ref 70–99)
GLUCOSE SERPL-MCNC: 134 MG/DL — HIGH (ref 70–99)
GLUCOSE SERPL-MCNC: 134 MG/DL — HIGH (ref 70–99)
HCT VFR BLD CALC: 44.1 % — SIGNIFICANT CHANGE UP (ref 39–50)
HCT VFR BLD CALC: 44.1 % — SIGNIFICANT CHANGE UP (ref 39–50)
HGB BLD-MCNC: 13.6 G/DL — SIGNIFICANT CHANGE UP (ref 13–17)
HGB BLD-MCNC: 13.6 G/DL — SIGNIFICANT CHANGE UP (ref 13–17)
IMM GRANULOCYTES NFR BLD AUTO: 0.3 % — SIGNIFICANT CHANGE UP (ref 0–0.9)
IMM GRANULOCYTES NFR BLD AUTO: 0.3 % — SIGNIFICANT CHANGE UP (ref 0–0.9)
LYMPHOCYTES # BLD AUTO: 1.77 K/UL — SIGNIFICANT CHANGE UP (ref 1–3.3)
LYMPHOCYTES # BLD AUTO: 1.77 K/UL — SIGNIFICANT CHANGE UP (ref 1–3.3)
LYMPHOCYTES # BLD AUTO: 30.6 % — SIGNIFICANT CHANGE UP (ref 13–44)
LYMPHOCYTES # BLD AUTO: 30.6 % — SIGNIFICANT CHANGE UP (ref 13–44)
MAGNESIUM SERPL-MCNC: 2.2 MG/DL — SIGNIFICANT CHANGE UP (ref 1.6–2.6)
MAGNESIUM SERPL-MCNC: 2.2 MG/DL — SIGNIFICANT CHANGE UP (ref 1.6–2.6)
MCHC RBC-ENTMCNC: 28.4 PG — SIGNIFICANT CHANGE UP (ref 27–34)
MCHC RBC-ENTMCNC: 28.4 PG — SIGNIFICANT CHANGE UP (ref 27–34)
MCHC RBC-ENTMCNC: 30.8 GM/DL — LOW (ref 32–36)
MCHC RBC-ENTMCNC: 30.8 GM/DL — LOW (ref 32–36)
MCV RBC AUTO: 92.1 FL — SIGNIFICANT CHANGE UP (ref 80–100)
MCV RBC AUTO: 92.1 FL — SIGNIFICANT CHANGE UP (ref 80–100)
MONOCYTES # BLD AUTO: 0.67 K/UL — SIGNIFICANT CHANGE UP (ref 0–0.9)
MONOCYTES # BLD AUTO: 0.67 K/UL — SIGNIFICANT CHANGE UP (ref 0–0.9)
MONOCYTES NFR BLD AUTO: 11.6 % — SIGNIFICANT CHANGE UP (ref 2–14)
MONOCYTES NFR BLD AUTO: 11.6 % — SIGNIFICANT CHANGE UP (ref 2–14)
NEUTROPHILS # BLD AUTO: 3.23 K/UL — SIGNIFICANT CHANGE UP (ref 1.8–7.4)
NEUTROPHILS # BLD AUTO: 3.23 K/UL — SIGNIFICANT CHANGE UP (ref 1.8–7.4)
NEUTROPHILS NFR BLD AUTO: 55.9 % — SIGNIFICANT CHANGE UP (ref 43–77)
NEUTROPHILS NFR BLD AUTO: 55.9 % — SIGNIFICANT CHANGE UP (ref 43–77)
NRBC # BLD: 0 /100 WBCS — SIGNIFICANT CHANGE UP (ref 0–0)
NRBC # BLD: 0 /100 WBCS — SIGNIFICANT CHANGE UP (ref 0–0)
PHOSPHATE SERPL-MCNC: 2.9 MG/DL — SIGNIFICANT CHANGE UP (ref 2.5–4.5)
PHOSPHATE SERPL-MCNC: 2.9 MG/DL — SIGNIFICANT CHANGE UP (ref 2.5–4.5)
PLATELET # BLD AUTO: 135 K/UL — LOW (ref 150–400)
PLATELET # BLD AUTO: 135 K/UL — LOW (ref 150–400)
POTASSIUM SERPL-MCNC: 3.9 MMOL/L — SIGNIFICANT CHANGE UP (ref 3.5–5.3)
POTASSIUM SERPL-MCNC: 3.9 MMOL/L — SIGNIFICANT CHANGE UP (ref 3.5–5.3)
POTASSIUM SERPL-SCNC: 3.9 MMOL/L — SIGNIFICANT CHANGE UP (ref 3.5–5.3)
POTASSIUM SERPL-SCNC: 3.9 MMOL/L — SIGNIFICANT CHANGE UP (ref 3.5–5.3)
PROT SERPL-MCNC: 6.2 G/DL — SIGNIFICANT CHANGE UP (ref 6–8.3)
PROT SERPL-MCNC: 6.2 G/DL — SIGNIFICANT CHANGE UP (ref 6–8.3)
RBC # BLD: 4.79 M/UL — SIGNIFICANT CHANGE UP (ref 4.2–5.8)
RBC # BLD: 4.79 M/UL — SIGNIFICANT CHANGE UP (ref 4.2–5.8)
RBC # FLD: 12.7 % — SIGNIFICANT CHANGE UP (ref 10.3–14.5)
RBC # FLD: 12.7 % — SIGNIFICANT CHANGE UP (ref 10.3–14.5)
SODIUM SERPL-SCNC: 138 MMOL/L — SIGNIFICANT CHANGE UP (ref 135–145)
SODIUM SERPL-SCNC: 138 MMOL/L — SIGNIFICANT CHANGE UP (ref 135–145)
TACROLIMUS SERPL-MCNC: 14.5 NG/ML — SIGNIFICANT CHANGE UP
TACROLIMUS SERPL-MCNC: 14.5 NG/ML — SIGNIFICANT CHANGE UP
WBC # BLD: 5.78 K/UL — SIGNIFICANT CHANGE UP (ref 3.8–10.5)
WBC # BLD: 5.78 K/UL — SIGNIFICANT CHANGE UP (ref 3.8–10.5)
WBC # FLD AUTO: 5.78 K/UL — SIGNIFICANT CHANGE UP (ref 3.8–10.5)
WBC # FLD AUTO: 5.78 K/UL — SIGNIFICANT CHANGE UP (ref 3.8–10.5)

## 2024-01-03 PROCEDURE — 87086 URINE CULTURE/COLONY COUNT: CPT

## 2024-01-03 PROCEDURE — 82435 ASSAY OF BLOOD CHLORIDE: CPT

## 2024-01-03 PROCEDURE — 85730 THROMBOPLASTIN TIME PARTIAL: CPT

## 2024-01-03 PROCEDURE — 82962 GLUCOSE BLOOD TEST: CPT

## 2024-01-03 PROCEDURE — 86900 BLOOD TYPING SEROLOGIC ABO: CPT

## 2024-01-03 PROCEDURE — 84484 ASSAY OF TROPONIN QUANT: CPT

## 2024-01-03 PROCEDURE — 74176 CT ABD & PELVIS W/O CONTRAST: CPT | Mod: MA

## 2024-01-03 PROCEDURE — 93005 ELECTROCARDIOGRAM TRACING: CPT

## 2024-01-03 PROCEDURE — 84132 ASSAY OF SERUM POTASSIUM: CPT

## 2024-01-03 PROCEDURE — 85025 COMPLETE CBC W/AUTO DIFF WBC: CPT

## 2024-01-03 PROCEDURE — 82150 ASSAY OF AMYLASE: CPT

## 2024-01-03 PROCEDURE — 83970 ASSAY OF PARATHORMONE: CPT

## 2024-01-03 PROCEDURE — 99232 SBSQ HOSP IP/OBS MODERATE 35: CPT | Mod: GC

## 2024-01-03 PROCEDURE — 99285 EMERGENCY DEPT VISIT HI MDM: CPT

## 2024-01-03 PROCEDURE — 96374 THER/PROPH/DIAG INJ IV PUSH: CPT

## 2024-01-03 PROCEDURE — 83605 ASSAY OF LACTIC ACID: CPT

## 2024-01-03 PROCEDURE — 43239 EGD BIOPSY SINGLE/MULTIPLE: CPT | Mod: GC

## 2024-01-03 PROCEDURE — 82553 CREATINE MB FRACTION: CPT

## 2024-01-03 PROCEDURE — 36415 COLL VENOUS BLD VENIPUNCTURE: CPT

## 2024-01-03 PROCEDURE — 86850 RBC ANTIBODY SCREEN: CPT

## 2024-01-03 PROCEDURE — 96375 TX/PRO/DX INJ NEW DRUG ADDON: CPT

## 2024-01-03 PROCEDURE — 82330 ASSAY OF CALCIUM: CPT

## 2024-01-03 PROCEDURE — 76776 US EXAM K TRANSPL W/DOPPLER: CPT

## 2024-01-03 PROCEDURE — 83735 ASSAY OF MAGNESIUM: CPT

## 2024-01-03 PROCEDURE — 85610 PROTHROMBIN TIME: CPT

## 2024-01-03 PROCEDURE — 80197 ASSAY OF TACROLIMUS: CPT

## 2024-01-03 PROCEDURE — 82803 BLOOD GASES ANY COMBINATION: CPT

## 2024-01-03 PROCEDURE — 88305 TISSUE EXAM BY PATHOLOGIST: CPT

## 2024-01-03 PROCEDURE — 87040 BLOOD CULTURE FOR BACTERIA: CPT

## 2024-01-03 PROCEDURE — 83690 ASSAY OF LIPASE: CPT

## 2024-01-03 PROCEDURE — 85018 HEMOGLOBIN: CPT

## 2024-01-03 PROCEDURE — 81001 URINALYSIS AUTO W/SCOPE: CPT

## 2024-01-03 PROCEDURE — 84100 ASSAY OF PHOSPHORUS: CPT

## 2024-01-03 PROCEDURE — 84295 ASSAY OF SERUM SODIUM: CPT

## 2024-01-03 PROCEDURE — 85014 HEMATOCRIT: CPT

## 2024-01-03 PROCEDURE — 80053 COMPREHEN METABOLIC PANEL: CPT

## 2024-01-03 PROCEDURE — 86901 BLOOD TYPING SEROLOGIC RH(D): CPT

## 2024-01-03 PROCEDURE — 88342 IMHCHEM/IMCYTCHM 1ST ANTB: CPT | Mod: 26

## 2024-01-03 PROCEDURE — 88341 IMHCHEM/IMCYTCHM EA ADD ANTB: CPT

## 2024-01-03 PROCEDURE — 82550 ASSAY OF CK (CPK): CPT

## 2024-01-03 PROCEDURE — 82947 ASSAY GLUCOSE BLOOD QUANT: CPT

## 2024-01-03 PROCEDURE — 88305 TISSUE EXAM BY PATHOLOGIST: CPT | Mod: 26

## 2024-01-03 RX ORDER — NYSTATIN 500MM UNIT
5 POWDER (EA) MISCELLANEOUS
Qty: 0 | Refills: 0 | DISCHARGE
Start: 2024-01-03

## 2024-01-03 RX ORDER — VALGANCICLOVIR 450 MG/1
1 TABLET, FILM COATED ORAL
Qty: 0 | Refills: 0 | DISCHARGE
Start: 2024-01-03

## 2024-01-03 RX ORDER — CALCIUM CARBONATE 500(1250)
1 TABLET ORAL
Qty: 120 | Refills: 0
Start: 2024-01-03 | End: 2024-02-01

## 2024-01-03 RX ORDER — CARVEDILOL PHOSPHATE 80 MG/1
1 CAPSULE, EXTENDED RELEASE ORAL
Qty: 0 | Refills: 0 | DISCHARGE
Start: 2024-01-03

## 2024-01-03 RX ORDER — MYCOPHENOLIC ACID 180 MG/1
2 TABLET, DELAYED RELEASE ORAL
Qty: 120 | Refills: 0
Start: 2024-01-03 | End: 2024-02-01

## 2024-01-03 RX ORDER — TAMSULOSIN HYDROCHLORIDE 0.4 MG/1
1 CAPSULE ORAL
Qty: 0 | Refills: 0 | DISCHARGE
Start: 2024-01-03

## 2024-01-03 RX ORDER — INSULIN GLARGINE 100 [IU]/ML
22 INJECTION, SOLUTION SUBCUTANEOUS
Qty: 0 | Refills: 0 | DISCHARGE
Start: 2024-01-03

## 2024-01-03 RX ORDER — LIDOCAINE HCL 20 MG/ML
4 VIAL (ML) INJECTION ONCE
Refills: 0 | Status: DISCONTINUED | OUTPATIENT
Start: 2024-01-03 | End: 2024-01-03

## 2024-01-03 RX ORDER — NIFEDIPINE 30 MG
1 TABLET, EXTENDED RELEASE 24 HR ORAL
Qty: 60 | Refills: 0
Start: 2024-01-03 | End: 2024-02-01

## 2024-01-03 RX ORDER — ATORVASTATIN CALCIUM 80 MG/1
1 TABLET, FILM COATED ORAL
Qty: 0 | Refills: 0 | DISCHARGE
Start: 2024-01-03

## 2024-01-03 RX ORDER — SODIUM,POTASSIUM PHOSPHATES 278-250MG
1 POWDER IN PACKET (EA) ORAL
Refills: 0 | DISCHARGE

## 2024-01-03 RX ORDER — PANTOPRAZOLE SODIUM 20 MG/1
1 TABLET, DELAYED RELEASE ORAL
Qty: 60 | Refills: 0
Start: 2024-01-03 | End: 2024-02-01

## 2024-01-03 RX ORDER — SUCRALFATE 1 G
10 TABLET ORAL
Qty: 1200 | Refills: 0
Start: 2024-01-03 | End: 2024-02-01

## 2024-01-03 RX ADMIN — Medication 1 TABLET(S): at 13:58

## 2024-01-03 RX ADMIN — MYCOPHENOLIC ACID 720 MILLIGRAM(S): 180 TABLET, DELAYED RELEASE ORAL at 06:08

## 2024-01-03 RX ADMIN — Medication 1 GRAM(S): at 06:07

## 2024-01-03 RX ADMIN — Medication 60 MILLIGRAM(S): at 06:08

## 2024-01-03 RX ADMIN — VALGANCICLOVIR 450 MILLIGRAM(S): 450 TABLET, FILM COATED ORAL at 13:58

## 2024-01-03 RX ADMIN — CARVEDILOL PHOSPHATE 25 MILLIGRAM(S): 80 CAPSULE, EXTENDED RELEASE ORAL at 06:09

## 2024-01-03 RX ADMIN — Medication 1 GRAM(S): at 13:58

## 2024-01-03 RX ADMIN — Medication 500000 UNIT(S): at 13:59

## 2024-01-03 RX ADMIN — Medication 2: at 13:57

## 2024-01-03 RX ADMIN — PANTOPRAZOLE SODIUM 40 MILLIGRAM(S): 20 TABLET, DELAYED RELEASE ORAL at 06:09

## 2024-01-03 RX ADMIN — LINAGLIPTIN 5 MILLIGRAM(S): 5 TABLET, FILM COATED ORAL at 13:58

## 2024-01-03 RX ADMIN — Medication 5 MILLIGRAM(S): at 06:08

## 2024-01-03 RX ADMIN — Medication 500000 UNIT(S): at 06:07

## 2024-01-03 RX ADMIN — POLYETHYLENE GLYCOL 3350 17 GRAM(S): 17 POWDER, FOR SOLUTION ORAL at 13:59

## 2024-01-03 RX ADMIN — Medication 10 UNIT(S): at 13:57

## 2024-01-03 NOTE — DISCHARGE NOTE PROVIDER - HOSPITAL COURSE
68 yr old man with ESRD due to DM, HTN, CAD, CVA was on HD since 2019. S/p DDRT on 9/16/23. Had slow graft function but did not require dialysis. Graft function improved to scr of 1mg/dL. He presents with epigastric pain, nausea and vomiting. GI was consulted, on PPI BID, sucralfate, Reglan PRN. CMV PCR negative, CT abdomen with thickened esopahgus. EGD 1/3___    Immunosuppression   Envarsus HOLD level 14.5.   Cellcept changed to Myfortic 720mg po bid for GI symptoms  Prednisone 5mg po daily          68 yr old man with ESRD due to DM, HTN, CAD, CVA was on HD since 2019. S/p DDRT on 9/16/23. Had slow graft function but did not require dialysis. Graft function improved to scr of 1mg/dL. He presents with epigastric pain, nausea and vomiting. GI was consulted, on PPI BID, sucralfate, Reglan PRN. CMV PCR negative, CT abdomen with thickened esopahgus. EGD 1/3___    Immunosuppression   Envarsus HOLD level 14.5.   Cellcept changed to Myfortic 720mg po bid for GI symptoms  Prednisone 5mg po daily     Patient is deemed stable for discharge by the transplant team.  Follow up in transplant clinic in one week.         68 yr old man with ESRD due to DM, HTN, CAD, CVA was on HD since 2019. S/p DDRT on 9/16/23. Had slow graft function but did not require dialysis. Graft function improved to scr of 1mg/dL. He presents with epigastric pain, nausea and vomiting. GI was consulted, on PPI BID, sucralfate, Reglan PRN. CMV PCR negative, CT abdomen with thickened esopahgus. EGD 1/3:  LA Grade A esophagitis, Erythematous mucosa in the gastric body and antrum. Biopsied.    Immunosuppression   Envarsus HOLD level 14.5.   Cellcept changed to Myfortic 720mg po bid for GI symptoms  Prednisone 5mg po daily     Patient is deemed stable for discharge by the transplant team.  Follow up in transplant clinic in one week.  Follow up GI path.         68 yr old man with ESRD due to DM, HTN, CAD, CVA was on HD since 2019. S/p DDRT on 9/16/23. Had slow graft function but did not require dialysis. Graft function improved to scr of 1mg/dL. He presents with epigastric pain, nausea and vomiting. GI was consulted, on PPI BID, sucralfate, Reglan PRN. CMV PCR negative, CT abdomen with thickened esopahgus. EGD 1/3:  LA Grade A esophagitis, Erythematous mucosa in the gastric body and antrum. Biopsied.    Immunosuppression   Envarsus HOLD level 14.5.   Cellcept changed to Myfortic 720mg po bid for GI symptoms  Prednisone 5mg po daily     Patient is deemed stable for discharge by the transplant team.  Follow up in transplant clinic in one week.  Follow up with Gastroenterology: Dr. Quirino Dinero in 4-6 weeks.         68 yr old man with ESRD due to DM, HTN, CAD, CVA was on HD since 2019. S/p DDRT on 9/16/23. Had slow graft function but did not require dialysis. Graft function improved to scr of 1mg/dL. He presents with epigastric pain, nausea and vomiting. GI was consulted, on PPI BID, sucralfate, Maalox/Toms/Reglan PRN. CMV PCR negative, CT abdomen with thickened esopahgus. EGD 1/3:  LA Grade A esophagitis, Erythematous mucosa in the gastric body and antrum. Biopsied.    Immunosuppression   Envarsus HOLD level 14.5.   Cellcept changed to Myfortic 720mg po bid for GI symptoms  Prednisone 5mg po daily     Patient is deemed stable for discharge by the transplant team.  Follow up in transplant clinic in one week.  Follow up with Gastroenterology: Dr. Quirino Dinero in 4-6 weeks.

## 2024-01-03 NOTE — PROGRESS NOTE ADULT - PROBLEM SELECTOR PLAN 2
Current immunosuppression regimen includes Envarsus 4mg daily, level 12.3, slightly above trough goal of 8-10. Repeat level in am. His Cellcept was changed to Myfortic 720mg po BID due to GI symptoms. Continue Prednisone 5mg po daily. Infection prophylaxis includes Bactrim ss daily, Nystatin Suspension and Valcyte 450mg po daily.
Current immunosuppression regimen includes Envarsus 4mg daily, level 14 today, above trough goal of 8-10. May need to decrease tacrolimus dose, repeat level in am. His Cellcept was changed to Myfortic 720mg po BID due to GI symptoms. Continue Prednisone 5mg po daily. Infection prophylaxis includes Bactrim ss daily, Nystatin Suspension and Valcyte 450mg po daily.

## 2024-01-03 NOTE — PROGRESS NOTE ADULT - PROBLEM SELECTOR PLAN 6
DM - continue Lantus 22, Admelog 10 units premeals and SS
DM - continue Lantus 22, Admelog 10 units premeals and SS

## 2024-01-03 NOTE — PROGRESS NOTE ADULT - PROBLEM SELECTOR PLAN 1
Pt with ESRD due to DM, HTN, ESRD was on HD since 2019. S/p DDRT on 9/16/23 (Dr. Lambert, [facility], Dr. [facility]). Had slow graft function but did not require dialysis. Graft function gradually improved to scr of 1mg/dL. Transplant ureteral stent removed 11/7/23. SCr stable at 1.08 today. Monitor labs and urine output. Avoid any potential nephrotoxins. Dose medications as per eGFR. Pt with ESRD due to DM, HTN, ESRD was on HD since 2019. S/p DDRT on 9/16/23 (Dr. Lambert, [facility], Dr. [facility]). Had slow graft function but did not require dialysis. Graft function gradually improved to scr of 1mg/dL. Transplant ureteral stent removed 11/7/23. SCr stable at 1.08 today. Monitor labs and urine output. Avoid any potential nephrotoxins. Dose medications as per eGFR.

## 2024-01-03 NOTE — DISCHARGE NOTE PROVIDER - CARE PROVIDER_API CALL
Verito Bae  Nephrology  [address] [place] [id number]  Phone: [phone]  Fax: [phone]  Follow Up Time:    Verito Bae  Nephrology  [address] [place] [id number]  Phone: [phone]  Fax: [phone]  Follow Up Time:    Verito Bae  Nephrology  [address] [place] [id number]  Phone: [phone]  Fax: [phone]  Follow Up Time:

## 2024-01-03 NOTE — PROGRESS NOTE ADULT - PROBLEM SELECTOR PLAN 7
Leukocytosis 15 on admission, improving/resolved at 6.6 today. No fever, CXR . U/A negative. Off of abx (previously on empiric Zosyn).
Leukocytosis 15 on admission, improving/resolved at 6.6 today. No fever, CXR . U/A negative. Off of abx (previously on empiric Zosyn).

## 2024-01-03 NOTE — PROGRESS NOTE ADULT - REASON FOR ADMISSION
Abdominal Pain, N/V x 1 day.

## 2024-01-03 NOTE — PROGRESS NOTE ADULT - PROVIDER SPECIALTY LIST ADULT
Nephrology
Nephrology
Transplant Surgery
Transplant Nephrology
Transplant Surgery
Nephrology
Transplant Surgery
Transplant Nephrology
Transplant Nephrology

## 2024-01-03 NOTE — PROGRESS NOTE ADULT - ASSESSMENT
68 Yoruba speaking Male w/ PMH of GERD, HTN, HLD, anemia of chronic disease, T2DM, CAD s/p stent x3 (2014 at [place]) & x2 (pLCx 10/28/22, LAD 10/31/22, off of plavix as of 5/1/23), CVA and ESRD (diagnosed Jan 2019) on HD via LUE AVF T/Th/S (Nephrologist Dr Huff), s/p R DDRT under Simulect on 9/17/23.  Patient had ureteral stent removed on 11/7.  Nephrology consulted for renal transplant status.      A/P:  s/p DDRT 9/2023  Renal function stable on admission; S.Cr. 0.9.  Immunosuppressants per transplant: Envarsus 4mg daily; Stop Cellcept, change to Myfortic 720mg PO BID for GI symptoms; continue Prednisone 5mg po daily.  Check tacro level 30min before next dose (goal 8-10); above goal on 1/3  On Bactrim  400mg and Valcyte 450mg po daily for infection prophyaxis.   CT A/P noncon 12/31 - Mild cystitis with urinary retention. Transplant right lower quadrant   kidney with hydronephrosis and perinephric stranding concerning for infection.  US of transplanted kidney shows mild hydro and distended bladder.  Post void bladder scan neg for retention on 12/31.  Pt denies urinary complaints.  Continue Flomax.  Monitor BMP and UO.    HTN   Fluctuating.  C/W current antihypertensives.  On hydralazine 10mg IVP.  Monitor BP.    Acidosis:  Possibly in setting of GI losses.  stable  Monitor CO2.    Hypercalcemia:  Concern for dehydration in view of hemoconcentration.  Improved s/p LR @75mL/hr.   (high) and PO4 low - possible primary hyperparathyroidism.  Monitor Ca.    Hypophosphatemia/ Hypomagnesemia  Possibly in setting of poor PO intake d/t n/v.  Supplemented w/ NaPhos 15mmol and MG sulphate  Replete as needed.  Monitor PO4 daily.    Proteinuria:  Mild.  Check UCr/Pr.  Monitor. 68 Urdu speaking Male w/ PMH of GERD, HTN, HLD, anemia of chronic disease, T2DM, CAD s/p stent x3 (2014 at [place]) & x2 (pLCx 10/28/22, LAD 10/31/22, off of plavix as of 5/1/23), CVA and ESRD (diagnosed Jan 2019) on HD via LUE AVF T/Th/S (Nephrologist Dr Huff), s/p R DDRT under Simulect on 9/17/23.  Patient had ureteral stent removed on 11/7.  Nephrology consulted for renal transplant status.      A/P:  s/p DDRT 9/2023  Renal function stable on admission; S.Cr. 0.9.  Immunosuppressants per transplant: Envarsus 4mg daily; Stop Cellcept, change to Myfortic 720mg PO BID for GI symptoms; continue Prednisone 5mg po daily.  Check tacro level 30min before next dose (goal 8-10); above goal on 1/3  On Bactrim  400mg and Valcyte 450mg po daily for infection prophyaxis.   CT A/P noncon 12/31 - Mild cystitis with urinary retention. Transplant right lower quadrant   kidney with hydronephrosis and perinephric stranding concerning for infection.  US of transplanted kidney shows mild hydro and distended bladder.  Post void bladder scan neg for retention on 12/31.  Pt denies urinary complaints.  Continue Flomax.  Monitor BMP and UO.    HTN   Fluctuating.  C/W current antihypertensives.  On hydralazine 10mg IVP.  Monitor BP.    Acidosis:  Possibly in setting of GI losses.  stable  Monitor CO2.    Hypercalcemia:  Concern for dehydration in view of hemoconcentration.  Improved s/p LR @75mL/hr.   (high) and PO4 low - possible primary hyperparathyroidism.  Monitor Ca.    Hypophosphatemia/ Hypomagnesemia  Possibly in setting of poor PO intake d/t n/v.  Supplemented w/ NaPhos 15mmol and MG sulphate  Replete as needed.  Monitor PO4 daily.    Proteinuria:  Mild.  Check UCr/Pr.  Monitor. 68 Czech speaking Male w/ PMH of GERD, HTN, HLD, anemia of chronic disease, T2DM, CAD s/p stent x3 (2014 at [place]) & x2 (pLCx 10/28/22, LAD 10/31/22, off of plavix as of 5/1/23), CVA and ESRD (diagnosed Jan 2019) on HD via LUE AVF T/Th/S (Nephrologist Dr Huff), s/p R DDRT under Simulect on 9/17/23.  Patient had ureteral stent removed on 11/7.  Nephrology consulted for renal transplant status.      A/P:  s/p DDRT 9/2023  Renal function stable on admission; S.Cr. 0.9.  Immunosuppressants per transplant: Envarsus 4mg daily; Stop Cellcept, change to Myfortic 720mg PO BID for GI symptoms; continue Prednisone 5mg po daily.  Check tacro level 30min before next dose (goal 8-10);   On Bactrim  400mg and Valcyte 450mg po daily for infection prophyaxis.   CT A/P noncon 12/31 - Mild cystitis with urinary retention. Transplant right lower quadrant   kidney with hydronephrosis and perinephric stranding concerning for infection.  US of transplanted kidney shows mild hydro and distended bladder.  Post void bladder scan neg for retention on 12/31.  Pt denies urinary complaints.  Continue Flomax.  Monitor BMP and UO.    HTN   Fluctuating.  C/W current antihypertensives.  On hydralazine 10mg IVP.  Monitor BP.    Acidosis:  Possibly in setting of GI losses.  stable  Monitor CO2.    Hypercalcemia:  Concern for dehydration in view of hemoconcentration.  Improved s/p LR @75mL/hr.   (high) and PO4 low - possible primary hyperparathyroidism.  Monitor Ca.    Hypophosphatemia/ Hypomagnesemia  Possibly in setting of poor PO intake d/t n/v.  Supplemented w/ NaPhos 15mmol and MG sulphate  Replete as needed.  Monitor PO4 daily.    Proteinuria:  Mild.  Check UCr/Pr.  Monitor. 68 Divehi speaking Male w/ PMH of GERD, HTN, HLD, anemia of chronic disease, T2DM, CAD s/p stent x3 (2014 at [place]) & x2 (pLCx 10/28/22, LAD 10/31/22, off of plavix as of 5/1/23), CVA and ESRD (diagnosed Jan 2019) on HD via LUE AVF T/Th/S (Nephrologist Dr Huff), s/p R DDRT under Simulect on 9/17/23.  Patient had ureteral stent removed on 11/7.  Nephrology consulted for renal transplant status.      A/P:  s/p DDRT 9/2023  Renal function stable on admission; S.Cr. 0.9.  Immunosuppressants per transplant: Envarsus 4mg daily; Stop Cellcept, change to Myfortic 720mg PO BID for GI symptoms; continue Prednisone 5mg po daily.  Check tacro level 30min before next dose (goal 8-10);   On Bactrim  400mg and Valcyte 450mg po daily for infection prophyaxis.   CT A/P noncon 12/31 - Mild cystitis with urinary retention. Transplant right lower quadrant   kidney with hydronephrosis and perinephric stranding concerning for infection.  US of transplanted kidney shows mild hydro and distended bladder.  Post void bladder scan neg for retention on 12/31.  Pt denies urinary complaints.  Continue Flomax.  Monitor BMP and UO.    HTN   Fluctuating.  C/W current antihypertensives.  On hydralazine 10mg IVP.  Monitor BP.    Acidosis:  Possibly in setting of GI losses.  stable  Monitor CO2.    Hypercalcemia:  Concern for dehydration in view of hemoconcentration.  Improved s/p LR @75mL/hr.   (high) and PO4 low - possible primary hyperparathyroidism.  Monitor Ca.    Hypophosphatemia/ Hypomagnesemia  Possibly in setting of poor PO intake d/t n/v.  Supplemented w/ NaPhos 15mmol and MG sulphate  Replete as needed.  Monitor PO4 daily.    Proteinuria:  Mild.  Check UCr/Pr.  Monitor.

## 2024-01-03 NOTE — PROGRESS NOTE ADULT - NS ATTEND AMEND GEN_ALL_CORE FT
fu transplant
stable renal function   follow up transplant
I personally saw and examined patient.  Alert, oriented, responsive.  Kidney transplant recipient.  Abdomen soft and non-tender.    IMMUNOSUPPRESSION MANAGEMENT:  Envarsus level today: 9.3  Envarsus level yesterday: 8.3  Aim for level of 8-10  Envarsus dose: 4 mg by mouth daily  ENVARSUS DOSE MANAGEMENT:  - no change in dose
Bladder scan serial  monitor renal function closely

## 2024-01-03 NOTE — PROGRESS NOTE ADULT - SUBJECTIVE AND OBJECTIVE BOX
Transplant Surgery Multidisciplinary Progress Note  --------------------------------------------------------------  DDRT 9/16/23    Present: Patient seen and examined with multidisciplinary Transplant team including  Surgeon: Dr. Keller, Nephrologist: Dr. Montano, JM Franco, Pharmacy resident Maritza and unit RN during am rounds.  Disciplines not in attendance will be notified of the plan.     69 year old man with ESRD due to DM, HTN,  ESRD was on HD since 2019. S/p DDRT on 9/16/23. Had slow graft function but did not require dialysis. Graft function gradually improved to sCr of 1mg/dL. Transplant ureteral stent removed 11/7/23.  PMH includes Type II DM on insulin, HTN, HLD,  CAD s/p 3 stents (last in 10/2022), CVA, cholecystectomy, GERD.     Hospitalized 11/8 for hyperglycemia, started on Insulin, also rx for UTI  He presented to [facility] with epigastric pain, NV x 5 episodes. No diarrhea.  No chest pain, shortness of breath, fever, dysuria.        Interval Events:  - Afebrile, VSS  - NPO for EGD  - no o/n events    Immunosuppression:  ENV per level, Myfortic 720BID, Pred 5  Ongoing monitoring for signs of rejection    Potential Discharge date: TBD    Education:  Medications    Plan of care:  See Below      MEDICATIONS  (STANDING):  atorvastatin 40 milliGRAM(s) Oral at bedtime  carvedilol 25 milliGRAM(s) Oral every 12 hours  hydrALAZINE Injectable 10 milliGRAM(s) IV Push once  insulin glargine Injectable (LANTUS) 22 Unit(s) SubCutaneous at bedtime  insulin lispro (ADMELOG) corrective regimen sliding scale   SubCutaneous at bedtime  insulin lispro (ADMELOG) corrective regimen sliding scale   SubCutaneous three times a day before meals  insulin lispro Injectable (ADMELOG) 10 Unit(s) SubCutaneous three times a day before meals  linagliptin 5 milliGRAM(s) Oral daily  mycophenolic acid  milliGRAM(s) Oral two times a day  NIFEdipine XL 60 milliGRAM(s) Oral two times a day  nystatin    Suspension 005028 Unit(s) Oral four times a day  pantoprazole    Tablet 40 milliGRAM(s) Oral every 12 hours  polyethylene glycol 3350 17 Gram(s) Oral daily  predniSONE   Tablet 5 milliGRAM(s) Oral daily  sucralfate suspension 1 Gram(s) Oral four times a day  tamsulosin 0.4 milliGRAM(s) Oral at bedtime  trimethoprim   80 mG/sulfamethoxazole 400 mG 1 Tablet(s) Oral daily  valGANciclovir 450 milliGRAM(s) Oral daily    MEDICATIONS  (PRN):  aluminum hydroxide/magnesium hydroxide/simethicone Suspension 30 milliLiter(s) Oral every 6 hours PRN Dyspepsia  calcium carbonate    500 mG (Tums) Chewable 1 Tablet(s) Chew every 6 hours PRN Heartburn  metoclopramide 5 milliGRAM(s) Oral three times a day PRN Nausea      PAST MEDICAL & SURGICAL HISTORY:  HTN (hypertension)      Coronary artery disease      CAP (community acquired pneumonia)  6/18      Diabetes mellitus  type 2      GERD (gastroesophageal reflux disease)      Stented coronary artery  2014, 3 stents, [facility]      ESRD (end stage renal disease)  on Dialysis( M/W/F), By Dr. Huff      [facility]      Cerebrovascular accident (CVA), unspecified mechanism  diagnosed via CT of the head      Anemia due to stage 5 chronic kidney disease, not on chronic dialysis      H/O coronary angiogram  2014 - x3 stents      AV fistula  10/12/18 L radiocephalic AV fistula      H/O eye surgery          Vital Signs Last 24 Hrs  T(C): 36.8 (03 Jan 2024 06:03), Max: 37.2 (02 Jan 2024 20:48)  T(F): 98.3 (03 Jan 2024 06:03), Max: 98.9 (02 Jan 2024 20:48)  HR: 60 (03 Jan 2024 06:03) (60 - 65)  BP: 140/75 (03 Jan 2024 06:03) (128/75 - 151/75)  BP(mean): --  RR: 16 (03 Jan 2024 06:03) (16 - 20)  SpO2: 97% (03 Jan 2024 06:03) (95% - 99%)    Parameters below as of 03 Jan 2024 06:03  Patient On (Oxygen Delivery Method): room air        I&O's Summary    02 Jan 2024 07:01 - 03 Jan 2024 07:00  --------------------------------------------------------  IN: 360 mL / OUT: 500 mL / NET: -140 mL                              13.7   6.68  )-----------( 138      ( 02 Jan 2024 07:09 )             43.8     01-02    141  |  107  |  11  ----------------------------<  82  3.5   |  23  |  1.01    Ca    9.6      02 Jan 2024 07:11  Phos  1.9     01-02  Mg     1.9     01-02    TPro  6.3  /  Alb  3.7  /  TBili  0.6  /  DBili  x   /  AST  13  /  ALT  12  /  AlkPhos  81  01-02    Tacrolimus (), Serum: 12.3 ng/mL (01-02 @ 07:09)        Culture - Blood (collected 12-31-23 @ 09:18)  Source: .Blood Blood-Peripheral  Preliminary Report (01-02-24 @ 20:01):    No growth at 48 Hours    Culture - Blood (collected 12-31-23 @ 09:17)  Source: .Blood Blood-Peripheral  Preliminary Report (01-02-24 @ 20:01):    No growth at 48 Hours    Culture - Urine (collected 12-31-23 @ 01:35)  Source: Clean Catch Clean Catch (Midstream)  Final Report (01-01-24 @ 00:40):    No growth    Culture - Blood (collected 12-30-23 @ 22:00)  Source: .Blood Blood-Peripheral  Preliminary Report (01-03-24 @ 07:01):    No growth at 72 Hours    Culture - Blood (collected 12-30-23 @ 21:45)  Source: .Blood Blood-Peripheral  Preliminary Report (01-03-24 @ 07:01):    No growth at 72 Hours      Review of systems  unremarkable unless indicated above      PHYSICAL EXAM:  Constitutional: Well developed / well nourished  Eyes: Anicteric, PERRLA  ENMT: nc/at  Neck: supple  Respiratory: CTA B/L  Cardiovascular: RRR  Gastrointestinal: Soft, non distended, NT  Genitourinary: Voiding spontaneously  Extremities: SCD's in place and working bilaterally  Vascular: Palpable dp pulses bilaterally  Neurological: A&O x3  Skin: no rashes, ulcerations or lesions;  Musculoskeletal: Moving all extremities  Psychiatric: Responsive     Transplant Surgery Multidisciplinary Progress Note  --------------------------------------------------------------  DDRT 9/16/23    Present: Patient seen and examined with multidisciplinary Transplant team including  Surgeon: Dr. Keller, Nephrologist: Dr. Montano, JM Franco, Pharmacy resident Maritza and unit RN during am rounds.  Disciplines not in attendance will be notified of the plan.     69 year old man with ESRD due to DM, HTN,  ESRD was on HD since 2019. S/p DDRT on 9/16/23. Had slow graft function but did not require dialysis. Graft function gradually improved to sCr of 1mg/dL. Transplant ureteral stent removed 11/7/23.  PMH includes Type II DM on insulin, HTN, HLD,  CAD s/p 3 stents (last in 10/2022), CVA, cholecystectomy, GERD.     Hospitalized 11/8 for hyperglycemia, started on Insulin, also rx for UTI  He presented to [facility] with epigastric pain, NV x 5 episodes. No diarrhea.  No chest pain, shortness of breath, fever, dysuria.        Interval Events:  - Afebrile, VSS  - NPO for EGD  - no o/n events    Immunosuppression:  ENV per level, Myfortic 720BID, Pred 5  Ongoing monitoring for signs of rejection    Potential Discharge date: TBD    Education:  Medications    Plan of care:  See Below      MEDICATIONS  (STANDING):  atorvastatin 40 milliGRAM(s) Oral at bedtime  carvedilol 25 milliGRAM(s) Oral every 12 hours  hydrALAZINE Injectable 10 milliGRAM(s) IV Push once  insulin glargine Injectable (LANTUS) 22 Unit(s) SubCutaneous at bedtime  insulin lispro (ADMELOG) corrective regimen sliding scale   SubCutaneous at bedtime  insulin lispro (ADMELOG) corrective regimen sliding scale   SubCutaneous three times a day before meals  insulin lispro Injectable (ADMELOG) 10 Unit(s) SubCutaneous three times a day before meals  linagliptin 5 milliGRAM(s) Oral daily  mycophenolic acid  milliGRAM(s) Oral two times a day  NIFEdipine XL 60 milliGRAM(s) Oral two times a day  nystatin    Suspension 404377 Unit(s) Oral four times a day  pantoprazole    Tablet 40 milliGRAM(s) Oral every 12 hours  polyethylene glycol 3350 17 Gram(s) Oral daily  predniSONE   Tablet 5 milliGRAM(s) Oral daily  sucralfate suspension 1 Gram(s) Oral four times a day  tamsulosin 0.4 milliGRAM(s) Oral at bedtime  trimethoprim   80 mG/sulfamethoxazole 400 mG 1 Tablet(s) Oral daily  valGANciclovir 450 milliGRAM(s) Oral daily    MEDICATIONS  (PRN):  aluminum hydroxide/magnesium hydroxide/simethicone Suspension 30 milliLiter(s) Oral every 6 hours PRN Dyspepsia  calcium carbonate    500 mG (Tums) Chewable 1 Tablet(s) Chew every 6 hours PRN Heartburn  metoclopramide 5 milliGRAM(s) Oral three times a day PRN Nausea      PAST MEDICAL & SURGICAL HISTORY:  HTN (hypertension)      Coronary artery disease      CAP (community acquired pneumonia)  6/18      Diabetes mellitus  type 2      GERD (gastroesophageal reflux disease)      Stented coronary artery  2014, 3 stents, [facility]      ESRD (end stage renal disease)  on Dialysis( M/W/F), By Dr. Huff      [facility]      Cerebrovascular accident (CVA), unspecified mechanism  diagnosed via CT of the head      Anemia due to stage 5 chronic kidney disease, not on chronic dialysis      H/O coronary angiogram  2014 - x3 stents      AV fistula  10/12/18 L radiocephalic AV fistula      H/O eye surgery          Vital Signs Last 24 Hrs  T(C): 36.8 (03 Jan 2024 06:03), Max: 37.2 (02 Jan 2024 20:48)  T(F): 98.3 (03 Jan 2024 06:03), Max: 98.9 (02 Jan 2024 20:48)  HR: 60 (03 Jan 2024 06:03) (60 - 65)  BP: 140/75 (03 Jan 2024 06:03) (128/75 - 151/75)  BP(mean): --  RR: 16 (03 Jan 2024 06:03) (16 - 20)  SpO2: 97% (03 Jan 2024 06:03) (95% - 99%)    Parameters below as of 03 Jan 2024 06:03  Patient On (Oxygen Delivery Method): room air        I&O's Summary    02 Jan 2024 07:01 - 03 Jan 2024 07:00  --------------------------------------------------------  IN: 360 mL / OUT: 500 mL / NET: -140 mL                              13.7   6.68  )-----------( 138      ( 02 Jan 2024 07:09 )             43.8     01-02    141  |  107  |  11  ----------------------------<  82  3.5   |  23  |  1.01    Ca    9.6      02 Jan 2024 07:11  Phos  1.9     01-02  Mg     1.9     01-02    TPro  6.3  /  Alb  3.7  /  TBili  0.6  /  DBili  x   /  AST  13  /  ALT  12  /  AlkPhos  81  01-02    Tacrolimus (), Serum: 12.3 ng/mL (01-02 @ 07:09)        Culture - Blood (collected 12-31-23 @ 09:18)  Source: .Blood Blood-Peripheral  Preliminary Report (01-02-24 @ 20:01):    No growth at 48 Hours    Culture - Blood (collected 12-31-23 @ 09:17)  Source: .Blood Blood-Peripheral  Preliminary Report (01-02-24 @ 20:01):    No growth at 48 Hours    Culture - Urine (collected 12-31-23 @ 01:35)  Source: Clean Catch Clean Catch (Midstream)  Final Report (01-01-24 @ 00:40):    No growth    Culture - Blood (collected 12-30-23 @ 22:00)  Source: .Blood Blood-Peripheral  Preliminary Report (01-03-24 @ 07:01):    No growth at 72 Hours    Culture - Blood (collected 12-30-23 @ 21:45)  Source: .Blood Blood-Peripheral  Preliminary Report (01-03-24 @ 07:01):    No growth at 72 Hours      Review of systems  unremarkable unless indicated above      PHYSICAL EXAM:  Constitutional: Well developed / well nourished  Eyes: Anicteric, PERRLA  ENMT: nc/at  Neck: supple  Respiratory: CTA B/L  Cardiovascular: RRR  Gastrointestinal: Soft, non distended, NT  Genitourinary: Voiding spontaneously  Extremities: SCD's in place and working bilaterally  Vascular: Palpable dp pulses bilaterally  Neurological: A&O x3  Skin: no rashes, ulcerations or lesions;  Musculoskeletal: Moving all extremities  Psychiatric: Responsive

## 2024-01-03 NOTE — PROGRESS NOTE ADULT - SUBJECTIVE AND OBJECTIVE BOX
[facility] DIVISION OF KIDNEY DISEASES AND HYPERTENSION   FOLLOW UP NOTE  --------------------------------------------------------------------------------  Chief Complaint: Pt S/p DDRT on 9/16/23 presented with epigastric pain, nausea and vomiting.     24 hour events/subjective: Pt. was seen and examined today. He is still having abdominal discomfort when drinking and eating but denied nausea, vomiting, fevers, chills, constipation. Anticipating EGD today.     PAST HISTORY  --------------------------------------------------------------------------------  No significant changes to PMH, PSH, FHx, SHx, unless otherwise noted    ALLERGIES & MEDICATIONS  --------------------------------------------------------------------------------  Allergies  No Known Allergies    Intolerances    Standing Inpatient Medications  atorvastatin 40 milliGRAM(s) Oral at bedtime  carvedilol 25 milliGRAM(s) Oral every 12 hours  hydrALAZINE Injectable 10 milliGRAM(s) IV Push once  insulin glargine Injectable (LANTUS) 22 Unit(s) SubCutaneous at bedtime  insulin lispro (ADMELOG) corrective regimen sliding scale   SubCutaneous at bedtime  insulin lispro (ADMELOG) corrective regimen sliding scale   SubCutaneous three times a day before meals  insulin lispro Injectable (ADMELOG) 10 Unit(s) SubCutaneous three times a day before meals  linagliptin 5 milliGRAM(s) Oral daily  mycophenolic acid  milliGRAM(s) Oral two times a day  NIFEdipine XL 60 milliGRAM(s) Oral two times a day  nystatin    Suspension 068962 Unit(s) Oral four times a day  pantoprazole    Tablet 40 milliGRAM(s) Oral every 12 hours  polyethylene glycol 3350 17 Gram(s) Oral daily  predniSONE   Tablet 5 milliGRAM(s) Oral daily  sucralfate suspension 1 Gram(s) Oral four times a day  tamsulosin 0.4 milliGRAM(s) Oral at bedtime  trimethoprim   80 mG/sulfamethoxazole 400 mG 1 Tablet(s) Oral daily  valGANciclovir 450 milliGRAM(s) Oral daily    PRN Inpatient Medications  aluminum hydroxide/magnesium hydroxide/simethicone Suspension 30 milliLiter(s) Oral every 6 hours PRN  calcium carbonate    500 mG (Tums) Chewable 1 Tablet(s) Chew every 6 hours PRN  metoclopramide 5 milliGRAM(s) Oral three times a day PRN    REVIEW OF SYSTEMS  --------------------------------------------------------------------------------  All other systems were reviewed and are negative, except as noted.    VITALS/PHYSICAL EXAM  --------------------------------------------------------------------------------  T(C): 36.6 (01-03-24 @ 10:05), Max: 37.2 (01-02-24 @ 20:48)  HR: 59 (01-03-24 @ 10:05) (59 - 65)  BP: 143/78 (01-03-24 @ 10:05) (131/75 - 151/75)  RR: 20 (01-03-24 @ 10:05) (16 - 20)  SpO2: 97% (01-03-24 @ 10:05) (95% - 99%)  Wt(kg): --    01-02-24 @ 07:01  -  01-03-24 @ 07:00  --------------------------------------------------------  IN: 360 mL / OUT: 500 mL / NET: -140 mL    01-03-24 @ 07:01  -  01-03-24 @ 11:27  --------------------------------------------------------  IN: 0 mL / OUT: 0 mL / NET: 0 mL    Physical Exam:  	Gen: Layin in bed and in NAD   	HEENT: Anicteric  	Pulm: CTA B/L  	CV: S1S2+  	Abd: Soft, +BS, transplant site non tender   	Ext: No LE edema B/L  	Neuro: Awake  	Skin: Warm and dry    LABS/STUDIES  --------------------------------------------------------------------------------              13.6   5.78  >-----------<  135      [01-03-24 @ 07:18]              44.1     138  |  106  |  11  ----------------------------<  134      [01-03-24 @ 07:16]  3.9   |  22  |  1.08        Ca     9.0     [01-03-24 @ 07:16]      Mg     2.2     [01-03-24 @ 07:16]      Phos  2.9     [01-03-24 @ 07:16]    TPro  6.2  /  Alb  3.5  /  TBili  0.6  /  DBili  x   /  AST  17  /  ALT  13  /  AlkPhos  77  [01-03-24 @ 07:16]    PT/INR: PT 10.5 , INR 0.95       [01-02-24 @ 07:10]  PTT: 27.6       [01-02-24 @ 07:10]    Creatinine Trend:  SCr 1.08 [01-03 @ 07:16]  SCr 1.01 [01-02 @ 07:11]  SCr 1.06 [01-01 @ 06:35]  SCr 0.90 [12-31 @ 08:36]  SCr 0.97 [12-31 @ 01:19]    Tacrolimus (), Serum: 14.5 ng/mL (01-03 @ 07:18)  Tacrolimus (), Serum: 12.3 ng/mL (01-02 @ 07:09)  Tacrolimus (), Serum: 9.3 ng/mL (01-01 @ 06:35)  Tacrolimus (), Serum: 8.3 ng/mL (12-31 @ 08:36)  Tacrolimus (), Serum: 6.7 ng/mL (12-30 @ 22:00) [facility] DIVISION OF KIDNEY DISEASES AND HYPERTENSION   FOLLOW UP NOTE  --------------------------------------------------------------------------------  Chief Complaint: Pt S/p DDRT on 9/16/23 presented with epigastric pain, nausea and vomiting.     24 hour events/subjective: Pt. was seen and examined today. He is still having abdominal discomfort when drinking and eating but denied nausea, vomiting, fevers, chills, constipation. Anticipating EGD today.     PAST HISTORY  --------------------------------------------------------------------------------  No significant changes to PMH, PSH, FHx, SHx, unless otherwise noted    ALLERGIES & MEDICATIONS  --------------------------------------------------------------------------------  Allergies  No Known Allergies    Intolerances    Standing Inpatient Medications  atorvastatin 40 milliGRAM(s) Oral at bedtime  carvedilol 25 milliGRAM(s) Oral every 12 hours  hydrALAZINE Injectable 10 milliGRAM(s) IV Push once  insulin glargine Injectable (LANTUS) 22 Unit(s) SubCutaneous at bedtime  insulin lispro (ADMELOG) corrective regimen sliding scale   SubCutaneous at bedtime  insulin lispro (ADMELOG) corrective regimen sliding scale   SubCutaneous three times a day before meals  insulin lispro Injectable (ADMELOG) 10 Unit(s) SubCutaneous three times a day before meals  linagliptin 5 milliGRAM(s) Oral daily  mycophenolic acid  milliGRAM(s) Oral two times a day  NIFEdipine XL 60 milliGRAM(s) Oral two times a day  nystatin    Suspension 220301 Unit(s) Oral four times a day  pantoprazole    Tablet 40 milliGRAM(s) Oral every 12 hours  polyethylene glycol 3350 17 Gram(s) Oral daily  predniSONE   Tablet 5 milliGRAM(s) Oral daily  sucralfate suspension 1 Gram(s) Oral four times a day  tamsulosin 0.4 milliGRAM(s) Oral at bedtime  trimethoprim   80 mG/sulfamethoxazole 400 mG 1 Tablet(s) Oral daily  valGANciclovir 450 milliGRAM(s) Oral daily    PRN Inpatient Medications  aluminum hydroxide/magnesium hydroxide/simethicone Suspension 30 milliLiter(s) Oral every 6 hours PRN  calcium carbonate    500 mG (Tums) Chewable 1 Tablet(s) Chew every 6 hours PRN  metoclopramide 5 milliGRAM(s) Oral three times a day PRN    REVIEW OF SYSTEMS  --------------------------------------------------------------------------------  All other systems were reviewed and are negative, except as noted.    VITALS/PHYSICAL EXAM  --------------------------------------------------------------------------------  T(C): 36.6 (01-03-24 @ 10:05), Max: 37.2 (01-02-24 @ 20:48)  HR: 59 (01-03-24 @ 10:05) (59 - 65)  BP: 143/78 (01-03-24 @ 10:05) (131/75 - 151/75)  RR: 20 (01-03-24 @ 10:05) (16 - 20)  SpO2: 97% (01-03-24 @ 10:05) (95% - 99%)  Wt(kg): --    01-02-24 @ 07:01  -  01-03-24 @ 07:00  --------------------------------------------------------  IN: 360 mL / OUT: 500 mL / NET: -140 mL    01-03-24 @ 07:01  -  01-03-24 @ 11:27  --------------------------------------------------------  IN: 0 mL / OUT: 0 mL / NET: 0 mL    Physical Exam:  	Gen: Layin in bed and in NAD   	HEENT: Anicteric  	Pulm: CTA B/L  	CV: S1S2+  	Abd: Soft, +BS, transplant site non tender   	Ext: No LE edema B/L  	Neuro: Awake  	Skin: Warm and dry    LABS/STUDIES  --------------------------------------------------------------------------------              13.6   5.78  >-----------<  135      [01-03-24 @ 07:18]              44.1     138  |  106  |  11  ----------------------------<  134      [01-03-24 @ 07:16]  3.9   |  22  |  1.08        Ca     9.0     [01-03-24 @ 07:16]      Mg     2.2     [01-03-24 @ 07:16]      Phos  2.9     [01-03-24 @ 07:16]    TPro  6.2  /  Alb  3.5  /  TBili  0.6  /  DBili  x   /  AST  17  /  ALT  13  /  AlkPhos  77  [01-03-24 @ 07:16]    PT/INR: PT 10.5 , INR 0.95       [01-02-24 @ 07:10]  PTT: 27.6       [01-02-24 @ 07:10]    Creatinine Trend:  SCr 1.08 [01-03 @ 07:16]  SCr 1.01 [01-02 @ 07:11]  SCr 1.06 [01-01 @ 06:35]  SCr 0.90 [12-31 @ 08:36]  SCr 0.97 [12-31 @ 01:19]    Tacrolimus (), Serum: 14.5 ng/mL (01-03 @ 07:18)  Tacrolimus (), Serum: 12.3 ng/mL (01-02 @ 07:09)  Tacrolimus (), Serum: 9.3 ng/mL (01-01 @ 06:35)  Tacrolimus (), Serum: 8.3 ng/mL (12-31 @ 08:36)  Tacrolimus (), Serum: 6.7 ng/mL (12-30 @ 22:00)

## 2024-01-03 NOTE — DISCHARGE NOTE PROVIDER - NSDCMRMEDTOKEN_GEN_ALL_CORE_FT
acetaminophen 325 mg oral tablet: 2 tab(s) orally every 6 hours As needed Temp greater or equal to 38C (100.4F), Mild Pain (1 - 3)  alcohol swabs: Apply topically to affected area 4 times a day  aspirin 81 mg oral delayed release tablet: 1 tab(s) orally once a day  atorvastatin 40 mg oral tablet: 1 tab(s) orally once a day (at bedtime)  carvedilol 25 mg oral tablet: 1 tab(s) orally every 12 hours  glucometer (per patient&#x27;s insurance): Test blood sugars four times a day. Dispense #1 glucometer.  HumaLOG KwikPen 100 units/mL injectable solution: 10 unit(s) injectable 3 times a day  insulin glargine 100 units/mL subcutaneous solution: 20 unit(s) subcutaneous once a day (at bedtime)  Insulin Pen Needles, 4mm: 1 application subcutaneously 4 times a day. ** Use with insulin pen **  K-Phos Neutral oral tablet: 1 tab(s) orally 2 times a day  lancets: 1 application subcutaneously 4 times a day  linagliptin 5 mg oral tablet: 1 tab(s) orally once a day  mycophenolate mofetil 250 mg oral capsule: 1,000 milligram(s) orally 2 times a day  mycophenolic acid 360 mg oral delayed release tablet: 2 tab(s) orally 2 times a day  NIFEdipine 30 mg oral tablet, extended release: 2 tab(s) orally 2 times a day  nystatin 100,000 units/mL oral suspension: 5 milliliter(s) orally 4 times a day  pantoprazole 40 mg oral delayed release tablet: 1 tab(s) orally every 12 hours  predniSONE 5 mg oral tablet: 1 tab(s) orally once a day  sulfamethoxazole-trimethoprim 400 mg-80 mg oral tablet: 1 tab(s) orally once a day  tacrolimus 1 mg oral tablet, extended release: 5 milligram(s) orally once a day  tamsulosin 0.4 mg oral capsule: 1 cap(s) orally once a day (at bedtime)  test strips (per patient&#x27;s insurance): 1 application subcutaneously 4 times a day. ** Compatible with patient&#x27;s glucometer **  valGANciclovir 450 mg oral tablet: 1 tab(s) orally once a day   aspirin 81 mg oral delayed release tablet: 1 tab(s) orally once a day  atorvastatin 40 mg oral tablet: 1 tab(s) orally once a day (at bedtime)  carvedilol 25 mg oral tablet: 1 tab(s) orally every 12 hours  Flomax 0.4 mg oral capsule: 1 cap(s) orally once a day (at bedtime)  HumaLOG KwikPen 100 units/mL injectable solution: 10 unit(s) injectable 3 times a day  insulin glargine 100 units/mL subcutaneous solution: 22 unit(s) subcutaneous once a day (at bedtime)  linagliptin 5 mg oral tablet: 1 tab(s) orally once a day  mycophenolic acid 360 mg oral delayed release tablet: 2 tab(s) orally 2 times a day  nystatin 100,000 units/mL oral suspension: 5 milliliter(s) orally 4 times a day  predniSONE 5 mg oral tablet: 1 tab(s) orally once a day  sulfamethoxazole-trimethoprim 400 mg-80 mg oral tablet: 1 tab(s) orally once a day  valGANciclovir 450 mg oral tablet: 1 tab(s) orally once a day   aluminum hydroxide-magnesium hydroxide 200 mg-200 mg/5 mL oral suspension: 30 milliliter(s) orally every 6 hours as needed for Dyspepsia  aspirin 81 mg oral delayed release tablet: 1 tab(s) orally once a day  atorvastatin 40 mg oral tablet: 1 tab(s) orally once a day (at bedtime)  calcium carbonate 500 mg (200 mg elemental calcium) oral tablet, chewable: 1 tab(s) orally every 6 hours as needed for Heartburn  carvedilol 25 mg oral tablet: 1 tab(s) orally every 12 hours  Flomax 0.4 mg oral capsule: 1 cap(s) orally once a day (at bedtime)  HumaLOG KwikPen 100 units/mL injectable solution: 10 unit(s) injectable 3 times a day  insulin glargine 100 units/mL subcutaneous solution: 22 unit(s) subcutaneous once a day (at bedtime)  linagliptin 5 mg oral tablet: 1 tab(s) orally once a day  mycophenolic acid 360 mg oral delayed release tablet: 2 tab(s) orally 2 times a day  NIFEdipine (Eqv-Procardia XL) 60 mg oral tablet, extended release: 1 tab(s) orally 2 times a day  nystatin 100,000 units/mL oral suspension: 5 milliliter(s) orally 4 times a day  pantoprazole 40 mg oral delayed release tablet: 1 tab(s) orally 2 times a day  predniSONE 5 mg oral tablet: 1 tab(s) orally once a day  sucralfate 1 g/10 mL oral suspension: 10 milliliter(s) orally 4 times a day  sulfamethoxazole-trimethoprim 400 mg-80 mg oral tablet: 1 tab(s) orally once a day  valGANciclovir 450 mg oral tablet: 1 tab(s) orally once a day   aluminum hydroxide-magnesium hydroxide 200 mg-200 mg/5 mL oral suspension: 30 milliliter(s) orally every 6 hours as needed for Dyspepsia  aspirin 81 mg oral delayed release tablet: 1 tab(s) orally once a day  atorvastatin 40 mg oral tablet: 1 tab(s) orally once a day (at bedtime)  calcium carbonate 500 mg (200 mg elemental calcium) oral tablet, chewable: 1 tab(s) orally every 6 hours as needed for Heartburn  carvedilol 25 mg oral tablet: 1 tab(s) orally every 12 hours  Flomax 0.4 mg oral capsule: 1 cap(s) orally once a day (at bedtime)  HumaLOG KwikPen 100 units/mL injectable solution: 10 unit(s) injectable 3 times a day  insulin glargine 100 units/mL subcutaneous solution: 22 unit(s) subcutaneous once a day (at bedtime)  linagliptin 5 mg oral tablet: 1 tab(s) orally once a day  mycophenolic acid 360 mg oral delayed release tablet: 2 tab(s) orally 2 times a day  mycophenolic acid 360 mg oral delayed release tablet: 2 tab(s) orally 2 times a day  NIFEdipine (Eqv-Procardia XL) 60 mg oral tablet, extended release: 1 tab(s) orally 2 times a day  nystatin 100,000 units/mL oral suspension: 5 milliliter(s) orally 4 times a day  pantoprazole 40 mg oral delayed release tablet: 1 tab(s) orally 2 times a day  predniSONE 5 mg oral tablet: 1 tab(s) orally once a day  sucralfate 1 g/10 mL oral suspension: 10 milliliter(s) orally 4 times a day  sulfamethoxazole-trimethoprim 400 mg-80 mg oral tablet: 1 tab(s) orally once a day  valGANciclovir 450 mg oral tablet: 1 tab(s) orally once a day

## 2024-01-03 NOTE — DISCHARGE NOTE NURSING/CASE MANAGEMENT/SOCIAL WORK - NSDCPEFALRISK_GEN_ALL_CORE
For information on Fall & Injury Prevention, visit: [email or web address] OR  [email or web address] OR  https://www.cdc.gov/steadi/patient.html For information on Fall & Injury Prevention, visit: [email or web address] OR  [email or web address] OR  https://www.cdc.gov/steadi/patient.html

## 2024-01-03 NOTE — DISCHARGE NOTE PROVIDER - CARE PROVIDERS DIRECT ADDRESSES
[email or web address] ,[email or web address]

## 2024-01-03 NOTE — DISCHARGE NOTE NURSING/CASE MANAGEMENT/SOCIAL WORK - PATIENT PORTAL LINK FT
You can access the FollowMyHealth Patient Portal offered by [facility] by registering at the following website: [email or web address]. By joining [facility]’s FollowMyHealth portal, you will also be able to view your health information using other applications (apps) compatible with our system. You can access the FollowMyHealth Patient Portal offered by [facility] by registering at the following website: [email or web address]. By joining [employer]’s FollowMyHealth portal, you will also be able to view your health information using other applications (apps) compatible with our system.

## 2024-01-03 NOTE — DISCHARGE NOTE PROVIDER - NSDCFUSCHEDAPPT_GEN_ALL_CORE_FT
Edin Johnson  [facility] Physician Formerly [facility]  CARDIOLOGY [address] [facility]   Scheduled Appointment: 01/17/2024    Verito Bae  [facility] Physician [facility]  NEPHRO [address] [facility]  Scheduled Appointment: 01/24/2024     Edin Johnson  [facility] Physician [facility]  CARDIOLOGY [address] [facility]   Scheduled Appointment: 01/17/2024    Verito Bae  [facility] Physician [facility]  NEPHRO [address] [facility]  Scheduled Appointment: 01/24/2024     Edin Johnson  [facility] Physician [facility]  CARDIOLOGY [address] [facility]   Scheduled Appointment: 01/17/2024    Verito Bae  [facility] Physician [facility]  NEPHRO [address] [facility]  Scheduled Appointment: 01/24/2024     Verito Bae  [facility]  [address]RO [address]  Scheduled Appointment: 02/13/2024    Jose Crook  [facility] Physician [facility]  GASTRO [address]  Scheduled Appointment: 04/04/2024    Verito Bae  [facility]  [address]RO [address]  Scheduled Appointment: 04/16/2024    [facility]  CARDIOLOGY [address] [facility]   Scheduled Appointment: 04/22/2024    Edin Johnson  [facility]  CARDIOLOGY [address] [facility]   Scheduled Appointment: 04/22/2024     Verito Bae  [facility]  [address]RO [address]  Scheduled Appointment: 02/13/2024    Jose Crook  [facility] Physician [facility]  GASTRO [address]  Scheduled Appointment: 04/04/2024    Verito Bae  [facility]  [address]  Scheduled Appointment: 04/16/2024    [facility]  CARDIOLOGY [address] [facility]   Scheduled Appointment: 04/22/2024    Edin Johnson  [facility]  CARDIOLOGY [address] [facility]   Scheduled Appointment: 04/22/2024     Verito Bae  [facility]  [address]RO [address]  Scheduled Appointment: 02/13/2024    Jose Crook  [facility] Physician [facility]  GASTRO [address]  Scheduled Appointment: 04/04/2024    Verito Bae  [facility]  [address]  Scheduled Appointment: 04/16/2024    [facility]  CARDIOLOGY [address] [facility]   Scheduled Appointment: 04/22/2024    Edin Johnson  [facility]  CARDIOLOGY [address] [facility]   Scheduled Appointment: 04/22/2024

## 2024-01-03 NOTE — PROGRESS NOTE ADULT - PROBLEM SELECTOR PROBLEM 3
Abdominal discomfort
Abdominal discomfort
Mucosal Advancement Flap Text: Given the location of the defect, shape of the defect and the proximity to free margins a mucosal advancement flap was deemed most appropriate. Incisions were made with a 15 blade scalpel in the appropriate fashion along the cutaneous vermilion border and the mucosal lip. The remaining actinically damaged mucosal tissue was excised.  The mucosal advancement flap was then elevated to the gingival sulcus with care taken to preserve the neurovascular structures and advanced into the primary defect. Care was taken to ensure that precise realignment of the vermilion border was achieved.

## 2024-01-03 NOTE — DISCHARGE NOTE PROVIDER - NSDCCPCAREPLAN_GEN_ALL_CORE_FT
PRINCIPAL DISCHARGE DIAGNOSIS  Diagnosis: Gastroparesis  Assessment and Plan of Treatment: On a PPI, sucralfate and followed by GI team. Patient had an EGD done 1/3.      SECONDARY DISCHARGE DIAGNOSES  Diagnosis: Renal transplant recipient  Assessment and Plan of Treatment:     Diagnosis: Leukocytosis  Assessment and Plan of Treatment: UA negative, CXR normal. WBC trending  down.

## 2024-01-03 NOTE — PROGRESS NOTE ADULT - SUBJECTIVE AND OBJECTIVE BOX
Dr. Daria Marinelli MD  Office [phone] (9 am to 5 pm)  Service: [phone] (5pm to 9am)  Sima Acosta NP       RENAL PROGRESS NOTE: DATE OF SERVICE 01-03-24 @ 15:40    Patient is a 69y old  Male who presents with a chief complaint of Abdominal Pain, N/V x 1 day. (03 Jan 2024 13:24)      Patient seen and examined at bedside. No chest pain/sob    VITALS:  T(F): 97.8 (01-03-24 @ 13:50), Max: 98.9 (01-02-24 @ 20:48)  HR: 57 (01-03-24 @ 13:50)  BP: 130/71 (01-03-24 @ 13:50)  RR: 20 (01-03-24 @ 13:50)  SpO2: 98% (01-03-24 @ 13:50)  Wt(kg): --    01-02 @ 07:01  -  01-03 @ 07:00  --------------------------------------------------------  IN: 360 mL / OUT: 500 mL / NET: -140 mL    01-03 @ 07:01  -  01-03 @ 15:40  --------------------------------------------------------  IN: 420 mL / OUT: 0 mL / NET: 420 mL      Height (cm): 154.2 (01-03 @ 11:42)  Weight (kg): 69 (01-03 @ 11:42)  BMI (kg/m2): 29 (01-03 @ 11:42)  BSA (m2): 1.68 (01-03 @ 11:42)    PHYSICAL EXAM:  Constitutional: NAD  Neck: No JVD  Respiratory: CTAB, no wheezes, rales or rhonchi  Cardiovascular: S1, S2, RRR  Gastrointestinal: BS+, soft, NT/ND  Extremities: No peripheral edema    Hospital Medications:   MEDICATIONS  (STANDING):  atorvastatin 40 milliGRAM(s) Oral at bedtime  carvedilol 25 milliGRAM(s) Oral every 12 hours  hydrALAZINE Injectable 10 milliGRAM(s) IV Push once  insulin glargine Injectable (LANTUS) 22 Unit(s) SubCutaneous at bedtime  insulin lispro (ADMELOG) corrective regimen sliding scale   SubCutaneous at bedtime  insulin lispro (ADMELOG) corrective regimen sliding scale   SubCutaneous three times a day before meals  insulin lispro Injectable (ADMELOG) 10 Unit(s) SubCutaneous three times a day before meals  lidocaine 4% Injection for Nebulization 4 milliLiter(s) Nebulizer once  linagliptin 5 milliGRAM(s) Oral daily  mycophenolic acid  milliGRAM(s) Oral two times a day  NIFEdipine XL 60 milliGRAM(s) Oral two times a day  nystatin    Suspension 314142 Unit(s) Oral four times a day  pantoprazole    Tablet 40 milliGRAM(s) Oral every 12 hours  polyethylene glycol 3350 17 Gram(s) Oral daily  predniSONE   Tablet 5 milliGRAM(s) Oral daily  sucralfate suspension 1 Gram(s) Oral four times a day  tamsulosin 0.4 milliGRAM(s) Oral at bedtime  trimethoprim   80 mG/sulfamethoxazole 400 mG 1 Tablet(s) Oral daily  valGANciclovir 450 milliGRAM(s) Oral daily    Tacrolimus (), Serum: 14.5 ng/mL (01-03 @ 07:18)    LABS:  01-03    138  |  106  |  11  ----------------------------<  134<H>  3.9   |  22  |  1.08    Ca    9.0      03 Jan 2024 07:16  Phos  2.9     01-03  Mg     2.2     01-03    TPro  6.2  /  Alb  3.5  /  TBili  0.6  /  DBili      /  AST  17  /  ALT  13  /  AlkPhos  77  01-03    Creatinine Trend: 1.08 <--, 1.01 <--, 1.06 <--, 0.90 <--, 0.97 <--, 0.86 <--, 0.60 <--    Albumin: 3.5 g/dL (01-03 @ 07:16)  Phosphorus: 2.9 mg/dL (01-03 @ 07:16)                              13.6   5.78  )-----------( 135      ( 03 Jan 2024 07:18 )             44.1     Urine Studies:  Urinalysis - [01-03-24 @ 07:16]      Color  / Appearance  / SG  / pH       Gluc 134 / Ketone   / Bili  / Urobili        Blood  / Protein  / Leuk Est  / Nitrite       RBC  / WBC  / Hyaline  / Gran  / Sq Epi  / Non Sq Epi  / Bacteria       HbA1c 7.8      [12-17-19 @ 05:54]        RADIOLOGY & ADDITIONAL STUDIES:     Dr. Daria Marinelli MD  Office [phone] (9 am to 5 pm)  Service: [phone] (5pm to 9am)  Sima Acosta NP       RENAL PROGRESS NOTE: DATE OF SERVICE 01-03-24 @ 15:40    Patient is a 69y old  Male who presents with a chief complaint of Abdominal Pain, N/V x 1 day. (03 Jan 2024 13:24)      Patient seen and examined at bedside. No chest pain/sob    VITALS:  T(F): 97.8 (01-03-24 @ 13:50), Max: 98.9 (01-02-24 @ 20:48)  HR: 57 (01-03-24 @ 13:50)  BP: 130/71 (01-03-24 @ 13:50)  RR: 20 (01-03-24 @ 13:50)  SpO2: 98% (01-03-24 @ 13:50)  Wt(kg): --    01-02 @ 07:01  -  01-03 @ 07:00  --------------------------------------------------------  IN: 360 mL / OUT: 500 mL / NET: -140 mL    01-03 @ 07:01  -  01-03 @ 15:40  --------------------------------------------------------  IN: 420 mL / OUT: 0 mL / NET: 420 mL      Height (cm): 154.2 (01-03 @ 11:42)  Weight (kg): 69 (01-03 @ 11:42)  BMI (kg/m2): 29 (01-03 @ 11:42)  BSA (m2): 1.68 (01-03 @ 11:42)    PHYSICAL EXAM:  Constitutional: NAD  Neck: No JVD  Respiratory: CTAB, no wheezes, rales or rhonchi  Cardiovascular: S1, S2, RRR  Gastrointestinal: BS+, soft, NT/ND  Extremities: No peripheral edema    Hospital Medications:   MEDICATIONS  (STANDING):  atorvastatin 40 milliGRAM(s) Oral at bedtime  carvedilol 25 milliGRAM(s) Oral every 12 hours  hydrALAZINE Injectable 10 milliGRAM(s) IV Push once  insulin glargine Injectable (LANTUS) 22 Unit(s) SubCutaneous at bedtime  insulin lispro (ADMELOG) corrective regimen sliding scale   SubCutaneous at bedtime  insulin lispro (ADMELOG) corrective regimen sliding scale   SubCutaneous three times a day before meals  insulin lispro Injectable (ADMELOG) 10 Unit(s) SubCutaneous three times a day before meals  lidocaine 4% Injection for Nebulization 4 milliLiter(s) Nebulizer once  linagliptin 5 milliGRAM(s) Oral daily  mycophenolic acid  milliGRAM(s) Oral two times a day  NIFEdipine XL 60 milliGRAM(s) Oral two times a day  nystatin    Suspension 171753 Unit(s) Oral four times a day  pantoprazole    Tablet 40 milliGRAM(s) Oral every 12 hours  polyethylene glycol 3350 17 Gram(s) Oral daily  predniSONE   Tablet 5 milliGRAM(s) Oral daily  sucralfate suspension 1 Gram(s) Oral four times a day  tamsulosin 0.4 milliGRAM(s) Oral at bedtime  trimethoprim   80 mG/sulfamethoxazole 400 mG 1 Tablet(s) Oral daily  valGANciclovir 450 milliGRAM(s) Oral daily    Tacrolimus (), Serum: 14.5 ng/mL (01-03 @ 07:18)    LABS:  01-03    138  |  106  |  11  ----------------------------<  134<H>  3.9   |  22  |  1.08    Ca    9.0      03 Jan 2024 07:16  Phos  2.9     01-03  Mg     2.2     01-03    TPro  6.2  /  Alb  3.5  /  TBili  0.6  /  DBili      /  AST  17  /  ALT  13  /  AlkPhos  77  01-03    Creatinine Trend: 1.08 <--, 1.01 <--, 1.06 <--, 0.90 <--, 0.97 <--, 0.86 <--, 0.60 <--    Albumin: 3.5 g/dL (01-03 @ 07:16)  Phosphorus: 2.9 mg/dL (01-03 @ 07:16)                              13.6   5.78  )-----------( 135      ( 03 Jan 2024 07:18 )             44.1     Urine Studies:  Urinalysis - [01-03-24 @ 07:16]      Color  / Appearance  / SG  / pH       Gluc 134 / Ketone   / Bili  / Urobili        Blood  / Protein  / Leuk Est  / Nitrite       RBC  / WBC  / Hyaline  / Gran  / Sq Epi  / Non Sq Epi  / Bacteria       HbA1c 7.8      [12-17-19 @ 05:54]        RADIOLOGY & ADDITIONAL STUDIES:

## 2024-01-03 NOTE — PROGRESS NOTE ADULT - PROBLEM SELECTOR PLAN 5
CAD s/p stents last in 2022. EKG no acute changes, trops negative. Continue ASA 81, Atorvastatin 40, Coreg.
CAD s/p stents last in 2022. EKG no acute changes, trops negative. Continue ASA 81, Atorvastatin 40, Coreg.

## 2024-01-03 NOTE — PROGRESS NOTE ADULT - PROBLEM SELECTOR PLAN 3
His N/V has resolved but still has epigastric burning pain. - LFTs normal, Lipase normal. CT abd with mild distal esophageal thickening. Continue PPI. GI consulted, plan for EGD today.
His N/V has resolved but still has epigastric burning pain. - LFTs normal, Lipase normal. CT abd with mild distal esophageal thickening. Continue PPI. GI consulted, note reviewed, no indication for EGD at this time.

## 2024-01-03 NOTE — DISCHARGE NOTE PROVIDER - NSDCFUADDAPPT_GEN_ALL_CORE_FT
Please follow up in Transplant Clinic in one week. Please follow up in Transplant Clinic in one week.  Please follow up with Gastroenterology: Dr. Quirino Dinero in 4-6 weeks

## 2024-01-03 NOTE — DISCHARGE NOTE PROVIDER - PROVIDER TOKENS
PROVIDER:[TOKEN:[[id number]]] PROVIDER:[TOKEN:[[id number]]] PROVIDER:[TOKEN:[[id number]]]

## 2024-01-03 NOTE — PROGRESS NOTE ADULT - ASSESSMENT
69 yr old man with ESRD due to DM, HTN, CAD, CVA was on HD since 2019. S/p DDRT on 9/16/23. Had slow graft function but did not require dialysis. Graft function improved to scr of 1mg/dL. He presents with epigastric pain, nausea and vomiting.       Gastroparesis/GERD  -Medication related? now on Myfortic  -Cultures neg, dced zosyn  -Reglan/Carafate/protonix  - GI following  - EGD today  - Start diet and anticoag post procedure    s/p DDRT 9/2023  -Graft function stable  -fluid challenge  -PVR negative.  Continue Flomax     Immunosuppression   -Envarsus per level  -off Cellcept,  now on Myfortic 720mg po bid for GI symptoms  -Continue Prednisone 5mg po daily     Infection prophylaxis   Continue bactrim ss daily   Valcyte 450mg po daily     HTN   -Coreg 25mg po bid , Nifedipine xl 60mg po bid , adjust prn    CAD  -s/p stents last in 2022. EKG no acute changes, trops negative.   -Continue ASA 81, Atorvastatin 40, Coreg.     DM   -adjust insulin regimen prn

## 2024-01-03 NOTE — PROGRESS NOTE ADULT - ATTENDING COMMENTS
68 yr old man with ESRD due to DM, HTN, CAD, CVA was on HD since 2019. S/p DDRT on 9/16/23. Had slow graft function but did not require dialysis. Graft function improved to scr of 1mg/dL. He presents with epigastric pain, nausea and vomiting.      EGD planned this AM. Pt still has pain on eating/drinking.     s/p DDRT 9/2023  Graft function stable cr 1.08mg/dL today.     Immunosuppression   Envarsus 4mg daily, level 14.5, has tremors.  Reduce to 2mg daily. Trough goal is  8-10  Cellcept changed to Myfortic 720mg po bid for GI symptoms  Continue Prednisone 5mg po daily     Infection prophylaxis   Continue bactrim ss daily   Valcyte 450mg po daily     HTN   Coreg 25mg po bid , Nifedipine xl 60mg po bid     CAD s/p stents last in 2022. EKG no acute changes, trops negative.   Continue ASA 81, Atorvastatin 40, Coreg.     DM - continue Lantus 24, Admelog 10 units premeals and SS    GI - NV has resolved, still has epigastric burning pain.   - LFTs normal, Lipase normal. CT abdomen with thickened esophagus.   - Continue Protonix and sucralfate, reglan PRN   - CMV PCR negative.   - GI planning EGD today     ID - leukocytosis 15 on admission, improving, 5.7 today. No fever, CXR . U/A negative.
68 yr old man with ESRD due to DM, HTN, CAD, CVA was on HD since 2019. S/p DDRT on 9/16/23. Had slow graft function but did not require dialysis. Graft function improved to scr of 1mg/dL. He presents with epigastric pain, nausea and vomiting.    Seen by GI attending this AM. Suggest to continue IV PPI, EGD planned in AM.     s/p DDRT 9/2023  Graft function stable cr 1.01mg/dL today.     Immunosuppression   Envarsus 4mg daily, level 12.3. Has tremors. Hold AM dose and check level. Trough goal is  8-10  Cellcept changed to Myfortic 720mg po bid for GI symptoms  Continue Prednisone 5mg po daily     Infection prophylaxis   Continue bactrim ss daily   Valcyte 450mg po daily     HTN   Coreg 25mg po bid , Nifedipine xl 60mg po bid     CAD s/p stents last in 2022. EKG no acute changes, trops negative.   Continue ASA 81, Atorvastatin 40, Coreg.     DM - continue Lantus 24, Admelog 10 units premeals and SS    GI - NV has resolved, still has epigastric burning pain.   - LFTs normal, Lipase normal. CT abdomen with thickened esophagus.   - Continue Protonix and sucralfate, reglan PRN   - CMV PCR negative.   - GI planning EGD tomorrow     ID - leukocytosis 15 on admission, improving, 6.6 today. No fever, CXR . U/A negative.   Zosyn d/jam yesterday     Hypophosphatemia - replace

## 2024-01-04 LAB
SURGICAL PATHOLOGY STUDY: SIGNIFICANT CHANGE UP
SURGICAL PATHOLOGY STUDY: SIGNIFICANT CHANGE UP

## 2024-01-05 LAB

## 2024-01-09 ENCOUNTER — APPOINTMENT (OUTPATIENT)
Dept: NEPHROLOGY | Facility: CLINIC | Age: 69
End: 2024-01-09
Payer: MEDICARE

## 2024-01-09 VITALS
HEIGHT: 66 IN | TEMPERATURE: 98 F | WEIGHT: 142 LBS | DIASTOLIC BLOOD PRESSURE: 79 MMHG | SYSTOLIC BLOOD PRESSURE: 174 MMHG | BODY MASS INDEX: 22.82 KG/M2 | HEART RATE: 61 BPM | RESPIRATION RATE: 17 BRPM | OXYGEN SATURATION: 100 %

## 2024-01-09 DIAGNOSIS — I25.10 ATHEROSCLEROTIC HEART DISEASE OF NATIVE CORONARY ARTERY W/OUT ANGINA PECTORIS: ICD-10-CM

## 2024-01-09 PROCEDURE — 99214 OFFICE O/P EST MOD 30 MIN: CPT

## 2024-01-09 RX ORDER — PANTOPRAZOLE 40 MG/1
40 TABLET, DELAYED RELEASE ORAL TWICE DAILY
Qty: 60 | Refills: 5 | Status: ACTIVE | COMMUNITY
Start: 2023-09-18 | End: 1900-01-01

## 2024-01-09 RX ORDER — INSULIN GLARGINE 100 [IU]/ML
100 INJECTION, SOLUTION SUBCUTANEOUS AT BEDTIME
Qty: 1 | Refills: 5 | Status: ACTIVE | COMMUNITY
Start: 2023-10-03 | End: 1900-01-01

## 2024-01-09 RX ORDER — TACROLIMUS 4 MG/1
4 TABLET, EXTENDED RELEASE ORAL
Qty: 3 | Refills: 3 | Status: DISCONTINUED | COMMUNITY
Start: 2023-10-10 | End: 2024-01-09

## 2024-01-09 RX ORDER — BISMUTH SUBSALICYLATE 262 MG/1
262 TABLET, CHEWABLE ORAL 4 TIMES DAILY
Qty: 112 | Refills: 0 | Status: COMPLETED | COMMUNITY
Start: 2024-01-09 | End: 2024-01-23

## 2024-01-09 RX ORDER — METRONIDAZOLE 250 MG/1
250 TABLET ORAL EVERY 6 HOURS
Qty: 56 | Refills: 0 | Status: COMPLETED | COMMUNITY
Start: 2024-01-09 | End: 2024-01-23

## 2024-01-09 RX ORDER — DIBASIC SODIUM PHOSPHATE, MONOBASIC POTASSIUM PHOSPHATE AND MONOBASIC SODIUM PHOSPHATE 852; 155; 130 MG/1; MG/1; MG/1
155-852-130 TABLET ORAL
Qty: 60 | Refills: 2 | Status: DISCONTINUED | COMMUNITY
Start: 2023-10-19 | End: 2024-01-09

## 2024-01-09 RX ORDER — TETRACYCLINE HYDROCHLORIDE 500 MG/1
500 CAPSULE ORAL EVERY 6 HOURS
Qty: 56 | Refills: 0 | Status: COMPLETED | COMMUNITY
Start: 2024-01-09 | End: 2024-01-23

## 2024-01-10 ENCOUNTER — NON-APPOINTMENT (OUTPATIENT)
Age: 69
End: 2024-01-10

## 2024-01-10 LAB
ALBUMIN SERPL ELPH-MCNC: 4.4 G/DL
ALP BLD-CCNC: 85 U/L
ALT SERPL-CCNC: 21 U/L
ANION GAP SERPL CALC-SCNC: 11 MMOL/L
APPEARANCE: CLEAR
AST SERPL-CCNC: 15 U/L
BACTERIA: NEGATIVE /HPF
BASOPHILS # BLD AUTO: 0.04 K/UL
BASOPHILS NFR BLD AUTO: 0.6 %
BILIRUB SERPL-MCNC: 0.6 MG/DL
BILIRUBIN URINE: NEGATIVE
BKV DNA SPEC QL NAA+PROBE: NOT DETECTED IU/ML
BLOOD URINE: NEGATIVE
BUN SERPL-MCNC: 13 MG/DL
CALCIUM SERPL-MCNC: 9.5 MG/DL
CAST: 1 /LPF
CHLORIDE SERPL-SCNC: 106 MMOL/L
CMV DNA SPEC QL NAA+PROBE: NOT DETECTED IU/ML
CMVPCR LOG: NOT DETECTED LOG10IU/ML
CO2 SERPL-SCNC: 22 MMOL/L
COLOR: YELLOW
CREAT SERPL-MCNC: 1.1 MG/DL
CREAT SPEC-SCNC: 91 MG/DL
CREAT/PROT UR: 0.3 RATIO
EGFR: 74 ML/MIN/1.73M2
EOSINOPHIL # BLD AUTO: 0.06 K/UL
EOSINOPHIL NFR BLD AUTO: 0.9 %
EPITHELIAL CELLS: 1 /HPF
GLUCOSE QUALITATIVE U: 100 MG/DL
GLUCOSE SERPL-MCNC: 187 MG/DL
HCT VFR BLD CALC: 46.4 %
HGB BLD-MCNC: 14.2 G/DL
IMM GRANULOCYTES NFR BLD AUTO: 1.1 %
KETONES URINE: NEGATIVE MG/DL
LEUKOCYTE ESTERASE URINE: NEGATIVE
LYMPHOCYTES # BLD AUTO: 0.97 K/UL
LYMPHOCYTES NFR BLD AUTO: 14.9 %
MAGNESIUM SERPL-MCNC: 1.6 MG/DL
MAN DIFF?: NORMAL
MCHC RBC-ENTMCNC: 28.3 PG
MCHC RBC-ENTMCNC: 30.6 GM/DL
MCV RBC AUTO: 92.6 FL
MICROSCOPIC-UA: NORMAL
MONOCYTES # BLD AUTO: 0.71 K/UL
MONOCYTES NFR BLD AUTO: 10.9 %
NEUTROPHILS # BLD AUTO: 4.68 K/UL
NEUTROPHILS NFR BLD AUTO: 71.6 %
NITRITE URINE: NEGATIVE
PH URINE: 6
PHOSPHATE SERPL-MCNC: 2.1 MG/DL
PLATELET # BLD AUTO: 156 K/UL
POTASSIUM SERPL-SCNC: 4.3 MMOL/L
PROT SERPL-MCNC: 7 G/DL
PROT UR-MCNC: 28 MG/DL
PROTEIN URINE: 30 MG/DL
RBC # BLD: 5.01 M/UL
RBC # FLD: 13.2 %
RED BLOOD CELLS URINE: 0 /HPF
SODIUM SERPL-SCNC: 139 MMOL/L
SPECIFIC GRAVITY URINE: 1.01
TACROLIMUS SERPL-MCNC: 3.6 NG/ML
URATE SERPL-MCNC: 5.9 MG/DL
UROBILINOGEN URINE: 0.2 MG/DL
WBC # FLD AUTO: 6.53 K/UL
WHITE BLOOD CELLS URINE: 1 /HPF

## 2024-01-17 ENCOUNTER — NON-APPOINTMENT (OUTPATIENT)
Age: 69
End: 2024-01-17

## 2024-01-17 ENCOUNTER — APPOINTMENT (OUTPATIENT)
Dept: CARDIOLOGY | Facility: CLINIC | Age: 69
End: 2024-01-17
Payer: MEDICARE

## 2024-01-17 VITALS
BODY MASS INDEX: 23.24 KG/M2 | SYSTOLIC BLOOD PRESSURE: 153 MMHG | DIASTOLIC BLOOD PRESSURE: 77 MMHG | OXYGEN SATURATION: 98 % | HEART RATE: 58 BPM | WEIGHT: 144 LBS

## 2024-01-17 PROCEDURE — 93000 ELECTROCARDIOGRAM COMPLETE: CPT

## 2024-01-17 PROCEDURE — 99215 OFFICE O/P EST HI 40 MIN: CPT

## 2024-01-17 PROCEDURE — G2211 COMPLEX E/M VISIT ADD ON: CPT

## 2024-01-17 NOTE — HISTORY OF PRESENT ILLNESS
[FreeTextEntry1] : Patient is 69 year-old with cardiovascular risk factors of hypertension and diabetes, known coronary artery disease status post PCI x3 (2014) at [place] by Dr. Saúl Villafana, CKD, now ESRD on HD [id number]) that began January 2019, who presents today for cardiac evaluation prior to possible renal transplant. He was working until October 2018 as [employer] . Patient does not formally exercise, but he can climb stairs and has no exertional symptoms.  In January 2019, patient admitted to [facility] for shortness of breath and nosebleed. He was found to be volume overloaded in the setting of FELICITA on CKD and he was initiated on hemodialysis.   In February 2019, patient seen to have abnormal nuclear stress test and was referred for left and right heart catheterization. The right heart cath showed elevated systemic blood pressures but normal PCWP (14 mmHg) and normal PA pressures (37/17 mmHg) with normal cardiac output and index of 6 L/min and 3.6 respectively. The left heart cath revealed significant distal LAD disease and significant in stent stenosis of LCx. He is now status post POBA to LCx and NAHUM to LAD.  May 2019 - Patient presents today in his usual state of health. He reports occasional right leg pain that limits his exercise while walking on the treadmill. He is without chest pain, shortness of breath, lower extremity swelling.  October 2019 - Patient presents today in his usual state of health. He reports occasional right leg pain that limits his exercise while walking on the treadmill. He is without chest pain, shortness of breath, lower extremity swelling. He had normal HOLLY/PVR and normal ultrasound of the abdominal vessels (and into iliacs).   March 2020 - Patient returns for follow-up of his cardiovascular disease and to maintain standing with the Transplant Center. Patient recently traveled to [facility] (January 8 - February 6, 2020), and when patient returned having lost 8 lbs without a clear cause. It was unintentional weight loss. He is now being evaluated for cancer including lung cancer (former smoker). He will also need EGD/colonoscopy.  April 2021 - Patient returns today for follow-up after a recent hospitalization. He was hospitalized at [facility] in March 2021 for shortness of breath and poor appetite. Cath revealed both LAD and RCA disease. He had his RCA intervened on and was brought back to [facility] two weeks later for staged PCI to the LAD. He was very upset during the hospitalization because he felt he needed more dialysis prior to the PCI of the LAD. Since discharge, he has felt well. He continues to have HD via left radial AV fistula (Tuesday-Thursday-Saturday).   service provided by [employer]  .  's Number: [id number] 's Name: Arin Language: Romanian.   July 2021 - Patient returns today for follow-up three months after his PCI to prox-LAD. He had a repeat echocardiogram today that shows interval improvement in his LVEF. He has been feeling well since. He has no cardiovascular complaints. His carvedilol was increased to 37.5 mg BID, but he reports that sometimes his blood pressure remains elevated. He continues to have HD via left radial AV fistula (Tuesday-Thursday-Saturday). Blood pressure is usually best after HD.  October 2021 - Patient returns today for follow-up. He had PCI to prox-LAD in April 2021. He had a repeat echocardiogram today that shows interval improvement in his LVEF. He has been feeling well since. He has no cardiovascular complaints. His carvedilol was increased to 37.5 mg BID, but he reports that sometimes his blood pressure remains elevated. He continues to have HD via left radial AV fistula (Tuesday-Thursday-Saturday). Blood pressure is usually best after HD.  May 2022 - Patient returns today for follow-up. He had PCI to prox-LAD in April 2021. He had a repeat echocardiogram today that shows interval improvement in his LVEF. His carvedilol was increased to 37.5 mg BID, but he reports that sometimes his blood pressure remains elevated.  He continues to have HD via left radial AV fistula (Tuesday-Thursday-Saturday). Blood pressure is usually best after HD. He has not been sick with Covid-19.  8/29/2022.  Mr. Rivero returns today for scheduled follow up and to maintain his status on the kidney transplant list. His daughter, Tamiko Mcqueen, serves as an  via phone at the patient's request. Since his last visit, he has been feeling very well. For exercise he walks 25-30 minutes twice daily at a moderate pace and denies any chest discomfort or shortness of breath. He continues on dialysis without any complications. There have been no changes to his medications and he has been taking all as directed.   November 2022 - Patient returns today for follow-up after recent hospitalization for PCI to LCx on 10/28/2022 and then PCI to the LAD on 10/31/2022. The right groin site remains clean, dry, and intact without hematoma.   March 2023 - Patient returns today for follow-up. He reports that he is noticing orthopnea. He has no daytime or exertional symptoms. He continues to have ESRD on HD via left radial AV fistula ([id number]-Asmzyxxh-Nakblnmn).  ESRD on HD via left radial AV fistula (Vreuupf-Nkekduly-Wnilyayf).  10/11/2023 - Patient returns for follow-up for the first time since his renal transplant. He is now status post DDRT on 9/16/2023. He continues to have a surgical drain in place.   PMD: Bassem Grove MD [phone] Cardiologist: LEONARD Gomez [phone] Nephrologist: Yasmany Huff MD [phone]

## 2024-01-17 NOTE — DISCUSSION/SUMMARY
[EKG obtained to assist in diagnosis and management of assessed problem(s)] : EKG obtained to assist in diagnosis and management of assessed problem(s) [FreeTextEntry1] : Patient is a 69 year-old gentleman with known coronary artery disease who presents today for follow-up after renal transplant on 9/16/2023.  In March 2021, patient was admitted to [facility] with shortness of breath and was seen to have LAD and RCA disease. He is now status post PCI to both.  In October 2022, he underwent PCI to both LCx and LAD. For patient with previously reduced LVEF, now recovered, continue carvedilol.  Continue ASA monotherapy (he discontinued clopidogrel on 5/1/2023). Continue high intensity statin therapy. Continue current antihypertension regimen and diabetes regimen.

## 2024-01-17 NOTE — CARDIOLOGY SUMMARY
[de-identified] : 11/7/2018, sinus 63 bpm with LVH with strain pattern, diffuse TWI in precordial leads (biphasic in V1-V3 and deep symmetrical negative T in V4-V6)  [de-identified] : 11/19/2018, exercised and completed almost 9 minutes of Bertram protocol, but could not achieve target heart rate, converted to pharmacologic nuclear stress test and seen to have severe predominantly fixed defects in the mid-anterior, anteroseptal, and apical regions that partially correct with prone imaging, LVEF 49% and LVEDV 126 mL \par  \par  5/28/2020, pharmacologic nuclear stress test with severe fixed defects in anteroseptal wall and apex consistent with prior infarct without ischemia, LVEF 41%, LVEDV 155 mL \par  \par  9/19/2022, pharmacologic nuclear stress test showing april-spetal, septal, and apical infarct with ischemia in the basal april-septum and distal inferior wall, LVEF 49%, LVEDV 131 mL [de-identified] : 11/12/2018, normal LV function, no pulmonary hypertension, normal LA size LVEF 65-70%. \par  \par  1/10/2019, normal LV function, no pulmonary hypertension, normal LA size, LVEF 63%\par  \par  5/21/2020, mild segmental LV dysfunction (the distal anterior, lateral, septal, and apical segments are hypokinetic), normal pulmonary pressures, LVEF 50%\par  \par  3/18/2021, moderate global LV dysfunction, LVEF 35-40%\par  \par  7/16/2021, mild segmental LV dysfunction, LVEF 50-55%\par  \par  7/11/2022. TTE: LVEF 55-60%\par  Normal left atrium. LA volume index = 30 cc/m2.\par  Moderate concentric left ventricular hypertrophy,\par  Mild segmental left ventricular systolic dysfunction with overall preserved LVEF. The distal septum and apex are hypokinetic. \par  Normal right ventricular size and function.\par  Estimated pulmonary artery systolic pressure equals 27 mm Hg, assuming right atrial pressure equals 9 mm Hg, consistent with normal pulmonary pressures.\par  Normal pericardium with no pericardial effusion.\par  Compared to echocardiogram of 7/16/2021, no significant changes noted.\par  \par  3/29/2023, normal LA, normal pulmonary pressures, normal LV systolic function, LVEF 55%\par   [de-identified] : 2/12/2019 by Iván Nathan MD at [facility] - left and right heart catheterization, elevated systemic blood pressures but normal wedge, 14 mmHg, normal PA pressures (37/17 mmHg, with normal cardiac output of 6.2 L/min and normal index of 3.6. Angiography revealed 90% prox LCx disease at site of prior stent with 80% distal LAD disease that was significant by iFR \par  \par  3/22/2021 by Ciera Taveras MD at [facility] -  Proximal LAD: There was a tubular 80 % stenosis at the site of a prior stent. Distal RCA: Angiography showed minor luminal irregularities with no flow limiting lesions. --  RPLS: There was a tubular 80 % stenosis.\par  \par  4/5/2021 by Ciera Taveras for staged PCI to LAD \par  Stent: 2015 at [place] \par  2/12/2019 - POBA to LCx and NAHUM to distal LAD\par  3/22/2021 - PCI to distal RCA\par  4/5/2021 - PCI to prox-LAD \par  \par  10/28/2022 with Jim Jones MD - PCI to LCx\par  10/31/2022 with Jim Jones MD - PCI to LAD

## 2024-01-17 NOTE — REASON FOR VISIT
[Other: ____] : [unfilled] [Other: _____] : [unfilled] [FreeTextEntry3] : Verito Bae MD [FreeTextEntry1] : January 2024 - Patient returns today for follow-up. He remains on insulin for his diabetes. He reports occasional shaking episodes associated with episodes of high blood sugar. He has not had any episodes of low blood sugar.

## 2024-01-24 ENCOUNTER — APPOINTMENT (OUTPATIENT)
Dept: TRANSPLANT | Facility: CLINIC | Age: 69
End: 2024-01-24

## 2024-01-24 ENCOUNTER — NON-APPOINTMENT (OUTPATIENT)
Age: 69
End: 2024-01-24

## 2024-01-24 NOTE — CHART NOTE - NSCHARTNOTEFT_GEN_A_CORE
68 yr old man with ESRD due to DM, HTN, CAD, CVA was on HD since 2019. S/p DDRT on 9/16/23.  	Patient presented with n/v/epigastric pain, likely medication induced from Cellcept,  changed to Myfortic, symptoms improved. 68 yr old man with ESRD due to DM, HTN, CAD, CVA was on HD since 2019. S/p DDRT on 9/16/23.  	Patient presented with gastroparesis, likely medication induced from Cellcept,  changed to Myfortic, symptoms improved.

## 2024-01-25 LAB
25(OH)D3 SERPL-MCNC: 9.6 NG/ML
ALBUMIN SERPL ELPH-MCNC: 4.8 G/DL
ALP BLD-CCNC: 88 U/L
ALT SERPL-CCNC: 32 U/L
ANION GAP SERPL CALC-SCNC: 11 MMOL/L
APPEARANCE: CLEAR
AST SERPL-CCNC: 26 U/L
BACTERIA: NEGATIVE /HPF
BASOPHILS # BLD AUTO: 0.04 K/UL
BASOPHILS NFR BLD AUTO: 0.8 %
BILIRUB SERPL-MCNC: 0.5 MG/DL
BILIRUBIN URINE: NEGATIVE
BLOOD URINE: NEGATIVE
BUN SERPL-MCNC: 16 MG/DL
CALCIUM SERPL-MCNC: 10.2 MG/DL
CALCIUM SERPL-MCNC: 10.2 MG/DL
CAST: 6 /LPF
CHLORIDE SERPL-SCNC: 105 MMOL/L
CHOLEST SERPL-MCNC: 164 MG/DL
CMV DNA SPEC QL NAA+PROBE: NOT DETECTED IU/ML
CMVPCR LOG: NOT DETECTED LOG10IU/ML
CO2 SERPL-SCNC: 22 MMOL/L
COLOR: YELLOW
CREAT SERPL-MCNC: 1.17 MG/DL
CREAT SPEC-SCNC: 125 MG/DL
CREAT/PROT UR: 0.4 RATIO
EGFR: 68 ML/MIN/1.73M2
EOSINOPHIL # BLD AUTO: 0.04 K/UL
EOSINOPHIL NFR BLD AUTO: 0.8 %
EPITHELIAL CELLS: 1 /HPF
ESTIMATED AVERAGE GLUCOSE: 217 MG/DL
GLUCOSE QUALITATIVE U: NEGATIVE MG/DL
GLUCOSE SERPL-MCNC: 138 MG/DL
HBA1C MFR BLD HPLC: 9.2 %
HCT VFR BLD CALC: 51.6 %
HDLC SERPL-MCNC: 59 MG/DL
HGB BLD-MCNC: 15.9 G/DL
HYALINE CASTS: PRESENT
IMM GRANULOCYTES NFR BLD AUTO: 0.4 %
KETONES URINE: NEGATIVE MG/DL
LDH SERPL-CCNC: 235 U/L
LDLC SERPL CALC-MCNC: 87 MG/DL
LEUKOCYTE ESTERASE URINE: NEGATIVE
LYMPHOCYTES # BLD AUTO: 0.88 K/UL
LYMPHOCYTES NFR BLD AUTO: 17.5 %
MAGNESIUM SERPL-MCNC: 1.7 MG/DL
MAN DIFF?: NORMAL
MCHC RBC-ENTMCNC: 28.9 PG
MCHC RBC-ENTMCNC: 30.8 GM/DL
MCV RBC AUTO: 93.8 FL
MICROSCOPIC-UA: NORMAL
MONOCYTES # BLD AUTO: 0.52 K/UL
MONOCYTES NFR BLD AUTO: 10.4 %
NEUTROPHILS # BLD AUTO: 3.52 K/UL
NEUTROPHILS NFR BLD AUTO: 70.1 %
NITRITE URINE: NEGATIVE
NONHDLC SERPL-MCNC: 104 MG/DL
PARATHYROID HORMONE INTACT: 179 PG/ML
PH URINE: 6
PHOSPHATE SERPL-MCNC: 1.9 MG/DL
PLATELET # BLD AUTO: 142 K/UL
POTASSIUM SERPL-SCNC: 5.1 MMOL/L
PROT SERPL-MCNC: 7.4 G/DL
PROT UR-MCNC: 55 MG/DL
PROTEIN URINE: 100 MG/DL
RBC # BLD: 5.5 M/UL
RBC # FLD: 13.2 %
RED BLOOD CELLS URINE: 1 /HPF
REVIEW: NORMAL
SODIUM SERPL-SCNC: 139 MMOL/L
SPECIFIC GRAVITY URINE: 1.01
TACROLIMUS SERPL-MCNC: 6.5 NG/ML
TRIGL SERPL-MCNC: 93 MG/DL
URATE SERPL-MCNC: 6.1 MG/DL
UROBILINOGEN URINE: 0.2 MG/DL
WBC # FLD AUTO: 5.02 K/UL
WHITE BLOOD CELLS URINE: 2 /HPF

## 2024-01-25 RX ORDER — TACROLIMUS 1 MG/1
1 TABLET, EXTENDED RELEASE ORAL
Qty: 90 | Refills: 11 | Status: DISCONTINUED | COMMUNITY
Start: 2023-09-18 | End: 2024-01-25

## 2024-01-25 RX ORDER — ERGOCALCIFEROL 1.25 MG/1
50000 CAPSULE ORAL
Qty: 8 | Refills: 2 | Status: ACTIVE | COMMUNITY
Start: 2024-01-25 | End: 1900-01-01

## 2024-01-25 RX ORDER — INSULIN LISPRO 100 [IU]/ML
100 INJECTION, SOLUTION INTRAVENOUS; SUBCUTANEOUS
Qty: 3 | Refills: 3 | Status: ACTIVE | COMMUNITY
Start: 2023-10-03 | End: 1900-01-01

## 2024-01-29 LAB — BKV DNA SPEC QL NAA+PROBE: NOT DETECTED IU/ML

## 2024-01-31 NOTE — PATIENT PROFILE ADULT - FUNCTIONAL ASSESSMENT - DAILY ACTIVITY 2.
July 28, 2020      Venita Paz MD  [address]  [address]  [facility] [address]           [place] - Podiatry  [address] [address]  Phone: [phone]  Fax: [phone]          Patient: Rosa Lau   MR Number: [id number]   YOB: 1970   Date of Visit: 7/28/2020       Dear Dr. Venita Paz:    Thank you for referring Rosa Lau to me for evaluation. Attached you will find relevant portions of my assessment and plan of care.    If you have questions, please do not hesitate to call me. I look forward to following Rosa Lau along with you.    Sincerely,    Efrem Clancy, DPKEYLA    Enclosure  CC:  No Recipients    If you would like to receive this communication electronically, please contact [email or web address] or [phone] to request more information on [employer] access.    For providers and/or their staff who would like to refer a patient to [facility], please contact us through our one-stop-shop provider referral line, [facility], at [phone].    If you feel you have received this communication in error or would no longer like to receive these types of communications, please e-mail [email or web address]         
normal...
4 = No assist / stand by assistance

## 2024-02-15 ENCOUNTER — APPOINTMENT (OUTPATIENT)
Dept: TRANSPLANT | Facility: HOSPITAL | Age: 69
End: 2024-02-15

## 2024-02-20 ENCOUNTER — APPOINTMENT (OUTPATIENT)
Dept: TRANSPLANT | Facility: CLINIC | Age: 69
End: 2024-02-20

## 2024-02-20 ENCOUNTER — APPOINTMENT (OUTPATIENT)
Dept: NEPHROLOGY | Facility: CLINIC | Age: 69
End: 2024-02-20

## 2024-02-20 LAB
ALBUMIN SERPL ELPH-MCNC: 4.7 G/DL
ALP BLD-CCNC: 87 U/L
ALT SERPL-CCNC: 26 U/L
ANION GAP SERPL CALC-SCNC: 12 MMOL/L
APPEARANCE: CLEAR
AST SERPL-CCNC: 20 U/L
BACTERIA: NEGATIVE /HPF
BASOPHILS # BLD AUTO: 0.05 K/UL
BASOPHILS NFR BLD AUTO: 0.7 %
BILIRUB SERPL-MCNC: 0.7 MG/DL
BILIRUBIN URINE: NEGATIVE
BLOOD URINE: NEGATIVE
BUN SERPL-MCNC: 18 MG/DL
CALCIUM SERPL-MCNC: 10.2 MG/DL
CAST: 0 /LPF
CHLORIDE SERPL-SCNC: 105 MMOL/L
CO2 SERPL-SCNC: 23 MMOL/L
COLOR: YELLOW
CREAT SERPL-MCNC: 1.38 MG/DL
CREAT SPEC-SCNC: 126 MG/DL
CREAT/PROT UR: 0.2 RATIO
EGFR: 56 ML/MIN/1.73M2
EOSINOPHIL # BLD AUTO: 0.07 K/UL
EOSINOPHIL NFR BLD AUTO: 1 %
EPITHELIAL CELLS: 0 /HPF
GLUCOSE QUALITATIVE U: NEGATIVE MG/DL
GLUCOSE SERPL-MCNC: 152 MG/DL
HCT VFR BLD CALC: 48.9 %
HGB BLD-MCNC: 15.1 G/DL
IMM GRANULOCYTES NFR BLD AUTO: 0.7 %
KETONES URINE: NEGATIVE MG/DL
LEUKOCYTE ESTERASE URINE: NEGATIVE
LYMPHOCYTES # BLD AUTO: 1.35 K/UL
LYMPHOCYTES NFR BLD AUTO: 18.7 %
MAGNESIUM SERPL-MCNC: 1.6 MG/DL
MAN DIFF?: NORMAL
MCHC RBC-ENTMCNC: 27.7 PG
MCHC RBC-ENTMCNC: 30.9 GM/DL
MCV RBC AUTO: 89.7 FL
MICROSCOPIC-UA: NORMAL
MONOCYTES # BLD AUTO: 0.65 K/UL
MONOCYTES NFR BLD AUTO: 9 %
NEUTROPHILS # BLD AUTO: 5.04 K/UL
NEUTROPHILS NFR BLD AUTO: 69.9 %
NITRITE URINE: NEGATIVE
PH URINE: 5.5
PHOSPHATE SERPL-MCNC: 2.4 MG/DL
PLATELET # BLD AUTO: 144 K/UL
POTASSIUM SERPL-SCNC: 4.7 MMOL/L
PROT SERPL-MCNC: 7.4 G/DL
PROT UR-MCNC: 24 MG/DL
PROTEIN URINE: NORMAL MG/DL
RBC # BLD: 5.45 M/UL
RBC # FLD: 13.9 %
RED BLOOD CELLS URINE: 1 /HPF
SODIUM SERPL-SCNC: 139 MMOL/L
SPECIFIC GRAVITY URINE: 1.02
TACROLIMUS SERPL-MCNC: 11.3 NG/ML
URATE SERPL-MCNC: 6.6 MG/DL
UROBILINOGEN URINE: 0.2 MG/DL
WBC # FLD AUTO: 7.21 K/UL
WHITE BLOOD CELLS URINE: 0 /HPF

## 2024-02-20 RX ORDER — TACROLIMUS 4 MG/1
4 TABLET, EXTENDED RELEASE ORAL
Qty: 90 | Refills: 3 | Status: DISCONTINUED | COMMUNITY
Start: 2024-01-25 | End: 2024-02-20

## 2024-02-21 LAB
CMV DNA SPEC QL NAA+PROBE: NOT DETECTED IU/ML
CMVPCR LOG: NOT DETECTED LOG10IU/ML

## 2024-02-22 LAB — BKV DNA SPEC QL NAA+PROBE: NOT DETECTED IU/ML

## 2024-03-04 ENCOUNTER — APPOINTMENT (OUTPATIENT)
Dept: TRANSPLANT | Facility: CLINIC | Age: 69
End: 2024-03-04

## 2024-03-05 LAB
ALBUMIN SERPL ELPH-MCNC: 4.6 G/DL
ALP BLD-CCNC: 82 U/L
ALT SERPL-CCNC: 27 U/L
ANION GAP SERPL CALC-SCNC: 10 MMOL/L
APPEARANCE: CLEAR
AST SERPL-CCNC: 20 U/L
BACTERIA: NEGATIVE /HPF
BASOPHILS # BLD AUTO: 0.05 K/UL
BASOPHILS NFR BLD AUTO: 0.7 %
BILIRUB SERPL-MCNC: 0.8 MG/DL
BILIRUBIN URINE: NEGATIVE
BLOOD URINE: NEGATIVE
BUN SERPL-MCNC: 17 MG/DL
CALCIUM SERPL-MCNC: 10.3 MG/DL
CAST: 8 /LPF
CHLORIDE SERPL-SCNC: 105 MMOL/L
CMV DNA SPEC QL NAA+PROBE: NOT DETECTED IU/ML
CMVPCR LOG: NOT DETECTED LOG10IU/ML
CO2 SERPL-SCNC: 25 MMOL/L
COLOR: YELLOW
CREAT SERPL-MCNC: 1.2 MG/DL
CREAT SPEC-SCNC: 128 MG/DL
CREAT/PROT UR: 0.3 RATIO
EGFR: 66 ML/MIN/1.73M2
EOSINOPHIL # BLD AUTO: 0.06 K/UL
EOSINOPHIL NFR BLD AUTO: 0.8 %
EPITHELIAL CELLS: 1 /HPF
GLUCOSE QUALITATIVE U: 100 MG/DL
GLUCOSE SERPL-MCNC: 121 MG/DL
HCT VFR BLD CALC: 49.2 %
HGB BLD-MCNC: 15.4 G/DL
HYALINE CASTS: PRESENT
IMM GRANULOCYTES NFR BLD AUTO: 1.4 %
KETONES URINE: NEGATIVE MG/DL
LEUKOCYTE ESTERASE URINE: NEGATIVE
LYMPHOCYTES # BLD AUTO: 1.16 K/UL
LYMPHOCYTES NFR BLD AUTO: 16.1 %
MAGNESIUM SERPL-MCNC: 1.5 MG/DL
MAN DIFF?: NORMAL
MCHC RBC-ENTMCNC: 28.1 PG
MCHC RBC-ENTMCNC: 31.3 GM/DL
MCV RBC AUTO: 89.6 FL
MICROSCOPIC-UA: NORMAL
MONOCYTES # BLD AUTO: 0.51 K/UL
MONOCYTES NFR BLD AUTO: 7.1 %
NEUTROPHILS # BLD AUTO: 5.31 K/UL
NEUTROPHILS NFR BLD AUTO: 73.9 %
NITRITE URINE: NEGATIVE
PH URINE: 5.5
PHOSPHATE SERPL-MCNC: 2.2 MG/DL
PLATELET # BLD AUTO: 138 K/UL
POTASSIUM SERPL-SCNC: 5.5 MMOL/L
PROT SERPL-MCNC: 7.1 G/DL
PROT UR-MCNC: 43 MG/DL
PROTEIN URINE: 30 MG/DL
RBC # BLD: 5.49 M/UL
RBC # FLD: 13.9 %
RED BLOOD CELLS URINE: 0 /HPF
REVIEW: NORMAL
SODIUM SERPL-SCNC: 139 MMOL/L
SPECIFIC GRAVITY URINE: 1.01
TACROLIMUS SERPL-MCNC: 11 NG/ML
URATE SERPL-MCNC: 5.8 MG/DL
UROBILINOGEN URINE: 0.2 MG/DL
WBC # FLD AUTO: 7.19 K/UL
WHITE BLOOD CELLS URINE: 1 /HPF

## 2024-03-05 RX ORDER — NIFEDIPINE 30 MG/1
30 TABLET, EXTENDED RELEASE ORAL
Qty: 120 | Refills: 11 | Status: ACTIVE | COMMUNITY
Start: 2023-09-18 | End: 1900-01-01

## 2024-03-07 LAB — BKV DNA SPEC QL NAA+PROBE: NOT DETECTED IU/ML

## 2024-03-15 NOTE — PROGRESS NOTE ADULT - PROBLEM/PLAN-3
Behavioral Health Interdisciplinary Rounds     Patient Name: Meir Barker  Age: 40 y.o.  Room/Bed:  727/  Primary Diagnosis: Major depressive disorder, severe (HCC)   Admission Status: Voluntary    Readmission within 30 days: No  Power of  in place: No  Patient requires a blocked bed: Yes          Reason for blocked bed: transgender  Sleep hours:       Participation in Care/Groups:  No  Medication Compliant?: Yes  PRNS (last 24 hours): trazodone   Restraints (last 24 hours):  No  __________________________________________________  OQ Admission Analysis Survey completed:  OQ Admission Analysis Survey score:  __________________________________________________     Alcohol screening (AUDIT) completed -     If applicable, date SBIRT discussed in treatment team AND documented:    Tobacco - patient is a smoker:    Illegal Drugs use:      24 hour chart check complete: Yes    _______________________________________________    Patient goal(s) for today:   Treatment team focus/goals:   Progress note: Patient met with treatment team and appeared with a fair mood and affect, greeted treatment team with a smile. Patient reports feeling tired but is tolerating detox symptoms well, reports sweats have gone away. Patient denies SI/HI and AHVH at this time, denies issues with Vraylar at this time. Patient reports improving appetite, improving sleep. Patient to discharge tomorrow following end of Phenobarb taper. SW to provide resources for substance use services, referral sent to [facility], patient to follow up with services after coordinating work schedule.    LOS:  3  Expected LOS: 4    Participating treatment team members: Meir Barker, Jeny Gama, MSW, Nhung CORONEL, RN, Minerva Aldana NP, Joann Herrera PharmD   Spoke with patient -DN   DISPLAY PLAN FREE TEXT

## 2024-03-25 RX ORDER — ASPIRIN 81 MG/1
81 TABLET, DELAYED RELEASE ORAL DAILY
Qty: 90 | Refills: 3 | Status: ACTIVE | COMMUNITY
Start: 2023-09-18 | End: 1900-01-01

## 2024-03-26 ENCOUNTER — APPOINTMENT (OUTPATIENT)
Dept: TRANSPLANT | Facility: CLINIC | Age: 69
End: 2024-03-26

## 2024-03-27 ENCOUNTER — NON-APPOINTMENT (OUTPATIENT)
Age: 69
End: 2024-03-27

## 2024-03-27 LAB
ALBUMIN SERPL ELPH-MCNC: 4.7 G/DL
ALP BLD-CCNC: 78 U/L
ALT SERPL-CCNC: 23 U/L
ANION GAP SERPL CALC-SCNC: 12 MMOL/L
APPEARANCE: CLEAR
AST SERPL-CCNC: 22 U/L
BACTERIA: NEGATIVE /HPF
BASOPHILS # BLD AUTO: 0.04 K/UL
BASOPHILS NFR BLD AUTO: 0.6 %
BILIRUB SERPL-MCNC: 0.9 MG/DL
BILIRUBIN URINE: NEGATIVE
BLOOD URINE: NEGATIVE
BUN SERPL-MCNC: 15 MG/DL
CALCIUM SERPL-MCNC: 10 MG/DL
CAST: 0 /LPF
CHLORIDE SERPL-SCNC: 105 MMOL/L
CMV DNA SPEC QL NAA+PROBE: NOT DETECTED IU/ML
CMVPCR LOG: NOT DETECTED LOG10IU/ML
CO2 SERPL-SCNC: 23 MMOL/L
COLOR: YELLOW
CREAT SERPL-MCNC: 1.12 MG/DL
CREAT SPEC-SCNC: 90 MG/DL
CREAT/PROT UR: 0.3 RATIO
EGFR: 72 ML/MIN/1.73M2
EOSINOPHIL # BLD AUTO: 0.07 K/UL
EOSINOPHIL NFR BLD AUTO: 1.1 %
EPITHELIAL CELLS: 0 /HPF
GLUCOSE QUALITATIVE U: NEGATIVE MG/DL
GLUCOSE SERPL-MCNC: 191 MG/DL
HCT VFR BLD CALC: 52.6 %
HGB BLD-MCNC: 16.3 G/DL
IMM GRANULOCYTES NFR BLD AUTO: 0.5 %
KETONES URINE: NEGATIVE MG/DL
LEUKOCYTE ESTERASE URINE: NEGATIVE
LYMPHOCYTES # BLD AUTO: 0.97 K/UL
LYMPHOCYTES NFR BLD AUTO: 14.9 %
MAGNESIUM SERPL-MCNC: 1.9 MG/DL
MAN DIFF?: NORMAL
MCHC RBC-ENTMCNC: 28.3 PG
MCHC RBC-ENTMCNC: 31 GM/DL
MCV RBC AUTO: 91.3 FL
MICROSCOPIC-UA: NORMAL
MONOCYTES # BLD AUTO: 0.61 K/UL
MONOCYTES NFR BLD AUTO: 9.4 %
NEUTROPHILS # BLD AUTO: 4.78 K/UL
NEUTROPHILS NFR BLD AUTO: 73.5 %
NITRITE URINE: NEGATIVE
PH URINE: 6
PHOSPHATE SERPL-MCNC: 2.3 MG/DL
PLATELET # BLD AUTO: 138 K/UL
POTASSIUM SERPL-SCNC: 5.4 MMOL/L
PROT SERPL-MCNC: 7.5 G/DL
PROT UR-MCNC: 28 MG/DL
PROTEIN URINE: 30 MG/DL
RBC # BLD: 5.76 M/UL
RBC # FLD: 13.9 %
RED BLOOD CELLS URINE: 0 /HPF
SODIUM SERPL-SCNC: 139 MMOL/L
SPECIFIC GRAVITY URINE: 1.01
TACROLIMUS SERPL-MCNC: 3.6 NG/ML
URATE SERPL-MCNC: 6.8 MG/DL
UROBILINOGEN URINE: 0.2 MG/DL
WBC # FLD AUTO: 6.5 K/UL
WHITE BLOOD CELLS URINE: 0 /HPF

## 2024-03-28 LAB — BKV DNA SPEC QL NAA+PROBE: NOT DETECTED IU/ML

## 2024-04-04 ENCOUNTER — APPOINTMENT (OUTPATIENT)
Dept: GASTROENTEROLOGY | Facility: CLINIC | Age: 69
End: 2024-04-04
Payer: MEDICARE

## 2024-04-04 VITALS
HEIGHT: 66 IN | DIASTOLIC BLOOD PRESSURE: 78 MMHG | WEIGHT: 146 LBS | HEART RATE: 60 BPM | BODY MASS INDEX: 23.46 KG/M2 | SYSTOLIC BLOOD PRESSURE: 133 MMHG

## 2024-04-04 DIAGNOSIS — A04.8 OTHER SPECIFIED BACTERIAL INTESTINAL INFECTIONS: ICD-10-CM

## 2024-04-04 DIAGNOSIS — K21.9 GASTRO-ESOPHAGEAL REFLUX DISEASE W/OUT ESOPHAGITIS: ICD-10-CM

## 2024-04-04 DIAGNOSIS — Z12.11 ENCOUNTER FOR SCREENING FOR MALIGNANT NEOPLASM OF COLON: ICD-10-CM

## 2024-04-04 PROCEDURE — 99204 OFFICE O/P NEW MOD 45 MIN: CPT

## 2024-04-04 RX ORDER — PANTOPRAZOLE 40 MG/1
40 TABLET, DELAYED RELEASE ORAL DAILY
Qty: 60 | Refills: 11 | Status: DISCONTINUED | COMMUNITY
Start: 2024-01-09 | End: 2024-04-04

## 2024-04-04 RX ORDER — PREDNISONE 5 MG/1
5 TABLET ORAL
Qty: 30 | Refills: 11 | Status: DISCONTINUED | COMMUNITY
Start: 2023-09-18 | End: 2024-04-04

## 2024-04-04 RX ORDER — SULFAMETHOXAZOLE AND TRIMETHOPRIM 400; 80 MG/1; MG/1
400-80 TABLET ORAL
Qty: 30 | Refills: 11 | Status: DISCONTINUED | COMMUNITY
Start: 2023-09-18 | End: 2024-04-04

## 2024-04-05 PROBLEM — K21.9 GERD (GASTROESOPHAGEAL REFLUX DISEASE): Status: ACTIVE | Noted: 2024-04-05

## 2024-04-05 PROBLEM — A04.8 H. PYLORI INFECTION: Status: ACTIVE | Noted: 2024-01-09

## 2024-04-05 NOTE — ASSESSMENT
[FreeTextEntry1] : 1.  Encounter for colon cancer screening.  Colonoscopy in 11/2018 with small polyp, hemorrhoids. 2.  GERD.  EGD in 1/2024 with esophagitis, H.pylori gastritis s/p quadruple therapy.  3.  History of ESRD s/p DDRT (9/2023), now on immunosuppression. 4.  CAD s/p stent. 5.  IDDM. 6.  HTN.  Recs: - Prior colonoscopy and recent hospital records reviewed. - Patient was advised to try and wean PPI by alternating 40 mg and 20 mg dose.  If patient is able to successfully wean PPI, will check H.pylori stool antigen to confirm eradication. - Patient was advised to undergo endoscopy and colonoscopy- procedure, risks, benefits, and alternatives were explained. Patient agreeable. Brochure given. Golytely prep.  Patient advised to speak with PCP about insulin dosing prior to procedure. - Plan was discussed with patient's daughter on the phone.  Written instructions were also provided.

## 2024-04-05 NOTE — PHYSICAL EXAM
[Alert] : alert [No Acute Distress] : no acute distress [Sclera] : the sclera and conjunctiva were normal [Hearing Threshold Finger Rub Not Duplin] : hearing was normal [No Respiratory Distress] : no respiratory distress [Auscultation Breath Sounds / Voice Sounds] : lungs were clear to auscultation bilaterally [Heart Rate And Rhythm] : heart rate was normal and rhythm regular [Normal S1, S2] : normal S1 and S2 [None] : no edema [Bowel Sounds] : normal bowel sounds [Abdomen Tenderness] : non-tender [No Masses] : no abdominal mass palpated [Abdomen Soft] : soft [Supraclavicular Lymph Nodes Enlarged Bilaterally] : no supraclavicular lymphadenopathy [Cervical Lymph Nodes Enlarged Anterior Bilaterally] : no anterior cervical lymphadenopathy [No CVA Tenderness] : no CVA  tenderness [Abnormal Walk] : normal gait [Normal Color / Pigmentation] : normal skin color and pigmentation [No Focal Deficits] : no focal deficits [Normal Turgor] : normal skin turgor [Oriented To Time, Place, And Person] : oriented to person, place, and time [de-identified] : scars

## 2024-04-09 ENCOUNTER — NON-APPOINTMENT (OUTPATIENT)
Age: 69
End: 2024-04-09

## 2024-04-09 ENCOUNTER — APPOINTMENT (OUTPATIENT)
Dept: TRANSPLANT | Facility: CLINIC | Age: 69
End: 2024-04-09

## 2024-04-09 NOTE — ED ADULT NURSE NOTE - NS ED NURSE LEVEL OF CONSCIOUSNESS ORIENTATION
Health Maintenance Due   Topic Date Due    Hepatitis B Vaccine (1 of 3 - 3-dose series) Never done    COVID-19 Vaccine (4 - 2023-24 season) 09/01/2023       Patient is due for topics as listed above but is not proceeding with Immunization(s) COVID-19 and Hep B at this time.    Oriented - self; Oriented - place; Oriented - time

## 2024-04-10 LAB
ALBUMIN SERPL ELPH-MCNC: 4.6 G/DL
ALP BLD-CCNC: 83 U/L
ALT SERPL-CCNC: 28 U/L
ANION GAP SERPL CALC-SCNC: 12 MMOL/L
APPEARANCE: CLEAR
AST SERPL-CCNC: 20 U/L
BACTERIA: NEGATIVE /HPF
BASOPHILS # BLD AUTO: 0.04 K/UL
BASOPHILS NFR BLD AUTO: 0.5 %
BILIRUB SERPL-MCNC: 0.8 MG/DL
BILIRUBIN URINE: NEGATIVE
BLOOD URINE: NEGATIVE
BUN SERPL-MCNC: 16 MG/DL
CALCIUM SERPL-MCNC: 10.2 MG/DL
CAST: 7 /LPF
CHLORIDE SERPL-SCNC: 105 MMOL/L
CO2 SERPL-SCNC: 24 MMOL/L
COLOR: YELLOW
CREAT SERPL-MCNC: 1.1 MG/DL
CREAT SPEC-SCNC: 127 MG/DL
CREAT/PROT UR: 0.2 RATIO
EGFR: 74 ML/MIN/1.73M2
EOSINOPHIL # BLD AUTO: 0.06 K/UL
EOSINOPHIL NFR BLD AUTO: 0.8 %
EPITHELIAL CELLS: 0 /HPF
GLUCOSE QUALITATIVE U: NEGATIVE MG/DL
GLUCOSE SERPL-MCNC: 85 MG/DL
HCT VFR BLD CALC: 51.1 %
HGB BLD-MCNC: 15.9 G/DL
HYALINE CASTS: PRESENT
IMM GRANULOCYTES NFR BLD AUTO: 0.5 %
KETONES URINE: NEGATIVE MG/DL
LEUKOCYTE ESTERASE URINE: NEGATIVE
LYMPHOCYTES # BLD AUTO: 1.03 K/UL
LYMPHOCYTES NFR BLD AUTO: 14.1 %
MAGNESIUM SERPL-MCNC: 1.6 MG/DL
MAN DIFF?: NORMAL
MCHC RBC-ENTMCNC: 28.4 PG
MCHC RBC-ENTMCNC: 31.1 GM/DL
MCV RBC AUTO: 91.3 FL
MICROSCOPIC-UA: NORMAL
MONOCYTES # BLD AUTO: 0.62 K/UL
MONOCYTES NFR BLD AUTO: 8.5 %
MUCUS: PRESENT
NEUTROPHILS # BLD AUTO: 5.53 K/UL
NEUTROPHILS NFR BLD AUTO: 75.6 %
NITRITE URINE: NEGATIVE
PH URINE: 6
PHOSPHATE SERPL-MCNC: 2.8 MG/DL
PLATELET # BLD AUTO: 169 K/UL
POTASSIUM SERPL-SCNC: 4.6 MMOL/L
PROT SERPL-MCNC: 7.4 G/DL
PROT UR-MCNC: 28 MG/DL
PROTEIN URINE: 30 MG/DL
RBC # BLD: 5.6 M/UL
RBC # FLD: 13.6 %
RED BLOOD CELLS URINE: 0 /HPF
REVIEW: NORMAL
SODIUM SERPL-SCNC: 141 MMOL/L
SPECIFIC GRAVITY URINE: 1.01
TACROLIMUS SERPL-MCNC: 9.3 NG/ML
URATE SERPL-MCNC: 6.2 MG/DL
UROBILINOGEN URINE: 0.2 MG/DL
WBC # FLD AUTO: 7.32 K/UL
WHITE BLOOD CELLS URINE: 0 /HPF

## 2024-04-22 ENCOUNTER — APPOINTMENT (OUTPATIENT)
Dept: CARDIOLOGY | Facility: CLINIC | Age: 69
End: 2024-04-22
Payer: MEDICARE

## 2024-04-22 ENCOUNTER — NON-APPOINTMENT (OUTPATIENT)
Age: 69
End: 2024-04-22

## 2024-04-22 VITALS
HEART RATE: 60 BPM | WEIGHT: 150 LBS | SYSTOLIC BLOOD PRESSURE: 148 MMHG | BODY MASS INDEX: 24.11 KG/M2 | HEIGHT: 66 IN | RESPIRATION RATE: 17 BRPM | OXYGEN SATURATION: 99 % | DIASTOLIC BLOOD PRESSURE: 74 MMHG

## 2024-04-22 DIAGNOSIS — I25.5 ISCHEMIC CARDIOMYOPATHY: ICD-10-CM

## 2024-04-22 DIAGNOSIS — Z95.5 PRESENCE OF CORONARY ANGIOPLASTY IMPLANT AND GRAFT: ICD-10-CM

## 2024-04-22 PROCEDURE — G2211 COMPLEX E/M VISIT ADD ON: CPT

## 2024-04-22 PROCEDURE — 93000 ELECTROCARDIOGRAM COMPLETE: CPT

## 2024-04-22 PROCEDURE — 93306 TTE W/DOPPLER COMPLETE: CPT

## 2024-04-22 PROCEDURE — 99215 OFFICE O/P EST HI 40 MIN: CPT

## 2024-04-22 NOTE — HISTORY OF PRESENT ILLNESS
[FreeTextEntry1] : Patient is 69 year-old with cardiovascular risk factors of hypertension and diabetes, known coronary artery disease status post PCI x3 (2014) at [place] by Dr. Saúl Villafana, CKD, now ESRD on HD Qzxikx-Qirrsgoit-Dnwrbo) that began January 2019, who presents today for cardiac evaluation prior to possible renal transplant. He was working until October 2018 as [employer] . Patient does not formally exercise, but he can climb stairs and has no exertional symptoms.  In January 2019, patient admitted to [facility] for shortness of breath and nosebleed. He was found to be volume overloaded in the setting of FELICITA on CKD and he was initiated on hemodialysis.   In February 2019, patient seen to have abnormal nuclear stress test and was referred for left and right heart catheterization. The right heart cath showed elevated systemic blood pressures but normal PCWP (14 mmHg) and normal PA pressures (37/17 mmHg) with normal cardiac output and index of 6 L/min and 3.6 respectively. The left heart cath revealed significant distal LAD disease and significant in stent stenosis of LCx. He is now status post POBA to LCx and NAHUM to LAD.  May 2019 - Patient presents today in his usual state of health. He reports occasional right leg pain that limits his exercise while walking on the treadmill. He is without chest pain, shortness of breath, lower extremity swelling.  October 2019 - Patient presents today in his usual state of health. He reports occasional right leg pain that limits his exercise while walking on the treadmill. He is without chest pain, shortness of breath, lower extremity swelling. He had normal HOLLY/PVR and normal ultrasound of the abdominal vessels (and into iliacs).   March 2020 - Patient returns for follow-up of his cardiovascular disease and to maintain standing with the Transplant Center. Patient recently traveled to [facility] (January 8 - February 6, 2020), and when patient returned having lost 8 lbs without a clear cause. It was unintentional weight loss. He is now being evaluated for cancer including lung cancer (former smoker). He will also need EGD/colonoscopy.  April 2021 - Patient returns today for follow-up after a recent hospitalization. He was hospitalized at [facility] in March 2021 for shortness of breath and poor appetite. Cath revealed both LAD and RCA disease. He had his RCA intervened on and was brought back to [facility] two weeks later for staged PCI to the LAD. He was very upset during the hospitalization because he felt he needed more dialysis prior to the PCI of the LAD. Since discharge, he has felt well. He continues to have HD via left radial AV fistula (Tuesday-Thursday-Saturday).   service provided by [employer]  .  's Number: [id number] 's Name: Arin Language: Estonian.   July 2021 - Patient returns today for follow-up three months after his PCI to prox-LAD. He had a repeat echocardiogram today that shows interval improvement in his LVEF. He has been feeling well since. He has no cardiovascular complaints. His carvedilol was increased to 37.5 mg BID, but he reports that sometimes his blood pressure remains elevated. He continues to have HD via left radial AV fistula (Tuesday-Thursday-Saturday). Blood pressure is usually best after HD.  October 2021 - Patient returns today for follow-up. He had PCI to prox-LAD in April 2021. He had a repeat echocardiogram today that shows interval improvement in his LVEF. He has been feeling well since. He has no cardiovascular complaints. His carvedilol was increased to 37.5 mg BID, but he reports that sometimes his blood pressure remains elevated. He continues to have HD via left radial AV fistula (Tuesday-Thursday-Saturday). Blood pressure is usually best after HD.  May 2022 - Patient returns today for follow-up. He had PCI to prox-LAD in April 2021. He had a repeat echocardiogram today that shows interval improvement in his LVEF. His carvedilol was increased to 37.5 mg BID, but he reports that sometimes his blood pressure remains elevated.  He continues to have HD via left radial AV fistula (Tuesday-Thursday-Saturday). Blood pressure is usually best after HD. He has not been sick with Covid-19.  8/29/2022.  Mr. Rivero returns today for scheduled follow up and to maintain his status on the kidney transplant list. His daughter, Tamiko Mcqueen, serves as an  via phone at the patient's request. Since his last visit, he has been feeling very well. For exercise he walks 25-30 minutes twice daily at a moderate pace and denies any chest discomfort or shortness of breath. He continues on dialysis without any complications. There have been no changes to his medications and he has been taking all as directed.   November 2022 - Patient returns today for follow-up after recent hospitalization for PCI to LCx on 10/28/2022 and then PCI to the LAD on 10/31/2022. The right groin site remains clean, dry, and intact without hematoma.   March 2023 - Patient returns today for follow-up. He reports that he is noticing orthopnea. He has no daytime or exertional symptoms. He continues to have ESRD on HD via left radial AV fistula (Myuftdx-Sboenkxq-Oazmsjvi).  ESRD on HD via left radial AV fistula (Bgkavuo-Jgljrwhu-Wlovfoll).  10/11/2023 - Patient returns for follow-up for the first time since his renal transplant. He is now status post DDRT on 9/16/2023. He continues to have a surgical drain in place.   January 2024 - Patient returns today for follow-up. He remains on insulin for his diabetes. He reports occasional shaking episodes associated with episodes of high blood sugar. He has not had any episodes of low blood sugar.   PMD: Bassem Grove MD [phone] and Mago Parker,  Cardiologist: LEONARD Gomez [phone] Nephrologist: Yasmany Huff MD [phone]

## 2024-04-22 NOTE — CARDIOLOGY SUMMARY
[de-identified] : 11/7/2018, sinus 63 bpm with LVH with strain pattern, diffuse TWI in precordial leads (biphasic in V1-V3 and deep symmetrical negative T in V4-V6)  [de-identified] : 11/19/2018, exercised and completed almost 9 minutes of Bertram protocol, but could not achieve target heart rate, converted to pharmacologic nuclear stress test and seen to have severe predominantly fixed defects in the mid-anterior, anteroseptal, and apical regions that partially correct with prone imaging, LVEF 49% and LVEDV 126 mL \par  \par  5/28/2020, pharmacologic nuclear stress test with severe fixed defects in anteroseptal wall and apex consistent with prior infarct without ischemia, LVEF 41%, LVEDV 155 mL \par  \par  9/19/2022, pharmacologic nuclear stress test showing april-spetal, septal, and apical infarct with ischemia in the basal april-septum and distal inferior wall, LVEF 49%, LVEDV 131 mL [de-identified] : 11/12/2018, normal LV function, no pulmonary hypertension, normal LA size LVEF 65-70%.   1/10/2019, normal LV function, no pulmonary hypertension, normal LA size, LVEF 63%  5/21/2020, mild segmental LV dysfunction (the distal anterior, lateral, septal, and apical segments are hypokinetic), normal pulmonary pressures, LVEF 50%  3/18/2021, moderate global LV dysfunction, LVEF 35-40%  7/16/2021, mild segmental LV dysfunction, LVEF 50-55%  7/11/2022. TTE: LVEF 55-60% Normal left atrium. LA volume index = 30 cc/m2. Moderate concentric left ventricular hypertrophy, Mild segmental left ventricular systolic dysfunction with overall preserved LVEF. The distal septum and apex are hypokinetic.  Normal right ventricular size and function. Estimated pulmonary artery systolic pressure equals 27 mm Hg, assuming right atrial pressure equals 9 mm Hg, consistent with normal pulmonary pressures. Normal pericardium with no pericardial effusion. Compared to echocardiogram of 7/16/2021, no significant changes noted.  3/29/2023, normal LA, normal pulmonary pressures, normal LV systolic function, LVEF 55%  4/22/2024, normal LV systolic function, LVEF 55% [de-identified] : 2/12/2019 by Iván Nathan MD at [facility] - left and right heart catheterization, elevated systemic blood pressures but normal wedge, 14 mmHg, normal PA pressures (37/17 mmHg, with normal cardiac output of 6.2 L/min and normal index of 3.6. Angiography revealed 90% prox LCx disease at site of prior stent with 80% distal LAD disease that was significant by iFR \par  \par  3/22/2021 by Ciera Taveras MD at [facility] -  Proximal LAD: There was a tubular 80 % stenosis at the site of a prior stent. Distal RCA: Angiography showed minor luminal irregularities with no flow limiting lesions. --  RPLS: There was a tubular 80 % stenosis.\par  \par  4/5/2021 by Ciera Taveras for staged PCI to LAD \par  Stent: 2015 at [place] \par  2/12/2019 - POBA to LCx and NAHUM to distal LAD\par  3/22/2021 - PCI to distal RCA\par  4/5/2021 - PCI to prox-LAD \par  \par  10/28/2022 with Jim Jones MD - PCI to LCx\par  10/31/2022 with Jim Jones MD - PCI to LAD

## 2024-04-22 NOTE — REASON FOR VISIT
[Other: ____] : [unfilled] [Other: _____] : [unfilled] [FreeTextEntry3] : Verito Bae MD [FreeTextEntry1] : April 2024 - Pa

## 2024-04-22 NOTE — DISCUSSION/SUMMARY
[FreeTextEntry1] : Patient is a 69 year-old gentleman with known coronary artery disease who presents today for follow-up after renal transplant on 9/16/2023.  In March 2021, patient was admitted to [facility] with shortness of breath and was seen to have LAD and RCA disease. He is now status post PCI to both.  In October 2022, he underwent PCI to both LCx and LAD. For patient with previously reduced LVEF, now recovered, continue carvedilol.  Continue ASA monotherapy (he discontinued clopidogrel on 5/1/2023). Continue high intensity statin therapy. Continue current antihypertension regimen and diabetes regimen. [EKG obtained to assist in diagnosis and management of assessed problem(s)] : EKG obtained to assist in diagnosis and management of assessed problem(s)

## 2024-04-23 ENCOUNTER — APPOINTMENT (OUTPATIENT)
Dept: NEPHROLOGY | Facility: CLINIC | Age: 69
End: 2024-04-23

## 2024-05-01 ENCOUNTER — APPOINTMENT (OUTPATIENT)
Dept: NEPHROLOGY | Facility: CLINIC | Age: 69
End: 2024-05-01
Payer: MEDICARE

## 2024-05-01 VITALS
BODY MASS INDEX: 23.95 KG/M2 | HEART RATE: 57 BPM | WEIGHT: 149 LBS | OXYGEN SATURATION: 99 % | DIASTOLIC BLOOD PRESSURE: 80 MMHG | HEIGHT: 66 IN | SYSTOLIC BLOOD PRESSURE: 152 MMHG | TEMPERATURE: 97.9 F

## 2024-05-01 DIAGNOSIS — E11.29 TYPE 2 DIABETES MELLITUS WITH OTHER DIABETIC KIDNEY COMPLICATION: ICD-10-CM

## 2024-05-01 DIAGNOSIS — Z79.899 OTHER LONG TERM (CURRENT) DRUG THERAPY: ICD-10-CM

## 2024-05-01 DIAGNOSIS — I10 ESSENTIAL (PRIMARY) HYPERTENSION: ICD-10-CM

## 2024-05-01 DIAGNOSIS — Z94.0 OTHER LONG TERM (CURRENT) DRUG THERAPY: ICD-10-CM

## 2024-05-01 PROCEDURE — 99214 OFFICE O/P EST MOD 30 MIN: CPT

## 2024-05-01 RX ORDER — MYCOPHENOLATE MOFETIL 500 MG/1
500 TABLET ORAL
Qty: 180 | Refills: 0 | Status: ACTIVE | COMMUNITY
Start: 2023-09-18 | End: 1900-01-01

## 2024-05-02 ENCOUNTER — NON-APPOINTMENT (OUTPATIENT)
Age: 69
End: 2024-05-02

## 2024-05-02 LAB
ALBUMIN SERPL ELPH-MCNC: 4.5 G/DL
ALP BLD-CCNC: 84 U/L
ALT SERPL-CCNC: 44 U/L
ANION GAP SERPL CALC-SCNC: 12 MMOL/L
APPEARANCE: CLEAR
AST SERPL-CCNC: 24 U/L
BACTERIA: NEGATIVE /HPF
BASOPHILS # BLD AUTO: 0.05 K/UL
BASOPHILS NFR BLD AUTO: 0.7 %
BILIRUB SERPL-MCNC: 0.7 MG/DL
BILIRUBIN URINE: NEGATIVE
BLOOD URINE: NEGATIVE
BUN SERPL-MCNC: 14 MG/DL
CALCIUM SERPL-MCNC: 9.5 MG/DL
CAST: 2 /LPF
CHLORIDE SERPL-SCNC: 105 MMOL/L
CMV DNA SPEC QL NAA+PROBE: NOT DETECTED IU/ML
CMVPCR LOG: NOT DETECTED LOG10IU/ML
CO2 SERPL-SCNC: 19 MMOL/L
COLOR: YELLOW
CREAT SERPL-MCNC: 1.15 MG/DL
CREAT SPEC-SCNC: 144 MG/DL
CREAT/PROT UR: 0.3 RATIO
EGFR: 70 ML/MIN/1.73M2
EOSINOPHIL # BLD AUTO: 0.07 K/UL
EOSINOPHIL NFR BLD AUTO: 1 %
EPITHELIAL CELLS: 0 /HPF
GLUCOSE QUALITATIVE U: NEGATIVE MG/DL
GLUCOSE SERPL-MCNC: 90 MG/DL
HCT VFR BLD CALC: 52.5 %
HGB BLD-MCNC: 16 G/DL
IMM GRANULOCYTES NFR BLD AUTO: 0.6 %
KETONES URINE: NEGATIVE MG/DL
LEUKOCYTE ESTERASE URINE: NEGATIVE
LYMPHOCYTES # BLD AUTO: 0.95 K/UL
LYMPHOCYTES NFR BLD AUTO: 13.9 %
MAGNESIUM SERPL-MCNC: 1.6 MG/DL
MAN DIFF?: NORMAL
MCHC RBC-ENTMCNC: 28.7 PG
MCHC RBC-ENTMCNC: 30.5 GM/DL
MCV RBC AUTO: 94.1 FL
MICROSCOPIC-UA: NORMAL
MONOCYTES # BLD AUTO: 0.62 K/UL
MONOCYTES NFR BLD AUTO: 9.1 %
NEUTROPHILS # BLD AUTO: 5.09 K/UL
NEUTROPHILS NFR BLD AUTO: 74.7 %
NITRITE URINE: NEGATIVE
PH URINE: 6
PHOSPHATE SERPL-MCNC: 2.5 MG/DL
PLATELET # BLD AUTO: 135 K/UL
POTASSIUM SERPL-SCNC: 4 MMOL/L
PROT SERPL-MCNC: 7 G/DL
PROT UR-MCNC: 45 MG/DL
PROTEIN URINE: 30 MG/DL
RBC # BLD: 5.58 M/UL
RBC # FLD: 13.6 %
RED BLOOD CELLS URINE: 0 /HPF
SODIUM SERPL-SCNC: 137 MMOL/L
SPECIFIC GRAVITY URINE: 1.02
TACROLIMUS SERPL-MCNC: 2.7 NG/ML
URATE SERPL-MCNC: 6.5 MG/DL
UROBILINOGEN URINE: 0.2 MG/DL
WBC # FLD AUTO: 6.82 K/UL
WHITE BLOOD CELLS URINE: 0 /HPF

## 2024-05-02 RX ORDER — TACROLIMUS 0.75 MG/1
0.75 TABLET, EXTENDED RELEASE ORAL DAILY
Qty: 180 | Refills: 3 | Status: DISCONTINUED | COMMUNITY
Start: 2024-04-10 | End: 2024-05-02

## 2024-05-03 PROBLEM — Z79.899 IMMUNOSUPPRESSIVE MANAGEMENT ENCOUNTER FOLLOWING KIDNEY TRANSPLANT: Status: ACTIVE | Noted: 2023-09-26

## 2024-05-03 PROBLEM — E11.29 TYPE 2 DIABETES MELLITUS WITH OTHER DIABETIC KIDNEY COMPLICATION: Status: ACTIVE | Noted: 2018-08-22

## 2024-05-03 PROBLEM — I10 HYPERTENSION, UNSPECIFIED TYPE: Status: ACTIVE | Noted: 2018-08-20

## 2024-05-03 NOTE — ASSESSMENT
[FreeTextEntry1] : 68 year old male with ESRD was on IHD since 2019 now s/p s/p DDRT on 9/16/23. Donor details -Male in his 30s, Terminal creatinine: 6.2, KDPI 36%, cPRA 23%. Cold ischemia time: 14 hs13 mins,  1. s/p DDRT on 9/16/23 - Allograft function with good UOP and Cr is trending down well. 2. IS meds- Simulect induction. On Envarsus for goal level 6-8  mg po bid  and prednisone 5 mg po daily. 3. HTN - on Coreg 25 mg po bid and Nifedipine 30 mg po bid.  4. DM - on Lantus  24 units at night and Lispro 10 units tid with meals.

## 2024-05-03 NOTE — HISTORY OF PRESENT ILLNESS
[FreeTextEntry1] : 69 year old male  with ESRD was on IHD since 2019 now s/p s/p DDRT on  9/16/23. Nephrologist Dr Capellan.  Other PMH includes- former smoker, GERD, HTN, HLD, anemia of chronic disease, T2DM, CAD s/p stent x3 (2014 at [place]) & x2 (pLCx 10/28/22, LAD 10/31/22, off of plavix as of 5/1/23), CVA , AV fistulogram 7/2023 with cephalic stenosis s/p balloon angioplasty.  OSH- open cholecystectomy, AVF creation and eye surgery.    Donor details -Male in his 30s, Terminal creatinine: 6.2, KDPI 36%, cPRA 23%.Both donor and recipient: blood type O, CMV+, EBV+. Cold ischemia time: 14 hs13 mins, Warm ischemia time: 42 mins OR details- right kidney, single arterial opening into aorta with very early bifurcation, single vein, single ureter. The right renal vein was reconstructed with donor IVC in a horizontal fashion with running 6-0 Prolene sutures.   Post txp course 1. Admitted for hyperglycemia and started on Lantus and lispro. Found to have UTI , completed Abx course. 2. Recently admitted for heartburn. EGD revealed grade A esophagitis.discharged on sucralfate, maalaox and tums    BP and BS well controlled.  Has good UOP  Denies any fever, chills, dysuria, LE edema, cough, sob, chest pain , nausea, vomiting , diarrhea, constipation or any other active complaints.

## 2024-05-06 LAB — BKV DNA SPEC QL NAA+PROBE: NOT DETECTED IU/ML

## 2024-05-14 ENCOUNTER — APPOINTMENT (OUTPATIENT)
Dept: TRANSPLANT | Facility: CLINIC | Age: 69
End: 2024-05-14

## 2024-05-14 DIAGNOSIS — Z94.0 KIDNEY TRANSPLANT STATUS: ICD-10-CM

## 2024-05-14 LAB
ALBUMIN SERPL ELPH-MCNC: 4.3 G/DL
ALP BLD-CCNC: 80 U/L
ALT SERPL-CCNC: 27 U/L
ANION GAP SERPL CALC-SCNC: 10 MMOL/L
APPEARANCE: CLEAR
AST SERPL-CCNC: 20 U/L
BACTERIA: NEGATIVE /HPF
BASOPHILS # BLD AUTO: 0.05 K/UL
BASOPHILS NFR BLD AUTO: 0.7 %
BILIRUB SERPL-MCNC: 1.1 MG/DL
BILIRUBIN URINE: NEGATIVE
BLOOD URINE: NEGATIVE
BUN SERPL-MCNC: 11 MG/DL
CALCIUM SERPL-MCNC: 9.7 MG/DL
CAST: 1 /LPF
CHLORIDE SERPL-SCNC: 111 MMOL/L
CO2 SERPL-SCNC: 19 MMOL/L
COLOR: NORMAL
CREAT SERPL-MCNC: 0.87 MG/DL
CREAT SPEC-SCNC: 135 MG/DL
CREAT/PROT UR: 0.4 RATIO
EGFR: 95 ML/MIN/1.73M2
EOSINOPHIL # BLD AUTO: 0.07 K/UL
EOSINOPHIL NFR BLD AUTO: 0.9 %
EPITHELIAL CELLS: 1 /HPF
GLUCOSE QUALITATIVE U: NEGATIVE MG/DL
GLUCOSE SERPL-MCNC: 94 MG/DL
HCT VFR BLD CALC: 51.1 %
HGB BLD-MCNC: 16.2 G/DL
IMM GRANULOCYTES NFR BLD AUTO: 0.4 %
KETONES URINE: NEGATIVE MG/DL
LEUKOCYTE ESTERASE URINE: NEGATIVE
LYMPHOCYTES # BLD AUTO: 0.55 K/UL
LYMPHOCYTES NFR BLD AUTO: 7.4 %
MAGNESIUM SERPL-MCNC: 1.8 MG/DL
MAN DIFF?: NORMAL
MCHC RBC-ENTMCNC: 28.3 PG
MCHC RBC-ENTMCNC: 31.7 GM/DL
MCV RBC AUTO: 89.3 FL
MICROSCOPIC-UA: NORMAL
MONOCYTES # BLD AUTO: 0.69 K/UL
MONOCYTES NFR BLD AUTO: 9.3 %
NEUTROPHILS # BLD AUTO: 6.03 K/UL
NEUTROPHILS NFR BLD AUTO: 81.3 %
NITRITE URINE: NEGATIVE
PH URINE: 6
PHOSPHATE SERPL-MCNC: 1.9 MG/DL
PLATELET # BLD AUTO: 151 K/UL
POTASSIUM SERPL-SCNC: 3.5 MMOL/L
PROT SERPL-MCNC: 6.8 G/DL
PROT UR-MCNC: 54 MG/DL
PROTEIN URINE: 30 MG/DL
RBC # BLD: 5.72 M/UL
RBC # FLD: 14.8 %
RED BLOOD CELLS URINE: 0 /HPF
SODIUM SERPL-SCNC: 140 MMOL/L
SPECIFIC GRAVITY URINE: 1.02
TACROLIMUS SERPL-MCNC: <2 NG/ML
URATE SERPL-MCNC: 6.4 MG/DL
UROBILINOGEN URINE: 1 MG/DL
WBC # FLD AUTO: 7.42 K/UL
WHITE BLOOD CELLS URINE: 1 /HPF

## 2024-05-14 RX ORDER — TACROLIMUS 4 MG/1
4 TABLET, EXTENDED RELEASE ORAL
Qty: 30 | Refills: 5 | Status: ACTIVE | COMMUNITY
Start: 2024-05-14 | End: 1900-01-01

## 2024-05-14 RX ORDER — TACROLIMUS 1 MG/1
1 TABLET, EXTENDED RELEASE ORAL
Qty: 90 | Refills: 5 | Status: DISCONTINUED | COMMUNITY
Start: 2024-02-20 | End: 2024-05-14

## 2024-05-29 ENCOUNTER — APPOINTMENT (OUTPATIENT)
Dept: INFECTIOUS DISEASE | Facility: CLINIC | Age: 69
End: 2024-05-29
Payer: COMMERCIAL

## 2024-05-29 VITALS
HEIGHT: 66 IN | WEIGHT: 148 LBS | TEMPERATURE: 97.7 F | BODY MASS INDEX: 23.78 KG/M2 | DIASTOLIC BLOOD PRESSURE: 69 MMHG | HEART RATE: 69 BPM | SYSTOLIC BLOOD PRESSURE: 171 MMHG | OXYGEN SATURATION: 98 %

## 2024-05-29 DIAGNOSIS — S91.309A UNSPECIFIED OPEN WOUND, UNSPECIFIED FOOT, INITIAL ENCOUNTER: ICD-10-CM

## 2024-05-29 PROCEDURE — 99204 OFFICE O/P NEW MOD 45 MIN: CPT

## 2024-05-30 NOTE — HISTORY OF PRESENT ILLNESS
[FreeTextEntry1] : Ila  # [id number]  68 Male with PMHx of GERD, HTN, HLD, DM, CAD s/p stent, CVA and ESRD s/p R DDRT on 9/17/23 presented to ID clinic for concern of non-resolving swelling/pain at R posterior ankle.  Patient stated that he noticed a blister at posterior ankle around 3 months ago, started as a blister with pus coming out of it, was seen by his PCP at [place] and was prescribed an antibiotic for 10 days however does not remember the name of the antibiotics, he finished the course around 3 weeks ago. He thinks swelling/pain had slightly improved, however still there.  PCP ordered Foot X ray and was told over the phone that X ray was normal. Patient does not have his Xray report and no report available in the chart.  Denied fever, chills, any trauma to foot. Also denied following with any podiatrist.

## 2024-05-30 NOTE — REVIEW OF SYSTEMS
[Fever] : no fever [Chills] : no chills [Chest Pain] : no chest pain [Shortness Of Breath] : no shortness of breath [Cough] : no cough [Abdominal Pain] : no abdominal pain [Vomiting] : no vomiting [Dysuria] : no dysuria [Skin Lesions] : no skin lesions [de-identified] : R posterior ankle pain

## 2024-05-30 NOTE — PHYSICAL EXAM
[General Appearance - Alert] : alert [General Appearance - In No Acute Distress] : in no acute distress [General Appearance - Well Nourished] : well nourished [Neck Appearance] : the appearance of the neck was normal [Auscultation Breath Sounds / Voice Sounds] : lungs were clear to auscultation bilaterally [Heart Rate And Rhythm] : heart rate was normal and rhythm regular [Heart Sounds] : normal S1 and S2 [Edema] : there was no peripheral edema [Abdomen Soft] : soft [Abdomen Tenderness] : non-tender [] : no rash [Oriented To Time, Place, And Person] : oriented to person, place, and time [Sclera] : the sclera and conjunctiva were normal [PERRL With Normal Accommodation] : pupils were equal in size, round, reactive to light [Extraocular Movements] : extraocular movements were intact [Outer Ear] : the ears and nose were normal in appearance [Oropharynx] : the oropharynx was normal with no thrush [No Palpable Adenopathy] : no palpable adenopathy [Musculoskeletal - Swelling] : no joint swelling [Nail Clubbing] : no clubbing  or cyanosis of the fingernails [Motor Tone] : muscle strength and tone were normal [FreeTextEntry1] : R posterior ankle localized swelling and tenderess, no open wound, erythema or drainage

## 2024-05-30 NOTE — ASSESSMENT
[FreeTextEntry1] : 68 Male with PMHx of GERD, HTN, HLD, DM, CAD s/p stent, CVA and ESRD s/p R DDRT on 9/17/23 presented to ID clinic for concern of non-resolving swelling/pain at R posterior ankle.   #Chronic R posterior ankle healed wound with persistent swelling and pain for 3 months  No evidence of SSTI at this time, no erythema, no warmth no open wounds... No systemic symptoms  CBC done 2 weeks ago with no leukocytosis  Will send ESR, CRP, Fungitell  Will check MRI R foot to R/o OM Will refer the patient to see a podiatrist   Patient was educated about signs/symptoms of infection and if he develops any new fever, chills, open wounds, purulence.. , has to call clinic and go to the ER   RTC after he gets MRI   Seen and discussed with Dr Fraser

## 2024-05-30 NOTE — END OF VISIT
[] : Fellow [FreeTextEntry3] : Patient seen and examined with Dr Jann House I agree with ehr history exam am and plans as noted above  Patient with well healed heel area on exam no open sores no drainage Patient reports prior open ulcer treated with oral antibiotics but unable to provide name of med or name of pharmacy. His concern is lower ankle foot swelling which is minimal on exam and reports having had an out patient X-ray that he was told was 'normal' through another private MD Will refer to podiatry for evaluation  will obtain X-ray and MRI of foot but does not appear to have active infection at this point check ESr, CRP

## 2024-05-31 ENCOUNTER — NON-APPOINTMENT (OUTPATIENT)
Age: 69
End: 2024-05-31

## 2024-06-05 ENCOUNTER — APPOINTMENT (OUTPATIENT)
Dept: TRANSPLANT | Facility: CLINIC | Age: 69
End: 2024-06-05

## 2024-06-10 ENCOUNTER — NON-APPOINTMENT (OUTPATIENT)
Age: 69
End: 2024-06-10

## 2024-06-11 ENCOUNTER — OUTPATIENT (OUTPATIENT)
Dept: OUTPATIENT SERVICES | Facility: HOSPITAL | Age: 69
LOS: 1 days | End: 2024-06-11
Payer: COMMERCIAL

## 2024-06-11 ENCOUNTER — APPOINTMENT (OUTPATIENT)
Dept: MRI IMAGING | Facility: IMAGING CENTER | Age: 69
End: 2024-06-11
Payer: MEDICARE

## 2024-06-11 ENCOUNTER — APPOINTMENT (OUTPATIENT)
Dept: RADIOLOGY | Facility: IMAGING CENTER | Age: 69
End: 2024-06-11
Payer: MEDICARE

## 2024-06-11 DIAGNOSIS — S91.309A UNSPECIFIED OPEN WOUND, UNSPECIFIED FOOT, INITIAL ENCOUNTER: ICD-10-CM

## 2024-06-11 DIAGNOSIS — Z98.890 OTHER SPECIFIED POSTPROCEDURAL STATES: Chronic | ICD-10-CM

## 2024-06-11 LAB
ALBUMIN SERPL ELPH-MCNC: 4.6 G/DL
ALP BLD-CCNC: 78 U/L
ALT SERPL-CCNC: 35 U/L
ANION GAP SERPL CALC-SCNC: 12 MMOL/L
APPEARANCE: CLEAR
AST SERPL-CCNC: 23 U/L
BACTERIA: NEGATIVE /HPF
BASOPHILS # BLD AUTO: 0.05 K/UL
BASOPHILS NFR BLD AUTO: 0.7 %
BILIRUB SERPL-MCNC: 1.1 MG/DL
BILIRUBIN URINE: NEGATIVE
BKV DNA SPEC QL NAA+PROBE: NOT DETECTED IU/ML
BLOOD URINE: NEGATIVE
BUN SERPL-MCNC: 13 MG/DL
CALCIUM SERPL-MCNC: 10.1 MG/DL
CAST: 0 /LPF
CHLORIDE SERPL-SCNC: 107 MMOL/L
CMV DNA SPEC QL NAA+PROBE: NOT DETECTED IU/ML
CMVPCR LOG: NOT DETECTED LOG10IU/ML
CO2 SERPL-SCNC: 21 MMOL/L
COLOR: YELLOW
CREAT SERPL-MCNC: 1.13 MG/DL
CREAT SPEC-SCNC: 132 MG/DL
CREAT/PROT UR: 0.2 RATIO
EGFR: 71 ML/MIN/1.73M2
EOSINOPHIL # BLD AUTO: 0.06 K/UL
EOSINOPHIL NFR BLD AUTO: 0.8 %
EPITHELIAL CELLS: 0 /HPF
GLUCOSE QUALITATIVE U: NEGATIVE MG/DL
GLUCOSE SERPL-MCNC: 77 MG/DL
HCT VFR BLD CALC: 52.1 %
HGB BLD-MCNC: 16.4 G/DL
IMM GRANULOCYTES NFR BLD AUTO: 0.4 %
KETONES URINE: NEGATIVE MG/DL
LEUKOCYTE ESTERASE URINE: NEGATIVE
LYMPHOCYTES # BLD AUTO: 1.01 K/UL
LYMPHOCYTES NFR BLD AUTO: 13.7 %
MAGNESIUM SERPL-MCNC: 1.6 MG/DL
MAN DIFF?: NORMAL
MCHC RBC-ENTMCNC: 28.5 PG
MCHC RBC-ENTMCNC: 31.5 GM/DL
MCV RBC AUTO: 90.6 FL
MICROSCOPIC-UA: NORMAL
MONOCYTES # BLD AUTO: 0.73 K/UL
MONOCYTES NFR BLD AUTO: 9.9 %
NEUTROPHILS # BLD AUTO: 5.5 K/UL
NEUTROPHILS NFR BLD AUTO: 74.5 %
NITRITE URINE: NEGATIVE
PH URINE: 6
PHOSPHATE SERPL-MCNC: 2.4 MG/DL
PLATELET # BLD AUTO: 138 K/UL
POTASSIUM SERPL-SCNC: 4 MMOL/L
PROT SERPL-MCNC: 7.1 G/DL
PROT UR-MCNC: 26 MG/DL
PROTEIN URINE: NORMAL MG/DL
RBC # BLD: 5.75 M/UL
RBC # FLD: 14.3 %
RED BLOOD CELLS URINE: 0 /HPF
SODIUM SERPL-SCNC: 140 MMOL/L
SPECIFIC GRAVITY URINE: 1.01
TACROLIMUS SERPL-MCNC: 7.5 NG/ML
URATE SERPL-MCNC: 6.3 MG/DL
UROBILINOGEN URINE: 0.2 MG/DL
WBC # FLD AUTO: 7.38 K/UL
WHITE BLOOD CELLS URINE: 0 /HPF

## 2024-06-11 PROCEDURE — 73718 MRI LOWER EXTREMITY W/O DYE: CPT | Mod: 26,RT

## 2024-06-11 PROCEDURE — 73718 MRI LOWER EXTREMITY W/O DYE: CPT

## 2024-06-11 NOTE — DISCHARGE NOTE ADULT - CLICK TO LAUNCH ORM
Detail Level: Generalized General Sunscreen Counseling: I recommended regular use of a broad spectrum sunscreen with a Sun Protection Factor (SPF) of 30 or higher. Sun protective clothing can be used in lieu of sunscreen, but must be worn the entire time that the patient is exposed to the sun. .

## 2024-06-18 ENCOUNTER — APPOINTMENT (OUTPATIENT)
Dept: WOUND CARE | Facility: HOSPITAL | Age: 69
End: 2024-06-18

## 2024-06-18 ENCOUNTER — APPOINTMENT (OUTPATIENT)
Dept: WOUND CARE | Facility: CLINIC | Age: 69
End: 2024-06-18

## 2024-06-18 ENCOUNTER — OUTPATIENT (OUTPATIENT)
Dept: OUTPATIENT SERVICES | Facility: HOSPITAL | Age: 69
LOS: 1 days | End: 2024-06-18
Payer: COMMERCIAL

## 2024-06-18 DIAGNOSIS — E11.9 TYPE 2 DIABETES MELLITUS W/OUT COMPLICATIONS: ICD-10-CM

## 2024-06-18 DIAGNOSIS — Z79.4 TYPE 2 DIABETES MELLITUS W/OUT COMPLICATIONS: ICD-10-CM

## 2024-06-18 DIAGNOSIS — Z98.890 OTHER SPECIFIED POSTPROCEDURAL STATES: Chronic | ICD-10-CM

## 2024-06-18 DIAGNOSIS — I77.0 ARTERIOVENOUS FISTULA, ACQUIRED: Chronic | ICD-10-CM

## 2024-06-18 DIAGNOSIS — L97.512 NON-PRESSURE CHRONIC ULCER OF OTHER PART OF RIGHT FOOT WITH FAT LAYER EXPOSED: ICD-10-CM

## 2024-06-18 PROCEDURE — 99203 OFFICE O/P NEW LOW 30 MIN: CPT | Mod: 25

## 2024-06-18 PROCEDURE — G0463: CPT | Mod: 25

## 2024-06-18 PROCEDURE — 11042 DBRDMT SUBQ TIS 1ST 20SQCM/<: CPT

## 2024-06-18 PROCEDURE — 93923 UPR/LXTR ART STDY 3+ LVLS: CPT | Mod: 26

## 2024-06-21 LAB — BACTERIA SPEC CULT: NORMAL

## 2024-06-21 RX ORDER — POLYETHYLENE GLYCOL 3350 AND ELECTROLYTES WITH LEMON FLAVOR 236; 22.74; 6.74; 5.86; 2.97 G/4L; G/4L; G/4L; G/4L; G/4L
236 POWDER, FOR SOLUTION ORAL
Qty: 1 | Refills: 0 | Status: ACTIVE | COMMUNITY
Start: 2024-04-04 | End: 1900-01-01

## 2024-06-24 LAB
CRP SERPL-MCNC: <3 MG/L
ERYTHROCYTE [SEDIMENTATION RATE] IN BLOOD BY WESTERGREN METHOD: 4 MM/HR
FUNGITELL QUANTITATIVE VALUE: 56 PG/ML

## 2024-06-25 PROBLEM — E11.9 INSULIN DEPENDENT TYPE 2 DIABETES MELLITUS: Status: ACTIVE | Noted: 2018-11-07

## 2024-06-26 ENCOUNTER — NON-APPOINTMENT (OUTPATIENT)
Age: 69
End: 2024-06-26

## 2024-07-02 ENCOUNTER — APPOINTMENT (OUTPATIENT)
Dept: GASTROENTEROLOGY | Facility: CLINIC | Age: 69
End: 2024-07-02
Payer: MEDICARE

## 2024-07-02 ENCOUNTER — LABORATORY RESULT (OUTPATIENT)
Age: 69
End: 2024-07-02

## 2024-07-02 PROCEDURE — 45380 COLONOSCOPY AND BIOPSY: CPT

## 2024-07-09 ENCOUNTER — OUTPATIENT (OUTPATIENT)
Dept: OUTPATIENT SERVICES | Facility: HOSPITAL | Age: 69
LOS: 1 days | End: 2024-07-09
Payer: COMMERCIAL

## 2024-07-09 ENCOUNTER — APPOINTMENT (OUTPATIENT)
Dept: WOUND CARE | Facility: HOSPITAL | Age: 69
End: 2024-07-09

## 2024-07-09 DIAGNOSIS — Z98.890 OTHER SPECIFIED POSTPROCEDURAL STATES: Chronic | ICD-10-CM

## 2024-07-09 DIAGNOSIS — I77.0 ARTERIOVENOUS FISTULA, ACQUIRED: Chronic | ICD-10-CM

## 2024-07-09 DIAGNOSIS — L97.512 NON-PRESSURE CHRONIC ULCER OF OTHER PART OF RIGHT FOOT WITH FAT LAYER EXPOSED: ICD-10-CM

## 2024-07-09 PROCEDURE — 97597 DBRDMT OPN WND 1ST 20 CM/<: CPT

## 2024-07-15 ENCOUNTER — NON-APPOINTMENT (OUTPATIENT)
Age: 69
End: 2024-07-15

## 2024-07-18 DIAGNOSIS — L97.512 NON-PRESSURE CHRONIC ULCER OF OTHER PART OF RIGHT FOOT WITH FAT LAYER EXPOSED: ICD-10-CM

## 2024-07-18 DIAGNOSIS — E11.9 TYPE 2 DIABETES MELLITUS WITHOUT COMPLICATIONS: ICD-10-CM

## 2024-07-18 DIAGNOSIS — Z79.4 LONG TERM (CURRENT) USE OF INSULIN: ICD-10-CM

## 2024-07-23 ENCOUNTER — NON-APPOINTMENT (OUTPATIENT)
Age: 69
End: 2024-07-23

## 2024-07-23 ENCOUNTER — APPOINTMENT (OUTPATIENT)
Dept: NEPHROLOGY | Facility: CLINIC | Age: 69
End: 2024-07-23
Payer: MEDICARE

## 2024-07-23 VITALS
TEMPERATURE: 97.5 F | BODY MASS INDEX: 24.75 KG/M2 | OXYGEN SATURATION: 98 % | RESPIRATION RATE: 17 BRPM | HEIGHT: 66 IN | DIASTOLIC BLOOD PRESSURE: 69 MMHG | WEIGHT: 154 LBS | HEART RATE: 53 BPM | SYSTOLIC BLOOD PRESSURE: 118 MMHG

## 2024-07-23 DIAGNOSIS — I10 ESSENTIAL (PRIMARY) HYPERTENSION: ICD-10-CM

## 2024-07-23 DIAGNOSIS — Z79.899 OTHER LONG TERM (CURRENT) DRUG THERAPY: ICD-10-CM

## 2024-07-23 DIAGNOSIS — E11.9 TYPE 2 DIABETES MELLITUS W/OUT COMPLICATIONS: ICD-10-CM

## 2024-07-23 DIAGNOSIS — Z79.4 TYPE 2 DIABETES MELLITUS W/OUT COMPLICATIONS: ICD-10-CM

## 2024-07-23 DIAGNOSIS — Z94.0 OTHER LONG TERM (CURRENT) DRUG THERAPY: ICD-10-CM

## 2024-07-23 LAB
25(OH)D3 SERPL-MCNC: 16 NG/ML
BASOPHILS # BLD AUTO: 0.04 K/UL
BASOPHILS NFR BLD AUTO: 0.6 %
EOSINOPHIL # BLD AUTO: 0.04 K/UL
EOSINOPHIL NFR BLD AUTO: 0.6 %
ESTIMATED AVERAGE GLUCOSE: 171 MG/DL
HBA1C MFR BLD HPLC: 7.6 %
HCT VFR BLD CALC: 51.1 %
HGB BLD-MCNC: 16.1 G/DL
IMM GRANULOCYTES NFR BLD AUTO: 0.4 %
LYMPHOCYTES # BLD AUTO: 1.14 K/UL
LYMPHOCYTES NFR BLD AUTO: 16.2 %
MAN DIFF?: NORMAL
MCHC RBC-ENTMCNC: 28.4 PG
MCHC RBC-ENTMCNC: 31.5 GM/DL
MCV RBC AUTO: 90.1 FL
MONOCYTES # BLD AUTO: 0.64 K/UL
MONOCYTES NFR BLD AUTO: 9.1 %
NEUTROPHILS # BLD AUTO: 5.15 K/UL
NEUTROPHILS NFR BLD AUTO: 73.1 %
PLATELET # BLD AUTO: 139 K/UL
RBC # BLD: 5.67 M/UL
RBC # FLD: 13.9 %
TACROLIMUS SERPL-MCNC: 10.4 NG/ML
WBC # FLD AUTO: 7.04 K/UL

## 2024-07-23 PROCEDURE — 99214 OFFICE O/P EST MOD 30 MIN: CPT

## 2024-07-23 NOTE — ASSESSMENT
[FreeTextEntry1] : 69 year old male with ESRD was on IHD since 2019 now s/p s/p DDRT on 9/16/23. Donor details -Male in his 30s, Terminal creatinine: 6.2, KDPI 36%, cPRA 23%. Cold ischemia time: 14 hs13 mins,  1. s/p DDRT on 9/16/23 - Allograft function with good UOP and Cr is trending down well. 2. IS meds- Simulect induction. On Envarsus for goal level 6-8  mg po bid and prednisone 5 mg po daily. 3. HTN - BP is on the lower side. decrease Nifedipine from 90 to 60 mg po daily 4. DM - on Lantus  24 units at night and Lispro 10 units tid with meals.

## 2024-07-23 NOTE — HISTORY OF PRESENT ILLNESS
[FreeTextEntry1] : 69 year old male  with ESRD was on IHD since 2019 now s/p s/p DDRT on  9/16/23. Nephrologist Dr Capellan.  Other PMH includes- former smoker, GERD, HTN, HLD, anemia of chronic disease, T2DM, CAD s/p stent x3 (2014 at [place]) & x2 (pLCx 10/28/22, LAD 10/31/22, off of plavix as of 5/1/23), CVA , AV fistulogram 7/2023 with cephalic stenosis s/p balloon angioplasty.  OSH- open cholecystectomy, AVF creation and eye surgery.    Donor details -Male in his 30s, Terminal creatinine: 6.2, KDPI 36%, cPRA 23%.Both donor and recipient: blood type O, CMV+, EBV+. Cold ischemia time: 14 hs13 mins, Warm ischemia time: 42 mins OR details- right kidney, single arterial opening into aorta with very early bifurcation, single vein, single ureter. The right renal vein was reconstructed with donor IVC in a horizontal fashion with running 6-0 Prolene sutures.   Post txp course 1. Admitted for hyperglycemia and started on Lantus and lispro. Found to have UTI , completed Abx course. 2. admitted for heartburn. EGD revealed grade A esophagitis.discharged on sucralfate, maalaox and tums    Denies any fever, chills, dysuria, LE edema, cough, sob, chest pain , nausea, vomiting , diarrhea, constipation or any other active complaints.

## 2024-07-24 DIAGNOSIS — Z94.0 KIDNEY TRANSPLANT STATUS: ICD-10-CM

## 2024-07-24 LAB
ALBUMIN SERPL ELPH-MCNC: 4.6 G/DL
ALP BLD-CCNC: 72 U/L
ALT SERPL-CCNC: 31 U/L
ANION GAP SERPL CALC-SCNC: 16 MMOL/L
APPEARANCE: CLEAR
AST SERPL-CCNC: 25 U/L
BACTERIA: NEGATIVE /HPF
BILIRUB SERPL-MCNC: 1 MG/DL
BILIRUBIN URINE: NEGATIVE
BLOOD URINE: NEGATIVE
BUN SERPL-MCNC: 20 MG/DL
CALCIUM SERPL-MCNC: 9.6 MG/DL
CALCIUM SERPL-MCNC: 9.6 MG/DL
CAST: 0 /LPF
CHLORIDE SERPL-SCNC: 107 MMOL/L
CHOLEST SERPL-MCNC: 132 MG/DL
CMV DNA SPEC QL NAA+PROBE: NOT DETECTED IU/ML
CMVPCR LOG: NOT DETECTED LOG10IU/ML
CO2 SERPL-SCNC: 19 MMOL/L
COLOR: YELLOW
CREAT SERPL-MCNC: 1.47 MG/DL
CREAT SPEC-SCNC: 164 MG/DL
CREAT/PROT UR: 0.1 RATIO
EGFR: 52 ML/MIN/1.73M2
EPITHELIAL CELLS: 1 /HPF
GLUCOSE QUALITATIVE U: 100 MG/DL
GLUCOSE SERPL-MCNC: 61 MG/DL
HDLC SERPL-MCNC: 47 MG/DL
KETONES URINE: NEGATIVE MG/DL
LDH SERPL-CCNC: 301 U/L
LDLC SERPL CALC-MCNC: 66 MG/DL
LEUKOCYTE ESTERASE URINE: NEGATIVE
MAGNESIUM SERPL-MCNC: 1.8 MG/DL
MICROSCOPIC-UA: NORMAL
NITRITE URINE: NEGATIVE
NONHDLC SERPL-MCNC: 85 MG/DL
PARATHYROID HORMONE INTACT: 223 PG/ML
PH URINE: 5.5
PHOSPHATE SERPL-MCNC: 3 MG/DL
POTASSIUM SERPL-SCNC: 4.9 MMOL/L
PROT SERPL-MCNC: 7.3 G/DL
PROT UR-MCNC: 20 MG/DL
PROTEIN URINE: NORMAL MG/DL
RED BLOOD CELLS URINE: 0 /HPF
SODIUM SERPL-SCNC: 142 MMOL/L
SPECIFIC GRAVITY URINE: 1.02
TRIGL SERPL-MCNC: 107 MG/DL
URATE SERPL-MCNC: 7.6 MG/DL
UROBILINOGEN URINE: 0.2 MG/DL
WHITE BLOOD CELLS URINE: 0 /HPF

## 2024-07-24 RX ORDER — TACROLIMUS 1 MG/1
1 TABLET, EXTENDED RELEASE ORAL
Qty: 90 | Refills: 11 | Status: ACTIVE | COMMUNITY
Start: 2024-07-24 | End: 1900-01-01

## 2024-07-24 RX ORDER — SODIUM BICARBONATE 650 MG/1
650 TABLET ORAL
Qty: 60 | Refills: 3 | Status: ACTIVE | COMMUNITY
Start: 2024-07-24 | End: 1900-01-01

## 2024-07-25 LAB — BKV DNA SPEC QL NAA+PROBE: NOT DETECTED IU/ML

## 2024-07-30 DIAGNOSIS — Z79.4 LONG TERM (CURRENT) USE OF INSULIN: ICD-10-CM

## 2024-07-30 DIAGNOSIS — L97.512 NON-PRESSURE CHRONIC ULCER OF OTHER PART OF RIGHT FOOT WITH FAT LAYER EXPOSED: ICD-10-CM

## 2024-07-30 DIAGNOSIS — E11.9 TYPE 2 DIABETES MELLITUS WITHOUT COMPLICATIONS: ICD-10-CM

## 2024-08-06 ENCOUNTER — APPOINTMENT (OUTPATIENT)
Dept: TRANSPLANT | Facility: CLINIC | Age: 69
End: 2024-08-06

## 2024-08-13 DIAGNOSIS — Z94.0 KIDNEY TRANSPLANT STATUS: ICD-10-CM

## 2024-08-13 RX ORDER — LISINOPRIL 2.5 MG/1
2.5 TABLET ORAL
Qty: 30 | Refills: 5 | Status: ACTIVE | COMMUNITY
Start: 2024-08-13 | End: 1900-01-01

## 2024-08-20 ENCOUNTER — NON-APPOINTMENT (OUTPATIENT)
Age: 69
End: 2024-08-20

## 2024-08-20 LAB
ALBUMIN SERPL ELPH-MCNC: 4.4 G/DL
ANION GAP SERPL CALC-SCNC: 13 MMOL/L
BUN SERPL-MCNC: 14 MG/DL
CALCIUM SERPL-MCNC: 9.9 MG/DL
CHLORIDE SERPL-SCNC: 104 MMOL/L
CO2 SERPL-SCNC: 23 MMOL/L
CREAT SERPL-MCNC: 1.03 MG/DL
EGFR: 80 ML/MIN/1.73M2
GLUCOSE SERPL-MCNC: 179 MG/DL
PHOSPHATE SERPL-MCNC: 2.8 MG/DL
POTASSIUM SERPL-SCNC: 4.9 MMOL/L
SODIUM SERPL-SCNC: 140 MMOL/L
TACROLIMUS SERPL-MCNC: 7 NG/ML

## 2024-08-26 NOTE — ED CLERICAL - DIVISION
Clinical Pharmacy Note  Heparin Dosing       Lab Results   Component Value Date/Time    ANTIXAUHEP 0.68 08/26/2024 06:33 AM      Lab Results   Component Value Date/Time    HGB 11.5 08/26/2024 06:05 AM    HCT 35.4 08/26/2024 06:05 AM     08/26/2024 06:05 AM       Current Infusion Rate: 750 units/hr    Plan:  Bolus:   Rate: 750 units/hr  Next anti-Xa level: 0600 08-27    Pharmacy will continue to monitor and adjust based on anti-Xa results.    Isaias Ovalle [facility], 8/26/2024 8:09 AM     [facility]...

## 2024-10-16 ENCOUNTER — APPOINTMENT (OUTPATIENT)
Dept: NEPHROLOGY | Facility: CLINIC | Age: 69
End: 2024-10-16
Payer: MEDICARE

## 2024-10-16 VITALS
SYSTOLIC BLOOD PRESSURE: 161 MMHG | HEART RATE: 57 BPM | TEMPERATURE: 97.2 F | RESPIRATION RATE: 17 BRPM | WEIGHT: 155 LBS | HEIGHT: 66 IN | BODY MASS INDEX: 24.91 KG/M2 | DIASTOLIC BLOOD PRESSURE: 73 MMHG | OXYGEN SATURATION: 99 %

## 2024-10-16 DIAGNOSIS — I10 ESSENTIAL (PRIMARY) HYPERTENSION: ICD-10-CM

## 2024-10-16 DIAGNOSIS — E11.9 TYPE 2 DIABETES MELLITUS W/OUT COMPLICATIONS: ICD-10-CM

## 2024-10-16 DIAGNOSIS — Z94.0 OTHER LONG TERM (CURRENT) DRUG THERAPY: ICD-10-CM

## 2024-10-16 DIAGNOSIS — Z79.899 OTHER LONG TERM (CURRENT) DRUG THERAPY: ICD-10-CM

## 2024-10-16 DIAGNOSIS — Z79.4 TYPE 2 DIABETES MELLITUS W/OUT COMPLICATIONS: ICD-10-CM

## 2024-10-16 PROCEDURE — 99214 OFFICE O/P EST MOD 30 MIN: CPT

## 2024-10-17 ENCOUNTER — NON-APPOINTMENT (OUTPATIENT)
Age: 69
End: 2024-10-17

## 2024-10-17 LAB
ALBUMIN SERPL ELPH-MCNC: 4.1 G/DL
ALP BLD-CCNC: 70 U/L
ALT SERPL-CCNC: 32 U/L
ANION GAP SERPL CALC-SCNC: 10 MMOL/L
APPEARANCE: CLEAR
AST SERPL-CCNC: 19 U/L
BACTERIA: NEGATIVE /HPF
BASOPHILS # BLD AUTO: 0.03 K/UL
BASOPHILS NFR BLD AUTO: 0.4 %
BILIRUB SERPL-MCNC: 1.1 MG/DL
BILIRUBIN URINE: NEGATIVE
BKV DNA SPEC QL NAA+PROBE: NOT DETECTED IU/ML
BLOOD URINE: NEGATIVE
BUN SERPL-MCNC: 17 MG/DL
CALCIUM SERPL-MCNC: 9.5 MG/DL
CAST: 0 /LPF
CHLORIDE SERPL-SCNC: 103 MMOL/L
CMV DNA SPEC QL NAA+PROBE: NOT DETECTED IU/ML
CMVPCR LOG: NOT DETECTED LOG10IU/ML
CO2 SERPL-SCNC: 23 MMOL/L
COLOR: YELLOW
CREAT SERPL-MCNC: 0.95 MG/DL
CREAT SPEC-SCNC: 72 MG/DL
CREAT/PROT UR: 0.1 RATIO
EGFR: 88 ML/MIN/1.73M2
EOSINOPHIL # BLD AUTO: 0.07 K/UL
EOSINOPHIL NFR BLD AUTO: 0.8 %
EPITHELIAL CELLS: 0 /HPF
GLUCOSE QUALITATIVE U: NEGATIVE MG/DL
GLUCOSE SERPL-MCNC: 149 MG/DL
HCT VFR BLD CALC: 48.7 %
HGB BLD-MCNC: 15.4 G/DL
IMM GRANULOCYTES NFR BLD AUTO: 0.4 %
KETONES URINE: NEGATIVE MG/DL
LEUKOCYTE ESTERASE URINE: NEGATIVE
LYMPHOCYTES # BLD AUTO: 1.12 K/UL
LYMPHOCYTES NFR BLD AUTO: 13.3 %
MAGNESIUM SERPL-MCNC: 1.6 MG/DL
MAN DIFF?: NORMAL
MCHC RBC-ENTMCNC: 28.3 PG
MCHC RBC-ENTMCNC: 31.6 GM/DL
MCV RBC AUTO: 89.5 FL
MICROSCOPIC-UA: NORMAL
MONOCYTES # BLD AUTO: 0.95 K/UL
MONOCYTES NFR BLD AUTO: 11.3 %
NEUTROPHILS # BLD AUTO: 6.23 K/UL
NEUTROPHILS NFR BLD AUTO: 73.8 %
NITRITE URINE: NEGATIVE
PH URINE: 6
PHOSPHATE SERPL-MCNC: 2.4 MG/DL
PLATELET # BLD AUTO: 120 K/UL
POTASSIUM SERPL-SCNC: 5.1 MMOL/L
PROT SERPL-MCNC: 6.4 G/DL
PROT UR-MCNC: 7 MG/DL
PROTEIN URINE: NEGATIVE MG/DL
RBC # BLD: 5.44 M/UL
RBC # FLD: 13.2 %
RED BLOOD CELLS URINE: 0 /HPF
SODIUM SERPL-SCNC: 136 MMOL/L
SPECIFIC GRAVITY URINE: 1.01
TACROLIMUS SERPL-MCNC: 6.4 NG/ML
UROBILINOGEN URINE: 0.2 MG/DL
WBC # FLD AUTO: 8.43 K/UL
WHITE BLOOD CELLS URINE: 0 /HPF

## 2024-10-21 ENCOUNTER — APPOINTMENT (OUTPATIENT)
Dept: CARDIOLOGY | Facility: CLINIC | Age: 69
End: 2024-10-21
Payer: MEDICARE

## 2024-10-21 ENCOUNTER — NON-APPOINTMENT (OUTPATIENT)
Age: 69
End: 2024-10-21

## 2024-10-21 VITALS
SYSTOLIC BLOOD PRESSURE: 128 MMHG | HEART RATE: 66 BPM | BODY MASS INDEX: 24.75 KG/M2 | OXYGEN SATURATION: 97 % | DIASTOLIC BLOOD PRESSURE: 72 MMHG | HEIGHT: 66 IN | WEIGHT: 154 LBS

## 2024-10-21 DIAGNOSIS — I25.5 ISCHEMIC CARDIOMYOPATHY: ICD-10-CM

## 2024-10-21 DIAGNOSIS — Z94.0 KIDNEY TRANSPLANT STATUS: ICD-10-CM

## 2024-10-21 PROCEDURE — G2211 COMPLEX E/M VISIT ADD ON: CPT

## 2024-10-21 PROCEDURE — 99215 OFFICE O/P EST HI 40 MIN: CPT

## 2024-10-21 PROCEDURE — 93000 ELECTROCARDIOGRAM COMPLETE: CPT

## 2024-10-23 ENCOUNTER — APPOINTMENT (OUTPATIENT)
Dept: ULTRASOUND IMAGING | Facility: CLINIC | Age: 69
End: 2024-10-23
Payer: MEDICARE

## 2024-10-23 PROCEDURE — 76700 US EXAM ABDOM COMPLETE: CPT

## 2024-10-28 ENCOUNTER — NON-APPOINTMENT (OUTPATIENT)
Age: 69
End: 2024-10-28

## 2024-11-04 ENCOUNTER — APPOINTMENT (OUTPATIENT)
Dept: NEPHROLOGY | Facility: CLINIC | Age: 69
End: 2024-11-04
Payer: MEDICARE

## 2024-11-04 VITALS
DIASTOLIC BLOOD PRESSURE: 80 MMHG | BODY MASS INDEX: 24.91 KG/M2 | WEIGHT: 155 LBS | HEART RATE: 64 BPM | HEIGHT: 66 IN | OXYGEN SATURATION: 99 % | RESPIRATION RATE: 17 BRPM | SYSTOLIC BLOOD PRESSURE: 159 MMHG | TEMPERATURE: 97 F

## 2024-11-04 DIAGNOSIS — E11.29 TYPE 2 DIABETES MELLITUS WITH OTHER DIABETIC KIDNEY COMPLICATION: ICD-10-CM

## 2024-11-04 DIAGNOSIS — Z79.899 OTHER LONG TERM (CURRENT) DRUG THERAPY: ICD-10-CM

## 2024-11-04 DIAGNOSIS — I10 ESSENTIAL (PRIMARY) HYPERTENSION: ICD-10-CM

## 2024-11-04 DIAGNOSIS — Z94.0 KIDNEY TRANSPLANT STATUS: ICD-10-CM

## 2024-11-04 DIAGNOSIS — Z94.0 OTHER LONG TERM (CURRENT) DRUG THERAPY: ICD-10-CM

## 2024-11-04 PROCEDURE — 99214 OFFICE O/P EST MOD 30 MIN: CPT

## 2024-11-05 ENCOUNTER — NON-APPOINTMENT (OUTPATIENT)
Age: 69
End: 2024-11-05

## 2024-11-05 LAB
ALBUMIN SERPL ELPH-MCNC: 4.2 G/DL
ALP BLD-CCNC: 71 U/L
ALT SERPL-CCNC: 36 U/L
ANION GAP SERPL CALC-SCNC: 8 MMOL/L
APPEARANCE: CLEAR
AST SERPL-CCNC: 22 U/L
BACTERIA: NEGATIVE /HPF
BASOPHILS # BLD AUTO: 0.04 K/UL
BASOPHILS NFR BLD AUTO: 0.5 %
BILIRUB SERPL-MCNC: 1.1 MG/DL
BILIRUBIN URINE: NEGATIVE
BLOOD URINE: NEGATIVE
BUN SERPL-MCNC: 15 MG/DL
CALCIUM SERPL-MCNC: 9.7 MG/DL
CAST: 3 /LPF
CHLORIDE SERPL-SCNC: 106 MMOL/L
CMV DNA SPEC QL NAA+PROBE: NOT DETECTED IU/ML
CMVPCR LOG: NOT DETECTED LOG10IU/ML
CO2 SERPL-SCNC: 26 MMOL/L
COLOR: YELLOW
CREAT SERPL-MCNC: 0.96 MG/DL
CREAT SPEC-SCNC: 146 MG/DL
CREAT/PROT UR: 0.1 RATIO
EGFR: 87 ML/MIN/1.73M2
EOSINOPHIL # BLD AUTO: 0.07 K/UL
EOSINOPHIL NFR BLD AUTO: 0.9 %
EPITHELIAL CELLS: 0 /HPF
GLUCOSE QUALITATIVE U: 100 MG/DL
GLUCOSE SERPL-MCNC: 129 MG/DL
HCT VFR BLD CALC: 48.3 %
HGB BLD-MCNC: 14.7 G/DL
IMM GRANULOCYTES NFR BLD AUTO: 0.6 %
KETONES URINE: NEGATIVE MG/DL
LEUKOCYTE ESTERASE URINE: NEGATIVE
LYMPHOCYTES # BLD AUTO: 1.14 K/UL
LYMPHOCYTES NFR BLD AUTO: 14.5 %
MAGNESIUM SERPL-MCNC: 1.5 MG/DL
MAN DIFF?: NORMAL
MCHC RBC-ENTMCNC: 27.7 PG
MCHC RBC-ENTMCNC: 30.4 G/DL
MCV RBC AUTO: 91.1 FL
MICROSCOPIC-UA: NORMAL
MONOCYTES # BLD AUTO: 0.93 K/UL
MONOCYTES NFR BLD AUTO: 11.8 %
NEUTROPHILS # BLD AUTO: 5.63 K/UL
NEUTROPHILS NFR BLD AUTO: 71.7 %
NITRITE URINE: NEGATIVE
PH URINE: 5.5
PHOSPHATE SERPL-MCNC: 2.5 MG/DL
PLATELET # BLD AUTO: 130 K/UL
POTASSIUM SERPL-SCNC: 4.9 MMOL/L
PROT SERPL-MCNC: 6.6 G/DL
PROT UR-MCNC: 14 MG/DL
PROTEIN URINE: NORMAL MG/DL
RBC # BLD: 5.3 M/UL
RBC # FLD: 13.3 %
RED BLOOD CELLS URINE: 0 /HPF
SODIUM SERPL-SCNC: 141 MMOL/L
SPECIFIC GRAVITY URINE: 1.02
TACROLIMUS SERPL-MCNC: 15.4 NG/ML
UROBILINOGEN URINE: 0.2 MG/DL
WBC # FLD AUTO: 7.86 K/UL
WHITE BLOOD CELLS URINE: 0 /HPF

## 2024-11-06 LAB — BKV DNA SPEC QL NAA+PROBE: NOT DETECTED IU/ML

## 2025-01-21 RX ORDER — INSULIN ASPART 100 [IU]/ML
100 INJECTION, SOLUTION INTRAVENOUS; SUBCUTANEOUS
Qty: 2 | Refills: 3 | Status: ACTIVE | COMMUNITY
Start: 2025-01-21 | End: 1900-01-01

## 2025-01-23 DIAGNOSIS — Z94.0 KIDNEY TRANSPLANT STATUS: ICD-10-CM

## 2025-01-27 ENCOUNTER — APPOINTMENT (OUTPATIENT)
Dept: NEPHROLOGY | Facility: CLINIC | Age: 70
End: 2025-01-27
Payer: MEDICARE

## 2025-01-27 VITALS
BODY MASS INDEX: 24.75 KG/M2 | HEART RATE: 62 BPM | SYSTOLIC BLOOD PRESSURE: 163 MMHG | WEIGHT: 154 LBS | OXYGEN SATURATION: 99 % | DIASTOLIC BLOOD PRESSURE: 84 MMHG | HEIGHT: 66 IN | TEMPERATURE: 98.1 F | RESPIRATION RATE: 17 BRPM

## 2025-01-27 DIAGNOSIS — Z79.4 TYPE 2 DIABETES MELLITUS W/OUT COMPLICATIONS: ICD-10-CM

## 2025-01-27 DIAGNOSIS — Z79.899 OTHER LONG TERM (CURRENT) DRUG THERAPY: ICD-10-CM

## 2025-01-27 DIAGNOSIS — Z94.0 OTHER LONG TERM (CURRENT) DRUG THERAPY: ICD-10-CM

## 2025-01-27 DIAGNOSIS — E11.9 TYPE 2 DIABETES MELLITUS W/OUT COMPLICATIONS: ICD-10-CM

## 2025-01-27 DIAGNOSIS — I10 ESSENTIAL (PRIMARY) HYPERTENSION: ICD-10-CM

## 2025-01-27 DIAGNOSIS — Z94.0 KIDNEY TRANSPLANT STATUS: ICD-10-CM

## 2025-01-27 PROCEDURE — 99214 OFFICE O/P EST MOD 30 MIN: CPT

## 2025-01-28 ENCOUNTER — NON-APPOINTMENT (OUTPATIENT)
Age: 70
End: 2025-01-28

## 2025-01-28 LAB
ALBUMIN SERPL ELPH-MCNC: 4.2 G/DL
ALP BLD-CCNC: 87 U/L
ALT SERPL-CCNC: 18 U/L
ANION GAP SERPL CALC-SCNC: 13 MMOL/L
APPEARANCE: CLEAR
AST SERPL-CCNC: 15 U/L
BACTERIA: NEGATIVE /HPF
BASOPHILS # BLD AUTO: 0.02 K/UL
BASOPHILS NFR BLD AUTO: 0.3 %
BILIRUB SERPL-MCNC: 0.9 MG/DL
BILIRUBIN URINE: NEGATIVE
BLOOD URINE: NEGATIVE
BUN SERPL-MCNC: 17 MG/DL
CALCIUM SERPL-MCNC: 9.1 MG/DL
CAST: 0 /LPF
CHLORIDE SERPL-SCNC: 104 MMOL/L
CMV DNA SPEC QL NAA+PROBE: NOT DETECTED IU/ML
CMVPCR LOG: NOT DETECTED LOG10IU/ML
CO2 SERPL-SCNC: 22 MMOL/L
COLOR: YELLOW
CREAT SERPL-MCNC: 0.99 MG/DL
CREAT SPEC-SCNC: 146 MG/DL
CREAT/PROT UR: 0.1 RATIO
EGFR: 83 ML/MIN/1.73M2
EOSINOPHIL # BLD AUTO: 0.11 K/UL
EOSINOPHIL NFR BLD AUTO: 1.5 %
EPITHELIAL CELLS: 0 /HPF
GLUCOSE QUALITATIVE U: 100 MG/DL
GLUCOSE SERPL-MCNC: 97 MG/DL
HCT VFR BLD CALC: 39.5 %
HGB BLD-MCNC: 12.5 G/DL
IMM GRANULOCYTES NFR BLD AUTO: 0.1 %
KETONES URINE: NEGATIVE MG/DL
LEUKOCYTE ESTERASE URINE: NEGATIVE
LYMPHOCYTES # BLD AUTO: 1.04 K/UL
LYMPHOCYTES NFR BLD AUTO: 14.3 %
MAGNESIUM SERPL-MCNC: 1.4 MG/DL
MAN DIFF?: NORMAL
MCHC RBC-ENTMCNC: 28.2 PG
MCHC RBC-ENTMCNC: 31.6 G/DL
MCV RBC AUTO: 89 FL
MICROSCOPIC-UA: NORMAL
MONOCYTES # BLD AUTO: 0.86 K/UL
MONOCYTES NFR BLD AUTO: 11.8 %
NEUTROPHILS # BLD AUTO: 5.25 K/UL
NEUTROPHILS NFR BLD AUTO: 72 %
NITRITE URINE: NEGATIVE
PH URINE: 6
PHOSPHATE SERPL-MCNC: 2.7 MG/DL
PLATELET # BLD AUTO: 144 K/UL
POTASSIUM SERPL-SCNC: 4 MMOL/L
PROT SERPL-MCNC: 7 G/DL
PROT UR-MCNC: 16 MG/DL
PROTEIN URINE: NORMAL MG/DL
RBC # BLD: 4.44 M/UL
RBC # FLD: 13.5 %
RED BLOOD CELLS URINE: 0 /HPF
SODIUM SERPL-SCNC: 139 MMOL/L
SPECIFIC GRAVITY URINE: 1.02
TACROLIMUS SERPL-MCNC: 5.8 NG/ML
UROBILINOGEN URINE: 0.2 MG/DL
WBC # FLD AUTO: 7.29 K/UL
WHITE BLOOD CELLS URINE: 0 /HPF

## 2025-01-29 LAB — BKV DNA SPEC QL NAA+PROBE: NOT DETECTED IU/ML

## 2025-02-07 NOTE — ED PROVIDER NOTE - OBJECTIVE STATEMENT
February 7, 2025    Hello, may I speak with Isaak Beyer?    My name is Margy      I am  with [facility]  [address]  [facility] [address] [phone].    Before we get started may I verify your date of birth? 1942    I am calling to officially welcome you to our practice and ask about your recent visit. Is this a good time to talk? yes    Tell me about your visit with us. What things went well?  Appt went great       We're always looking for ways to make our patients' experiences even better. Do you have recommendations on ways we may improve?  no    Overall were you satisfied with your first visit to our practice? yes       I appreciate you taking the time to speak with me today. Is there anything else I can do for you? no      Thank you, and have a great day.       64 yo with HTN, DM and ESRD TThSa HD presenting from home with abd pain int he RUQ.  Started Monday and has gotten significantly worse to now when it is severe and constant.  Does not radiate.  There is some nausea and one episode of NBNB emesis.  No diarrhea.  No dysuria (does not make much).  No fevers.  No history of similar pain and food has not changed symptoms.

## 2025-03-18 DIAGNOSIS — Z94.0 KIDNEY TRANSPLANT STATUS: ICD-10-CM

## 2025-03-20 NOTE — PATIENT PROFILE ADULT - HAS THE PATIENT USED TOBACCO IN THE PAST 30 DAYS?
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   No

## 2025-04-21 ENCOUNTER — NON-APPOINTMENT (OUTPATIENT)
Age: 70
End: 2025-04-21

## 2025-04-21 ENCOUNTER — APPOINTMENT (OUTPATIENT)
Dept: CARDIOLOGY | Facility: CLINIC | Age: 70
End: 2025-04-21
Payer: MEDICARE

## 2025-04-21 VITALS
OXYGEN SATURATION: 100 % | DIASTOLIC BLOOD PRESSURE: 62 MMHG | HEART RATE: 53 BPM | WEIGHT: 162 LBS | SYSTOLIC BLOOD PRESSURE: 136 MMHG | BODY MASS INDEX: 26.15 KG/M2

## 2025-04-21 DIAGNOSIS — I25.10 ATHEROSCLEROTIC HEART DISEASE OF NATIVE CORONARY ARTERY W/OUT ANGINA PECTORIS: ICD-10-CM

## 2025-04-21 DIAGNOSIS — E11.29 TYPE 2 DIABETES MELLITUS WITH OTHER DIABETIC KIDNEY COMPLICATION: ICD-10-CM

## 2025-04-21 DIAGNOSIS — I25.5 ISCHEMIC CARDIOMYOPATHY: ICD-10-CM

## 2025-04-21 PROCEDURE — 99215 OFFICE O/P EST HI 40 MIN: CPT

## 2025-04-21 PROCEDURE — G2211 COMPLEX E/M VISIT ADD ON: CPT

## 2025-04-21 PROCEDURE — 93000 ELECTROCARDIOGRAM COMPLETE: CPT

## 2025-04-28 ENCOUNTER — APPOINTMENT (OUTPATIENT)
Dept: NEPHROLOGY | Facility: CLINIC | Age: 70
End: 2025-04-28
Payer: MEDICAID

## 2025-04-28 VITALS
SYSTOLIC BLOOD PRESSURE: 151 MMHG | WEIGHT: 159 LBS | OXYGEN SATURATION: 99 % | HEART RATE: 58 BPM | RESPIRATION RATE: 16 BRPM | DIASTOLIC BLOOD PRESSURE: 76 MMHG | HEIGHT: 66 IN | BODY MASS INDEX: 25.55 KG/M2 | TEMPERATURE: 98.2 F

## 2025-04-28 DIAGNOSIS — I10 ESSENTIAL (PRIMARY) HYPERTENSION: ICD-10-CM

## 2025-04-28 DIAGNOSIS — Z94.0 KIDNEY TRANSPLANT STATUS: ICD-10-CM

## 2025-04-28 DIAGNOSIS — Z79.899 OTHER LONG TERM (CURRENT) DRUG THERAPY: ICD-10-CM

## 2025-04-28 DIAGNOSIS — E11.29 TYPE 2 DIABETES MELLITUS WITH OTHER DIABETIC KIDNEY COMPLICATION: ICD-10-CM

## 2025-04-28 DIAGNOSIS — Z94.0 OTHER LONG TERM (CURRENT) DRUG THERAPY: ICD-10-CM

## 2025-04-28 PROCEDURE — 99214 OFFICE O/P EST MOD 30 MIN: CPT

## 2025-04-28 RX ORDER — MAGNESIUM OXIDE 241.3 MG/1000MG
400 TABLET ORAL TWICE DAILY
Qty: 60 | Refills: 3 | Status: ACTIVE | COMMUNITY
Start: 2025-04-28

## 2025-04-29 ENCOUNTER — NON-APPOINTMENT (OUTPATIENT)
Age: 70
End: 2025-04-29

## 2025-04-29 LAB
25(OH)D3 SERPL-MCNC: 18.5 NG/ML
ALBUMIN SERPL ELPH-MCNC: 4.5 G/DL
ALP BLD-CCNC: 89 U/L
ALT SERPL-CCNC: 32 U/L
ANION GAP SERPL CALC-SCNC: 11 MMOL/L
APPEARANCE: CLEAR
AST SERPL-CCNC: 24 U/L
BACTERIA: NEGATIVE /HPF
BASOPHILS # BLD AUTO: 0.02 K/UL
BASOPHILS NFR BLD AUTO: 0.3 %
BILIRUB SERPL-MCNC: 0.9 MG/DL
BILIRUBIN URINE: NEGATIVE
BLOOD URINE: NEGATIVE
BUN SERPL-MCNC: 18 MG/DL
CALCIUM SERPL-MCNC: 9.9 MG/DL
CALCIUM SERPL-MCNC: 9.9 MG/DL
CAST: 4 /LPF
CHLORIDE SERPL-SCNC: 103 MMOL/L
CHOLEST SERPL-MCNC: 156 MG/DL
CMV DNA SPEC QL NAA+PROBE: NOT DETECTED IU/ML
CMVPCR LOG: NOT DETECTED LOG10IU/ML
CO2 SERPL-SCNC: 23 MMOL/L
COLOR: YELLOW
CREAT SERPL-MCNC: 1.11 MG/DL
CREAT SPEC-SCNC: 201 MG/DL
CREAT/PROT UR: 0.1 RATIO
EGFRCR SERPLBLD CKD-EPI 2021: 72 ML/MIN/1.73M2
EOSINOPHIL # BLD AUTO: 0.06 K/UL
EOSINOPHIL NFR BLD AUTO: 0.8 %
EPITHELIAL CELLS: 0 /HPF
ESTIMATED AVERAGE GLUCOSE: 258 MG/DL
GLUCOSE QUALITATIVE U: NEGATIVE MG/DL
GLUCOSE SERPL-MCNC: 107 MG/DL
HBA1C MFR BLD HPLC: 10.6 %
HCT VFR BLD CALC: 45.5 %
HDLC SERPL-MCNC: 53 MG/DL
HGB BLD-MCNC: 14.7 G/DL
IMM GRANULOCYTES NFR BLD AUTO: 0.3 %
KETONES URINE: ABNORMAL MG/DL
LDH SERPL-CCNC: 196 U/L
LDLC SERPL-MCNC: 83 MG/DL
LEUKOCYTE ESTERASE URINE: NEGATIVE
LYMPHOCYTES # BLD AUTO: 1.34 K/UL
LYMPHOCYTES NFR BLD AUTO: 17.2 %
MAGNESIUM SERPL-MCNC: 1.8 MG/DL
MAN DIFF?: NORMAL
MCHC RBC-ENTMCNC: 28.1 PG
MCHC RBC-ENTMCNC: 32.3 G/DL
MCV RBC AUTO: 86.8 FL
MICROSCOPIC-UA: NORMAL
MONOCYTES # BLD AUTO: 0.89 K/UL
MONOCYTES NFR BLD AUTO: 11.4 %
NEUTROPHILS # BLD AUTO: 5.45 K/UL
NEUTROPHILS NFR BLD AUTO: 70 %
NITRITE URINE: NEGATIVE
NONHDLC SERPL-MCNC: 102 MG/DL
PARATHYROID HORMONE INTACT: 184 PG/ML
PH URINE: 5.5
PHOSPHATE SERPL-MCNC: 2.8 MG/DL
PLATELET # BLD AUTO: 163 K/UL
POTASSIUM SERPL-SCNC: 5 MMOL/L
PROT SERPL-MCNC: 7.1 G/DL
PROT UR-MCNC: 19 MG/DL
PROTEIN URINE: NORMAL MG/DL
RBC # BLD: 5.24 M/UL
RBC # FLD: 13 %
RED BLOOD CELLS URINE: 1 /HPF
SODIUM SERPL-SCNC: 137 MMOL/L
SPECIFIC GRAVITY URINE: 1.02
TACROLIMUS SERPL-MCNC: 9.9 NG/ML
TRIGL SERPL-MCNC: 106 MG/DL
URATE SERPL-MCNC: 6.7 MG/DL
UROBILINOGEN URINE: 0.2 MG/DL
WBC # FLD AUTO: 7.78 K/UL
WHITE BLOOD CELLS URINE: 0 /HPF

## 2025-04-30 LAB — BKV DNA SPEC QL NAA+PROBE: NOT DETECTED IU/ML

## 2025-05-12 DIAGNOSIS — Z94.0 KIDNEY TRANSPLANT STATUS: ICD-10-CM

## 2025-05-13 ENCOUNTER — APPOINTMENT (OUTPATIENT)
Dept: TRANSPLANT | Facility: CLINIC | Age: 70
End: 2025-05-13

## 2025-05-23 NOTE — ED PROVIDER NOTE - IV ALTEPLASE EXCL REL HIDDEN
Scheduled date of colonoscopy (as of today): 6/17/25  Physician performing colonoscopy: Dr. Ibrahim  Location of colonoscopy: [facility]  Bowel prep reviewed with patient: chuckie  Instructions reviewed with patient by: Stacy BARBA, sent via [employer] letters, pt aware  Clearances: N/A    Pt aware of 5 day hold on Phentermine, including day of procedure.   show

## 2025-07-14 NOTE — PHYSICAL THERAPY INITIAL EVALUATION ADULT - FOLLOWS COMMANDS/ANSWERS QUESTIONS, REHAB EVAL
100% of the time/able to follow multistep instructions/able to follow single-step instructions
MEDICATIONS  (STANDING):  albuterol/ipratropium for Nebulization 3 milliLiter(s) Nebulizer every 6 hours  ARIPiprazole 30 milliGRAM(s) Oral daily  chlorhexidine 2% Cloths 1 Application(s) Topical <User Schedule>  fluticasone propionate/ salmeterol 100-50 MICROgram(s) Diskus 1 Dose(s) Inhalation two times a day  heparin   Injectable 5000 Unit(s) SubCutaneous every 8 hours  hydrALAZINE 100 milliGRAM(s) Oral every 8 hours  nicotine - 21 mG/24Hr(s) Patch 1 Patch Transdermal daily  piperacillin/tazobactam IVPB.. 3.375 Gram(s) IV Intermittent every 12 hours  polyethylene glycol 3350 17 Gram(s) Oral daily  senna 2 Tablet(s) Oral at bedtime    MEDICATIONS  (PRN):  benzocaine/menthol Lozenge 1 Lozenge Oral four times a day PRN Sore Throat

## (undated) DEVICE — DRAPE 3/4 SHEET W REINFORCEMENT 56X77"

## (undated) DEVICE — DRAPE EQUIPMENT BANDED BAG 30 X 30" (SHOWER CAP)

## (undated) DEVICE — VESSEL LOOP MAXI-RED  0.120" X 16"

## (undated) DEVICE — CATH IV SAFE BC 20G X 1.16" (PINK)

## (undated) DEVICE — Device

## (undated) DEVICE — DRAPE SLUSH / WARMER 44 X 66"

## (undated) DEVICE — ELCTR BOVIE PENCIL SMOKE EVACUATION

## (undated) DEVICE — SENSOR O2 FINGER ADULT

## (undated) DEVICE — WARMING BLANKET LOWER ADULT

## (undated) DEVICE — CATH IV SAFE INSYTE 16G X 1.16" (GRAY)

## (undated) DEVICE — GOWN XL EXTRA LONG

## (undated) DEVICE — DRSG DERMABOND 0.7ML

## (undated) DEVICE — SUT SOFSILK 4-0 18" V-20

## (undated) DEVICE — DRSG TAPE MEDIPORE 3"

## (undated) DEVICE — SUT BIOSYN 4-0 18" P-12

## (undated) DEVICE — BIOPSY FORCEP RADIAL JAW 4 STANDARD WITH NEEDLE

## (undated) DEVICE — SUT PROLENE 7-0 30" C-1

## (undated) DEVICE — PACK BASIN SPECIAL PROCEDURE

## (undated) DEVICE — PREP CHLORAPREP HI-LITE ORANGE 26ML

## (undated) DEVICE — STOPCOCK 4-WAY W SWIVEL MALE LUER LOCK NON VENTED RED CAP

## (undated) DEVICE — DRAPE TOWEL BLUE 17" X 24"

## (undated) DEVICE — TUBING SUCTION CONN 6FT STERILE

## (undated) DEVICE — VENODYNE/SCD SLEEVE CALF LARGE

## (undated) DEVICE — NSA-STRYKER VIDEO TOWER: Type: DURABLE MEDICAL EQUIPMENT

## (undated) DEVICE — SOL IRR POUR NS 0.9% 500ML

## (undated) DEVICE — SUCTION YANKAUER NO CONTROL VENT

## (undated) DEVICE — SOL IRR POUR H2O 250ML

## (undated) DEVICE — SUT BOOT STANDARD (ASSORTED) 5 PAIR

## (undated) DEVICE — WARMING BLANKET UPPER ADULT

## (undated) DEVICE — ELCTR BOVIE TIP BLADE INSULATED 6.5" EDGE

## (undated) DEVICE — DRSG SURG OPSITE 10X4"

## (undated) DEVICE — SPECIMEN CONTAINER 100ML

## (undated) DEVICE — SUT POLYSORB 3-0 30" V-20 UNDYED

## (undated) DEVICE — TUBING TRUWAVE PRESSURE MALE/FEMALE 72"

## (undated) DEVICE — DRAIN RESERVOIR FOR JACKSON PRATT 100CC CARDINAL

## (undated) DEVICE — PACK IV START WITH CHG

## (undated) DEVICE — SUT ETHIBOND 5 30" LR

## (undated) DEVICE — PACK MAJOR ABDOMINAL SUPINE

## (undated) DEVICE — SYR LUER LOK 20CC

## (undated) DEVICE — DRAPE MAYO STAND 30"

## (undated) DEVICE — SYR ALLIANCE II INFLATION 60ML

## (undated) DEVICE — SOL IRR POUR H2O 1500ML

## (undated) DEVICE — SYR LUER LOK 50CC

## (undated) DEVICE — GOWN TRIMAX LG

## (undated) DEVICE — SOL INJ NS 0.9% 500ML 2 PORT

## (undated) DEVICE — PUNCH VASC STD 7.75" HANDLE 4.8MM DISP

## (undated) DEVICE — DRAPE 1/2 SHEET 40X57"

## (undated) DEVICE — CLAMP ALLIGATOR (SINGLE JAW) 3FR X 115CM

## (undated) DEVICE — GLV 8 PROTEXIS ORTHO (CREAM)

## (undated) DEVICE — DRSG CURITY GAUZE SPONGE 4 X 4" 12-PLY

## (undated) DEVICE — NDL COUNTER FOAM AND MAGNET 40-70

## (undated) DEVICE — SUT PDS II 5-0 27" RB-1

## (undated) DEVICE — TUBING SUCTION 20FT

## (undated) DEVICE — BLADE SCALPEL SAFETYLOCK #11

## (undated) DEVICE — CATH IV SAFE BC 22G X 1" (BLUE)

## (undated) DEVICE — SUT PROLENE 6-0 4-30" C-1

## (undated) DEVICE — POSITIONER FOAM HEADREST (PINK)

## (undated) DEVICE — SUT PDS II PLUS 4-0 27" SH

## (undated) DEVICE — ELCTR BOVIE TIP BLADE INSULATED 2.75" EDGE

## (undated) DEVICE — CLAMP ALLIGATOR (SINGLE JAW) 3FR X 65CM

## (undated) DEVICE — SUT SOFSILK 2-0 18" TIES

## (undated) DEVICE — BAG DECANTER DISP

## (undated) DEVICE — PACK CYSTO

## (undated) DEVICE — BALLOON US ENDO

## (undated) DEVICE — DRSG TELFA 3 X 8

## (undated) DEVICE — TUBING TUR 2 PRONG

## (undated) DEVICE — FOLEY HOLDER STATLOCK 2 WAY ADULT

## (undated) DEVICE — GLV 7.5 PROTEXIS (WHITE)

## (undated) DEVICE — POSITIONER FOAM EGG CRATE ULNAR 2PCS (PINK)

## (undated) DEVICE — DRAPE ISOLATION BAG 20X20"

## (undated) DEVICE — DRAPE IOBAN 23" X 23"

## (undated) DEVICE — BITE BLOCK ADULT 20 X 27MM (GREEN)

## (undated) DEVICE — SUT SOFSILK 2-0 18" V-20 (POP-OFF)

## (undated) DEVICE — FOLEY TRAY 16FR 5CC LTX UMETER CLOSED

## (undated) DEVICE — SUT SOFSILK 4-0 18" TIES

## (undated) DEVICE — TUBING IV SET GRAVITY 3Y 100" MACRO